# Patient Record
Sex: FEMALE | Race: WHITE | NOT HISPANIC OR LATINO | Employment: OTHER | URBAN - METROPOLITAN AREA
[De-identification: names, ages, dates, MRNs, and addresses within clinical notes are randomized per-mention and may not be internally consistent; named-entity substitution may affect disease eponyms.]

---

## 2017-01-11 ENCOUNTER — OFFICE VISIT (OUTPATIENT)
Dept: INTERNAL MEDICINE | Facility: CLINIC | Age: 72
End: 2017-01-11

## 2017-01-11 VITALS — HEART RATE: 81 BPM | SYSTOLIC BLOOD PRESSURE: 141 MMHG | TEMPERATURE: 97.7 F | DIASTOLIC BLOOD PRESSURE: 90 MMHG

## 2017-01-11 DIAGNOSIS — M62.838 MUSCLE SPASM: ICD-10-CM

## 2017-01-11 DIAGNOSIS — M62.838 MUSCLE SPASM: Primary | ICD-10-CM

## 2017-01-11 LAB
ANION GAP SERPL CALCULATED.3IONS-SCNC: 5 MMOL/L (ref 3–14)
BUN SERPL-MCNC: 24 MG/DL (ref 7–30)
CALCIUM SERPL-MCNC: 8.7 MG/DL (ref 8.5–10.1)
CHLORIDE SERPL-SCNC: 101 MMOL/L (ref 94–109)
CK SERPL-CCNC: 100 U/L (ref 30–225)
CO2 SERPL-SCNC: 32 MMOL/L (ref 20–32)
CREAT SERPL-MCNC: 0.89 MG/DL (ref 0.52–1.04)
GFR SERPL CREATININE-BSD FRML MDRD: 62 ML/MIN/1.7M2
GLUCOSE SERPL-MCNC: 87 MG/DL (ref 70–99)
POTASSIUM SERPL-SCNC: 4.1 MMOL/L (ref 3.4–5.3)
SODIUM SERPL-SCNC: 139 MMOL/L (ref 133–144)

## 2017-01-11 RX ORDER — CODEINE PHOSPHATE AND GUAIFENESIN 10; 100 MG/5ML; MG/5ML
1-2 SOLUTION ORAL EVERY 4 HOURS PRN
Qty: 420 ML | Status: CANCELLED | OUTPATIENT
Start: 2017-01-11

## 2017-01-11 ASSESSMENT — PAIN SCALES - GENERAL: PAINLEVEL: NO PAIN (0)

## 2017-01-11 NOTE — MR AVS SNAPSHOT
After Visit Summary   1/11/2017    Ca Yan    MRN: [id number]           Patient Information     Date Of Birth          1945        Visit Information        Provider Department      1/11/2017 1:25 PM Marlene Briscoe MD [facility]        Today's Diagnoses     Muscle spasm    -  1       Care Instructions    Primary Care Center Medication Refill Request Information:  * Please contact your pharmacy regarding ANY request for medication refills.  ** [facility] Prescription Fax = [phone]  * Please allow 3 business days for routine medication refills.  * Please allow 5 business days for controlled substance medication refills.     Primary Care Center Test Result notification information:  *You will be notified with in 7-10 days of your appointment day regarding the results of your test.  If you are on MyChart you will be notified as soon as the provider has reviewed the results and signed off on them.          Follow-ups after your visit        Additional Services     PHYSICAL THERAPY REFERRAL       *This therapy referral will be filtered to a centralized scheduling office at [facility] and the patient will receive a call to schedule an appointment at a [facility] location most convenient for them. *     [facility] provides Physical Therapy evaluation and treatment and many specialty services across the [facility] system.  If requesting a specialty program, please choose from the list below.    If you have not heard from the scheduling office within 2 business days, please call [phone] for all locations, with the exception of [place], please call [phone].  Treatment: Evaluation & Treatment  Special Instructions/Modalities: aqua therapy  Special Programs: Aquatic Therapy ([facility] locations only)    Please be aware that coverage of these services is subject to the terms and limitations of your health insurance plan.   Call member services at your health plan with any benefit or coverage questions.      **Note to Provider:  If you are referring outside of [place] for the therapy appointment, please list the name of the location in the  special instructions  above, print the referral and give to the patient to schedule the appointment.                  Your next 10 appointments already scheduled     Jan 11, 2017  2:45 PM   LAB with  LAB   [facility] ([facility])    [address] [place] [address]  [address] [facility] [address] [phone]           Patient must bring picture ID.  Patient should be prepared to give a urine specimen  Please do not eat 10-12 hours before your appointment if you are coming in fasting for labs on lipids, cholesterol, or glucose (sugar).  Pregnant women should follow their Care Team instructions. Water with medications is okay. Do not drink coffee or other fluids.   If you have concerns about taking  your medications, please ask at office or if scheduling via [employer], send a message by clicking on Secure Messaging, Message Your Care Team.            Mar 22, 2017 11:40 AM   (Arrive by 11:25 AM)   New Patient Visit with Lynn Peacock MD   Gastroenterology and IBD ([facility])    [address] [place] [address] [phone]              Future tests that were ordered for you today     Open Future Orders        Priority Expected Expires Ordered    Basic metabolic panel Routine  1/11/2018 1/11/2017    CK total Routine  1/11/2018 1/11/2017            Who to contact     Please call your clinic at [phone] to:    Ask questions about your health    Make or cancel appointments    Discuss your medicines    Learn about your test results    Speak to your doctor   If you have compliments or concerns about an experience at your clinic, or if you wish to file a complaint, please contact [facility] Patient Relations at [phone] or email us at [email or web address]         Additional Information About Your Visit        [employer] Information     [employer] is an electronic gateway that provides easy, online access to your medical records. With [employer], you can request a clinic appointment, read your test results, renew a prescription or communicate with your care team.     To sign up for [employer] visit the website at [email or web address]   You will be asked to enter the access code listed below, as well as some personal information. Please follow the directions to create your username and password.     Your access code is: [email or web address]  Expires: 2017 11:57 AM     Your access code will  in 90 days. If you need help or a new code, please contact your [facility] or call [phone] for assistance.        Care EveryWhere ID     This is your Care EveryWhere ID. This could be used by other organizations to access your [facility] medical records  [id number]        Your Vitals Were     Pulse Temperature Breastfeeding?             81 97.7  F (36.5  C) (Oral) No          Blood Pressure from Last 3 Encounters:   17 141/90   16 143/98   10/31/16 136/84    Weight from Last 3 Encounters:   16 89.812 kg (198 lb)   10/31/16 90.13 kg (198 lb 11.2 oz)   10/10/16 90.538 kg (199 lb 9.6 oz)              We Performed the Following     PHYSICAL THERAPY REFERRAL        Primary Care Provider Office Phone # Fax #    Senia Olivas [phone]        PH[facility]IC[facility] [address] [facility] [address]  [facility] [address]        Thank you!     Thank you for choosing [facility]  for your care. Our goal is always to provide you with excellent care. Hearing back from our patients is one way we can continue to improve our services. Please take a few minutes to complete the written survey that you may receive in the mail after your  "visit with us. Thank you!             Your Updated Medication List - Protect others around you: Learn how to safely use, store and throw away your medicines at www.disposemymeds.org.          This list is accurate as of: 1/11/17  2:31 PM.  Always use your most recent med list.                   Brand Name Dispense Instructions for use    acetaminophen 500 MG tablet    TYLENOL     Take 500-1,000 mg by mouth every 6 hours as needed for mild pain       ASPIRIN EC PO      Take 81 mg by mouth       budesonide 0.5 MG/2ML neb solution    PULMICORT     Take 0.5 mg by nebulization daily       cephalexin 250 MG capsule    KEFLEX         gabapentin 100 MG capsule    NEURONTIN    30 capsule    Take 1 capsule (100 mg) by mouth At Bedtime       hydrochlorothiazide 12.5 MG Tabs tablet      Take 12.5 mg by mouth daily.       ibuprofen 200 MG tablet    ADVIL/MOTRIN    90 tablet    Take 3 tablets (600 mg) by mouth 3 times daily (with meals)       losartan 25 MG tablet    COZAAR    30 tablet    Take 1 tablet (25 mg) by mouth daily       omeprazole 20 MG CR capsule    priLOSEC     Take 20 mg by mouth as needed       order for DME     1 Device    Equipment being ordered: Oxygen  Please provide portable oxygen concentrator (pt would like over the shoulder oxygen device) for portability at 2LPM with activity via nasal canula.       polyethylene glycol Packet    MIRALAX    238 g    One 8.3-ounce bottle (238 g).  Refer to \"Getting Ready for a Colonoscopy\" patient handout       QUEtiapine 200 MG tablet    SEROquel     Take 200 mg by mouth At Bedtime.       tiotropium 18 MCG capsule    SPIRIVA     Inhale 18 mcg into the lungs daily       ZOCOR 20 MG tablet   Generic drug:  simvastatin      Take 20 mg by mouth daily         "

## 2017-01-11 NOTE — PATIENT INSTRUCTIONS
Primary Care Center Medication Refill Request Information:  * Please contact your pharmacy regarding ANY request for medication refills.  ** [facility] Prescription Fax = [phone]  * Please allow 3 business days for routine medication refills.  * Please allow 5 business days for controlled substance medication refills.     Primary Care Center Test Result notification information:  *You will be notified with in 7-10 days of your appointment day regarding the results of your test.  If you are on MyChart you will be notified as soon as the provider has reviewed the results and signed off on them.

## 2017-01-11 NOTE — NURSING NOTE
Chief Complaint   Patient presents with     Cough     Patient here for cough and cramps     Rosaura Taveras LPN at 1:34 PM on 1/11/2017.

## 2017-01-11 NOTE — PROGRESS NOTES
"HPI:  This 71-year-old woman comes in today for evaluation of cold symptoms.  The symptoms started last week and by Sunday, 5 days ago, she felt very ill and visited the [facility].  There, they did a chest x-ray which was negative and prescribed doxycycline and Tessalon Perles.  She does not think that the cough medication is helping at all.  She feels slightly better but is wondering if she could get some codeine cough syrup.  She does not have a sore throat, fever, ear pain, nausea, vomiting or diarrhea.      She also complains of cramps in the muscles around her rib cage, under her breasts, and around her abdomen.  This is a longstanding problem that she experiences when she makes a sudden movement; for example, bending over to wash her hair at the sink.  The cramps are debilitating and last several minutes but are very painful.  She would like to know what is causing them and what she can do to make them better.     She describes that overall her body feels \"terrible.\"  She used to feel much better when she was younger and now everything seems to be going wrong.  She has colonic problems which she declined to describe because they are going to be evaluated by a gastroenterologist in a couple of months; however, this is the fifth or sixth specialist she has seen for this problem.  Her mood is somewhat low and she thinks that perhaps she is just getting old.     ROS:  Constitutional: no fevers, night sweats or unintentional weight change   Eyes: no vision change, diplopia or red eyes   Ears, Nose, Mouth, Throat: no tinnitus or hearing change, no oral lesions, throat clear   Cardiovascular: no chest pain, palpitations,orthopnea or PND   Respiratory: no dyspnea, cough, shortness of breath or wheezing   GI: no nausea, vomiting, diarrhea or constipation, no abdominal pain   : no change in urine, no dysuria or hematuria   Musculoskeletal: see HPI      Patient Active Problem List   Diagnosis     Non " morbid obesity due to excess calories     Alcohol abuse     Malignant neoplasm of breast (H)     Chronic kidney disease, stage III (moderate)     Osteoarthritis of knee     Major depressive disorder with single episode     Diarrhea     Diastolic dysfunction     Osteoarthritis of spine     Gastroesophageal reflux disease     Generalized anxiety disorder     Hypertension     Hyperlipidemia     Insomnia     Irritable bowel syndrome     Kyphoscoliosis     Cluster C personality disorder     History of knee surgery     Seborrheic eczema     Anemia     Anxiety state     Asthma     Back pain     Bunion     Chronic obstructive pulmonary disease, unspecified COPD type (H)     Past Medical History   Diagnosis Date     COPD (chronic obstructive pulmonary disease) (H)      Anxiety      Arthritis      Hypertension      Breast cancer (H)      Past Surgical History   Procedure Laterality Date     Back surgery       spinal fusion     Orthopedic surgery       Knee surgery left side     Lumpectomy breast       Social History   Substance Use Topics     Smoking status: Former Smoker     Types: Cigarettes     Quit date: 09/26/1980     Smokeless tobacco: Never Used     Alcohol Use: No      Comment: Sober for 2 years, previous alcoholic     Family History   Problem Relation Age of Onset     Breast Cancer Mother      DIABETES Mother      Alcohol/Drug Father      MENTAL ILLNESS Father      CEREBROVASCULAR DISEASE Maternal Grandmother 67     Allergies   Allergen Reactions     Percocet [Oxycodone-Acetaminophen]      Vomiting     Seasonal Allergies Other (See Comments)     Pt states that she has sneezing and runny nose.      Vicodin [Hydrocodone-Acetaminophen]      Vomiting     Zolpidem Other (See Comments)     Amnestic behavior     Current Outpatient Prescriptions   Medication Sig Dispense Refill     losartan (COZAAR) 25 MG tablet Take 1 tablet (25 mg) by mouth daily 30 tablet 1     cephALEXin (KEFLEX) 250 MG capsule        ibuprofen  "(ADVIL,MOTRIN) 200 MG tablet Take 3 tablets (600 mg) by mouth 3 times daily (with meals) 90 tablet 0     gabapentin (NEURONTIN) 100 MG capsule Take 1 capsule (100 mg) by mouth At Bedtime 30 capsule 0     order for DME Equipment being ordered: Oxygen  Please provide portable oxygen concentrator (pt would like over the shoulder oxygen device) for portability at 2LPM with activity via nasal canula. 1 Device 1     polyethylene glycol (MIRALAX) packet One 8.3-ounce bottle (238 g).  Refer to \"Getting Ready for a Colonoscopy\" patient handout 238 g 0     acetaminophen (TYLENOL) 500 MG tablet Take 500-1,000 mg by mouth every 6 hours as needed for mild pain       ASPIRIN EC PO Take 81 mg by mouth       budesonide (PULMICORT) 0.5 MG/2ML nebulizer solution Take 0.5 mg by nebulization daily       tiotropium (SPIRIVA) 18 MCG inhalation capsule Inhale 18 mcg into the lungs daily       hydrochlorothiazide 12.5 MG TABS Take 12.5 mg by mouth daily.       omeprazole (PRILOSEC) 20 MG capsule Take 20 mg by mouth as needed        quetiapine (SEROQUEL) 200 MG tablet Take 200 mg by mouth At Bedtime.       simvastatin (ZOCOR) 20 MG tablet Take 20 mg by mouth daily        /90 mmHg  Pulse 81  Temp(Src) 97.7  F (36.5  C) (Oral)  Wt   Breastfeeding? No    PHYSICAL EXAM:  Constitutional: no distress, comfortable, pleasant, obese  Eyes: anicteric, normal extra-ocular movements   Ears, Nose and Throat: throat clear, neck supple with full range of motion, no thyromegaly.   Cardiovascular: regular rate and rhythm, normal S1 and S2, no murmurs, rubs or gallops  Respiratory: clear to auscultation, no wheezes or crackles, normal breath sounds   Psychological: fairly appropriate mood but interrupts frequently, somewhat anxious  Lymphatic: no cervical lymphadenopathy    A/P:    1.  Upper respiratory tract infection for approximately 1 week.  Symptoms are improving, but she has a persistent cough.  She does not think that the Tessalon is helping " very much, so we considered Robitussin with codeine, which she was positive about.  She does, however, have an allergy to oxycodone; therefore, we did not complete the prescription.  I reassured her that her symptoms were likely to resolve in less than a week.    2.  Muscle cramping.  This is a longstanding problem of unclear etiology.  I did check a CK since she is on a statin; however, it was normal.  I will inform her of this result and recommended that she embark on an exercise program both for muscle stretching and strengthening and also weight loss.  After some discussion, she elected to pursue aqua therapy and I placed a Physical Therapy order.     Total time spent 25 minutes.  More than 50% of the time spent with Ms. Yan on counseling / coordinating her care.

## 2017-01-19 ENCOUNTER — OFFICE VISIT (OUTPATIENT)
Dept: INTERNAL MEDICINE | Facility: CLINIC | Age: 72
End: 2017-01-19

## 2017-01-19 VITALS
DIASTOLIC BLOOD PRESSURE: 89 MMHG | HEART RATE: 83 BPM | WEIGHT: 196 LBS | BODY MASS INDEX: 34.6 KG/M2 | OXYGEN SATURATION: 94 % | RESPIRATION RATE: 20 BRPM | SYSTOLIC BLOOD PRESSURE: 136 MMHG

## 2017-01-19 DIAGNOSIS — R60.0 BILATERAL LEG EDEMA: ICD-10-CM

## 2017-01-19 DIAGNOSIS — F10.20 ALCOHOLISM (H): ICD-10-CM

## 2017-01-19 DIAGNOSIS — E66.09 OBESITY DUE TO EXCESS CALORIES, UNSPECIFIED OBESITY SEVERITY: ICD-10-CM

## 2017-01-19 DIAGNOSIS — R60.0 BILATERAL LEG EDEMA: Primary | ICD-10-CM

## 2017-01-19 LAB
ALBUMIN SERPL-MCNC: 3.4 G/DL (ref 3.4–5)
ALBUMIN UR-MCNC: 30 MG/DL
ALP SERPL-CCNC: 126 U/L (ref 40–150)
ALT SERPL W P-5'-P-CCNC: 13 U/L (ref 0–50)
AMORPH CRY #/AREA URNS HPF: ABNORMAL /HPF
APPEARANCE UR: ABNORMAL
AST SERPL W P-5'-P-CCNC: 22 U/L (ref 0–45)
BACTERIA #/AREA URNS HPF: ABNORMAL /HPF
BILIRUB DIRECT SERPL-MCNC: 0.1 MG/DL (ref 0–0.2)
BILIRUB SERPL-MCNC: 0.3 MG/DL (ref 0.2–1.3)
BILIRUB UR QL STRIP: NEGATIVE
COLOR UR AUTO: YELLOW
GLUCOSE UR STRIP-MCNC: NEGATIVE MG/DL
HGB UR QL STRIP: NEGATIVE
KETONES UR STRIP-MCNC: 5 MG/DL
LEUKOCYTE ESTERASE UR QL STRIP: ABNORMAL
MUCOUS THREADS #/AREA URNS LPF: PRESENT /LPF
NITRATE UR QL: NEGATIVE
PH UR STRIP: 5 PH (ref 5–7)
PROT SERPL-MCNC: 7.9 G/DL (ref 6.8–8.8)
RBC #/AREA URNS AUTO: 0 /HPF (ref 0–2)
SP GR UR STRIP: 1.03 (ref 1–1.03)
SQUAMOUS #/AREA URNS AUTO: 3 /HPF (ref 0–1)
TSH SERPL DL<=0.005 MIU/L-ACNC: 1.56 MU/L (ref 0.4–4)
URN SPEC COLLECT METH UR: ABNORMAL
UROBILINOGEN UR STRIP-MCNC: 0 MG/DL (ref 0–2)
WBC #/AREA URNS AUTO: 3 /HPF (ref 0–2)

## 2017-01-19 RX ORDER — AMMONIUM LACTATE 12 G/100G
CREAM TOPICAL 2 TIMES DAILY PRN
Qty: 385 G | Refills: 3 | Status: SHIPPED | OUTPATIENT
Start: 2017-01-19 | End: 2019-04-15

## 2017-01-19 ASSESSMENT — PAIN SCALES - GENERAL: PAINLEVEL: NO PAIN (0)

## 2017-01-19 NOTE — PATIENT INSTRUCTIONS
Bong Penaloza,  Nice to meet you today. Your leg swelling is likely due to genetics and being overweight. Losing weight and exercise will help. Things that we will today:  1) Obtain blood work to rule out causes that may be making the leg swelling worse: Check Thyroid (TSH), Check liver (hepatic panel and abdominal ultrasound). Your heart ECHO shows that you have good heart function. Your kidney function is also good.  We will also obtain a Venous Ultrasound Competency test on both legs.   2) Your itchy legs are due to dry skin - you will need to use amlactin and also moisturize your legs and feet right after the shower  3) Nutrition referral: to help you make healthy lifestyle choices to eat.  4) Keep up the good work with finding exercise, whether it's aquatic therapy or other.    Dr. Baez

## 2017-01-19 NOTE — PROGRESS NOTES
"[facility] Office Visit  Date: January 19, 2017  Resident: Casi Baez MD  Attending: Joyce Aguirre MD    Ms. Yan is a 71 year old female with hx of COPD here:     History of Present Illness:  Leg swelling: Since age 30-40's. Wonders if it's hereditary, since her grandma had edema \"so bad that her calves draped over her ankles.\" Not on any CCB's, no recent changes to medications. Creatinine function normal in 1/2017, Albumin last checked in 2014 was 4.1.  Last ECHO with normal EF of 60-65%, on 8/5/16.  In 2014, normal ALT/AST. 1/11/17 Creatinine within normal limits. History of alcoholism, drank since age 13 - 26, then 46 - 68. Drank a half a pint of vodka daily, during those years.  Feels very embarassed and will never wear a dress or any clothes that expose her legs.  Of note, last TSH in 10/2015 was 5.35 (high) with normal T4.  Comes into clinic today, because she noticed that the right outer part of her calf has become more itchy.     No fevers, chills. No chest pain. No sob.  No orthopnea, or PND.     A full 10-pt Review of Systems was performed, verified and is negative except as documented in the HPI.  All health questionnaires were reviewed, verified and relevant information documented above.    Past Medical History:  Past Medical History   Diagnosis Date     COPD (chronic obstructive pulmonary disease) (H)      Anxiety      Arthritis      Hypertension      Breast cancer (H)        Active Meds:  Current Outpatient Prescriptions   Medication     ammonium lactate (LAC-HYDRIN) 12 % cream     losartan (COZAAR) 25 MG tablet     cephALEXin (KEFLEX) 250 MG capsule     ibuprofen (ADVIL,MOTRIN) 200 MG tablet     gabapentin (NEURONTIN) 100 MG capsule     order for DME     polyethylene glycol (MIRALAX) packet     acetaminophen (TYLENOL) 500 MG tablet     ASPIRIN EC PO     budesonide (PULMICORT) 0.5 MG/2ML nebulizer solution     tiotropium (SPIRIVA) 18 MCG inhalation capsule     " hydrochlorothiazide 12.5 MG TABS     omeprazole (PRILOSEC) 20 MG capsule     quetiapine (SEROQUEL) 200 MG tablet     simvastatin (ZOCOR) 20 MG tablet     No current facility-administered medications for this visit.        Allergies:  Reviewed, refer to EMR    Relevant Social History:  Retired , working on computer.     Physical Exam:  Vitals: /89 mmHg  Pulse 83  Resp 20  Wt 88.905 kg (196 lb)  SpO2 94%  Constitutional: Alert, oriented, pleasant, no acute distress, overweight  Head: Normocephalic, atraumatic  Eyes: Extra-ocular movements intact, pupils equally round and reactive bilaterally, no scleral icterus  ENT: Oropharynx clear, moist mucus membranes, good dentition  Neck: Supple, no lymphadenopathy, no thyromegaly, no JVD  Cardiovascular: Regular rate and rhythm, no murmurs, rubs or gallops, peripheral pulses full/symmetric  Respiratory: Good air movement bilaterally, lungs clear, no wheezes/rales/rhonchi  GI: Abdomen soft, bowel sounds present, nondistended, nontender, no organomegaly or masses, no rebound/guarding  Musculoskeletal: Knee replacement scar on right knee, 2+ edema vs. Adipose tissue (non-pitting), normal muscle tone, normal gait  Neurologic: Alert and oriented, cranial nerves 2-12 intact, strength 5/5 throughout, sensation to light touch intact, reflexes 2+ bilaterally  Skin: Right outer calf with faint erythematous excoriation, very dry skin with scale, no warmth / sign of infection, no palpable cords  Psychiatric: normal mentation, affect and mood    Recent Labs:  NA      139   1/11/2017   POTASSIUM      4.1   1/11/2017  CHLORIDE      101   1/11/2017  SYMONE      8.7   1/11/2017  CO2       32   1/11/2017  BUN       24   1/11/2017  CR     0.89   1/11/2017  GLC       87   1/11/2017  AST       24   5/6/2014  ALT       22   5/6/2014  BILICONJ      0.0   2/7/2006   BILITOTAL      0.5   5/6/2014  ALBUMIN      4.1   5/6/2014  PROTTOTAL      8.0   5/6/2014   ALKPHOS      101    5/6/2014    WBC      6.5   4/21/2016  HGB     13.9   4/21/2016  HCT     43.5   4/21/2016  MCV       97   4/21/2016  PLT      259   4/21/2016      Assessment and Plan:    71 year old female with a history of COPD presents    Ca was seen today for edema.    Diagnoses and all orders for this visit:    Bilateral leg swelling:  Chronic for 30-40 years, with grandmother experiencing similar symptoms. High suspicion for genetic component given that bilateral LE swelling feels on exam more consistent with adipose tissue instead of fluid. DDX for contributing causes could be cirrhosis (hx of alcoholism), heart failure (though normal ECHO in 2016), nephrotic syndrome (less likely given normal renal function 1/11/17). Not on any classically causative meds. TSH was high in past, will check now. Lymphedema wraps unlikely to be of much help given that swelling is more related to tissue instead of fluid.  Suspect itchy skin is 2/2 to dry skin from winter weather.   -     LYMPHEDEMA THERAPY REFERRAL  -     Hepatic panel; Future  -     TSH with free T4 reflex; Future  -     US Venous Competency Left; Future  -     US Venous Competency Right; Future  -     NUTRITION REFERRAL  -     US Abdomen Limited; Future  -     ammonium lactate (LAC-HYDRIN) 12 % cream; Apply topically 2 times daily as needed for dry skin  -     Urine analysis     Obesity due to excess calories, unspecified obesity severity: BMI of 31, pt very interested in learning more information about how to lose weight. Will be embarking on aquatic aerobics class.   -     NUTRITION REFERRAL    Alcoholism (H): History of alcoholism, drank since age 13 - 26, then 46 - 68. Drank a half a pint of vodka daily, during those years. Obtaining hepatic panel and US Abdomen to assess for cirrhosis.   -     US Abdomen Limited; Future    Return to clinic: At next available office visit with Dr. Olivas.     Pt was seen and examined with Dr. Baez.  I agree with her documentation as noted  above.    My additional comments: None    Joyce Aguirre MD

## 2017-01-19 NOTE — MR AVS SNAPSHOT
After Visit Summary   1/19/2017    Ca Yan    MRN: [id number]           Patient Information     Date Of Birth          1945        Visit Information        Provider Department      1/19/2017 1:35 PM Casi Baez MD [facility]        Today's Diagnoses     Bilateral leg edema    -  1     Obesity due to excess calories, unspecified obesity severity         Alcoholism (H)           Care Instructions    Bong Penaloza,  Nice to meet you today. Your leg swelling is likely due to genetics and being overweight. Losing weight and exercise will help. Things that we will today:  1) Obtain blood work to rule out causes that may be making the leg swelling worse: Check Thyroid (TSH), Check liver (hepatic panel and abdominal ultrasound). Your heart ECHO shows that you have good heart function. Your kidney function is also good.  We will also obtain a Venous Ultrasound Competency test on both legs.   2) Your itchy legs are due to dry skin - you will need to use amlactin and also moisturize your legs and feet right after the shower  3) Nutrition referral: to help you make healthy lifestyle choices to eat.  4) Keep up the good work with finding exercise, whether it's aquatic therapy or other.    Dr. Baez         Follow-ups after your visit        Additional Services     LYMPHEDEMA THERAPY REFERRAL       This is for a referral to lymphedema PT and/or OT who will evaluate and treat as necessary.            NUTRITION REFERRAL       Your provider has referred you to: [facility]: [facility] (on call location)  - [place] [phone]   [email or web address]    Please be aware that coverage of these services is subject to the terms and limitations of your health insurance plan.  Call member services at your health plan with any benefit or coverage questions.      Please bring the following with you to your appointment:    (1) This referral request  (2)  Any documents given to you regarding this referral  (3) Any specific questions you have about diet and/or food choices                  Your next 10 appointments already scheduled     Jan 19, 2017  3:00 PM   LAB with  LAB   [facility] ([facility])    [address] [place] [id number] [phone]           Patient must bring picture ID.  Patient should be prepared to give a urine specimen  Please do not eat 10-12 hours before your appointment if you are coming in fasting for labs on lipids, cholesterol, or glucose (sugar).  Pregnant women should follow their Care Team instructions. Water with medications is okay. Do not drink coffee or other fluids.   If you have concerns about taking  your medications, please ask at office or if scheduling via [employer], send a message by clicking on Secure Messaging, Message Your Care Team.            Jan 25, 2017  1:45 PM   Lymphedema Evaluation with Daila Galvez, PT   [facility] [place] [facility] ([facility])    [address] [facility]  1st Floor  [address]  [facility] [id number] [phone]            Jan 27, 2017  9:45 AM   US ABDOMEN LIMITED with UCUS2   [facility] US ([facility])    [address] [place] [id number] [phone]           Please bring a list of your medicines (including vitamins, minerals and over-the-counter drugs). Also, tell your doctor about any allergies you may have. Wear comfortable clothes and leave your valuables at home.  Adults: No eating or drinking for 8 hours before the exam. You may take medicine with a small sip of water.  Children: - Children 6+ years: No food or drink for 6 hours before exam. - Children 1-5 years: No food or drink for 4 hours before exam. - Infants, breast-fed: may have breast milk up to 2 hours before exam. - Infants, formula: may have bottle until  4 hours before exam.  Please call the Imaging Department at your exam site with any questions.            Jan 27, 2017 10:30 AM   US LOWER EXTREMITY VENOUS COMPETENCY RIGHT with UCUSV1   [facility]  ([facility])    [address] [place] [id number] [phone]           Please bring a list of your medicines (including vitamins, minerals and over-the-counter drugs). Also, tell your doctor about any allergies you may have. Wear comfortable clothes and leave your valuables at home.  You do not need to do anything special to prepare for your exam.  Please call the Imaging Department at your exam site with any questions.            Jan 27, 2017 11:30 AM   US LOWER EXTREMITY VENOUS COMPETENCY LEFT with UCUSV1   [facility] ([facility])    [address] [place] [id number] [phone]           Please bring a list of your medicines (including vitamins, minerals and over-the-counter drugs). Also, tell your doctor about any allergies you may have. Wear comfortable clothes and leave your valuables at home.  You do not need to do anything special to prepare for your exam.  Please call the Imaging Department at your exam site with any questions.            Feb 21, 2017  1:00 PM   Pool Evaluation with Delmi Mcknezie, PT   [facility] ([facility])    [address] [place] [address]   [phone]            Feb 27, 2017 10:10 AM   (Arrive by 9:55 AM)   Return Visit with Senia Olivas MD   [facility] ([facility])    [address] [place] [address]   [phone]            Mar 22, 2017 11:40 AM   (Arrive by 11:25 AM)   New Patient Visit with Lynn Peacock MD   [facility] ([facility])    [address] [place] [address]  Se  4th Floor  [facility] [id number] [phone]              Future tests that were ordered for you today     Open Future Orders        Priority Expected Expires Ordered    Hepatic panel Routine  2018    TSH with free T4 reflex Routine  2018    US Venous Competency Left Routine  2018    US Venous Competency Right Routine  2018    US Abdomen Limited Routine  2018            Who to contact     Please call your clinic at [phone] to:    Ask questions about your health    Make or cancel appointments    Discuss your medicines    Learn about your test results    Speak to your doctor   If you have compliments or concerns about an experience at your clinic, or if you wish to file a complaint, please contact [facility] Patient Relations at [phone] or email us at [email or web address]         Additional Information About Your Visit        [employer] Information     [employer] is an electronic gateway that provides easy, online access to your medical records. With [employer], you can request a clinic appointment, read your test results, renew a prescription or communicate with your care team.     To sign up for [employer] visit the website at [email or web address]   You will be asked to enter the access code listed below, as well as some personal information. Please follow the directions to create your username and password.     Your access code is: [email or web address]  Expires: 2017 11:57 AM     Your access code will  in 90 days. If you need help or a new code, please contact your [facility] Clinic or call [phone] for assistance.        Care EveryWhere ID     This is your Care EveryWhere ID. This could be used by other organizations to access your [facility] medical records  [id number]        Your Vitals Were     Pulse Respirations Pulse Oximetry             83 20 94%           Blood Pressure from Last 3 Encounters:   01/19/17 136/89   01/11/17 141/90   11/04/16 143/98    Weight from Last 3 Encounters:   01/19/17 88.905 kg (196 lb)   11/04/16 89.812 kg (198 lb)   10/31/16 90.13 kg (198 lb 11.2 oz)              We Performed the Following     *UA reflex to Microscopic     LYMPHEDEMA THERAPY REFERRAL     NUTRITION REFERRAL          Today's Medication Changes          These changes are accurate as of: 1/19/17  2:53 PM.  If you have any questions, ask your nurse or doctor.               Start taking these medicines.        Dose/Directions    ammonium lactate 12 % cream   Commonly known as:  LAC-HYDRIN   Used for:  Bilateral leg edema   Started by:  Casi Baez MD        Apply topically 2 times daily as needed for dry skin   Quantity:  385 g   Refills:  3            Where to get your medicines      These medications were sent to [facility] [address] [facility] [address]  [address] [facility] [address] [address]    Hours:  TRANSPLANT PHONE NUMBER [phone] Phone:  [phone]    - ammonium lactate 12 % cream             Primary Care Provider Office Phone # Fax #    Senia Olivas [phone]        [facility] [address] [place] [address] [facility] [address]  [facility] [address]        Thank you!     Thank you for choosing [facility]  for your care. Our goal is always to provide you with excellent care. Hearing back from our patients is one way we can continue to improve our services. Please take a few minutes to complete the written survey that you may receive in the mail after your visit with us. Thank you!             Your Updated Medication List - Protect others around you: Learn how to safely use, store and throw away your medicines at www.disposemymeds.org.          This list is accurate as of: 1/19/17  2:53 PM.  Always use your most recent med list.                   Brand Name Dispense Instructions for use     "acetaminophen 500 MG tablet    TYLENOL     Take 500-1,000 mg by mouth every 6 hours as needed for mild pain       ammonium lactate 12 % cream    LAC-HYDRIN    385 g    Apply topically 2 times daily as needed for dry skin       ASPIRIN EC PO      Take 81 mg by mouth       budesonide 0.5 MG/2ML neb solution    PULMICORT     Take 0.5 mg by nebulization daily       cephalexin 250 MG capsule    KEFLEX         gabapentin 100 MG capsule    NEURONTIN    30 capsule    Take 1 capsule (100 mg) by mouth At Bedtime       hydrochlorothiazide 12.5 MG Tabs tablet      Take 12.5 mg by mouth daily.       ibuprofen 200 MG tablet    ADVIL/MOTRIN    90 tablet    Take 3 tablets (600 mg) by mouth 3 times daily (with meals)       losartan 25 MG tablet    COZAAR    30 tablet    Take 1 tablet (25 mg) by mouth daily       omeprazole 20 MG CR capsule    priLOSEC     Take 20 mg by mouth as needed       order for DME     1 Device    Equipment being ordered: Oxygen  Please provide portable oxygen concentrator (pt would like over the shoulder oxygen device) for portability at 2LPM with activity via nasal canula.       polyethylene glycol Packet    MIRALAX    238 g    One 8.3-ounce bottle (238 g).  Refer to \"Getting Ready for a Colonoscopy\" patient handout       QUEtiapine 200 MG tablet    SEROquel     Take 200 mg by mouth At Bedtime.       tiotropium 18 MCG capsule    SPIRIVA     Inhale 18 mcg into the lungs daily       ZOCOR 20 MG tablet   Generic drug:  simvastatin      Take 20 mg by mouth daily         "

## 2017-01-19 NOTE — NURSING NOTE
"Chief Complaint   Patient presents with     Edema     Itching , redness   and edema on outer aspect of left leg for a \"couple of weeks\"   Waleska Patel LPN 1:32 PM on 1/19/2017  "

## 2017-01-19 NOTE — Clinical Note
Patient:  Ca Yan  :   1945  MRN:     [id number]        Ms. Ca Yan  [address]   [place] [facility] [address]        2017    Dear Ms. Yan,    Thank you for choosing the [facility] for your healthcare needs.  We appreciate the opportunity to serve you.    The following are your recent test results.     Results for orders placed or performed in visit on 17   UA reflex to Microscopic   Result Value Ref Range    Color Urine Yellow     Appearance Urine Slightly Cloudy     Glucose Urine Negative NEG mg/dL    Bilirubin Urine Negative NEG    Ketones Urine 5 (A) NEG mg/dL    Specific Gravity Urine 1.028 1.003 - 1.035    Blood Urine Negative NEG    pH Urine 5.0 5.0 - 7.0 pH    Protein Albumin Urine 30 (A) NEG mg/dL    Urobilinogen mg/dL 0.0 0.0 - 2.0 mg/dL    Nitrite Urine Negative NEG    Leukocyte Esterase Urine Trace (A) NEG    Source Midstream Urine     RBC Urine 0 0 - 2 /HPF    WBC Urine 3 (H) 0 - 2 /HPF    Bacteria Urine Few (A) NEG /HPF    Squamous Epithelial /HPF Urine 3 (H) 0 - 1 /HPF    Mucous Urine Present (A) NEG /LPF    Amorphous Crystals Few (A) NEG /HPF       Your urine test was negative. Please ask your Primary Care Doctor to obtain a Urine Protein: Urine Creatinine ratio at your next visit, to assess the amount of protein in your urine.       Sincerely,    Casi Baez MD/ky

## 2017-01-25 ENCOUNTER — HOSPITAL ENCOUNTER (OUTPATIENT)
Dept: PHYSICAL THERAPY | Facility: CLINIC | Age: 72
Setting detail: THERAPIES SERIES
End: 2017-01-25
Attending: INTERNAL MEDICINE
Payer: COMMERCIAL

## 2017-01-25 DIAGNOSIS — R60.0 BILATERAL LEG EDEMA: Primary | ICD-10-CM

## 2017-01-25 PROCEDURE — 97161 PT EVAL LOW COMPLEX 20 MIN: CPT | Mod: GP | Performed by: PHYSICAL THERAPIST

## 2017-01-25 PROCEDURE — 97110 THERAPEUTIC EXERCISES: CPT | Mod: GP | Performed by: PHYSICAL THERAPIST

## 2017-01-25 PROCEDURE — 97140 MANUAL THERAPY 1/> REGIONS: CPT | Mod: GP | Performed by: PHYSICAL THERAPIST

## 2017-01-25 PROCEDURE — 40000449 ZZHC STATISTIC PT VISIT, LYMPHEDEMA: Performed by: PHYSICAL THERAPIST

## 2017-01-25 PROCEDURE — 97535 SELF CARE MNGMENT TRAINING: CPT | Mod: GP | Performed by: PHYSICAL THERAPIST

## 2017-01-25 NOTE — PROGRESS NOTES
" 01/25/17 1300   Rehab Discipline   Discipline PT   Type of Visit   Type of visit Initial Edema Evaluation   General Information   Start of care 01/25/17   Referring physician Casi Baez MD    Orders Evaluate and treat as indicated   Order date 01/19/17   Medical diagnosis B LE edema   Onset of illness / date of surgery 01/01/17   Edema onset [birth year]01[birth year]   Affected body parts LLE;RLE   Edema etiology Surgery;TKA;Radiation   Location - Radiation L chest and axilla   Edema etiology comments LE edema began in her 20's and has gotten worse recently   Pertinent history of current problem (PT: include personal factors and/or comorbidities that impact the POC; OT: include additional occupational profile info) obesity, COPD, lives alone,difficult to do IADLs due to chronic LBP and COPD and leg volume (past spinal fusion 1957), anxiety about health problems, no longer has a car, has very limited support, history of alcoholism in early adulthood and 22 out of past 25 years; sober 3 years,    Surgical / medical history reviewed Yes   Edema special tests Ultrasound  (will have US done on Friday)   Prior level of functional mobility able to do own housework   Prior treatment (none)   Community support (no help from friends or relatives)   Patient role / employment history Retired  (worked in office, 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 No   Per patient, fall with injury in past year? No   Is patient a fall risk? No   System Outcome Measures   Outcome Measures Lymphedema   Lymphedema Life Impact Scale (score range 0-72). A higher score indicates greater impairment. 30   Subjective Report   Patient report of symptoms heavy   Precautions   Precautions comments none   Patient / Family Goals   Patient / family goals statement \"I'm here " "because my doctor sent me. I didn't know there was anything I could do about it.\"   Pain   Patient currently in pain Yes   Pain location LBP   Pain rating up to 9/10   Pain description comment soreness   Additional pain locations? Pain location 2   Pain location 2 B legs   Pain rating 2 moderate   Pain comments \"legs feel heavy\"   Vital Signs   Vital signs Weight;Height;BMI   Weight 196   Height 5'4\"   BMI 35   Cognitive Status   Orientation Orientation to person, place and time   Level of consciousness Alert   Follows commands and answers questions 100% of the time   Edema Exam / Assessment   Skin condition Non-pitting;Dryness   Skin condition comments rubbery edema in lower legs , painful with light touch, dry lower legs, cracked heels and very large callouses on heels and 1st MT head   Scar Yes   Location R knee   Mobility fair   Capillary refill Symmetrical   Stemmer sign Negative   Stemmer sign comments B 2nd toe   Ulceration No   Girth Measurements   Girth Measurements Refer to separate girth measurement flowsheet   Volume LE   Right LE (mL) to 70 cm: 51345   Left LE (mL) to 70 cm: 19159   Range of Motion   ROM comments WFL   Strength   Strength comments grossly 4/5 B knees and hip flexion   Palpation   Palpation tender to light touch on legs   Planned Edema Interventions   Planned edema interventions Manual lymph drainage;Gradient compression bandaging;Fit for compression garment;Exercises;Precautions to prevent infection / exacerbation;Education;Manual therapy;ADL training;Skin care / precautions;Scar mobilization;Soft tissue mobilization;Home management program development   Planned edema intervention comments B comrpession bandaging to knees, MLD, garments and home program   Clinical Impression   Criteria for skilled therapeutic intervention met Yes   Therapy diagnosis B LE lymphedema   Influenced by the following impairments / conditions Stage 2;Lipedema   Functional limitations due to impairments / " conditions difficult to get boots, slacks to fit, unable to wear skirts, interferes with leisure activities   Clinical Presentation Stable/Uncomplicated   Clinical Presentation Rationale Pt edema has increased only slightly in last few years.   Clinical Decision Making (Complexity) Low complexity   Treatment frequency 3 times / week   Treatment duration 3 weeks then 2 x month x 2 months   Patient / family and/or staff in agreement with plan of care Yes   Risks and benefits of therapy have been explained Yes   Clinical impression comments Pt presents with long history of lipedema/primary lymphedema and no previous treatment and would benefit from complete CDT to decrease volume and increase her function.   Education Assessment   Preferred learning style Listening;Reading;Demonstration;Pictures / video   Barriers to learning Emotional;Physical   Goals   Edema Eval Goals 1;2;3;4   Goal 1   Goal identifier Home program   Goal description Pt will be independent in home program of skin care and exercises   Target date 05/01/17   Goal 2   Goal identifier compression    Goal description Pt will be independent with use of well fitting compression garments and compression bandaging   Target date 05/16/17   Goal 3   Goal identifier volume   Goal description Pt will have 8% or more reduction in volume of lower legs to decrease risk of infection and make clothers fit more easily.   Target date 06/01/17   Goal 4   Goal identifier LLIS   Goal description Pt will have 4 or more point improvement in LLIS score   Target date 05/17/17   Total Evaluation Time   Total evaluation time 15

## 2017-02-20 ENCOUNTER — OFFICE VISIT (OUTPATIENT)
Dept: INTERNAL MEDICINE | Facility: CLINIC | Age: 72
End: 2017-02-20

## 2017-02-20 VITALS
OXYGEN SATURATION: 91 % | SYSTOLIC BLOOD PRESSURE: 157 MMHG | WEIGHT: 194 LBS | HEART RATE: 81 BPM | TEMPERATURE: 98 F | BODY MASS INDEX: 34.25 KG/M2 | RESPIRATION RATE: 18 BRPM | DIASTOLIC BLOOD PRESSURE: 99 MMHG

## 2017-02-20 DIAGNOSIS — Z00.00 HEALTH CARE MAINTENANCE: ICD-10-CM

## 2017-02-20 DIAGNOSIS — J44.9 CHRONIC OBSTRUCTIVE PULMONARY DISEASE, UNSPECIFIED COPD TYPE (H): Primary | ICD-10-CM

## 2017-02-20 LAB
ALBUMIN SERPL-MCNC: 3.6 G/DL (ref 3.4–5)
ALP SERPL-CCNC: 110 U/L (ref 40–150)
ALT SERPL W P-5'-P-CCNC: 18 U/L (ref 0–50)
ANION GAP SERPL CALCULATED.3IONS-SCNC: 8 MMOL/L (ref 3–14)
AST SERPL W P-5'-P-CCNC: 20 U/L (ref 0–45)
BASOPHILS # BLD AUTO: 0.1 10E9/L (ref 0–0.2)
BASOPHILS NFR BLD AUTO: 1 %
BILIRUB SERPL-MCNC: 0.4 MG/DL (ref 0.2–1.3)
BUN SERPL-MCNC: 22 MG/DL (ref 7–30)
CALCIUM SERPL-MCNC: 8.8 MG/DL (ref 8.5–10.1)
CHLORIDE SERPL-SCNC: 105 MMOL/L (ref 94–109)
CO2 SERPL-SCNC: 28 MMOL/L (ref 20–32)
CREAT SERPL-MCNC: 0.92 MG/DL (ref 0.52–1.04)
DIFFERENTIAL METHOD BLD: NORMAL
EOSINOPHIL # BLD AUTO: 0.1 10E9/L (ref 0–0.7)
EOSINOPHIL NFR BLD AUTO: 2.3 %
ERYTHROCYTE [DISTWIDTH] IN BLOOD BY AUTOMATED COUNT: 14.7 % (ref 10–15)
GFR SERPL CREATININE-BSD FRML MDRD: 60 ML/MIN/1.7M2
GLUCOSE SERPL-MCNC: 90 MG/DL (ref 70–99)
HCT VFR BLD AUTO: 42.4 % (ref 35–47)
HGB BLD-MCNC: 13.4 G/DL (ref 11.7–15.7)
IMM GRANULOCYTES # BLD: 0 10E9/L (ref 0–0.4)
IMM GRANULOCYTES NFR BLD: 0.5 %
LYMPHOCYTES # BLD AUTO: 1.4 10E9/L (ref 0.8–5.3)
LYMPHOCYTES NFR BLD AUTO: 22.3 %
MCH RBC QN AUTO: 30.2 PG (ref 26.5–33)
MCHC RBC AUTO-ENTMCNC: 31.6 G/DL (ref 31.5–36.5)
MCV RBC AUTO: 96 FL (ref 78–100)
MONOCYTES # BLD AUTO: 0.5 10E9/L (ref 0–1.3)
MONOCYTES NFR BLD AUTO: 8.9 %
NEUTROPHILS # BLD AUTO: 3.9 10E9/L (ref 1.6–8.3)
NEUTROPHILS NFR BLD AUTO: 65 %
NRBC # BLD AUTO: 0 10*3/UL
NRBC BLD AUTO-RTO: 0 /100
PLATELET # BLD AUTO: 269 10E9/L (ref 150–450)
POTASSIUM SERPL-SCNC: 4.2 MMOL/L (ref 3.4–5.3)
PROT SERPL-MCNC: 8.4 G/DL (ref 6.8–8.8)
RBC # BLD AUTO: 4.44 10E12/L (ref 3.8–5.2)
SODIUM SERPL-SCNC: 141 MMOL/L (ref 133–144)
TSH SERPL DL<=0.05 MIU/L-ACNC: 1.4 MU/L (ref 0.4–4)
WBC # BLD AUTO: 6.1 10E9/L (ref 4–11)

## 2017-02-20 ASSESSMENT — PAIN SCALES - GENERAL: PAINLEVEL: NO PAIN (0)

## 2017-02-20 NOTE — PATIENT INSTRUCTIONS
[facility] Medication Refill Request Information:  * Please contact your pharmacy regarding ANY request for medication refills.  ** [facility] Prescription Fax = [phone]  * Please allow 3 business days for routine medication refills.  * Please allow 5 business days for controlled substance medication refills.     [facility] Test Result notification information:  *You will be notified with in 7-10 days of your appointment day regarding the results of your test.  If you are on MyChart you will be notified as soon as the provider has reviewed the results and signed off on them.    Primary Care Center Phone Number [phone]    1.  Please call my nurse at  to inform me of the name of the bad smelling pill you take at night to prevent bladder infections.    2.  Use albuterol inhaler 2 puffs 2-4 times daily     3.  Labs today:    Ca was seen today for physical.    Diagnoses and all orders for this visit:    Chronic obstructive pulmonary disease, unspecified COPD type (H)  -     Lipid panel reflex to direct LDL - -(Today); Future  -     Dexa hip/pelvis/spine*; Future  -     Pulmonary Function Test; Future  -     6 minute walk test; Future    Health care maintenance  -     Comprehensive metabolic panel; Future  -     CBC with platelets differential; Future  -     TSH; Future

## 2017-02-20 NOTE — NURSING NOTE
Chief Complaint   Patient presents with     Physical     Patient is here for annual physcial.      Ada Murray LPN at 1:38 PM on 2/20/2017.

## 2017-02-20 NOTE — MR AVS SNAPSHOT
After Visit Summary   2/20/2017    Ca Yan    MRN: [id number]           Patient Information     Date Of Birth          1945        Visit Information        Provider Department      2/20/2017 1:40 PM Senia Olivas MD [facility]        Today's Diagnoses     Chronic obstructive pulmonary disease, unspecified COPD type (H)    -  1    Health care maintenance          Care Instructions    Primary Care Center Medication Refill Request Information:  * Please contact your pharmacy regarding ANY request for medication refills.  ** [facility] Prescription Fax = [phone]  * Please allow 3 business days for routine medication refills.  * Please allow 5 business days for controlled substance medication refills.     Primary Care Center Test Result notification information:  *You will be notified with in 7-10 days of your appointment day regarding the results of your test.  If you are on MyChart you will be notified as soon as the provider has reviewed the results and signed off on them.    Primary Care Center Phone Number [phone]    1.  Please call my nurse at  to inform me of the name of the bad smelling pill you take at night to prevent bladder infections.    2.  Use albuterol inhaler 2 puffs 2-4 times daily     3.  Labs today:    Ca was seen today for physical.    Diagnoses and all orders for this visit:    Chronic obstructive pulmonary disease, unspecified COPD type (H)  -     Lipid panel reflex to direct LDL - -(Today); Future  -     Dexa hip/pelvis/spine*; Future  -     Pulmonary Function Test; Future  -     6 minute walk test; Future    Health care maintenance  -     Comprehensive metabolic panel; Future  -     CBC with platelets differential; Future  -     TSH; Future              Follow-ups after your visit        Follow-up notes from your care team     Return in about 2 months (around 4/20/2017).      Your next 10 appointments already scheduled      Feb 20, 2017  3:15 PM CST   LAB with  LAB    Health Lab ([facility])    [address]  [address] [facility] [address] [phone]           Patient must bring picture ID.  Patient should be prepared to give a urine specimen  Please do not eat 10-12 hours before your appointment if you are coming in fasting for labs on lipids, cholesterol, or glucose (sugar).  Pregnant women should follow their Care Team instructions. Water with medications is okay. Do not drink coffee or other fluids.   If you have concerns about taking  your medications, please ask at office or if scheduling via [employer], send a message by clicking on Secure Messaging, Message Your Care Team.            Feb 21, 2017  1:00 PM CST   Pool Evaluation with Delmi Mckenzie, PT   [facility] ([facility])    [address] [place] [address]   [phone]            Mar 01, 2017 11:00 AM CST   (Arrive by 10:45 AM)   CONSULT with Ginny Britton RD   [facility] Gastroenterology and IBD ([facility])    [address] [place] [address]   [phone]            Mar 09, 2017 12:00 PM CST   (Arrive by 11:45 AM)   New Patient Visit with Luis Felipe Narayanan DPM   [facility] Endocrinology ([facility])    [address] [place] [address]   [phone]            Mar 22, 2017 11:40 AM CDT   (Arrive by 11:25 AM)   New Patient Visit with Lynn Peacock MD   [facility] Gastroenterology and IBD ([facility])    [address] [place] [address]   [phone]            Apr 05, 2017  1:00 PM CDT   FULL PULMONARY FUNCTION with  PFL C   [facility] Pulmonary Function Testing ([facility])    [address] [place] [address]   [phone]            Apr 12, 2017  1:00  PM CDT   DX HIP/PELVIS/SPINE with UCDX1   [facility] Dexa ([facility])    [address]  1st Floor  [facility] [address] [phone]           Please do not take any of the following 48 hours prior to your exam: vitamins, calcium tablets, antacids.            Apr 24, 2017  1:40 PM CDT   (Arrive by 1:25 PM)   Return Visit with Senia Olivas MD   [facility] ([facility])    [address]  4th Floor  [facility] [id number] [phone]              Future tests that were ordered for you today     Open Future Orders        Priority Expected Expires Ordered    CBC with platelets differential Routine 2/20/2017 3/6/2017 2/20/2017    TSH Routine 2/20/2017 2/20/2018 2/20/2017    Comprehensive metabolic panel Routine 2/20/2017 3/6/2017 2/20/2017    Dexa hip/pelvis/spine* Routine  2/20/2018 2/20/2017    Pulmonary Function Test Routine  2/20/2018 2/20/2017    6 minute walk test Routine  2/20/2018 2/20/2017            Who to contact     Please call your clinic at [phone] to:    Ask questions about your health    Make or cancel appointments    Discuss your medicines    Learn about your test results    Speak to your doctor   If you have compliments or concerns about an experience at your clinic, or if you wish to file a complaint, please contact [facility] Patient Relations at [phone] or email us at [email or web address]         Additional Information About Your Visit        [employer] Information     [employer] is an electronic gateway that provides easy, online access to your medical records. With [employer], you can request a clinic appointment, read your test results, renew a prescription or communicate with your care team.     To sign up for [employer] visit the website at [email or web address]   You will be asked to enter the access code listed below, as well as some  personal information. Please follow the directions to create your username and password.     Your access code is: [email or web address]  Expires: 2017  6:31 AM     Your access code will  in 90 days. If you need help or a new code, please contact your [facility] or call [phone] for assistance.        Care EveryWhere ID     This is your Care EveryWhere ID. This could be used by other organizations to access your [facility] medical records  [id number]        Your Vitals Were     Pulse Temperature Respirations Pulse Oximetry Breastfeeding? BMI (Body Mass Index)    81 98  F (36.7  C) (Oral) 18 91% No 34.25 kg/m2       Blood Pressure from Last 3 Encounters:   17 (!) 157/99   17 136/89   17 141/90    Weight from Last 3 Encounters:   17 88 kg (194 lb)   17 88.9 kg (196 lb)   16 89.8 kg (198 lb)                 Today's Medication Changes          These changes are accurate as of: 17  3:03 PM.  If you have any questions, ask your nurse or doctor.               Stop taking these medicines if you haven't already. Please contact your care team if you have questions.     budesonide 0.5 MG/2ML neb solution   Commonly known as:  PULMICORT   Stopped by:  Senia Olivas MD                    Primary Care Provider Office Phone # Fax #    Senia Olivas [phone]        [facility] [address]  [facility] [address]        Thank you!     Thank you for choosing [facility]  for your care. Our goal is always to provide you with excellent care. Hearing back from our patients is one way we can continue to improve our services. Please take a few minutes to complete the written survey that you may receive in the mail after your visit with us. Thank you!             Your Updated Medication List - Protect others around you: Learn how to safely use, store and throw away your medicines at www.disposemymeds.org.  "         This list is accurate as of: 2/20/17  3:03 PM.  Always use your most recent med list.                   Brand Name Dispense Instructions for use    acetaminophen 500 MG tablet    TYLENOL     Take 500-1,000 mg by mouth every 6 hours as needed for mild pain       ammonium lactate 12 % cream    LAC-HYDRIN    385 g    Apply topically 2 times daily as needed for dry skin       ASPIRIN EC PO      Take 81 mg by mouth       cephalexin 250 MG capsule    KEFLEX         gabapentin 100 MG capsule    NEURONTIN    30 capsule    Take 1 capsule (100 mg) by mouth At Bedtime       hydrochlorothiazide 12.5 MG Tabs tablet      Take 12.5 mg by mouth daily.       ibuprofen 200 MG tablet    ADVIL/MOTRIN    90 tablet    Take 3 tablets (600 mg) by mouth 3 times daily (with meals)       losartan 25 MG tablet    COZAAR    30 tablet    Take 1 tablet (25 mg) by mouth daily       omeprazole 20 MG CR capsule    priLOSEC     Take 20 mg by mouth as needed       * order for DME     1 Device    Equipment being ordered: Oxygen  Please provide portable oxygen concentrator (pt would like over the shoulder oxygen device) for portability at 2LPM with activity via nasal canula.       * order for DME     2 each    Equipment being ordered: knee high compression stockings, class 1 or 2, night time velcro compression garments, lymphedema bandaging supplies, darco boots to wear during treatment       polyethylene glycol Packet    MIRALAX    238 g    One 8.3-ounce bottle (238 g).  Refer to \"Getting Ready for a Colonoscopy\" patient handout       QUEtiapine 200 MG tablet    SEROquel     Take 200 mg by mouth At Bedtime.       tiotropium 18 MCG capsule    SPIRIVA     Inhale 18 mcg into the lungs daily       ZOCOR 20 MG tablet   Generic drug:  simvastatin      Take 20 mg by mouth daily       * Notice:  This list has 2 medication(s) that are the same as other medications prescribed for you. Read the directions carefully, and ask your doctor or other care " provider to review them with you.

## 2017-02-20 NOTE — PROGRESS NOTES
Chief complaint: Annual exam  History of present illness:  Patient is a 71-year-old woman with a PMH of COPD, depression, anxiety, and chemical dependency, presents for an annual examination. She has number of concerns today:    1.  HTN---has not taken her pills today.  She tells me that she has not checked her blood pressure at home.  She is currently on hydrochlorothiazide 12.5 mg and losartan 25 mg daily. She is not having side effects  2.  COPD.  Always short of breath.  Going to get a pool evaulation at [facility].  Has doen pulmonary rehab at [facility] and  and did not like it.  She has a pulmonologist, Dr. Omar Patel at [facility] who recommended that she be on home oxygen. I'm unable to access care everywhere right now to verify that. Patient says that she hates oxygen and that the tanks are too heavy and she doesn't want to use it.  3.  Chronic low back pain.  Partly due to scoliosis.  No sciatica.  No weakness numbness or tingling.  4.  IBS with Constipation.  She is scheduled to see GI on March 22.  5.  Anxiety.  We talked about her intense loneliness. There are days where she doesn't talk to anyone else. She feels that this is affecting her mental and physical health.  Lives by hersefl alone in the apartment.  Goes to her day program..abdominal tenderness Mad[facility] Meredith 2-3 days per week.   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  5.  Healthcare maintenance: She had a mammogram in November and she is not had a DEXA since 2014. At that time she tells me that her bone density was normal.    Past Medical History   Diagnosis Date     Anxiety      Arthritis      Breast cancer (H)      COPD (chronic obstructive pulmonary disease) (H)      Hypertension      Past Surgical History   Procedure Laterality Date     Back surgery       spinal fusion     Orthopedic surgery        Knee surgery left side     Lumpectomy breast        ROS: 10 point ROS neg other than the symptoms noted above in the HPI.    Family History   Problem Relation Age of Onset     Breast Cancer Mother      DIABETES Mother      Alcohol/Drug Father      MENTAL ILLNESS Father      CEREBROVASCULAR DISEASE Maternal Grandmother 67   ,  Social History     Social History     Marital status: Single     Spouse name: N/A     Number of children: N/A     Years of education: N/A     Occupational History      Retired     Social History Main Topics     Smoking status: Former Smoker     Types: Cigarettes     Quit date: 9/26/1980     Smokeless tobacco: Never Used     Alcohol use No      Comment: Sober for 2 years, previous alcoholic     Drug use: No     Sexual activity: No     Other Topics Concern     Stress Concern Not Asked     Lives alone     Back Care Not Asked     Hx of scoliosis with spine fusion     Exercise Not Asked     Walks     Social History Narrative     BP (!) 157/99  Pulse 81  Temp 98  F (36.7  C) (Oral)  Resp 18  Wt 88 kg (194 lb)  SpO2 91%  Breastfeeding? No  BMI 34.25 kg/m2  Exam:  Constitutional: Obese, very anxious, friendly and talkative 71-year-old woman  Head: Normocephalic. No masses, lesions, tenderness or abnormalities  Neck: Neck supple. No adenopathy. Thyroid symmetric, normal size,, Carotids without bruits.  ENT: ENT exam normal, no neck nodes or sinus tenderness  Cardiovascular: negative, PMI normal. No lifts, heaves, or thrills. RRR. No murmurs, clicks gallops or rub  Respiratory: Decreased ventilation bilaterally but clear to auscultation percussion and there is no clubbing or cyanosis.  Gastrointestinal: Abdomen soft, non-tender. BS normal. No masses, organomegaly  : Deferred  Back:  Scoliosis  Musculoskeletal: Bilateral lymphedema in both lower extremities.  Skin: no suspicious lesions or rashes  Neurologic: Gait normal. Reflexes normal and symmetric. Sensation grossly WNL.  Psychiatric: mentation  "appears normal and affect anxious  Hematologic/Lymphatic/Immunologic: Normal cervical lymph nodes      ASSESSMENT  1.  COPD. I need to review the care everywhere chart to determine what goals stated she is. She's been offered oxygen, and meds and this is severe. We will obtain PFTs without the 6 minutes test which she says she is already had, to get a better idea. I've encouraged her to take albuterol inhaler 2-4 times a day in addition to her Spiriva inhaler.  We will follow up in 2 months on RTC    2.  Essential hypertension. I cannot tell whether this is controlled that she did not take her medications today. I encouraged her to take those daily and will check electrolytes BUN and creatinine today since she said chronic kidney disease. We'll follow-up on RTC.    3.  Anxiety. I would like to discuss this more closely with her on RTC.      4.  Healthcare maintenance: Due for DEXA scan. Up-to-date on mammography. Up-to-date on immunizations.  On RTC we should get lipids checked.    5.  Medication review: Neither she nor I  She was taking gabapentin for. I don't think she is taking. However she is taking a \"very smelly\" medication at night that she thinks is preventing bladder infections. I asked her to ring that medication with her or call me with the name of it so that I can help her decide whether she needs to continue it.  "

## 2017-02-21 ENCOUNTER — HOSPITAL ENCOUNTER (OUTPATIENT)
Dept: PHYSICAL THERAPY | Facility: CLINIC | Age: 72
Setting detail: THERAPIES SERIES
End: 2017-02-21
Attending: INTERNAL MEDICINE
Payer: COMMERCIAL

## 2017-02-21 PROCEDURE — 97162 PT EVAL MOD COMPLEX 30 MIN: CPT | Mod: GP | Performed by: PHYSICAL THERAPIST

## 2017-02-21 PROCEDURE — 40000185 ZZHC STATISTIC PT OUTPT VISIT: Performed by: PHYSICAL THERAPIST

## 2017-02-21 PROCEDURE — 97110 THERAPEUTIC EXERCISES: CPT | Mod: GP | Performed by: PHYSICAL THERAPIST

## 2017-02-21 PROCEDURE — 97161 PT EVAL LOW COMPLEX 20 MIN: CPT | Mod: GP | Performed by: PHYSICAL THERAPIST

## 2017-02-22 ENCOUNTER — TELEPHONE (OUTPATIENT)
Dept: INTERNAL MEDICINE | Facility: CLINIC | Age: 72
End: 2017-02-22

## 2017-02-23 NOTE — PROGRESS NOTES
02/21/17 1300   Quick Adds   Type of Visit Initial OP PT Evaluation   General Information   Start of Care Date 02/21/17   Referring Physician Marlene Briscoe MD   Orders Evaluate and Treat as Indicated   Order Date 01/11/17   Medical Diagnosis Muscle spasm    Onset of illness/injury or Date of Surgery (10+ years LBP; ms spasms over the past few years)   Precautions/Limitations oxygen therapy device and L/min  (per pt report)   Special Instructions Aquatic Therapy   Surgical/Medical history reviewed Yes   Pertinent history of current problem (include personal factors and/or comorbidities that impact the POC) Pt f/u with internist on 1/11/17 to address cold sxs and referred to aquatic therapy at that time for general conditioning and to address chronic abdominal cramping. Per chart review, during this visit pt c/o cramps in ms around ribs and under breasts and abdomen which has been a longstanding problem. Sxs are intermittent and occur with transitions and prolonged positions ie. when bending over to wash her hair in the sink. Cramping lasts several minutes and very painful. Pt reports she is here today because she wants some relief to her chronic LBP. Per chart, chronic LBP partially due to scoliosis, no numbness or tingling. Pertinent PMH includes: kyphoscoliosis with spinal fusion (1975), OA of knees, OA of spine, hx of L knee surgery (5-6 years ago), back pain, COPD, HTN, non-morbid obesity, alcohol abuse, chronic kidney disease stage III, major depressive disorder, generalized anxiety disorder,  IBS. Pt reports prior rounds of aquatic therapy, difficulty recalling when but thinks about 10 years ago for back pain, she also briefly attended through [facility] and stopped going because therapist tried to get her to do deep water therapy and she was uncomfortable with that. She used to be a gym member at [facility] but canceled membership, unable to do land based exercise because she gets short of breath and does  not prefer [facility] pools, she finds them cold.  Pt evaluated by lymphedema therapist 1/25/17, pt reports today she is not interested in this therapy because she does not want to wrap her legs. She has also received pulmonary rehab with most recent evaluation 7/2016 without sessions following. Had f/u with primary care yesterday for annual physical. Per chart, she has a pulmonologist at [facility] who recommended she be on home oxygen. Unable to verify without access to documentation. Pt says she hates oxygen, the tanks are too heavy and she doesn t want to use it. Feels limited by COPD in participating in community activities. Repeats that overall her body feels  terrible . She is also being evaluated by GI for colonic problems, which will be her 5th or 6th specialist for armando problem.    Prior level of function comment PREVIOUSLY: Reports she did not have back pain for many years after receiving spinal fusion when a teenager. 15 years ago was walking  a lot  outside before dx with COPD 10 years ago. Used to exercise in pool in 2002. CURRENTLY: Reports low back soreness after prolonged standing for the past 10 years with immediate relief in sitting. Needs to frequently sit throughout day to relief LBP. She continues to be (I) standing for cooking, cleaning but has been working with  to receive cleaning services and improved access to community. Her biggest concern is that she is unable to go clothes shopping due to shortness of breath walking community distances and LBP with frequent standing breaks when shopping. Feels like she needs to be pushed by a wheelchair. Denies limitations in her balance, no falls, but uses walker at night to go to bathroom.  Denies pain limiting sleep, sleeps  9-10 hours.   Previous/Current Treatment Physical Therapy   Current Community Support Other/Comments  (, in progress arranging cleaning and outings)   Patient role/Employment history Retired   Living  environment Apartment/condo  (alone)   Home/Community Accessibility Comments no stairs to enter. [facility] for appointments. Does not drive.   Current Assistive Devices Walker  (uses at night for access to bathroom)   Patient/Family Goals Statement strengthening back.    General Information Comments No current access to pool. Somewhat interested in joining as gym member for continued exercise following d/c from therapy, but historically has canceled memberships because she does not like the pool available, finds [facility] pools to be cold.    Fall Risk Screen   Fall screen completed by PT   Per patient - Fall 2 or more times in past year? No   Per patient - Fall with injury in past year? No   Timed Up and Go score (seconds) (below)   Is patient a fall risk? No   Pain   Patient currently in pain No   Pain comments reports intermittent sharp pains in low back and cramps in abdomen. back pain provoked with prolonged standing, 8/10 LBP, aching pain in B LEs. abdominal cramps provoked with forward bending to dry hair daily and twisting movements. sitting immediately relieves pain. Laying painfree.    Vital Signs   Vital Signs Pulse;SpO2  (in seated rest, without supplimental O2)   Pulse 74   SpO2 94 %   Cognitive Status Examination   Orientation orientation to person, place and time   Level of Consciousness alert   Follows Commands and Answers Questions 100% of the time   Personal Safety and Judgment impaired  (does not use supplimnetal O2 as recommended)   Observation   Observation Somewhat anxious and interrupts therapist frequently with repeated questions on various topics around pain, lymphedema, COPD, and pool therapy environment. Arrives to session without supplimental O2   Integumentary   Integumentary Comments B lymphedema B LEs. unable to further assess - pt wearing pants and request's therapist does not expose LEs; allodynia to pants moving against LEs   Posture   Posture Comments kyphoscoliosis, inc lumbar  lordosis   Palpation   Palpation (+) tender to light touch distal LEs   Range of Motion (ROM)   ROM Comment trunk ROM grossly limited in extension, B rotation and SB 2/2 spinal fusion, pt able to demo forwrad trunk bend fingertips past knees without pain   Strength   Strength Comments MMT B LEs limited 2/2 pt (+) TTP, able to demo hip flexion, knee flex/ext and ankle DF WNL against gravity, limited functionally (see 30 sec sit <> stand)   Bed Mobility   Bed Mobility Comments independent   Transfer Skills   Transfer Comments independent wihtout need for UE support   Gait   Gait Comments over short distances indoors without AD, non-antalgicreciprocal gait pattern without instability , inc RR with fatigue over short distances   Balance Special Tests   Balance Special Tests Single leg stance right;Single leg stance left;Sit to stand reps;Timed up and go   Balance Special Tests Timed Up and Go   Seconds 12.02 Seconds   Comments no AD, O2 sat 88% following   Balance Special Tests Single Leg Stance Right,   Right, seconds 5 Seconds   Balance Special Tests Single Leg Stance Left   Left, seconds 5 Seconds   Balance Special Tests Sit to Stand Reps in 30 Seconds   Height 18   Comments no UE assisit, 1st trial: 7 reps, 2nd trial: 9 reps, reports some knee discomfort with relief in sitting, no LBP, inc RR, O2 remains >/ = 92% without supplemental O2   Sensory Examination   Sensory Perception Comments denies numbness/tingling   Muscle Tone   Muscle Tone no deficits were identified   Planned Therapy Interventions   Planned Therapy Interventions Comment aquatic therapy   Clinical Impression   Criteria for Skilled Therapeutic Interventions Met yes, treatment indicated   PT Diagnosis impaired functional mobility   Influenced by the following impairments Reported pain with transitional movements and prolonged standing. Allodynia B LEs, lymphedema distal B LEs.  Limited trunk ROM 2/2 spinal fusion, impaired posture, impaired functional  LE strength per 30 sec sit <> stand, impaired O2 sat following brief walking activity without supplemental O2.   Functional limitations due to impairments Impaired functional standing, walking tolerance with inc LBP and abdominal cramps and shortness of breath 2/2 COPD,  impairing participation in daily activities and self cares.    Clinical Presentation Evolving/Changing   Clinical Presentation Rationale Pt desaturation with activity.   Clinical Decision Making (Complexity) Moderate complexity   Therapy Frequency 2 times/Week   Predicted Duration of Therapy Intervention (days/wks) 4 weeks   Risk & Benefits of therapy have been explained Yes   Patient, Family & other staff in agreement with plan of care Yes   Clinical Impression Comments Unable to provoke sxs of abdominal cramps or LBP throughout assessment repeating movements pt reports provokes her ms cramping and standing assessment without provoking LBP. Notably pt did not wear supplemental oxygen throughout session reporting it has been recommended by her pulmonologist to wear all the time, unable to verify recommendations without access to documentation. Her oxygen declined to 88% following brief walking from [facility], pt demonstrates inc RR without sxs otherwise and needed to be guided through pursed lipped breathing for improved O2 sat. Pt would benefit from aquatic therapy towards improved aerobic conditioning, and reported pain towards improved functional mobility. Evaluating therapist plans for f/u with aquatic therapists in [place] to address recommendation for use of supplemental O2 during sessions with further f/u with pt s pulmonologist prn for safety during exercise. Participation in therapy may be limited by pt expressing hesitation towards wearing oxygen and has been displeased with [facility] pool conditions in the past, as well as poor hx of compliance with therapy including previous pool, pulmonary and lymphedema therapies.     Education Assessment    Barriers to Learning Emotional   GOALS   PT Eval Goals 1;2;3;4;5   Goal 1   Goal Identifier HEP   Goal Description Pt to demonstrate (I) with aquatic HEP for progress towards all goals and continued self-maintenance of chronic condition following d/c from physical therapy.    Target Date 04/10/17   Goal 2   Goal Identifier 30 sec sit <> stand (baseline: 1st trial: 7 reps, 2nd trial: 9 reps, reports some knee discomfort with relief in sitting, no LBP, inc RR, O2 remains >/ = 92% without supplemental O2.)   Goal Description Pt to achieve 13 reps to demonstrate improved functional LE strength and endurance to achieve 50th percentile for age and gender matched norms.   Target Date 04/10/17   Goal 3   Goal Identifier Standing tolerance (baseline: reports intermittent sharp pains in low back provoked with prolonged standing, 8/10, sitting immediately relieves pain, frequently sits throughout day for pain relief.)   Goal Description Pt to report improved standing tolerance with LBP maintained </= 6/10 with fewer seated rests throughout day for pain relief, for progress towards  pt s goal of improved standing tolerance to participate in community access for clothes shopping.   Target Date 04/10/17   Goal 4   Goal Identifier IADLs (baseline: abdominal cramps provoked with forward bending to dry hair daily and twisting movements.)   Goal Description Pt to be able to dry hair without abdominal cramps x 1 week towards improved tolerance of daily activities.   Target Date 04/10/17   Goal 5   Goal Identifier Walking tolerance/community access (baseline: O2 sat decreased to 88% without supplemental oxygen walking short distance indoors)   Goal Description Pt to demonstrate improved walking and activity tolerance without shortness of breath 2/2 O2 desaturation with appropriate use of supplemental oxygen throughout therapy sessions and improved compliance outside of sessions towards improved community access.    Target Date  04/10/17   Total Evaluation Time   Total Evaluation Time (Minutes) 60

## 2017-03-01 ENCOUNTER — OFFICE VISIT (OUTPATIENT)
Dept: GASTROENTEROLOGY | Facility: CLINIC | Age: 72
End: 2017-03-01

## 2017-03-01 DIAGNOSIS — K58.9 IRRITABLE BOWEL SYNDROME: ICD-10-CM

## 2017-03-01 DIAGNOSIS — E66.09 NON MORBID OBESITY DUE TO EXCESS CALORIES: Primary | ICD-10-CM

## 2017-03-01 DIAGNOSIS — N18.30 CHRONIC KIDNEY DISEASE, STAGE III (MODERATE) (H): ICD-10-CM

## 2017-03-01 NOTE — LETTER
"3/1/2017       RE: Ca Yan  [address]   [place] [address]     Dear Colleague,    Thank you for referring your patient, Ca Yan, to the [facility] AND IBD at [facility]. Please see a copy of my visit note below.    CLINICAL NUTRITION SERVICES - ASSESSMENT NOTE    REASON FOR ASSESSMENT  Ca Yan is a 71 year old female seen by the dietitian for Medical Nutrition Therapy related obesity referred by Dr. Baez.   Pt accompanied by self.     NUTRITION HISTORY  Factors affecting nutrition intake include: none at this time.     Ca expresses her desire to lose weight.  She reports that she has gained ~40 lbs over the past 5 years d/t inactivity with her COPD.  She reports that she typically eats 3 meals/day with 1 afternoon snack.   She receives 1 meal and her snack from the her day program. She admits that the meal is less healthy than she would prepare, but the portions are moderate.  She admits that she does not need the snack as she is never hungry for it.   She admits to cooking with a lot of butter.     Diet recall:  B toaster strudel with 2 pc polanco, black coffee  L meat, potato, gravy, vegetables, butter  D lean cuisine or turkey sandwich or cream chipped beef on toast  Snack ice cream  Beverages water, skim milk, <12 oz water/day      ANTHROPOMETRICS  Height: 63\"  Weight: 115 lbs  BMI: 34  Weight Status:  Obesity Grade I BMI 30-34.9  IBW: 115 lbs  % IBW: 168%  Weight History:   Wt Readings from Last 5 Encounters:   02/20/17 88 kg (194 lb)   01/19/17 88.9 kg (196 lb)   11/04/16 89.8 kg (198 lb)   10/31/16 90.1 kg (198 lb 11.2 oz)   10/10/16 90.5 kg (199 lb 9.6 oz)     Dosing Weight: 134 lbs/61kg    LABS  Labs reviewed    MEDICATIONS/VITAMINS/MINERALS  Medications reviewed    ASSESSED NUTRITION NEEDS:  Estimated Energy Needs: 9073-0392 kcals (20-25 Kcal/Kg)  Justification: obese  Estimated Protein Needs: 60 grams protein (1 " "g pro/Kg)  Justification: maintenance  Estimated Fluid Needs: 2000  mL   Justification: maintenance    NUTRITION DIAGNOSIS:  Obesity related to self-monitoring deficit, excessive caloric intake AEB diet recall BMI >35    INTERVENTIONS  Recommendations / Nutrition Prescription  1. 1200kcal/day for desired wt loss  2. 2000mL water/day (non-calorie containing beverages)   Nutrition Intervention:  Discussed dietary and behavioral modifications to promote weight loss (portion control, meal consistency, measuring foods, 9\"portion plate method, fiber sources and protein sources.  Suggested pt aim for 1200kcal/day.  Suggested pt reduce daily caloric intake by 300-500kcal for desired wt loss.   Suggested pt increase fluids to >6-8cups/day as able.      Provided pt with corresponding education materials -  1200kcal meal plan, 100-kcal snack ideas, list of quick meal ideas and resources for healthful living websites.  Pt verbalized understanding of education provided.  Expected diet compliance: good.     Nutrition Goals:  1. Aim for 1200kcal/day  2. Increase water intake to >2000mL/day (6-8 cups)  3. Weight loss towards 160 lbs (0.5-1lb/week desirable)    Monitoring/Follow-up: Provided pt with RD contact information for further questions prn.    MONITORING AND EVALUATION:  Weight trends       Time spent with patient: 60 minutes.  Ginny Britton RD, LD        "

## 2017-03-01 NOTE — PROGRESS NOTES
"CLINICAL NUTRITION SERVICES - ASSESSMENT NOTE    REASON FOR ASSESSMENT  Ca Yan is a 71 year old female seen by the dietitian for Medical Nutrition Therapy related obesity referred by Dr. Baez.   Pt accompanied by self.     NUTRITION HISTORY  Factors affecting nutrition intake include: none at this time.     Ca expresses her desire to lose weight.  She reports that she has gained ~40 lbs over the past 5 years d/t inactivity with her COPD.  She reports that she typically eats 3 meals/day with 1 afternoon snack.   She receives 1 meal and her snack from the her day program. She admits that the meal is less healthy than she would prepare, but the portions are moderate.  She admits that she does not need the snack as she is never hungry for it.   She admits to cooking with a lot of butter.     Diet recall:  B toaster strudel with 2 pc polanco, black coffee  L meat, potato, gravy, vegetables, butter  D lean cuisine or turkey sandwich or cream chipped beef on toast  Snack ice cream  Beverages water, skim milk, <12 oz water/day      ANTHROPOMETRICS  Height: 63\"  Weight: 115 lbs  BMI: 34  Weight Status:  Obesity Grade I BMI 30-34.9  IBW: 115 lbs  % IBW: 168%  Weight History:   Wt Readings from Last 5 Encounters:   02/20/17 88 kg (194 lb)   01/19/17 88.9 kg (196 lb)   11/04/16 89.8 kg (198 lb)   10/31/16 90.1 kg (198 lb 11.2 oz)   10/10/16 90.5 kg (199 lb 9.6 oz)     Dosing Weight: 134 lbs/61kg    LABS  Labs reviewed    MEDICATIONS/VITAMINS/MINERALS  Medications reviewed    ASSESSED NUTRITION NEEDS:  Estimated Energy Needs: 3839-1306 kcals (20-25 Kcal/Kg)  Justification: obese  Estimated Protein Needs: 60 grams protein (1 g pro/Kg)  Justification: maintenance  Estimated Fluid Needs: 2000  mL   Justification: maintenance    NUTRITION DIAGNOSIS:  Obesity related to self-monitoring deficit, excessive caloric intake AEB diet recall BMI >35    INTERVENTIONS  Recommendations / Nutrition Prescription  1. 1200kcal/day for " "desired wt loss  2. 2000mL water/day (non-calorie containing beverages)   Nutrition Intervention:  Discussed dietary and behavioral modifications to promote weight loss (portion control, meal consistency, measuring foods, 9\"portion plate method, fiber sources and protein sources.  Suggested pt aim for 1200kcal/day.  Suggested pt reduce daily caloric intake by 300-500kcal for desired wt loss.   Suggested pt increase fluids to >6-8cups/day as able.      Provided pt with corresponding education materials -  1200kcal meal plan, 100-kcal snack ideas, list of quick meal ideas and resources for healthful living websites.  Pt verbalized understanding of education provided.  Expected diet compliance: good.     Nutrition Goals:  1. Aim for 1200kcal/day  2. Increase water intake to >2000mL/day (6-8 cups)  3. Weight loss towards 160 lbs (0.5-1lb/week desirable)    Monitoring/Follow-up: Provided pt with RD contact information for further questions prn.    MONITORING AND EVALUATION:  Weight trends       Time spent with patient: 60 minutes.  Ginny Britton RD, LD      "

## 2017-03-01 NOTE — MR AVS SNAPSHOT
After Visit Summary   3/1/2017    Ca Yan    MRN: [id number]           Patient Information     Date Of Birth          1945        Visit Information        Provider Department      3/1/2017 11:00 AM Ginny Graves RD [facility]        Today's Diagnoses     Non morbid obesity due to excess calories    -  1    Irritable bowel syndrome        Chronic kidney disease, stage III (moderate)           Follow-ups after your visit        Your next 10 appointments already scheduled     Mar 06, 2017  1:00 PM CST   Pool Treatment with Elena Cedeño PT   [facility] Physical Therapy ([facility])    [address]  [address]  [facility] [address]   [phone]            Mar 09, 2017 12:00 PM CST   (Arrive by 11:45 AM)   New Patient Visit with Luis Felipe Narayanan DPM    [facility] ([facility])    [address] [place] [address]  3rd [facility] [address]   [phone]            Mar 13, 2017  1:00 PM CDT   Pool Treatment with Elena Cedeño PT   [facility] Physical Therapy ([facility])    [address]  [address]  [facility] [address]   [phone]            Mar 15, 2017  1:45 PM CDT   Pool Treatment with Lucy Cherry PT   [facility] Physical Therapy ([facility])    [address]  [address]  [facility] [address]   [phone]            Mar 20, 2017  1:00 PM CDT   Pool Treatment with Elena Cedeño PT   [facility] Therapy ([facility])    [address] [place] [address] [address]  [facility] [address]   [phone]            Mar 22, 2017 11:40 AM CDT   (Arrive by 11:25 AM)   New Patient Visit with MD JC Small [facility] Gastroenterology and IBD ([facility])    [address] [place] [address]  4th [facility] [address]   [phone]            Apr 03, 2017  1:00 PM CDT   Pool Treatment with Elena Cedeño PT    [facility] Physical Therapy ([facility])    [address] [place] [address]  [address]  [place] [address]   [phone]            Apr 05, 2017  1:00 PM CDT   FULL PULMONARY FUNCTION with UC PFL C   [facility] Pulmonary Function Testing ([facility])    [address]  3rd Floor  [facility] [address]   [phone]            Apr 10, 2017  1:00 PM CDT   Pool Treatment with Elena Cedeño PT   [facility] Physical Therapy ([facility])    [address] [place] [address]  [address]  [place] [address]   [phone]            Apr 12, 2017  1:00 PM CDT   DX HIP/PELVIS/SPINE with UCDX1   [facility] ([facility])    [address]  1st [facility] [address]   [phone]           Please do not take any of the following 48 hours prior to your exam: vitamins, calcium tablets, antacids.              Who to contact     Please call your clinic at [phone] to:    Ask questions about your health    Make or cancel appointments    Discuss your medicines    Learn about your test results    Speak to your doctor   If you have compliments or concerns about an experience at your clinic, or if you wish to file a complaint, please contact [facility] Patient Relations at [phone] or email us at [email or web address]         Additional Information About Your Visit        Playnery Information     Playnery is an electronic gateway that provides easy, online access to your medical records. With Playnery, you can request a clinic appointment, read your test results, renew a prescription or communicate with your care team.     To sign up for Playnery visit the website at [email or web address]   You will be asked to enter the access code listed below, as well as some personal information. Please follow the directions to create your username and password.     Your access code is:  [email or web address]  Expires: 2017  6:31 AM     Your access code will  in 90 days. If you need help or a new code, please contact your [facility] or call [phone] for assistance.        Care EveryWhere ID     This is your Care EveryWhere ID. This could be used by other organizations to access your [place] medical records  [id number]         Blood Pressure from Last 3 Encounters:   17 (!) 157/99   17 136/89   17 141/90    Weight from Last 3 Encounters:   17 88 kg (194 lb)   17 88.9 kg (196 lb)   16 89.8 kg (198 lb)              We Performed the Following     MNT INDIVIDUAL INITIAL EA 15 MIN        Primary Care Provider Office Phone # Fax #    Senia Olivas [phone]        PHYSICIANS [address] [facility] [address]  [facility] [address]        Thank you!     Thank you for choosing [facility] GASTROENTEROLOGY AND IBD  for your care. Our goal is always to provide you with excellent care. Hearing back from our patients is one way we can continue to improve our services. Please take a few minutes to complete the written survey that you may receive in the mail after your visit with us. Thank you!             Your Updated Medication List - Protect others around you: Learn how to safely use, store and throw away your medicines at www.disposemymeds.org.          This list is accurate as of: 3/1/17  1:57 PM.  Always use your most recent med list.                   Brand Name Dispense Instructions for use    acetaminophen 500 MG tablet    TYLENOL     Take 500-1,000 mg by mouth every 6 hours as needed for mild pain       ammonium lactate 12 % cream    LAC-HYDRIN    385 g    Apply topically 2 times daily as needed for dry skin       ASPIRIN EC PO      Take 81 mg by mouth       cephalexin 250 MG capsule    KEFLEX         gabapentin 100 MG capsule    NEURONTIN    30 capsule    Take 1 capsule (100 mg) by mouth At Bedtime        "hydrochlorothiazide 12.5 MG Tabs tablet      Take 12.5 mg by mouth daily.       ibuprofen 200 MG tablet    ADVIL/MOTRIN    90 tablet    Take 3 tablets (600 mg) by mouth 3 times daily (with meals)       losartan 25 MG tablet    COZAAR    30 tablet    Take 1 tablet (25 mg) by mouth daily       omeprazole 20 MG CR capsule    priLOSEC     Take 20 mg by mouth as needed       * order for DME     1 Device    Equipment being ordered: Oxygen  Please provide portable oxygen concentrator (pt would like over the shoulder oxygen device) for portability at 2LPM with activity via nasal canula.       * order for DME     2 each    Equipment being ordered: knee high compression stockings, class 1 or 2, night time velcro compression garments, lymphedema bandaging supplies, darco boots to wear during treatment       polyethylene glycol Packet    MIRALAX    238 g    One 8.3-ounce bottle (238 g).  Refer to \"Getting Ready for a Colonoscopy\" patient handout       QUEtiapine 200 MG tablet    SEROquel     Take 200 mg by mouth At Bedtime.       tiotropium 18 MCG capsule    SPIRIVA     Inhale 18 mcg into the lungs daily       ZOCOR 20 MG tablet   Generic drug:  simvastatin      Take 20 mg by mouth daily       * Notice:  This list has 2 medication(s) that are the same as other medications prescribed for you. Read the directions carefully, and ask your doctor or other care provider to review them with you.      "

## 2017-03-02 ENCOUNTER — PRE VISIT (OUTPATIENT)
Dept: GASTROENTEROLOGY | Facility: CLINIC | Age: 72
End: 2017-03-02

## 2017-03-02 NOTE — TELEPHONE ENCOUNTER
1.  Date/reason for appt: 3/22/17 11:40AM Colonic motility disorder/ IBS / per referral / No outside records per Pt  2.  Referring provider:  Dr. Olivas  3.  Call to patient (Yes / No - short description): No, recs are in Epic.   4.  Previous care at / records requested from:  O[facility] notes w/ Dr. Olivas - 10/31/16  Transferred recs from [facility] - 12/03/16  Previous recs w/ [facility] - 1/2012   Colonoscopy - [facility] - Will attempt to fax cover sheet to req. recs

## 2017-03-06 ENCOUNTER — HOSPITAL ENCOUNTER (OUTPATIENT)
Dept: PHYSICAL THERAPY | Facility: CLINIC | Age: 72
Setting detail: THERAPIES SERIES
End: 2017-03-06
Attending: INTERNAL MEDICINE
Payer: COMMERCIAL

## 2017-03-06 ENCOUNTER — TELEPHONE (OUTPATIENT)
Dept: PSYCHOLOGY | Facility: CLINIC | Age: 72
End: 2017-03-06

## 2017-03-06 PROCEDURE — 40000189 ZZH STATISTIC PT POOL VISIT: Performed by: PHYSICAL THERAPIST

## 2017-03-06 PROCEDURE — 97113 AQUATIC THERAPY/EXERCISES: CPT | Mod: GP | Performed by: PHYSICAL THERAPIST

## 2017-03-07 NOTE — TELEPHONE ENCOUNTER
"  Health Psychology                  Clinic    Department of Medicine  India Fernandez, Ph.D., L.P. [phone]                          [facility]  [facility] Harpal Laureano, Ph.D.,  L.P. [phone]                 3rd Floor  [place] Mail [address]   Turner Sierra, Ph.D., JANE.B.P.P., L.P. [phone]     [address] [place] [address] Dalia Huerta, Ph.D., L.P. [phone]            [place] [address]  [place] [address]           Ella Mckenna, Ph.D., L.P. [phone]      Telephone Note      I returned patient's call next business day.  She stated she \"called some ladies and I'm going to go with them.\"    Turner Sierra, Ph.D., A.B.P.P., L.P.  Director, Health Psychology    "

## 2017-03-07 NOTE — TELEPHONE ENCOUNTER
[facility] needs an GREGG, called and LM for pt to return my call regarding GREGG.

## 2017-03-08 NOTE — TELEPHONE ENCOUNTER
Pt LM on my Vm to return her call, called pt but was not able to reach pt left a detailed message for pt to return my call in regards to her GREGG form. - 2nd attempt

## 2017-03-09 ENCOUNTER — OFFICE VISIT (OUTPATIENT)
Dept: ENDOCRINOLOGY | Facility: CLINIC | Age: 72
End: 2017-03-09

## 2017-03-09 DIAGNOSIS — L84 TYLOMA: ICD-10-CM

## 2017-03-09 DIAGNOSIS — B35.3 TINEA PEDIS OF BOTH FEET: ICD-10-CM

## 2017-03-09 DIAGNOSIS — M79.672 PAIN IN BOTH FEET: ICD-10-CM

## 2017-03-09 DIAGNOSIS — B35.1 DERMATOPHYTOSIS OF NAIL: Primary | ICD-10-CM

## 2017-03-09 DIAGNOSIS — L60.2 ONYCHAUXIS: ICD-10-CM

## 2017-03-09 DIAGNOSIS — M79.671 PAIN IN BOTH FEET: ICD-10-CM

## 2017-03-09 RX ORDER — CICLOPIROX OLAMINE 7.7 MG/G
CREAM TOPICAL 2 TIMES DAILY
Qty: 90 G | Refills: 6 | Status: SHIPPED | OUTPATIENT
Start: 2017-03-09 | End: 2019-04-15

## 2017-03-09 NOTE — LETTER
3/9/2017       RE: Ca Yan  [address]   [facility] [address]     Dear Colleague,    Thank you for referring your patient, Ca Yan, to the [facility] ENDOCRINOLOGY at [facility]. Please see a copy of my visit note below.    Past Medical History   Diagnosis Date     Anxiety      Arthritis      Breast cancer (H)      COPD (chronic obstructive pulmonary disease) (H)      6/19/12:  FEV 0.99 l     Hypertension      Patient Active Problem List   Diagnosis     Non morbid obesity due to excess calories     Alcohol abuse     Malignant neoplasm of breast (H)     Chronic kidney disease, stage III (moderate)     Osteoarthritis of knee     Major depressive disorder with single episode     Diarrhea     Diastolic dysfunction     Osteoarthritis of spine     Gastroesophageal reflux disease     Generalized anxiety disorder     Hypertension     Hyperlipidemia     Insomnia     Irritable bowel syndrome     Kyphoscoliosis     Cluster C personality disorder     History of knee surgery     Seborrheic eczema     Anemia     Anxiety state     Asthma     Back pain     Bunion     Chronic obstructive pulmonary disease, unspecified COPD type (H)     Past Surgical History   Procedure Laterality Date     Back surgery       spinal fusion     Orthopedic surgery       Knee surgery left side     Lumpectomy breast       Social History     Social History     Marital status: Single     Spouse name: N/A     Number of children: N/A     Years of education: N/A     Occupational History      Retired     Social History Main Topics     Smoking status: Former Smoker     Types: Cigarettes     Quit date: 9/26/1980     Smokeless tobacco: Never Used     Alcohol use No      Comment: Sober for 2 years, previous alcoholic     Drug use: No     Sexual activity: No     Other Topics Concern     Stress Concern Not Asked     Lives alone     Back Care Not Asked     Hx of scoliosis with spine fusion     Exercise  Not Asked     Walks     Social History Narrative    Only family member is daughter in [place], [place] whom      Family History   Problem Relation Age of Onset     Breast Cancer Mother      DIABETES Mother      Alcohol/Drug Father      MENTAL ILLNESS Father      CEREBROVASCULAR DISEASE Maternal Grandmother 67     Lab Results   Component Value Date    A1C 5.8 04/21/2016      Lab Results   Component Value Date    WBC 6.1 02/20/2017     Lab Results   Component Value Date    RBC 4.44 02/20/2017     Lab Results   Component Value Date    HGB 13.4 02/20/2017     Lab Results   Component Value Date    HCT 42.4 02/20/2017     No components found for: MCT  Lab Results   Component Value Date    MCV 96 02/20/2017     Lab Results   Component Value Date    MCH 30.2 02/20/2017     Lab Results   Component Value Date    MCHC 31.6 02/20/2017     Lab Results   Component Value Date    RDW 14.7 02/20/2017     Lab Results   Component Value Date     02/20/2017     Last Basic Metabolic Panel:  Lab Results   Component Value Date     02/20/2017      Lab Results   Component Value Date    POTASSIUM 4.2 02/20/2017     Lab Results   Component Value Date    CHLORIDE 105 02/20/2017     Lab Results   Component Value Date    SYMONE 8.8 02/20/2017     Lab Results   Component Value Date    CO2 28 02/20/2017     Lab Results   Component Value Date    BUN 22 02/20/2017     Lab Results   Component Value Date    CR 0.92 02/20/2017     Lab Results   Component Value Date    GLC 90 02/20/2017     Lab Results   Component Value Date    AST 20 02/20/2017     Lab Results   Component Value Date    ALT 18 02/20/2017     Lab Results   Component Value Date    BILICONJ 0.0 02/07/2006      Lab Results   Component Value Date    BILITOTAL 0.4 02/20/2017     Lab Results   Component Value Date    ALBUMIN 3.6 02/20/2017     Lab Results   Component Value Date    PROTTOTAL 8.4 02/20/2017      Lab Results   Component Value Date    ALKPHOS 110 02/20/2017     SUBJECTIVE  FINDINGS:  A 71-year-old female presents for her feet are a mess, she relates.  She has got calluses and toenails.  She relates they are dry.  She is using AmLactin, but she has not been using it and so she was seeing a podiatrist.  She last seen him in December but was not happy because she was not using her cream, so she came here.  Relates that they usually trim the calluses down.  She relates she has no neuropathy.  Her toenails hurt with socks on.  She does not wear socks because of that.      OBJECTIVE FINDINGS:  DP and PT are 1/4 bilaterally.  She has decreased hair growth bilaterally.  She has laterally deviated hallux with dorsomedial first MPJ prominence bilaterally.  She has dorsally contracted digits 2-5 bilaterally.  She has dry scaly skin in a moccasin pattern bilaterally.  She has incurvated nails with some thickening, subungual debris and brittleness 1-5 bilaterally.  She has hyperkeratotic tissue buildup plantar medial hallux and first MPJ bilaterally, plantar 2 and 3 MPJs on the left and plantar fifth MPJ bilaterally.  There is no erythema, no drainage, no odor, no calor bilaterally.  She has some peripheral edema bilaterally.  She has pain with palpation of the nails and the calluses bilaterally.        ASSESSMENT AND PLAN:  Tylomas bilaterally.  Onychauxis and onychomycosis bilaterally.  The nails and calluses are causing pain.  She has tinea pedis bilaterally.  Diagnosis and treatment options were discussed with her.  Prescription for Loprox cream given and use discussed with her.  Tylomas 5+ were sharply debrided with a #15 blade and a tissue cutter bilaterally upon consent.  All of the nails were debrided or reduced bilaterally upon consent.  She will return to clinic and see me in 2 months.  Diagnosis and treatment options were discussed with her.     Sincerely,    Luis Felipe Narayanan DPM

## 2017-03-09 NOTE — MR AVS SNAPSHOT
After Visit Summary   3/9/2017    Ca Yan    MRN: [id number]           Patient Information     Date Of Birth          1945        Visit Information        Provider Department      3/9/2017 12:00 PM Luis Felipe Narayanan DPM [facility]         Follow-ups after your visit        Your next 10 appointments already scheduled     Mar 13, 2017  1:00 PM CDT   Pool Treatment with Elena Cedeño, PT   [facility] ([facility])    [address]  [address]  [place] [address]   [phone]            Mar 15, 2017  1:45 PM CDT   Pool Treatment with Lucy Cherry, PT   [facility] ([facility])    [address]  [address]  [place] [address]   [phone]            Mar 20, 2017  1:00 PM CDT   Pool Treatment with Elena Cedeño, PT   [facility] ([facility])    [address]  [address]  [place] [address]   [phone]            Mar 22, 2017 11:40 AM CDT   (Arrive by 11:25 AM)   New Patient Visit with Lynn Peacock MD   [facility] Gastroenterology and IBD ([facility])    [address]  4th Floor  [facility] [address]   [phone]            Apr 03, 2017  1:00 PM CDT   Pool Treatment with Elena Cedeño, PT   [facility] ([facility])    [address]  [address]  [place] [address]   [phone]            Apr 05, 2017  1:00 PM CDT   FULL PULMONARY FUNCTION with UC PFL C   [facility] Pulmonary Function Testing ([facility])    [address]  3rd Floor  [facility] [address]   [phone]            Apr 10, 2017  1:00 PM CDT   Pool Treatment with Elena Cedeño, PT   [facility] ([facility])    [address]  [address]  [place] [address]   [phone]            Apr 12, 2017  1:00 PM CDT   DX HIP/PELVIS/SPINE with UCDX1   [facility]  [facility] ([facility])    [address]  1st Floor  [facility] [address] [phone]           Please do not take any of the following 48 hours prior to your exam: vitamins, calcium tablets, antacids.            2017  1:40 PM CDT   (Arrive by 1:25 PM)   Return Visit with Senia Olivas MD   [facility] ([facility])    [address]  4th Floor  [facility] [address] [phone]            May 11, 2017  1:30 PM CDT   (Arrive by 1:15 PM)   Return Visit with Luis Felipe Narayanan DPM   [facility] Endocrinology ([facility])    [address] [place] [address]  3rd Floor  [facility] [address] [phone]              Who to contact     Please call your clinic at [phone] to:    Ask questions about your health    Make or cancel appointments    Discuss your medicines    Learn about your test results    Speak to your doctor   If you have compliments or concerns about an experience at your clinic, or if you wish to file a complaint, please contact [facility] Phys[facility] Patient Relations at [phone] or email us at [email or web address]         Additional Information About Your Visit        [employer] Grouphart Information     Snapchat is an electronic gateway that provides easy, online access to your medical records. With Snapchat, you can request a clinic appointment, read your test results, renew a prescription or communicate with your care team.     To sign up for PhoneTellt visit the website at [email or web address]   You will be asked to enter the access code listed below, as well as some personal information. Please follow the directions to create your username and password.     Your access code is: [email or web address]  Expires: 2017  6:31 AM     Your access code will  in 90 days. If you need help or a new code, please contact your [facility]  Physicians Clinic or call [phone] for assistance.        Care EveryWhere ID     This is your Care EveryWhere ID. This could be used by other organizations to access your [place] medical records  [id number]         Blood Pressure from Last 3 Encounters:   02/20/17 (!) 157/99   01/19/17 136/89   01/11/17 141/90    Weight from Last 3 Encounters:   02/20/17 88 kg (194 lb)   01/19/17 88.9 kg (196 lb)   11/04/16 89.8 kg (198 lb)              Today, you had the following     No orders found for display       Primary Care Provider Office Phone # Fax #    Senia Olivas [phone]        PHYSICIANS [address] [facility] [address]  [facility] [address]        Thank you!     Thank you for choosing [facility]  for your care. Our goal is always to provide you with excellent care. Hearing back from our patients is one way we can continue to improve our services. Please take a few minutes to complete the written survey that you may receive in the mail after your visit with us. Thank you!             Your Updated Medication List - Protect others around you: Learn how to safely use, store and throw away your medicines at www.disposemymeds.org.          This list is accurate as of: 3/9/17 12:26 PM.  Always use your most recent med list.                   Brand Name Dispense Instructions for use    acetaminophen 500 MG tablet    TYLENOL     Take 500-1,000 mg by mouth every 6 hours as needed for mild pain       ammonium lactate 12 % cream    LAC-HYDRIN    385 g    Apply topically 2 times daily as needed for dry skin       ASPIRIN EC PO      Take 81 mg by mouth       cephalexin 250 MG capsule    KEFLEX         gabapentin 100 MG capsule    NEURONTIN    30 capsule    Take 1 capsule (100 mg) by mouth At Bedtime       hydrochlorothiazide 12.5 MG Tabs tablet      Take 12.5 mg by mouth daily.       ibuprofen 200 MG tablet    ADVIL/MOTRIN    90 tablet    Take 3 tablets (600 mg) by mouth 3 times daily (with  "meals)       losartan 25 MG tablet    COZAAR    30 tablet    Take 1 tablet (25 mg) by mouth daily       omeprazole 20 MG CR capsule    priLOSEC     Take 20 mg by mouth as needed       * order for DME     1 Device    Equipment being ordered: Oxygen  Please provide portable oxygen concentrator (pt would like over the shoulder oxygen device) for portability at 2LPM with activity via nasal canula.       * order for DME     2 each    Equipment being ordered: knee high compression stockings, class 1 or 2, night time velcro compression garments, lymphedema bandaging supplies, darco boots to wear during treatment       polyethylene glycol Packet    MIRALAX    238 g    One 8.3-ounce bottle (238 g).  Refer to \"Getting Ready for a Colonoscopy\" patient handout       QUEtiapine 200 MG tablet    SEROquel     Take 200 mg by mouth At Bedtime.       tiotropium 18 MCG capsule    SPIRIVA     Inhale 18 mcg into the lungs daily       ZOCOR 20 MG tablet   Generic drug:  simvastatin      Take 20 mg by mouth daily       * Notice:  This list has 2 medication(s) that are the same as other medications prescribed for you. Read the directions carefully, and ask your doctor or other care provider to review them with you.      "

## 2017-03-09 NOTE — PROGRESS NOTES
Past Medical History   Diagnosis Date     Anxiety      Arthritis      Breast cancer (H)      COPD (chronic obstructive pulmonary disease) (H)      6/19/12:  FEV 0.99 l     Hypertension      Patient Active Problem List   Diagnosis     Non morbid obesity due to excess calories     Alcohol abuse     Malignant neoplasm of breast (H)     Chronic kidney disease, stage III (moderate)     Osteoarthritis of knee     Major depressive disorder with single episode     Diarrhea     Diastolic dysfunction     Osteoarthritis of spine     Gastroesophageal reflux disease     Generalized anxiety disorder     Hypertension     Hyperlipidemia     Insomnia     Irritable bowel syndrome     Kyphoscoliosis     Cluster C personality disorder     History of knee surgery     Seborrheic eczema     Anemia     Anxiety state     Asthma     Back pain     Bunion     Chronic obstructive pulmonary disease, unspecified COPD type (H)     Past Surgical History   Procedure Laterality Date     Back surgery       spinal fusion     Orthopedic surgery       Knee surgery left side     Lumpectomy breast       Social History     Social History     Marital status: Single     Spouse name: N/A     Number of children: N/A     Years of education: N/A     Occupational History      Retired     Social History Main Topics     Smoking status: Former Smoker     Types: Cigarettes     Quit date: 9/26/1980     Smokeless tobacco: Never Used     Alcohol use No      Comment: Sober for 2 years, previous alcoholic     Drug use: No     Sexual activity: No     Other Topics Concern     Stress Concern Not Asked     Lives alone     Back Care Not Asked     Hx of scoliosis with spine fusion     Exercise Not Asked     Walks     Social History Narrative    Only family member is daughter in [place], [place] whom      Family History   Problem Relation Age of Onset     Breast Cancer Mother      DIABETES Mother      Alcohol/Drug Father      MENTAL ILLNESS Father      CEREBROVASCULAR DISEASE  Maternal Grandmother 67     Lab Results   Component Value Date    A1C 5.8 04/21/2016      Lab Results   Component Value Date    WBC 6.1 02/20/2017     Lab Results   Component Value Date    RBC 4.44 02/20/2017     Lab Results   Component Value Date    HGB 13.4 02/20/2017     Lab Results   Component Value Date    HCT 42.4 02/20/2017     No components found for: MCT  Lab Results   Component Value Date    MCV 96 02/20/2017     Lab Results   Component Value Date    MCH 30.2 02/20/2017     Lab Results   Component Value Date    MCHC 31.6 02/20/2017     Lab Results   Component Value Date    RDW 14.7 02/20/2017     Lab Results   Component Value Date     02/20/2017     Last Basic Metabolic Panel:  Lab Results   Component Value Date     02/20/2017      Lab Results   Component Value Date    POTASSIUM 4.2 02/20/2017     Lab Results   Component Value Date    CHLORIDE 105 02/20/2017     Lab Results   Component Value Date    SYMONE 8.8 02/20/2017     Lab Results   Component Value Date    CO2 28 02/20/2017     Lab Results   Component Value Date    BUN 22 02/20/2017     Lab Results   Component Value Date    CR 0.92 02/20/2017     Lab Results   Component Value Date    GLC 90 02/20/2017     Lab Results   Component Value Date    AST 20 02/20/2017     Lab Results   Component Value Date    ALT 18 02/20/2017     Lab Results   Component Value Date    BILICONJ 0.0 02/07/2006      Lab Results   Component Value Date    BILITOTAL 0.4 02/20/2017     Lab Results   Component Value Date    ALBUMIN 3.6 02/20/2017     Lab Results   Component Value Date    PROTTOTAL 8.4 02/20/2017      Lab Results   Component Value Date    ALKPHOS 110 02/20/2017     SUBJECTIVE FINDINGS:  A 71-year-old female presents for her feet are a mess, she relates.  She has got calluses and toenails.  She relates they are dry.  She is using AmLactin, but she has not been using it and so she was seeing a podiatrist.  She last seen him in December but was not happy  because she was not using her cream, so she came here.  Relates that they usually trim the calluses down.  She relates she has no neuropathy.  Her toenails hurt with socks on.  She does not wear socks because of that.      OBJECTIVE FINDINGS:  DP and PT are 1/4 bilaterally.  She has decreased hair growth bilaterally.  She has laterally deviated hallux with dorsomedial first MPJ prominence bilaterally.  She has dorsally contracted digits 2-5 bilaterally.  She has dry scaly skin in a moccasin pattern bilaterally.  She has incurvated nails with some thickening, subungual debris and brittleness 1-5 bilaterally.  She has hyperkeratotic tissue buildup plantar medial hallux and first MPJ bilaterally, plantar 2 and 3 MPJs on the left and plantar fifth MPJ bilaterally.  There is no erythema, no drainage, no odor, no calor bilaterally.  She has some peripheral edema bilaterally.  She has pain with palpation of the nails and the calluses bilaterally.        ASSESSMENT AND PLAN:  Tylomas bilaterally.  Onychauxis and onychomycosis bilaterally.  The nails and calluses are causing pain.  She has tinea pedis bilaterally.  Diagnosis and treatment options were discussed with her.  Prescription for Loprox cream given and use discussed with her.  Tylomas 5+ were sharply debrided with a #15 blade and a tissue cutter bilaterally upon consent.  All of the nails were debrided or reduced bilaterally upon consent.  She will return to clinic and see me in 2 months.  Diagnosis and treatment options were discussed with her.

## 2017-03-13 ENCOUNTER — HOSPITAL ENCOUNTER (OUTPATIENT)
Dept: PHYSICAL THERAPY | Facility: CLINIC | Age: 72
Setting detail: THERAPIES SERIES
End: 2017-03-13
Attending: INTERNAL MEDICINE
Payer: COMMERCIAL

## 2017-03-13 PROCEDURE — 40000189 ZZH STATISTIC PT POOL VISIT: Performed by: PHYSICAL THERAPIST

## 2017-03-13 PROCEDURE — 97113 AQUATIC THERAPY/EXERCISES: CPT | Mod: GP | Performed by: PHYSICAL THERAPIST

## 2017-03-13 NOTE — TELEPHONE ENCOUNTER
Pt LM on my VM, Called and LM for pt to return my call regarding her outside recs. - 3rd attempt

## 2017-03-13 NOTE — TELEPHONE ENCOUNTER
Pt returned my call in regards to my message I left on her VM. Will call pt to explain to her I need records from [facility] for her appt.

## 2017-03-16 NOTE — TELEPHONE ENCOUNTER
Pt returned my call regarding GREGG form, she will call [facility] to see if they can fax her recs - Mailing out GREGG form to pt per pt request

## 2017-03-21 NOTE — TELEPHONE ENCOUNTER
Received signed GREGG through fax, will fax cover sheet to [facility] to obtain recs

## 2017-03-22 ENCOUNTER — OFFICE VISIT (OUTPATIENT)
Dept: GASTROENTEROLOGY | Facility: CLINIC | Age: 72
End: 2017-03-22

## 2017-03-22 ENCOUNTER — TELEPHONE (OUTPATIENT)
Dept: SURGERY | Facility: CLINIC | Age: 72
End: 2017-03-22

## 2017-03-22 VITALS
BODY MASS INDEX: 34.96 KG/M2 | HEART RATE: 74 BPM | DIASTOLIC BLOOD PRESSURE: 96 MMHG | OXYGEN SATURATION: 92 % | TEMPERATURE: 97.6 F | SYSTOLIC BLOOD PRESSURE: 151 MMHG | WEIGHT: 197.3 LBS | HEIGHT: 63 IN

## 2017-03-22 DIAGNOSIS — K58.9 IRRITABLE BOWEL SYNDROME, UNSPECIFIED TYPE: Primary | ICD-10-CM

## 2017-03-22 DIAGNOSIS — R15.9 FECAL INCONTINENCE: ICD-10-CM

## 2017-03-22 ASSESSMENT — ENCOUNTER SYMPTOMS
TROUBLE SWALLOWING: 0
DYSPNEA ON EXERTION: 1
FLANK PAIN: 0
SPUTUM PRODUCTION: 0
SMELL DISTURBANCE: 0
HOARSE VOICE: 0
HEARTBURN: 0
COUGH: 0
DIARRHEA: 0
SINUS PAIN: 0
SINUS CONGESTION: 0
BLOATING: 0
BLOOD IN STOOL: 0
DYSURIA: 0
WHEEZING: 0
NECK MASS: 0
SNORES LOUDLY: 0
HEMOPTYSIS: 0
HEMATURIA: 0
RESPIRATORY PAIN: 0
DIFFICULTY URINATING: 0
SORE THROAT: 0
ABDOMINAL PAIN: 0
RECTAL BLEEDING: 0
CONSTIPATION: 0
JAUNDICE: 0
COUGH DISTURBING SLEEP: 0
BOWEL INCONTINENCE: 1
SHORTNESS OF BREATH: 1
NAUSEA: 0
TASTE DISTURBANCE: 0
POSTURAL DYSPNEA: 0
RECTAL PAIN: 0

## 2017-03-22 ASSESSMENT — PAIN SCALES - GENERAL: PAINLEVEL: NO PAIN (0)

## 2017-03-22 NOTE — PATIENT INSTRUCTIONS
1. Recommend Citrucel fiber supplement - start 1 capsule daily and increase as tolerated to 4-8 capsules daily.   2. Recommend following up in colorectal surgery clinic to address your questions regarding biofeedback and sacral nerve stimulator  3. Stop drinking coffee in the evening, can work up to stopping coffee after 3 pm. Work on decreasing amount of caffeine ingested overall.   4.  If you have any questions, please don't hesitate to contact our GI RN Clinic Coordinators at [phone] (select option #3 for nurse line).   5. Follow-up in GI clinic as needed.

## 2017-03-22 NOTE — TELEPHONE ENCOUNTER
Records received from [facility].   Included  Office notes: 10/5/11, 1/18/16  Radiology reports: ULT breast on 6/21/02   Xray chest on 9/4/03   Xray left knee on 2/25/04

## 2017-03-22 NOTE — TELEPHONE ENCOUNTER
Called [facility] to see if I can get pt's Colonoscopy report, spoke with GREGG Dept they stated there are no colonoscopy reports.

## 2017-03-22 NOTE — MR AVS SNAPSHOT
After Visit Summary   3/22/2017    Ca Yan    MRN: [id number]           Patient Information     Date Of Birth          1945        Visit Information        Provider Department      3/22/2017 11:40 AM Lynn Peacock MD [facility] Gastroenterology and IBD        Care Instructions    1. Recommend Citrucel fiber supplement - start 1 capsule daily and increase as tolerated to 4-8 capsules daily.   2. Recommend following up in colorectal surgery clinic to address your questions regarding biofeedback and sacral nerve stimulator  3. Stop drinking coffee in the evening, can work up to stopping coffee after 3 pm. Work on decreasing amount of caffeine ingested overall.   4.  If you have any questions, please don't hesitate to contact our GI RN Clinic Coordinators at [phone] (select option #3 for nurse line).   5. Follow-up in GI clinic as needed.              Follow-ups after your visit        Your next 10 appointments already scheduled     Apr 03, 2017  1:00 PM CDT   Pool Treatment with Aimee Ramesh, PT   [facility] Physical Therapy ([facility])    [address] [place] [address] [address]  [facility] [address]   [phone]            Apr 05, 2017  1:00 PM CDT   FULL PULMONARY FUNCTION with UC PFL C   [facility] Pulmonary Function Testing ([facility])    [address] [place] [address]  3rd [facility] [id number] [phone]            Apr 10, 2017  1:00 PM CDT   Pool Treatment with Aimee Ramesh, PT   [facility] ([facility])    [address]  [facility] [address]   [phone]            Apr 10, 2017  2:10 PM CDT   (Arrive by 1:55 PM)   Return Visit with Senia Olivas MD   [facility] Primary Care Clinic ([facility])    [address]  4th Floor  [facility] [id number] [phone]            Apr 12, 2017  1:00 PM CDT   DX HIP/PELVIS/SPINE with UCDX1   [facility] ([facility])    [address]  1st Floor  [facility] [place] [address] [phone]           Please do not take any of the following 48 hours prior to your exam: vitamins, calcium tablets, antacids.            Apr 24, 2017  1:40 PM CDT   (Arrive by 1:25 PM)   Return Visit with Senia Olivas MD   [facility] Primary Care Clinic ([facility])    [address]  4th Floor  [facility] [place] [address] [phone]            May 11, 2017  1:30 PM CDT   (Arrive by 1:15 PM)   Return Visit with Luis Felipe Narayanan DPM   [facility] Endocrinology ([facility])    [address]  3rd Floor  [facility] [place] [address] [phone]              Who to contact     Please call your clinic at [phone] to:    Ask questions about your health    Make or cancel appointments    Discuss your medicines    Learn about your test results    Speak to your doctor   If you have compliments or concerns about an experience at your clinic, or if you wish to file a complaint, please contact [facility] Patient Relations at [phone] or email us at [email or web address]         Additional Information About Your Visit        Care EveryWhere ID     This is your Care EveryWhere ID. This could be used by other organizations to access your [place] medical records  [id number]        Your Vitals Were     Pulse Temperature Height Pulse Oximetry BMI (Body Mass Index)       74 97.6  F (36.4  C) 1.6 m (5' 3\") 92% 34.95 kg/m2        Blood Pressure from Last 3 Encounters:   03/22/17 (!) 151/96   02/20/17 (!) 157/99   01/19/17 136/89    Weight from Last 3 Encounters:   03/22/17 89.5 kg (197 lb 4.8 oz)   02/20/17 88 kg (194 lb)   01/19/17 88.9 kg (196 lb)              Today, you had the following     No orders found for display       Primary Care Provider Office Phone # Fax #    Senia Olivas MD " [phone]        [facility] [address]  [facility] [address]        Thank you!     Thank you for choosing [facility] GASTROENTEROLOGY AND IBD  for your care. Our goal is always to provide you with excellent care. Hearing back from our patients is one way we can continue to improve our services. Please take a few minutes to complete the written survey that you may receive in the mail after your visit with us. Thank you!             Your Updated Medication List - Protect others around you: Learn how to safely use, store and throw away your medicines at www.disposemymeds.org.          This list is accurate as of: 3/22/17 12:54 PM.  Always use your most recent med list.                   Brand Name Dispense Instructions for use    acetaminophen 500 MG tablet    TYLENOL     Take 500-1,000 mg by mouth every 6 hours as needed for mild pain       ammonium lactate 12 % cream    LAC-HYDRIN    385 g    Apply topically 2 times daily as needed for dry skin       ASPIRIN EC PO      Take 81 mg by mouth       cephalexin 250 MG capsule    KEFLEX         ciclopirox 0.77 % cream    LOPROX    90 g    Apply topically 2 times daily To feet and toenails.       gabapentin 100 MG capsule    NEURONTIN    30 capsule    Take 1 capsule (100 mg) by mouth At Bedtime       hydrochlorothiazide 12.5 MG Tabs tablet      Take 12.5 mg by mouth daily.       ibuprofen 200 MG tablet    ADVIL/MOTRIN    90 tablet    Take 3 tablets (600 mg) by mouth 3 times daily (with meals)       losartan 25 MG tablet    COZAAR    30 tablet    Take 1 tablet (25 mg) by mouth daily       omeprazole 20 MG CR capsule    priLOSEC     Take 20 mg by mouth as needed       * order for DME     1 Device    Equipment being ordered: Oxygen  Please provide portable oxygen concentrator (pt would like over the shoulder oxygen device) for portability at 2LPM with activity via nasal canula.       * order for DME     2 each    Equipment being ordered: knee high  "compression stockings, class 1 or 2, night time velcro compression garments, lymphedema bandaging supplies, darco boots to wear during treatment       polyethylene glycol Packet    MIRALAX    238 g    One 8.3-ounce bottle (238 g).  Refer to \"Getting Ready for a Colonoscopy\" patient handout       QUEtiapine 200 MG tablet    SEROquel     Take 200 mg by mouth At Bedtime.       tiotropium 18 MCG capsule    SPIRIVA     Inhale 18 mcg into the lungs daily       ZOCOR 20 MG tablet   Generic drug:  simvastatin      Take 20 mg by mouth daily       * Notice:  This list has 2 medication(s) that are the same as other medications prescribed for you. Read the directions carefully, and ask your doctor or other care provider to review them with you.      "

## 2017-03-22 NOTE — LETTER
"3/22/2017       RE: Ca Yan  [address]   [facility] [address]     Dear Colleague,    Thank you for referring your patient, Ca Yan, to the [facility] GASTROENTEROLOGY AND IBD at [facility]. Please see a copy of my visit note below.    GI CLINIC VISIT    CC/REFERRING MD:  Senia Olivas  REASON FOR CONSULTATION:   Ms. Yan is a 71 year old female who I was asked to see in consultation at the request of Dr. Senia Olivas for   Chief Complaint   Patient presents with     Consult     New Consultaion       ASSESSMENT/PLAN:  (K58.9) Irritable bowel syndrome, unspecified type  (primary encounter diagnosis)  (R15.9) Fecal incontinence  Comment:     Ms. Yan's primary concern is episodes of fecal incontinence which occur during periods of less-formed stool. Though she carries a diagnosis of IBS, this seems to be more of an IBS-D subtype based on history (only one documented episode of constipation in the last year, with no reported prominent constipation history or noted in available documentation). IBS-D episdoes are infrequent (once monthly) and short-lived making daily medication/prophylactic therapy less useful and as needed meds less effective (due to briefness of episode). It seems her only prior \"IBS\" medications were antidiarrheals which were more in use for her fecal incontinence.     Fecal incontinence is Ms. Yan's primary concern. It has been evaluated previously with colorectal surgery with conservative measures planned (though not completed by patient). Biofeedback and sacral nerve stimulation have also been discussed, but no pursued per patient preference. From a GI perspective, we spent some time discussing the role of fiber in stool bulking to permit better \"control\" and use of antidiarrheals to address colon motility. Caffeine's role as a bowel stimulant was reviewed in detail as well.     Ms. Yan was ultimately agreeable to a " "trial of fiber - with past issues tolerating Metamucil leading to only a short-term use, will use Citrucel as outlined below. Ms. Yan was encouraged to follow-up with colorectal surgery clinic to address her questions regarding biofeedback and sacral nerve stimulation.   Plan:   - recommend Citrucel fiber supplement - start 1 capsule daily and increase as tolerated to 4-8 capsules daily.   - recommend following up in colorectal surgery clinic to address your questions   - stop drinking coffee in the evening, can work up to stopping coffee after 3 pm. Work on decreasing amount of caffeine ingested overall.     RTC prn    Thank you for this consultation.  It was a pleasure to participate in the care of this patient; please contact us with any further questions.  A total of 35 minutes was spent with this patient, >50% of which was counseling regarding the above delineated issues.    Lynn Peacock MD   of Medicine  Division of Gastroenterology, Hepatology and Nutrition  [facility]    ---------------------------------------------------------------------------------------------------  HPI:    Ms. Yan is a pleasant 71 year old female with prior breast CA s/p lumpectomy (2012) followed by radiation, COPD, HTN, RENATO, CKD, stage III, and back pain s/p fusion who presents for evaluation of \"IBS.\"  Ms. Yan reports that she thought her visit today was in colorectal surgery clinic; she recalls being referred there for fecal incontinence. She is not quite sure why she is here today in GI clinic. She states she was previously following with GI at [facility].       In regards to her IBS diagnosis, this was apparently made in the 1980s in the [facility] system. Ms. Yan recalls seeing several different providers there but not many details of her GI evaluation. She reports that she experienced intermittent episodes of abdominal cramping, urgency, with passage of one large \"shake\" " "consistency stool. These symptom would resolve after she emptied her bowels, typically lasting a short time (less than an hour). These episodes would occur about once a moth. In between episodes, Ms. Yan typically has one soft, formed stool every morning after breakfast.     These episodes have continued intermittently over the years, but in the last year of so, the episodes having been occurring at night. She reports waking around 2 a.m. with her typical symptoms and passing a \"shake-consistency\" stool and returning to bed after her symptoms are passed. Her evening meal is around 5-6 pm, though Ms. Yan reports she continues to drink coffee throughout the evening.    Most bothersome to Ms. Yan is the associated fecal incontinence that she experiences with her loose stools. She reports having urinary incontinence since age 48 with has not concerned her much. She believes she has seen urologists in the past in the [facility] system. Her fecal incontinence began 7-8 years ago. Ms. Yan has one one vaginal delivery; she does not remember any further details of this delivery.  She states she was told at [facility] that she \"needed an implant\" for her urinary and fecal incontinence but cannot recall why it was not pursued. Review of records indicates she has also followed with our colorectal surgery clinic in the past seeing Dr. Latham, Dr. Lorenzo, and most recently in June 2016, Lilly Ríos CNP. Previous work-up has included an incomplete colonoscopy (due to discomfort) in 2010, a virtual colonoscopy (reportedly normal, results unavailable), and pelvic floor testing in 2010 which demonstrated non-relaxation of her puborectalis and slightly weak resting tone and squeeze. Another colonoscopy was ordered in June 2016, though it appears, Ms. Yan has not completed this.     For management of fecal incontinence she recalls trying tincture of opium and lomotil to slow her bowels; she " "states both were helpful for a period of time but were stopped when her stools become too hard. She doesn't believe she's been on loperamide, though it is mentioned as ineffective for her in other documentation. Ms. Yan reports she stopped all lactose and did use fiber (apparnetly Metamucil powder mixed with water which she stopped as it caused her to feel \"too full\"). She does not believe she tried other forms of fiber or fiber pills. She cannot recall what happened to her stools while on fiber.     At her last colorectal surgery visit in June 2016 the patient declined pursuit of biofeedback. Sacral nerve stimulation was discussed a a potential intervention though Ms. Yan was interested in more conservative measure with stool bulking and/or scheduled enemas to aid in evacuation. It appears, Ms. Yan has not had pursued either of these options. She did not return to colorectal surgery clinic for follow-up as recommended. It seems she reported a period of constipation in Oct 2016 which was treated with Miralax and then an enema through her PCP clinic. This may have been when the GI consultation was placed (though I can find no active referral order). Ms. Yan has no further issues with constipation and she is no longer using Miralax (despite the fact it is on her active med list).  A follow-up appointment on 10/31/16 in primary care clinic revealed resolution of her constipation, but with report of fecal incontinence a referral was placed to colorectal surgery clinic.         ROS:    Remainder of comprehensive ROS is negative.     PROBLEM LIST  Patient Active Problem List    Diagnosis Date Noted     Chronic obstructive pulmonary disease, unspecified COPD type (H) 10/10/2016     Priority: Medium     Non morbid obesity due to excess calories 07/08/2016     Priority: Medium     Diastolic dysfunction 07/08/2016     Priority: Medium     Irritable bowel syndrome 07/08/2016     Priority: Medium     Overview: "   with chronic diarrhea       Kyphoscoliosis 07/08/2016     Priority: Medium     Overview:   Marked kyphoscoliosis of thoracolumbar spine       Diarrhea 05/28/2015     Priority: Medium     Alcohol abuse 05/15/2014     Priority: Medium     Overview:   Problem: Bishop Blayne, psychotherapist, working with pt. Calling to leave message for New to inform her that pt is drinking up to a 1/2 pint a day several times a week. She is not interested in recovery services and will not go to day treatment. She has been given resources. Questions/concerns Bishop can be reached at [phone].        Seborrheic eczema 08/30/2013     Priority: Medium     Chronic kidney disease, stage III (moderate) 08/12/2013     Priority: Medium     Bunion 08/12/2013     Priority: Medium     Overview:   left       Anxiety state 07/12/2013     Priority: Medium     Malignant neoplasm of breast (H) 04/17/2013     Priority: Medium     Overview:   Status post left lumpectomy (2012)  Overview:   Dx 10/25/12, cared for at [facility]       History of knee surgery 12/02/2010     Priority: Medium     Osteoarthritis of knee 08/10/2010     Priority: Medium     Back pain 07/27/2010     Priority: Medium     Overview:   scoliosis surgery as child       Generalized anxiety disorder 06/23/2010     Priority: Medium     Osteoarthritis of spine 01/28/2010     Priority: Medium     Anemia 03/24/2009     Priority: Medium     Hypertension 03/23/2009     Priority: Medium     Overview:   Updated by system to replace inactive record       Insomnia 03/23/2009     Priority: Medium     Cluster C personality disorder 04/10/2007     Priority: Medium     Gastroesophageal reflux disease 09/29/2006     Priority: Medium     Overview:   LW Onset:  [id number]       Major depressive disorder with single episode 03/23/2005     Priority: Medium     Overview:   LW Onset:  [id number]       Hyperlipidemia 03/07/2005     Priority: Medium     Overview:   LW Onset:  26Emz30       Asthma  02/07/2005     Priority: Medium     Overview:   Noncompliant with use of inhalers         PERTINENT PAST MEDICAL HISTORY:  Past Medical History:   Diagnosis Date     Anxiety      Arthritis      Breast cancer (H)      COPD (chronic obstructive pulmonary disease) (H)     6/19/12:  FEV 0.99 l     Hypertension        PREVIOUS SURGERIES:  Past Surgical History:   Procedure Laterality Date     BACK SURGERY      spinal fusion     LUMPECTOMY BREAST       ORTHOPEDIC SURGERY      Knee surgery left side       ALLERGIES:     Allergies   Allergen Reactions     Azithromycin      Other reaction(s): Itching     Codeine Nausea and Vomiting     Hydrocodone      Vomiting     Metronidazole Nausea     Percocet [Oxycodone-Acetaminophen]      Vomiting     Pollen Extract      Other reaction(s): Other, see comments  Pt states that she has sneezing and runny nose.      Seasonal Allergies Other (See Comments)     Pt states that she has sneezing and runny nose.      Vicodin [Hydrocodone-Acetaminophen]      Vomiting     Zolpidem Other (See Comments)     Amnestic behavior     Oxycodone Itching and Rash       PERTINENT MEDICATIONS:  Current Outpatient Prescriptions   Medication     ciclopirox (LOPROX) 0.77 % cream     order for DME     ammonium lactate (LAC-HYDRIN) 12 % cream     losartan (COZAAR) 25 MG tablet     cephALEXin (KEFLEX) 250 MG capsule     ibuprofen (ADVIL,MOTRIN) 200 MG tablet     gabapentin (NEURONTIN) 100 MG capsule     order for DME     polyethylene glycol (MIRALAX) packet     acetaminophen (TYLENOL) 500 MG tablet     ASPIRIN EC PO     tiotropium (SPIRIVA) 18 MCG inhalation capsule     hydrochlorothiazide 12.5 MG TABS     omeprazole (PRILOSEC) 20 MG capsule     quetiapine (SEROQUEL) 200 MG tablet     simvastatin (ZOCOR) 20 MG tablet     No current facility-administered medications for this visit.        SOCIAL HISTORY:  Social History     Social History     Marital status: Single     Spouse name: N/A     Number of children: N/A  "    Years of education: N/A     Occupational History      Retired     Social History Main Topics     Smoking status: Former Smoker     Types: Cigarettes     Quit date: 9/26/1980     Smokeless tobacco: Never Used     Alcohol use No      Comment: Sober for 2 years, previous alcoholic     Drug use: No     Sexual activity: No     Other Topics Concern     Stress Concern Not Asked     Lives alone     Back Care Not Asked     Hx of scoliosis with spine fusion     Exercise Not Asked     Walks     Social History Narrative    Only family member is daughter in [place], [place] whom        FAMILY HISTORY:  Family History   Problem Relation Age of Onset     Breast Cancer Mother      DIABETES Mother      Alcohol/Drug Father      MENTAL ILLNESS Father      CEREBROVASCULAR DISEASE Maternal Grandmother 67       Past/family/social history reviewed and no changes    PHYSICAL EXAMINATION:  Vitals BP (!) 151/96 (BP Location: Right arm, Patient Position: Chair, Cuff Size: Adult Small)  Pulse 74  Temp 97.6  F (36.4  C)  Ht 1.6 m (5' 3\")  Wt 89.5 kg (197 lb 4.8 oz)  SpO2 92%  BMI 34.95 kg/m2   Wt   Wt Readings from Last 2 Encounters:   03/22/17 89.5 kg (197 lb 4.8 oz)   02/20/17 88 kg (194 lb)      Gen: Pt sitting up on exam table in NAD, interactive and cooperative on exam  Eyes: sclerae anicteric, no injection  ENT:  OP clear, MMM  Cardiac: RRR, nl S1, S2  Resp: Clear on anterior exam  GI: Normoactive BS, abd soft, flat, nontender  Skin: Warm, dry, no jaundice, nails appear healthy  Neuro: alert, oriented, answers questions appropriately      PERTINENT STUDIES:    Orders Only on 02/20/2017   Component Date Value Ref Range Status     Sodium 02/20/2017 141  133 - 144 mmol/L Final     Potassium 02/20/2017 4.2  3.4 - 5.3 mmol/L Final     Chloride 02/20/2017 105  94 - 109 mmol/L Final     Carbon Dioxide 02/20/2017 28  20 - 32 mmol/L Final     Anion Gap 02/20/2017 8  3 - 14 mmol/L Final     Glucose 02/20/2017 90  70 - 99 mg/dL Final     Urea " Nitrogen 02/20/2017 22  7 - 30 mg/dL Final     Creatinine 02/20/2017 0.92  0.52 - 1.04 mg/dL Final     GFR Estimate 02/20/2017 60* >60 mL/min/1.7m2 Final     GFR Estimate If Black 02/20/2017 72  >60 mL/min/1.7m2 Final     Calcium 02/20/2017 8.8  8.5 - 10.1 mg/dL Final     Bilirubin Total 02/20/2017 0.4  0.2 - 1.3 mg/dL Final     Albumin 02/20/2017 3.6  3.4 - 5.0 g/dL Final     Protein Total 02/20/2017 8.4  6.8 - 8.8 g/dL Final     Alkaline Phosphatase 02/20/2017 110  40 - 150 U/L Final     ALT 02/20/2017 18  0 - 50 U/L Final     AST 02/20/2017 20  0 - 45 U/L Final     WBC 02/20/2017 6.1  4.0 - 11.0 10e9/L Final     RBC Count 02/20/2017 4.44  3.8 - 5.2 10e12/L Final     Hemoglobin 02/20/2017 13.4  11.7 - 15.7 g/dL Final     Hematocrit 02/20/2017 42.4  35.0 - 47.0 % Final     MCV 02/20/2017 96  78 - 100 fl Final     MCH 02/20/2017 30.2  26.5 - 33.0 pg Final     MCHC 02/20/2017 31.6  31.5 - 36.5 g/dL Final     RDW 02/20/2017 14.7  10.0 - 15.0 % Final     Platelet Count 02/20/2017 269  150 - 450 10e9/L Final     Diff Method 02/20/2017 Automated Method   Final     % Neutrophils 02/20/2017 65.0  % Final     % Lymphocytes 02/20/2017 22.3  % Final     % Monocytes 02/20/2017 8.9  % Final     % Eosinophils 02/20/2017 2.3  % Final     % Basophils 02/20/2017 1.0  % Final     % Immature Granulocytes 02/20/2017 0.5  % Final     Nucleated RBCs 02/20/2017 0  0 /100 Final     Absolute Neutrophil 02/20/2017 3.9  1.6 - 8.3 10e9/L Final     Absolute Lymphocytes 02/20/2017 1.4  0.8 - 5.3 10e9/L Final     Absolute Monocytes 02/20/2017 0.5  0.0 - 1.3 10e9/L Final     Absolute Eosinophils 02/20/2017 0.1  0.0 - 0.7 10e9/L Final     Absolute Basophils 02/20/2017 0.1  0.0 - 0.2 10e9/L Final     Abs Immature Granulocytes 02/20/2017 0.0  0 - 0.4 10e9/L Final     Absolute Nucleated RBC 02/20/2017 0.0   Final     TSH 02/20/2017 1.40  0.40 - 4.00 mU/L Final

## 2017-03-22 NOTE — TELEPHONE ENCOUNTER
[place] [facility]cluded  Office notes:7/8/14(appears to be unrelated), 1/18/16(duplicate), 2/12/16 (appears to be unrelated)  ED notes: 5/6/14 (appears to be unrelated)

## 2017-03-22 NOTE — PROGRESS NOTES
"GI CLINIC VISIT    CC/REFERRING MD:  Senia Olivas  REASON FOR CONSULTATION:   Ms. Yan is a 71 year old female who I was asked to see in consultation at the request of Dr. Senia Olivas for   Chief Complaint   Patient presents with     Consult     New Consultaion       ASSESSMENT/PLAN:  (K58.9) Irritable bowel syndrome, unspecified type  (primary encounter diagnosis)  (R15.9) Fecal incontinence  Comment:     Ms. Yan's primary concern is episodes of fecal incontinence which occur during periods of less-formed stool. Though she carries a diagnosis of IBS, this seems to be more of an IBS-D subtype based on history (only one documented episode of constipation in the last year, with no reported prominent constipation history or noted in available documentation). IBS-D episdoes are infrequent (once monthly) and short-lived making daily medication/prophylactic therapy less useful and as needed meds less effective (due to briefness of episode). It seems her only prior \"IBS\" medications were antidiarrheals which were more in use for her fecal incontinence.     Fecal incontinence is Ms. Yan's primary concern. It has been evaluated previously with colorectal surgery with conservative measures planned (though not completed by patient). Biofeedback and sacral nerve stimulation have also been discussed, but no pursued per patient preference. From a GI perspective, we spent some time discussing the role of fiber in stool bulking to permit better \"control\" and use of antidiarrheals to address colon motility. Caffeine's role as a bowel stimulant was reviewed in detail as well.     Ms. Yan was ultimately agreeable to a trial of fiber - with past issues tolerating Metamucil leading to only a short-term use, will use Citrucel as outlined below. Ms. Yan was encouraged to follow-up with colorectal surgery clinic to address her questions regarding biofeedback and sacral nerve stimulation.   Plan:   - recommend " "Citrucel fiber supplement - start 1 capsule daily and increase as tolerated to 4-8 capsules daily.   - recommend following up in colorectal surgery clinic to address your questions   - stop drinking coffee in the evening, can work up to stopping coffee after 3 pm. Work on decreasing amount of caffeine ingested overall.     RTC prn    Thank you for this consultation.  It was a pleasure to participate in the care of this patient; please contact us with any further questions.  A total of 35 minutes was spent with this patient, >50% of which was counseling regarding the above delineated issues.    Lynn Peacock MD   of Medicine  Division of Gastroenterology, Hepatology and Nutrition  [facility]    ---------------------------------------------------------------------------------------------------  HPI:    Ms. Yan is a pleasant 71 year old female with prior breast CA s/p lumpectomy (2012) followed by radiation, COPD, HTN, RENATO, CKD, stage III, and back pain s/p fusion who presents for evaluation of \"IBS.\"  Ms. Yan reports that she thought her visit today was in colorectal surgery clinic; she recalls being referred there for fecal incontinence. She is not quite sure why she is here today in GI clinic. She states she was previously following with GI at [facility].       In regards to her IBS diagnosis, this was apparently made in the 1980s in the [facility] system. Ms. Yan recalls seeing several different providers there but not many details of her GI evaluation. She reports that she experienced intermittent episodes of abdominal cramping, urgency, with passage of one large \"shake\" consistency stool. These symptom would resolve after she emptied her bowels, typically lasting a short time (less than an hour). These episodes would occur about once a moth. In between episodes, Ms. Yan typically has one soft, formed stool every morning after breakfast.     These episodes " "have continued intermittently over the years, but in the last year of so, the episodes having been occurring at night. She reports waking around 2 a.m. with her typical symptoms and passing a \"shake-consistency\" stool and returning to bed after her symptoms are passed. Her evening meal is around 5-6 pm, though Ms. Yan reports she continues to drink coffee throughout the evening.    Most bothersome to Ms. Yan is the associated fecal incontinence that she experiences with her loose stools. She reports having urinary incontinence since age 48 with has not concerned her much. She believes she has seen urologists in the past in the [facility]. Her fecal incontinence began 7-8 years ago. Ms. Yan has one one vaginal delivery; she does not remember any further details of this delivery.  She states she was told at [facility] that she \"needed an implant\" for her urinary and fecal incontinence but cannot recall why it was not pursued. Review of records indicates she has also followed with our colorectal surgery clinic in the past seeing Dr. Latham, Dr. Lorenzo, and most recently in June 2016, Lilly Ríos CNP. Previous work-up has included an incomplete colonoscopy (due to discomfort) in 2010, a virtual colonoscopy (reportedly normal, results unavailable), and pelvic floor testing in 2010 which demonstrated non-relaxation of her puborectalis and slightly weak resting tone and squeeze. Another colonoscopy was ordered in June 2016, though it appears, Ms. Yan has not completed this.     For management of fecal incontinence she recalls trying tincture of opium and lomotil to slow her bowels; she states both were helpful for a period of time but were stopped when her stools become too hard. She doesn't believe she's been on loperamide, though it is mentioned as ineffective for her in other documentation. Ms. Yan reports she stopped all lactose and did use fiber (apparnetly Metamucil " "powder mixed with water which she stopped as it caused her to feel \"too full\"). She does not believe she tried other forms of fiber or fiber pills. She cannot recall what happened to her stools while on fiber.     At her last colorectal surgery visit in June 2016 the patient declined pursuit of biofeedback. Sacral nerve stimulation was discussed a a potential intervention though Ms. Yan was interested in more conservative measure with stool bulking and/or scheduled enemas to aid in evacuation. It appears, Ms. Yan has not had pursued either of these options. She did not return to colorectal surgery clinic for follow-up as recommended. It seems she reported a period of constipation in Oct 2016 which was treated with Miralax and then an enema through her PCP clinic. This may have been when the GI consultation was placed (though I can find no active referral order). Ms. Yan has no further issues with constipation and she is no longer using Miralax (despite the fact it is on her active med list).  A follow-up appointment on 10/31/16 in primary care clinic revealed resolution of her constipation, but with report of fecal incontinence a referral was placed to colorectal surgery clinic.         ROS:    Remainder of comprehensive ROS is negative.     PROBLEM LIST  Patient Active Problem List    Diagnosis Date Noted     Chronic obstructive pulmonary disease, unspecified COPD type (H) 10/10/2016     Priority: Medium     Non morbid obesity due to excess calories 07/08/2016     Priority: Medium     Diastolic dysfunction 07/08/2016     Priority: Medium     Irritable bowel syndrome 07/08/2016     Priority: Medium     Overview:   with chronic diarrhea       Kyphoscoliosis 07/08/2016     Priority: Medium     Overview:   Marked kyphoscoliosis of thoracolumbar spine       Diarrhea 05/28/2015     Priority: Medium     Alcohol abuse 05/15/2014     Priority: Medium     Overview:   Problem: Bishop Cisneros, psychotherapist, working " with pt. Calling to leave message for New to inform her that pt is drinking up to a 1/2 pint a day several times a week. She is not interested in recovery services and will not go to day treatment. She has been given resources. Questions/concerns Bishop can be reached at [phone].        Seborrheic eczema 08/30/2013     Priority: Medium     Chronic kidney disease, stage III (moderate) 08/12/2013     Priority: Medium     Bunion 08/12/2013     Priority: Medium     Overview:   left       Anxiety state 07/12/2013     Priority: Medium     Malignant neoplasm of breast (H) 04/17/2013     Priority: Medium     Overview:   Status post left lumpectomy (2012)  Overview:   Dx 10/25/12, cared for at [facility]       History of knee surgery 12/02/2010     Priority: Medium     Osteoarthritis of knee 08/10/2010     Priority: Medium     Back pain 07/27/2010     Priority: Medium     Overview:   scoliosis surgery as child       Generalized anxiety disorder 06/23/2010     Priority: Medium     Osteoarthritis of spine 01/28/2010     Priority: Medium     Anemia 03/24/2009     Priority: Medium     Hypertension 03/23/2009     Priority: Medium     Overview:   Updated by system to replace inactive record       Insomnia 03/23/2009     Priority: Medium     Cluster C personality disorder 04/10/2007     Priority: Medium     Gastroesophageal reflux disease 09/29/2006     Priority: Medium     Overview:   LW Onset:  [id number]       Major depressive disorder with single episode 03/23/2005     Priority: Medium     Overview:   LW Onset:  [id number]       Hyperlipidemia 03/07/2005     Priority: Medium     Overview:   LW Onset:  [id number]       Asthma 02/07/2005     Priority: Medium     Overview:   Noncompliant with use of inhalers         PERTINENT PAST MEDICAL HISTORY:  Past Medical History:   Diagnosis Date     Anxiety      Arthritis      Breast cancer (H)      COPD (chronic obstructive pulmonary disease) (H)     6/19/12:  FEV 0.99 l      Hypertension        PREVIOUS SURGERIES:  Past Surgical History:   Procedure Laterality Date     BACK SURGERY      spinal fusion     LUMPECTOMY BREAST       ORTHOPEDIC SURGERY      Knee surgery left side       ALLERGIES:     Allergies   Allergen Reactions     Azithromycin      Other reaction(s): Itching     Codeine Nausea and Vomiting     Hydrocodone      Vomiting     Metronidazole Nausea     Percocet [Oxycodone-Acetaminophen]      Vomiting     Pollen Extract      Other reaction(s): Other, see comments  Pt states that she has sneezing and runny nose.      Seasonal Allergies Other (See Comments)     Pt states that she has sneezing and runny nose.      Vicodin [Hydrocodone-Acetaminophen]      Vomiting     Zolpidem Other (See Comments)     Amnestic behavior     Oxycodone Itching and Rash       PERTINENT MEDICATIONS:  Current Outpatient Prescriptions   Medication     ciclopirox (LOPROX) 0.77 % cream     order for DME     ammonium lactate (LAC-HYDRIN) 12 % cream     losartan (COZAAR) 25 MG tablet     cephALEXin (KEFLEX) 250 MG capsule     ibuprofen (ADVIL,MOTRIN) 200 MG tablet     gabapentin (NEURONTIN) 100 MG capsule     order for DME     polyethylene glycol (MIRALAX) packet     acetaminophen (TYLENOL) 500 MG tablet     ASPIRIN EC PO     tiotropium (SPIRIVA) 18 MCG inhalation capsule     hydrochlorothiazide 12.5 MG TABS     omeprazole (PRILOSEC) 20 MG capsule     quetiapine (SEROQUEL) 200 MG tablet     simvastatin (ZOCOR) 20 MG tablet     No current facility-administered medications for this visit.        SOCIAL HISTORY:  Social History     Social History     Marital status: Single     Spouse name: N/A     Number of children: N/A     Years of education: N/A     Occupational History      Retired     Social History Main Topics     Smoking status: Former Smoker     Types: Cigarettes     Quit date: 9/26/1980     Smokeless tobacco: Never Used     Alcohol use No      Comment: Sober for 2 years, previous alcoholic     Drug use:  "No     Sexual activity: No     Other Topics Concern     Stress Concern Not Asked     Lives alone     Back Care Not Asked     Hx of scoliosis with spine fusion     Exercise Not Asked     Walks     Social History Narrative    Only family member is daughter in [place], [place] whom        FAMILY HISTORY:  Family History   Problem Relation Age of Onset     Breast Cancer Mother      DIABETES Mother      Alcohol/Drug Father      MENTAL ILLNESS Father      CEREBROVASCULAR DISEASE Maternal Grandmother 67       Past/family/social history reviewed and no changes    PHYSICAL EXAMINATION:  Vitals BP (!) 151/96 (BP Location: Right arm, Patient Position: Chair, Cuff Size: Adult Small)  Pulse 74  Temp 97.6  F (36.4  C)  Ht 1.6 m (5' 3\")  Wt 89.5 kg (197 lb 4.8 oz)  SpO2 92%  BMI 34.95 kg/m2   Wt   Wt Readings from Last 2 Encounters:   03/22/17 89.5 kg (197 lb 4.8 oz)   02/20/17 88 kg (194 lb)      Gen: Pt sitting up on exam table in NAD, interactive and cooperative on exam  Eyes: sclerae anicteric, no injection  ENT:  OP clear, MMM  Cardiac: RRR, nl S1, S2  Resp: Clear on anterior exam  GI: Normoactive BS, abd soft, flat, nontender  Skin: Warm, dry, no jaundice, nails appear healthy  Neuro: alert, oriented, answers questions appropriately      PERTINENT STUDIES:    Orders Only on 02/20/2017   Component Date Value Ref Range Status     Sodium 02/20/2017 141  133 - 144 mmol/L Final     Potassium 02/20/2017 4.2  3.4 - 5.3 mmol/L Final     Chloride 02/20/2017 105  94 - 109 mmol/L Final     Carbon Dioxide 02/20/2017 28  20 - 32 mmol/L Final     Anion Gap 02/20/2017 8  3 - 14 mmol/L Final     Glucose 02/20/2017 90  70 - 99 mg/dL Final     Urea Nitrogen 02/20/2017 22  7 - 30 mg/dL Final     Creatinine 02/20/2017 0.92  0.52 - 1.04 mg/dL Final     GFR Estimate 02/20/2017 60* >60 mL/min/1.7m2 Final     GFR Estimate If Black 02/20/2017 72  >60 mL/min/1.7m2 Final     Calcium 02/20/2017 8.8  8.5 - 10.1 mg/dL Final     Bilirubin Total " 02/20/2017 0.4  0.2 - 1.3 mg/dL Final     Albumin 02/20/2017 3.6  3.4 - 5.0 g/dL Final     Protein Total 02/20/2017 8.4  6.8 - 8.8 g/dL Final     Alkaline Phosphatase 02/20/2017 110  40 - 150 U/L Final     ALT 02/20/2017 18  0 - 50 U/L Final     AST 02/20/2017 20  0 - 45 U/L Final     WBC 02/20/2017 6.1  4.0 - 11.0 10e9/L Final     RBC Count 02/20/2017 4.44  3.8 - 5.2 10e12/L Final     Hemoglobin 02/20/2017 13.4  11.7 - 15.7 g/dL Final     Hematocrit 02/20/2017 42.4  35.0 - 47.0 % Final     MCV 02/20/2017 96  78 - 100 fl Final     MCH 02/20/2017 30.2  26.5 - 33.0 pg Final     MCHC 02/20/2017 31.6  31.5 - 36.5 g/dL Final     RDW 02/20/2017 14.7  10.0 - 15.0 % Final     Platelet Count 02/20/2017 269  150 - 450 10e9/L Final     Diff Method 02/20/2017 Automated Method   Final     % Neutrophils 02/20/2017 65.0  % Final     % Lymphocytes 02/20/2017 22.3  % Final     % Monocytes 02/20/2017 8.9  % Final     % Eosinophils 02/20/2017 2.3  % Final     % Basophils 02/20/2017 1.0  % Final     % Immature Granulocytes 02/20/2017 0.5  % Final     Nucleated RBCs 02/20/2017 0  0 /100 Final     Absolute Neutrophil 02/20/2017 3.9  1.6 - 8.3 10e9/L Final     Absolute Lymphocytes 02/20/2017 1.4  0.8 - 5.3 10e9/L Final     Absolute Monocytes 02/20/2017 0.5  0.0 - 1.3 10e9/L Final     Absolute Eosinophils 02/20/2017 0.1  0.0 - 0.7 10e9/L Final     Absolute Basophils 02/20/2017 0.1  0.0 - 0.2 10e9/L Final     Abs Immature Granulocytes 02/20/2017 0.0  0 - 0.4 10e9/L Final     Absolute Nucleated RBC 02/20/2017 0.0   Final     TSH 02/20/2017 1.40  0.40 - 4.00 mU/L Final         Answers for HPI/ROS submitted by the patient on 3/22/2017   General Symptoms: No  Skin Symptoms: No  HENT Symptoms: Yes  EYE SYMPTOMS: Yes  HEART SYMPTOMS: No  LUNG SYMPTOMS: Yes  INTESTINAL SYMPTOMS: Yes  URINARY SYMPTOMS: Yes  GYNECOLOGIC SYMPTOMS: No  BREAST SYMPTOMS: No  SKELETAL SYMPTOMS: No  BLOOD SYMPTOMS: No  NERVOUS SYSTEM SYMPTOMS: No  MENTAL HEALTH SYMPTOMS:  No  Ear pain: No  Ear discharge: No  Hearing loss: No  Tinnitus: No  Nosebleeds: No  Congestion: No  Sinus pain: No  Trouble swallowing: No   Voice hoarseness: No  Mouth sores: Yes  Sore throat: No  Tooth pain: No  Gum tenderness: No  Bleeding gums: No  Change in taste: No  Change in sense of smell: No  Dry mouth: Yes  Hearing aid used: No  Neck lump: No  Cough: No  Sputum or phlegm: No  Coughing up blood: No  Difficulty breating or shortness of breath: Yes  Snoring: No  Wheezing: No  Difficulty breathing on exertion: Yes  Respiratory pain: No  Nighttime Cough: No  Difficulty breathing when lying flat: No  Heart burn or indigestion: No  Nausea: No  Abdominal pain: No  Bloating: No  Constipation: No  Diarrhea: No  Blood in stool: No  Black stools: No  Rectal or Anal pain: No  Fecal incontinence: Yes  Rectal bleeding: No  Yellowing of skin or eyes: No  Vomit with blood: No  Change in stools: No  Trouble holding urine or incontinence: Yes  Pain or burning: No  Trouble starting or stopping: Yes  Increased frequency of urination: No  Blood in urine: No  Decreased frequency of urination: No  Frequent nighttime urination: Yes  Flank pain: No  Difficulty emptying bladder: No

## 2017-03-22 NOTE — TELEPHONE ENCOUNTER
Per task, I called Ca and we discussed setting her up to see a colon and rectal provider. I explained that a GI provider can do certain things while a colon and rectal surgeon can do other things. She was frustrated that she has to come back and wants to know why she is see a different provider. I explained as best as I could why she needed to come in and I believe she understood. I confirmed time, date, location and provider. She asked if she needed to prep or bring anything and I told her that I would find out if she needed to bring anything. I gave her my number in case she has further questions or needs to reschedule.

## 2017-03-22 NOTE — NURSING NOTE
"Chief Complaint   Patient presents with     Consult     New Consultaion       Vitals:    03/22/17 1141   BP: (!) 151/96   BP Location: Right arm   Patient Position: Chair   Cuff Size: Adult Small   Pulse: 74   Temp: 97.6  F (36.4  C)   SpO2: 92%   Weight: 89.5 kg (197 lb 4.8 oz)   Height: 1.6 m (5' 3\")       Body mass index is 34.95 kg/(m^2).    Bela Mack                          "

## 2017-03-30 DIAGNOSIS — R19.7 DIARRHEA, UNSPECIFIED TYPE: Primary | ICD-10-CM

## 2017-04-03 ENCOUNTER — HOSPITAL ENCOUNTER (OUTPATIENT)
Dept: PHYSICAL THERAPY | Facility: CLINIC | Age: 72
Setting detail: THERAPIES SERIES
End: 2017-04-03
Attending: INTERNAL MEDICINE
Payer: COMMERCIAL

## 2017-04-03 PROCEDURE — 97113 AQUATIC THERAPY/EXERCISES: CPT | Mod: GP | Performed by: PHYSICAL THERAPIST

## 2017-04-03 PROCEDURE — 40000189 ZZH STATISTIC PT POOL VISIT: Performed by: PHYSICAL THERAPIST

## 2017-04-03 NOTE — PROGRESS NOTES
AQUATIC PHYSICAL THERAPY EXERCISE LOG (TRUNK)  Date  4/3/17   Warm Up Ambulation (Forward/Side/Back/March/All) FOrward walking as walm up, remaining at 3' deck and completing 8 widths of the pool             Stretching/ROM Chin Tucks     CROM (Flex/Ext/SB/Rot/All)     Shoulder (Shrugs/Rolls)     Scapular (Retraction/Depression)     Upper Trunk (Levator/Scalene/Upper Trapezius)     Pec Stretch (Unilateral/Bilateral)     Posterior Shoulder     Gluteal     Hamstring (On Step/Cuff)     Calf (In Hole/At Wall)     Quad     Hip Flexor (Kneel)     Piriformis (Seated/Stand)     Trunk ROM (Flex/Ext/SB/Rot/All)     BAD RAGAZ              Strengthening Abdominal Sets/ Pelvic Tilt     Shoulder (Flex/Ext, Abd/Add, IR/ER, Circles, Alternation flex/ext for core) Flexion/ext, abduction/adduction and IR/ER with use of small blue dumbells x 20 reps    Horizon (Abd/Add, Diagonals)     Rowing Arms     UE PNF (D1/D2)     Abdominal Sets (Push/Pull, side to side, push down with board) Abdominal setting with push/pull and pushing down on board. Did not complete rotational movements due to history of spinal fusion. Cueing provided for posture, positioning relative to kickboard and technique.    Squat  x 20 reps    Lunge Squat     Hip (Flex/Ext, Abd/Add, single leg bike, circles, figure 8, All) Hip flex/Ext and abduction/adduction x 20 reps, cueing provided for patient to perform hip flexion at faster rate to utilize added resistance from water.     Heel/Toe Raises X 20 reps    Step Ups (Forward, Lateral)     Noodle Push Downs with UE(B UE/1 UE) for core strengthening Noodle push down x 15 reps with LEs using yellow resistance. Trialed resisted knee extension, however, patient having difficulty with positioning and indciating increased discomfort through low back- discontinued.     Noodle Push Downs with LE     Stir the Pot/Punches with Dumbells     Sit to Stand at Bench     Prone Plank on step     Side Plank on step              Aerobic Fast  Walking Fast walking x 4 minutes at 3' deck     Bike/Ski/Anish/All     Aerobic step up  X 5 minutes- patient denying pain with this        Balance Narrow Base of Support     Tandem Stand     Single Leg Stance     Heel/Toe Walking     3 Step and Stop     Braiding                   Cool Down Ambulation     Zarephath Dangle     Whirlpool      NOTE: Cueing for posture and abdominal activation provided throughout.

## 2017-04-05 ENCOUNTER — PRE VISIT (OUTPATIENT)
Dept: SURGERY | Facility: CLINIC | Age: 72
End: 2017-04-05

## 2017-04-05 DIAGNOSIS — J44.9 CHRONIC OBSTRUCTIVE PULMONARY DISEASE, UNSPECIFIED COPD TYPE (H): ICD-10-CM

## 2017-04-05 LAB
ERV-%PRED-PRE: 37 %
ERV-PRE: 0.11 L
ERV-PRED: 0.3 L
EXPTIME-PRE: 7.37 SEC
FEF2575-%PRED-PRE: 46 %
FEF2575-PRE: 0.81 L/SEC
FEF2575-PRED: 1.74 L/SEC
FEFMAX-%PRED-PRE: 58 %
FEFMAX-PRE: 3.12 L/SEC
FEFMAX-PRED: 5.31 L/SEC
FEV1-%PRED-PRE: 47 %
FEV1-PRE: 0.99 L
FEV1FEV6-PRE: 78 %
FEV1FEV6-PRED: 79 %
FEV1FVC-PRE: 78 %
FEV1FVC-PRED: 78 %
FEV1SVC-PRE: 83 %
FEV1SVC-PRED: 71 %
FIFMAX-PRE: 2.14 L/SEC
FVC-%PRED-PRE: 46 %
FVC-PRE: 1.26 L
FVC-PRED: 2.69 L
IC-%PRED-PRE: 40 %
IC-PRE: 1.07 L
IC-PRED: 2.63 L
VC-%PRED-PRE: 40 %
VC-PRE: 1.18 L
VC-PRED: 2.93 L

## 2017-04-05 NOTE — TELEPHONE ENCOUNTER
1.  Date/reason for appt: 4/19/7 10AM Fecal Incontinence   2.  Referring provider: Josiah LOPEZ  3.  Call to patient (Yes / No - short description):  4.  Previous care at / records requested from:  Ov notes - 9/13/2010, 1/6/12, 6/21/16 w/ Noa,  ED notes - 10/5/16 ([facility])   Labs- PFT 6/19/12  Pelvic Floor - 10/09/2010  Virtual colonoscopy ([facility]) 2010-     [facility] PCP (Rachel Villeda) - Attempt to fax cover sheet to req. any missing recs     Pt was seen by GI

## 2017-04-10 NOTE — TELEPHONE ENCOUNTER
Radiology reports received from [facility]. Notified the film room to pull images.   Xray abdomen on 4/1/15, 10/5/16  CT abdomen pelvis on 8/13/16

## 2017-04-11 NOTE — TELEPHONE ENCOUNTER
Records received from [facility].   Included  Office notes: 12/14/13, 4/16/14, 6/30/14, 9/10/15, 9/16/16  Radiology reports: xray thoracic on 1/28/10   Xray lumbar on 1/28/10  Other: labs     Missing ED notes.

## 2017-04-12 NOTE — TELEPHONE ENCOUNTER
Additional records received from [facility].   Included  Office notes:  5/8/09, 6/5/09, 2/3/11, 11/2/11, 8/3/14, 9/30/14, 5/28/15, 7/13/15, 9/10/15(duplicate)  Radiology reports:CT abdomen pelvis on 6/7/13, 8/13/16   Xray abdomen on 2/12/05, 3/7/07, 3/8/07, 1/12/14, 4/1/15   US renal and bladder on 6/5/09   Xray IVP stone technique on 3/7/97

## 2017-04-13 PROBLEM — M85.9 LOW BONE DENSITY: Status: ACTIVE | Noted: 2017-04-13

## 2017-04-18 NOTE — TELEPHONE ENCOUNTER
Called pt to see if she recalls who her previous Gastroenterologist is, pt has no idea who she saw. Called [facility] tp see if they have ED notes, Olive will look into pt's chart for any ED notes (10/5/16 IBS Sx's) and related OV notes over today.

## 2017-04-19 ENCOUNTER — OFFICE VISIT (OUTPATIENT)
Dept: SURGERY | Facility: CLINIC | Age: 72
End: 2017-04-19

## 2017-04-19 VITALS
DIASTOLIC BLOOD PRESSURE: 83 MMHG | WEIGHT: 197.7 LBS | HEART RATE: 83 BPM | BODY MASS INDEX: 35.03 KG/M2 | OXYGEN SATURATION: 91 % | HEIGHT: 63 IN | SYSTOLIC BLOOD PRESSURE: 134 MMHG | TEMPERATURE: 97.4 F

## 2017-04-19 DIAGNOSIS — R15.9 FECAL INCONTINENCE: Primary | ICD-10-CM

## 2017-04-19 ASSESSMENT — PAIN SCALES - GENERAL: PAINLEVEL: NO PAIN (0)

## 2017-04-19 NOTE — LETTER
"2017       RE: Ca Yan  [address]   [facility] [address]     Dear Colleague,    Thank you for referring your patient, Ca Yan, to the [facility] COLON AND RECTAL SURGERY at [facility]. Please see a copy of my visit note below.    Colon and Rectal Surgery Follow-Up Clinic Note    RE: Ca Yan  : 1945  NUPUR: 2017    Ca Yan is a very pleasant 71 year old female here for follow-up of fecal incontinence.    Interval history: Ms. Yan has been seen in the past by Lilly Santa, EVANGELISTA, Dr. Latham and Dr. Lorenzo for fecal incontinence. She reports dealing with fecal incontinence for the past 8 years. She continues to have about one bowel movement a day. Her bowel movements are subjectively very \"messy\" and she has to return to the bathroom several times to clean after bowel movements. She denies loosing a whole stool and denies any incontinence of flatus or nocturnal incontinence. She does have some urinary incontinence. She declines seeing urogynecology as she finds this manageable. She wears a pad and the urinary incontinence is worst with coughing/Valsalva. She denies feeling a prolapse. Her back problems make transfers and taking baths difficult.     She has never had any anorectal procedures. She hd one vaginal birth of a baby of 8 pounds.     Her last colonoscopy was in  but was unable to be completed due to patient discomfort. A virtual colonoscopy was completed at that time with recommended repeat in 5 years.    She underwent pelvic floor testing in  with non relaxing publrectalis on defecography and slightly weaker tone with resting tone 25-31 and squeeze 6-38 mmHg. She declined biofeedback in the past.    PLEASE SEE NOTE BELOW FOR PHYSICAL EXAMINATION, REVIEW OF SYSTEMS, AND OTHER HISTORY.    Assessment/Plan: 71 year old female with ongoing fecal incontinence. We had a long discussion " about pelvic floor dysfunction and fecal incontinence. We spoke about conservative treatment (stool bulkening) versus biofeedback versus sacral nerve stimulator and generally about outcomes associated with these. I think she would be a good candidate for Sacral Nerve Stimulation and she is interested in considering. She also currently wants to get planned cataract surgery in May and June.     We spoke in particular about Sacral Nerve Stimulation and we gave her information about Sacral Nerve Stimulation. She will do a bowel diary prior to any procedure. We provided Sacral Nerve Stimulation information.      She was given our number and she will plan to call in follow-up. She states that she plans to do this after her cataract surgeries.    Advised colonoscopy at this time as well.    Patient's questions were answered to her stated satisfaction and she is in agreement with this plan.    Total time was 25 minutes, over 50% was spent counseling the patient.     Leda Lahtam MD  Colon and Rectal Surgery Attending  Department of Surgery  [facility]    PLEASE SEE NOTE BELOW FOR PHYSICAL EXAMINATION, REVIEW OF SYSTEMS, AND OTHER HISTORY.  --------------------------------------------------------------------------------------------------------------------------------------------------------------    MEDICATIONS:  Current Outpatient Prescriptions   Medication     methylcellulose (CITRUCEL) 500 MG TABS tablet     ciclopirox (LOPROX) 0.77 % cream     order for DME     ammonium lactate (LAC-HYDRIN) 12 % cream     losartan (COZAAR) 25 MG tablet     cephALEXin (KEFLEX) 250 MG capsule     ibuprofen (ADVIL,MOTRIN) 200 MG tablet     gabapentin (NEURONTIN) 100 MG capsule     order for DME     polyethylene glycol (MIRALAX) packet     acetaminophen (TYLENOL) 500 MG tablet     ASPIRIN EC PO     tiotropium (SPIRIVA) 18 MCG inhalation capsule     hydrochlorothiazide 12.5 MG TABS     omeprazole  (PRILOSEC) 20 MG capsule     quetiapine (SEROQUEL) 200 MG tablet     simvastatin (ZOCOR) 20 MG tablet     No current facility-administered medications for this visit.      ALLERGIES:     Allergies   Allergen Reactions     Azithromycin      Other reaction(s): Itching     Codeine Nausea and Vomiting     Hydrocodone      Vomiting     Metronidazole Nausea     Percocet [Oxycodone-Acetaminophen]      Vomiting     Pollen Extract      Other reaction(s): Other, see comments  Pt states that she has sneezing and runny nose.      Seasonal Allergies Other (See Comments)     Pt states that she has sneezing and runny nose.      Vicodin [Hydrocodone-Acetaminophen]      Vomiting     Zolpidem Other (See Comments)     Amnestic behavior     Oxycodone Itching and Rash       PAST MEDICAL HISTORY:  Past Medical History:   Diagnosis Date     Anxiety      Arthritis      Breast cancer (H)      COPD (chronic obstructive pulmonary disease) (H)     6/19/12:  FEV 0.99 l     Hypertension      Low bone density 4/13/2017    DEXA April 12, 2017: T score -2.0. Normal Z score. FRAX risk: major osteoporotic fracture 11.9%, hip fracture 2.6%, therefore not high-risk     PROBLEM LIST:  Patient Active Problem List   Diagnosis     Non morbid obesity due to excess calories     Alcohol abuse     Malignant neoplasm of breast (H)     Chronic kidney disease, stage III (moderate)     Osteoarthritis of knee     Major depressive disorder with single episode     Diarrhea     Diastolic dysfunction     Osteoarthritis of spine     Gastroesophageal reflux disease     Generalized anxiety disorder     Hypertension     Hyperlipidemia     Insomnia     Irritable bowel syndrome     Kyphoscoliosis     Cluster C personality disorder     History of knee surgery     Seborrheic eczema     Anemia     Anxiety state     Asthma     Back pain     Bunion     Chronic obstructive pulmonary disease, unspecified COPD type (H)     Low bone density     PAST SURGICAL HISTORY:  Past Surgical  "History:   Procedure Laterality Date     BACK SURGERY      spinal fusion     LUMPECTOMY BREAST       ORTHOPEDIC SURGERY      Knee surgery left side       FAMILY HISTORY:  Family History   Problem Relation Age of Onset     Breast Cancer Mother      DIABETES Mother      Alcohol/Drug Father      MENTAL ILLNESS Father      CEREBROVASCULAR DISEASE Maternal Grandmother 67       SOCIAL HISTORY:  Social History   Substance Use Topics     Smoking status: Former Smoker     Types: Cigarettes     Quit date: 9/26/1980     Smokeless tobacco: Never Used     Alcohol use No      Comment: Sober for 2 years, previous alcoholic     Marital status: single.    Nursing Notes:   Jaycee Charles CMA  4/19/2017 10:31 AM  Signed  Chief Complaint   Patient presents with     Consult     consult       Vitals:    04/19/17 1027   BP: 134/83   BP Location: Right arm   Patient Position: Chair   Cuff Size: Adult Regular   Pulse: 83   Temp: 97.4  F (36.3  C)   SpO2: 91%   Weight: 197 lb 11.2 oz   Height: 5' 3\"       Body mass index is 35.02 kg/(m^2).      Jaycee Charles MA             Physical Examination:  /83 (BP Location: Right arm, Patient Position: Chair, Cuff Size: Adult Regular)  Pulse 83  Temp 97.4  F (36.3  C)  Ht 5' 3\"  Wt 197 lb 11.2 oz  SpO2 91%  BMI 35.02 kg/m2   General: alert, oriented, in no acute distress, sitting comfortably  HEENT:mucous membranes moist  The remainder of the exam was deferred today per the patient's request. I offered and asked to examine her on the commode and to perform an anorectal examination.      Again, thank you for allowing me to participate in the care of your patient.      Sincerely,    Leda Latham MD      "

## 2017-04-19 NOTE — MR AVS SNAPSHOT
After Visit Summary   4/19/2017    Ca Yan    MRN: [id number]           Patient Information     Date Of Birth          1945        Visit Information        Provider Department      4/19/2017 10:00 AM Leda Latham MD [facility] Colon and Rectal Surgery        Today's Diagnoses     Fecal incontinence    -  1       Follow-ups after your visit        Your next 10 appointments already scheduled     Apr 24, 2017  1:40 PM CDT   (Arrive by 1:25 PM)   Return Visit with Senia Olivas MD   [facility] ([facility] [place])    [address]  [address] [facility] [address]   [phone]            Apr 26, 2017  2:30 PM CDT   Pool Treatment with Lucy Cherry, PT   [facility] ([facility])    [address]  [address]  [facility] [address]   [phone]            May 01, 2017  2:30 PM CDT   Pool Treatment with Aimee Ramesh, PT   [facility] ([facility])    [address]  [address]  [facility] [address]   [phone]            May 03, 2017  2:30 PM CDT   Pool Treatment with Lucy Cherry, PT   [facility] ([facility])    [address]  [address]  [facility] [address]   [phone]            May 08, 2017  1:30 PM CDT   NEW CORNEA with Babar Aguirre MD   [facility] ([facility])    [address]  [facility] [address]   [phone]            May 11, 2017  1:30 PM CDT   (Arrive by 1:15 PM)   Return Visit with Luis Felipe Narayanan DPM   [facility] Endocrinology ([facility] [place])    [address]  [address] [facility] [address]   [phone]            May 12, 2017  1:45 PM CDT   Pool Treatment with Lucy Cherry, PT   [facility] ([facility])    [address]  [address]  [facility] [address]   [phone]  "           May 17, 2017  2:30 PM CDT   Pool Treatment with Lucy Cherry, PT   [facility] [facility] ([facility])    [address] [facility] [address]  [address]  [place] [address]   [phone]            May 19, 2017  2:30 PM CDT   Pool Treatment with Lucy Cherry, PT   [facility] [facility] ([facility])    [address]  [address]  [place] [address]   [phone]            May 24, 2017  2:30 PM CDT   Pool Treatment with Lucy Cherry, PT   [facility] [facility] ([facility])    [address] [facility]  [address]  [place] [address]   [phone]              Who to contact     Please call your clinic at [phone] to:    Ask questions about your health    Make or cancel appointments    Discuss your medicines    Learn about your test results    Speak to your doctor   If you have compliments or concerns about an experience at your clinic, or if you wish to file a complaint, please contact [facility] Patient Relations at [phone] or email us at [email or web address]         Additional Information About Your Visit        Care EveryWhere ID     This is your Care EveryWhere ID. This could be used by other organizations to access your [place] medical records  [id number]        Your Vitals Were     Pulse Temperature Height Pulse Oximetry BMI (Body Mass Index)       83 97.4  F (36.3  C) 5' 3\" 91% 35.02 kg/m2        Blood Pressure from Last 3 Encounters:   04/19/17 134/83   03/22/17 (!) 151/96   02/20/17 (!) 157/99    Weight from Last 3 Encounters:   04/19/17 197 lb 11.2 oz   03/22/17 197 lb 4.8 oz   02/20/17 194 lb              Today, you had the following     No orders found for display       Primary Care Provider Office Phone # Fax #    Senia Olivas [phone]        [facility] [address] [facility] [address]  [facility] [address]        Thank you!     Thank you for choosing [facility] COLON AND " RECTAL SURG[facility]  for your care. Our goal is always to provide you with excellent care. Hearing back from our patients is one way we can continue to improve our services. Please take a few minutes to complete the written survey that you may receive in the mail after your visit with us. Thank you!             Your Updated Medication List - Protect others around you: Learn how to safely use, store and throw away your medicines at www.disposemymeds.org.          This list is accurate as of: 4/19/17 11:59 PM.  Always use your most recent med list.                   Brand Name Dispense Instructions for use    acetaminophen 500 MG tablet    TYLENOL     Take 500-1,000 mg by mouth every 6 hours as needed for mild pain       ammonium lactate 12 % cream    LAC-HYDRIN    385 g    Apply topically 2 times daily as needed for dry skin       ASPIRIN EC PO      Take 81 mg by mouth       cephalexin 250 MG capsule    KEFLEX         ciclopirox 0.77 % cream    LOPROX    90 g    Apply topically 2 times daily To feet and toenails.       gabapentin 100 MG capsule    NEURONTIN    30 capsule    Take 1 capsule (100 mg) by mouth At Bedtime       hydrochlorothiazide 12.5 MG Tabs tablet      Take 12.5 mg by mouth daily.       ibuprofen 200 MG tablet    ADVIL/MOTRIN    90 tablet    Take 3 tablets (600 mg) by mouth 3 times daily (with meals)       losartan 25 MG tablet    COZAAR    30 tablet    Take 1 tablet (25 mg) by mouth daily       methylcellulose 500 MG Tabs tablet    CITRUCEL    30 tablet    Take 1 tablet (500 mg) by mouth daily start 1 capsule daily and increase as tolerated to 4-8 capsules daily.       omeprazole 20 MG CR capsule    priLOSEC     Take 20 mg by mouth as needed       * order for DME     1 Device    Equipment being ordered: Oxygen  Please provide portable oxygen concentrator (pt would like over the shoulder oxygen device) for portability at 2LPM with activity via nasal canula.       * order for DME     2 each    Equipment  "being ordered: knee high compression stockings, class 1 or 2, night time velcro compression garments, lymphedema bandaging supplies, darco boots to wear during treatment       polyethylene glycol Packet    MIRALAX    238 g    One 8.3-ounce bottle (238 g).  Refer to \"Getting Ready for a Colonoscopy\" patient handout       QUEtiapine 200 MG tablet    SEROquel     Take 200 mg by mouth At Bedtime.       tiotropium 18 MCG capsule    SPIRIVA     Inhale 18 mcg into the lungs daily       ZOCOR 20 MG tablet   Generic drug:  simvastatin      Take 20 mg by mouth daily       * Notice:  This list has 2 medication(s) that are the same as other medications prescribed for you. Read the directions carefully, and ask your doctor or other care provider to review them with you.      "

## 2017-04-19 NOTE — NURSING NOTE
"Chief Complaint   Patient presents with     Consult     consult       Vitals:    04/19/17 1027   BP: 134/83   BP Location: Right arm   Patient Position: Chair   Cuff Size: Adult Regular   Pulse: 83   Temp: 97.4  F (36.3  C)   SpO2: 91%   Weight: 197 lb 11.2 oz   Height: 5' 3\"       Body mass index is 35.02 kg/(m^2).      Jaycee Charles MA                          "

## 2017-04-23 PROBLEM — R15.9 FECAL INCONTINENCE: Status: ACTIVE | Noted: 2017-04-23

## 2017-04-23 NOTE — PROGRESS NOTES
"Colon and Rectal Surgery Follow-Up Clinic Note    RE: Ca Yan  : 1945  NUPUR: 2017    Ca Yan is a very pleasant 71 year old female here for follow-up of fecal incontinence.    Interval history: Ms. Yan has been seen in the past by Lilly Santa, EVANGELISTA, Dr. Latham and Dr. Lorenzo for fecal incontinence. She reports dealing with fecal incontinence for the past 8 years. She continues to have about one bowel movement a day. Her bowel movements are subjectively very \"messy\" and she has to return to the bathroom several times to clean after bowel movements. She denies loosing a whole stool and denies any incontinence of flatus or nocturnal incontinence. She does have some urinary incontinence. She declines seeing urogynecology as she finds this manageable. She wears a pad and the urinary incontinence is worst with coughing/Valsalva. She denies feeling a prolapse. Her back problems make transfers and taking baths difficult.     She has never had any anorectal procedures. She hd one vaginal birth of a baby of 8 pounds.     Her last colonoscopy was in  but was unable to be completed due to patient discomfort. A virtual colonoscopy was completed at that time with recommended repeat in 5 years.    She underwent pelvic floor testing in  with non relaxing publrectalis on defecography and slightly weaker tone with resting tone 25-31 and squeeze 6-38 mmHg. She declined biofeedback in the past.    PLEASE SEE NOTE BELOW FOR PHYSICAL EXAMINATION, REVIEW OF SYSTEMS, AND OTHER HISTORY.    Assessment/Plan: 71 year old female with ongoing fecal incontinence. We had a long discussion about pelvic floor dysfunction and fecal incontinence. We spoke about conservative treatment (stool bulkening) versus biofeedback versus sacral nerve stimulator and generally about outcomes associated with these. I think she would be a good candidate for Sacral Nerve Stimulation and she is interested in " considering. She also currently wants to get planned cataract surgery in May and June.     We spoke in particular about Sacral Nerve Stimulation and we gave her information about Sacral Nerve Stimulation. She will do a bowel diary prior to any procedure. We provided Sacral Nerve Stimulation information.      She was given our number and she will plan to call in follow-up. She states that she plans to do this after her cataract surgeries.    Advised colonoscopy at this time as well.    Patient's questions were answered to her stated satisfaction and she is in agreement with this plan.    Total time was 25 minutes, over 50% was spent counseling the patient.     Leda Latham MD  Colon and Rectal Surgery Attending  Department of Surgery  [facility]    PLEASE SEE NOTE BELOW FOR PHYSICAL EXAMINATION, REVIEW OF SYSTEMS, AND OTHER HISTORY.  --------------------------------------------------------------------------------------------------------------------------------------------------------------    MEDICATIONS:  Current Outpatient Prescriptions   Medication     methylcellulose (CITRUCEL) 500 MG TABS tablet     ciclopirox (LOPROX) 0.77 % cream     order for DME     ammonium lactate (LAC-HYDRIN) 12 % cream     losartan (COZAAR) 25 MG tablet     cephALEXin (KEFLEX) 250 MG capsule     ibuprofen (ADVIL,MOTRIN) 200 MG tablet     gabapentin (NEURONTIN) 100 MG capsule     order for DME     polyethylene glycol (MIRALAX) packet     acetaminophen (TYLENOL) 500 MG tablet     ASPIRIN EC PO     tiotropium (SPIRIVA) 18 MCG inhalation capsule     hydrochlorothiazide 12.5 MG TABS     omeprazole (PRILOSEC) 20 MG capsule     quetiapine (SEROQUEL) 200 MG tablet     simvastatin (ZOCOR) 20 MG tablet     No current facility-administered medications for this visit.      ALLERGIES:     Allergies   Allergen Reactions     Azithromycin      Other reaction(s): Itching     Codeine Nausea and Vomiting      Hydrocodone      Vomiting     Metronidazole Nausea     Percocet [Oxycodone-Acetaminophen]      Vomiting     Pollen Extract      Other reaction(s): Other, see comments  Pt states that she has sneezing and runny nose.      Seasonal Allergies Other (See Comments)     Pt states that she has sneezing and runny nose.      Vicodin [Hydrocodone-Acetaminophen]      Vomiting     Zolpidem Other (See Comments)     Amnestic behavior     Oxycodone Itching and Rash       PAST MEDICAL HISTORY:  Past Medical History:   Diagnosis Date     Anxiety      Arthritis      Breast cancer (H)      COPD (chronic obstructive pulmonary disease) (H)     6/19/12:  FEV 0.99 l     Hypertension      Low bone density 4/13/2017    DEXA April 12, 2017: T score -2.0. Normal Z score. FRAX risk: major osteoporotic fracture 11.9%, hip fracture 2.6%, therefore not high-risk     PROBLEM LIST:  Patient Active Problem List   Diagnosis     Non morbid obesity due to excess calories     Alcohol abuse     Malignant neoplasm of breast (H)     Chronic kidney disease, stage III (moderate)     Osteoarthritis of knee     Major depressive disorder with single episode     Diarrhea     Diastolic dysfunction     Osteoarthritis of spine     Gastroesophageal reflux disease     Generalized anxiety disorder     Hypertension     Hyperlipidemia     Insomnia     Irritable bowel syndrome     Kyphoscoliosis     Cluster C personality disorder     History of knee surgery     Seborrheic eczema     Anemia     Anxiety state     Asthma     Back pain     Bunion     Chronic obstructive pulmonary disease, unspecified COPD type (H)     Low bone density     PAST SURGICAL HISTORY:  Past Surgical History:   Procedure Laterality Date     BACK SURGERY      spinal fusion     LUMPECTOMY BREAST       ORTHOPEDIC SURGERY      Knee surgery left side       FAMILY HISTORY:  Family History   Problem Relation Age of Onset     Breast Cancer Mother      DIABETES Mother      Alcohol/Drug Father      MENTAL  "ILLNESS Father      CEREBROVASCULAR DISEASE Maternal Grandmother 67       SOCIAL HISTORY:  Social History   Substance Use Topics     Smoking status: Former Smoker     Types: Cigarettes     Quit date: 9/26/1980     Smokeless tobacco: Never Used     Alcohol use No      Comment: Sober for 2 years, previous alcoholic     Marital status: single.    Nursing Notes:   Jaycee Charles CMA  4/19/2017 10:31 AM  Signed  Chief Complaint   Patient presents with     Consult     consult       Vitals:    04/19/17 1027   BP: 134/83   BP Location: Right arm   Patient Position: Chair   Cuff Size: Adult Regular   Pulse: 83   Temp: 97.4  F (36.3  C)   SpO2: 91%   Weight: 197 lb 11.2 oz   Height: 5' 3\"       Body mass index is 35.02 kg/(m^2).      Jaycee Charles MA             Physical Examination:  /83 (BP Location: Right arm, Patient Position: Chair, Cuff Size: Adult Regular)  Pulse 83  Temp 97.4  F (36.3  C)  Ht 5' 3\"  Wt 197 lb 11.2 oz  SpO2 91%  BMI 35.02 kg/m2   General: alert, oriented, in no acute distress, sitting comfortably  HEENT:mucous membranes moist  The remainder of the exam was deferred today per the patient's request. I offered and asked to examine her on the commode and to perform an anorectal examination.        "

## 2017-04-24 ENCOUNTER — OFFICE VISIT (OUTPATIENT)
Dept: INTERNAL MEDICINE | Facility: CLINIC | Age: 72
End: 2017-04-24

## 2017-04-24 VITALS
DIASTOLIC BLOOD PRESSURE: 97 MMHG | TEMPERATURE: 97.6 F | SYSTOLIC BLOOD PRESSURE: 152 MMHG | HEART RATE: 77 BPM | RESPIRATION RATE: 18 BRPM | BODY MASS INDEX: 34.72 KG/M2 | WEIGHT: 196 LBS

## 2017-04-24 DIAGNOSIS — I10 BENIGN ESSENTIAL HYPERTENSION: Primary | ICD-10-CM

## 2017-04-24 DIAGNOSIS — J44.9 CHRONIC OBSTRUCTIVE PULMONARY DISEASE, UNSPECIFIED COPD TYPE (H): ICD-10-CM

## 2017-04-24 RX ORDER — ADHESIVE BANDAGE 3/4"
BANDAGE TOPICAL
Qty: 1 EACH | Refills: 0 | Status: SHIPPED | OUTPATIENT
Start: 2017-04-24 | End: 2020-04-16

## 2017-04-24 RX ORDER — LOSARTAN POTASSIUM 25 MG/1
25 TABLET ORAL DAILY
Qty: 30 TABLET | Refills: 1 | Status: SHIPPED | OUTPATIENT
Start: 2017-04-24 | End: 2017-07-28

## 2017-04-24 ASSESSMENT — PAIN SCALES - GENERAL: PAINLEVEL: NO PAIN (0)

## 2017-04-24 NOTE — MR AVS SNAPSHOT
After Visit Summary   4/24/2017    Ca Yan    MRN: [id number]           Patient Information     Date Of Birth          1945        Visit Information        Provider Department      4/24/2017 1:40 PM Senia Olivas MD [facility]        Today's Diagnoses     Benign essential hypertension    -  1    Chronic obstructive pulmonary disease, unspecified COPD type (H)          Care Instructions      1.   Please check you BP at home three times daily with a goal of <140/90.  Please let me know your results after two weeks.  You can call me with results.    2. Portable walker with a seat    3.  Bone density:  I would not recommend treatment this year. repeat DEXA in 2 years.      4.  Call me if you get another UTI.        Follow-ups after your visit        Your next 10 appointments already scheduled     Apr 26, 2017  2:30 PM CDT   Pool Treatment with Lucy Cherry, PT   [facility] ([facility])    [address] [place] [address] [address]  [facility] [address]   [phone]            May 01, 2017  2:30 PM CDT   Pool Treatment with Aimee Ramesh, PT   [facility] ([facility])    [address] [place] [address]  [address]  [facility] [address]   [phone]            May 03, 2017  2:30 PM CDT   Pool Treatment with Lucy Cherry, PT   [facility] ([facility])    [address] [place] [address]  [address]  [facility] [address]   [phone]            May 08, 2017  1:30 PM CDT   NEW CORNEA with Babar Aguirre MD   [facility] ([facility])    [address]  [address]  [address] [address]  [facility] [address]   [phone]            May 11, 2017  1:30 PM CDT   (Arrive by 1:15 PM)   Return Visit with Luis Felipe Narayanan DPM   [facility] ([facility])    [address] [facility]  [address] [facility] [id number] [phone]            May 12, 2017  1:45 PM  CDT   Pool Treatment with Lucy Cherry, PT   [facility] ([facility])    [address]  [address]  [place] [address]   [phone]            May 17, 2017  2:30 PM CDT   Pool Treatment with Lucy Cherry, PT   [facility] ([facility])    [address]  [address]  [place] [address]   [phone]            May 19, 2017  2:30 PM CDT   Pool Treatment with Lucy Cherry, PT   [facility] ([facility])    [address]  [address]  [place] [address]   [phone]            May 24, 2017  2:30 PM CDT   Pool Treatment with Lucy Cherry, PT   [facility] ([facility])    [address]  [address]  [place] [address]   [phone]            May 26, 2017  2:30 PM CDT   Pool Treatment with Lucy Hcerry, PT   [facility] ([facility])    [address]  [address]  [place] [address]   [phone]              Who to contact     Please call your clinic at [phone] to:    Ask questions about your health    Make or cancel appointments    Discuss your medicines    Learn about your test results    Speak to your doctor   If you have compliments or concerns about an experience at your clinic, or if you wish to file a complaint, please contact [facility] Patient Relations at [phone] or email us at [email or web address]         Additional Information About Your Visit        Care EveryWhere ID     This is your Care EveryWhere ID. This could be used by other organizations to access your [facility] medical records  [id number]        Your Vitals Were     Pulse Temperature Respirations Breastfeeding? BMI (Body Mass Index)       77 97.6  F (36.4  C) (Oral) 18 No 34.72 kg/m2        Blood Pressure from Last 3 Encounters:   04/24/17 (!) 152/97   04/19/17 134/83   03/22/17 (!) 151/96    Weight from Last 3  Encounters:   04/24/17 88.9 kg (196 lb)   04/19/17 89.7 kg (197 lb 11.2 oz)   03/22/17 89.5 kg (197 lb 4.8 oz)              Today, you had the following     No orders found for display         Today's Medication Changes          These changes are accurate as of: 4/24/17  2:18 PM.  If you have any questions, ask your nurse or doctor.               Start taking these medicines.        Dose/Directions    Blood Pressure Cuff Misc   Used for:  Benign essential hypertension   Started by:  Senia Olivas MD        Imron blood cuff  Please check your blood pressure 2-3 times per week.  Goal is <140/90   Quantity:  1 each   Refills:  0         These medicines have changed or have updated prescriptions.        Dose/Directions    * order for DME   This may have changed:  Another medication with the same name was added. Make sure you understand how and when to take each.   Used for:  Pulmonary hypertension (H)   Changed by:  Jase Allen MD        Equipment being ordered: Oxygen  Please provide portable oxygen concentrator (pt would like over the shoulder oxygen device) for portability at 2LPM with activity via nasal canula.   Quantity:  1 Device   Refills:  1       * order for DME   This may have changed:  Another medication with the same name was added. Make sure you understand how and when to take each.   Used for:  Bilateral leg edema   Changed by:  Dalia Galvez PT        Equipment being ordered: knee high compression stockings, class 1 or 2, night time velcro compression garments, lymphedema bandaging supplies, darco boots to wear during treatment   Quantity:  2 each   Refills:  3       * order for DME   This may have changed:  You were already taking a medication with the same name, and this prescription was added. Make sure you understand how and when to take each.   Used for:  Chronic obstructive pulmonary disease, unspecified COPD type (H)   Changed by:  Senia Olivas MD        Equipment  being ordered: portable walker with seat and compartment under the seat.  Use for going out of the house on errands, where prolonged standing is required.   Quantity:  1 Units   Refills:  0       * Notice:  This list has 3 medication(s) that are the same as other medications prescribed for you. Read the directions carefully, and ask your doctor or other care provider to review them with you.         Where to get your medicines      Some of these will need a paper prescription and others can be bought over the counter.  Ask your nurse if you have questions.     Bring a paper prescription for each of these medications     Blood Pressure Cuff Misc    order for DME                Primary Care Provider Office Phone # Fax #    Senia Olivas [phone]        [facility] [address] [facility] [address]  [facility] [address]        Thank you!     Thank you for choosing [facility]  for your care. Our goal is always to provide you with excellent care. Hearing back from our patients is one way we can continue to improve our services. Please take a few minutes to complete the written survey that you may receive in the mail after your visit with us. Thank you!             Your Updated Medication List - Protect others around you: Learn how to safely use, store and throw away your medicines at www.disposemymeds.org.          This list is accurate as of: 4/24/17  2:18 PM.  Always use your most recent med list.                   Brand Name Dispense Instructions for use    acetaminophen 500 MG tablet    TYLENOL     Take 500-1,000 mg by mouth every 6 hours as needed for mild pain       ammonium lactate 12 % cream    LAC-HYDRIN    385 g    Apply topically 2 times daily as needed for dry skin       ASPIRIN EC PO      Take 81 mg by mouth       Blood Pressure Cuff Misc     1 each    Imron blood cuff  Please check your blood pressure 2-3 times per week.  Goal is <140/90       cephalexin 250 MG capsule     "KEFLEX         ciclopirox 0.77 % cream    LOPROX    90 g    Apply topically 2 times daily To feet and toenails.       gabapentin 100 MG capsule    NEURONTIN    30 capsule    Take 1 capsule (100 mg) by mouth At Bedtime       hydrochlorothiazide 12.5 MG Tabs tablet      Take 12.5 mg by mouth daily.       ibuprofen 200 MG tablet    ADVIL/MOTRIN    90 tablet    Take 3 tablets (600 mg) by mouth 3 times daily (with meals)       losartan 25 MG tablet    COZAAR    30 tablet    Take 1 tablet (25 mg) by mouth daily       methylcellulose 500 MG Tabs tablet    CITRUCEL    30 tablet    Take 1 tablet (500 mg) by mouth daily start 1 capsule daily and increase as tolerated to 4-8 capsules daily.       omeprazole 20 MG CR capsule    priLOSEC     Take 20 mg by mouth as needed       * order for DME     1 Device    Equipment being ordered: Oxygen  Please provide portable oxygen concentrator (pt would like over the shoulder oxygen device) for portability at 2LPM with activity via nasal canula.       * order for DME     2 each    Equipment being ordered: knee high compression stockings, class 1 or 2, night time velcro compression garments, lymphedema bandaging supplies, darco boots to wear during treatment       * order for DME     1 Units    Equipment being ordered: portable walker with seat and compartment under the seat.  Use for going out of the house on errands, where prolonged standing is required.       polyethylene glycol Packet    MIRALAX    238 g    One 8.3-ounce bottle (238 g).  Refer to \"Getting Ready for a Colonoscopy\" patient handout       QUEtiapine 200 MG tablet    SEROquel     Take 200 mg by mouth At Bedtime.       tiotropium 18 MCG capsule    SPIRIVA     Inhale 18 mcg into the lungs daily       ZOCOR 20 MG tablet   Generic drug:  simvastatin      Take 20 mg by mouth daily       * Notice:  This list has 3 medication(s) that are the same as other medications prescribed for you. Read the directions carefully, and ask your " doctor or other care provider to review them with you.

## 2017-04-24 NOTE — PROGRESS NOTES
Chief complaint:  History of present illness:  This 71-year-old woman with a complex past medical history including generalized anxiety disorder, essential hypertension, diastolic dysfunction, major depression, chronic kidney disease stage III, breast cancer, alcohol abuse and cluster C personality disorder.    1.  Essential hypertension.  We reviewed her blood pressures over the last 4 weeks and they tend to be elevated. She is currently on Cozaar 25 mg daily plus hydrochlorothiazide.  She says her blood pressures are often better. We've agreed that she should get a blood pressure cuff and monitor her pressures at home  2.  History of recurrent UTIs.  She has been treated by Dr. Lilly Aguila, a uro-gynecologist at [facility].   Previously she had been on Keflex daily to prevent bladder infections but her doctor recently stopped out with concerns about drug resistance. In its place she started vaginal estrogen twice weekly. The patient describes intensely burning vaginal mucosa and dyspareunia since menopause. She takes a shower every day to clean herself.  Her last urinary tract infection began shortly after stopping the Keflex and she was treated with Cipro for 3 days and recovered. Now she is very worried she is going to get another infection. He spent 10 minutes reviewing the anatomy and potential benefit of estrogen and potential hazard of developing resistant UTI.    She is not currently having dysuria or frequency.    3.  COPD. No exacerbation but she is no longer able to stand for very long or go to the store to buy clothing because of breathing trouble unless she is able to sit.  She asked for a wheelchair but I talked with her about getting a walker. She is able to walk any distance slowly.    4.  Osteopenia. I sent her a letter outlining results and review these. Serax risks is not high. Recommend repeating DEXA scan in 1-2 years.    Past Medical History:   Diagnosis Date     Anxiety       Arthritis      Breast cancer (H)      COPD (chronic obstructive pulmonary disease) (H)     6/19/12:  FEV 0.99 l     Hypertension      Low bone density 4/13/2017    DEXA April 12, 2017: T score -2.0. Normal Z score. FRAX risk: major osteoporotic fracture 11.9%, hip fracture 2.6%, therefore not high-risk      ROS: 10 point ROS neg other than the symptoms noted above in the HPI.    Family History   Problem Relation Age of Onset     Breast Cancer Mother      DIABETES Mother      Alcohol/Drug Father      MENTAL ILLNESS Father      CEREBROVASCULAR DISEASE Maternal Grandmother 67     BP (!) 152/97 (BP Location: Right arm, Patient Position: Chair, Cuff Size: Adult Small)  Pulse 77  Temp 97.6  F (36.4  C) (Oral)  Resp 18  Wt 88.9 kg (196 lb)  Breastfeeding? No  BMI 34.72 kg/m2  No physical exam today    Last Basic Metabolic Panel:  Lab Results   Component Value Date     02/20/2017      Lab Results   Component Value Date    POTASSIUM 4.2 02/20/2017     Lab Results   Component Value Date    CHLORIDE 105 02/20/2017     Lab Results   Component Value Date    SYMONE 8.8 02/20/2017     Lab Results   Component Value Date    CO2 28 02/20/2017     Lab Results   Component Value Date    BUN 22 02/20/2017     Lab Results   Component Value Date    CR 0.92 02/20/2017     Lab Results   Component Value Date    GLC 90 02/20/2017     Color Urine (no units)   Date Value   01/19/2017 Yellow     Appearance Urine (no units)   Date Value   01/19/2017 Slightly Cloudy     Glucose Urine (mg/dL)   Date Value   01/19/2017 Negative     Bilirubin Urine (no units)   Date Value   01/19/2017 Negative     Ketones Urine (mg/dL)   Date Value   01/19/2017 5 (A)     Specific Gravity Urine (no units)   Date Value   01/19/2017 1.028     pH Urine (pH)   Date Value   01/19/2017 5.0     Protein Albumin Urine (mg/dL)   Date Value   01/19/2017 30 (A)     Urobilinogen Urine (EU/dL)   Date Value   02/07/2006 0.2     Nitrite Urine (no units)   Date Value    01/19/2017 Negative     Leukocyte Esterase Urine (no units)   Date Value   01/19/2017 Trace (A)     ASSESSMENT  1.  Essential hypertension with questionable control. Plan to renew Cozaar 25 mg daily and to give patient a blood pressure cuff that she can monitor at home. Our goal is less than 140/90. She will check her blood pressures 2-3 times q. week and contact my nurse with the results in 2 weeks.      2.  COPD with inability to stand for long periods of time without becoming very short of breath. I ordered  a walker with a seat.    3.  Recurrent UTIs. Cemented above. I strongly supported the approach that her uro-gynecologist is taken.  Also recommended Vaseline after showers to protect the vaginal and urethral tissue.    4.  Low bone density. Plan repeat DEXA in 1-2 years. No indication for alendronate. She is currently taking inadequate calcium plus vitamin D diet.    RTC p.r.n.      I spent a total of 25 minutes face-to-face with Ca Yan during today's office visit.  Over 50% of this time was spent counseling the patient and/or coordinating care regarding recurrent UTIs, hypertension.  See note for details.

## 2017-04-24 NOTE — PATIENT INSTRUCTIONS
1.   Please check you BP at home three times daily with a goal of <140/90.  Please let me know your results after two weeks.  You can call me with results.    2. Portable walker with a seat    3.  Bone density:  I would not recommend treatment this year. repeat DEXA in 2 years.      4.  Call me if you get another UTI.

## 2017-04-24 NOTE — NURSING NOTE
Chief Complaint   Patient presents with     Results     Here to follow up on tests done and discuss about getting wheelchair     UTI     Also here for UTI, polyuria and dysuria     Jama De La Vega CMA at 1:50 PM on 4/24/2017

## 2017-04-25 ENCOUNTER — TELEPHONE (OUTPATIENT)
Dept: INTERNAL MEDICINE | Facility: CLINIC | Age: 72
End: 2017-04-25

## 2017-04-25 ENCOUNTER — CARE COORDINATION (OUTPATIENT)
Dept: SURGERY | Facility: CLINIC | Age: 72
End: 2017-04-25

## 2017-04-25 DIAGNOSIS — J44.9 COPD (CHRONIC OBSTRUCTIVE PULMONARY DISEASE) (H): Primary | ICD-10-CM

## 2017-04-25 NOTE — PROGRESS NOTES
Per Dr. NIÑO, pt due for repeat colonoscopy in 2015. Discussed with pt--she thought it was a 10-yr recall, or 2020.  She requests Cologard be done.      Called pt back today (4/27/17), had to lv VM msg.  Let pt know our facility doesn't have Cologard (spoke to our lab--we do FIT) and that she can d/w PCP best way for her to have colon cancer screening.

## 2017-04-25 NOTE — TELEPHONE ENCOUNTER
DME order for Equipment being ordered: portable walker with seat and compartment under the seat.     Use for going out of the house on errands, where prolonged standing is required.  DME order sent.  Viri Webb RN 10:34 AM on 5/4/2017.

## 2017-05-01 ENCOUNTER — TELEPHONE (OUTPATIENT)
Dept: INTERNAL MEDICINE | Facility: CLINIC | Age: 72
End: 2017-05-01

## 2017-05-01 NOTE — TELEPHONE ENCOUNTER
----- Message from Senia Olivas MD sent at 5/1/2017 10:16 AM CDT -----  Regarding: RE: Question   Contact: [phone]  I would not recommend: Cologard, but would recommend FIT testing once yearly instead of colonoscopy. This is based on current recommendations from the [employer] of physicians.  Please let her know. Thank you.    Tanesha  ----- Message -----     From: Viri Webb RN     Sent: 5/1/2017  10:02 AM       To: Senia Olivas MD  Subject: FW: Question                                         ----- Message -----     From: Joshua Wall     Sent: 4/28/2017   9:36 AM       To: Viri Webb RN  Subject: Question                                         Pt states she was recently referred to get a colonoscopy done, but does not want a colonoscopy. She would prefer a colonguard instead.     Would like to speak with her PCP or nurse.     Left message for pt to call back.  Viri Webb RN 4:57 PM on 5/1/2017.    Pt called back and discussed FIT testing. Pt will  at the lab.  Viri Webb RN 10:49 AM on 5/3/2017.

## 2017-05-01 NOTE — TELEPHONE ENCOUNTER
----- Message from Senia Olivas MD sent at 5/1/2017 10:16 AM CDT -----  Regarding: RE: Question   Contact: [phone]  I would not recommend: Cologard, but would recommend FIT testing once yearly instead of colonoscopy. This is based on current recommendations from the [employer] physicians.  Please let her know. Thank you.    Tanesha      Let message for pt to call back.  Viri Webb RN 12:18 PM on 5/1/2017.             Message -----     From: Viri Webb RN     Sent: 5/1/2017  10:02 AM       To: Senia Olivas MD  Subject: FW: Question                                         ----- Message -----     From: Joshua Wall     Sent: 4/28/2017   9:36 AM       To: Viri Webb RN  Subject: Question                                         Pt states she was recently referred to get a colonoscopy done, but does not want a colonoscopy. She would prefer a colonguard instead.     Would like to speak with her PCP or nurse.

## 2017-05-03 NOTE — TELEPHONE ENCOUNTER
----- Message from Senia Olivas MD sent at 5/1/2017 10:16 AM CDT -----  Regarding: RE: Question   Contact: [phone]  I would not recommend: Cologard, but would recommend FIT testing once yearly instead of colonoscopy. This is based on current recommendations from the [employer] physicians.  Please let her know. Thank you.    Tanesha  ----- Message -----     From: Viri Webb RN     Sent: 5/1/2017  10:02 AM       To: Senia Olivas MD  Subject: FW: Question                                         ----- Message -----     From: Joshua Wall     Sent: 4/28/2017   9:36 AM       To: Viri Webb RN  Subject: Question                                         Pt states she was recently referred to get a colonoscopy done, but does not want a colonoscopy. She would prefer a colonguard instead.     Would like to speak with her PCP or nurse.

## 2017-05-09 NOTE — PROGRESS NOTES
"Outpatient Physical Therapy Discharge Note     Patient: Ca Yan  : 1945    Beginning/End Dates of Reporting Period:  17 to 2017    Referring Provider: Marlene Briscoe MD    Therapy Diagnosis: Impaired functional mobility     Client Self Report: Patient present on time. Reproting that her low back pain is \"like normal.\" Denying increased pain following previous session- hopeful to get a good work out today.     Objective Measurements: See goals and comments below        Outcome Measures (most recent score): See goals and comments below      Goals:  Goal Identifier HEP   Goal Description Pt to demonstrate (I) with aquatic HEP for progress towards all goals and continued self-maintenance of chronic condition following d/c from physical therapy.    Target Date 04/10/17   Date Met      Progress: Patient presenting for only 2 treatment sessions. Patient cancelled remaining pool therapy sessions, requesting discharge, and continuation with land based therapy.      Goal Identifier 30 sec sit <> stand (baseline: 1st trial: 7 reps, 2nd trial: 9 reps, reports some knee discomfort with relief in sitting, no LBP, inc RR, O2 remains >/ = 92% without supplemental O2.)   Goal Description Pt to achieve 13 reps to demonstrate improved functional LE strength and endurance to achieve 50th percentile for age and gender matched norms.   Target Date 04/10/17   Date Met      Progress: Unable to assess prior to patient requesting d/c.     Goal Identifier Standing tolerance (baseline: reports intermittent sharp pains in low back provoked with prolonged standing, 8/10, sitting immediately relieves pain, frequently sits throughout day for pain relief.)   Goal Description Pt to report improved standing tolerance with LBP maintained </= 6/10 with fewer seated rests throughout day for pain relief, for progress towards  pt s goal of improved standing tolerance to participate in community access for clothes shopping. "   Target Date 04/10/17   Date Met      Progress: Unable to assess prior to d/c from pool therapy.      Goal Identifier IADLs (baseline: abdominal cramps provoked with forward bending to dry hair daily and twisting movements.)   Goal Description Pt to be able to dry hair without abdominal cramps x 1 week towards improved tolerance of daily activities.   Target Date 04/10/17   Date Met      Progress: Unable to assess prior to D/C from pool therapy.      Goal Identifier Walking tolerance/community access (baseline: O2 sat decreased to 88% without supplemental oxygen walking short distance indoors)   Goal Description Pt to demonstrate improved walking and activity tolerance without shortness of breath 2/2 O2 desaturation with appropriate use of supplemental oxygen throughout therapy sessions and improved compliance outside of sessions towards improved community access.    Target Date 04/10/17   Date Met      Progress: Unable to assess prior to D/C from pool therapy.      Goal Identifier     Goal Description     Target Date     Date Met      Progress:     Goal Identifier     Goal Description     Target Date     Date Met      Progress:     Goal Identifier     Goal Description     Target Date     Date Met      Progress:     Progress Toward Goals: See comments above      Plan:  Other: Patient requesting discharge from pool therapy with continuation of land based therapy.       Discharge:    Reason for Discharge: Patient chooses to discontinue therapy.    Equipment Issued: none    Discharge Plan: Patient to continue with land based therapy

## 2017-05-11 ENCOUNTER — OFFICE VISIT (OUTPATIENT)
Dept: ENDOCRINOLOGY | Facility: CLINIC | Age: 72
End: 2017-05-11

## 2017-05-11 DIAGNOSIS — L84 TYLOMA: ICD-10-CM

## 2017-05-11 DIAGNOSIS — L60.2 ONYCHAUXIS: Primary | ICD-10-CM

## 2017-05-11 DIAGNOSIS — B35.1 DERMATOPHYTOSIS OF NAIL: ICD-10-CM

## 2017-05-11 RX ORDER — UREA 40 %
CREAM (GRAM) TOPICAL DAILY
Qty: 198 G | Refills: 5 | Status: SHIPPED | OUTPATIENT
Start: 2017-05-11 | End: 2019-04-15

## 2017-05-11 NOTE — MR AVS SNAPSHOT
After Visit Summary   5/11/2017    Ca Yan    MRN: [id number]           Patient Information     Date Of Birth          1945        Visit Information        Provider Department      5/11/2017 1:30 PM Luis Felipe Narayanan DPM [facility]        Today's Diagnoses     Onychauxis    -  1    Dermatophytosis of nail        Tyloma           Follow-ups after your visit        Who to contact     Please call your clinic at [phone] to:    Ask questions about your health    Make or cancel appointments    Discuss your medicines    Learn about your test results    Speak to your doctor   If you have compliments or concerns about an experience at your clinic, or if you wish to file a complaint, please contact [facility] Patient Relations at [phone] or email us at [email or web address]         Additional Information About Your Visit        Care EveryWhere ID     This is your Care EveryWhere ID. This could be used by other organizations to access your [facility] medical records  [id number]         Blood Pressure from Last 3 Encounters:   04/24/17 (!) 152/97   04/19/17 134/83   03/22/17 (!) 151/96    Weight from Last 3 Encounters:   04/24/17 88.9 kg (196 lb)   04/19/17 89.7 kg (197 lb 11.2 oz)   03/22/17 89.5 kg (197 lb 4.8 oz)              We Performed the Following     TRIM NONDYSTRPHIC NAIL(S)          Today's Medication Changes          These changes are accurate as of: 5/11/17 11:59 PM.  If you have any questions, ask your nurse or doctor.               Start taking these medicines.        Dose/Directions    Urea 40 % Crea   Used for:  Onychauxis, Dermatophytosis of nail, Tyloma        Externally apply topically daily To feet and toenails.   Quantity:  198 g   Refills:  5            Where to get your medicines      These medications were sent to [facility] [place], [place] - [address] [place] [address] [phone]  [address] [facility]  [address], [facility] [address]    Hours:  TRANSPLANT PHONE NUMBER [phone] Phone:  [phone]     Urea 40 % Crea                Primary Care Provider Office Phone # Fax #    Senia Olivas [phone]       [facility] [address]  [facility] [address]        Thank you!     Thank you for choosing [facility]  for your care. Our goal is always to provide you with excellent care. Hearing back from our patients is one way we can continue to improve our services. Please take a few minutes to complete the written survey that you may receive in the mail after your visit with us. Thank you!             Your Updated Medication List - Protect others around you: Learn how to safely use, store and throw away your medicines at www.disposemymeds.org.          This list is accurate as of: 5/11/17 11:59 PM.  Always use your most recent med list.                   Brand Name Dispense Instructions for use    acetaminophen 500 MG tablet    TYLENOL     Take 500-1,000 mg by mouth every 6 hours as needed for mild pain       ammonium lactate 12 % cream    LAC-HYDRIN    385 g    Apply topically 2 times daily as needed for dry skin       ASPIRIN EC PO      Take 81 mg by mouth       Blood Pressure Cuff Misc     1 each    Imron blood cuff  Please check your blood pressure 2-3 times per week.  Goal is <140/90       cephalexin 250 MG capsule    KEFLEX         ciclopirox 0.77 % cream    LOPROX    90 g    Apply topically 2 times daily To feet and toenails.       gabapentin 100 MG capsule    NEURONTIN    30 capsule    Take 1 capsule (100 mg) by mouth At Bedtime       hydrochlorothiazide 12.5 MG Tabs tablet      Take 12.5 mg by mouth daily.       ibuprofen 200 MG tablet    ADVIL/MOTRIN    90 tablet    Take 3 tablets (600 mg) by mouth 3 times daily (with meals)       losartan 25 MG tablet    COZAAR    30 tablet    Take 1 tablet (25 mg) by mouth daily       methylcellulose 500 MG Tabs tablet     "CITRUCEL    30 tablet    Take 1 tablet (500 mg) by mouth daily start 1 capsule daily and increase as tolerated to 4-8 capsules daily.       omeprazole 20 MG CR capsule    priLOSEC     Take 20 mg by mouth as needed       * order for DME     1 Device    Equipment being ordered: Oxygen  Please provide portable oxygen concentrator (pt would like over the shoulder oxygen device) for portability at 2LPM with activity via nasal canula.       * order for DME     2 each    Equipment being ordered: knee high compression stockings, class 1 or 2, night time velcro compression garments, lymphedema bandaging supplies, darco boots to wear during treatment       * order for DME     1 Units    Equipment being ordered: portable walker with seat and compartment under the seat.  Use for going out of the house on errands, where prolonged standing is required.       * order for DME     1 Device    Equipment being ordered: Wheelchair Sig: Equipment being ordered: portable walker with seat and compartment under the seat.   Use for going out of the house on errands, where prolonged standing is required.       polyethylene glycol Packet    MIRALAX    238 g    One 8.3-ounce bottle (238 g).  Refer to \"Getting Ready for a Colonoscopy\" patient handout       QUEtiapine 200 MG tablet    SEROquel     Take 200 mg by mouth At Bedtime.       tiotropium 18 MCG capsule    SPIRIVA     Inhale 18 mcg into the lungs daily       Urea 40 % Crea     198 g    Externally apply topically daily To feet and toenails.       ZOCOR 20 MG tablet   Generic drug:  simvastatin      Take 20 mg by mouth daily       * Notice:  This list has 4 medication(s) that are the same as other medications prescribed for you. Read the directions carefully, and ask your doctor or other care provider to review them with you.      "

## 2017-05-11 NOTE — LETTER
5/11/2017       RE: Ca Yan  [address]   [facility] [address]     Dear Colleague,    Thank you for referring your patient, Ca Yan, to the [facility] ENDOCRINOLOGY at [facility]. Please see a copy of my visit note below.    Past Medical History:   Diagnosis Date     Anxiety      Arthritis      Breast cancer (H)      COPD (chronic obstructive pulmonary disease) (H)     6/19/12:  FEV 0.99 l     Hypertension      Low bone density 4/13/2017    DEXA April 12, 2017: T score -2.0. Normal Z score. FRAX risk: major osteoporotic fracture 11.9%, hip fracture 2.6%, therefore not high-risk     Patient Active Problem List   Diagnosis     Non morbid obesity due to excess calories     Alcohol abuse     Malignant neoplasm of breast (H)     Chronic kidney disease, stage III (moderate)     Osteoarthritis of knee     Major depressive disorder with single episode     Diarrhea     Diastolic dysfunction     Osteoarthritis of spine     Gastroesophageal reflux disease     Generalized anxiety disorder     Hypertension     Hyperlipidemia     Insomnia     Irritable bowel syndrome     Kyphoscoliosis     Cluster C personality disorder     Seborrheic eczema     Anemia     Anxiety state     Asthma     Back pain     Bunion     Chronic obstructive pulmonary disease, unspecified COPD type (H)     Low bone density     Fecal incontinence     Past Surgical History:   Procedure Laterality Date     BACK SURGERY      spinal fusion     LUMPECTOMY BREAST       ORTHOPEDIC SURGERY      Knee surgery left side     Social History     Social History     Marital status: Single     Spouse name: N/A     Number of children: N/A     Years of education: N/A     Occupational History      Retired     Social History Main Topics     Smoking status: Former Smoker     Types: Cigarettes     Quit date: 9/26/1980     Smokeless tobacco: Never Used     Alcohol use No      Comment: Sober for 2 years, previous  alcoholic     Drug use: No     Sexual activity: No     Other Topics Concern     Stress Concern Not Asked     Lives alone     Back Care Not Asked     Hx of scoliosis with spine fusion     Exercise Not Asked     Walks     Social History Narrative    Only family member is daughter in [place], [place] whom      Family History   Problem Relation Age of Onset     Breast Cancer Mother      DIABETES Mother      Alcohol/Drug Father      MENTAL ILLNESS Father      CEREBROVASCULAR DISEASE Maternal Grandmother 67   SUBJECTIVE FINDINGS:  A 71-year-old female returns to clinic for tylomas and onychomycosis and onychauxis.  She relates her nails are still curving and bothering her.  She relates she used the Loprox and that has not really done any good; it has not gotten rid of anything.        OBJECTIVE FINDINGS:  Vascular status is intact.  She has decreased hair growth bilaterally.  She has some mild peripheral edema bilaterally.  She has incurvated nails, 1-5 bilaterally with thickening, dystrophy, discoloration and brittleness to differing degrees, left hallux nail greater than the rest.  She has hyperkeratotic tissue buildup with cracking, plantar medial hallux and first MPJ bilaterally and plantar heels bilaterally.  There is no erythema, no drainage, no odor, no calor.  There is some pain with debridement of the nails.      ASSESSMENT AND PLAN:  Tylomas bilaterally.  Onychauxis and onychomycosis bilaterally.  Diagnosis and treatment discussed with her.  The nails were all reduced bilaterally upon consent. I attempted to reduce the callus but she related it hurt too much, so she did not want to do that, so we did not.  I am going to discontinue the Loprox and start her on Carmol Lotion and urea cream.  She will return to clinic and see me as needed.         Again, thank you for allowing me to participate in the care of your patient.      Sincerely,    Luis Felipe Narayanan DPM

## 2017-05-11 NOTE — PROGRESS NOTES
Past Medical History:   Diagnosis Date     Anxiety      Arthritis      Breast cancer (H)      COPD (chronic obstructive pulmonary disease) (H)     6/19/12:  FEV 0.99 l     Hypertension      Low bone density 4/13/2017    DEXA April 12, 2017: T score -2.0. Normal Z score. FRAX risk: major osteoporotic fracture 11.9%, hip fracture 2.6%, therefore not high-risk     Patient Active Problem List   Diagnosis     Non morbid obesity due to excess calories     Alcohol abuse     Malignant neoplasm of breast (H)     Chronic kidney disease, stage III (moderate)     Osteoarthritis of knee     Major depressive disorder with single episode     Diarrhea     Diastolic dysfunction     Osteoarthritis of spine     Gastroesophageal reflux disease     Generalized anxiety disorder     Hypertension     Hyperlipidemia     Insomnia     Irritable bowel syndrome     Kyphoscoliosis     Cluster C personality disorder     Seborrheic eczema     Anemia     Anxiety state     Asthma     Back pain     Bunion     Chronic obstructive pulmonary disease, unspecified COPD type (H)     Low bone density     Fecal incontinence     Past Surgical History:   Procedure Laterality Date     BACK SURGERY      spinal fusion     LUMPECTOMY BREAST       ORTHOPEDIC SURGERY      Knee surgery left side     Social History     Social History     Marital status: Single     Spouse name: N/A     Number of children: N/A     Years of education: N/A     Occupational History      Retired     Social History Main Topics     Smoking status: Former Smoker     Types: Cigarettes     Quit date: 9/26/1980     Smokeless tobacco: Never Used     Alcohol use No      Comment: Sober for 2 years, previous alcoholic     Drug use: No     Sexual activity: No     Other Topics Concern     Stress Concern Not Asked     Lives alone     Back Care Not Asked     Hx of scoliosis with spine fusion     Exercise Not Asked     Walks     Social History Narrative    Only family member is daughter in [place], [place]  whom      Family History   Problem Relation Age of Onset     Breast Cancer Mother      DIABETES Mother      Alcohol/Drug Father      MENTAL ILLNESS Father      CEREBROVASCULAR DISEASE Maternal Grandmother 67   SUBJECTIVE FINDINGS:  A 71-year-old female returns to clinic for tylomas and onychomycosis and onychauxis.  She relates her nails are still curving and bothering her.  She relates she used the Loprox and that has not really done any good; it has not gotten rid of anything.        OBJECTIVE FINDINGS:  Vascular status is intact.  She has decreased hair growth bilaterally.  She has some mild peripheral edema bilaterally.  She has incurvated nails, 1-5 bilaterally with thickening, dystrophy, discoloration and brittleness to differing degrees, left hallux nail greater than the rest.  She has hyperkeratotic tissue buildup with cracking, plantar medial hallux and first MPJ bilaterally and plantar heels bilaterally.  There is no erythema, no drainage, no odor, no calor.  There is some pain with debridement of the nails.      ASSESSMENT AND PLAN:  Tylomas bilaterally.  Onychauxis and onychomycosis bilaterally.  Diagnosis and treatment discussed with her.  The nails were all reduced bilaterally upon consent. I attempted to reduce the callus but she related it hurt too much, so she did not want to do that, so we did not.  I am going to discontinue the Loprox and start her on Carmol Lotion and urea cream.  She will return to clinic and see me as needed.

## 2017-06-07 ENCOUNTER — OFFICE VISIT (OUTPATIENT)
Dept: SURGERY | Facility: CLINIC | Age: 72
End: 2017-06-07

## 2017-06-07 VITALS — OXYGEN SATURATION: 98 % | WEIGHT: 197 LBS | HEIGHT: 63 IN | TEMPERATURE: 98.3 F | BODY MASS INDEX: 34.91 KG/M2

## 2017-06-07 DIAGNOSIS — R15.9 FECAL INCONTINENCE: Primary | ICD-10-CM

## 2017-06-07 ASSESSMENT — PAIN SCALES - GENERAL: PAINLEVEL: NO PAIN (0)

## 2017-06-07 NOTE — PROGRESS NOTES
"Colon and Rectal Surgery Follow-Up Clinic Note    RE: Ca Yan  : 1945  NUPUR: 2017    Ca Yan is a very pleasant 71 year old female here for follow-up of fecal incontinence.    Interval history: Ms. Yan has been seen in the past by Lilly Santa, EVANGELISTA, Dr. Latham and Dr. Lorenzo for fecal incontinence. She reports dealing with fecal incontinence for the past 8 years. She continues to have about one bowel movement a day although sometimes this is erratic. Her bowel movements are subjectively very \"messy\" and she has to return to the bathroom several times to clean after bowel movements. She denies loosing a whole stool and denies any incontinence of flatus or nocturnal incontinence. She does have some urinary incontinence. She declines seeing urogynecology as she finds this manageable. She wears a pad and the urinary incontinence is worst with coughing/Valsalva. She denies feeling a prolapse. Her back problems make transfers and taking baths difficult. Most recently, the patient reports having recurrent urinary tract infections.     Past relevant history:  She has never had any anorectal procedures. She hd one vaginal birth of a baby of 8 pounds.     Her last colonoscopy was in  but was unable to be completed due to patient discomfort. A virtual colonoscopy was completed at that time with recommended repeat in 5 years.    She underwent pelvic floor testing in  with non relaxing publrectalis on defecography and slightly weaker tone with resting tone 25-31 and squeeze 6-38 mmHg. She declined biofeedback in the past.    PLEASE SEE NOTE BELOW FOR PHYSICAL EXAMINATION, REVIEW OF SYSTEMS, AND OTHER HISTORY.    Assessment/Plan: 71 year old female with ongoing fecal incontinence. We had a long discussion about pelvic floor dysfunction and fecal incontinence. We spoke about conservative treatment (stool bulkening) versus biofeedback versus sacral nerve stimulator and " generally about outcomes associated with these. I think she would be a good candidate for Sacral Nerve Stimulation and she is interested in considering. She also currently wants to get planned cataract surgery but now states that this is higher priority.     We spoke in particular about Sacral Nerve Stimulation and we gave her information about Sacral Nerve Stimulation. She will do a bowel diary prior to any procedure. We provided Sacral Nerve Stimulation information. Our care coordinator did nursing education    She was given our number and she will plan to call in follow-up. She states that she plans to do this now before her cataract surgeries.    Advised colonoscopy at this time as well which has previously decline and also today.    Patient's questions were answered to her stated satisfaction and she is in agreement with this plan.    Total time was 25 minutes, over 50% was spent counseling the patient.     Leda Latham MD  Colon and Rectal Surgery Attending  Department of Surgery  [facility]    PLEASE SEE NOTE BELOW FOR PHYSICAL EXAMINATION, REVIEW OF SYSTEMS, AND OTHER HISTORY.  --------------------------------------------------------------------------------------------------------------------------------------------------------------    MEDICATIONS:  Current Outpatient Prescriptions   Medication     umeclidinium (INCRUSE ELLIPTA) 62.5 MCG/INH oral inhaler     Urea 40 % CREA     order for DME     [facility]     order for DME     losartan (COZAAR) 25 MG tablet     order for DME     ibuprofen (ADVIL,MOTRIN) 200 MG tablet     order for DME     acetaminophen (TYLENOL) 500 MG tablet     ASPIRIN EC PO     hydrochlorothiazide 12.5 MG TABS     omeprazole (PRILOSEC) 20 MG capsule     quetiapine (SEROQUEL) 200 MG tablet     simvastatin (ZOCOR) 20 MG tablet     ciclopirox (LOPROX) 0.77 % cream     ammonium lactate (LAC-HYDRIN) 12 % cream      No current facility-administered medications for this visit.      ALLERGIES:     Allergies   Allergen Reactions     Azithromycin      Other reaction(s): Itching     Codeine Nausea and Vomiting     Hydrocodone      Vomiting     Metronidazole Nausea     Percocet [Oxycodone-Acetaminophen]      Vomiting     Pollen Extract      Other reaction(s): Other, see comments  Pt states that she has sneezing and runny nose.      Seasonal Allergies Other (See Comments)     Pt states that she has sneezing and runny nose.      Vicodin [Hydrocodone-Acetaminophen]      Vomiting     Zolpidem Other (See Comments)     Amnestic behavior     Oxycodone Itching and Rash       PAST MEDICAL HISTORY:  Past Medical History:   Diagnosis Date     Anxiety      Arthritis      Breast cancer (H)      COPD (chronic obstructive pulmonary disease) (H)     6/19/12:  FEV 0.99 l     Hypertension      Low bone density 4/13/2017    DEXA April 12, 2017: T score -2.0. Normal Z score. FRAX risk: major osteoporotic fracture 11.9%, hip fracture 2.6%, therefore not high-risk     PROBLEM LIST:  Patient Active Problem List   Diagnosis     Non morbid obesity due to excess calories     Alcohol abuse     Malignant neoplasm of breast (H)     Chronic kidney disease, stage III (moderate)     Osteoarthritis of knee     Major depressive disorder with single episode     Diarrhea     Diastolic dysfunction     Osteoarthritis of spine     Gastroesophageal reflux disease     Generalized anxiety disorder     Hypertension     Hyperlipidemia     Insomnia     Irritable bowel syndrome     Kyphoscoliosis     Cluster C personality disorder     Seborrheic eczema     Anemia     Anxiety state     Asthma     Back pain     Bunion     Chronic obstructive pulmonary disease, unspecified COPD type (H)     Low bone density     Fecal incontinence     PAST SURGICAL HISTORY:  Past Surgical History:   Procedure Laterality Date     BACK SURGERY      spinal fusion     LUMPECTOMY BREAST        "ORTHOPEDIC SURGERY      Knee surgery left side       FAMILY HISTORY:  Family History   Problem Relation Age of Onset     Breast Cancer Mother      DIABETES Mother      Alcohol/Drug Father      MENTAL ILLNESS Father      CEREBROVASCULAR DISEASE Maternal Grandmother 67       SOCIAL HISTORY:  Social History   Substance Use Topics     Smoking status: Former Smoker     Types: Cigarettes     Quit date: 9/26/1980     Smokeless tobacco: Never Used     Alcohol use No      Comment: Sober for 2 years, previous alcoholic     Marital status: single.    Nursing Notes:   Jaycee Charles CMA  4/19/2017 10:31 AM  Signed  Chief Complaint   Patient presents with     Consult     consult       Vitals:    04/19/17 1027   BP: 134/83   BP Location: Right arm   Patient Position: Chair   Cuff Size: Adult Regular   Pulse: 83   Temp: 97.4  F (36.3  C)   SpO2: 91%   Weight: 197 lb 11.2 oz   Height: 5' 3\"       Body mass index is 35.02 kg/(m^2).      Jaycee Charles MA             Physical Examination:  Temp 98.3  F (36.8  C) (Oral)  Ht 5' 3\"  Wt 197 lb  SpO2 98%  BMI 34.9 kg/m2   General: alert, oriented, in no acute distress, sitting comfortably  HEENT:mucous membranes moist  The remainder of the exam was deferred today per the patient's request. I offered and asked to examine her on the commode and to perform an anorectal examination.        "

## 2017-06-07 NOTE — LETTER
"2017       RE: Ca Yan  [address]     [facility] [address]     Dear Colleague,    Thank you for referring your patient, Ca Yan, to the [facility]. Please see a copy of my visit note below.    Colon and Rectal Surgery Follow-Up Clinic Note    RE: aC Yan  : 1945  NUPUR: 2017    Ca Yan is a very pleasant 71 year old female here for follow-up of fecal incontinence.    Interval history: Ms. Yan has been seen in the past by Lilly Santa, EVANGELISTA, Dr. Latham and Dr. Lorenzo for fecal incontinence. She reports dealing with fecal incontinence for the past 8 years. She continues to have about one bowel movement a day although sometimes this is erratic. Her bowel movements are subjectively very \"messy\" and she has to return to the bathroom several times to clean after bowel movements. She denies loosing a whole stool and denies any incontinence of flatus or nocturnal incontinence. She does have some urinary incontinence. She declines seeing urogynecology as she finds this manageable. She wears a pad and the urinary incontinence is worst with coughing/Valsalva. She denies feeling a prolapse. Her back problems make transfers and taking baths difficult. Most recently, the patient reports having recurrent urinary tract infections.     Past relevant history:  She has never had any anorectal procedures. She hd one vaginal birth of a baby of 8 pounds.     Her last colonoscopy was in  but was unable to be completed due to patient discomfort. A virtual colonoscopy was completed at that time with recommended repeat in 5 years.  She underwent pelvic floor testing in  with non relaxing publrectalis on defecography and slightly weaker tone with resting tone 25-31 and squeeze 6-38 mmHg. She declined biofeedback in the past.    PLEASE SEE NOTE BELOW FOR PHYSICAL " EXAMINATION, REVIEW OF SYSTEMS, AND OTHER HISTORY.    Assessment/Plan: 71 year old female with ongoing fecal incontinence. We had a long discussion about pelvic floor dysfunction and fecal incontinence. We spoke about conservative treatment (stool bulkening) versus biofeedback versus sacral nerve stimulator and generally about outcomes associated with these. I think she would be a good candidate for Sacral Nerve Stimulation and she is interested in considering. She also currently wants to get planned cataract surgery but now states that this is higher priority.     We spoke in particular about Sacral Nerve Stimulation and we gave her information about Sacral Nerve Stimulation. She will do a bowel diary prior to any procedure. We provided Sacral Nerve Stimulation information. Our care coordinator did nursing education    She was given our number and she will plan to call in follow-up. She states that she plans to do this now before her cataract surgeries.    Advised colonoscopy at this time as well which has previously decline and also today.    Patient's questions were answered to her stated satisfaction and she is in agreement with this plan.    Total time was 25 minutes, over 50% was spent counseling the patient.     Leda Latham MD  Colon and Rectal Surgery Attending  Department of Surgery  [facility]    PLEASE SEE NOTE BELOW FOR PHYSICAL EXAMINATION, REVIEW OF SYSTEMS, AND OTHER HISTORY.  --------------------------------------------------------------------------------------------------------------------------------------------------------------    MEDICATIONS:  Current Outpatient Prescriptions   Medication     umeclidinium (INCRUSE ELLIPTA) 62.5 MCG/INH oral inhaler     Urea 40 % CREA     order for DME     Blood Pressure Monitoring (BLOOD PRESSURE CUFF) MIS     order for DME     losartan (COZAAR) 25 MG tablet     order for DME     ibuprofen (ADVIL,MOTRIN) 200 MG tablet      order for DME     acetaminophen (TYLENOL) 500 MG tablet     ASPIRIN EC PO     hydrochlorothiazide 12.5 MG TABS     omeprazole (PRILOSEC) 20 MG capsule     quetiapine (SEROQUEL) 200 MG tablet     simvastatin (ZOCOR) 20 MG tablet     ciclopirox (LOPROX) 0.77 % cream     ammonium lactate (LAC-HYDRIN) 12 % cream     No current facility-administered medications for this visit.      ALLERGIES:     Allergies   Allergen Reactions     Azithromycin      Other reaction(s): Itching     Codeine Nausea and Vomiting     Hydrocodone      Vomiting     Metronidazole Nausea     Percocet [Oxycodone-Acetaminophen]      Vomiting     Pollen Extract      Other reaction(s): Other, see comments  Pt states that she has sneezing and runny nose.      Seasonal Allergies Other (See Comments)     Pt states that she has sneezing and runny nose.      Vicodin [Hydrocodone-Acetaminophen]      Vomiting     Zolpidem Other (See Comments)     Amnestic behavior     Oxycodone Itching and Rash       PAST MEDICAL HISTORY:  Past Medical History:   Diagnosis Date     Anxiety      Arthritis      Breast cancer (H)      COPD (chronic obstructive pulmonary disease) (H)     6/19/12:  FEV 0.99 l     Hypertension      Low bone density 4/13/2017    DEXA April 12, 2017: T score -2.0. Normal Z score. FRAX risk: major osteoporotic fracture 11.9%, hip fracture 2.6%, therefore not high-risk     PROBLEM LIST:  Patient Active Problem List   Diagnosis     Non morbid obesity due to excess calories     Alcohol abuse     Malignant neoplasm of breast (H)     Chronic kidney disease, stage III (moderate)     Osteoarthritis of knee     Major depressive disorder with single episode     Diarrhea     Diastolic dysfunction     Osteoarthritis of spine     Gastroesophageal reflux disease     Generalized anxiety disorder     Hypertension     Hyperlipidemia     Insomnia     Irritable bowel syndrome     Kyphoscoliosis     Cluster C personality disorder     Seborrheic eczema     Anemia      "Anxiety state     Asthma     Back pain     Bunion     Chronic obstructive pulmonary disease, unspecified COPD type (H)     Low bone density     Fecal incontinence     PAST SURGICAL HISTORY:  Past Surgical History:   Procedure Laterality Date     BACK SURGERY      spinal fusion     LUMPECTOMY BREAST       ORTHOPEDIC SURGERY      Knee surgery left side       FAMILY HISTORY:  Family History   Problem Relation Age of Onset     Breast Cancer Mother      DIABETES Mother      Alcohol/Drug Father      MENTAL ILLNESS Father      CEREBROVASCULAR DISEASE Maternal Grandmother 67       SOCIAL HISTORY:  Social History   Substance Use Topics     Smoking status: Former Smoker     Types: Cigarettes     Quit date: 9/26/1980     Smokeless tobacco: Never Used     Alcohol use No      Comment: Sober for 2 years, previous alcoholic     Marital status: single.    Nursing Notes:   Jaycee Charles CMA  4/19/2017 10:31 AM  Signed  Chief Complaint   Patient presents with     Consult     consult       Vitals:    04/19/17 1027   BP: 134/83   BP Location: Right arm   Patient Position: Chair   Cuff Size: Adult Regular   Pulse: 83   Temp: 97.4  F (36.3  C)   SpO2: 91%   Weight: 197 lb 11.2 oz   Height: 5' 3\"     Body mass index is 35.02 kg/(m^2).    Jaycee Charles MA    Physical Examination:  Temp 98.3  F (36.8  C) (Oral)  Ht 5' 3\"  Wt 197 lb  SpO2 98%  BMI 34.9 kg/m2   General: alert, oriented, in no acute distress, sitting comfortably  HEENT:mucous membranes moist  The remainder of the exam was deferred today per the patient's request. I offered and asked to examine her on the commode and to perform an anorectal examination.    Leda Latham MD      "

## 2017-06-07 NOTE — NURSING NOTE
"No chief complaint on file.      Vitals:    06/07/17 1036   Temp: 98.3  F (36.8  C)   TempSrc: Oral   SpO2: 98%   Weight: 197 lb   Height: 5' 3\"       Body mass index is 34.9 kg/(m^2).    Unable to obtain BP, patient states BP cuff hurts her too much.   Vanessa THORNTON, LPN                          "

## 2017-06-07 NOTE — MR AVS SNAPSHOT
"              After Visit Summary   6/7/2017    Ca Yan    MRN: [id number]           Patient Information     Date Of Birth          1945        Visit Information        Provider Department      6/7/2017 10:15 AM Leda Latham MD  [facility]al [facility]        Today's Diagnoses     Fecal incontinence    -  1       Follow-ups after your visit        Who to contact     Please call your clinic at [phone] to:    Ask questions about your health    Make or cancel appointments    Discuss your medicines    Learn about your test results    Speak to your doctor   If you have compliments or concerns about an experience at your clinic, or if you wish to file a complaint, please contact [facility] Patient Relations at [phone] or email us at [email or web address]         Additional Information About Your Visit        Care EveryWhere ID     This is your Care EveryWhere ID. This could be used by other organizations to access your [place] medical records  [id number]        Your Vitals Were     Temperature Height Pulse Oximetry BMI (Body Mass Index)          98.3  F (36.8  C) (Oral) 5' 3\" 98% 34.9 kg/m2         Blood Pressure from Last 3 Encounters:   04/24/17 (!) 152/97   04/19/17 134/83   03/22/17 (!) 151/96    Weight from Last 3 Encounters:   06/07/17 197 lb   04/24/17 196 lb   04/19/17 197 lb 11.2 oz              Today, you had the following     No orders found for display       Primary Care Provider Office Phone # Fax #    Senia Olivas [phone]       [facility] [address] [place] [id number]        Thank you!     Thank you for choosing  [facility]  for your care. Our goal is always to provide you with excellent care. Hearing back from our patients is one way we can continue to improve our services. Please take a few minutes to complete the written survey that you may " receive in the mail after your visit with us. Thank you!             Your Updated Medication List - Protect others around you: Learn how to safely use, store and throw away your medicines at www.disposemymeds.org.          This list is accurate as of: 6/7/17 11:05 AM.  Always use your most recent med list.                   Brand Name Dispense Instructions for use    acetaminophen 500 MG tablet    TYLENOL     Take 500-1,000 mg by mouth every 6 hours as needed for mild pain       ammonium lactate 12 % cream    LAC-HYDRIN    385 g    Apply topically 2 times daily as needed for dry skin       ASPIRIN EC PO      Take 81 mg by mouth       Blood Pressure Cuff Misc     1 each    Imron blood cuff  Please check your blood pressure 2-3 times per week.  Goal is <140/90       ciclopirox 0.77 % cream    LOPROX    90 g    Apply topically 2 times daily To feet and toenails.       hydrochlorothiazide 12.5 MG Tabs tablet      Take 12.5 mg by mouth daily.       ibuprofen 200 MG tablet    ADVIL/MOTRIN    90 tablet    Take 3 tablets (600 mg) by mouth 3 times daily (with meals)       losartan 25 MG tablet    COZAAR    30 tablet    Take 1 tablet (25 mg) by mouth daily       omeprazole 20 MG CR capsule    priLOSEC     Take 20 mg by mouth as needed       * order for DME     1 Device    Equipment being ordered: Oxygen  Please provide portable oxygen concentrator (pt would like over the shoulder oxygen device) for portability at 2LPM with activity via nasal canula.       * order for DME     2 each    Equipment being ordered: knee high compression stockings, class 1 or 2, night time velcro compression garments, lymphedema bandaging supplies, darco boots to wear during treatment       * order for DME     1 Units    Equipment being ordered: portable walker with seat and compartment under the seat.  Use for going out of the house on errands, where prolonged standing is required.       * order for DME     1 Device    Equipment being ordered:  Wheelchair Sig: Equipment being ordered: portable walker with seat and compartment under the seat.   Use for going out of the house on errands, where prolonged standing is required.       QUEtiapine 200 MG tablet    SEROquel     Take 200 mg by mouth At Bedtime.       umeclidinium 62.5 MCG/INH oral inhaler    INCRUSE ELLIPTA     Inhale 1 puff into the lungs       Urea 40 % Crea     198 g    Externally apply topically daily To feet and toenails.       ZOCOR 20 MG tablet   Generic drug:  simvastatin      Take 20 mg by mouth daily       * Notice:  This list has 4 medication(s) that are the same as other medications prescribed for you. Read the directions carefully, and ask your doctor or other care provider to review them with you.

## 2017-06-08 DIAGNOSIS — R15.9 FECAL INCONTINENCE: Primary | ICD-10-CM

## 2017-06-12 ENCOUNTER — MEDICAL CORRESPONDENCE (OUTPATIENT)
Dept: HEALTH INFORMATION MANAGEMENT | Facility: CLINIC | Age: 72
End: 2017-06-12

## 2017-06-16 ENCOUNTER — TELEPHONE (OUTPATIENT)
Dept: SURGERY | Facility: CLINIC | Age: 72
End: 2017-06-16

## 2017-06-16 NOTE — TELEPHONE ENCOUNTER
I called Ca to discuss her procedures and left a message about them. She had wanted both stages to start later in the day. I found out from Dr. Latham that the first stage needs to be the first case of the day and the second stage is more flexible. I told her that I had not looked into other dates yet because I wanted to find out more about the times. I left my number and asked her to call me back to discuss.

## 2017-06-19 ENCOUNTER — CARE COORDINATION (OUTPATIENT)
Dept: SURGERY | Facility: CLINIC | Age: 72
End: 2017-06-19

## 2017-06-19 NOTE — PROGRESS NOTES
Pt is thinking she may want to delay SNS d/t other health issues & appts this summer.  She will think about the currently-scheduled dates of 7/14/17 and 7/28/17 and let Cony know in the next few days if she wishes to keep or cx dates.

## 2017-06-20 ENCOUNTER — TELEPHONE (OUTPATIENT)
Dept: SURGERY | Facility: CLINIC | Age: 72
End: 2017-06-20

## 2017-06-20 NOTE — TELEPHONE ENCOUNTER
Ca called me yesterday and asked if I am the correct person to reschedule her surgeries. I returned her call today and we discussed new dates for her surgeries. She mentioned that she is having issues with her eyes and needs to take care of it soon. She would prefer August dates for her surgeries, but that is not an option. I offered next available and she took the dates in September. I confirmed new dates with her and asked her if she would like to schedule her pre-op now or if I should call her in August to schedule. She said that would work for her.

## 2017-06-28 ENCOUNTER — MEDICAL CORRESPONDENCE (OUTPATIENT)
Dept: HEALTH INFORMATION MANAGEMENT | Facility: CLINIC | Age: 72
End: 2017-06-28

## 2017-07-28 DIAGNOSIS — I10 BENIGN ESSENTIAL HYPERTENSION: ICD-10-CM

## 2017-07-28 RX ORDER — LOSARTAN POTASSIUM 25 MG/1
25 TABLET ORAL DAILY
Qty: 90 TABLET | Refills: 0 | Status: SHIPPED | OUTPATIENT
Start: 2017-07-28 | End: 2018-09-24

## 2017-07-28 NOTE — TELEPHONE ENCOUNTER
Losartan      Last Written Prescription Date: 4-24-17  Last Fill Quantity: 30, # refills: 1  Last Office Visit : 4-24-17  Next Clinic Visit: none       Potassium   Date Value Ref Range Status   02/20/2017 4.2 3.4 - 5.3 mmol/L Final     Creatinine   Date Value Ref Range Status   02/20/2017 0.92 0.52 - 1.04 mg/dL Final     BP Readings from Last 3 Encounters:   04/24/17 (!) 152/97   04/19/17 134/83   03/22/17 (!) 151/96     Kathleen M Doege RN

## 2017-08-02 ENCOUNTER — TELEPHONE (OUTPATIENT)
Dept: SURGERY | Facility: CLINIC | Age: 72
End: 2017-08-02

## 2017-08-02 NOTE — TELEPHONE ENCOUNTER
I called Ca to discuss scheduling a pre-op. She has multiple questions about the SNS procedure and I offered an appointment before her first stage to come in to talk to Dr. Latham about these concerns. She is not able to come in that day. I let her know that if she wants to see Dr. Latham before the surgery this is the only time. She asked if she could cancel the surgeries and move them out. She asked about recovery time, how the surgery works and what she can expect. I let her know that Ivet Mcghee would have to address these questions. I asked if she would like a call from Ivet and she said yes.

## 2017-08-04 ENCOUNTER — CARE COORDINATION (OUTPATIENT)
Dept: SURGERY | Facility: CLINIC | Age: 72
End: 2017-08-04

## 2017-08-04 NOTE — PROGRESS NOTES
rec'd a msg that pt has questions about upcoming SNS surgery.  Called pt to discuss but had to lv VM msg.  Will await CB.

## 2017-08-11 ENCOUNTER — CARE COORDINATION (OUTPATIENT)
Dept: SURGERY | Facility: CLINIC | Age: 72
End: 2017-08-11

## 2017-08-11 NOTE — PROGRESS NOTES
Pt has questions r/t possible SNS procedure.  She states she is having fecal incontinence and that often times this is very soft to liquid.  States she has a bowel diary completed.  We discussed starting a fiber supplement--she does not wish to take anything in liquid form.  Suggested to pt that she start Citrucel daily, to do so in consistently, and to increase the amt of water she drinks daily because of this.  Asked her to keep a 2-week bowel diary while she is on the Citrucel and bring both in with her when she comes on 9/13.

## 2017-08-22 ENCOUNTER — TELEPHONE (OUTPATIENT)
Dept: INTERNAL MEDICINE | Facility: CLINIC | Age: 72
End: 2017-08-22

## 2017-08-22 NOTE — TELEPHONE ENCOUNTER
----- Message from Joshua Wall sent at 8/22/2017 11:47 AM CDT -----  Regarding: Referral   Contact: [phone]  Pt left a voicemail.    States she needs a physical therapy referral for her back.     Pt seen at [facility] for back pain. Has never been seen for back pain at PCP. Appt set up for 09/11/2017 12:40pm with Dr Olivas.  Viri Webb RN 12:08 PM on 8/22/2017.

## 2017-08-24 ENCOUNTER — OFFICE VISIT (OUTPATIENT)
Dept: INTERNAL MEDICINE | Facility: CLINIC | Age: 72
End: 2017-08-24

## 2017-08-24 ENCOUNTER — TELEPHONE (OUTPATIENT)
Dept: INTERNAL MEDICINE | Facility: CLINIC | Age: 72
End: 2017-08-24

## 2017-08-24 VITALS
WEIGHT: 197.6 LBS | BODY MASS INDEX: 35 KG/M2 | DIASTOLIC BLOOD PRESSURE: 86 MMHG | SYSTOLIC BLOOD PRESSURE: 130 MMHG | HEART RATE: 77 BPM

## 2017-08-24 DIAGNOSIS — R31.9 URINARY TRACT INFECTION WITH HEMATURIA, SITE UNSPECIFIED: Primary | ICD-10-CM

## 2017-08-24 DIAGNOSIS — N39.0 URINARY TRACT INFECTION WITH HEMATURIA, SITE UNSPECIFIED: Primary | ICD-10-CM

## 2017-08-24 LAB
ALBUMIN UR-MCNC: 30 MG/DL
APPEARANCE UR: ABNORMAL
BACTERIA #/AREA URNS HPF: ABNORMAL /HPF
BILIRUB UR QL STRIP: NEGATIVE
COLOR UR AUTO: ABNORMAL
GLUCOSE UR STRIP-MCNC: NEGATIVE MG/DL
HGB UR QL STRIP: NEGATIVE
KETONES UR STRIP-MCNC: NEGATIVE MG/DL
LEUKOCYTE ESTERASE UR QL STRIP: NEGATIVE
MUCOUS THREADS #/AREA URNS LPF: PRESENT /LPF
NITRATE UR QL: POSITIVE
PH UR STRIP: 6 PH (ref 5–7)
RBC #/AREA URNS AUTO: 2 /HPF (ref 0–2)
RENAL EPI CELLS #/AREA URNS HPF: 1 /HPF
SOURCE: ABNORMAL
SP GR UR STRIP: 1.02 (ref 1–1.03)
SQUAMOUS #/AREA URNS AUTO: 25 /HPF (ref 0–1)
UROBILINOGEN UR STRIP-MCNC: 4 MG/DL (ref 0–2)
WBC #/AREA URNS AUTO: 14 /HPF (ref 0–2)

## 2017-08-24 RX ORDER — LATANOPROST 50 UG/ML
1 SOLUTION/ DROPS OPHTHALMIC
Status: ON HOLD | COMMUNITY
Start: 2017-06-23 | End: 2022-12-09

## 2017-08-24 RX ORDER — PHENAZOPYRIDINE HYDROCHLORIDE 100 MG/1
100-200 TABLET, FILM COATED ORAL 3 TIMES DAILY PRN
Qty: 20 TABLET | Refills: 0 | Status: SHIPPED | OUTPATIENT
Start: 2017-08-24 | End: 2018-03-08

## 2017-08-24 RX ORDER — CIPROFLOXACIN 500 MG/1
500 TABLET, FILM COATED ORAL
COMMUNITY
Start: 2017-08-21 | End: 2017-08-28

## 2017-08-24 ASSESSMENT — PAIN SCALES - GENERAL: PAINLEVEL: NO PAIN (0)

## 2017-08-24 NOTE — MR AVS SNAPSHOT
After Visit Summary   8/24/2017    Ca Yan    MRN: [id number]           Patient Information     Date Of Birth          1945        Visit Information        Provider Department      8/24/2017 8:40 AM Eden Morfin MD [facility]        Today's Diagnoses     Urinary tract infection with hematuria, site unspecified    -  1      Care Instructions    Primary Care Center Medication Refill Request Information:  * Please contact your pharmacy regarding ANY request for medication refills.  ** [facility] Prescription Fax = [phone]  * Please allow 3 business days for routine medication refills.  * Please allow 5 business days for controlled substance medication refills.     Primary Care Center Test Result notification information:  *You will be notified with in 7-10 days of your appointment day regarding the results of your test.  If you are on MyChart you will be notified as soon as the provider has reviewed the results and signed off on them.    [facility] [phone]             Follow-ups after your visit        Your next 10 appointments already scheduled     Sep 11, 2017 12:40 PM CDT   (Arrive by 12:25 PM)   Return Visit with Senia Olivas MD   [facility] ([facility] [place])    [address] [place] [address] [phone]            Sep 13, 2017 10:00 AM CDT   (Arrive by 9:45 AM)   Return Visit with Leda Latham MD   [facility] ([facility] [place])    [address] [place] [address] [phone]              Who to contact     Please call your clinic at [phone] to:    Ask questions about your health    Make or cancel appointments    Discuss your medicines    Learn about your test results    Speak to your doctor   If you have compliments or concerns about an experience at your clinic, or if you wish to file a  complaint, please contact [facility] Patient Relations at [phone] or email us at [email or web address]         Additional Information About Your Visit        navigayaharPubMatic Information     MuciMed is an electronic gateway that provides easy, online access to your medical records. With MuciMed, you can request a clinic appointment, read your test results, renew a prescription or communicate with your care team.     To sign up for MuciMed visit the website at [email or web address]   You will be asked to enter the access code listed below, as well as some personal information. Please follow the directions to create your username and password.     Your access code is: [email or web address]  Expires: 2017  6:30 AM     Your access code will  in 90 days. If you need help or a new code, please contact your [facility] or call [phone] for assistance.        Care EveryWhere ID     This is your Care EveryWhere ID. This could be used by other organizations to access your [facility] medical records  [id number]        Your Vitals Were     Pulse BMI (Body Mass Index)                77 35 kg/m2           Blood Pressure from Last 3 Encounters:   17 130/86   17 (!) 152/97   17 134/83    Weight from Last 3 Encounters:   17 89.6 kg (197 lb 9.6 oz)   17 89.4 kg (197 lb)   17 88.9 kg (196 lb)              We Performed the Following     UA with Microscopic reflex to Culture          Today's Medication Changes          These changes are accurate as of: 17  9:14 AM.  If you have any questions, ask your nurse or doctor.               Start taking these medicines.        Dose/Directions    phenazopyridine 100 MG tablet   Commonly known as:  PYRIDIUM   Used for:  Urinary tract infection with hematuria, site unspecified   Started by:  Eden Morfin MD        Dose:  100-200 mg   Take 1-2 tablets (100-200 mg) by mouth 3  times daily as needed for urinary tract discomfort   Quantity:  20 tablet   Refills:  0            Where to get your medicines      These medications were sent to [facility] - [place], [place] - [address]  [address], [facility] [address]    Hours:  TRANSPLANT PHONE NUMBER [phone] Phone:  [phone]     phenazopyridine 100 MG tablet                Primary Care Provider Office Phone # Fax #    Senia Olivas [phone]       [address]  [facility] [place] [address]        Equal Access to Services     MUSHTAQ BROWN : Hadii aad ku hadasho Soomaali, waaxda luqadaha, qaybta kaalmada adeegyada, sushma mahmood hayaan adeloretta wisdom . So [facility] [phone].    ATENCIÓN: Si habla español, tiene a maxwell disposición servicios gratuitos de asistencia lingüística. Llame al [phone].    We comply with applicable federal civil rights laws and [place] laws. We do not discriminate on the basis of race, color, national origin, age, disability sex, sexual orientation or gender identity.            Thank you!     Thank you for choosing [facility]  for your care. Our goal is always to provide you with excellent care. Hearing back from our patients is one way we can continue to improve our services. Please take a few minutes to complete the written survey that you may receive in the mail after your visit with us. Thank you!             Your Updated Medication List - Protect others around you: Learn how to safely use, store and throw away your medicines at www.disposemymeds.org.          This list is accurate as of: 8/24/17  9:14 AM.  Always use your most recent med list.                   Brand Name Dispense Instructions for use Diagnosis    acetaminophen 500 MG tablet    TYLENOL     Take 500-1,000 mg by mouth every 6 hours as needed for mild pain        ammonium lactate 12 % cream    LAC-HYDRIN    385 g    Apply topically 2  times daily as needed for dry skin    Bilateral leg edema       ASPIRIN EC PO      Take 81 mg by mouth        Blood Pressure Cuff Misc     1 each    Imron blood cuff  Please check your blood pressure 2-3 times per week.  Goal is <140/90    Benign essential hypertension       ciclopirox 0.77 % cream    LOPROX    90 g    Apply topically 2 times daily To feet and toenails.    Dermatophytosis of nail, Tinea pedis of both feet       ciprofloxacin 500 MG tablet    CIPRO     Take 500 mg by mouth        hydrochlorothiazide 12.5 MG Tabs tablet      Take 12.5 mg by mouth daily.        ibuprofen 200 MG tablet    ADVIL/MOTRIN    90 tablet    Take 3 tablets (600 mg) by mouth 3 times daily (with meals)    Acute left-sided low back pain without sciatica       latanoprost 0.005 % ophthalmic solution    XALATAN     1 drop        losartan 25 MG tablet    COZAAR    90 tablet    Take 1 tablet (25 mg) by mouth daily    Benign essential hypertension       omeprazole 20 MG CR capsule    priLOSEC     Take 20 mg by mouth as needed        * order for DME     1 Device    Equipment being ordered: Oxygen  Please provide portable oxygen concentrator (pt would like over the shoulder oxygen device) for portability at 2LPM with activity via nasal canula.    Pulmonary hypertension (H)       * order for DME     2 each    Equipment being ordered: knee high compression stockings, class 1 or 2, night time velcro compression garments, lymphedema bandaging supplies, darco boots to wear during treatment    Bilateral leg edema       * order for DME     1 Units    Equipment being ordered: portable walker with seat and compartment under the seat.  Use for going out of the house on errands, where prolonged standing is required.    Chronic obstructive pulmonary disease, unspecified COPD type (H)       * order for DME     1 Device    Equipment being ordered: Wheelchair Sig: Equipment being ordered: portable walker with seat and compartment under the seat.   Use  for going out of the house on errands, where prolonged standing is required.    COPD (chronic obstructive pulmonary disease) (H)       phenazopyridine 100 MG tablet    PYRIDIUM    20 tablet    Take 1-2 tablets (100-200 mg) by mouth 3 times daily as needed for urinary tract discomfort    Urinary tract infection with hematuria, site unspecified       QUEtiapine 200 MG tablet    SEROquel     Take 200 mg by mouth At Bedtime.        umeclidinium 62.5 MCG/INH oral inhaler    INCRUSE ELLIPTA     Inhale 1 puff into the lungs        Urea 40 % Crea     198 g    Externally apply topically daily To feet and toenails.    Onychauxis, Dermatophytosis of nail, Tyloma       ZOCOR 20 MG tablet   Generic drug:  simvastatin      Take 20 mg by mouth daily        * Notice:  This list has 4 medication(s) that are the same as other medications prescribed for you. Read the directions carefully, and ask your doctor or other care provider to review them with you.

## 2017-08-24 NOTE — LETTER
"Patient:  Ca Yan  :   1945  MRN:     [id number]        Ms. Ca Yan  [address]   [place] [facility] [address]        2017    Dear Ms. Yan,    Thank you for choosing the [facility] for your healthcare needs.  We appreciate the opportunity to serve you.    The following are your recent test results.     Your urinalysis interpretation is a bit of a challenge.  You have some findings that might be consistent with a urinary tract infection.  Some of these findings have improved compared to 3 days ago, which suggests that the antibiotic you are taking is working. This make sense because the bacteria that grew in your urine 3 days ago is sensitive to this antibiotic. There is also evidence of \"contamination\" from bacteria that normally reside around the outside of your urethral area.  This may make it look like you have a urinary tract infection even though you don't.  At this time, I recommend that you complete the course of antibiotic you are currently taking.  Use the pyridium to control symptoms of having to urinate frequently.  If you develop fevers, shaking chills, escalating abdominal pain, urinary burning and/or blood in your urine, return to the clinic promptly.  I will let you know if your urine culture indicates a change in antibiotics is warranted.     Results for orders placed or performed in visit on 17   UA with Microscopic reflex to Culture   Result Value Ref Range    Color Urine Nilam     Appearance Urine Slightly Cloudy     Glucose Urine Negative NEG^Negative mg/dL    Bilirubin Urine Negative NEG^Negative    Ketones Urine Negative NEG^Negative mg/dL    Specific Gravity Urine 1.021 1.003 - 1.035    Blood Urine Negative NEG^Negative    pH Urine 6.0 5.0 - 7.0 pH    Protein Albumin Urine 30 (A) NEG^Negative mg/dL    Urobilinogen mg/dL 4.0 (H) 0.0 - 2.0 mg/dL    Nitrite Urine Positive (A) NEG^Negative    Leukocyte " Esterase Urine Negative NEG^Negative    Source Midstream Urine     WBC Urine 14 (H) 0 - 2 /HPF    RBC Urine 2 0 - 2 /HPF    Bacteria Urine Many (A) NEG^Negative /HPF    Squamous Epithelial /HPF Urine 25 (H) 0 - 1 /HPF    Renal Tub Epi 1 (A) NEG^Negative /HPF    Mucous Urine Present (A) NEG^Negative /LPF   Urine Culture Aerobic Bacterial   Result Value Ref Range    Specimen Description Midstream Urine     Special Requests Specimen received in preservative     Culture Micro PENDING      Please contact your provider if you have any questions or concerns.  We look forward to serving your needs in the future.      Sincerely,    Dr. Morfin

## 2017-08-24 NOTE — TELEPHONE ENCOUNTER
"Your urinalysis interpretation is a bit of a challenge.  You have some findings that might be consistent with a urinary tract infection.  Some of these findings have improved compared to 3 days ago, which suggests that the antibiotic you are taking is working. This make sense because the bacteria that grew in your urine 3 days ago is sensitive to this antibiotic. There is also evidence of \"contamination\" from bacteria that normally reside around the outside of your urethral area.  This may make it look like you have a urinary tract infection even though you don't.  At this time, I recommend that you complete the course of antibiotic you are currently taking.  Use the pyridium to control symptoms of having to urinate frequently.  If you develop fevers, shaking chills, escalating abdominal pain, urinary burning and/or blood in your urine, return to the clinic promptly.  I will let you know if your urine culture indicates a change in antibiotics is warranted.     8/24/17 Patient contacted regarding information above. Patient will call if changes in symptoms and wait to hear about the urine culture.  Leticia Barrett RN    "

## 2017-08-24 NOTE — PATIENT INSTRUCTIONS
[facility] Medication Refill Request Information:  * Please contact your pharmacy regarding ANY request for medication refills.  ** [facility] Prescription Fax = [phone]  * Please allow 3 business days for routine medication refills.  * Please allow 5 business days for controlled substance medication refills.     [facility] Test Result notification information:  *You will be notified with in 7-10 days of your appointment day regarding the results of your test.  If you are on MyChart you will be notified as soon as the provider has reviewed the results and signed off on them.    [facility] [phone]

## 2017-08-24 NOTE — NURSING NOTE
Chief Complaint   Patient presents with     UTI     pt was diagnosed with UTI at urgent care on Monday, is on Cipro, still having symptoms     Norma Chery CMA at 8:23 AM on 8/24/2017.

## 2017-08-24 NOTE — PROGRESS NOTES
CC:  UTI, urinary frequency    S:  Here for the above.  Now on 3rd day of ciprofloxacin but urinary frequency persists.  States chronic fecal incontinence, have been associated with frequent UTI's.  Planning implanted stim, but worried about whether or not she should proceed.  We talked about risk/benefits and that ultimately it is up to her.  Offered second opinion, but she declined. Has been on 6 months of antibiotics in the past which helped reduce UTI's, but she reports that the urogynecologist discontinued it and may recommend topical estrogen.  We reviewed her UA's and cultures over the last 6 months.  Also most recent one along with sensitivities.  No fevers, one chill, no abdominal pain. Has chronic back pain, not in flank distribution. No hematuria, no history of renal stones.  No unexplained weight loss or appetite loss.  Transportation to/from clinic and pharmacies a challenge for her.  We discussed options including and I advised that she investigate local pharmacies that have a delivery service.    Patient Active Problem List   Diagnosis     Non morbid obesity due to excess calories     Alcohol abuse     Malignant neoplasm of breast (H)     Chronic kidney disease, stage III (moderate)     Osteoarthritis of knee     Major depressive disorder with single episode     Diarrhea     Diastolic dysfunction     Osteoarthritis of spine     Gastroesophageal reflux disease     Generalized anxiety disorder     Hypertension     Hyperlipidemia     Insomnia     Irritable bowel syndrome     Kyphoscoliosis     Cluster C personality disorder     Seborrheic eczema     Anemia     Anxiety state     Asthma     Back pain     Bunion     Chronic obstructive pulmonary disease, unspecified COPD type (H)     Low bone density     Fecal incontinence     Past Surgical History:   Procedure Laterality Date     BACK SURGERY      spinal fusion     LUMPECTOMY BREAST       ORTHOPEDIC SURGERY      Knee surgery left side     Current  Outpatient Prescriptions   Medication Sig Dispense Refill     ciprofloxacin (CIPRO) 500 MG tablet Take 500 mg by mouth       latanoprost (XALATAN) 0.005 % ophthalmic solution 1 drop       phenazopyridine (PYRIDIUM) 100 MG tablet Take 1-2 tablets (100-200 mg) by mouth 3 times daily as needed for urinary tract discomfort 20 tablet 0     losartan (COZAAR) 25 MG tablet Take 1 tablet (25 mg) by mouth daily 90 tablet 0     umeclidinium (INCRUSE ELLIPTA) 62.5 MCG/INH oral inhaler Inhale 1 puff into the lungs       Urea 40 % CREA Externally apply topically daily To feet and toenails. 198 g 5     order for DME Equipment being ordered: Wheelchair Sig: Equipment being ordered: portable walker with seat and compartment under the seat.     Use for going out of the house on errands, where prolonged standing is required. 1 Device 1     Blood Pressure Monitoring (BLOOD PRESSURE CUFF) MISC Imron blood cuff    Please check your blood pressure 2-3 times per week.  Goal is <140/90 1 each 0     order for DME Equipment being ordered: portable walker with seat and compartment under the seat.    Use for going out of the house on errands, where prolonged standing is required. 1 Units 0     ciclopirox (LOPROX) 0.77 % cream Apply topically 2 times daily To feet and toenails. 90 g 6     order for DME Equipment being ordered: knee high compression stockings, class 1 or 2, night time velcro compression garments, lymphedema bandaging supplies, darco boots to wear during treatment 2 each 3     ammonium lactate (LAC-HYDRIN) 12 % cream Apply topically 2 times daily as needed for dry skin 385 g 3     ibuprofen (ADVIL,MOTRIN) 200 MG tablet Take 3 tablets (600 mg) by mouth 3 times daily (with meals) 90 tablet 0     order for DME Equipment being ordered: Oxygen  Please provide portable oxygen concentrator (pt would like over the shoulder oxygen device) for portability at 2LPM with activity via nasal canula. 1 Device 1     acetaminophen (TYLENOL) 500 MG  tablet Take 500-1,000 mg by mouth every 6 hours as needed for mild pain       ASPIRIN EC PO Take 81 mg by mouth       hydrochlorothiazide 12.5 MG TABS Take 12.5 mg by mouth daily.       omeprazole (PRILOSEC) 20 MG capsule Take 20 mg by mouth as needed        quetiapine (SEROQUEL) 200 MG tablet Take 200 mg by mouth At Bedtime.       simvastatin (ZOCOR) 20 MG tablet Take 20 mg by mouth daily        Allergies   Allergen Reactions     Azithromycin      Other reaction(s): Itching     Codeine Nausea and Vomiting     Hydrocodone      Vomiting     Metronidazole Nausea     Percocet [Oxycodone-Acetaminophen]      Vomiting     Pollen Extract      Other reaction(s): Other, see comments  Pt states that she has sneezing and runny nose.      Seasonal Allergies Other (See Comments)     Pt states that she has sneezing and runny nose.      Vicodin [Hydrocodone-Acetaminophen]      Vomiting     Zolpidem Other (See Comments)     Amnestic behavior     Oxycodone Itching and Rash     /86  Pulse 77  Wt 89.6 kg (197 lb 9.6 oz)  BMI 35 kg/m2  Gen:  Nervous, worried  Lungs:  CTA  C/V:  RRR  No CVA tenderness  Abd:  No tenderness, not distended.  Normal BS's.  No HSM or masses.    Ca was seen today for uti.    Diagnoses and all orders for this visit:    Urinary tract infection with hematuria, site unspecified  -     UA with Micro reflex to Culture; Future  -     phenazopyridine (PYRIDIUM) 100 MG tablet; Take 1-2 tablets (100-200 mg) by mouth 3 times daily as needed for urinary tract discomfort  Plan is to eval UA.  If negative, continue current cares.  If c/w UTI, change antibiotics.  If mainly hematuria, renal US.    F/U with PCP  Eden Morfin M.D.  Internal Medicine  Primary Care Center   pager [phone]

## 2017-08-25 LAB
BACTERIA SPEC CULT: NO GROWTH
Lab: NORMAL
SPECIMEN SOURCE: NORMAL

## 2017-08-31 ENCOUNTER — OFFICE VISIT (OUTPATIENT)
Dept: INTERNAL MEDICINE | Facility: CLINIC | Age: 72
End: 2017-08-31

## 2017-08-31 VITALS — OXYGEN SATURATION: 91 % | DIASTOLIC BLOOD PRESSURE: 89 MMHG | HEART RATE: 86 BPM | SYSTOLIC BLOOD PRESSURE: 142 MMHG

## 2017-08-31 DIAGNOSIS — M54.50 ACUTE LEFT-SIDED LOW BACK PAIN WITHOUT SCIATICA: ICD-10-CM

## 2017-08-31 DIAGNOSIS — Z79.1 NSAID LONG-TERM USE: Primary | ICD-10-CM

## 2017-08-31 RX ORDER — LIDOCAINE 50 MG/G
PATCH TOPICAL
Qty: 30 PATCH | Refills: 0 | Status: SHIPPED | OUTPATIENT
Start: 2017-08-31 | End: 2017-10-18

## 2017-08-31 RX ORDER — NAPROXEN 500 MG/1
500 TABLET ORAL 2 TIMES DAILY WITH MEALS
Qty: 30 TABLET | Refills: 1 | Status: SHIPPED | OUTPATIENT
Start: 2017-08-31 | End: 2020-01-15

## 2017-08-31 RX ORDER — LIDOCAINE 50 MG/G
OINTMENT TOPICAL PRN
Qty: 5 G | Refills: 3 | Status: SHIPPED | OUTPATIENT
Start: 2017-08-31 | End: 2017-09-15

## 2017-08-31 ASSESSMENT — PAIN SCALES - GENERAL: PAINLEVEL: WORST PAIN (10)

## 2017-08-31 NOTE — MR AVS SNAPSHOT
After Visit Summary   8/31/2017    Ca Yan    MRN: [id number]           Patient Information     Date Of Birth          1945        Visit Information        Provider Department      8/31/2017 11:00 AM Pedro Fragoso MD [facility]        Today's Diagnoses     NSAID long-term use    -  1    Acute left-sided low back pain without sciatica          Care Instructions    Primary Care Center Medication Refill Request Information:  * Please contact your pharmacy regarding ANY request for medication refills.  ** [facility] Prescription Fax = [phone]  * Please allow 3 business days for routine medication refills.  * Please allow 5 business days for controlled substance medication refills.     Primary Care Center Test Result notification information:  *You will be notified with in 7-10 days of your appointment day regarding the results of your test.  If you are on MyChart you will be notified as soon as the provider has reviewed the results and signed off on them.    [facility] [phone]             Follow-ups after your visit        Additional Services     PHYSICAL THERAPY REFERRAL       *This therapy referral will be filtered to a centralized scheduling office at [facility] and the patient will receive a call to schedule an appointment at a [facility] location most convenient for them. *     [facility] provides Physical Therapy evaluation and treatment and many specialty services across the [facility] system.  If requesting a specialty program, please choose from the list below.    If you have not heard from the scheduling office within 2 business days, please call [phone] for all locations, with the exception of [place], please call [phone].  Treatment: Evaluation & Treatment  Special Instructions/Modalities: low back pain management      Please be aware that coverage of these services is subject to the terms and  "limitations of your health insurance plan.  Call member services at your health plan with any benefit or coverage questions.      **Note to Provider:  If you are referring outside of [facility] for the therapy appointment, please list the name of the location in the \"special instructions\" above, print the referral and give to the patient to schedule the appointment.                  Follow-up notes from your care team     Return if symptoms worsen or fail to improve.      Your next 10 appointments already scheduled     Sep 11, 2017 12:40 PM CDT   (Arrive by 12:25 PM)   Return Visit with Senia Olivas MD   [facility] ([facility])    [address] [place] [id number] [phone]            Sep 13, 2017 10:00 AM CDT   (Arrive by 9:45 AM)   Return Visit with Leda Latham MD   [facility] ([facility])    [address] [place] [id number] [phone]              Future tests that were ordered for you today     Open Future Orders        Priority Expected Expires Ordered    Basic metabolic panel Routine 8/31/2017 9/14/2017 8/31/2017            Who to contact     Please call your clinic at [phone] to:    Ask questions about your health    Make or cancel appointments    Discuss your medicines    Learn about your test results    Speak to your doctor   If you have compliments or concerns about an experience at your clinic, or if you wish to file a complaint, please contact [facility] Patient Relations at [phone] or email us at [email or web address]         Additional Information About Your Visit        Ask Ziggyhart Information     [employer] is an electronic gateway that provides easy, online access to your medical records. With [employer], you can request a clinic appointment, read your test results, renew a prescription or " communicate with your care team.     To sign up for [facility] visit the website at [email or web address]   You will be asked to enter the access code listed below, as well as some personal information. Please follow the directions to create your username and password.     Your access code is: [email or web address]  Expires: 2017  6:30 AM     Your access code will  in 90 days. If you need help or a new code, please contact your [facility] or call [phone] for assistance.        Care EveryWhere ID     This is your Care EveryWhere ID. This could be used by other organizations to access your [facility] medical records  [id number]        Your Vitals Were     Pulse Pulse Oximetry Breastfeeding?             86 91% No          Blood Pressure from Last 3 Encounters:   17 142/89   17 130/86   17 (!) 152/97    Weight from Last 3 Encounters:   17 89.6 kg (197 lb 9.6 oz)   17 89.4 kg (197 lb)   17 88.9 kg (196 lb)              We Performed the Following     PHYSICAL THERAPY REFERRAL          Today's Medication Changes          These changes are accurate as of: 17 11:59 PM.  If you have any questions, ask your nurse or doctor.               Start taking these medicines.        Dose/Directions    * lidocaine 5 % Patch   Commonly known as:  LIDODERM   Used for:  Acute left-sided low back pain without sciatica   Started by:  Pedro Fragoso MD        Apply up to 3 patches to painful area at once for up to 12 h within a 24 h period.  Remove after 12 hours.   Quantity:  30 patch   Refills:  0       * lidocaine 5 % ointment   Commonly known as:  XYLOCAINE   Used for:  Acute left-sided low back pain without sciatica   Started by:  Pedro Fragoso MD        Apply topically as needed for moderate pain (Please dispense for pain if lidocaine patch unavailable)   Quantity:  5 g   Refills:  3       naproxen 500 MG tablet   Commonly known as:  NAPROSYN   Used for:   Acute left-sided low back pain without sciatica   Started by:  Pedro Fragoso MD        Dose:  500 mg   Take 1 tablet (500 mg) by mouth 2 times daily (with meals)   Quantity:  30 tablet   Refills:  1       tiZANidine 4 MG tablet   Commonly known as:  ZANAFLEX   Used for:  Acute left-sided low back pain without sciatica   Started by:  Pedro Fragoso MD        Dose:  4 mg   Take 1 tablet (4 mg) by mouth 3 times daily   Quantity:  90 tablet   Refills:  0       * Notice:  This list has 2 medication(s) that are the same as other medications prescribed for you. Read the directions carefully, and ask your doctor or other care provider to review them with you.         Where to get your medicines      These medications were sent to [place] [id number] [address] [place] [address] [address] [place], [place] [id number]    Hours:  TRANSPLANT PHONE NUMBER [phone] Phone:  [phone]     lidocaine 5 % ointment    lidocaine 5 % Patch    naproxen 500 MG tablet    tiZANidine 4 MG tablet                Primary Care Provider Office Phone # Fax #    Seniairina Olivas [phone]       [address] [place] [address]        Equal Access to Services     MUSHTAQ BROWN AH: Sal guerrero hadjose fo Soshantellali, waaxda luqadaha, qaybta kaalmada adeegyada, sushma melchor. So [facility] [phone].    ATENCIÓN: Si habla español, tiene a maxwell disposición servicios gratuitos de asistencia lingüística. Lljarrett al [phone].    We comply with applicable federal civil rights laws and [place] laws. We do not discriminate on the basis of race, color, national origin, age, disability sex, sexual orientation or gender identity.            Thank you!     Thank you for choosing [facility]  for your care. Our goal is always to provide you with excellent care. Hearing back from our patients is one way we can continue to improve our services.  Please take a few minutes to complete the written survey that you may receive in the mail after your visit with us. Thank you!             Your Updated Medication List - Protect others around you: Learn how to safely use, store and throw away your medicines at www.disposemymeds.org.          This list is accurate as of: 8/31/17 11:59 PM.  Always use your most recent med list.                   Brand Name Dispense Instructions for use Diagnosis    acetaminophen 500 MG tablet    TYLENOL     Take 500-1,000 mg by mouth every 6 hours as needed for mild pain        ammonium lactate 12 % cream    LAC-HYDRIN    385 g    Apply topically 2 times daily as needed for dry skin    Bilateral leg edema       ASPIRIN EC PO      Take 81 mg by mouth        Blood Pressure Cuff Misc     1 each    Imron blood cuff  Please check your blood pressure 2-3 times per week.  Goal is <140/90    Benign essential hypertension       ciclopirox 0.77 % cream    LOPROX    90 g    Apply topically 2 times daily To feet and toenails.    Dermatophytosis of nail, Tinea pedis of both feet       hydrochlorothiazide 12.5 MG Tabs tablet      Take 12.5 mg by mouth daily.        ibuprofen 200 MG tablet    ADVIL/MOTRIN    90 tablet    Take 3 tablets (600 mg) by mouth 3 times daily (with meals)    Acute left-sided low back pain without sciatica       latanoprost 0.005 % ophthalmic solution    XALATAN     1 drop        * lidocaine 5 % Patch    LIDODERM    30 patch    Apply up to 3 patches to painful area at once for up to 12 h within a 24 h period.  Remove after 12 hours.    Acute left-sided low back pain without sciatica       * lidocaine 5 % ointment    XYLOCAINE    5 g    Apply topically as needed for moderate pain (Please dispense for pain if lidocaine patch unavailable)    Acute left-sided low back pain without sciatica       losartan 25 MG tablet    COZAAR    90 tablet    Take 1 tablet (25 mg) by mouth daily    Benign essential hypertension       naproxen 500  MG tablet    NAPROSYN    30 tablet    Take 1 tablet (500 mg) by mouth 2 times daily (with meals)    Acute left-sided low back pain without sciatica       omeprazole 20 MG CR capsule    priLOSEC     Take 20 mg by mouth as needed        * order for DME     1 Device    Equipment being ordered: Oxygen  Please provide portable oxygen concentrator (pt would like over the shoulder oxygen device) for portability at 2LPM with activity via nasal canula.    Pulmonary hypertension (H)       * order for DME     2 each    Equipment being ordered: knee high compression stockings, class 1 or 2, night time velcro compression garments, lymphedema bandaging supplies, darco boots to wear during treatment    Bilateral leg edema       * order for DME     1 Units    Equipment being ordered: portable walker with seat and compartment under the seat.  Use for going out of the house on errands, where prolonged standing is required.    Chronic obstructive pulmonary disease, unspecified COPD type (H)       * order for DME     1 Device    Equipment being ordered: Wheelchair Sig: Equipment being ordered: portable walker with seat and compartment under the seat.   Use for going out of the house on errands, where prolonged standing is required.    COPD (chronic obstructive pulmonary disease) (H)       phenazopyridine 100 MG tablet    PYRIDIUM    20 tablet    Take 1-2 tablets (100-200 mg) by mouth 3 times daily as needed for urinary tract discomfort    Urinary tract infection with hematuria, site unspecified       QUEtiapine 200 MG tablet    SEROquel     Take 200 mg by mouth At Bedtime.        tiZANidine 4 MG tablet    ZANAFLEX    90 tablet    Take 1 tablet (4 mg) by mouth 3 times daily    Acute left-sided low back pain without sciatica       umeclidinium 62.5 MCG/INH oral inhaler    INCRUSE ELLIPTA     Inhale 1 puff into the lungs        Urea 40 % Crea     198 g    Externally apply topically daily To feet and toenails.    Onychauxis,  Dermatophytosis of nail, Tyloma       ZOCOR 20 MG tablet   Generic drug:  simvastatin      Take 20 mg by mouth daily        * Notice:  This list has 6 medication(s) that are the same as other medications prescribed for you. Read the directions carefully, and ask your doctor or other care provider to review them with you.

## 2017-08-31 NOTE — PROGRESS NOTES
PRIMARY CARE CENTER       SUBJECTIVE:  Ca Yan is a 71 year old female with a PMHx of   Active Ambulatory Problems     Diagnosis Date Noted     Non morbid obesity due to excess calories 07/08/2016     Alcohol abuse 05/15/2014     Malignant neoplasm of breast (H) 04/17/2013     Chronic kidney disease, stage III (moderate) 08/12/2013     Osteoarthritis of knee 08/10/2010     Major depressive disorder with single episode 03/23/2005     Diarrhea 05/28/2015     Diastolic dysfunction 07/08/2016     Osteoarthritis of spine 01/28/2010     Gastroesophageal reflux disease 09/29/2006     Generalized anxiety disorder 06/23/2010     Hypertension 03/23/2009     Hyperlipidemia 03/07/2005     Insomnia 03/23/2009     Irritable bowel syndrome 07/08/2016     Kyphoscoliosis 07/08/2016     Cluster C personality disorder 04/10/2007     Seborrheic eczema 08/30/2013     Anemia 03/24/2009     Anxiety state 07/12/2013     Asthma 02/07/2005     Back pain 07/27/2010     Bunion 08/12/2013     Chronic obstructive pulmonary disease, unspecified COPD type (H) 10/10/2016     Low bone density 04/13/2017     Fecal incontinence 04/23/2017       who comes in for lower back pain. This has been an ongoing issue, particularly in the past month that has been gradually worsening. She states the inciting factor is getting into and out of cars. Pain is localized ni the superior aspect of posterior left hip. Pain is sharp, 10/10, intermittent, occurs with movement.  Denies radiation, burning or pain along his leg, sensory deficit on his leg. Symptoms worsen with movement and improve with rest. Cold packs mildly improve the pain. Has tried NSAIDs intermittently without relief. No other associated symptoms.No trauma or fall.  No red flag symptoms such as fever, chills, night-sweat, weight loss, urinary incontinence or stool incontinence.     ROS:   Constitutional, neuro, ENT, endocrine, pulmonary, cardiac, gastrointestinal,  genitourinary, musculoskeletal, integument and psychiatric systems are negative, except as otherwise noted.      OBJECTIVE:  /89  Pulse 86  SpO2 91%  Breastfeeding? No   Wt Readings from Last 1 Encounters:   08/24/17 89.6 kg (197 lb 9.6 oz)       GENERAL APPEARANCE: healthy, alert and no distress     RESP: lungs clear to auscultation - no rales, rhonchi or wheezes     CV: regular rates and rhythm, normal S1 S2, no S3 or S4 and no murmur, click or rub     ABDOMEN:  soft, nontender, no HSM or masses and bowel sounds normal     MS: tender to palpation upper posterior left hip and along piriformis, limited ROM of the back     SKIN: no suspicious lesions or rashes     NEURO:  Reduce left leg strength due to pain, 5/5 right leg. Intact patellar and ankle reflex bilaterally, intact sensation bilaterally, unable to perform leg raise due to pain on hip    ASSESSMENT/PLAN:    #NSAID long-term use  Patient has taken ibuprofen intermittently for back pain. Last creatinine ~0.9.   -     Basic metabolic panel as pt will receive naproxen for next 2-weeks    #Acute left-sided low back pain without sciatica  -     naproxen (NAPROSYN) 500 MG tablet; Take 1 tablet (500 mg) by mouth 2 times daily (with meals)  -     tiZANidine (ZANAFLEX) 4 MG tablet; Take 1 tablet (4 mg) by mouth 3 times daily  -     lidocaine (LIDODERM) 5 % Patch; Apply up to 3 patches to painful area at once for up to 12 h within a 24 h period.  Remove after 12 hours.  -     PHYSICAL THERAPY REFERRAL  -     lidocaine (XYLOCAINE) 5 % ointment; Apply topically as needed for moderate pain (Please dispense for pain if lidocaine patch unavailable)    Pt should return to clinic for f/u with PCP as scheduled on 09/11/17    Pedro Fragoso MD  Aug 31, 2017    Pt was seen and plan of care discussed with Dr. Sunshine.   Ms Yan was seen and examined with the resident Dr Fragoso,I have reviewed his note and future paln and I agree.

## 2017-08-31 NOTE — PATIENT INSTRUCTIONS
[facility] Medication Refill Request Information:  * Please contact your pharmacy regarding ANY request for medication refills.  ** [facility] Prescription Fax = [phone]  * Please allow 3 business days for routine medication refills.  * Please allow 5 business days for controlled substance medication refills.     [facility] Test Result notification information:  *You will be notified with in 7-10 days of your appointment day regarding the results of your test.  If you are on MyChart you will be notified as soon as the provider has reviewed the results and signed off on them.    [facility] [phone]

## 2017-08-31 NOTE — NURSING NOTE
Chief Complaint   Patient presents with     Back Pain     Patient is here to discuss a back pain.      Ada Murray LPN at 10:38 AM on 8/31/2017.

## 2017-09-07 ENCOUNTER — NURSE TRIAGE (OUTPATIENT)
Dept: NURSING | Facility: CLINIC | Age: 72
End: 2017-09-07

## 2017-09-07 NOTE — TELEPHONE ENCOUNTER
Reason for Disposition    [1] SEVERE back pain (e.g., excruciating, unable to do any normal activities) AND [2] not improved 2 hours after pain medicine    Additional Information    Negative: Passed out (i.e., lost consciousness, collapsed and was not responding)    Negative: Shock suspected (e.g., cold/pale/clammy skin, too weak to stand, low BP, rapid pulse)    Negative: Sounds like a life-threatening emergency to the triager    Negative: Major injury to the back (e.g., MVA, fall > 10 feet or 3 meters, penetrating injury, etc.)    Negative: Followed a tailbone injury    Negative: [1] Pain in the upper back over the ribs (rib cage) AND [2] radiates (travels, goes) into chest    Negative: [1] Pain in the upper back over the ribs (rib cage) AND [2] worsened by coughing (or clearly increases with breathing)    Negative: Back pain during pregnancy    Negative: Pain mainly in flank (i.e., in the side, over the lower ribs or just below the ribs)    Negative: [1] SEVERE back pain (e.g., excruciating) AND [2] sudden onset AND [3] age > 60    Negative: [1] Unable to urinate (or only a few drops) > 4 hours AND     [2] bladder feels very full (e.g., palpable bladder or strong urge to urinate)    Negative: [1] Urinary or bowel incontinence (i.e., loss of bladder or bowel control) AND [2] new onset    Negative: Numbness in groin or rectal area (i.e., loss of sensation)    Negative: [1] SEVERE abdominal pain AND [2] present > 1 hour    Negative: [1] Abdominal pain AND [2] age > 60    Negative: Weakness of a leg or foot (e.g., unable to bear weight, dragging foot)    Negative: Unable to walk    Negative: Patient sounds very sick or weak to the triager    Protocols used: BACK PAIN-ADULT-AH  Pain is down by waist and no fever.    Ca states that she is going to ED.

## 2017-09-08 ENCOUNTER — TELEPHONE (OUTPATIENT)
Dept: INTERNAL MEDICINE | Facility: CLINIC | Age: 72
End: 2017-09-08

## 2017-09-08 NOTE — TELEPHONE ENCOUNTER
----- Message from Valentin Lyn sent at 9/7/2017 12:08 PM CDT -----  Regarding: PT would like a call back regarding back pain  Contact: [phone]  PT is requesting a call back from a nurse about her back pain.  PT does have an appointment scheduled with Dr. Olivas on 9/11/17 but is still in a lot of pain and would like a call back.  PT can be reached at [phone].    Thank you,  Valentin    Call Center    9/8/17 Patient contacted regarding back pain. Patient was seen in the ER yesterday and evaluated. Patient states that the ER was not able to provide any answers and recommended that she follow up with her PCP. Patient is taking Tylenol and Ibuprofen as well as using ice and heat and not getting any relief. She is feeling better than yesterday, but still having the pain. Patient will keep follow up on Monday with .  Leticia Barrett RN

## 2017-09-11 ENCOUNTER — OFFICE VISIT (OUTPATIENT)
Dept: INTERNAL MEDICINE | Facility: CLINIC | Age: 72
End: 2017-09-11

## 2017-09-11 ENCOUNTER — TELEPHONE (OUTPATIENT)
Dept: SURGERY | Facility: CLINIC | Age: 72
End: 2017-09-11

## 2017-09-11 VITALS
OXYGEN SATURATION: 93 % | WEIGHT: 194 LBS | RESPIRATION RATE: 16 BRPM | TEMPERATURE: 98.5 F | HEART RATE: 73 BPM | BODY MASS INDEX: 34.37 KG/M2 | SYSTOLIC BLOOD PRESSURE: 144 MMHG | DIASTOLIC BLOOD PRESSURE: 90 MMHG

## 2017-09-11 DIAGNOSIS — M79.18 LEFT BUTTOCK PAIN: Primary | ICD-10-CM

## 2017-09-11 ASSESSMENT — PAIN SCALES - GENERAL: PAINLEVEL: MODERATE PAIN (5)

## 2017-09-11 NOTE — PROGRESS NOTES
Chief complaint:  Left-sided buttock pain  History of present illness: Olivia is a 71-year-old woman with a past medical history of COPD and breast cancer as well as anxiety who  Presents for a five-week history of left-sided buttock pain that is gotten worse over the last week. A week ago she went to the [facility] emergency department where she was given tizanidine and naproxen, and her greater trochanteric bursitis on the left was treated with a corticosteroid injection. The tizanidine and naproxen did not help but ice applied to the buttock did help. 5 days later she went to the [facility] emergency department were similar conclusions were drawn based on the normal physical examination. Imaging was not done.    Ca has had similar pain a number of years ago and recalls 5 weeks ago that the pain began when she was trying to move into the back seat of a taxi. Particular lateral movements of the thigh cause the pain.  She has no low back pain, numbness or tingling. She does not have weakness of the legs.  At [facility] she was prescribed Flexeril and referred to physical therapy which she will have done in 3 days.    She's had no fevers chills night sweats or weight loss. There was no trauma.    Past Medical History:   Diagnosis Date     Anxiety      Arthritis      Breast cancer (H)      COPD (chronic obstructive pulmonary disease) (H)     6/19/12:  FEV 0.99 l     Hypertension      Low bone density 4/13/2017    DEXA April 12, 2017: T score -2.0. Normal Z score. FRAX risk: major osteoporotic fracture 11.9%, hip fracture 2.6%, therefore not high-risk      ROS: 4 point ROS neg other than the symptoms noted above in the HPI.    Pulse 73  Temp 98.5  F (36.9  C) (Oral)  Resp 16  Wt 88 kg (194 lb)  SpO2 93%  Breastfeeding? No  BMI 34.37 kg/m2  Physical exam  Gen.: Very worried 71-year-old woman  Back:  No paraspinous or spinous tenderness.Iliac crest tenderness. Sacroiliac is not tender. There is reproducible  tenderness in the sacral notch and the muscles just above that. There is mild tenderness on the left greater trochanter.    Hips: Full range of motion and do not induce pain with internal or external rotation    ASSESSMENT  Probable muscular pain in the left buttock, most consistent with piriformis muscle syndrome.    I encouraged her to use ice, use naproxen, and tizanidine, and to attend her physical therapy session on Thursday which will be very helpful.

## 2017-09-11 NOTE — PATIENT INSTRUCTIONS
[facility] Medication Refill Request Information:  * Please contact your pharmacy regarding ANY request for medication refills.  ** [facility] Prescription Fax = [phone]  * Please allow 3 business days for routine medication refills.  * Please allow 5 business days for controlled substance medication refills.     [facility] Test Result notification information:  *You will be notified with in 7-10 days of your appointment day regarding the results of your test.  If you are on MyChart you will be notified as soon as the provider has reviewed the results and signed off on them.    [facility] [phone]

## 2017-09-11 NOTE — MR AVS SNAPSHOT
After Visit Summary   9/11/2017    Ca Yan    MRN: [id number]           Patient Information     Date Of Birth          1945        Visit Information        Provider Department      9/11/2017 12:40 PM Senia Olivas MD [facility]        Care Instructions    Primary Care Center Medication Refill Request Information:  * Please contact your pharmacy regarding ANY request for medication refills.  ** [facility] Prescription Fax = [phone]  * Please allow 3 business days for routine medication refills.  * Please allow 5 business days for controlled substance medication refills.     Primary Care Center Test Result notification information:  *You will be notified with in 7-10 days of your appointment day regarding the results of your test.  If you are on MyChart you will be notified as soon as the provider has reviewed the results and signed off on them.    [facility] [phone]             Follow-ups after your visit        Your next 10 appointments already scheduled     Sep 14, 2017  9:40 AM CDT   (Arrive by 9:25 AM)   TONY Spine with Alfonzo Holly PT   [facility] ([facility])    [address] [place] [id number] [phone]            Oct 25, 2017 10:00 AM CDT   (Arrive by 9:45 AM)   Return Visit with Leda Latham MD   [facility] ([facility])    [address] [address] [phone]              Who to contact     Please call your clinic at [phone] to:    Ask questions about your health    Make or cancel appointments    Discuss your medicines    Learn about your test results    Speak to your doctor   If you have compliments or concerns about an experience at your clinic, or if you wish to file a complaint, please contact [facility] Patient Relations at  [phone] or email us at [email or web address]         Additional Information About Your Visit        [employer] Information     [employer] is an electronic gateway that provides easy, online access to your medical records. With [employer], you can request a clinic appointment, read your test results, renew a prescription or communicate with your care team.     To sign up for [employer] visit the website at [email or web address]   You will be asked to enter the access code listed below, as well as some personal information. Please follow the directions to create your username and password.     Your access code is: [email or web address]  Expires: 2017  6:30 AM     Your access code will  in 90 days. If you need help or a new code, please contact your [facility] or call [phone] for assistance.        Care EveryWhere ID     This is your Care EveryWhere ID. This could be used by other organizations to access your [facility] medical records  [id number]        Your Vitals Were     Pulse Temperature Respirations Pulse Oximetry Breastfeeding? BMI (Body Mass Index)    73 98.5  F (36.9  C) (Oral) 16 93% No 34.37 kg/m2       Blood Pressure from Last 3 Encounters:   17 144/90   17 142/89   17 130/86    Weight from Last 3 Encounters:   17 88 kg (194 lb)   17 89.6 kg (197 lb 9.6 oz)   17 89.4 kg (197 lb)              Today, you had the following     No orders found for display       Primary Care Provider Office Phone # Fax #    Senia Olivas [phone]       [address] [address]        Equal Access to Services     MUSHTAQ BROWN : Sal Sweet, donald cherry, kyle kaalmada sierra, sushma melchor. So [facility] [phone].    ATENCIÓN: Si habla español, tiene a maxwell disposición servicios gratuitos de asistencia lingüística. Lljarrett al [phone].    We comply  with applicable federal civil rights laws and [place] laws. We do not discriminate on the basis of race, color, national origin, age, disability sex, sexual orientation or gender identity.            Thank you!     Thank you for choosing [facility]  for your care. Our goal is always to provide you with excellent care. Hearing back from our patients is one way we can continue to improve our services. Please take a few minutes to complete the written survey that you may receive in the mail after your visit with us. Thank you!             Your Updated Medication List - Protect others around you: Learn how to safely use, store and throw away your medicines at www.disposemymeds.org.          This list is accurate as of: 9/11/17  1:06 PM.  Always use your most recent med list.                   Brand Name Dispense Instructions for use Diagnosis    acetaminophen 500 MG tablet    TYLENOL     Take 500-1,000 mg by mouth every 6 hours as needed for mild pain        ammonium lactate 12 % cream    LAC-HYDRIN    385 g    Apply topically 2 times daily as needed for dry skin    Bilateral leg edema       ASPIRIN EC PO      Take 81 mg by mouth        Blood Pressure Cuff Misc     1 each    Imron blood cuff  Please check your blood pressure 2-3 times per week.  Goal is <140/90    Benign essential hypertension       ciclopirox 0.77 % cream    LOPROX    90 g    Apply topically 2 times daily To feet and toenails.    Dermatophytosis of nail, Tinea pedis of both feet       hydrochlorothiazide 12.5 MG Tabs tablet      Take 12.5 mg by mouth daily.        ibuprofen 200 MG tablet    ADVIL/MOTRIN    90 tablet    Take 3 tablets (600 mg) by mouth 3 times daily (with meals)    Acute left-sided low back pain without sciatica       latanoprost 0.005 % ophthalmic solution    XALATAN     1 drop        * lidocaine 5 % Patch    LIDODERM    30 patch    Apply up to 3 patches to painful area at once for up to 12 h within a 24 h period.   Remove after 12 hours.    Acute left-sided low back pain without sciatica       * lidocaine 5 % ointment    XYLOCAINE    5 g    Apply topically as needed for moderate pain (Please dispense for pain if lidocaine patch unavailable)    Acute left-sided low back pain without sciatica       losartan 25 MG tablet    COZAAR    90 tablet    Take 1 tablet (25 mg) by mouth daily    Benign essential hypertension       naproxen 500 MG tablet    NAPROSYN    30 tablet    Take 1 tablet (500 mg) by mouth 2 times daily (with meals)    Acute left-sided low back pain without sciatica       omeprazole 20 MG CR capsule    priLOSEC     Take 20 mg by mouth as needed        * order for DME     1 Device    Equipment being ordered: Oxygen  Please provide portable oxygen concentrator (pt would like over the shoulder oxygen device) for portability at 2LPM with activity via nasal canula.    Pulmonary hypertension (H)       * order for DME     2 each    Equipment being ordered: knee high compression stockings, class 1 or 2, night time velcro compression garments, lymphedema bandaging supplies, darco boots to wear during treatment    Bilateral leg edema       * order for DME     1 Units    Equipment being ordered: portable walker with seat and compartment under the seat.  Use for going out of the house on errands, where prolonged standing is required.    Chronic obstructive pulmonary disease, unspecified COPD type (H)       * order for DME     1 Device    Equipment being ordered: Wheelchair Sig: Equipment being ordered: portable walker with seat and compartment under the seat.   Use for going out of the house on errands, where prolonged standing is required.    COPD (chronic obstructive pulmonary disease) (H)       phenazopyridine 100 MG tablet    PYRIDIUM    20 tablet    Take 1-2 tablets (100-200 mg) by mouth 3 times daily as needed for urinary tract discomfort    Urinary tract infection with hematuria, site unspecified       QUEtiapine 200 MG  tablet    SEROquel     Take 200 mg by mouth At Bedtime.        tiZANidine 4 MG tablet    ZANAFLEX    90 tablet    Take 1 tablet (4 mg) by mouth 3 times daily    Acute left-sided low back pain without sciatica       umeclidinium 62.5 MCG/INH oral inhaler    INCRUSE ELLIPTA     Inhale 1 puff into the lungs        Urea 40 % Crea     198 g    Externally apply topically daily To feet and toenails.    Onychauxis, Dermatophytosis of nail, Tyloma       ZOCOR 20 MG tablet   Generic drug:  simvastatin      Take 20 mg by mouth daily        * Notice:  This list has 6 medication(s) that are the same as other medications prescribed for you. Read the directions carefully, and ask your doctor or other care provider to review them with you.

## 2017-09-11 NOTE — NURSING NOTE
Chief Complaint   Patient presents with     Hospital F/U     Here for Urgent Care follow up; back pain     Jama De La Vega CMA (AAMA) at 12:56 PM on 9/11/2017

## 2017-09-11 NOTE — TELEPHONE ENCOUNTER
Ca left a message asking me to cancel her appointment on Wednesday. I returned her call and left a message with a new appointment. I asked her to call me to confirm appointment.

## 2017-09-14 ENCOUNTER — THERAPY VISIT (OUTPATIENT)
Dept: PHYSICAL THERAPY | Facility: CLINIC | Age: 72
End: 2017-09-14
Payer: COMMERCIAL

## 2017-09-14 DIAGNOSIS — M54.50 LEFT-SIDED LOW BACK PAIN WITHOUT SCIATICA: Primary | ICD-10-CM

## 2017-09-14 PROCEDURE — 97110 THERAPEUTIC EXERCISES: CPT | Mod: GP | Performed by: PHYSICAL THERAPIST

## 2017-09-14 PROCEDURE — 97161 PT EVAL LOW COMPLEX 20 MIN: CPT | Mod: GP | Performed by: PHYSICAL THERAPIST

## 2017-09-14 NOTE — PROGRESS NOTES
Subjective:    HPI                  Physical Therapy Initial Examination/Evaluation  September 14, 2017    Ca Yan is a 71 year old female referred to physical therapy by Dr. Olivas for treatment of left low back pain with Precautions/Restrictions/MD instructions none  Pt reports 2 year hx of low back pain, left sided.  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  Reports can't twist to get into cab.  Reports pain was good for awhile and then 1 week ago it reoccurred.  Pt has tried ice and this has not helped.  Pt has also tried heat with no success.  NSAIDs did not help.     Subjective:  DOI/onset: 8/31/17 DOS: spinal fusion in 1957  Acute Injury or Gradual Onset?: Acute injury onset  Mechanism of Injury: in and out of car  Related PMH: fusion, emphysema, depression, HBP, chem dep, overweight, CA, OA Previous Treatment: Nothing   Chief Complaint/Functional Limitations:   Left lumbar pain and posterior hip with getting in and out of a cab and see below in therapy evaluation codes   Pain: rest 3 /10, activity 6/10 Location: left lumbar and post hip Frequency: Constant Described as: aching and stabbing Alleviated by: Nothing Progression of Symptoms: Gradually getting better. Time of day when pain is worse: Evening and Activity related  Sleeping: No issues/uninterrupted   Occupation: not working  Job duties: none  Current HEP/exercise regimen: none  Patient's goals are see chief complaints feel good, get groceries without pain, shop for clothes    Other pertinent PMH/Red Flags: osteoarthritis, cancer, chemical dependency, overweight, depression , emphysema, changes in bowel/bladder   Barriers at home/work: Live alone in an apartment with elevator  Pertinent Surgical History: spinal fusion  Medications: None as reported by patient  General health as reported by patient: fair  Return to MD:  Brendon-not at this time      Objective:  Trunk AROM  Ext unable to go past neutral, pain left  lumbar  Flex: 50% and hinging at lower spine-sx relief  Lat flex: right 50%, left 10% and pain  Rotation: left 15% and pain, right 25%   obs: overweight, left lumbar trunk shift, scar fusion from T-L  Sitting: flexed position  System    Physical Exam    General     ROS    Assessment/Plan:      Patient is a 71 year old female with lumbar complaints.    Patient has the following significant findings with corresponding treatment plan.                Diagnosis 1:  Chronic low back pain  Pain -  hot/cold therapy, US, manual therapy, self management, education and home program  Decreased ROM/flexibility - manual therapy and therapeutic exercise  Decreased function - therapeutic activities  Impaired posture - neuro re-education    Therapy Evaluation Codes:   1) History comprised of:   Personal factors that impact the plan of care:      None.    Comorbidity factors that impact the plan of care are:      Chemical Dependency and Emphysema.     Medications impacting care: None.  2) Examination of Body Systems comprised of:   Body structures and functions that impact the plan of care:      Lumbar spine.   Activity limitations that impact the plan of care are:      Bending, Lifting, Stairs, Standing and Walking.  3) Clinical presentation characteristics are:   Stable/Uncomplicated.  4) Decision-Making    Low complexity using standardized patient assessment instrument and/or measureable assessment of functional outcome.  Cumulative Therapy Evaluation is: Low complexity.    Previous and current functional limitations:  (See Goal Flow Sheet for this information)    Short term and Long term goals: (See Goal Flow Sheet for this information)     Communication ability:  Patient appears to be able to clearly communicate and understand verbal and written communication and follow directions correctly.  Treatment Explanation - The following has been discussed with the patient:   RX ordered/plan of care  Anticipated outcomes  Possible risks  and side effects  This patient would benefit from PT intervention to resume normal activities.   Rehab potential is good.    Frequency:  2 X week, once daily  Duration:  for 2 weeks tapering to 1 X a week over 8 weeks  Discharge Plan:  Achieve all LTG.  Independent in home treatment program.  Reach maximal therapeutic benefit.    Please refer to the daily flowsheet for treatment today, total treatment time and time spent performing 1:1 timed codes.

## 2017-09-14 NOTE — MR AVS SNAPSHOT
After Visit Summary   9/14/2017    Ca Yan    MRN: [id number]           Patient Information     Date Of Birth          1945        Visit Information        Provider Department      9/14/2017 9:40 AM Alfonzo Holly PT [facility]        Today's Diagnoses     Left-sided low back pain without sciatica    -  1       Follow-ups after your visit        Your next 10 appointments already scheduled     Sep 28, 2017  1:30 PM CDT   TONY Spine with Alfonzo Holly PT   [facility]    [address]  [address]  [facility] [id number] [phone]            Oct 25, 2017 10:00 AM CDT   (Arrive by 9:45 AM)   Return Visit with Leda Latham MD   [facility] ([facility])    [address] [facility]  4th Floor  [facility] [id number] [phone]              Who to contact     If you have questions or need follow up information about today's clinic visit or your schedule please contact [facility] directly at [phone].  Normal or non-critical lab and imaging results will be communicated to you by [employer], letter or phone within 4 business days after the clinic has received the results. If you do not hear from us within 7 days, please contact the clinic through [employer] or phone. If you have a critical or abnormal lab result, we will notify you by phone as soon as possible.  Submit refill requests through ZEALER or call your pharmacy and they will forward the refill request to us. Please allow 3 business days for your refill to be completed.          Additional Information About Your Visit        MyChart Information     [employer]ER lets you send messages to your doctor, view your test results, renew your prescriptions, schedule appointments and more. To sign up, go to [email or web address] . Click on  "\"Log in\" on the left side of the screen, which will take you to the Welcome page. Then click on \"Sign up Now\" on the right side of the page.     You will be asked to enter the access code listed below, as well as some personal information. Please follow the directions to create your username and password.     Your access code is: [email or web address]  Expires: 2017  6:30 AM     Your access code will  in 90 days. If you need help or a new code, please call your [facility] clinic or [phone].        Care EveryWhere ID     This is your Care EveryWhere ID. This could be used by other organizations to access your [facility] medical records  [id number]         Blood Pressure from Last 3 Encounters:   17 144/90   17 142/89   17 130/86    Weight from Last 3 Encounters:   17 88 kg (194 lb)   17 89.6 kg (197 lb 9.6 oz)   17 89.4 kg (197 lb)              We Performed the Following     TONY Inital Eval Report     PT Eval, Low Complexity (17824)     Therapeutic Exercises        Primary Care Provider Office Phone # Fax #    Senia Olivas [phone]       [address] [address]        Equal Access to Services     MUSHTAQ BROWN : Hadii hollie guerrero hadasho Sosebastian, waaxda luqadaha, qaybta kaalmada sushma esquivel. So [facility] [phone].    ATENCIÓN: Si habla español, tiene a maxwell disposición servicios gratuitos de asistencia lingüística. Elizabeth al [phone].    We comply with applicable federal civil rights laws and [place] laws. We do not discriminate on the basis of race, color, national origin, age, disability sex, sexual orientation or gender identity.            Thank you!     Thank you for choosing [facility] [place]  for your care. Our goal is always to provide you with excellent care. Hearing back from our patients is one way we can continue to improve our services. Please take a " few minutes to complete the written survey that you may receive in the mail after your visit with us. Thank you!             Your Updated Medication List - Protect others around you: Learn how to safely use, store and throw away your medicines at www.disposemymeds.org.          This list is accurate as of: 9/14/17 10:38 AM.  Always use your most recent med list.                   Brand Name Dispense Instructions for use Diagnosis    acetaminophen 500 MG tablet    TYLENOL     Take 500-1,000 mg by mouth every 6 hours as needed for mild pain        ammonium lactate 12 % cream    LAC-HYDRIN    385 g    Apply topically 2 times daily as needed for dry skin    Bilateral leg edema       ASPIRIN EC PO      Take 81 mg by mouth        Blood Pressure Cuff Misc     1 each    Imron blood cuff  Please check your blood pressure 2-3 times per week.  Goal is <140/90    Benign essential hypertension       ciclopirox 0.77 % cream    LOPROX    90 g    Apply topically 2 times daily To feet and toenails.    Dermatophytosis of nail, Tinea pedis of both feet       hydrochlorothiazide 12.5 MG Tabs tablet      Take 12.5 mg by mouth daily.        ibuprofen 200 MG tablet    ADVIL/MOTRIN    90 tablet    Take 3 tablets (600 mg) by mouth 3 times daily (with meals)    Acute left-sided low back pain without sciatica       latanoprost 0.005 % ophthalmic solution    XALATAN     1 drop        * lidocaine 5 % Patch    LIDODERM    30 patch    Apply up to 3 patches to painful area at once for up to 12 h within a 24 h period.  Remove after 12 hours.    Acute left-sided low back pain without sciatica       * lidocaine 5 % ointment    XYLOCAINE    5 g    Apply topically as needed for moderate pain (Please dispense for pain if lidocaine patch unavailable)    Acute left-sided low back pain without sciatica       losartan 25 MG tablet    COZAAR    90 tablet    Take 1 tablet (25 mg) by mouth daily    Benign essential hypertension       naproxen 500 MG tablet     NAPROSYN    30 tablet    Take 1 tablet (500 mg) by mouth 2 times daily (with meals)    Acute left-sided low back pain without sciatica       omeprazole 20 MG CR capsule    priLOSEC     Take 20 mg by mouth as needed        * order for DME     1 Device    Equipment being ordered: Oxygen  Please provide portable oxygen concentrator (pt would like over the shoulder oxygen device) for portability at 2LPM with activity via nasal canula.    Pulmonary hypertension (H)       * order for DME     2 each    Equipment being ordered: knee high compression stockings, class 1 or 2, night time velcro compression garments, lymphedema bandaging supplies, darco boots to wear during treatment    Bilateral leg edema       * order for DME     1 Units    Equipment being ordered: portable walker with seat and compartment under the seat.  Use for going out of the house on errands, where prolonged standing is required.    Chronic obstructive pulmonary disease, unspecified COPD type (H)       * order for DME     1 Device    Equipment being ordered: Wheelchair Sig: Equipment being ordered: portable walker with seat and compartment under the seat.   Use for going out of the house on errands, where prolonged standing is required.    COPD (chronic obstructive pulmonary disease) (H)       phenazopyridine 100 MG tablet    PYRIDIUM    20 tablet    Take 1-2 tablets (100-200 mg) by mouth 3 times daily as needed for urinary tract discomfort    Urinary tract infection with hematuria, site unspecified       QUEtiapine 200 MG tablet    SEROquel     Take 200 mg by mouth At Bedtime.        tiZANidine 4 MG tablet    ZANAFLEX    90 tablet    Take 1 tablet (4 mg) by mouth 3 times daily    Acute left-sided low back pain without sciatica       umeclidinium 62.5 MCG/INH oral inhaler    INCRUSE ELLIPTA     Inhale 1 puff into the lungs        Urea 40 % Crea     198 g    Externally apply topically daily To feet and toenails.    Onychauxis, Dermatophytosis of nail,  Tyloma       ZOCOR 20 MG tablet   Generic drug:  simvastatin      Take 20 mg by mouth daily        * Notice:  This list has 6 medication(s) that are the same as other medications prescribed for you. Read the directions carefully, and ask your doctor or other care provider to review them with you.

## 2017-09-18 DIAGNOSIS — E78.5 HYPERLIPEMIA: Primary | ICD-10-CM

## 2017-09-18 RX ORDER — SIMVASTATIN 20 MG
20 TABLET ORAL DAILY
Qty: 30 TABLET | Refills: 1 | Status: SHIPPED | OUTPATIENT
Start: 2017-09-18 | End: 2018-02-05

## 2017-09-18 RX ORDER — HYDROCHLOROTHIAZIDE 12.5 MG/1
12.5 TABLET ORAL DAILY
Qty: 60 TABLET | Refills: 1 | Status: SHIPPED | OUTPATIENT
Start: 2017-09-18 | End: 2018-07-02

## 2017-09-18 NOTE — TELEPHONE ENCOUNTER
saw Dr. Olivas on 09/11/17 and forgot to ask her to refill her medications.  I asked pt if she called the pharmacy she said Dr. Olivas needs to fill them.  First one is Simvascatin and the second one is Hydrochlorothiacide.  Pt would like a call back at the number listed above in regards to this.   RX sent to pt's pharmacy.  Viri Webb RN 11:36 AM on 9/18/2017.

## 2017-09-19 ENCOUNTER — NURSE TRIAGE (OUTPATIENT)
Dept: NURSING | Facility: CLINIC | Age: 72
End: 2017-09-19

## 2017-09-19 NOTE — TELEPHONE ENCOUNTER
Reason for Disposition    Bleeding or spotting occurs after hysterectomy    Additional Information    Negative: Shock suspected (e.g., cold/pale/clammy skin, too weak to stand, low BP, rapid pulse)    Negative: Difficult to awaken or acting confused  (e.g., disoriented, slurred speech)    Negative: Passed out (i.e., lost consciousness, collapsed and was not responding)    Negative: Sounds like a life-threatening emergency to the triager    Negative: Followed a genital area injury    Negative: Pregnant > 20 weeks  (5 months or more)    Negative: Pregnant < 20 weeks  (less than 5 months)    Negative: Bleeding occurring > 12 months after menopause    Negative: Bleeding from sexual abuse or rape    Negative: [1] Vaginal discharge is main symptom AND [2] small amount of blood    Negative: SEVERE abdominal pain    Negative: SEVERE dizziness (e.g., unable to stand, requires support to walk, feels like passing out now)    Negative: SEVERE vaginal bleeding (i.e., soaking 2 pads or tampons per hour and present 2 or more hours)    Negative: Patient sounds very sick or weak to the triager    Negative: MODERATE vaginal bleeding (i.e., soaking 1 pad or tampon per hour and present > 6 hours)    Negative: [1] Constant abdominal pain AND [2] present > 2 hours    Negative: Pale skin (pallor) of new onset or worsening    Negative: Passed tissue (e.g., gray-white)    Negative: Taking Coumadin (warfarin) or other strong blood thinner, or known bleeding disorder (e.g., thrombocytopenia)    Negative: [1] Skin bruises or nosebleed AND [2] not caused by an injury    Negative: [1] Periods with > 6 soaked pads or tampons per day AND [2] last > 7 days    Negative: [1] Bleeding or spotting after procedure (e.g., biopsy) or pelvic examination (e.g., pap smear) AND [2] lasts > 7 days    Negative: [1] Bleeding or spotting occurs after sex (Exception: first intercourse) AND [2] lasts > 7 days    Negative: Periods with > 6 soaked pads or tampons  "per day    Negative: Periods last > 7 days    Negative: [1] Missed period AND [2] has occurred 2 or more times in the last year    Negative: [1] Menstrual cycle < 21 days OR > 35 days AND [2] occurs more than two cycles (2 months) this past year    Negative: [1] Bleeding or spotting between regular periods AND [2] occurs more than two cycles (2 months) this past year    Negative: [1] Bleeding or spotting between regular periods AND [2] occurs more than two cycles (2 months) AND [3] using birth control medicine (pills, patch, Depo-Provera, Implanon, vaginal ring, Mirena IUD)    Protocols used: VAGINAL BLEEDING - ABNORMAL-ADULT-  Patient states she is having \"vaginal bleeding\" and thinks it cervical cancer.  States only sees blood on toilet tissue after using the bathroom and occasionally a few drops at other times.  Denies any urinary symptoms, pain, fever.  States has an appointment with PCP on 10/4/17, but is so concerned it may be cancer that FNA recommended she call clinic and see if an earlier appointment with PCP or another provider.  Patient will go to  if unable to get earlier appointment.  "

## 2017-10-18 ENCOUNTER — OFFICE VISIT (OUTPATIENT)
Dept: FAMILY MEDICINE | Facility: CLINIC | Age: 72
End: 2017-10-18

## 2017-10-18 VITALS — DIASTOLIC BLOOD PRESSURE: 86 MMHG | HEART RATE: 72 BPM | SYSTOLIC BLOOD PRESSURE: 138 MMHG

## 2017-10-18 DIAGNOSIS — Z91.81 AT RISK FOR FALLING: Primary | ICD-10-CM

## 2017-10-18 DIAGNOSIS — F40.240 CLAUSTROPHOBIA: ICD-10-CM

## 2017-10-18 DIAGNOSIS — M54.50 ACUTE BILATERAL LOW BACK PAIN WITHOUT SCIATICA: ICD-10-CM

## 2017-10-18 RX ORDER — DIAZEPAM 2 MG
2 TABLET ORAL EVERY 6 HOURS PRN
Qty: 1 TABLET | Refills: 0 | Status: SHIPPED | OUTPATIENT
Start: 2017-10-18 | End: 2019-04-03

## 2017-10-18 RX ORDER — NAPROXEN 500 MG/1
500 TABLET ORAL 2 TIMES DAILY PRN
Qty: 60 TABLET | Refills: 1 | Status: SHIPPED | OUTPATIENT
Start: 2017-10-18 | End: 2018-09-24

## 2017-10-18 RX ORDER — TRAMADOL HYDROCHLORIDE 50 MG/1
50 TABLET ORAL EVERY 6 HOURS PRN
Qty: 20 TABLET | Refills: 0 | Status: CANCELLED | OUTPATIENT
Start: 2017-10-18

## 2017-10-18 ASSESSMENT — PAIN SCALES - GENERAL: PAINLEVEL: WORST PAIN (10)

## 2017-10-18 NOTE — MR AVS SNAPSHOT
After Visit Summary   10/18/2017    Ca Yan    MRN: [id number]           Patient Information     Date Of Birth          1945        Visit Information        Provider Department      10/18/2017 12:30 PM Courtney Gongora MD PhD [facility]        Today's Diagnoses     At risk for falling    -  1    Acute bilateral low back pain without sciatica          Care Instructions    Primary Care Center: [phone]     Primary Care Center Medication Refill Request Information:  * Please contact your pharmacy regarding ANY request for medication refills.  ** [facility] Prescription Fax = [phone]  * Please allow 3 business days for routine medication refills.  * Please allow 5 business days for controlled substance medication refills.     Primary Care Center Test Result notification information:  *You will be notified with in 7-10 days of your appointment day regarding the results of your test.  If you are on MyChart you will be notified as soon as the provider has reviewed the results and signed off on them.            Follow-ups after your visit        Additional Services     SPORTS MEDICINE REFERRAL       Your provider has referred you to:  [facility]: Sports Medicine Clinic [facility] [phone]   [email or web address]    Please be aware that coverage of these services is subject to the terms and limitations of your health insurance plan.  Call member services at your health plan with any benefit or coverage questions.      Please bring the following to your appointment:    >>   Any x-rays, CTs or MRIs which have been performed.  Contact the facility where they were done to arrange for  prior to your scheduled appointment.    >>   List of current medications   >>   This referral request   >>   Any documents/labs given to you for this referral                  Your next 10 appointments already scheduled     Oct 20, 2017  1:00 PM CDT    MR LUMBAR SPINE W/O & W CONTRAST with UUMR1   [facility] [place], MRI ([facility] [place])    [address] [facility] [address] [phone]           Take your medicines as usual, unless your doctor tells you not to. Bring a list of your current medicines to your exam (including vitamins, minerals and over-the-counter drugs).  You will be given intravenous contrast for this exam. To prepare:   The day before your exam, drink extra fluids at least six 8-ounce glasses (unless your doctor tells you to restrict your fluids).   Have a blood test (creatinine test) within 30 days of your exam. Go to your clinic or Diagnostic Imaging Department for this test.  The MRI machine uses a strong magnet. Please wear clothes without metal (snaps, zippers). A sweatsuit works well, or we may give you a hospital gown.  Please remove any body piercings and hair extensions before you arrive. You will also remove watches, jewelry, hairpins, wallets, dentures, partial dental plates and hearing aids. You may wear contact lenses, and you may be able to wear your rings. We have a safe place to keep your personal items, but it is safer to leave them at home.   **IMPORTANT** THE INSTRUCTIONS BELOW ARE ONLY FOR THOSE PATIENTS WHO HAVE BEEN TOLD THEY WILL RECEIVE SEDATION OR GENERAL ANESTHESIA DURING THEIR MRI PROCEDURE:  IF YOU WILL RECEIVE SEDATION (take medicine to help you relax during your exam):   You must get the medicine from your doctor before you arrive. Bring the medicine to the exam. Do not take it at home.   Arrive one hour early. Bring someone who can take you home after the test. Your medicine will make you sleepy. After the exam, you may not drive, take a bus or take a taxi by yourself.   No eating 8 hours before your exam. You may have clear liquids up until 4 hours before your exam. (Clear liquids include water, clear tea, black coffee and fruit juice without pulp.)  IF  YOU WILL RECEIVE ANESTHESIA (be asleep for your exam):   Arrive 1 1/2 hours early. Bring someone who can take you home after the test. You may not drive, take a bus or take a taxi by yourself.   No eating 8 hours before your exam. You may have clear liquids up until 4 hours before your exam. (Clear liquids include water, clear tea, black coffee and fruit juice without pulp.)  Please call the Imaging Department at your exam site with any questions.            Oct 23, 2017 11:30 AM CDT   (Arrive by 11:15 AM)   New Patient Visit with Luis Felipe Barnes MD   [facility] Sports Medicine ([facility])    [address]  5th Floor  [facility] [address]   [phone]            Oct 25, 2017 10:00 AM CDT   (Arrive by 9:45 AM)   Return Visit with Leda Latham MD   [facility] Colon and Rectal Surgery ([facility])    [address]  4th Floor  [facility] [address] [phone]            Nov 07, 2017  1:30 PM CST   (Arrive by 1:15 PM)   Return Visit with Senia Olivas MD   [facility] ([facility])    [address] [place] [address]  4th Floor  [facility] [address] [phone]              Future tests that were ordered for you today     Open Future Orders        Priority Expected Expires Ordered    MRI Lumbar spine w/o & w contrast Routine 10/19/2017 10/18/2018 10/18/2017    SPORTS MEDICINE REFERRAL Routine 10/19/2017 12/18/2017 10/18/2017            Who to contact     Please call your clinic at [phone] to:    Ask questions about your health    Make or cancel appointments    Discuss your medicines    Learn about your test results    Speak to your doctor   If you have compliments or concerns about an experience at your clinic, or if you wish to file a complaint, please contact [facility] Patient Relations at [phone] or email us at [email or web address]          Additional Information About Your Visit        Fileblaze Information     Fileblaze is an electronic gateway that provides easy, online access to your medical records. With Fileblaze, you can request a clinic appointment, read your test results, renew a prescription or communicate with your care team.     To sign up for Fileblaze visit the website at [email or web address]   You will be asked to enter the access code listed below, as well as some personal information. Please follow the directions to create your username and password.     Your access code is: [email or web address]  Expires: 2017  6:30 AM     Your access code will  in 90 days. If you need help or a new code, please contact your [facility] or call [phone] for assistance.        Care EveryWhere ID     This is your Care EveryWhere ID. This could be used by other organizations to access your [facility] medical records  [id number]        Your Vitals Were     Pulse Breastfeeding?                72 No           Blood Pressure from Last 3 Encounters:   10/18/17 138/86   17 144/90   17 142/89    Weight from Last 3 Encounters:   17 88 kg (194 lb)   17 89.6 kg (197 lb 9.6 oz)   17 89.4 kg (197 lb)              We Performed the Following     Basic metabolic panel          Today's Medication Changes          These changes are accurate as of: 10/18/17  2:39 PM.  If you have any questions, ask your nurse or doctor.               These medicines have changed or have updated prescriptions.        Dose/Directions    * naproxen 500 MG tablet   Commonly known as:  NAPROSYN   This may have changed:  Another medication with the same name was added. Make sure you understand how and when to take each.   Used for:  Acute left-sided low back pain without sciatica   Changed by:  Pedro Fragoso MD        Dose:  500 mg   Take 1 tablet (500 mg) by mouth 2 times daily (with meals)   Quantity:  30 tablet    Refills:  1       * naproxen 500 MG tablet   Commonly known as:  NAPROSYN   This may have changed:  You were already taking a medication with the same name, and this prescription was added. Make sure you understand how and when to take each.   Used for:  Acute bilateral low back pain without sciatica   Changed by:  Courtney Gongora MD PhD        Dose:  500 mg   Take 1 tablet (500 mg) by mouth 2 times daily as needed for moderate pain   Quantity:  60 tablet   Refills:  1       * Notice:  This list has 2 medication(s) that are the same as other medications prescribed for you. Read the directions carefully, and ask your doctor or other care provider to review them with you.         Where to get your medicines      These medications were sent to [address] [place] [address] [address] [place], [place] [id number]    Hours:  TRANSPLANT PHONE NUMBER [phone] Phone:  [phone]     naproxen 500 MG tablet                Primary Care Provider Office Phone # Fax #    Senia Olivas [phone]       [address] [place] [address]        Equal Access to Services     TESSA BROWN : Hadii hollie guerrero hadasho Soomaali, waaxda luqadaha, qaybta kaalmada adeegyada, sushma wisdom . So [facility] [phone].    ATENCIÓN: Si habla español, tiene a maxwell disposición servicios gratuitos de asistencia lingüística. Llame al [phone].    We comply with applicable federal civil rights laws and [place] laws. We do not discriminate on the basis of race, color, national origin, age, disability, sex, sexual orientation, or gender identity.            Thank you!     Thank you for choosing [facility]  for your care. Our goal is always to provide you with excellent care. Hearing back from our patients is one way we can continue to improve our services. Please take a few minutes to complete the written  survey that you may receive in the mail after your visit with us. Thank you!             Your Updated Medication List - Protect others around you: Learn how to safely use, store and throw away your medicines at www.disposemymeds.org.          This list is accurate as of: 10/18/17  2:39 PM.  Always use your most recent med list.                   Brand Name Dispense Instructions for use Diagnosis    acetaminophen 500 MG tablet    TYLENOL     Take 500-1,000 mg by mouth every 6 hours as needed for mild pain        ammonium lactate 12 % cream    LAC-HYDRIN    385 g    Apply topically 2 times daily as needed for dry skin    Bilateral leg edema       ASPIRIN EC PO      Take 81 mg by mouth        Blood Pressure Cuff Misc     1 each    Imron blood cuff  Please check your blood pressure 2-3 times per week.  Goal is <140/90    Benign essential hypertension       ciclopirox 0.77 % cream    LOPROX    90 g    Apply topically 2 times daily To feet and toenails.    Dermatophytosis of nail, Tinea pedis of both feet       hydrochlorothiazide 12.5 MG Tabs tablet     60 tablet    Take 1 tablet (12.5 mg) by mouth daily    Hyperlipemia       ibuprofen 200 MG tablet    ADVIL/MOTRIN    90 tablet    Take 3 tablets (600 mg) by mouth 3 times daily (with meals)    Acute left-sided low back pain without sciatica       latanoprost 0.005 % ophthalmic solution    XALATAN     1 drop        losartan 25 MG tablet    COZAAR    90 tablet    Take 1 tablet (25 mg) by mouth daily    Benign essential hypertension       * naproxen 500 MG tablet    NAPROSYN    30 tablet    Take 1 tablet (500 mg) by mouth 2 times daily (with meals)    Acute left-sided low back pain without sciatica       * naproxen 500 MG tablet    NAPROSYN    60 tablet    Take 1 tablet (500 mg) by mouth 2 times daily as needed for moderate pain    Acute bilateral low back pain without sciatica       omeprazole 20 MG CR capsule    priLOSEC     Take 20 mg by mouth as needed        * order for  DME     1 Device    Equipment being ordered: Oxygen  Please provide portable oxygen concentrator (pt would like over the shoulder oxygen device) for portability at 2LPM with activity via nasal canula.    Pulmonary hypertension       * order for DME     2 each    Equipment being ordered: knee high compression stockings, class 1 or 2, night time velcro compression garments, lymphedema bandaging supplies, darco boots to wear during treatment    Bilateral leg edema       * order for DME     1 Units    Equipment being ordered: portable walker with seat and compartment under the seat.  Use for going out of the house on errands, where prolonged standing is required.    Chronic obstructive pulmonary disease, unspecified COPD type (H)       * order for DME     1 Device    Equipment being ordered: Wheelchair Sig: Equipment being ordered: portable walker with seat and compartment under the seat.   Use for going out of the house on errands, where prolonged standing is required.    COPD (chronic obstructive pulmonary disease) (H)       phenazopyridine 100 MG tablet    PYRIDIUM    20 tablet    Take 1-2 tablets (100-200 mg) by mouth 3 times daily as needed for urinary tract discomfort    Urinary tract infection with hematuria, site unspecified       QUEtiapine 200 MG tablet    SEROquel     Take 200 mg by mouth At Bedtime.        simvastatin 20 MG tablet    ZOCOR    30 tablet    Take 1 tablet (20 mg) by mouth daily    Hyperlipemia       umeclidinium 62.5 MCG/INH oral inhaler    INCRUSE ELLIPTA     Inhale 1 puff into the lungs        Urea 40 % Crea     198 g    Externally apply topically daily To feet and toenails.    Onychauxis, Dermatophytosis of nail, Tyloma       * Notice:  This list has 6 medication(s) that are the same as other medications prescribed for you. Read the directions carefully, and ask your doctor or other care provider to review them with you.

## 2017-10-18 NOTE — PATIENT INSTRUCTIONS
[facility]: [phone]     [facility] Medication Refill Request Information:  * Please contact your pharmacy regarding ANY request for medication refills.  ** [facility] Prescription Fax = [phone]  * Please allow 3 business days for routine medication refills.  * Please allow 5 business days for controlled substance medication refills.     [facility] Test Result notification information:  *You will be notified with in 7-10 days of your appointment day regarding the results of your test.  If you are on MyChart you will be notified as soon as the provider has reviewed the results and signed off on them.

## 2017-10-18 NOTE — PROGRESS NOTES
[facility]  Established Patient visit    Name: Ca Yan MRN: [id number]     Age: 71 year old           Chief Complaint:    YOB: 1945       CC:back pain         HPI and ROS:   HPI:    71 year old female with a PMH of COPD, GERD, HTN presents today with a one-day history of severe back pain. Patient has a history of scoliosis and had her upper back fused at age 11. She's had chronic back pain on and off for 15-20 years, it's gotten worse over the last 2 months mainly on the left side. However yesterday became  severe at 10 /10. It is across the  low back. It is not into the legs or buttocks. It is so severe she can't even stand up straight. She wondered if it's because her bed is old and her couch is old. She's not had any fall or injury. She didn't lift anything heavy. She has no numbness into the legs. She has no incontinence. She's been using ibuprofen 2 every few hours but doesn't seem to be helping much.      ROS:  A 10-point Review of Systems was performed and is negative other than mentioned in HPI         Past Medical History:     Past Medical History:   Diagnosis Date     Anxiety      Arthritis      Breast cancer (H)      COPD (chronic obstructive pulmonary disease) (H)     6/19/12:  FEV 0.99 l     Hypertension      Low bone density 4/13/2017    DEXA April 12, 2017: T score -2.0. Normal Z score. FRAX risk: major osteoporotic fracture 11.9%, hip fracture 2.6%, therefore not high-risk             Past Surgical History:      Past Surgical History:   Procedure Laterality Date     BACK SURGERY      spinal fusion     LUMPECTOMY BREAST       ORTHOPEDIC SURGERY      Knee surgery left side               Immunizations:     Immunization History   Administered Date(s) Administered     Influenza (H1N1) 01/08/2010     Influenza (High Dose) 3 valent vaccine 10/31/2016     Influenza (IIV3) 11/05/2010, 11/13/2013, 08/25/2014     Pneumococcal (PCV 13) 04/17/2015     Pneumococcal 23  valent 02/06/2009, 03/30/2012     TD (ADULT, 7+) 02/07/2005, 02/07/2005     Tdap (Adacel,Boostrix) 07/09/2011             Allergies:     Allergies   Allergen Reactions     Azithromycin      Other reaction(s): Itching     Codeine Nausea and Vomiting     Hydrocodone      Vomiting     Metronidazole Nausea     Percocet [Oxycodone-Acetaminophen]      Vomiting     Pollen Extract      Other reaction(s): Other, see comments  Pt states that she has sneezing and runny nose.      Seasonal Allergies Other (See Comments)     Pt states that she has sneezing and runny nose.      Vicodin [Hydrocodone-Acetaminophen]      Vomiting     Zolpidem Other (See Comments)     Amnestic behavior     Oxycodone Itching and Rash             Medications:     Current Outpatient Prescriptions on File Prior to Visit:  hydrochlorothiazide 12.5 MG TABS tablet Take 1 tablet (12.5 mg) by mouth daily   simvastatin (ZOCOR) 20 MG tablet Take 1 tablet (20 mg) by mouth daily   naproxen (NAPROSYN) 500 MG tablet Take 1 tablet (500 mg) by mouth 2 times daily (with meals)   tiZANidine (ZANAFLEX) 4 MG tablet Take 1 tablet (4 mg) by mouth 3 times daily   lidocaine (LIDODERM) 5 % Patch Apply up to 3 patches to painful area at once for up to 12 h within a 24 h period.  Remove after 12 hours.   latanoprost (XALATAN) 0.005 % ophthalmic solution 1 drop   phenazopyridine (PYRIDIUM) 100 MG tablet Take 1-2 tablets (100-200 mg) by mouth 3 times daily as needed for urinary tract discomfort   losartan (COZAAR) 25 MG tablet Take 1 tablet (25 mg) by mouth daily   umeclidinium (INCRUSE ELLIPTA) 62.5 MCG/INH oral inhaler Inhale 1 puff into the lungs   Urea 40 % CREA Externally apply topically daily To feet and toenails.   order for DME Equipment being ordered: Wheelchair Sig: Equipment being ordered: portable walker with seat and compartment under the seat. Use for going out of the house on errands, where prolonged standing is required.   Blood Pressure Monitoring (BLOOD PRESSURE  CUFF) MISC Imron blood cuffPlease check your blood pressure 2-3 times per week.  Goal is <140/90   order for DME Equipment being ordered: portable walker with seat and compartment under the seat.Use for going out of the house on errands, where prolonged standing is required.   ciclopirox (LOPROX) 0.77 % cream Apply topically 2 times daily To feet and toenails.   order for DME Equipment being ordered: knee high compression stockings, class 1 or 2, night time velcro compression garments, lymphedema bandaging supplies, darco boots to wear during treatment   ammonium lactate (LAC-HYDRIN) 12 % cream Apply topically 2 times daily as needed for dry skin   ibuprofen (ADVIL,MOTRIN) 200 MG tablet Take 3 tablets (600 mg) by mouth 3 times daily (with meals)   order for DME Equipment being ordered: Oxygen  Please provide portable oxygen concentrator (pt would like over the shoulder oxygen device) for portability at 2LPM with activity via nasal canula.   acetaminophen (TYLENOL) 500 MG tablet Take 500-1,000 mg by mouth every 6 hours as needed for mild pain   ASPIRIN EC PO Take 81 mg by mouth   omeprazole (PRILOSEC) 20 MG capsule Take 20 mg by mouth as needed    quetiapine (SEROQUEL) 200 MG tablet Take 200 mg by mouth At Bedtime.     No current facility-administered medications on file prior to visit.          Exam:   /86  Pulse 72  Breastfeeding? No    General: Elderly female who appears to be in moderate discomfort, can ambulate to the table but has great difficulty sitting up or lying down.  CV: Normal S1,S2 with RRR no murmurs, rubs or gallops  Resp: Lungs CTA bilaterally with no wheezes or crackles  MS: Has mild tenderness to palpation in the low lumbar spine. Cannot straight leg raise either leg.reflexes 1/ 4 in the knees. Motor strength is 4 / 5 in both lower extremities.    Skin: No concerning lesions or rashes        Labs:   MRI pending        Assessment and Plan:     Assessment: An elderly woman with history of  scoliosis and fusion of upper back, and chronic back pain for years who presents with acute  onset of severe back pain yesterday. She is not having any sciatica    Plan  1.Acute low back pain:  She is not experiencing sciatica and has no neurological compromise but is having sudden onset of severe pain which could be related to her scoliosis and spine degenerative changes. We will get an MRI and have her then see sports medicine for consideration of steroid injection. Meantime I gave her Naprosyn 500 b.i.d. for the pain.           RTC: richie Gongora MD, PhD

## 2017-10-18 NOTE — NURSING NOTE
Chief Complaint   Patient presents with     Back Pain     Patient here for back pain x2 months.       Dacia Herndon CMA at 12:51 PM on 10/18/2017.

## 2017-10-23 ENCOUNTER — TELEPHONE (OUTPATIENT)
Dept: INTERNAL MEDICINE | Facility: CLINIC | Age: 72
End: 2017-10-23

## 2017-10-23 NOTE — TELEPHONE ENCOUNTER
----- Message from Valentin Balnick sent at 10/20/2017 10:00 AM CDT -----  Regarding: PT following up on earlier message about her back  Contact: [phone]  PT is following up from a message earlier this morning about pain in her back.  She would like a call back from a nurse and can be reached at [phone].    Left message for pt to call back.  Viri Webb RN 11:37 AM on 10/23/2017.

## 2017-10-23 NOTE — TELEPHONE ENCOUNTER
Pt requesting call back to discuss recent order that was put in for her to get an MRI done. Pt stated she can not do it and wanted to talk to nurse about it. Pt can be reached at [phone]. Thanks.        Left message for pt to call back.  Viri Webb RN 10:44 AM on 10/23/2017.

## 2017-10-24 ENCOUNTER — TELEPHONE (OUTPATIENT)
Dept: INTERNAL MEDICINE | Facility: CLINIC | Age: 72
End: 2017-10-24

## 2017-10-24 NOTE — TELEPHONE ENCOUNTER
"Call to pt, discussed the message from , stated that \"I am not worried about that right now. I have more important thing to worry about, I have cancer and I am trying to take care of that.\"  Asia Steiner RN  -----------------------------      ----- Message from Courtney Gongora MD PhD sent at 10/23/2017  3:04 PM CDT -----  Regarding: FW: Dr. Olivas/Rolan, Request call from Dr. Gongora about MRI  I couldn't reach her - I am OK if she doesn't get the MRI but she should see sports medicine to help with her back pain - they may just get an xray  Courtney   ----- Message -----     From: Asia Steiner RN     Sent: 10/23/2017   2:11 PM       To: Courtney Gongora MD PhD  Subject: FW: Dr. Fernandez, Request call from  #        ----- Message -----     From: Viri Webb RN     Sent: 10/23/2017  10:45 AM       To: Asia Steiner RN  Subject: FW: Dr. Olivas/Rolan, Request call from  #        ----- Message -----     From: Robert Barkley     Sent: 10/20/2017   8:10 AM       To: Asia Steiner RN, Viri Webb RN  Subject: Dr. Fernandez, Request call from Dr. Cowart#    Hi Aubrie,    TGIF!!    Patient called in to let us know she cancelled her MRI for today because she is scared and will no have a ride back. Patient is insist that she speak to Dr. Gongora. Please follow-up.     Kind Regards    Robert     Please DO NOT send this message and/or reply back to sender. Call Center Representatives DO NOT respond to messages.         "

## 2017-10-24 NOTE — TELEPHONE ENCOUNTER
----- Message from Alison Eri sent at 10/20/2017  7:56 AM CDT -----  Regarding: call back/ questions about recent MRI order, pt of Dr. Olivas  Contact: [phone]  Pt requesting call back to discuss recent order that was put in for her to get an MRI done. Pt stated she can not do it and wanted to talk to nurse about it. Pt can be reached at [phone]. Thanks.    Pt states she is NOT going to have an MRI done.  Viri Webb RN 10:09 AM on 10/24/2017.

## 2017-10-26 ENCOUNTER — TELEPHONE (OUTPATIENT)
Dept: SURGERY | Facility: CLINIC | Age: 72
End: 2017-10-26

## 2017-10-26 NOTE — TELEPHONE ENCOUNTER
This RN called patient per request and the patient informed this RN that she needed to cancel her surgery due to other health concerns. This RN told the patient to call the clinic if she would like to reschedule

## 2017-11-07 ENCOUNTER — OFFICE VISIT (OUTPATIENT)
Dept: INTERNAL MEDICINE | Facility: CLINIC | Age: 72
End: 2017-11-07

## 2017-11-07 VITALS
RESPIRATION RATE: 16 BRPM | TEMPERATURE: 97.9 F | HEART RATE: 62 BPM | SYSTOLIC BLOOD PRESSURE: 138 MMHG | DIASTOLIC BLOOD PRESSURE: 88 MMHG

## 2017-11-07 DIAGNOSIS — J30.9 CHRONIC ALLERGIC RHINITIS, UNSPECIFIED SEASONALITY, UNSPECIFIED TRIGGER: Primary | ICD-10-CM

## 2017-11-07 DIAGNOSIS — E66.811 CLASS 1 OBESITY IN ADULT, UNSPECIFIED BMI, UNSPECIFIED OBESITY TYPE, UNSPECIFIED WHETHER SERIOUS COMORBIDITY PRESENT: ICD-10-CM

## 2017-11-07 RX ORDER — FLUTICASONE PROPIONATE 50 MCG
1 SPRAY, SUSPENSION (ML) NASAL DAILY
Qty: 1 BOTTLE | Refills: 1 | Status: SHIPPED | OUTPATIENT
Start: 2017-11-07 | End: 2020-01-15

## 2017-11-07 ASSESSMENT — PAIN SCALES - GENERAL: PAINLEVEL: NO PAIN (0)

## 2017-11-07 NOTE — NURSING NOTE
"Right ear wash per Dr Olivas verbal order. Patient positioned comfortably on exam table, instilled one half water spray into right ear after confirming comfortable water temp for patient, pt requested procedure to stop, \" my ears are too sensitive \" and declined to proceed. Offered to get provider for other recommendations, patient declined, stated will come back if it continues to bother her.   "

## 2017-11-07 NOTE — PATIENT INSTRUCTIONS
[facility]: [phone]     [facility] Medication Refill Request Information:  * Please contact your pharmacy regarding ANY request for medication refills.  ** [facility] Prescription Fax = [phone]  * Please allow 3 business days for routine medication refills.  * Please allow 5 business days for controlled substance medication refills.     [facility] Test Result notification information:  *You will be notified with in 7-10 days of your appointment day regarding the results of your test.  If you are on MyChart you will be notified as soon as the provider has reviewed the results and signed off on them.      1.  Right cerumen impaction  2.  For nasal congestion, try Flonase nasal spray  3.  Referral to weight loss clinic [phone]

## 2017-11-07 NOTE — MR AVS SNAPSHOT
After Visit Summary   11/7/2017    Ca Yan    MRN: [id number]           Patient Information     Date Of Birth          1945        Visit Information        Provider Department      11/7/2017 1:30 PM Senia Olivas MD [facility]        Today's Diagnoses     Chronic allergic rhinitis, unspecified seasonality, unspecified trigger    -  1    Class 1 obesity in adult, unspecified BMI, unspecified obesity type, unspecified whether serious comorbidity present          Care Instructions    [facility]: [phone]     [facility] Medication Refill Request Information:  * Please contact your pharmacy regarding ANY request for medication refills.  ** [facility] Prescription Fax = [phone]  * Please allow 3 business days for routine medication refills.  * Please allow 5 business days for controlled substance medication refills.     [facility] Test Result notification information:  *You will be notified with in 7-10 days of your appointment day regarding the results of your test.  If you are on MyChart you will be notified as soon as the provider has reviewed the results and signed off on them.      1.  Right cerumen impaction  2.  For nasal congestion, try Flonase nasal spray  3.  Referral to weight loss clinic [phone]           Follow-ups after your visit        Additional Services     BARIATRIC ADULT REFERRAL       Your provider has referred you to: [facility]: Medical [facility] [phone]. [email or web address]    Please be aware that coverage of these services is subject to the terms and limitations of your health insurance plan.  Call member services at your health plan with any benefit or coverage questions.      Please bring the following with you to your appointment:      (1) List of current medications   (2) This referral request   (3) Any  documents/labs given to you for this referral                  Your next 10 appointments already scheduled     2017  1:30 PM CST   [facility] with Alfonzo Holly PT   [facility]    [address] [place] [id number] [phone]              Who to contact     Please call your clinic at [phone] to:    Ask questions about your health    Make or cancel appointments    Discuss your medicines    Learn about your test results    Speak to your doctor   If you have compliments or concerns about an experience at your clinic, or if you wish to file a complaint, please contact [facility] Patient Relations at [phone] or email us at [email or web address]         Additional Information About Your Visit        [employer] Information     [employer] is an electronic gateway that provides easy, online access to your medical records. With [employer], you can request a clinic appointment, read your test results, renew a prescription or communicate with your care team.     To sign up for [employer] visit the website at [email or web address]   You will be asked to enter the access code listed below, as well as some personal information. Please follow the directions to create your username and password.     Your access code is: [email or web address]  Expires: 2017  5:30 AM     Your access code will  in 90 days. If you need help or a new code, please contact your [facility] Clinic or call [phone] for assistance.        Care EveryWhere ID     This is your Care EveryWhere ID. This could be used by other organizations to access your [place] medical records  [id number]        Your Vitals Were     Pulse Temperature Respirations             62 97.9  F (36.6  C) 16          Blood Pressure from Last 3 Encounters:   17 138/88   10/18/17 138/86   17 144/90    Weight  from Last 3 Encounters:   09/11/17 88 kg (194 lb)   08/24/17 89.6 kg (197 lb 9.6 oz)   06/07/17 89.4 kg (197 lb)              We Performed the Following     BARIATRIC ADULT REFERRAL          Today's Medication Changes          These changes are accurate as of: 11/7/17  2:18 PM.  If you have any questions, ask your nurse or doctor.               Start taking these medicines.        Dose/Directions    fluticasone 50 MCG/ACT spray   Commonly known as:  FLONASE   Used for:  Chronic allergic rhinitis, unspecified seasonality, unspecified trigger   Started by:  Senia Olivas MD        Dose:  1 spray   Spray 1 spray into both nostrils daily   Quantity:  1 Bottle   Refills:  1            Where to get your medicines      These medications were sent to [address] [place] [address] [address] [place], [place] [id number]    Hours:  TRANSPLANT PHONE NUMBER [phone] Phone:  [phone]     fluticasone 50 MCG/ACT spray                Primary Care Provider Office Phone # Fax #    Senia Olivas [phone]       [address] [facility] [address]  [facility] [address]        Equal Access to Services     MUSHTAQ BROWN AH: Sal guerrero hadasho Soomaali, waaxda luqadaha, qaybta kaalmada adeegyada, sushma melchor. So [facility] [phone].    ATENCIÓN: Si habla español, tiene a maxwell disposición servicios gratuitos de asistencia lingüística. Llame al [phone].    We comply with applicable federal civil rights laws and [place] laws. We do not discriminate on the basis of race, color, national origin, age, disability, sex, sexual orientation, or gender identity.            Thank you!     Thank you for choosing [facility]  for your care. Our goal is always to provide you with excellent care. Hearing back from our patients is one way we can continue to improve our services. Please take a few minutes to complete the  written survey that you may receive in the mail after your visit with us. Thank you!             Your Updated Medication List - Protect others around you: Learn how to safely use, store and throw away your medicines at www.disposemymeds.org.          This list is accurate as of: 11/7/17  2:18 PM.  Always use your most recent med list.                   Brand Name Dispense Instructions for use Diagnosis    acetaminophen 500 MG tablet    TYLENOL     Take 500-1,000 mg by mouth every 6 hours as needed for mild pain        ammonium lactate 12 % cream    LAC-HYDRIN    385 g    Apply topically 2 times daily as needed for dry skin    Bilateral leg edema       ASPIRIN EC PO      Take 81 mg by mouth        Blood Pressure Cuff Misc     1 each    Imron blood cuff  Please check your blood pressure 2-3 times per week.  Goal is <140/90    Benign essential hypertension       ciclopirox 0.77 % cream    LOPROX    90 g    Apply topically 2 times daily To feet and toenails.    Dermatophytosis of nail, Tinea pedis of both feet       diazepam 2 MG tablet    VALIUM    1 tablet    Take 1 tablet (2 mg) by mouth every 6 hours as needed for anxiety or sleep    Claustrophobia       fluticasone 50 MCG/ACT spray    FLONASE    1 Bottle    Spray 1 spray into both nostrils daily    Chronic allergic rhinitis, unspecified seasonality, unspecified trigger       hydrochlorothiazide 12.5 MG Tabs tablet     60 tablet    Take 1 tablet (12.5 mg) by mouth daily    Hyperlipemia       ibuprofen 200 MG tablet    ADVIL/MOTRIN    90 tablet    Take 3 tablets (600 mg) by mouth 3 times daily (with meals)    Acute left-sided low back pain without sciatica       latanoprost 0.005 % ophthalmic solution    XALATAN     1 drop        losartan 25 MG tablet    COZAAR    90 tablet    Take 1 tablet (25 mg) by mouth daily    Benign essential hypertension       * naproxen 500 MG tablet    NAPROSYN    30 tablet    Take 1 tablet (500 mg) by mouth 2 times daily (with meals)     Acute left-sided low back pain without sciatica       * naproxen 500 MG tablet    NAPROSYN    60 tablet    Take 1 tablet (500 mg) by mouth 2 times daily as needed for moderate pain    Acute bilateral low back pain without sciatica       omeprazole 20 MG CR capsule    priLOSEC     Take 20 mg by mouth as needed        * order for DME     1 Device    Equipment being ordered: Oxygen  Please provide portable oxygen concentrator (pt would like over the shoulder oxygen device) for portability at 2LPM with activity via nasal canula.    Pulmonary hypertension       * order for DME     2 each    Equipment being ordered: knee high compression stockings, class 1 or 2, night time velcro compression garments, lymphedema bandaging supplies, darco boots to wear during treatment    Bilateral leg edema       * order for DME     1 Units    Equipment being ordered: portable walker with seat and compartment under the seat.  Use for going out of the house on errands, where prolonged standing is required.    Chronic obstructive pulmonary disease, unspecified COPD type (H)       * order for DME     1 Device    Equipment being ordered: Wheelchair Sig: Equipment being ordered: portable walker with seat and compartment under the seat.   Use for going out of the house on errands, where prolonged standing is required.    COPD (chronic obstructive pulmonary disease) (H)       phenazopyridine 100 MG tablet    PYRIDIUM    20 tablet    Take 1-2 tablets (100-200 mg) by mouth 3 times daily as needed for urinary tract discomfort    Urinary tract infection with hematuria, site unspecified       QUEtiapine 200 MG tablet    SEROquel     Take 200 mg by mouth At Bedtime.        simvastatin 20 MG tablet    ZOCOR    30 tablet    Take 1 tablet (20 mg) by mouth daily    Hyperlipemia       umeclidinium 62.5 MCG/INH oral inhaler    INCRUSE ELLIPTA     Inhale 1 puff into the lungs        Urea 40 % Crea     198 g    Externally apply topically daily To feet and  toenails.    Onychauxis, Dermatophytosis of nail, Tyloma       * Notice:  This list has 6 medication(s) that are the same as other medications prescribed for you. Read the directions carefully, and ask your doctor or other care provider to review them with you.

## 2017-11-07 NOTE — PROGRESS NOTES
Chief complaint:  Follow-up  History of present illness:  Ca is a 71-year-old woman with a past medical history of chronic back pain, COPD, and anxiety who has a number of concerns today:    1.  A couple of weeks ago she saw one of my colleagues for severe back pain. An MRI scan was ordered. The back pain got better the next day. She has known new neurological complaints. She is going to physical therapy for her by Dr. Ponce.    2.  Her ears feel plugged. It's intermittent. She also has a scratchy throat. In addition her sinuses feel congested at times and she wakes in the morning with a very dry mouth. She has clear nasal discharge without sneezing. This been no epistaxis.    3.  She requests a referral to the weight management clinic because she wants to lose 30 pounds.    4.  She's been feeling especially fatigued lately. She's had no fevers, chills, or acute exacerbations of her COPD.   She's had no bleeding vaginally, no melena or hematochezia.    Past Medical History:   Diagnosis Date     Anxiety      Arthritis      Breast cancer (H)      COPD (chronic obstructive pulmonary disease) (H)     6/19/12:  FEV 0.99 l     Hypertension      Low bone density 4/13/2017    DEXA April 12, 2017: T score -2.0. Normal Z score. FRAX risk: major osteoporotic fracture 11.9%, hip fracture 2.6%, therefore not high-risk     Past Surgical History:   Procedure Laterality Date     BACK SURGERY      spinal fusion     LUMPECTOMY BREAST       ORTHOPEDIC SURGERY      Knee surgery left side      ROS: 10 point ROS neg other than the symptoms noted above in the HPI.    Family History   Problem Relation Age of Onset     Breast Cancer Mother      DIABETES Mother      Alcohol/Drug Father      MENTAL ILLNESS Father      CEREBROVASCULAR DISEASE Maternal Grandmother 67     Social History     Social History     Marital status: Single     Spouse name: N/A     Number of children: N/A     Years of education: N/A     Occupational History       Retired     Social History Main Topics     Smoking status: Former Smoker     Types: Cigarettes     Quit date: 9/26/1980     Smokeless tobacco: Never Used     Alcohol use No      Comment: Sober for 2 years, previous alcoholic     Drug use: No     Sexual activity: No     Other Topics Concern     Stress Concern Not Asked     Lives alone     Back Care Not Asked     Hx of scoliosis with spine fusion     Exercise Not Asked     Walks     Social History Narrative    Only family member is daughter in [place], [place] whom      /88  Pulse 62  Temp 97.9  F (36.6  C)  Resp 16  Exam:  Constitutional: Animated, worried appearing 71-year-old woman sitting in a wheelchair.  ENT: Posterior pharynx is hyperemic but not ulcerated or deeply inflamed. There is no cervical lymphadenopathy. The right TM is completely plugged with cerumen. The left TM is normal.  Sinuses are nontender. The nasal mucosa is slightly swollen and erythematous without ulceration.  Cardiovascular: negative, PMI normal. No lifts, heaves, or thrills. RRR. No murmurs, clicks gallops or rub    TSH   Date Value Ref Range Status   02/20/2017 1.40 0.40 - 4.00 mU/L Final   ]  CBC RESULTS:   Recent Labs   Lab Test  02/20/17   1522   WBC  6.1   RBC  4.44   HGB  13.4   HCT  42.4   MCV  96   MCH  30.2   MCHC  31.6   RDW  14.7   PLT  269       ASSESSMENT  1.  Cerumen impaction.  We attempted to clean her right ear but her ears are so sensitive that we could not continue.  2.  Rhinorrhea. Exam is consistent with mild allergic rhinitis or possible vasomotor rhinitis. Plan trial of Flonase.  3. Scratchy throat. I suspect that because  her nose is plugged, she sleeps with an open mouth at night and wakes in the morning with a dry and irritated mouth.  See #2.  4.  Obesity. We'll refer her at her request to the bariatric/weight loss program.    Ca was seen today for plugged ears.    Diagnoses and all orders for this visit:    Chronic allergic rhinitis, unspecified  seasonality, unspecified trigger  -     fluticasone (FLONASE) 50 MCG/ACT spray; Spray 1 spray into both nostrils daily    Class 1 obesity in adult, unspecified BMI, unspecified obesity type, unspecified whether serious comorbidity present  -     BARIATRIC ADULT REFERRAL

## 2017-11-07 NOTE — NURSING NOTE
Chief Complaint   Patient presents with     Plugged Ears     patient states she is here to discuss ears, and throat.       FRANCESCO AVENDANO at 1:29 PM on 11/7/2017.

## 2017-11-15 ENCOUNTER — THERAPY VISIT (OUTPATIENT)
Dept: PHYSICAL THERAPY | Facility: CLINIC | Age: 72
End: 2017-11-15
Payer: COMMERCIAL

## 2017-11-15 DIAGNOSIS — M54.50 LEFT-SIDED LOW BACK PAIN WITHOUT SCIATICA: ICD-10-CM

## 2017-11-15 PROCEDURE — 97112 NEUROMUSCULAR REEDUCATION: CPT | Mod: GP | Performed by: PHYSICAL THERAPIST

## 2017-11-15 PROCEDURE — 97110 THERAPEUTIC EXERCISES: CPT | Mod: GP | Performed by: PHYSICAL THERAPIST

## 2017-11-15 NOTE — MR AVS SNAPSHOT
After Visit Summary   11/15/2017    Ca Yan    MRN: [id number]           Patient Information     Date Of Birth          1945        Visit Information        Provider Department      11/15/2017 10:50 AM Alfonzo Holly PT [facility]        Today's Diagnoses     Left-sided low back pain without sciatica           Follow-ups after your visit        Your next 10 appointments already scheduled     Nov 30, 2017 11:00 AM CST   [facility] Spine with Alfonzo Holly PT   [facility]    [address]  [address]  [facility] [id number] [phone]            Dec 26, 2017 12:00 PM CST   (Arrive by 11:45 AM)   New Patient Visit with Tory Hobbs MD   [facility] ([facility])    [address]  4th Floor  [facility] [id number] [phone]              Who to contact     If you have questions or need follow up information about today's clinic visit or your schedule please contact [facility] directly at [phone].  Normal or non-critical lab and imaging results will be communicated to you by [employer], letter or phone within 4 business days after the clinic has received the results. If you do not hear from us within 7 days, please contact the clinic through [employer] or phone. If you have a critical or abnormal lab result, we will notify you by phone as soon as possible.  Submit refill requests through [facility] or call your pharmacy and they will forward the refill request to us. Please allow 3 business days for your refill to be completed.          Additional Information About Your Visit        [employer] Information     [facility] lets you send messages to your doctor, view your test results, renew your prescriptions, schedule appointments and more. To sign up, go to [email or web address] . Click  "on \"Log in\" on the left side of the screen, which will take you to the Welcome page. Then click on \"Sign up Now\" on the right side of the page.     You will be asked to enter the access code listed below, as well as some personal information. Please follow the directions to create your username and password.     Your access code is: [email or web address]  Expires: 2017  5:30 AM     Your access code will  in 90 days. If you need help or a new code, please call your [facility] clinic or [phone].        Care EveryWhere ID     This is your Care EveryWhere ID. This could be used by other organizations to access your [facility] medical records  [id number]         Blood Pressure from Last 3 Encounters:   17 138/88   10/18/17 138/86   17 144/90    Weight from Last 3 Encounters:   17 88 kg (194 lb)   17 89.6 kg (197 lb 9.6 oz)   17 89.4 kg (197 lb)              We Performed the Following     Neuromuscular Re-Education     Therapeutic Exercises        Primary Care Provider Office Phone # Fax #    Senia Olivas [phone]       [address] [address]        Equal Access to Services     MUSHTAQ BROWN : Hadii hollie bairdo Sosebastian, waaxda luqadaha, qaybta kaalmada adeegyada, sushma wisdom . So [facility] [phone].    ATENCIÓN: Si habla español, tiene a maxwell disposición servicios gratuitos de asistencia lingüística. Llame al [phone].    We comply with applicable federal civil rights laws and [place] laws. We do not discriminate on the basis of race, color, national origin, age, disability, sex, sexual orientation, or gender identity.            Thank you!     Thank you for choosing [facility] [place]  for your care. Our goal is always to provide you with excellent care. Hearing back from our patients is one way we can continue to improve our services. Please take a few minutes to complete the " written survey that you may receive in the mail after your visit with us. Thank you!             Your Updated Medication List - Protect others around you: Learn how to safely use, store and throw away your medicines at www.disposemymeds.org.          This list is accurate as of: 11/15/17 11:41 AM.  Always use your most recent med list.                   Brand Name Dispense Instructions for use Diagnosis    acetaminophen 500 MG tablet    TYLENOL     Take 500-1,000 mg by mouth every 6 hours as needed for mild pain        ammonium lactate 12 % cream    LAC-HYDRIN    385 g    Apply topically 2 times daily as needed for dry skin    Bilateral leg edema       ASPIRIN EC PO      Take 81 mg by mouth        Blood Pressure Cuff Misc     1 each    Imron blood cuff  Please check your blood pressure 2-3 times per week.  Goal is <140/90    Benign essential hypertension       ciclopirox 0.77 % cream    LOPROX    90 g    Apply topically 2 times daily To feet and toenails.    Dermatophytosis of nail, Tinea pedis of both feet       diazepam 2 MG tablet    VALIUM    1 tablet    Take 1 tablet (2 mg) by mouth every 6 hours as needed for anxiety or sleep    Claustrophobia       fluticasone 50 MCG/ACT spray    FLONASE    1 Bottle    Spray 1 spray into both nostrils daily    Chronic allergic rhinitis, unspecified seasonality, unspecified trigger       hydrochlorothiazide 12.5 MG Tabs tablet     60 tablet    Take 1 tablet (12.5 mg) by mouth daily    Hyperlipemia       ibuprofen 200 MG tablet    ADVIL/MOTRIN    90 tablet    Take 3 tablets (600 mg) by mouth 3 times daily (with meals)    Acute left-sided low back pain without sciatica       latanoprost 0.005 % ophthalmic solution    XALATAN     1 drop        losartan 25 MG tablet    COZAAR    90 tablet    Take 1 tablet (25 mg) by mouth daily    Benign essential hypertension       * naproxen 500 MG tablet    NAPROSYN    30 tablet    Take 1 tablet (500 mg) by mouth 2 times daily (with meals)     Acute left-sided low back pain without sciatica       * naproxen 500 MG tablet    NAPROSYN    60 tablet    Take 1 tablet (500 mg) by mouth 2 times daily as needed for moderate pain    Acute bilateral low back pain without sciatica       omeprazole 20 MG CR capsule    priLOSEC     Take 20 mg by mouth as needed        * order for DME     1 Device    Equipment being ordered: Oxygen  Please provide portable oxygen concentrator (pt would like over the shoulder oxygen device) for portability at 2LPM with activity via nasal canula.    Pulmonary hypertension       * order for DME     2 each    Equipment being ordered: knee high compression stockings, class 1 or 2, night time velcro compression garments, lymphedema bandaging supplies, darco boots to wear during treatment    Bilateral leg edema       * order for DME     1 Units    Equipment being ordered: portable walker with seat and compartment under the seat.  Use for going out of the house on errands, where prolonged standing is required.    Chronic obstructive pulmonary disease, unspecified COPD type (H)       * order for DME     1 Device    Equipment being ordered: Wheelchair Sig: Equipment being ordered: portable walker with seat and compartment under the seat.   Use for going out of the house on errands, where prolonged standing is required.    COPD (chronic obstructive pulmonary disease) (H)       phenazopyridine 100 MG tablet    PYRIDIUM    20 tablet    Take 1-2 tablets (100-200 mg) by mouth 3 times daily as needed for urinary tract discomfort    Urinary tract infection with hematuria, site unspecified       QUEtiapine 200 MG tablet    SEROquel     Take 200 mg by mouth At Bedtime.        simvastatin 20 MG tablet    ZOCOR    30 tablet    Take 1 tablet (20 mg) by mouth daily    Hyperlipemia       umeclidinium 62.5 MCG/INH oral inhaler    INCRUSE ELLIPTA     Inhale 1 puff into the lungs        Urea 40 % Crea     198 g    Externally apply topically daily To feet and  toenails.    Onychauxis, Dermatophytosis of nail, Tyloma       * Notice:  This list has 6 medication(s) that are the same as other medications prescribed for you. Read the directions carefully, and ask your doctor or other care provider to review them with you.

## 2017-11-17 NOTE — PROGRESS NOTES
Outpatient Physical Therapy Discharge Note     Patient: Ca Yan  : 1945    Beginning/End Dates of Reporting Period:  17 to 2017    Referring Provider: Casi Baez MD    Therapy Diagnosis: B LE lymphedem, lipidema     Client Self Report:  See initial eval    Objective Measurements:  See initial eval                      Outcome Measures (most recent score):  Lymphedema Life Impact Scale (score range 0-72). A higher score indicates greater impairment.: 30      Goals:  Goal Identifier Home program   Goal Description Pt will be independent in home program of skin care and exercises   Target Date 17   Date Met      Progress:     Goal Identifier compression    Goal Description Pt will be independent with use of well fitting compression garments and compression bandaging   Target Date 17   Date Met      Progress:     Goal Identifier volume   Goal Description Pt will have 8% or more reduction in volume of lower legs to decrease risk of infection and make clothers fit more easily.   Target Date 17   Date Met      Progress:     Goal Identifier LLIS   Goal Description Pt will have 4 or more point improvement in LLIS score   Target Date 17   Date Met      Progress:         Progress Toward Goals:   Progress this reporting period: Goal status unknown as pt did not return for additional visits after eval.         Plan:  Discharge from therapy.    Discharge:    Reason for Discharge: Patient has failed to schedule further appointments.    Equipment Issued: none    Discharge Plan: Patient to continue home program.

## 2017-11-17 NOTE — ADDENDUM NOTE
Encounter addended by: Dalia Galvez, PT on: 11/17/2017 11:21 AM<BR>     Actions taken: Episode resolved, Sign clinical note

## 2017-12-26 ENCOUNTER — OFFICE VISIT (OUTPATIENT)
Dept: ENDOCRINOLOGY | Facility: CLINIC | Age: 72
End: 2017-12-26
Payer: COMMERCIAL

## 2017-12-26 VITALS
TEMPERATURE: 97.7 F | DIASTOLIC BLOOD PRESSURE: 116 MMHG | HEART RATE: 91 BPM | HEIGHT: 64 IN | SYSTOLIC BLOOD PRESSURE: 160 MMHG | WEIGHT: 198 LBS | BODY MASS INDEX: 33.8 KG/M2 | OXYGEN SATURATION: 90 %

## 2017-12-26 ASSESSMENT — ENCOUNTER SYMPTOMS
HEMOPTYSIS: 0
WHEEZING: 1
SHORTNESS OF BREATH: 1
COUGH: 0
COUGH DISTURBING SLEEP: 1
SPUTUM PRODUCTION: 0
POSTURAL DYSPNEA: 0
SNORES LOUDLY: 0
DYSPNEA ON EXERTION: 1

## 2017-12-26 ASSESSMENT — PAIN SCALES - GENERAL: PAINLEVEL: NO PAIN (0)

## 2017-12-26 NOTE — LETTER
"2017       RE: Ca Yan  [address]   [facility] [address]     Dear Colleague,    Thank you for referring your patient, Ca Yan, to the [facility]ICAL [facility]IGHT MANAGEMENT at [facility]. Please see a copy of my visit note below.        New Medical Weight Management Consult    PATIENT:  Ca Yan  MRN:         [id number]  :         1945  NUPUR:         2017    Dear Senia Olivas,    I had the pleasure of seeing your patient, Ca Yan.  Full intake/assessment done to determine barriers to weight loss success and develop a treatment plan.  Ca Yan is a 72 year old female interested in treatment of medical problems associated with weight.  Her weight today is 198 lbs 0 oz, Body mass index is 33.99 kg/(m^2)., and she has the following co-morbidities:     2017   I have the following co-morbidities associated with obesity: High Blood Pressure, High Cholesterol, Lower Extremity Edema, Fatty Liver      COPD      Patient Goals Reviewed With Patient 2017   I am interested in attaining a healthier weight to diminish current health problems related to co-morbid conditions: Yes   I am interested in attaining a healthier weight in order to prevent future health problems: Yes       Referring Provider 2017   Please name the provider who referred you to Medical Weight Management.  If you do not know, please answer: \"I Don't Know\". ldont  know       Wt Readings from Last 4 Encounters:   17 89.8 kg (198 lb)   17 88 kg (194 lb)   17 89.6 kg (197 lb 9.6 oz)   17 89.4 kg (197 lb)       Weight History Reviewed With Patient 2017   How concerned are you about your weight? Very Concerned   Would you describe your weight gain as gradual? Yes   I became overweight: As an Adult   The following factors have contributed to my weight gain:  Eating Wrong Types of Food, Lack of Exercise "   Has anyone in your family had weight loss surgery? No       Diet Recall Reviewed With Patient 12/26/2017   How many glasses of juice do you drink in a typical day? 1   How many of glasses of milk do you drink in a typical day? 2   How many 8oz glasses of sugar containing drinks such as Hussein-Aid/sweet tea do you drink in a day? 1   How many cans/bottles of sugar pop/soda/tea/sports drinks do you drink in a day? 0   How many cans/bottles of diet pop/soda/tea or sports drink do you drink in a day? 0   How often do you have a drink of alcohol? Never       Eating Habits Reviewed With Patient 12/26/2017   Generally, my meals include foods like these: bread, pasta, rice, potatoes, corn, crackers, sweet dessert, pop, or juice. Almost Everyday   Generally, my meals include foods like these: fried meats, brats, burgers, french fries, pizza, cheese, chips, or ice cream. Never   Eat fast food (like weezim.com, ActX, Taco Bell). Never   Eat at a buffet or sit-down restaurant. Never   Eat most of my meals in front of the TV or computer. Almost Everyday   Often skip meals, eat at random times, have no regular eating times. Never   Rarely sit down for a meal but snack or graze throughout.  Never   Eat extra snacks between meals. Never   Eat most of my food at the end of the day. Never   Eat in the middle of the night or wake up at night to eat. A Few TImes a Week   Eat extra snacks to prevent or correct low blood sugar. Never   Eat to prevent acid reflux or stomach pain. Never   Worry about not having enough food to eat. Never   Have you been to the food shelf at least a few times this year? No   I eat when I am depressed, stressed, anxious, or bored. Never   I eat when I am happy or as a reward. Never   I feel hungry all the time even if I just have eaten. Never   Feeling full is important to me. Never   Once I start eating, it is hard to stop. Never   I finish all the food on my plate even if I am already full. Never   I  can't resist eating delicious food or walk past the good food/smell. A Few Times a Week   I eat/snack without noticing that I am eating. Never   I eat when I am preparing the meal. Never   I eat more than usual when I see others eating. Never   I have trouble not eating sweets, ice cream, cookies, or chips if they are around the house. A Few Times a Week   I think about food all day. Never   What foods, if any, do you crave? Cheese   Please list any other foods you crave? muffins ice cream some yummy dinners   I feel out of control when eating. Never   I eat a large amount of food, like a loaf of bread, a box of cookies, a pint/quart of ice cream, all at once. Never   I eat a large amount of food even when I am not hungry. Never   I eat rapidly. Almost Everyday   I eat alone because I feel embarrassed and do not want others to see how much I have eaten. Never   I eat until I am uncomfortably full. Never   I feel bad, disgusted, or guilty after I overeat. Monthly   I make myself vomit what I have eaten or use laxatives to get rid of food. Never       Activity/Exercise History Reviewed With Patient 12/26/2017   How much of a typical 12 hour day do you spend sitting? Most of the Day   How much of a typical 12 hour day do you spend lying down? Most of the Day   How much of a typical day do you spend walking/standing? Half the Day   How many hours (not including work) do you spend on the TV/Video Games/Computer/Tablet/Phone? 6 Hours or More   How many times a week are you active for the purpose of exercise? Never   How many total minutes do you spend doing some activity for the purpose of exercising when you exercise? None   What keeps you from being more active? Pain, Shortness of Breath       ROS    PAST MEDICAL HISTORY:  Past Medical History:   Diagnosis Date     Anxiety      Arthritis      Breast cancer (H)      COPD (chronic obstructive pulmonary disease) (H)     6/19/12:  FEV 0.99 l     Hypertension      Low bone  density 4/13/2017    DEXA April 12, 2017: T score -2.0. Normal Z score. FRAX risk: major osteoporotic fracture 11.9%, hip fracture 2.6%, therefore not high-risk       Work/Social History Reviewed With Patient 12/26/2017   My employment status is: Retired   What is your marital status? Single   Do you have children? Yes   If you have children, are they overweight? No       Mental Health History Reviewed With Patient 12/26/2017   Have you ever been physically or sexually abused? No   How often in the past 2 weeks have you felt little interest or pleasure in doing things? Not at all   Over the past 2 weeks how often have you felt down, depressed, or hopeless? Not at all       Sleep History Reviewed With Patient 12/26/2017   How many hours do you sleep at night? 8   Has anyone seen or heard you stop breathing during your sleep? No   Do you often feel tired, fatigued, or sleepy during the day? No       MEDICATIONS:   Current Outpatient Prescriptions   Medication Sig Dispense Refill     fluticasone (FLONASE) 50 MCG/ACT spray Spray 1 spray into both nostrils daily 1 Bottle 1     naproxen (NAPROSYN) 500 MG tablet Take 1 tablet (500 mg) by mouth 2 times daily as needed for moderate pain 60 tablet 1     diazepam (VALIUM) 2 MG tablet Take 1 tablet (2 mg) by mouth every 6 hours as needed for anxiety or sleep 1 tablet 0     hydrochlorothiazide 12.5 MG TABS tablet Take 1 tablet (12.5 mg) by mouth daily 60 tablet 1     simvastatin (ZOCOR) 20 MG tablet Take 1 tablet (20 mg) by mouth daily 30 tablet 1     naproxen (NAPROSYN) 500 MG tablet Take 1 tablet (500 mg) by mouth 2 times daily (with meals) 30 tablet 1     latanoprost (XALATAN) 0.005 % ophthalmic solution 1 drop       phenazopyridine (PYRIDIUM) 100 MG tablet Take 1-2 tablets (100-200 mg) by mouth 3 times daily as needed for urinary tract discomfort 20 tablet 0     losartan (COZAAR) 25 MG tablet Take 1 tablet (25 mg) by mouth daily 90 tablet 0     umeclidinium (INCRUSE ELLIPTA)  62.5 MCG/INH oral inhaler Inhale 1 puff into the lungs       Urea 40 % CREA Externally apply topically daily To feet and toenails. 198 g 5     order for DME Equipment being ordered: Wheelchair Sig: Equipment being ordered: portable walker with seat and compartment under the seat.     Use for going out of the house on errands, where prolonged standing is required. 1 Device 1     Blood Pressure Monitoring (BLOOD PRESSURE CUFF) MISC Imron blood cuff    Please check your blood pressure 2-3 times per week.  Goal is <140/90 1 each 0     order for DME Equipment being ordered: portable walker with seat and compartment under the seat.    Use for going out of the house on errands, where prolonged standing is required. 1 Units 0     ciclopirox (LOPROX) 0.77 % cream Apply topically 2 times daily To feet and toenails. 90 g 6     order for DME Equipment being ordered: knee high compression stockings, class 1 or 2, night time velcro compression garments, lymphedema bandaging supplies, darco boots to wear during treatment 2 each 3     ammonium lactate (LAC-HYDRIN) 12 % cream Apply topically 2 times daily as needed for dry skin 385 g 3     ibuprofen (ADVIL,MOTRIN) 200 MG tablet Take 3 tablets (600 mg) by mouth 3 times daily (with meals) 90 tablet 0     order for DME Equipment being ordered: Oxygen  Please provide portable oxygen concentrator (pt would like over the shoulder oxygen device) for portability at 2LPM with activity via nasal canula. 1 Device 1     acetaminophen (TYLENOL) 500 MG tablet Take 500-1,000 mg by mouth every 6 hours as needed for mild pain       ASPIRIN EC PO Take 81 mg by mouth       omeprazole (PRILOSEC) 20 MG capsule Take 20 mg by mouth as needed        quetiapine (SEROQUEL) 200 MG tablet Take 200 mg by mouth At Bedtime.         ALLERGIES:   Allergies   Allergen Reactions     Azithromycin      Other reaction(s): Itching     Codeine Nausea and Vomiting     Hydrocodone      Vomiting     Metronidazole Nausea      "Percocet [Oxycodone-Acetaminophen]      Vomiting     Pollen Extract      Other reaction(s): Other, see comments  Pt states that she has sneezing and runny nose.      Seasonal Allergies Other (See Comments)     Pt states that she has sneezing and runny nose.      Vicodin [Hydrocodone-Acetaminophen]      Vomiting     Zolpidem Other (See Comments)     Amnestic behavior     Oxycodone Itching and Rash       PHYSICAL EXAM:  BP (!) 160/116 (BP Location: Left arm, Patient Position: Chair, Cuff Size: Adult Regular)  Pulse 91  Temp 97.7  F (36.5  C)  Ht 1.626 m (5' 4\")  Wt 89.8 kg (198 lb)  SpO2 90%  BMI 33.99 kg/m2   A & O x 3  HEENT: NCAT, mucous membranes moist  Respirations unlabored  Location of obesity: Mixed Obesity    ASSESSMENT:    REE calculated: 1,426    Ca is a patient with mature onset morbid obesity with unknown element of familial/genetic influence and with current health consequences. She does need aggressive weight loss plan due to BMI 33 and significant co-morbid conditions present . Ca Yan eats a high carb diet, eats most meals in front of TV.    Her problem is complicated by strong craving/reward pathways, gender and short stature, impaired metabolic perception and poor lifestyle choices    Her ability to lose weight is impacted by lack of education on nutrition and dietary needs.    PLAN:    Craving/Reward   Ancillary testing:  N/A.  Food Plan:  See below.   Activity Plan:  Exercise after meals.  Supplementary:  N/A.   Medication:  She maybe candidate for naltrexone in future. She was asked to try to stop medication associated with weight gain (Seroquel).    1,000 calorie meal plan to lose 1 lbs weekly without exercise    Please talk to your psychiatrist about Seroquel use for sleep since it can be associated with weight gain. Try sleep hygiene techniques discussed today as well as benadryl 50 mg about 1 hour prior to desired sleep time.    Use meal replacements such as Virgie's meals, " Lean Cuisines, Healthy Choice, Smart Ones, Weight Watchers Meals, and Slim Fast and Glucerna shakes and supplement with fresh fruits and vegetables  Please drink a lot of water daily. Most people typically need about 2 liters of water daily and more if they are exercising, have a large weight, or have a fever or illness. Add Crystal Light for flavoring if desired. But no pop with calories in it.  Please keep a food journal of what you eat, calories in what you eat, and mood and bring the journal with you to your next appointment.  Consider using applications for smart phones such as Spinelab, Polymer Vision, Inquirly, LoseIt, Tap&Track, and RelaxM.  Focus on wet volumetrics, meaning, eat more foods that are high in water and fiber such as fruits and vegetables in order to get that full feeling. These are also good for your overall health as well.  Check out Dr. Ivet Leong from [facility] - she has cookbooks with low calorie volumetric recipes  You can try Let's Dish to help you prepare meals for you and your family. Often times, the caloric and nutrition data and serving sizes are available for this food. This can be a time saving maneuver. The website can give you more information http://www.[employer]/  C[employer] has Let's Dish & fresh low calorie salads  Check out Hello Fresh at https://www.AppRedeem.[employer]/food-boxes/classic-box/  Try Cooking Light recipes for low calorie meal preparation and planning  Other food plan options you can search for on the internet and check out include: Maya LOPEZ, [facility]  Please consider health psychologist to discuss how depression and/or anxiety impact your eating.  Plan to go to the [place] [facility] to get an exercise assessment and recommendation. You can either plan to complete the entire program there or take your new skills to the gym of your choice. If you do not hear from an exercise professional from the program within a week, please call our  clinic nurse at [phone].    RTC:    8 weeks.    TIME: 30 min spent on evaluation, management, counseling, education, & motivational interviewing with greater than 50% of the total time was spent on counseling and coordinating care    Sincerely,    Tory Hobbs MD

## 2017-12-26 NOTE — PATIENT INSTRUCTIONS
1,000 calorie meal plan to lose 1 lbs weekly without exercise    Please talk to your psychiatrist about Seroquel use for sleep since it can be associated with weight gain. Try sleep hygiene techniques discussed today as well as benadryl 50 mg about 1 hour prior to desired sleep time.    Use meal replacements such as Virgie's meals, Lean Cuisines, Healthy Choice, Smart Ones, Weight Watchers Meals, and Slim Fast and Glucerna shakes and supplement with fresh fruits and vegetables  Please drink a lot of water daily. Most people typically need about 2 liters of water daily and more if they are exercising, have a large weight, or have a fever or illness. Add Crystal Light for flavoring if desired. But no pop with calories in it.  Please keep a food journal of what you eat, calories in what you eat, and mood and bring the journal with you to your next appointment.  Consider using applications for smart phones such as ManageIQ, [place] Runway Thief, myLINGO, LoseIt, Tap&Track, and RelaxM.  Focus on wet volumetrics, meaning, eat more foods that are high in water and fiber such as fruits and vegetables in order to get that full feeling. These are also good for your overall health as well.  Check out Dr. Ivet Leong from [facility] - she has cookbooks with low calorie volumetric recipes  You can try Let's Dish to help you prepare meals for you and your family. Often times, the caloric and nutrition data and serving sizes are available for this food. This can be a time saving maneuver. The website can give you more information http://www.Esanex.[employer]/  Coben[employer]'s Delivers has Let's Dish & fresh low calorie salads  Check out Hello Fresh at https://www.[employer]/food-boxes/classic-box/  Try Cooking Light recipes for low calorie meal preparation and planning  Other food plan options you can search for on the internet and check out include: Maya LOPZE, Carlos Bull  Please consider health psychologist to discuss how depression  and/or anxiety impact your eating.  Plan to go to the [place] [facility] to get an exercise assessment and recommendation. You can either plan to complete the entire program there or take your new skills to the gym of your choice. If you do not hear from an exercise professional from the program within a week, please call our clinic nurse at [phone].

## 2017-12-26 NOTE — MR AVS SNAPSHOT
After Visit Summary   12/26/2017    Ca Yan    MRN: [id number]           Patient Information     Date Of Birth          1945        Visit Information        Provider Department      12/26/2017 12:00 PM Tory Hobbs MD  Health Medical Weight Management        Today's Diagnoses     Class 2 obesity due to excess calories with serious comorbidity in adult, unspecified BMI    -  1      Care Instructions    1,000 calorie meal plan to lose 1 lbs weekly without exercise    Please talk to your psychiatrist about Seroquel use for sleep since it can be associated with weight gain. Try sleep hygiene techniques discussed today as well as benadryl 50 mg about 1 hour prior to desired sleep time.    Use meal replacements such as Virgie's meals, Lean Cuisines, Healthy Choice, Smart Ones, Weight Watchers Meals, and Slim Fast and Glucerna shakes and supplement with fresh fruits and vegetables  Please drink a lot of water daily. Most people typically need about 2 liters of water daily and more if they are exercising, have a large weight, or have a fever or illness. Add Crystal Light for flavoring if desired. But no pop with calories in it.  Please keep a food journal of what you eat, calories in what you eat, and mood and bring the journal with you to your next appointment.  Consider using applications for smart phones such as MilkPal, Zase, EmbueRecipes, LoseIt, Tap&Track, and RelaxM.  Focus on wet volumetrics, meaning, eat more foods that are high in water and fiber such as fruits and vegetables in order to get that full feeling. These are also good for your overall health as well.  Check out Dr. Ivet Leong from [facility] - she has cookbooks with low calorie volumetric recipes  You can try Let's Dish to help you prepare meals for you and your family. Often times, the caloric and nutrition data and serving sizes are available for this food. This can be a time saving maneuver. The  website can give you more information http://www.Etable.Solasta/  [employer] has Let's Dish & fresh low calorie salads  Check out Hello Fresh at https://www.Quture.Solasta/food-boxes/classic-box/  Try Cooking Light recipes for low calorie meal preparation and planning  Other food plan options you can search for on the internet and check out include: Maya LOPEZ, [facility]  Please consider health psychologist to discuss how depression and/or anxiety impact your eating.  Plan to go to the [facility] to get an exercise assessment and recommendation. You can either plan to complete the entire program there or take your new skills to the gym of your choice. If you do not hear from an exercise professional from the program within a week, please call our clinic nurse at [phone].              Follow-ups after your visit        Additional Services     Exercise Program at Cardiac/Pulmonary Rehab       Weight Management Exercise Program evaluation and re-evaluations, including six-minute walk test, or modified sub-max stress test.  Includes comprehensive exercise evaluation, instruction, and counseling.    Scheduling Phone #:  [phone]  Fax #: [phone]                  Follow-up notes from your care team     Return in about 2 months (around 2/26/2018).      Your next 10 appointments already scheduled     Jan 16, 2018 10:15 AM CST   (Arrive by 10:00 AM)   MA SCREENING DIGITAL BILATERAL with [id number]   [facility] ([facility])    [address] [place] [id number] [phone]           Do not use any powder, lotion or deodorant under your arms or on your breast. If you do, we will ask you to remove it before your exam.  Wear comfortable, two-piece clothing.  If you have any allergies, tell your care team.  Bring any previous mammograms from other facilities or have them mailed to the breast center. Three-dimensional  "(3D) mammograms are available at [facility] locations in [place], [place], [place], [place], [facility], [place], [place], and [place]. [facility] locations include [facility] in [place]. Benefits of 3D mammograms include: - Improved rate of cancer detection - Decreases your chance of having to go back for more tests, which means fewer: - \"False-positive\" results (This means that there is an abnormal area but it isn't cancer.) - Invasive testing procedures, such as a biopsy or surgery - Can provide clearer images of the breast if you have dense breast tissue. 3D mammography is an optional exam that anyone can have with a 2D mammogram. It doesn't replace or take the place of a 2D mammogram. 2D mammograms remain an effective screening test for all women.  Not all insurance companies cover the cost of a 3D mammogram. Check with your insurance.            Feb 05, 2018  1:10 PM CST   (Arrive by 12:55 PM)   Return Visit with Senia Olivas MD   [facility] ([facility])    [address] [place] [id number] [phone]              Future tests that were ordered for you today     Open Future Orders        Priority Expected Expires Ordered    Exercise Program at Cardiac/Pulmonary Rehab Routine  12/26/2018 12/26/2017            Who to contact     Please call your clinic at [phone] to:    Ask questions about your health    Make or cancel appointments    Discuss your medicines    Learn about your test results    Speak to your doctor   If you have compliments or concerns about an experience at your clinic, or if you wish to file a complaint, please contact [facility] Patient Relations at [phone] or email us at [email or web address]         Additional Information About Your Visit        Biomonitorhart Information     [employer] is an electronic gateway that provides easy, online " "access to your medical records. With Synchro, you can request a clinic appointment, read your test results, renew a prescription or communicate with your care team.     To sign up for Synchro visit the website at [email or web address]   You will be asked to enter the access code listed below, as well as some personal information. Please follow the directions to create your username and password.     Your access code is: [email or web address]  Expires: 3/12/2018  6:30 AM     Your access code will  in 90 days. If you need help or a new code, please contact your [facility] or call [phone] for assistance.        Care EveryWhere ID     This is your Care EveryWhere ID. This could be used by other organizations to access your [place] medical records  [id number]        Your Vitals Were     Pulse Temperature Height Pulse Oximetry BMI (Body Mass Index)       91 97.7  F (36.5  C) 1.626 m (5' 4\") 90% 33.99 kg/m2        Blood Pressure from Last 3 Encounters:   17 (!) 160/116   17 138/88   10/18/17 138/86    Weight from Last 3 Encounters:   17 89.8 kg (198 lb)   17 88 kg (194 lb)   17 89.6 kg (197 lb 9.6 oz)               Primary Care Provider Office Phone # Fax #    Senia STEVE Olivas [phone]       [address] [address]        Equal Access to Services     MUSHTAQ BROWN : Hadvelma bairdo Sosebastian, waaxda luqadaha, qaybta kaalmada sierra, sushma melchor. So [facility] [phone].    ATENCIÓN: Si habla español, tiene a maxwell disposición servicios gratuitos de asistencia lingüística. Llame al [phone].    We comply with applicable federal civil rights laws and [place] laws. We do not discriminate on the basis of race, color, national origin, age, disability, sex, sexual orientation, or gender identity.            Thank you!     Thank you for choosing [facility] MEDICAL WE[facility] MANAG[facility]  " for your care. Our goal is always to provide you with excellent care. Hearing back from our patients is one way we can continue to improve our services. Please take a few minutes to complete the written survey that you may receive in the mail after your visit with us. Thank you!             Your Updated Medication List - Protect others around you: Learn how to safely use, store and throw away your medicines at www.disposemymeds.org.          This list is accurate as of: 12/26/17  2:47 PM.  Always use your most recent med list.                   Brand Name Dispense Instructions for use Diagnosis    acetaminophen 500 MG tablet    TYLENOL     Take 500-1,000 mg by mouth every 6 hours as needed for mild pain        ammonium lactate 12 % cream    LAC-HYDRIN    385 g    Apply topically 2 times daily as needed for dry skin    Bilateral leg edema       ASPIRIN EC PO      Take 81 mg by mouth        Blood Pressure Cuff Misc     1 each    Imron blood cuff  Please check your blood pressure 2-3 times per week.  Goal is <140/90    Benign essential hypertension       ciclopirox 0.77 % cream    LOPROX    90 g    Apply topically 2 times daily To feet and toenails.    Dermatophytosis of nail, Tinea pedis of both feet       diazepam 2 MG tablet    VALIUM    1 tablet    Take 1 tablet (2 mg) by mouth every 6 hours as needed for anxiety or sleep    Claustrophobia       fluticasone 50 MCG/ACT spray    FLONASE    1 Bottle    Spray 1 spray into both nostrils daily    Chronic allergic rhinitis, unspecified seasonality, unspecified trigger       hydrochlorothiazide 12.5 MG Tabs tablet     60 tablet    Take 1 tablet (12.5 mg) by mouth daily    Hyperlipemia       ibuprofen 200 MG tablet    ADVIL/MOTRIN    90 tablet    Take 3 tablets (600 mg) by mouth 3 times daily (with meals)    Acute left-sided low back pain without sciatica       latanoprost 0.005 % ophthalmic solution    XALATAN     1 drop        losartan 25 MG tablet    COZAAR    90 tablet     Take 1 tablet (25 mg) by mouth daily    Benign essential hypertension       * naproxen 500 MG tablet    NAPROSYN    30 tablet    Take 1 tablet (500 mg) by mouth 2 times daily (with meals)    Acute left-sided low back pain without sciatica       * naproxen 500 MG tablet    NAPROSYN    60 tablet    Take 1 tablet (500 mg) by mouth 2 times daily as needed for moderate pain    Acute bilateral low back pain without sciatica       omeprazole 20 MG CR capsule    priLOSEC     Take 20 mg by mouth as needed        * order for DME     1 Device    Equipment being ordered: Oxygen  Please provide portable oxygen concentrator (pt would like over the shoulder oxygen device) for portability at 2LPM with activity via nasal canula.    Pulmonary hypertension       * order for DME     2 each    Equipment being ordered: knee high compression stockings, class 1 or 2, night time velcro compression garments, lymphedema bandaging supplies, darco boots to wear during treatment    Bilateral leg edema       * order for DME     1 Units    Equipment being ordered: portable walker with seat and compartment under the seat.  Use for going out of the house on errands, where prolonged standing is required.    Chronic obstructive pulmonary disease, unspecified COPD type (H)       * order for DME     1 Device    Equipment being ordered: Wheelchair Sig: Equipment being ordered: portable walker with seat and compartment under the seat.   Use for going out of the house on errands, where prolonged standing is required.    COPD (chronic obstructive pulmonary disease) (H)       phenazopyridine 100 MG tablet    PYRIDIUM    20 tablet    Take 1-2 tablets (100-200 mg) by mouth 3 times daily as needed for urinary tract discomfort    Urinary tract infection with hematuria, site unspecified       QUEtiapine 200 MG tablet    SEROquel     Take 200 mg by mouth At Bedtime.        simvastatin 20 MG tablet    ZOCOR    30 tablet    Take 1 tablet (20 mg) by mouth daily     Hyperlipemia       umeclidinium 62.5 MCG/INH oral inhaler    INCRUSE ELLIPTA     Inhale 1 puff into the lungs        Urea 40 % Crea     198 g    Externally apply topically daily To feet and toenails.    Onychauxis, Dermatophytosis of nail, Tyloma       * Notice:  This list has 6 medication(s) that are the same as other medications prescribed for you. Read the directions carefully, and ask your doctor or other care provider to review them with you.

## 2017-12-26 NOTE — PROGRESS NOTES
"    New Medical Weight Management Consult    PATIENT:  Ca Yan  MRN:         [id number]  :         1945  NUPUR:         2017    Dear Senia Olivas,    I had the pleasure of seeing your patient, Ca Yan.  Full intake/assessment done to determine barriers to weight loss success and develop a treatment plan.  Ca Yan is a 72 year old female interested in treatment of medical problems associated with weight.  Her weight today is 198 lbs 0 oz, Body mass index is 33.99 kg/(m^2)., and she has the following co-morbidities:     2017   I have the following co-morbidities associated with obesity: High Blood Pressure, High Cholesterol, Lower Extremity Edema, Fatty Liver      COPD      Patient Goals Reviewed With Patient 2017   I am interested in attaining a healthier weight to diminish current health problems related to co-morbid conditions: Yes   I am interested in attaining a healthier weight in order to prevent future health problems: Yes       Referring Provider 2017   Please name the provider who referred you to Medical Weight Management.  If you do not know, please answer: \"I Don't Know\". ldont  know       Wt Readings from Last 4 Encounters:   17 89.8 kg (198 lb)   17 88 kg (194 lb)   17 89.6 kg (197 lb 9.6 oz)   17 89.4 kg (197 lb)       Weight History Reviewed With Patient 2017   How concerned are you about your weight? Very Concerned   Would you describe your weight gain as gradual? Yes   I became overweight: As an Adult   The following factors have contributed to my weight gain:  Eating Wrong Types of Food, Lack of Exercise   Has anyone in your family had weight loss surgery? No       Diet Recall Reviewed With Patient 2017   How many glasses of juice do you drink in a typical day? 1   How many of glasses of milk do you drink in a typical day? 2   How many 8oz glasses of sugar containing drinks such as Hussein-Aid/sweet " tea do you drink in a day? 1   How many cans/bottles of sugar pop/soda/tea/sports drinks do you drink in a day? 0   How many cans/bottles of diet pop/soda/tea or sports drink do you drink in a day? 0   How often do you have a drink of alcohol? Never       Eating Habits Reviewed With Patient 12/26/2017   Generally, my meals include foods like these: bread, pasta, rice, potatoes, corn, crackers, sweet dessert, pop, or juice. Almost Everyday   Generally, my meals include foods like these: fried meats, brats, burgers, french fries, pizza, cheese, chips, or ice cream. Never   Eat fast food (like [employer], [employer], Taco Bell). Never   Eat at a buffet or sit-down restaurant. Never   Eat most of my meals in front of the TV or computer. Almost Everyday   Often skip meals, eat at random times, have no regular eating times. Never   Rarely sit down for a meal but snack or graze throughout.  Never   Eat extra snacks between meals. Never   Eat most of my food at the end of the day. Never   Eat in the middle of the night or wake up at night to eat. A Few TImes a Week   Eat extra snacks to prevent or correct low blood sugar. Never   Eat to prevent acid reflux or stomach pain. Never   Worry about not having enough food to eat. Never   Have you been to the food shelf at least a few times this year? No   I eat when I am depressed, stressed, anxious, or bored. Never   I eat when I am happy or as a reward. Never   I feel hungry all the time even if I just have eaten. Never   Feeling full is important to me. Never   Once I start eating, it is hard to stop. Never   I finish all the food on my plate even if I am already full. Never   I can't resist eating delicious food or walk past the good food/smell. A Few Times a Week   I eat/snack without noticing that I am eating. Never   I eat when I am preparing the meal. Never   I eat more than usual when I see others eating. Never   I have trouble not eating sweets, ice cream, cookies, or  chips if they are around the house. A Few Times a Week   I think about food all day. Never   What foods, if any, do you crave? Cheese   Please list any other foods you crave? muffins ice cream some yummy dinners   I feel out of control when eating. Never   I eat a large amount of food, like a loaf of bread, a box of cookies, a pint/quart of ice cream, all at once. Never   I eat a large amount of food even when I am not hungry. Never   I eat rapidly. Almost Everyday   I eat alone because I feel embarrassed and do not want others to see how much I have eaten. Never   I eat until I am uncomfortably full. Never   I feel bad, disgusted, or guilty after I overeat. Monthly   I make myself vomit what I have eaten or use laxatives to get rid of food. Never       Activity/Exercise History Reviewed With Patient 12/26/2017   How much of a typical 12 hour day do you spend sitting? Most of the Day   How much of a typical 12 hour day do you spend lying down? Most of the Day   How much of a typical day do you spend walking/standing? Half the Day   How many hours (not including work) do you spend on the TV/Video Games/Computer/Tablet/Phone? 6 Hours or More   How many times a week are you active for the purpose of exercise? Never   How many total minutes do you spend doing some activity for the purpose of exercising when you exercise? None   What keeps you from being more active? Pain, Shortness of Breath       ROS    PAST MEDICAL HISTORY:  Past Medical History:   Diagnosis Date     Anxiety      Arthritis      Breast cancer (H)      COPD (chronic obstructive pulmonary disease) (H)     6/19/12:  FEV 0.99 l     Hypertension      Low bone density 4/13/2017    DEXA April 12, 2017: T score -2.0. Normal Z score. FRAX risk: major osteoporotic fracture 11.9%, hip fracture 2.6%, therefore not high-risk       Work/Social History Reviewed With Patient 12/26/2017   My employment status is: Retired   What is your marital status? Single   Do you  have children? Yes   If you have children, are they overweight? No       Mental Health History Reviewed With Patient 12/26/2017   Have you ever been physically or sexually abused? No   How often in the past 2 weeks have you felt little interest or pleasure in doing things? Not at all   Over the past 2 weeks how often have you felt down, depressed, or hopeless? Not at all       Sleep History Reviewed With Patient 12/26/2017   How many hours do you sleep at night? 8   Has anyone seen or heard you stop breathing during your sleep? No   Do you often feel tired, fatigued, or sleepy during the day? No       MEDICATIONS:   Current Outpatient Prescriptions   Medication Sig Dispense Refill     fluticasone (FLONASE) 50 MCG/ACT spray Spray 1 spray into both nostrils daily 1 Bottle 1     naproxen (NAPROSYN) 500 MG tablet Take 1 tablet (500 mg) by mouth 2 times daily as needed for moderate pain 60 tablet 1     diazepam (VALIUM) 2 MG tablet Take 1 tablet (2 mg) by mouth every 6 hours as needed for anxiety or sleep 1 tablet 0     hydrochlorothiazide 12.5 MG TABS tablet Take 1 tablet (12.5 mg) by mouth daily 60 tablet 1     simvastatin (ZOCOR) 20 MG tablet Take 1 tablet (20 mg) by mouth daily 30 tablet 1     naproxen (NAPROSYN) 500 MG tablet Take 1 tablet (500 mg) by mouth 2 times daily (with meals) 30 tablet 1     latanoprost (XALATAN) 0.005 % ophthalmic solution 1 drop       phenazopyridine (PYRIDIUM) 100 MG tablet Take 1-2 tablets (100-200 mg) by mouth 3 times daily as needed for urinary tract discomfort 20 tablet 0     losartan (COZAAR) 25 MG tablet Take 1 tablet (25 mg) by mouth daily 90 tablet 0     umeclidinium (INCRUSE ELLIPTA) 62.5 MCG/INH oral inhaler Inhale 1 puff into the lungs       Urea 40 % CREA Externally apply topically daily To feet and toenails. 198 g 5     order for DME Equipment being ordered: Wheelchair Sig: Equipment being ordered: portable walker with seat and compartment under the seat.     Use for going  out of the house on errands, where prolonged standing is required. 1 Device 1     Blood Pressure Monitoring (BLOOD PRESSURE CUFF) MISC Imron blood cuff    Please check your blood pressure 2-3 times per week.  Goal is <140/90 1 each 0     order for DME Equipment being ordered: portable walker with seat and compartment under the seat.    Use for going out of the house on errands, where prolonged standing is required. 1 Units 0     ciclopirox (LOPROX) 0.77 % cream Apply topically 2 times daily To feet and toenails. 90 g 6     order for DME Equipment being ordered: knee high compression stockings, class 1 or 2, night time velcro compression garments, lymphedema bandaging supplies, darco boots to wear during treatment 2 each 3     ammonium lactate (LAC-HYDRIN) 12 % cream Apply topically 2 times daily as needed for dry skin 385 g 3     ibuprofen (ADVIL,MOTRIN) 200 MG tablet Take 3 tablets (600 mg) by mouth 3 times daily (with meals) 90 tablet 0     order for DME Equipment being ordered: Oxygen  Please provide portable oxygen concentrator (pt would like over the shoulder oxygen device) for portability at 2LPM with activity via nasal canula. 1 Device 1     acetaminophen (TYLENOL) 500 MG tablet Take 500-1,000 mg by mouth every 6 hours as needed for mild pain       ASPIRIN EC PO Take 81 mg by mouth       omeprazole (PRILOSEC) 20 MG capsule Take 20 mg by mouth as needed        quetiapine (SEROQUEL) 200 MG tablet Take 200 mg by mouth At Bedtime.         ALLERGIES:   Allergies   Allergen Reactions     Azithromycin      Other reaction(s): Itching     Codeine Nausea and Vomiting     Hydrocodone      Vomiting     Metronidazole Nausea     Percocet [Oxycodone-Acetaminophen]      Vomiting     Pollen Extract      Other reaction(s): Other, see comments  Pt states that she has sneezing and runny nose.      Seasonal Allergies Other (See Comments)     Pt states that she has sneezing and runny nose.      Vicodin [Hydrocodone-Acetaminophen]   "    Vomiting     Zolpidem Other (See Comments)     Amnestic behavior     Oxycodone Itching and Rash       PHYSICAL EXAM:  BP (!) 160/116 (BP Location: Left arm, Patient Position: Chair, Cuff Size: Adult Regular)  Pulse 91  Temp 97.7  F (36.5  C)  Ht 1.626 m (5' 4\")  Wt 89.8 kg (198 lb)  SpO2 90%  BMI 33.99 kg/m2   A & O x 3  HEENT: NCAT, mucous membranes moist  Respirations unlabored  Location of obesity: Mixed Obesity    ASSESSMENT:    REE calculated: 1,426    Ca is a patient with mature onset morbid obesity with unknown element of familial/genetic influence and with current health consequences. She does need aggressive weight loss plan due to BMI 33 and significant co-morbid conditions present . Ca Yan eats a high carb diet, eats most meals in front of TV.    Her problem is complicated by strong craving/reward pathways, gender and short stature, impaired metabolic perception and poor lifestyle choices    Her ability to lose weight is impacted by lack of education on nutrition and dietary needs.    PLAN:    Craving/Reward   Ancillary testing:  N/A.  Food Plan:  See below.   Activity Plan:  Exercise after meals.  Supplementary:  N/A.   Medication:  She maybe candidate for naltrexone in future. She was asked to try to stop medication associated with weight gain (Seroquel).    1,000 calorie meal plan to lose 1 lbs weekly without exercise    Please talk to your psychiatrist about Seroquel use for sleep since it can be associated with weight gain. Try sleep hygiene techniques discussed today as well as benadryl 50 mg about 1 hour prior to desired sleep time.    Use meal replacements such as Virgie's meals, Lean Cuisines, Healthy Choice, Smart Ones, Weight Watchers Meals, and Slim Fast and Glucerna shakes and supplement with fresh fruits and vegetables  Please drink a lot of water daily. Most people typically need about 2 liters of water daily and more if they are exercising, have a large weight, or " have a fever or illness. Add Crystal Light for flavoring if desired. But no pop with calories in it.  Please keep a food journal of what you eat, calories in what you eat, and mood and bring the journal with you to your next appointment.  Consider using applications for smart phones such as Lexity, Cleanify, Earn and Play, LoseIt, Tap&Track, and RelaxM.  Focus on wet volumetrics, meaning, eat more foods that are high in water and fiber such as fruits and vegetables in order to get that full feeling. These are also good for your overall health as well.  Check out Dr. Ivet Leong from [facility] - she has cookbooks with low calorie volumetric recipes  You can try Let's Dish to help you prepare meals for you and your family. Often times, the caloric and nutrition data and serving sizes are available for this food. This can be a time saving maneuver. The website can give you more information http://www.SnowShoe Stamp/  [employer] has Let's Dish & fresh low calorie salads  Check out Hello Fresh at https://www.Nitronex/food-boxes/classic-box/  Try Cooking Light recipes for low calorie meal preparation and planning  Other food plan options you can search for on the internet and check out include: Maya LOPEZ, [facility]  Please consider health psychologist to discuss how depression and/or anxiety impact your eating.  Plan to go to the [facility] to get an exercise assessment and recommendation. You can either plan to complete the entire program there or take your new skills to the gym of your choice. If you do not hear from an exercise professional from the program within a week, please call our clinic nurse at [phone].    RTC:    8 weeks.    TIME: 30 min spent on evaluation, management, counseling, education, & motivational interviewing with greater than 50% of the total time was spent on counseling and coordinating care    Sincerely,    Tory Hobbs MD

## 2017-12-29 ENCOUNTER — TRANSFERRED RECORDS (OUTPATIENT)
Dept: HEALTH INFORMATION MANAGEMENT | Facility: CLINIC | Age: 72
End: 2017-12-29

## 2018-01-23 ENCOUNTER — TELEPHONE (OUTPATIENT)
Dept: SURGERY | Facility: CLINIC | Age: 73
End: 2018-01-23

## 2018-01-23 NOTE — TELEPHONE ENCOUNTER
Patient called in to speak with someone in the colon and rectal clinic about the spinal nerve stimulator for fecal incontinence. Patient was informed that she could schedule an appointment with Lilly Santa NP to discuss the SNS. Patient was given the number to schedule and appointment for when she is available

## 2018-01-27 ENCOUNTER — HEALTH MAINTENANCE LETTER (OUTPATIENT)
Age: 73
End: 2018-01-27

## 2018-02-05 ENCOUNTER — OFFICE VISIT (OUTPATIENT)
Dept: INTERNAL MEDICINE | Facility: CLINIC | Age: 73
End: 2018-02-05
Payer: COMMERCIAL

## 2018-02-05 VITALS
DIASTOLIC BLOOD PRESSURE: 92 MMHG | WEIGHT: 192 LBS | SYSTOLIC BLOOD PRESSURE: 164 MMHG | BODY MASS INDEX: 32.96 KG/M2 | OXYGEN SATURATION: 89 % | HEART RATE: 70 BPM

## 2018-02-05 DIAGNOSIS — Z00.00 HEALTH CARE MAINTENANCE: ICD-10-CM

## 2018-02-05 DIAGNOSIS — M54.50 ACUTE BILATERAL LOW BACK PAIN WITHOUT SCIATICA: ICD-10-CM

## 2018-02-05 DIAGNOSIS — J44.9 CHRONIC OBSTRUCTIVE PULMONARY DISEASE, UNSPECIFIED COPD TYPE (H): Primary | ICD-10-CM

## 2018-02-05 DIAGNOSIS — E78.5 HYPERLIPIDEMIA, UNSPECIFIED HYPERLIPIDEMIA TYPE: ICD-10-CM

## 2018-02-05 DIAGNOSIS — I10 BENIGN ESSENTIAL HYPERTENSION: ICD-10-CM

## 2018-02-05 LAB
ANION GAP SERPL CALCULATED.3IONS-SCNC: 4 MMOL/L (ref 3–14)
BUN SERPL-MCNC: 22 MG/DL (ref 7–30)
CALCIUM SERPL-MCNC: 8.7 MG/DL (ref 8.5–10.1)
CHLORIDE SERPL-SCNC: 103 MMOL/L (ref 94–109)
CO2 SERPL-SCNC: 32 MMOL/L (ref 20–32)
CREAT SERPL-MCNC: 0.77 MG/DL (ref 0.52–1.04)
GFR SERPL CREATININE-BSD FRML MDRD: 74 ML/MIN/1.7M2
GLUCOSE SERPL-MCNC: 87 MG/DL (ref 70–99)
POTASSIUM SERPL-SCNC: 3.9 MMOL/L (ref 3.4–5.3)
SODIUM SERPL-SCNC: 139 MMOL/L (ref 133–144)

## 2018-02-05 RX ORDER — LOSARTAN POTASSIUM 50 MG/1
50 TABLET ORAL DAILY
Qty: 90 TABLET | Refills: 3 | Status: SHIPPED | OUTPATIENT
Start: 2018-02-05 | End: 2019-04-03

## 2018-02-05 RX ORDER — SIMVASTATIN 20 MG
20 TABLET ORAL DAILY
Qty: 90 TABLET | Refills: 3 | Status: SHIPPED | OUTPATIENT
Start: 2018-02-05 | End: 2019-04-03

## 2018-02-05 ASSESSMENT — PAIN SCALES - GENERAL: PAINLEVEL: NO PAIN (0)

## 2018-02-05 NOTE — PATIENT INSTRUCTIONS
[facility]: [phone]     [facility] Medication Refill Request Information:  * Please contact your pharmacy regarding ANY request for medication refills.  ** [facility] Prescription Fax = [phone]  * Please allow 3 business days for routine medication refills.  * Please allow 5 business days for controlled substance medication refills.     [facility] Test Result notification information:  *You will be notified with in 7-10 days of your appointment day regarding the results of your test.  If you are on MyChart you will be notified as soon as the provider has reviewed the results and signed off on them.    1.  Increase losartan to 50 mg daily.  (take 2 -25 mg tablets until they are gone, then start the new 50 mg tablets daily)  2.  Continue the HCTZ  3.  Bmp labs today  4.  Talk to your daughter and georges about end of life care.  5. Referral to OT for wheelchair assessment

## 2018-02-05 NOTE — PROGRESS NOTES
"[facility]   Senia Olivas MD  2/5/2018     Chief Complaint:   Patient is here to discuss advanced directive and end-of-life care  Dme (Patient is here to discuss an order for wheelchair. ) and Abdominal Cramping (Pt is here to discuss cramping all over body.      History of Present Illness:   Ca Yan is a 72 year old female with a history of COPD who presents for evaluation of end of life care.    Advanced directive and End Of Life care-  The patient reports that she has no family to take care of her, as her only close relative (daughter) is an alcoholic. She sees her doctor regularly and tells me that her daughter holds full-time job and that they are able to talk.She does have a friend, [place], who lives in the same building and who could potentially honor her wishes as a \"healthcare agent.\" She is concerned about what will happen to her \"stuff.\" She is scared about there not being a plan for what will happen to her belongings or to herself.  We spent 15 minutes reviewing the end-of-life care advance directive form.In most cases, she would like to have full resuscitation unless her probability of surviving with good mentation  Is slow. We discussed the circumstances under which that might occur. I encouraged her to discuss this with her daughter and her friend, [place].    COPD-  Moderately severe to severe COPD based on PFTs from April 20 17th which show an FEV1 of 0.99.  With regards to COPD the patient states that her breathing is \"sometimes good sometimes not\" and that she doesn't exert herself very much. However, she wants to lose weight, but notes that this is very hard to due as she does not exercise. The patient would like a wheelchair because she is not able to walk long enough to do grocery shopping or regular shopping due to her exertional dyspnea. She reports that her mobility is limited due to back pain and COPD.     Myalgias-  The patient describes cramping all over " "her body, including to her back, hands, and feet. She has been feeling this type of pain for years and denies any change. She feels that the pain prevented her from getting blood pressure checked.    Hypertension-  The patient's blood pressure is 164/92 mmHg. She is prescribed HCTZ 12.5 mg daily and Losartan 25 mg daily for blood pressure, but has not taken this \"for a couple of days\" because \" I forgot.\"     Review of Systems:   Pertinent items are noted in HPI.  All other systems are negative.    Active Medications:   Current Outpatient Prescriptions:      fluticasone (FLONASE) 50 MCG/ACT spray, Spray 1 spray into both nostrils daily, Disp: 1 Bottle, Rfl: 1     naproxen (NAPROSYN) 500 MG tablet, Take 1 tablet (500 mg) by mouth 2 times daily as needed for moderate pain, Disp: 60 tablet, Rfl: 1     diazepam (VALIUM) 2 MG tablet, Take 1 tablet (2 mg) by mouth every 6 hours as needed for anxiety or sleep, Disp: 1 tablet, Rfl: 0     hydrochlorothiazide 12.5 MG TABS tablet, Take 1 tablet (12.5 mg) by mouth daily, Disp: 60 tablet, Rfl: 1     simvastatin (ZOCOR) 20 MG tablet, Take 1 tablet (20 mg) by mouth daily, Disp: 30 tablet, Rfl: 1     naproxen (NAPROSYN) 500 MG tablet, Take 1 tablet (500 mg) by mouth 2 times daily (with meals), Disp: 30 tablet, Rfl: 1     latanoprost (XALATAN) 0.005 % ophthalmic solution, 1 drop, Disp: , Rfl:      phenazopyridine (PYRIDIUM) 100 MG tablet, Take 1-2 tablets (100-200 mg) by mouth 3 times daily as needed for urinary tract discomfort, Disp: 20 tablet, Rfl: 0     losartan (COZAAR) 25 MG tablet, Take 1 tablet (25 mg) by mouth daily, Disp: 90 tablet, Rfl: 0     umeclidinium (INCRUSE ELLIPTA) 62.5 MCG/INH oral inhaler, Inhale 1 puff into the lungs, Disp: , Rfl:      Urea 40 % CREA, Externally apply topically daily To feet and toenails., Disp: 198 g, Rfl: 5     order for DME, Equipment being ordered: Wheelchair Sig: Equipment being ordered: portable walker with seat and compartment under the " seat.   Use for going out of the house on errands, where prolonged standing is required., Disp: 1 Device, Rfl: 1     Blood Pressure Monitoring (BLOOD PRESSURE CUFF) [facility]  Please check your blood pressure 2-3 times per week.  Goal is <140/90, Disp: 1 each, Rfl: 0     order for DME, Equipment being ordered: portable walker with seat and compartment under the seat.  Use for going out of the house on errands, where prolonged standing is required., Disp: 1 Units, Rfl: 0     ciclopirox (LOPROX) 0.77 % cream, Apply topically 2 times daily To feet and toenails., Disp: 90 g, Rfl: 6     order for DME, Equipment being ordered: knee high compression stockings, class 1 or 2, night time velcro compression garments, lymphedema bandaging supplies, darco boots to wear during treatment, Disp: 2 each, Rfl: 3     ammonium lactate (LAC-HYDRIN) 12 % cream, Apply topically 2 times daily as needed for dry skin, Disp: 385 g, Rfl: 3     ibuprofen (ADVIL,MOTRIN) 200 MG tablet, Take 3 tablets (600 mg) by mouth 3 times daily (with meals), Disp: 90 tablet, Rfl: 0     order for DME, Equipment being ordered: Oxygen  Please provide portable oxygen concentrator (pt would like over the shoulder oxygen device) for portability at 2LPM with activity via nasal canula., Disp: 1 Device, Rfl: 1     acetaminophen (TYLENOL) 500 MG tablet, Take 500-1,000 mg by mouth every 6 hours as needed for mild pain, Disp: , Rfl:      ASPIRIN EC PO, Take 81 mg by mouth, Disp: , Rfl:      omeprazole (PRILOSEC) 20 MG capsule, Take 20 mg by mouth as needed , Disp: , Rfl:      quetiapine (SEROQUEL) 200 MG tablet, Take 200 mg by mouth At Bedtime., Disp: , Rfl:       Allergies:   Azithromycin; Codeine; Hydrocodone; Metronidazole; Percocet [oxycodone-acetaminophen]; Pollen extract; Seasonal allergies; Vicodin [hydrocodone-acetaminophen]; Zolpidem; and Oxycodone      Past Medical History:  Anxiety   Arthritis   Breast cancer  COPD (chronic obstructive pulmonary  "disease) (6/19/12:  FEV 0.99 l)  Hypertension   Low bone density (4/13/2017)  DEXA April 12, 2017: T score -2.0. Normal Z score. FRAX risk: major osteoporotic fracture 11.9%, hip fracture 2.6%, therefore not high-risk  Non morbid obesity due to excess calories  Alcohol abuse  Malignant neoplasm of breast  Chronic kidney disease, stage III (moderate)  Osteoarthritis of knee  Major depressive disorder with single episode  Diarrhea  Diastolic dysfunction  Osteoarthritis of spine  Gastroesophageal reflux disease  Hyperlipidemia  Insomnia  Irritable bowel syndrome  Kyphoscoliosis  Cluster C personality disorder  Seborrheic eczema  Anemia  Asthma  Back pain  Bunion  Fecal incontinence  Left-sided low back pain without sciatica    Past Surgical History:  Back surgery, spinal fusion  Lumpectomy breast  Orthopedic surgery, knee surgery left side    Family History:   Breast cancer, diabetes: Mother  Alcohol/drug, mental illness: Father  Cerebrovascular disease, age of onset 67: Materal grandmother      Social History:   Former smoker, quit date (9/26/1980)  Sober for 2 years, previous alcoholic     Physical Exam:   Patient Vitals for the past 24 hrs:   BP Pulse SpO2 Weight   02/05/18 1311 (!) 164/92 70 (!) 89 % 87.1 kg (192 lb)      Constitutional:  Very anxious, worried 72-year-old woman    Assessment and Plan:  Advanced Care Directive  As noted above, we reviewed the questions in the directive as well as the roles of her healthcare agent.  Recommended the patient speak to [place] and her daughter about becoming her \"healthcare agents.\" Discussed Advanced Directives with the patient in detail. Today, the patient reports that she knows she wants to be cremated. On RTC in 4 weeks we will discuss further.    Chronic obstructive pulmonary disease, unspecified COPD type (H)  Referred to Occupational Therapy for wheelchair evaluation.    - OCCUPATIONAL THERAPY REFERRAL    Myalgias  The patient will have electrolytes checked to " determine if electrolyte imbalance is the cause of her cramps.       Benign essential hypertension  We were able to take her blood pressure with using meditative deepbreathing exercises to help with the pain. Blood pressure is elevated. Will increase the Losartan to 50 mg daily and continue the HCTZ on her current dose. Reviewed the importance of medication compliance.  - losartan (COZAAR) 50 MG tablet  Dispense: 90 tablet; Refill: 3    Hyperlipidemia, unspecified hyperlipidemia type  The patient was prescribed simvastatin.  - simvastatin (ZOCOR) 20 MG tablet  Dispense: 90 tablet; Refill: 3    Follow-up: Return in about 4 weeks (around 3/5/2018).         Scribe Disclosure:   I, Facundo Wall, am serving as a scribe to document services personally performed by Senia Olivas MD at this visit, based upon the provider's statements to me. All documentation has been reviewed by the aforementioned provider prior to being entered into the official medical record.     Portions of this medical record were completed by a scribe. UPON MY REVIEW AND AUTHENTICATION BY ELECTRONIC SIGNATURE, this confirms (a) I performed the applicable clinical services, and (b) the record is accurate.     Senia Olivas MD    I spent a total of 25 minutes face-to-face with Ca Yan during today's office visit.  Over 50% of this time was spent counseling the patient and/or coordinating care regarding advanced directive and muscle cramps.  See note for details.

## 2018-02-05 NOTE — PROGRESS NOTES
Rooming Note  Health Maintenance   Health Maintenance Due   Topic Date Due     COPD ACTION PLAN Q1 YR  1945     ADVANCE DIRECTIVE PLANNING Q5 YRS  11/29/1998     MAMMO Q1 YR  11/21/2017    Health maintenance items discussed and ordered/forwarded to PCP.  Gave patient a copy of health directive.   Ada uMrray LPN at 1:35 PM on 2/5/2018.

## 2018-02-05 NOTE — MR AVS SNAPSHOT
After Visit Summary   2/5/2018    Ca Yan    MRN: [id number]           Patient Information     Date Of Birth          1945        Visit Information        Provider Department      2/5/2018 1:10 PM Senia Olivas MD [facility]        Today's Diagnoses     Chronic obstructive pulmonary disease, unspecified COPD type (H)    -  1    Health care maintenance        Hyperlipidemia, unspecified hyperlipidemia type        Benign essential hypertension          Care Instructions    Primary Care Center: [phone]     Primary Care Center Medication Refill Request Information:  * Please contact your pharmacy regarding ANY request for medication refills.  ** [facility] Prescription Fax = [phone]  * Please allow 3 business days for routine medication refills.  * Please allow 5 business days for controlled substance medication refills.     Primary Care Center Test Result notification information:  *You will be notified with in 7-10 days of your appointment day regarding the results of your test.  If you are on MyChart you will be notified as soon as the provider has reviewed the results and signed off on them.    1.  Increase losartan to 50 mg daily.  (take 2 -25 mg tablets until they are gone, then start the new 50 mg tablets daily)  2.  Continue the HCTZ  3.  Bmp labs today  4.  Talk to your daughter and georges about end of life care.  5. Referral to OT for wheelchair assessment          Follow-ups after your visit        Additional Services     OCCUPATIONAL THERAPY REFERRAL       *This therapy referral will be filtered to a centralized scheduling office at [facility] and the patient will receive a call to schedule an appointment at a [facility] location most convenient for them. *     [facility] provides Occupational Therapy evaluation and treatment and many specialty services across the [facility] system.  If requesting a specialty  "program, please choose from the list below.    If you have not heard from the scheduling office within 2 business days, please call [phone] for all locations, with the exception of [place], please call [phone].     Treatment: Evaluation & Treatment  Special Instructions/Modalities: requests wheelchair assessment  Special Programs: Seating & Wheeled Mobility ([facility] location only)    Please be aware that coverage of these services is subject to the terms and limitations of your health insurance plan.  Call member services at your health plan with any benefit or coverage questions.      **Note to Provider:  If you are referring outside of [place] for the therapy appointment, please list the name of the location in the \"special instructions\" above, print the referral and give to the patient to schedule the appointment.                  Follow-up notes from your care team     Return in about 4 weeks (around 3/5/2018).      Your next 10 appointments already scheduled     Feb 05, 2018  2:00 PM CST   LAB with [facility] ([facility])    [address] [place] [id number] [phone]           Please do not eat 10-12 hours before your appointment if you are coming in fasting for labs on lipids, cholesterol, or glucose (sugar). This does not apply to pregnant women. Water, hot tea and black coffee (with nothing added) are okay. Do not drink other fluids, diet soda or chew gum.            Feb 14, 2018  1:45 PM CST   RETURN EXTENDED with Virgie Cast MD   [facility] ([facility])    [facility] [address]  [address]rd [address]  [address] [facility] [id number] [phone]            Feb 19, 2018  1:15 PM CST   (Arrive by 1:00 PM)   Return Visit with RUPAL Buchanan [facility] ([facility])    [address] [place] [address]  4th " "Floor  [facility] [id number] [phone]            Feb 21, 2018  1:00 PM CST   (Arrive by 12:45 PM)   MA SCREENING DIGITAL BILATERAL with [email or web address]   [facility] ([facility])    [address]  2nd Floor  [facility] [id number] [phone]           Do not use any powder, lotion or deodorant under your arms or on your breast. If you do, we will ask you to remove it before your exam.  Wear comfortable, two-piece clothing.  If you have any allergies, tell your care team.  Bring any previous mammograms from other facilities or have them mailed to the breast center. Three-dimensional (3D) mammograms are available at [place] locations in [facility], [facility], and [place]. [facility] locations include [place] [facility] in [place]. Benefits of 3D mammograms include: - Improved rate of cancer detection - Decreases your chance of having to go back for more tests, which means fewer: - \"False-positive\" results (This means that there is an abnormal area but it isn't cancer.) - Invasive testing procedures, such as a biopsy or surgery - Can provide clearer images of the breast if you have dense breast tissue. 3D mammography is an optional exam that anyone can have with a 2D mammogram. It doesn't replace or take the place of a 2D mammogram. 2D mammograms remain an effective screening test for all women.  Not all insurance companies cover the cost of a 3D mammogram. Check with your insurance.            Mar 05, 2018  1:10 PM CST   (Arrive by 12:55 PM)   Return Visit with Senia Olivas MD   [facility] ([facility])    [address]  4th Floor  [facility] [id number] [phone]              Future tests that were ordered for you today     Open Future Orders        Priority Expected Expires Ordered    Basic metabolic panel Routine " 2018            Who to contact     Please call your clinic at [phone] to:    Ask questions about your health    Make or cancel appointments    Discuss your medicines    Learn about your test results    Speak to your doctor   If you have compliments or concerns about an experience at your clinic, or if you wish to file a complaint, please contact [facility] Patient Relations at [phone] or email us at [email or web address]         Additional Information About Your Visit        TidyClub Information     T[employer]Club is an electronic gateway that provides easy, online access to your medical records. With T[employer]Club, you can request a clinic appointment, read your test results, renew a prescription or communicate with your care team.     To sign up for [employer] visit the website at [email or web address]   You will be asked to enter the access code listed below, as well as some personal information. Please follow the directions to create your username and password.     Your access code is: [email or web address]  Expires: 3/12/2018  6:30 AM     Your access code will  in 90 days. If you need help or a new code, please contact your [facility] or call [phone] for assistance.        Care EveryWhere ID     This is your Care EveryWhere ID. This could be used by other organizations to access your [place] medical records  [id number]        Your Vitals Were     Pulse Pulse Oximetry Breastfeeding? BMI (Body Mass Index)          70 89% No 32.96 kg/m2         Blood Pressure from Last 3 Encounters:   18 (!) 164/92   17 (!) 160/116   17 138/88    Weight from Last 3 Encounters:   18 87.1 kg (192 lb)   17 89.8 kg (198 lb)   17 88 kg (194 lb)              We Performed the Following     OCCUPATIONAL THERAPY REFERRAL          Today's Medication Changes          These changes are accurate as of 18  1:48  PM.  If you have any questions, ask your nurse or doctor.               These medicines have changed or have updated prescriptions.        Dose/Directions    * losartan 25 MG tablet   Commonly known as:  COZAAR   This may have changed:  Another medication with the same name was added. Make sure you understand how and when to take each.   Used for:  Benign essential hypertension   Changed by:  Senia Olivas MD        Dose:  25 mg   Take 1 tablet (25 mg) by mouth daily   Quantity:  90 tablet   Refills:  0       * losartan 50 MG tablet   Commonly known as:  COZAAR   This may have changed:  You were already taking a medication with the same name, and this prescription was added. Make sure you understand how and when to take each.   Used for:  Benign essential hypertension   Changed by:  Senia Olivas MD        Dose:  50 mg   Take 1 tablet (50 mg) by mouth daily   Quantity:  90 tablet   Refills:  3       * Notice:  This list has 2 medication(s) that are the same as other medications prescribed for you. Read the directions carefully, and ask your doctor or other care provider to review them with you.         Where to get your medicines      These medications were sent to [facility] [address] [place] [address]    Hours:  TRANSPLANT PHONE NUMBER [phone] Phone:  [phone]     losartan 50 MG tablet    simvastatin 20 MG tablet                Primary Care Provider Office Phone # Fax #    Senia Olivas [phone]       [address] [place] [address]        Equal Access to Services     MUSHTAQ BROWN AH: Hadii aad ku hadasho Soomaali, waaxda luqadaha, qaybta kaalmada adeegyada, sushma melchor. So [facility] [phone].    ATENCIÓN: Si habla español, tiene a maxwell disposición servicios gratuitos de asistencia lingüística. Llame al [phone].    We comply with  applicable federal civil rights laws and [place] laws. We do not discriminate on the basis of race, color, national origin, age, disability, sex, sexual orientation, or gender identity.            Thank you!     Thank you for choosing [facility]  for your care. Our goal is always to provide you with excellent care. Hearing back from our patients is one way we can continue to improve our services. Please take a few minutes to complete the written survey that you may receive in the mail after your visit with us. Thank you!             Your Updated Medication List - Protect others around you: Learn how to safely use, store and throw away your medicines at www.disposemymeds.org.          This list is accurate as of 2/5/18  1:48 PM.  Always use your most recent med list.                   Brand Name Dispense Instructions for use Diagnosis    acetaminophen 500 MG tablet    TYLENOL     Take 500-1,000 mg by mouth every 6 hours as needed for mild pain        ammonium lactate 12 % cream    LAC-HYDRIN    385 g    Apply topically 2 times daily as needed for dry skin    Bilateral leg edema       ASPIRIN EC PO      Take 81 mg by mouth        Blood Pressure Cuff Misc     1 each    Imron blood cuff  Please check your blood pressure 2-3 times per week.  Goal is <140/90    Benign essential hypertension       ciclopirox 0.77 % cream    LOPROX    90 g    Apply topically 2 times daily To feet and toenails.    Dermatophytosis of nail, Tinea pedis of both feet       diazepam 2 MG tablet    VALIUM    1 tablet    Take 1 tablet (2 mg) by mouth every 6 hours as needed for anxiety or sleep    Claustrophobia       fluticasone 50 MCG/ACT spray    FLONASE    1 Bottle    Spray 1 spray into both nostrils daily    Chronic allergic rhinitis, unspecified seasonality, unspecified trigger       hydrochlorothiazide 12.5 MG Tabs tablet     60 tablet    Take 1 tablet (12.5 mg) by mouth daily    Hyperlipemia       ibuprofen 200 MG tablet     ADVIL/MOTRIN    90 tablet    Take 3 tablets (600 mg) by mouth 3 times daily (with meals)    Acute left-sided low back pain without sciatica       latanoprost 0.005 % ophthalmic solution    XALATAN     1 drop        * losartan 25 MG tablet    COZAAR    90 tablet    Take 1 tablet (25 mg) by mouth daily    Benign essential hypertension       * losartan 50 MG tablet    COZAAR    90 tablet    Take 1 tablet (50 mg) by mouth daily    Benign essential hypertension       * naproxen 500 MG tablet    NAPROSYN    30 tablet    Take 1 tablet (500 mg) by mouth 2 times daily (with meals)    Acute left-sided low back pain without sciatica       * naproxen 500 MG tablet    NAPROSYN    60 tablet    Take 1 tablet (500 mg) by mouth 2 times daily as needed for moderate pain    Acute bilateral low back pain without sciatica       omeprazole 20 MG CR capsule    priLOSEC     Take 20 mg by mouth as needed        * order for DME     1 Device    Equipment being ordered: Oxygen  Please provide portable oxygen concentrator (pt would like over the shoulder oxygen device) for portability at 2LPM with activity via nasal canula.    Pulmonary hypertension       * order for DME     2 each    Equipment being ordered: knee high compression stockings, class 1 or 2, night time velcro compression garments, lymphedema bandaging supplies, darco boots to wear during treatment    Bilateral leg edema       * order for DME     1 Units    Equipment being ordered: portable walker with seat and compartment under the seat.  Use for going out of the house on errands, where prolonged standing is required.    Chronic obstructive pulmonary disease, unspecified COPD type (H)       * order for DME     1 Device    Equipment being ordered: Wheelchair Sig: Equipment being ordered: portable walker with seat and compartment under the seat.   Use for going out of the house on errands, where prolonged standing is required.    COPD (chronic obstructive pulmonary disease) (H)        phenazopyridine 100 MG tablet    PYRIDIUM    20 tablet    Take 1-2 tablets (100-200 mg) by mouth 3 times daily as needed for urinary tract discomfort    Urinary tract infection with hematuria, site unspecified       QUEtiapine 200 MG tablet    SEROquel     Take 200 mg by mouth At Bedtime.        simvastatin 20 MG tablet    ZOCOR    90 tablet    Take 1 tablet (20 mg) by mouth daily    Hyperlipidemia, unspecified hyperlipidemia type       umeclidinium 62.5 MCG/INH oral inhaler    INCRUSE ELLIPTA     Inhale 1 puff into the lungs        Urea 40 % Crea     198 g    Externally apply topically daily To feet and toenails.    Onychauxis, Dermatophytosis of nail, Tyloma       * Notice:  This list has 8 medication(s) that are the same as other medications prescribed for you. Read the directions carefully, and ask your doctor or other care provider to review them with you.

## 2018-02-05 NOTE — NURSING NOTE
Chief Complaint   Patient presents with     Dme     Patient is here to discuss an order for wheelchair.      Abdominal Cramping     Pt is here to discuss cramping all over body.      Ada Murray LPN at 1:11 PM on 2/5/2018.

## 2018-02-14 ENCOUNTER — OFFICE VISIT (OUTPATIENT)
Dept: OBGYN | Facility: CLINIC | Age: 73
End: 2018-02-14
Attending: OBSTETRICS & GYNECOLOGY
Payer: COMMERCIAL

## 2018-02-14 VITALS
BODY MASS INDEX: 33.36 KG/M2 | HEART RATE: 83 BPM | HEIGHT: 64 IN | DIASTOLIC BLOOD PRESSURE: 83 MMHG | SYSTOLIC BLOOD PRESSURE: 132 MMHG | WEIGHT: 195.4 LBS

## 2018-02-14 DIAGNOSIS — N95.0 POSTMENOPAUSAL BLEEDING: Primary | ICD-10-CM

## 2018-02-14 NOTE — LETTER
2018       RE: Ca Yan  [address]   [place] [address]     Dear Colleague,    Thank you for referring your patient, Ca Yan, to the [facility] at [facility]. Please see a copy of my visit note below.    [facility]    HPI:  Ca Yan is a 72 year old  with history of breast cancer who presents for a second opinion on postmenopausal bleeding. She reports she went through menopause in her early 40s and was on HRT for 17 years for osteoporosis prevention. She had not had any bleeding until 2017, at which time she had four days of bleeding. An ultrasound was obtained 17 and showed endometrium of 7mm. She was unable to tolerate an office endometrial biopsy and so had a hysteroscopy, D&C in the OR on 10/31/17. Biopsy showed fragments of inactive endometrium with no hyperplasia, atypia or malignancy. However, she had a pap obtained 17 which showed atypical glandular cells, favor a neoplastic process, as well as presence of endometrial cells. All of this was done at [facility]. She was recommended to undergo a follow up procedure with repeat D&C and colposcopy, but is wondering if this is really necessary as she has not had any further vaginal bleeding.     Today she also reports bladder and bowel incontinence; she is scheduled to see colorectal surgery next Monday.     Gyn history:  Regular menses throughout adult life  Menopause in early 40s with HRT as above  Distant history of abnormal pap, thinks possibly treated with LEEP    OB history:   x2, one SAB    Past Medical History:   Diagnosis Date     Anxiety      Arthritis      Breast cancer (H)      COPD (chronic obstructive pulmonary disease) (H)     12:  FEV 0.99 l     Hypertension      Low bone density 2017    DEXA 2017: T score -2.0. Normal Z score. FRAX risk: major osteoporotic fracture 11.9%, hip  fracture 2.6%, therefore not high-risk     Past Surgical History:   Procedure Laterality Date     BACK SURGERY      spinal fusion     LUMPECTOMY BREAST       ORTHOPEDIC SURGERY      Knee surgery left side       Current Outpatient Prescriptions:      simvastatin (ZOCOR) 20 MG tablet, Take 1 tablet (20 mg) by mouth daily, Disp: 90 tablet, Rfl: 3     losartan (COZAAR) 50 MG tablet, Take 1 tablet (50 mg) by mouth daily, Disp: 90 tablet, Rfl: 3     fluticasone (FLONASE) 50 MCG/ACT spray, Spray 1 spray into both nostrils daily, Disp: 1 Bottle, Rfl: 1     naproxen (NAPROSYN) 500 MG tablet, Take 1 tablet (500 mg) by mouth 2 times daily as needed for moderate pain, Disp: 60 tablet, Rfl: 1     diazepam (VALIUM) 2 MG tablet, Take 1 tablet (2 mg) by mouth every 6 hours as needed for anxiety or sleep, Disp: 1 tablet, Rfl: 0     hydrochlorothiazide 12.5 MG TABS tablet, Take 1 tablet (12.5 mg) by mouth daily, Disp: 60 tablet, Rfl: 1     naproxen (NAPROSYN) 500 MG tablet, Take 1 tablet (500 mg) by mouth 2 times daily (with meals), Disp: 30 tablet, Rfl: 1     latanoprost (XALATAN) 0.005 % ophthalmic solution, 1 drop, Disp: , Rfl:      phenazopyridine (PYRIDIUM) 100 MG tablet, Take 1-2 tablets (100-200 mg) by mouth 3 times daily as needed for urinary tract discomfort, Disp: 20 tablet, Rfl: 0     losartan (COZAAR) 25 MG tablet, Take 1 tablet (25 mg) by mouth daily, Disp: 90 tablet, Rfl: 0     umeclidinium (INCRUSE ELLIPTA) 62.5 MCG/INH oral inhaler, Inhale 1 puff into the lungs, Disp: , Rfl:      Urea 40 % CREA, Externally apply topically daily To feet and toenails., Disp: 198 g, Rfl: 5     order for DME, Equipment being ordered: Wheelchair Sig: Equipment being ordered: portable walker with seat and compartment under the seat.   Use for going out of the house on errands, where prolonged standing is required., Disp: 1 Device, Rfl: 1     Blood Pressure Monitoring (BLOOD PRESSURE CUFF) MISC, Imron blood cuff  Please check your blood  pressure 2-3 times per week.  Goal is <140/90, Disp: 1 each, Rfl: 0     order for DME, Equipment being ordered: portable walker with seat and compartment under the seat.  Use for going out of the house on errands, where prolonged standing is required., Disp: 1 Units, Rfl: 0     ciclopirox (LOPROX) 0.77 % cream, Apply topically 2 times daily To feet and toenails., Disp: 90 g, Rfl: 6     order for DME, Equipment being ordered: knee high compression stockings, class 1 or 2, night time velcro compression garments, lymphedema bandaging supplies, darco boots to wear during treatment, Disp: 2 each, Rfl: 3     ammonium lactate (LAC-HYDRIN) 12 % cream, Apply topically 2 times daily as needed for dry skin, Disp: 385 g, Rfl: 3     ibuprofen (ADVIL,MOTRIN) 200 MG tablet, Take 3 tablets (600 mg) by mouth 3 times daily (with meals), Disp: 90 tablet, Rfl: 0     order for DME, Equipment being ordered: Oxygen  Please provide portable oxygen concentrator (pt would like over the shoulder oxygen device) for portability at 2LPM with activity via nasal canula., Disp: 1 Device, Rfl: 1     acetaminophen (TYLENOL) 500 MG tablet, Take 500-1,000 mg by mouth every 6 hours as needed for mild pain, Disp: , Rfl:      ASPIRIN EC PO, Take 81 mg by mouth, Disp: , Rfl:      omeprazole (PRILOSEC) 20 MG capsule, Take 20 mg by mouth as needed , Disp: , Rfl:      quetiapine (SEROQUEL) 200 MG tablet, Take 200 mg by mouth At Bedtime., Disp: , Rfl:        Allergies   Allergen Reactions     Azithromycin      Other reaction(s): Itching     Codeine Nausea and Vomiting     Hydrocodone      Vomiting     Metronidazole Nausea     Percocet [Oxycodone-Acetaminophen]      Vomiting     Pollen Extract      Other reaction(s): Other, see comments  Pt states that she has sneezing and runny nose.      Seasonal Allergies Other (See Comments)     Pt states that she has sneezing and runny nose.      Vicodin [Hydrocodone-Acetaminophen]      Vomiting     Zolpidem Other (See  "Comments)     Amnestic behavior     Oxycodone Itching and Rash     ROS: negative except as per HPI    Exam:  Vitals:    02/14/18 1355   BP: 132/83   Pulse: 83   Weight: 88.6 kg (195 lb 6.4 oz)   Height: 1.626 m (5' 4\")     Gen: alert, oriented, NAD  Resp: breathing comfortably on room air  : patient declined    A/P:  72 year old  with postmenopausal bleeding and atypical glandular cells on pap.    - Discussed with patient finding of AGC and rationale for resampling of the endometrium as well as sampling of the cervix/endocervix. Despite previous negative endometrial sampling, my suspicion is highest for an endometrial source of neoplastic cells given her risk factors of age, obesity and HRT.   - Recommended return to OR for hysteroscopy with targeted biopsies, D&C, colposcopy with endocervical sampling. Patient agreeable; surgery request submitted. Will have preop visit with PCP.  - Did not go into detail about urinary incontinence today but will make a plan for follow up after we have the results back from planned procedure.      Again, thank you for allowing me to participate in the care of your patient.      Sincerely,    Virgie Cast MD      "

## 2018-02-14 NOTE — PROGRESS NOTES
[facility]    HPI:  Ca Yan is a 72 year old  with history of breast cancer who presents for a second opinion on postmenopausal bleeding. She reports she went through menopause in her early 40s and was on HRT for 17 years for osteoporosis prevention. She had not had any bleeding until 2017, at which time she had four days of bleeding. An ultrasound was obtained 17 and showed endometrium of 7mm. She was unable to tolerate an office endometrial biopsy and so had a hysteroscopy, D&C in the OR on 10/31/17. Biopsy showed fragments of inactive endometrium with no hyperplasia, atypia or malignancy. However, she had a pap obtained 17 which showed atypical glandular cells, favor a neoplastic process, as well as presence of endometrial cells. All of this was done at [facility]. She was recommended to undergo a follow up procedure with repeat D&C and colposcopy, but is wondering if this is really necessary as she has not had any further vaginal bleeding.     Today she also reports bladder and bowel incontinence; she is scheduled to see colorectal surgery next Monday.     Gyn history:  Regular menses throughout adult life  Menopause in early 40s with HRT as above  Distant history of abnormal pap, thinks possibly treated with LEEP    OB history:   x2, one SAB    Past Medical History:   Diagnosis Date     Anxiety      Arthritis      Breast cancer (H)      COPD (chronic obstructive pulmonary disease) (H)     12:  FEV 0.99 l     Hypertension      Low bone density 2017    DEXA 2017: T score -2.0. Normal Z score. FRAX risk: major osteoporotic fracture 11.9%, hip fracture 2.6%, therefore not high-risk     Past Surgical History:   Procedure Laterality Date     BACK SURGERY      spinal fusion     LUMPECTOMY BREAST       ORTHOPEDIC SURGERY      Knee surgery left side       Current Outpatient Prescriptions:      simvastatin (ZOCOR) 20 MG tablet, Take 1 tablet (20 mg)  by mouth daily, Disp: 90 tablet, Rfl: 3     losartan (COZAAR) 50 MG tablet, Take 1 tablet (50 mg) by mouth daily, Disp: 90 tablet, Rfl: 3     fluticasone (FLONASE) 50 MCG/ACT spray, Spray 1 spray into both nostrils daily, Disp: 1 Bottle, Rfl: 1     naproxen (NAPROSYN) 500 MG tablet, Take 1 tablet (500 mg) by mouth 2 times daily as needed for moderate pain, Disp: 60 tablet, Rfl: 1     diazepam (VALIUM) 2 MG tablet, Take 1 tablet (2 mg) by mouth every 6 hours as needed for anxiety or sleep, Disp: 1 tablet, Rfl: 0     hydrochlorothiazide 12.5 MG TABS tablet, Take 1 tablet (12.5 mg) by mouth daily, Disp: 60 tablet, Rfl: 1     naproxen (NAPROSYN) 500 MG tablet, Take 1 tablet (500 mg) by mouth 2 times daily (with meals), Disp: 30 tablet, Rfl: 1     latanoprost (XALATAN) 0.005 % ophthalmic solution, 1 drop, Disp: , Rfl:      phenazopyridine (PYRIDIUM) 100 MG tablet, Take 1-2 tablets (100-200 mg) by mouth 3 times daily as needed for urinary tract discomfort, Disp: 20 tablet, Rfl: 0     losartan (COZAAR) 25 MG tablet, Take 1 tablet (25 mg) by mouth daily, Disp: 90 tablet, Rfl: 0     umeclidinium (INCRUSE ELLIPTA) 62.5 MCG/INH oral inhaler, Inhale 1 puff into the lungs, Disp: , Rfl:      Urea 40 % CREA, Externally apply topically daily To feet and toenails., Disp: 198 g, Rfl: 5     order for DME, Equipment being ordered: Wheelchair Sig: Equipment being ordered: portable walker with seat and compartment under the seat.   Use for going out of the house on errands, where prolonged standing is required., Disp: 1 Device, Rfl: 1     Blood Pressure Monitoring (BLOOD PRESSURE CUFF) MISC, Imron blood cuff  Please check your blood pressure 2-3 times per week.  Goal is <140/90, Disp: 1 each, Rfl: 0     order for DME, Equipment being ordered: portable walker with seat and compartment under the seat.  Use for going out of the house on errands, where prolonged standing is required., Disp: 1 Units, Rfl: 0     ciclopirox (LOPROX) 0.77 %  "cream, Apply topically 2 times daily To feet and toenails., Disp: 90 g, Rfl: 6     order for DME, Equipment being ordered: knee high compression stockings, class 1 or 2, night time velcro compression garments, lymphedema bandaging supplies, darco boots to wear during treatment, Disp: 2 each, Rfl: 3     ammonium lactate (LAC-HYDRIN) 12 % cream, Apply topically 2 times daily as needed for dry skin, Disp: 385 g, Rfl: 3     ibuprofen (ADVIL,MOTRIN) 200 MG tablet, Take 3 tablets (600 mg) by mouth 3 times daily (with meals), Disp: 90 tablet, Rfl: 0     order for DME, Equipment being ordered: Oxygen  Please provide portable oxygen concentrator (pt would like over the shoulder oxygen device) for portability at 2LPM with activity via nasal canula., Disp: 1 Device, Rfl: 1     acetaminophen (TYLENOL) 500 MG tablet, Take 500-1,000 mg by mouth every 6 hours as needed for mild pain, Disp: , Rfl:      ASPIRIN EC PO, Take 81 mg by mouth, Disp: , Rfl:      omeprazole (PRILOSEC) 20 MG capsule, Take 20 mg by mouth as needed , Disp: , Rfl:      quetiapine (SEROQUEL) 200 MG tablet, Take 200 mg by mouth At Bedtime., Disp: , Rfl:        Allergies   Allergen Reactions     Azithromycin      Other reaction(s): Itching     Codeine Nausea and Vomiting     Hydrocodone      Vomiting     Metronidazole Nausea     Percocet [Oxycodone-Acetaminophen]      Vomiting     Pollen Extract      Other reaction(s): Other, see comments  Pt states that she has sneezing and runny nose.      Seasonal Allergies Other (See Comments)     Pt states that she has sneezing and runny nose.      Vicodin [Hydrocodone-Acetaminophen]      Vomiting     Zolpidem Other (See Comments)     Amnestic behavior     Oxycodone Itching and Rash     ROS: negative except as per HPI    Exam:  Vitals:    02/14/18 1355   BP: 132/83   Pulse: 83   Weight: 88.6 kg (195 lb 6.4 oz)   Height: 1.626 m (5' 4\")     Gen: alert, oriented, NAD  Resp: breathing comfortably on room air  : patient " declined    A/P:  72 year old  with postmenopausal bleeding and atypical glandular cells on pap.    - Discussed with patient finding of AGC and rationale for resampling of the endometrium as well as sampling of the cervix/endocervix. Despite previous negative endometrial sampling, my suspicion is highest for an endometrial source of neoplastic cells given her risk factors of age, obesity and HRT.   - Recommended return to OR for hysteroscopy with targeted biopsies, D&C, colposcopy with endocervical sampling. Patient agreeable; surgery request submitted. Will have preop visit with PCP.  - Did not go into detail about urinary incontinence today but will make a plan for follow up after we have the results back from planned procedure.    Virgie Cast MD

## 2018-02-14 NOTE — MR AVS SNAPSHOT
"              After Visit Summary   2/14/2018    Ca Yan    MRN: [id number]           Patient Information     Date Of Birth          1945        Visit Information        Provider Department      2/14/2018 1:45 PM Virgie Cast MD [facility]        Today's Diagnoses     Postmenopausal bleeding    -  1       Follow-ups after your visit        Your next 10 appointments already scheduled     Feb 19, 2018  1:15 PM CST   (Arrive by 1:00 PM)   Return Visit with RUPAL [facility] ([facility])    [address]  4th Floor  [facility] [id number] [phone]            Feb 21, 2018  1:00 PM CST   (Arrive by 12:45 PM)   MA SCREENING DIGITAL BILATERAL with [id number]   [facility] ([facility])    [address]  [address]nd [facility] [id number] [phone]           Do not use any powder, lotion or deodorant under your arms or on your breast. If you do, we will ask you to remove it before your exam.  Wear comfortable, two-piece clothing.  If you have any allergies, tell your care team.  Bring any previous mammograms from other facilities or have them mailed to the breast center. Three-dimensional (3D) mammograms are available at [place] locations in [facility], [facility], [facility], and [place]. [facility] locations include [place] and Clinic & Surgery Center in [place]. Benefits of 3D mammograms include: - Improved rate of cancer detection - Decreases your chance of having to go back for more tests, which means fewer: - \"False-positive\" results (This means that there is an abnormal area but it isn't cancer.) - Invasive testing procedures, such as a biopsy or surgery - Can provide clearer images of the breast if you have dense breast tissue. 3D mammography is an optional exam that anyone " can have with a 2D mammogram. It doesn't replace or take the place of a 2D mammogram. 2D mammograms remain an effective screening test for all women.  Not all insurance companies cover the cost of a 3D mammogram. Check with your insurance.            Mar 05, 2018  1:10 PM CST   (Arrive by 12:55 PM)   Return Visit with Senia Olivas MD   [facility] ([facility])    [address]  4th Floor  [place] [id number] [phone]            Mar 06, 2018 10:00 AM CST   W/C Seating Eval with Kelly Ferraro OT   [facility] Outpatient ([facility])    [address], [address]  [place] [address] [phone]              Who to contact     Please call your clinic at [phone] to:    Ask questions about your health    Make or cancel appointments    Discuss your medicines    Learn about your test results    Speak to your doctor            Additional Information About Your Visit        [employer] Information     [employer] is an electronic gateway that provides easy, online access to your medical records. With [employer], you can request a clinic appointment, read your test results, renew a prescription or communicate with your care team.     To sign up for [employer] visit the website at [email or web address]   You will be asked to enter the access code listed below, as well as some personal information. Please follow the directions to create your username and password.     Your access code is: [email or web address]  Expires: 3/12/2018  6:30 AM     Your access code will  in 90 days. If you need help or a new code, please contact your [facility] or call [phone] for assistance.        Care EveryWhere ID     This is your Care EveryWhere ID. This could be used by other organizations to access your [facility] medical records  [id number]        Your Vitals  "Were     Pulse Height BMI (Body Mass Index)             83 1.626 m (5' 4\") 33.54 kg/m2          Blood Pressure from Last 3 Encounters:   02/14/18 132/83   02/05/18 (!) 164/92   12/26/17 (!) 160/116    Weight from Last 3 Encounters:   02/14/18 88.6 kg (195 lb 6.4 oz)   02/05/18 87.1 kg (192 lb)   12/26/17 89.8 kg (198 lb)              We Performed the Following     Alysha-Operative Worksheet (Operative Hysteroscopy)        Primary Care Provider Office Phone # Fax #    Senia Olivas [phone]       [address] [address]        Equal Access to Services     MUSHTAQ BROWN : Hadvelma bairdo Sosebastian, waaxda luqadaha, qaybta kaalmada adeegyada, sushma wisdom . So [facility] [phone].    ATENCIÓN: Si habla español, tiene a maxwell disposición servicios gratuitos de asistencia lingüística. Elizabeth al [phone].    We comply with applicable federal civil rights laws and [place] laws. We do not discriminate on the basis of race, color, national origin, age, disability, sex, sexual orientation, or gender identity.            Thank you!     Thank you for choosing [facility]  for your care. Our goal is always to provide you with excellent care. Hearing back from our patients is one way we can continue to improve our services. Please take a few minutes to complete the written survey that you may receive in the mail after your visit with us. Thank you!             Your Updated Medication List - Protect others around you: Learn how to safely use, store and throw away your medicines at www.disposemymeds.org.          This list is accurate as of 2/14/18  4:21 PM.  Always use your most recent med list.                   Brand Name Dispense Instructions for use Diagnosis    acetaminophen 500 MG tablet    TYLENOL     Take 500-1,000 mg by mouth every 6 hours as needed for mild pain        ammonium lactate 12 % cream    LAC-HYDRIN    385 g    Apply " topically 2 times daily as needed for dry skin    Bilateral leg edema       ASPIRIN EC PO      Take 81 mg by mouth        Blood Pressure Cuff Misc     1 each    Imron blood cuff  Please check your blood pressure 2-3 times per week.  Goal is <140/90    Benign essential hypertension       ciclopirox 0.77 % cream    LOPROX    90 g    Apply topically 2 times daily To feet and toenails.    Dermatophytosis of nail, Tinea pedis of both feet       diazepam 2 MG tablet    VALIUM    1 tablet    Take 1 tablet (2 mg) by mouth every 6 hours as needed for anxiety or sleep    Claustrophobia       fluticasone 50 MCG/ACT spray    FLONASE    1 Bottle    Spray 1 spray into both nostrils daily    Chronic allergic rhinitis, unspecified seasonality, unspecified trigger       hydrochlorothiazide 12.5 MG Tabs tablet     60 tablet    Take 1 tablet (12.5 mg) by mouth daily    Hyperlipemia       ibuprofen 200 MG tablet    ADVIL/MOTRIN    90 tablet    Take 3 tablets (600 mg) by mouth 3 times daily (with meals)    Acute left-sided low back pain without sciatica       latanoprost 0.005 % ophthalmic solution    XALATAN     1 drop        * losartan 25 MG tablet    COZAAR    90 tablet    Take 1 tablet (25 mg) by mouth daily    Benign essential hypertension       * losartan 50 MG tablet    COZAAR    90 tablet    Take 1 tablet (50 mg) by mouth daily    Benign essential hypertension       * naproxen 500 MG tablet    NAPROSYN    30 tablet    Take 1 tablet (500 mg) by mouth 2 times daily (with meals)    Acute left-sided low back pain without sciatica       * naproxen 500 MG tablet    NAPROSYN    60 tablet    Take 1 tablet (500 mg) by mouth 2 times daily as needed for moderate pain    Acute bilateral low back pain without sciatica       omeprazole 20 MG CR capsule    priLOSEC     Take 20 mg by mouth as needed        * order for DME     1 Device    Equipment being ordered: Oxygen  Please provide portable oxygen concentrator (pt would like over the  shoulder oxygen device) for portability at 2LPM with activity via nasal canula.    Pulmonary hypertension       * order for DME     2 each    Equipment being ordered: knee high compression stockings, class 1 or 2, night time velcro compression garments, lymphedema bandaging supplies, darco boots to wear during treatment    Bilateral leg edema       * order for DME     1 Units    Equipment being ordered: portable walker with seat and compartment under the seat.  Use for going out of the house on errands, where prolonged standing is required.    Chronic obstructive pulmonary disease, unspecified COPD type (H)       * order for DME     1 Device    Equipment being ordered: Wheelchair Sig: Equipment being ordered: portable walker with seat and compartment under the seat.   Use for going out of the house on errands, where prolonged standing is required.    COPD (chronic obstructive pulmonary disease) (H)       phenazopyridine 100 MG tablet    PYRIDIUM    20 tablet    Take 1-2 tablets (100-200 mg) by mouth 3 times daily as needed for urinary tract discomfort    Urinary tract infection with hematuria, site unspecified       QUEtiapine 200 MG tablet    SEROquel     Take 200 mg by mouth At Bedtime.        simvastatin 20 MG tablet    ZOCOR    90 tablet    Take 1 tablet (20 mg) by mouth daily    Hyperlipidemia, unspecified hyperlipidemia type       umeclidinium 62.5 MCG/INH oral inhaler    INCRUSE ELLIPTA     Inhale 1 puff into the lungs        Urea 40 % Crea     198 g    Externally apply topically daily To feet and toenails.    Onychauxis, Dermatophytosis of nail, Tyloma       * Notice:  This list has 8 medication(s) that are the same as other medications prescribed for you. Read the directions carefully, and ask your doctor or other care provider to review them with you.

## 2018-02-15 ENCOUNTER — TELEPHONE (OUTPATIENT)
Dept: OBGYN | Facility: CLINIC | Age: 73
End: 2018-02-15

## 2018-02-19 ENCOUNTER — OFFICE VISIT (OUTPATIENT)
Dept: SURGERY | Facility: CLINIC | Age: 73
End: 2018-02-19
Payer: COMMERCIAL

## 2018-02-19 VITALS
HEART RATE: 73 BPM | BODY MASS INDEX: 33.29 KG/M2 | SYSTOLIC BLOOD PRESSURE: 156 MMHG | DIASTOLIC BLOOD PRESSURE: 101 MMHG | TEMPERATURE: 98.7 F | WEIGHT: 195 LBS | HEIGHT: 64 IN | OXYGEN SATURATION: 91 %

## 2018-02-19 DIAGNOSIS — R15.9 INCONTINENCE OF FECES, UNSPECIFIED FECAL INCONTINENCE TYPE: Primary | ICD-10-CM

## 2018-02-19 ASSESSMENT — PAIN SCALES - GENERAL: PAINLEVEL: NO PAIN (0)

## 2018-02-19 NOTE — LETTER
"2018      RE: Ca Yan  [address]   [facility] [place] [address]       Colon and Rectal Surgery Follow-Up Clinic Note    RE: Ca Yan  : 1945  NUPUR: 2018    Ca Yan is a very pleasant 71 year old female here for follow-up of fecal incontinence.    Interval history: Ms. Yan has been seen in the past by myself, Dr. Latham and Dr. Lorenzo for fecal incontinence.   She reports dealing with fecal incontinence for the past 8 years. She continues to have about one bowel movement a day although sometimes this is erratic. Her bowel movements are subjectively very \"messy\" and she has to return to the bathroom several times to clean after bowel movements. She denies loosing a whole stool and denies any incontinence of flatus or nocturnal incontinence. She does have some urinary incontinence. She declines seeing urogynecology as she finds this manageable. She wears a pad and the urinary incontinence is worst with coughing/Valsalva. She denies feeling a prolapse. She has tried a fiber supplement in the past but does not like that it makes her feel full and bloated.    Past relevant history:  She has never had any anorectal procedures. She hd one vaginal birth of a baby of 8 pounds.     Her last colonoscopy was in  but was unable to be completed due to patient discomfort. A virtual colonoscopy was completed at that time with recommended repeat in 5 years.    She underwent pelvic floor testing in  with non relaxing publrectalis on defecography and slightly weaker tone with resting tone 25-31 and squeeze 6-38 mmHg. She declined biofeedback in the past.    PLEASE SEE NOTE BELOW FOR PHYSICAL EXAMINATION, REVIEW OF SYSTEMS, AND OTHER HISTORY.    Assessment/Plan: 71 year old female with ongoing fecal incontinence. We had a long discussion about pelvic floor dysfunction and fecal incontinence. We spoke about conservative treatment (stool bulkening) versus biofeedback versus " sacral nerve stimulator and generally about outcomes associated with these. She previously discussed Sacral Nerve Stimulation with Dr. Latham would thought she be a good candidate and she is interested in considering.  I answered her questions regarding the procedure. She has a few upcoming gynecologic surgeries and she would like to take care of those first.  She will do a bowel diary and she will plan to call in follow-up if she decides to schedule SNS. I again advised colonoscopy at this time as well which has previously decline and also today. Recommended she try going back on the fiber supplement for her intermittent constipation and diarrhea and she is agreeable to trying this again.    Patient's questions were answered to her stated satisfaction and she is in agreement with this plan.    Total time was 15 minutes, over 50% was spent counseling the patient.     PLEASE SEE NOTE BELOW FOR PHYSICAL EXAMINATION, REVIEW OF SYSTEMS, AND OTHER HISTORY.  --------------------------------------------------------------------------------------------------------------------------------------------------------------    MEDICATIONS:  Current Outpatient Prescriptions   Medication     simvastatin (ZOCOR) 20 MG tablet     losartan (COZAAR) 50 MG tablet     fluticasone (FLONASE) 50 MCG/ACT spray     naproxen (NAPROSYN) 500 MG tablet     diazepam (VALIUM) 2 MG tablet     hydrochlorothiazide 12.5 MG TABS tablet     naproxen (NAPROSYN) 500 MG tablet     latanoprost (XALATAN) 0.005 % ophthalmic solution     phenazopyridine (PYRIDIUM) 100 MG tablet     losartan (COZAAR) 25 MG tablet     umeclidinium (INCRUSE ELLIPTA) 62.5 MCG/INH oral inhaler     Urea 40 % CREA     order for DME     Blood Pressure Monitoring (BLOOD PRESSURE CUFF[place] [facility]     order for DME     ciclopirox (LOPROX) 0.77 % cream     order for DME     ammonium lactate (LAC-HYDRIN) 12 % cream     ibuprofen (ADVIL,MOTRIN) 200 MG tablet     order for DME     acetaminophen  (TYLENOL) 500 MG tablet     ASPIRIN EC PO     omeprazole (PRILOSEC) 20 MG capsule     quetiapine (SEROQUEL) 200 MG tablet     No current facility-administered medications for this visit.      ALLERGIES:     Allergies   Allergen Reactions     Azithromycin      Other reaction(s): Itching     Codeine Nausea and Vomiting     Hydrocodone      Vomiting     Metronidazole Nausea     Percocet [Oxycodone-Acetaminophen]      Vomiting     Pollen Extract      Other reaction(s): Other, see comments  Pt states that she has sneezing and runny nose.      Seasonal Allergies Other (See Comments)     Pt states that she has sneezing and runny nose.      Vicodin [Hydrocodone-Acetaminophen]      Vomiting     Zolpidem Other (See Comments)     Amnestic behavior     Oxycodone Itching and Rash       PAST MEDICAL HISTORY:  Past Medical History:   Diagnosis Date     Anxiety      Arthritis      Breast cancer (H)      COPD (chronic obstructive pulmonary disease) (H)     6/19/12:  FEV 0.99 l     Hypertension      Low bone density 4/13/2017    DEXA April 12, 2017: T score -2.0. Normal Z score. FRAX risk: major osteoporotic fracture 11.9%, hip fracture 2.6%, therefore not high-risk     PROBLEM LIST:  Patient Active Problem List   Diagnosis     Non morbid obesity due to excess calories     Alcohol abuse     Malignant neoplasm of breast (H)     Chronic kidney disease, stage III (moderate)     Osteoarthritis of knee     Major depressive disorder with single episode     Diarrhea     Diastolic dysfunction     Osteoarthritis of spine     Gastroesophageal reflux disease     Generalized anxiety disorder     Hypertension     Hyperlipidemia     Insomnia     Irritable bowel syndrome     Kyphoscoliosis     Cluster C personality disorder     Seborrheic eczema     Anemia     Anxiety state     Asthma     Back pain     Bunion     Chronic obstructive pulmonary disease, unspecified COPD type (H)     Low bone density     Fecal incontinence     Left-sided low back pain  "without sciatica     PAST SURGICAL HISTORY:  Past Surgical History:   Procedure Laterality Date     BACK SURGERY      spinal fusion     LUMPECTOMY BREAST       ORTHOPEDIC SURGERY      Knee surgery left side       FAMILY HISTORY:  Family History   Problem Relation Age of Onset     Breast Cancer Mother      DIABETES Mother      Alcohol/Drug Father      MENTAL ILLNESS Father      CEREBROVASCULAR DISEASE Maternal Grandmother 67       SOCIAL HISTORY:  Social History   Substance Use Topics     Smoking status: Former Smoker     Types: Cigarettes     Quit date: 9/26/1980     Smokeless tobacco: Never Used     Alcohol use No      Comment: Sober for 2 years, previous alcoholic     Marital status: single.    Nursing Notes:   Jaycee Charles CMA  4/19/2017 10:31 AM  Signed  Chief Complaint   Patient presents with     Consult     consult       Vitals:    04/19/17 1027   BP: 134/83   BP Location: Right arm   Patient Position: Chair   Cuff Size: Adult Regular   Pulse: 83   Temp: 97.4  F (36.3  C)   SpO2: 91%   Weight: 197 lb 11.2 oz   Height: 5' 3\"       Body mass index is 35.02 kg/(m^2).      Jaycee Charles MA             Physical Examination:  BP (!) 156/101  Pulse 73  Temp 98.7  F (37.1  C) (Oral)  Ht 5' 4\"  Wt 195 lb  SpO2 91%  BMI 33.47 kg/m2   General: alert, oriented, in no acute distress, sitting comfortably  HEENT:mucous membranes moist  The remainder of the exam was deferred today    RUPAL Knight, NP-C  Colon and Rectal Surgery  [facility]        "

## 2018-02-19 NOTE — MR AVS SNAPSHOT
"              After Visit Summary   2/19/2018    Ca Yan    MRN: [id number]           Patient Information     Date Of Birth          1945        Visit Information        Provider Department      2/19/2018 1:15 PM Lilly Ríos APRN [facility]        Today's Diagnoses     Incontinence of feces, unspecified fecal incontinence type    -  1       Follow-ups after your visit        Your next 10 appointments already scheduled     Feb 21, 2018  1:00 PM CST   (Arrive by 12:45 PM)   MA SCREENING DIGITAL BILATERAL with [id number]   [facility] ([facility])    [address]  2nd [address]  [place] [address] [phone]           Do not use any powder, lotion or deodorant under your arms or on your breast. If you do, we will ask you to remove it before your exam.  Wear comfortable, two-piece clothing.  If you have any allergies, tell your care team.  Bring any previous mammograms from other facilities or have them mailed to the breast center. Three-dimensional (3D) mammograms are available at [place] locations in [facility], [facility], [facility], and [place]. [facility] locations include [facility] in [place]. Benefits of 3D mammograms include: - Improved rate of cancer detection - Decreases your chance of having to go back for more tests, which means fewer: - \"False-positive\" results (This means that there is an abnormal area but it isn't cancer.) - Invasive testing procedures, such as a biopsy or surgery - Can provide clearer images of the breast if you have dense breast tissue. 3D mammography is an optional exam that anyone can have with a 2D mammogram. It doesn't replace or take the place of a 2D mammogram. 2D mammograms remain an effective screening test for all women.  Not all insurance companies cover the cost of a 3D mammogram. Check " "with your insurance.            Mar 05, 2018  1:10 PM CST   (Arrive by 12:55 PM)   Return Visit with Senia Olivas MD   [facility] ([facility])    [address]  [address] [facility] [id number] [phone]            Mar 06, 2018 10:00 AM CST   W/C Seating Eval with Kelly Ferraro OT   [facility] [place] - Outpatient ([facility])    [address], [address]  [place] [address]   [phone]              Who to contact     Please call your clinic at [phone] to:    Ask questions about your health    Make or cancel appointments    Discuss your medicines    Learn about your test results    Speak to your doctor            Additional Information About Your Visit        My[employer] R&D Information     MePlease is an electronic gateway that provides easy, online access to your medical records. With MePlease, you can request a clinic appointment, read your test results, renew a prescription or communicate with your care team.     To sign up for MePlease visit the website at [email or web address]   You will be asked to enter the access code listed below, as well as some personal information. Please follow the directions to create your username and password.     Your access code is: [email or web address]  Expires: 3/12/2018  6:30 AM     Your access code will  in 90 days. If you need help or a new code, please contact your [facility] or call [phone] for assistance.        Care EveryWhere ID     This is your Care EveryWhere ID. This could be used by other organizations to access your [facility] medical records  [id number]        Your Vitals Were     Pulse Temperature Height Pulse Oximetry BMI (Body Mass Index)       73 98.7  F (37.1  C) (Oral) 5' 4\" 91% 33.47 kg/m2        Blood Pressure from Last 3 Encounters:   18 (!) 156/101   18 132/83 "   02/05/18 (!) 164/92    Weight from Last 3 Encounters:   02/19/18 195 lb   02/14/18 195 lb 6.4 oz   02/05/18 192 lb              Today, you had the following     No orders found for display       Primary Care Provider Office Phone # Fax #    Senia Olivas [phone] [address] [address]        Equal Access to Services     MUSHTAQ BROWN : Hadii aad ku hadasho Soomaali, waaxda luqadaha, qaybta kaalmada adeegyada, waxay idiin hayaan adeeg kharash la'carolina . So [facility] [phone].    ATENCIÓN: Si habla español, tiene a maxwell disposición servicios gratuitos de asistencia lingüística. Llame al [phone].    We comply with applicable federal civil rights laws and [place] laws. We do not discriminate on the basis of race, color, national origin, age, disability, sex, sexual orientation, or gender identity.            Thank you!     Thank you for choosing [facility] COLON [facility] RECTAL [facility]GERY  for your care. Our goal is always to provide you with excellent care. Hearing back from our patients is one way we can continue to improve our services. Please take a few minutes to complete the written survey that you may receive in the mail after your visit with us. Thank you!             Your Updated Medication List - Protect others around you: Learn how to safely use, store and throw away your medicines at www.disposemymeds.org.          This list is accurate as of 2/19/18  4:02 PM.  Always use your most recent med list.                   Brand Name Dispense Instructions for use Diagnosis    acetaminophen 500 MG tablet    TYLENOL     Take 500-1,000 mg by mouth every 6 hours as needed for mild pain        ammonium lactate 12 % cream    LAC-HYDRIN    385 g    Apply topically 2 times daily as needed for dry skin    Bilateral leg edema       ASPIRIN EC PO      Take 81 mg by mouth        Blood Pressure Cuff Misc     1 each    Imron blood cuff  Please check your blood pressure 2-3 times per  week.  Goal is <140/90    Benign essential hypertension       ciclopirox 0.77 % cream    LOPROX    90 g    Apply topically 2 times daily To feet and toenails.    Dermatophytosis of nail, Tinea pedis of both feet       diazepam 2 MG tablet    VALIUM    1 tablet    Take 1 tablet (2 mg) by mouth every 6 hours as needed for anxiety or sleep    Claustrophobia       fluticasone 50 MCG/ACT spray    FLONASE    1 Bottle    Spray 1 spray into both nostrils daily    Chronic allergic rhinitis, unspecified seasonality, unspecified trigger       hydrochlorothiazide 12.5 MG Tabs tablet     60 tablet    Take 1 tablet (12.5 mg) by mouth daily    Hyperlipemia       ibuprofen 200 MG tablet    ADVIL/MOTRIN    90 tablet    Take 3 tablets (600 mg) by mouth 3 times daily (with meals)    Acute left-sided low back pain without sciatica       latanoprost 0.005 % ophthalmic solution    XALATAN     1 drop        * losartan 25 MG tablet    COZAAR    90 tablet    Take 1 tablet (25 mg) by mouth daily    Benign essential hypertension       * losartan 50 MG tablet    COZAAR    90 tablet    Take 1 tablet (50 mg) by mouth daily    Benign essential hypertension       * naproxen 500 MG tablet    NAPROSYN    30 tablet    Take 1 tablet (500 mg) by mouth 2 times daily (with meals)    Acute left-sided low back pain without sciatica       * naproxen 500 MG tablet    NAPROSYN    60 tablet    Take 1 tablet (500 mg) by mouth 2 times daily as needed for moderate pain    Acute bilateral low back pain without sciatica       omeprazole 20 MG CR capsule    priLOSEC     Take 20 mg by mouth as needed        * order for DME     1 Device    Equipment being ordered: Oxygen  Please provide portable oxygen concentrator (pt would like over the shoulder oxygen device) for portability at 2LPM with activity via nasal canula.    Pulmonary hypertension       * order for DME     2 each    Equipment being ordered: knee high compression stockings, class 1 or 2, night time velcro  compression garments, lymphedema bandaging supplies, darco boots to wear during treatment    Bilateral leg edema       * order for DME     1 Units    Equipment being ordered: portable walker with seat and compartment under the seat.  Use for going out of the house on errands, where prolonged standing is required.    Chronic obstructive pulmonary disease, unspecified COPD type (H)       * order for DME     1 Device    Equipment being ordered: Wheelchair Sig: Equipment being ordered: portable walker with seat and compartment under the seat.   Use for going out of the house on errands, where prolonged standing is required.    COPD (chronic obstructive pulmonary disease) (H)       phenazopyridine 100 MG tablet    PYRIDIUM    20 tablet    Take 1-2 tablets (100-200 mg) by mouth 3 times daily as needed for urinary tract discomfort    Urinary tract infection with hematuria, site unspecified       QUEtiapine 200 MG tablet    SEROquel     Take 200 mg by mouth At Bedtime.        simvastatin 20 MG tablet    ZOCOR    90 tablet    Take 1 tablet (20 mg) by mouth daily    Hyperlipidemia, unspecified hyperlipidemia type       umeclidinium 62.5 MCG/INH oral inhaler    INCRUSE ELLIPTA     Inhale 1 puff into the lungs        Urea 40 % Crea     198 g    Externally apply topically daily To feet and toenails.    Onychauxis, Dermatophytosis of nail, Tyloma       * Notice:  This list has 8 medication(s) that are the same as other medications prescribed for you. Read the directions carefully, and ask your doctor or other care provider to review them with you.

## 2018-02-19 NOTE — NURSING NOTE
"Chief Complaint   Patient presents with     Clinic Care Coordination - Follow-up     return       Vitals:    02/19/18 1310   BP: (!) 156/101   Pulse: 73   Temp: 98.7  F (37.1  C)   TempSrc: Oral   SpO2: 91%   Weight: 195 lb   Height: 5' 4\"       Body mass index is 33.47 kg/(m^2).      Hattie GONZALES LPN                          "

## 2018-02-19 NOTE — PROGRESS NOTES
"Colon and Rectal Surgery Follow-Up Clinic Note    RE: Ca Yan  : 1945  NUPUR: 2018    Ca Yan is a very pleasant 71 year old female here for follow-up of fecal incontinence.    Interval history: Ms. Yan has been seen in the past by myself, Dr. Latham and Dr. Lorenzo for fecal incontinence.   She reports dealing with fecal incontinence for the past 8 years. She continues to have about one bowel movement a day although sometimes this is erratic. Her bowel movements are subjectively very \"messy\" and she has to return to the bathroom several times to clean after bowel movements. She denies loosing a whole stool and denies any incontinence of flatus or nocturnal incontinence. She does have some urinary incontinence. She declines seeing urogynecology as she finds this manageable. She wears a pad and the urinary incontinence is worst with coughing/Valsalva. She denies feeling a prolapse. She has tried a fiber supplement in the past but does not like that it makes her feel full and bloated.    Past relevant history:  She has never had any anorectal procedures. She hd one vaginal birth of a baby of 8 pounds.     Her last colonoscopy was in  but was unable to be completed due to patient discomfort. A virtual colonoscopy was completed at that time with recommended repeat in 5 years.    She underwent pelvic floor testing in  with non relaxing publrectalis on defecography and slightly weaker tone with resting tone 25-31 and squeeze 6-38 mmHg. She declined biofeedback in the past.    PLEASE SEE NOTE BELOW FOR PHYSICAL EXAMINATION, REVIEW OF SYSTEMS, AND OTHER HISTORY.    Assessment/Plan: 71 year old female with ongoing fecal incontinence. We had a long discussion about pelvic floor dysfunction and fecal incontinence. We spoke about conservative treatment (stool bulkening) versus biofeedback versus sacral nerve stimulator and generally about outcomes associated with these. She " previously discussed Sacral Nerve Stimulation with Dr. Latham would thought she be a good candidate and she is interested in considering.  I answered her questions regarding the procedure. She has a few upcoming gynecologic surgeries and she would like to take care of those first.  She will do a bowel diary and she will plan to call in follow-up if she decides to schedule SNS. I again advised colonoscopy at this time as well which has previously decline and also today. Recommended she try going back on the fiber supplement for her intermittent constipation and diarrhea and she is agreeable to trying this again.    Patient's questions were answered to her stated satisfaction and she is in agreement with this plan.    Total time was 15 minutes, over 50% was spent counseling the patient.     PLEASE SEE NOTE BELOW FOR PHYSICAL EXAMINATION, REVIEW OF SYSTEMS, AND OTHER HISTORY.  --------------------------------------------------------------------------------------------------------------------------------------------------------------    MEDICATIONS:  Current Outpatient Prescriptions   Medication     simvastatin (ZOCOR) 20 MG tablet     losartan (COZAAR) 50 MG tablet     fluticasone (FLONASE) 50 MCG/ACT spray     naproxen (NAPROSYN) 500 MG tablet     diazepam (VALIUM) 2 MG tablet     hydrochlorothiazide 12.5 MG TABS tablet     naproxen (NAPROSYN) 500 MG tablet     latanoprost (XALATAN) 0.005 % ophthalmic solution     phenazopyridine (PYRIDIUM) 100 MG tablet     losartan (COZAAR) 25 MG tablet     umeclidinium (INCRUSE ELLIPTA) 62.5 MCG/INH oral inhaler     Urea 40 % CREA     order for DME     Blood Pressure Monitoring (BLOOD PRESSURE CUFF) MISC     order for DME     ciclopirox (LOPROX) 0.77 % cream     order for DME     ammonium lactate (LAC-HYDRIN) 12 % cream     ibuprofen (ADVIL,MOTRIN) 200 MG tablet     order for DME     acetaminophen (TYLENOL) 500 MG tablet     ASPIRIN EC PO     omeprazole (PRILOSEC) 20 MG  capsule     quetiapine (SEROQUEL) 200 MG tablet     No current facility-administered medications for this visit.      ALLERGIES:     Allergies   Allergen Reactions     Azithromycin      Other reaction(s): Itching     Codeine Nausea and Vomiting     Hydrocodone      Vomiting     Metronidazole Nausea     Percocet [Oxycodone-Acetaminophen]      Vomiting     Pollen Extract      Other reaction(s): Other, see comments  Pt states that she has sneezing and runny nose.      Seasonal Allergies Other (See Comments)     Pt states that she has sneezing and runny nose.      Vicodin [Hydrocodone-Acetaminophen]      Vomiting     Zolpidem Other (See Comments)     Amnestic behavior     Oxycodone Itching and Rash       PAST MEDICAL HISTORY:  Past Medical History:   Diagnosis Date     Anxiety      Arthritis      Breast cancer (H)      COPD (chronic obstructive pulmonary disease) (H)     6/19/12:  FEV 0.99 l     Hypertension      Low bone density 4/13/2017    DEXA April 12, 2017: T score -2.0. Normal Z score. FRAX risk: major osteoporotic fracture 11.9%, hip fracture 2.6%, therefore not high-risk     PROBLEM LIST:  Patient Active Problem List   Diagnosis     Non morbid obesity due to excess calories     Alcohol abuse     Malignant neoplasm of breast (H)     Chronic kidney disease, stage III (moderate)     Osteoarthritis of knee     Major depressive disorder with single episode     Diarrhea     Diastolic dysfunction     Osteoarthritis of spine     Gastroesophageal reflux disease     Generalized anxiety disorder     Hypertension     Hyperlipidemia     Insomnia     Irritable bowel syndrome     Kyphoscoliosis     Cluster C personality disorder     Seborrheic eczema     Anemia     Anxiety state     Asthma     Back pain     Bunion     Chronic obstructive pulmonary disease, unspecified COPD type (H)     Low bone density     Fecal incontinence     Left-sided low back pain without sciatica     PAST SURGICAL HISTORY:  Past Surgical History:  "  Procedure Laterality Date     BACK SURGERY      spinal fusion     LUMPECTOMY BREAST       ORTHOPEDIC SURGERY      Knee surgery left side       FAMILY HISTORY:  Family History   Problem Relation Age of Onset     Breast Cancer Mother      DIABETES Mother      Alcohol/Drug Father      MENTAL ILLNESS Father      CEREBROVASCULAR DISEASE Maternal Grandmother 67       SOCIAL HISTORY:  Social History   Substance Use Topics     Smoking status: Former Smoker     Types: Cigarettes     Quit date: 9/26/1980     Smokeless tobacco: Never Used     Alcohol use No      Comment: Sober for 2 years, previous alcoholic     Marital status: single.    Nursing Notes:   Jaycee Charles CMA  4/19/2017 10:31 AM  Signed  Chief Complaint   Patient presents with     Consult     consult       Vitals:    04/19/17 1027   BP: 134/83   BP Location: Right arm   Patient Position: Chair   Cuff Size: Adult Regular   Pulse: 83   Temp: 97.4  F (36.3  C)   SpO2: 91%   Weight: 197 lb 11.2 oz   Height: 5' 3\"       Body mass index is 35.02 kg/(m^2).      Jaycee Charles MA             Physical Examination:  BP (!) 156/101  Pulse 73  Temp 98.7  F (37.1  C) (Oral)  Ht 5' 4\"  Wt 195 lb  SpO2 91%  BMI 33.47 kg/m2   General: alert, oriented, in no acute distress, sitting comfortably  HEENT:mucous membranes moist  The remainder of the exam was deferred today    RUAPL Knight, NP-C  Colon and Rectal Surgery  [facility]          "

## 2018-02-21 ENCOUNTER — TELEPHONE (OUTPATIENT)
Dept: SURGERY | Facility: CLINIC | Age: 73
End: 2018-02-21

## 2018-02-21 ENCOUNTER — RADIANT APPOINTMENT (OUTPATIENT)
Dept: MAMMOGRAPHY | Facility: CLINIC | Age: 73
End: 2018-02-21
Payer: COMMERCIAL

## 2018-02-21 DIAGNOSIS — Z12.39 SCREENING BREAST EXAMINATION: ICD-10-CM

## 2018-02-21 NOTE — TELEPHONE ENCOUNTER
Call center contacted clinic that Ca has lost her bowel diary.  Ca had hung up before I was able to speak with her.    I called patient back, got her voicemail.  I left a message.  Keep looking.  Can us a regular notebook to document going forward, or we can mail her another one if she would like?  Requested call back if she would like us to mail another one.

## 2018-02-22 ENCOUNTER — TELEPHONE (OUTPATIENT)
Dept: OBGYN | Facility: CLINIC | Age: 73
End: 2018-02-22

## 2018-02-25 ENCOUNTER — TRANSFERRED RECORDS (OUTPATIENT)
Dept: HEALTH INFORMATION MANAGEMENT | Facility: CLINIC | Age: 73
End: 2018-02-25

## 2018-02-26 ENCOUNTER — TELEPHONE (OUTPATIENT)
Dept: INTERNAL MEDICINE | Facility: CLINIC | Age: 73
End: 2018-02-26

## 2018-02-26 NOTE — TELEPHONE ENCOUNTER
Pt Ph# [phone]     Pt of Dr Olivas     Discharged from [facility] ED 2/25/18 for pneumonia with orders to see PCC within 2 days. Pt needs FU appt and concerned on when she is able to leave the house since she is not feeling well, please call back- Per Pt.     Thank you,   Neeta     Appt on 03/0/2017with Dr Rodríguez at 1:03pm  Viri Webb RN 2:04 PM on 2/26/2018.

## 2018-03-01 ENCOUNTER — TELEPHONE (OUTPATIENT)
Dept: OBGYN | Facility: CLINIC | Age: 73
End: 2018-03-01

## 2018-03-06 ENCOUNTER — TELEPHONE (OUTPATIENT)
Dept: INTERNAL MEDICINE | Facility: CLINIC | Age: 73
End: 2018-03-06

## 2018-03-06 NOTE — TELEPHONE ENCOUNTER
----- Message from Neeta Rice sent at 3/6/2018 12:33 PM CST -----  Regarding: Pneumonia concerns   Contact: [phone]  Pt Ph# [phone]    Pt of Dr Olivas    PT called to get in sooner and unable. Pt said she has pneumonia and wants to speak with a nurse- Per Pt.   Pt refusing to cough. Will only blow her nose.  Advised pt to cough and deep breathe and repeat-pt refusing.  Pt states she is not getting any better. No fever, no SOB. Just feels lousy.  Pt encouraged to keep appt on the 8th of March with Dr Rodriguez.   Suggested UC or ER if pt symptoms change and she fell worse. Pt declined.

## 2018-03-08 ENCOUNTER — OFFICE VISIT (OUTPATIENT)
Dept: INTERNAL MEDICINE | Facility: CLINIC | Age: 73
End: 2018-03-08
Payer: COMMERCIAL

## 2018-03-08 ENCOUNTER — APPOINTMENT (OUTPATIENT)
Dept: LAB | Facility: CLINIC | Age: 73
End: 2018-03-08
Payer: COMMERCIAL

## 2018-03-08 VITALS
OXYGEN SATURATION: 89 % | DIASTOLIC BLOOD PRESSURE: 85 MMHG | TEMPERATURE: 99 F | HEART RATE: 87 BPM | SYSTOLIC BLOOD PRESSURE: 126 MMHG

## 2018-03-08 DIAGNOSIS — K21.9 GASTROESOPHAGEAL REFLUX DISEASE WITHOUT ESOPHAGITIS: ICD-10-CM

## 2018-03-08 DIAGNOSIS — J18.9 UNRESOLVED PNEUMONIA: ICD-10-CM

## 2018-03-08 DIAGNOSIS — K12.0 APHTHOUS ULCER OF MOUTH: ICD-10-CM

## 2018-03-08 DIAGNOSIS — J18.9 PNEUMONIA DUE TO INFECTIOUS ORGANISM, UNSPECIFIED LATERALITY, UNSPECIFIED PART OF LUNG: ICD-10-CM

## 2018-03-08 DIAGNOSIS — J32.9 SINUSITIS, UNSPECIFIED CHRONICITY, UNSPECIFIED LOCATION: Primary | ICD-10-CM

## 2018-03-08 LAB
BASOPHILS # BLD AUTO: 0.1 10E9/L (ref 0–0.2)
BASOPHILS NFR BLD AUTO: 0.9 %
DIFFERENTIAL METHOD BLD: NORMAL
EOSINOPHIL # BLD AUTO: 0.1 10E9/L (ref 0–0.7)
EOSINOPHIL NFR BLD AUTO: 2 %
ERYTHROCYTE [DISTWIDTH] IN BLOOD BY AUTOMATED COUNT: 14.4 % (ref 10–15)
HCT VFR BLD AUTO: 43.4 % (ref 35–47)
HGB BLD-MCNC: 13.7 G/DL (ref 11.7–15.7)
IMM GRANULOCYTES # BLD: 0 10E9/L (ref 0–0.4)
IMM GRANULOCYTES NFR BLD: 0.2 %
LYMPHOCYTES # BLD AUTO: 1.4 10E9/L (ref 0.8–5.3)
LYMPHOCYTES NFR BLD AUTO: 23 %
MCH RBC QN AUTO: 29.6 PG (ref 26.5–33)
MCHC RBC AUTO-ENTMCNC: 31.6 G/DL (ref 31.5–36.5)
MCV RBC AUTO: 94 FL (ref 78–100)
MONOCYTES # BLD AUTO: 0.6 10E9/L (ref 0–1.3)
MONOCYTES NFR BLD AUTO: 10.9 %
NEUTROPHILS # BLD AUTO: 3.7 10E9/L (ref 1.6–8.3)
NEUTROPHILS NFR BLD AUTO: 63 %
NRBC # BLD AUTO: 0 10*3/UL
NRBC BLD AUTO-RTO: 0 /100
PLATELET # BLD AUTO: 279 10E9/L (ref 150–450)
RBC # BLD AUTO: 4.63 10E12/L (ref 3.8–5.2)
WBC # BLD AUTO: 5.9 10E9/L (ref 4–11)

## 2018-03-08 RX ORDER — OMEPRAZOLE 40 MG/1
40 CAPSULE, DELAYED RELEASE ORAL DAILY
Qty: 90 CAPSULE | Refills: 3 | Status: SHIPPED | OUTPATIENT
Start: 2018-03-08 | End: 2019-04-15

## 2018-03-08 RX ORDER — IPRATROPIUM BROMIDE 42 UG/1
2 SPRAY, METERED NASAL 4 TIMES DAILY PRN
Qty: 1 BOX | Refills: 1 | Status: SHIPPED | OUTPATIENT
Start: 2018-03-08 | End: 2019-04-15

## 2018-03-08 ASSESSMENT — PAIN SCALES - GENERAL: PAINLEVEL: NO PAIN (0)

## 2018-03-08 NOTE — NURSING NOTE
Chief Complaint   Patient presents with     Hospital F/U     Patient is here to follow up from the hospital. Records in [facility].      Ada Murray LPN at 12:48 PM on 3/8/2018.

## 2018-03-08 NOTE — PATIENT INSTRUCTIONS
[facility]: [phone]     [facility] Medication Refill Request Information:  * Please contact your pharmacy regarding ANY request for medication refills.  ** [facility] Prescription Fax = [phone]  * Please allow 3 business days for routine medication refills.  * Please allow 5 business days for controlled substance medication refills.     [facility] Test Result notification information:  *You will be notified with in 7-10 days of your appointment day regarding the results of your test.  If you are on MyChart you will be notified as soon as the provider has reviewed the results and signed off on them.

## 2018-03-08 NOTE — PROGRESS NOTES
PRIMARY CARE CENTER       SUBJECTIVE:  Ca Yan is a 72 year old female with a PMHx of COPD who comes in for follow up for PNA.  She was seen in the ED on 2/25 and given a 5 days of levaquin and albuterol nebs.  She takes a neb once daily.  Patient has continued to have subjective fevers (Tmax 99).  She is fatigued and sleeping all day.  Patient is too tired to take a shower.  Worsening SOB.  She has been coughing up thick phlegm that is white in color.  No nausea, vomiting, or diarrhea.  The coughing is worse at night.  She feels a tickle in the back of her throat and has a lot of clear nasal drainage during the day.  Patient feels like it is difficult for her to cough out sputum.  She has difficulty taking full breaths.  Also having recurrent infections.  Patient does not sleep well at night due to coughing.  Feels some postprandial discomfort and bloating.  Patient has noticed that coffee makes these symptoms worse.  No nausea or vomiting.  Denies any melena.  Patient lives alone and is very anxious about being her symptoms.  She started getting painful sores in her mouth since the pna.  This has made it difficult to eat or drink due to pain.      Medications and allergies reviewed by me today.     ROS:   Constitutional, neuro, ENT, endocrine, pulmonary, cardiac, gastrointestinal, genitourinary, musculoskeletal, integument and psychiatric systems are negative, except as otherwise noted.    OBJECTIVE:    /85  Pulse 87  Temp 99  F (37.2  C) (Oral)  SpO2 (!) 89%  Breastfeeding? No   Wt Readings from Last 1 Encounters:   02/19/18 88.5 kg (195 lb)       GENERAL APPEARANCE: anxious woman sitting in wheelchair     EYES: EOMI, PERRL     HENT: cerumen right and aphthous ulcers on b/l sides of tongue and inside bottom lip     RESP: rough breath sounds greater on left but scoliosis limited ability to compare sides accurately     CV: regular rates and rhythm, normal S1 S2, no S3 or S4  and no murmur, click or rub     ABDOMEN:  soft, nontender, no HSM or masses and bowel sounds normal     MS: scoliosis with curvature toward right side     EXT: b/l lymphedema LE     NEURO: Normal strength and tone     PSYCH: anxious, worried and somatic     ASSESSMENT/PLAN:    Ca was seen today for hospital f/u.    Diagnoses and all orders for this visit:    Sinusitis, unspecified chronicity, unspecified location  -     ipratropium (ATROVENT) 0.06 % spray; Spray 2 sprays into both nostrils 4 times daily as needed for rhinitis    Gastroesophageal reflux disease without esophagitis  Night coughing could be a result of GERD.  She does have some postprandial bloating and abdominal discomfort.  She noticed that this is worse when she drinks coffee.  Treating for GERD may help resolve some of her current symptoms.   -     omeprazole (PRILOSEC) 40 MG capsule; Take 1 capsule (40 mg) by mouth daily    Pneumonia due to infectious organism, unspecified laterality, unspecified part of lung  WBC 5.9  -     CBC with platelets and differential    Unresolved pneumonia vs COPD vs bronchitis vs GERD vs asthma vs allergies vs anxiety  Did not get a CXR because no comparison was available.  She does not currently have a leukocytosis (WBC 5.9).  It is likely that her pulmonary symptoms may improve with pulmonary rehab rather than further tx with abx.  Her scoliosis may be playing a role in her   -     PULMONARY REHAB REFERRAL    Aphthous ulcer of mouth  -     magic mouthwash (ENTER INGREDIENTS IN COMMENTS) suspension; Swish and spit 5-10 mLs in mouth every 6 hours as needed     Pt should return to clinic for f/u with me as needed.    Delmi Stone MD  Mar 8, 2018    Pt was seen and plan of care discussed with Dr. Grier.     Pt was seen and examined with Dr. Stone.  I agree with her documentation as noted above.    My additional comments: None    Alan Grier MD

## 2018-03-08 NOTE — MR AVS SNAPSHOT
After Visit Summary   3/8/2018    Ca Yan    MRN: [id number]           Patient Information     Date Of Birth          1945        Visit Information        Provider Department      3/8/2018 1:00 PM Delmi Stone MD [facility]        Today's Diagnoses     Sinusitis, unspecified chronicity, unspecified location    -  1    Gastroesophageal reflux disease without esophagitis        Pneumonia due to infectious organism, unspecified laterality, unspecified part of lung        Unresolved pneumonia        Aphthous ulcer of mouth          Care Instructions    Primary Care Center: [phone]     Primary Care Center Medication Refill Request Information:  * Please contact your pharmacy regarding ANY request for medication refills.  ** [facility] Prescription Fax = [phone]  * Please allow 3 business days for routine medication refills.  * Please allow 5 business days for controlled substance medication refills.     [facility] Test Result notification information:  *You will be notified with in 7-10 days of your appointment day regarding the results of your test.  If you are on MyChart you will be notified as soon as the provider has reviewed the results and signed off on them.            Follow-ups after your visit        Additional Services     PULMONARY REHAB REFERRAL       *This therapy referral will be filtered to a centralized scheduling office at [facility] and the patient will receive a call to schedule an appointment at a [place] location most convenient for them.*     If you have not heard from the scheduling office within 2 business days, please call [phone] for all locations.    Please be aware that coverage of these services is subject to the terms and limitations of your health insurance plan.  Call member services at your health plan with any benefit or coverage questions.      **Note to Provider:  If you are referring outside of  "[place] for the therapy appointment, please list the name of the location in the \"special instructions\" above, print the referral and give to the patient to schedule the appointment.                     Your next 10 appointments already scheduled     Mar 08, 2018  2:15 PM CST   LAB with  LAB    Health Lab ([facility])    [address]  1st Floor  [facility] [id number] [phone]           Please do not eat 10-12 hours before your appointment if you are coming in fasting for labs on lipids, cholesterol, or glucose (sugar). This does not apply to pregnant women. Water, hot tea and black coffee (with nothing added) are okay. Do not drink other fluids, diet soda or chew gum.            Mar 20, 2018 11:00 AM CDT   W/C Keilying Vanita with Kelly Ferraro, OT   [facility], [place], Occupational Therapy - Outpatient ([facility])    [address], [address]  [place] [id number] [phone]              Who to contact     Please call your clinic at [phone] to:    Ask questions about your health    Make or cancel appointments    Discuss your medicines    Learn about your test results    Speak to your doctor            Additional Information About Your Visit        MyChart Information     Comeett is an electronic gateway that provides easy, online access to your medical records. With HOLLR, you can request a clinic appointment, read your test results, renew a prescription or communicate with your care team.     To sign up for Comeett visit the website at [email or web address]   You will be asked to enter the access code listed below, as well as some personal information. Please follow the directions to create your username and password.     Your access code is: [email or web address]  Expires: 3/12/2018  6:30 AM     Your access code will  in 90 days. If you need help or a new code, please contact your [facility] or call [phone] for assistance.        Care EveryWhere ID     This is your Care EveryWhere ID. This could be used by other organizations to access your [place] medical records  [id number]        Your Vitals Were     Pulse Temperature Pulse Oximetry Breastfeeding?          87 99  F (37.2  C) (Oral) 89% No         Blood Pressure from Last 3 Encounters:   03/08/18 126/85   02/19/18 (!) 156/101   02/14/18 132/83    Weight from Last 3 Encounters:   02/19/18 88.5 kg (195 lb)   02/14/18 88.6 kg (195 lb 6.4 oz)   02/05/18 87.1 kg (192 lb)              We Performed the Following     CBC with platelets and differential     PULMONARY REHAB REFERRAL          Today's Medication Changes          These changes are accurate as of 3/8/18  2:12 PM.  If you have any questions, ask your nurse or doctor.               Start taking these medicines.        Dose/Directions    ipratropium 0.06 % spray   Commonly known as:  ATROVENT   Used for:  Sinusitis, unspecified chronicity, unspecified location   Started by:  Delmi Stone MD        Dose:  2 spray   Spray 2 sprays into both nostrils 4 times daily as needed for rhinitis   Quantity:  1 Box   Refills:  1       magic mouthwash suspension   Commonly known as:  ENTER INGREDIENTS IN COMMENTS   Used for:  Aphthous ulcer of mouth   Started by:  Delmi Stone MD        Dose:  5-10 mL   Swish and spit 5-10 mLs in mouth every 6 hours as needed   Quantity:  180 mL   Refills:  1         These medicines have changed or have updated prescriptions.        Dose/Directions    * omeprazole 40 MG capsule   Commonly known as:  priLOSEC   This may have changed:  You were already taking a medication with the same name, and this prescription was added. Make sure you understand how and when to take each.   Used for:  Gastroesophageal reflux disease without esophagitis   Changed by:  Delmi Stone MD        Dose:  40 mg   Take 1 capsule (40 mg) by mouth daily   Quantity:  90  capsule   Refills:  3       * omeprazole 20 MG CR capsule   Commonly known as:  priLOSEC   This may have changed:  Another medication with the same name was added. Make sure you understand how and when to take each.   Changed by:  Delmi Stone MD        Dose:  20 mg   Take 20 mg by mouth as needed   Refills:  0       * Notice:  This list has 2 medication(s) that are the same as other medications prescribed for you. Read the directions carefully, and ask your doctor or other care provider to review them with you.      Stop taking these medicines if you haven't already. Please contact your care team if you have questions.     phenazopyridine 100 MG tablet   Commonly known as:  PYRIDIUM   Stopped by:  Delmi Stone MD                Where to get your medicines      These medications were sent to [address] [place] [address] [address] [place] [id number]    Hours:  TRANSPLANT PHONE NUMBER [phone] Phone:  [phone]     ipratropium 0.06 % spray    omeprazole 40 MG capsule         Some of these will need a paper prescription and others can be bought over the counter.  Ask your nurse if you have questions.     Bring a paper prescription for each of these medications     magic mouthwash suspension                Primary Care Provider Office Phone # Fax #    Senia Olivas [phone]       [address] [facility] [address]  [facility] [address]        Equal Access to Services     [facility]: Hadii hollie guerrero hadjose fo Sosebastian, waaxda luqadaha, qaybta kaalmada adeegyada, sushma wisdom . So [facility] [phone].    ATENCIÓN: Si habla español, tiene a maxwell disposición servicios gratuitos de asistencia lingüística. Llame al [phone].    We comply with applicable federal civil rights laws and [place] laws. We do not discriminate on the basis of race, color, national origin, age, disability, sex, sexual  orientation, or gender identity.            Thank you!     Thank you for choosing [facility]  for your care. Our goal is always to provide you with excellent care. Hearing back from our patients is one way we can continue to improve our services. Please take a few minutes to complete the written survey that you may receive in the mail after your visit with us. Thank you!             Your Updated Medication List - Protect others around you: Learn how to safely use, store and throw away your medicines at www.disposemymeds.org.          This list is accurate as of 3/8/18  2:12 PM.  Always use your most recent med list.                   Brand Name Dispense Instructions for use Diagnosis    acetaminophen 500 MG tablet    TYLENOL     Take 500-1,000 mg by mouth every 6 hours as needed for mild pain        ammonium lactate 12 % cream    LAC-HYDRIN    385 g    Apply topically 2 times daily as needed for dry skin    Bilateral leg edema       ASPIRIN EC PO      Take 81 mg by mouth        Blood Pressure Cuff Misc     1 each    Imron blood cuff  Please check your blood pressure 2-3 times per week.  Goal is <140/90    Benign essential hypertension       ciclopirox 0.77 % cream    LOPROX    90 g    Apply topically 2 times daily To feet and toenails.    Dermatophytosis of nail, Tinea pedis of both feet       diazepam 2 MG tablet    VALIUM    1 tablet    Take 1 tablet (2 mg) by mouth every 6 hours as needed for anxiety or sleep    Claustrophobia       fluticasone 50 MCG/ACT spray    FLONASE    1 Bottle    Spray 1 spray into both nostrils daily    Chronic allergic rhinitis, unspecified seasonality, unspecified trigger       hydrochlorothiazide 12.5 MG Tabs tablet     60 tablet    Take 1 tablet (12.5 mg) by mouth daily    Hyperlipemia       ibuprofen 200 MG tablet    ADVIL/MOTRIN    90 tablet    Take 3 tablets (600 mg) by mouth 3 times daily (with meals)    Acute left-sided low back pain without sciatica        ipratropium 0.06 % spray    ATROVENT    1 Box    Spray 2 sprays into both nostrils 4 times daily as needed for rhinitis    Sinusitis, unspecified chronicity, unspecified location       latanoprost 0.005 % ophthalmic solution    XALATAN     1 drop        * losartan 25 MG tablet    COZAAR    90 tablet    Take 1 tablet (25 mg) by mouth daily    Benign essential hypertension       * losartan 50 MG tablet    COZAAR    90 tablet    Take 1 tablet (50 mg) by mouth daily    Benign essential hypertension       magic mouthwash suspension    ENTER INGREDIENTS IN COMMENTS    180 mL    Swish and spit 5-10 mLs in mouth every 6 hours as needed    Aphthous ulcer of mouth       * naproxen 500 MG tablet    NAPROSYN    30 tablet    Take 1 tablet (500 mg) by mouth 2 times daily (with meals)    Acute left-sided low back pain without sciatica       * naproxen 500 MG tablet    NAPROSYN    60 tablet    Take 1 tablet (500 mg) by mouth 2 times daily as needed for moderate pain    Acute bilateral low back pain without sciatica       * omeprazole 40 MG capsule    priLOSEC    90 capsule    Take 1 capsule (40 mg) by mouth daily    Gastroesophageal reflux disease without esophagitis       * omeprazole 20 MG CR capsule    priLOSEC     Take 20 mg by mouth as needed        * order for DME     1 Device    Equipment being ordered: Oxygen  Please provide portable oxygen concentrator (pt would like over the shoulder oxygen device) for portability at 2LPM with activity via nasal canula.    Pulmonary hypertension       * order for DME     2 each    Equipment being ordered: knee high compression stockings, class 1 or 2, night time velcro compression garments, lymphedema bandaging supplies, darco boots to wear during treatment    Bilateral leg edema       * order for DME     1 Units    Equipment being ordered: portable walker with seat and compartment under the seat.  Use for going out of the house on errands, where prolonged standing is required.    Chronic  obstructive pulmonary disease, unspecified COPD type (H)       * order for DME     1 Device    Equipment being ordered: Wheelchair Sig: Equipment being ordered: portable walker with seat and compartment under the seat.   Use for going out of the house on errands, where prolonged standing is required.    COPD (chronic obstructive pulmonary disease) (H)       QUEtiapine 200 MG tablet    SEROquel     Take 200 mg by mouth At Bedtime.        simvastatin 20 MG tablet    ZOCOR    90 tablet    Take 1 tablet (20 mg) by mouth daily    Hyperlipidemia, unspecified hyperlipidemia type       umeclidinium 62.5 MCG/INH oral inhaler    INCRUSE ELLIPTA     Inhale 1 puff into the lungs        Urea 40 % Crea     198 g    Externally apply topically daily To feet and toenails.    Onychauxis, Dermatophytosis of nail, Tyloma       * Notice:  This list has 10 medication(s) that are the same as other medications prescribed for you. Read the directions carefully, and ask your doctor or other care provider to review them with you.

## 2018-03-12 ENCOUNTER — TELEPHONE (OUTPATIENT)
Dept: INTERNAL MEDICINE | Facility: CLINIC | Age: 73
End: 2018-03-12

## 2018-03-12 NOTE — TELEPHONE ENCOUNTER
Call to pt, stated that she continue to feel poorly, increased fatigue, low energy, SOB, poor appetite, and  Cough. Pt was seen by  on Thursday.  Appt offered for tomorrow ,but declined, requesting to send a message to primary for recommendations.  Please advise.  Asia Steiner RN        ----- Message from Jacquelyn Olvera sent at 3/12/2018  9:26 AM CDT -----  Regarding: Lab results  Contact: [phone]  Patient calling wanting her lab results from Thursday.  Please call her      I call this patient with severe COPD who in late February was diagnosed with probable pneumonia treated with antibiotics for five days. She was seen by  on March 8, where exams unchanged. Refer to ordinary rehab which is not done yet.    Today she is feeling very tired and hungry. She does not have sputum production, fever, or wheezing. She is exhausted however.  Her only pulmonary medications are albuterol and Incruse.      I recommended that she go to pulmonary rehab. That we start along acting bronchodilators with held corticosteroids, and that she returned to see me in clinic within a week. There is no indication for antibiotic.    Senia Olivas MD

## 2018-03-14 ENCOUNTER — TELEPHONE (OUTPATIENT)
Dept: INTERNAL MEDICINE | Facility: CLINIC | Age: 73
End: 2018-03-14

## 2018-03-14 DIAGNOSIS — J44.1 COPD EXACERBATION (H): Primary | ICD-10-CM

## 2018-03-14 NOTE — TELEPHONE ENCOUNTER
----- Message from Jaja Ponce sent at 3/14/2018 10:48 AM CDT -----  Regarding: Pt calling about pneumonia symptoms requesting alternative nurse   Contact: [phone]  Pt calling wanting to know when she is going to start feeling better, Per pt she has had pneumonia for the last 2 1/2 weeks and still feels terrible. Pt is now extremely hungry all the time but per pt still feels lousy. Per pt she wants an answer about when she will feel better. Pt requested to speak to another nurse about these issues.   Pt can be reached at [phone]    Pt called and she states she doesn't have a fever.States she is hungry all the time. No cough. Requesting to f/u with Dr Olivas-Mar 26th at 11:10am. SOB at times will try the Breo inhaler once it come.    Viri Webb RN 2:08 PM on 3/14/2018.

## 2018-03-20 ENCOUNTER — HOSPITAL ENCOUNTER (OUTPATIENT)
Dept: OCCUPATIONAL THERAPY | Facility: CLINIC | Age: 73
Setting detail: THERAPIES SERIES
End: 2018-03-20
Attending: INTERNAL MEDICINE
Payer: COMMERCIAL

## 2018-03-20 PROCEDURE — 97542 WHEELCHAIR MNGMENT TRAINING: CPT | Mod: GO | Performed by: OCCUPATIONAL THERAPIST

## 2018-03-20 PROCEDURE — 40000132 ZZH STATISTIC OT SEATING/WHEELED MOBILITY VISIT: Performed by: OCCUPATIONAL THERAPIST

## 2018-03-20 NOTE — PROGRESS NOTES
03/20/18 1200   Quick Adds   Quick Adds Certification   General Information (PT: include personal factors and/or comorbidities that impact the POC; OT: include additional occupational profile info)   Rehab Discipline OT   Funding Medicare   Service Occupational Therapy;Outpatient;Seating/Wheeled Mobility Evaluation   Start Of Care Date 03/20/18   Referring Physician Senia Olivas   Orders Evaluate And Treat As Indicated;Per Therapist Evaluation   Orders Date 02/05/18   Patient/Caregiver Goals wheelchair to be pushed in at the store   Rehabilitation Technology Supplier [employer] REAVES from [employer]   Current Community Support Family/Friend Caregiver   Medical History   Onset Of Illness/injury Or Date Of Surgery 2/5/18  (order)   Medical Diagnosis COPD, anxiety   Medical History spinal fusion, L knee surgery, alcohol abuse, breast cancer with surger, Personality disorder   Cardio-Respiratory Status O2  (in home only)   Home Accessibility   [facility]   Community ADL   Transportation Transportation Services   Balance   Unsupported Sitting Balance Within Functional Limits   Sitting Balance in Chair Within Functional Limits   Standing Balance Within Functional Limits   Ambulation   Ambulation Ambulatory   Ambulation Assist Independent   Transfers   Transfer Assist Independent   Education Assessment   Barriers to Learning Emotional   Preferred Learning Style Demonstration;Listening;Pictures/Video   Assessment/Plan   Criteria for Skilled Interventions Met Evaluation Only   Treatment Diagnosis weakness limits community mobility   Therapy Frequency once   Planned Therapy Interventions Wheelchair Management/Propulsion Training   Planned Therapy Interventions Comments Educated on payment process for desired equipment.  Patient wanting a light chair to be pushed in at the store.  Medicare will not cover these devices.  Educated on transport w/c's and issued resources via [employer] or [employer] to  obtain a cheap one, 85$.   Risks and benefits of treatment have been explained Yes   Patient/family & other staff in agreement with plan of care Yes   Session Time   Total Treatment Time 20   Total Session Time 20   Certification   Certification date from 03/20/18   Certification date to 03/20/18   Adult OT Eval Goals   OT Eval Goals (Adult) 1   OT Goal 1   Goal Identifier w/c   Goal Description Patient/family demonstrates understanding of equipment to reduce risk to patient/caregiver during ADL   Target Date 03/20/18   Electronically signed by:  Kelly CRABTREE/L, ATP      Occupational Therapist, Assistive   [phone]      fax: [phone]      [email or web address], [address]  [address]  [place]   [address]

## 2018-03-26 ENCOUNTER — OFFICE VISIT (OUTPATIENT)
Dept: INTERNAL MEDICINE | Facility: CLINIC | Age: 73
End: 2018-03-26
Payer: COMMERCIAL

## 2018-03-26 VITALS
DIASTOLIC BLOOD PRESSURE: 97 MMHG | SYSTOLIC BLOOD PRESSURE: 150 MMHG | BODY MASS INDEX: 33.59 KG/M2 | WEIGHT: 195.7 LBS | OXYGEN SATURATION: 91 % | TEMPERATURE: 98.5 F | HEART RATE: 77 BPM

## 2018-03-26 DIAGNOSIS — J44.9 COPD, SEVERE (H): Primary | ICD-10-CM

## 2018-03-26 ASSESSMENT — PAIN SCALES - GENERAL: PAINLEVEL: SEVERE PAIN (6)

## 2018-03-26 NOTE — PROGRESS NOTES
[facility]  Primary Care Center   Senia Olivas MD  03/26/2018      Chief Complaint:   Cough       History of Present Illness:   Ca Yan is a 72 year old female with a history of malignant neoplasm of breast, severe COPD, stage 3 CKD, hypertension, hyperlipidemia, and RENATO, who presents alone for evaluation of cough. The patient recently had pneumonia and was treated. She experienced complications due to her COPD with emphysema since this. The patient was taking Advair for management, however her insurance stopped covering this medication, therefore she was prescribed Breo. The patient has also been prescribed Incruse. It is unclear which of these medications the patient has been taking at home. She mentions that over the last week, her breathing has remained about the same. Overall, the patient's O2 levels have been decreasing. The patient mentions increased wheezing with laying down. She denies chest tightness. The patient does have an upcoming appointment scheduled with her pulmonologist, Dr. Juan Patel, at [facility]. She is interested getting set up with at home oxygen as she cannot walk across a room without getting out of breath.      Of note, the patient does mention occasional upper left abdominal cramping that are exacerbated with wearing of her bra.      Health Care Maintenance/Other:  1. Blood pressure medication- not taken yet this morning   2. Immunizations- currently up to date     Review of Systems:   Pertinent items are noted in HPI, remainder of complete ROS is negative.      Active Medications:      fluticasone-vilanterol (BREO ELLIPTA) 200-25 MCG/INH oral inhaler, Inhale 1 puff into the lungs daily, Disp: 1 Inhaler, Rfl: 3     ipratropium (ATROVENT) 0.06 % spray, Spray 2 sprays into both nostrils 4 times daily as needed for rhinitis, Disp: 1 Box, Rfl: 1     omeprazole (PRILOSEC) 40 MG capsule, Take 1 capsule (40 mg) by mouth daily, Disp: 90 capsule, Rfl: 3     magic mouthwash  (ENTER INGREDIENTS IN COMMENTS) suspension, Swish and spit 5-10 mLs in mouth every 6 hours as needed, Disp: 180 mL, Rfl: 1     simvastatin (ZOCOR) 20 MG tablet, Take 1 tablet (20 mg) by mouth daily, Disp: 90 tablet, Rfl: 3     losartan (COZAAR) 50 MG tablet, Take 1 tablet (50 mg) by mouth daily, Disp: 90 tablet, Rfl: 3     fluticasone (FLONASE) 50 MCG/ACT spray, Spray 1 spray into both nostrils daily, Disp: 1 Bottle, Rfl: 1     naproxen (NAPROSYN) 500 MG tablet, Take 1 tablet (500 mg) by mouth 2 times daily as needed for moderate pain, Disp: 60 tablet, Rfl: 1     diazepam (VALIUM) 2 MG tablet, Take 1 tablet (2 mg) by mouth every 6 hours as needed for anxiety or sleep, Disp: 1 tablet, Rfl: 0     hydrochlorothiazide 12.5 MG TABS tablet, Take 1 tablet (12.5 mg) by mouth daily, Disp: 60 tablet, Rfl: 1     naproxen (NAPROSYN) 500 MG tablet, Take 1 tablet (500 mg) by mouth 2 times daily (with meals), Disp: 30 tablet, Rfl: 1     latanoprost (XALATAN) 0.005 % ophthalmic solution, 1 drop, Disp: , Rfl:      losartan (COZAAR) 25 MG tablet, Take 1 tablet (25 mg) by mouth daily, Disp: 90 tablet, Rfl: 0     umeclidinium (INCRUSE ELLIPTA) 62.5 MCG/INH oral inhaler, Inhale 1 puff into the lungs, Disp: , Rfl:      Urea 40 % CREA, Externally apply topically daily To feet and toenails., Disp: 198 g, Rfl: 5     ciclopirox (LOPROX) 0.77 % cream, Apply topically 2 times daily To feet and toenails., Disp: 90 g, Rfl: 6     ammonium lactate (LAC-HYDRIN) 12 % cream, Apply topically 2 times daily as needed for dry skin, Disp: 385 g, Rfl: 3     ibuprofen (ADVIL,MOTRIN) 200 MG tablet, Take 3 tablets (600 mg) by mouth 3 times daily (with meals), Disp: 90 tablet, Rfl: 0     acetaminophen (TYLENOL) 500 MG tablet, Take 500-1,000 mg by mouth every 6 hours as needed for mild pain, Disp: , Rfl:      ASPIRIN EC PO, Take 81 mg by mouth, Disp: , Rfl:      omeprazole (PRILOSEC) 20 MG capsule, Take 20 mg by mouth as needed , Disp: , Rfl:      quetiapine  (SEROQUEL) 200 MG tablet, Take 200 mg by mouth At Bedtime., Disp: , Rfl:       Allergies:   Azithromycin  Codeine  Hydrocodone  Metronidazole  Percocet [oxycodone-acetaminophen]  Pollen extract  Seasonal allergies  Vicodin [hydrocodone-acetaminophen]  Zolpidem  Oxycodone      Past Medical History:  Anxiety state   Arthritis   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   Low bone density   Non-morbid obesity due to excess calories   Alcohol abuse   Malignant neoplasm of breast    Chronic kidney disease, stage 3 (moderate)   Osteoarthritis of knee   Major depressive disorder (MDD) with single episode   Diarrhea   Diastolic dysfunction   Gastroesophageal reflux disease (GERD)   Generalized anxiety disorder (RENATO)   Hypertension   Hyperlipidemia    Insomnia   Irritable bowel syndrome (IBS)  Kyphoscoliosis   Cluster C personality disorder   Seborrheic eczema   Anemia   Asthma   Bunion   Fecal incontinence   Left-sided low back pain without sciatica     Past Surgical History:  Spinal fusion   Breast lumpectomy   Left knee surgery     Family History:   Breast cancer   Diabetes   Alcohol/drug   Mental illness unspecified   Cerebrovascular disease       Social History:   The patient is single, a former smoker (quit date: 09/1980), and does have a history of alcohol consumption.      Physical Exam:   BP (!) 150/97  Pulse 77  Temp 98.5  F (36.9  C) (Oral)  Wt 88.8 kg (195 lb 11.2 oz)  SpO2 91%  BMI 33.59 kg/m2      Constitutional: Alert, no distress and cooperative  Respiratory: Reduced ventilation.  Hyperresonant lungs.  Trace wheezing. No crackles.   Legs: 1+ bilateral pretibial edema     Assessment and Plan:  1.  COPD with recent pneumonia, questionable medication use   the patient was recently treated for pneumonia.  She states that her breathing is improved.  Her insurance company will no longer pay for Advair so I switched her recently to Brio.  She is really not sure what medication she is taking  however.  She was prescribed Breo and Incruse, however it is unclear what medication she is currently taking at home at this time.  Most recent FEV1 in the chart is 0.92 L.  She has pretibial edema, consistent with mild cor pulmonale.  She would be a candidate to consider for O2 treatment.    The patient is interested in being prescribed at home oxygen as she typically becomes short of breath while walking across a room. She does have an appointment with her pulmonologist, Dr. Juan Patel, in the near future. It would be appropriate to further discuss this intervention at that time as the patient's O2 levels have been chronically low.  I have asked my nurse to give her a call this afternoon to determine which of the 2 inhalers you she is using and needs.  I will then renew the one that she does not have.    2. Routine maintenance  The patient's immunization status is currently up to date.     3.  Essential hypertension.  The patient did NOT take her blood pressure medication this morning.     Follow-up: Patient should follow up to clinic on a p.r.n. basis.          Scribe Disclosure:  I, Eusebia Patiño, am serving as a scribe to document services personally performed by Senia Olivas MD at this visit, based upon the provider's statements to me. All documentation has been reviewed by the aforementioned provider prior to being entered into the official medical record.     Portions of this medical record were completed by a scribe. UPON MY REVIEW AND AUTHENTICATION BY ELECTRONIC SIGNATURE, this confirms (a) I performed the applicable clinical services, and (b) the record is accurate.     I spent a total of 15 minutes face-to-face with Ca Yan during today's office visit.  Over 50% of this time was spent counseling the patient and/or coordinating care regarding severe COPD.  See note for details.    Senia Olivas MD

## 2018-03-26 NOTE — MR AVS SNAPSHOT
After Visit Summary   3/26/2018    Ca Yan    MRN: [id number]           Patient Information     Date Of Birth          1945        Visit Information        Provider Department      3/26/2018 11:10 AM Senia Olivas MD [facility]        Today's Diagnoses     COPD, severe (H)    -  1       Follow-ups after your visit        Your next 10 appointments already scheduled     2018  1:45 PM CDT   Pulmonary Eval with [facility]   [facility], [place], Cardiac Rehabilitation ([facility])    [address] [place] [address] [address]st [address]  [facility] [address] [phone]              Who to contact     Please call your clinic at [phone] to:    Ask questions about your health    Make or cancel appointments    Discuss your medicines    Learn about your test results    Speak to your doctor            Additional Information About Your Visit        MyChart Information     [employer] is an electronic gateway that provides easy, online access to your medical records. With [facility] [employer], you can request a clinic appointment, read your test results, renew a prescription or communicate with your care team.     To sign up for [employer] visit the website at [email or web address]   You will be asked to enter the access code listed below, as well as some personal information. Please follow the directions to create your username and password.     Your access code is: [email or web address]  Expires: 2018  6:30 AM     Your access code will  in 90 days. If you need help or a new code, please contact your [facility] or call [phone] for assistance.        Care EveryWhere ID     This is your Care EveryWhere ID. This could be used by other organizations to access your [place] medical records  [id number]        Your Vitals Were     Pulse Temperature Pulse Oximetry BMI (Body Mass Index)           77 98.5  F (36.9  C) (Oral) 91% 33.59 kg/m2         Blood Pressure from Last 3 Encounters:   03/26/18 (!) 150/97   03/08/18 126/85   02/19/18 (!) 156/101    Weight from Last 3 Encounters:   03/26/18 88.8 kg (195 lb 11.2 oz)   02/19/18 88.5 kg (195 lb)   02/14/18 88.6 kg (195 lb 6.4 oz)              Today, you had the following     No orders found for display       Primary Care Provider Office Phone # Fax #    Senia Olivas [phone] [address] [address]        Equal Access to Services     MUSHTAQ BROWN : Hadvelma bairdo Sosebastian, waaxda luqadaha, qaybta kaalmada adeegyada, sushma melchor. So [facility] [phone].    ATENCIÓN: Si habla español, tiene a maxwell disposición servicios gratuitos de asistencia lingüística. [facility] [phone].    We comply with applicable federal civil rights laws and [place] laws. We do not discriminate on the basis of race, color, national origin, age, disability, sex, sexual orientation, or gender identity.            Thank you!     Thank you for choosing [facility]  for your care. Our goal is always to provide you with excellent care. Hearing back from our patients is one way we can continue to improve our services. Please take a few minutes to complete the written survey that you may receive in the mail after your visit with us. Thank you!             Your Updated Medication List - Protect others around you: Learn how to safely use, store and throw away your medicines at www.disposemymeds.org.          This list is accurate as of 3/26/18  2:08 PM.  Always use your most recent med list.                   Brand Name Dispense Instructions for use Diagnosis    acetaminophen 500 MG tablet    TYLENOL     Take 500-1,000 mg by mouth every 6 hours as needed for mild pain        ammonium lactate 12 % cream    LAC-HYDRIN    385 g    Apply topically 2 times daily as needed for dry skin     Bilateral leg edema       ASPIRIN EC PO      Take 81 mg by mouth        Blood Pressure Cuff Misc     1 each    Imron blood cuff  Please check your blood pressure 2-3 times per week.  Goal is <140/90    Benign essential hypertension       ciclopirox 0.77 % cream    LOPROX    90 g    Apply topically 2 times daily To feet and toenails.    Dermatophytosis of nail, Tinea pedis of both feet       diazepam 2 MG tablet    VALIUM    1 tablet    Take 1 tablet (2 mg) by mouth every 6 hours as needed for anxiety or sleep    Claustrophobia       fluticasone 50 MCG/ACT spray    FLONASE    1 Bottle    Spray 1 spray into both nostrils daily    Chronic allergic rhinitis, unspecified seasonality, unspecified trigger       fluticasone-vilanterol 200-25 MCG/INH oral inhaler    BREO ELLIPTA    1 Inhaler    Inhale 1 puff into the lungs daily    COPD exacerbation (H)       hydrochlorothiazide 12.5 MG Tabs tablet     60 tablet    Take 1 tablet (12.5 mg) by mouth daily    Hyperlipemia       ibuprofen 200 MG tablet    ADVIL/MOTRIN    90 tablet    Take 3 tablets (600 mg) by mouth 3 times daily (with meals)    Acute left-sided low back pain without sciatica       ipratropium 0.06 % spray    ATROVENT    1 Box    Spray 2 sprays into both nostrils 4 times daily as needed for rhinitis    Sinusitis, unspecified chronicity, unspecified location       latanoprost 0.005 % ophthalmic solution    XALATAN     1 drop        * losartan 25 MG tablet    COZAAR    90 tablet    Take 1 tablet (25 mg) by mouth daily    Benign essential hypertension       * losartan 50 MG tablet    COZAAR    90 tablet    Take 1 tablet (50 mg) by mouth daily    Benign essential hypertension       magic mouthwash suspension    ENTER INGREDIENTS IN COMMENTS    180 mL    Swish and spit 5-10 mLs in mouth every 6 hours as needed    Aphthous ulcer of mouth       * naproxen 500 MG tablet    NAPROSYN    30 tablet    Take 1 tablet (500 mg) by mouth 2 times daily (with meals)    Acute  left-sided low back pain without sciatica       * naproxen 500 MG tablet    NAPROSYN    60 tablet    Take 1 tablet (500 mg) by mouth 2 times daily as needed for moderate pain    Acute bilateral low back pain without sciatica       * omeprazole 40 MG capsule    priLOSEC    90 capsule    Take 1 capsule (40 mg) by mouth daily    Gastroesophageal reflux disease without esophagitis       * omeprazole 20 MG CR capsule    priLOSEC     Take 20 mg by mouth as needed        * order for DME     1 Device    Equipment being ordered: Oxygen  Please provide portable oxygen concentrator (pt would like over the shoulder oxygen device) for portability at 2LPM with activity via nasal canula.    Pulmonary hypertension       * order for DME     2 each    Equipment being ordered: knee high compression stockings, class 1 or 2, night time velcro compression garments, lymphedema bandaging supplies, darco boots to wear during treatment    Bilateral leg edema       * order for DME     1 Units    Equipment being ordered: portable walker with seat and compartment under the seat.  Use for going out of the house on errands, where prolonged standing is required.    Chronic obstructive pulmonary disease, unspecified COPD type (H)       * order for DME     1 Device    Equipment being ordered: Wheelchair Sig: Equipment being ordered: portable walker with seat and compartment under the seat.   Use for going out of the house on errands, where prolonged standing is required.    COPD (chronic obstructive pulmonary disease) (H)       QUEtiapine 200 MG tablet    SEROquel     Take 200 mg by mouth At Bedtime.        simvastatin 20 MG tablet    ZOCOR    90 tablet    Take 1 tablet (20 mg) by mouth daily    Hyperlipidemia, unspecified hyperlipidemia type       umeclidinium 62.5 MCG/INH oral inhaler    INCRUSE ELLIPTA     Inhale 1 puff into the lungs        Urea 40 % Crea     198 g    Externally apply topically daily To feet and toenails.    Onychauxis,  Dermatophytosis of nail, Tyloma       * Notice:  This list has 10 medication(s) that are the same as other medications prescribed for you. Read the directions carefully, and ask your doctor or other care provider to review them with you.

## 2018-03-27 ENCOUNTER — TELEPHONE (OUTPATIENT)
Dept: INTERNAL MEDICINE | Facility: CLINIC | Age: 73
End: 2018-03-27

## 2018-03-27 DIAGNOSIS — J44.9 CHRONIC OBSTRUCTIVE PULMONARY DISEASE, UNSPECIFIED COPD TYPE (H): Primary | ICD-10-CM

## 2018-03-27 NOTE — TELEPHONE ENCOUNTER
----- Message from Senia Olivas MD sent at 3/26/2018 12:04 PM CDT -----  Regarding: Which new inhaler that she needed for severe COPD?  Robyn,   This patient has severe COPD.  She cannot keep track of her medications.  I showed her pictures of BREO and INCRUSE inhalers and she is not sure which one she is taking.  She should be taking BOTH.    Would you please call her this afternoon to review those 2 medications with her and let me know which one she does not have.  I will then prescribe it.  Thank you.    Tanesha  --------------------  Message left for pt to call back.  Asia Steiner RN  --------------------  Spoke with pt, Rx sent to pt's pharmacy.  Asia Steiner RN

## 2018-04-06 ENCOUNTER — TRANSFERRED RECORDS (OUTPATIENT)
Dept: HEALTH INFORMATION MANAGEMENT | Facility: CLINIC | Age: 73
End: 2018-04-06

## 2018-04-09 ENCOUNTER — HOSPITAL ENCOUNTER (OUTPATIENT)
Dept: CARDIAC REHAB | Facility: CLINIC | Age: 73
End: 2018-04-09
Attending: INTERNAL MEDICINE
Payer: COMMERCIAL

## 2018-04-09 VITALS — WEIGHT: 195 LBS | BODY MASS INDEX: 33.29 KG/M2 | HEIGHT: 64 IN

## 2018-04-09 PROCEDURE — 40000244 ZZH STATISTIC VISIT PULM REHAB

## 2018-04-09 PROCEDURE — G0238 OTH RESP PROC, INDIV: HCPCS

## 2018-04-09 ASSESSMENT — 6 MINUTE WALK TEST (6MWT)
MALE CALC: 394.41
TOTAL DISTANCE WALKED: 91.44
GENDER SELECTION: FEMALE
FEMALE CALC: 379.39

## 2018-04-09 ASSESSMENT — PULMONARY FUNCTION TESTS
FVC: 1.46
FVC_PERCENT_PREDICTED: 53
FEV1/FVC: 84
FEV1 (%PREDICTED): 58
FEV1: 1.23

## 2018-04-09 ASSESSMENT — ACTIVITIES OF DAILY LIVING (ADL): ADL_LIMITATIONS: DRESSING;STAIR CLIMBING

## 2018-04-09 ASSESSMENT — DUKE ACTIVITY SCORE INDEX (DASI)
DASI METS SCORE: 4.27
VO2_PEAK: 14.95

## 2018-04-09 NOTE — PROGRESS NOTES
04/09/18 1300   Session   Session Initial Evaluation and Exercise Prescription   Certified through this date 05/08/18   Type Initial   General Information   Treatment Diagnosis Chronic Bronchitis   Classification of COPD NA   Hospital Location Not hospitalized   Current Signs and Symptoms None   Outpatient Pulmonary Rehab Start Date 04/09/18   Primary Physician Senia Olivas   Pulmonologist Dr. Patel   Medical History/Co morbidities   Medical History/Co morbidities Anxiety;COPD;Chronic renal disease;Depression;Hypertension;GERD;Insomnia   Sputum   Sputum Production Amount ADEEL   Tobacco History   Tobacco Former smoker   Tobacco Habit Cigarettes   Years Smoked 30   Average Packs Per Day 0.5   Quit Date or Planned Quit Date 01/01/80   Interventions Planned None: Patient is in maintenance   Medications   Long-Acting Beta Agonist Prescribed, taking as prescribed   Short-Acting Beta Agonist Prescribed, not taking as prescribed   Long-Acting Anticholinergic Prescribed, taking as prescribed   Inhaled Corticosteroid Prescribed, taking as prescribed   Oral Corticosteroid Not prescribed   Medications Reconciled By Patient;Medical record   Preventative Vaccinations   Influenza Vaccination Yes   Pneumonia Vaccination Yes   Pain   Patient Currently in Pain Denies   Fall Risk Screen   Fall screen completed by Pulmonary Rehab   Have you fallen 2 or more times in the past year? No   Have you fallen and had an injury in the past year? No   Living and Work Status   Living Arrangements apartment   Support System Live alone   Environmental Factors No concerns   ADL Limitations Dressing;Stair climbing   Initial Duke Activity Status Index (DASI) score. A measure of functional capacity. The goal is to have a pre-program raw score of 9.95 (~4 METs) or above 12.45   Initial DASI VO2 Peak (ml*kg-1*min-1) 14.95   Initial DASI MET Level 4.27   Return to Employment Not employed   Physical Assessments   Incisions Not applicable   Edema Not  "assessed   Left Lung Sounds not assessed   Right Lung Sounds not assessed   Pulmonary Function Test (PFTs)   PFT Results Available   Date Completed 05/27/16   FVC Actual 1.46   % Predicted FVC 53   FEV1 Actual 1.23   % Predicted FEV1 58   FEV1/FEV Ratio 84   Pre/Post Bronchodilator Pre   Airway Obstruction NA   Individualized Treatment Plan   Sessions Scheduled 10   Sessions Attended 1   Type Resistance training;Aerobic exercise;Flexibility training   Oxygen Use   Supplemental Oxygen Needed Yes   Delivery Device Nasal Cannula   Liter Flow at Rest RA   Liter Flow with ADLS RA   Liter Flow During Exercise RA   Liter Flow With Sleep 2   Interventions Recommended Continue to monitor SpO2 at rest and with exercise on current O2 settings;Demonstrate need for oxygen using SpO2 readings   Exercise Prescription   Mode Treadmill;Nustep;Sci-Fit;Ambulation;Weights   Frequency 2 days/week   Duration/Time 15-30 min;Intermittent bouts   THR (85% of age predicted max heart rate)  124.1   Effort Rating (0-10) 4-6/10   Oxygen Titration with Exercise > 88% with exercise   Exercise Assessment   6 Minute Walk Predicted - Gender Selection Female   6 Minute Walk Predicted (Female) 379.39   6 Minute Walk Predicted (Male) 394.41   6 Minute Walk Distance (Initial) 91.44 Meters   Resting HR 77   Exercise    Resting /86   Exercise /90   SpO2 92   Exercise SpO2 89   Current MET level 1.7   Exercise Tolerance poor   Normal Limits Discussed Yes   Current Symptoms at Home Dyspnea   Current Symptoms in Rehab Dyspnea   Limitations Deconditioned   Nutrition Management   Age 72   Height 1.626 m (5' 4.02\")   Weight 88.5 kg (195 lb)   BMI (Calculated) 33.52   Assessment Overweight   Interventions Planned Attend group nutrition class   Psychosocial   Initial Patient Health Questionnaire -9 (PHQ-9) for depression. To notify physician if pre-score >9. 0   Initial COPD Assessment Test (CAT). A quality of life measure. Scores range from " 0-40. Higher scores indicate greater levels of limitations. The goal is to reduce scores pre to post program. 25   Initial Shortness of Breath Questionnaire (SOBQ) score. The goal is to reduce the score pre to post program. 67   Psychosocial Comments Sees a psychiatrist every 6 months   Stages of Change   Aerobic Exercise Contemplation   Physical Activity Contemplation   Recommended diet Precontemplation   Stress Action   Smoking Cessation Maintenance   Oxygen Usage Action   Current Home Exercise   Type of Exercise None   Frequency (days per week) 0   Duration (minutes per session) 0   Recommended Home Exercise Prescription   Type of Exercise Walking;LE Strengthening Exercise   Frequency (Days per week) 1   Duration (minutes per session) 15-30 min;Intermittent   Effort Rating Recommended (0-10) Scale  4-6/10   Learning Assessment   Learner Patient   Primary Language English   Preferred Learning Style Listening;Reading;Demonstration;Pictures/Video   Patient Education/Referrals   Education Recommended Activities of Daily Living;Breathing Techniques;Exercise Principles   Pulmonary Rehab Goals   Pulmonary Rehab Goals 1   Goal 1   Goal Increase endurance to be able to tolerate walking 1 block and increase performance of ADLs (light housework, making the bed, etc).   Target Date 07/09/18   Assessment   Assessment Patient is a pleasant 72 year-old, with chronic bronchitis, and history of depression, anxiety, HTN, COPD, CKD, GERD, and insomnia. Patient has attended rehab in the past but did not complete due to transportation. Pt appears motivated to attend rehab but stated that she is not sure she will have time with her other commitments. Patient stopped after 4 minutes during 6 MWT test, due to being to shortness of breath. Skilled therapy is recommended to monitor cardiopulmonary response to exercise, instruct proper breathing techniques, and assist patient in establishing an exercise routine to achieve goals for rehab  to improve quality of life. The patient was assessed to be stable and appropriate to begin exercise.  The patient's functional capacity and exercise prescription were determined by the completion of the 6 minute walk test. See results above.  The patient was orientated to the program and the equipment. Pulmonary response to exercise was assessed and WNL. No symptoms, complaints or pain were reported.   Goals and objectives were discussed. Good prognosis for reaching goals.   Skilled therapy is necessary to monitor pulmonary response to exercise, provide education, and provide behavior change counseling to achieve patient's goals.     I have reviewed initial evaluation outcomes, and agree with exercise prescription for respiratory therapy for this patient      60 minute total session time, 45 minutes spent 1:1 with patient; assessing performance of ADLs, instructing PLB technique, and discussing strategies to successfully achieve goals for rehab.

## 2018-04-11 DIAGNOSIS — G89.29 CHRONIC BILATERAL LOW BACK PAIN WITH LEFT-SIDED SCIATICA: Primary | ICD-10-CM

## 2018-04-11 DIAGNOSIS — M54.42 CHRONIC BILATERAL LOW BACK PAIN WITH LEFT-SIDED SCIATICA: Primary | ICD-10-CM

## 2018-04-13 ENCOUNTER — TELEPHONE (OUTPATIENT)
Dept: OBGYN | Facility: CLINIC | Age: 73
End: 2018-04-13

## 2018-04-17 ENCOUNTER — TRANSFERRED RECORDS (OUTPATIENT)
Dept: HEALTH INFORMATION MANAGEMENT | Facility: CLINIC | Age: 73
End: 2018-04-17

## 2018-04-25 ENCOUNTER — HOSPITAL ENCOUNTER (OUTPATIENT)
Dept: CARDIAC REHAB | Facility: CLINIC | Age: 73
End: 2018-04-25
Attending: INTERNAL MEDICINE
Payer: COMMERCIAL

## 2018-04-25 VITALS — BODY MASS INDEX: 33.29 KG/M2 | HEIGHT: 64 IN | WEIGHT: 195 LBS

## 2018-04-25 PROCEDURE — G0239 OTH RESP PROC, GROUP: HCPCS

## 2018-04-25 PROCEDURE — 40000244 ZZH STATISTIC VISIT PULM REHAB

## 2018-04-25 ASSESSMENT — PULMONARY FUNCTION TESTS
FVC_PERCENT_PREDICTED: 53
FEV1 (%PREDICTED): 58
FEV1/FVC: 84
FEV1: 1.23
FVC: 1.46

## 2018-04-25 ASSESSMENT — DUKE ACTIVITY SCORE INDEX (DASI)
DASI METS SCORE: 4.27
VO2_PEAK: 14.95

## 2018-04-25 ASSESSMENT — ACTIVITIES OF DAILY LIVING (ADL): ADL_LIMITATIONS: DRESSING;STAIR CLIMBING

## 2018-04-25 ASSESSMENT — 6 MINUTE WALK TEST (6MWT)
TOTAL DISTANCE WALKED: 91.44
GENDER SELECTION: FEMALE
MALE CALC: 394.41
FEMALE CALC: 379.39

## 2018-04-25 NOTE — PROGRESS NOTES
04/25/18 0900   Session   Session 30 Day Individualized Treatment Plan   Certified through this date 05/30/18   Type Reassessment   General Information   Treatment Diagnosis Chronic Bronchitis   Classification of COPD NA   Hospital Location Not hospitalized   Outpatient Pulmonary Rehab Start Date 04/09/18   Primary Physician Senia Olivas   Pulmonconstancegist Dr. Patel   Medical History/Comorbidities   Medical History/Comorbidities Anxiety;COPD;Chronic renal disease;Depression;Hypertension;GERD;Insomnia   Sputum   Sputum Production Amount ADEEL   Tobacco History   Tobacco Former smoker   Tobacco Habit Cigarettes   Years Smoked 30   Average Packs Per Day 0.5   Quit Date or Planned Quit Date 01/01/80   Interventions Planned None: Patient is in maintenance   Medications   Long-Acting Beta Agonist Prescribed, taking as prescribed   Short-Acting Beta Agonist Prescribed, not taking as prescribed   Long-Acting Anticholinergic Prescribed, taking as prescribed   Inhaled Corticosteroid Prescribed, taking as prescribed   Oral Corticosteroid Not prescribed   Medications Reconciled By Patient;Medical record   Preventative Vaccinations   Influenza Vaccination Yes   Pneumonia Vaccination Yes   Pain   Patient Currently in Pain Denies   Fall Risk Screen   Fall screen completed by Pulmonary Rehab   Have you fallen 2 or more times in the past year? No   Have you fallen and had an injury in the past year? No   Living and Work Status   Living Arrangements apartment   Support System Live alone   Environmental Factors No concerns   ADL Limitations Dressing;Stair climbing   Initial Duke Activity Status Index (DASI) score. A measure of functional capacity. The goal is to have a pre-program raw score of 9.95 (~4 METs) or above 12.45   Initial DASI VO2 Peak (ml*kg-1*min-1) 14.95   Initial DASI MET Level 4.27   Return to Employment Not employed   Physical Assessments   Incisions Not applicable   Edema Not assessed   Left Lung Sounds not  "assessed   Right Lung Sounds not assessed   Pulmonary Function Test (PFTs)   PFT Results Available   Date Completed 05/27/16   FVC Actual 1.46   % Predicted FVC 53   FEV1 Actual 1.23   % Predicted FEV1 58   FEV1/FEV Ratio 84   Pre/Post Bronchodilator Pre   Airway Obstruction NA   Individualized Treatment Plan   Sessions Scheduled 10   Sessions Attended 2   Oxygen Use   Supplemental Oxygen Needed Yes   Delivery Device Nasal Cannula   Liter Flow at Rest RA   Liter Flow with ADLS RA   Liter Flow During Exercise RA   Liter Flow With Sleep 2   Interventions Recommended Continue to monitor SpO2 at rest and with exercise on current O2 settings;Demonstrate need for oxygen using SpO2 readings   Exercise Prescription   Mode Treadmill;Nustep;Sci-Fit;Ambulation;Weights   Frequency 2 days/week   Duration/Time 15-30 min;Intermittent bouts   THR (85% of age predicted max heart rate)  124.1   Effort Rating (0-10) 4-6/10   Oxygen Titration with Exercise > 88% with exercise   Exercise Assessment   6 Minute Walk Predicted - Gender Selection Female   6 Minute Walk Predicted (Female) 379.39   6 Minute Walk Predicted (Male) 394.41   6 Minute Walk Distance (Initial) 91.44 Meters   Resting HR 85  (Vitals from 4/25/18)   Exercise HR 94   Resting /68   SpO2 90   Exercise SpO2 92   Current MET level 1.7   Exercise Tolerance poor   Normal Limits Discussed Yes   Current Symptoms at Home Dyspnea   Current Symptoms in Rehab Dyspnea   Limitations Deconditioned   Nutrition Management   Age 72   Height 1.626 m (5' 4.02\")   Weight 88.5 kg (195 lb)   BMI (Calculated) 33.52   Assessment Overweight   Interventions Planned Attend group nutrition class   Psychosocial   Initial Patient Health Questionnaire -9 (PHQ-9) for depression. To notify physician if pre-score >9. 0   Initial COPD Assessment Test (CAT). A quality of life measure. Scores range from 0-40. Higher scores indicate greater levels of limitations. The goal is to reduce scores pre to " post program. 25   Initial Shortness of Breath Questionnaire (SOBQ) score. The goal is to reduce the score pre to post program. 67   Psychosocial Comments Sees a pyschiatrist every 6 months   Stages of Change   Aerobic Exercise Contemplation   Physical Activity Contemplation   Recommended diet Precontemplation   Stress Action   Smoking Cessation Maintenance   Oxygen Usage Action   Current Home Exercise   Type of Exercise None   Frequency (days per week) 0   Duration (minutes per session) 0   Recommended Home Exercise Prescription   Type of Exercise Walking;LE Strengthening Exercise   Frequency (Days per week) 1   Duration (minutes per session) 15-30 min;Intermittent   Effort Rating Recommended (0-10) Scale  4-6/10   Learning Assessment   Learner Patient   Primary Language English   Preferred Learning Style Listening;Reading;Demonstration;Pictures/Video   Patient Education/Referrals   Education Recommended Activities of Daily Living;Breathing Techniques;Exercise Principles   Pulmonary Rehab Goals   Pulmonary Rehab Goals 1   Goal 1   Goal Increase endurance to be able to tolerate walking 1 block and increase performance of ADLs (light housework, making the bed, etc).   Target Date 07/09/18 4/25 Little progress, first exercise session today   Assessment   Assessment Patient is a pleasant 72 year-old, with chronic bronchitis, and history of depression, anxiety, HTN, COPD, CKD, GERD, and insomnia. Patient has attended rehab in the past but did not complete due to transportation. Pt appears motivated to attend rehab but stated that she is not sure she will have time with her other committments. 4/25 Pt has attended 2 rehab sessions. She was able to do intermittent bouts on the Nustep T5 with her arms for 15 min at 10 Hameed. ITP forwarded to medical director for review, only few sessions attended. Skilled therapy is recommended to monitor cardiopulmonary response to exercise, instruct breathing techniques, and assist  patient in establishing an exercise routine to achieve goals for rehab.    I have reviewed and agree with this patient s individual treatment plan and exercise prescription for respiratory therapy.  Please see  individual treatment plan for details of progress and plan.

## 2018-05-14 ENCOUNTER — MEDICAL CORRESPONDENCE (OUTPATIENT)
Dept: HEALTH INFORMATION MANAGEMENT | Facility: CLINIC | Age: 73
End: 2018-05-14

## 2018-05-30 NOTE — PROGRESS NOTES
Pulmonary Rehabilitation/Respiratory Therapy Discharge Summary    Reason for discharge:    Pt multiple no show to appointments.      Progress towards goals:  Goals not met.  Barriers to achieving goals:   limited tolerance for therapy.     Recommendation(s):    Continue home exercise program.

## 2018-05-30 NOTE — ADDENDUM NOTE
Encounter addended by: Vickie Lezama on: 5/30/2018  4:14 PM<BR>     Actions taken: Pend clinical note

## 2018-05-30 NOTE — ADDENDUM NOTE
Encounter addended by: Vickie Lezama on: 5/30/2018  4:25 PM<BR>     Actions taken: Sign clinical note

## 2018-06-08 ENCOUNTER — TRANSFERRED RECORDS (OUTPATIENT)
Dept: HEALTH INFORMATION MANAGEMENT | Facility: CLINIC | Age: 73
End: 2018-06-08

## 2018-06-13 ENCOUNTER — HOSPITAL ENCOUNTER (OUTPATIENT)
Dept: CARDIAC REHAB | Facility: CLINIC | Age: 73
End: 2018-06-13
Attending: INTERNAL MEDICINE
Payer: COMMERCIAL

## 2018-06-13 PROCEDURE — G0238 OTH RESP PROC, INDIV: HCPCS

## 2018-06-13 PROCEDURE — 40000244 ZZH STATISTIC VISIT PULM REHAB

## 2018-06-13 ASSESSMENT — PULMONARY FUNCTION TESTS
FVC: 1.46
FEV1/FVC: 84
FEV1: 1.23
FVC_PERCENT_PREDICTED: 53
FEV1 (%PREDICTED): 58

## 2018-06-13 ASSESSMENT — 6 MINUTE WALK TEST (6MWT)
GENDER SELECTION: FEMALE
MALE CALC: 393.61
TOTAL DISTANCE WALKED: 91.44
FEMALE CALC: 378.35

## 2018-06-13 ASSESSMENT — ACTIVITIES OF DAILY LIVING (ADL): ADL_LIMITATIONS: DRESSING;STAIR CLIMBING

## 2018-06-13 ASSESSMENT — DUKE ACTIVITY SCORE INDEX (DASI)
VO2_PEAK: 14.95
DASI METS SCORE: 4.27

## 2018-06-21 VITALS — HEIGHT: 64 IN | BODY MASS INDEX: 33.46 KG/M2 | WEIGHT: 196 LBS

## 2018-06-21 NOTE — ADDENDUM NOTE
Encounter addended by: Vickie Lezama on: 6/21/2018  9:08 AM<BR>     Actions taken: Sign clinical note, Flowsheet accepted

## 2018-06-21 NOTE — PROGRESS NOTES
06/13/18 1348   Session   Session 60 Day Individualized Treatment Plan   Certified through this date 07/12/18   Type Reassessment   General Information   Treatment Diagnosis Chronic Bronchitis   Classification of COPD NA   Hospital Location Not hospitalized   Current Signs and Symptoms SOB;Fatigue   Outpatient Pulmonary Rehab Start Date 04/09/18   Primary Physician Senia Olivas   Pulmonologist Dr. Patel   Medical History/Co morbidities   Medical History/Co morbidities Anxiety;COPD;Chronic renal disease;Depression;Hypertension;GERD;Insomnia   Sputum   Sputum Production Amount ADEEL   Tobacco History   Tobacco Former smoker   Tobacco Habit Cigarettes   Years Smoked 30   Average Packs Per Day 0.5   Quit Date or Planned Quit Date 01/01/80   Interventions Planned None: Patient is in maintenance   Medications   Long-Acting Beta Agonist Prescribed, taking as prescribed   Short-Acting Beta Agonist Prescribed, not taking as prescribed   Long-Acting Anticholinergic Prescribed, taking as prescribed   Short-Acting Anticholinergic Not prescribed   Inhaled Corticosteroid Prescribed, taking as prescribed   Oral Corticosteroid Not prescribed   Medications Reconciled By Patient;Medical record   Preventative Vaccinations   Influenza Vaccination Yes   Pneumonia Vaccination Yes   Pain   Patient Currently in Pain Denies   Pain Comments chronic knee pain   Fall Risk Screen   Fall screen completed by Pulmonary Rehab   Have you fallen 2 or more times in the past year? No   Have you fallen and had an injury in the past year? No   Is patient a fall risk? No   Living and Work Status   Living Arrangements apartment   Support System Live alone   Environmental Factors No concerns   ADL Limitations Dressing;Stair climbing   Initial Duke Activity Status Index (DASI) score. A measure of functional capacity. The goal is to have a pre-program raw score of 9.95 (~4 METs) or above 12.45   Initial DASI VO2 Peak (ml*kg-1*min-1) 14.95   Initial DASI  "MET Level 4.27   Return to Employment Not employed   Physical Assessments   Incisions Not applicable   Edema Not assessed   Left Lung Sounds not assessed   Right Lung Sounds not assessed   Pulmonary Function Test (PFTs)   PFT Results Available   Date Completed 05/27/16   FVC Actual 1.46   % Predicted FVC 53   FEV1 Actual 1.23   % Predicted FEV1 58   FEV1/FEV Ratio 84   Pre/Post Bronchodilator Pre   Airway Obstruction NA   Individualized Treatment Plan   Sessions Scheduled 13   Sessions Attended 3   Type Aerobic exercise;Resistance training;Flexibility training   Oxygen Use   Supplemental Oxygen Needed Yes   Delivery Device Nasal Cannula   Liter Flow at Rest RA   Liter Flow with ADLS RA   Liter Flow During Exercise RA   Liter Flow With Sleep 2   Interventions Recommended Continue to monitor SpO2 at rest and with exercise on current O2 settings;Demonstrate need for oxygen using SpO2 readings   Exercise Prescription   Mode Treadmill;Nustep;Sci-Fit;Ambulation;Weights   Frequency 2 days/week   Duration/Time 15-30 min;Intermittent bouts   THR (85% of age predicted max heart rate)  124.1   Effort Rating (0-10) 4-6/10   Progression of Exercise Increase by 0.1-0.25 METs per week   Oxygen Titration with Exercise > 88% with exercise   Exercise Assessment   6 Minute Walk Predicted - Gender Selection Female   6 Minute Walk Predicted (Female) 378.35   6 Minute Walk Predicted (Male) 393.61   6 Minute Walk Distance (Initial) 91.44 Meters   Resting HR 76   Exercise HR 96   Resting /56   SpO2 91   Exercise SpO2 91   Current MET level 1.77   Exercise Tolerance fair   Normal Limits Discussed Yes   Current Symptoms at Home Dyspnea;Fatigue   Current Symptoms in Rehab Dyspnea;Fatigue   Limitations Deconditioned   Nutrition Management   Age 72   Height 1.626 m (5' 4.02\")   Weight 88.9 kg (196 lb)   BMI (Calculated) 33.7   Assessment Overweight   Interventions Planned Attend group nutrition class   Psychosocial   Initial Patient " Health Questionnaire -9 (PHQ-9) for depression. To notify physician if pre-score >9. 0   Initial COPD Assessment Test (CAT). A quality of life measure. Scores range from 0-40. Higher scores indicate greater levels of limitations. The goal is to reduce scores pre to post program. 25   Initial Shortness of Breath Questionnaire (SOBQ) score. The goal is to reduce the score pre to post program. 67   Psychosocial Comments Sees a psychiatrist every 6 months   Stages of Change   Aerobic Exercise Contemplation   Physical Activity Contemplation   Recommended diet Precontemplation   Stress Action   Smoking Cessation Maintenance   Oxygen Usage Action   Current Home Exercise   Type of Exercise None   Frequency (days per week) 0   Duration (minutes per session) 0   Recommended Home Exercise Prescription   Type of Exercise Walking;LE Strengthening Exercise   Frequency (Days per week) 1   Duration (minutes per session) 15-30 min;Intermittent   Effort Rating Recommended (0-10) Scale  4-6/10   Learning Assessment   Learner Patient   Primary Language English   Preferred Learning Style Listening;Reading;Demonstration;Pictures/Video   Patient Education/Referrals   Education Recommended Activities of Daily Living;Breathing Techniques;Exercise Principles   Follow-up/On-going Support   Provider follow-up needed on the following No follow-up needed   Pulmonary Rehab Goals   Pulmonary Rehab Goals 1   Goal 1   Goal Increase endurance to be able to tolerate walking 1 block and increase performance of ADLs (housework, making the bed, etc) by participate in aerobic exercise 3 days/week.   Target Date 09/13/18   Progress Towards Goal 6/13 Little progress. Pt tolerated 6 minutes on TM at 1.77 METs and 10 min on Scifit at 2.2 METs.   Assessment   Assessment Patient is a pleasant 72 year-old, with chronic bronchitis, and history of depression, anxiety, HTN, COPD, CKD, GERD, and insomnia. Patient has attended rehab in the past but did not complete  due to transportation. Pt appears motivated to attend rehab but stated that she is not sure she will have time with her other commitments. 4/25 Pt has attended 2 rehab sessions. She was able to do intermittent bouts on the Nustep T5 with her arms for 15 min at 10 Hameed. 6/13 Pt returns to rehab today after being discharged after no show to multiple no call/no show. Patient stated she is motivated this time to attend rehab 2 days/week, but transportation could . Today, discussed strategies to achieve goals for rehab to improve quality of life, instructing PLB technique, and assessing performance of ADLs. Skilled therapy is recommended to monitor cardiopulmonary response to exercise, instruct proper breathing techniques, and assist pt in establishing an exercise routine to achieve goals to improve quality of life.   I have reviewed and agree with this patient s individual treatment plan and exercise prescription for respiratory therapy.  Please see  individual treatment plan for details of progress and plan.

## 2018-07-02 DIAGNOSIS — E78.5 HYPERLIPEMIA: ICD-10-CM

## 2018-07-03 RX ORDER — HYDROCHLOROTHIAZIDE 12.5 MG/1
1 CAPSULE ORAL DAILY
Qty: 90 CAPSULE | Refills: 0 | Status: SHIPPED | OUTPATIENT
Start: 2018-07-03 | End: 2019-03-08

## 2018-07-03 NOTE — TELEPHONE ENCOUNTER
"03/26/18 (!) 150/97   03/08/18 126/85   02/19/18 (!) 156/101     HYDROCHLOROTHIAZIDE 12.5MG CAPS       Last Written Prescription Date:  9/18/2017  Last Fill Quantity: 60,   # refills: 1  Last Office Visit :  3/26/2018  Future Office visit:  none    Routing refill request to provider for review  because:  BP date of last visit 3/26/2018 .  \"3.  Essential hypertension.  The patient did NOT take her blood pressure medication this morning.\"    90 day refill provided per protocol for Diuretics with abnormal BP route as FYI.        "

## 2018-07-10 ENCOUNTER — TELEPHONE (OUTPATIENT)
Dept: INTERNAL MEDICINE | Facility: CLINIC | Age: 73
End: 2018-07-10

## 2018-07-10 DIAGNOSIS — M54.42 CHRONIC BILATERAL LOW BACK PAIN WITH LEFT-SIDED SCIATICA: Primary | ICD-10-CM

## 2018-07-10 DIAGNOSIS — G89.29 CHRONIC BILATERAL LOW BACK PAIN WITH LEFT-SIDED SCIATICA: Primary | ICD-10-CM

## 2018-07-10 NOTE — TELEPHONE ENCOUNTER
Called patient back and she is requesting one on one pool therapy to be done at [facility]  for general health and arthritis.  She states she has had a referral for this in the past but unsure which provider sent her to this therapy.  Will inform RN of this request.  Garth Hilliard LPN at 4:51 PM on 7/10/2018      I ordered PT/pool therapy at the [facility]    Senia Olivas

## 2018-07-10 NOTE — TELEPHONE ENCOUNTER
[facility] Call Center    Phone Message    May a detailed message be left on voicemail: yes    Reason for Call: Other: Per call from PT is requesting an order for Pool Therapy.  Per PT someone from the clinic referred her about a year ago but unsure if it's Dr Olivas or another provider. PT is requesting for a call back     Action Taken: Message routed to:  [facility]

## 2018-07-13 NOTE — TELEPHONE ENCOUNTER
[facility] Call Center    Phone Message    May a detailed message be left on voicemail: yes    Reason for Call: Other: Pt requesting call back to discuss pool therapy order for the [facility].      Action Taken: Message routed to:  [facility] primary care

## 2018-08-07 ENCOUNTER — TELEPHONE (OUTPATIENT)
Dept: CARE COORDINATION | Facility: CLINIC | Age: 73
End: 2018-08-07

## 2018-08-07 NOTE — TELEPHONE ENCOUNTER
Social Work Intervention  [facility]    Data/Intervention:    Patient Name:  Ca Yan  /Age:  1945 (72 year old)    Visit Type: telephone  Referral Source: [facility]   Reason for Referral:  Assistance completing a healthcare directive    Collaborated With:    -Patient    Patient Concerns/Issues:   Patient would like assistance in completing a healthcare directive. Patient is coming to clinic on .    Intervention/Education/Resources Provided:   encouraged Patient to ask to speak to  when she is in clinic for her next appointment.  will be willing and able to assist Patient.    Assessment/Plan:  Patient to ask for  at next visit.    Provided patient/family with contact information and availability.    [facility]resham [facility]  Direct Phone: [phone]  Pager:  [phone]

## 2018-08-27 ENCOUNTER — TRANSFERRED RECORDS (OUTPATIENT)
Dept: HEALTH INFORMATION MANAGEMENT | Facility: CLINIC | Age: 73
End: 2018-08-27

## 2018-09-07 ENCOUNTER — HOSPITAL ENCOUNTER (OUTPATIENT)
Dept: BEHAVIORAL HEALTH | Facility: CLINIC | Age: 73
Discharge: HOME | End: 2018-09-07
Attending: PSYCHIATRY & NEUROLOGY | Admitting: PSYCHIATRY & NEUROLOGY
Payer: COMMERCIAL

## 2018-09-07 ENCOUNTER — BEH TREATMENT PLAN (OUTPATIENT)
Dept: BEHAVIORAL HEALTH | Facility: CLINIC | Age: 73
End: 2018-09-07
Attending: PSYCHIATRY & NEUROLOGY

## 2018-09-07 PROCEDURE — 90791 PSYCH DIAGNOSTIC EVALUATION: CPT | Performed by: PSYCHOLOGIST

## 2018-09-07 ASSESSMENT — PAIN SCALES - GENERAL: PAINLEVEL: WORST PAIN (10)

## 2018-09-07 NOTE — PROGRESS NOTES
"Initial Individual Treatment Plan     Patient: Ca Yan   MRN: [id number]  : 1945  Age: 72 year old  Sex: female    Diagnostic Assessment Date / Date of Initial Individual Treatment Plan: 18      Immediate Health Concerns:  Yes. Identify health concern and plan to address: Pt reports she has COPD and follows with her Pulmonary Doctor through [facility].      Immediate Safety Concerns:  No    Identify the issues to be addressed in treatment:  Symptom Management, Community Resources/Discharge Planning, Develop / Improve Independent Living Skills and Develop Socialization / Interpersonal Relationship Skills    Client Initial Individualized Goals for Treatment:  \"support\"     Initial Treatment suggestions for the client during the time between Diagnostic Assessment and completion of the Individualized Treatment Plan:  Ask for more information, support and/or assistance as needed.  Follow up with providers/community supports as needed:   Report increases or changes in symptoms to staff.  Report any personal safety concerns to staff.   Take medications as prescribed.  Report medication changes and/or side effects to staff.  Attend and participate in groups as scheduled or notify staff if unable to do so.  Report any use of substances to staff as this may impact your symptoms and/or  personal safety.  Notify staff if you have any other issues that need to be addressed. This may include  any current abuse / neglect / exploitation or other vulnerability.  Follow recommendations of your treatment team and discuss concerns if not in  agreement.     Treatment Team Responsible: 55+ Outpatient Program (55+)     Therapeutic Interventions/Treatment Strategies may include:  Support, Redirection, Feedback, Limit/Boundaries, Safety Assessments, Structured Activity, Problem Solving, Clarification, Education, Motivational Enhancement and Relapse Prevention as needed.    Juan Fabian                "

## 2018-09-07 NOTE — PROGRESS NOTES
"Standard Diagnostic Assessment     CLIENT'S NAME: Ca Yan  MRN:   [id number]  :   1945 AGE:72 year old SEX: female  ACCT. NUMBER: [id number]  DATE OF SERVICE: 18 Start Time:  1200 End Time:  1400      Home Phone [phone]   Work Phone Not on file.   Mobile [phone]     Preferred Phone: [phone]  May we leave a program related message? yes    Yes, the patient has been informed that any other mental health professional providing mental health services to me will need access to this Diagnostic Assessment in order to develop a treatment plan and receive payment.     Identifying Information:  Ca Yan is a 72 year old, White, single female. Ca attended the   alone.     Reason for Referral: Ca was referred to 55+ [facility]patient Program (55+) by (pt cannot recall). Ca reports she needs a support group because she does not have friends and feels isolated. Everyone in her family has  and there is no one left except younger people. Pt reports she has felt alone since her sister-in-law  four years ago.     Ca verbalizes the following treatment/discharge goals: \"support\"     Current Stressors/Losses/Disappointments: Pt reports her sister-in-law  four years ago. She reports she was her last family member she was close to. She reports she and her daughter do not get along and do not have contact. She reports her daughter, nephew, and niece are alcoholics and \"they all drink too much.\" Pt reports she is lonely. She reports she had been attending a [facility] program and does not like it since \"everybody has dementia.\" Pt reports she is stressed by not having enough to do.     Per Client, Review of Symptoms:  Mood (Depression/Anxiety/Ayse/Anger): Pt reports she has been feeling depressed. She reports she watches too much tv and drinks too much coffee. She reports she feels anxious when she goes to bed and worries about feeling alone. Pt reports she feels " irritable a lot of the time. Pt reports she yells sometimes. She reports she has COPD and when she gets angry afterward she feels exhausted.   Thoughts: Pt reports no thoughts wishing she were dead and denies S/I. Pt reports she worries about dying and lays in bed thinking about it. Pt reports she ruminates about her sister-in-law's death and the lack of family she has support from.   Concentration/Memory: Pt reports she reads a lot of books. Pt reports her memory is pretty good.   Appetite/Weight: (see also, Physical Health Screening below) Pt reports her appetite is too good. Pt reports she has gained 5# recently.    Sleep: Pt reports she sleeps pretty good.     Motivation/Energy: Pt reports she lacks motivation to do anything. She reports she has to conserve her energy due to having COPD. She reports she gets tired easily.   Behavior: Pt reports she yells sometimes.      Psychosis: No     Trauma: No   Other: Pt reports she has frequent arguments with cab drivers.     Mental Health History:  Ca reports first onset of mental health symptoms Pt reports she first had a depressive episode 20 years ago.   Ca was first diagnosed 20 years ago.   Ca received the following mental health services in the past: counseling, day treatment, inpatient mental health services, MI / CD day treatment and psychiatry.   Psychiatric Hospitalizations: one admission a long time ago. .   Ca denies a history of civil commitment.      Onset/Duration/Pattern of Symptoms noted above:  Since her sister-in-law  four years ago.      Ca reports the following understanding of her diagnosis: Depression and anxiety      Personal Safety:    [place]-Suicide Severity Rating Scale   Suicide Ideation   1.) Have you ever wished you were dead or that you could go to sleep and not wake up?     Lifetime: No Past Month:  No   2.) Have you actually had any thoughts of killing yourself?   Lifetime:  No Past Month:  No   3.) Have you been  thinking about how you might do this?     Lifetime:  No Past Month:  No   4.) Have you had these thoughts and had some intention of acting on them?     Lifetime:  No Past Month:  No   5.) Have you started to work out the details of how to kill yourself?   Lifetime:  No Past Month:  No   6.) Do you intend to carry out this plan?      Lifetime:  No Past Month:  No   Intensity of Ideation   Intensity of ideation (1 being least severe, 5 being most severe):     Lifetime:  NA                                                                                                Past Month:  NA   How often do you have these thoughts? NA    When you have the thoughts how long do they last?  NA   Can you stop thinking about killing yourself or wanting to die if you want to?  NA   Are there things - anyone or anything (i.e. family, Orthodoxy, pain of death) that stopped you from wanting to die or acting on thoughts of suicide?  NA      What sort of reasons did you have for thinking about wanting to die or killing yourself (ie end pain, stop how you were feeling, get attention or reaction, revenge)?  NA   Suicidal Behavior   (Suicide Attempt) - Have you made a suicide attempt?     Lifetime:  No Past Month: No   Have you engaged in self-harm (non-suicidal self-injury)?  No   (Interrupted Attempt) - Has there been a time when you started to do something to end your life but someone or something stopped you before you actually did anything?  No   (Aborted or Self-Interrupted Attempt) - Has there been a time when you started to do something to try to end your life but you stopped yourself before you actually did anything?  No   (Preparatory Acts of Behavior) - Have you taken any steps towards making suicide attempt or preparing to kill yourself (such as collecting pills, getting a gun, giving valuables away or writing a suicide note)? No   Actual Lethality/Medical Damage:   0. No physical damage or very minor physical damage (e.g., surface  scratches).   1. Minor physical damage (e.g., lethargic speech; first-degree burns; mild bleeding; sprains).  2. Moderate physical damage; medical attention needed (e.g., conscious but sleepy, somewhat responsive; second-degree burns; bleeding of major vessel).  3. Moderately severe physical damage; medical hospitalization and likely intensive care required (e.g., comatose with reflexes intact; third-degree burns less than 20% of body; extensive blood loss but can recover; major fractures).  4. Sever physical damage; medical hospitalization with intensive care required (e.g., comatose without reflexes; third-degree burs over 20% of body; extensive blood loss with unstable vital sign; major damage to a vital area).  5. Death    Attempt Date / Enter Code:  /   Attempt Date / Enter Code:  /   Attempt Date / Enter Code:  /        2008  [facility]  Used with permission by Ginny Alvarado, PhD.               Guide to C-SSRS Risk Ratings   NO IDEATION:  with no active thoughts IDEATION: with a wish to die. IDEATION: with active thoughts. Risk Ratings   If Yes No No 0 - Very Low Risk   If NA Yes No 1 - Low Risk   If NA Yes Yes 2 - Low/moderate risk   IDEATION: associated thoughts of methods without intent or plan INTENT: Intent to follow through on suicide PLAN: Plan to follow through on suicide Risk Ratings cont...   If Yes No No 3 - Moderate Risk   If Yes Yes No 4 - High Risk   If Yes Yes Yes 5 - High Risk   The patient's ADDITIONAL RISK FACTORS and lack of PROTECTIVE FACTORS may increase their overall suicide risk ratings.      Additional Risk Factors: NA   Protective Factors:  NA      Risk Status   Risk Rating: -  DA Staff:  LUCYAR to Tx team.    Additional information to support suicide risk rating: There was no additional information to provide at this time.  Please see the above suicide risk rating information.       Additional Safety Questions:    Do you have a gun, weapons or other  "means (including medications) to harm yourself available to you? No   Do you take chances with your safety?   no   Have you ever thought about killing someone else? No   Have you ever heard voices telling you to harm yourself or others? No       Supports:   From whom do you receive support and how often? (family/friends/agency) 's wife Cyndi. Pt reports she is going on a retreat tomorrow with them.      Do your support people want/need education/resources? no        Is there anything in your life (current or history) that is satisfying to you (include leisure interests/hobbies)?   yes reading and play scrabble.       Hope/Belief System:  Do you believe things can get better? \"i hope so\"       Personal Safety Summary:          Ca denies current fears or concerns for personal safety.    Completed safety coping plan? no        Substance Use History:     Substance: Hx of Use/Abuse: Last Use: Pattern of Use:   Alcohol yes 4 years ago 10/4.  Pt report she used to have issues with alcohol for many years.     Pt reports she quit for 20 years until her late 40's and restarted. Pt report she drank 1/2 pint of vodka.    Cannabis no     Street Drugs no     Prescription Drugs no     Other no       Substance Use Disorder Treatment: Ca is currently receiving the following services: CD Treatment at yes, in the past. 4-5 times. .       CAGE-AID:  Have you ever felt you ought to cut down on your drinking or drug use?   Yes in the past.     Have people annoyed you by criticizing your drinking or drug use?   Yes    Have you ever felt bad or guilty about your drinking or drug use?   Yes    Have you ever had a drink or used drugs first thing in the morning to steady your nerves or to get rid of a hangover?  No    Do you feel these issues have been adequately addressed yes    Chemical Dependency Assessment Recommended?  No        Ca has a negative Cage-Aid score.       Legal History:    Ca reports that she has not " "been involved with the legal system.   ________________________________________________________________________    Life Situation (Employment/School/Finances/Basic Needs):  Ca  is currently living alone in an apartment.    The safety/stability of this environment is described as: safe and stable, has been there nine years.     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   Ca reports finances are obtained through Social Security Disability.   Ca does not identify her finances as a current stressor.  Ca denies a history of gambling and denies a history of gambling treatment.     Ca reports her highest level of education is some college 2 years post high school. Ca did not identify any learning problems Pt reports she did not like school.   Ca describes academic performance as: did not do well at all.    Ca describes school social experience as: did not have many friends.      Ca denies concerns regarding her current ability to meet basic needs.     Social/Family History:  Ca  reports she grew up in [place], [place].   Ca was the first born of 2 children.   Ca reports her biological parents are     Ca describes her childhood as : \"Ok, except my dad was an alcoholic, but my mother was real nice, so that helped.\"   Ca describes her current relationships with her family of origin as : pt reports she has no family.      Ca identifies her relationship status as: single.    Ca identifies her sexual orientation as: opposite sex   Ca denies sexual health concerns.     Ca reports having 1 children.     Ca describes the quantity/quality of her social relationships as : pt reports she lacks relationships.         Significant Losses / Trauma / Abuse / Neglect Issues / Developmental Incidents:  Ca denies significant loss/trauma/abuse/neglect issues/developmental incidents     Ca denies personal  " "experience.     Adventism Preference/Spiritual Beliefs/Cultural Considerations:     A. Ethnic Self-Identification:  Ca self-identifies her race/ethnicities as:  and her preferred language to be English.   Ca reports she does not need the assistance of an . Ca  reports she does not need other support or modifications involved in therapy.      B. Do you experience cultural bias (the practice of interpreting judging behavior by standards inherent to one's own culture) by other people as a stressor? If yes, describe how this relates to overall mental health symptoms.  No    C. Are there any cultural influences that may need to be considered for your treatment?  (This includes historical, geographical and familial factors that affect assessment and intervention processes). No, Denies any cultural influences or concerns that need to be considered for treatment    Strengths/Vulnerabilities:   Ca identifies her personal strengths as:  \"i have been able to stand this all these years.\".   Things that may interfere with the clients success in treatment include: few friends and lack of family support.   Other identified areas of vulnerability include: Anxiety with/without panic attacks  Depressive symptoms.     Medical History / Physical Health Screen:     Primary Care Physician: Ca has a [facility] Provider, who is named Senia Olivas.   Last Physical Exam: greater than a year ago and client was encouraged to schedule an exam with PCP.    Mental Health Medication Management Provider / Psychiatrist: Ca has a psychiatrist whose name and location are: [facility], Dr Kurdikar MD.     Last visit: recently        Next visit: no appt, will see in 3-6 months    Current medications including prescription, non-prescription, herbals, dietary aids and vitamins:  Per client report:   Outpatient Prescriptions Marked as Taking for the 9/7/18 encounter (Hospital Encounter) with " Juan Fabian, CHAD   Medication Sig     acetaminophen (TYLENOL) 500 MG tablet Take 500-1,000 mg by mouth every 6 hours as needed for mild pain     ammonium lactate (LAC-HYDRIN) 12 % cream Apply topically 2 times daily as needed for dry skin     ASPIRIN EC PO Take 81 mg by mouth     Blood Pressure Monitoring (BLOOD PRESSURE CUFF) MISC Imron blood cuff    Please check your blood pressure 2-3 times per week.  Goal is <140/90     ciclopirox (LOPROX) 0.77 % cream Apply topically 2 times daily To feet and toenails.     diazepam (VALIUM) 2 MG tablet Take 1 tablet (2 mg) by mouth every 6 hours as needed for anxiety or sleep     fluticasone (FLONASE) 50 MCG/ACT spray Spray 1 spray into both nostrils daily     fluticasone-vilanterol (BREO ELLIPTA) 200-25 MCG/INH oral inhaler Inhale 1 puff into the lungs daily     hydrochlorothiazide (MICROZIDE) 12.5 MG capsule Take 1 capsule (12.5 mg) by mouth daily     ibuprofen (ADVIL,MOTRIN) 200 MG tablet Take 3 tablets (600 mg) by mouth 3 times daily (with meals)     ipratropium (ATROVENT) 0.06 % spray Spray 2 sprays into both nostrils 4 times daily as needed for rhinitis     latanoprost (XALATAN) 0.005 % ophthalmic solution 1 drop     losartan (COZAAR) 25 MG tablet Take 1 tablet (25 mg) by mouth daily     losartan (COZAAR) 50 MG tablet Take 1 tablet (50 mg) by mouth daily     magic mouthwash (ENTER INGREDIENTS IN COMMENTS) suspension Swish and spit 5-10 mLs in mouth every 6 hours as needed     naproxen (NAPROSYN) 500 MG tablet Take 1 tablet (500 mg) by mouth 2 times daily as needed for moderate pain     naproxen (NAPROSYN) 500 MG tablet Take 1 tablet (500 mg) by mouth 2 times daily (with meals)     omeprazole (PRILOSEC) 20 MG capsule Take 20 mg by mouth as needed      omeprazole (PRILOSEC) 40 MG capsule Take 1 capsule (40 mg) by mouth daily     order for DME Equipment being ordered: Wheelchair Sig: Equipment being ordered: portable walker with seat and compartment under  the seat.     Use for going out of the house on errands, where prolonged standing is required.     order for DME Equipment being ordered: portable walker with seat and compartment under the seat.    Use for going out of the house on errands, where prolonged standing is required.     order for DME Equipment being ordered: knee high compression stockings, class 1 or 2, night time velcro compression garments, lymphedema bandaging supplies, darco boots to wear during treatment     order for DME Equipment being ordered: Oxygen  Please provide portable oxygen concentrator (pt would like over the shoulder oxygen device) for portability at 2LPM with activity via nasal canula.     quetiapine (SEROQUEL) 200 MG tablet Take 200 mg by mouth At Bedtime.     simvastatin (ZOCOR) 20 MG tablet Take 1 tablet (20 mg) by mouth daily     umeclidinium (INCRUSE ELLIPTA) 62.5 MCG/INH oral inhaler Inhale 1 puff into the lungs daily     Urea 40 % CREA Externally apply topically daily To feet and toenails.       Ca reports current medications are: Seroquel helps her sleep and she feels lonely. She reports she is depressed.    Ca describes taking her medications as: Independent.  Ca reports taking prescribed medications as prescribed.     Ca provides the following current assessment of pain:  ; Pain Score: Worst Pain (10) (hurts when she stands on her foot severely);  .     Ca provides the following information regarding past significant medical conditions/diagnoses:      Medical:  Past Medical History:   Diagnosis Date     Anxiety      Arthritis      Breast cancer (H)      COPD (chronic obstructive pulmonary disease) (H)     6/19/12:  FEV 0.99 l     Hypertension      Low bone density 4/13/2017    DEXA April 12, 2017: T score -2.0. Normal Z score. FRAX risk: major osteoporotic fracture 11.9%, hip fracture 2.6%, therefore not high-risk       Surgical:  Past Surgical History:   Procedure Laterality Date     BACK SURGERY       "spinal fusion     LUMPECTOMY BREAST       ORTHOPEDIC SURGERY      Knee surgery left side     Allergy:   Ca reports   Allergies   Allergen Reactions     Azithromycin      Other reaction(s): Itching     Codeine Nausea and Vomiting     Hydrocodone      Vomiting     Metronidazole Nausea     Percocet [Oxycodone-Acetaminophen]      Vomiting     Pollen Extract      Other reaction(s): Other, see comments  Pt states that she has sneezing and runny nose.      Seasonal Allergies Other (See Comments)     Pt states that she has sneezing and runny nose.      Vicodin [Hydrocodone-Acetaminophen]      Vomiting     Zolpidem Other (See Comments)     Amnestic behavior     Oxycodone Itching and Rash        Family History of Medical, Mental Health and/or Substance Use problems:  Per client report:   Family History   Problem Relation Age of Onset     Breast Cancer Mother      Diabetes Mother      Alcohol/Drug Father      Mental Illness Father      Cerebrovascular Disease Maternal Grandmother 67       Ca reports the following current medical concerns: COPD worries her. .      General Health:   Have you had any exposure to any communicable disease in the past 2-3 weeks? no     Are you aware of safe sex practices? yes     Is there a possibility of pregnancy?  no       Nutrition:    Are you on a special diet? If yes, please explain:  no   Do you have any concerns regarding your nutritional status? If yes, please explain:  yes \"I eat too much, and sometimes I eat the wrong stuff.\"    Have you had any appetite changes in the last 3 months?  No     Have you had any weight loss or weight gain in the last 3 months?  Yes, how much? Gained 5#     Do you have a history of an eating disorder? no   Do you have a history of being in an eating disorder program? no   NOTE: BMI to be calculated following program admission.    Fall Risk:   Have you had any falls in the past 3 months? no     Do you currently use any assistive devices for mobility?   " no     NOTE: If client reports 3 or more falls in the past 3 months, the client will not be accepted into the program until further assessment is completed by the program nurse. Check if a nurse is available to assess at time of DA.    NOTE: If client reports 2 falls in the past 3 months and/or the client currently uses assistive devices for mobility, the  will send an in-basket to the program nurse to meet with the client within the first week of programming.    Head Injury/Trauma:   Do you have a history of head injury / trauma? no     Do you have any cognitive impairment? no       Per completion of the Medical History / Physical Health Screen, is there a recommendation to see / follow up with a primary care physician/clinic?    No.      Clinical Findings     Mental Status Assessment/Clinical Observation:  Appearance:   awake, alert  Eye Contact:   good  Psychomotor Behavior: Normal    Attitude:   Cooperative    Oriented to:   All    Speech   Rate / Production: Normal    Volume:  Normal   Mood:    Anxious  Depressed     Affect:    Constricted      Thought Content:  Clear    Thought Form:  logical   Insight:    limited    Judgment:     fair  Attention Span/Concentration: fair  Recent and Remote Memory:  fair      Psychiatric Diagnosis:    296.32 (F33.1) Major Depressive Disorder, Recurrent Episode, Moderate _  300.02 (F41.1) Generalized Anxiety Disorder    Provisional Diagnostic Hypothesis (Explain R/O, other Provisional Diagnosis, and why alternative Diagnosis that were considered were ruled out):   None    Medical Concerns that may Impact Treatment:   COPD may effect attendance.     Psychosocial and Contextual Factors (V-Codes):  Lack of support    WHODAS 2.0 SCORE: 28/94.7 %    Client and family participation in assessment:   Ca was alone during this assessment.   This assessment does not include collateral information.      Summary & Recommendations  Provide a brief summary of how diagnostic  "criteria is met (symptoms, duration & functional impairment), cause, prognosis, and likely consequences of symptoms. Include overview of pertinent client strengths, cultural influences, life situations, relationships, health concerns and how diagnosis interacts/impacts with client's life. Recommendations include: client preferences, prioritization of needed mental health, ancillary or other services and any referrals to services required by statute or rule.       Ca was referred to 55+ Outpatient Program (55+) by self .Ca reports the following understanding of her diagnosis: Depression and anxiety Ca reports she needs a support group because she does not have friends and feels isolated. Everyone in her family has  and there is no one left except younger people. Pt reports she has felt alone since her sister-in-law  four years ago.     Pt reports her sister-in-law  four years ago. She reports she was her last family member she was close to. She reports she and her daughter do not get along and do not have contact. She reports her daughter, nephew, and niece are alcoholics and \"they all drink too much.\" Pt reports she is lonely. She reports she had [facility] and does not like it since \"everybody has dementia.\" Pt reports she is stressed by not having enough to do.     Pt reports sx since her sister-in-law  four years ago. Pt reports she has been feeling depressed. She reports she watches too much tv and drinks too much coffee. She reports she feels anxious when she goes to bed and worries about feeling alone. Pt reports she feels irritable a lot of the time. Pt reports she yells sometimes. She reports she has COPD and when she gets angry afterward she feels exhausted. Pt reports no thoughts wishing she were dead and denies S/I. Pt reports she worries about dying and lays in bed thinking about it. Pt reports she ruminates about her sister-in-law's death and the " "lack of family she has support from. Pt reports she reads a lot of books. Pt reports her memory is pretty good. Pt reports her appetite is too good. Pt reports she has gained 5# recently.  Pt reports she sleeps pretty good. Pt reports she lacks motivation to do anything. She reports she has to conserve her energy due to having COPD. She reports she gets tired easily. Pt reports she yells sometimes.     Ca reports first onset of mental health symptoms Pt reports she first had a depressive episode 20 years ago. Ca was first diagnosed 20 years ago. Ca received the following mental health services in the past: counseling, day treatment, inpatient mental health services, MI / CD day treatment and psychiatry. Psychiatric Hospitalizations: one admission a long time ago.     Pt reports a hx of Alcohol Use Disorder, but denies use for the past four years.     Pt reports her strength is that she has been \"able to stand this all these years.\".     Pt reports she has COPD and is followed by her pulmonary MD.     Pt denies cultural issues.     Prognosis is Guarded. Without the recommended intervention, the client is likely to experience the following consequences of their symptoms:Pt will need residential treatment without iop .    Referrals to services required by statute or rule:   Report to child/adult protection services was NA.     Program Recommendation: 55+ Outpatient Program (55+).      Assessment Completed by: Juan Fabian     "

## 2018-09-07 NOTE — PROGRESS NOTES
Acknowledgement of Current Treatment Plan       I have reviewed my treatment plan with my therapist / counselor on 9/7/2018. I agree with the plan as it is written in the electronic health record.    Name Signature   Ca Yan    Name of Therapist / Counselor    Juan Fabian MA [facility]

## 2018-09-17 ENCOUNTER — MEDICAL CORRESPONDENCE (OUTPATIENT)
Dept: HEALTH INFORMATION MANAGEMENT | Facility: CLINIC | Age: 73
End: 2018-09-17

## 2018-09-18 ENCOUNTER — TELEPHONE (OUTPATIENT)
Dept: INTERNAL MEDICINE | Facility: CLINIC | Age: 73
End: 2018-09-18

## 2018-09-18 NOTE — TELEPHONE ENCOUNTER
Contacted [facility] with [facility] [phone] regarding skilled nurse visits needed for plantar fasciitis.  Patient was seen at [facility] ER for this. Will inform RN as this is a new home care order.   Garth Hilliard LPN at 1:13 PM on 9/18/2018    Message left for RN from [facility] with [facility] to call back.  Viri Webb RN 1:21 PM on 9/18/2018.

## 2018-09-18 NOTE — TELEPHONE ENCOUNTER
[facility] Call Center    Phone Message    May a detailed message be left on voicemail: yes    Reason for Call: Order(s): Home Care Orders: Skilled Nursinx a wk for 3 wks - Pain management and med education     Action Taken: Message routed to:  [facility] KALEIGH

## 2018-09-21 ENCOUNTER — MEDICAL CORRESPONDENCE (OUTPATIENT)
Dept: HEALTH INFORMATION MANAGEMENT | Facility: CLINIC | Age: 73
End: 2018-09-21

## 2018-09-21 ENCOUNTER — TELEPHONE (OUTPATIENT)
Dept: BEHAVIORAL HEALTH | Facility: CLINIC | Age: 73
End: 2018-09-21

## 2018-09-24 ENCOUNTER — ALLIED HEALTH/NURSE VISIT (OUTPATIENT)
Dept: CARE COORDINATION | Facility: CLINIC | Age: 73
End: 2018-09-24

## 2018-09-24 ENCOUNTER — OFFICE VISIT (OUTPATIENT)
Dept: INTERNAL MEDICINE | Facility: CLINIC | Age: 73
End: 2018-09-24
Payer: COMMERCIAL

## 2018-09-24 ENCOUNTER — TELEPHONE (OUTPATIENT)
Dept: BEHAVIORAL HEALTH | Facility: CLINIC | Age: 73
End: 2018-09-24

## 2018-09-24 VITALS
SYSTOLIC BLOOD PRESSURE: 153 MMHG | BODY MASS INDEX: 34.26 KG/M2 | RESPIRATION RATE: 16 BRPM | WEIGHT: 199.7 LBS | DIASTOLIC BLOOD PRESSURE: 94 MMHG

## 2018-09-24 DIAGNOSIS — Z01.818 PRE-OP EXAM: Primary | ICD-10-CM

## 2018-09-24 DIAGNOSIS — I10 BENIGN ESSENTIAL HYPERTENSION: ICD-10-CM

## 2018-09-24 DIAGNOSIS — J44.1 COPD EXACERBATION (H): ICD-10-CM

## 2018-09-24 DIAGNOSIS — J44.9 CHRONIC OBSTRUCTIVE PULMONARY DISEASE, UNSPECIFIED COPD TYPE (H): ICD-10-CM

## 2018-09-24 DIAGNOSIS — Z71.9 VISIT FOR COUNSELING: Primary | ICD-10-CM

## 2018-09-24 RX ORDER — AMLODIPINE BESYLATE 2.5 MG/1
2.5 TABLET ORAL DAILY
Qty: 90 TABLET | Refills: 3 | Status: SHIPPED | OUTPATIENT
Start: 2018-09-24 | End: 2020-07-09

## 2018-09-24 RX ORDER — ALBUTEROL SULFATE 90 UG/1
2 AEROSOL, METERED RESPIRATORY (INHALATION) EVERY 6 HOURS PRN
Qty: 3 INHALER | Refills: 1 | Status: SHIPPED | OUTPATIENT
Start: 2018-09-24 | End: 2021-04-07

## 2018-09-24 NOTE — MR AVS SNAPSHOT
"              After Visit Summary   2018    Ca Yan    MRN: [id number]           Patient Information     Date Of Birth          1945        Visit Information        Provider Department      2018 4:47 PM Dalia Gresham LICSW  CASE MANAGEMENT        Today's Diagnoses     Visit for counseling    -  1       Follow-ups after your visit        Who to contact     If you have questions or need follow up information about today's clinic visit or your schedule please contact  CASE MANAGEMENT directly at [phone].  Normal or non-critical lab and imaging results will be communicated to you by [employer], letter or phone within 4 business days after the clinic has received the results. If you do not hear from us within 7 days, please contact the clinic through [employer] or phone. If you have a critical or abnormal lab result, we will notify you by phone as soon as possible.  Submit refill requests through [email or web address] or call your pharmacy and they will forward the refill request to us. Please allow 3 business days for your refill to be completed.          Additional Information About Your Visit        MyChart Information     [email or web address] lets you send messages to your doctor, view your test results, renew your prescriptions, schedule appointments and more. To sign up, go to [email or web address] . Click on \"Log in\" on the left side of the screen, which will take you to the Welcome page. Then click on \"Sign up Now\" on the right side of the page.     You will be asked to enter the access code listed below, as well as some personal information. Please follow the directions to create your username and password.     Your access code is: [email or web address]  Expires: 2018  6:30 AM     Your access code will  in 90 days. If you need help or a new code, please call your [place] clinic or [phone].        Care EveryWhere ID     This is your Care EveryWhere ID. This could be used by other organizations to " access your [facility] medical records  [id number]         Blood Pressure from Last 3 Encounters:   09/24/18 (!) 153/94   03/26/18 (!) 150/97   03/08/18 126/85    Weight from Last 3 Encounters:   09/26/18 89.4 kg (197 lb)   09/24/18 90.6 kg (199 lb 11.2 oz)   06/13/18 88.9 kg (196 lb)              Today, you had the following     No orders found for display         Today's Medication Changes          These changes are accurate as of 9/24/18 11:59 PM.  If you have any questions, ask your nurse or doctor.               Start taking these medicines.        Dose/Directions    albuterol 108 (90 Base) MCG/ACT inhaler   Commonly known as:  PROAIR HFA/PROVENTIL HFA/VENTOLIN HFA   Used for:  COPD exacerbation (H)   Started by:  Senia Olivas MD        Dose:  2 puff   Inhale 2 puffs into the lungs every 6 hours as needed for shortness of breath / dyspnea or wheezing   Quantity:  3 Inhaler   Refills:  1       amLODIPine 2.5 MG tablet   Commonly known as:  NORVASC   Used for:  Benign essential hypertension   Started by:  Senia Olivas MD        Dose:  2.5 mg   Take 1 tablet (2.5 mg) by mouth daily   Quantity:  90 tablet   Refills:  3         These medicines have changed or have updated prescriptions.        Dose/Directions    losartan 50 MG tablet   Commonly known as:  COZAAR   This may have changed:  Another medication with the same name was removed. Continue taking this medication, and follow the directions you see here.   Used for:  Benign essential hypertension   Changed by:  Senia Olivas MD        Dose:  50 mg   Take 1 tablet (50 mg) by mouth daily   Quantity:  90 tablet   Refills:  3       naproxen 500 MG tablet   Commonly known as:  NAPROSYN   This may have changed:  Another medication with the same name was removed. Continue taking this medication, and follow the directions you see here.   Used for:  Acute left-sided low back pain without sciatica   Changed by:  Senia Olivas MD        Dose:   500 mg   Take 1 tablet (500 mg) by mouth 2 times daily (with meals)   Quantity:  30 tablet   Refills:  1            Where to get your medicines      These medications were sent to [facility] - [place], [place] - [address]  [address], [facility] [address]     Phone:  [phone]     albuterol 108 (90 Base) MCG/ACT inhaler    amLODIPine 2.5 MG tablet    fluticasone-vilanterol 200-25 MCG/INH inhaler    umeclidinium 62.5 MCG/INH inhaler                Primary Care Provider Office Phone # Fax #    Senia WILSON [phone]       [address] [address]        Equal Access to Services     MUSHTAQ BROWN : Hadii aad ku hadasho Sosebastian, waaxda luqadaha, qaybta kaalmada adeegyada, sushma wisdom . So [facility] [phone].    ATENCIÓN: Si habla español, tiene a maxwell disposición servicios gratuitos de asistencia lingüística. [facility] [phone].    We comply with applicable federal civil rights laws and [place] laws. We do not discriminate on the basis of race, color, national origin, age, disability, sex, sexual orientation, or gender identity.            Thank you!     Thank you for choosing  CASE [facility]NAGEMENT  for your care. Our goal is always to provide you with excellent care. Hearing back from our patients is one way we can continue to improve our services. Please take a few minutes to complete the written survey that you may receive in the mail after your visit with us. Thank you!             Your Updated Medication List - Protect others around you: Learn how to safely use, store and throw away your medicines at www.disposemymeds.org.          This list is accurate as of 9/24/18 11:59 PM.  Always use your most recent med list.                   Brand Name Dispense Instructions for use Diagnosis    acetaminophen 500 MG tablet    TYLENOL     Take 500-1,000 mg by mouth every 6 hours as needed for mild pain         albuterol 108 (90 Base) MCG/ACT inhaler    PROAIR HFA/PROVENTIL HFA/VENTOLIN HFA    3 Inhaler    Inhale 2 puffs into the lungs every 6 hours as needed for shortness of breath / dyspnea or wheezing    COPD exacerbation (H)       amLODIPine 2.5 MG tablet    NORVASC    90 tablet    Take 1 tablet (2.5 mg) by mouth daily    Benign essential hypertension       ammonium lactate 12 % cream    LAC-HYDRIN    385 g    Apply topically 2 times daily as needed for dry skin    Bilateral leg edema       ASPIRIN EC PO      Take 81 mg by mouth        Blood Pressure Cuff Misc     1 each    Imron blood cuff  Please check your blood pressure 2-3 times per week.  Goal is <140/90    Benign essential hypertension       ciclopirox 0.77 % cream    LOPROX    90 g    Apply topically 2 times daily To feet and toenails.    Dermatophytosis of nail, Tinea pedis of both feet       diazepam 2 MG tablet    VALIUM    1 tablet    Take 1 tablet (2 mg) by mouth every 6 hours as needed for anxiety or sleep    Claustrophobia       fluticasone 50 MCG/ACT spray    FLONASE    1 Bottle    Spray 1 spray into both nostrils daily    Chronic allergic rhinitis, unspecified seasonality, unspecified trigger       fluticasone-vilanterol 200-25 MCG/INH inhaler    BREO ELLIPTA    1 Inhaler    Inhale 1 puff into the lungs daily    COPD exacerbation (H)       hydrochlorothiazide 12.5 MG capsule    MICROZIDE    90 capsule    Take 1 capsule (12.5 mg) by mouth daily    Hyperlipemia       ibuprofen 200 MG tablet    ADVIL/MOTRIN    90 tablet    Take 3 tablets (600 mg) by mouth 3 times daily (with meals)    Acute left-sided low back pain without sciatica       ipratropium 0.06 % spray    ATROVENT    1 Box    Spray 2 sprays into both nostrils 4 times daily as needed for rhinitis    Sinusitis, unspecified chronicity, unspecified location       latanoprost 0.005 % ophthalmic solution    XALATAN     1 drop        losartan 50 MG tablet    COZAAR    90 tablet    Take 1 tablet  (50 mg) by mouth daily    Benign essential hypertension       magic mouthwash suspension    ENTER INGREDIENTS IN COMMENTS    180 mL    Swish and spit 5-10 mLs in mouth every 6 hours as needed    Aphthous ulcer of mouth       naproxen 500 MG tablet    NAPROSYN    30 tablet    Take 1 tablet (500 mg) by mouth 2 times daily (with meals)    Acute left-sided low back pain without sciatica       * omeprazole 40 MG capsule    priLOSEC    90 capsule    Take 1 capsule (40 mg) by mouth daily    Gastroesophageal reflux disease without esophagitis       * omeprazole 20 MG CR capsule    priLOSEC     Take 20 mg by mouth as needed        * order for DME     1 Device    Equipment being ordered: Oxygen  Please provide portable oxygen concentrator (pt would like over the shoulder oxygen device) for portability at 2LPM with activity via nasal canula.    Pulmonary hypertension (H)       * order for DME     2 each    Equipment being ordered: knee high compression stockings, class 1 or 2, night time velcro compression garments, lymphedema bandaging supplies, darco boots to wear during treatment    Bilateral leg edema       * order for DME     1 Units    Equipment being ordered: portable walker with seat and compartment under the seat.  Use for going out of the house on errands, where prolonged standing is required.    Chronic obstructive pulmonary disease, unspecified COPD type (H)       * order for DME     1 Device    Equipment being ordered: Wheelchair Sig: Equipment being ordered: portable walker with seat and compartment under the seat.   Use for going out of the house on errands, where prolonged standing is required.    COPD (chronic obstructive pulmonary disease) (H)       QUEtiapine 200 MG tablet    SEROquel     Take 200 mg by mouth At Bedtime.        simvastatin 20 MG tablet    ZOCOR    90 tablet    Take 1 tablet (20 mg) by mouth daily    Hyperlipidemia, unspecified hyperlipidemia type       umeclidinium 62.5 MCG/INH inhaler     INCRUSE ELLIPTA    7 Inhaler    Inhale 1 puff into the lungs daily    Chronic obstructive pulmonary disease, unspecified COPD type (H)       Urea 40 % Crea     198 g    Externally apply topically daily To feet and toenails.    Onychauxis, Dermatophytosis of nail, Tyloma       * Notice:  This list has 6 medication(s) that are the same as other medications prescribed for you. Read the directions carefully, and ask your doctor or other care provider to review them with you.

## 2018-09-24 NOTE — PROGRESS NOTES
[facility] [place]   Senia Olivas MD  09/24/2018     Chief Complaint:   Preop and discuss advanced healthcare directive    History of Present Illness:   Ca Yan is 72 year old female here at the request of Dr. Martinez for cardiovascular, pulmonary, and perioperative risk assessment prior to surgery.  The intended procedure is a sedated colposcopy with biopsies of the cervix and endometrium.  A copy of this note will be sent to the surgeon.     Reason for surgery: She had post menopausal vaginal bleeding last year. This has not recurred since. She does have a history of DERIK. A colposcopy was attempted but she experienced too much discomfort.    Cardiovascular Risk:  She has no history of coronary artery disease, arrhythmia, or heart failure.  This patient does not have chest pain with ambulation or walking up a flight of stairs.  There is not any chest pain with exercise.  She does have a history of hypertension.  There is not a history of stroke or valvular disease. Blood pressure was elevated in clinic today.    Pulmonary Risk:  In terms of risk factors for pulmonary complications, the patient does have a history of moderately severe COPD. She has not been taking her Breo ellipta and incruse ellipta .  She states that she gets short of breath very easily especially with climbing stairs.  This is not new.  She is followed by pulmonologist, Dr. Omar Patel at [facility].  She is also run out of her albuterol.  She does not smoke cigarettes.  She has a non productive cough.     Perioperative Complications:  The patient does not have a history of bleeding or clotting problems in the past, specifically has no history of DVT, PE, or bleeding disorder.  They are not currently anticoagulated.  She has not had complications from past surgeries.  The patient does not have a family history of any anesthesia or surgical complications. She denies fainting in the last six months. She  required a blood transfusion after her back surgery.     Advanced directive  We went over her healthcare directive and the options on it.  We reviewed and discussed her chief concern which is that certain relatives not have ready to make decisions about her health or finances.    Review of Systems:   Denies nausea and vomiting. She does have difficulty with her bowel movements, she sees colorectal.    Pertinent items are noted in HPI, remainder of complete ROS is negative.      Active Medications:     Current Outpatient Prescriptions:      acetaminophen (TYLENOL) 500 MG tablet, Take 500-1,000 mg by mouth every 6 hours as needed for mild pain, Disp: , Rfl:      albuterol (PROAIR HFA/PROVENTIL HFA/VENTOLIN HFA) 108 (90 Base) MCG/ACT inhaler, Inhale 2 puffs into the lungs every 6 hours as needed for shortness of breath / dyspnea or wheezing, Disp: 3 Inhaler, Rfl: 1     amLODIPine (NORVASC) 2.5 MG tablet, Take 1 tablet (2.5 mg) by mouth daily, Disp: 90 tablet, Rfl: 3     ammonium lactate (LAC-HYDRIN) 12 % cream, Apply topically 2 times daily as needed for dry skin, Disp: 385 g, Rfl: 3     ASPIRIN EC PO, Take 81 mg by mouth, Disp: , Rfl:      Blood Pressure Monitoring (BLOOD PRESSURE CUFF) MISC, [facility] blood cuff  Please check your blood pressure 2-3 times per week.  Goal is <140/90, Disp: 1 each, Rfl: 0     ciclopirox (LOPROX) 0.77 % cream, Apply topically 2 times daily To feet and toenails., Disp: 90 g, Rfl: 6     diazepam (VALIUM) 2 MG tablet, Take 1 tablet (2 mg) by mouth every 6 hours as needed for anxiety or sleep, Disp: 1 tablet, Rfl: 0     fluticasone (FLONASE) 50 MCG/ACT spray, Spray 1 spray into both nostrils daily, Disp: 1 Bottle, Rfl: 1     fluticasone-vilanterol (BREO ELLIPTA) 200-25 MCG/INH inhaler, Inhale 1 puff into the lungs daily, Disp: 1 Inhaler, Rfl: 3     hydrochlorothiazide (MICROZIDE) 12.5 MG capsule, Take 1 capsule (12.5 mg) by mouth daily, Disp: 90 capsule, Rfl: 0     ibuprofen (ADVIL,MOTRIN) 200  MG tablet, Take 3 tablets (600 mg) by mouth 3 times daily (with meals), Disp: 90 tablet, Rfl: 0     ipratropium (ATROVENT) 0.06 % spray, Spray 2 sprays into both nostrils 4 times daily as needed for rhinitis, Disp: 1 Box, Rfl: 1     latanoprost (XALATAN) 0.005 % ophthalmic solution, 1 drop, Disp: , Rfl:      losartan (COZAAR) 50 MG tablet, Take 1 tablet (50 mg) by mouth daily, Disp: 90 tablet, Rfl: 3     magic mouthwash (ENTER INGREDIENTS IN COMMENTS) suspension, Swish and spit 5-10 mLs in mouth every 6 hours as needed, Disp: 180 mL, Rfl: 1     naproxen (NAPROSYN) 500 MG tablet, Take 1 tablet (500 mg) by mouth 2 times daily (with meals), Disp: 30 tablet, Rfl: 1     omeprazole (PRILOSEC) 20 MG capsule, Take 20 mg by mouth as needed , Disp: , Rfl:      omeprazole (PRILOSEC) 40 MG capsule, Take 1 capsule (40 mg) by mouth daily, Disp: 90 capsule, Rfl: 3     order for DME, Equipment being ordered: Wheelchair Sig: Equipment being ordered: portable walker with seat and compartment under the seat.   Use for going out of the house on errands, where prolonged standing is required., Disp: 1 Device, Rfl: 1     order for DME, Equipment being ordered: portable walker with seat and compartment under the seat.  Use for going out of the house on errands, where prolonged standing is required., Disp: 1 Units, Rfl: 0     order for DME, Equipment being ordered: knee high compression stockings, class 1 or 2, night time velcro compression garments, lymphedema bandaging supplies, darco boots to wear during treatment, Disp: 2 each, Rfl: 3     order for DME, Equipment being ordered: Oxygen  Please provide portable oxygen concentrator (pt would like over the shoulder oxygen device) for portability at 2LPM with activity via nasal canula., Disp: 1 Device, Rfl: 1     quetiapine (SEROQUEL) 200 MG tablet, Take 200 mg by mouth At Bedtime., Disp: , Rfl:      simvastatin (ZOCOR) 20 MG tablet, Take 1 tablet (20 mg) by mouth daily, Disp: 90 tablet,  Rfl: 3     umeclidinium (INCRUSE ELLIPTA) 62.5 MCG/INH inhaler, Inhale 1 puff into the lungs daily, Disp: 7 Inhaler, Rfl: 3     Urea 40 % CREA, Externally apply topically daily To feet and toenails., Disp: 198 g, Rfl: 5     [DISCONTINUED] losartan (COZAAR) 25 MG tablet, Take 1 tablet (25 mg) by mouth daily, Disp: 90 tablet, Rfl: 0      Allergies:   Azithromycin; Codeine; Hydrocodone; Metronidazole; Percocet [oxycodone-acetaminophen]; Pollen extract; Seasonal allergies; Vicodin [hydrocodone-acetaminophen]; Zolpidem; and Oxycodone      Past Medical History:  Past Medical History:   Diagnosis Date     Anxiety      Arthritis      Breast cancer (H)      COPD (chronic obstructive pulmonary disease) (H)     6/19/12:  FEV 0.99 l     Hypertension      Low bone density 4/13/2017    DEXA April 12, 2017: T score -2.0. Normal Z score. FRAX risk: major osteoporotic fracture 11.9%, hip fracture 2.6%, therefore not high-risk     Patient Active Problem List   Diagnosis     Non morbid obesity due to excess calories     Alcohol abuse     Malignant neoplasm of breast (H)     Chronic kidney disease, stage III (moderate)     Osteoarthritis of knee     Major depressive disorder with single episode     Diarrhea     Diastolic dysfunction     Osteoarthritis of spine     Gastroesophageal reflux disease     Generalized anxiety disorder     Hypertension     Hyperlipidemia     Insomnia     Irritable bowel syndrome     Kyphoscoliosis     Cluster C personality disorder     Seborrheic eczema     Anemia     Anxiety state     Asthma     Back pain     Bunion     Chronic obstructive pulmonary disease, unspecified COPD type (H)     Low bone density     Fecal incontinence     Left-sided low back pain without sciatica        Past Surgical History:  Past Surgical History:   Procedure Laterality Date     BACK SURGERY      spinal fusion     LUMPECTOMY BREAST       ORTHOPEDIC SURGERY      Knee surgery left side       Family History:   Family History   Problem  Relation Age of Onset     Breast Cancer Mother      Diabetes Mother      Alcohol/Drug Father      Mental Illness Father      Cerebrovascular Disease Maternal Grandmother 67         Social History:   Social History   Substance Use Topics     Smoking status: Former Smoker     Types: Cigarettes     Quit date: 9/26/1980     Smokeless tobacco: Never Used     Alcohol use No      Comment: Sober for 2 years, previous alcoholic        Physical Exam:   BP (!) 153/94 (BP Location: Right arm, Patient Position: Sitting, Cuff Size: Adult Regular)  Resp 16  Wt 90.6 kg (199 lb 11.2 oz)  Breastfeeding? No  BMI 34.26 kg/m2   Constitutional: Healthy, alert, anxious and cooperative  Head: Normocephalic. No masses, lesions, tenderness or abnormalities  Eyes: PERRL, no conjunctival injection  ENT: Bilateral TM normal without fluid or infection, throat normal without erythema or exudate, no cervical lymphadenopathy  Cardiovascular: JVP 6cm. RRR, S1,S2 no clicks, rubs, or gallops. Soft grade 2/6 murmur. No pretibial edema.  No carotid bruits.  Respiratory: Decreased ventilation bilaterally.  Clear to auscultation and percussion bilaterally.   Gastrointestinal: BS NA. Soft, nontender, no hepatosplenomegaly or mass. Liver is 7 centimeters at the midclavicular line.    Extremities: Significant lymphedema on legs bilaterally.  Pretibial area tender to palpation.  Skin: No suspicious lesions or rashes  Psychiatric: Mentation appears normal and affect anxious  Hematologic/Lymphatic/Immunologic: Normal cervical, anterior, posterior, submandibular, submental, and supraclavicular  lymph nodes    Recent Labs:    Last Comprehensive Metabolic Panel:  Sodium   Date Value Ref Range Status   02/05/2018 139 133 - 144 mmol/L Final     Potassium   Date Value Ref Range Status   02/05/2018 3.9 3.4 - 5.3 mmol/L Final     Chloride   Date Value Ref Range Status   02/05/2018 103 94 - 109 mmol/L Final     Carbon Dioxide   Date Value Ref Range Status    02/05/2018 32 20 - 32 mmol/L Final     Anion Gap   Date Value Ref Range Status   02/05/2018 4 3 - 14 mmol/L Final     Glucose   Date Value Ref Range Status   02/05/2018 87 70 - 99 mg/dL Final     Urea Nitrogen   Date Value Ref Range Status   02/05/2018 22 7 - 30 mg/dL Final     Creatinine   Date Value Ref Range Status   02/05/2018 0.77 0.52 - 1.04 mg/dL Final     GFR Estimate   Date Value Ref Range Status   02/05/2018 74 >60 mL/min/1.7m2 Final     Comment:     Non  GFR Calc     Calcium   Date Value Ref Range Status   02/05/2018 8.7 8.5 - 10.1 mg/dL Final         EKG:  EKG was indicated based on risk assessment.  Normal sinus rhythm with a rate of 71 bpm.  Normal OR and QT intervals.  Left axis deviation.  Very irregular baseline, but no overt ST or T-wave changes.  Otherwise normal EKG.    Pregnancy test needed: No     Assessment and Plan:    Chronic obstructive pulmonary disease, unspecified COPD type (H)  She has discontinued medications for COPD because she doesn't believe they are effective. We agreed it would be best if she resumed these before her upcoming surgery regardless, as it will help with her pulmonary risk.   - umeclidinium (INCRUSE ELLIPTA) 62.5 MCG/INH inhaler  Dispense: 7 Inhaler; Refill: 3  - fluticasone-vilanterol (BREO ELLIPTA) 200-25 MCG/INH inhaler  Dispense: 1 Inhaler; Refill: 3  - albuterol (PROAIR HFA/PROVENTIL HFA/VENTOLIN HFA) 108 (90 Base) MCG/ACT inhaler  Dispense: 3 Inhaler; Refill: 1    Pre-op exam  - EKG Performed in Clinic w/ Provider Reading Fee  - Basic metabolic panel  - CBC with platelets differential    Benign essential hypertension  Blood pressure was elevated today at 153/94. Patient agreed to adding amlodipine.  - amLODIPine (NORVASC) 2.5 MG tablet  Dispense: 90 tablet; Refill: 3     1. Pre-operative assessment for colposcopy with biopsies of the cervix and endometrium  The patient is at LOW risk for cardiovascular complications and at MODERATE risk for  pulmonary complications of this LOW risk surgery.    --Approval given to proceed with proposed procedure, without further diagnostic evaluation  --Patient has   Pulmonary Risk  COPD- patient given prescriptions for inhalers to manage COPD prior to surgery.  --Patient is to take all other scheduled medications on the day of surgery including Incruse, Ellipta (I asked her to start taking those again and refilled them), and albuterl MDI.  -If patient is short of breath the day of surgery, would recommend using albuterol nebulizer 1 power preoperative and repeat postoperatively as needed.    The patient has been told to not take any aspirin or NSAIDs for 10 days prior to surgery. The patient has been instructed as to what to do with medications prior to surgery. She has also been instructed to resume her COPD medications prior to surgery and begin amlodipine.    Healthcare directive   As indicated above, we spent about 10 minutes discussing this and I showed her where on the healthcare directive to indicate her wishes.  She would like to talk with the  about this in more detail today we have asked the  to come by.      follow-up: Return in about 3 months (around 12/24/2018).     Scribe Disclosure:   I, Ishmael Hull, am serving as a scribe to document services personally performed by Senia Olivas MD at this visit, based upon the provider's statements to me. All documentation has been reviewed by the aforementioned provider prior to being entered into the official medical record.     Portions of this medical record were completed by a scribe. UPON MY REVIEW AND AUTHENTICATION BY ELECTRONIC SIGNATURE, this confirms (a) I performed the applicable clinical services, and (b) the record is accurate.        Senia Olivas

## 2018-09-24 NOTE — TELEPHONE ENCOUNTER
Left message on pts home phone asking for a return call. Let her know that we will not hold her spot in 55+ if she does not call back today.

## 2018-09-24 NOTE — NURSING NOTE
Chief Complaint   Patient presents with     Pre-Op Exam     pt is here for a pre op exam       Delmi Herrera CMA at 11:21 AM on 9/24/2018

## 2018-09-24 NOTE — PROGRESS NOTES
"Rooming Note      Pre-Operative Information  Surgery: \"Cervical procedure\"  Date of Surgery: 10/9  Surgeon: Dr Michelle Yoder   Fax: [phone]  "

## 2018-09-24 NOTE — MR AVS SNAPSHOT
After Visit Summary   9/24/2018    Ca Yan    MRN: [id number]           Patient Information     Date Of Birth          1945        Visit Information        Provider Department      9/24/2018 11:25 AM Senia Olivas MD [facility]        Today's Diagnoses     Pre-op exam    -  1    COPD exacerbation (H)        Chronic obstructive pulmonary disease, unspecified COPD type (H)        Benign essential hypertension          Care Instructions    Primary Care Center Phone Number [phone]  Primary Care Center Medication Refill Request Information:  * Please contact your pharmacy regarding ANY request for medication refills.  ** [facility] Prescription Fax = [phone]  * Please allow 3 business days for routine medication refills.  * Please allow 5 business days for controlled substance medication refills.     Primary Care Center Test Result notification information:  *You will be notified with in 7-10 days of your appointment day regarding the results of your test.  If you are on MyChart you will be notified as soon as the provider has reviewed the results and signed off on them.      1.  Hold naprosyn and ibuprofen 1 week before surgery  2.  Take all medications including Incruse and Ellipta the day of surgery  3.  The morning of surgery take albuterol inhaler 2 puffs 30 minutes before surgery  4.  Labs and EKG today  5.  For blood pressure, add amlodipine 2.5 mg daily                          Follow-ups after your visit        Follow-up notes from your care team     Return in about 3 months (around 12/24/2018).      Future tests that were ordered for you today     Open Future Orders        Priority Expected Expires Ordered    CBC with platelets differential Routine 9/24/2018 10/8/2018 9/24/2018    Basic metabolic panel Routine 9/24/2018 10/8/2018 9/24/2018            Who to contact     Please call your clinic at [phone] to:    Ask questions about your health    Make  or cancel appointments    Discuss your medicines    Learn about your test results    Speak to your doctor            Additional Information About Your Visit        [employer] Information     Up[employer]ater is an electronic gateway that provides easy, online access to your medical records. With Updater, you can request a clinic appointment, read your test results, renew a prescription or communicate with your care team.     To sign up for Updater visit the website at [email or web address]   You will be asked to enter the access code listed below, as well as some personal information. Please follow the directions to create your username and password.     Your access code is: [email or web address]  Expires: 2018  6:30 AM     Your access code will  in 90 days. If you need help or a new code, please contact your [facility] or call [phone] for assistance.        Care EveryWhere ID     This is your Care EveryWhere ID. This could be used by other organizations to access your [facility] medical records  [id number]        Your Vitals Were     Respirations Breastfeeding? BMI (Body Mass Index)             16 No 34.26 kg/m2          Blood Pressure from Last 3 Encounters:   18 (!) 153/94   18 (!) 150/97   18 126/85    Weight from Last 3 Encounters:   18 90.6 kg (199 lb 11.2 oz)   18 88.9 kg (196 lb)   18 88.5 kg (195 lb)              We Performed the Following     EKG Performed in Clinic w/ Provider Reading Fee          Today's Medication Changes          These changes are accurate as of 18 11:59 PM.  If you have any questions, ask your nurse or doctor.               Start taking these medicines.        Dose/Directions    albuterol 108 (90 Base) MCG/ACT inhaler   Commonly known as:  PROAIR HFA/PROVENTIL HFA/VENTOLIN HFA   Used for:  COPD exacerbation (H)   Started by:  Senia Olivas MD        Dose:  2 puff   Inhale 2 puffs into the lungs every 6  hours as needed for shortness of breath / dyspnea or wheezing   Quantity:  3 Inhaler   Refills:  1       amLODIPine 2.5 MG tablet   Commonly known as:  NORVASC   Used for:  Benign essential hypertension   Started by:  Senia Olivas MD        Dose:  2.5 mg   Take 1 tablet (2.5 mg) by mouth daily   Quantity:  90 tablet   Refills:  3         These medicines have changed or have updated prescriptions.        Dose/Directions    losartan 50 MG tablet   Commonly known as:  COZAAR   This may have changed:  Another medication with the same name was removed. Continue taking this medication, and follow the directions you see here.   Used for:  Benign essential hypertension   Changed by:  Senia Olivas MD        Dose:  50 mg   Take 1 tablet (50 mg) by mouth daily   Quantity:  90 tablet   Refills:  3       naproxen 500 MG tablet   Commonly known as:  NAPROSYN   This may have changed:  Another medication with the same name was removed. Continue taking this medication, and follow the directions you see here.   Used for:  Acute left-sided low back pain without sciatica   Changed by:  Senia Olivas MD        Dose:  500 mg   Take 1 tablet (500 mg) by mouth 2 times daily (with meals)   Quantity:  30 tablet   Refills:  1            Where to get your medicines      [place] [facility] [place] [address], [place] [address]     Phone:  [phone]     albuterol 108 (90 Base) MCG/ACT inhaler    amLODIPine 2.5 MG tablet    fluticasone-vilanterol 200-25 MCG/INH inhaler    umeclidinium 62.5 MCG/INH inhaler                Primary Care Provider Office Phone # Fax #    Senia WILSON [phone]       [address] [address]        Equal Access to Services     MUSHTAQ BROWN AH: Sal Sweet, donald cherry, sushma hare  ah. So [facility] [phone].    ATENCIÓN: Si vianey miranda, tiene a maxwell disposición servicios gratuitos de asistencia lingüística. Elizabeth sherman [phone].    We comply with applicable federal civil rights laws and [place] laws. We do not discriminate on the basis of race, color, national origin, age, disability, sex, sexual orientation, or gender identity.            Thank you!     Thank you for choosing [facility]  for your care. Our goal is always to provide you with excellent care. Hearing back from our patients is one way we can continue to improve our services. Please take a few minutes to complete the written survey that you may receive in the mail after your visit with us. Thank you!             Your Updated Medication List - Protect others around you: Learn how to safely use, store and throw away your medicines at www.disposemymeds.org.          This list is accurate as of 9/24/18 11:59 PM.  Always use your most recent med list.                   Brand Name Dispense Instructions for use Diagnosis    acetaminophen 500 MG tablet    TYLENOL     Take 500-1,000 mg by mouth every 6 hours as needed for mild pain        albuterol 108 (90 Base) MCG/ACT inhaler    PROAIR HFA/PROVENTIL HFA/VENTOLIN HFA    3 Inhaler    Inhale 2 puffs into the lungs every 6 hours as needed for shortness of breath / dyspnea or wheezing    COPD exacerbation (H)       amLODIPine 2.5 MG tablet    NORVASC    90 tablet    Take 1 tablet (2.5 mg) by mouth daily    Benign essential hypertension       ammonium lactate 12 % cream    LAC-HYDRIN    385 g    Apply topically 2 times daily as needed for dry skin    Bilateral leg edema       ASPIRIN EC PO      Take 81 mg by mouth        Blood Pressure Cuff Misc     1 each    Imron blood cuff  Please check your blood pressure 2-3 times per week.  Goal is <140/90    Benign essential hypertension       ciclopirox 0.77 % cream    LOPROX    90 g    Apply topically 2 times daily To feet and  toenails.    Dermatophytosis of nail, Tinea pedis of both feet       diazepam 2 MG tablet    VALIUM    1 tablet    Take 1 tablet (2 mg) by mouth every 6 hours as needed for anxiety or sleep    Claustrophobia       fluticasone 50 MCG/ACT spray    FLONASE    1 Bottle    Spray 1 spray into both nostrils daily    Chronic allergic rhinitis, unspecified seasonality, unspecified trigger       fluticasone-vilanterol 200-25 MCG/INH inhaler    BREO ELLIPTA    1 Inhaler    Inhale 1 puff into the lungs daily    COPD exacerbation (H)       hydrochlorothiazide 12.5 MG capsule    MICROZIDE    90 capsule    Take 1 capsule (12.5 mg) by mouth daily    Hyperlipemia       ibuprofen 200 MG tablet    ADVIL/MOTRIN    90 tablet    Take 3 tablets (600 mg) by mouth 3 times daily (with meals)    Acute left-sided low back pain without sciatica       ipratropium 0.06 % spray    ATROVENT    1 Box    Spray 2 sprays into both nostrils 4 times daily as needed for rhinitis    Sinusitis, unspecified chronicity, unspecified location       latanoprost 0.005 % ophthalmic solution    XALATAN     1 drop        losartan 50 MG tablet    COZAAR    90 tablet    Take 1 tablet (50 mg) by mouth daily    Benign essential hypertension       magic mouthwash suspension    ENTER INGREDIENTS IN COMMENTS    180 mL    Swish and spit 5-10 mLs in mouth every 6 hours as needed    Aphthous ulcer of mouth       naproxen 500 MG tablet    NAPROSYN    30 tablet    Take 1 tablet (500 mg) by mouth 2 times daily (with meals)    Acute left-sided low back pain without sciatica       * omeprazole 40 MG capsule    priLOSEC    90 capsule    Take 1 capsule (40 mg) by mouth daily    Gastroesophageal reflux disease without esophagitis       * omeprazole 20 MG CR capsule    priLOSEC     Take 20 mg by mouth as needed        * order for DME     1 Device    Equipment being ordered: Oxygen  Please provide portable oxygen concentrator (pt would like over the shoulder oxygen device) for  portability at 2LPM with activity via nasal canula.    Pulmonary hypertension       * order for DME     2 each    Equipment being ordered: knee high compression stockings, class 1 or 2, night time velcro compression garments, lymphedema bandaging supplies, darco boots to wear during treatment    Bilateral leg edema       * order for DME     1 Units    Equipment being ordered: portable walker with seat and compartment under the seat.  Use for going out of the house on errands, where prolonged standing is required.    Chronic obstructive pulmonary disease, unspecified COPD type (H)       * order for DME     1 Device    Equipment being ordered: Wheelchair Sig: Equipment being ordered: portable walker with seat and compartment under the seat.   Use for going out of the house on errands, where prolonged standing is required.    COPD (chronic obstructive pulmonary disease) (H)       QUEtiapine 200 MG tablet    SEROquel     Take 200 mg by mouth At Bedtime.        simvastatin 20 MG tablet    ZOCOR    90 tablet    Take 1 tablet (20 mg) by mouth daily    Hyperlipidemia, unspecified hyperlipidemia type       umeclidinium 62.5 MCG/INH inhaler    INCRUSE ELLIPTA    7 Inhaler    Inhale 1 puff into the lungs daily    Chronic obstructive pulmonary disease, unspecified COPD type (H)       Urea 40 % Crea     198 g    Externally apply topically daily To feet and toenails.    Onychauxis, Dermatophytosis of nail, Tyloma       * Notice:  This list has 6 medication(s) that are the same as other medications prescribed for you. Read the directions carefully, and ask your doctor or other care provider to review them with you.

## 2018-09-24 NOTE — LETTER
9/24/2018      RE: Ca Yan  [address]  [facility] [address]       [facility]   Senia Olivas MD  09/24/2018     Chief Complaint:   Preop and discuss advanced healthcare directive    History of Present Illness:   Ca Yan is 72 year old female here at the request of Dr. Martinez for cardiovascular, pulmonary, and perioperative risk assessment prior to surgery.  The intended procedure is a sedated colposcopy with biopsies of the cervix and endometrium.  A copy of this note will be sent to the surgeon.     Reason for surgery: She had post menopausal vaginal bleeding last year. This has not recurred since. She does have a history of DERIK. A colposcopy was attempted but she experienced too much discomfort.    Cardiovascular Risk:  She has no history of coronary artery disease, arrhythmia, or heart failure.  This patient does not have chest pain with ambulation or walking up a flight of stairs.  There is not any chest pain with exercise.  She does have a history of hypertension.  There is not a history of stroke or valvular disease. Blood pressure was elevated in clinic today.    Pulmonary Risk:  In terms of risk factors for pulmonary complications, the patient does have a history of moderately severe COPD. She has not been taking her Breo ellipta and incruse ellipta .  She states that she gets short of breath very easily especially with climbing stairs.  This is not new.  She is followed by pulmonologist, Dr. Omar Patel at [facility].  She is also run out of her albuterol.  She does not smoke cigarettes.  She has a non productive cough.     Perioperative Complications:  The patient does not have a history of bleeding or clotting problems in the past, specifically has no history of DVT, PE, or bleeding disorder.  They are not currently anticoagulated.  She has not had complications from past surgeries.  The patient does not have a family history of any  anesthesia or surgical complications. She denies fainting in the last six months. She required a blood transfusion after her back surgery.     Advanced directive  We went over her healthcare directive and the options on it.  We reviewed and discussed her chief concern which is that certain relatives not have ready to make decisions about her health or finances.    Review of Systems:   Denies nausea and vomiting. She does have difficulty with her bowel movements, she sees colorectal.    Pertinent items are noted in HPI, remainder of complete ROS is negative.      Active Medications:     Current Outpatient Prescriptions:      acetaminophen (TYLENOL) 500 MG tablet, Take 500-1,000 mg by mouth every 6 hours as needed for mild pain, Disp: , Rfl:      albuterol (PROAIR HFA/PROVENTIL HFA/VENTOLIN HFA) 108 (90 Base) MCG/ACT inhaler, Inhale 2 puffs into the lungs every 6 hours as needed for shortness of breath / dyspnea or wheezing, Disp: 3 Inhaler, Rfl: 1     amLODIPine (NORVASC) 2.5 MG tablet, Take 1 tablet (2.5 mg) by mouth daily, Disp: 90 tablet, Rfl: 3     ammonium lactate (LAC-HYDRIN) 12 % cream, Apply topically 2 times daily as needed for dry skin, Disp: 385 g, Rfl: 3     ASPIRIN EC PO, Take 81 mg by mouth, Disp: , Rfl:      Blood Pressure Monitoring (BLOOD PRESSURE CUFF) MISC, [facility]  Please check your blood pressure 2-3 times per week.  Goal is <140/90, Disp: 1 each, Rfl: 0     ciclopirox (LOPROX) 0.77 % cream, Apply topically 2 times daily To feet and toenails., Disp: 90 g, Rfl: 6     diazepam (VALIUM) 2 MG tablet, Take 1 tablet (2 mg) by mouth every 6 hours as needed for anxiety or sleep, Disp: 1 tablet, Rfl: 0     fluticasone (FLONASE) 50 MCG/ACT spray, Spray 1 spray into both nostrils daily, Disp: 1 Bottle, Rfl: 1     fluticasone-vilanterol (BREO ELLIPTA) 200-25 MCG/INH inhaler, Inhale 1 puff into the lungs daily, Disp: 1 Inhaler, Rfl: 3     hydrochlorothiazide (MICROZIDE) 12.5 MG capsule, Take 1  capsule (12.5 mg) by mouth daily, Disp: 90 capsule, Rfl: 0     ibuprofen (ADVIL,MOTRIN) 200 MG tablet, Take 3 tablets (600 mg) by mouth 3 times daily (with meals), Disp: 90 tablet, Rfl: 0     ipratropium (ATROVENT) 0.06 % spray, Spray 2 sprays into both nostrils 4 times daily as needed for rhinitis, Disp: 1 Box, Rfl: 1     latanoprost (XALATAN) 0.005 % ophthalmic solution, 1 drop, Disp: , Rfl:      losartan (COZAAR) 50 MG tablet, Take 1 tablet (50 mg) by mouth daily, Disp: 90 tablet, Rfl: 3     magic mouthwash (ENTER INGREDIENTS IN COMMENTS) suspension, Swish and spit 5-10 mLs in mouth every 6 hours as needed, Disp: 180 mL, Rfl: 1     naproxen (NAPROSYN) 500 MG tablet, Take 1 tablet (500 mg) by mouth 2 times daily (with meals), Disp: 30 tablet, Rfl: 1     omeprazole (PRILOSEC) 20 MG capsule, Take 20 mg by mouth as needed , Disp: , Rfl:      omeprazole (PRILOSEC) 40 MG capsule, Take 1 capsule (40 mg) by mouth daily, Disp: 90 capsule, Rfl: 3     order for DME, Equipment being ordered: Wheelchair Sig: Equipment being ordered: portable walker with seat and compartment under the seat.   Use for going out of the house on errands, where prolonged standing is required., Disp: 1 Device, Rfl: 1     order for DME, Equipment being ordered: portable walker with seat and compartment under the seat.  Use for going out of the house on errands, where prolonged standing is required., Disp: 1 Units, Rfl: 0     order for DME, Equipment being ordered: knee high compression stockings, class 1 or 2, night time velcro compression garments, lymphedema bandaging supplies, darco boots to wear during treatment, Disp: 2 each, Rfl: 3     order for DME, Equipment being ordered: Oxygen  Please provide portable oxygen concentrator (pt would like over the shoulder oxygen device) for portability at 2LPM with activity via nasal canula., Disp: 1 Device, Rfl: 1     quetiapine (SEROQUEL) 200 MG tablet, Take 200 mg by mouth At Bedtime., Disp: , Rfl:       simvastatin (ZOCOR) 20 MG tablet, Take 1 tablet (20 mg) by mouth daily, Disp: 90 tablet, Rfl: 3     umeclidinium (INCRUSE ELLIPTA) 62.5 MCG/INH inhaler, Inhale 1 puff into the lungs daily, Disp: 7 Inhaler, Rfl: 3     Urea 40 % CREA, Externally apply topically daily To feet and toenails., Disp: 198 g, Rfl: 5     [DISCONTINUED] losartan (COZAAR) 25 MG tablet, Take 1 tablet (25 mg) by mouth daily, Disp: 90 tablet, Rfl: 0      Allergies:   Azithromycin; Codeine; Hydrocodone; Metronidazole; Percocet [oxycodone-acetaminophen]; Pollen extract; Seasonal allergies; Vicodin [hydrocodone-acetaminophen]; Zolpidem; and Oxycodone      Past Medical History:  Past Medical History:   Diagnosis Date     Anxiety      Arthritis      Breast cancer (H)      COPD (chronic obstructive pulmonary disease) (H)     6/19/12:  FEV 0.99 l     Hypertension      Low bone density 4/13/2017    DEXA April 12, 2017: T score -2.0. Normal Z score. FRAX risk: major osteoporotic fracture 11.9%, hip fracture 2.6%, therefore not high-risk     Patient Active Problem List   Diagnosis     Non morbid obesity due to excess calories     Alcohol abuse     Malignant neoplasm of breast (H)     Chronic kidney disease, stage III (moderate)     Osteoarthritis of knee     Major depressive disorder with single episode     Diarrhea     Diastolic dysfunction     Osteoarthritis of spine     Gastroesophageal reflux disease     Generalized anxiety disorder     Hypertension     Hyperlipidemia     Insomnia     Irritable bowel syndrome     Kyphoscoliosis     Cluster C personality disorder     Seborrheic eczema     Anemia     Anxiety state     Asthma     Back pain     Bunion     Chronic obstructive pulmonary disease, unspecified COPD type (H)     Low bone density     Fecal incontinence     Left-sided low back pain without sciatica        Past Surgical History:  Past Surgical History:   Procedure Laterality Date     BACK SURGERY      spinal fusion     LUMPECTOMY BREAST        ORTHOPEDIC SURGERY      Knee surgery left side       Family History:   Family History   Problem Relation Age of Onset     Breast Cancer Mother      Diabetes Mother      Alcohol/Drug Father      Mental Illness Father      Cerebrovascular Disease Maternal Grandmother 67         Social History:   Social History   Substance Use Topics     Smoking status: Former Smoker     Types: Cigarettes     Quit date: 9/26/1980     Smokeless tobacco: Never Used     Alcohol use No      Comment: Sober for 2 years, previous alcoholic        Physical Exam:   BP (!) 153/94 (BP Location: Right arm, Patient Position: Sitting, Cuff Size: Adult Regular)  Resp 16  Wt 90.6 kg (199 lb 11.2 oz)  Breastfeeding? No  BMI 34.26 kg/m2   Constitutional: Healthy, alert, anxious and cooperative  Head: Normocephalic. No masses, lesions, tenderness or abnormalities  Eyes: PERRL, no conjunctival injection  ENT: Bilateral TM normal without fluid or infection, throat normal without erythema or exudate, no cervical lymphadenopathy  Cardiovascular: JVP 6cm. RRR, S1,S2 no clicks, rubs, or gallops. Soft grade 2/6 murmur. No pretibial edema.  No carotid bruits.  Respiratory: Decreased ventilation bilaterally.  Clear to auscultation and percussion bilaterally.   Gastrointestinal: BS NA. Soft, nontender, no hepatosplenomegaly or mass. Liver is 7 centimeters at the midclavicular line.    Extremities: Significant lymphedema on legs bilaterally.  Pretibial area tender to palpation.  Skin: No suspicious lesions or rashes  Psychiatric: Mentation appears normal and affect anxious  Hematologic/Lymphatic/Immunologic: Normal cervical, anterior, posterior, submandibular, submental, and supraclavicular  lymph nodes    Recent Labs:    Last Comprehensive Metabolic Panel:  Sodium   Date Value Ref Range Status   02/05/2018 139 133 - 144 mmol/L Final     Potassium   Date Value Ref Range Status   02/05/2018 3.9 3.4 - 5.3 mmol/L Final     Chloride   Date Value Ref Range Status    02/05/2018 103 94 - 109 mmol/L Final     Carbon Dioxide   Date Value Ref Range Status   02/05/2018 32 20 - 32 mmol/L Final     Anion Gap   Date Value Ref Range Status   02/05/2018 4 3 - 14 mmol/L Final     Glucose   Date Value Ref Range Status   02/05/2018 87 70 - 99 mg/dL Final     Urea Nitrogen   Date Value Ref Range Status   02/05/2018 22 7 - 30 mg/dL Final     Creatinine   Date Value Ref Range Status   02/05/2018 0.77 0.52 - 1.04 mg/dL Final     GFR Estimate   Date Value Ref Range Status   02/05/2018 74 >60 mL/min/1.7m2 Final     Comment:     Non  GFR Calc     Calcium   Date Value Ref Range Status   02/05/2018 8.7 8.5 - 10.1 mg/dL Final         EKG:  EKG was indicated based on risk assessment.  Normal sinus rhythm with a rate of 71 bpm.  Normal GA and QT intervals.  Left axis deviation.  Very irregular baseline, but no overt ST or T-wave changes.  Otherwise normal EKG.    Pregnancy test needed: No     Assessment and Plan:    Chronic obstructive pulmonary disease, unspecified COPD type (H)  She has discontinued medications for COPD because she doesn't believe they are effective. We agreed it would be best if she resumed these before her upcoming surgery regardless, as it will help with her pulmonary risk.   - umeclidinium (INCRUSE ELLIPTA) 62.5 MCG/INH inhaler  Dispense: 7 Inhaler; Refill: 3  - fluticasone-vilanterol (BREO ELLIPTA) 200-25 MCG/INH inhaler  Dispense: 1 Inhaler; Refill: 3  - albuterol (PROAIR HFA/PROVENTIL HFA/VENTOLIN HFA) 108 (90 Base) MCG/ACT inhaler  Dispense: 3 Inhaler; Refill: 1    Pre-op exam  - EKG Performed in Clinic w/ Provider Reading Fee  - Basic metabolic panel  - CBC with platelets differential    Benign essential hypertension  Blood pressure was elevated today at 153/94. Patient agreed to adding amlodipine.  - amLODIPine (NORVASC) 2.5 MG tablet  Dispense: 90 tablet; Refill: 3     1. Pre-operative assessment for colposcopy with biopsies of the cervix and  "endometrium  The patient is at LOW risk for cardiovascular complications and at MODERATE risk for pulmonary complications of this LOW risk surgery.    --Approval given to proceed with proposed procedure, without further diagnostic evaluation  --Patient has   Pulmonary Risk  COPD- patient given prescriptions for inhalers to manage COPD prior to surgery.  --Patient is to take all other scheduled medications on the day of surgery including Incruse, Ellipta (I asked her to start taking those again and refilled them), and albuterl MDI.  -If patient is short of breath the day of surgery, would recommend using albuterol nebulizer 1 power preoperative and repeat postoperatively as needed.    The patient has been told to not take any aspirin or NSAIDs for 10 days prior to surgery. The patient has been instructed as to what to do with medications prior to surgery. She has also been instructed to resume her COPD medications prior to surgery and begin amlodipine.    Healthcare directive   As indicated above, we spent about 10 minutes discussing this and I showed her where on the healthcare directive to indicate her wishes.  She would like to talk with the  about this in more detail today we have asked the  to come by.      follow-up: Return in about 3 months (around 12/24/2018).     Scribe Disclosure:   I, Ishmael Hull, am serving as a scribe to document services personally performed by Senia Olivas MD at this visit, based upon the provider's statements to me. All documentation has been reviewed by the aforementioned provider prior to being entered into the official medical record.     Portions of this medical record were completed by a scribe. UPON MY REVIEW AND AUTHENTICATION BY ELECTRONIC SIGNATURE, this confirms (a) I performed the applicable clinical services, and (b) the record is accurate.        Senia Olivas      Rooming Note      Pre-Operative Information  Surgery: \"Cervical " "procedure\"  Date of Surgery: 10/9  Surgeon: Dr Michelle Yoder   Fax: [phone]      Senia Olivas MD    "

## 2018-09-24 NOTE — PATIENT INSTRUCTIONS
Primary Care Center Phone Number [phone]  [facility] Medication Refill Request Information:  * Please contact your pharmacy regarding ANY request for medication refills.  ** [facility] Prescription Fax = [phone]  * Please allow 3 business days for routine medication refills.  * Please allow 5 business days for controlled substance medication refills.     [facility] Test Result notification information:  *You will be notified with in 7-10 days of your appointment day regarding the results of your test.  If you are on MyChart you will be notified as soon as the provider has reviewed the results and signed off on them.      1.  Hold naprosyn and ibuprofen 1 week before surgery  2.  Take all medications including Incruse and Ellipta the day of surgery  3.  The morning of surgery take albuterol inhaler 2 puffs 30 minutes before surgery  4.  Labs and EKG today  5.  For blood pressure, add amlodipine 2.5 mg daily

## 2018-09-26 ENCOUNTER — HOSPITAL ENCOUNTER (OUTPATIENT)
Dept: BEHAVIORAL HEALTH | Facility: CLINIC | Age: 73
End: 2018-09-26
Attending: PSYCHIATRY & NEUROLOGY
Payer: COMMERCIAL

## 2018-09-26 VITALS — BODY MASS INDEX: 33.63 KG/M2 | WEIGHT: 197 LBS | HEIGHT: 64 IN

## 2018-09-26 PROBLEM — F33.9 MAJOR DEPRESSION, RECURRENT (H): Status: ACTIVE | Noted: 2018-09-26

## 2018-09-26 LAB — INTERPRETATION ECG - MUSE: NORMAL

## 2018-09-26 ASSESSMENT — ANXIETY QUESTIONNAIRES
7. FEELING AFRAID AS IF SOMETHING AWFUL MIGHT HAPPEN: SEVERAL DAYS
3. WORRYING TOO MUCH ABOUT DIFFERENT THINGS: SEVERAL DAYS
1. FEELING NERVOUS, ANXIOUS, OR ON EDGE: SEVERAL DAYS
IF YOU CHECKED OFF ANY PROBLEMS ON THIS QUESTIONNAIRE, HOW DIFFICULT HAVE THESE PROBLEMS MADE IT FOR YOU TO DO YOUR WORK, TAKE CARE OF THINGS AT HOME, OR GET ALONG WITH OTHER PEOPLE: SOMEWHAT DIFFICULT
GAD7 TOTAL SCORE: 8
2. NOT BEING ABLE TO STOP OR CONTROL WORRYING: SEVERAL DAYS
5. BEING SO RESTLESS THAT IT IS HARD TO SIT STILL: MORE THAN HALF THE DAYS
6. BECOMING EASILY ANNOYED OR IRRITABLE: SEVERAL DAYS

## 2018-09-26 ASSESSMENT — PATIENT HEALTH QUESTIONNAIRE - PHQ9: 5. POOR APPETITE OR OVEREATING: SEVERAL DAYS

## 2018-09-26 NOTE — PROGRESS NOTES
RN Review of Medical History / Physical Health Screen  Outpatient Behavioral Programs      CLIENT'S NAME: Ca Yan  MRN:   [id number]  :   1945 AGE:72 year old SEX: female    DATE OF DIAGNOSTIC ASSESSMENT: 18  DATE OF ADMISSION: 18   PROGRAM: 55+ Outpatient Program (55+)     Following admission, the RN reviewed the following:    - Medical History / Physical Health Screen completed during the DA noted above.  - Immediate Health Concerns as noted on the Initial Individual Treatment Plan.    Client height and weight recorded by RN in epic: yes    BMI Review:  Was the patient informed of BMI? yes      Findings  No Intervention       RN Recommendations include: Advised to follow up with primary care physician for any recommendations.  General nutrition education will be provided in group setting.    WALE MAN  2018

## 2018-09-27 ASSESSMENT — PATIENT HEALTH QUESTIONNAIRE - PHQ9: SUM OF ALL RESPONSES TO PHQ QUESTIONS 1-9: 1

## 2018-09-27 ASSESSMENT — ANXIETY QUESTIONNAIRES: GAD7 TOTAL SCORE: 8

## 2018-09-27 NOTE — PROGRESS NOTES
"Adult Mental Health Outpatient Group Therapy Progress Note     Client Initial Individualized Goals for Treatment:     See Initial Treatment suggestions for the client during the time between Diagnostic Assessment and completion of the Master Individualized Treatment Plan.    Treatment Goals:     TBD     Area of Treatment Focus:  Symptom Management, Personal Safety, Community Resources/Discharge Planning, Abstinence/Relapse Prevention, Develop / Improve Independent Living Skills and Develop Socialization / Interpersonal Relationship Skills    Therapeutic Interventions/Treatment Strategies:  Support, Redirection, Feedback, Limit/Boundaries, Structured Activity, Problem Solving, Clarification, Education, Motivational Enhancement Therapy and Cognitive Behavioral Therapy    Response to Treatment Strategies:  Accepted Feedback, Gave Feedback, Listened, Attentive, Accepted Support and Alert    Name of Group:  Psychotherapy Group   10:00- 10:50: This was first day for client. She was somewhat irritable and restless but seemed to settle in during morning group. She stated she was very isolated and \" felt buried under ground and invisible\". She reports no supports in her life and no remaining family. She used her time in group to problem solve how she could get to an AA meeting and determined she would use cab to get to 1 meeting near her apartment in [facility] with the hope that she could develop support and perhaps find someone to give her a ride to more meetings. Endorsed anxiety and depression and stated, \" I've been spending too much time reading the obituaries\". Denied active suicidal intent or harm to self.    11:00- 11:50: Discussed values and strengths in this session. Olivia listened today.    Hour 2:00- 2:50PM: Facilitated topic on Boundaries. All received detailed worksheet including examples of both healthy and unhealthy boundaries as well as Tips to Set Boundaries. Ca gave examples of people in her life " that have violated her boundaries but will benefit from redirection to avoid advice giving to others.    Group Participants: 8 of 8    Description and Outcome:  Client demonstrated understanding of session content by listening to others and identifying a goal for herself to address isolation.  Client verbalized understanding of session content by appeared to track conversation, needed slight redirection but very accepting of this.  Client would benefit from additional opportunities to practice and implement content from this session.      Is this a Weekly Review of the Progress on the Treatment Plan?  Yes.      Are Treatment Plan Goals being addressed?  Yes, continue treatment goals      Are Treatment Plan Strategies to Address Goals Effective?  Yes, continue treatment strategies      Are there any current contracts in place?  Yes. safety- denied suicidal ideation.

## 2018-10-02 ENCOUNTER — MEDICAL CORRESPONDENCE (OUTPATIENT)
Dept: HEALTH INFORMATION MANAGEMENT | Facility: CLINIC | Age: 73
End: 2018-10-02

## 2018-10-03 ENCOUNTER — HOSPITAL ENCOUNTER (OUTPATIENT)
Dept: BEHAVIORAL HEALTH | Facility: CLINIC | Age: 73
End: 2018-10-03
Attending: PSYCHIATRY & NEUROLOGY
Payer: COMMERCIAL

## 2018-10-03 PROCEDURE — H2012 BEHAV HLTH DAY TREAT, PER HR: HCPCS

## 2018-10-03 NOTE — PROGRESS NOTES
Adult Mental Health Outpatient Group Therapy Progress Note     Client Initial Individualized Goals for Treatment:     See Initial Treatment suggestions for the client during the time between Diagnostic Assessment and completion of the Master Individualized Treatment Plan.    Treatment Goals:     TBD     Area of Treatment Focus:  Symptom Management, Personal Safety, Community Resources/Discharge Planning, Abstinence/Relapse Prevention, Develop / Improve Independent Living Skills and Develop Socialization / Interpersonal Relationship Skills    Therapeutic Interventions/Treatment Strategies:  Support, Redirection, Feedback, Limit/Boundaries, Structured Activity, Problem Solving, Clarification, Education, Motivational Enhancement Therapy and Cognitive Behavioral Therapy    Response to Treatment Strategies:  Accepted Feedback, Gave Feedback, Listened, Attentive, Accepted Support and Alert    Name of Group:  Psychotherapy Group   10:00- 10:50: Olivia complaining of a lot of pain in her feet and back making it difficult for her to sit. She also reported more difficulty breathing due toCOPD which worsens depression. She sees pulmunary doc tomorrow and will discuss this with this person. She expressed that she does not feel she wants to continue in this program as the day is too long for her. Indeed she left after this first hour and informed w she was going to discharge and focus on attending AA and [place] Day program.   Group Participants: 5/8    Description and Outcome:  Client demonstrated understanding of session content by listening to others and identifying a goal for herself to address isolation.  Client verbalized understanding of session content by appeared to track conversation, needed slight redirection but very accepting of this.  Client would benefit from additional opportunities to practice and implement content from this session.      Is this a Weekly Review of the Progress on the Treatment Plan?  Yes.       Are Treatment Plan Goals being addressed?  Yes, continue treatment goals      Are Treatment Plan Strategies to Address Goals Effective?  Yes, continue treatment strategies      Are there any current contracts in place?  Yes. safety- denied suicidal ideation.

## 2018-10-08 ENCOUNTER — MEDICAL CORRESPONDENCE (OUTPATIENT)
Dept: HEALTH INFORMATION MANAGEMENT | Facility: CLINIC | Age: 73
End: 2018-10-08

## 2018-10-09 ENCOUNTER — MEDICAL CORRESPONDENCE (OUTPATIENT)
Dept: HEALTH INFORMATION MANAGEMENT | Facility: CLINIC | Age: 73
End: 2018-10-09

## 2018-10-09 NOTE — PROGRESS NOTES
Social Work Intervention  [facility]    Data/Intervention:    Patient Name:  Ca Yan  /Age:  1945 (72 year old)    Visit Type: in person  Referral Source: Clinic referral  Reason for Referral:  Health care Directive questions    Collaborated With:    -Patient    Patient Concerns/Issues:    met with Patient who had questions about completing a Health Care Directive. Patient reported that she does not want anyone in her family involved in health care decisions and would like friends to be the agents.     Intervention/Education/Resources Provided:   answered all of Patient's questions about HCD and encouraged Patient to have a conversation with her friends who she wants to appoint as her HC decision makers.    Assessment/Plan:  Patient to work with her support system to have HCD completed. Patient will contact  with any follow up questions.    Provided patient/family with contact information and availability.    JUNE Simon  Outpatient [facility]ty Clinics  Direct Phone: [phone]  Pager:  [phone]

## 2018-10-15 ENCOUNTER — MEDICAL CORRESPONDENCE (OUTPATIENT)
Dept: HEALTH INFORMATION MANAGEMENT | Facility: CLINIC | Age: 73
End: 2018-10-15

## 2018-10-22 ENCOUNTER — TELEPHONE (OUTPATIENT)
Dept: BEHAVIORAL HEALTH | Facility: CLINIC | Age: 73
End: 2018-10-22

## 2018-10-22 NOTE — TELEPHONE ENCOUNTER
Received call from client who has discharged that she is lonely and wanted ideas for weekends. Really stressed [facility] as place she could go and meet people, get involved and become a member. She verbalized agreement and stated she planned to attend this Saturday. W responded to her request for further assistance with resources with an appropriate resource for her that is free and easy for her to get to.

## 2018-11-05 ENCOUNTER — TELEPHONE (OUTPATIENT)
Dept: INTERNAL MEDICINE | Facility: CLINIC | Age: 73
End: 2018-11-05

## 2018-11-05 DIAGNOSIS — G89.29 CHRONIC MIDLINE LOW BACK PAIN WITH LEFT-SIDED SCIATICA: Primary | ICD-10-CM

## 2018-11-05 DIAGNOSIS — M54.42 CHRONIC MIDLINE LOW BACK PAIN WITH LEFT-SIDED SCIATICA: Primary | ICD-10-CM

## 2018-11-05 NOTE — TELEPHONE ENCOUNTER
Health Call Center    Phone Message    May a detailed message be left on voicemail: yes    Reason for Call: Other: Pt requests that Dr Watson call her today or tomorrow before 11am to discuss symptoms and Pt request for PT referral.  Please call prior to 11am.     Action Taken: Patient transferred to: encounter and Message routed to:  [facility] ([facility]): primary

## 2018-11-06 NOTE — TELEPHONE ENCOUNTER
Spoke to patient regarding request for referral for PT eval and treat.  She has had a history of scoliosis surgery as a child and experiencing back pain now.  Will inform provider.  Garth Hilliard LPN at 11:32 AM on 11/6/2018

## 2018-11-08 NOTE — TELEPHONE ENCOUNTER
Contacted patient and advised her that Dr. Olivas has placed a referral for physical therapy.  [facility] physical therapy department will be calling her within a few days to set up an appointment.  Patient would also like to come in to discuss her anxiety.  Appointment made for  December 3 with Dr. Olivas.  Garth Hilliard LPN at 4:46 PM on 11/8/2018

## 2018-12-05 ENCOUNTER — THERAPY VISIT (OUTPATIENT)
Dept: PHYSICAL THERAPY | Facility: CLINIC | Age: 73
End: 2018-12-05
Payer: COMMERCIAL

## 2018-12-05 DIAGNOSIS — G89.29 CHRONIC MIDLINE LOW BACK PAIN WITH LEFT-SIDED SCIATICA: ICD-10-CM

## 2018-12-05 DIAGNOSIS — M54.42 CHRONIC MIDLINE LOW BACK PAIN WITH LEFT-SIDED SCIATICA: ICD-10-CM

## 2018-12-05 PROCEDURE — 97161 PT EVAL LOW COMPLEX 20 MIN: CPT | Mod: GP | Performed by: PHYSICAL THERAPIST

## 2018-12-05 PROCEDURE — 97110 THERAPEUTIC EXERCISES: CPT | Mod: GP | Performed by: PHYSICAL THERAPIST

## 2018-12-05 NOTE — PROGRESS NOTES
Physical Therapy Initial Evaluation  December 5, 2018     MD Instructions/Precautions/Restrictions: PT eval and treat.     Therapist Impression:   Ca Yan presents with findings consistent with mechanical LBP/scoliosis, with related impairments limiting her ability to stand, walk, navigate stairs, squat/bend down. Skilled PT services are necessary in order to reduce impairments and improve independent function.     Subjective:   Date of Onset: 12/5/18 (MD orders, chronic condition)  C/C: Chronic L-sided LBP, history of major fusion due to scoliosis as a child.   Quality of pain is dull and aching. Pains are described as intermittent in nature. Pain is worse: not dependent on time of day. Pain is rated 5/10.   History of symptoms: Pains began gradually as the result of insidious onset. Since onset, symptoms are worsening.  Worsened by: Standing, walking.    Alleviated by: Nothing.    General health as reported by patient: fair  Pertinent medical/surgical history: Refer to health history in EMR. Imaging: none. Current occupational status: Retired. Patient's goals are: decrease pain, stand for longer. Return to MD:  PRN.     Objective:  LUMBAR EXAMINATION    Posture: Prominent R rib hump present during forward flexion consistent with hx of scoliosis.     Active ROM Limitation   Flexion Min, no effect   Extension Mod, incr pain    L R   Rotation     Sidebend Mod/max, no effect Mod/max, pull/no pain   Sideglide       Neurological testing (myotomes, sensation, reflexes, nerve tension) not indicated at this time.    Special Tests:  30sec sit to stand: 10 reps.     Assessment/Plan:    The patient is a 73 year old female with chief complaint of LBP.    The patient has the following significant findings with corresponding treatment plan.  Diagnosis 1:  Mechanical LBP, scoliosis    Pain -  hot/cold therapy, US, electric stimulation, manual therapy, splint/taping/bracing/orthotics, self management, education,  directional preference exercise and home program  Decreased ROM/flexibility - manual therapy, therapeutic exercise and home program  Decreased joint mobility - manual therapy, therapeutic exercise and home program  Decreased strength - therapeutic exercise, therapeutic activities and home program  Impaired balance - neuro re-education, therapeutic activities and home program  Decreased proprioception - neuro re-education and therapeutic activities  Impaired gait - gait training and assistive devices  Impaired muscle performance - neuro re-education and home program  Decreased function - therapeutic activities and home program  Impaired posture - neuro re-education, therapeutic activities and home program    Therapy Evaluation Codes:   1) History comprised of:   Personal factors that impact the plan of care:      Please refer to health history in EMR.    Comorbidity factors that impact the plan of care are:      Please refer to health history in EMR.     Medications impacting care: None.  2) Examination of Body Systems comprised of:   Body structures and functions that impact the plan of care:      Lumbar spine.   Activity limitations that impact the plan of care are:      Bathing, Bending, Cooking, Dressing, Lifting, Squatting/kneeling, Stairs, Standing, Walking, Sleeping and Laying down.   Clinical presentation characteristics are:    Stable/Uncomplicated.  3) Presentation comprised of:   Presentation scored as Low complexity with uncomplicated characteristics..  4) Decision-Making    Low complexity using standardized patient assessment instrument and/or measureable assessment of functional outcome.  Cumulative Therapy Evaluation is: Low complexity.    Previous and current functional limitations:  (See Goal Flow Sheet for this information)    Short term and Long term goals: (See Goal Flow Sheet for this information)     Communication ability:  Patient appears to be able to clearly communicate and understand verbal  and written communication and follow directions correctly.  Treatment Explanation - The following has been discussed with the patient: RX ordered/plan of care, anticipated outcomes, and possible risks and side effects.  This patient would benefit from PT intervention to resume normal activities.   Rehab potential is poor to meet PT goals given PLOF, motivation regarding rehabilitation process, beliefs regarding potential to improve.    Frequency:  2 X a month, once daily  Duration:  for 2 months  Discharge Plan: Achieve all LTGs, be independent in home treatment program, and reach maximal therapeutic benefit.    Please refer to the daily flowsheet for treatment today, total treatment time and time spent performing 1:1 timed codes.

## 2018-12-05 NOTE — MR AVS SNAPSHOT
"              After Visit Summary   12/5/2018    Ca Yan    MRN: [id number]           Patient Information     Date Of Birth          1945        Visit Information        Provider Department      12/5/2018 12:50 PM Alex Atkinson, PT [facility]        Today's Diagnoses     Chronic midline low back pain with left-sided sciatica           Follow-ups after your visit        Your next 10 appointments already scheduled     Jan 07, 2019  1:25 PM CST   (Arrive by 1:10 PM)   Return Visit with Senia WILSON MD   [facility] ([facility])    [address] [place] [id number] [phone]              Who to contact     If you have questions or need follow up information about today's clinic visit or your schedule please contact [facility] directly at [phone].  Normal or non-critical lab and imaging results will be communicated to you by MyChart, letter or phone within 4 business days after the clinic has received the results. If you do not hear from us within 7 days, please contact the clinic through MyChart or phone. If you have a critical or abnormal lab result, we will notify you by phone as soon as possible.  Submit refill requests through [employer] or call your pharmacy and they will forward the refill request to us. Please allow 3 business days for your refill to be completed.          Additional Information About Your Visit        MyChart Information     [employer] lets you send messages to your doctor, view your test results, renew your prescriptions, schedule appointments and more. To sign up, go to [email or web address] . Click on \"Log in\" on the left side of the screen, which will take you to the Welcome page. Then click on \"Sign up Now\" on the right side of the page.     You will be asked to enter the access code listed below, as well as some personal information. Please follow the " directions to create your username and password.     Your access code is: [email or web address]  Expires: 2019  6:30 AM     Your access code will  in 90 days. If you need help or a new code, please call your [facility] clinic or [phone].        Care EveryWhere ID     This is your Care EveryWhere ID. This could be used by other organizations to access your [facility] medical records  [id number]         Blood Pressure from Last 3 Encounters:   18 (!) 153/94   18 (!) 150/97   18 126/85    Weight from Last 3 Encounters:   18 89.4 kg (197 lb)   18 90.6 kg (199 lb 11.2 oz)   18 88.9 kg (196 lb)              We Performed the Following     HC PT EVAL, LOW COMPLEXITY     [facility] INITIAL EVAL REPORT     PHYSICAL THERAPY REFERRAL     THERAPEUTIC EXERCISES        Primary Care Provider Office Phone # Fax #    Senia WILSON [phone]       [address] [address]        Equal Access to Services     [facility]: Hadii aad ku hadasho Soomaali, waaxda luqadaha, qaybta kaalmada adeegyada, sushma wisdom . So [facility] [phone].    ATENCIÓN: Si habla español, tiene a maxwell disposición servicios gratuitos de asistencia lingüística. [facility] [phone].    We comply with applicable federal civil rights laws and [place] laws. We do not discriminate on the basis of race, color, national origin, age, disability, sex, sexual orientation, or gender identity.            Thank you!     Thank you for choosing [facility]  for your care. Our goal is always to provide you with excellent care. Hearing back from our patients is one way we can continue to improve our services. Please take a few minutes to complete the written survey that you may receive in the mail after your visit with us. Thank you!             Your Updated Medication List - Protect others around you: Learn how to safely use, store and throw away your  medicines at www.disposemymeds.org.          This list is accurate as of 12/5/18  1:54 PM.  Always use your most recent med list.                   Brand Name Dispense Instructions for use Diagnosis    acetaminophen 500 MG tablet    TYLENOL     Take 500-1,000 mg by mouth every 6 hours as needed for mild pain        albuterol 108 (90 Base) MCG/ACT inhaler    PROAIR HFA/PROVENTIL HFA/VENTOLIN HFA    3 Inhaler    Inhale 2 puffs into the lungs every 6 hours as needed for shortness of breath / dyspnea or wheezing    COPD exacerbation (H)       amLODIPine 2.5 MG tablet    NORVASC    90 tablet    Take 1 tablet (2.5 mg) by mouth daily    Benign essential hypertension       ammonium lactate 12 % external cream    LAC-HYDRIN    385 g    Apply topically 2 times daily as needed for dry skin    Bilateral leg edema       ASPIRIN EC PO      Take 81 mg by mouth        Blood Pressure Cuff Misc     1 each    Imron blood cuff  Please check your blood pressure 2-3 times per week.  Goal is <140/90    Benign essential hypertension       ciclopirox 0.77 % cream    LOPROX    90 g    Apply topically 2 times daily To feet and toenails.    Dermatophytosis of nail, Tinea pedis of both feet       diazepam 2 MG tablet    VALIUM    1 tablet    Take 1 tablet (2 mg) by mouth every 6 hours as needed for anxiety or sleep    Claustrophobia       fluticasone 50 MCG/ACT nasal spray    FLONASE    1 Bottle    Spray 1 spray into both nostrils daily    Chronic allergic rhinitis, unspecified seasonality, unspecified trigger       fluticasone-vilanterol 200-25 MCG/INH inhaler    BREO ELLIPTA    1 Inhaler    Inhale 1 puff into the lungs daily    COPD exacerbation (H)       hydrochlorothiazide 12.5 MG capsule    MICROZIDE    90 capsule    Take 1 capsule (12.5 mg) by mouth daily    Hyperlipemia       ibuprofen 200 MG tablet    ADVIL/MOTRIN    90 tablet    Take 3 tablets (600 mg) by mouth 3 times daily (with meals)    Acute left-sided low back pain without  sciatica       ipratropium 0.06 % nasal spray    ATROVENT    1 Box    Spray 2 sprays into both nostrils 4 times daily as needed for rhinitis    Sinusitis, unspecified chronicity, unspecified location       latanoprost 0.005 % ophthalmic solution    XALATAN     1 drop        losartan 50 MG tablet    COZAAR    90 tablet    Take 1 tablet (50 mg) by mouth daily    Benign essential hypertension       magic mouthwash suspension    ENTER INGREDIENTS IN COMMENTS    180 mL    Swish and spit 5-10 mLs in mouth every 6 hours as needed    Aphthous ulcer of mouth       naproxen 500 MG tablet    NAPROSYN    30 tablet    Take 1 tablet (500 mg) by mouth 2 times daily (with meals)    Acute left-sided low back pain without sciatica       * omeprazole 40 MG DR capsule    priLOSEC    90 capsule    Take 1 capsule (40 mg) by mouth daily    Gastroesophageal reflux disease without esophagitis       * omeprazole 20 MG DR capsule    priLOSEC     Take 20 mg by mouth as needed        * order for DME     1 Device    Equipment being ordered: Oxygen  Please provide portable oxygen concentrator (pt would like over the shoulder oxygen device) for portability at 2LPM with activity via nasal canula.    Pulmonary hypertension (H)       * order for DME     2 each    Equipment being ordered: knee high compression stockings, class 1 or 2, night time velcro compression garments, lymphedema bandaging supplies, darco boots to wear during treatment    Bilateral leg edema       * order for DME     1 Units    Equipment being ordered: portable walker with seat and compartment under the seat.  Use for going out of the house on errands, where prolonged standing is required.    Chronic obstructive pulmonary disease, unspecified COPD type (H)       * order for DME     1 Device    Equipment being ordered: Wheelchair Sig: Equipment being ordered: portable walker with seat and compartment under the seat.   Use for going out of the house on errands, where prolonged  standing is required.    COPD (chronic obstructive pulmonary disease) (H)       QUEtiapine 200 MG tablet    SEROquel     Take 200 mg by mouth At Bedtime.        simvastatin 20 MG tablet    ZOCOR    90 tablet    Take 1 tablet (20 mg) by mouth daily    Hyperlipidemia, unspecified hyperlipidemia type       umeclidinium 62.5 MCG/INH inhaler    INCRUSE ELLIPTA    7 Inhaler    Inhale 1 puff into the lungs daily    Chronic obstructive pulmonary disease, unspecified COPD type (H)       Urea 40 % Crea     198 g    Externally apply topically daily To feet and toenails.    Onychauxis, Dermatophytosis of nail, Tyloma       * Notice:  This list has 6 medication(s) that are the same as other medications prescribed for you. Read the directions carefully, and ask your doctor or other care provider to review them with you.

## 2018-12-17 ENCOUNTER — TELEPHONE (OUTPATIENT)
Dept: SURGERY | Facility: CLINIC | Age: 73
End: 2018-12-17

## 2018-12-17 NOTE — TELEPHONE ENCOUNTER
Called patient, scheduled an appt with Dr. Latham on 1/23/19 @ 1pm to discuss SNS    Vanessa THORNTON LPN

## 2018-12-17 NOTE — TELEPHONE ENCOUNTER
[facility] Call Center    Phone Message    May a detailed message be left on voicemail: yes    Reason for Call: Other: Patient is calling in requesting a call back regarding a procedure. She does not remember the name of the procedure she stated she discussed it with Lilly at her last office visit in Feb. Please follow up with the patient asap.Thank you.    Action Taken: Message routed to:  [facility] colon and rectal

## 2019-01-07 ENCOUNTER — OFFICE VISIT (OUTPATIENT)
Dept: INTERNAL MEDICINE | Facility: CLINIC | Age: 74
End: 2019-01-07
Payer: COMMERCIAL

## 2019-01-07 VITALS — HEART RATE: 78 BPM | OXYGEN SATURATION: 91 %

## 2019-01-07 DIAGNOSIS — F41.9 ANXIETY: Primary | ICD-10-CM

## 2019-01-07 ASSESSMENT — PAIN SCALES - GENERAL: PAINLEVEL: NO PAIN (0)

## 2019-01-07 NOTE — PROGRESS NOTES
Chief complaint:  HPI: This 73-year-old woman with a past medical history of COPD and severe anxiety who presents for routine follow-up.    Problem #1.  COPD.  She wonders how to dose her Brio and includes.  We reviewed this together today.  Her breathing is generally better than it has been in the past.  She has a morning cough but is not feeling short of breath.    Problem #2: Plantar fasciitis.  She gets episodic heel pain and sometimes has been to the emergency room for this.  She has bought new shoes which are very comfortable and help support her feet.  We reviewed the concept of plantar fasciitis including stretching, icing and heel pads.  The pain is not present today.    Problem #3: Chronic fecal incontinence.  She has seen colorectal surgery in the past and they have recommended the bulk agents which she is used unsuccessfully.  She was last seen in February 2018 when they recommended consideration of sacral nerve stimulation.  She did not go back but now she is thinking about doing it because she is having a great deal of difficulty with defecation and the mass as a result.  There is no rectal bleeding    Problem #4: Anxiety.  She states that before she goes to bed at night she becomes very anxious.  On further questioning this is due to feeling very alone.  She is a last living member of her family.  She lives in the apartment by herself.  The building does not really support cooperative or cooperative activities.  She is not feeling depressed.  She joined a new [employer] and goes to Sunday school but is having some trouble making new friends.  At her old [employer] she liked the assistant 's wife.  We talked about ways to decrease her isolation.  She is able to focus and concentrate.  When she falls asleep she is fine.  Her energy level is not great.    #5: Healthcare maintenance she is up-to-date and had a flu shot this year      Past Medical History:   Diagnosis Date     Anxiety      Arthritis       Breast cancer (H)      COPD (chronic obstructive pulmonary disease) (H)     6/19/12:  FEV 0.99 l     Hypertension      Low back pain with left-sided sciatica      Low bone density 4/13/2017    DEXA April 12, 2017: T score -2.0. Normal Z score. FRAX risk: major osteoporotic fracture 11.9%, hip fracture 2.6%, therefore not high-risk      ROS: 10 point ROS neg other than the symptoms noted above in the HPI.  Past Surgical History:   Procedure Laterality Date     BACK SURGERY      spinal fusion     LUMPECTOMY BREAST       ORTHOPEDIC SURGERY      Knee surgery left side       Pulse 78   SpO2 91%   Refuses vital signs    Exam:  Constitutional: Obese, pleasant, worried appearing woman sitting in a wheelchair  Cardiovascular: negative, PMI normal. No lifts, heaves, or thrills. RRR. Grade 2/6 early systolic murmurs, clicks gallops or rub  Respiratory: Decreased breath sounds bilaterally without wheezes, rhonchi or crackles  Extremities: Lymphedema without pretibial edema bilaterally      Last Comprehensive Metabolic Panel:  Sodium   Date Value Ref Range Status   02/05/2018 139 133 - 144 mmol/L Final     Potassium   Date Value Ref Range Status   02/05/2018 3.9 3.4 - 5.3 mmol/L Final     Chloride   Date Value Ref Range Status   02/05/2018 103 94 - 109 mmol/L Final     Carbon Dioxide   Date Value Ref Range Status   02/05/2018 32 20 - 32 mmol/L Final     Anion Gap   Date Value Ref Range Status   02/05/2018 4 3 - 14 mmol/L Final     Glucose   Date Value Ref Range Status   02/05/2018 87 70 - 99 mg/dL Final     Urea Nitrogen   Date Value Ref Range Status   02/05/2018 22 7 - 30 mg/dL Final     Creatinine   Date Value Ref Range Status   02/05/2018 0.77 0.52 - 1.04 mg/dL Final     GFR Estimate   Date Value Ref Range Status   02/05/2018 74 >60 mL/min/1.7m2 Final     Comment:     Non  GFR Calc     Calcium   Date Value Ref Range Status   02/05/2018 8.7 8.5 - 10.1 mg/dL Final     CBC RESULTS:   Recent Labs   Lab Test  03/08/18  1430   WBC 5.9   RBC 4.63   HGB 13.7   HCT 43.4   MCV 94   MCH 29.6   MCHC 31.6   RDW 14.4        TSH   Date Value Ref Range Status   02/20/2017 1.40 0.40 - 4.00 mU/L Final     ASSESSMENT    Problem #1.  COPD, severe.  FEV1 0.99 L.  On appropriate medications and doing well.  Has had a flu shot this year she is up-to-date on Pneumovax.    Problem #2: Anxiety.  This seems most likely due to isolation and loneliness.  We talked about this a lot.  She has a few ideas about how to meet friends but it is difficult for her.  She does not meet diagnostic criteria for depression at this point.  I recommended a referral to health psychology.  In addition she will consider volunteering at her [employer] and talking with her .    Problem #3: Plantar fasciitis, resolved.  See above    RTC 2 months  We will consider antianxiety medication at that time if not better     I spent a total of 25 minutes face-to-face with Ca Yan during today's office visit.  Over 50% of this time was spent counseling the patient and/or coordinating care regarding multiple medical problems.  See note for details.    Senia Olivas

## 2019-01-07 NOTE — NURSING NOTE
Chief Complaint   Patient presents with     Colitis     Pt has bowel problems     Anxiety     Pt wants to dicuss anxiety     [facility]  1:50 PM 1/7/2019.

## 2019-01-08 ENCOUNTER — TELEPHONE (OUTPATIENT)
Dept: INTERNAL MEDICINE | Facility: CLINIC | Age: 74
End: 2019-01-08

## 2019-01-08 NOTE — TELEPHONE ENCOUNTER
[facility] Call Center    Phone Message    May a detailed message be left on voicemail: yes    Reason for Call: Other: Annual Medical Exam report was sent over but sent back without being filled out. Pt. says the document needs to be filled out. The care facility will resend the document please follow-up.      Action Taken: Message routed to:  [facility]ALE[facility]

## 2019-01-09 ENCOUNTER — TELEPHONE (OUTPATIENT)
Dept: INTERNAL MEDICINE | Facility: CLINIC | Age: 74
End: 2019-01-09

## 2019-01-09 NOTE — TELEPHONE ENCOUNTER
[facility] Call Center    Phone Message    May a detailed message be left on voicemail: yes    Reason for Call: Other: . PLEASE FAX MEDICATION LIST [phone]    Action Taken: Message routed to:  [facility]ALE[facility]

## 2019-01-09 NOTE — TELEPHONE ENCOUNTER
Juan Alberto completed form to [facility].  Faxed to [phone]  1/9/19

## 2019-01-10 ENCOUNTER — TELEPHONE (OUTPATIENT)
Dept: INTERNAL MEDICINE | Facility: CLINIC | Age: 74
End: 2019-01-10

## 2019-01-10 NOTE — TELEPHONE ENCOUNTER
Health Call Center    Phone Message    May a detailed message be left on voicemail: yes    Reason for Call: Other: Pt states Dr. Olivas had recommended seeing Dalia Huerta for counseling, and she had spoken to Dalia, but she could not see pt until March. Pt states she would like to see Dalia but doesn't think she can wait till March. She wanted to know if Brendon had another recommendation. Please advise.    Action Taken: Message routed to:  [facility] PCC

## 2019-01-10 NOTE — TELEPHONE ENCOUNTER
Spoke to patient to relay that if March was too far out that she could check with her insurance or she could call the [facility] number that was given to her by Dalia Huerta and see them and if she would like to then in March, she could see Dalia, but if she would like to stick with where she was currently going, then she would not need to switch. Patient states that she didn't know if March was too long or not, it was only a couple months a way, but she was checking with us if it could wait until March. After discussing with the patient, the patient decided that she would call the [facility]. Advised patient that if she does need the referral sent elsewhere she can contact the clinic again, patient stated verbal understanding. Rosaura Taveras LPN 1/10/2019 9:37 AM

## 2019-01-22 NOTE — PROGRESS NOTES
"Colon and Rectal Surgery Follow-Up Clinic Note    RE: Ca Yan  : 1945  NUPUR: 2019    Ca Yan is a very pleasant 73 year old female here for follow-up of fecal incontinence.    Interval history: Ms. Yan has been seen in the past by Lilly Santa NP and Dr. Hattie Lorenzo for fecal incontinence. She reports dealing with fecal incontinence for the past 8 years, now on a near daily basis. She continues to have about one bowel movement a day although sometimes this is erratic. This tends to occur in AM and is often associated with drinking coffee. Her bowel movements are subjectively very \"messy.\" She reports her pads nearly always have stool on them after having a bowel movement. She denies loosing whole stools. She often doesn't feel or have a sensation that she is having stool leakage. She reports a significant impact on quality of life, including her sex life. She has tried a fiber supplement in the past without improvement. She had discussed SNS at her last visit and hopes to continue this discussion today.    Past relevant history:  She has never had any anorectal procedures. She had one vaginal birth of a baby of 8 pounds.     Her last colonoscopy was in  but was unable to be completed due to patient discomfort. A virtual colonoscopy was completed at that time with recommended repeat in 5 years.    She underwent pelvic floor testing in  with non relaxing publrectalis on defecography and slightly weaker tone with resting tone 25-31 and squeeze 6-38 mmHg. She declined biofeedback in the past.    PLEASE SEE NOTE BELOW FOR PHYSICAL EXAMINATION, REVIEW OF SYSTEMS, AND OTHER HISTORY.    Assessment/Plan: 73 year old female with ongoing fecal incontinence. We had a another discussion about pelvic floor dysfunction and fecal incontinence. We spoke about conservative treatment (stool bulkening) consisting of fiber supplementation, which she reports has failed in past. We " also had an in-depth discussion about sacral nerve stimulator with our SNS nurse specialist. She has previously discussed Sacral Nerve Stimulation with Dr. Latham and is believed to be a good candidate. She expressed a desire to move forward with SNS planning. She will do a bowel diary and she will plan to call for scheduling of trial SNS placement.    -Bowel diary for 2 weeks  -Patient plans to mail in diary  -Will plan to schedule SNS trial in clinic once bowel diary is complete    Patient's questions were answered to her stated satisfaction and she is in agreement with this plan.      Johan Juarez MD PGY1 served as scribe for this visit. I was present for the visit and saw and examined the patient.    Total time was 25 minutes, over 50% was spent counseling the patient.     Leda Latham MD  Colon and Rectal Surgery Staff  [facility]      PLEASE SEE NOTE BELOW FOR PHYSICAL EXAMINATION, REVIEW OF SYSTEMS, AND OTHER HISTORY.  --------------------------------------------------------------------------------------------------------------------------------------------------------------    MEDICATIONS:  Current Outpatient Medications   Medication     acetaminophen (TYLENOL) 500 MG tablet     albuterol (PROAIR HFA/PROVENTIL HFA/VENTOLIN HFA) 108 (90 Base) MCG/ACT inhaler     fluticasone (FLONASE) 50 MCG/ACT spray     fluticasone-vilanterol (BREO ELLIPTA) 200-25 MCG/INH inhaler     hydrochlorothiazide (MICROZIDE) 12.5 MG capsule     ibuprofen (ADVIL,MOTRIN) 200 MG tablet     latanoprost (XALATAN) 0.005 % ophthalmic solution     losartan (COZAAR) 50 MG tablet     omeprazole (PRILOSEC) 20 MG capsule     order for DME     order for DME     order for DME     quetiapine (SEROQUEL) 200 MG tablet     simvastatin (ZOCOR) 20 MG tablet     umeclidinium (INCRUSE ELLIPTA) 62.5 MCG/INH inhaler     amLODIPine (NORVASC) 2.5 MG tablet     ammonium lactate (LAC-HYDRIN) 12 % cream     ASPIRIN  EC PO     Blood Pressure Monitoring (BLOOD PRESSURE CUFF) MISC     ciclopirox (LOPROX) 0.77 % cream     diazepam (VALIUM) 2 MG tablet     ipratropium (ATROVENT) 0.06 % spray     magic mouthwash (ENTER INGREDIENTS IN COMMENTS) suspension     naproxen (NAPROSYN) 500 MG tablet     omeprazole (PRILOSEC) 40 MG capsule     order for DME     Urea 40 % CREA     No current facility-administered medications for this visit.      ALLERGIES:     Allergies   Allergen Reactions     Azithromycin      Other reaction(s): Itching     Codeine Nausea and Vomiting     Hydrocodone      Vomiting     Metronidazole Nausea     Percocet [Oxycodone-Acetaminophen]      Vomiting     Pollen Extract      Other reaction(s): Other, see comments  Pt states that she has sneezing and runny nose.      Seasonal Allergies Other (See Comments)     Pt states that she has sneezing and runny nose.      Vicodin [Hydrocodone-Acetaminophen]      Vomiting     Zolpidem Other (See Comments)     Amnestic behavior     Oxycodone Itching and Rash       PAST MEDICAL HISTORY:  Past Medical History:   Diagnosis Date     Anxiety      Arthritis      Breast cancer (H)      COPD (chronic obstructive pulmonary disease) (H)     6/19/12:  FEV 0.99 l     Hypertension      Low back pain with left-sided sciatica      Low bone density 4/13/2017    DEXA April 12, 2017: T score -2.0. Normal Z score. FRAX risk: major osteoporotic fracture 11.9%, hip fracture 2.6%, therefore not high-risk     Lymphedema, chronic lower extremities      PROBLEM LIST:  Patient Active Problem List   Diagnosis     Non morbid obesity due to excess calories     Alcohol abuse     Malignant neoplasm of breast (H)     Chronic kidney disease, stage III (moderate) (H)     Osteoarthritis of knee     Major depressive disorder with single episode     Diarrhea     Diastolic dysfunction     Osteoarthritis of spine     Gastroesophageal reflux disease     Generalized anxiety disorder     Hypertension     Hyperlipidemia      "Insomnia     Irritable bowel syndrome     Kyphoscoliosis     Cluster C personality disorder (H)     Seborrheic eczema     Anemia     Anxiety state     Asthma     Back pain     Bunion     Chronic obstructive pulmonary disease, unspecified COPD type (H)     Low bone density     Fecal incontinence     Left-sided low back pain without sciatica     Major depression, recurrent (H)     Chronic midline low back pain with left-sided sciatica     PAST SURGICAL HISTORY:  Past Surgical History:   Procedure Laterality Date     BACK SURGERY      spinal fusion     LUMPECTOMY BREAST       ORTHOPEDIC SURGERY      Knee surgery left side       FAMILY HISTORY:  Family History   Problem Relation Age of Onset     Breast Cancer Mother      Diabetes Mother      Alcohol/Drug Father      Mental Illness Father      Cerebrovascular Disease Maternal Grandmother 67       SOCIAL HISTORY:  Social History     Tobacco Use     Smoking status: Former Smoker     Types: Cigarettes     Last attempt to quit: 1980     Years since quittin.3     Smokeless tobacco: Never Used   Substance Use Topics     Alcohol use: No     Alcohol/week: 0.0 oz     Comment: Sober for 2 years, previous alcoholic     Drug use: No     Marital status: single.    ROS  Per HPI, otherwise negative    Physical Examination:  Ht 5' 4\"   SpO2 92%   BMI 33.81 kg/m     General: alert, oriented, in no acute distress, sitting comfortably  HEENT:mucous membranes moist  Pulm:nonlabored breathing on room air  Abd:nondistended  Msk:moving all 4 extremities spontaneously, no BLE edema  Neuro: A+Ox3            "

## 2019-01-23 ENCOUNTER — OFFICE VISIT (OUTPATIENT)
Dept: SURGERY | Facility: CLINIC | Age: 74
End: 2019-01-23
Payer: COMMERCIAL

## 2019-01-23 VITALS — OXYGEN SATURATION: 92 % | BODY MASS INDEX: 33.81 KG/M2 | HEIGHT: 64 IN

## 2019-01-23 DIAGNOSIS — R15.9 INCONTINENCE OF FECES, UNSPECIFIED FECAL INCONTINENCE TYPE: Primary | ICD-10-CM

## 2019-01-23 ASSESSMENT — PAIN SCALES - GENERAL: PAINLEVEL: NO PAIN (0)

## 2019-01-23 NOTE — NURSING NOTE
"Chief Complaint   Patient presents with     Consult     Discuss sacral nerve stimulation and fecal incontinence.       Vitals:    01/23/19 1301   SpO2: 92%   Height: 1.626 m (5' 4\")       Body mass index is 33.81 kg/m .      Baylee Dillard, EMT                      "

## 2019-01-23 NOTE — LETTER
"2019       RE: Ca Yan  [address]  [place] [address]     Dear Colleague,    Thank you for referring your patient, Ca Yan, to the [facility] COLON AND RECTAL SURGERY at [facility]. Please see a copy of my visit note below.    Colon and Rectal Surgery Follow-Up Clinic Note    RE: Ca Yan  : 1945  NUPUR: 2019    Ca Yan is a very pleasant 73 year old female here for follow-up of fecal incontinence.    Interval history: Ms. Yan has been seen in the past by Lilly Santa NP and Dr. Hattie Lorenzo for fecal incontinence. She reports dealing with fecal incontinence for the past 8 years, now on a near daily basis. She continues to have about one bowel movement a day although sometimes this is erratic. This tends to occur in AM and is often associated with drinking coffee. Her bowel movements are subjectively very \"messy.\" She reports her pads nearly always have stool on them after having a bowel movement. She denies loosing whole stools. She often doesn't feel or have a sensation that she is having stool leakage. She reports a significant impact on quality of life, including her sex life. She has tried a fiber supplement in the past without improvement. She had discussed SNS at her last visit and hopes to continue this discussion today.    Past relevant history:  She has never had any anorectal procedures. She had one vaginal birth of a baby of 8 pounds.     Her last colonoscopy was in  but was unable to be completed due to patient discomfort. A virtual colonoscopy was completed at that time with recommended repeat in 5 years.    She underwent pelvic floor testing in  with non relaxing publrectalis on defecography and slightly weaker tone with resting tone 25-31 and squeeze 6-38 mmHg. She declined biofeedback in the past.    PLEASE SEE NOTE BELOW FOR PHYSICAL EXAMINATION, REVIEW OF SYSTEMS, AND " OTHER HISTORY.    Assessment/Plan: 73 year old female with ongoing fecal incontinence. We had a another discussion about pelvic floor dysfunction and fecal incontinence. We spoke about conservative treatment (stool bulkening) consisting of fiber supplementation, which she reports has failed in past. We also had an in-depth discussion about sacral nerve stimulator with our SNS nurse specialist. She has previously discussed Sacral Nerve Stimulation with Dr. Latham and is believed to be a good candidate. She expressed a desire to move forward with SNS planning. She will do a bowel diary and she will plan to call for scheduling of trial SNS placement.    -Bowel diary for 2 weeks  -Patient plans to mail in diary  -Will plan to schedule SNS trial in clinic once bowel diary is complete    Patient's questions were answered to her stated satisfaction and she is in agreement with this plan.      Johan Juarez MD PGY1 served as scribe for this visit. I was present for the visit and saw and examined the patient.    Total time was 25 minutes, over 50% was spent counseling the patient.     Leda Latham MD  Colon and Rectal Surgery Staff  [facility]      PLEASE SEE NOTE BELOW FOR PHYSICAL EXAMINATION, REVIEW OF SYSTEMS, AND OTHER HISTORY.  --------------------------------------------------------------------------------------------------------------------------------------------------------------    MEDICATIONS:  Current Outpatient Medications   Medication     acetaminophen (TYLENOL) 500 MG tablet     albuterol (PROAIR HFA/PROVENTIL HFA/VENTOLIN HFA) 108 (90 Base) MCG/ACT inhaler     fluticasone (FLONASE) 50 MCG/ACT spray     fluticasone-vilanterol (BREO ELLIPTA) 200-25 MCG/INH inhaler     hydrochlorothiazide (MICROZIDE) 12.5 MG capsule     ibuprofen (ADVIL,MOTRIN) 200 MG tablet     latanoprost (XALATAN) 0.005 % ophthalmic solution     losartan (COZAAR) 50 MG tablet     omeprazole  (PRILOSEC) 20 MG capsule     order for DME     order for DME     order for DME     quetiapine (SEROQUEL) 200 MG tablet     simvastatin (ZOCOR) 20 MG tablet     umeclidinium (INCRUSE ELLIPTA) 62.5 MCG/INH inhaler     amLODIPine (NORVASC) 2.5 MG tablet     ammonium lactate (LAC-HYDRIN) 12 % cream     ASPIRIN EC PO     Blood Pressure Monitoring (BLOOD PRESSURE CUFF) MISC     ciclopirox (LOPROX) 0.77 % cream     diazepam (VALIUM) 2 MG tablet     ipratropium (ATROVENT) 0.06 % spray     magic mouthwash (ENTER INGREDIENTS IN COMMENTS) suspension     naproxen (NAPROSYN) 500 MG tablet     omeprazole (PRILOSEC) 40 MG capsule     order for DME     Urea 40 % CREA     No current facility-administered medications for this visit.      ALLERGIES:     Allergies   Allergen Reactions     Azithromycin      Other reaction(s): Itching     Codeine Nausea and Vomiting     Hydrocodone      Vomiting     Metronidazole Nausea     Percocet [Oxycodone-Acetaminophen]      Vomiting     Pollen Extract      Other reaction(s): Other, see comments  Pt states that she has sneezing and runny nose.      Seasonal Allergies Other (See Comments)     Pt states that she has sneezing and runny nose.      Vicodin [Hydrocodone-Acetaminophen]      Vomiting     Zolpidem Other (See Comments)     Amnestic behavior     Oxycodone Itching and Rash       PAST MEDICAL HISTORY:  Past Medical History:   Diagnosis Date     Anxiety      Arthritis      Breast cancer (H)      COPD (chronic obstructive pulmonary disease) (H)     6/19/12:  FEV 0.99 l     Hypertension      Low back pain with left-sided sciatica      Low bone density 4/13/2017    DEXA April 12, 2017: T score -2.0. Normal Z score. FRAX risk: major osteoporotic fracture 11.9%, hip fracture 2.6%, therefore not high-risk     Lymphedema, chronic lower extremities      PROBLEM LIST:  Patient Active Problem List   Diagnosis     Non morbid obesity due to excess calories     Alcohol abuse     Malignant neoplasm of breast  "(H)     Chronic kidney disease, stage III (moderate) (H)     Osteoarthritis of knee     Major depressive disorder with single episode     Diarrhea     Diastolic dysfunction     Osteoarthritis of spine     Gastroesophageal reflux disease     Generalized anxiety disorder     Hypertension     Hyperlipidemia     Insomnia     Irritable bowel syndrome     Kyphoscoliosis     Cluster C personality disorder (H)     Seborrheic eczema     Anemia     Anxiety state     Asthma     Back pain     Bunion     Chronic obstructive pulmonary disease, unspecified COPD type (H)     Low bone density     Fecal incontinence     Left-sided low back pain without sciatica     Major depression, recurrent (H)     Chronic midline low back pain with left-sided sciatica     PAST SURGICAL HISTORY:  Past Surgical History:   Procedure Laterality Date     BACK SURGERY      spinal fusion     LUMPECTOMY BREAST       ORTHOPEDIC SURGERY      Knee surgery left side       FAMILY HISTORY:  Family History   Problem Relation Age of Onset     Breast Cancer Mother      Diabetes Mother      Alcohol/Drug Father      Mental Illness Father      Cerebrovascular Disease Maternal Grandmother 67       SOCIAL HISTORY:  Social History     Tobacco Use     Smoking status: Former Smoker     Types: Cigarettes     Last attempt to quit: 1980     Years since quittin.3     Smokeless tobacco: Never Used   Substance Use Topics     Alcohol use: No     Alcohol/week: 0.0 oz     Comment: Sober for 2 years, previous alcoholic     Drug use: No     Marital status: single.    ROS  Per HPI, otherwise negative    Physical Examination:  Ht 5' 4\"   SpO2 92%   BMI 33.81 kg/m      General: alert, oriented, in no acute distress, sitting comfortably  HEENT:mucous membranes moist  Pulm:nonlabored breathing on room air  Abd:nondistended  Msk:moving all 4 extremities spontaneously, no BLE edema  Neuro: A+Ox3    Again, thank you for allowing me to participate in the care of your patient.  "     Sincerely,    Leda Latham MD

## 2019-02-28 ENCOUNTER — TELEPHONE (OUTPATIENT)
Dept: SURGERY | Facility: CLINIC | Age: 74
End: 2019-02-28

## 2019-02-28 NOTE — TELEPHONE ENCOUNTER
[facility] Call Center    Phone Message    May a detailed message be left on voicemail: yes    Reason for Call: Other: PT:   Per pt wanting a phone call back from Karen HARRIS, pt has questions on her bowel diary.     Action Taken: Message routed to:  [facility]

## 2019-03-01 NOTE — TELEPHONE ENCOUNTER
Patient called back in through the call center. Patient states she has been recording her bowel diary for five months. Patient was advised to mail in a copy of her bowel diary and we will get this uploaded and have our prior authorization team start working with her insurance to see if the SNS surgery would be covered. Patient stated understanding of the address needed to mail these to. Patient is in agreement with this plan of care. Patient's questions and concerns were addressed to her stated satisfaction. Patient will callback directly with further questions or concerns.

## 2019-03-01 NOTE — TELEPHONE ENCOUNTER
Patient was called to assess her symptoms in regard to the below message. The direct phone number was left in a voicemail with a request for the patient to call back at her earliest convenience.     STEVEN Jones Care Coordinator  Colon & Rectal Surgery Clinic  Phone: [phone]

## 2019-03-08 DIAGNOSIS — E78.5 HYPERLIPEMIA: ICD-10-CM

## 2019-03-11 NOTE — TELEPHONE ENCOUNTER
[facility]  Last Written Prescription Date: 7/3/18  Last Fill Quantity: 90  # refills: 0  Last Office Visit : 1/7/19  Future Office visit: no future appt    Routing refill request to nurse for review/approval because:  Drug failed the [facility] or [facility] refill protocol   90 day supply last ordered in July  No recent b/p history: chart note below from 1/7/19 visit  Pulse 78   SpO2 91%   Refuses vital signs    Labs needed    BP Readings from Last 3 Encounters:   09/24/18 (!) 153/94   03/26/18 (!) 150/97   03/08/18 126/85

## 2019-03-12 RX ORDER — HYDROCHLOROTHIAZIDE 12.5 MG/1
CAPSULE ORAL
Qty: 90 CAPSULE | Refills: 0 | Status: SHIPPED | OUTPATIENT
Start: 2019-03-12 | End: 2019-04-03

## 2019-03-13 ENCOUNTER — ANCILLARY PROCEDURE (OUTPATIENT)
Dept: MAMMOGRAPHY | Facility: CLINIC | Age: 74
End: 2019-03-13
Attending: INTERNAL MEDICINE
Payer: COMMERCIAL

## 2019-03-13 DIAGNOSIS — Z12.31 VISIT FOR SCREENING MAMMOGRAM: ICD-10-CM

## 2019-03-28 ENCOUNTER — OFFICE VISIT (OUTPATIENT)
Dept: PSYCHOLOGY | Facility: CLINIC | Age: 74
End: 2019-03-28
Payer: COMMERCIAL

## 2019-03-28 DIAGNOSIS — F41.1 ANXIETY STATE: Primary | ICD-10-CM

## 2019-03-28 DIAGNOSIS — F32.A DEPRESSION, UNSPECIFIED DEPRESSION TYPE: ICD-10-CM

## 2019-03-28 ASSESSMENT — ANXIETY QUESTIONNAIRES
GAD7 TOTAL SCORE: 0
6. BECOMING EASILY ANNOYED OR IRRITABLE: NOT AT ALL
GAD7 TOTAL SCORE: 0
GAD7 TOTAL SCORE: 0
2. NOT BEING ABLE TO STOP OR CONTROL WORRYING: NOT AT ALL
5. BEING SO RESTLESS THAT IT IS HARD TO SIT STILL: NOT AT ALL
4. TROUBLE RELAXING: NOT AT ALL
3. WORRYING TOO MUCH ABOUT DIFFERENT THINGS: NOT AT ALL
7. FEELING AFRAID AS IF SOMETHING AWFUL MIGHT HAPPEN: NOT AT ALL
1. FEELING NERVOUS, ANXIOUS, OR ON EDGE: NOT AT ALL

## 2019-03-28 ASSESSMENT — PATIENT HEALTH QUESTIONNAIRE - PHQ9
SUM OF ALL RESPONSES TO PHQ QUESTIONS 1-9: 1
10. IF YOU CHECKED OFF ANY PROBLEMS, HOW DIFFICULT HAVE THESE PROBLEMS MADE IT FOR YOU TO DO YOUR WORK, TAKE CARE OF THINGS AT HOME, OR GET ALONG WITH OTHER PEOPLE: NOT DIFFICULT AT ALL
SUM OF ALL RESPONSES TO PHQ QUESTIONS 1-9: 1

## 2019-03-28 NOTE — PROGRESS NOTES
"  Health Psychology                  Clinic    Department of Medicine  India Fernandez, PhD, LP [phone]                          [facility]  [facility] Harpal Laureano, PhD, LP [phone]                  3rd Floor  Deb[place] Mail Code 741   Turner Sierra, PhD, ABPP, LP [phone]     [address] [facility],   [address] [facility],  Dalia Huerta,  PhD, LP [phone]            [place] [address] Ella Mckenna, PhD, LP [phone]     Confidential Summary of Standard Psychodiagnostic Evaluation*    Referral Source:  Senia Olivas MD    Reason for Referral:  This evaluation was conducted in order to assess current psychological functioning and provide treatment recommendations.  Reason for referral was anxiety.     Sources of Information:  Information was obtained from a clinical interview with the patient, review of available medical records, and administration of psychological assessments.     History of Presenting Concerns:  Ca Yan is a 73 year old female with multiple medical issues who reported that her presenting issue was anxiety, anger, loneliness, and border.  She reported that these emotions have been experienced intermittently over \"many years\".  She stated that she tries so hard to keep busy during the week, but what she is doing is not enough to feel satisfying.  For example, she attends a day program for older adults at [place] a few days a week, attends Dr. sutton, and is planning to increase time spent exercising at the gym.  However, she feels as if she is not satisfied with social connections or activities.  She also reported that she goes shopping or eats to pass time, and while she is not going into debt due to the shopping, she did report that weight gain has been a problem.  She reported that she has lost several meaningful individuals in her life through death, including her best friend that  in " 2018, her boyfriend that  16 years ago, as well as other family members.  She stated that her anxiety is worse before going to bed and at times it is tough to sleep.  She stated that she also experiences restlessness when at home, worry about being older, worry about being alone, irritability, depressed mood, and fatigue due to her COPD.    Medical History:    Past Medical History:   Diagnosis Date     Anxiety      Arthritis      Breast cancer (H)      COPD (chronic obstructive pulmonary disease) (H)     12:  FEV 0.99 l     Hypertension      Low back pain with left-sided sciatica      Low bone density 2017    DEXA 2017: T score -2.0. Normal Z score. FRAX risk: major osteoporotic fracture 11.9%, hip fracture 2.6%, therefore not high-risk     Lymphedema, chronic lower extremities        Past Surgical History:   Procedure Laterality Date     BACK SURGERY      spinal fusion     LUMPECTOMY BREAST       ORTHOPEDIC SURGERY      Knee surgery left side       Current Outpatient Medications   Medication     acetaminophen (TYLENOL) 500 MG tablet     albuterol (PROAIR HFA/PROVENTIL HFA/VENTOLIN HFA) 108 (90 Base) MCG/ACT inhaler     amLODIPine (NORVASC) 2.5 MG tablet     ammonium lactate (LAC-HYDRIN) 12 % cream     ASPIRIN EC PO     Blood Pressure Monitoring (BLOOD PRESSURE CUFF) MISC     ciclopirox (LOPROX) 0.77 % cream     diazepam (VALIUM) 2 MG tablet     fluticasone (FLONASE) 50 MCG/ACT spray     fluticasone-vilanterol (BREO ELLIPTA) 200-25 MCG/INH inhaler     hydrochlorothiazide (MICROZIDE) 12.5 MG capsule     ibuprofen (ADVIL,MOTRIN) 200 MG tablet     ipratropium (ATROVENT) 0.06 % spray     latanoprost (XALATAN) 0.005 % ophthalmic solution     losartan (COZAAR) 50 MG tablet     magic mouthwash (ENTER INGREDIENTS IN COMMENTS) suspension     naproxen (NAPROSYN) 500 MG tablet     omeprazole (PRILOSEC) 20 MG capsule     omeprazole (PRILOSEC) 40 MG capsule     order for DME     order for DME      "order for DME     order for DME     quetiapine (SEROQUEL) 200 MG tablet     simvastatin (ZOCOR) 20 MG tablet     umeclidinium (INCRUSE ELLIPTA) 62.5 MCG/INH inhaler     Urea 40 % CREA     No current facility-administered medications for this visit.        Psychiatric History:  Previous treatment history includes working with multiple psychologists for one session evaluation (Omar Fabian in 2018 and Harpal Laureano, PhD, LP in Dec 2015) but no follow-up in therapy. Per EMR, Ms. Yan's personal psychiatric history multiple hospitalizations beginning in her early 20's.  She reports trials with many different medications and treatments including ECT.  She reports that her father and most relatives on her father's side have problems with alcohol dependence.  Ms. Yan reports she experienced a severe episode of depression in the  which was treated with Klonopin and antidepressants.  She denied a history of suicidal ideation.  She endorsed some thoughts of suicide after her boyfriend  16 years ago but that she has not experienced any suicidal ideation since that time.    Substance Use History:  Ms. Yan reported that she has a history of an alcohol use disorder.  She stated that she was sober from ages [age], consuming alcohol between ages [age], and sober from age 68 on.  She stated she has participated in multiple inpatient chemical dependency treatment programs as well as Alcoholics Anonymous.  She stated she quit alcohol use approximately 4 years ago and has been sober since then.  She denied current use of tobacco products or recreational drugs.    Social History:  Ms. Yan reported that she grew up in a [place], [place], with her mother, father, and brother.  She reported that her father was  \"mean\" but that she was close with her mother.  She declined to elaborate more fully on her upbringing but this is detailed well in a note from Dr. Laureano in 2015.    Psychological Assessment:  The " patient completed the following battery of assessments during this psychological evaluation: World Health Organization Disability Assessment Schedule 2.0 12-item (WHODAS), Patient Health Questionnaire-9 (PHQ-9), Generalized Anxiety Disorder-7 screener (RENATO-7), and the CAGE Questionnaire Adapted to Include Drugs (CAGE-AID).    The WHODAS measures disability and functional impairment due to health conditions including diseases, illnesses, injuries, mental or emotional problems, and problems with alcohol or drugs. The possible range of scores is 12-60 and higher scores indicate higher levels of disability.   WHODAS 2.0 Total Score 3/28/2019   Total Score 24   Total Score MyChart 24       The PHQ-9 is an instrument for screening, diagnosing, monitoring and measuring the severity of depression. Scores of 5, 10, 15, and 20 represent cutpoints for mild, moderate, moderately severe and severe depression, respectively.   PHQ-9 SCORE 10/10/2016 9/26/2018 3/28/2019   PHQ-9 Total Score MyChart - - 1 (Minimal depression)   PHQ-9 Total Score 2 1 1   PHQ-9 Total Score - - -       The RENATO-7 is an instrument for screening, diagnosing, monitoring and measuring the severity of anxiety. Scores of 5, 10, and 15 represent cutpoints for mild, moderate, and severe anxiety, respectively.  RENATO-7 SCORE 10/10/2016 9/26/2018 3/28/2019   Total Score - - 0 (minimal anxiety)   Total Score 5 8 0   Total Score BEH Adult - - -       The CAGE-AID questionnaire is used to screen for alcohol or drug abuse and dependence in adults. A CAGE-AID score  > 1 is a positive screen, suggesting further discussion is needed to determine if evaluation for alcohol or substance abuse is appropriate. A score > 2 is considered clinically significant, suggesting further evaluation of alcohol or substance-related problems is indicated.  CAGE-AID Total Score 3/28/2019   Total Score 0   Total Score MyChart 0 (A total score of 2 or greater is considered clinically  "significant)       Mental Status Examination:  Appearance/Behavior/Orientation: Patient was on time, appropriately groomed and dressed, and demonstrated good eye contact. Alert and oriented to person, place, time, and situation.  Cooperation/Reliability: Patient was largely cooperative throughout the session but noted there were several topics she would not be interested in speaking at length about such as her early childhood.  Speech/Language: Speech was clear, coherent, and of normal rate, rhythm and volume.   Thought Form: Overall logical and organized.   Thought Content: Appropriate to interview and situation.  Cognition/Memory: Not formally assessed, but no difficulties apparent upon interview.   Attention/Concentration: Good throughout interview.    Fund of knowledge: Consistent with age and level of education.    Abstract reasoning: Not assessed.   Judgment: Intact.    Mood/Affect: Mood was reported to be \"terrible\"; affect ranged from irritable at the beginning of the session to calm towards the end of the session   Insight/Motivation: Fair, ambivalent  Suicide/Assault: Patient denies suicidal or assaultive ideation, plan, or intent.    Impression:  Ca Yan is a 73 year old female endorsing anxiety about her health and aging as well as symptoms of depressed mood.  She also endorses long-standing mental health symptoms as well as documentation in her medical records suggesting long-standing challenges in connecting with others socially as well as history of cluster B personality traits.  Given my specialty in health psychology, she may be best suited to working with me and developing strategies to manage anxiety about health as well as considering a general mental health practitioner to address more long-standing interpersonal dysfunction.    Diagnosis:  Anxiety, unspecified; depression, unspecified    Recommendation/Plan:  Provided recommendation for patient to participate in cognitive behavioral " therapy for anxiety related to health and aging to develop skills to better manage his symptoms.  Cognitive behavioral therapy may also target behavioral activation, activity scheduling, and strategies to manage fatigue associated with COPD.  Provided recommendation for 4-6 session course of treatment.  Given long-standing history of interpersonal difficulties, I encouraged her to consider engaging in psychotherapy with a general mental health provider either individually or with an interpersonal process group.  Provided recommendation for the following 2 groups below.  She reported significant hesitance about wanting to attend given her uncertainty about who the other group members may be and if she would be able to connect to them.    [facility] [address], [address] / [place] [address] / [phone], Interpersonal Therapy Group, Tuesdays 5:30-7  [facility], [address] [place] [address], [phone], Mixed-Gender Process Therapy Group, Thursdays 9:30-11    Dalia Huerta, PhD,   Clinical Health Psychologist    *In accordance with the Rules of the [place] Board of [employer], it is noted that psychological descriptions and scientific procedures underlying psychological evaluations have limitations.  Absolute predictions cannot be made based on information in this report.

## 2019-03-29 ASSESSMENT — ANXIETY QUESTIONNAIRES: GAD7 TOTAL SCORE: 0

## 2019-03-29 ASSESSMENT — PATIENT HEALTH QUESTIONNAIRE - PHQ9: SUM OF ALL RESPONSES TO PHQ QUESTIONS 1-9: 1

## 2019-04-03 ENCOUNTER — APPOINTMENT (OUTPATIENT)
Dept: LAB | Facility: CLINIC | Age: 74
End: 2019-04-03
Payer: COMMERCIAL

## 2019-04-03 ENCOUNTER — OFFICE VISIT (OUTPATIENT)
Dept: FAMILY MEDICINE | Facility: CLINIC | Age: 74
End: 2019-04-03
Payer: COMMERCIAL

## 2019-04-03 ENCOUNTER — TELEPHONE (OUTPATIENT)
Dept: INTERNAL MEDICINE | Facility: CLINIC | Age: 74
End: 2019-04-03

## 2019-04-03 VITALS
HEART RATE: 77 BPM | BODY MASS INDEX: 33.81 KG/M2 | SYSTOLIC BLOOD PRESSURE: 150 MMHG | RESPIRATION RATE: 16 BRPM | OXYGEN SATURATION: 93 % | DIASTOLIC BLOOD PRESSURE: 84 MMHG | HEIGHT: 64 IN

## 2019-04-03 DIAGNOSIS — H65.02 ACUTE SEROUS OTITIS MEDIA OF LEFT EAR, RECURRENCE NOT SPECIFIED: ICD-10-CM

## 2019-04-03 DIAGNOSIS — M17.10 ARTHRITIS OF KNEE: ICD-10-CM

## 2019-04-03 DIAGNOSIS — E78.5 HYPERLIPIDEMIA LDL GOAL <100: ICD-10-CM

## 2019-04-03 DIAGNOSIS — Z79.899 POLYPHARMACY: ICD-10-CM

## 2019-04-03 DIAGNOSIS — E78.5 HYPERLIPIDEMIA, UNSPECIFIED HYPERLIPIDEMIA TYPE: ICD-10-CM

## 2019-04-03 DIAGNOSIS — J44.9 CHRONIC OBSTRUCTIVE PULMONARY DISEASE, UNSPECIFIED COPD TYPE (H): Primary | ICD-10-CM

## 2019-04-03 DIAGNOSIS — I10 BENIGN ESSENTIAL HYPERTENSION: ICD-10-CM

## 2019-04-03 LAB
ANION GAP SERPL CALCULATED.3IONS-SCNC: 3 MMOL/L (ref 3–14)
BUN SERPL-MCNC: 26 MG/DL (ref 7–30)
CALCIUM SERPL-MCNC: 9.7 MG/DL (ref 8.5–10.1)
CHLORIDE SERPL-SCNC: 102 MMOL/L (ref 94–109)
CO2 SERPL-SCNC: 32 MMOL/L (ref 20–32)
CREAT SERPL-MCNC: 0.83 MG/DL (ref 0.52–1.04)
GFR SERPL CREATININE-BSD FRML MDRD: 69 ML/MIN/{1.73_M2}
GLUCOSE SERPL-MCNC: 84 MG/DL (ref 70–99)
POTASSIUM SERPL-SCNC: 4.2 MMOL/L (ref 3.4–5.3)
SODIUM SERPL-SCNC: 137 MMOL/L (ref 133–144)

## 2019-04-03 RX ORDER — LOSARTAN POTASSIUM 50 MG/1
100 TABLET ORAL DAILY
Qty: 90 TABLET | Refills: 3 | Status: SHIPPED | OUTPATIENT
Start: 2019-04-03 | End: 2020-06-02

## 2019-04-03 RX ORDER — HYDROCHLOROTHIAZIDE 12.5 MG/1
1 CAPSULE ORAL DAILY
Qty: 90 CAPSULE | Refills: 3 | Status: SHIPPED | OUTPATIENT
Start: 2019-04-03 | End: 2020-06-02

## 2019-04-03 ASSESSMENT — PAIN SCALES - GENERAL: PAINLEVEL: NO PAIN (0)

## 2019-04-03 NOTE — PATIENT INSTRUCTIONS
[facility] Medication Refill Request Information:  * Please contact your pharmacy regarding ANY request for medication refills.  ** [facility] Prescription Fax = [phone]  * Please allow 3 business days for routine medication refills.  * Please allow 5 business days for controlled substance medication refills.     [facility] Test Result notification information:  *You will be notified with in 7-10 days of your appointment day regarding the results of your test.  If you are on MyChart you will be notified as soon as the provider has reviewed the results and signed off on them.    [facility]: [phone]   Increase cozaar to 100mg/day  Inhalers were refilled  Get your nebulizer fixed  For the ear use the flonase in the nose twice daily for 2-3 wks - if no better you can call and get a referral for ENT  You can make an appointment with our pharmacist Facundo to discuss the inhalers and how to use them or check with your respiratory doctor at [facility]   See me 1 month

## 2019-04-03 NOTE — NURSING NOTE
Chief Complaint   Patient presents with     Pain     Pt complains of cramps all over her body especially in the hands and feet.          Emiliano Salas, EMT on 4/3/2019 at 12:38 PM

## 2019-04-03 NOTE — PROGRESS NOTES
"[facility]   Courtney Gongora MD PhD  04/03/2019      Chief Complaint:   Ear throbbing, cramping of toes and fingers,        History of Present Illness:   Ca Yan is a 73 year old female with a history of scoliosis and osteoarthritis who presents for several problems    Ear pounding  . Ca reports a pounding sensation in her left ear which started yesterday and persists into today. She describes it as feeling \"full,\" but denies drainage or difficulty hearing. She does report intermittent tinnitus, which lasts for a few seconds at a time. She is unable to release the pressure in her ear. She notes that she has experienced this sensation in her ear before, but that it has not persisted for this long before.     Hand cramping  She reports painful cramping in her bilateral knuckles, chest, upper left back, feet, and legs for the past several years. These cramps are intermittent but she notes that her chest cramping can be exacerbated by coughing. She is still able to move her fingers when they cramp, although she allocates her hand and foot pain to her joints at the bases of her fingers and in the tips of her toes. Ca also notes that her upper left-sided back pain is exacerbated by bending forward. She denies joint swelling.    Knee stiffness  She reports knee stiffness when she stands up, which reduces with walking. She takes Acetaminophen to manage her arthritis. She wonders what kinds of exercises may reduce her knee pain. She has had a knee replacement     HTN  She currently takes Cozaar 50 mg daily and Hydrochlorothiazide 12.5 mg daily, and feels that these medications at their current dosages do not sufficiently control her blood pressure. She reports that her blood pressure is always elevated, around 150/80 or 150/90, when she has it checked at medical appointments. She does not check it at any other time. She denies headaches, chest pain, or dizziness associated with her " hypertension.    COPD  She  reports significant confusion about the purposes of many of her medications, and is unsure which she is taking. She would like to talk to Pharmacy to learn how to better manage her medication. Her pulmonary MD is at [facility]. Her nebulizer is broken and so she can't use it.         Review of Systems:   Patient endorses shortness of breath and wheezing attributable to COPD. Remainder of complete ROS is negative other than as mentioned above .     Active Medications:     Current Outpatient Medications:      acetaminophen (TYLENOL) 500 MG tablet, Take 500-1,000 mg by mouth every 6 hours as needed for mild pain, Disp: , Rfl:      albuterol (PROAIR HFA/PROVENTIL HFA/VENTOLIN HFA) 108 (90 Base) MCG/ACT inhaler, Inhale 2 puffs into the lungs every 6 hours as needed for shortness of breath / dyspnea or wheezing, Disp: 3 Inhaler, Rfl: 1     amLODIPine (NORVASC) 2.5 MG tablet, Take 1 tablet (2.5 mg) by mouth daily, Disp: 90 tablet, Rfl: 3     ammonium lactate (LAC-HYDRIN) 12 % cream, Apply topically 2 times daily as needed for dry skin, Disp: 385 g, Rfl: 3     ASPIRIN EC PO, Take 81 mg by mouth, Disp: , Rfl:      Blood Pressure Monitoring (BLOOD PRESSURE CUFF) MISC, [facility] blood cuff  Please check your blood pressure 2-3 times per week.  Goal is <140/90, Disp: 1 each, Rfl: 0     ciclopirox (LOPROX) 0.77 % cream, Apply topically 2 times daily To feet and toenails., Disp: 90 g, Rfl: 6     fluticasone (FLONASE) 50 MCG/ACT spray, Spray 1 spray into both nostrils daily, Disp: 1 Bottle, Rfl: 1     fluticasone-vilanterol (BREO ELLIPTA) 200-25 MCG/INH inhaler, Inhale 1 puff into the lungs daily, Disp: 1 Inhaler, Rfl: 3     hydrochlorothiazide (MICROZIDE) 12.5 MG capsule, TAKE ONE CAPSULE BY MOUTH EVERY DAY, Disp: 90 capsule, Rfl: 0     ibuprofen (ADVIL,MOTRIN) 200 MG tablet, Take 3 tablets (600 mg) by mouth 3 times daily (with meals), Disp: 90 tablet, Rfl: 0     ipratropium (ATROVENT) 0.06 % spray,  Spray 2 sprays into both nostrils 4 times daily as needed for rhinitis, Disp: 1 Box, Rfl: 1     latanoprost (XALATAN) 0.005 % ophthalmic solution, 1 drop, Disp: , Rfl:      losartan (COZAAR) 50 MG tablet, Take 1 tablet (50 mg) by mouth daily, Disp: 90 tablet, Rfl: 3     naproxen (NAPROSYN) 500 MG tablet, Take 1 tablet (500 mg) by mouth 2 times daily (with meals), Disp: 30 tablet, Rfl: 1     omeprazole (PRILOSEC) 20 MG capsule, Take 20 mg by mouth as needed , Disp: , Rfl:      omeprazole (PRILOSEC) 40 MG capsule, Take 1 capsule (40 mg) by mouth daily, Disp: 90 capsule, Rfl: 3     order for DME, Equipment being ordered: Wheelchair Sig: Equipment being ordered: portable walker with seat and compartment under the seat.   Use for going out of the house on errands, where prolonged standing is required., Disp: 1 Device, Rfl: 1     order for DME, Equipment being ordered: portable walker with seat and compartment under the seat.  Use for going out of the house on errands, where prolonged standing is required., Disp: 1 Units, Rfl: 0     order for DME, Equipment being ordered: knee high compression stockings, class 1 or 2, night time velcro compression garments, lymphedema bandaging supplies, darco boots to wear during treatment, Disp: 2 each, Rfl: 3     order for DME, Equipment being ordered: Oxygen  Please provide portable oxygen concentrator (pt would like over the shoulder oxygen device) for portability at 2LPM with activity via nasal canula., Disp: 1 Device, Rfl: 1     quetiapine (SEROQUEL) 200 MG tablet, Take 200 mg by mouth At Bedtime., Disp: , Rfl:      simvastatin (ZOCOR) 20 MG tablet, Take 1 tablet (20 mg) by mouth daily, Disp: 90 tablet, Rfl: 3     umeclidinium (INCRUSE ELLIPTA) 62.5 MCG/INH inhaler, Inhale 1 puff into the lungs daily, Disp: 7 Inhaler, Rfl: 3     Urea 40 % CREA, Externally apply topically daily To feet and toenails., Disp: 198 g, Rfl: 5      Allergies:   Azithromycin; Codeine; Hydrocodone;  "Metronidazole; Percocet [oxycodone-acetaminophen]; Pollen extract; Seasonal allergies; Vicodin [hydrocodone-acetaminophen]; Zolpidem; and Oxycodone      Past Medical History:  Generalized anxiety disorder  Anxiety state  Major depression, recurrent   Cluster C personality disorder  Alcohol abuse  Insomnia   GERD  Chronic obstructive pulmonary disease, unspecified type   Diastolic dysfunction   Hypertension  Hyperlipidemia  Anemia    Breast cancer  Chronic kidney disease, stage 3  Irritable bowel syndrome  Diarrhea   Fecal incontinence   Kyphoscoliosis   Low back pain with left-sided sciatica  Osteoarthritis of knee, spine  Low bone density  Lymphedema, chronic lower extremities  Seborrheic eczema   Bunion      Past Surgical History:  Spinal fusion  Lumpectomy breast  Knee surgery, left    Family History:   Mother: Breast cancer, diabetes  Father: Alcohol/drug abuse, mental illness  Maternal grandmother: Cerebrovascular disease      Social History:   Marital Status: Single  Presents to the clinic alone  Tobacco Use: Former cigarette smoker (quit 9/26/1980; 38.5 years since quitting), never used smokeless tobacco  Alcohol Use: No (\"sober for 2 years, previous alcoholic\")  PCP: Senia Olivas     Physical Exam:   /84 (BP Location: Right arm, Patient Position: Sitting, Cuff Size: Adult Regular)   Pulse 77   Resp 16   Ht 1.626 m (5' 4\")   SpO2 93%   BMI 33.81 kg/m     Gen:  73 year old female quite anxious   HEENT: PERRL fundi  Right ear occluded with  cerumen, and left TM sull light reflex and no TM movement. . De Santiago test is midline. Rinne left ear air is equal to bone, and right ear air is greater than bone.   Neck  Supple.  Thyroid normal No carotid bruits.  No LAD  CVS exam: S1, S2 normal, no murmur, click, rub or gallop, regular rate and rhythm, chest is clear without rales or wheezing, no pedal edema,   Respiratory: Normal - some scattered expiratory wheezes. No rales or rhonchi.   Ext  diminished " pulses. No edema  Musculoskeletal:marked scoliosis with curvature and elevation on right side of posterior thorax  Skin: Well-healed scars on spine  Neuro: Neurological exam reveals normal without focal findings, mental status, speech normal, alert and oriented x iii, NEVILLE, reflexes normal and symmetric.     Assessment and Plan: Elderly 72 yo woman with COPD who comes in because of a throbbing sensation left ear, and joint discomfort.    Chronic obstructive pulmonary disease, unspecified COPD type (H)  She is quite confused on how to use her inhalers - her nebulizer is broken   I provided refills for Ca's inhalers today. And want her to meet with Facundo  - fluticasone-vilanterol (BREO ELLIPTA) 200-25 MCG/INH inhaler  Dispense: 1 Inhaler; Refill: 3  - umeclidinium (INCRUSE ELLIPTA) 62.5 MCG/INH inhaler  Dispense: 7 Inhaler; Refill: 3    Hyperlipemia  No recent lipid test - get when fasting   Continue on her current medication and dosage.     Benign essential hypertension  BP is above target - kidney function OK so will increase Cozaar   Cozaar dosage to 100 mg daily and provided refills for her Cozaar and Hydrochlorothiazide.   - hydrochlorothiazide (MICROZIDE) 12.5 MG capsule  Dispense: 90 capsule; Refill: 3  - losartan (COZAAR) 50 MG tablet  Dispense: 90 tablet; Refill: 3  - Basic metabolic panel    Acute serous otitis media of left ear, recurrence not specified  Ear on left appears to have fluid - serous otitis   will use Flonase for 2-3 weeks to reduce her left ear fluid buildup. She will notify me if her symptoms do not resolve in the next few weeks. Then could have her see ENT     Polypharmacy  She rs confused about what medications she needs to take and when, and would like to talk to pharmacy to clear up her confusion.   - MED THERAPY MANAGE REFERRAL    Arthritis of knee  I recommended exercises that will not exacerbate her knee pain, such as swimming. I recommended PT but she did not want to do this.        Follow-up: 3 months        I spent a total of 45 minutes face-to-face with the patient during today's office visit.  Over 50% of this time was spent counseling the patient and/or coordinating care regarding ear problem, joint pain, HTN, and COPD meds.  See note for details.        Scribe Disclosure:  I, Lissy De Los Santos, am serving as a scribe to document services personally performed by Courtney Gongora MD PhD at this visit, based upon the provider's statements to me. All documentation has been reviewed by the aforementioned provider prior to being entered into the official medical record.    Scribe Preparation Attestation:  I, Lissy De Los Santos, served as a scribe preparing the chart for the clinic encounter through chart review for the provider team.      Portions of this medical record were completed by a scribe. UPON MY REVIEW AND AUTHENTICATION BY ELECTRONIC SIGNATURE, this confirms (a) I performed the applicable clinical services, and (b) the record is accurate.

## 2019-04-05 PROBLEM — G89.29 CHRONIC MIDLINE LOW BACK PAIN WITH LEFT-SIDED SCIATICA: Status: RESOLVED | Noted: 2018-12-05 | Resolved: 2019-04-05

## 2019-04-05 PROBLEM — M54.42 CHRONIC MIDLINE LOW BACK PAIN WITH LEFT-SIDED SCIATICA: Status: RESOLVED | Noted: 2018-12-05 | Resolved: 2019-04-05

## 2019-04-05 NOTE — PROGRESS NOTES
DISCHARGE REPORT    Patient seen one time for evaluation and treat. Please see initial evaluation for discharge information. Episode to be closed at this time and patient formally discharged from therapy.      Alex Atkinson, PT, DPT, OCS

## 2019-04-08 RX ORDER — SIMVASTATIN 20 MG
20 TABLET ORAL DAILY
Qty: 90 TABLET | Refills: 0 | Status: SHIPPED | OUTPATIENT
Start: 2019-04-08 | End: 2019-08-19

## 2019-04-08 NOTE — TELEPHONE ENCOUNTER
simvastatin (ZOCOR) 20 MG   90 DAY RF  NO LDL  Scheduling / CAM has been notified to contact the pt for appointment/ LABS

## 2019-04-12 ENCOUNTER — TELEPHONE (OUTPATIENT)
Dept: PHARMACY | Facility: CLINIC | Age: 74
End: 2019-04-12

## 2019-04-12 NOTE — TELEPHONE ENCOUNTER
Ca calls and LVM asking why she was to meet with me. She has trouble getting around and wonders if she really must come in. I call her back to discuss why we need to meet. She wonders if we can do this over the phone instead.  She is rescheduled for a phone call at her original appt date and time.

## 2019-04-15 ENCOUNTER — ALLIED HEALTH/NURSE VISIT (OUTPATIENT)
Dept: PHARMACY | Facility: CLINIC | Age: 74
End: 2019-04-15
Payer: COMMERCIAL

## 2019-04-15 DIAGNOSIS — E78.5 HYPERLIPIDEMIA, UNSPECIFIED HYPERLIPIDEMIA TYPE: ICD-10-CM

## 2019-04-15 DIAGNOSIS — G47.00 INSOMNIA, UNSPECIFIED TYPE: ICD-10-CM

## 2019-04-15 DIAGNOSIS — Z79.82 LONG TERM (CURRENT) USE OF ASPIRIN: ICD-10-CM

## 2019-04-15 DIAGNOSIS — M17.9 OSTEOARTHRITIS OF KNEE, UNSPECIFIED LATERALITY, UNSPECIFIED OSTEOARTHRITIS TYPE: ICD-10-CM

## 2019-04-15 DIAGNOSIS — I10 BENIGN ESSENTIAL HYPERTENSION: ICD-10-CM

## 2019-04-15 DIAGNOSIS — H40.009 GLAUCOMA SUSPECT, UNSPECIFIED LATERALITY: ICD-10-CM

## 2019-04-15 DIAGNOSIS — R09.82 POST-NASAL DRIP: ICD-10-CM

## 2019-04-15 DIAGNOSIS — J44.9 CHRONIC OBSTRUCTIVE PULMONARY DISEASE, UNSPECIFIED COPD TYPE (H): Primary | ICD-10-CM

## 2019-04-15 DIAGNOSIS — Z79.899 POLYPHARMACY: ICD-10-CM

## 2019-04-15 DIAGNOSIS — R25.2 CRAMP OF LIMB: ICD-10-CM

## 2019-04-15 PROCEDURE — 99605 MTMS BY PHARM NP 15 MIN: CPT | Performed by: PHARMACIST

## 2019-04-15 PROCEDURE — 99607 MTMS BY PHARM ADDL 15 MIN: CPT | Performed by: PHARMACIST

## 2019-04-15 NOTE — PROGRESS NOTES
"SUBJECTIVE/OBJECTIVE:                           Ca Yan is a 73 year old female called for an initial visit for Medication Therapy Management.  She was referred to me from Courtney Gongora.    Chief Complaint: CMR.    Allergies/ADRs: Reviewed in Epic  Tobacco: History of tobacco dependence - quit 1980  Alcohol: not currently using  Caffeine: 4-5 cups/day of coffee  Activity: not right now but is hoping to get into an exercise class  PMH: Reviewed in Epic    Medication Adherence/Access:  no issues reported    COPD: Current medications: Short-Acting Bronchodilator: Albuterol pt reports using 2 times per week (she just takes it because she \"needs it\" and doesn't explain further).  ICS/LABA- Breo 1 puff(s) once daily  LAMA- Incruse Ellipta 1 puff(s) once daily. She reports good adherence and takes her at bedtime, hardly misses doses.      Pt is not experiencing side effects.   Pt reports the following symptoms: none.  Pt does not have an COPD Action Plan on file.   Has spirometry been completed: Doesn't know  PIF was completed today: No    Cramps: She has cramps that occur any of the time of the day, can last up to 5 minutes and are very painful.  She hasn't been prescribed medicine recently to help with this recently. She reports that the cramps started years ago.     Hypertension: Current medications include amlodipine 2.5 mg daily, hydrochlorothiazide 12.5 mg daily and losartan 100 mg daily.  Patient does not self-monitor BP.  We discuss blood pressure goals. Patient reports no current medication side effects.    Last Comprehensive Metabolic Panel:  Sodium   Date Value Ref Range Status   04/03/2019 137 133 - 144 mmol/L Final     Potassium   Date Value Ref Range Status   04/03/2019 4.2 3.4 - 5.3 mmol/L Final     Chloride   Date Value Ref Range Status   04/03/2019 102 94 - 109 mmol/L Final     Carbon Dioxide   Date Value Ref Range Status   04/03/2019 32 20 - 32 mmol/L Final     Anion Gap   Date Value Ref Range " Status   04/03/2019 3 3 - 14 mmol/L Final     Glucose   Date Value Ref Range Status   04/03/2019 84 70 - 99 mg/dL Final     Urea Nitrogen   Date Value Ref Range Status   04/03/2019 26 7 - 30 mg/dL Final     Creatinine   Date Value Ref Range Status   04/03/2019 0.83 0.52 - 1.04 mg/dL Final     GFR Estimate   Date Value Ref Range Status   04/03/2019 69 >60 mL/min/[1.73_m2] Final     Comment:     Non  GFR Calc  Starting 12/18/2018, serum creatinine based estimated GFR (eGFR) will be   calculated using the Chronic Kidney Disease Epidemiology Collaboration   (CKD-EPI) equation.       Calcium   Date Value Ref Range Status   04/03/2019 9.7 8.5 - 10.1 mg/dL Final     CrCl cannot be calculated (Unknown ideal weight.).    BP Readings from Last 3 Encounters:   04/03/19 150/84   09/24/18 (!) 153/94   03/26/18 (!) 150/97     No results found for: MAG    Hyperlipidemia: Current therapy includes simvastatin 20 mg once daily.  Pt reports the following possible side effects: cramping.  The ASCVD Risk score (Sohan OLGA Jr., et al., 2013) failed to calculate for the following reasons:    Cannot find a previous HDL lab    Cannot find a previous total cholesterol lab     No results for input(s): CHOL, HDL, LDL, TRIG, CHOLHDLRATIO in the last 02697 hours.    Insomnia: Current medications include: quetiapine 200 mg at bedtime. Pt reports trouble falling asleep. This works well for sleep and she denies side effects. [facility] is where her psychiatrist practices.      QTc = 439 ms (9/18/18)  Wt Readings from Last 2 Encounters:   09/26/18 197 lb (89.4 kg)   09/24/18 199 lb 11.2 oz (90.6 kg)     General Aches and Pains: Current medications include APAP 2 tablets once and naproxen 500 mg once and ibuprofen as needed, and Ab's as needed, and Bengay as needed. Ab's contains magnesium salicylate.  We discuss concerns using multiple NSAIDs and max doses of APAP. Otherwise, she reports no problems with side effects.     Stroke  Prevention: Current medications include 81 mg daily. No side effects reported.     Post-nasal Drip: She used once spray in each nostril fluticasone 60 mcg/act. She doesn't use it anymore because she didn't feel like it was helpful. She wonders if there is anything else that can be helpful for post-nasal drip.     Glaucoma: Current medications include Latanoprost 0.005% drops.  She reports no concerns.      Today's Vitals: There were no vitals taken for this visit. - telemed       ASSESSMENT:                             Current medications were reviewed today.     Medication Adherence: good, no issues identified    COPD: Stable.    Cramps: Needs improvement. Patient may benefit from a small dose of magnesium to help with sleep and cramps.     Hypertension: Needs Improvement. Patient is not meeting BP goal of < 140/90mmHg. Patient would benefit from using NSAIDs safely and appropriately. She may also benefit from a dose increase in hydrochlorothiazide or amlodipine.     Hyperlipidemia: Unable to assess. Patient would benefit from an updated lipid lab.     Insomnia: Stable. Quetiapine appears safe and effective at this time.     General Aches and Pains: Needs improvement. Patient is encouraged to pick one NSAID and not taking ibuprofen, naproxen and Ab's in the same day to avoid unexpected effects on BP and risk for bleeding events.     Stroke Prevention: Stable. Unclear whether aspirin is helpful in preventing primary CVD events in patients older than 70. Given use of NSAIDs for aches and pains, consideration should be given to use of aspirin.     Post-Nasal Drip: Needs improvement. Increase Fluticasone dose to 2 sprays daily.     Glaucoma: Plan in place with ophthalmology.     PLAN:                            1. Try magnesium 250 mg daily.   2. Reduce use of NSAIDs. Try picking only one (ibuprofen vs. Naproxen vs. Doans) for pain.   3. Increase fluticasone to 2 sprays in each nostril daily.     I spent 45 minutes  with this patient today. All changes were made via collaborative practice agreement with Courtney Gongora. A copy of the visit note was provided to the patient's referring provider.    Will follow up in 2 weeks.    The patient declined a summary of these recommendations as an after visit summary.     Nell Hathaway, Pharm.D., [facility]  Medication Therapy Management Pharmacist  Page/VM:  [phone]

## 2019-04-23 ENCOUNTER — TELEPHONE (OUTPATIENT)
Dept: SURGERY | Facility: CLINIC | Age: 74
End: 2019-04-23

## 2019-04-23 NOTE — TELEPHONE ENCOUNTER
The patient called this writer and is frustrated as she has not heard from the colon and rectal team about her interstim placement. This writer let her know that Carol Ocampo RN, would be in touch with her today. The patient would prefer to be called after 2:30 pm. This writer will send this request to Carol.

## 2019-05-01 ENCOUNTER — ALLIED HEALTH/NURSE VISIT (OUTPATIENT)
Dept: PHARMACY | Facility: CLINIC | Age: 74
End: 2019-05-01
Payer: COMMERCIAL

## 2019-05-01 DIAGNOSIS — R09.82 POST-NASAL DRIP: ICD-10-CM

## 2019-05-01 DIAGNOSIS — R25.2 CRAMP OF LIMB: Primary | ICD-10-CM

## 2019-05-01 PROCEDURE — 99606 MTMS BY PHARM EST 15 MIN: CPT | Performed by: PHARMACIST

## 2019-05-01 RX ORDER — MULTIVITAMIN WITH IRON
1 TABLET ORAL DAILY
COMMUNITY
End: 2020-04-16

## 2019-05-01 NOTE — PROGRESS NOTES
SUBJECTIVE/OBJECTIVE:                Ca Yan is a 73 year old female called for a follow-up visit for Medication Therapy Management.  She was referred to me from Courtney Gongora MD.     Chief Complaint: Follow up from our visit on 4/15/19.      Tobacco: History of tobacco dependence - quit 1980  Alcohol: history of alcohol dependence    Medication Adherence/Access:  no issues reported    Cramps: current medications include magnesium 250 mg daily (newly started). She doesn't think it has been enough time to determine if it is effective.  She denies side effects today.     Post-Nasal Drip: Current medications include fluticasone 50 mcg 2 sprays in each nostril daily (recent dose increase). She says that she still blows her nose a lot but intends to continue using the medication to see if it helps with her symptoms. No side effects are reported.     Today's Vitals: There were no vitals taken for this visit. - telemed       ASSESSMENT:              Current medications were reviewed today as discussed above.      Medication Adherence: good, no issues identified    Cramps: Unimproved. Patient would benefit from further evaluation and has appt scheduled on 5/10.     Post-Nasal Drip: Unimproved. Patient would benefit from consistent use of fluticasone for the next 4 weeks to determine efficacy.     PLAN:                  1. Keep appt in [facility] on 5/10 to discuss cramps and swelling.   2. Continue using fluticasone nasal spray on a regular basis to improve post-nasal drip.     I spent 15 minutes with this patient today. A copy of the visit note was provided to the patient's primary care provider.     Will follow up in 4 weeks.    The patient declined a summary of these recommendations as an after visit summary.    Nell Hathaway, Pharm.D., [facility]  Medication Therapy Management Pharmacist  Page/VM:  [phone]

## 2019-05-10 ENCOUNTER — OFFICE VISIT (OUTPATIENT)
Dept: INTERNAL MEDICINE | Facility: CLINIC | Age: 74
End: 2019-05-10
Payer: COMMERCIAL

## 2019-05-10 ENCOUNTER — ANCILLARY PROCEDURE (OUTPATIENT)
Dept: GENERAL RADIOLOGY | Facility: CLINIC | Age: 74
End: 2019-05-10
Attending: INTERNAL MEDICINE
Payer: COMMERCIAL

## 2019-05-10 VITALS
DIASTOLIC BLOOD PRESSURE: 84 MMHG | OXYGEN SATURATION: 92 % | SYSTOLIC BLOOD PRESSURE: 124 MMHG | BODY MASS INDEX: 34.5 KG/M2 | HEART RATE: 77 BPM | WEIGHT: 201 LBS

## 2019-05-10 DIAGNOSIS — M79.605 PAIN IN BOTH LOWER EXTREMITIES: ICD-10-CM

## 2019-05-10 DIAGNOSIS — M79.604 PAIN IN BOTH LOWER EXTREMITIES: ICD-10-CM

## 2019-05-10 DIAGNOSIS — M79.605 PAIN IN BOTH LOWER EXTREMITIES: Primary | ICD-10-CM

## 2019-05-10 DIAGNOSIS — R63.5 WEIGHT GAIN: ICD-10-CM

## 2019-05-10 DIAGNOSIS — M79.604 PAIN IN BOTH LOWER EXTREMITIES: Primary | ICD-10-CM

## 2019-05-10 LAB
ALBUMIN SERPL-MCNC: 3.6 G/DL (ref 3.4–5)
ALP SERPL-CCNC: 118 U/L (ref 40–150)
ALT SERPL W P-5'-P-CCNC: 14 U/L (ref 0–50)
ANION GAP SERPL CALCULATED.3IONS-SCNC: 4 MMOL/L (ref 3–14)
AST SERPL W P-5'-P-CCNC: 18 U/L (ref 0–45)
BASOPHILS # BLD AUTO: 0.1 10E9/L (ref 0–0.2)
BASOPHILS NFR BLD AUTO: 1 %
BILIRUB SERPL-MCNC: 0.3 MG/DL (ref 0.2–1.3)
BUN SERPL-MCNC: 21 MG/DL (ref 7–30)
CALCIUM SERPL-MCNC: 9.1 MG/DL (ref 8.5–10.1)
CHLORIDE SERPL-SCNC: 101 MMOL/L (ref 94–109)
CO2 SERPL-SCNC: 31 MMOL/L (ref 20–32)
CREAT SERPL-MCNC: 0.8 MG/DL (ref 0.52–1.04)
DIFFERENTIAL METHOD BLD: ABNORMAL
EOSINOPHIL # BLD AUTO: 0.1 10E9/L (ref 0–0.7)
EOSINOPHIL NFR BLD AUTO: 2.4 %
ERYTHROCYTE [DISTWIDTH] IN BLOOD BY AUTOMATED COUNT: 14.3 % (ref 10–15)
GFR SERPL CREATININE-BSD FRML MDRD: 72 ML/MIN/{1.73_M2}
GLUCOSE SERPL-MCNC: 114 MG/DL (ref 70–99)
HCT VFR BLD AUTO: 43.6 % (ref 35–47)
HGB BLD-MCNC: 13.4 G/DL (ref 11.7–15.7)
IMM GRANULOCYTES # BLD: 0 10E9/L (ref 0–0.4)
IMM GRANULOCYTES NFR BLD: 0.2 %
LYMPHOCYTES # BLD AUTO: 1 10E9/L (ref 0.8–5.3)
LYMPHOCYTES NFR BLD AUTO: 16.8 %
MAGNESIUM SERPL-MCNC: 2.5 MG/DL (ref 1.6–2.3)
MCH RBC QN AUTO: 30.7 PG (ref 26.5–33)
MCHC RBC AUTO-ENTMCNC: 30.7 G/DL (ref 31.5–36.5)
MCV RBC AUTO: 100 FL (ref 78–100)
MONOCYTES # BLD AUTO: 0.4 10E9/L (ref 0–1.3)
MONOCYTES NFR BLD AUTO: 7.5 %
NEUTROPHILS # BLD AUTO: 4.1 10E9/L (ref 1.6–8.3)
NEUTROPHILS NFR BLD AUTO: 72.1 %
NRBC # BLD AUTO: 0 10*3/UL
NRBC BLD AUTO-RTO: 0 /100
PLATELET # BLD AUTO: 236 10E9/L (ref 150–450)
POTASSIUM SERPL-SCNC: 3.8 MMOL/L (ref 3.4–5.3)
PROT SERPL-MCNC: 7.8 G/DL (ref 6.8–8.8)
RBC # BLD AUTO: 4.36 10E12/L (ref 3.8–5.2)
SODIUM SERPL-SCNC: 137 MMOL/L (ref 133–144)
TSH SERPL DL<=0.005 MIU/L-ACNC: 3.01 MU/L (ref 0.4–4)
WBC # BLD AUTO: 5.7 10E9/L (ref 4–11)

## 2019-05-10 ASSESSMENT — PAIN SCALES - GENERAL: PAINLEVEL: NO PAIN (0)

## 2019-05-10 NOTE — PROGRESS NOTES
HPI:    Pt. With h/o HTN, respiratory issues (see Dr. Patel's pulmonary note in Care Everywhere 10/14/2018) possibly COPD and restrictive physiology, anxiety, and fecal incontinence. She had h/o breast cancer (last mammogram 3/13/2019).  She comes in specifically for B anterior shin/skin pain. This is worse at night and described as a burning sensation. She states less pain during the day. She states she has had lymphedema for many years (going back to her 20's?). She sates she was possibly seen in Lymphedema clinic and advised to wear compression stocking or leg wraps but this was too painful. She denies any injury or falls. She has not ever tried a neuropathic medication such as gabapentin. She denies any worse HEENT, cardiopulmonary, abdominal, , GYN, systemic complaints. She state denies any chest pain or worse SOB today. She states she takes a cab to get to medical visits. She states possible another family member with lymphedema. She umberto any B LE skin changes.       Past Medical History:   Diagnosis Date     Anxiety      Arthritis      Breast cancer (H)      COPD (chronic obstructive pulmonary disease) (H)     6/19/12:  FEV 0.99 l     Hypertension      Low back pain with left-sided sciatica      Low bone density 4/13/2017    DEXA April 12, 2017: T score -2.0. Normal Z score. FRAX risk: major osteoporotic fracture 11.9%, hip fracture 2.6%, therefore not high-risk     Lymphedema, chronic lower extremities      Past Surgical History:   Procedure Laterality Date     BACK SURGERY      spinal fusion     LUMPECTOMY BREAST       ORTHOPEDIC SURGERY      Knee surgery left side     PE:    Vitals noted, gen alert, cooperative, nad, sitting in wheel chair, Lungs with good air movement, RRR, S2, S2, non acute abdominal exam. She did not let me look at her B LE's to exam because of too much pain to roll up her pants. She had some B upper shin tenderness to palpation anteriorly. She has B LE swelling from what I could  tell with her pants on. No lower leg tenderness near her B ankles. She had minimal B calf tenderness to palpation. No B thigh tenderness to palpation. Abdominal exam (with very light touch) w/o findings.     A/P:    1. She continues to follow in C/R with Dr. Latham 7/2019 for fecal incontinence  2. She has 5/29/2019 follow up MT follow up 5/29/2019   3. She was seen in Psychology with MsNakul Deanna 3/28/2019  4. Leg pain anterior shins worse at night. I recommended B LE U/S today to assess for DVT but she declined. Moreover, I recommended we do a B LE venous competency study but again she declined any ultrasound study because of pain. She did agree to get Labs today and Plain B tib/fib X-rays. Placed order for wt. Loss/management clinic. I also dicussed about abdominal imagining to look for any pelvic obstruction causing B LE swelling. Likewise, we discussed assessing R sided heart/lung issues but she wants to wait on this.       Total time spent 25 minutes.  More than 50% of the time spent with Ms. Yan on counseling / coordinating her care

## 2019-05-10 NOTE — NURSING NOTE
Chief Complaint   Patient presents with     Leg Pain     Pt is here to discuss leg pain at night.      Ada Murray LPN at 9:56 AM on 5/10/2019.

## 2019-05-10 NOTE — PATIENT INSTRUCTIONS
[facility] Medication Refill Request Information:  * Please contact your pharmacy regarding ANY request for medication refills.  ** [facility] Prescription Fax = [phone]  * Please allow 3 business days for routine medication refills.  * Please allow 5 business days for controlled substance medication refills.     [facility] Test Result notification information:  *You will be notified with in 7-10 days of your appointment day regarding the results of your test.  If you are on MyChart you will be notified as soon as the provider has reviewed the results and signed off on them.    [facility]: [phone]

## 2019-05-20 ENCOUNTER — TELEPHONE (OUTPATIENT)
Dept: INTERNAL MEDICINE | Facility: CLINIC | Age: 74
End: 2019-05-20

## 2019-05-20 NOTE — TELEPHONE ENCOUNTER
[facility] Call Center    Phone Message    May a detailed message be left on voicemail: yes    Reason for Call: Other: pt reported that she misplaced her letter regarding the xray for her legs and pt would like a copy of that letter mailed to her residence, please. Thank you.     Action Taken: Message routed to:  [facility] ([facility]): ROBINSON FARRIS

## 2019-05-22 ENCOUNTER — DOCUMENTATION ONLY (OUTPATIENT)
Dept: CARE COORDINATION | Facility: CLINIC | Age: 74
End: 2019-05-22

## 2019-05-23 ENCOUNTER — PATIENT OUTREACH (OUTPATIENT)
Dept: GASTROENTEROLOGY | Facility: CLINIC | Age: 74
End: 2019-05-23

## 2019-05-23 ENCOUNTER — MEDICAL CORRESPONDENCE (OUTPATIENT)
Dept: HEALTH INFORMATION MANAGEMENT | Facility: CLINIC | Age: 74
End: 2019-05-23

## 2019-05-23 ENCOUNTER — TELEPHONE (OUTPATIENT)
Dept: SURGERY | Facility: CLINIC | Age: 74
End: 2019-05-23

## 2019-05-23 NOTE — TELEPHONE ENCOUNTER
Pt called and would like to discuss what to do next, with Dr. Latham.  Pt states she has been calling and has not been able to get a hold of anyone.    Pt also stated she would like to be seen sooner and at an earlier time, like 1pm.  Pt would like to know if she needs to keep the 7/10/19 appt, can she be scheduled at an earlier time?    Pt would prefer a call back from Dr. Latham.  Thank you!

## 2019-05-23 NOTE — PROGRESS NOTES
Patient called and informed that insurance approved her PNE trail.  Patient scheduled for trial on 6/21/2019 at 11 am.

## 2019-05-28 ENCOUNTER — OFFICE VISIT (OUTPATIENT)
Dept: ENDOCRINOLOGY | Facility: CLINIC | Age: 74
End: 2019-05-28
Payer: COMMERCIAL

## 2019-05-28 ENCOUNTER — ALLIED HEALTH/NURSE VISIT (OUTPATIENT)
Dept: SURGERY | Facility: CLINIC | Age: 74
End: 2019-05-28
Payer: COMMERCIAL

## 2019-05-28 VITALS
SYSTOLIC BLOOD PRESSURE: 134 MMHG | WEIGHT: 198.9 LBS | OXYGEN SATURATION: 91 % | HEART RATE: 72 BPM | DIASTOLIC BLOOD PRESSURE: 90 MMHG | HEIGHT: 64 IN | BODY MASS INDEX: 33.96 KG/M2

## 2019-05-28 DIAGNOSIS — E66.09 NON MORBID OBESITY DUE TO EXCESS CALORIES: Primary | ICD-10-CM

## 2019-05-28 RX ORDER — NALTREXONE HYDROCHLORIDE 50 MG/1
TABLET, FILM COATED ORAL
Qty: 60 TABLET | Refills: 2 | Status: SHIPPED | OUTPATIENT
Start: 2019-05-28 | End: 2020-02-13

## 2019-05-28 ASSESSMENT — MIFFLIN-ST. JEOR: SCORE: 1382.2

## 2019-05-28 NOTE — LETTER
"2019       RE: Ca Yan  [address]  [place] [address]     Dear Colleague,    Thank you for referring your patient, Ca Yan, to the [facility] MEDICAL WEIGHT MANAGEMENT at [facility]. Please see a copy of my visit note below.        New Medical Weight Management Consult    PATIENT:  Ca Yan  MRN:         [id number]  :         1945  NUPUR:         2019    Dear Senia Olivas,    I had the pleasure of seeing your patient, Ca Yan.  Full intake/assessment done to determine barriers to weight loss success and develop a treatment plan.  Ca Yan is a 73 year old female interested in treatment of medical problems associated with weight.  Her weight today is 198 lbs 14.4 oz, Body mass index is 34.14 kg/m ., and she has the following co-morbidities: hx of breast cancer, hypertension, hyperlipidemia, COPD, low bone density, CKD stage 3, anxiety, depression       2019   I have the following co-morbidities associated with obesity: High Blood Pressure, High Cholesterol, Lower Extremity Edema, Fatty Liver, Asthma, Weight Bearing Joint Pain       Patient Goals Reviewed With Patient 2019   I am interested in attaining a healthier weight to diminish current health problems related to co-morbid conditions: Yes   I am interested in attaining a healthier weight in order to prevent future health problems: Yes       Referring Provider 2019   Please name the provider who referred you to Medical Weight Management.  If you do not know, please answer: \"I Don't Know\". ntknow       Wt Readings from Last 4 Encounters:   05/10/19 91.2 kg (201 lb)   18 89.4 kg (197 lb)   18 90.6 kg (199 lb 11.2 oz)   18 88.9 kg (196 lb)     She is referred to medical weight management to assist on long term weight loss. She stated that she needs help to lose weight. She tried dieting by herself but it did not work " well. She reports having boredom eating, emotional eating and stress eating. She likes high carb diet particularly ice-cream, cookies, bakery, toast. She does not like cooking.    BF: ice-cream, cookies, Rob, pastry, toast +PB  Lunch: sandwich  Dinner: Spam or TV dinner    Exercise: limited due to COPD  She used to attend pulmonary rehab.    Reports sleep well.  Does not have a lot of family support. Lives alone. Now retired. Plans to find part time job.     She has hx of previous alcohol addiction.    Weight History Reviewed With Patient 5/28/2019   How concerned are you about your weight? -   Would you describe your weight gain as gradual? -   I became overweight: -   The following factors have contributed to my weight gain:  Eating Wrong Types of Food, Lack of Exercise   I have tried the following methods to lose weight: Watching Portions or Calories, Exercise, Weight Watchers   I have the following family history of obesity/being overweight:  I am the only one in my immediate family who is overweight   Has anyone in your family had weight loss surgery? No       Diet Recall Reviewed With Patient 5/28/2019   How many glasses of juice do you drink in a typical day? 1   How many of glasses of milk do you drink in a typical day? 1   How many 8oz glasses of sugar containing drinks such as Hussein-Aid/sweet tea do you drink in a day? 1   How many cans/bottles of sugar pop/soda/tea/sports drinks do you drink in a day? 0   How many cans/bottles of diet pop/soda/tea or sports drink do you drink in a day? 0   How often do you have a drink of alcohol? Never       Eating Habits Reviewed With Patient 5/28/2019   Generally, my meals include foods like these: bread, pasta, rice, potatoes, corn, crackers, sweet dessert, pop, or juice. Almost Everyday   Generally, my meals include foods like these: fried meats, brats, burgers, french fries, pizza, cheese, chips, or ice cream. Never   Eat fast food (like [employer], [employer],  Taco Bell). Never   Eat at a buffet or sit-down restaurant. Never   Eat most of my meals in front of the TV or computer. A Few Times a Week   Often skip meals, eat at random times, have no regular eating times. Never   Rarely sit down for a meal but snack or graze throughout.  Never   Eat extra snacks between meals. Never   Eat most of my food at the end of the day. Never   Eat in the middle of the night or wake up at night to eat. A Few TImes a Week   Eat extra snacks to prevent or correct low blood sugar. Never   Eat to prevent acid reflux or stomach pain. Never   Worry about not having enough food to eat. Never   Have you been to the food shelf at least a few times this year? No   I eat when I am depressed, stressed, anxious, or bored. A Few Times a Week   I eat when I am happy or as a reward. Never   I feel hungry all the time even if I just have eaten. Never   Feeling full is important to me. Never   Once I start eating, it is hard to stop. Never   I finish all the food on my plate even if I am already full. Never   I can't resist eating delicious food or walk past the good food/smell. A Few Times a Week   I eat/snack without noticing that I am eating. Never   I eat when I am preparing the meal. Never   I eat more than usual when I see others eating. Never   I have trouble not eating sweets, ice cream, cookies, or chips if they are around the house. Never   I think about food all day. A Few Times a Week   What foods, if any, do you crave? Sweets/Candy/Chocolate   Please list any other foods you crave? -   I feel out of control when eating. Never   I eat a large amount of food, like a loaf of bread, a box of cookies, a pint/quart of ice cream, all at once. Never   I eat a large amount of food even when I am not hungry. Never   I eat rapidly. Monthly   I eat alone because I feel embarrassed and do not want others to see how much I have eaten. Never   I eat until I am uncomfortably full. Never   I feel bad,  disgusted, or guilty after I overeat. Monthly   I make myself vomit what I have eaten or use laxatives to get rid of food. Never       Activity/Exercise History Reviewed With Patient 5/28/2019   How much of a typical 12 hour day do you spend sitting? Most of the Day   How much of a typical 12 hour day do you spend lying down? Most of the Day   How much of a typical day do you spend walking/standing? Less Than Half the Day   How many hours (not including work) do you spend on the TV/Video Games/Computer/Tablet/Phone? 2-3 Hours   How many times a week are you active for the purpose of exercise? 2-3 Times a Week   How many total minutes do you spend doing some activity for the purpose of exercising when you exercise? -   What keeps you from being more active? Pain, Shortness of Breath, Unsure What To Do       PAST MEDICAL HISTORY:  Past Medical History:   Diagnosis Date     Anxiety      Arthritis      Breast cancer (H)      COPD (chronic obstructive pulmonary disease) (H)     6/19/12:  FEV 0.99 l     Hypertension      Low back pain with left-sided sciatica      Low bone density 4/13/2017    DEXA April 12, 2017: T score -2.0. Normal Z score. FRAX risk: major osteoporotic fracture 11.9%, hip fracture 2.6%, therefore not high-risk     Lymphedema, chronic lower extremities        Work/Social History Reviewed With Patient 5/28/2019   My employment status is: Retired   What is your marital status? Single   If in a relationship, is your significant other overweight? N/A   Do you have children? No   If you have children, are they overweight? N/A       Mental Health History Reviewed With Patient 5/28/2019   Have you ever been physically or sexually abused? No   How often in the past 2 weeks have you felt little interest or pleasure in doing things? For Several Days   Over the past 2 weeks how often have you felt down, depressed, or hopeless? For Several Days       Sleep History Reviewed With Patient 5/28/2019   How many hours  do you sleep at night? 12   Do you think that you snore loudly or has anybody ever heard you snore loudly (louder than talking or so loud it can be heard behind a shut door)? No   Has anyone seen or heard you stop breathing during your sleep? No   Do you often feel tired, fatigued, or sleepy during the day? No       MEDICATIONS:   Current Outpatient Medications   Medication Sig Dispense Refill     acetaminophen (TYLENOL) 500 MG tablet Take 500-1,000 mg by mouth every 6 hours as needed for mild pain       albuterol (PROAIR HFA/PROVENTIL HFA/VENTOLIN HFA) 108 (90 Base) MCG/ACT inhaler Inhale 2 puffs into the lungs every 6 hours as needed for shortness of breath / dyspnea or wheezing 3 Inhaler 1     amLODIPine (NORVASC) 2.5 MG tablet Take 1 tablet (2.5 mg) by mouth daily 90 tablet 3     ASPIRIN EC PO Take 81 mg by mouth       Blood Pressure Monitoring (BLOOD PRESSURE CUFF) MISC Imron blood cuff    Please check your blood pressure 2-3 times per week.  Goal is <140/90 1 each 0     fluticasone (FLONASE) 50 MCG/ACT spray Spray 1 spray into both nostrils daily (Patient taking differently: Spray 2 sprays into both nostrils daily ) 1 Bottle 1     fluticasone-vilanterol (BREO ELLIPTA) 200-25 MCG/INH inhaler Inhale 1 puff into the lungs daily 1 Inhaler 3     hydrochlorothiazide (MICROZIDE) 12.5 MG capsule Take 1 capsule (12.5 mg) by mouth daily 90 capsule 3     ibuprofen (ADVIL,MOTRIN) 200 MG tablet Take 3 tablets (600 mg) by mouth 3 times daily (with meals) 90 tablet 0     latanoprost (XALATAN) 0.005 % ophthalmic solution 1 drop       losartan (COZAAR) 50 MG tablet Take 2 tablets (100 mg) by mouth daily 90 tablet 3     magnesium 250 MG tablet Take 1 tablet by mouth daily       Magnesium Salicylate Tetrahyd (DOANS EXTRA STRENGTH) 580 MG TABS        naproxen (NAPROSYN) 500 MG tablet Take 1 tablet (500 mg) by mouth 2 times daily (with meals) 30 tablet 1     order for DME Equipment being ordered: Wheelchair Sig: Equipment being  "ordered: portable walker with seat and compartment under the seat.     Use for going out of the house on errands, where prolonged standing is required. 1 Device 1     order for DME Equipment being ordered: portable walker with seat and compartment under the seat.    Use for going out of the house on errands, where prolonged standing is required. 1 Units 0     order for DME Equipment being ordered: knee high compression stockings, class 1 or 2, night time velcro compression garments, lymphedema bandaging supplies, darco boots to wear during treatment 2 each 3     order for DME Equipment being ordered: Oxygen  Please provide portable oxygen concentrator (pt would like over the shoulder oxygen device) for portability at 2LPM with activity via nasal canula. 1 Device 1     quetiapine (SEROQUEL) 200 MG tablet Take 200 mg by mouth At Bedtime.       simvastatin (ZOCOR) 20 MG tablet Take 1 tablet (20 mg) by mouth daily * LDL DUE FOR FURTHER REFILLS 90 tablet 0     umeclidinium (INCRUSE ELLIPTA) 62.5 MCG/INH inhaler Inhale 1 puff into the lungs daily 7 Inhaler 3       ALLERGIES:   Allergies   Allergen Reactions     Azithromycin      Other reaction(s): Itching     Codeine Nausea and Vomiting     Hydrocodone      Vomiting     Metronidazole Nausea     Percocet [Oxycodone-Acetaminophen]      Vomiting     Pollen Extract      Other reaction(s): Other, see comments  Pt states that she has sneezing and runny nose.      Seasonal Allergies Other (See Comments)     Pt states that she has sneezing and runny nose.      Vicodin [Hydrocodone-Acetaminophen]      Vomiting     Zolpidem Other (See Comments)     Amnestic behavior     Oxycodone Itching and Rash       PHYSICAL EXAM:  Ht 1.626 m (5' 4\")   Wt 90.2 kg (198 lb 14.4 oz)   SpO2 91%   BMI 34.14 kg/m      A & O x 3  HEENT: NCAT, mucous membranes moist  Respirations unlabored  Location of obesity: Mixed Obesity    ASSESSMENT:  Ca is a patient with mature onset obesity without " significant element of familial/genetic influence and with current health consequences. She does not need aggressive weight loss plan.  Ca Yan eats a high carb diet, eats a high fat diet, uses food as mood management and eats most meals in front of TV.    Her problem is complicated by strong craving/reward pathways and poor lifestyle choices    Her ability to lose weight is impacted by functional impairment and lack of confidence.    PLAN:    Decrease portion sizes  Purge house of food triggers  Dietician visit of education  Calorie/low fat diet  Meal planning  Increase activity by  times per day    Craving/Reward   Ancillary testing:  N/A.  Food Plan:  High protein/low carbohydrate.   Activity Plan:  Exercise after meals.  Supplementary:  N/A.   Medication:  The patient will begin medication in pursuit of improved medical status as influenced by body weight. She will start naltrexone 25 mg and can be up to 50 mg daily. There is a mutual understanding of the goals and risks of this therapy. The patient is in agreement. She is educated on dosage regimen and possible side effects.    RTC:    Follow up with Scarlett LeslieD in 2-3 weeks  Return to see MD in 2 months    TIME: 30 min spent on evaluation, management, counseling, education, & motivational interviewing with greater than 50 % of the total time was spent on counseling and coordinating care    Sincerely,    Sharita Rodriguez MD

## 2019-05-28 NOTE — PATIENT INSTRUCTIONS
Welcome to our weight management program!   We are excited to join you on your weight loss journey    Thank you for allowing us the privilege of caring for you. We hope we provided you with the excellent service you deserve.    Please let us know if there is anything else we can do so that we can be sure you are leaving completely satisfied with your care experience.    You saw Dr. Sharita GONZALES today.    Instructions per today's visit:   Medications started today: Naltrexone  MTM pharmacist referral: 2-3 weeks in clinic      Follow up appointments:  Dietitian today and monthly as needed.  MTM pharmacist in clinic LONG in 2-3 weeks.  Dr. Sheriff in 2 months.     For any questions/concerns contact Margret Maki LPN at [phone] or Erica Merchant RN at [phone]    To schedule appointments with our team, please call [phone]     Please call during clinic hours Monday through Friday 8:00a - 4:00p if you have questions or you can contact us via [employer] at anytime. ?    Lab results will be communicated through My Chart or letter (if My Chart not used). Please call the clinic if you have not received communication after 1 week or if you have any questions.?      Fax: [phone]    Thank you,  Medical Weight Management Team      MEDICATION STARTED AT THIS APPOINTMENT  We are starting Naltrexone. Start with 1/2 tab 1-2 hours prior to the time you have the most trouble with cravings or extra hunger. If you are doing well you may switch to a whole tablet taken at the same time period.     WARNING: This medication blocks the action of opioid type pain medications. If you routinely take any medication like Codeine, Oxycontin,Percocet,Morphine,Dilaudid or Methodone, do not take this until you have talked with weight management staff. If you are planning surgery you should stop Naltrexone 4 days prior to the surgery. If you have an injury that requires pain medication, make sure the health care staff knows you take  "Naltrexone.     Call the nurse at [phone] if you have any questions or concerns. (Do not stop taking it if you don't think it's working. For some people it works without them knowing it.)    Naltrexone is a medication that is used most often to help people who are troubled by dependence on prescription pain killers or alcohol. It has also been found to help with weight loss. Although it's not currently FDA approved for weight loss, it has been used safely for a number of years to help people who are carrying extra weight.     Just how Naltrexone helps with weight loss has not been exactly determined.  It seems to work by quieting down brain signals related to strong food cravings. Many of our patients use the word \"addiction\" to describe their feelings and constant thoughts about food. It makes sense then to treat the feeling of dependence on food, outside of real hunger, with a medication designed to help with other sorts of dependence.     Our patients on Naltrexone find that they:    >feel less interest in food   >think less about food and eating and have more time to think of other things   >find it easier to push the plate away   >have an easier time eating less    For some of our patients, these feelings are very immediate. Other patients, don't feel much of a change but find they've lost weight. Like all weight loss medications, Naltrexone works best when you help it work. This means:  1. Having less tempting high calorie (fattening) food around the house or office. (For people with strong cravings this is very important.)   2. Staying away from situations or people that may trigger your cravings .   3. Eating out only one time or less each week.  4. Eating your meals at a table with the TV or computer off.    Side-effects. Naltrexone is generally well tolerated. The main side-effect we see is  nausea or a woozy feeling. A small number of people feel quite ill. Most people have a mild reaction and some " people have no reaction at all.  The good news is that this feeling does go away.     In order to avoid nausea, please start the medication with half a pill for the first few days. Go on to a full pill if you are feeling well.      If you  are nauseated on 1/2 a pill it is okay to cut back to 1/4 pill ( a very small amount). Take this for a couple of days and work your way back up to a 1/2 pill and then a whole pill. Taking the medication at night or with food  to start also may help prevent the feeling of nausea.         Please refer to the pharmacy insert for more information on side-effects. Since many pharmacists are not familiar with the use of naltrexone in weight loss, calling the nurse at [phone] will get you the most accurate information.  In order to get refills of this or any medication we prescribe you must be seen in the medical weight mgmt clinic every 2-3 months. Please have your pharmacy fax a refill request to [phone].

## 2019-05-28 NOTE — NURSING NOTE
"  Chief Complaint   Patient presents with     Weight Problem     RMWM     Vitals:    05/28/19 1252   BP: 134/90   Pulse: 72   SpO2: 91%   Weight: 90.2 kg (198 lb 14.4 oz)   Height: 1.626 m (5' 4\")     Body mass index is 34.14 kg/m .  Fritz Hayes CMA    "

## 2019-05-28 NOTE — PROGRESS NOTES
"New Weight Management Nutrition Note:    Ca Yan is a 73 year old female presents today for new weight management nutrition consultation. Pt referred by dr. Sharita Mercer (5/28/19).     Admit Height:   Ht Readings from Last 2 Encounters:   05/28/19 1.626 m (5' 4\")   04/03/19 1.626 m (5' 4\")     Admit Weight:   Wt Readings from Last 3 Encounters:   05/28/19 90.2 kg (198 lb 14.4 oz)   05/10/19 91.2 kg (201 lb)   09/26/18 89.4 kg (197 lb)     Admit BMI: 34.14 kg/m2 - calculated based on height and weigh from 5/28/19.     Nutrition history:  Pt states that she does not understand why she has gained weight, she explains that she had been the same weight until her 60's when she reports weight gain. She note that she is not as active because of her COPD.     She reports that she does not like to cook. Pt continued to ask what foods were good or bad, she seems to have an all or nothing complex. Pt keeps healthier options in her home (ie: cottage cheese, eggs or yogurt) but trends to forget about them and chose pastries or sweets instead. Pt explains that sometimes she will get up in the middle of the night and eat; she is aware that she is eating and is unsure why she chooses to eat.     Diet Recall:   B: ice cream, cookie, Rob, pastry or toast with PB  L: [place], burger   D: fried spam or TV dinner (chicken ala andrei)   Snack: Milk and hermelindo crackers    Exercise: None     Vitamin and mineral supplements:   Eating in the middle of night.     See MD note for details.    Nutrition Prescription  High protein/low CHO diet (per MD)    Nutrition Diagnosis  Food and nutrition related knowledge deficit r/t lack of prior exposure to calorie counting and nutrition education aeb pt unable to verbalize understanding of High protein/low CHO diet for weight loss.    Nutrition Intervention  Materials/education provided on High protein/low CHO diet with portion control and healthy food choices (the plate mehtod), " 100 calorie snack choices, sample meal plans and sources of protein (handout given).   -Looked up specific foods that the patient can buy at the grocery store that are healthier options than what she is currently eating.   - Goals were food focused, as patient continues to ask what to buy at the grocery store.     Patient Understanding: Fair   Expected Compliance: Fair   Follow-Up Plans: 1 month      Nutrition Goals  1) Try TV dinner from:   Frozen Meal Replacements  Healthy Choice  Lean Cuisine  Atkins Meals  Smart Ones  2) Try open face sandwiches, (using 1 piece of bread) or for the morning 1/2 bagel or 1 piece of toast and pair it with a protein.   3) Try 100 calorie yogurt: (Yoplait 100 calorie greek yogurt)   4) Eat a protein source at every meal (Rerrer to sources of protein list).   5) Instead of eating during the night: try journaling, try drinking a glass of water, try reading a book/newpaper or watching tv.   6) Keep a food journal, write down everything you eat or drink in a 24 hour period and write sown any additional information (Ie: hunger less, stress, emotions, boredom)       Follow-Up:  1 month    Time spent with patient: 30 minutes.  Nanette Penn, MS, RD, LD

## 2019-05-28 NOTE — PATIENT INSTRUCTIONS
Nutrition Goals  1) Try TV dinner from:   Frozen Meal Replacements  Healthy Choice  Lean Cuisine  Atkins Meals  Smart Ones  2) Try open face sandwiches, (using 1 piece of bread) or for the morning 1/2 bagel or 1 piece of toast and pair it with a protein.   3) Try 100 calorie yogurt: (Yoplait 100 calorie greek yogurt)   4) Eat a protein source at every meal (Rerrer to sources of protein list).   5) Instead of eating during the night: try journaling, try drinking a glass of water, try reading a book/newpaper or watching tv.   6) Keep a food journal, write down everything you eat or drink in a 24 hour period and write sown any additional information (Ie: hunger less, stress, emotions, boredom)     Follow up with RD in one month    Nanette Penn, MS, RD, LD  If you need to schedule or reschedule with a dietitian please call [phone].

## 2019-05-28 NOTE — PROGRESS NOTES
"    New Medical Weight Management Consult    PATIENT:  Ca Yan  MRN:         [id number]  :         1945  NUPUR:         2019    Dear Senia Olivas,    I had the pleasure of seeing your patient, Ca Yan.  Full intake/assessment done to determine barriers to weight loss success and develop a treatment plan.  Ca Yan is a 73 year old female interested in treatment of medical problems associated with weight.  Her weight today is 198 lbs 14.4 oz, Body mass index is 34.14 kg/m ., and she has the following co-morbidities: hx of breast cancer, hypertension, hyperlipidemia, COPD, low bone density, CKD stage 3, anxiety, depression       2019   I have the following co-morbidities associated with obesity: High Blood Pressure, High Cholesterol, Lower Extremity Edema, Fatty Liver, Asthma, Weight Bearing Joint Pain       Patient Goals Reviewed With Patient 2019   I am interested in attaining a healthier weight to diminish current health problems related to co-morbid conditions: Yes   I am interested in attaining a healthier weight in order to prevent future health problems: Yes       Referring Provider 2019   Please name the provider who referred you to Medical Weight Management.  If you do not know, please answer: \"I Don't Know\". ntknow       Wt Readings from Last 4 Encounters:   05/10/19 91.2 kg (201 lb)   18 89.4 kg (197 lb)   18 90.6 kg (199 lb 11.2 oz)   18 88.9 kg (196 lb)     She is referred to medical weight management to assist on long term weight loss. She stated that she needs help to lose weight. She tried dieting by herself but it did not work well. She reports having boredom eating, emotional eating and stress eating. She likes high carb diet particularly ice-cream, cookies, bakery, toast. She does not like cooking.    BF: ice-cream, cookies, Rob, pastry, toast +PB  Lunch: sandwich  Dinner: Spam or TV dinner    Exercise: limited due " to COPD  She used to attend pulmonary rehab.    Reports sleep well.  Does not have a lot of family support. Lives alone. Now retired. Plans to find part time job.     She has hx of previous alcohol addiction.    Weight History Reviewed With Patient 5/28/2019   How concerned are you about your weight? -   Would you describe your weight gain as gradual? -   I became overweight: -   The following factors have contributed to my weight gain:  Eating Wrong Types of Food, Lack of Exercise   I have tried the following methods to lose weight: Watching Portions or Calories, Exercise, Weight Watchers   I have the following family history of obesity/being overweight:  I am the only one in my immediate family who is overweight   Has anyone in your family had weight loss surgery? No       Diet Recall Reviewed With Patient 5/28/2019   How many glasses of juice do you drink in a typical day? 1   How many of glasses of milk do you drink in a typical day? 1   How many 8oz glasses of sugar containing drinks such as Hussein-Aid/sweet tea do you drink in a day? 1   How many cans/bottles of sugar pop/soda/tea/sports drinks do you drink in a day? 0   How many cans/bottles of diet pop/soda/tea or sports drink do you drink in a day? 0   How often do you have a drink of alcohol? Never       Eating Habits Reviewed With Patient 5/28/2019   Generally, my meals include foods like these: bread, pasta, rice, potatoes, corn, crackers, sweet dessert, pop, or juice. Almost Everyday   Generally, my meals include foods like these: fried meats, brats, burgers, french fries, pizza, cheese, chips, or ice cream. Never   Eat fast food (like [employer], [employer], [employer]aco [employer]). Never   Eat at a buffet or sit-down restaurant. Never   Eat most of my meals in front of the TV or computer. A Few Times a Week   Often skip meals, eat at random times, have no regular eating times. Never   Rarely sit down for a meal but snack or graze throughout.  Never   Eat extra  snacks between meals. Never   Eat most of my food at the end of the day. Never   Eat in the middle of the night or wake up at night to eat. A Few TImes a Week   Eat extra snacks to prevent or correct low blood sugar. Never   Eat to prevent acid reflux or stomach pain. Never   Worry about not having enough food to eat. Never   Have you been to the food shelf at least a few times this year? No   I eat when I am depressed, stressed, anxious, or bored. A Few Times a Week   I eat when I am happy or as a reward. Never   I feel hungry all the time even if I just have eaten. Never   Feeling full is important to me. Never   Once I start eating, it is hard to stop. Never   I finish all the food on my plate even if I am already full. Never   I can't resist eating delicious food or walk past the good food/smell. A Few Times a Week   I eat/snack without noticing that I am eating. Never   I eat when I am preparing the meal. Never   I eat more than usual when I see others eating. Never   I have trouble not eating sweets, ice cream, cookies, or chips if they are around the house. Never   I think about food all day. A Few Times a Week   What foods, if any, do you crave? Sweets/Candy/Chocolate   Please list any other foods you crave? -   I feel out of control when eating. Never   I eat a large amount of food, like a loaf of bread, a box of cookies, a pint/quart of ice cream, all at once. Never   I eat a large amount of food even when I am not hungry. Never   I eat rapidly. Monthly   I eat alone because I feel embarrassed and do not want others to see how much I have eaten. Never   I eat until I am uncomfortably full. Never   I feel bad, disgusted, or guilty after I overeat. Monthly   I make myself vomit what I have eaten or use laxatives to get rid of food. Never       Activity/Exercise History Reviewed With Patient 5/28/2019   How much of a typical 12 hour day do you spend sitting? Most of the Day   How much of a typical 12 hour day  do you spend lying down? Most of the Day   How much of a typical day do you spend walking/standing? Less Than Half the Day   How many hours (not including work) do you spend on the TV/Video Games/Computer/Tablet/Phone? 2-3 Hours   How many times a week are you active for the purpose of exercise? 2-3 Times a Week   How many total minutes do you spend doing some activity for the purpose of exercising when you exercise? -   What keeps you from being more active? Pain, Shortness of Breath, Unsure What To Do       PAST MEDICAL HISTORY:  Past Medical History:   Diagnosis Date     Anxiety      Arthritis      Breast cancer (H)      COPD (chronic obstructive pulmonary disease) (H)     6/19/12:  FEV 0.99 l     Hypertension      Low back pain with left-sided sciatica      Low bone density 4/13/2017    DEXA April 12, 2017: T score -2.0. Normal Z score. FRAX risk: major osteoporotic fracture 11.9%, hip fracture 2.6%, therefore not high-risk     Lymphedema, chronic lower extremities        Work/Social History Reviewed With Patient 5/28/2019   My employment status is: Retired   What is your marital status? Single   If in a relationship, is your significant other overweight? N/A   Do you have children? No   If you have children, are they overweight? N/A       Mental Health History Reviewed With Patient 5/28/2019   Have you ever been physically or sexually abused? No   How often in the past 2 weeks have you felt little interest or pleasure in doing things? For Several Days   Over the past 2 weeks how often have you felt down, depressed, or hopeless? For Several Days       Sleep History Reviewed With Patient 5/28/2019   How many hours do you sleep at night? 12   Do you think that you snore loudly or has anybody ever heard you snore loudly (louder than talking or so loud it can be heard behind a shut door)? No   Has anyone seen or heard you stop breathing during your sleep? No   Do you often feel tired, fatigued, or sleepy during the  day? No       MEDICATIONS:   Current Outpatient Medications   Medication Sig Dispense Refill     acetaminophen (TYLENOL) 500 MG tablet Take 500-1,000 mg by mouth every 6 hours as needed for mild pain       albuterol (PROAIR HFA/PROVENTIL HFA/VENTOLIN HFA) 108 (90 Base) MCG/ACT inhaler Inhale 2 puffs into the lungs every 6 hours as needed for shortness of breath / dyspnea or wheezing 3 Inhaler 1     amLODIPine (NORVASC) 2.5 MG tablet Take 1 tablet (2.5 mg) by mouth daily 90 tablet 3     ASPIRIN EC PO Take 81 mg by mouth       Blood Pressure Monitoring (BLOOD PRESSURE CUFF) MISC Imron blood cuff    Please check your blood pressure 2-3 times per week.  Goal is <140/90 1 each 0     fluticasone (FLONASE) 50 MCG/ACT spray Spray 1 spray into both nostrils daily (Patient taking differently: Spray 2 sprays into both nostrils daily ) 1 Bottle 1     fluticasone-vilanterol (BREO ELLIPTA) 200-25 MCG/INH inhaler Inhale 1 puff into the lungs daily 1 Inhaler 3     hydrochlorothiazide (MICROZIDE) 12.5 MG capsule Take 1 capsule (12.5 mg) by mouth daily 90 capsule 3     ibuprofen (ADVIL,MOTRIN) 200 MG tablet Take 3 tablets (600 mg) by mouth 3 times daily (with meals) 90 tablet 0     latanoprost (XALATAN) 0.005 % ophthalmic solution 1 drop       losartan (COZAAR) 50 MG tablet Take 2 tablets (100 mg) by mouth daily 90 tablet 3     magnesium 250 MG tablet Take 1 tablet by mouth daily       Magnesium Salicylate Tetrahyd (DOANS EXTRA STRENGTH) 580 MG TABS        naproxen (NAPROSYN) 500 MG tablet Take 1 tablet (500 mg) by mouth 2 times daily (with meals) 30 tablet 1     order for DME Equipment being ordered: Wheelchair Sig: Equipment being ordered: portable walker with seat and compartment under the seat.     Use for going out of the house on errands, where prolonged standing is required. 1 Device 1     order for DME Equipment being ordered: portable walker with seat and compartment under the seat.    Use for going out of the house on  "errands, where prolonged standing is required. 1 Units 0     order for DME Equipment being ordered: knee high compression stockings, class 1 or 2, night time velcro compression garments, lymphedema bandaging supplies, darco boots to wear during treatment 2 each 3     order for DME Equipment being ordered: Oxygen  Please provide portable oxygen concentrator (pt would like over the shoulder oxygen device) for portability at 2LPM with activity via nasal canula. 1 Device 1     quetiapine (SEROQUEL) 200 MG tablet Take 200 mg by mouth At Bedtime.       simvastatin (ZOCOR) 20 MG tablet Take 1 tablet (20 mg) by mouth daily * LDL DUE FOR FURTHER REFILLS 90 tablet 0     umeclidinium (INCRUSE ELLIPTA) 62.5 MCG/INH inhaler Inhale 1 puff into the lungs daily 7 Inhaler 3       ALLERGIES:   Allergies   Allergen Reactions     Azithromycin      Other reaction(s): Itching     Codeine Nausea and Vomiting     Hydrocodone      Vomiting     Metronidazole Nausea     Percocet [Oxycodone-Acetaminophen]      Vomiting     Pollen Extract      Other reaction(s): Other, see comments  Pt states that she has sneezing and runny nose.      Seasonal Allergies Other (See Comments)     Pt states that she has sneezing and runny nose.      Vicodin [Hydrocodone-Acetaminophen]      Vomiting     Zolpidem Other (See Comments)     Amnestic behavior     Oxycodone Itching and Rash       PHYSICAL EXAM:  Ht 1.626 m (5' 4\")   Wt 90.2 kg (198 lb 14.4 oz)   SpO2 91%   BMI 34.14 kg/m     A & O x 3  HEENT: NCAT, mucous membranes moist  Respirations unlabored  Location of obesity: Mixed Obesity    ASSESSMENT:  Ca is a patient with mature onset obesity without significant element of familial/genetic influence and with current health consequences. She does not need aggressive weight loss plan.  Ca Yan eats a high carb diet, eats a high fat diet, uses food as mood management and eats most meals in front of TV.    Her problem is complicated by strong " craving/reward pathways and poor lifestyle choices    Her ability to lose weight is impacted by functional impairment and lack of confidence.    PLAN:    Decrease portion sizes  Purge house of food triggers  Dietician visit of education  Calorie/low fat diet  Meal planning  Increase activity by  times per day    Craving/Reward   Ancillary testing:  N/A.  Food Plan:  High protein/low carbohydrate.   Activity Plan:  Exercise after meals.  Supplementary:  N/A.   Medication:  The patient will begin medication in pursuit of improved medical status as influenced by body weight. She will start naltrexone 25 mg and can be up to 50 mg daily. There is a mutual understanding of the goals and risks of this therapy. The patient is in agreement. She is educated on dosage regimen and possible side effects.    RTC:    Follow up with Scarlett LeslieD in 2-3 weeks  Return to see MD in 2 months    TIME: 30 min spent on evaluation, management, counseling, education, & motivational interviewing with greater than 50 % of the total time was spent on counseling and coordinating care    Sincerely,    Sharita Rodriguez MD

## 2019-05-29 ENCOUNTER — ALLIED HEALTH/NURSE VISIT (OUTPATIENT)
Dept: PHARMACY | Facility: CLINIC | Age: 74
End: 2019-05-29
Payer: COMMERCIAL

## 2019-05-29 DIAGNOSIS — E66.09 NON MORBID OBESITY DUE TO EXCESS CALORIES: ICD-10-CM

## 2019-05-29 DIAGNOSIS — R25.2 CRAMP OF LIMB: Primary | ICD-10-CM

## 2019-05-29 DIAGNOSIS — R09.82 POST-NASAL DRIP: ICD-10-CM

## 2019-05-29 PROCEDURE — 99606 MTMS BY PHARM EST 15 MIN: CPT | Performed by: PHARMACIST

## 2019-05-29 NOTE — PROGRESS NOTES
SUBJECTIVE/OBJECTIVE:                Ca Yan is a 73 year old female called for a follow-up visit for Medication Therapy Management.  She was referred to me from Courtney Gongora MD.     Chief Complaint: Follow up from our visit on 5/1/19.      Tobacco: History of tobacco dependence - quit 1980  Alcohol: history of alcohol dependence    Medication Adherence/Access:  no issues reported - she does have questions about how to get established with a new doctor since Dr. Olivas is retiring.     Cramps: current medications include magnesium 250 mg daily. She denies side effects today and doesn't think the medication has been helpful for cramps. She has seen weight loss clinic who has recommended a series of lifestyle changes which she wants to pursue at this time.     Post-Nasal Drip: Current medications include fluticasone 50 mcg 2 sprays in each nostril daily. She reports mild improvement in her post-nasal drip.     Today's Vitals: There were no vitals taken for this visit. - telemed       ASSESSMENT:              Current medications were reviewed today as discussed above.      Medication Adherence: good, no issues identified    Cramps: Unimproved. Patient would benefit from following through with weigh loss management recommendations.     Post-Nasal Drip: Somewhat improved. Patient would benefit from continued use of fluticasone nasal spray.     PLAN:                  1. Implement lifestyle changes recommended by weight management.     I spent 15 minutes with this patient today. A copy of the visit note was provided to the patient's primary care provider.     Will follow up as needed per patient.     The patient declined a summary of these recommendations as an after visit summary.    Nell Hathaway, Pharm.D., [facility]  Medication Therapy Management Pharmacist  Page/VM:  [phone]

## 2019-05-30 ENCOUNTER — TELEPHONE (OUTPATIENT)
Dept: ENDOCRINOLOGY | Facility: CLINIC | Age: 74
End: 2019-05-30

## 2019-05-30 NOTE — TELEPHONE ENCOUNTER
MD Sheriff patient:    Reason for call:  Other   Patient called regarding (reason for call): call back  Additional comments: Patient is not feeling well after taking 1/2 of her pill that was prescribed yesterday. She didn't want to be triaged, but would like to speak with the care team about what to do.     Phone number to reach patient:  Home number on file [phone] (home)    Best Time:  Anytime     Can we leave a detailed message on this number?  YES

## 2019-05-30 NOTE — TELEPHONE ENCOUNTER
"Called and spoke with patient in regards to medication side effects. Patient states she took 1/2 tablet of Naltrexone today and she is not feeling well. States she \"cannot describe what she is feeling\". Writer asked is she is having nausea or upset stomach, patient said yes. Advised patient that this is a common side effect from starting the medication and that side effects usually subside after a few days. Patient states she does not want to take this medication. Will inform Dr. Sheriff.   "

## 2019-05-30 NOTE — TELEPHONE ENCOUNTER
After speaking with Dr. Sheriff, offered patient Topiramate ramp to 75mg. Patient declined stating she does not want to take medications.

## 2019-06-04 ENCOUNTER — TELEPHONE (OUTPATIENT)
Dept: ENDOCRINOLOGY | Facility: CLINIC | Age: 74
End: 2019-06-04

## 2019-06-04 NOTE — PROGRESS NOTES
RD was routed message that patient called asking where to find 100 calorie whips yogurt.     RD called patient back and told her where this item was in stock. Encouraged patient to buy any high protein, low sugar yogurt.     Nanette Penn, MS, RD, LD

## 2019-06-04 NOTE — TELEPHONE ENCOUNTER
Reason for call:  Other   Patient called regarding (reason for call): call back  Additional comments: PT WOULD LIKE A CALL BACK, SHE CANNOT FIND WHIPPED 100 CALORIE YOGURT     Phone number to reach patient:  Cell number on file:    Telephone Information:   Mobile [phone]       Best Time:  ANYTIME    Can we leave a detailed message on this number?  YES

## 2019-07-02 ENCOUNTER — TELEPHONE (OUTPATIENT)
Dept: SURGERY | Facility: CLINIC | Age: 74
End: 2019-07-02

## 2019-07-02 NOTE — TELEPHONE ENCOUNTER
[facility] Call Center    Phone Message    May a detailed message be left on voicemail: yes    Reason for Call: Patient called said she received a letter in the mail saying she has an appointment on July 12th. Please call to discuss patient said she is unaware and want information on why and who scheduled this.    Action Taken: Message routed to:  [facility] ([facility]

## 2019-07-03 ENCOUNTER — TRANSFERRED RECORDS (OUTPATIENT)
Dept: HEALTH INFORMATION MANAGEMENT | Facility: CLINIC | Age: 74
End: 2019-07-03

## 2019-07-04 NOTE — TELEPHONE ENCOUNTER
Called patient and discussed dates for her to have the interstim placement.  Patient verbalizes understanding of plan

## 2019-07-23 ENCOUNTER — TELEPHONE (OUTPATIENT)
Dept: INTERNAL MEDICINE | Facility: CLINIC | Age: 74
End: 2019-07-23

## 2019-07-23 NOTE — TELEPHONE ENCOUNTER
[facility] Call Center    Phone Message    May a detailed message be left on voicemail: yes    Reason for Call: Symptoms or Concerns     If patient has red-flag symptoms, warm transfer to triage line    Current symptom or concern: R leg pain and burning sensation - Patient is requesting treatment or referral for possible lymphedema.  Please call to discuss.      Symptoms have been present for:  A couple month(s)    Has patient previously been seen for this? No    Appt on 7/31 at 12:45pm with Jodi Gutierrez.  Viri Webb RN 9:54 AM on 7/23/2019.            Action Taken: Message routed to:  [facility]

## 2019-07-31 ENCOUNTER — OFFICE VISIT (OUTPATIENT)
Dept: INTERNAL MEDICINE | Facility: CLINIC | Age: 74
End: 2019-07-31
Payer: COMMERCIAL

## 2019-07-31 VITALS
OXYGEN SATURATION: 92 % | HEIGHT: 64 IN | HEART RATE: 78 BPM | BODY MASS INDEX: 34.12 KG/M2 | SYSTOLIC BLOOD PRESSURE: 118 MMHG | DIASTOLIC BLOOD PRESSURE: 80 MMHG

## 2019-07-31 DIAGNOSIS — I89.0 LYMPHEDEMA OF BOTH LOWER EXTREMITIES: Primary | ICD-10-CM

## 2019-07-31 ASSESSMENT — PAIN SCALES - GENERAL: PAINLEVEL: NO PAIN (0)

## 2019-07-31 NOTE — PROGRESS NOTES
"[facility]   Jodi PERALTANakul Gutierrez, RUPAL CNP  07/31/2019      Chief Complaint:   Musculoskeletal Problem       History of Present Illness:   Ca Yan is a 73 year old female with a complex medical history who presents for evaluation of musculoskeletal pain.    Leg Pain  Ca's right leg pain mainly occurs at night and is intermittent in frequency, occurring about 1-2 times a month and lasting for a few seconds at a time. It radiates throughout the front of the shin from the ankle to the knee. She describes the pain as burning and stinging. She never notices this pain during the day. No history of injury to her leg. She did have x-rays completed in May for leg pain; she had declined US of the leg at that time. She does not have pain when she walks. Ca has not tried any pain relieving medication as the episodes are so short in duration. She does have pain to palpation, however, patient does have lymphedema. She reports she has had this edema since she was in her 20's and wonders why she has it. She does have compression stockings, however, it is painful to wear. She does not want to use wraps for compression, as she feels she wouldn't have anything to wear and \"my legs already look bad enough, I'm not going to make them look worse with wraps.\"     Review of Systems:   Pertinent items are noted in HPI or as in patient entered ROS below, remainder of complete ROS is negative.     Active Medications:      acetaminophen (TYLENOL) 500 MG tablet, Take 500-1,000 mg by mouth every 6 hours as needed for mild pain, Disp: , Rfl:      albuterol (PROAIR HFA/PROVENTIL HFA/VENTOLIN HFA) 108 (90 Base) MCG/ACT inhaler, Inhale 2 puffs into the lungs every 6 hours as needed for shortness of breath / dyspnea or wheezing, Disp: 3 Inhaler, Rfl: 1     amLODIPine (NORVASC) 2.5 MG tablet, Take 1 tablet (2.5 mg) by mouth daily, Disp: 90 tablet, Rfl: 3     ASPIRIN EC PO, Take 81 mg by mouth, Disp: , Rfl:      " fluticasone (FLONASE) 50 MCG/ACT spray, Spray 1 spray into both nostrils daily (Patient taking differently: Spray 2 sprays into both nostrils daily ), Disp: 1 Bottle, Rfl: 1     fluticasone-vilanterol (BREO ELLIPTA) 200-25 MCG/INH inhaler, Inhale 1 puff into the lungs daily, Disp: 1 Inhaler, Rfl: 3     hydrochlorothiazide (MICROZIDE) 12.5 MG capsule, Take 1 capsule (12.5 mg) by mouth daily, Disp: 90 capsule, Rfl: 3     ibuprofen (ADVIL,MOTRIN) 200 MG tablet, Take 3 tablets (600 mg) by mouth 3 times daily (with meals), Disp: 90 tablet, Rfl: 0     latanoprost (XALATAN) 0.005 % ophthalmic solution, 1 drop, Disp: , Rfl:      losartan (COZAAR) 50 MG tablet, Take 2 tablets (100 mg) by mouth daily, Disp: 90 tablet, Rfl: 3     magnesium 250 MG tablet, Take 1 tablet by mouth daily, Disp: , Rfl:      Magnesium Salicylate Tetrahyd (DOANS EXTRA STRENGTH) 580 MG TABS, , Disp: , Rfl:      naltrexone (DEPADE/REVIA) 50 MG tablet, Take 1/2 tablet.  Time it one to two hours prior to worst cravings.  Then increase to one full tablet as instructed., Disp: 60 tablet, Rfl: 2     naproxen (NAPROSYN) 500 MG tablet, Take 1 tablet (500 mg) by mouth 2 times daily (with meals), Disp: 30 tablet, Rfl: 1     quetiapine (SEROQUEL) 200 MG tablet, Take 200 mg by mouth At Bedtime., Disp: , Rfl:      simvastatin (ZOCOR) 20 MG tablet, Take 1 tablet (20 mg) by mouth daily * LDL DUE FOR FURTHER REFILLS, Disp: 90 tablet, Rfl: 0     umeclidinium (INCRUSE ELLIPTA) 62.5 MCG/INH inhaler, Inhale 1 puff into the lungs daily, Disp: 7 Inhaler, Rfl: 3      Allergies:   Azithromycin; Codeine; Hydrocodone; Metronidazole; Percocet [oxycodone-acetaminophen]; Pollen extract; Seasonal allergies; Vicodin [hydrocodone-acetaminophen]; Zolpidem; and Oxycodone      Past Medical History:  Arthritis  Breast Cancer  COPD  Depressive disorder  Hypertension   Low bone density  Lymphedema, chronic lower extremities  Non morbid obesity due to excess calories  Alcohol  "abuse  Malignant neoplasm of breast  CKD, stage III, moderate  Osteoarthritis of knee, spine  Diarrhea  Diastolic dysfunction  GERD  RENATO  Hyperlipidemia   Insomnia  IBS  Kyphoscoliosis  Cluster C personality disorder  Seborrheic eczema  Bunion  COPD  Fecal incontinence     Past Surgical History:  Back surgery, spinal fusion  Colonoscopy  Lumpectomy breast  Orthopedic surgery, knee, left    Family History:   Cancer- Mother (breast, other)  Diabetes- Mother  Anxiety disorder- Mother  Asthma- Mother  Hyperlipidemia- Mother  Alcohol/drug- Father  Mental Illness- Father  Substance abuse- Father  Obesity- Father  Cerebrovascular disease- Maternal Grandmother       Social History:   The patient was alone  Smoking Status: Former Smoker, 0.50 PPD for 5 years, cigarettes, Start: 5/8/1956 and Quit: 9/26/1980   Smokeless Tobacco: former user, Quit: 5/6/1980   Alcohol Use: not currently, sober for  2 years      Physical Exam:   /80 (BP Location: Right arm, Patient Position: Sitting, Cuff Size: Adult Large)   Pulse 78   Ht 1.626 m (5' 4.02\")   LMP  (LMP Unknown)   SpO2 92%   Breastfeeding? No   BMI 34.12 kg/m     Constitutional: Alert, oriented, pleasant, no acute distress  Head: Normocephalic, atraumatic  Eyes: Extra-ocular movements intact, pupils equally round and reactive bilaterally, no scleral icterus  Cardiovascular: Regular rate and rhythm, no murmurs, rubs or gallops, peripheral pulses full/symmetric  Respiratory: Good air movement bilaterally, lungs clear, no wheezes/rales/rhonchi  Musculoskeletal: 2-3+ edema in BLE, tender to the touch over bilateral anterior tibia, no calf tenderness, normal muscle tone, sitting in wheelchair  Neurologic: Alert and oriented, cranial nerves grossly intact, sensation to light touch intact  Skin: No rashes/lesions  Psychiatric: normal mentation, affect and mood    Assessment and Plan:  Lymphedema of both lower extremities  Suggest Ca try wrapping her legs or wear " compression stockings to reduce her swelling. Suspect that the edema is contributing to her pain. She will try waist high compression stockings. She is convinced that this is nerve pain and we discussed testing to determine if there is a nerve problem; she declines EMG or further work-up at this time. Given that the episodes are infrequent (1-2 times per month) and lasting only a few seconds at a time, would not recommend starting medication at this point, but if worsening or increasing in frequency, or any weakness develops, would recommend further evaluation.  - COMPRESSION STOCKINGS  Dispense: 2 each; Refill: 1  - order for DME  Dispense: 1 each; Refill: 1    Follow-up: Return as needed or if symptoms worsen         Scribe Disclosure:  I, Babar Keith, am serving as a scribe to document services personally performed by RUPAL Mas CNP at this visit, based upon the provider's statements to me. All documentation has been reviewed by the aforementioned provider prior to being entered into the official medical record.     Portions of this medical record were completed by a scribe. UPON MY REVIEW AND AUTHENTICATION BY ELECTRONIC SIGNATURE, this confirms (a) I performed the applicable clinical services, and (b) the record is accurate.     RUPAL Mas CNP

## 2019-07-31 NOTE — PATIENT INSTRUCTIONS
[facility] Medication Refill Request Information:  * Please contact your pharmacy regarding ANY request for medication refills.  ** [facility] Prescription Fax = [phone]  * Please allow 3 business days for routine medication refills.  * Please allow 5 business days for controlled substance medication refills.     [facility] Test Result notification information:  *You will be notified with in 7-10 days of your appointment day regarding the results of your test.  If you are on MyChart you will be notified as soon as the provider has reviewed the results and signed off on them.    [facility]: [phone]

## 2019-07-31 NOTE — NURSING NOTE
Chief Complaint   Patient presents with     Musculoskeletal Problem     internmittent leg pain in right leg- x2 months        Rachel Kate, EMT at 12:29 PM on 7/31/2019.

## 2019-08-19 DIAGNOSIS — E78.5 HYPERLIPIDEMIA, UNSPECIFIED HYPERLIPIDEMIA TYPE: ICD-10-CM

## 2019-08-21 RX ORDER — SIMVASTATIN 20 MG
TABLET ORAL
Qty: 30 TABLET | Refills: 0 | Status: SHIPPED | OUTPATIENT
Start: 2019-08-21 | End: 2019-09-26

## 2019-08-21 NOTE — TELEPHONE ENCOUNTER
simvastatin (ZOCOR) 20 MG tablet      Last Written Prescription Date:  4/8/19  Last Fill Quantity: 90,   # refills: 0  Last Office Visit : 7/31/19  Future Office visit: 9/11/19    30 day pended.     Patient instructed to keep scheduled appointment on 9/11/19.     Routing because:  LDL

## 2019-09-05 ENCOUNTER — TRANSFERRED RECORDS (OUTPATIENT)
Dept: HEALTH INFORMATION MANAGEMENT | Facility: CLINIC | Age: 74
End: 2019-09-05

## 2019-09-11 ENCOUNTER — OFFICE VISIT (OUTPATIENT)
Dept: INTERNAL MEDICINE | Facility: CLINIC | Age: 74
End: 2019-09-11
Payer: COMMERCIAL

## 2019-09-11 VITALS
HEART RATE: 79 BPM | BODY MASS INDEX: 34.14 KG/M2 | RESPIRATION RATE: 26 BRPM | DIASTOLIC BLOOD PRESSURE: 91 MMHG | WEIGHT: 199 LBS | OXYGEN SATURATION: 91 % | SYSTOLIC BLOOD PRESSURE: 151 MMHG | TEMPERATURE: 98.1 F

## 2019-09-11 DIAGNOSIS — R06.02 SOB (SHORTNESS OF BREATH): Primary | ICD-10-CM

## 2019-09-11 RX ORDER — PREDNISONE 20 MG/1
TABLET ORAL
COMMUNITY
End: 2020-01-21

## 2019-09-11 ASSESSMENT — PAIN SCALES - GENERAL: PAINLEVEL: NO PAIN (0)

## 2019-09-11 NOTE — PROGRESS NOTES
"Chief Complaint     Chief Complaint   Patient presents with     Establish Care     establish care for a new PCP     COPD     flare up on COPD follow up.  Patient would like doctors permission to go for Pulmonary Rehab        History of Present Illness   Ca Yan is a 73 year old female presents for follow-up of COPD. States she was seen in the ED 9/6/19 for a COPD exacerbation, and was prescribed steroids and antibiotics. She finished these and feels her breathing is improved today, but notes the prednisone prevented her from sleeping. Today she states she \"doesn't feel good, I can't explain it\" and has felt this way since she was in the ED. Unable to qualify symptoms further. She denies current shortness of breath or chest pain. She does endorse dyspnea on exertion. No fevers, chills. She does use supplemental oxygen at home. She would like to receive a referral for pulmonary rehab.     She does note that she doesn't like where she currently lives, and tends to feel worse when she spends a lot of time at home. Will be going to day program later today and anticipates feeling better when she's there. She feels lonely often, and has a daughter who lives in [place], but doesn't have a good relationship with her and doesn't see her often. Recently got a new psychologist and will be seeing her tomorrow. Sees a psychiatrist through [facility] who she's seen for several years. Currently takes Seroquel primarily as a sleep aid, no other medications for her mood. States she wants to take Klonipin, but her psychiatrist won't prescribe this for her.     She is also concerned that her BP is not well-controlled. She did run out of her amlodipine a few weeks ago. Takes losartan and hydrochlorothiazide daily, no recent missed doses.     Saw Dr. Sheriff in the weight management clinic a few months ago but doesn't recall this appointment.     Has longstanding bilateral lymphedema and denies any recent changes to this or " pain. Doesn't use compression stockings because they are uncomfortable.     Medications and allergies were reviewed by me today.     Social History   History   Smoking Status     Former Smoker     Packs/day: 0.50     Years: 5.00     Types: Cigarettes     Start date: 5/8/1956     Quit date: 9/26/1980   Smokeless Tobacco     Former User     Quit date: 5/6/1980      PHQ-2 ( 1999 [employer]) 10/10/2016 9/26/2014   Q1: Little interest or pleasure in doing things 1 0   Q2: Feeling down, depressed or hopeless 0 1   PHQ-2 Score 1 1     PHQ-9 SCORE 10/10/2016 9/26/2018 3/28/2019   PHQ-9 Total Score MyChart - - 1 (Minimal depression)   PHQ-9 Total Score 2 1 -   Some encounter information is confidential and restricted. Go to Review Flowsheets activity to see all data.       Past Medical History   Patient Active Problem List   Diagnosis     Non morbid obesity due to excess calories     Alcohol abuse     Malignant neoplasm of breast (H)     Chronic kidney disease, stage III (moderate) (H)     Osteoarthritis of knee     Major depressive disorder with single episode     Diarrhea     Diastolic dysfunction     Osteoarthritis of spine     Gastroesophageal reflux disease     Generalized anxiety disorder     Hypertension     Hyperlipidemia     Insomnia     Irritable bowel syndrome     Kyphoscoliosis     Cluster C personality disorder (H)     Seborrheic eczema     Anemia     Anxiety state     Asthma     Back pain     Bunion     Chronic obstructive pulmonary disease, unspecified COPD type (H)     Low bone density     Fecal incontinence     Left-sided low back pain without sciatica     Major depression, recurrent (H)       Review of Systems   5 point ROS completed and negative except noted above, including Gen, CV, Resp, GI, MS    Physical Exam   BP (!) 151/91 (BP Location: Right arm, Patient Position: Sitting, Cuff Size: Adult Small)   Pulse 79   Temp 98.1  F (36.7  C) (Oral)   Resp 26   Wt 90.3 kg (199 lb)   LMP  (LMP Unknown)   SpO2  91%   Breastfeeding? No   BMI 34.14 kg/m    Gen: no distress, appears somewhat uncomfortable   Eyes: anicteric, EOMI  Cardiovascular: regular rate and rhythm, normal S1 and S2, no murmurs appreciated  Respiratory: clear to auscultation, no wheezes or crackles, normal breath sounds   Skin: no concerning lesions, no jaundice   Psychological: appears anxious    Assessment & Plan   There are no diagnoses linked to this encounter.    1. RTHC - States she's had 2-3 colonoscopies in the past, unsure when last one was, and unclear from review of chart. A1c 5.8 and  7/2019--she does take a statin. Creatinine 0.8 on 9/6/2019. Last mammogram 3/2019.   2. COPD - Uses Trelegy and albuterol, supplemental O2. Saw pulmonology in July 2019. Assured patient that cardiopulmonary workup in the ED on 9/6 was thorough, and encouraged keeping her follow-up with [facility] pulmonology on 9/27/19. Will see her back after this.   3. Mental health - Significant MH and substance abuse history, follows with a psychiatrist and establishing care with a new psychologist. Discussed that anxiety can exacerbate her breathing, and in turn breathing issues can worsen anxiety--suspect this plays a significant role for her.   4. HTN - On review of flowsheet data, BP fluctuates significantly. Unclear medication adherence, would benefit from further discussion, possibly simplifying her medication regimen. Will need to discuss at follow-up.     RTC: Return in about 2 weeks (around 9/25/2019).    Patient seen and discussed with Dr. Saunders, supervising physician.   Alison Herrera, MS4      Staff note; I personally reviewed Ms. Yan's past medical history and outside records. I interviewed her today. I conducted a physical examination. I agree with the above assessment and plan.    MD Shannan    Total time spent 25 minutes.  More than 50% of the time spent with Ms. Yan on counseling / coordinating her care

## 2019-09-20 ENCOUNTER — NURSE TRIAGE (OUTPATIENT)
Dept: NURSING | Facility: CLINIC | Age: 74
End: 2019-09-20

## 2019-09-20 NOTE — TELEPHONE ENCOUNTER
"She has 10/11/2019 follow up with leonardo Saunders      [facility]: Nurse Triage Note  SITUATION/BACKGROUND                                                      Ca Yan is a 73 year old female who calls with concern regarding her blood pressure, which today was 150/100.  She does not do home monitor, this was at PT office.   9/11/19 7/31/19 5/28/19 5/10/19 4/3/19   /91 118/80 134/90 124/84 150/84        She takes Losartan and hydrochlorothiazide in  the evening before bed or she forgets to take it.  Amlodipine is on her medication list but she states she does not take this medication.( per 9/11/19 \"She did run out of her amlodipine a few weeks ago.\")     She states she did take hydrochlorothiazide and Losartan  last night.  Recent ED: 9/6/19 for a COPD exacerbation  Recent visit with Dr Saunders 9/11/2019:   HTN - On review of flowsheet data, BP fluctuates significantly. Unclear medication adherence, would benefit from further discussion, possibly simplifying her medication regimen. Will need to discuss at follow-up( 10/11/2019)     She has headache posteriorly which is mild, like a pulled muscle and has had that for months.   She has COPD without increase in SOB, no chest pain or uneven strength, she sounds mildly anxious but no slurred speech or confusion.  She wonders if she should have another BP medication.       Allergies:   Allergies   Allergen Reactions     Azithromycin      Other reaction(s): Itching     Codeine Nausea and Vomiting     Hydrocodone      Vomiting     Metronidazole Nausea     Percocet [Oxycodone-Acetaminophen]      Vomiting     Pollen Extract      Other reaction(s): Other, see comments  Pt states that she has sneezing and runny nose.      Seasonal Allergies Other (See Comments)     Pt states that she has sneezing and runny nose.      Vicodin [Hydrocodone-Acetaminophen]      Vomiting     Zolpidem Other (See Comments)     Amnestic behavior     Oxycodone " Itching and Rash       ASSESSMENT       Reporting /100 at PT office. 9/11/19 here 151/91.No other symptoms.  Confirm medication regimen with patient and physician.( amlodipine or has this been discontinued- patient is not taking but unclear if she ought to be, see 9/11/ note)  She may need sooner follow up.than 10/11/2019  Per Dr Saunders's note her BP was fluctuant and would review further at next appt, no med changes.  If FAST sx, headache, CP, dizziness, weakness, severe headache or blurred vision go to ED        If further questions/concerns or if symptoms do not improve, worsen or new symptoms develop, call your PCP or [phone] to talk with the Resident on call, as soon as possible.    Guideline used: pp   Telephone Triage Protocols for Nurses, Fifth Edition, Elisabeth Jack, STEVEN, RN

## 2019-09-24 ENCOUNTER — TELEPHONE (OUTPATIENT)
Dept: INTERNAL MEDICINE | Facility: CLINIC | Age: 74
End: 2019-09-24

## 2019-09-24 NOTE — TELEPHONE ENCOUNTER
[facility] Call Center    Phone Message    May a detailed message be left on voicemail: yes    Reason for Call: Symptoms or Concerns       Current symptom or concern: Muscle cramping in both legs, calf muscles.      Symptoms have been present for: 2-3 hour(s)    Has patient previously been seen for this? No    By : NA    Date: NA    Are there any new or worsening symptoms? No      Action Taken: Message routed to:  [facility]

## 2019-09-24 NOTE — TELEPHONE ENCOUNTER
"I called this patient and discussed her concerns. It sounds that her muscle cramping started about 5 years ago, but it started out \"small\". Now the patient reports that it has progressively worsened and she is having \"horrible\" muscle cramping all over her body, worse in her legs. I offered her an appointment in the NP clinic, which she was agreeable to. I scheduled the patient for Thursday in the NP clinic due to transportation issues on behalf of the patient. The patient was comfortable with this appointment time, and knew to go to urgent care if she is unable to wait until her appointment time due to discomfort. The patient had no further concerns or questions.   Rachel Kate LPN, EMT at 10:21 AM on 9/24/2019.   "

## 2019-09-25 ENCOUNTER — OFFICE VISIT (OUTPATIENT)
Dept: FAMILY MEDICINE | Facility: CLINIC | Age: 74
End: 2019-09-25
Payer: COMMERCIAL

## 2019-09-25 ENCOUNTER — TELEPHONE (OUTPATIENT)
Dept: FAMILY MEDICINE | Facility: CLINIC | Age: 74
End: 2019-09-25

## 2019-09-25 VITALS — SYSTOLIC BLOOD PRESSURE: 117 MMHG | HEART RATE: 93 BPM | OXYGEN SATURATION: 93 % | DIASTOLIC BLOOD PRESSURE: 77 MMHG

## 2019-09-25 DIAGNOSIS — R25.2 MUSCLE CRAMPING: ICD-10-CM

## 2019-09-25 DIAGNOSIS — M79.604 BILATERAL LEG PAIN: ICD-10-CM

## 2019-09-25 DIAGNOSIS — M79.605 BILATERAL LEG PAIN: ICD-10-CM

## 2019-09-25 DIAGNOSIS — E55.9 VITAMIN D DEFICIENCY: ICD-10-CM

## 2019-09-25 DIAGNOSIS — R25.2 MUSCLE CRAMPING: Primary | ICD-10-CM

## 2019-09-25 LAB
ANION GAP SERPL CALCULATED.3IONS-SCNC: 3 MMOL/L (ref 3–14)
BUN SERPL-MCNC: 21 MG/DL (ref 7–30)
CALCIUM SERPL-MCNC: 9.2 MG/DL (ref 8.5–10.1)
CHLORIDE SERPL-SCNC: 102 MMOL/L (ref 94–109)
CO2 SERPL-SCNC: 32 MMOL/L (ref 20–32)
CREAT SERPL-MCNC: 0.92 MG/DL (ref 0.52–1.04)
CRP SERPL-MCNC: 6.4 MG/L (ref 0–8)
DEPRECATED CALCIDIOL+CALCIFEROL SERPL-MC: 16 UG/L (ref 20–75)
ERYTHROCYTE [SEDIMENTATION RATE] IN BLOOD BY WESTERGREN METHOD: 18 MM/H (ref 0–30)
GFR SERPL CREATININE-BSD FRML MDRD: 61 ML/MIN/{1.73_M2}
GLUCOSE SERPL-MCNC: 102 MG/DL (ref 70–99)
MAGNESIUM SERPL-MCNC: 2.5 MG/DL (ref 1.6–2.3)
POTASSIUM SERPL-SCNC: 4 MMOL/L (ref 3.4–5.3)
SODIUM SERPL-SCNC: 137 MMOL/L (ref 133–144)
TSH SERPL DL<=0.005 MIU/L-ACNC: 2.99 MU/L (ref 0.4–4)

## 2019-09-25 PROCEDURE — 82306 VITAMIN D 25 HYDROXY: CPT | Performed by: NURSE PRACTITIONER

## 2019-09-25 ASSESSMENT — ENCOUNTER SYMPTOMS
CHILLS: 0
MUSCLE CRAMPS: 1
MYALGIAS: 1
FATIGUE: 1
FEVER: 0

## 2019-09-25 ASSESSMENT — PAIN SCALES - GENERAL: PAINLEVEL: NO PAIN (0)

## 2019-09-25 NOTE — TELEPHONE ENCOUNTER
Magnesium remains low. This could be the cause of the cramping. Start taking Magnesium Glycinate 400 mg daily prior to coming back to clinic. Pt will get medication OTC.     Lani Blanc MA 1:55 PM  9/25/2019

## 2019-09-25 NOTE — PROGRESS NOTES
"       HPI       Ca Yan is a 73 year old woman who presents with generalized muscle cramping. She also has bilateral lower leg pain. She has a history of hypertension and this is managed well at this time. She has a history of arthritis. She is a primary patient of Dr. Saunders. She was recently hospitalized for a COPD exacerbation. She feels better in regards to her lungs today. She is not sure what can be causing her cramping, \"its scaring me.\" She has been seen for this in the past and reports that no one knows why or what to do to help her. She also complains of being very lonely. She does not have any family in the area. She also reports that she does not have any friends. She reports that she is sitting at home most of the time and this is not good for her.     Chief Complaint   Patient presents with     Musculoskeletal Problem     Pt is having muscle cramps on whole body.      Pain     Pain on both legs and stiffness.     Problem, Medication and Allergy Lists were reviewed and updated if needed.    Patient is an established patient of this clinic.         Review of Systems:   Review of Systems     Constitutional:  Positive for fatigue. Negative for fever and chills.   Musculoskeletal:  Positive for myalgias and muscle cramps.               Physical Exam:     Vitals:    09/25/19 1037   BP: 117/77   Pulse: 93   SpO2: 93%     There is no height or weight on file to calculate BMI.  Vitals were reviewed and were normal     Physical Exam  Constitutional:       Appearance: Normal appearance.   HENT:      Head: Normocephalic.   Cardiovascular:      Rate and Rhythm: Normal rate and regular rhythm.      Pulses: Normal pulses.      Heart sounds: Normal heart sounds.   Musculoskeletal:        Legs:       Comments: Bilateral lower legs are swollen and tender to touch.    Skin:     General: Skin is warm and dry.   Neurological:      General: No focal deficit present.      Mental Status: She is alert and oriented " to person, place, and time.   Psychiatric:         Mood and Affect: Mood normal.         Behavior: Behavior normal.         Results:       Assessment and Plan     1. Muscle cramping    - Basic metabolic panel; Future  - Magnesium; Future  - Erythrocyte sedimentation rate; Future  - CRP inflammation; Future  - TSH; Future    2. Bilateral leg pain      3. Vitamin D deficiency    - Vitamin D Deficiency; Future      There are no discontinued medications.  First, have labs completed today. Next, drink more water and increase activity. Next, talk with  about activities in the area that may be fun and that you may be able to attend. Finally, please return in 2 weeks for follow up on labs and status.   Options for treatment and follow-up care were reviewed with the patient. Ca Yan  engaged in the decision making process and verbalized understanding of the options discussed and agreed with the final plan.  RUPAL Cardona, CNP

## 2019-09-25 NOTE — PATIENT INSTRUCTIONS

## 2019-09-25 NOTE — NURSING NOTE
73 year old  Chief Complaint   Patient presents with     Musculoskeletal Problem     Pt is having muscle cramps on whole body.      Pain     Pain on both legs and stiffness.       Blood pressure 117/77, pulse 93, SpO2 93 %, not currently breastfeeding. There is no height or weight on file to calculate BMI.  BP completed using cuff size:      Lani Blanc MA  September 25, 2019 10:43 AM

## 2019-09-26 ENCOUNTER — PATIENT OUTREACH (OUTPATIENT)
Dept: CARE COORDINATION | Facility: CLINIC | Age: 74
End: 2019-09-26

## 2019-09-26 DIAGNOSIS — E78.5 HYPERLIPIDEMIA, UNSPECIFIED HYPERLIPIDEMIA TYPE: ICD-10-CM

## 2019-09-26 NOTE — PROGRESS NOTES
Social Work Telephone Message Note  [facility]    Patient Name:  Ca Yan  /Age:  1945 (73 year old)    Referral Source: EMT Ishmael Salas in NP Clinic (Coverage for  Dalia Gresham)   Reason for Referral: Community resources      contacted patient via telephone on 19. Left message on patient's voicemail with  call back info. Will await patient's return phone call and provide assistance at that time.    EPI Gordon, [facility]  Clinical   [facility]   Ph. [phone]  [email or web address]     NO LETTER

## 2019-09-30 RX ORDER — SIMVASTATIN 20 MG
20 TABLET ORAL DAILY
Qty: 90 TABLET | Refills: 0 | Status: SHIPPED | OUTPATIENT
Start: 2019-09-30 | End: 2020-01-27 | Stop reason: ALTCHOICE

## 2019-09-30 NOTE — TELEPHONE ENCOUNTER
Outside labs scanned into EMR  Exam Date Exam Time Accession # Results    7/9/19 11:21 AM     Contains abnormal data Lipid Panel - LDLD If Trig High   Order: [id number]   (suggestion)  Information displayed in this report will not trend or trigger automated decision support.    Ref Range & Units Value   Cholesterol 0 - 199 mg/dL 232High     Triglyceride <=149 mg/dL 66    HDL Cholesterol >=40 mg/dL 78    LDL, Calculated <130 mg/dL 141High     Non HDL Chol, Calculated <=159 mg/dL 154    Cholesterol/HDL Ratio   3.0    Hours Fasting   0    [place] [id number] LABORATORY

## 2019-09-30 NOTE — TELEPHONE ENCOUNTER
Last Clinic Visit: 9/11/2019 [facility]  Outside LDL from 7/9/19 scanned into EMR and  mentioned in Dr. Saunders's 9/11/19 clinic note.

## 2019-10-01 NOTE — PROGRESS NOTES
"Social Work Intervention  [facility]    Data/Intervention:    Patient Name:  Ca Yan  /Age:  1945 (73 year old)    Visit Type: telephone  Referral Source:  Clinic (Coverage for SHARRON Dalia Gresham)   Reason for Referral:  Social isolation     Collaborated With:    - Patient   - Elderly Waiver CM (Tanesha Adan, Ph. [phone])     Patient Concerns/Issues:   Received referral for pt requesting \"social activities\" as she feels lonely/isolated. Called pt to inquire further; introduced self and role of SW, reason for call. Pt stated that she is in search of more social activities, as she has lost her friends and family, doesn't drive, feels alone. Inquired about current activities/support. Pt reported that she attends AA, as well as a [employer] group. She also goes to [place] for adult day services and has an Elderly Waiver. Pt has a therapist but can't get in to see her that often (next appt is not until November, with Ceferino Nicholson). Pt has previously been referred to [facility], but she was not interested in returning here. Pt takes [employer] to get places and stated she's willing to go anywhere in town for more social activities, noted that the weeknds are the hardest. Pt gave  permission to contact her Elderly Waiver  (Tanesha Adan) to discuss further.    After playing phone tag with DMITRY Almendarez, was finally able to discuss pt on 10/3/19. Tanesha confirmed that pt goes to adult day services 3 days a week and in addition to what pt discussed, pt has home making services and her waiver pays for rides to [employer] and [employer]. I had suggested a senior  through [employer] and Tanesha reported that she had already made a referral for this and that it's pending at this time. Discussed ILS/ICLS which pt has brought up in the past, but pt didn't feel she needed assistance with the services an ILS/ICLS worker could do (more paperwork-focused, " help with bills, etc.). Tanesha also reported that pt has had a lot of issues with the homemaker she has and that this is an ongoing source of frustration. Tanesha said she would follow up with pt to discuss services again. Discussed clinic SW role and noted availability to collaborate on barriers to care or other issues related to pt's medical/health needs.     Intervention/Education/Resources Provided:  - Brief needs/resources assessment  - Collaboration with pt's Elderly Waiver CM    Assessment/Plan:  Pt was pleasant and receptive to SW. Per chart review and discussion with CM, pt is well-connected to services and activities at this time and it has been difficult to find additional options at this time. EW CM to continue to main point of contact for pt; clinic SW available to collaborate as needed.     Provided patient/family with contact information and availability.    EPI Gordon, [facility]  Clinical   [facility]   Ph. [phone]  [email or web address]     NO LETTER

## 2019-10-11 ENCOUNTER — OFFICE VISIT (OUTPATIENT)
Dept: INTERNAL MEDICINE | Facility: CLINIC | Age: 74
End: 2019-10-11
Payer: COMMERCIAL

## 2019-10-11 VITALS
OXYGEN SATURATION: 90 % | HEART RATE: 73 BPM | DIASTOLIC BLOOD PRESSURE: 82 MMHG | BODY MASS INDEX: 34.31 KG/M2 | WEIGHT: 200 LBS | SYSTOLIC BLOOD PRESSURE: 118 MMHG

## 2019-10-11 DIAGNOSIS — J44.9 CHRONIC OBSTRUCTIVE PULMONARY DISEASE, UNSPECIFIED COPD TYPE (H): ICD-10-CM

## 2019-10-11 DIAGNOSIS — J44.9 COPD (CHRONIC OBSTRUCTIVE PULMONARY DISEASE) (H): Primary | ICD-10-CM

## 2019-10-11 DIAGNOSIS — R42 DIZZINESS: Primary | ICD-10-CM

## 2019-10-11 DIAGNOSIS — R06.00 DYSPNEA, UNSPECIFIED TYPE: ICD-10-CM

## 2019-10-11 DIAGNOSIS — R42 DIZZINESS: ICD-10-CM

## 2019-10-11 LAB
ALBUMIN SERPL-MCNC: 3.8 G/DL (ref 3.4–5)
ALP SERPL-CCNC: 113 U/L (ref 40–150)
ALT SERPL W P-5'-P-CCNC: 17 U/L (ref 0–50)
ANION GAP SERPL CALCULATED.3IONS-SCNC: 3 MMOL/L (ref 3–14)
AST SERPL W P-5'-P-CCNC: 21 U/L (ref 0–45)
BASOPHILS # BLD AUTO: 0.1 10E9/L (ref 0–0.2)
BASOPHILS NFR BLD AUTO: 1.3 %
BILIRUB SERPL-MCNC: 0.4 MG/DL (ref 0.2–1.3)
BUN SERPL-MCNC: 21 MG/DL (ref 7–30)
CALCIUM SERPL-MCNC: 9.1 MG/DL (ref 8.5–10.1)
CHLORIDE SERPL-SCNC: 101 MMOL/L (ref 94–109)
CO2 SERPL-SCNC: 33 MMOL/L (ref 20–32)
CREAT SERPL-MCNC: 1 MG/DL (ref 0.52–1.04)
DIFFERENTIAL METHOD BLD: ABNORMAL
EOSINOPHIL # BLD AUTO: 0.1 10E9/L (ref 0–0.7)
EOSINOPHIL NFR BLD AUTO: 1.4 %
ERYTHROCYTE [DISTWIDTH] IN BLOOD BY AUTOMATED COUNT: 14.6 % (ref 10–15)
GFR SERPL CREATININE-BSD FRML MDRD: 55 ML/MIN/{1.73_M2}
GLUCOSE SERPL-MCNC: 95 MG/DL (ref 70–99)
HCT VFR BLD AUTO: 45.7 % (ref 35–47)
HGB BLD-MCNC: 14.3 G/DL (ref 11.7–15.7)
IMM GRANULOCYTES # BLD: 0 10E9/L (ref 0–0.4)
IMM GRANULOCYTES NFR BLD: 0.2 %
LYMPHOCYTES # BLD AUTO: 1.5 10E9/L (ref 0.8–5.3)
LYMPHOCYTES NFR BLD AUTO: 23.4 %
MCH RBC QN AUTO: 30.8 PG (ref 26.5–33)
MCHC RBC AUTO-ENTMCNC: 31.3 G/DL (ref 31.5–36.5)
MCV RBC AUTO: 99 FL (ref 78–100)
MONOCYTES # BLD AUTO: 0.5 10E9/L (ref 0–1.3)
MONOCYTES NFR BLD AUTO: 8.6 %
NEUTROPHILS # BLD AUTO: 4.1 10E9/L (ref 1.6–8.3)
NEUTROPHILS NFR BLD AUTO: 65.1 %
NRBC # BLD AUTO: 0 10*3/UL
NRBC BLD AUTO-RTO: 0 /100
PLATELET # BLD AUTO: 230 10E9/L (ref 150–450)
POTASSIUM SERPL-SCNC: 3.9 MMOL/L (ref 3.4–5.3)
PROT SERPL-MCNC: 8.3 G/DL (ref 6.8–8.8)
RBC # BLD AUTO: 4.64 10E12/L (ref 3.8–5.2)
SODIUM SERPL-SCNC: 137 MMOL/L (ref 133–144)
WBC # BLD AUTO: 6.3 10E9/L (ref 4–11)

## 2019-10-11 ASSESSMENT — PAIN SCALES - GENERAL: PAINLEVEL: NO PAIN (0)

## 2019-10-11 NOTE — NURSING NOTE
Chief Complaint   Patient presents with     Recheck Medication     pt here for follow up and looking for a pulmonary doctor       Ousmane Coelho CMA, EMT at 1:28 PM on 10/11/2019.

## 2019-10-11 NOTE — PATIENT INSTRUCTIONS
[facility] [place] Medication Refill Request Information:  * Please contact your pharmacy regarding ANY request for medication refills.  ** [facility] Prescription Fax = [phone]  * Please allow 3 business days for routine medication refills.  * Please allow 5 business days for controlled substance medication refills.     [facility] [place] Test Result notification information:  *You will be notified with in 7-10 days of your appointment day regarding the results of your test.  If you are on MyChart you will be notified as soon as the provider has reviewed the results and signed off on them.    [facility] [place]: [phone]

## 2019-10-11 NOTE — PROGRESS NOTES
HPI:  73 year old female patient here for 1 month follow-up. She reports dizziness with activity. This has been occurring for at least the past few weeks. She usually uses supplemental home oxygen with a concentrator. She recently received portable tanks but has been frustrated with how difficult it has been to learn how to use them. Currently receives oxygen supplies through [employer]. Representatives from the oxygen company have come to her home to assist her with education but have been unsuccessful in helping to set up these tanks. Dizziness is relieved with rest. She denies syncope or chest pain with these episodes. She does not check her blood pressure at home but believes it has been high. Blood pressure checked in clinic today is stable. She is able to walk short distances around her home and denies dizziness when using home oxygen. Uses a pulse oximeter on a regular basis with reported oxygen saturation in the low 90s at rest and mid 80s with activity.     She has bilateral lower extremity lymphedema which is stable. Muscle cramping has improved since taking magnesium supplements.    Patient is taking hydrochlorothiazide and losartan for hypertension. She has not used amlodipine since she last ran out of medication.    Past Medical History:   Diagnosis Date     Anxiety      Arthritis      Breast cancer (H)      COPD (chronic obstructive pulmonary disease) (H)     6/19/12:  FEV 0.99 l     Depressive disorder      Hypertension      Low back pain with left-sided sciatica      Low bone density 4/13/2017    DEXA April 12, 2017: T score -2.0. Normal Z score. FRAX risk: major osteoporotic fracture 11.9%, hip fracture 2.6%, therefore not high-risk     Lymphedema, chronic lower extremities      Past Surgical History:   Procedure Laterality Date     BACK SURGERY      spinal fusion     COLONOSCOPY       LUMPECTOMY BREAST       ORTHOPEDIC SURGERY      Knee surgery left side        Allergies   Allergen Reactions      Azithromycin      Other reaction(s): Itching     Codeine Nausea and Vomiting     Hydrocodone      Vomiting     Metronidazole Nausea     Percocet [Oxycodone-Acetaminophen]      Vomiting     Pollen Extract      Other reaction(s): Other, see comments  Pt states that she has sneezing and runny nose.      Seasonal Allergies Other (See Comments)     Pt states that she has sneezing and runny nose.      Vicodin [Hydrocodone-Acetaminophen]      Vomiting     Zolpidem Other (See Comments)     Amnestic behavior     Oxycodone Itching and Rash     Current Outpatient Medications   Medication     acetaminophen (TYLENOL) 500 MG tablet     albuterol (PROAIR HFA/PROVENTIL HFA/VENTOLIN HFA) 108 (90 Base) MCG/ACT inhaler     amLODIPine (NORVASC) 2.5 MG tablet     ASPIRIN EC PO     COMPRESSION STOCKINGS     fluticasone (FLONASE) 50 MCG/ACT spray     fluticasone-vilanterol (BREO ELLIPTA) 200-25 MCG/INH inhaler     hydrochlorothiazide (MICROZIDE) 12.5 MG capsule     ibuprofen (ADVIL,MOTRIN) 200 MG tablet     latanoprost (XALATAN) 0.005 % ophthalmic solution     losartan (COZAAR) 50 MG tablet     magnesium 250 MG tablet     Magnesium Salicylate Tetrahyd (DOANS EXTRA STRENGTH) 580 MG TABS     naltrexone (DEPADE/REVIA) 50 MG tablet     naproxen (NAPROSYN) 500 MG tablet     order for DME     order for DME     order for DME     predniSONE (DELTASONE) 20 MG tablet     quetiapine (SEROQUEL) 200 MG tablet     simvastatin (ZOCOR) 20 MG tablet     umeclidinium (INCRUSE ELLIPTA) 62.5 MCG/INH inhaler     Blood Pressure Monitoring (BLOOD PRESSURE CUFF) MISC     order for DME     order for DME     No current facility-administered medications for this visit.      Social history:  Lives alone. Attends [employer] program a few days per week.    ROS:  Constitutional: Frustrated with current symptoms and ability to perform activities of daily living.  HEENT: No changes to vision or hearing. No difficulty swallowing.   Respiratory: Dyspnea with exertion.  Wheezing occasionally with lying flat but no orthopnea. No hemoptysis.   Cardiovascular: No chest pain or palpitations. Lymphedema to bilateral lower extremities.  GI: No diarrhea, constipation, gas or bloating. No nausea or vomiting.  : No urinary hesitancy or frequency.   Neuro: Dizziness with activity. No syncope.   Skin: No mole changes, rashes or pruritis.  Psych: Highly stressed with inability to do laundry and perform activities around the house due to current symptoms.      EKG; SR at 69; no acute findings. No change from 9/24/2018    PE:  HEENT: No scleral icterus. Oropharynx pink and moist.  Respiratory: Lungs clear and diminished bilaterally.   Cardiology: Heart rate and rhythm regular, S1 and S2 heard without murmur. Bilateral lower extremity lymphedema. Gait not observed.   GI: Obese abdomen with even contour.  Musculoskeletal: ROM and strength exam limited by wheelchair.     Plan:  1. Dizzines  EKG today shows NSR with rate of 69bpm  ECHO (last performed 8/5/2016)  CBC with diff   CMP    2. COPD  Patient would like to see a new pulmonologist. Referral placed.  Home health care nurse referral for assistance with home oxygen and blood pressure monitoring.    3. Health maintenance  Flu shot today  Patient declines shingles vaccine at this time  Mammogram 3/13/19    Return in 1 month for follow-up.    Staff note; I personally reviewed Ms. Yan's previous medical history. I interviewed her and conducted physical examination. I reviewed her EKG and laboratory tests from today    BOBBY Saunders MD    Total time spent 25 minutes.  More than 50% of the time spent with Ms. Yan on counseling / coordinating her care

## 2019-10-13 LAB — INTERPRETATION ECG - MUSE: NORMAL

## 2019-10-14 NOTE — TELEPHONE ENCOUNTER
RECORDS RECEIVED FROM: Internal    DATE RECEIVED: 12/30/19   NOTES STATUS DETAILS   OFFICE NOTE from referring provider Internal 10/11/19 Dr. Saunders    OFFICE NOTE from other specialist N/A    DISCHARGE SUMMARY from hospital N/A    DISCHARGE REPORT from the ER N/A    MEDICATION LIST Internal    IMAGING  (NEED IMAGES AND REPORTS)     CT SCAN N/A    CHEST XRAY (CXR) Internal 9/5/19 scheduled   10/11/19 order pending    TESTS     PULMONARY FUNCTION TESTING (PFT) In process/care everywhere 12/30/19 scheduled   4/17/18 requested       Action 10/14/19   Action Taken Records request sent out to [facility] for CXR and PFT from the past 2 years        Action 11/8/19   Action Taken Received fax from  saying images are at [facility], request sent to [facility]      Action 11.20.19 sv   Action Taken Received reports, request sent again for images         Action 12.18.19 sv   Action Taken Images received and in PACS

## 2019-10-16 ENCOUNTER — HOSPITAL ENCOUNTER (OUTPATIENT)
Dept: CARDIAC REHAB | Facility: CLINIC | Age: 74
End: 2019-10-16
Attending: INTERNAL MEDICINE
Payer: COMMERCIAL

## 2019-10-16 DIAGNOSIS — R06.02 SOB (SHORTNESS OF BREATH): ICD-10-CM

## 2019-10-16 PROCEDURE — G0237 THERAPEUTIC PROCD STRG ENDUR: HCPCS

## 2019-10-16 PROCEDURE — G0238 OTH RESP PROC, INDIV: HCPCS

## 2019-10-16 PROCEDURE — 40000244 ZZH STATISTIC VISIT PULM REHAB

## 2019-10-23 ENCOUNTER — HOSPITAL ENCOUNTER (OUTPATIENT)
Dept: CARDIAC REHAB | Facility: CLINIC | Age: 74
End: 2019-10-23
Attending: INTERNAL MEDICINE
Payer: COMMERCIAL

## 2019-10-23 PROCEDURE — 40000244 ZZH STATISTIC VISIT PULM REHAB

## 2019-10-23 PROCEDURE — G0238 OTH RESP PROC, INDIV: HCPCS

## 2019-10-30 ENCOUNTER — HOSPITAL ENCOUNTER (OUTPATIENT)
Dept: CARDIAC REHAB | Facility: CLINIC | Age: 74
End: 2019-10-30
Attending: INTERNAL MEDICINE
Payer: COMMERCIAL

## 2019-10-30 PROCEDURE — G0239 OTH RESP PROC, GROUP: HCPCS

## 2019-10-30 PROCEDURE — 40000244 ZZH STATISTIC VISIT PULM REHAB

## 2019-11-06 ENCOUNTER — HOSPITAL ENCOUNTER (OUTPATIENT)
Dept: CARDIAC REHAB | Facility: CLINIC | Age: 74
End: 2019-11-06
Attending: INTERNAL MEDICINE
Payer: COMMERCIAL

## 2019-11-06 PROCEDURE — 40000244 ZZH STATISTIC VISIT PULM REHAB

## 2019-11-06 PROCEDURE — G0239 OTH RESP PROC, GROUP: HCPCS

## 2019-11-11 DIAGNOSIS — R06.02 SHORTNESS OF BREATH: Primary | ICD-10-CM

## 2019-11-13 ENCOUNTER — HOSPITAL ENCOUNTER (OUTPATIENT)
Dept: CARDIAC REHAB | Facility: CLINIC | Age: 74
End: 2019-11-13
Attending: INTERNAL MEDICINE
Payer: COMMERCIAL

## 2019-11-13 PROCEDURE — 40000244 ZZH STATISTIC VISIT PULM REHAB

## 2019-11-13 PROCEDURE — G0239 OTH RESP PROC, GROUP: HCPCS

## 2019-11-15 ENCOUNTER — ANCILLARY PROCEDURE (OUTPATIENT)
Dept: CARDIOLOGY | Facility: CLINIC | Age: 74
End: 2019-11-15
Attending: INTERNAL MEDICINE
Payer: COMMERCIAL

## 2019-11-15 ENCOUNTER — OFFICE VISIT (OUTPATIENT)
Dept: INTERNAL MEDICINE | Facility: CLINIC | Age: 74
End: 2019-11-15
Payer: COMMERCIAL

## 2019-11-15 VITALS
DIASTOLIC BLOOD PRESSURE: 85 MMHG | WEIGHT: 205 LBS | OXYGEN SATURATION: 94 % | SYSTOLIC BLOOD PRESSURE: 125 MMHG | HEART RATE: 82 BPM | BODY MASS INDEX: 35.17 KG/M2

## 2019-11-15 DIAGNOSIS — R06.00 DYSPNEA, UNSPECIFIED TYPE: ICD-10-CM

## 2019-11-15 DIAGNOSIS — R42 DIZZINESS: ICD-10-CM

## 2019-11-15 DIAGNOSIS — R63.5 WEIGHT GAIN: Primary | ICD-10-CM

## 2019-11-15 RX ORDER — CLONAZEPAM 0.5 MG/1
0.5 TABLET ORAL DAILY
COMMUNITY
Start: 2019-11-12 | End: 2021-04-07

## 2019-11-15 NOTE — NURSING NOTE
Chief Complaint   Patient presents with     Results     pt got echo done earlier today     Kimberly Nissen, EMT at 11:56 AM on 11/15/2019

## 2019-11-15 NOTE — PROGRESS NOTES
HPI:  Ca is a 73 year old female who presents today for follow-up for dizziness and to review echocardiogram results. She states that her dizziness has now resolved. ECHO performed today reveals EF of 60-65% with no valve abnormalities or dysfunction present, however, limited information is available due to a technically difficult study. She reports dyspnea on exertion but states this resolves with rest.     She was recently prescribed clonazepam by her psychiatrist but is hesitant to use this. She has a history of substance abuse and is concerned about potential addiction. Her anxiety is worse in the evening when she is home alone. During the day she occupies her time with appointments, her adult day program and shopping which eases her anxiety.     Patient states a home care consult has not been arranged. She has concerns about her need for use of home oxygen. She is scheduled to establish care with Dr. Taylor with pulmonology on 12/30/2019. She currently has a home oxygen tank which she only uses at night, if at all.     She is also concerned about her weight. She would like to increase her physical activity but feels limited by her COPD and a bunion on her left foot. She does not wish to see podiatry regarding her bunion. She is requesting a referral to weight management.     PMH:  Past Medical History:   Diagnosis Date     Anxiety      Arthritis      Breast cancer (H)      COPD (chronic obstructive pulmonary disease) (H)     6/19/12:  FEV 0.99 l     Depressive disorder      Hypertension      Low back pain with left-sided sciatica      Low bone density 4/13/2017    DEXA April 12, 2017: T score -2.0. Normal Z score. FRAX risk: major osteoporotic fracture 11.9%, hip fracture 2.6%, therefore not high-risk     Lymphedema, chronic lower extremities        PSH:  Past Surgical History:   Procedure Laterality Date     BACK SURGERY      spinal fusion     COLONOSCOPY       LUMPECTOMY BREAST       ORTHOPEDIC SURGERY       Knee surgery left side       Medications:  Current Outpatient Medications   Medication     acetaminophen (TYLENOL) 500 MG tablet     albuterol (PROAIR HFA/PROVENTIL HFA/VENTOLIN HFA) 108 (90 Base) MCG/ACT inhaler     amLODIPine (NORVASC) 2.5 MG tablet     ASPIRIN EC PO     COMPRESSION STOCKINGS     fluticasone (FLONASE) 50 MCG/ACT spray     fluticasone-vilanterol (BREO ELLIPTA) 200-25 MCG/INH inhaler     hydrochlorothiazide (MICROZIDE) 12.5 MG capsule     ibuprofen (ADVIL,MOTRIN) 200 MG tablet     latanoprost (XALATAN) 0.005 % ophthalmic solution     losartan (COZAAR) 50 MG tablet     magnesium 250 MG tablet     Magnesium Salicylate Tetrahyd (DOANS EXTRA STRENGTH) 580 MG TABS     naltrexone (DEPADE/REVIA) 50 MG tablet     naproxen (NAPROSYN) 500 MG tablet     order for DME     order for DME     order for DME     predniSONE (DELTASONE) 20 MG tablet     quetiapine (SEROQUEL) 200 MG tablet     simvastatin (ZOCOR) 20 MG tablet     umeclidinium (INCRUSE ELLIPTA) 62.5 MCG/INH inhaler     Blood Pressure Monitoring (BLOOD PRESSURE CUFF) MISC     clonazePAM (KLONOPIN) 0.5 MG tablet     order for DME     order for DME     No current facility-administered medications for this visit.      SH:  Lives independently. Attends an adult day program. Denies alcohol, tobacco or illicit drug use.    ROS:  Constitutional: No weight loss, fatigue, fever or night sweats.   HEENT: No blurred vision, tearing or redness. No hearing loss or tinnitus. No nasal congestion or epistaxis. No neck stiffness of pain.  Cardiac: No chest pain or palpitations.  Respiratory: Positive for dyspnea on exertion.  GI: No nausea, vomiting, diarrhea, constipation, melena, or hematochezia.  Genitourinary: No hesitancy, urgency, dysuria or hematuria.   Musculoskeletal: No joint pain, stiffness or swelling.  Skin/Integumentary: No rashes, dryness, color change or abnormalities of hair or nails  Neurological: No weakness or numbness of extremities, no headache,  dizziness, no loss of consciousness.  Psych: Positive for anxiety.     OBJECTIVE  /85   Pulse 82   Wt 93 kg (205 lb)   LMP  (LMP Unknown)   SpO2 94%   BMI 35.17 kg/m      Physical Exam:  General: Pleasant female in no acute distress. Alert and cooperative with examination.  HEENT: Head normocephalic without tenderness. Conjunctiva pink, sclera non-icteric. PERRLA. EOM intact. Moist oropharynx without lesions or exudate. Uvula and jennifer muidline. Dentition intact.   Respiratory: Symmetric thoracic expansion. Lungs clear to auscultation without crackles, wheezes or rhonchi.    Cardiac: RRR, S1 and S2 auscultated without murmurs, click or rubs. No JVD. Peripheral pulses palpable and equal bilaterally.   GI: Abdomen soft and nontender to palpation.   Musculoskeletal: Full range of motion bilaterally without limitation.   Skin: Warm and dry without rashes or lesions.     ASSESSMENT/PLAN:  1. Dizziness-ECHO completed 11/15/2019. Study limited. EF 60-65%. No valve dysfunction. Diastolic function indeterminate. EKG performed 10/11/19 sinus rhythm with incomplete BBB. Symptoms of dizziness have resolved. Blood pressure stable. Pulmonary consult on 12/30/19.  2. Anxiety-Patient will consider use of PRN clonazepam. She feels that her anxiety is fairly well-controlled. She sees her psychiatrist every 3 months.  3. Weight management-Referral to weight management.    Follow-up in 2 months.    Nancy Huber RN  Nurse Practitioner Student  [facility]    Staff note; I personally reviewed Ms. Yan's past medical records and interviewed her. I examined her. I agree with the above assessment and plan.    BOBBY Saunders MD    Total time spent 25 minutes.  More than 50% of the time spent with Ms. Yan on counseling / coordinating her care

## 2019-11-16 ASSESSMENT — ASTHMA QUESTIONNAIRES: ACT_TOTALSCORE: 19

## 2019-11-18 ENCOUNTER — TELEPHONE (OUTPATIENT)
Dept: INTERNAL MEDICINE | Facility: CLINIC | Age: 74
End: 2019-11-18

## 2019-11-18 NOTE — TELEPHONE ENCOUNTER
[facility] Call Center    Phone Message    May a detailed message be left on voicemail: yes    Reason for Call: Other: Pt requesting call back. Pt stated she was in to see Dr. Saunders on 11/15 but forgot to ask him for Pt orders for her back.      Action Taken: Message routed to:  [facility] lulu

## 2019-11-18 NOTE — TELEPHONE ENCOUNTER
Bend over, back sore, lower back. Scoliosis and spinal fusion when a kid.   No radiation.   Forgot to mention at the apt. Would like it looked at. Pt advised OK to leave a message at this number . Route to MD Esperanza Julien Paramedic on 11/18/2019 at 9:16 AM

## 2019-11-21 NOTE — TELEPHONE ENCOUNTER
Called and advised she could go to the walk in ortho clinic for an evaluation of her back that hurts when she bends over, history of scoliosis and spinal fusion as a child. Pt will go there Esperanza Julien Paramedic on 11/21/2019 at 9:37 AM

## 2019-11-21 NOTE — TELEPHONE ENCOUNTER
[facility] Call Center    Phone Message    May a detailed message be left on voicemail: yes    Reason for Call: Other: Pt calling again requesting to discuss getting PT orders for her back. She is wanting to discuss different locations she can go to once the orders are placed. Please advise.    Action Taken: Message routed to:  [facility] KALEIGH

## 2019-11-23 ENCOUNTER — OFFICE VISIT (OUTPATIENT)
Dept: ORTHOPEDICS | Facility: CLINIC | Age: 74
End: 2019-11-23
Payer: COMMERCIAL

## 2019-11-23 VITALS — BODY MASS INDEX: 33.8 KG/M2 | WEIGHT: 198 LBS | HEIGHT: 64 IN

## 2019-11-23 DIAGNOSIS — M54.50 ACUTE LEFT-SIDED LOW BACK PAIN WITHOUT SCIATICA: Primary | ICD-10-CM

## 2019-11-23 DIAGNOSIS — M41.115 JUVENILE IDIOPATHIC SCOLIOSIS OF THORACOLUMBAR REGION: ICD-10-CM

## 2019-11-23 RX ORDER — MELOXICAM 15 MG/1
15 TABLET ORAL DAILY
Qty: 15 TABLET | Refills: 0 | Status: SHIPPED | OUTPATIENT
Start: 2019-11-23 | End: 2020-01-15

## 2019-11-23 ASSESSMENT — MIFFLIN-ST. JEOR: SCORE: 1378.12

## 2019-11-23 NOTE — LETTER
RE: Ca Yan  [address]  [facility] [address]     Dear Colleague,    Thank you for referring your patient, Ca Yan, to the [facility] SPORTS AND ORTHOPAEDIC WALK IN CLINIC at [facility]. Please see a copy of my visit note below.          SPORTS & ORTHOPEDIC WALK-IN VISIT 11/23/2019    Primary Care Physician: Dr. Saunders    She has a long history of back pain but her pain has been increasing over the last few days. Low back pain only on the left side. Standing for short periods of time increases her pain. Pain feels better when she bends forward. She stated that the pain seems more muscular and much different than her other previous back problems.     Reason for visit:     What part of your body is injured / painful?  left low back    What caused the injury /pain? No inciting event     How long ago did your injury occur or pain begin? problem is longstanding    What are your most bothersome symptoms? Pain    How would you characterize your symptom?  aching    What makes your symptoms better? Other: Bending forward    What makes your symptoms worse? Standing    Have you been previously seen for this problem? No    Medical History:    Any recent changes to your medical history? No    Any new medication prescribed since last visit? No    Have you had surgery on this body part before? Yes spinal fusion in upper back between shoulder blades     Social History:    Occupation: Retired    Handedness: Right    Exercise: Pulmonary rehab    Review of Systems:    Do you have fever, chills, weight loss? No    Do you have any vision problems? No    Do you have any chest pain or edema? No    Do you have any shortness of breath or wheezing?  Yes, COPD    Do you have stomach problems? No    Do you have any numbness or focal weakness? No    Do you have diabetes? No    Do you have problems with bleeding or clotting? No    Do you have an rashes or other skin lesions?  No         CHIEF COMPLAINT:  Pain of the Lower Back    HISTORY OF PRESENT ILLNESS  Ms. Yan is a pleasant 73 year old year old female history of scoliosis and thoracic spinal fusion who presents to clinic today with acute on chronic low back pain.  Ca explains that pain began over the past few days without inciting event or trauma. She has a long history of back pain but her pain has been increasing over the last few days. Low back pain only on the left side. Standing for short periods of time increases her pain. Pain feels better when she bends forward. She stated that the pain seems more muscular and much different than her other previous back problems.        What part of your body is injured / painful?  left low back    What caused the injury /pain? No inciting event     How long ago did your injury occur or pain begin? problem is longstanding    What are your most bothersome symptoms? Pain    How would you characterize your symptom?  aching    What makes your symptoms better? Other: Bending forward    What makes your symptoms worse? Standing    Have you been previously seen for this problem? No    No back pain treatment over the past five years.   Fusion of upper back in adolescence. Unsure of exact procedure around age 11.      Additional history: as documented    MEDICAL HISTORY  Patient Active Problem List   Diagnosis     Non morbid obesity due to excess calories     Alcohol abuse     Malignant neoplasm of breast (H)     Chronic kidney disease, stage III (moderate) (H)     Osteoarthritis of knee     Major depressive disorder with single episode     Diarrhea     Diastolic dysfunction     Osteoarthritis of spine     Gastroesophageal reflux disease     Generalized anxiety disorder     Hypertension     Hyperlipidemia     Insomnia     Irritable bowel syndrome     Kyphoscoliosis     Cluster C personality disorder (H)     Seborrheic eczema     Anemia     Anxiety state     Asthma     Back pain     Bunion      Chronic obstructive pulmonary disease, unspecified COPD type (H)     Low bone density     Fecal incontinence     Left-sided low back pain without sciatica     Major depression, recurrent (H)       Current Outpatient Medications   Medication Sig Dispense Refill     acetaminophen (TYLENOL) 500 MG tablet Take 500-1,000 mg by mouth every 6 hours as needed for mild pain       albuterol (PROAIR HFA/PROVENTIL HFA/VENTOLIN HFA) 108 (90 Base) MCG/ACT inhaler Inhale 2 puffs into the lungs every 6 hours as needed for shortness of breath / dyspnea or wheezing 3 Inhaler 1     amLODIPine (NORVASC) 2.5 MG tablet Take 1 tablet (2.5 mg) by mouth daily 90 tablet 3     ASPIRIN EC PO Take 81 mg by mouth       clonazePAM (KLONOPIN) 0.5 MG tablet Take 0.5 mg by mouth daily       COMPRESSION STOCKINGS 1 each daily 2 each 1     fluticasone (FLONASE) 50 MCG/ACT spray Spray 1 spray into both nostrils daily 1 Bottle 1     fluticasone-vilanterol (BREO ELLIPTA) 200-25 MCG/INH inhaler Inhale 1 puff into the lungs daily 1 Inhaler 3     hydrochlorothiazide (MICROZIDE) 12.5 MG capsule Take 1 capsule (12.5 mg) by mouth daily 90 capsule 3     ibuprofen (ADVIL,MOTRIN) 200 MG tablet Take 3 tablets (600 mg) by mouth 3 times daily (with meals) 90 tablet 0     latanoprost (XALATAN) 0.005 % ophthalmic solution 1 drop       losartan (COZAAR) 50 MG tablet Take 2 tablets (100 mg) by mouth daily 90 tablet 3     magnesium 250 MG tablet Take 1 tablet by mouth daily       Magnesium Salicylate Tetrahyd (DOANS EXTRA STRENGTH) 580 MG TABS        naltrexone (DEPADE/REVIA) 50 MG tablet Take 1/2 tablet.  Time it one to two hours prior to worst cravings.  Then increase to one full tablet as instructed. 60 tablet 2     naproxen (NAPROSYN) 500 MG tablet Take 1 tablet (500 mg) by mouth 2 times daily (with meals) 30 tablet 1     order for DME Equipment being ordered: Waist-high compression stockings (waist-high), appropriately sized, 20-30 mm Hg 1 each 1     order for DME  Equipment being ordered: Wheelchair Sig: Equipment being ordered: portable walker with seat and compartment under the seat.     Use for going out of the house on errands, where prolonged standing is required. 1 Device 1     order for DME Equipment being ordered: Oxygen  Please provide portable oxygen concentrator (pt would like over the shoulder oxygen device) for portability at 2LPM with activity via nasal canula. 1 Device 1     predniSONE (DELTASONE) 20 MG tablet Take 2 tablets daily for 4 days       quetiapine (SEROQUEL) 200 MG tablet Take 200 mg by mouth At Bedtime.       simvastatin (ZOCOR) 20 MG tablet Take 1 tablet (20 mg) by mouth daily 90 tablet 0     umeclidinium (INCRUSE ELLIPTA) 62.5 MCG/INH inhaler Inhale 1 puff into the lungs daily 7 Inhaler 3     Blood Pressure Monitoring (BLOOD PRESSURE CUFF) MISC Imron blood cuff    Please check your blood pressure 2-3 times per week.  Goal is <140/90 (Patient not taking: Reported on 5/28/2019) 1 each 0     order for DME Equipment being ordered: portable walker with seat and compartment under the seat.    Use for going out of the house on errands, where prolonged standing is required. (Patient not taking: Reported on 5/28/2019) 1 Units 0     order for DME Equipment being ordered: knee high compression stockings, class 1 or 2, night time velcro compression garments, lymphedema bandaging supplies, darco boots to wear during treatment (Patient not taking: Reported on 5/28/2019) 2 each 3       Allergies   Allergen Reactions     Azithromycin      Other reaction(s): Itching     Codeine Nausea and Vomiting     Hydrocodone      Vomiting     Metronidazole Nausea     Percocet [Oxycodone-Acetaminophen]      Vomiting     Pollen Extract      Other reaction(s): Other, see comments  Pt states that she has sneezing and runny nose.      Seasonal Allergies Other (See Comments)     Pt states that she has sneezing and runny nose.      Vicodin [Hydrocodone-Acetaminophen]      Vomiting  "    Zolpidem Other (See Comments)     Amnestic behavior     Oxycodone Itching and Rash       Family History   Problem Relation Age of Onset     Breast Cancer Mother      Diabetes Mother      Anxiety Disorder Mother      Asthma Mother      Other Cancer Mother      Hyperlipidemia Mother      Alcohol/Drug Father      Mental Illness Father      Substance Abuse Father      Obesity Father      Cerebrovascular Disease Maternal Grandmother 67     Past Surgical History:   Procedure Laterality Date     BACK SURGERY      spinal fusion     COLONOSCOPY       LUMPECTOMY BREAST       ORTHOPEDIC SURGERY      Knee surgery left side       Additional medical/Social/Surgical histories reviewed in [facility] and updated as appropriate.     REVIEW OF SYSTEMS (11/23/2019)  A 10-point review of systems was obtained and is negative except for as noted in the HPI.      PHYSICAL EXAM  Ht 1.626 m (5' 4\")   Wt 93 kg (205 lb)   LMP  (LMP Unknown)   BMI 35.19 kg/m       General  - normal appearance, in no obvious distress  CV  - normal peripheral perfusion  Pulm  - normal respiratory pattern, non-labored  Musculoskeletal - lumbar spine  - stance: slow to rise and sit  - inspection: normal bone and joint alignment, Marked convex right thoracolumbar scoliosis.  - palpation: bilateral lumbar paravertebral spasm  - ROM: pain with bilateral rotation, flexion past 30 deg, extension  - strength: lower extremities 5/5 in all planes  - special tests:  (-) straight leg raise bilaterally  (-) slump test  Neuro  - patellar and Achilles DTRs 2+ bilaterally, no sensory or motor deficit, grossly normal coordination, normal muscle tone  Skin  - no ecchymosis, erythema, warmth, or induration, no obvious rash  Psych  - interactive, appropriate, normal mood and affect    IMAGING: Deferred by patient today    ASSESSMENT & PLAN  Ms. Yan is a 73 year old year old female with history of scoliosis, thoracic fusion surgery in adolescence who presents to clinic today with " acute low back pain without radiculopathy.    -Meloxicam daily  -Tizanidine at bedtime  -Heat therapy  -PT referral provided today  -Follow up if persisting in 4 weeks; if no improvement imaging (xr minimum).  -Follow up urgently for red flag symptoms discussed    It was a pleasure seeing Ca today.    Js Grace DO, CAQSM  Primary Care Sports Medicine

## 2019-11-23 NOTE — PROGRESS NOTES
SPORTS & ORTHOPEDIC WALK-IN VISIT 11/23/2019    Primary Care Physician: Dr. Saunders    She has a long history of back pain but her pain has been increasing over the last few days. Low back pain only on the left side. Standing for short periods of time increases her pain. Pain feels better when she bends forward. She stated that the pain seems more muscular and much different than her other previous back problems.     Reason for visit:     What part of your body is injured / painful?  left low back    What caused the injury /pain? No inciting event     How long ago did your injury occur or pain begin? problem is longstanding    What are your most bothersome symptoms? Pain    How would you characterize your symptom?  aching    What makes your symptoms better? Other: Bending forward    What makes your symptoms worse? Standing    Have you been previously seen for this problem? No    Medical History:    Any recent changes to your medical history? No    Any new medication prescribed since last visit? No    Have you had surgery on this body part before? Yes spinal fusion in upper back between shoulder blades     Social History:    Occupation: Retired    Handedness: Right    Exercise: Pulmonary rehab    Review of Systems:    Do you have fever, chills, weight loss? No    Do you have any vision problems? No    Do you have any chest pain or edema? No    Do you have any shortness of breath or wheezing?  Yes, COPD    Do you have stomach problems? No    Do you have any numbness or focal weakness? No    Do you have diabetes? No    Do you have problems with bleeding or clotting? No    Do you have an rashes or other skin lesions? No

## 2019-11-23 NOTE — PROGRESS NOTES
CHIEF COMPLAINT:  Pain of the Lower Back       HISTORY OF PRESENT ILLNESS  Ms. Yan is a pleasant 73 year old year old female history of scoliosis and thoracic spinal fusion who presents to clinic today with acute on chronic low back pain.  Ca explains that pain began over the past few days without inciting event or trauma. She has a long history of back pain but her pain has been increasing over the last few days. Low back pain only on the left side. Standing for short periods of time increases her pain. Pain feels better when she bends forward. She stated that the pain seems more muscular and much different than her other previous back problems.        What part of your body is injured / painful?  left low back    What caused the injury /pain? No inciting event     How long ago did your injury occur or pain begin? problem is longstanding    What are your most bothersome symptoms? Pain    How would you characterize your symptom?  aching    What makes your symptoms better? Other: Bending forward    What makes your symptoms worse? Standing    Have you been previously seen for this problem? No    No back pain treatment over the past five years.   Fusion of upper back in adolescence. Unsure of exact procedure around age 11.      Additional history: as documented    MEDICAL HISTORY  Patient Active Problem List   Diagnosis     Non morbid obesity due to excess calories     Alcohol abuse     Malignant neoplasm of breast (H)     Chronic kidney disease, stage III (moderate) (H)     Osteoarthritis of knee     Major depressive disorder with single episode     Diarrhea     Diastolic dysfunction     Osteoarthritis of spine     Gastroesophageal reflux disease     Generalized anxiety disorder     Hypertension     Hyperlipidemia     Insomnia     Irritable bowel syndrome     Kyphoscoliosis     Cluster C personality disorder (H)     Seborrheic eczema     Anemia     Anxiety state     Asthma     Back pain     Bunion     Chronic  obstructive pulmonary disease, unspecified COPD type (H)     Low bone density     Fecal incontinence     Left-sided low back pain without sciatica     Major depression, recurrent (H)       Current Outpatient Medications   Medication Sig Dispense Refill     acetaminophen (TYLENOL) 500 MG tablet Take 500-1,000 mg by mouth every 6 hours as needed for mild pain       albuterol (PROAIR HFA/PROVENTIL HFA/VENTOLIN HFA) 108 (90 Base) MCG/ACT inhaler Inhale 2 puffs into the lungs every 6 hours as needed for shortness of breath / dyspnea or wheezing 3 Inhaler 1     amLODIPine (NORVASC) 2.5 MG tablet Take 1 tablet (2.5 mg) by mouth daily 90 tablet 3     ASPIRIN EC PO Take 81 mg by mouth       clonazePAM (KLONOPIN) 0.5 MG tablet Take 0.5 mg by mouth daily       COMPRESSION STOCKINGS 1 each daily 2 each 1     fluticasone (FLONASE) 50 MCG/ACT spray Spray 1 spray into both nostrils daily 1 Bottle 1     fluticasone-vilanterol (BREO ELLIPTA) 200-25 MCG/INH inhaler Inhale 1 puff into the lungs daily 1 Inhaler 3     hydrochlorothiazide (MICROZIDE) 12.5 MG capsule Take 1 capsule (12.5 mg) by mouth daily 90 capsule 3     ibuprofen (ADVIL,MOTRIN) 200 MG tablet Take 3 tablets (600 mg) by mouth 3 times daily (with meals) 90 tablet 0     latanoprost (XALATAN) 0.005 % ophthalmic solution 1 drop       losartan (COZAAR) 50 MG tablet Take 2 tablets (100 mg) by mouth daily 90 tablet 3     magnesium 250 MG tablet Take 1 tablet by mouth daily       Magnesium Salicylate Tetrahyd (DOANS EXTRA STRENGTH) 580 MG TABS        naltrexone (DEPADE/REVIA) 50 MG tablet Take 1/2 tablet.  Time it one to two hours prior to worst cravings.  Then increase to one full tablet as instructed. 60 tablet 2     naproxen (NAPROSYN) 500 MG tablet Take 1 tablet (500 mg) by mouth 2 times daily (with meals) 30 tablet 1     order for DME Equipment being ordered: Waist-high compression stockings (waist-high), appropriately sized, 20-30 mm Hg 1 each 1     order for DME Equipment  being ordered: Wheelchair Sig: Equipment being ordered: portable walker with seat and compartment under the seat.     Use for going out of the house on errands, where prolonged standing is required. 1 Device 1     order for DME Equipment being ordered: Oxygen  Please provide portable oxygen concentrator (pt would like over the shoulder oxygen device) for portability at 2LPM with activity via nasal canula. 1 Device 1     predniSONE (DELTASONE) 20 MG tablet Take 2 tablets daily for 4 days       quetiapine (SEROQUEL) 200 MG tablet Take 200 mg by mouth At Bedtime.       simvastatin (ZOCOR) 20 MG tablet Take 1 tablet (20 mg) by mouth daily 90 tablet 0     umeclidinium (INCRUSE ELLIPTA) 62.5 MCG/INH inhaler Inhale 1 puff into the lungs daily 7 Inhaler 3     Blood Pressure Monitoring (BLOOD PRESSURE CUFF) MISC Imron blood cuff    Please check your blood pressure 2-3 times per week.  Goal is <140/90 (Patient not taking: Reported on 5/28/2019) 1 each 0     order for DME Equipment being ordered: portable walker with seat and compartment under the seat.    Use for going out of the house on errands, where prolonged standing is required. (Patient not taking: Reported on 5/28/2019) 1 Units 0     order for DME Equipment being ordered: knee high compression stockings, class 1 or 2, night time velcro compression garments, lymphedema bandaging supplies, darco boots to wear during treatment (Patient not taking: Reported on 5/28/2019) 2 each 3       Allergies   Allergen Reactions     Azithromycin      Other reaction(s): Itching     Codeine Nausea and Vomiting     Hydrocodone      Vomiting     Metronidazole Nausea     Percocet [Oxycodone-Acetaminophen]      Vomiting     Pollen Extract      Other reaction(s): Other, see comments  Pt states that she has sneezing and runny nose.      Seasonal Allergies Other (See Comments)     Pt states that she has sneezing and runny nose.      Vicodin [Hydrocodone-Acetaminophen]      Vomiting      "Zolpidem Other (See Comments)     Amnestic behavior     Oxycodone Itching and Rash       Family History   Problem Relation Age of Onset     Breast Cancer Mother      Diabetes Mother      Anxiety Disorder Mother      Asthma Mother      Other Cancer Mother      Hyperlipidemia Mother      Alcohol/Drug Father      Mental Illness Father      Substance Abuse Father      Obesity Father      Cerebrovascular Disease Maternal Grandmother 67     Past Surgical History:   Procedure Laterality Date     BACK SURGERY      spinal fusion     COLONOSCOPY       LUMPECTOMY BREAST       ORTHOPEDIC SURGERY      Knee surgery left side       Additional medical/Social/Surgical histories reviewed in [facility] and updated as appropriate.     REVIEW OF SYSTEMS (11/23/2019)  A 10-point review of systems was obtained and is negative except for as noted in the HPI.      PHYSICAL EXAM  Ht 1.626 m (5' 4\")   Wt 93 kg (205 lb)   LMP  (LMP Unknown)   BMI 35.19 kg/m      General  - normal appearance, in no obvious distress  CV  - normal peripheral perfusion  Pulm  - normal respiratory pattern, non-labored  Musculoskeletal - lumbar spine  - stance: slow to rise and sit  - inspection: normal bone and joint alignment, Marked convex right thoracolumbar scoliosis.  - palpation: bilateral lumbar paravertebral spasm  - ROM: pain with bilateral rotation, flexion past 30 deg, extension  - strength: lower extremities 5/5 in all planes  - special tests:  (-) straight leg raise bilaterally  (-) slump test  Neuro  - patellar and Achilles DTRs 2+ bilaterally, no sensory or motor deficit, grossly normal coordination, normal muscle tone  Skin  - no ecchymosis, erythema, warmth, or induration, no obvious rash  Psych  - interactive, appropriate, normal mood and affect    IMAGING: Deferred by patient today    ASSESSMENT & PLAN  Ms. Yan is a 73 year old year old female with history of scoliosis, thoracic fusion surgery in adolescence who presents to clinic today with " acute low back pain without radiculopathy.    -Meloxicam daily  -Tizanidine at bedtime  -Heat therapy  -PT referral provided today  -Follow up if persisting in 4 weeks; if no improvement imaging (xr minimum).  -Follow up urgently for red flag symptoms discussed    It was a pleasure seeing Ca today.    Js Grace DO, CAQSM  Primary Care Sports Medicine

## 2019-11-23 NOTE — PATIENT INSTRUCTIONS
Low back pain     What is low back pain?    Low back pain is pain and stiffness in the lower back.  It is one of the most common reasons people miss work.      How does it occur?    Low back pain is usually caused when a ligament or muscle holding a vertebra in its proper position is strained.  Vertebrae are bones that make up the spinal column through which the spinal cord passes.  When these muscles or ligaments become weak, the spine loses its ability, resulting in pain.  Because nerves reach all parts of the body from the spinal cord, back problems can lead to pain or weakness in almost any part of the body.      Low back pain can occur if your job involves lifting and carrying heavy objects, or if you spend a lot of time sitting or standing in one position or bending over.  It can be caused by a fall or by unusually strenuous exercise.  It can be brought on by the tension and stress that causes headaches in some people.  It can even be brought on by violent sneezing or coughing.      People who are overweight may have low back pain because of the added stress on their back.      Back pain may occur when the muscles, joints, bones and connective tissues in the back become inflamed as a result of an infection or an immune system problem.  Arthritic disorders as well as some congenital and degenerative conditions may cause back pain.      Back pain accompanied by loss of bladder or bowel control, difficulty moving your legs, or numbness or tingling in your arms or legs may indicate an injury to your spine and nerves, which requires immediate medical treatment.      What are the symptoms?    Symptoms include:      Pain in the back or legs    Stiffness and limited motion    The pain may be continuous or may occur in certain positions.  It may be aggravated by coughing, sneezing, bending, twisting, or straining during a bowel movement. The pain may occur in only one spot or may spread to other areas, most commonly  down the buttocks and into the back of the thigh.     A low back strain typically does not produce pain past the knee into the calf or foot.  Tingling and numbness in the calf or foot may indicate a herniated disk or pinched nerve.      How is it diagnosed?    Your healthcare provider will review your medical history and examine you.  He or she may order X-rays.  In certain situations, a myelogram, CT scan, or MRI may be ordered.    How is it treated?    The early stages of back pain with muscle spasms should be treated with ice packs for 20-30 minutes every 4 to 6 hours for the first 2 to 3 days. You m ay lie on a frozen gel pack, crushed ice, or a bag of frozen peas.    The following are always to treat low back pain:      After the initial injury, applying heat from a heating pad or hot water bottle    Resting in bed on a firm mattress.  Often it helps to lie on your back with your knees raised.  However, isome people prefer to lie on their side with their knees bent.      Taking aspirin, ibuprofen, or other anti-inflammatory medications; muscle relaxants; or other pain medications if recommended by your healthcare provider (adults aged 65 years and older should not take non-steroidal anti-inflammatory medicine for more than 7 days without their healthcare provider's approval).    Having your back massaged by a trained person    Having traction, if recommended by your provider    Wearing a belt or corset to support your back    Talking with a counselor, if your back pain is related to tension caused by emotional problems.    Beginning a program of physical therapy, or exercising on your own.  Begin a regular exercise program to gently stretch and strengthen your muscles as soon as you can.  Your healthcare provider or physical therapist can recommend exercises that will not only help you feel better but will strengthen your muscles and help avoid back trouble later.      When the pain subsides, ask your  healthcare provider about starting an exercise program such as the following:       Exercise moderately every day, using stretching and warm-up exercises suggested by your provider or physical therapist.    Exercise vigorously for about 30 minutes two or three times a week by walking, swimming, using a stationary bicycle, or doing low-impact aerobics.    Participating regularly in an exercise program will not only help your back, it will also help keep you healthier overall.     How long will the effects last?      The effects of back pain last as long as the cause exists or until your body recovers from the strain, usually a day or two but sometimes weeks.     How can I take care of myself?    In addition to the treatment described above, keep in mind these suggestions:      Use an electric heating pad on a low setting (or a hot water bottle wrapped in a towel to avoid burning yourself) for 20 to 30 minutes.  Don't let the heating pad get too hot, and don't fall asleep with it.  You could get a burn.     Try putting an ice pack wrapped in a towel on your back for 20 minutes, one to four times a day.  Set an alarm to avoid frostbite from using the ice pack too long.    Put a pillow under your knees when you are lying down.    Sleep without a pillow under your head.    Lose weight if you are overweight.    Practice good posture.  Stand with your head up, shoulders straight and chest forward, weight balanced evenly on both feet, and pelvis tucked in.     Pain is the best way to  the pace you should set in increasing your activity and exercise.  Minor discomfort, stiffness, soreness, and mild aches need not interfere with activity.  However, limit your activity temporarily if:      your symptoms return    the pain increases when you are more active    the pain increases within 24 hours after a new or higher level of activity    When can I return to my sport or activity?    The goal of rehabilitation is to return  you to your sport or activity as soon as safely possible. If you return too soon you may worsen your injury, which could lead to permanent damage.  Everyone recovers from injury at different rate.  Return to your sport will be determined by how soon your back recovers, not by how many days or weeks it has been since your injury occurred.  In general, the longer you have symptoms before you start treatment, the longer it will take to get better.      It is important that you have fully recovered from your low back pain before you return to your sport or any strenuous activity.  You must be able to have the same range of motion that you had before your injury.  You must be able to run, jump and twist without pain.      What can I do to help prevent low back pain?    You can reduce the strain on your back by doing the following:      Don't push with your arms when you move a heavy object.  Turn around and push backwards so the strain is taken by your legs.     Whenever you sit, sit in a straight-backed chair and hold your spine against the back of the chair.     Bend your knees and hips and keep your back straight when you lift a heavy object.     Avoid lifting heavy objects higher than your waist    Hold packages you carry close to your body, with your arms bent.    Use a footrest for one foot when you stand or sit in one spot for a long time.  This keeps your back straight.    Bend your knees when you bend over.    Sit close to the pedals when you drive and use your seat belt and a hard backrest or pillow.     Lie on your side with your knees bent when you sleep or rest.  It may help to put a pillow between your knees.    Put a pillow under your knees when you sleep on your back.    Raise the foot of the bed 8 inches to discourage sleeping on your stomach unless you have other problems that require that you keep your head elevated.      To rest your back, hold each of these positions for 5 minutes or longer:  Lie on  your back, bend your knees, and put pillows under your knees.    Lie on your back, put a pillow under your neck, bend your knees to a 90-degree angle, and put your lower legs and feet on a chair.    Lie on your back, bend your knees, and bring one knee up to your chest and hold it there.  Repeat with the other knee, then bring both knees to your chest.  When holding your knee to your chest, grab your thigh rather than your lower leg to avoid over flexing your knee.           Exercises that stretch and strengthen the muscles of your abdomen and spine can help prevent back problems. Strong back and abdominal muscles help you keep good posture, with your spine in its correct position.    If your muscles are tight, take a warm shower or bath before doing the exercises. Exercise on a rug or mat. Wear loose clothing. Don t wear shoes. Stop doing any exercise that causes pain until you have talked with your healthcare provider.    Ask your provider or physical therapist to help you develop an exercise program. Ask your provider how many times a week you need to do the exercises. Remember to start slowly.    Excerpts from: The Sports Medicine Patient Advisor 3rd edition. Copyright 2010 [employer].     Low Back Pain Exercises    EXERCISES  These exercises are intended only as suggestions. Be sure to check with your provider before starting the exercises.    Standing hamstring stretch: Put the heel of one leg on a stool about 15 inches high. Keep your leg straight. Lean forward, bending at the hips until you feel a mild stretch in the back of your thigh. Make sure you do not roll your shoulders or bend at the waist when doing this. You want to stretch your leg, not your lower back. Hold the stretch for 15 to 30 seconds. Repeat with each leg 3 times.    Cat and camel: Get down on your hands and knees. Let your stomach sag, allowing your back to curve downward. Hold this position for 5 seconds. Then arch your back and  hold for 5 seconds. Do 2 sets of 15.    Quadruped arm and leg raise: Get down on your hands and knees. Pull in your belly button and tighten your abdominal muscles to stiffen your spine. While keeping your abdominals tight, raise one arm and the opposite leg away from you. Hold this position for 5 seconds. Lower your arm and leg slowly and change sides. Do this 10 times on each side.    Pelvic tilt: Lie on your back with your knees bent and your feet flat on the floor. Pull your belly button in towards your spine and push your lower back into the floor, flattening your back. Hold this position for 15 seconds, then relax. Repeat 5 to 10 times.  Partial curl: Lie on your back with your knees bent and your feet flat on the floor. Draw in your abdomen and tighten your stomach muscles. With your hands stretched out in front of you, curl your upper body forward until your shoulders clear the floor. Hold this position for 3 seconds. Don't hold your breath. It helps to breathe out as you lift your shoulders. Relax back to the floor. Repeat 10 times. Build to 2 sets of 15. To challenge yourself, clasp your hands behind your head and keep your elbows out to your sides.    Gluteal stretch: Lie on your back with both knees bent. Rest your right ankle over the knee of your left leg. Grasp the thigh of the left leg and pull toward your chest. You will feel a stretch along the buttocks and possibly along the outside of your hip. Hold the stretch for 15 to 30 seconds. Then repeat the exercise with your left ankle over your right knee. Do the exercise 3 times with each leg.    Extension exercise  Lie face down on the floor for 5 minutes. If this hurts too much, lie face down with a pillow under your stomach. This should relieve your leg or back pain. When you can lie on your stomach for 5 minutes without a pillow, you can continue with Part B of this exercise.    After lying on your stomach for 5 minutes, prop yourself up on your  elbows for another 5 minutes. If you can do this without having more leg or buttock pain, you can start doing part C of this exercise.    Lie on your stomach with your hands under your shoulders. Then press down on your hands and extend your elbows while keeping your hips flat on the floor. Hold for 1 second and lower yourself to the floor. Do 3 to 5 sets of 10 repetitions. Rest for 1 minute between sets. You should have no pain in your legs when you do this, but it is normal to feel some pain in your lower back.    Do this exercise several times a day.    Side plank: Lie on your side with your legs, hips, and shoulders in a straight line. Prop yourself up onto your forearm with your elbow directly under your shoulder. Lift your hips off the floor and balance on your forearm and the outside of your foot. Try to hold this position for 15 seconds and then slowly lower your hip to the ground. Switch sides and repeat. Work up to holding for 1 minute. This exercise can be made easier by starting with your knees and hips flexed toward your chest.    EXERCISES TO AVOID     It s best to avoid the following exercises because they strain the lower back:    Exercises in which you lie on your back and raise and lower both legs together    Full sit-ups or sit-ups with straight legs    Hip twists    SPORTS AND OTHER ACTIVITIES    In addition to strengthening your back muscles, it s helpful to keep your entire body in shape. Good activities for people with back problems include:      Walking    Bicycling    Swimming    Cross-country skiing    Yoga    Corey Chi    Pilates    Some sports can hurt your back because of rough contact, twisting, sudden impact, or direct stress on your back. Sports that may be dangerous to your back include:      Football    Soccer    Volleyball    Handball    Golf    Weight lifting    Trampoline    Tobogganing    Sledding    Snowmobiling    Snowboarding    Ice hockey            Try lifting both knees to  chest as well. This is a great stretch first thing in the morning, while still in bed.

## 2019-12-13 ENCOUNTER — TELEPHONE (OUTPATIENT)
Dept: INTERNAL MEDICINE | Facility: CLINIC | Age: 74
End: 2019-12-13

## 2019-12-13 NOTE — TELEPHONE ENCOUNTER
[facility] Call Center    Phone Message    May a detailed message be left on voicemail: yes    Reason for Call: Pt called and stated she received a letter from clinic stating that Dr. Saunders has referred her to [facility]? Pt is not aware of why she is being referred to that clinic and it was never discussed with her. She would like to speak with someone regarding this.    Also, Pt was not informed of the change in appointment on 12/18 with Dr. Saunders to Dr. Anderson and was upset about that. She is concerned about her voice and that it is not getting better.     Pt also was not aware of the 1/15 appointment with Dr. Saunders and wants to know why she was not informed of the appointment.     Please call Pt back to discuss these items.     Action Taken: Message routed to:  [facility] ([facility] Clinic

## 2019-12-13 NOTE — TELEPHONE ENCOUNTER
canceled today with Ofstedal due to weather so she wsas put back on the schedule with him.  She said she will   Address with Dr Saunders next week, (was scheduled on wed,) someone canceled it.     Called pt, left a ZEFERINO Julien Paramedic on 12/13/2019 at 3:35 PM

## 2019-12-26 ENCOUNTER — OFFICE VISIT (OUTPATIENT)
Dept: PHARMACY | Facility: CLINIC | Age: 74
End: 2019-12-26
Payer: COMMERCIAL

## 2019-12-26 ENCOUNTER — OFFICE VISIT (OUTPATIENT)
Dept: INTERNAL MEDICINE | Facility: CLINIC | Age: 74
End: 2019-12-26
Payer: COMMERCIAL

## 2019-12-26 VITALS
HEART RATE: 78 BPM | WEIGHT: 197.1 LBS | OXYGEN SATURATION: 100 % | HEIGHT: 64 IN | DIASTOLIC BLOOD PRESSURE: 88 MMHG | SYSTOLIC BLOOD PRESSURE: 130 MMHG | BODY MASS INDEX: 33.65 KG/M2 | TEMPERATURE: 97.9 F | RESPIRATION RATE: 18 BRPM

## 2019-12-26 DIAGNOSIS — J04.0 LARYNGITIS: Primary | ICD-10-CM

## 2019-12-26 DIAGNOSIS — J45.909 UNCOMPLICATED ASTHMA, UNSPECIFIED ASTHMA SEVERITY, UNSPECIFIED WHETHER PERSISTENT: Primary | ICD-10-CM

## 2019-12-26 PROCEDURE — 99207 ZZC NO CHARGE LOS: CPT | Performed by: PHARMACIST

## 2019-12-26 ASSESSMENT — MIFFLIN-ST. JEOR: SCORE: 1369.04

## 2019-12-26 ASSESSMENT — PAIN SCALES - GENERAL: PAINLEVEL: NO PAIN (0)

## 2019-12-26 NOTE — PATIENT INSTRUCTIONS
Your health care Provider has recommended that you receive the new Shingle vaccine called Shingrix to prevent shingles for ages 50 and above. Many private insurance and Medicare Part D plans cover Shingrix. However, not all insurance carriers cover the entire cost of the Shingrix vaccine if the vaccine is administered at your primary care clinic. The clinic cannot determine your insurance benefits.  Please call your insurance carrier prior. The vaccine comes in two doses. Your second dose should be 2-6 months from your first dose.       Prior to receiving the vaccine, we recommend that you call your insurance carrier and ask them the following questions:            1. Is there a cost difference if I receive the vaccine at my doctor's office or a pharmacy?          2. Does my insurance cover the Shingrix Vaccine and administration of the vaccine?          3. What is my co-pay or deductible for the vaccine?        Please call to schedule a Nurse-Visit only at [phone].  Nurse Visit hours are available Monday, Wednesday, and Friday from 9:00 AM-11:00 AM and 1:00 PM-3:00 PM.       [facility] Medication Refill Request Information:  * Please contact your pharmacy regarding ANY request for medication refills.  ** [facility] Prescription Fax = [phone]  * Please allow 3 business days for routine medication refills.  * Please allow 5 business days for controlled substance medication refills.     [facility] Test Result notification information:  *You will be notified with in 7-10 days of your appointment day regarding the results of your test.  If you are on MyChart you will be notified as soon as the provider has reviewed the results and signed off on them.

## 2019-12-26 NOTE — PROGRESS NOTES
Clinical Pharmacy Consult:                                                    Ca Yan is a 74 year old female coming in for a clinical pharmacist consult.  She was referred to me from Joaquín Sunshine.     Reason for Consult: Inhaler teaching    Discussion: Patient is using albuterol HFA inhaler correctly. Technique is reviewed and patient is able to repeat the technique back with reasonable accuracy. She would likely benefit from a spacer. She is to see pulmonology on 12/30 and should review technique again at that visit. She expresses additional confusion regarding her inhalers that warrants an MTM referral after she has seen pulmonology.     Plan:  1. Use albuterol as directed until you see pulmonology.   2. Will see patient for initial MTM on 1/15/20 when she sees Dr. Saunders.    Nell Hathaway, Pharm.D., [facility]  Medication Therapy Management Pharmacist  Page/VM:  [phone]

## 2019-12-26 NOTE — PROGRESS NOTES
"                     PRIMARY CARE CENTER       SUBJECTIVE:  Ca Yan is a 74 year old female with a PMHx of   Past Medical History:   Diagnosis Date     Anxiety      Arthritis      Breast cancer (H)      COPD (chronic obstructive pulmonary disease) (H)     6/19/12:  FEV 0.99 l     Depressive disorder      Hypertension      Low back pain with left-sided sciatica      Low bone density 4/13/2017    DEXA April 12, 2017: T score -2.0. Normal Z score. FRAX risk: major osteoporotic fracture 11.9%, hip fracture 2.6%, therefore not high-risk     Lymphedema, chronic lower extremities     who comes for laryngitis  \"  f several weeks duration throat \"  cratchy\" voice hoarse, no fever , Mucous production ,headache  or nasal congestion. She uses home O 2 and has several inhalers although she admits to inconsistent and is somewhat confused how to uses these. She does note SAM with modest  exertion in her ADL  i.e.less delvin 1 block .  She has superficial muscle pain in her lower torso and upper abd  Then she describes pain in  Her back and leg muscle as well              Medications and allergies reviewed by me  And Pharm d today    ROS:   Constipation    weakness generalized ,dffuse muscle pain, she lives alone in assisted living  Uses [employer] mobility  And does not appear to have many external supports            OBJECTIVE:    /88 (BP Location: Right arm, Patient Position: Chair, Cuff Size: Adult Large)   Pulse 78   Temp 97.9  F (36.6  C) (Oral)   Resp 18   Ht 1.626 m (5' 4\")   Wt 89.4 kg (197 lb 1.6 oz)   LMP  (LMP Unknown)   SpO2 100%   Breastfeeding No   BMI 33.83 kg/m     Wt Readings from Last 1 Encounters:   12/26/19 89.4 kg (197 lb 1.6 oz)     Woman sitting in wheel chair  Anxious and easily distracted when asked specific questions . She is able with modest assistance and some exclamation of pain to walk slowly to exam table and is assisted to sit uppn it.  HEENT  Would allow tongue blade eval due to " discomfort, her throat( as best seen )was dry  But  not injected without edema  , erythema ,or exudate  Neck was supple but she would no allow cervical adenopathy exam  Due to tenderness on touch no icterus   Ear exam incomplete due to her discomfort with touch external canal cerumen was present in her r ear   Lungs  Were resonant and with poor respiratory excursion  Without rales or wheeze   Heart was regular  No gallop  Heart   sounds distant  ABD BS present panis prominent tender to lite touch diffusely    would not permit exam of ext due to pain from her bilat lymphedema  ASSESSMENT/PLAN:  DX   Sore throat    Since she was unclear in her inhaler use or if she was using  Same ; The pharm D came ,demonstrated use io albuterol inhaler  And had her work it several times  Neither of us were able to precisely determine whether she was using  Her other inhalers .We asked her to bring all into up coming pulmonary visit       SHe will  have med review in our clinic (1/15)as well. Her throat was dry and I suspect it's from her O2 us;   But can not exclude reflux given her degree of  Med  uncertainty I did not add anti reflux agent to current meds  And to use saline swish and swallow   And come to pulmonary with meds.  She certainly seems to have a somatic hypersensitivity issue  undetermined type             Pt should return to clinic for f/u with pulmonary 12/30/19and Primary 1/15/20    Joaquín Sunshine MD  Dec 26, 2019

## 2019-12-30 ENCOUNTER — PRE VISIT (OUTPATIENT)
Dept: PULMONOLOGY | Facility: CLINIC | Age: 74
End: 2019-12-30

## 2019-12-30 ENCOUNTER — ANCILLARY PROCEDURE (OUTPATIENT)
Dept: GENERAL RADIOLOGY | Facility: CLINIC | Age: 74
End: 2019-12-30
Payer: COMMERCIAL

## 2019-12-30 ENCOUNTER — OFFICE VISIT (OUTPATIENT)
Dept: PULMONOLOGY | Facility: CLINIC | Age: 74
End: 2019-12-30
Attending: INTERNAL MEDICINE
Payer: COMMERCIAL

## 2019-12-30 VITALS
HEIGHT: 64 IN | WEIGHT: 197.1 LBS | SYSTOLIC BLOOD PRESSURE: 110 MMHG | OXYGEN SATURATION: 90 % | HEART RATE: 82 BPM | BODY MASS INDEX: 33.65 KG/M2 | DIASTOLIC BLOOD PRESSURE: 78 MMHG

## 2019-12-30 DIAGNOSIS — J44.9 COPD (CHRONIC OBSTRUCTIVE PULMONARY DISEASE) (H): ICD-10-CM

## 2019-12-30 DIAGNOSIS — J96.11 CHRONIC HYPOXEMIC RESPIRATORY FAILURE (H): ICD-10-CM

## 2019-12-30 DIAGNOSIS — M41.9 KYPHOSCOLIOSIS: Primary | ICD-10-CM

## 2019-12-30 PROCEDURE — G0463 HOSPITAL OUTPT CLINIC VISIT: HCPCS | Mod: 25,ZF

## 2019-12-30 ASSESSMENT — MIFFLIN-ST. JEOR: SCORE: 1369.04

## 2019-12-30 ASSESSMENT — PAIN SCALES - GENERAL: PAINLEVEL: NO PAIN (0)

## 2019-12-30 NOTE — LETTER
12/30/2019       RE: Ca Yan  [address]  [facility] [address]     Dear Colleague,    Thank you for referring your patient, Ca Yan, to the [facility] at [facility]. Please see a copy of my visit note below.    [facility]  Pulmonary Medicine  Visit Clinic Note  December 30, 2019         ASSESSMENT & PLAN       Shortness of breath: She carries a diagnosis of COPD, but I am not certain if this is accurate at this point.  Her pulmonary function testing is certainly skewed by the fact that she has significant kyphoscoliosis.  She has a extremely minimal smoking history, and has not had good results with inhaler therapy.  This is why I question a COPD diagnosis.  I will get a noncontrast chest CT scan to look for evidence any evidence of airways disease or emphysema which may support COPD as a diagnosis.  If that is normal, I will assume that most of her shortness of breath is primarily related to her thoracic cage defect and deconditioning.    She has been to pulmonary rehab in the past, and I would like her to get back involved with this.  She prefers to go to the [facility]hab, and I have placed a referral for this.    Chronic hypoxemic respiratory failure.  She uses oxygen while she sleeps at night.  We have a 6-minute walk test which shows her oxygen drops to 89% with exertion.  She says as of late, her oxygen has been getting as low as 85% with exertion.  A basic metabolic panel demonstrates an elevated carbon dioxide level which would support hypercapnia.  I suspect these gas exchange abnormalities are related to her hypoventilation from her thoracic cage defect.  Again, if emphysema is noted on a chest CT scan, that parenchymal abnormality could be responsible for the gas exchange abnormalities as well.    I will plan to get an overnight oximetry study as well as a 6-minute walk to  assess her current oxygenation need.  I did discuss the possibility of noninvasive positive pressure ventilation at night to help out with some of this gas exchange and ventilation.  She refuses to wear a mask, and does not want to entertain this idea moving forward.      End-of-life care: I do not think she has a hospice associated diagnosis right now.  She asked me about this.  She does have chronic respiratory failure due to her scoliosis which appears to be somewhat progressive if solely looking at spirometry. This has been a chronic issue, and while it may certainly be life limiting, I do not think she has less than 6 months to live.  She does have a lot of concerns about end-of-life issues, and is extremely anxious about this.  She lacks a stable support group in order to help cope with these concerns.  We discussed this in the clinic.  She did just join a new [employer], and is optimistic that she may need people to talk about these issues moving forward.  She is also very open to meeting with our palliative care doctors to discuss issues of emotional support, how to address end-of-life care issues, and updating her advanced directive.  I have placed a referral for this.    I will call her when I get the chest CT and oxygen results.     Mono Taylor MD          Today's visit note:     Chief Complaint: Ca Yan is a 74 year old year old female who is being seen for No chief complaint on file.      HISTORY OF PRESENT ILLNESS:    This is a 74-year-old female with a history of kyphoscoliosis, COPD, who presents the pulmonary clinic today for evaluation of shortness of breath on exertion.    Today, she is quite frustrated and nervous about her symptoms.  She explains that she is afraid she is going to die any day, and that nothing has been done in the past to help her out with her shortness of breath.  She has been on various inhalers, none of which have helped out.  She develops shortness of breath walking  between rooms.  She will also wake up coughing at night.  She went to the emergency department once this last year with shortness of breath, and was prescribed prednisone.  She said she was having a lot of bad hayfever at that time.  She does have oxygen at home with a stationary concentrator.  She will usually wear oxygen at night while she is sleeping, and sometimes while she is walking around the house.  She thinks she needs a new oxygen tank that she can bring to [employer] with her.  She gets significantly short of breath when she walks from the [facility] to the [facility].  She does measure her oxygen saturation on occasion and sometimes it will be as low as 85%.    She denies any acid reflux symptoms, unless she drinks too much coffee.  Her cough usually will happens when she is in bed.  At that time she will also noticed some wheezing.  Sometimes she wakes up with the cough.    She endorses a lot of anxiety, particularly around end of life issues.  She does not have any family that is alive, and her friend group is very minimal.  She became fairly close with the  of the [employer] she just joined, however he just was diagnosed with leukemia and is no longer there.  She does not feel like she has a close support group, and this amplifies her concerns about end-of-life issues.  She thinks she has filled out an advanced directive, but she is not sure what it says.         Past Medical and Surgical History:     Past Medical History:   Diagnosis Date     Anxiety      Arthritis      Breast cancer (H)      COPD (chronic obstructive pulmonary disease) (H)     6/19/12:  FEV 0.99 l     Depressive disorder      Hypertension      Low back pain with left-sided sciatica      Low bone density 4/13/2017    DEXA April 12, 2017: T score -2.0. Normal Z score. FRAX risk: major osteoporotic fracture 11.9%, hip fracture 2.6%, therefore not high-risk     Lymphedema, chronic lower extremities      Past Surgical History:    Procedure Laterality Date     BACK SURGERY      spinal fusion     COLONOSCOPY       LUMPECTOMY BREAST       ORTHOPEDIC SURGERY      Knee surgery left side           Family History:     Family History   Problem Relation Age of Onset     Breast Cancer Mother      Diabetes Mother      Anxiety Disorder Mother      Asthma Mother      Other Cancer Mother      Hyperlipidemia Mother      Alcohol/Drug Father      Mental Illness Father      Substance Abuse Father      Obesity Father      Cerebrovascular Disease Maternal Grandmother 67              Social History:     Social History     Socioeconomic History     Marital status: Single     Spouse name: Not on file     Number of children: Not on file     Years of education: Not on file     Highest education level: Not on file   Occupational History     Employer: RETIRED   Social Needs     Financial resource strain: Not on file     Food insecurity:     Worry: Not on file     Inability: Not on file     Transportation needs:     Medical: Not on file     Non-medical: Not on file   Tobacco Use     Smoking status: Former Smoker     Packs/day: 0.50     Years: 5.00     Pack years: 2.50     Types: Cigarettes     Start date: 1956     Last attempt to quit: 1980     Years since quittin.2     Smokeless tobacco: Former User     Quit date: 1980   Substance and Sexual Activity     Alcohol use: Not Currently     Alcohol/week: 0.0 standard drinks     Comment: Sober for 2 years, previous alcoholic     Drug use: No     Sexual activity: Not Currently     Partners: Male     Birth control/protection: Abstinence   Lifestyle     Physical activity:     Days per week: Not on file     Minutes per session: Not on file     Stress: Not on file   Relationships     Social connections:     Talks on phone: Not on file     Gets together: Not on file     Attends [employer] service: Not on file     Active member of club or organization: Not on file     Attends meetings of clubs or organizations:  Not on file     Relationship status: Not on file     Intimate partner violence:     Fear of current or ex partner: Not on file     Emotionally abused: Not on file     Physically abused: Not on file     Forced sexual activity: Not on file   Other Topics Concern     Parent/sibling w/ CABG, MI or angioplasty before 65F 55M? Not Asked      Service Not Asked     Blood Transfusions Not Asked     Caffeine Concern Not Asked     Occupational Exposure Not Asked     Hobby Hazards Not Asked     Sleep Concern Not Asked     Stress Concern Not Asked     Comment: Lives alone     Weight Concern Not Asked     Special Diet Not Asked     Back Care Not Asked     Comment: Hx of scoliosis with spine fusion     Exercise Not Asked     Comment: Walks     Bike Helmet Not Asked     Seat Belt Not Asked     Self-Exams Not Asked   Social History Narrative    Only family member is daughter in [place], [place] whom             Medications:     Current Outpatient Medications   Medication     acetaminophen (TYLENOL) 500 MG tablet     albuterol (PROAIR HFA/PROVENTIL HFA/VENTOLIN HFA) 108 (90 Base) MCG/ACT inhaler     amLODIPine (NORVASC) 2.5 MG tablet     ASPIRIN EC PO     Blood Pressure Monitoring (BLOOD PRESSURE CUFF) MISC     clonazePAM (KLONOPIN) 0.5 MG tablet     COMPRESSION STOCKINGS     fluticasone (FLONASE) 50 MCG/ACT spray     fluticasone-vilanterol (BREO ELLIPTA) 200-25 MCG/INH inhaler     hydrochlorothiazide (MICROZIDE) 12.5 MG capsule     ibuprofen (ADVIL,MOTRIN) 200 MG tablet     latanoprost (XALATAN) 0.005 % ophthalmic solution     losartan (COZAAR) 50 MG tablet     magnesium 250 MG tablet     Magnesium Salicylate Tetrahyd (DOANS EXTRA STRENGTH) 580 MG TABS     meloxicam (MOBIC) 15 MG tablet     naltrexone (DEPADE/REVIA) 50 MG tablet     naproxen (NAPROSYN) 500 MG tablet     order for DME     order for DME     order for DME     order for DME     order for DME     predniSONE (DELTASONE) 20 MG tablet     quetiapine (SEROQUEL) 200 MG  "tablet     simvastatin (ZOCOR) 20 MG tablet     tiZANidine (ZANAFLEX) 4 MG tablet     umeclidinium (INCRUSE ELLIPTA) 62.5 MCG/INH inhaler     No current facility-administered medications for this visit.             Review of Systems:       A complete review of systems was otherwise negative except as noted in the HPI.      PHYSICAL EXAM:  /78   Pulse 82   Ht 1.626 m (5' 4\")   Wt 89.4 kg (197 lb 1.6 oz)   LMP  (LMP Unknown)   SpO2 90%   BMI 33.83 kg/m         General: Anxious, pleasant  Eyes: Anicteric  Ears: Hearing grossly normal  Mouth: Oral mucosa is moist, without any lesions. No oropharyngeal exudate.  Neck: supple, no thyromegaly  Lymphatics: No cervical or supraclavicular nodes  Respiratory: Decreased air movement, but clear to auscultation.  No crackles or wheezes.  Cardiac: RRR, normal S1, S2. No murmurs.  Abdomen: Soft, NT/ND   Musculoskeletal: Kyphoscoliosis of her back.  She refused examination of her legs due to painful lymphedema  Skin: No rash on limited exam  Neuro: Normal mentation. Normal speech.  Psych: Anxious            Data:   All laboratory and imaging data reviewed.      PFT:   FEV1/FVC ratio 0.71.  FVC is 1.12 L which is 42% predicted.  FEV1 is 0.8 L which is 39% predicted.      PFT Interpretation:  Possible airflow obstruction.  Low FVC and FEV1 suggestive of restriction.  No lung volumes available to correlate.  Valid Maneuver    CXR:  PA and lateral radiographs of chest. The cardiac silhouette size is  unchanged. Lung volumes are high. Mild flattening of the diaphragms.  Unchanged linear opacity in the right lower lung. Streaky bibasilar  opacities. No pleural effusion or pneumothorax. Visualized portions of  the upper abdomen are unremarkable. Dextroscoliotic curvature of the  thoracic spine.                                                                      IMPRESSION:  1. Changes associated with chronic obstructive pulmonary disease with  no acute air space opacities.  2. " Dextroscoliosis of the thoracic spine.       Recent Results (from the past 168 hour(s))   Pulmonary function test    Collection Time: 12/30/19  9:53 AM   Result Value Ref Range    FVC-Pred 2.66 L    FVC-Pre 1.12 L    FVC-%Pred-Pre 42 %    FEV1-Pre 0.80 L    FEV1-%Pred-Pre 39 %    FEV1FVC-Pred 77 %    FEV1FVC-Pre 71 %    FEFMax-Pred 5.16 L/sec    FEFMax-Pre 2.07 L/sec    FEFMax-%Pred-Pre 40 %    FEF2575-Pred 1.67 L/sec    FEF2575-Pre 0.45 L/sec    BDB9072-%Pred-Pre 26 %    ExpTime-Pre 7.48 sec    FIFMax-Pre 1.41 L/sec    FEV1FEV6-Pred 78 %    FEV1FEV6-Pre 71 %                     Again, thank you for allowing me to participate in the care of your patient.      Sincerely,    Bassam Taylor MD

## 2019-12-30 NOTE — PROGRESS NOTES
[facility]  Pulmonary Medicine  Visit Clinic Note  December 30, 2019         ASSESSMENT & PLAN       Shortness of breath: She carries a diagnosis of COPD, but I am not certain if this is accurate at this point.  Her pulmonary function testing is certainly skewed by the fact that she has significant kyphoscoliosis.  She has a extremely minimal smoking history, and has not had good results with inhaler therapy.  This is why I question a COPD diagnosis.  I will get a noncontrast chest CT scan to look for evidence any evidence of airways disease or emphysema which may support COPD as a diagnosis.  If that is normal, I will assume that most of her shortness of breath is primarily related to her thoracic cage defect and deconditioning.    She has been to pulmonary rehab in the past, and I would like her to get back involved with this.  She prefers to go to the [facility], and I have placed a referral for this.    Chronic hypoxemic respiratory failure.  She uses oxygen while she sleeps at night.  We have a 6-minute walk test which shows her oxygen drops to 89% with exertion.  She says as of late, her oxygen has been getting as low as 85% with exertion.  A basic metabolic panel demonstrates an elevated carbon dioxide level which would support hypercapnia.  I suspect these gas exchange abnormalities are related to her hypoventilation from her thoracic cage defect.  Again, if emphysema is noted on a chest CT scan, that parenchymal abnormality could be responsible for the gas exchange abnormalities as well.    I will plan to get an overnight oximetry study as well as a 6-minute walk to assess her current oxygenation need.  I did discuss the possibility of noninvasive positive pressure ventilation at night to help out with some of this gas exchange and ventilation.  She refuses to wear a mask, and does not want to entertain this idea moving forward.      End-of-life care: I do not think she  has a hospice associated diagnosis right now.  She asked me about this.  She does have chronic respiratory failure due to her scoliosis which appears to be somewhat progressive if solely looking at spirometry. This has been a chronic issue, and while it may certainly be life limiting, I do not think she has less than 6 months to live.  She does have a lot of concerns about end-of-life issues, and is extremely anxious about this.  She lacks a stable support group in order to help cope with these concerns.  We discussed this in the clinic.  She did just join a new [employer], and is optimistic that she may need people to talk about these issues moving forward.  She is also very open to meeting with our palliative care doctors to discuss issues of emotional support, how to address end-of-life care issues, and updating her advanced directive.  I have placed a referral for this.    I will call her when I get the chest CT and oxygen results.     Mono Taylor MD          Today's visit note:     Chief Complaint: Ca Yan is a 74 year old year old female who is being seen for No chief complaint on file.      HISTORY OF PRESENT ILLNESS:    This is a 74-year-old female with a history of kyphoscoliosis, COPD, who presents the pulmonary clinic today for evaluation of shortness of breath on exertion.    Today, she is quite frustrated and nervous about her symptoms.  She explains that she is afraid she is going to die any day, and that nothing has been done in the past to help her out with her shortness of breath.  She has been on various inhalers, none of which have helped out.  She develops shortness of breath walking between rooms.  She will also wake up coughing at night.  She went to the emergency department once this last year with shortness of breath, and was prescribed prednisone.  She said she was having a lot of bad hayfever at that time.  She does have oxygen at home with a stationary concentrator.  She will  usually wear oxygen at night while she is sleeping, and sometimes while she is walking around the house.  She thinks she needs a new oxygen tank that she can bring to [employer] with her.  She gets significantly short of breath when she walks from the [facility] to the [facility].  She does measure her oxygen saturation on occasion and sometimes it will be as low as 85%.    She denies any acid reflux symptoms, unless she drinks too much coffee.  Her cough usually will happens when she is in bed.  At that time she will also noticed some wheezing.  Sometimes she wakes up with the cough.    She endorses a lot of anxiety, particularly around end of life issues.  She does not have any family that is alive, and her friend group is very minimal.  She became fairly close with the  of the [employer] she just joined, however he just was diagnosed with leukemia and is no longer there.  She does not feel like she has a close support group, and this amplifies her concerns about end-of-life issues.  She thinks she has filled out an advanced directive, but she is not sure what it says.         Past Medical and Surgical History:     Past Medical History:   Diagnosis Date     Anxiety      Arthritis      Breast cancer (H)      COPD (chronic obstructive pulmonary disease) (H)     6/19/12:  FEV 0.99 l     Depressive disorder      Hypertension      Low back pain with left-sided sciatica      Low bone density 4/13/2017    DEXA April 12, 2017: T score -2.0. Normal Z score. FRAX risk: major osteoporotic fracture 11.9%, hip fracture 2.6%, therefore not high-risk     Lymphedema, chronic lower extremities      Past Surgical History:   Procedure Laterality Date     BACK SURGERY      spinal fusion     COLONOSCOPY       LUMPECTOMY BREAST       ORTHOPEDIC SURGERY      Knee surgery left side           Family History:     Family History   Problem Relation Age of Onset     Breast Cancer Mother      Diabetes Mother      Anxiety Disorder Mother       Asthma Mother      Other Cancer Mother      Hyperlipidemia Mother      Alcohol/Drug Father      Mental Illness Father      Substance Abuse Father      Obesity Father      Cerebrovascular Disease Maternal Grandmother 67              Social History:     Social History     Socioeconomic History     Marital status: Single     Spouse name: Not on file     Number of children: Not on file     Years of education: Not on file     Highest education level: Not on file   Occupational History     Employer: RETIRED   Social Needs     Financial resource strain: Not on file     Food insecurity:     Worry: Not on file     Inability: Not on file     Transportation needs:     Medical: Not on file     Non-medical: Not on file   Tobacco Use     Smoking status: Former Smoker     Packs/day: 0.50     Years: 5.00     Pack years: 2.50     Types: Cigarettes     Start date: 1956     Last attempt to quit: 1980     Years since quittin.2     Smokeless tobacco: Former User     Quit date: 1980   Substance and Sexual Activity     Alcohol use: Not Currently     Alcohol/week: 0.0 standard drinks     Comment: Sober for 2 years, previous alcoholic     Drug use: No     Sexual activity: Not Currently     Partners: Male     Birth control/protection: Abstinence   Lifestyle     Physical activity:     Days per week: Not on file     Minutes per session: Not on file     Stress: Not on file   Relationships     Social connections:     Talks on phone: Not on file     Gets together: Not on file     Attends [employer] service: Not on file     Active member of club or organization: Not on file     Attends meetings of clubs or organizations: Not on file     Relationship status: Not on file     Intimate partner violence:     Fear of current or ex partner: Not on file     Emotionally abused: Not on file     Physically abused: Not on file     Forced sexual activity: Not on file   Other Topics Concern     Parent/sibling w/ CABG, MI or angioplasty  before 65F 55M? Not Asked      Service Not Asked     Blood Transfusions Not Asked     Caffeine Concern Not Asked     Occupational Exposure Not Asked     Hobby Hazards Not Asked     Sleep Concern Not Asked     Stress Concern Not Asked     Comment: Lives alone     Weight Concern Not Asked     Special Diet Not Asked     Back Care Not Asked     Comment: Hx of scoliosis with spine fusion     Exercise Not Asked     Comment: Walks     Bike Helmet Not Asked     Seat Belt Not Asked     Self-Exams Not Asked   Social History Narrative    Only family member is daughter in [place], [place] whom             Medications:     Current Outpatient Medications   Medication     acetaminophen (TYLENOL) 500 MG tablet     albuterol (PROAIR HFA/PROVENTIL HFA/VENTOLIN HFA) 108 (90 Base) MCG/ACT inhaler     amLODIPine (NORVASC) 2.5 MG tablet     ASPIRIN EC PO     Blood Pressure Monitoring (BLOOD PRESSURE CUFF) MISC     clonazePAM (KLONOPIN) 0.5 MG tablet     COMPRESSION STOCKINGS     fluticasone (FLONASE) 50 MCG/ACT spray     fluticasone-vilanterol (BREO ELLIPTA) 200-25 MCG/INH inhaler     hydrochlorothiazide (MICROZIDE) 12.5 MG capsule     ibuprofen (ADVIL,MOTRIN) 200 MG tablet     latanoprost (XALATAN) 0.005 % ophthalmic solution     losartan (COZAAR) 50 MG tablet     magnesium 250 MG tablet     Magnesium Salicylate Tetrahyd (DOANS EXTRA STRENGTH) 580 MG TABS     meloxicam (MOBIC) 15 MG tablet     naltrexone (DEPADE/REVIA) 50 MG tablet     naproxen (NAPROSYN) 500 MG tablet     order for DME     order for DME     order for DME     order for DME     order for DME     predniSONE (DELTASONE) 20 MG tablet     quetiapine (SEROQUEL) 200 MG tablet     simvastatin (ZOCOR) 20 MG tablet     tiZANidine (ZANAFLEX) 4 MG tablet     umeclidinium (INCRUSE ELLIPTA) 62.5 MCG/INH inhaler     No current facility-administered medications for this visit.             Review of Systems:       A complete review of systems was otherwise negative except as noted  "in the HPI.      PHYSICAL EXAM:  /78   Pulse 82   Ht 1.626 m (5' 4\")   Wt 89.4 kg (197 lb 1.6 oz)   LMP  (LMP Unknown)   SpO2 90%   BMI 33.83 kg/m        General: Anxious, pleasant  Eyes: Anicteric  Ears: Hearing grossly normal  Mouth: Oral mucosa is moist, without any lesions. No oropharyngeal exudate.  Neck: supple, no thyromegaly  Lymphatics: No cervical or supraclavicular nodes  Respiratory: Decreased air movement, but clear to auscultation.  No crackles or wheezes.  Cardiac: RRR, normal S1, S2. No murmurs.  Abdomen: Soft, NT/ND   Musculoskeletal: Kyphoscoliosis of her back.  She refused examination of her legs due to painful lymphedema  Skin: No rash on limited exam  Neuro: Normal mentation. Normal speech.  Psych: Anxious            Data:   All laboratory and imaging data reviewed.      PFT:   FEV1/FVC ratio 0.71.  FVC is 1.12 L which is 42% predicted.  FEV1 is 0.8 L which is 39% predicted.      PFT Interpretation:  Possible airflow obstruction.  Low FVC and FEV1 suggestive of restriction.  No lung volumes available to correlate.  Valid Maneuver    CXR:  PA and lateral radiographs of chest. The cardiac silhouette size is  unchanged. Lung volumes are high. Mild flattening of the diaphragms.  Unchanged linear opacity in the right lower lung. Streaky bibasilar  opacities. No pleural effusion or pneumothorax. Visualized portions of  the upper abdomen are unremarkable. Dextroscoliotic curvature of the  thoracic spine.                                                                      IMPRESSION:  1. Changes associated with chronic obstructive pulmonary disease with  no acute air space opacities.  2. Dextroscoliosis of the thoracic spine.       Recent Results (from the past 168 hour(s))   Pulmonary function test    Collection Time: 12/30/19  9:53 AM   Result Value Ref Range    FVC-Pred 2.66 L    FVC-Pre 1.12 L    FVC-%Pred-Pre 42 %    FEV1-Pre 0.80 L    FEV1-%Pred-Pre 39 %    FEV1FVC-Pred 77 %    " FEV1FVC-Pre 71 %    FEFMax-Pred 5.16 L/sec    FEFMax-Pre 2.07 L/sec    FEFMax-%Pred-Pre 40 %    FEF2575-Pred 1.67 L/sec    FEF2575-Pre 0.45 L/sec    SGZ4140-%Pred-Pre 26 %    ExpTime-Pre 7.48 sec    FIFMax-Pre 1.41 L/sec    FEV1FEV6-Pred 78 %    FEV1FEV6-Pre 71 %

## 2019-12-30 NOTE — NURSING NOTE
Chief Complaint   Patient presents with     New Patient     copd     Vitals were taken and medications were reconciled.     MARTIN Desai

## 2019-12-30 NOTE — Clinical Note
Can you help me out with Ca? She needs:1) Pulm Rehab referral to [facility] (order is in, just needs to be faxed)2) overnight oximetry study on 2 L3) chest CT and 6 minute walk with oxygen titration4) palliative care referral

## 2019-12-31 LAB
EXPTIME-PRE: 7.48 SEC
FEF2575-%PRED-PRE: 26 %
FEF2575-PRE: 0.45 L/SEC
FEF2575-PRED: 1.67 L/SEC
FEFMAX-%PRED-PRE: 40 %
FEFMAX-PRE: 2.07 L/SEC
FEFMAX-PRED: 5.16 L/SEC
FEV1-%PRED-PRE: 39 %
FEV1-PRE: 0.8 L
FEV1FEV6-PRE: 71 %
FEV1FEV6-PRED: 78 %
FEV1FVC-PRE: 71 %
FEV1FVC-PRED: 77 %
FIFMAX-PRE: 1.41 L/SEC
FVC-%PRED-PRE: 42 %
FVC-PRE: 1.12 L
FVC-PRED: 2.66 L

## 2020-01-02 ENCOUNTER — OFFICE VISIT (OUTPATIENT)
Dept: DERMATOLOGY | Facility: CLINIC | Age: 75
End: 2020-01-02
Payer: COMMERCIAL

## 2020-01-02 ENCOUNTER — TELEPHONE (OUTPATIENT)
Dept: PULMONOLOGY | Facility: CLINIC | Age: 75
End: 2020-01-02

## 2020-01-02 DIAGNOSIS — L82.0 INFLAMED SEBORRHEIC KERATOSIS: Primary | ICD-10-CM

## 2020-01-02 DIAGNOSIS — J96.11 CHRONIC HYPOXEMIC RESPIRATORY FAILURE (H): ICD-10-CM

## 2020-01-02 LAB
6 MIN WALK (FT): 315 FT
6 MIN WALK (M): 96 M
FIO2-PRE: 21 %

## 2020-01-02 ASSESSMENT — PAIN SCALES - GENERAL: PAINLEVEL: NO PAIN (0)

## 2020-01-02 NOTE — LETTER
1/2/2020       RE: Ca Yan  [address]  [facility] [address]     Dear Colleague,    Thank you for referring your patient, Ca Yan, to the [facility] DERMATOLOGY at [facility]. Please see a copy of my visit note below.    [facility] Dermatology Note    Dermatology Problem List:  1. Seborrheic Keratosis, inflamed  - s/p cryo 1/2/2020    Encounter Date: Jan 2, 2020    CC:  Chief Complaint   Patient presents with     Derm Problem     Ca is here today for an SK on her left jawline. Pt denies FBSE.        History of Present Illness:  Ms. Ca Yan is a 74 year old female who presents for evaluation of a brown lesion on her left jaw. She is a new patient at our clinic. The lesion has been present for several years, and has been treated with cryotherapy by an outside provider, however, the lesion has grown back. It is bothersome especially when it rubs against her clothes and jacket.     The patient otherwise denies any new or concerning lesions. No bleeding, painful, pruritic, or changing lesions. They report no personal history of skin cancer. There is no family history of skin cancer.  A history of indoor tanning (x1 in her 20s).  No occupational exposure to ultraviolet light or other forms of radiation. Patient is a retired . Health otherwise stable. No other skin concerns.     Past Medical History:   Patient Active Problem List   Diagnosis     Non morbid obesity due to excess calories     Alcohol abuse     Malignant neoplasm of breast (H)     Chronic kidney disease, stage III (moderate) (H)     Osteoarthritis of knee     Major depressive disorder with single episode     Diarrhea     Diastolic dysfunction     Osteoarthritis of spine     Gastroesophageal reflux disease     Generalized anxiety disorder     Hypertension     Hyperlipidemia     Insomnia     Irritable bowel syndrome     Kyphoscoliosis     Cluster C  personality disorder (H)     Seborrheic eczema     Anemia     Anxiety state     Asthma     Back pain     Bunion     Chronic obstructive pulmonary disease, unspecified COPD type (H)     Low bone density     Fecal incontinence     Left-sided low back pain without sciatica     Major depression, recurrent (H)     Past Medical History:   Diagnosis Date     Anxiety      Arthritis      Breast cancer (H)      COPD (chronic obstructive pulmonary disease) (H)     6/19/12:  FEV 0.99 l     Depressive disorder      Hypertension      Low back pain with left-sided sciatica      Low bone density 4/13/2017    DEXA April 12, 2017: T score -2.0. Normal Z score. FRAX risk: major osteoporotic fracture 11.9%, hip fracture 2.6%, therefore not high-risk     Lymphedema, chronic lower extremities      Past Surgical History:   Procedure Laterality Date     BACK SURGERY      spinal fusion     COLONOSCOPY       LUMPECTOMY BREAST       ORTHOPEDIC SURGERY      Knee surgery left side       Social History:  Patient reports that she quit smoking about 39 years ago. Her smoking use included cigarettes. She started smoking about 63 years ago. She has a 2.50 pack-year smoking history. She quit smokeless tobacco use about 39 years ago. She reports previous alcohol use. She reports that she does not use drugs.    Family History:  Family History   Problem Relation Age of Onset     Breast Cancer Mother      Diabetes Mother      Anxiety Disorder Mother      Asthma Mother      Other Cancer Mother      Hyperlipidemia Mother      Alcohol/Drug Father      Mental Illness Father      Substance Abuse Father      Obesity Father      Cerebrovascular Disease Maternal Grandmother 67       Medications:  Current Outpatient Medications   Medication Sig Dispense Refill     acetaminophen (TYLENOL) 500 MG tablet Take 500-1,000 mg by mouth every 6 hours as needed for mild pain       albuterol (PROAIR HFA/PROVENTIL HFA/VENTOLIN HFA) 108 (90 Base) MCG/ACT inhaler Inhale 2  puffs into the lungs every 6 hours as needed for shortness of breath / dyspnea or wheezing 3 Inhaler 1     amLODIPine (NORVASC) 2.5 MG tablet Take 1 tablet (2.5 mg) by mouth daily 90 tablet 3     ASPIRIN EC PO Take 81 mg by mouth       Blood Pressure Monitoring (BLOOD PRESSURE CUFF) MISC Imron blood cuff    Please check your blood pressure 2-3 times per week.  Goal is <140/90 1 each 0     clonazePAM (KLONOPIN) 0.5 MG tablet Take 0.5 mg by mouth daily       COMPRESSION STOCKINGS 1 each daily 2 each 1     fluticasone (FLONASE) 50 MCG/ACT spray Spray 1 spray into both nostrils daily 1 Bottle 1     fluticasone-vilanterol (BREO ELLIPTA) 200-25 MCG/INH inhaler Inhale 1 puff into the lungs daily 1 Inhaler 3     hydrochlorothiazide (MICROZIDE) 12.5 MG capsule Take 1 capsule (12.5 mg) by mouth daily 90 capsule 3     ibuprofen (ADVIL,MOTRIN) 200 MG tablet Take 3 tablets (600 mg) by mouth 3 times daily (with meals) 90 tablet 0     latanoprost (XALATAN) 0.005 % ophthalmic solution 1 drop       losartan (COZAAR) 50 MG tablet Take 2 tablets (100 mg) by mouth daily 90 tablet 3     magnesium 250 MG tablet Take 1 tablet by mouth daily       Magnesium Salicylate Tetrahyd (DOANS EXTRA STRENGTH) 580 MG TABS        meloxicam (MOBIC) 15 MG tablet Take 1 tablet (15 mg) by mouth daily 15 tablet 0     naltrexone (DEPADE/REVIA) 50 MG tablet Take 1/2 tablet.  Time it one to two hours prior to worst cravings.  Then increase to one full tablet as instructed. 60 tablet 2     naproxen (NAPROSYN) 500 MG tablet Take 1 tablet (500 mg) by mouth 2 times daily (with meals) 30 tablet 1     order for DME Equipment being ordered: Waist-high compression stockings (waist-high), appropriately sized, 20-30 mm Hg 1 each 1     order for DME Equipment being ordered: Wheelchair Sig: Equipment being ordered: portable walker with seat and compartment under the seat.     Use for going out of the house on errands, where prolonged standing is required. 1 Device 1      order for DME Equipment being ordered: portable walker with seat and compartment under the seat.    Use for going out of the house on errands, where prolonged standing is required. 1 Units 0     order for DME Equipment being ordered: knee high compression stockings, class 1 or 2, night time velcro compression garments, lymphedema bandaging supplies, darco boots to wear during treatment 2 each 3     order for DME Equipment being ordered: Oxygen  Please provide portable oxygen concentrator (pt would like over the shoulder oxygen device) for portability at 2LPM with activity via nasal canula. 1 Device 1     predniSONE (DELTASONE) 20 MG tablet Take 2 tablets daily for 4 days       quetiapine (SEROQUEL) 200 MG tablet Take 200 mg by mouth At Bedtime.       simvastatin (ZOCOR) 20 MG tablet Take 1 tablet (20 mg) by mouth daily 90 tablet 0     tiZANidine (ZANAFLEX) 4 MG tablet Take 1 tablet (4 mg) by mouth nightly as needed for muscle spasms 10 tablet 0     umeclidinium (INCRUSE ELLIPTA) 62.5 MCG/INH inhaler Inhale 1 puff into the lungs daily 7 Inhaler 3       Allergies   Allergen Reactions     Azithromycin      Other reaction(s): Itching     Codeine Nausea and Vomiting     Hydrocodone      Vomiting     Metronidazole Nausea     Percocet [Oxycodone-Acetaminophen]      Vomiting     Pollen Extract      Other reaction(s): Other, see comments  Pt states that she has sneezing and runny nose.      Seasonal Allergies Other (See Comments)     Pt states that she has sneezing and runny nose.      Vicodin [Hydrocodone-Acetaminophen]      Vomiting     Zolpidem Other (See Comments)     Amnestic behavior     Oxycodone Itching and Rash       Review of Systems:  -Constitutional: Patient is otherwise feeling well, in usual state of health.   -Skin: As above in HPI. No additional skin concerns.    Physical exam:  Vitals: LMP  (LMP Unknown)   GEN: This is a well developed, well-nourished female in no acute distress, in a pleasant mood.    SKIN:  Focused examination of the face was performed.  - Mc Type I  - 1.2 cm x 0.7 cm tan to brown waxy stuck on papule with surrounding erythema on the L jawline.   - No other lesions of concern on areas examined.     Impression/Plan:    1. Seborrheic keratosis, inflamed.   - We discussed the risks, benefits and efficacy of treating the lesion with cryotherapy. The patient agrees to this plan. We advised the patient to follow up in 6 weeks for repeat cryotherapy or biopsy if unresolved.   - Cryotherapy procedure note: After verbal consent and discussion of risks and benefits including but no limited to dyspigmentation/scar, blister, and pain, 1 was(were) treated with 1-2mm freeze border for 3 cycles with liquid nitrogen. Post cryotherapy instructions were provided.    Follow-up in 6 weeks for a follow up, earlier for new or changing lesions.     Staff Involved:  Scribe/Staff    Scribe Disclosure  I, Kiki Desir, am serving as a scribe to document services personally performed by Dr. Armand Rodriges MD, based on data collection and the provider's statements to me.     Provider Disclosure:   The documentation recorded by the scribe accurately reflects the services I personally performed and the decisions made by me.    Armand Rodriges MD    Department of Dermatology  [facility]: Phone: [phone], Fax:[phone]  [facility]: Phone: [phone], Fax: [phone]

## 2020-01-02 NOTE — PROGRESS NOTES
1) Faxed pulmonary rehab order to [facility]  2) Faxed overnight oximetry test to [facility]  3) Scheduled 6 min walk with titration and Chest CT  4) Sent staff message to palliative care group to schedule pt

## 2020-01-02 NOTE — PATIENT INSTRUCTIONS
Cryotherapy    What is it?    Use of a very cold liquid, such as liquid nitrogen, to freeze and destroy abnormal skin cells that need to be removed    What should I expect?    Tenderness and redness    A small blister that might grow and fill with dark purple blood. There may be crusting.    More than one treatment may be needed if the lesions do not go away.    How do I care for the treated area?    Gently wash the area with your hands when bathing.    Use a thin layer of Vaseline to help with healing. You may use a Band-Aid.     The area should heal within 7-10 days and may leave behind a pink or lighter color.     Do not use an antibiotic or Neosporin ointment.     You may take acetaminophen (Tylenol) for pain.     Call your Doctor if you have:    Severe pain    Signs of infection (warmth, redness, cloudy yellow drainage, and or a bad smell)    Questions or concerns    Who should I call with questions?       [facility]: [phone]       [facility]: [phone]       For urgent needs outside of business hours call the [facility] at [phone]        and ask for the dermatology resident on call

## 2020-01-02 NOTE — NURSING NOTE
Chief Complaint   Patient presents with     Derm Problem     Ca is here today for an SK on her left jawline. Pt denies FBSE.        Prema Chavis LPN

## 2020-01-06 ENCOUNTER — TELEPHONE (OUTPATIENT)
Dept: INTERNAL MEDICINE | Facility: CLINIC | Age: 75
End: 2020-01-06

## 2020-01-06 ENCOUNTER — NURSE TRIAGE (OUTPATIENT)
Dept: NURSING | Facility: CLINIC | Age: 75
End: 2020-01-06

## 2020-01-06 NOTE — TELEPHONE ENCOUNTER
Reviewed notes in Care Everywhere 1/4/2020 and 1/6/2020. The actual CXR nor the report was available for review. However, the ED notes suggest a possible infection and therefore she was started on antibiotics.    BOBBY Saundesr

## 2020-01-06 NOTE — TELEPHONE ENCOUNTER
Pt over the age to triage per policy, called left a  for her to call and schedule an apt. Esperanza Julien Paramedic on 1/6/2020 at 9:42 AM

## 2020-01-06 NOTE — TELEPHONE ENCOUNTER
Health Call Center    Phone Message    May a detailed message be left on voicemail: yes    Reason for Call: Symptoms or Concerns     If patient has red-flag symptoms, warm transfer to triage line    Current symptom or concern: URI    Symptoms have been present for:  4 day(s)    Has patient previously been seen for this? Yes    By : Abbot Overton     Date: 01/05/20    Are there any new or worsening symptoms? Yes: Pt was prescribed prednisone, would like to discuss with care team, cannot sleep.      Action Taken: Message routed to:  [facility] KALEIGH

## 2020-01-06 NOTE — TELEPHONE ENCOUNTER
Health Call Center    Phone Message    May a detailed message be left on voicemail: yes    Reason for Call: Other: Pt would like Dr Saunders to look at xrays from [facility] from the weekend. Need to request. Please call pt with any questions.    Action Taken: Message routed to:  [facility]

## 2020-01-06 NOTE — TELEPHONE ENCOUNTER
[facility] xrays. Pt had them this weekend. . She has a little pnuemonia per pt.   She went back to the ER, she is on albuterol and steriods. Then went back to ER and asked for anxiety medications. While talking to her she was not coughing, nor did she sound winded. Esperanza Julien Paramedic on 1/6/2020 at 3:53 PM

## 2020-01-06 NOTE — TELEPHONE ENCOUNTER
Caller states she is having difficulty sleeping and needs to go to ED. Caller states she has been on the prednisone for COPD for one day and it is causing her to have insomnia. Caller states she is also having difficulty breathing and has been using her albuterol nebs every 4 hours. Caller is able to carry out full sentences without any shortness of breath, cough or wheezing noted via phone. Triage guidelines recommend to go to ED, caller refused disposition.    Reason for Disposition    Asthma medicine (nebulizer or inhaler) is needed more frequently than q 4 hours to keep you comfortable    Additional Information    Difficulty breathing    Negative: [1] Breathing stopped AND [2] hasn't returned    Negative: Choking on something    Negative: Severe difficulty breathing (e.g., struggling for each breath, speaks in single words)    Negative: Bluish (or gray) lips or face now    Negative: Difficult to awaken or acting confused (e.g., disoriented, slurred speech)    Negative: Passed out (i.e., lost consciousness, collapsed and was not responding)    Negative: Wheezing started suddenly after medicine, an allergic food or bee sting    Negative: Stridor    Negative: Slow, shallow and weak breathing    Negative: Sounds like a life-threatening emergency to the triager    Negative: Chest pain    [1] Wheezing (high pitched whistling sound) AND [2] previous asthma attacks or use of asthma medicines    Negative: Severe difficulty breathing (e.g., struggling for each breath, speak in single words, pulse > 120)    Negative: Bluish (or gray) lips or face now    Negative: Difficult to awaken or acting confused (e.g., disoriented, slurred speech)    Negative: Passed out (i.e., lost consciousness, collapsed and was not responding)    Negative: Wheezing started suddenly after medicine, an allergic food, or bee sting    Negative: Sounds like a life-threatening emergency to the triager    Negative: [1] SEVERE asthma attack (e.g., very  SOB at rest, speaks in single words, loud wheezes) AND [2] not resolved after 2 nebulizer or inhaler treatments    Negative: Peak flow rate less than 50% of baseline level (RED zone)    Negative: [1] Severe wheezing or coughing AND [2] doesn't have neb or inhaler available    Negative: Chest pain    Negative: [1] Hospitalized before with asthma AND [2] feels the same now    Negative: [1] MODERATE asthma attack (e.g., SOB at rest, speaks in phrases, audible wheezes) AND [2] not resolved after 2 nebulizer or inhaler treatments given 20 minutes apart    Negative: [1] Peak flow rate 50-80% of baseline level (YELLOW zone) AND [2] not resolved after 2 nebulizer or inhaler treatments given 20 minutes apart    Protocols used: ASTHMA ATTACK-A-AH, INSOMNIA-A-AH, BREATHING DIFFICULTY-A-AH

## 2020-01-07 ENCOUNTER — TELEPHONE (OUTPATIENT)
Dept: INTERNAL MEDICINE | Facility: CLINIC | Age: 75
End: 2020-01-07

## 2020-01-07 ENCOUNTER — NURSE TRIAGE (OUTPATIENT)
Dept: NURSING | Facility: CLINIC | Age: 75
End: 2020-01-07

## 2020-01-07 DIAGNOSIS — J44.1 COPD EXACERBATION (H): ICD-10-CM

## 2020-01-07 DIAGNOSIS — J44.9 CHRONIC OBSTRUCTIVE PULMONARY DISEASE, UNSPECIFIED COPD TYPE (H): Primary | ICD-10-CM

## 2020-01-07 DIAGNOSIS — F10.10 ALCOHOL ABUSE: ICD-10-CM

## 2020-01-07 DIAGNOSIS — I51.89 DIASTOLIC DYSFUNCTION: ICD-10-CM

## 2020-01-07 DIAGNOSIS — I10 HYPERTENSION: ICD-10-CM

## 2020-01-07 DIAGNOSIS — K21.9 GASTROESOPHAGEAL REFLUX DISEASE: ICD-10-CM

## 2020-01-07 DIAGNOSIS — R06.02 SOB (SHORTNESS OF BREATH): ICD-10-CM

## 2020-01-07 DIAGNOSIS — F41.9 ANXIETY: ICD-10-CM

## 2020-01-07 NOTE — TELEPHONE ENCOUNTER
Clinic Action Needed: YES  [facility] Triage Call  Presenting Problem:    Patient would like a provider to call back in the morning, states she cannot sleep with the prednisone and needs her PCP to call her back at [phone].     Routed to:    Message sent to PCP Dr. Saunders's team with the [facility] to call pt back   [facility] [employer]

## 2020-01-07 NOTE — TELEPHONE ENCOUNTER
[facility] Call Center    Phone Message    May a detailed message be left on voicemail: yes    Reason for Call: Symptoms or Concerns     If patient has red-flag symptoms, warm transfer to triage line    Current symptom or concern: Pt Is wanting to get a call back, Pt states she has Pneumonia and states she is really sick. Pt states she thinks she should be in the hospital but they don't think she should be.     Symptoms have been present for:  4 day(s)    Has patient previously been seen for this? Yes    CRYSTAL Simmons     Date: 1/7/2020    Are there any new or worsening symptoms? Yes: Really Sick       Action Taken: Message routed to:  [facility] Yassine

## 2020-01-07 NOTE — TELEPHONE ENCOUNTER
[facility] Call Center    Phone Message    May a detailed message be left on voicemail: yes    Reason for Call: Order(s): Home Care Orders: Other: .     Tanesha from [facility] called to inform us that the pt has had multiple ER visits with [facility]. The pt is requesting orders for home care nurse visits due to anxiety and upper respiratory infection. Please call pt to discuss further     Action Taken: Message routed to:  [facility] KALEIGH

## 2020-01-07 NOTE — TELEPHONE ENCOUNTER
Has not taken ativan. It makes her feel like people are in the room with her. She is requesting something else as she cant sleep from the steroid. Esperanza Julien Paramedic on 1/7/2020 at 9:56 AM

## 2020-01-07 NOTE — TELEPHONE ENCOUNTER
Patient would like a provider to call back in the morning, states she cannot sleep with the prednisone and needs her PCP to call her back. Message sent to PCP Dr. Saunders's team with the [facility] to call pt back at [phone].     Reason for Disposition    [1] Caller requests to speak ONLY to PCP AND [2] NON-URGENT question    Protocols used: PCP CALL - NO TRIAGE-A-

## 2020-01-08 NOTE — TELEPHONE ENCOUNTER
Called pt back, got Vm. She has an upcoming apt with MD Esperanza Julien Paramedic on 1/8/2020 at 8:39 AM

## 2020-01-09 NOTE — TELEPHONE ENCOUNTER
Called the patient, left a VM, advised she can make an apt with any provider but also see MD on 15th. Esperanza Julien Paramedic on 1/9/2020 at 11:38 AM

## 2020-01-10 ENCOUNTER — TELEPHONE (OUTPATIENT)
Dept: INTERNAL MEDICINE | Facility: CLINIC | Age: 75
End: 2020-01-10

## 2020-01-10 NOTE — TELEPHONE ENCOUNTER
[facility] Call Center    Phone Message    May a detailed message be left on voicemail: yes    Reason for Call: Per Tanesha, [facility] Care Coordinator, no need to put in home care orders. Pt has been admitted to [facility] and they will do orders. Any questions, feel free to give Tanesha a call back at [phone].     Action Taken: Message routed to:  [facility] Primary Care Clinic

## 2020-01-15 ENCOUNTER — OFFICE VISIT (OUTPATIENT)
Dept: INTERNAL MEDICINE | Facility: CLINIC | Age: 75
End: 2020-01-15
Payer: COMMERCIAL

## 2020-01-15 ENCOUNTER — ANCILLARY PROCEDURE (OUTPATIENT)
Dept: CT IMAGING | Facility: CLINIC | Age: 75
End: 2020-01-15
Payer: COMMERCIAL

## 2020-01-15 ENCOUNTER — OFFICE VISIT (OUTPATIENT)
Dept: PHARMACY | Facility: CLINIC | Age: 75
End: 2020-01-15
Payer: COMMERCIAL

## 2020-01-15 VITALS
TEMPERATURE: 98.3 F | SYSTOLIC BLOOD PRESSURE: 147 MMHG | HEART RATE: 89 BPM | DIASTOLIC BLOOD PRESSURE: 94 MMHG | OXYGEN SATURATION: 90 %

## 2020-01-15 DIAGNOSIS — E78.5 HYPERLIPIDEMIA, UNSPECIFIED HYPERLIPIDEMIA TYPE: ICD-10-CM

## 2020-01-15 DIAGNOSIS — M54.40 LOW BACK PAIN WITH SCIATICA, SCIATICA LATERALITY UNSPECIFIED, UNSPECIFIED BACK PAIN LATERALITY, UNSPECIFIED CHRONICITY: ICD-10-CM

## 2020-01-15 DIAGNOSIS — I10 BENIGN ESSENTIAL HYPERTENSION: ICD-10-CM

## 2020-01-15 DIAGNOSIS — F41.9 ANXIETY: ICD-10-CM

## 2020-01-15 DIAGNOSIS — H40.009 GLAUCOMA SUSPECT, UNSPECIFIED LATERALITY: ICD-10-CM

## 2020-01-15 DIAGNOSIS — J44.9 CHRONIC OBSTRUCTIVE PULMONARY DISEASE, UNSPECIFIED COPD TYPE (H): ICD-10-CM

## 2020-01-15 DIAGNOSIS — J96.11 CHRONIC HYPOXEMIC RESPIRATORY FAILURE (H): ICD-10-CM

## 2020-01-15 DIAGNOSIS — J15.9 COMMUNITY ACQUIRED BACTERIAL PNEUMONIA: Primary | ICD-10-CM

## 2020-01-15 DIAGNOSIS — K59.00 CONSTIPATION, UNSPECIFIED CONSTIPATION TYPE: ICD-10-CM

## 2020-01-15 DIAGNOSIS — R05.9 COUGH: Primary | ICD-10-CM

## 2020-01-15 PROCEDURE — 99607 MTMS BY PHARM ADDL 15 MIN: CPT | Performed by: PHARMACIST

## 2020-01-15 PROCEDURE — 99605 MTMS BY PHARM NP 15 MIN: CPT | Performed by: PHARMACIST

## 2020-01-15 RX ORDER — DOXYCYCLINE 100 MG/1
100 CAPSULE ORAL 2 TIMES DAILY
Qty: 10 CAPSULE | Refills: 0 | Status: SHIPPED | OUTPATIENT
Start: 2020-01-15 | End: 2020-01-21

## 2020-01-15 RX ORDER — DEXTROMETHORPHAN HBR. AND GUAIFENESIN 10; 100 MG/5ML; MG/5ML
5 SOLUTION ORAL 4 TIMES DAILY PRN
Qty: 180 ML | Refills: 0 | Status: SHIPPED | OUTPATIENT
Start: 2020-01-15 | End: 2020-01-24

## 2020-01-15 NOTE — PROGRESS NOTES
"SUBJECTIVE/OBJECTIVE:                Ca Yan is a 74 year old female coming in for a transitions of care visit.  She was discharged from [facility] on 1/12/20 for CAP of right lower lobe, parapneumonic effusion, COPD, and cognitive impairment.      Chief Complaint: Post discharge follow up.  Personal Healthcare Goals: To feel better and figure out her inhalers    Personal Cell: [phone]    Allergies/ADRs: Reviewed in Epic  Tobacco:  reports that she quit smoking about 39 years ago. Her smoking use included cigarettes. She started smoking about 63 years ago. She has a 2.50 pack-year smoking history. She quit smokeless tobacco use about 39 years ago.  Alcohol: Didn't assess  Caffeine: Didn't assess  Activity: ADLs  PMH: Obstructive breathing issues and difficulty sleeping    Medication Adherence/Access:  Per patient, doesn't take inhalers as indicated times per week.     Community-acquired pneumonia: Current medications: Robitussin 100mg/5ml - 10 ml by mouth every 4 hours as needed for cough (she doesn't have any of this, would like some), Prednisone 20 mg - Take 2 tablets daily for today until 1/15/20, benzocaine-menthol 15-3.6 mg lozenge - 1 lozenge every 2 hours as needed. Finished up last dose of Augmentin 875-125 mg and doxycycline 100 mg on day of discharge.   She was started on IV antibiotics for her pneumonia at the hospital. An ultrasound was done to assess whether she had enough pleural fluid for thoracentesis. She did not. She was continued on antibiotics, which were changed to oral at 1 point due to loss of stable IV access. She continued to improve. Respiratory therapy followed. She is on 2 L oxygen at night at home but she had had been using it around the clock. She was stable on 2 L of oxygen here, and did not need anymore at discharge. Still doesn't feel well and feels head congestion.     She has been \"starving\" since taking prednisone - eating everything in sight. Patient asked " for a Robitussin prescription and is receiving another CT to verify status of pneumonia.     Asthma/COPD: Current medications: ICS/LABA- Breo 1 puff once daily, Albuterol sulfate  - Inhale 1-2 puffs every 4 hours as needed, albuterol 2.5 mg/3 mL nublizer solution - Inhale 1 vial every 6 hours as needed for wheezing.   Pt is not experiencing side effects.   Pt reports the following symptoms: recent hospitalization.  Pt does not have an COPD Action Plan on file.   Has spirometry been completed: Doesn't know  COPD diagnosis is not certain. Breathing difficulties may be from kyphoscoliosis.   FEV1/FVC ratio 0.71.  FVC is 1.12 L which is 42% predicted.  FEV1 is 0.8 L which is 39% predicted.   Patient is confused by inhalers and doesn't know which one she is using.  Incruse Ellipta was discontinued in the hospital.     Anxiety:  Current medications include: Quetiapine 25 mg three times daily as needed for anxiety or agitation (not using) and quetiapine 200 mg at bedtime.  Still helps her fall asleep easily.     Constipation:  Current medications include: Senna 8.6 mg once daily as needed.      Hypertension: Current medications include hydrochlorothiazide 12.5 mg once daily at bedtime, losartan 25 mg once daily at bedtime .  Patient reports no current medication side effects.  BP Readings from Last 3 Encounters:   01/15/20 (!) 147/94   12/30/19 110/78   12/26/19 130/88     Hyperlipidemia: Current therapy includes simvastatin 20mg once daily.  Pt reports no significant myalgias or other side effects.      ACC Calculated Current 10 Year ASCVD Risk: 18.2%    Bilateral Glaucoma:  Current medications include: Latanoprost 0.005% eye drops - Place 1 drop into both eyes at bedtime.      Chronic pain:  Current medications include: Acetaminophen 325 mg - Take 2 tablets by mouth every 6 hours as needed. Not taking regularly and gets confused between acetaminophen and ibuprofen. Cyclobenzaprine was discontinued in hospital. She  "is not taking tizanidine.     BP Readings from Last 1 Encounters:   01/15/20 (!) 147/94     Pulse Readings from Last 1 Encounters:   01/15/20 89     Wt Readings from Last 1 Encounters:   12/30/19 197 lb 1.6 oz (89.4 kg)     Ht Readings from Last 1 Encounters:   12/30/19 5' 4\" (1.626 m)     Estimated body mass index is 33.83 kg/m  as calculated from the following:    Height as of 12/30/19: 5' 4\" (1.626 m).    Weight as of 12/30/19: 197 lb 1.6 oz (89.4 kg).    Temp Readings from Last 1 Encounters:   01/15/20 98.3  F (36.8  C) (Oral)       ASSESSMENT:                 Medication Adherence: poor, needs improvement - see below    Community Acquired Pneumonia: Unresolved. Patient would benefit from Robitussin prescription to improve cough symptoms. Patient receiving a repeat CT to confirm resolution pnemonia.      COPD/Asthma: Needs Improvement. Patient would benefit from education on inhalers and reconciliation on which inhaler to use.     Anxiety: Stable. Patient would benefit from continuing Seroquel 200 mg at bedtime and discontinuing the Seroquel 25 mg three times daily as needed due to lack of need.    Constipation: Stable. Patient would benefit from continuing senna once daily as needed.      Hypertension: Needs Improvement. Patient is not meeting BP goal of < 140/90mmHg. This could be due to prednisone use. Continuing to monitor is sensible.     Hyperlipidemia: Stable. Pt is on moderate intensity statin which is indicated based on 2019 ACC/AHA guidelines for lipid management. A current lipid panel would be beneficial since last lipid panel on 7/9/19 showed an elevated LDL.     Bilateral glaucoma: Stable. Patient would benefit from continuing latanoprost drops.      Chronic Pain: Stable. Pain is well controlled and patient has no identified aberrant behaviors.    PLAN:                Post Discharge Medication Reconciliation Status: discharge medications reconciled and changed, per note/orders (see AVS).    1. Sent " prescription for Robitussin cough syrup to improve cough symptoms.  2. Discontinue Seroquel 25 mg three times daily as needed. Continue Seroquel 200 mg at bedtime.  3. Monitor blood pressure at next appointment.  4. Recommending a new lipid panel.     I spent 30 minutes with this patient today. All changes were made via verbal approval with Jefferson Saunders. A copy of the visit note was provided to the patient's primary care provider.    Will follow up in 1 week to identify which inhaler to use and education surrounding its use.    The patient declined a summary of these recommendations as an after visit summary.    Nell Hathaway, Pharm.D., [facility]  Medication Therapy Management Pharmacist  Page/VM:  [phone]

## 2020-01-15 NOTE — NURSING NOTE
Chief Complaint   Patient presents with     [facility] F/U       Jama De La Vega CMA (MA) at 11:49 AM on 1/15/2020

## 2020-01-15 NOTE — PROGRESS NOTES
HPI:    Pt. With h/o anxiety, HTN, increased cholesterol, and breathing issues comes in for follow up today. She was seen by Dr. Taylor, pulmonary 12/30/2019 and he is not entirely sure she has a dx. Of COPD. She is scheduled for a Chest CT scan today 1/15/2020. She was admitted to [facility] 1/6/2020 for possible R sided pneumonia and discharged 1/12/2020. She was treated with antibiotics and prednisone. She states she is better but still not back to her baseline. She states the prednisone has increased her appetite and has trouble sleeping. She has a little nasal drainage. She states she uses O2 at home but not while she is out of the house. She states she is eating and able to do her house work. It was recommended she go to a TCU but she declined. She sates inhalers are not that effective. She took [employer] here today. No additional HEENT, cardiopulmonary, abdominal, , GYN, neurological, systemic, psychiatric complaints.       Past Medical History:   Diagnosis Date     Anxiety      Arthritis      Breast cancer (H)      COPD (chronic obstructive pulmonary disease) (H)     6/19/12:  FEV 0.99 l     Depressive disorder      Hypertension      Low back pain with left-sided sciatica      Low bone density 4/13/2017    DEXA April 12, 2017: T score -2.0. Normal Z score. FRAX risk: major osteoporotic fracture 11.9%, hip fracture 2.6%, therefore not high-risk     Lymphedema, chronic lower extremities      Past Surgical History:   Procedure Laterality Date     BACK SURGERY      spinal fusion     COLONOSCOPY       LUMPECTOMY BREAST       ORTHOPEDIC SURGERY      Knee surgery left side     PE:    Vitals noted, she is not using O2, gen, nad, cooperative, breathing comfortably, HEENT, oropharynx clear no exudate, neck supple nl rom, lungs with fair/good air movement with expiratory wheezing, RRR, S1, S2, soft murmur, abdomen, no acute findings, grossly normal neurological exam.    A/P:    1. Pulmonary, seen by Dr. Taylor  12/30/2019 and she has chest CT scan today. Will review scan if still with air space findings will treat longer with doxycyline. Also discussed another short course of prednisone but she is reluctant to take this  2. She will see [facility] pharmacy today  3. She has 2/13/2020 follow up in Dermatology  4. She has 1/16/2020 palliative medicine appt. Tomorrow 1/16/2020  5. She was seen 12/23/2019  Urology for chronic UTI's and note is present in Care Everywhere  6. She remains on Seroquel for anxiety.   7. HTN; she remains on hydrochlorothiazide and Losartan; electrolytes and renal function checked stable 1/7/2020.   8. H/o breast cancer; mammogram done 3/13/2019    Total time spent 40 minutes.  More than 50% of the time spent with Ms. Yan on counseling / coordinating her care

## 2020-01-16 ENCOUNTER — TELEPHONE (OUTPATIENT)
Dept: INTERNAL MEDICINE | Facility: CLINIC | Age: 75
End: 2020-01-16

## 2020-01-16 NOTE — TELEPHONE ENCOUNTER
Doxycyline, jan 4th. Got RX from [facility].  In hospital. When got out started taking it  It has been almost 4 days  Needs cough syrup sent out to her. cancel the Doxycyline. Will call the pharmacy. Done Esperanza Julien Paramedic on 1/16/2020 at 1:29 PM '

## 2020-01-16 NOTE — TELEPHONE ENCOUNTER
Health Call Center    Phone Message    May a detailed message be left on voicemail: yes    Reason for Call: Medication Question or concern regarding medication   Prescription Clarification  Name of Medication: Mucinex   Prescribing Provider: Dr. Saunders    Pharmacy: [phone]       Pt is wondering if she can take Mucinex with her current medications. Her pneumonia symptoms are worsening. Not able to sleep laying down due to the phlegm. Pt would like a call from a nurse to discuss         Action Taken: Message routed to:  [facility]

## 2020-01-17 NOTE — TELEPHONE ENCOUNTER
[facility] Call Center    Phone Message    May a detailed message be left on voicemail: yes    Reason for Call: Symptoms or Concerns     If patient has red-flag symptoms, warm transfer to triage line    Current symptom or concern: pneumonia not getting better    Symptoms have been present for:  unknown day(s)    Has patient previously been seen for this? Yes    By : Dr. Saunders    Date: 1/15/20    Are there any new or worsening symptoms? Yes: Pt stated she is not getting better, does not know what to do, would like to speak with someone today      Action Taken: Message routed to:  [facility]

## 2020-01-21 ENCOUNTER — OFFICE VISIT (OUTPATIENT)
Dept: INTERNAL MEDICINE | Facility: CLINIC | Age: 75
End: 2020-01-21
Payer: COMMERCIAL

## 2020-01-21 ENCOUNTER — DOCUMENTATION ONLY (OUTPATIENT)
Dept: CARE COORDINATION | Facility: CLINIC | Age: 75
End: 2020-01-21

## 2020-01-21 ENCOUNTER — TELEPHONE (OUTPATIENT)
Dept: INTERNAL MEDICINE | Facility: CLINIC | Age: 75
End: 2020-01-21

## 2020-01-21 VITALS
BODY MASS INDEX: 33.97 KG/M2 | DIASTOLIC BLOOD PRESSURE: 73 MMHG | SYSTOLIC BLOOD PRESSURE: 102 MMHG | HEART RATE: 83 BPM | TEMPERATURE: 97.9 F | OXYGEN SATURATION: 91 % | WEIGHT: 197.9 LBS

## 2020-01-21 DIAGNOSIS — E78.5 HYPERLIPIDEMIA, UNSPECIFIED HYPERLIPIDEMIA TYPE: ICD-10-CM

## 2020-01-21 DIAGNOSIS — M62.81 GENERALIZED MUSCLE WEAKNESS: Primary | ICD-10-CM

## 2020-01-21 DIAGNOSIS — Z13.1 SCREENING FOR DIABETES MELLITUS: ICD-10-CM

## 2020-01-21 DIAGNOSIS — M62.81 GENERALIZED MUSCLE WEAKNESS: ICD-10-CM

## 2020-01-21 LAB
ALBUMIN UR-MCNC: NEGATIVE MG/DL
ANION GAP SERPL CALCULATED.3IONS-SCNC: 1 MMOL/L (ref 3–14)
APPEARANCE UR: ABNORMAL
BILIRUB UR QL STRIP: NEGATIVE
BUN SERPL-MCNC: 26 MG/DL (ref 7–30)
CALCIUM SERPL-MCNC: 9.5 MG/DL (ref 8.5–10.1)
CHLORIDE SERPL-SCNC: 104 MMOL/L (ref 94–109)
CHOLEST SERPL-MCNC: 209 MG/DL
CO2 SERPL-SCNC: 34 MMOL/L (ref 20–32)
COLOR UR AUTO: YELLOW
CREAT SERPL-MCNC: 0.81 MG/DL (ref 0.52–1.04)
ERYTHROCYTE [DISTWIDTH] IN BLOOD BY AUTOMATED COUNT: 14.9 % (ref 10–15)
GFR SERPL CREATININE-BSD FRML MDRD: 71 ML/MIN/{1.73_M2}
GLUCOSE SERPL-MCNC: 81 MG/DL (ref 70–99)
GLUCOSE UR STRIP-MCNC: NEGATIVE MG/DL
HCT VFR BLD AUTO: 43.4 % (ref 35–47)
HDLC SERPL-MCNC: 89 MG/DL
HGB BLD-MCNC: 13.4 G/DL (ref 11.7–15.7)
HGB UR QL STRIP: NEGATIVE
HYALINE CASTS #/AREA URNS LPF: 3 /LPF (ref 0–2)
KETONES UR STRIP-MCNC: NEGATIVE MG/DL
LDLC SERPL CALC-MCNC: 100 MG/DL
LEUKOCYTE ESTERASE UR QL STRIP: NEGATIVE
MCH RBC QN AUTO: 30.1 PG (ref 26.5–33)
MCHC RBC AUTO-ENTMCNC: 30.9 G/DL (ref 31.5–36.5)
MCV RBC AUTO: 98 FL (ref 78–100)
MUCOUS THREADS #/AREA URNS LPF: PRESENT /LPF
NITRATE UR QL: NEGATIVE
NONHDLC SERPL-MCNC: 120 MG/DL
PH UR STRIP: 5 PH (ref 5–7)
PLATELET # BLD AUTO: 244 10E9/L (ref 150–450)
POTASSIUM SERPL-SCNC: 4.1 MMOL/L (ref 3.4–5.3)
RBC # BLD AUTO: 4.45 10E12/L (ref 3.8–5.2)
RBC #/AREA URNS AUTO: 2 /HPF (ref 0–2)
SODIUM SERPL-SCNC: 140 MMOL/L (ref 133–144)
SOURCE: ABNORMAL
SP GR UR STRIP: 1.02 (ref 1–1.03)
SQUAMOUS #/AREA URNS AUTO: 8 /HPF (ref 0–1)
TRIGL SERPL-MCNC: 104 MG/DL
UROBILINOGEN UR STRIP-MCNC: 0 MG/DL (ref 0–2)
WBC # BLD AUTO: 7.3 10E9/L (ref 4–11)
WBC #/AREA URNS AUTO: 2 /HPF (ref 0–5)

## 2020-01-21 ASSESSMENT — PAIN SCALES - GENERAL: PAINLEVEL: NO PAIN (0)

## 2020-01-21 NOTE — PROGRESS NOTES
"[facility]   Trisha Garcia, RUPAL CNP  01/21/2020      Chief Complaint:   Hospital F/U       History of Present Illness:   Ca Yan is a 74 year old female with a history of breast cancer, COPD, and irritable bowl syndrome who presents for evaluation of hospital follow-up. She was admitted to the hospital 1/7 with pneumonia and parapneumonic effusion. She was treated with antibiotics and Prednisone which she has finished. During her hospital stay she underwent pulmonary rehab. At home she is on 2L oxygen overnight.     Today she feels \"generally sick, all over\". She has ongoing fatigue, she wants to sleep all night and stay in bed during the day. Her appetite is good-she is more hungry than usual, and bowel movements are normal. She endorses urinary frequency, going to the bathroom every 15 minutes, which she attributes to high caffeine/fluid intake. She denies lower abdominal discomfort, she has had multiple UTIs and doesn't feel like her current symptoms match them. Her lower extremity edema is unchanged from baseline. She has a productive cough with white sputum. When she lays down she wheezes. She has nasal congestion, she has been blowing her nose for weeks and continues to have thick white nasal discharge. For inhalers she is using Albuterol PRN and Incruse Ellipta once a day along with the remainder of her medications as prescribed. She is on supplemental oxygen at home because it makes her feel much better.    During her hospital stay she was also noted to have cognitive impairment with a MOCA score of 15/30.     Review of Systems:   Pertinent items are noted in HPI, remainder of complete ROS is negative.      Active Medications:     Current Outpatient Medications:      acetaminophen (TYLENOL) 500 MG tablet, Take 500-1,000 mg by mouth every 6 hours as needed for mild pain, Disp: , Rfl:      albuterol (PROAIR HFA/PROVENTIL HFA/VENTOLIN HFA) 108 (90 Base) MCG/ACT inhaler, Inhale " 2 puffs into the lungs every 6 hours as needed for shortness of breath / dyspnea or wheezing, Disp: 3 Inhaler, Rfl: 1     amLODIPine (NORVASC) 2.5 MG tablet, Take 1 tablet (2.5 mg) by mouth daily, Disp: 90 tablet, Rfl: 3     ASPIRIN EC PO, Take 81 mg by mouth, Disp: , Rfl:      Blood Pressure Monitoring (BLOOD PRESSURE CUFF) MISC, Imron blood cuff  Please check your blood pressure 2-3 times per week.  Goal is <140/90, Disp: 1 each, Rfl: 0     COMPRESSION STOCKINGS, 1 each daily, Disp: 2 each, Rfl: 1     dextromethorphan-guaiFENesin (TUSSIN DM)  MG/5ML liquid, Take 5 mLs by mouth 4 times daily as needed (for cough), Disp: 180 mL, Rfl: 0     fluticasone-vilanterol (BREO ELLIPTA) 200-25 MCG/INH inhaler, Inhale 1 puff into the lungs daily, Disp: 1 Inhaler, Rfl: 3     hydrochlorothiazide (MICROZIDE) 12.5 MG capsule, Take 1 capsule (12.5 mg) by mouth daily, Disp: 90 capsule, Rfl: 3     ibuprofen (ADVIL,MOTRIN) 200 MG tablet, Take 3 tablets (600 mg) by mouth 3 times daily (with meals), Disp: 90 tablet, Rfl: 0     latanoprost (XALATAN) 0.005 % ophthalmic solution, 1 drop, Disp: , Rfl:      losartan (COZAAR) 50 MG tablet, Take 2 tablets (100 mg) by mouth daily, Disp: 90 tablet, Rfl: 3     magnesium 250 MG tablet, Take 1 tablet by mouth daily, Disp: , Rfl:      order for DME, Equipment being ordered: Waist-high compression stockings (waist-high), appropriately sized, 20-30 mm Hg, Disp: 1 each, Rfl: 1     order for DME, Equipment being ordered: Wheelchair Sig: Equipment being ordered: portable walker with seat and compartment under the seat.   Use for going out of the house on errands, where prolonged standing is required., Disp: 1 Device, Rfl: 1     order for DME, Equipment being ordered: portable walker with seat and compartment under the seat.  Use for going out of the house on errands, where prolonged standing is required., Disp: 1 Units, Rfl: 0     order for DME, Equipment being ordered: knee high compression  stockings, class 1 or 2, night time velcro compression garments, lymphedema bandaging supplies, darco boots to wear during treatment, Disp: 2 each, Rfl: 3     order for DME, Equipment being ordered: Oxygen  Please provide portable oxygen concentrator (pt would like over the shoulder oxygen device) for portability at 2LPM with activity via nasal canula., Disp: 1 Device, Rfl: 1     quetiapine (SEROQUEL) 200 MG tablet, Take 200 mg by mouth At Bedtime., Disp: , Rfl:      simvastatin (ZOCOR) 20 MG tablet, Take 1 tablet (20 mg) by mouth daily, Disp: 90 tablet, Rfl: 0     umeclidinium (INCRUSE ELLIPTA) 62.5 MCG/INH inhaler, Inhale 1 puff into the lungs daily, Disp: 7 Inhaler, Rfl: 3     clonazePAM (KLONOPIN) 0.5 MG tablet, Take 0.5 mg by mouth daily, Disp: , Rfl:      naltrexone (DEPADE/REVIA) 50 MG tablet, Take 1/2 tablet.  Time it one to two hours prior to worst cravings.  Then increase to one full tablet as instructed. (Patient not taking: Reported on 1/15/2020), Disp: 60 tablet, Rfl: 2      Immunizations:  Immunization History   Administered Date(s) Administered     Influenza (H1N1) 01/08/2010     Influenza (High Dose) 3 valent vaccine 02/26/2016, 10/31/2016, 10/05/2017, 10/04/2018, 10/11/2019     Influenza (IIV3) PF 11/05/2010, 11/13/2013, 08/25/2014     Mantoux Tuberculin Skin Test 04/26/2013, 02/10/2014     Pneumo Conj 13-V (2010&after) 04/17/2015     Pneumococcal 23 valent 02/06/2009, 03/30/2012     TD (ADULT, 7+) 02/07/2005, 02/07/2005     Tdap (Adacel,Boostrix) 07/09/2011      Allergies:   Azithromycin; Codeine; Hydrocodone; Metronidazole; Percocet [oxycodone-acetaminophen]; Pollen extract; Seasonal allergies; Vicodin [hydrocodone-acetaminophen]; Zolpidem; and Oxycodone      Past Medical History:  Anxiety  Arthritis  Breast cancer  COPD  Depression  Hypertension  Low back pain with sciatica  Chronic lymphedema  Low bone density  Hyperlipidemia  Alcohol abuse  Osteoarthritis of knee  Irritable bowl  syndrome  Cluster C personality disorder  Anxiety  Kyphoscoliosis    Past Surgical History:  Spine fusion  Colonoscopy  Breast lumpectomy  Left knee surgery     Family History:   Mother: breast cancer, diabetes mellitus, anxiety, asthma, cancer, hyperlipidemia  Father: alcohol/drug, mental illness  Maternal grandmother: cerebrovascular disease      Social History:   Unmarried   Former 0.5 PPD smoker for 5 years    Physical Exam:   /73 (BP Location: Right arm, Patient Position: Sitting, Cuff Size: Adult Large)   Pulse 83   Temp 97.9  F (36.6  C) (Oral)   Wt 89.8 kg (197 lb 14.4 oz)   LMP  (LMP Unknown)   SpO2 91%   BMI 33.97 kg/m       Constitutional: no distress, comfortable, pleasant   Eyes: anicteric, conjunctiva pink, normal extra-ocular movements   Ears, Nose and Throat: tympanic membranes clear, nose clear and free of lesions, throat clear.   Neck: supple with full range of motion, no thyromegaly.   Cardiovascular: regular rate and rhythm, normal S1 and S2, no murmurs, rubs or gallops, peripheral pulses full and symmetric   Respiratory: clear to auscultation, no wheezes or crackles, normal breath sounds   Gastrointestinal: positive bowel sounds, nontender, no hepatosplenomegaly, no masses   Musculoskeletal: full range of motion, no edema   Skin: no concerning lesions, no jaundice, temp normal   Neurological: cranial nerves intact, normal strength and sensation, reflexes at patella and biceps normal, normal gait, normal speech, no tremor. A and O x 3, good historian.  Psychological: appropriate mood, good eye contact, normal affect   Lymph: no axillary, cervical,  supraclavicular, infraclavicular or inguinal nodes.     Assessment and Plan:    Generalized muscle weakness  Labs ordered to evaluate for UTI or other infection, her ongoing symptoms are likely due to recent illness, hospital stay, and deconditioning. Recommended she rest at home, she deconditioned while in the hospital, advised her to move  around as tolerated to avoid losing more strength. Given her ongoing malaise she can do a trial off statin for a few days to see if this helps.   - UA with Micro reflex to Culture  - CBC with platelets  - Basic metabolic panel    Screening for diabetes mellitus  - Glucose   Results for AMAURI WOOD (MRN [id number]) as of 1/22/2020 15:06   Ref. Range 1/21/2020 09:20 1/21/2020 09:25   Sodium Latest Ref Range: 133 - 144 mmol/L 140    Potassium Latest Ref Range: 3.4 - 5.3 mmol/L 4.1    Chloride Latest Ref Range: 94 - 109 mmol/L 104    Carbon Dioxide Latest Ref Range: 20 - 32 mmol/L 34 (H)    Urea Nitrogen Latest Ref Range: 7 - 30 mg/dL 26    Creatinine Latest Ref Range: 0.52 - 1.04 mg/dL 0.81    GFR Estimate Latest Ref Range: >60 mL/min/1.73_m2 71    GFR Estimate If Black Latest Ref Range: >60 mL/min/1.73_m2 82    Calcium Latest Ref Range: 8.5 - 10.1 mg/dL 9.5    Anion Gap Latest Ref Range: 3 - 14 mmol/L 1 (L)    Cholesterol Latest Ref Range: <200 mg/dL 209 (H)    HDL Cholesterol Latest Ref Range: >49 mg/dL 89    LDL Cholesterol Calculated Latest Ref Range: <100 mg/dL 100 (H)    Non HDL Cholesterol Latest Ref Range: <130 mg/dL 120    Triglycerides Latest Ref Range: <150 mg/dL 104    Glucose Latest Ref Range: 70 - 99 mg/dL 81    WBC Latest Ref Range: 4.0 - 11.0 10e9/L 7.3    Hemoglobin Latest Ref Range: 11.7 - 15.7 g/dL 13.4    Hematocrit Latest Ref Range: 35.0 - 47.0 % 43.4    Platelet Count Latest Ref Range: 150 - 450 10e9/L 244    RBC Count Latest Ref Range: 3.8 - 5.2 10e12/L 4.45    MCV Latest Ref Range: 78 - 100 fl 98    MCH Latest Ref Range: 26.5 - 33.0 pg 30.1    MCHC Latest Ref Range: 31.5 - 36.5 g/dL 30.9 (L)    RDW Latest Ref Range: 10.0 - 15.0 % 14.9    Color Urine Unknown  Yellow   Appearance Urine Unknown  Cloudy   Glucose Urine Latest Ref Range: NEG^Negative mg/dL  Negative   Bilirubin Urine Latest Ref Range: NEG^Negative   Negative   Ketones Urine Latest Ref Range: NEG^Negative mg/dL  Negative    Specific Gravity Urine Latest Ref Range: 1.003 - 1.035   1.019   pH Urine Latest Ref Range: 5.0 - 7.0 pH  5.0   Protein Albumin Urine Latest Ref Range: NEG^Negative mg/dL  Negative   Urobilinogen mg/dL Latest Ref Range: 0.0 - 2.0 mg/dL  0.0   Nitrite Urine Latest Ref Range: NEG^Negative   Negative   Blood Urine Latest Ref Range: NEG^Negative   Negative   Leukocyte Esterase Urine Latest Ref Range: NEG^Negative   Negative   Source Unknown  Midstream Urine   WBC Urine Latest Ref Range: 0 - 5 /HPF  2   RBC Urine Latest Ref Range: 0 - 2 /HPF  2   Squamous Epithelial /HPF Urine Latest Ref Range: 0 - 1 /HPF  8 (H)   Mucous Urine Latest Ref Range: NEG^Negative /LPF  Present (A)   Hyaline Casts Latest Ref Range: 0 - 2 /LPF  3 (H)     She will be phoned with normal results.   She was encouraged to continue to drink fluids and rest and follow-up with Dr. Saunders.     Total time spent 25 minutes.  More than 50% of the time spent with Ms. Yan on counseling / coordinating her care.    Trisah GOLDSMITH CNP      Scribe Disclosure:  I, Ishmael Hull, am serving as a scribe to document services personally performed by RUPAL Black CNP at this visit, based upon the provider's statements to me. All documentation has been reviewed by the aforementioned provider prior to being entered into the official medical record.    Portions of this medical record were completed by a scribe. UPON MY REVIEW AND AUTHENTICATION BY ELECTRONIC SIGNATURE, this confirms (a) I performed the applicable clinical services, and (b) the record is accurate.

## 2020-01-21 NOTE — TELEPHONE ENCOUNTER
Health Call Center    Phone Message    May a detailed message be left on voicemail: yes    Reason for Call: Requesting Results   Name/type of test: Labs  Date of test: 01/21/20   Was test done at a location other than  [facility]?: No    Pt requests call back with results as soon as you have them - she is anxious to discuss them - wants to speak with Trisha Garcia, EVANGELISTA or any nurse at [facility] to discuss Labs - Thanks      Action Taken: Message routed to:  [facility]

## 2020-01-21 NOTE — PATIENT INSTRUCTIONS
[facility] Medication Refill Request Information:  * Please contact your pharmacy regarding ANY request for medication refills.  ** [facility] Prescription Fax = [phone]  * Please allow 3 business days for routine medication refills.  * Please allow 5 business days for controlled substance medication refills.     [facility] Test Result notification information:  *You will be notified with in 7-10 days of your appointment day regarding the results of your test.  If you are on MyChart you will be notified as soon as the provider has reviewed the results and signed off on them.    [facility]: [phone]

## 2020-01-21 NOTE — TELEPHONE ENCOUNTER
Labs are not resulted on by provider. Unable to discuss as there are abnormal results. Rosaura Taveras LPN 1/21/2020 4:04 PM

## 2020-01-21 NOTE — NURSING NOTE
Chief Complaint   Patient presents with     Hospital F/U     pt states she was in the hospital for pneumonia, still doesn't feel good, is very hungry       Norma Chery CMA at 8:19 AM on 1/21/2020.

## 2020-01-22 ENCOUNTER — TELEPHONE (OUTPATIENT)
Dept: PULMONOLOGY | Facility: CLINIC | Age: 75
End: 2020-01-22

## 2020-01-22 ENCOUNTER — TELEPHONE (OUTPATIENT)
Dept: INTERNAL MEDICINE | Facility: CLINIC | Age: 75
End: 2020-01-22

## 2020-01-22 ENCOUNTER — ALLIED HEALTH/NURSE VISIT (OUTPATIENT)
Dept: PHARMACY | Facility: CLINIC | Age: 75
End: 2020-01-22
Payer: COMMERCIAL

## 2020-01-22 DIAGNOSIS — E78.5 HYPERLIPIDEMIA, UNSPECIFIED HYPERLIPIDEMIA TYPE: ICD-10-CM

## 2020-01-22 DIAGNOSIS — R05.9 COUGH: ICD-10-CM

## 2020-01-22 DIAGNOSIS — J44.9 CHRONIC OBSTRUCTIVE PULMONARY DISEASE, UNSPECIFIED COPD TYPE (H): Primary | ICD-10-CM

## 2020-01-22 PROCEDURE — 99607 MTMS BY PHARM ADDL 15 MIN: CPT | Performed by: PHARMACIST

## 2020-01-22 PROCEDURE — 99606 MTMS BY PHARM EST 15 MIN: CPT | Performed by: PHARMACIST

## 2020-01-22 NOTE — TELEPHONE ENCOUNTER
[facility] Call Center    Phone Message    May a detailed message be left on voicemail: yes    Reason for Call: Symptoms or Concerns     Current symptom or concern: bad cough for at least a couple of weeks, pt is still coughing, stomach and diaphragm got so sore from all the coughing, pt wants to know what she can do to relieve the symptoms (really sore muscles)    Symptoms have been present for:  2 week(s)    Has patient previously been seen for this? no    By: Dr. Js Saunders    Date: Last wed, 1/15/20    Are there any new or worsening symptoms? Yes, please call pt. Thank you.      Action Taken: Message routed to:  [facility]

## 2020-01-22 NOTE — TELEPHONE ENCOUNTER
Call center called back line with patient and was relayed to the patient that results were not resulted by the provider yet. Advised that they can take 7-10 days before they are resulted on. Rosaura Taveras LPN 1/22/2020 10:28 AM

## 2020-01-22 NOTE — TELEPHONE ENCOUNTER
Health Call Center    Phone Message    May a detailed message be left on voicemail: yes    Reason for Call: Medication Question or concern regarding medication   Prescription Clarification  Name of Medication: MIGUEL   Prescribing Provider: UNABLE TO VERIFY but says that this rx is for her cough.    Pharmacy: [facility] [place], [place] - [address] [place] [address] [phone]   What on the order needs clarification? Pt is out of rx and would like a refill. Please f/u.     Action Taken: pcc

## 2020-01-22 NOTE — TELEPHONE ENCOUNTER
Called [facility] to ask for an update on the results of the overnight oximetry test for the pt. I spoke with Kelly who stated they tried to reach the pt for 2 weeks and were unsuccessful. Eventually they reached the pt and tried to drop off the overnight oximetry test at her home this morning but the pt stated she was too sick and did not want to do it and didn't know when she would want to do it. Kelly stated the pt told them she would call [facility] when she was ready to do it.

## 2020-01-22 NOTE — TELEPHONE ENCOUNTER
Called the patient she is taking ibuprofen for her stomach muscle aches from coughing. Told her it will take time Esperanza Julien Paramedic on 1/22/2020 at 12:12 PM

## 2020-01-22 NOTE — PROGRESS NOTES
SUBJECTIVE/OBJECTIVE:                Ca Yan is a 74 year old female called for a follow-up visit for Medication Therapy Management.  She was referred to me from Js Saunders.     Chief Complaint: Follow up from [facility] visit on 1/15/20.  We are following up on recent labs and inhalers    Tobacco:  reports that she quit smoking about 39 years ago. Her smoking use included cigarettes. She started smoking about 63 years ago. She has a 2.50 pack-year smoking history. She quit smokeless tobacco use about 39 years ago.  Alcohol: none    Medication Adherence/Access:  Some issues reported with inhalers - discussed below    Asthma/COPD: Current asthma medications: Short-Acting Bronchodilator: Albuterol MDI and Nebs. She doesn't use her albuterol HFA because she doesn't think it works well. She also is still not using her spacer with this device. She also has albuterol nebulizers at home and these work well. We discuss the value of having an albuterol inhaler with her when she is out of the house.   ICS/LABA- Breo 1 puff(s) once daily (does not have)  ICS/LAMA/LABA- Trelegy Ellipta (has but is not using).  Asthma triggers include: upper respiratory infections and but unfortunately she always feels short of breath. Pulmonology has documented that this is likely related to her kyphosis and scoliosis rather than a COPD process.  Pt reports the following symptoms: increased SOB at rest with recent pneumonia.  AAP on file: UNKNOWN  ACT Total Scores 11/15/2019   ACT TOTAL SCORE (Goal Greater than or Equal to 20) 19   In the past 12 months, how many times did you visit the emergency room for your asthma without being admitted to the hospital? 1   In the past 12 months, how many times were you hospitalized overnight because of your asthma? 0     Cough: Current medications include Robitussin at night. She isn't sure if this is helping at this time. Her cough persists.     Hyperlipidemia: Current therapy includes simvastatin  20 mg once daily.  Pt reports no significant myalgias or other side effects. We review her lab results today.   The 10-year ASCVD risk score (Sohan ESPINOZA Jr., et al., 2013) is: 38.4%    Values used to calculate the score:      Age: 74 years      Sex: Female      Is Non- : No      Diabetic: No      Tobacco smoker: No      Systolic Blood Pressure: 170 mmHg      Is BP treated: Yes      HDL Cholesterol: 89 mg/dL      Total Cholesterol: 209 mg/dL   The 10-year ASCVD risk score (Sohan ESPINOZA Jr., et al., 2013) is: 15.9%    Values used to calculate the score:      Age: 74 years      Sex: Female      Is Non- : No      Diabetic: No      Tobacco smoker: No      Systolic Blood Pressure: 102 mmHg      Is BP treated: Yes      HDL Cholesterol: 89 mg/dL      Total Cholesterol: 209 mg/dL    Recent Labs   Lab Test 01/21/20  0920   CHOL 209*   HDL 89   *   TRIG 104     Today's Vitals: LMP  (LMP Unknown)  - telemed       ASSESSMENT:                  Medication Adherence: fair, she would benefit from a new prescription for Breo today to improve adherence to her respiratory therapies    Asthma/COPD: Needs improvement. Patient would benefit from access to her Breo to help reduce inflammation that is contributing to her poor breathing.  Unclear whether this should be continued on a long-term basis based on unclear indication.     Cough: Improving. Continue to use prn therapies to help alleviate symptoms.     Hyperlipidemia: Needs Improvement. Pt is not on high intensity statin which is indicated based on 2019 ACC/AHA guidelines for lipid management.       PLAN:                  1. Breo Ellipta ordered.   2. Stop simvastatin 20 mg daily.  3. Start atorvastatin 40 mg daily.     I spent 20 minutes with this patient today. All changes were made via collaborative practice agreement with Jefferson Saunders. A copy of the visit note was provided to the patient's primary care provider.     Will follow up  in 2 weeks.    The patient declined a summary of these recommendations as an after visit summary.    Vijaya RobbinsD., [facility]  Medication Therapy Management Pharmacist  Page/VM:  [phone]

## 2020-01-22 NOTE — TELEPHONE ENCOUNTER
Spoke to patient to relay that Rx was sent in on 1/15/2020. Patient asked if she was able to get a refill once that was gone. Advised patient that if she still needed cough medication that she would need to request a refill and that she may need to be seen again in clinic. Patient stated that she was too sick and that she can not come in to be seen. Patient said she would call back once she ran out of her cough syrup. Rosaura Taveras LPN 1/22/2020 10:27 AM

## 2020-01-24 ENCOUNTER — OFFICE VISIT (OUTPATIENT)
Dept: FAMILY MEDICINE | Facility: CLINIC | Age: 75
End: 2020-01-24
Payer: COMMERCIAL

## 2020-01-24 VITALS
DIASTOLIC BLOOD PRESSURE: 109 MMHG | TEMPERATURE: 97.9 F | SYSTOLIC BLOOD PRESSURE: 170 MMHG | OXYGEN SATURATION: 91 % | RESPIRATION RATE: 16 BRPM | HEIGHT: 64 IN | WEIGHT: 197 LBS | BODY MASS INDEX: 33.63 KG/M2 | HEART RATE: 80 BPM

## 2020-01-24 DIAGNOSIS — R05.9 COUGH: Primary | ICD-10-CM

## 2020-01-24 DIAGNOSIS — R53.83 OTHER FATIGUE: ICD-10-CM

## 2020-01-24 DIAGNOSIS — J15.9 COMMUNITY ACQUIRED BACTERIAL PNEUMONIA: ICD-10-CM

## 2020-01-24 RX ORDER — DEXTROMETHORPHAN HBR. AND GUAIFENESIN 10; 100 MG/5ML; MG/5ML
5 SOLUTION ORAL 4 TIMES DAILY PRN
Qty: 180 ML | Refills: 0 | Status: SHIPPED | OUTPATIENT
Start: 2020-01-24 | End: 2020-06-03

## 2020-01-24 ASSESSMENT — ANXIETY QUESTIONNAIRES
7. FEELING AFRAID AS IF SOMETHING AWFUL MIGHT HAPPEN: NOT AT ALL
GAD7 TOTAL SCORE: 14
3. WORRYING TOO MUCH ABOUT DIFFERENT THINGS: NEARLY EVERY DAY
GAD7 TOTAL SCORE: 14
6. BECOMING EASILY ANNOYED OR IRRITABLE: SEVERAL DAYS
7. FEELING AFRAID AS IF SOMETHING AWFUL MIGHT HAPPEN: NOT AT ALL
1. FEELING NERVOUS, ANXIOUS, OR ON EDGE: NEARLY EVERY DAY
5. BEING SO RESTLESS THAT IT IS HARD TO SIT STILL: MORE THAN HALF THE DAYS
4. TROUBLE RELAXING: MORE THAN HALF THE DAYS
2. NOT BEING ABLE TO STOP OR CONTROL WORRYING: NEARLY EVERY DAY

## 2020-01-24 ASSESSMENT — ENCOUNTER SYMPTOMS
CHILLS: 0
FATIGUE: 1
COUGH: 1
SHORTNESS OF BREATH: 0
FEVER: 1

## 2020-01-24 ASSESSMENT — MIFFLIN-ST. JEOR: SCORE: 1368.59

## 2020-01-24 ASSESSMENT — PAIN SCALES - GENERAL: PAINLEVEL: NO PAIN (0)

## 2020-01-24 NOTE — PATIENT INSTRUCTIONS

## 2020-01-24 NOTE — NURSING NOTE
Chief Complaint   Patient presents with     URI     Pt complains of a URI.         Emiliano Salas, EMT on 1/24/2020 at 1:01 PM

## 2020-01-24 NOTE — PROGRESS NOTES
"       HPI       Ca Yan is a 74 year old woman who presents for follow up on fatigue after pneumonia. She has been seen once this week and once last week for the same concerns. Ca does have some documented memory loss as well. Today, she asks the same questions a few times. She was admitted to [facility] 1/6/2020 for possible R sided pneumonia and discharged 1/12/2020. She was treated with antibiotics and prednisone. She states she is better but still not back to her baseline. She feels tired all the time and is hungry all the time. She wants to have her cough syrup filled.   Chief Complaint   Patient presents with     URI     Pt complains of a URI.     Problem, Medication and Allergy Lists were reviewed and updated if needed.    Patient is an established patient of this clinic.         Review of Systems:   Review of Systems     Constitutional:  Positive for fever and fatigue. Negative for chills.   Respiratory:   Positive for cough. Negative for shortness of breath.                Physical Exam:     Vitals:    01/24/20 1301   BP: (!) 170/109   BP Location: Right arm   Patient Position: Sitting   Cuff Size: Adult Regular   Pulse: 80   Resp: 16   Temp: 97.9  F (36.6  C)   TempSrc: Oral   SpO2: 91%   Weight: 89.4 kg (197 lb)   Height: 1.626 m (5' 4\")   124/82 left arm.   Body mass index is 33.81 kg/m .  Vitals were reviewed and were normal; BP was better when rechecked.      Physical Exam  Constitutional:       Appearance: Normal appearance.   HENT:      Head: Normocephalic.      Right Ear: Hearing, tympanic membrane, ear canal and external ear normal.      Left Ear: Hearing, tympanic membrane, ear canal and external ear normal.      Nose: Nose normal.      Mouth/Throat:      Lips: Pink.      Mouth: Mucous membranes are moist.      Pharynx: Oropharynx is clear.   Musculoskeletal: Normal range of motion.   Skin:     General: Skin is warm and dry.   Neurological:      General: No focal deficit present.     "  Mental Status: She is alert and oriented to person, place, and time.   Psychiatric:         Mood and Affect: Mood normal.         Behavior: Behavior normal.         Results:     No diagnostics ordered today.     Assessment and Plan     1. Cough    - dextromethorphan-guaiFENesin (TUSSIN DM)  MG/5ML liquid; Take 5 mLs by mouth 4 times daily as needed (for cough)  Dispense: 180 mL; Refill: 0    2. Other fatigue      3. Community acquired bacterial pneumonia    - dextromethorphan-guaiFENesin (TUSSIN DM)  MG/5ML liquid; Take 5 mLs by mouth 4 times daily as needed (for cough)  Dispense: 180 mL; Refill: 0    First, please monitor your symptoms. Please increase your activity as able. Next, please take the cough syrup as needed. Next, please return in one week or as needed for follow up on your symptoms. Options for treatment and follow-up care were reviewed with the patient. Ca Yan  engaged in the decision making process and verbalized understanding of the options discussed and agreed with the final plan.  Margaux Nicholson, APRN, CNP

## 2020-01-25 ASSESSMENT — ANXIETY QUESTIONNAIRES: GAD7 TOTAL SCORE: 14

## 2020-01-27 ENCOUNTER — TELEPHONE (OUTPATIENT)
Dept: INTERNAL MEDICINE | Facility: CLINIC | Age: 75
End: 2020-01-27

## 2020-01-27 DIAGNOSIS — F41.1 GENERALIZED ANXIETY DISORDER: Primary | ICD-10-CM

## 2020-01-27 RX ORDER — ATORVASTATIN CALCIUM 40 MG/1
40 TABLET, FILM COATED ORAL DAILY
Qty: 90 TABLET | Refills: 3 | Status: SHIPPED | OUTPATIENT
Start: 2020-01-27 | End: 2020-08-20

## 2020-01-27 NOTE — TELEPHONE ENCOUNTER
Called pt,. Gave number to call and schedule Esperanza Julien Paramedic on 1/27/2020 at 10:16 AM

## 2020-01-27 NOTE — TELEPHONE ENCOUNTER
[facility] Call Center    Phone Message    May a detailed message be left on voicemail: yes    Reason for Call: Other: Pt calling today requesting that Dr. Saunders give her a referral to Psychiatry.  She stated she has an anxiety disorder that she needs to deal with and would like Dr. Saunders's opinion on who he thinks would be a good fit for her.  She's appreciate a call back about this.      Action Taken: Message routed to:  [facility] [place]

## 2020-01-27 NOTE — TELEPHONE ENCOUNTER
Pend referral to Psych for anxiety route to MD Esperanza Julien Paramedic on 1/27/2020 at 10:09 AM

## 2020-01-31 ENCOUNTER — TELEPHONE (OUTPATIENT)
Dept: INTERNAL MEDICINE | Facility: CLINIC | Age: 75
End: 2020-01-31

## 2020-01-31 ENCOUNTER — TELEPHONE (OUTPATIENT)
Dept: FAMILY MEDICINE | Facility: CLINIC | Age: 75
End: 2020-01-31

## 2020-01-31 NOTE — TELEPHONE ENCOUNTER
Health Call Center    Phone Message    May a detailed message be left on voicemail: yes    Reason for Call: Medication Question or concern regarding medication   Prescription Clarification  Name of Medication: simvastatin (ZOCOR) 20 MG tablet   Prescribing Provider: Dr. Saunders      What on the order needs clarification? Pt called and stated she was told by Trisha Garcia or Margaux Nicholson to stop taking this medication. She wants to know if she can start taking it again or not. Please call pt to discuss. Can leave VM if she is missed. Call both number           Action Taken: Message routed to:  [facility] ([facility] P[facility]

## 2020-01-31 NOTE — TELEPHONE ENCOUNTER
Pt is feeling better. Follow up appointment is not needed. Pt is requesting a pneumonia vaccine, but I explained to pt she has already completed both of her pneumonia vaccines. Pneumonia vaccine is not needed.     MARTIN Mast 9:00 AM  1/31/2020

## 2020-02-03 NOTE — TELEPHONE ENCOUNTER
She is prescribed Simvastatin 40 MG and was to stop the 20 Mg per Facundo Pharmacist. Visit 1/22/20 Esperanza Julien Paramedic on 2/3/2020 at 9:01 AM   Called the patient to explain she is to take 40 mg daily. She understood but forgot she spoke with Facundo, she says she sees so many people she forgets. Esperanza Julien Paramedic on 2/3/2020 at 12:57 PM

## 2020-02-07 ENCOUNTER — OFFICE VISIT (OUTPATIENT)
Dept: ENDOCRINOLOGY | Facility: CLINIC | Age: 75
End: 2020-02-07
Payer: COMMERCIAL

## 2020-02-07 ENCOUNTER — ALLIED HEALTH/NURSE VISIT (OUTPATIENT)
Dept: SURGERY | Facility: CLINIC | Age: 75
End: 2020-02-07
Payer: COMMERCIAL

## 2020-02-07 VITALS
OXYGEN SATURATION: 89 % | WEIGHT: 195.2 LBS | BODY MASS INDEX: 33.32 KG/M2 | HEART RATE: 89 BPM | HEIGHT: 64 IN | DIASTOLIC BLOOD PRESSURE: 74 MMHG | SYSTOLIC BLOOD PRESSURE: 131 MMHG | TEMPERATURE: 97.9 F

## 2020-02-07 DIAGNOSIS — E66.811 OBESITY (BMI 30.0-34.9): Primary | ICD-10-CM

## 2020-02-07 DIAGNOSIS — Z71.3 NUTRITIONAL COUNSELING: Primary | ICD-10-CM

## 2020-02-07 DIAGNOSIS — E66.09 NON MORBID OBESITY DUE TO EXCESS CALORIES: ICD-10-CM

## 2020-02-07 ASSESSMENT — PAIN SCALES - GENERAL: PAINLEVEL: EXTREME PAIN (9)

## 2020-02-07 ASSESSMENT — MIFFLIN-ST. JEOR: SCORE: 1360.42

## 2020-02-07 NOTE — PROGRESS NOTES
"New Weight Management Nutrition Consultation    Ca Yan is a 74 year old female presents today for new weight management nutrition consultation.  Patient referred by Dr. Tony on February 7, 2020.    Patient with Co-morbidities of obesity including:  Type II DM no  Renal Failure yes - CKD III  Sleep apnea no  Hypertension yes   Dyslipidemia no - hyperlipidemia per problem list  Joint pain no  Back pain yes  GERD yes     Anthropometrics:  Estimated body mass index is 33.51 kg/m  as calculated from the following:    Height as of an earlier encounter on 2/7/20: 1.626 m (5' 4\").    Weight as of an earlier encounter on 2/7/20: 88.5 kg (195 lb 3.2 oz).    Medications for Weight Loss:  Naltrexone    NUTRITION HISTORY  See MD note for details.    - She's been 195 lbs for a long time and she says her goal weight is 165 lbs. She is a self referral.   - Not drinking very much water per her report.   - Does not own a microwave but was instructed by Dr. Tony, per pt report, to purchase a microwave so she can make some healthier frozen meals.   - She does not like to cook.  - Does daytime activities with a group that provides lunch some days. She says the portions are small but does not go into detail about size.     Recent food recall:  Breakfast: 2-3 eggs + 1 piece oat nut toast  Lunch: sandwich OR lunch with veg, meat, starch at group  Dinner: stoffers in the oven  Snacks: sometimes piece of cake or pie   Beverages: black coffee, skim milk, water  Alcohol: none  Dining out: none    Physical Activity:  None at this time.    MALNUTRITION  % Intake: No decreased intake noted  % Weight Loss: None noted  Subcutaneous Fat Loss: None observed  Muscle Loss: None observed  Fluid Accumulation/Edema: None noted  Malnutrition Diagnosis: Patient does not meet two of the established criteria necessary for diagnosing malnutrition    Nutrition Prescription  Recommended energy/nutrient modification.    Nutrition Diagnosis  Obesity " "r/t long history of self-monitoring deficit and excessive energy intake aeb BMI >30.    Nutrition Intervention  Materials/education provided on Volumetric eating to help satiety level on fewer calories; portion control and healthy food choices (Plate Method handout), and sources of protein handout provided. Provided her list of frozen meals that are healthier that she could substitute for dinner. Pt was wondering where she could by a microwave and how much one costs so RD looked this information up for her and printed her a page off [employer]'s website per her request. Pt also asked if egg salad was appropriate for lunch. RD helped her look up a healthier recipe with less doe (see below). Also recommended she try using one slice of bread with her sandwiches instead of two slices. Answered patient's questions and provided encouragement and support. Provided patient with list of goals and RD contact information.     Patient Understanding: fair-good  Expected Compliance: fair-good  Follow-Up Plans: use frozen meals in place of stoffers, smaller CHO portions      Nutrition Goals  1) Eat slowly (20-30 minutes per meal), chewing foods well (25 chews per bite/applesauce consistency)  2) 9\" Plate method (1/2 plate non-starchy vegetables/fruit, 1/4 plate lean protein, 1/4 plate whole grain starch - no more than 1/2 cup carb/meal)  3) Get a microwave to use for frozen meals.    Frozen Meal Replacements  Healthy Choice  Lean Cuisine  Atkins Meals  Smart Ones    Healthy Egg Salad:  4 large eggs, 1/4 cup plain Greek yogurt, 1/4 Tablespoon Mayonnaise    Follow-Up:  1 month or PRN    Time spent with patient: 30 minutes.  Antonette Madsen, MS, RD, LD      "
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
## 2020-02-07 NOTE — PATIENT INSTRUCTIONS
"Nutrition Goals  1) Eat slowly (20-30 minutes per meal), chewing foods well (25 chews per bite/applesauce consistency)  2) 9\" Plate method (1/2 plate non-starchy vegetables/fruit, 1/4 plate lean protein, 1/4 plate whole grain starch - no more than 1/2 cup carb/meal)  3) Get a microwave to use for frozen meals.    Frozen Meal Replacements  Healthy Choice  Lean Cuisine  Atkins Meals  Smart Ones    Healthy Egg Salad:  4 large eggs, 1/4 cup plain Greek yogurt, 1/4 Tablespoon Mayonnaise    Follow up with RD in one month    Antonette Madsen MS, RD, LD  If you need to schedule or reschedule with a dietitian please call [phone].      Interested in working with a health ?  Health coaches work with you to improve your overall health and wellbeing.  They look at the whole person, and may involve discussion of different areas of life, including, but not limited to the four pillars of health (sleep, exercise, nutrition, and stress management). Discuss with your care team if you would like to start working a health .     Health Coaching-3 Pack:      $99 for three health coaching visits    Visits may be done in person or via phone    Coaching is a partnership between the  and the client; Coaches do not prescribe or diagnose    Coaching helps inspire the client to reach his/her personal goals   "

## 2020-02-07 NOTE — PATIENT INSTRUCTIONS
"Welcome to our weight management program!   We are excited to join you on your weight loss journey    Thank you for allowing us the privilege of caring for you. We hope we provided you with the excellent service you deserve.   Please let us know if there is anything else we can do for you so that we can be sure you are leaving completely satisfied with your care experience.    To ensure the quality of our services you may be receiving a patient satisfaction survey from an independent patient satisfaction monitoring company.    The greatest compliment you can give is a \"Likely to Recommend\"    You saw Dr. Tony today.    Instructions per today's visit:   Medications started today: considering topiramate--pt to check with her eye doctor and psychiatrist  [facility] pharmacist referral: Lauren Bloch 4-6 wks phone visit  Other referrals ordered today: nutritionist today    Follow up appointments:  Cecily in 4 mo; Lauren Bloch in 4-6 wks      Interested in working with a health ?  Health coaches work with you to improve your overall health and wellbeing.  They look at the whole person, and may involve discussion of different areas of life, including, but not limited to the four pillars of health (sleep, exercise, nutrition, and stress management). Discuss with your care team if you would like to start working a health .  Health Coaching-3 Pack:    $99 for three health coaching visits    Visits may be done in person or via phone    Coaching is a partnership between the  and the client; Coaches do not prescribe or diagnose    Coaching helps inspire the client to reach his/her personal goals     For any questions/concerns contact Margret Maki LPN at [phone]     To schedule appointments with our team, please call [phone]     Please call during clinic hours Monday through Friday 8:00a - 4:00p if you have questions or you can contact us via [email or web address] at anytime. ?    Lab results will be communicated through " My Chart or letter (if My Chart not used). Please call the clinic if you have not received communication after 1 week or if you have any questions.?      Fax: [phone]    Thank you,  Medical Weight Management Team        We are considering starting this medication--also please check with your psychiatrist and your eye doctor-  We are starting topiramate at bedtime or before supper.  Start one tab, 25 mg, for a week. Go up to 50 mg (2 tabs) for the next week. At the third week, take   3 tabs (75 mg).  Stay at 3 tabs until you are seen again. Call the nurse if you have any questions or concerns. (Do not stop taking it if you don't think it's working. For some people it works even though they do not feel much different.)    Topiramate (Topamax) is a medication that is used most often to treat migraine headaches or for seizures. It has also been found to help with weight loss. Although it's not currently FDA approved for weight loss, it has been used safely for a number of years to help people who are carrying extra weight.     Just how topiramate helps with weight loss has not been exactly determined. However it seems to work on areas of the brain to quiet down signals related to eating.      Topiramate may make you:    >feel less interest in eating in between meals   >think less about food and eating   >find it easier to push the plate away   >find giving up pop easier    >have an easier time eating less    For some of our patients, the pills work right away. They feel and think quite differently about food. Other patients don't feel much of a change but find in fact they have lost weight! Like all weight loss medications, topiramate works best when you help it work.  This means:    1) Have less tempting high calorie (fattening) food around the house or office    2) Have lower calorie food (fruits, vegetables,low fat meats and dairy) for snacks    3) Eat out only one time or less each week.   4) Eat your meals at  a table with the TV or computer off.    Side-effects. Topiramate is generally well tolerated. The main side-effects we see are:   Tingling in hands,feet, or face (usually not very troublesome)   Mental confusion and word finding trouble (about 10% of patients have this.)     Feeling sleepy or a bit dopey- this goes away very soon after starting.    One of the dangers of topiramate is the possibility of birth defects--if you get pregnant when you are on it, there is the risk that your baby will be born with a cleft lip or palate.  If you are on topiramate and of child bearing age, you need to be on a reliable form of birth control or refrain from sexual intercourse.     Please refer to the pharmacy insert for more information on side-effects. Since many pharmacists are not familiar with the use of topiramate in weight loss, calling the clinic will get you the most accurate information on the use of this medication for weight loss.     In order to get refills of this or any medication we prescribe you must be seen in the medical weight mgmt clinic every 2-3 months. Please have your pharmacy fax a refill request to [phone].

## 2020-02-07 NOTE — NURSING NOTE
"(   Chief Complaint   Patient presents with     Consult     New weight management.    )    ( Weight: 88.5 kg (195 lb 3.2 oz) )  ( Height: 162.6 cm (5' 4\") )  ( BMI (Calculated): 33.51 )  (   )  ( Cumulative weight loss (lbs): 0 )  (   )  (   )  (   )  (   )    ( BP: 131/74 )  (   )  ( Temp: 97.9  F (36.6  C) )  ( Temp src: Oral )  ( Pulse: 89 )  (   )  ( SpO2: (!) 89 % )    (   Patient Active Problem List   Diagnosis     Non morbid obesity due to excess calories     Alcohol abuse     Malignant neoplasm of breast (H)     Chronic kidney disease, stage III (moderate) (H)     Osteoarthritis of knee     Major depressive disorder with single episode     Diarrhea     Diastolic dysfunction     Osteoarthritis of spine     Gastroesophageal reflux disease     Generalized anxiety disorder     Hypertension     Hyperlipidemia     Insomnia     Irritable bowel syndrome     Kyphoscoliosis     Cluster C personality disorder (H)     Seborrheic eczema     Anemia     Anxiety state     Asthma     Back pain     Bunion     Chronic obstructive pulmonary disease, unspecified COPD type (H)     Low bone density     Fecal incontinence     Left-sided low back pain without sciatica     Major depression, recurrent (H)    )  (   Current Outpatient Medications   Medication Sig Dispense Refill     acetaminophen (TYLENOL) 500 MG tablet Take 500-1,000 mg by mouth every 6 hours as needed for mild pain       albuterol (PROAIR HFA/PROVENTIL HFA/VENTOLIN HFA) 108 (90 Base) MCG/ACT inhaler Inhale 2 puffs into the lungs every 6 hours as needed for shortness of breath / dyspnea or wheezing 3 Inhaler 1     amLODIPine (NORVASC) 2.5 MG tablet Take 1 tablet (2.5 mg) by mouth daily 90 tablet 3     ASPIRIN EC PO Take 81 mg by mouth       atorvastatin (LIPITOR) 40 MG tablet Take 1 tablet (40 mg) by mouth daily 90 tablet 3     Blood Pressure Monitoring (BLOOD PRESSURE CUFF) MISC Imron blood cuff    Please check your blood pressure 2-3 times per week.  Goal is <140/90 " 1 each 0     clonazePAM (KLONOPIN) 0.5 MG tablet Take 0.5 mg by mouth daily       COMPRESSION STOCKINGS 1 each daily 2 each 1     dextromethorphan-guaiFENesin (TUSSIN DM)  MG/5ML liquid Take 5 mLs by mouth 4 times daily as needed (for cough) 180 mL 0     fluticasone-vilanterol (BREO ELLIPTA) 200-25 MCG/INH inhaler Inhale 1 puff into the lungs daily 3 Inhaler 3     hydrochlorothiazide (MICROZIDE) 12.5 MG capsule Take 1 capsule (12.5 mg) by mouth daily 90 capsule 3     ibuprofen (ADVIL,MOTRIN) 200 MG tablet Take 3 tablets (600 mg) by mouth 3 times daily (with meals) 90 tablet 0     latanoprost (XALATAN) 0.005 % ophthalmic solution 1 drop       losartan (COZAAR) 50 MG tablet Take 2 tablets (100 mg) by mouth daily 90 tablet 3     magnesium 250 MG tablet Take 1 tablet by mouth daily       order for DME Equipment being ordered: Waist-high compression stockings (waist-high), appropriately sized, 20-30 mm Hg 1 each 1     order for DME Equipment being ordered: Wheelchair Sig: Equipment being ordered: portable walker with seat and compartment under the seat.     Use for going out of the house on errands, where prolonged standing is required. 1 Device 1     order for DME Equipment being ordered: portable walker with seat and compartment under the seat.    Use for going out of the house on errands, where prolonged standing is required. 1 Units 0     order for DME Equipment being ordered: knee high compression stockings, class 1 or 2, night time velcro compression garments, lymphedema bandaging supplies, darco boots to wear during treatment 2 each 3     order for DME Equipment being ordered: Oxygen  Please provide portable oxygen concentrator (pt would like over the shoulder oxygen device) for portability at 2LPM with activity via nasal canula. 1 Device 1     quetiapine (SEROQUEL) 200 MG tablet Take 200 mg by mouth At Bedtime.       umeclidinium (INCRUSE ELLIPTA) 62.5 MCG/INH inhaler Inhale 1 puff into the lungs daily 7  Inhaler 3     naltrexone (DEPADE/REVIA) 50 MG tablet Take 1/2 tablet.  Time it one to two hours prior to worst cravings.  Then increase to one full tablet as instructed. (Patient not taking: Reported on 1/15/2020) 60 tablet 2    )  ( Diabetes Eval:    )    ( Pain Eval:  Extreme Pain (9) )    ( Wound Eval:       )    (   History   Smoking Status     Former Smoker     Packs/day: 0.50     Years: 5.00     Types: Cigarettes     Start date: 5/8/1956     Quit date: 9/26/1980   Smokeless Tobacco     Former User     Quit date: 5/6/1980    )    ( Signed By:  Mary Kate Coelho EMT; February 7, 2020; 12:05 PM )

## 2020-02-13 ENCOUNTER — ALLIED HEALTH/NURSE VISIT (OUTPATIENT)
Dept: PHARMACY | Facility: CLINIC | Age: 75
End: 2020-02-13
Payer: COMMERCIAL

## 2020-02-13 ENCOUNTER — TELEPHONE (OUTPATIENT)
Dept: ENDOCRINOLOGY | Facility: CLINIC | Age: 75
End: 2020-02-13

## 2020-02-13 DIAGNOSIS — E66.09 NON MORBID OBESITY DUE TO EXCESS CALORIES: ICD-10-CM

## 2020-02-13 DIAGNOSIS — E78.5 HYPERLIPIDEMIA, UNSPECIFIED HYPERLIPIDEMIA TYPE: Primary | ICD-10-CM

## 2020-02-13 PROCEDURE — 99606 MTMS BY PHARM EST 15 MIN: CPT | Performed by: PHARMACIST

## 2020-02-13 NOTE — TELEPHONE ENCOUNTER
"For Dr. Tony or RD?  Pt called and wants to know \"what kind of salad dressings she can use\".  Thanks!    [phone]  **OK to leave detailed VM**  "

## 2020-02-13 NOTE — PROGRESS NOTES
"SUBJECTIVE/OBJECTIVE:                Ca Yan is a 74 year old female called for a follow-up visit for Medication Therapy Management.  She was referred to me from Js Saunders.     Chief Complaint: Follow up from MTM visit on 1/22/20.  We are following up on statin dose increase.     Tobacco:  reports that she quit smoking about 39 years ago. Her smoking use included cigarettes. She started smoking about 63 years ago. She has a 2.50 pack-year smoking history. She quit smokeless tobacco use about 39 years ago.  Alcohol: none    Medication Adherence/Access:  Some issues with changes in statin medication - see below for details    Hyperlipidemia: Current therapy includes atorvastatin 40 mg once daily (recently replaced simvastatin 20 mg daily).  Patient reports that she did not start atorvastatin as directed, she has just been taking simvastatin 20 mg two tablets daily thinking it was the same thing. Pt reports no significant myalgias or other side effects with this dose change.  We discuss the difference between atorvastatin and simvastatin.     The 10-year ASCVD risk score (Arvadaestela ESPINOZA Jr., et al., 2013) is: 24.9%    Values used to calculate the score:      Age: 74 years      Sex: Female      Is Non- : No      Diabetic: No      Tobacco smoker: No      Systolic Blood Pressure: 131 mmHg      Is BP treated: Yes      HDL Cholesterol: 89 mg/dL      Total Cholesterol: 209 mg/dL    Recent Labs   Lab Test 01/21/20  0920   CHOL 209*   HDL 89   *   TRIG 104      Today's Vitals: LMP  (LMP Unknown)  - telemed     Obesity: Current medications include none. Historically, she took naltrexone 50 mg 1/2 tab at night for food cravings. She has recently established care with weight management and we discuss how the [facility] pharmacist in weight management will follow-up with her in 1 month to discuss medication options for her weight loss goals. She is agreeable to the visit but \"I don't know what we'll " "talk about\". She remembers little about how helpful the naltrexone was. In looking back at chart notes, it appears that it caused nausea and upset stomach.     ASSESSMENT:                Medication Adherence: needs improvement - see below    Hyperlipidemia: Needs Improvement. Patient would benefit from switching to atorvastatin as directed. Taking simvastatin 40 mg until she runs out of her current prescription is acceptable.    Obesity: Needs improvement. Patient to follow-up with Lauren Bloch in weight management regarding weight management medications.     PLAN:                  1. Continue simvastatin 20 mg two tablets until gone. Then, start taking atorvastatin 40 mg daily.   2. Follow-up with Lauren Bloch as directed. Appointment scheduled.     I spent 15 minutes with this patient today. All changes were made via collaborative practice agreement with Jefferson Saunders. A copy of the visit note was provided to the patient's primary care provider.     Patient to follow up with Lauren Bloch, MTM pharmacist in Weight Management in 4 weeks. Appointment scheduled.    The patient declined a summary of these recommendations as an after visit summary.    Nell Hathaway, Pharm.D., [facility]  Medication Therapy Management Pharmacist  Page/VM:  [phone]  "

## 2020-02-14 ENCOUNTER — OFFICE VISIT (OUTPATIENT)
Dept: FAMILY MEDICINE | Facility: CLINIC | Age: 75
End: 2020-02-14
Payer: COMMERCIAL

## 2020-02-14 VITALS
SYSTOLIC BLOOD PRESSURE: 169 MMHG | RESPIRATION RATE: 16 BRPM | DIASTOLIC BLOOD PRESSURE: 94 MMHG | HEIGHT: 64 IN | HEART RATE: 75 BPM | WEIGHT: 195 LBS | TEMPERATURE: 98 F | BODY MASS INDEX: 33.29 KG/M2 | OXYGEN SATURATION: 90 %

## 2020-02-14 DIAGNOSIS — Z92.29 HISTORY OF PNEUMOCOCCAL VACCINATION: ICD-10-CM

## 2020-02-14 DIAGNOSIS — R05.9 COUGH: Primary | ICD-10-CM

## 2020-02-14 ASSESSMENT — PAIN SCALES - GENERAL: PAINLEVEL: NO PAIN (0)

## 2020-02-14 ASSESSMENT — MIFFLIN-ST. JEOR: SCORE: 1359.51

## 2020-02-14 NOTE — PATIENT INSTRUCTIONS

## 2020-02-17 NOTE — PROGRESS NOTES
"Palliative Care Outpatient Clinic Consultation Note    Patient: Ca Yan    Chief Complaint:   Ca Yan 74 year old female who is presenting to the palliative medicine clinic today at the request of Dr.Nathaniel Taylor for a palliative care consultation secondary to kyphoscoliosis and chronic hypoxemic respiratory failure. The patient's primary care provider is: Js Saunders     History of Present Illness:  73 yo F with h/o anxiety, HTN, increased cholesterol, and chronic respiratory failure, attributed to COPD but likely also from kyphoscoliosis.     She was admitted to [facility] 1/6/2020 for possible R sided pneumonia and discharged 1/12/2020. She was treated with antibiotics and prednisone. It was recommended she go to a TCU, but she declined.     PCP placed order for home care nursing due to her anxiety and breathing issues.    Also of note, there is documentation in her chart that \"During her hospital stay she was also noted to have cognitive impairment with a MOCA score of 15/30.\"     Today, she reports the following concerns:       Breathing: Has never felt inhalers help her. She will usually wear oxygen at night while she is sleeping, and sometimes while she is walking around the house. She gets significantly short of breath when she walks longer distances.  She Per chart review, does measure her oxygen saturation on occasion and can be as low as 85%. Feels some of her breathing could be anxiety.     Mood: Her primary concerns today are anxiety and social isolation. History of depression in the 90s and off and on anxiety over the years. States she follows with her Psychiatrist Q3 months. Was on Quetiapine 25 mg three times daily as needed for anxiety or agitation (discontinued) and now taking only quetiapine 200 mg at bedtime. Used to take klonopin but doesn't feel it was as helpful for her more recently.     She does not have any family that is alive, and her friend group is very " minimal. She became fairly close with the  of the [employer] she just joined, however he just was diagnosed with leukemia and is no longer there.  She does not feel like she has a close support group, though she is trying to find another [employer]. She does not have a car and has to take a cab or metro mobility places, and with her breathing issues, this makes it hard for her to be spontaneous. Is going to a day program 2-3x weekly but many people there have dementia. She repeatedly asks for assistance with ways to meet people.     Chronic Illness History:   Has carried a diagnosis of COPD for many years, however, she follows with Dr. Taylor in Pulmonology, and he is not entirely sure this is an accurate diagnosis. She did have a Chest CT scan on 1/15/2020, which did not by my read show overt signs of airway disease/emphysema, however, Dr. Taylor has not reviewed yet. It is more likely her significant kyphoscoliosis is the main contributor to her breathing difficulty. She additionally has had a longstanding history of anxiety, and thinking about end of life issues has apparently been a trigger for her, however, she did not express this today.    Patient's Disease Understanding: Based on our conversation, she understands her breathing issues as primarily from her lungs and doesn't think her kyphoscoliosis is contributing.     Coping: Poor coping, very anxious and tends to go down rabbit holes with her train of thought. States it would be helpful for her to learn some better coping techniques.    Social History  Living Situation: lives alone in an independent senior apartment in [facility]  Children: None  Actual/Potential Caregiver(s): None  Support System: Currently very limited. Very few friends.  Occupation: Disabled  Hobbies: Likes to go out to eat and talking to people, also likes Scrabble.  Substance Use/History of misuse: Hx of alcohol misues  Spiritual Background: Judaism Spiritism  Spiritual  Concerns/Needs: Spiritual needs currently not met. She is hoping to connect with the [facility].  Social History     Tobacco Use     Smoking status: Former Smoker     Packs/day: 0.50     Years: 5.00     Pack years: 2.50     Types: Cigarettes     Start date: 1956     Last attempt to quit: 1980     Years since quittin.4     Smokeless tobacco: Former User     Quit date: 1980   Substance Use Topics     Alcohol use: Not Currently     Alcohol/week: 0.0 standard drinks     Comment: Sober for 2 years, previous alcoholic     Drug use: No     Family History- Reviewed in Epic.    Advance Care Planning:  Advance Directive: States she has completed, though not in our system  POLST: Not completed    Allergies   Allergen Reactions     Azithromycin      Other reaction(s): Itching     Codeine Nausea and Vomiting     Hydrocodone      Vomiting     Metronidazole Nausea     Percocet [Oxycodone-Acetaminophen]      Vomiting     Pollen Extract      Other reaction(s): Other, see comments  Pt states that she has sneezing and runny nose.      Seasonal Allergies Other (See Comments)     Pt states that she has sneezing and runny nose.      Vicodin [Hydrocodone-Acetaminophen]      Vomiting     Zolpidem Other (See Comments)     Amnestic behavior     Oxycodone Itching and Rash     Current Outpatient Medications   Medication Sig Dispense Refill     acetaminophen (TYLENOL) 500 MG tablet Take 500-1,000 mg by mouth every 6 hours as needed for mild pain       albuterol (PROAIR HFA/PROVENTIL HFA/VENTOLIN HFA) 108 (90 Base) MCG/ACT inhaler Inhale 2 puffs into the lungs every 6 hours as needed for shortness of breath / dyspnea or wheezing 3 Inhaler 1     amLODIPine (NORVASC) 2.5 MG tablet Take 1 tablet (2.5 mg) by mouth daily 90 tablet 3     ASPIRIN EC PO Take 81 mg by mouth       atorvastatin (LIPITOR) 40 MG tablet Take 1 tablet (40 mg) by mouth daily 90 tablet 3     Blood Pressure Monitoring (BLOOD PRESSURE CUFF) MISC Imron  blood cuff    Please check your blood pressure 2-3 times per week.  Goal is <140/90 1 each 0     clonazePAM (KLONOPIN) 0.5 MG tablet Take 0.5 mg by mouth daily       COMPRESSION STOCKINGS 1 each daily 2 each 1     dextromethorphan-guaiFENesin (TUSSIN DM)  MG/5ML liquid Take 5 mLs by mouth 4 times daily as needed (for cough) 180 mL 0     fluticasone-vilanterol (BREO ELLIPTA) 200-25 MCG/INH inhaler Inhale 1 puff into the lungs daily 3 Inhaler 3     hydrochlorothiazide (MICROZIDE) 12.5 MG capsule Take 1 capsule (12.5 mg) by mouth daily 90 capsule 3     ibuprofen (ADVIL,MOTRIN) 200 MG tablet Take 3 tablets (600 mg) by mouth 3 times daily (with meals) 90 tablet 0     latanoprost (XALATAN) 0.005 % ophthalmic solution 1 drop       losartan (COZAAR) 50 MG tablet Take 2 tablets (100 mg) by mouth daily 90 tablet 3     magnesium 250 MG tablet Take 1 tablet by mouth daily       order for DME Equipment being ordered: Waist-high compression stockings (waist-high), appropriately sized, 20-30 mm Hg 1 each 1     order for DME Equipment being ordered: Wheelchair Sig: Equipment being ordered: portable walker with seat and compartment under the seat.     Use for going out of the house on errands, where prolonged standing is required. 1 Device 1     order for DME Equipment being ordered: portable walker with seat and compartment under the seat.    Use for going out of the house on errands, where prolonged standing is required. 1 Units 0     order for DME Equipment being ordered: knee high compression stockings, class 1 or 2, night time velcro compression garments, lymphedema bandaging supplies, darco boots to wear during treatment 2 each 3     order for DME Equipment being ordered: Oxygen  Please provide portable oxygen concentrator (pt would like over the shoulder oxygen device) for portability at 2LPM with activity via nasal canula. 1 Device 1     quetiapine (SEROQUEL) 200 MG tablet Take 200 mg by mouth At Bedtime.        "sertraline 25 MG PO tablet Take 1 tablet (25 mg) by mouth daily If no side effects and no increase in anxiety in 2 weeks, increase to 2 tabs daily. 60 tablet 1     umeclidinium (INCRUSE ELLIPTA) 62.5 MCG/INH inhaler Inhale 1 puff into the lungs daily 7 Inhaler 3     Past Medical History:   Diagnosis Date     Anxiety      Arthritis      Breast cancer (H)      COPD (chronic obstructive pulmonary disease) (H)     6/19/12:  FEV 0.99 l     Depressive disorder      Hypertension      Low back pain with left-sided sciatica      Low bone density 4/13/2017    DEXA April 12, 2017: T score -2.0. Normal Z score. FRAX risk: major osteoporotic fracture 11.9%, hip fracture 2.6%, therefore not high-risk     Lymphedema, chronic lower extremities      Past Surgical History:   Procedure Laterality Date     BACK SURGERY      spinal fusion     COLONOSCOPY       LUMPECTOMY BREAST       ORTHOPEDIC SURGERY      Knee surgery left side     REVIEW OF SYSTEMS:   ROS: 10 point ROS neg other than the symptoms noted above in the HPI and here:  Palliative Symptom Review (0=no symptom/no concern, 1=mild, 2=moderate, 3=severe):      Pain: 0      Fatigue: 0      Nausea: 0      Constipation: 0      Diarrhea: 0      Depressive Symptoms: 2      Anxiety: 3      Drowsiness: 0      Poor Appetite: 0      Shortness of Breath: 2      Insomnia: 0    Physical Exam:  BP (!) 159/106   Pulse 85   Temp 97.9  F (36.6  C) (Oral)   Ht 1.626 m (5' 4\")   Wt 88.2 kg (194 lb 6.4 oz)   LMP  (LMP Unknown)   SpO2 90%   BMI 33.37 kg/m    General: Sitting in wheelchair, pleasant  HEENT: Sclera anicteric, ears appears normal. MMM.  Neck: supple  Respiratory: Respirations unlabored  Musculoskeletal: Kyphoscoliosis of her back.    Skin: No rash on limited exam  Neuro: Normal mentation. Normal speech.  Psych: Anxious     Data Reviewed:  LABS: 1/21/20- Cr 0.81 (down from 1.08 1/7/20), CO2 34. WBC 7.3, Hgb 13.4, Plts 244.    IMAGING:   Chest CT 01/15/20- IMPRESSION:   1. Small " right pleural effusion.  2. Two solid pulmonary nodules as in left upper lobe. Consider follow-up if patient is stratified into high-risk by Fleischner criteria.  3. Enlargement of the pulmonary artery which can be seen in setting of pulmonary hypertension.  4. Hepatomegaly.  5. Focus of the pancreatic body which may represent serous cystic neoplasm.   Also, Severe thoracic dextroscoliosis.     PFT:   FEV1/FVC ratio 0.71.  FVC is 1.12 L which is 42% predicted.  FEV1 is 0.8 L which is 39% predicted.      Images personally reviewed: Yes    [place]  reviewed and fills consistent with history: Yes    Impressions:  Palliative Performance Score:  50%  Decision Making Capacity:  Seems to have full capacity.    Ca Yan is a 74 year old female with history of h/o anxiety, HTN, increased cholesterol, and chronic respiratory failure, attributed to COPD but likely more from kyphoscoliosis. She was referred to Palliaitve care primarily for guidance on goals of care, updating her HCD, and emotional support. However, today she declined needing help with updating her HCD and was focused on how our team could help with her anxiety and help her meet people.    She is incredibly socially isolated, so we tried to brain storm some ideas for her. This is difficulty due to her limited mobility. It also seemed that although she kept asking for ideas, she had an answer for why most of the ideas wouldn't work. Ultimately, she was open to a trial of starting an serotonin specific reuptake inhibitor as well as a referral to Palliative LICSW for both assistance with coping as well as possibly other resources for social engagement.    Recommendations & Counselin. Start sertraline 25mg in the morning. If she tolerates this after 2 weeks, increase to 50mg daily.  2. Referral to Palliative  .  3. Recommended public library and [employer] groups as other options for places to meet people.    Return in clinic in 4 weeks for a  follow-up.     Patient seen and discussed with Dr. Alison Schuster.    Merlene Juarez, DO  Palliative Medicine Fellow    Attending attestation:   Patient seen and evaluated with Dr. Juarez and I agree with findings/recs in this note.   Experiencing anxiety and dysphoria with significant social isolation.  Will refer to palliative SW for counseling, start sertraline for anxiety/depressed mood.  Also says follows with a psychiatrist q3m, notes not available to us today.  Appears has been on high-dose seroquel for ?anxiety or sleep dating back to at least 2006, has not been tried on SSRI.  If mood stable, this could be a target for weaning/deprescribing.   Thank you for involving us in the patient's care.   Alison Schuster MD / Palliative Medicine / Pager [phone] / After-Hours Answering Service [phone] / Main Palliative Clinic - [facility] [phone] / [facility] Inpatient Team Consult Pager [phone] (answered 8am-430pm M-F)

## 2020-02-19 ENCOUNTER — TELEPHONE (OUTPATIENT)
Dept: SURGERY | Facility: CLINIC | Age: 75
End: 2020-02-19

## 2020-02-19 NOTE — TELEPHONE ENCOUNTER
"Called patient and left detailed message regarding forwarded phone conversation:     Pt called and wants to know \"what kind of salad dressings she can use\".    Recommended pt try using an oil vinagrette-based dressing and avoiding the creamy dressing such as ranch or Italian dressing. Explained that ranch and Italian are higher in fat and calories and the vinagrette dressings are lower in calories.     Provided her RD phone number to call back if she has any questions.     Time spent on phone: 5 minutes  Antonettese Cale MS, RD, LD  "

## 2020-02-20 ENCOUNTER — OFFICE VISIT (OUTPATIENT)
Dept: PALLIATIVE CARE | Facility: CLINIC | Age: 75
End: 2020-02-20
Attending: INTERNAL MEDICINE
Payer: COMMERCIAL

## 2020-02-20 VITALS
BODY MASS INDEX: 33.19 KG/M2 | WEIGHT: 194.4 LBS | OXYGEN SATURATION: 90 % | SYSTOLIC BLOOD PRESSURE: 159 MMHG | HEART RATE: 85 BPM | DIASTOLIC BLOOD PRESSURE: 106 MMHG | TEMPERATURE: 97.9 F | HEIGHT: 64 IN

## 2020-02-20 DIAGNOSIS — R45.89 DEPRESSED MOOD: ICD-10-CM

## 2020-02-20 DIAGNOSIS — J98.4 RESTRICTIVE LUNG DISEASE DUE TO KYPHOSCOLIOSIS: ICD-10-CM

## 2020-02-20 DIAGNOSIS — F41.9 ANXIETY: Primary | ICD-10-CM

## 2020-02-20 DIAGNOSIS — Z60.4 SOCIAL ISOLATION: ICD-10-CM

## 2020-02-20 DIAGNOSIS — M41.9 RESTRICTIVE LUNG DISEASE DUE TO KYPHOSCOLIOSIS: ICD-10-CM

## 2020-02-20 DIAGNOSIS — M41.9 KYPHOSCOLIOSIS: ICD-10-CM

## 2020-02-20 PROCEDURE — 99204 OFFICE O/P NEW MOD 45 MIN: CPT | Mod: GC | Performed by: STUDENT IN AN ORGANIZED HEALTH CARE EDUCATION/TRAINING PROGRAM

## 2020-02-20 PROCEDURE — G0463 HOSPITAL OUTPT CLINIC VISIT: HCPCS | Mod: ZF

## 2020-02-20 RX ORDER — SERTRALINE HYDROCHLORIDE 25 MG/1
25 TABLET, FILM COATED ORAL DAILY
Qty: 60 TABLET | Refills: 1 | Status: SHIPPED | OUTPATIENT
Start: 2020-02-20 | End: 2020-02-20

## 2020-02-20 RX ORDER — SERTRALINE HYDROCHLORIDE 25 MG/1
25 TABLET, FILM COATED ORAL DAILY
Qty: 60 TABLET | Refills: 1 | Status: SHIPPED | OUTPATIENT
Start: 2020-02-20 | End: 2020-06-03

## 2020-02-20 SDOH — SOCIAL STABILITY - SOCIAL INSECURITY: SOCIAL EXCLUSION AND REJECTION: Z60.4

## 2020-02-20 ASSESSMENT — MIFFLIN-ST. JEOR: SCORE: 1356.79

## 2020-02-20 ASSESSMENT — PAIN SCALES - GENERAL: PAINLEVEL: NO PAIN (0)

## 2020-02-20 NOTE — NURSING NOTE
"Oncology Rooming Note    February 20, 2020 12:30 PM   Ca Yan is a 74 year old female who presents for:    Chief Complaint   Patient presents with     New Patient     [facility] NEW; BREAST CANCER     Initial Vitals: BP (!) 159/106   Pulse 85   Temp 97.9  F (36.6  C) (Oral)   Ht 1.626 m (5' 4\")   Wt 88.2 kg (194 lb 6.4 oz)   LMP  (LMP Unknown)   SpO2 90%   BMI 33.37 kg/m   Estimated body mass index is 33.37 kg/m  as calculated from the following:    Height as of this encounter: 1.626 m (5' 4\").    Weight as of this encounter: 88.2 kg (194 lb 6.4 oz). Body surface area is 2 meters squared.  No Pain (0) Comment: Data Unavailable   No LMP recorded (lmp unknown). Patient is postmenopausal.  Allergies reviewed: Yes  Medications reviewed: Yes    Medications: Medication refills not needed today.  Pharmacy name entered into [facility]: [place], [place] - [address] [place] [address] [phone]    Clinical concerns: No new concerns.  Dr. Mack was notified.      Sisi Trujillo, NASH              "

## 2020-02-20 NOTE — LETTER
"2/20/2020       RE: Ca Yan  [address]  [facility] [address]     Dear Colleague,    Thank you for referring your patient, Ca Yan, to the [facility] at [facility]. Please see a copy of my visit note below.    Palliative Care Outpatient Clinic Consultation Note    Patient: Ca Yan    Chief Complaint:   Ca Yan 74 year old female who is presenting to the palliative medicine clinic today at the request of Dr.Nathaniel Taylor for a palliative care consultation secondary to kyphoscoliosis and chronic hypoxemic respiratory failure. The patient's primary care provider is: Js Saunders     History of Present Illness:  73 yo F with h/o anxiety, HTN, increased cholesterol, and chronic respiratory failure, attributed to COPD but likely also from kyphoscoliosis.     [facility] for possible R sided pneumonia and discharged 1/12/2020. She was treated with antibiotics and prednisone. It was recommended she go to a TCU, but she declined.     PCP placed order for home care nursing due to her anxiety and breathing issues.    Also of note, there is documentation in her chart that \"During her hospital stay she was also noted to have cognitive impairment with a MOCA score of 15/30.\"     Today, she reports the following concerns:       Breathing:  Has never felt inhalers help her. She will usually wear oxygen at night while she is sleeping, and sometimes while she is walking around the house. She gets significantly short of breath when she walks longer distances.  She Per chart review, does measure her oxygen saturation on occasion and can be as low as 85%. Feels some of her breathing could be anxiety.     Mood:  Her primary concerns today are anxiety and social isolation. History of depression in the 90s and off and on anxiety over the years. States she follows with her Psychiatrist Q3 months. " Was on Quetiapine 25 mg three times daily as needed for anxiety or agitation (discontinued) and now taking only quetiapine 200 mg at bedtime. Used to take klonopin but doesn't feel it was as helpful for her more recently.     She does not have any family that is alive, and her friend group is very minimal. She became fairly close with the  of the Religion she just joined, however he just was diagnosed with leukemia and is no longer there.  She does not feel like she has a close support group, though she is trying to find another Religion. She does not have a car and has to take a cab or metro mobility places, and with her breathing issues, this makes it hard for her to be spontaneous. Is going to a day program 2-3x weekly but many people there have dementia. She repeatedly asks for assistance with ways to meet people.     Chronic Illness History:   Has carried a diagnosis of COPD for  many years, however, she follows with Dr. Taylor in Pulmonology, and he is not entirely sure this is an accurate diagnosis. She did have a Chest CT scan on 1/15/2020, which did not by my read show overt signs of airway disease/emphysema, however, Dr. Taylor has not reviewed yet. It is more likely her significant kyphoscoliosis is the main contributor to her breathing difficulty. She additionally has had a longstanding history of anxiety, and thinking about end of life issues has apparently been a trigger for her, however, she did not express this today.    Patient's Disease Understanding: Based on our conversation, she understands her breathing issues as primarily from her lungs and doesn't think her kyphoscoliosis is contributing.     Coping: Poor coping, very anxious and tends to go down rabbit holes with her train of thought. States it would be helpful for her to learn some better coping techniques.    Social History  Living Situation: lives alone in an independent senior apartment in [place]  Children:  None  Actual/Potential Caregiver(s): None  Support System: Currently very limited. Very few friends.  Occupation: Disabled  Hobbies: Likes to go out to eat and talking to people, also likes Scrabble.  Substance Use/History of misuse: Hx of alcohol misues  Spiritual Background: [employer]  Spiritual Concerns/Needs: Spiritual needs currently not met. She is hoping to connect with the [facility].  Social History     Tobacco Use     Smoking status: Former Smoker     Packs/day: 0.50     Years: 5.00     Pack years: 2.50     Types: Cigarettes     Start date: 1956     Last attempt to quit: 1980     Years since quittin.4     Smokeless tobacco: Former User     Quit date: 1980   Substance Use Topics     Alcohol use: Not Currently     Alcohol/week: 0.0 standard drinks     Comment: Sober for 2 years, previous alcoholic     Drug use: No     Family History- Reviewed in Epic.    Advance Care Planning:  Advance Directive: States she has completed, though not in our system  POLST: Not completed    Allergies   Allergen Reactions     Azithromycin      Other reaction(s): Itching     Codeine Nausea and Vomiting     Hydrocodone      Vomiting     Metronidazole Nausea     Percocet [Oxycodone-Acetaminophen]      Vomiting     Pollen Extract      Other reaction(s): Other, see comments  Pt states that she has sneezing and runny nose.      Seasonal Allergies Other (See Comments)     Pt states that she has sneezing and runny nose.      Vicodin [Hydrocodone-Acetaminophen]      Vomiting     Zolpidem Other (See Comments)     Amnestic behavior     Oxycodone Itching and Rash     Current Outpatient Medications   Medication Sig Dispense Refill     acetaminophen (TYLENOL) 500 MG tablet Take 500-1,000 mg by mouth every 6 hours as needed for mild pain       albuterol (PROAIR HFA/PROVENTIL HFA/VENTOLIN HFA) 108 (90 Base) MCG/ACT inhaler Inhale 2 puffs into the lungs every 6 hours as needed for shortness of breath /  dyspnea or wheezing 3 Inhaler 1     amLODIPine (NORVASC) 2.5 MG tablet Take 1 tablet (2.5 mg) by mouth daily 90 tablet 3     ASPIRIN EC PO Take 81 mg by mouth       atorvastatin (LIPITOR) 40 MG tablet Take 1 tablet (40 mg) by mouth daily 90 tablet 3     Blood Pressure Monitoring (BLOOD PRESSURE CUFF) MISC Imron blood cuff    Please check your blood pressure 2-3 times per week.  Goal is <140/90 1 each 0     clonazePAM (KLONOPIN) 0.5 MG tablet Take 0.5 mg by mouth daily       COMPRESSION STOCKINGS 1 each daily 2 each 1     dextromethorphan-guaiFENesin (TUSSIN DM)  MG/5ML liquid Take 5 mLs by mouth 4 times daily as needed (for cough) 180 mL 0     fluticasone-vilanterol (BREO ELLIPTA) 200-25 MCG/INH inhaler Inhale 1 puff into the lungs daily 3 Inhaler 3     hydrochlorothiazide (MICROZIDE) 12.5 MG capsule Take 1 capsule (12.5 mg) by mouth daily 90 capsule 3     ibuprofen (ADVIL,MOTRIN) 200 MG tablet Take 3 tablets (600 mg) by mouth 3 times daily (with meals) 90 tablet 0     latanoprost (XALATAN) 0.005 % ophthalmic solution 1 drop       losartan (COZAAR) 50 MG tablet Take 2 tablets (100 mg) by mouth daily 90 tablet 3     magnesium 250 MG tablet Take 1 tablet by mouth daily       order for DME Equipment being ordered: Waist-high compression stockings (waist-high), appropriately sized, 20-30 mm Hg 1 each 1     order for DME Equipment being ordered: Wheelchair Sig: Equipment being ordered: portable walker with seat and compartment under the seat.     Use for going out of the house on errands, where prolonged standing is required. 1 Device 1     order for DME Equipment being ordered: portable walker with seat and compartment under the seat.    Use for going out of the house on errands, where prolonged standing is required. 1 Units 0     order for DME Equipment being ordered: knee high compression stockings, class 1 or 2, night time velcro compression garments, lymphedema bandaging supplies, darco boots to wear during  "treatment 2 each 3     order for DME Equipment being ordered: Oxygen  Please provide portable oxygen concentrator (pt would like over the shoulder oxygen device) for portability at 2LPM with activity via nasal canula. 1 Device 1     quetiapine (SEROQUEL) 200 MG tablet Take 200 mg by mouth At Bedtime.       sertraline 25 MG PO tablet Take 1 tablet (25 mg) by mouth daily If no side effects and no increase in anxiety in 2 weeks, increase to 2 tabs daily. 60 tablet 1     umeclidinium (INCRUSE ELLIPTA) 62.5 MCG/INH inhaler Inhale 1 puff into the lungs daily 7 Inhaler 3     Past Medical History:   Diagnosis Date     Anxiety      Arthritis      Breast cancer (H)      COPD (chronic obstructive pulmonary disease) (H)     6/19/12:  FEV 0.99 l     Depressive disorder      Hypertension      Low back pain with left-sided sciatica      Low bone density 4/13/2017    DEXA April 12, 2017: T score -2.0. Normal Z score. FRAX risk: major osteoporotic fracture 11.9%, hip fracture 2.6%, therefore not high-risk     Lymphedema, chronic lower extremities      Past Surgical History:   Procedure Laterality Date     BACK SURGERY      spinal fusion     COLONOSCOPY       LUMPECTOMY BREAST       ORTHOPEDIC SURGERY      Knee surgery left side     REVIEW OF SYSTEMS:   ROS: 10 point ROS neg other than the symptoms noted above in the HPI and here:  Palliative Symptom Review (0=no symptom/no concern, 1=mild, 2=moderate, 3=severe):      Pain: 0      Fatigue: 0      Nausea: 0      Constipation: 0      Diarrhea: 0      Depressive Symptoms: 2      Anxiety: 3      Drowsiness: 0      Poor Appetite: 0      Shortness of Breath: 2      Insomnia: 0    Physical Exam:  BP (!) 159/106   Pulse 85   Temp 97.9  F (36.6  C) (Oral)   Ht 1.626 m (5' 4\")   Wt 88.2 kg (194 lb 6.4 oz)   LMP  (LMP Unknown)   SpO2 90%   BMI 33.37 kg/m     General: Sitting in wheelchair, pleasant  HEENT: Sclera anicteric, ears appears normal. MMM.  Neck: " supple  Respiratory: Respirations unlabored  Musculoskeletal: Kyphoscoliosis of her back.    Skin: No rash on limited exam  Neuro: Normal mentation. Normal speech.  Psych: Anxious     Data Reviewed:  LABS: 1/21/20- Cr 0.81 (down from 1.08 1/7/20), CO2 34. WBC 7.3, Hgb 13.4, Plts 244.    IMAGING:   Chest CT 01/15/20- IMPRESSION:   1. Small right pleural effusion.  2. Two solid pulmonary nodules as in left upper lobe. Consider follow-up if patient is stratified into high-risk by Fleischner criteria.  3. Enlargement of the pulmonary artery which can be seen in setting of pulmonary hypertension.  4. Hepatomegaly.  5. Focus of the pancreatic body which may represent serous cystic neoplasm.   Also, Severe thoracic dextroscoliosis.     PFT:   FEV1/FVC ratio 0.71.  FVC is 1.12 L which is 42% predicted.  FEV1 is 0.8 L which is 39% predicted.      Images personally reviewed: Yes    MN  reviewed and fills consistent with history: Yes    Impressions:  Palliative Performance Score:  50%  Decision Making Capacity:  Seems to have full capacity.    Ca Yan is a 74 year old female with history of h/o anxiety, HTN, increased cholesterol, and chronic respiratory failure, attributed to COPD but likely more from kyphoscoliosis.  She was referred to Palliaitve care primarily for guidance on goals of care, updating her HCD, and emotional support. However, today she declined needing help with updating her HCD and was focused on how our team could help with her anxiety and help her meet people.    She is incredibly socially isolated, so we tried to brain storm some ideas for her. This is difficulty due to her limited mobility. It also seemed that although she kept asking for ideas, she had an answer for why most of the ideas wouldn't work. Ultimately, she was open to a trial of starting an serotonin specific reuptake inhibitor as well as a referral to Palliative LICSW for both assistance with coping as well as possibly other  resources for social engagement.    Recommendations & Counselin. Start sertraline 25mg in the morning. If she tolerates this after 2 weeks, increase to 50mg daily.  2. Referral to Palliative  .  3. Recommended public library and [employer] groups as other options for places to meet people.    Return in clinic in 4 weeks for a follow-up.     Patient seen and discussed with Dr. Alison Schuster.    Merlene Juarez, DO  Palliative Medicine Fellow    Attending attestation:   Patient seen and evaluated with Dr. Juarez and I agree with findings/recs in this note.   Experiencing anxiety and dysphoria with significant social isolation.  Will refer to palliative SW for counseling, start sertraline for anxiety/depressed mood.  Also says follows with a psychiatrist q3m, notes not available to us today.  Appears has been on high-dose seroquel for ?anxiety or sleep dating back to at least , has not been tried on SSRI.  If mood stable, this could be a target for weaning/deprescribing.   Thank you for involving us in the patient's care.   Alison Schuster MD / Palliative Medicine / Pager [phone] / After-Hours Answering Service [phone] / Main Palliative Clinic - [facility] [phone] / [facility] Inpatient Team Consult Pager [phone] (answered 8am-430pm M-F)

## 2020-02-20 NOTE — PATIENT INSTRUCTIONS
Thank you for coming into the Palliative Care Clinic today.      1. Start taking the sertraline 1 pill in the morning. If you do OK with this after 2 weeks, you can increase to 2 tabs daily.  2. We will put in a referral to our  (counseling background) for you to meet with.  3. Recommend public [employer], [employer] groups as other options for places to meet people.    Return in clinic in 4 weeks for a follow-up.      You can reach the Palliative Care Team during business hours at the following numbers:   -For the [facility], call [phone]     To reach the Palliative Care Provider on-call After-hours or on holidays and weekends, call: [phone].  Please note that we are not able to provide pain medication refills on evenings or weekends.

## 2020-02-20 NOTE — PROGRESS NOTES
"       HPI       Ca Yan is a 74 year old woman who presents with a continuous cough. She does feel that she is improving, however she wants to make sure she is up to date on her pneumonia vaccinations. She presents for evaluation and discussion.   Chief Complaint   Patient presents with     Imm/Inj     Problem, Medication and Allergy Lists were reviewed and updated if needed.    Patient is an established patient of this clinic.         Review of Systems:   Review of Systems     Constitutional:  Positive for fatigue. Negative for fever and chills.   Respiratory:   Positive for cough. Negative for shortness of breath.                Physical Exam:     Vitals:    02/14/20 1035   BP: (!) 169/94   BP Location: Left arm   Patient Position: Sitting   Cuff Size: Adult Regular   Pulse: 75   Resp: 16   Temp: 98  F (36.7  C)   TempSrc: Oral   SpO2: 90%   Weight: 88.5 kg (195 lb)   Height: 1.626 m (5' 4\")     Body mass index is 33.47 kg/m .  Vitals were reviewed and were normal     Physical Exam  Constitutional:       Appearance: Normal appearance.   HENT:      Head: Normocephalic.   Cardiovascular:      Rate and Rhythm: Normal rate and regular rhythm.      Pulses: Normal pulses.      Heart sounds: Normal heart sounds.   Pulmonary:      Effort: Pulmonary effort is normal.      Breath sounds: Normal breath sounds.   Musculoskeletal: Normal range of motion.   Skin:     General: Skin is warm and dry.   Neurological:      General: No focal deficit present.      Mental Status: She is alert and oriented to person, place, and time.   Psychiatric:         Mood and Affect: Mood normal.         Behavior: Behavior normal.         Results:     No diagnostics ordered today.     Assessment and Plan     1. Cough      2. History of pneumococcal vaccination  Ca appears to be improving steadily. She will continue to monitor symptoms and return as needed. Immunization record and guidelines are reviewed in detail with Ca and she " is up to date on all vaccinations. Options for treatment and follow-up care were reviewed with the patient. Ca Yan  engaged in the decision making process and verbalized understanding of the options discussed and agreed with the final plan.  RUPAL Cardona, CNP

## 2020-02-28 ASSESSMENT — ENCOUNTER SYMPTOMS
COUGH: 1
FEVER: 0
CHILLS: 0
FATIGUE: 1
SHORTNESS OF BREATH: 0

## 2020-03-06 ENCOUNTER — TELEPHONE (OUTPATIENT)
Facility: CLINIC | Age: 75
End: 2020-03-06

## 2020-03-06 NOTE — TELEPHONE ENCOUNTER
[facility] Call Center    Phone Message    May a detailed message be left on voicemail: yes     Reason for Call: Other: Pt would like a call back to discuss her health and is she in danger of the corona virus and other health questions     Action Taken: Message routed to:  [facility] Pulm    Travel Screening: Not Applicable

## 2020-03-09 NOTE — TELEPHONE ENCOUNTER
Spoke with patient and explained to limit public contact. Do not touch hands to face when unable to wash hands. Wash hands for 20 seconds with good amount of lather. Discussed viewing CDC web site information.

## 2020-03-12 ENCOUNTER — TELEPHONE (OUTPATIENT)
Dept: PHARMACY | Facility: CLINIC | Age: 75
End: 2020-03-12

## 2020-03-12 NOTE — TELEPHONE ENCOUNTER
"Called patient today, 3/12/2020, at 12:30 pm for telemedicine follow up MTM visit. When trying to call her cell, went straight to voicemail as \"the person you are trying to call is not accepting calls at this time\". Tried then calling home phone and left VM with CB#.     Historically has been seen my Facundo Hathaway, MTM pharmacist but was referred to myself from Dr. Tony to check in from [facility] and progress. Will route to Facundo and Dr. Tony as FYI.     Lauren Bloch, PharmD  Medication Therapy Management Pharmacist   [facility]   Phone: [phone]      "

## 2020-03-19 ENCOUNTER — ALLIED HEALTH/NURSE VISIT (OUTPATIENT)
Dept: PHARMACY | Facility: CLINIC | Age: 75
End: 2020-03-19
Payer: COMMERCIAL

## 2020-03-19 DIAGNOSIS — E66.09 NON MORBID OBESITY DUE TO EXCESS CALORIES: ICD-10-CM

## 2020-03-19 DIAGNOSIS — E78.5 HYPERLIPIDEMIA, UNSPECIFIED HYPERLIPIDEMIA TYPE: Primary | ICD-10-CM

## 2020-03-19 PROCEDURE — 99607 MTMS BY PHARM ADDL 15 MIN: CPT | Performed by: PHARMACIST

## 2020-03-19 PROCEDURE — 99606 MTMS BY PHARM EST 15 MIN: CPT | Performed by: PHARMACIST

## 2020-03-19 NOTE — PROGRESS NOTES
"MTM ENCOUNTER  SUBJECTIVE/OBJECTIVE:                           Ca Yan is a 74 year old female called for a follow-up visit. She was referred to me from Dr. Tony.  Today's visit is a follow-up MTM visit from 2/13/2020. Is followed by Facundo Hathaway, [facility] pharmacist at [facility], last visit was with Facundo.     Chief Complaint: Her questions are:   1) what weight loss medication was Dr. Tony wanted starting  2) Wants to clarify the plan for her cholesterol medication(s)     Allergies/ADRs: Reviewed in Epic  Tobacco:  reports that she quit smoking about 39 years ago. Her smoking use included cigarettes. She started smoking about 63 years ago. She has a 2.50 pack-year smoking history. She quit smokeless tobacco use about 39 years ago.  Alcohol: none  Caffeine: 2-3 cups/day of coffee  Activity: sedentary - reports difficult to be active due to COPD.   PMH: Reviewed in [facility]  Psychiatrist: [facility]    Medication Adherence/Access:   no issues reported. She is interested in PillPack but doesn't want to change medications over from [facility].  Gets medications from [facility] mail order pharmacy and would like one medication sent there.    Hyperlipidemia:   Simvastatin 20 mg tablet: 2 tablets once daily.      Plan from last visit with MTM was to use up the simvastatin she had at home, then to start on atorvastatin 40 mg daily thereafter due to ASCVD risk and lipid results. Patient reports she still has \"quite a bit\" of simvastatin left. She wants to clarify that she should be taking 2 tablets of simvastatin daily and where the rx for atorvastatin is. Clarified that [place]vastatin [place] [facility] [place] 1[place]27/2020.   Recent Labs   Lab Test 01/21/20  0920   CHOL 209*   HDL 89   *   TRIG 104     Obesity:   No medications    Followed by Dr. Truman Tony , last seen 2/7/2020 for [facility].  Last visit with Dr. Tony, patient and provider discussed the potential " "of starting weight loss medication topiramate.  Patient was recommended to follow-up with psychiatry and ophthalmologist.  Patient reports she did follow-up with both doctors and that both signed off on okay to utilize topiramate for weight loss.  However, she is unsure if she would like to add an additional medication to her regimen as she is currently taking \"a lot of medications\" and not wanting to lengthen her medication list.  She wants to know if the topiramate is effective for middle of the night snacking.  Weight History: See Dr. Tony's note for more information.  Diet/Eating Habits: Patient reports during the day she reports that she is not a \"big eater\".  She reports that she eats very small portions throughout the day and does not snack in between.  She does eat breakfast lunch and dinner.  She reports that she does not drink a lot of liquid calories.  She finds her greatest issue is getting up in the middle of the night and snacking, this can occur multiple times in a night.  It is very dependent on what she has in her home as to what she will eat.   Diet Recall:   Breakfast: eggs +/- toast   Lunch: sandwich with no sides otherwise deli meat, vegetables   Dinner: frozen meal option, either single serving size or multiple servings  Snacks: cake, pie, other sweets, etc. - usually at night  Liquid calories: none  Exercise/Activity: Patient reports sedentary, difficult to exercise due to COPD. She would like to go for a walk, even just around her building but she doesn't know if she is allowed to.     Wt Readings from Last 4 Encounters:   02/20/20 194 lb 6.4 oz (88.2 kg)   02/14/20 195 lb (88.5 kg)   02/07/20 195 lb 3.2 oz (88.5 kg)   01/24/20 197 lb (89.4 kg)     Estimated body mass index is 33.37 kg/m  as calculated from the following:    Height as of 2/20/20: 5' 4\" (1.626 m).    Weight as of 2/20/20: 194 lb 6.4 oz (88.2 kg).    Today's Vitals: LMP  (LMP Unknown) Telemedicine visit no " vitals.    ASSESSMENT:                            Medication Adherence: good, no issues identified    Hyperlipidemia: Unimproved.  Patient would benefit from finishing simvastatin medication at home, and following recommendations previously discussed with patient by MTM pharmacist Facundo Hathaway.     Obesity: Needs improvement. Could consider starting low dose topiramate to see if this assists with nighttime snacking/cravings as this would be beneficial with. Due to other medications, would want to watch for potential cognitive impact at current age, so would want to keep at lower dose if prescribing and discussed these risks with patient.     PLAN:                            1. Continue the simvastatin 20 mg tablet: 2 tablet daily for now and when you run out you should start taking the atorvastatin 40 mg tablet: 1 tablet daily   -Will get the atorvastatin sent over to the [facility] or have current pharmacy send out in mail.     2. Can consider starting topiramate - patient to think about and contact in future if wishes to start. Recommend she follow up with Dr. Tony in 1-2 months based on her preference.     I spent 30 minutes with this patient today. A copy of the visit note was provided to the patient's referring provider.    Follow up: Recommended she follow up with Facundo Hathaway in near future to follow up on statin change. Provided phone number for scheduling as she wanted to wait to schedule for now.     The patient declined a summary of these recommendations.     Lauren Bloch, PharmD  Medication Therapy Management Pharmacist   [facility]   Phone: [phone]

## 2020-03-19 NOTE — PROGRESS NOTES
Update 3/19 after visit:     Discussed the below with Facundo Hathaway, MTM pharmacist and clarified with [facility] pharmacy that patient picked up atorvastatin 40 mg daily on 1/31/2020 for 3 months supply. Tried calling patient back to see if she does indeed have this at home and ensure she isn't taking both simvastatin and atorvastatin. No answer. Left VM and CB#.     Lauren Bloch, PharmD  Medication Therapy Management Pharmacist   [facility]   Phone: [phone]

## 2020-04-06 ENCOUNTER — TELEPHONE (OUTPATIENT)
Dept: INTERNAL MEDICINE | Facility: CLINIC | Age: 75
End: 2020-04-06

## 2020-04-06 NOTE — TELEPHONE ENCOUNTER
[facility] Call Center    Phone Message    May a detailed message be left on voicemail: yes     Reason for Call: Symptoms or Concerns     If patient has red-flag symptoms, warm transfer to triage line    Current symptom or concern: Laryngitis    Symptoms have been present for:  2-3 month(s)    Has patient previously been seen for this? Yes    By : Dr. Saunders        Are there any new or worsening symptoms? Yes: patient would like a return call to discuss her laryngitis that she has been experiencing for 2-3 months, please call to discuss thank you.       Action Taken: Message routed to:  [facility]    Travel Screening: Not Applicable                       Pt has had a laryngitis for 3 months. Able to swallow. No drooling. Had pneumonia 2 months and laryngitis. Gets dry mouth every night and drinks lots of water. No pain. Will try salt water gargles and stay away from carbonation. Requesting Dr Dailey input-pt refusing to come to the clinic.

## 2020-04-07 ENCOUNTER — TELEPHONE (OUTPATIENT)
Dept: INTERNAL MEDICINE | Facility: CLINIC | Age: 75
End: 2020-04-07

## 2020-04-07 DIAGNOSIS — Z74.09 MOBILITY IMPAIRED: Primary | ICD-10-CM

## 2020-04-07 NOTE — TELEPHONE ENCOUNTER
[facility] Call Center    Phone Message    May a detailed message be left on voicemail: no     Reason for Call: Order(s): Other:   Reason for requested: 4-wheeled walker  Date needed: Asap  Provider name: Dr. Saunders  Comments: Please fax to [facility] at [phone].       Action Taken: Message routed to:  [facility]    Travel Screening: Not Applicable

## 2020-04-07 NOTE — TELEPHONE ENCOUNTER
Spoke to Tanesha, no visit needed for coverage. OK to send order to [facility].  Rachel Kate EMT at 10:40 AM on 4/7/2020.

## 2020-04-08 NOTE — TELEPHONE ENCOUNTER
She should have a telephone visit with ANYONE    BOBBY Lainez 04/10 at 8:30am for telephone visit.  Viri Webb RN 11:19 AM on 4/8/2020.

## 2020-04-09 NOTE — PROGRESS NOTES
"Telephone visit    Ms. Yan agrees to a telephone visit.     Pt. With h/o anxiety, HTN, increased cholesterol, and breathing issues comes in for follow up today. She was seen by Dr. Taylor, pulmonary 12/30/2019 and he is not entirely sure she has a dx. Of COPD. Chest CT scan done 1/15/2020:    IMPRESSION:   1. Small right pleural effusion.  2. Solid pulmonary nodules as detailed above. Consider follow-up if  patient is stratified into high-risk by Fleischner criteria.  3. Enlargement of the pulmonary artery which can be seen in setting of  pulmonary hypertension.  4. Hepatomegaly      I last saw her in clinic 1/15/2020 and additional information is in that note. She saw Dr. Tony, endocrine for wt. Management 2/7/2020. She states since her \" respiratory\" illness in 1/2020 she has had a hoarse voice. No throat pain, No post nasal drip. She denies any dysphagia. She is eating OK. No F/C/NS. She has not used any PPI recently. She o/w denies additional HEENT, cardiopulmonary, abdominal, , neurological, systemic, psychiatric complaints. Will send in Rx. For PPI to try for 7-10 days; if no improvement she can discontinue. Placed an ENT referral.    BOBBY Saunders MD    Total time today 6 minutes 55 seconds  "

## 2020-04-10 ENCOUNTER — VIRTUAL VISIT (OUTPATIENT)
Dept: INTERNAL MEDICINE | Facility: CLINIC | Age: 75
End: 2020-04-10
Payer: COMMERCIAL

## 2020-04-10 DIAGNOSIS — R49.0 HOARSE VOICE QUALITY: Primary | ICD-10-CM

## 2020-04-10 NOTE — NURSING NOTE
Chief Complaint   Patient presents with     Throat Problem     JAYNE Orlando at 1:31 PM sign on 4/10/2020

## 2020-04-13 ENCOUNTER — MEDICAL CORRESPONDENCE (OUTPATIENT)
Dept: HEALTH INFORMATION MANAGEMENT | Facility: CLINIC | Age: 75
End: 2020-04-13

## 2020-04-14 ENCOUNTER — TELEPHONE (OUTPATIENT)
Dept: INTERNAL MEDICINE | Facility: CLINIC | Age: 75
End: 2020-04-14

## 2020-04-14 ENCOUNTER — TELEPHONE (OUTPATIENT)
Dept: OTOLARYNGOLOGY | Facility: CLINIC | Age: 75
End: 2020-04-14

## 2020-04-14 NOTE — TELEPHONE ENCOUNTER
"Called patient in regards to referral. Unable to reach patient, message stated \"patient is not accepting calls at this time.\"     If patient calls back, OK to schedule as a virtual visit with Dr. Arzate or Dr. Roche in any open NEW slot or schedule 8 weeks out for in person appointment.     If patient would like to do video visit please confirm e-mail and let patient know and clinic staff member will reach out 2 days prior to help set up appointment.   "

## 2020-04-14 NOTE — TELEPHONE ENCOUNTER
[facility] Call Center    Phone Message    May a detailed message be left on voicemail: yes     Reason for Call: Other: please call patient because she has some concerns about her COPD (Chronic obstructive pulmonary disease)  which gives her concerns about Covid-19 she wants a nurse to call asap thank you    Action Taken: Message routed to:  [facility]    Travel Screening: Not Applicable

## 2020-04-15 ENCOUNTER — VIRTUAL VISIT (OUTPATIENT)
Dept: INTERNAL MEDICINE | Facility: CLINIC | Age: 75
End: 2020-04-15
Payer: COMMERCIAL

## 2020-04-15 DIAGNOSIS — F41.1 GENERALIZED ANXIETY DISORDER: Primary | ICD-10-CM

## 2020-04-15 DIAGNOSIS — J44.9 CHRONIC OBSTRUCTIVE PULMONARY DISEASE, UNSPECIFIED COPD TYPE (H): ICD-10-CM

## 2020-04-15 ASSESSMENT — PAIN SCALES - GENERAL: PAINLEVEL: NO PAIN (0)

## 2020-04-15 NOTE — NURSING NOTE
74 year old  Chief Complaint   Patient presents with     COPD     Discuss COPD.        not currently breastfeeding. There is no height or weight on file to calculate BMI.  BP completed using cuff size:      MARTIN Mast  April 15, 2020 10:23 AM

## 2020-04-15 NOTE — TELEPHONE ENCOUNTER
Called Ca and left voice message:  Take everyday preventative care and keep a social distance.  Stay home as much as possible and self quarantine.  Stay 6 feet away from people.  If have nurse attending patient, have nurse wear mask and wash their hands before touching patient.  Patient need to wash hands also and try not to touch her face without washing hands first.    If patient developed any flu like symptoms such as fever, cough, SOB, cold chills, headaches, etc, please contact us at [phone] and they will transfer patient to be triaged by a Nurse.    If patient have other concerns that she wants to discuss with her doctor, please schedule a virtual visit with him at [phone].         Jama De La Vega CMA ([facility]) at 9:54 AM on 4/15/2020 '

## 2020-04-15 NOTE — PATIENT INSTRUCTIONS
"Ca,    I enjoyed visiting with you by phone today.  As we discussed earlier, I recommend the following measures:    I recommend that you limit your exposure to others during this period of \"mitigation\", preferably staying in your apartment as much as possible.  When you leave your primary, I recommend that you wear a facemask, if available.  Given your concern regarding the use of a facemask, I recommend that you lift the mask briefly if you feel excessively hot or breathlessness.  When in public, I recommend you maintain \"social distancing\", staying at least 6 feet away from others.  Be sure to continue careful personal hygiene: avoid touching your face, wash your hands regularly and carefully with soap and water, especially after touching any surface and before and after meals and using the bathroom.  Monitor closely for symptoms and call if you develop fever, loss of taste or smell, cough or chest congestion beyond baseline, or diarrhea.  Immediate evaluation should be arranged in the event that you develop breathing difficulty beyond baseline.    Remember that our best strategy is to limit our risks as we learn more about this virus and develop strategies that will lead to an end to this pandemic.  Thank you for choosing [facility] for your healthcare needs.  Best wishes.    Ruben Lowry  "

## 2020-04-15 NOTE — PROGRESS NOTES
"SUBJECTIVE: Chief complaint: \"I'm anxious\".  This 74-year-old woman with known risk factors for Covid-19 expresses concern regarding her risk and has questions regarding management.  She reports that she feels well, with baseline controlled symptoms that allow limited mobility.  She lives in an apartment complex and enjoys visiting the lobby, where she can select books from a collection.  Due to her limited mobility, in general she does not leave her apartment building.  She reports that she \"would go crazy\", if she never left her apartment.  She reports that she has an abundant supply of food in her apartment; nevertheless, she leaves periodically to spend time in the apartment lobby for a change of pace.  She has experienced no symptoms to suggest infection, but has questions regarding her risks and consequences that she might experience should she contract Covid-19.  She reports that she has a single facemask, which she has used sparingly since \"it feels hot and makes it more difficult to breathe\".    Past Medical History: Reviewed and updated in patient health profile.     Adverse Drug Reactions: Reviewed and updated in patient health profile.     Current Medications: Reviewed and updated in patient health profile.     OBJECTIVE:     Virtual visit by phone; no examination.     ASSESSMENT:    1.  Anxiety.  Currently concerned regarding her risk for Covid-19, and seeking advice regarding measures to reduce risk.  She also posed questions regarding the outcomes of individuals diagnosed with Covid-19 and the expected duration of current activity restrictions.  We reviewed current limited information regarding risk factors, medication, transmission, and management of illness related to Covid-19.  She was advised to follow current social distancing guidelines, which were reviewed.  I encouraged her to limit exposures to others, and we discussed the fact that not all individuals capable of transmitting the virus are " actively symptomatic.  She was advised to use a facemask if available; given her preference not to use the mask due to discomfort, I suggested that she acclimate herself to wearing the mask in her apartment, where the mask whenever leaving her apartment, and lift the mask temporarily only if severe breathlessness is noted.  We reviewed presumed routes of transmission and the importance of maintaining a 6-foot distance when exposed to others in public.  We discussed the concept of mitigation, available and anticipated testing, the uncertainty regarding development of immunity, and the hope for a vaccination in coming months.  We discussed the importance of seeking prompt evaluation in the event of symptoms suggesting Covid-19, including cough, fever, chest congestion, and dyspnea.  She was advised to arrange immediate evaluation in the event of dyspnea more pronounced than baseline.    2.  COPD.  Baseline, controlled symptoms.  We reviewed the importance of pursuing further evaluation in the event of progressive symptoms.    PLAN:  See above.    Call start time 10: 54 AM; call end time 11:33 AM     ~SRT

## 2020-04-15 NOTE — PROGRESS NOTES
"Ca Yan is a 74 year old female who is being evaluated via a billable telephone visit.      The patient has been notified of following:     \"This telephone visit will be conducted via a call between you and your physician/provider. We have found that certain health care needs can be provided without the need for a physical exam.  This service lets us provide the care you need with a short phone conversation.  If a prescription is necessary we can send it directly to your pharmacy.  If lab work is needed we can place an order for that and you can then stop by our lab to have the test done at a later time.    Telephone visits are billed at different rates depending on your insurance coverage. During this emergency period, for some insurers they may be billed the same as an in-person visit.  Please reach out to your insurance provider with any questions.    If during the course of the call the physician/provider feels a telephone visit is not appropriate, you will not be charged for this service.\"    Patient has given verbal consent for Telephone visit?  Yes    How would you like to obtain your AVS? Mail a copy     Please call [phone]    Casi Moya CMA    Additional provider notes:     Prefers to use her home phone - [phone].     Palliative Care Outpatient Clinic Progress Note    Patient Name:  Ca Yan  Primary Provider:  Js Saunders    Chief Complaint/Patient ID:   [id number] F with chronic respiratory failure due to kyphoscoliosis and restrictive lung disease, ?COPD on O2 at night and with exertion  Anxiety  Social isolation    Last Palliative Care Appointment:  2/20/20 w Dr. Juarez, started sertraline 25 mg daily for anxiety, referred to palliative SW for counseling    Interim History:  Ca Yan 74 year old female returns to be seen by palliative care today.  Telephone visit performed due to coronavirus outbreak.      Had appointment scheduled with palliative " social work but was cancelled.  Had visit with PCP Dr. Petty for hoarseness after URI in Jan, no pain.  Recommended trial of PPI, referred to ENT.  Had visit with Dr. Lowry yesterday about coronavirus concerns.     Olivia today reports having the most trouble with physical isolation and being home alone.  At her last appointment we had a extensive discussion about her social isolation, because she does not have any family and few friends.  She is now not been able to go to her day program because it is closed, and even if it were open she would have concerns about her risk of getting ill with her lung disease.  She does not have a computer, and says most of her day is just in her sitting in her apartment watching TV.  She describes worsening anxiety, no panic attacks, she is sleeping well.  Appetite has been good and is continued to going to the grocery store as needed, will go to the library in her building.  Says she is careful about handwashing when she is out of her apartment..  She gets increasingly anxious when she thinks about how long she may need to stay alone.  She says she does not have any friends she has been keeping in touch with her over the phone.    She denies any changes with her blood breathing, and feels like that is doing well.  We had started sertraline at last visit, which she sees she said she took for a few weeks and did not notice any difference so she stopped.  She does not want to be on any medication for anxiety and declines any other pharmacologic treatment today.    Social History:  Pertinent changes to social history/social situation since last visit:   Lives alone in independent senior apartment  Key support resources: Limited social [place], trying to get involved in [employer].  Uses [employer] for transportation.  Goes to day program 2-3x/week.    Hx of alcohol misuse  Advance Directive Status:  States she has completed, though not in our system  Social History     Tobacco Use      Smoking status: Former Smoker     Packs/day: 0.50     Years: 5.00     Pack years: 2.50     Types: Cigarettes     Start date: 1956     Last attempt to quit: 1980     Years since quittin.5     Smokeless tobacco: Former User     Quit date: 1980   Substance Use Topics     Alcohol use: Not Currently     Alcohol/week: 0.0 standard drinks     Comment: Sober for 2 years, previous alcoholic     Drug use: No         Allergies   Allergen Reactions     Azithromycin      Other reaction(s): Itching     Codeine Nausea and Vomiting     Hydrocodone      Vomiting     Metronidazole Nausea     Percocet [Oxycodone-Acetaminophen]      Vomiting     Pollen Extract      Other reaction(s): Other, see comments  Pt states that she has sneezing and runny nose.      Seasonal Allergies Other (See Comments)     Pt states that she has sneezing and runny nose.      Vicodin [Hydrocodone-Acetaminophen]      Vomiting     Zolpidem Other (See Comments)     Amnestic behavior     Oxycodone Itching and Rash     Current Outpatient Medications   Medication Sig Dispense Refill     acetaminophen (TYLENOL) 500 MG tablet Take 500-1,000 mg by mouth every 6 hours as needed for mild pain       albuterol (PROAIR HFA/PROVENTIL HFA/VENTOLIN HFA) 108 (90 Base) MCG/ACT inhaler Inhale 2 puffs into the lungs every 6 hours as needed for shortness of breath / dyspnea or wheezing 3 Inhaler 1     amLODIPine (NORVASC) 2.5 MG tablet Take 1 tablet (2.5 mg) by mouth daily 90 tablet 3     ASPIRIN EC PO Take 81 mg by mouth       atorvastatin (LIPITOR) 40 MG tablet Take 1 tablet (40 mg) by mouth daily 90 tablet 3     Blood Pressure Monitoring (BLOOD PRESSURE CUFF) MISC Imron blood cuff    Please check your blood pressure 2-3 times per week.  Goal is <140/90 1 each 0     clonazePAM (KLONOPIN) 0.5 MG tablet Take 0.5 mg by mouth daily       COMPRESSION STOCKINGS 1 each daily 2 each 1     dextromethorphan-guaiFENesin (TUSSIN DM)  MG/5ML liquid Take 5 mLs by  mouth 4 times daily as needed (for cough) 180 mL 0     fluticasone-vilanterol (BREO ELLIPTA) 200-25 MCG/INH inhaler Inhale 1 puff into the lungs daily 3 Inhaler 3     hydrochlorothiazide (MICROZIDE) 12.5 MG capsule Take 1 capsule (12.5 mg) by mouth daily 90 capsule 3     ibuprofen (ADVIL,MOTRIN) 200 MG tablet Take 3 tablets (600 mg) by mouth 3 times daily (with meals) 90 tablet 0     latanoprost (XALATAN) 0.005 % ophthalmic solution 1 drop       losartan (COZAAR) 50 MG tablet Take 2 tablets (100 mg) by mouth daily 90 tablet 3     magnesium 250 MG tablet Take 1 tablet by mouth daily       order for DME 4 wheeled walker, patient preference 1 Device 0     order for DME Equipment being ordered: Waist-high compression stockings (waist-high), appropriately sized, 20-30 mm Hg 1 each 1     order for DME Equipment being ordered: Wheelchair Sig: Equipment being ordered: portable walker with seat and compartment under the seat.     Use for going out of the house on errands, where prolonged standing is required. 1 Device 1     order for DME Equipment being ordered: portable walker with seat and compartment under the seat.    Use for going out of the house on errands, where prolonged standing is required. 1 Units 0     order for DME Equipment being ordered: knee high compression stockings, class 1 or 2, night time velcro compression garments, lymphedema bandaging supplies, darco boots to wear during treatment 2 each 3     order for DME Equipment being ordered: Oxygen  Please provide portable oxygen concentrator (pt would like over the shoulder oxygen device) for portability at 2LPM with activity via nasal canula. 1 Device 1     quetiapine (SEROQUEL) 200 MG tablet Take 200 mg by mouth At Bedtime.       sertraline 25 MG PO tablet Take 1 tablet (25 mg) by mouth daily If no side effects and no increase in anxiety in 2 weeks, increase to 2 tabs daily. 60 tablet 1     umeclidinium (INCRUSE ELLIPTA) 62.5 MCG/INH inhaler Inhale 1 puff  into the lungs daily 7 Inhaler 3     Past Medical History:   Diagnosis Date     Anxiety      Arthritis      Breast cancer (H)      COPD (chronic obstructive pulmonary disease) (H)     6/19/12:  FEV 0.99 l     Depressive disorder      Hypertension      Low back pain with left-sided sciatica      Low bone density 4/13/2017    DEXA April 12, 2017: T score -2.0. Normal Z score. FRAX risk: major osteoporotic fracture 11.9%, hip fracture 2.6%, therefore not high-risk     Lymphedema, chronic lower extremities      Past Surgical History:   Procedure Laterality Date     BACK SURGERY      spinal fusion     COLONOSCOPY       LUMPECTOMY BREAST       ORTHOPEDIC SURGERY      Knee surgery left side       Review of Systems:   ROS: 10 point ROS neg other than the symptoms noted above in the HPI and pertinents here:  Palliative Symptom Review (0=no symptom/no concern, 1=mild, 2=moderate, 3=severe):     Pain: 0      Fatigue: 0      Nausea: 0      Constipation: 0      Diarrhea: 0      Depressive Symptoms: 2      Anxiety: 3      Drowsiness: 0      Poor Appetite: 0      Shortness of Breath: 1      Insomnia: 0    Unable to perform physical exam because of telephone visit.  She is speaking in full paragraphs, with anxious emotion her voice.    Key Data Reviewed:  LABS:   Last GFR 71  Hb 13.4    IMAGING: No new imaging.     Impression & Recommendations & Counseling:  Ca Yan is a 74 year old female with history of h/o anxiety, HTN, increased cholesterol, and chronic respiratory failure, attributed to COPD but likely more from kyphoscoliosis. She was referred to Palliaitve care primarily for guidance on goals of care, updating her HCD, and emotional support. However, she declined needing help with updating her HCD and was focused on how our team could help with her anxiety and help her meet people.  She is incredibly socially isolated, so we tried to brain storm some ideas for her.  This has been exacerbated by the further physical  isolation recommended for coronavirus.  She alternates about being distressed over having to be alone, and her fear of leaving her apartment and getting sick.  I think she clearly has pre-existing anxiety that is exacerbated by the current situation, I did recommend a trial of an alternate anxiety medicine, but she declines today.  Supportive listening performed.    1.  Is accepting of a palliative social work referral, aware this would likely happen over the phone.  2.  Given the phone number for the 24/7 friendship hotline through the [place] on Aging    Of note for the future, she only uses her HOME phone.     Phone call duration: 22 minutes, 4:26-4:48    Follow-up in 2 months.     Alison Schuster MD  Palliative Medicine  Pager [phone]

## 2020-04-16 ENCOUNTER — VIRTUAL VISIT (OUTPATIENT)
Dept: PALLIATIVE CARE | Facility: CLINIC | Age: 75
End: 2020-04-16
Attending: INTERNAL MEDICINE
Payer: COMMERCIAL

## 2020-04-16 DIAGNOSIS — R45.89 DIFFICULTY COPING: ICD-10-CM

## 2020-04-16 DIAGNOSIS — F43.22 ADJUSTMENT DISORDER WITH ANXIOUS MOOD: Primary | ICD-10-CM

## 2020-04-16 DIAGNOSIS — Z60.4 SOCIAL ISOLATION: ICD-10-CM

## 2020-04-16 PROBLEM — R41.89 COGNITIVE IMPAIRMENT: Status: ACTIVE | Noted: 2020-01-10

## 2020-04-16 PROCEDURE — 99443 ZZC PHYSICIAN TELEPHONE EVALUATION 21-30 MIN: CPT | Performed by: INTERNAL MEDICINE

## 2020-04-16 PROCEDURE — 40000114 ZZH STATISTIC NO CHARGE CLINIC VISIT

## 2020-04-16 SDOH — SOCIAL STABILITY - SOCIAL INSECURITY: SOCIAL EXCLUSION AND REJECTION: Z60.4

## 2020-04-17 ENCOUNTER — TELEPHONE (OUTPATIENT)
Dept: INTERNAL MEDICINE | Facility: CLINIC | Age: 75
End: 2020-04-17

## 2020-04-17 NOTE — TELEPHONE ENCOUNTER
[facility] Call Center    Phone Message    May a detailed message be left on voicemail: yes     Reason for Call: Pt had a telephone visit with Dr. Lowry on 4/15 and stated she has additional questions from that visit (would not give her questions) and would like a call back today if possible.     Action Taken: Primary Care     Travel Screening: Not Applicable

## 2020-04-20 ENCOUNTER — TELEPHONE (OUTPATIENT)
Dept: OTOLARYNGOLOGY | Facility: CLINIC | Age: 75
End: 2020-04-20

## 2020-04-20 NOTE — TELEPHONE ENCOUNTER
[facility] Call Center    Phone Message    May a detailed message be left on voicemail: yes     Reason for Call: Other: DX:Hoarse voice quality and referred by Dr. Js Saunders, per patient.  All records in [facility], per patient.  Please review and follow up with patient.  Thank you!     Action Taken: Message routed to:  [facility] Coord [facility]    Travel Screening: Not Applicable

## 2020-04-20 NOTE — TELEPHONE ENCOUNTER
Contacted Ca and discussed her concerns by phone.  We discussed measures to reduce Covid-19 risk, symptoms for which to seek evaluation, and strategies to lifestyle measures to help manage anxiety in addition to medication currently prescribed.  She expressed satisfaction with plan and agreed to call in the event of problems.

## 2020-04-20 NOTE — TELEPHONE ENCOUNTER
I spoke to the patient over the phone. She was upset that Dr. Lowry told her that she would be able to survive covid19 infection. She feels that this is not factual and she would like a call back from the doctor. She did not wish to speak to me further, but had no other concerns.   Rahcel Kate, EMT at 10:10 AM on 4/20/2020.

## 2020-04-21 NOTE — TELEPHONE ENCOUNTER
"Called patient to schedule referral from Dr. Saunders.    Spoke with patient. She declined a virtual visit w/ MDs, stating \"why would you suggest that?\" and declined to schedule in office visit in July as \"it won't be safe yet.\"     Requested a call back from clinic staff to discuss \"what can be done for me\" at appointment maribell reinoso w/ Dr. Roche.    "

## 2020-04-21 NOTE — TELEPHONE ENCOUNTER
FUTURE VISIT INFORMATION      FUTURE VISIT INFORMATION:    Date: 4/30/2020    Time: 3:20 PM    Location: [facility]   REFERRAL INFORMATION:    Referring provider:  Dr. Js Saunders    Referring providers clinic:  [facility]    Reason for visit/diagnosis  Hoarse Voice     RECORDS REQUESTED FROM:       Clinic name Comments Records Status Imaging Status

## 2020-04-27 ENCOUNTER — TELEPHONE (OUTPATIENT)
Dept: INTERNAL MEDICINE | Facility: CLINIC | Age: 75
End: 2020-04-27

## 2020-04-27 NOTE — TELEPHONE ENCOUNTER
[facility] Call Center    Phone Message    May a detailed message be left on voicemail: yes     Reason for Call: Other: Pt requesting call from nursing staff. Pt states she has COPD, and she cannot wear a face mask as it is difficult for her to breathe. Pt states she is however, worried for her safety and wants to know if she should wear one. Please advise.    Action Taken: Message routed to:  [facility]    Travel Screening: Not Applicable

## 2020-04-27 NOTE — TELEPHONE ENCOUNTER
Called patient:  Patient has to run errands such as grocery shopping.  She cannot breath if wearing a face mask due to her COPD.  She does not have anyone who can run errands for her.  I advise to bring disposable cloth or tissue to have on hand to cover her nose and mouth if people are not staying 6 ft away from her or someone who is coughing near her.  Patient gave verbal understanding.        Jama De La Vega CMA ([facility]) at 11:02 AM on 4/27/2020

## 2020-04-28 ENCOUNTER — TELEPHONE (OUTPATIENT)
Dept: OTOLARYNGOLOGY | Facility: CLINIC | Age: 75
End: 2020-04-28

## 2020-04-28 NOTE — TELEPHONE ENCOUNTER
Spoke with patient about upcoming virtual visit with Dr Roche. Explained further the benefits of an initial exam virtually through the telephone. Also tried to convince patient to switch to a video visit, but patient does not have video capability. Informed patient that clinic will call her 30min prior to her appointment to go over questionnaires in relation to the visit. Patient expressed understanding.    Adali Duran, EMT

## 2020-04-30 ENCOUNTER — PRE VISIT (OUTPATIENT)
Dept: OTOLARYNGOLOGY | Facility: CLINIC | Age: 75
End: 2020-04-30

## 2020-04-30 ENCOUNTER — VIRTUAL VISIT (OUTPATIENT)
Dept: OTOLARYNGOLOGY | Facility: CLINIC | Age: 75
End: 2020-04-30
Attending: INTERNAL MEDICINE
Payer: COMMERCIAL

## 2020-04-30 ENCOUNTER — VIRTUAL VISIT (OUTPATIENT)
Dept: OTOLARYNGOLOGY | Facility: CLINIC | Age: 75
End: 2020-04-30
Payer: COMMERCIAL

## 2020-04-30 VITALS — HEIGHT: 64 IN | WEIGHT: 195 LBS | BODY MASS INDEX: 33.29 KG/M2

## 2020-04-30 DIAGNOSIS — R49.0 DYSPHONIA: Primary | ICD-10-CM

## 2020-04-30 DIAGNOSIS — R09.89 CHRONIC THROAT CLEARING: ICD-10-CM

## 2020-04-30 ASSESSMENT — MIFFLIN-ST. JEOR: SCORE: 1359.51

## 2020-04-30 ASSESSMENT — PAIN SCALES - GENERAL: PAINLEVEL: NO PAIN (0)

## 2020-04-30 NOTE — PROGRESS NOTES
"  Ca Yan is a 74 year old female who is being evaluated via a billable telephone visit.      The patient has been notified of following:      \"This telephone visit will be conducted via a call between you and your physician/provider. We have found that certain health care needs can be provided without the need for a physical exam.  This service lets us provide the care you need with a short phone conversation.  If a prescription is necessary we can send it directly to your pharmacy.  If lab work is needed we can place an order for that and you can then stop by our lab to have the test done at a later time.     Telephone visits are billed at different rates depending on your insurance coverage. During this emergency period, for some insurers they may be billed the same as an in-person visit.  Please reach out to your insurance provider with any questions.     If during the course of the call the physician/provider feels a telephone visit is not appropriate, you will not be charged for this service.\"     Patient has given verbal consent for Telephone visit?  Yes     How would you like to obtain your AVS? Mail a copy    Call initiated at: 3:20pm        [facility]  Evaluation report    Clinician: ROSA MARIA Fabian M.A. (music), CFY-SLP  Seen in conjunction with: Dr. Roche  Referring physician:  Dr. Saunders  Patient: Ca Yan  Date of Visit: 4/30/2020    HISTORY  Chief complaint: Ca Yan is a 74 year old female presenting today for evaluation of voice concerns.    Onset: Suddenly about 3-4 months ago  Inciting incident: Pneumonia in January 2020  Course: Stable  Salient history: She has a history significant for anxiety, depression and COPD. Reports she was hospitalized in January 2020 for pneumonia. She is unable to remember whether the hoarseness began during or after her hospital stay. Although her coughing has improved, the voice has been stable with no improvement. She is quite " bothered by her voice difficulties stating many of her friends want to talk on the phone during the COVID-19 crisis and she is unable to do so. She was referred to our clinic by Dr. Saunders for further evaluation and treatment.     CURRENT SYMPTOMS INCLUDE  VOICE    Poor voice quality    Total loss of voice    Voice rest does not help     Does not like to talk on the phone as much because of voice difficulties     Wakes up with the hoarse voice      THROAT/COUGH/THROAT CLEARING    Dry mouth and throat    Frequent throat clearing    Frequent cough; however, cough is much better   her cough/ throat clearing triggers include:    Worse after heavy voice use    Phlegm    BREATHING    Has COPD and feels short of breath because of this    No additional concerns      Patient denies significant dysphagia and pain.       OTHER PERTINENT HISTORY  Anxiety/depression; managed with medications  COPD  Otherwise unremarkable.  Please also refer to Dr. Roche's dictation.     Past Medical History:   Diagnosis Date     Anxiety      Arthritis      Breast cancer (H)      COPD (chronic obstructive pulmonary disease) (H)     6/19/12:  FEV 0.99 l     Depressive disorder      Hypertension      Low back pain with left-sided sciatica      Low bone density 4/13/2017    DEXA April 12, 2017: T score -2.0. Normal Z score. FRAX risk: major osteoporotic fracture 11.9%, hip fracture 2.6%, therefore not high-risk     Lymphedema, chronic lower extremities      Past Surgical History:   Procedure Laterality Date     BACK SURGERY      spinal fusion     COLONOSCOPY       LUMPECTOMY BREAST       ORTHOPEDIC SURGERY      Knee surgery left side       OBJECTIVE  PATIENT REPORTED MEASURES  Effort to talk: 5 / 10 (0-10 in which 10 represents maximal effort)  Voice quality: 5 / 10 (0-10 in which 10 represents best possible voice)     Patient Supplied Answers To VHI Questionnaire  Voice Handicap Index (VHI-10) 4/30/2020   My voice makes it difficult for people to  "hear me 0   People have difficulty understanding me in a noisy room 0   My voice difficulties restrict my personal and social life.  4   I feel left out of conversations because of my voice 0   My voice problem causes me to lose income 0   I feel as though I have to strain to produce voice 4   The clarity of my voice is unpredictable 4   My voice problem upsets me 4   My voice makes me feel handicapped 4   People ask, \"What's wrong with your voice?\" 0   VHI-10 20       Patient Supplied Answers To CSI Questionnaire  Cough Severity Index (CSI) 4/30/2020   My cough is worse when I lie down 4   My coughing problem causes me to restrict my personal and social life 0   I tend to avoid places because of my cough problem 0   I feel embarrassed because of my coughing problem 3   People ask, ''What's wrong?'' because I cough a lot 0   I run out of air when I cough 4   My coughing problem affects my voice 4   My coughing problem limits my physical activity 4   My coughing problem upsets me 4   People ask me if I am sick because I cough a lot 0   CSI Score 23       PERCEPTUAL EVALUATION (CPT 36770)  POSTURE / TENSION:     Unable to assess due to visit being completed via telephone    BREATHING:   phonation is not coordinated with respiration    VOICE:  Roughness: Moderate Consistent  Breathiness: Minimal  Strain: Moderate to severe Consistent  Intermittent diplophonia   Loudness  Conversational speech:  WNL  Pitch:  Conversational speech:  WNL  Pitch glide: much clearer sound quality is appreciated at higher pitches  Resonance:  Conversational speech:  laryngeal pharyngeal resonance  CAPE-V Overall Severity:  42/100    COUGH/THROAT CLEARING:  Frequent  Dry    LARYNGEAL FUNCTION STUDIES (CPT 77455)  Recommend to be completed when able to come into clinic.    LARYNGEAL EXAMINATION  Recommend to be completed when able to come into clinic.      ASSESSMENT / PLAN  IMPRESSIONS: Ca Yan is presenting today with R49.0 (Dysphonia) " and R68.89 (Chronic Throat Clearing) in the context of following pneumonia in January 2020. Unfortunately, laryngoscopy and laryngeal function studies were unable to be completed due to COVID-19 crisis. Ms. Yan is noted to throat clear frequently throughout the evaluation. Perceptually, Ms. Yan's voice is characterized by roughness, strain and intermittent diplophonia. However, at much higher pitches the voice becomes clearer and no diplophonia is appreciated. She is unable to bring the less dysphonic voice quality to more modal pitches with probes. Because there appears to be a functional component to her symptoms, she would benefit from a course of speech therapy. She will need to undergo laryngoscopy evaluation when it is safe to do so.    STIMULABILITY: results of therapy probes during perceptual and laryngeal evaluation demonstrate limited improvement with use of forward resonant stimuli, coordination of respiration and phonation and use of yawn sigh    RECOMMENDATIONS:   A course of speech therapy is recommended to optimize vocal technique, improve voice quality, promote reduced discomfort, effort and fatigue and help reduce throat clear.  She demonstrates a Guarded prognosis for improvement given adherence to therapeutic recommendations.   Positive indicators: high level of comittment  Negative indicators: Unable to complete laryngoscopy due to COVID-19, limited response to therapy probes  DURATION / FREQUENCY: 2 one-hour sessions with one week in between. Then 3 session with 2-3 weeks in between and 1 monthly follow-up. A total of 7-8 sessions may be necessary.       GOALS:  Patient goal:   To improve and maintain a healthy voice quality  To understand the problem and fix it as much as possible  To have a normal and acceptable voice quality  To reduce throat clearing    Short-term goal(s): Within the first 4 sessions, Ms. Yan:  will demonstrate improved awareness of throat clearing / cough:  acknowledging >75% of all cough events during session time with no clinician support  will be able to demonstrate provided cough suppression and substitution strategies from memory independently with 90% accuracy  will be able to independently list key factors in maintenance of good vocal hygiene with 80% accuracy, and report on their use outside the therapy room.  will utilize silent inhalation with good low-respiratory engagement 75% of the time during therapy tasks with minimal clinician support  will demonstrate semi-occluded vocal tract (SOVT) exercises with at least 80% accuracy with no clinician support  will demonstrate the ability to alternate between target and habitual voice quality given clinician cue 75% of the time during therapy tasks  will initiate Resonant Voice Therapy (RVT)    Long-term goal(s): In 6 months, Ms. Yan will:  Report resolution of symptoms, and use of optimal voice quality and comfort to meet personal, social, and professional needs, 90% of the time during a typical week of vocal activities      This treatment plan was developed with the patient who agreed with the recommendations.      TOTAL SERVICE TIME: 30 minutes  Call Initiated at: 3:20pm  Call Ended at: 3:50pm    CPT Billing Codes  EVALUATION OF VOICE AND RESONANCE (97676)  NO CHARGE FACILITY FEE (47315)        ROSA MARIA Fabian (jenelle), M.A., CFY-SLP  Speech Language Pathology Clinical Fellow  [facility] Trained Vocologist   [facility] Voice [facility]   [phone]  [email or web address]    *This report was created in part through the use of computerized dictation software, and though reviewed following completion, some typographic errors may persist.  If there is confusion regarding any of this notes contents, please contact me for clarification

## 2020-04-30 NOTE — PATIENT INSTRUCTIONS
1.  You were seen in the ENT Clinic today by . If you have any questions or concerns after your appointment, please call [phone]. Press option #1 for scheduling related needs. Press option #3 for Nurse advice.    2.   has recommended  the following:                       Xerostomia (Dry Mouth)  - Try increasing liquids with foods, e.g. sauces and gravies, sips of drinks between mouthfuls of food  - Suck on ice cubes or ice lollies. Try flavoring with citrus fruits or juices  - Suck slices of citrus fruits  - Suck boiled sweets  - Avoid dry foods, i.e. bread and cakes  - Chew sugar-free aimee    3. Start voice therapy    4. Plan is to return to clinic tentatively in 3 months      Annabella Asif LPN  [facility] - Otolaryngology

## 2020-04-30 NOTE — Clinical Note
Hi   Can you mail her the AVS and make her a placeholder appt in 3 months. She also needs voice therapy.   Thank you  Domonique

## 2020-04-30 NOTE — Clinical Note
Thank you for your referral. I was able to talk to her today along with my speech and language therapist. We were able to hear clear voice in the high pitch which makes me not worried about a fungal or bacterial laryngitis. We will start her on some voice therapy - though it is difficult over the phone only. 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 Thank you for your referral again, Domonique

## 2020-04-30 NOTE — PROGRESS NOTES
"Ca Yan is a 74 year old female who is being evaluated via a billable telephone visit.      The patient has been notified of following:     \"This telephone visit will be conducted via a call between you and your physician/provider. We have found that certain health care needs can be provided without the need for a physical exam.  This service lets us provide the care you need with a short phone conversation.  If a prescription is necessary we can send it directly to your pharmacy.  If lab work is needed we can place an order for that and you can then stop by our lab to have the test done at a later time.    Telephone visits are billed at different rates depending on your insurance coverage. During this emergency period, for some insurers they may be billed the same as an in-person visit.  Please reach out to your insurance provider with any questions.    If during the course of the call the physician/provider feels a telephone visit is not appropriate, you will not be charged for this service.\"    Patient has given verbal consent for Telephone visit?  Yes    How would you like to obtain your AVS? Mail a copy    Phone call duration: 20 minutes      [facility]   at the [facility]   Otolaryngology Clinic     Patient: Ca Yan    MRN: [id number]    : 1945    Age/Gender: 74 year old female  Date of Service: 2020  Rendering Provider:   Domonique Roche MD       Referring Provider   PCP: Js Saunders  Referring Physician: Js Saunders MD  [address] [place] [address]    Reason for Consultation   Dysphonia  History     HISTORY OF PRESENT ILLNESS: I was asked to consult on Ca Yan, by Dr Js Saunders for evaluation of dysphonia . Ms. Yan is a 74 year old female who presents to us today with dysphonia for the past 3-4 months. This started around the time of her hospital admission on 20 when she was admitted for pneumonia. She " can't recall the timing - if the hoarseness started during the hospital or after the hospital. She does report that it has been stable since. It does not get better or worse throughout the day. She wakes up with this voice. Her voice today is about as good as it gets. It does not get better than this. It does not get better if she is quiet for a while. She also complains of dry mouth. She has a history of COPD, her breathing has been stable.     PAST MEDICAL HISTORY:   Past Medical History:   Diagnosis Date     Anxiety      Arthritis      Breast cancer (H)      COPD (chronic obstructive pulmonary disease) (H)     6/19/12:  FEV 0.99 l     Depressive disorder      Hypertension      Low back pain with left-sided sciatica      Low bone density 4/13/2017    DEXA April 12, 2017: T score -2.0. Normal Z score. FRAX risk: major osteoporotic fracture 11.9%, hip fracture 2.6%, therefore not high-risk     Lymphedema, chronic lower extremities          PAST SURGICAL HISTORY:   Past Surgical History:   Procedure Laterality Date     BACK SURGERY      spinal fusion     COLONOSCOPY       LUMPECTOMY BREAST       ORTHOPEDIC SURGERY      Knee surgery left side         CURRENT MEDICATIONS:   Current Outpatient Medications:      albuterol (PROAIR HFA/PROVENTIL HFA/VENTOLIN HFA) 108 (90 Base) MCG/ACT inhaler, Inhale 2 puffs into the lungs every 6 hours as needed for shortness of breath / dyspnea or wheezing, Disp: 3 Inhaler, Rfl: 1     amLODIPine (NORVASC) 2.5 MG tablet, Take 1 tablet (2.5 mg) by mouth daily, Disp: 90 tablet, Rfl: 3     ASPIRIN EC PO, Take 81 mg by mouth, Disp: , Rfl:      atorvastatin (LIPITOR) 40 MG tablet, Take 1 tablet (40 mg) by mouth daily, Disp: 90 tablet, Rfl: 3     clonazePAM (KLONOPIN) 0.5 MG tablet, Take 0.5 mg by mouth daily, Disp: , Rfl:      dextromethorphan-guaiFENesin (TUSSIN DM)  MG/5ML liquid, Take 5 mLs by mouth 4 times daily as needed (for cough), Disp: 180 mL, Rfl: 0     hydrochlorothiazide  (MICROZIDE) 12.5 MG capsule, Take 1 capsule (12.5 mg) by mouth daily, Disp: 90 capsule, Rfl: 3     ibuprofen (ADVIL,MOTRIN) 200 MG tablet, Take 3 tablets (600 mg) by mouth 3 times daily (with meals), Disp: 90 tablet, Rfl: 0     latanoprost (XALATAN) 0.005 % ophthalmic solution, 1 drop, Disp: , Rfl:      losartan (COZAAR) 50 MG tablet, Take 2 tablets (100 mg) by mouth daily, Disp: 90 tablet, Rfl: 3     order for DME, Equipment being ordered: Oxygen  Please provide portable oxygen concentrator (pt would like over the shoulder oxygen device) for portability at 2LPM with activity via nasal canula., Disp: 1 Device, Rfl: 1     quetiapine (SEROQUEL) 200 MG tablet, Take 200 mg by mouth At Bedtime., Disp: , Rfl:      fluticasone-vilanterol (BREO ELLIPTA) 200-25 MCG/INH inhaler, Inhale 1 puff into the lungs daily (Patient not taking: Reported on 4/16/2020), Disp: 3 Inhaler, Rfl: 3     sertraline 25 MG PO tablet, Take 1 tablet (25 mg) by mouth daily If no side effects and no increase in anxiety in 2 weeks, increase to 2 tabs daily. (Patient not taking: Reported on 4/16/2020), Disp: 60 tablet, Rfl: 1     umeclidinium (INCRUSE ELLIPTA) 62.5 MCG/INH inhaler, Inhale 1 puff into the lungs daily (Patient not taking: Reported on 4/16/2020), Disp: 7 Inhaler, Rfl: 3      ALLERGIES: Azithromycin; Codeine; Hydrocodone; Metronidazole; Oxycodone; Percocet [oxycodone-acetaminophen]; Pollen extract; Seasonal allergies; Vicodin [hydrocodone-acetaminophen]; and Zolpidem      SOCIAL HISTORY:    Social History     Socioeconomic History     Marital status: Single     Spouse name: Not on file     Number of children: Not on file     Years of education: Not on file     Highest education level: Not on file   Occupational History     Employer: RETIRED   Social Needs     Financial resource strain: Not on file     Food insecurity     Worry: Not on file     Inability: Not on file     Transportation needs     Medical: Not on file     Non-medical: Not on  file   Tobacco Use     Smoking status: Former Smoker     Packs/day: 0.50     Years: 5.00     Pack years: 2.50     Types: Cigarettes     Start date: 1956     Last attempt to quit: 1980     Years since quittin.6     Smokeless tobacco: Former User     Quit date: 1980   Substance and Sexual Activity     Alcohol use: Not Currently     Alcohol/week: 0.0 standard drinks     Comment: Sober for 2 years, previous alcoholic     Drug use: No     Sexual activity: Not Currently     Partners: Male     Birth control/protection: Abstinence   Lifestyle     Physical activity     Days per week: Not on file     Minutes per session: Not on file     Stress: Not on file   Relationships     Social connections     Talks on phone: Not on file     Gets together: Not on file     Attends Bahai service: Not on file     Active member of club or organization: Not on file     Attends meetings of clubs or organizations: Not on file     Relationship status: Not on file     Intimate partner violence     Fear of current or ex partner: Not on file     Emotionally abused: Not on file     Physically abused: Not on file     Forced sexual activity: Not on file   Other Topics Concern     Parent/sibling w/ CABG, MI or angioplasty before 65F 55M? Not Asked      Service Not Asked     Blood Transfusions Not Asked     Caffeine Concern Not Asked     Occupational Exposure Not Asked     Hobby Hazards Not Asked     Sleep Concern Not Asked     Stress Concern Not Asked     Comment: Lives alone     Weight Concern Not Asked     Special Diet Not Asked     Back Care Not Asked     Comment: Hx of scoliosis with spine fusion     Exercise Not Asked     Comment: Walks     Bike Helmet Not Asked     Seat Belt Not Asked     Self-Exams Not Asked   Social History Narrative    Only family member is daughter in [place], [place] whom            FAMILY HISTORY:   Family History   Problem Relation Age of Onset     Breast Cancer Mother      Diabetes Mother       Anxiety Disorder Mother      Asthma Mother      Other Cancer Mother      Hyperlipidemia Mother      Alcohol/Drug Father      Mental Illness Father      Substance Abuse Father      Obesity Father      Cerebrovascular Disease Maternal Grandmother 67     Non-contributory for problems with anesthesia      REVIEW OF SYSTEMS:   The patient was asked a 14 point review of systems regarding constitutional symptoms, eye symptoms, ears, nose, mouth, throat symptoms, cardiovascular symptoms, respiratory symptoms, gastrointestinal symptoms, genitourinary symptoms, musculoskeletal symptoms, integumentary symptoms, neurological symptoms, psychiatric symptoms, endocrine symptoms, hematologic/lymphatic symptoms, and allergic/ immunologic symptoms.   The pertinent factors have been included in the HPI and below.  Patient Supplied Answers to Review of Systems  [facility] ROS 4/30/2020   Psychology Frequently feeling depressed or sad, Frequently feeling anxious   Ears, Nose, Throat Nasal congestion or drainage, Hoarseness   Cardiopulmonary Cough, Breathing problems   Musculoskeletal Sore or stiff joints, Swollen legs/feet   Allergy/Immunology Allergies or hay fever   Hematologic Easy bruising         Physical Examination     BEHAVIORAL & QUALITATIVE EVALUATION OF VOICE AND RESONENCE   No stridor  Comments: MPT: 10s  Vocal Quality: strained and rough with diplophonia and low and normal pitch, normal quality at high pitch    Pitch Range:  Normal   Phrase Length:  Normal  Vocal Loudness: Normal  Dysarthria: No    Review of Relevant Clinical Data     Notes: Js Saunders 1/21/20; Trisha Bowen 1/21/20; Hayden Rodriguez 1/7/20    Radiology: CT chest 1/15/20  1. Small right pleural effusion.  2. Solid pulmonary nodules as detailed above. Consider follow-up if  patient is stratified into high-risk by Fleischner criteria.  3. Enlargement of the pulmonary artery which can be seen in setting of  pulmonary hypertension.  4.  "Hepatomegaly.    Pathology: none    Procedures:6 min walk test 1/2/20  The six-minute walk distance is reduced.   There is significant desaturation, without hypoxemia, during the six-minute walk done on room air.     Labs:  Lab Results   Component Value Date    TSH 2.99 09/25/2019     Lab Results   Component Value Date     01/21/2020    CO2 34 (H) 01/21/2020    BUN 26 01/21/2020     Lab Results   Component Value Date    WBC 7.3 01/21/2020    HGB 13.4 01/21/2020    HCT 43.4 01/21/2020    MCV 98 01/21/2020     01/21/2020     No results found for: PT, PTT, INR  No results found for: LONNY  No components found for: RHEUMATOIDFACTOR,  RF  Lab Results   Component Value Date    CRP 6.4 09/25/2019     No components found for: CKTOT, URICACID  No components found for: C3, C4, DSDNAAB, NDNAABIFA  No results found for: MPOAB    Patient reported Quality of Life (QOL) Measures     Patient Supplied Answers To VHI Questionnaire  Voice Handicap Index (VHI-10) 4/30/2020   My voice makes it difficult for people to hear me 0   People have difficulty understanding me in a noisy room 0   My voice difficulties restrict my personal and social life.  4   I feel left out of conversations because of my voice 0   My voice problem causes me to lose income 0   I feel as though I have to strain to produce voice 4   The clarity of my voice is unpredictable 4   My voice problem upsets me 4   My voice makes me feel handicapped 4   People ask, \"What's wrong with your voice?\" 0   VHI-10 20       Patient Supplied Answers To CSI Questionnaire  Cough Severity Index (CSI) 4/30/2020   My cough is worse when I lie down 4   My coughing problem causes me to restrict my personal and social life 0   I tend to avoid places because of my cough problem 0   I feel embarrassed because of my coughing problem 3   People ask, ''What's wrong?'' because I cough a lot 0   I run out of air when I cough 4   My coughing problem affects my voice 4   My coughing " problem limits my physical activity 4   My coughing problem upsets me 4   People ask me if I am sick because I cough a lot 0   CSI Score 23       Impression & Plan     IMPRESSION: Ms. Yan is a 74 year old female who is being seen for the followin. Dysphonia - for the past 3 months, stable all day and progression, with improvement at high pitch. Normal voice at high pitch rules out an infectious etiology like fungal laryngitis. Differential includes some type of paresis (though no breathiness or trouble with food) or vocal fold polyp given coughing. This is on top of presbylarynx and dehyration.  - will start voice therapy  - will discuss with therapist - if minimal improvement will consider in office exam  - difficult to provide biofeedback given that she does not have video capability  - will need to mail instructions for dry mouth    RETURN VISIT: follow up 3 months, or earlier as needed.     Thank you for the kind referral and for allowing me to share in the care of Ms. Yan. If you have any questions, please do not hesitate to contact me.        Domonique Roche MD    of Otolaryngology - Head and Neck Surgery   Voice, Airway, and Swallowing Disorders   [facility] at the [facility]    Clinics & Surgery Center  [address] [place] [address]  Phone: [phone]  Fax: [phone]    [place] [address]  Phone: [phone]  Fax: [phone]

## 2020-05-01 ENCOUNTER — TELEPHONE (OUTPATIENT)
Dept: INTERNAL MEDICINE | Facility: CLINIC | Age: 75
End: 2020-05-01

## 2020-05-01 NOTE — TELEPHONE ENCOUNTER
"[facility] Call Center    Phone Message    May a detailed message be left on voicemail: yes     Reason for Call: Other:     Pt is calling again asking for a call back ASAP.  She is worried that she will not be able to leave her apartment if she does not wear a mask.       Action Taken: Message routed to:  [facility] ([facility]): [facility]    Travel Screening: Not Applicable     Called pt and she states she cannot breathe for a period of time with her mask on due to her COPD. Informed pt to make a list of thing she needs to do within her apartment complex and set a time to get things done. Pt states they do not care about her \"problem\" and that she is the only one with this \"problem\".  Encouraged pt to wear the mask outside her home to protect her from others.Clinic numbers given for questions or concerns.  Viri Webb RN 10:44 AM on 5/1/2020.                                                                       "

## 2020-05-01 NOTE — TELEPHONE ENCOUNTER
[facility] Call Center    Phone Message    May a detailed message be left on voicemail: yes     Reason for Call: Other: Call Back: Patient is calling with concerns about wearing a mask. Patient states her apartment building is requiring everyone to wear masks. Patient is concerned because she has COPD and cannot breath when she wears a mask. Patient states she would like a call back with advice on what she should do? Please advise.    Action Taken: Message routed to:  [facility] ([facility]): [facility]    Travel Screening: Not Applicable

## 2020-05-05 ENCOUNTER — TELEPHONE (OUTPATIENT)
Dept: OTOLARYNGOLOGY | Facility: CLINIC | Age: 75
End: 2020-05-05

## 2020-05-05 NOTE — TELEPHONE ENCOUNTER
Spoke with patient about questions on AVS. Patient was wondering about the suggestions on the AVS stating to use 'ice lollies' and 'boiled candies.' Writer informed patient that those were basically ice cubes and hard candies to help with the dry mouth she gets after waking up and to help with her hoarseness. Patient states that she has tried this in the past, but will continue to try doing this. Patient had no further questions.    Adali Duran, EMT

## 2020-05-05 NOTE — TELEPHONE ENCOUNTER
[facility] Call Center    Phone Message    May a detailed message be left on voicemail: yes     Reason for Call: Other: Pt would like a call back to discuss summary that was sent over, please reach out to pt to discuss     Action Taken: Message routed to:  [facility] ([facility]): ENT    Travel Screening: Not Applicable

## 2020-05-07 ENCOUNTER — TELEPHONE (OUTPATIENT)
Dept: INTERNAL MEDICINE | Facility: CLINIC | Age: 75
End: 2020-05-07

## 2020-05-07 NOTE — TELEPHONE ENCOUNTER
See pharmacist visit on 01/22/20.  Patient was stopped taking simvastatin and was to start taking atorvastatin.    Atorvastatin medication was sent to [place], [place] - [address] [place] [address] [phone]       Jama De La Vega CMA (AAMA) at 10:01 AM on 5/7/2020   [facility]

## 2020-05-07 NOTE — TELEPHONE ENCOUNTER
Pt states she is supposed to be on Simvastatin, NOT Atorvastatin    Thank you!  Brandy Chand Lucy  [facility]

## 2020-05-07 NOTE — LETTER
[facility]  [address]  [address]  [place] [address]  [phone]          May 11, 2020    RE:  Ca Yan                                                                                                                                                       [address]   [place] [address]            To whom it may concern:    aC Yan is under my professional care. Ca states she is unable to wear a mask due to her medical diagnosis of Chronic Obstructive Lung Disease.        Sincerely,      Js Saunders MD

## 2020-05-07 NOTE — TELEPHONE ENCOUNTER
[facility] Call Center    Phone Message    May a detailed message be left on voicemail: yes     Reason for Call: Other: .  pt has some issues breathing making wearing a mask almost impossible - pt is requesting a call back to discuss potential solutions - people in her office are upset she will not wear a mask, but she cant breath well with one on.    Action Taken: Message routed to:  [facility] ([facility]    Travel Screening: Not Applicable     Pt states she cannot wear a mask of any kind. Made suggestions and dismissed all of them. Pt adamant about being unable to breathe with a mask on. Wants Dr Saunders to advise.

## 2020-05-08 ENCOUNTER — VIRTUAL VISIT (OUTPATIENT)
Dept: OTOLARYNGOLOGY | Facility: CLINIC | Age: 75
End: 2020-05-08
Payer: COMMERCIAL

## 2020-05-08 DIAGNOSIS — R09.89 CHRONIC THROAT CLEARING: ICD-10-CM

## 2020-05-08 DIAGNOSIS — R49.0 DYSPHONIA: Primary | ICD-10-CM

## 2020-05-08 NOTE — TELEPHONE ENCOUNTER
The pt is asking Dr Saunders himself to call her to discuss the mask. Thanks.    Called pt and she is requesting a note from Dr Saunders to state that she is unable to  her mail and do other activities in the lobby of her apartment/office building with a mask on. She is unable to breathe with a mask on.  Viri Webb RN 1:32 PM on 5/8/2020.

## 2020-05-11 NOTE — TELEPHONE ENCOUNTER
The pt called to clarify the letter needs to be addressed to Karissa Esteban, ph# [phone]. If you need to speak with the pt, please call [phone]. Thanks.

## 2020-05-11 NOTE — PROGRESS NOTES
"Ca Yan is a 74 year old female who is being evaluated via a billable telephone visit.      The patient has been notified of following:     \"This telephone visit will be conducted via a call between you and your physician/provider. We have found that certain health care needs can be provided without the need for a physical exam.  This service lets us provide the care you need with a short phone conversation.  If a prescription is necessary we can send it directly to your pharmacy.  If lab work is needed we can place an order for that and you can then stop by our lab to have the test done at a later time.    Telephone visits are billed at different rates depending on your insurance coverage. During this emergency period, for some insurers they may be billed the same as an in-person visit.  Please reach out to your insurance provider with any questions.    If during the course of the call the physician/provider feels a telephone visit is not appropriate, you will not be charged for this service.\"    Patient has given verbal consent for Telephone visit?  Yes    What phone number would you like to be contacted at? [phone]    How would you like to obtain your AVS? Mail a copy    Phone call duration: 60 minutes    .[facility] VOICE [facility]  Inderjit Shelton Jr., M.D., F.A.C.S.  Luann Arzate M.D., M.P.H.  Domonique Roche M.D.  Mirta Blake, Ph.D., CCC/SLP  Ya Arzate M.M. (voice), M.A., CCC/SLP  Wiley Snow M.M. (voice), M.A., [facility]/SLP    [facility] VOICE [facility]  VOICE/SPEECH/BREATHING THERAPY PROGRESS REPORT    Patient: Ca Yan  Date of Service: 5/8/2020    Date of Last Service: 4/30/20  Session number: 1  Referring physician: Dr. Roche  Initial evaluation: 4/30/20    I had the pleasure of seeing Ms. Yan today, for speech therapy to address a diagnosis of:  R49.0 (Dysphonia)   R68.89 (Chronic Throat Clearing)    PROGRESS SINCE LAST SESSION  Ms. Yan was had a telephone evaluation on 4/30.20 by " Dr. Roche and my associate Nargis Valerio.  At that time, it was determined that she would benefit from a course of speech therapy to address the above diagnosis.  Since then, she states she has not had any change in her symptoms, which is expected, as no therapeutic suggestions were made at the time.    Ms. Yan also states that:    She would like to know why her voice is so bad, and what can be done about it  o She has no understanding of what happened to make her voice so bad     Ms. Yan presents today with the following:  Voice quality:    Marked roughness, diplophonia, pitch and register breaks, and obvious register imbalance    Obvious strain and lack of airflow    Pitch often sounds higher than it is because of upper register production; there are brief bursts of phonation at around A3, but they sound much higher, and lack any periodicity or regularity associated with balanced lower register phonation    Moderately higher pitches, at around E4 or F4, are clear and WNL    Laughter is clear and WNL    Very short bursts of normal voice quality; she is unaware of these      THERAPEUTIC ACTIVITIES  Today Ms. Yan participated in the following therapeutic activities:    Asked many questions about the nature of her symptoms, and I answered all of these thoroughly.  o She demonstrated limited understanding of my explanations, and did not seem to comprehend the analogies; we were limited by lack of visual signal in this telephone visit    Engaged in a variety of therapy probes to try to find an exercise that would help her find a more balanced muscle adjustment in phonation  o Some success in producing as easier airstream on an unvoiced hiss, though poor awareness of abdominal relaxation  o Semi-occluded vocal tract exercises were not successful; she had obviously too much tension on lip trills, tongue trills, or lip puffs, and does not have a straw to try straw phonation  o Tried gentle glottic coup exercises to  promote better thryoarytenoid contraction; these had obvious high effort and high glottic resistance, with very strain, rough phonation at onset  o Tried easy-onset, yawn-sigh phonation, but didn't seem to have a grasp of a more relaxed flowing airstream, and I could not demonstrate adequately    Practiced techniques for yawn-sigh pitch glides, descending from upper register productions  o This was her best technique for producting a more balanced register production with improved quality  o There were no pitch or register breaks, or overt roughness, as the upper register productions  o She started at about Eb4, which sounded higher due to the register adjustment  o Was able to produce a D4 in a mixed register adjustment; this was clear and WNL in quality but she had no awareness of it.  o Was able to hear the improved quality at the higher pitches, but had no awareness of the difference in technique, or what she did to improve the qualty    I provided instruction for practicing these pitch glides, in order to reduce overall laryngeal tension and promote better airflow, until we can have another session  o She asked to have the exercises sent to her in the mail.    IMPRESSIONS/GOALS/PLAN  Ms. Yan had a difficult first session of speech therapy today, to address the following:  R49.0 (Dysphonia)   R68.89 (Chronic Throat Clearing)   We were able to find a few therapeutic activities to start the process of learning new muscular behaviors.    Speech therapy for her is medically necessary to allow  her to meet personal and professional demands and fully engage in activities of daily living.     She will work on her exercises on a daily basis, and work on incorporating the techniques into her daily activities.    Goals for this practice period:     practice all exercises according to instructions    Plan: I will see Ms. Yan in 6 days to work on education, modification, and carryover of therapeutic activities to more  complex activities.    TOTAL SERVICE TIME: 60 minutes  TREATMENT (21769)  NO CHARGE FACILITY FEE (94412)    Mirta Blake, Ph.D., [facility]-SLP  Speech-Language Pathologist  Director, [facility]  [phone]

## 2020-05-11 NOTE — TELEPHONE ENCOUNTER
Pt calling and she now does not want the letter sent to pt manager.  Viri Webb RN 1:16 PM on 5/11/2020.          Letter written and mailed to Karissa Esteban at [address] [phone]  Viri Webb RN 12:40 PM on 5/11/2020.       Health Call Center    Phone Message    May a detailed message be left on voicemail: yes     Reason for Call: Other: Per call from PT is requesting to speak with Viri re: letter. Please reach to the PT.      Action Taken: Message routed to:  [facility] Primary Care    Travel Screening: Not Applicable

## 2020-05-11 NOTE — PATIENT INSTRUCTIONS
"  After Visit Summary    Patient: Ca Yan  Date of Visit: 5/8/2020      Voice Exercises:    Start with just breathing:   o Say \"SShhhhhhhh\" and hold it for about 5 seconds:  o Feel the easy flow of air, and the relaxation in your neck and jaw  o Let your belly muscles relax as you take you next breath; make sure your chest doesn't lift up as you inhale; your abdominal area should feel like it inflates    o Once you're relaxed enough, you can start exercisng your voice       Sigh-glide  o Start by inhaling as if you were starting a yawn  o Start with a comfortably high pitch (with your clear voice) and glide on, saying \"shahhh\" or \"whoooo\"  o Let the voice get softer and easier as the pitch gets lower; if it gets gravelly, stop the voice but keep the air going for a second  o Take a \"belly-breath\" and do it again    Do this 10 times: each repetition will take less than 5 seconds, so it won't take much more than a minute to do all 10 repetitions      Do a set of 10 repetitions 10 times throughout the day  o It won't take more than 15-20 minutes out of your entire day, but you will need to spend time practicing in order to get your voice back      I'll talk to you on Thursday at 8:30          "

## 2020-05-11 NOTE — TELEPHONE ENCOUNTER
[facility] Call Center    Phone Message    May a detailed message be left on voicemail: yes     Reason for Call: Other: Pt requesting to speak with STEVEN Meredith regarding her letter request.     Action Taken: Message routed to:  [facility] P[facility]    Travel Screening: Not Applicable

## 2020-05-14 ENCOUNTER — VIRTUAL VISIT (OUTPATIENT)
Dept: OTOLARYNGOLOGY | Facility: CLINIC | Age: 75
End: 2020-05-14
Payer: COMMERCIAL

## 2020-05-14 DIAGNOSIS — R09.89 CHRONIC THROAT CLEARING: ICD-10-CM

## 2020-05-14 DIAGNOSIS — R49.0 DYSPHONIA: Primary | ICD-10-CM

## 2020-05-14 NOTE — PROGRESS NOTES
"Ca Yan is a 74 year old female who is being evaluated via a billable telephone visit.      The patient has been notified of following:     \"This telephone visit will be conducted via a call between you and your physician/provider. We have found that certain health care needs can be provided without the need for a physical exam.  This service lets us provide the care you need with a short phone conversation.  If a prescription is necessary we can send it directly to your pharmacy.  If lab work is needed we can place an order for that and you can then stop by our lab to have the test done at a later time.    Telephone visits are billed at different rates depending on your insurance coverage. During this emergency period, for some insurers they may be billed the same as an in-person visit.  Please reach out to your insurance provider with any questions.    If during the course of the call the physician/provider feels a telephone visit is not appropriate, you will not be charged for this service.\"    Patient has given verbal consent for Telephone visit?  Yes    What phone number would you like to be contacted at? [phone]    How would you like to obtain your AVS? Mail a copy    Phone call duration: 40 minutes    [facility] VOICE [facility]  Inderjit Shelton Jr., M.D., F.A.C.S.  Luann Arzate M.D., M.P.H.  Domonique Roche M.D.  Mirta Blake, Ph.D., CCC/SLP  Ya Arzate M.M. (voice), M.A., CCC/SLP  Wiley Snow M.M. (voice), M.A., [facility]/SLP    [facility] VOICE [facility]  VOICE/SPEECH/BREATHING THERAPY PROGRESS REPORT    Patient: Ca Yan  Date of Service: 5/14/2020    Date of Last Service: 5/8/20  Session number: 2  Referring physician: Dr. Roche  Initial evaluation: 4/30/20    I had the pleasure of seeing Ms. Yan today, for speech therapy to address a diagnosis of:  R49.0 (Dysphonia)   R68.89 (Chronic Throat Clearing)    PROGRESS SINCE LAST SESSION  At the last session, Ms. Yan worked on therapeutic " "activities to address the above diagnosis.    Regarding practice, Ms. Yan reports the following:     She tried to practice, but found 10 repetitions too tiring    She stopped practicing after a couple days because she had too much tension and fatigue    She did not know if she was doing the exercises correctly    Ms. Yan also states that:    Her voice is not better    She has \"so much anxiety\" about the pandemic that it fatigues her  o She has a counseling session this afternoon, but she doesn't know what a counselor can do to help her  o She wants to know when the pandemic will be over  o When queried, she states she still thinks she should continue trying to work on her voice, despite the fact that it is very difficult for her to practice, to understand the instructions, or whether or not she is doing the exercises correctly     Ms. Yan presents today with the following:  Voice quality:    Continued roughness, diplophonia, and strain    Very frequent pitch and register breaks    Obvious effort    Pitch is very hard to discern    More bursts of normal quality, though they are still very transient, and she is unaware of them    THERAPEUTIC ACTIVITIES  Today Ms. Yan participated in the following therapeutic activities:    Demonstrated previous exercises.  o Demonstrated lack of awareness of technique, but somewhat improved quality in the glides  - She is ble to descend to A3 with a weak lower register adjustment; this is slightly lower than last week, and an improvement in register balance and quality  - She has to start at around E4 in order for the glide to be useful or with acceptable technique  o instruction provided for increased level of complexity/difficulty  - Able to find a more normal muscular respiratory/phonatory balance in a simple humming task  - Aware of the improved quality, but not aware of what she is doing differently, or to understand my explanations  - Worked on more complex " "descending glides, with the neutral syllables \"my-oh-my\"    Had to stop quickly because of fatigue with the exercises    I provided an after visit summary to help facilitate practice.    IMPRESSIONS/GOALS/PLAN  Ms. Yan has made some minimal progress, and had a more productive session of speech therapy today, to address the following:  R49.0 (Dysphonia)   R68.89 (Chronic Throat Clearing)   Speech therapy for her is medically necessary to allow  her to meet personal and professional demands and fully engage in activities of daily living.     She will continue to work on her exercises on a daily basis, and work on incorporating the techniques into her daily activities.    Goals for this practice period:     practice all exercises according to instructions    Plan: I will see Ms. Yan in three weeks to work on education, modification, and carryover of therapeutic activities to more complex activities.    TOTAL SERVICE TIME: 40 minutes  TREATMENT (55954)  NO CHARGE FACILITY FEE (91977)    Mirta Blake, Ph.D., [facility]-SLP  Speech-Language Pathologist  Director, [facility]  [phone]      "

## 2020-05-17 NOTE — PATIENT INSTRUCTIONS
"  After Visit Summary    Patient: Ca Yan  Date of Visit: 5/14/2020      Voice Exercises:    \"Es-xg-oc-oh-my\"   o Start at a medium high pitch and then glide down  o Take an easy breath first, and let the air flow out smoothly and easily; keep the air going past the end of the sound  o Don't be afraid of going to the lower pitches, but don't go to the very bottom pitch  o If the sound gets gravelly, stop!      Practice the exercises 5 repetitions at a time, many many times per day  o Let your muscle learn new behaviors          "

## 2020-05-19 ENCOUNTER — TELEPHONE (OUTPATIENT)
Dept: OTOLARYNGOLOGY | Facility: CLINIC | Age: 75
End: 2020-05-19

## 2020-05-19 NOTE — TELEPHONE ENCOUNTER
Called patient to schedule additional TELEPHONE RETURN voice therapy sessions with Dr. Blake. Provided ENT call center number for patient to call back and schedule.    If pt would like to complete return video visit via [employer] please confirm pt e-mail. Please send pt [employer] tip sheet via [employer]. Pt is welcome to do telephone appt if unable to facilitate a video appt.       If pt would prefer to wait to be seen in clinic please reschedule appts 8+ weeks out.

## 2020-05-22 ENCOUNTER — TELEPHONE (OUTPATIENT)
Dept: INTERNAL MEDICINE | Facility: CLINIC | Age: 75
End: 2020-05-22

## 2020-05-22 ENCOUNTER — VIRTUAL VISIT (OUTPATIENT)
Dept: OTOLARYNGOLOGY | Facility: CLINIC | Age: 75
End: 2020-05-22
Payer: COMMERCIAL

## 2020-05-22 DIAGNOSIS — R49.0 DYSPHONIA: Primary | ICD-10-CM

## 2020-05-22 DIAGNOSIS — R09.89 CHRONIC THROAT CLEARING: ICD-10-CM

## 2020-05-22 NOTE — TELEPHONE ENCOUNTER
Don't know.    Looks like it was ordered by Dr. Schuster, Palliative care    She had a virtual visit with Dr. Schuster  4/16/2020.     Is the pt. Even taking the Sertraline?    [facility] Call Center    Phone Message    May a detailed message be left on voicemail: yes     Reason for Call: Medication Question or concern regarding medication   Prescription Clarification  Name of Medication: sertraline 25 MG PO tablet   Prescribing Provider: Dr. Alison Schuster,  Oncology Palliative    Pharmacy: n/a   What on the order needs clarification? Pt reporting that she received this medication, she does not know why, she did not recognize the provider who ordered the script. Writer shared with pt that is was Dr. Alison Schuster. Pt still does not know why this script was given to her and pt wants her doctor, Dr. Saunders/Care team to tell her why and if she should be using this script.          Action Taken: Message routed to:  [facility]  Primary    Travel Screening: Not Applicable

## 2020-05-22 NOTE — PROGRESS NOTES
"Ca Yan is a 74 year old female who is being evaluated via a billable telephone visit.      The patient has been notified of following:     \"This telephone visit will be conducted via a call between you and your physician/provider. We have found that certain health care needs can be provided without the need for a physical exam.  This service lets us provide the care you need with a short phone conversation.  If a prescription is necessary we can send it directly to your pharmacy.  If lab work is needed we can place an order for that and you can then stop by our lab to have the test done at a later time.    Telephone visits are billed at different rates depending on your insurance coverage. During this emergency period, for some insurers they may be billed the same as an in-person visit.  Please reach out to your insurance provider with any questions.    If during the course of the call the physician/provider feels a telephone visit is not appropriate, you will not be charged for this service.\"    Patient has given verbal consent for Telephone visit?  Yes    What phone number would you like to be contacted at? [phone]    How would you like to obtain your AVS? Mail a copy    Phone call duration: 50 minutes    [facility] VOICE [facility]  Inderjit Shelton Jr., M.D., F.A.C.S.  Luann Arzate M.D., M.P.H.  Domonique Roche M.D.  Mirta Blake, Ph.D., CCC/SLP  Ya Arzate M.M. (voice), M.A., CCC/SLP  Wiley Snow M.M. (voice), M.A., [facility]/SLP    [facility] VOICE [facility]  VOICE/SPEECH/BREATHING THERAPY PROGRESS REPORT    Patient: Ca Yan  Date of Service: 5/22/2020    Date of Last Service: 5/14/20  Session number: 3  Referring physician: Dr. Roche  Initial evaluation: 4/30/20    I had the pleasure of seeing Ms. Yan today, for speech therapy to address a diagnosis of:  R49.0 (Dysphonia)   R68.89 (Chronic Throat Clearing)    PROGRESS SINCE LAST SESSION  At the last session, Ms. Yan worked on therapeutic " activities to address the above diagnosis.    Regarding practice, Ms. Yan reports the following:     Irregular practice; she got the mailed instructions and tried to remember what we had done    Ms. Yan also states that:    She thinks her voice is better overall, although she doesn't understand why    Ms. Yan presents today with the following:  Voice quality:    Still significant strain, roughness, muscle tension    Very frequent pitch, register, and phonation breaks    Very poor sense of pitch; obvious poor register adjustment    THERAPEUTIC ACTIVITIES  Today Ms. Yan participated in the following therapeutic activities:    Demonstrated previous exercises.  o demonstrated improved technique  o appropriate redirection provided    Scarbro exercises to promote improved airflow during phonation  o Yawn-sigh concept continued to be helpful  o Able to progress to a simple phrase with aspirate onsets.    Scarbro exercises for improved glottic closure to promote thyroarytenoid contraction  o able to produce acceptable glottic coup with about 50% success; seemed to have a perfunctory awareness of whether she was producing a sharp coup  o Also used voiced bilabial plosives for a more gentle glottic closure  o Able to find a lower pitch with almost normal lower register adjustment on these exercises, about 50% successful with about 40% awareness  o learned techniques to reduce glottal tyson and improve breath flow    Scarbro exercises to experience a more forward sensation during phonation.  o speech material with nasal continuants was facilitating  o Started with high pitch and glided down  o able to recognize improvement in quality most of the time, and was able to self-correct a few times  o Did not complain of fatigue with any of these exercises    Scarbro concepts of an optimal regimen for practice.  o she should use an interval schedule of practice, with brief periods of practice frequently throughout each  day  o I provided a very specific regimen to be sent    I provided an after visit summary to help facilitate practice.    IMPRESSIONS/GOALS/PLAN  Ms. Yan had a productive session of speech therapy today, to address the following:  R49.0 (Dysphonia)   R68.89 (Chronic Throat Clearing)   Speech therapy for her is medically necessary to allow  her to meet personal and professional demands and fully engage in activities of daily living.     She will continue to work on her exercises on a daily basis, and work on incorporating the techniques into her daily activities.    Goals for this practice period:     practice all exercises according to instructions    incorporate techniques into daily vocal activities    maintain vigilance for vocal technique    Plan: I will see Ms. Yan in two weeks to work on education, modification, and carryover of therapeutic activities to more complex activities.    TOTAL SERVICE TIME: 50 minutes  TREATMENT (87429)  NO CHARGE FACILITY FEE (88233)    Mirta Blake, Ph.D., [facility]-SLP  Speech-Language Pathologist  Director, [facility]  [phone]

## 2020-05-22 NOTE — PATIENT INSTRUCTIONS
"  After Visit Summary    Patient: Ca Yan  Date of Visit: 5/22/2020      Ca's Voice Exercises:    High-pitch, relaxation exercises:   o Remember to start with a yawn, and then sigh out the phrase with flowing air  o \"my - oh -my -oh - my\"  (start high, glide down gently)  o \"hi there, how are you\"  (start high, glide down gently)   These exercises will help you learn to relax your throat muscles and have a better balance of muscle use.  Stay relaxed with flowing air.  Do 5-10 repetitions of each one, every 15-30 minutes throughout the day.         Low-pitch strengthening exercises:  o Start with a little yawn, and then gently hold your breath back:  o \"bye-bye\" (let the pitch be a little lower)  o \"uh-oh\"  (let the pitch be a little lower)   These exercises will help you get more strength back in your low pitch muscle, but it's important that you don't use too much effort doing them.  Let it be gentle.  Do 3-5 repetitions of each one, every 15-30 minutes throughout the day.   It's a good idea to alternate these exercises with the relaxation exercises above, so you don't get too fatigued.  If the sound gets worse, rest for 15 seconds, have a sip of water, and try again.      Also remember to try to use the \"yawn-sigh\" technique EVERY time you say something.  That's how your muscles will learn the new technique.    I have you on my wait list, so hopefully you'll get a call, and will be able to have an appointment before June 19.    Good luck - happy practicing!    Dr. Blake          "

## 2020-05-22 NOTE — LETTER
"5/22/2020       RE: Ca Yan  [address]  [facility] [address]     Dear Colleague,    Thank you for referring your patient, Ca Yan, to the [facility] at [facility]. Please see a copy of my visit note below.    Ca Yan is a 74 year old female who is being evaluated via a billable telephone visit.      The patient has been notified of following:     \"This telephone visit will be conducted via a call between you and your physician/provider. We have found that certain health care needs can be provided without the need for a physical exam.  This service lets us provide the care you need with a short phone conversation.  If a prescription is necessary we can send it directly to your pharmacy.  If lab work is needed we can place an order for that and you can then stop by our lab to have the test done at a later time.    Telephone visits are billed at different rates depending on your insurance coverage. During this emergency period, for some insurers they may be billed the same as an in-person visit.  Please reach out to your insurance provider with any questions.    If during the course of the call the physician/provider feels a telephone visit is not appropriate, you will not be charged for this service.\"    Patient has given verbal consent for Telephone visit?  Yes    What phone number would you like to be contacted at? [phone]    How would you like to obtain your AVS? Mail a copy    Phone call duration: 50 minutes    [facility]  Inderjit Shelton Jr., M.D., F.A.C.S.  Luann Arzate M.D., M.P.H.  Domonique Roche M.D.  Mirta Blake, Ph.D., CCC/SLP  Ya Arzate M.M. (voice), M.A., CCC/SLP  Wiley nSow M.M. (voice), M.A., CCC/SLP    [facility]  VOICE/SPEECH/BREATHING THERAPY PROGRESS REPORT    Patient: Ca Yan  Date of Service: 5/22/2020    Date of Last Service: 5/14/20  Session number: 3  Referring " physician: Dr. Roche  Initial evaluation: 4/30/20    I had the pleasure of seeing Ms. Yan today, for speech therapy to address a diagnosis of:  R49.0 (Dysphonia)   R68.89 (Chronic Throat Clearing)    PROGRESS SINCE LAST SESSION  At the last session, Ms. Yan worked on therapeutic activities to address the above diagnosis.    Regarding practice, Ms. Yan reports the following:     Irregular practice; she got the mailed instructions and tried to remember what we had done    Ms. Yan also states that:    She thinks her voice is better overall, although she doesn't understand why    Ms. Yan presents today with the following:  Voice quality:    Still significant strain, roughness, muscle tension    Very frequent pitch, register, and phonation breaks    Very poor sense of pitch; obvious poor register adjustment    THERAPEUTIC ACTIVITIES  Today Ms. Yan participated in the following therapeutic activities:    Demonstrated previous exercises.  o demonstrated improved technique  o appropriate redirection provided    Devens exercises to promote improved airflow during phonation  o Yawn-sigh concept continued to be helpful  o Able to progress to a simple phrase with aspirate onsets.    Devens exercises for improved glottic closure to promote thyroarytenoid contraction  o able to produce acceptable glottic coup with about 50% success; seemed to have a perfunctory awareness of whether she was producing a sharp coup  o Also used voiced bilabial plosives for a more gentle glottic closure  o Able to find a lower pitch with almost normal lower register adjustment on these exercises, about 50% successful with about 40% awareness  o learned techniques to reduce glottal tyson and improve breath flow    Devens exercises to experience a more forward sensation during phonation.  o speech material with nasal continuants was facilitating  o Started with high pitch and glided down  o able to recognize improvement in quality  most of the time, and was able to self-correct a few times  o Did not complain of fatigue with any of these exercises    Booth concepts of an optimal regimen for practice.  o she should use an interval schedule of practice, with brief periods of practice frequently throughout each day  o I provided a very specific regimen to be sent    I provided an after visit summary to help facilitate practice.    IMPRESSIONS/GOALS/PLAN  Ms. Yan had a productive session of speech therapy today, to address the following:  R49.0 (Dysphonia)   R68.89 (Chronic Throat Clearing)   Speech therapy for her is medically necessary to allow  her to meet personal and professional demands and fully engage in activities of daily living.     She will continue to work on her exercises on a daily basis, and work on incorporating the techniques into her daily activities.    Goals for this practice period:     practice all exercises according to instructions    incorporate techniques into daily vocal activities    maintain vigilance for vocal technique    Plan: I will see Ms. Yan in two weeks to work on education, modification, and carryover of therapeutic activities to more complex activities.    TOTAL SERVICE TIME: 50 minutes  TREATMENT (62206)  NO CHARGE FACILITY FEE (36927)    Mirta Blake, Ph.D., [facility]-SLP  Speech-Language Pathologist  Director, [facility]  [phone]

## 2020-05-26 NOTE — TELEPHONE ENCOUNTER
Patient declined wanting to speak with PCP about this medication. She would like to try taking it for a few weeks and see if she feels better. She does note that anxiety is a problem that she has. She will update us on how she is doing in a couple of weeks. I again offered for her to discuss this with Chip, she declined. No other questions or concerns.   Rachel Kate, EMT at 9:48 AM on 5/26/2020.

## 2020-05-29 ENCOUNTER — TELEPHONE (OUTPATIENT)
Dept: INTERNAL MEDICINE | Facility: CLINIC | Age: 75
End: 2020-05-29

## 2020-05-29 ENCOUNTER — TELEPHONE (OUTPATIENT)
Dept: FAMILY MEDICINE | Facility: CLINIC | Age: 75
End: 2020-05-29

## 2020-05-29 DIAGNOSIS — I10 BENIGN ESSENTIAL HYPERTENSION: ICD-10-CM

## 2020-05-29 NOTE — TELEPHONE ENCOUNTER
[facility] Call Center    Phone Message    May a detailed message be left on voicemail: yes     Reason for Call: Other: pt is requesting to speak to  only about a mask, she didnt provide any more information, please call pt thanks     Action Taken: Message routed to:  [facility] PCC    Travel Screening: Not Applicable

## 2020-06-02 RX ORDER — HYDROCHLOROTHIAZIDE 12.5 MG/1
CAPSULE ORAL
Qty: 90 CAPSULE | Refills: 0 | Status: SHIPPED | OUTPATIENT
Start: 2020-06-02 | End: 2020-06-03

## 2020-06-02 RX ORDER — LOSARTAN POTASSIUM 50 MG/1
TABLET ORAL
Qty: 180 TABLET | Refills: 0 | Status: SHIPPED | OUTPATIENT
Start: 2020-06-02 | End: 2020-08-20

## 2020-06-02 NOTE — TELEPHONE ENCOUNTER
HYDROCHLOROTHIAZIDE 12.5MG CAPS   Last Written Prescription Date:  4/3/2019  Last Fill Quantity: 90,   # refills: 3     LOSARTAN POTASSIUM 50MG TABS Take 2 tablets (100 mg) by mouth daily   Last Written Prescription Date:  4/3/2019  Last Fill Quantity: 90,   # refills: 3     Last Office Visit : 4/15/20  Future Office visit:  6/3/20    Routing refill request to provider for review/approval because:  Blood pressure out of range. New appt Dr Sahu( req female provider) on 6/3/20    02/20/20 (!) 159/106   02/14/20 (!) 169/94   02/07/20 131/74     90 day refill sent with PCP follow up for increased BP /message sent per protocol

## 2020-06-03 ENCOUNTER — VIRTUAL VISIT (OUTPATIENT)
Dept: FAMILY MEDICINE | Facility: CLINIC | Age: 75
End: 2020-06-03
Payer: COMMERCIAL

## 2020-06-03 DIAGNOSIS — F41.1 GENERALIZED ANXIETY DISORDER: ICD-10-CM

## 2020-06-03 DIAGNOSIS — J44.9 CHRONIC OBSTRUCTIVE PULMONARY DISEASE, UNSPECIFIED COPD TYPE (H): ICD-10-CM

## 2020-06-03 DIAGNOSIS — F41.1 ANXIETY STATE: ICD-10-CM

## 2020-06-03 DIAGNOSIS — I10 BENIGN ESSENTIAL HYPERTENSION: ICD-10-CM

## 2020-06-03 DIAGNOSIS — Z76.89 ENCOUNTER TO ESTABLISH CARE: Primary | ICD-10-CM

## 2020-06-03 DIAGNOSIS — F32.1 CURRENT MODERATE EPISODE OF MAJOR DEPRESSIVE DISORDER WITHOUT PRIOR EPISODE (H): ICD-10-CM

## 2020-06-03 DIAGNOSIS — I51.89 DIASTOLIC DYSFUNCTION: ICD-10-CM

## 2020-06-03 RX ORDER — HYDROCHLOROTHIAZIDE 12.5 MG/1
CAPSULE ORAL
Qty: 90 CAPSULE | Refills: 3 | Status: SHIPPED | OUTPATIENT
Start: 2020-06-03 | End: 2020-08-20

## 2020-06-03 ASSESSMENT — PATIENT HEALTH QUESTIONNAIRE - PHQ9
5. POOR APPETITE OR OVEREATING: NEARLY EVERY DAY
SUM OF ALL RESPONSES TO PHQ QUESTIONS 1-9: 6

## 2020-06-03 ASSESSMENT — ANXIETY QUESTIONNAIRES
5. BEING SO RESTLESS THAT IT IS HARD TO SIT STILL: SEVERAL DAYS
2. NOT BEING ABLE TO STOP OR CONTROL WORRYING: NEARLY EVERY DAY
1. FEELING NERVOUS, ANXIOUS, OR ON EDGE: NEARLY EVERY DAY
7. FEELING AFRAID AS IF SOMETHING AWFUL MIGHT HAPPEN: NEARLY EVERY DAY
GAD7 TOTAL SCORE: 17
6. BECOMING EASILY ANNOYED OR IRRITABLE: SEVERAL DAYS
3. WORRYING TOO MUCH ABOUT DIFFERENT THINGS: NEARLY EVERY DAY
IF YOU CHECKED OFF ANY PROBLEMS ON THIS QUESTIONNAIRE, HOW DIFFICULT HAVE THESE PROBLEMS MADE IT FOR YOU TO DO YOUR WORK, TAKE CARE OF THINGS AT HOME, OR GET ALONG WITH OTHER PEOPLE: VERY DIFFICULT

## 2020-06-03 NOTE — Clinical Note
Please assist with pulmonary referral  Follow-up with me in one month  Best wishes,  Favian Sahu MD

## 2020-06-03 NOTE — PROGRESS NOTES
"Ca Yan is a 74 year old female who is being evaluated via a billable telephone visit.      The patient has been notified of following:     \"This telephone visit will be conducted via a call between you and your physician/provider. We have found that certain health care needs can be provided without the need for a physical exam.  This service lets us provide the care you need with a short phone conversation.  If a prescription is necessary we can send it directly to your pharmacy.  If lab work is needed we can place an order for that and you can then stop by our lab to have the test done at a later time.    Telephone visits are billed at different rates depending on your insurance coverage. During this emergency period, for some insurers they may be billed the same as an in-person visit.  Please reach out to your insurance provider with any questions.    If during the course of the call the physician/provider feels a telephone visit is not appropriate, you will not be charged for this service.\"    Patient has given verbal consent for Telephone visit?  Yes    What phone number would you like to be contacted at? [phone]    How would you like to obtain your AVS? Mail a copy    Subjective     Ca Yan is a 74 year old female COPD, hypertension, osteoarthritis scoliosis, pulmonary hypertension, HX breast cancer who presents via phone visit today to re-establish primary care for the following health issues:    HPI   Ca Yan a 74-year-old female who would like to reestablish primary care requested a female provider.  She indicates she has COPD, recently establish care with a pulmonologist.  She indicates her breathing is quite impaired related to severe scoliosis with a restrictive component as well as COPD although she indicates she quit smoking in 1980 but sustained secondhand smoke by both parents smoking when she was a child.  She indicates she uses 3 L of oxygen when needed has a Brio " "Ellipta and Incruse inhaler has not required use of albuterol rescue inhaler very frequently.  Her current concern is with pandemic recommendations to wear a mask she has trouble wearing a mask because she feels she is not able to breathe with the mask in place, feels claustrophobic and is wondering what she should do in this situation as she has been maintaining in her home but would like to go into the lobby at times of her building or out in public and is worried about not being able to wear a mask.  I discussed it is important to wear a mask for her protection as well as the protection of others and will look into if there are masks that may be less \"claustrophobic\" she may be able to tolerate such as N 95.  She indicates she has hoarseness after pneumonia in January.  She is following with a voice specialist.    She has significant mood disorder is on Seroquel 200 mg has a therapist through [facility] and a psychiatrist through [facility] which I learned at the end of the appointment.  She had a recent visit with Dr. Schuster in palliative care who started her on sertraline 25 mg daily but she feels this dose is too small and is wondering if I would increase the dose to 50 mg dose to assist with her anxiety related to recent protests and pandemic concerns.  I encouraged her to share her doses with her psychiatrist.    She has a history of hypertension/ pulmonary hypertension and is tolerating losartan 50 mg tablets taking 2 at one time and hydrochlorothiazide 12.5 mg daily.    She has hyperlipidemia and thinks that she is on simvastatin although the medical record indicates atorvastatin 40 mg therefore I need to clarify with her pharmacy current medication. I confirmed with her pharmacy Atorvastatin 40 mg was mailed to her on May 7.  History of breast cancer treated through [facility] @ 2014        Patient Active Problem List   Diagnosis     Non morbid obesity due to excess calories     Alcohol " abuse     Malignant neoplasm of breast (H)     Chronic kidney disease, stage III (moderate) (H)     Osteoarthritis of knee     Major depressive disorder with single episode     Diarrhea     Diastolic dysfunction     Osteoarthritis of spine     Gastroesophageal reflux disease     Generalized anxiety disorder     Hypertension     Hyperlipidemia     Insomnia     Irritable bowel syndrome     Kyphoscoliosis     Cluster C personality disorder (H)     Seborrheic eczema     Anemia     Anxiety state     Asthma     Back pain     Bunion     Chronic obstructive pulmonary disease, unspecified COPD type (H)     Low bone density     Fecal incontinence     Left-sided low back pain without sciatica     Major depression, recurrent (H)     Cognitive impairment     Past Surgical History:   Procedure Laterality Date     BACK SURGERY      spinal fusion     COLONOSCOPY       LUMPECTOMY BREAST       ORTHOPEDIC SURGERY      Knee surgery left side       Social History     Tobacco Use     Smoking status: Former Smoker     Packs/day: 0.50     Years: 5.00     Pack years: 2.50     Types: Cigarettes     Start date: 1956     Last attempt to quit: 1980     Years since quittin.7     Smokeless tobacco: Former User     Quit date: 1980   Substance Use Topics     Alcohol use: Not Currently     Alcohol/week: 0.0 standard drinks     Comment: Sober for 2 years, previous alcoholic     Family History   Problem Relation Age of Onset     Breast Cancer Mother      Diabetes Mother      Anxiety Disorder Mother      Asthma Mother      Other Cancer Mother      Hyperlipidemia Mother      Alcohol/Drug Father      Mental Illness Father      Substance Abuse Father      Obesity Father      Cerebrovascular Disease Maternal Grandmother 67         Current Outpatient Medications   Medication Sig Dispense Refill     ASPIRIN EC PO Take 81 mg by mouth       atorvastatin (LIPITOR) 40 MG tablet Take 1 tablet (40 mg) by mouth daily 90 tablet 3      fluticasone-vilanterol (BREO ELLIPTA) 200-25 MCG/INH inhaler Inhale 1 puff into the lungs daily 3 Inhaler 3     hydrochlorothiazide (MICROZIDE) 12.5 MG capsule TAKE ONE CAPSULE (12.5MG) BY MOUTH ONCE DAILY 90 capsule 3     ibuprofen (ADVIL,MOTRIN) 200 MG tablet Take 3 tablets (600 mg) by mouth 3 times daily (with meals) 90 tablet 0     latanoprost (XALATAN) 0.005 % ophthalmic solution 1 drop       losartan (COZAAR) 50 MG tablet TAKE TWO TABLETS (100MG) BY MOUTH ONCE DAILY 180 tablet 0     order for DME Equipment being ordered: Oxygen  Please provide portable oxygen concentrator (pt would like over the shoulder oxygen device) for portability at 2LPM with activity via nasal canula. 1 Device 1     quetiapine (SEROQUEL) 200 MG tablet Take 200 mg by mouth At Bedtime.       sertraline (ZOLOFT) 50 MG tablet Take 1 tablet (50 mg) by mouth daily 90 tablet 1     umeclidinium (INCRUSE ELLIPTA) 62.5 MCG/INH inhaler Inhale 1 puff into the lungs daily 7 Inhaler 3     albuterol (PROAIR HFA/PROVENTIL HFA/VENTOLIN HFA) 108 (90 Base) MCG/ACT inhaler Inhale 2 puffs into the lungs every 6 hours as needed for shortness of breath / dyspnea or wheezing (Patient not taking: Reported on 6/3/2020) 3 Inhaler 1     amLODIPine (NORVASC) 2.5 MG tablet Take 1 tablet (2.5 mg) by mouth daily (Patient not taking: Reported on 6/3/2020) 90 tablet 3     clonazePAM (KLONOPIN) 0.5 MG tablet Take 0.5 mg by mouth daily       Allergies   Allergen Reactions     Azithromycin Itching     Codeine Nausea and Vomiting     Hydrocodone Nausea and Vomiting     Metronidazole Nausea     Oxycodone Itching and Rash     Percocet [Oxycodone-Acetaminophen] Nausea and Vomiting     Pollen Extract      sneezing and runny nose.      Seasonal Allergies      sneezing and runny nose.      Vicodin [Hydrocodone-Acetaminophen] Nausea and Vomiting     Zolpidem      Amnestic behavior     Recent Labs   Lab Test 01/21/20  0920 10/11/19  1520 09/25/19  1138 05/10/19  1057  02/20/17  1522   04/21/16  1443   A1C  --   --   --   --   --   --   --  5.8   *  --   --   --   --   --   --   --    HDL 89  --   --   --   --   --   --   --    TRIG 104  --   --   --   --   --   --   --    ALT  --  17  --  14  --  18   < >  --    CR 0.81 1.00 0.92 0.80   < > 0.92   < >  --    GFRESTIMATED 71 55* 61 72   < > 60*   < >  --    GFRESTBLACK 82 64 70 83   < > 72   < >  --    POTASSIUM 4.1 3.9 4.0 3.8   < > 4.2   < >  --    TSH  --   --  2.99 3.01  --  1.40   < >  --     < > = values in this interval not displayed.      BP Readings from Last 3 Encounters:   02/20/20 (!) 159/106   02/14/20 (!) 169/94   02/07/20 131/74    Wt Readings from Last 3 Encounters:   04/30/20 88.5 kg (195 lb)   02/20/20 88.2 kg (194 lb 6.4 oz)   02/14/20 88.5 kg (195 lb)                    Reviewed and updated as needed this visit by Provider         Review of Systems   Anxiety, chronic breathing concerns related to COPD, claustrophobia, Anxiety about wearing a mask and not able to breathe.        Objective   Reported vitals:  LMP  (LMP Unknown)    alert and interactive, vocal hoarseness chronic after pneumonia in January  PSYCH: Alert and oriented times 3; coherent speech but hoarseness,  able to articulate logical thoughts but concern for wearing a mask she was  Open to consideration of possible alternative mask such as an N95 if able to obtain for her  RESP: No cough, no audible wheezing, able to talk in full sentences  Remainder of exam unable to be completed due to telephone visits    Labs reviewed in Epic      PHQ 9/26/2018 3/28/2019 6/3/2020   PHQ-9 Total Score 1 1 6   Q9: Thoughts of better off dead/self-harm past 2 weeks Not at all Not at all Not at all     RENATO-7 SCORE 3/28/2019 1/24/2020 6/3/2020   Total Score 0 (minimal anxiety) 14 (moderate anxiety) -   Total Score 0 14 17   Total Score BEH Adult - - -     Reviewed today  EXAMINATION: CT CHEST W/O CONTRAST, 1/15/2020 1:30 PM     TECHNIQUE:  Helical CT images from the thoracic  inlet through the lung  bases were obtained without IV contrast.      COMPARISON: Radiograph 12/30/2019, 8/13/2016     HISTORY: Shortness of breath; Chronic hypoxemic respiratory failure  (H)     FINDINGS:  Chest: Thyroid gland appears unremarkable. Esophagus appears  unremarkable. Tracheobronchial tree appears patent.  No suspicious  lung nodules.      Lung nodule(s) described on series 4:  -Solid 3 mm pulmonary nodule of the left upper lobe (Image: 147)  -Solid 4 mm pulmonary nodule in the left upper lobe (Image: 164)     The pleura appears unremarkable. Small right pleural effusion. No  pneumothorax. Enlargement of the pulmonary artery measuring 4.0 cm.  Heart size is within normal limits. Mild atherosclerotic ossifications  of the coronary arteries. No significant pericardial effusion.   Visualized thoracic aorta and main pulmonary artery diameters appear  within normal limits. There are no visualized pathologically enlarged  mediastinal, hilar or axillary lymph nodes.     Abdomen: Examination of the upper abdomen is limited.   Hypoattenuating focus of the pancreas measuring 7 mm. Original  preliminary report had suggested a lesion in the pancreas. Images are  available from outside CT showed a very similar appearance on outside  CT dated 8/13/2016. Appears to be fat extending up into the pancreas  rather than a mass.     Bones: No suspicious osseous lesion. Severe thoracic dextroscoliosis.         Soft Tissues: No suspicious mass.                                                                      IMPRESSION:   1. Small right pleural effusion.  2. Solid pulmonary nodules as detailed above. Consider follow-up if  patient is stratified into high-risk by Fleischner criteria.  3. Enlargement of the pulmonary artery which can be seen in setting of  pulmonary hypertension.  4. Hepatomegaly.     I have personally reviewed the examination and initial interpretation  and I agree with the findings.     EAGLE CASTRO,  MD    Assessment/Plan: Ca Yan is a 74 year old female COPD, hypertension, osteoarthritis scoliosis, pulmonary hypertension who presents via phone visit today to re-establish primary care       1. Chronic obstructive pulmonary disease, unspecified COPD type (H)  I encouraged her to wear a mask for her personal protection. I noted a letter has been written 5/11/2020 that could be mailed to her in order for her to carry with her for medical reason if she did not have a mask. She does not have my chart.I will ask staff mail to her. I will research if there are any particular masks for people with COPD that may be more comfortable but it is in her best interest to protect herself and others by wearing a mask during pandemic concerns. I encouraged a follow-up appointment with pulmonary.  I reviewed recent CT and PFTS  - PULMONARY MEDICINE REFERRAL; Future    2. Current moderate episode of major depressive disorder without prior episode (H)  She has increased anxiety. I sent RX for increased dose of Sertraline 50 mg. I encouraged her to keep appointment with her psychiatrist ( Through [facility]) and psychologist through [facility]. FOllow-up with me in 1 month may be virtual  - sertraline (ZOLOFT) 50 MG tablet; Take 1 tablet (50 mg) by mouth daily  Dispense: 90 tablet; Refill: 1    3. Anxiety state  See #2  - sertraline (ZOLOFT) 50 MG tablet; Take 1 tablet (50 mg) by mouth daily  Dispense: 90 tablet; Refill: 1    4. Benign essential hypertension  Refills provided under my name  - hydrochlorothiazide (MICROZIDE) 12.5 MG capsule; TAKE ONE CAPSULE (12.5MG) BY MOUTH ONCE DAILY  Dispense: 90 capsule; Refill: 3    5. Diastolic dysfunction  I reviewed recent ECHO    Patient instructions:  Please make a follow-up appointment with me in one month.   We adjusted your dose of Sertraline today to 50 mg daily to assist with anxiety. Please share this information with your Psychiatrist.  Sandra make an appointment with  pulmonary provider  Please try to wear a mask in public for your protection and others. If you are not able to breath due to your medical condition you may take off your mask but use good handwashing after removal of your mask.  I will set up a prescription for you to try to obtain an N 95 mask to determine if this works better for you. Please contact my staff if you need assistance in locating N 95 masks.  Best wishes,  Favian Sahu MD    Phone call duration: 10:22 AM-10:56 AM   34 minutes    Favian Sahu MD  June 8, 2020Addendum  Kiki Eddy, Favian Mena MD               Hi Dr. Sahu,   Sent FYI, I called patient and let her know the letter written by Dr. Saunders on 5/11/20 would be mailed to her home address.   Also, I called around the various DME companies, but N95 masks are not available.  [facility] does have these for online ordering, and I let patient know.  She appreciated the letter, but was not sure if she would get the masks.     Thanks,   Kiki

## 2020-06-03 NOTE — Clinical Note
Please print DME and send to home health supply of her choice. I called her pharmacy and she is to be on Atorvastin  40 mg once daily simvastatin was stopped in January by Dr GREENE.There is a letter in her chart 5/11 mail to her she could carry with her.

## 2020-06-03 NOTE — NURSING NOTE
Chief Complaint   Patient presents with     Establish Care     Pt would like to establish care       JAYNE Orlando at 9:09 AM sign on 6/3/2020

## 2020-06-03 NOTE — PATIENT INSTRUCTIONS
Patient Education     Chronic Lung Disease: Controlling Stress    Stress and anxiety can make breathing harder. When it s hard to breathe, it s natural to get anxious and start to panic. This makes you even more short of breath. This sequence is known as the dyspnea cycle, and it s common among people with chronic lung disease. Talk with your healthcare provider about how you are feeling. It's important for him or her to understand what is going on and how it is affecting your life. Breathing training and coping strategies can help you manage stress and anxiety.  Understanding the cycle  When you re short of breath, your breathing muscles get tense. It s hard to take a deep breath. You may worry that you re not getting enough air. Then you start breathing faster and become more short of breath. You may even start to panic, which makes symptoms seem worse. Often, people with chronic lung disease try to prevent this cycle. They limit their activity, stay at home, and don't do anything that could cause shortness of breath. You don t have to live this way.   Ways to relax  When you find yourself getting stressed or anxious, make an effort to relax. Doing so will help break the dyspnea cycle. Sit in a quiet, comfortable place. Do pursed-lip and diaphragmatic breathing. You may also find the following helpful:    Certain activities can help you relax. These can include reading a good book, listening to music or relaxation tapes, practicing yoga or bridget chi, meditating, and praying. Find activities that work for you.    Try visualization. Picture yourself in a peaceful place, such as the beach. Feel the warm sand. Hear the waves. Smell the ocean. Doing this may help you feel more relaxed.    Your healthcare provider may advise using a bronchodilator along with these or other relaxation methods.     To prevent shortness of breath from limiting your life:    Right now. Learn to stop an attack with pursed-lip breathing,  diaphragmatic breathing, and relaxation methods. If you don t know how to do these, ask your healthcare provider.    In day-to-day life. Learn to maximize your energy and to breathe during activity, so you can do more.    Over time. Start exercising, so your body can start to handle more activity.  Date Last Reviewed: 11/1/2018 2000-2019 [employer]. [address] [place] [address], [place] [address]. All rights reserved. This information is not intended as a substitute for professional medical care. Always follow your healthcare professional's instructions.    Please make a follow-up appointment with me in one month.   We adjusted your dose of Sertraline today to 50 mg daily to assist with anxiety. Please share this information with your Psychiatrist.  Pleas make an appointment with pulmonary provider  Please try to wear a mask in public for your protection and others. If you are not able to breath due to your medical condition you may take off your mask but use good handwashing after removal of your mask.  I will set up a prescription for you to try to obtain an N 95 mask to determine if this works better for you. Please contact my staff if you need assistance in locating N 95 masks.  Best wishes,  Favian Sahu MD

## 2020-06-04 ENCOUNTER — TELEPHONE (OUTPATIENT)
Dept: FAMILY MEDICINE | Facility: CLINIC | Age: 75
End: 2020-06-04

## 2020-06-04 DIAGNOSIS — J44.9 CHRONIC OBSTRUCTIVE PULMONARY DISEASE, UNSPECIFIED COPD TYPE (H): ICD-10-CM

## 2020-06-04 ASSESSMENT — ANXIETY QUESTIONNAIRES: GAD7 TOTAL SCORE: 17

## 2020-06-08 NOTE — TELEPHONE ENCOUNTER
Last Clinic Visit 6/3/2020  [facility]  Overdue test; ACT, - FYI to clinic [facility]  NCV 7-9-20  RF 90 day

## 2020-06-15 ENCOUNTER — NURSE TRIAGE (OUTPATIENT)
Dept: FAMILY MEDICINE | Facility: CLINIC | Age: 75
End: 2020-06-15

## 2020-06-15 NOTE — TELEPHONE ENCOUNTER
[facility] Call Center    Phone Message    May a detailed message be left on voicemail: yes     Reason for Call: Symptoms or Concerns---2ND REQUEST TODAY    If patient has red-flag symptoms, warm transfer to triage line    Current symptom or concern: Diarrhea after she eats    Symptoms have been present for:  3 day(s), started on Saturday 6/13/20, did not happen on Sunday 6/14/20, happened again today    Has patient previously been seen for this? Not sure    By : Dr. Sahu    Date: n/a    Are there any new or worsening symptoms? Not sure      Action Taken: Message routed to:  [facility]    Travel Screening: Not Applicable

## 2020-06-15 NOTE — TELEPHONE ENCOUNTER
[facility] Call Center    Phone Message    May a detailed message be left on voicemail: yes     Reason for Call: Symptoms or Concerns     If patient has red-flag symptoms, warm transfer to triage line    Current symptom or concern: Diarrhea    Symptoms have been present for:  1 day(s)    Has patient previously been seen for this? No    By : n/a    Date: n/a    Are there any new or worsening symptoms? No      Action Taken: Message routed to:  [facility]    Travel Screening: Not Applicable

## 2020-06-16 NOTE — TELEPHONE ENCOUNTER
Additional Information    [1] MILD diarrhea (e.g., 1-3 or more stools than normal in past 24 hours) without known cause AND [2] present >  7 days    Protocols used: DIARRHEA-A-AH      Patient was reached by phone, and reported she had one episode of diarrhea on Sat, but it has since improved.  No nausea or vomiting, and she has been able to have small meals and is taking PO fluids OK.  No fever or abdominal pain, no diarrhea episodes today so far.  She will call clinic back if she develops this again, thinks at this point it was a one time event.    Kiki Eddy RN on 6/16/2020 at 8:35 AM

## 2020-06-19 ENCOUNTER — VIRTUAL VISIT (OUTPATIENT)
Dept: OTOLARYNGOLOGY | Facility: CLINIC | Age: 75
End: 2020-06-19
Payer: COMMERCIAL

## 2020-06-19 DIAGNOSIS — R49.0 DYSPHONIA: Primary | ICD-10-CM

## 2020-06-19 NOTE — LETTER
6/19/2020       RE: Ca Yan  [address]  [facility] [address]     Dear Colleague,    Thank you for referring your patient, Ca Yan, to the [facility]. Please see a copy of my visit note below.    I tired calling Ms. Yan several times, but was unable to get a reply.  I believe she may have declined one of the phone calls.  No visit today.    Again, thank you for allowing me to participate in the care of your patient.      Sincerely,    Mirta Blake, SLP

## 2020-06-22 NOTE — PROGRESS NOTES
"  Ca Yan is a 74 year old female who is being evaluated via a billable telephone visit.       The patient has been notified of following:      \"This telephone visit will be conducted via a call between you and your physician/provider. We have found that certain health care needs can be provided without the need for a physical exam.  This service lets us provide the care you need with a short phone conversation.  If a prescription is necessary we can send it directly to your pharmacy.  If lab work is needed we can place an order for that and you can then stop by our lab to have the test done at a later time.     Telephone visits are billed at different rates depending on your insurance coverage. During this emergency period, for some insurers they may be billed the same as an in-person visit.  Please reach out to your insurance provider with any questions.     If during the course of the call the physician/provider feels a telephone visit is not appropriate, you will not be charged for this service.\"     Patient has given verbal consent for Telephone visit?  Yes     What phone number would you like to be contacted at? [phone]      How would you like to obtain your AVS? Mail a copy      I have reviewed and updated the patient's allergies and medication list.     Concerns: No concerns  Refills: No refills needed.       Patient did not have any vitals to report. Patient reported no pain.      Meli Santiago CMA       Additional provider notes:     Prefers to use her home phone - [phone].       Palliative Care Outpatient Clinic Progress Note    Patient Name:  Ca Yan  Primary Provider:  Js Saunders    Chief Complaint/Patient ID:   73yo F with chronic respiratory failure due to kyphoscoliosis and restrictive lung disease, ?COPD on O2 at night and with exertion  Anxiety  Social isolation    Last Palliative Care Appointment:  4/16/20 w/ Dr. Schuster. Had stopped sertraline for anxiety as " "she didn't feel it worked. Declined further medication for anxiety. Again referred to palliative SW for counseling.    Interim History:  Ca Yan 74 year old female returns to be seen by palliative care today.  Telephone visit performed due to coronavirus outbreak.      Had appointment scheduled with palliative social work but was cancelled and has not been able to reschedule. Has been seeing Speech therapy through ENT for dysphonia and chronic throat clearing-seems to be seeing some improvement.    Olivia today reports having the most trouble with physical isolation and being home alone. At previous Palliative appointments, we have had extensive discussions about her social isolation, because she does not have any family and few friends. Due to COVID related closures, she still has not been able to go to her day program because she is unable to wear a mask due to her claustrophobia and lung disease. She does not have a computer, and says most of her day is just in her sitting in her apartment watching TV. She did get a smart phone recently but has now been struggling with using too much data. She is bored with not much to do and is not interested in anything other than reading. She describes worsening anxiety related to \"when will this Covid end?\". No panic attacks, she is sleeping well.  Appetite has been good, continues to going to the grocery store as needed, will go to the library in her building. Continues with careful handwashing when she is out of her apartment. She says she has a couple ladies in her building as well as one friend Cyndi who she is able to keep up with by phone or in person sometimes.    About 3 weeks ago, she restarted Zoloft at 50mg dose, unsure if there has been much change. She also started talking to a therapist on the phone weekly within the past month. Feels this will be a good and helpful relationship for her.    She denies any changes with her breathing, and feels like that " is doing well.       Social History:  Pertinent changes to social history/social situation since last visit:   Lives alone in independent senior apartment  Key support resources: Limited social [place], trying to get involved in [employer].  Uses [employer] for transportation.  Hx of alcohol misuse  Advance Directive Status:  States she has completed, though not in our system  Social History     Tobacco Use     Smoking status: Former Smoker     Packs/day: 0.50     Years: 5.00     Pack years: 2.50     Types: Cigarettes     Start date: 1956     Last attempt to quit: 1980     Years since quittin.7     Smokeless tobacco: Former User     Quit date: 1980   Substance Use Topics     Alcohol use: Not Currently     Alcohol/week: 0.0 standard drinks     Comment: Sober for 2 years, previous alcoholic     Drug use: No       Allergies   Allergen Reactions     Azithromycin Itching     Codeine Nausea and Vomiting     Hydrocodone Nausea and Vomiting     Metronidazole Nausea     Oxycodone Itching and Rash     Percocet [Oxycodone-Acetaminophen] Nausea and Vomiting     Pollen Extract      sneezing and runny nose.      Seasonal Allergies      sneezing and runny nose.      Vicodin [Hydrocodone-Acetaminophen] Nausea and Vomiting     Zolpidem      Amnestic behavior     Current Outpatient Medications   Medication Sig Dispense Refill     albuterol (PROAIR HFA/PROVENTIL HFA/VENTOLIN HFA) 108 (90 Base) MCG/ACT inhaler Inhale 2 puffs into the lungs every 6 hours as needed for shortness of breath / dyspnea or wheezing (Patient not taking: Reported on 6/3/2020) 3 Inhaler 1     amLODIPine (NORVASC) 2.5 MG tablet Take 1 tablet (2.5 mg) by mouth daily (Patient not taking: Reported on 6/3/2020) 90 tablet 3     ASPIRIN EC PO Take 81 mg by mouth       atorvastatin (LIPITOR) 40 MG tablet Take 1 tablet (40 mg) by mouth daily 90 tablet 3     clonazePAM (KLONOPIN) 0.5 MG tablet Take 0.5 mg by mouth daily       fluticasone-vilanterol (BREO  ELLIPTA) 200-25 MCG/INH inhaler Inhale 1 puff into the lungs daily 3 Inhaler 3     hydrochlorothiazide (MICROZIDE) 12.5 MG capsule TAKE ONE CAPSULE (12.5MG) BY MOUTH ONCE DAILY 90 capsule 3     ibuprofen (ADVIL,MOTRIN) 200 MG tablet Take 3 tablets (600 mg) by mouth 3 times daily (with meals) 90 tablet 0     latanoprost (XALATAN) 0.005 % ophthalmic solution 1 drop       losartan (COZAAR) 50 MG tablet TAKE TWO TABLETS (100MG) BY MOUTH ONCE DAILY 180 tablet 0     order for DME Equipment being ordered: N95 mask indication COPD difficulty breathing with standard mask 10 each 1     order for DME Equipment being ordered: Oxygen  Please provide portable oxygen concentrator (pt would like over the shoulder oxygen device) for portability at 2LPM with activity via nasal canula. 1 Device 1     quetiapine (SEROQUEL) 200 MG tablet Take 200 mg by mouth At Bedtime.       sertraline (ZOLOFT) 50 MG tablet Take 1 tablet (50 mg) by mouth daily 90 tablet 1     umeclidinium (INCRUSE ELLIPTA) 62.5 MCG/INH inhaler Inhale 1 puff into the lungs daily 7 each 0     Past Medical History:   Diagnosis Date     Anxiety      Arthritis      Breast cancer (H)      COPD (chronic obstructive pulmonary disease) (H)     6/19/12:  FEV 0.99 l     Depressive disorder      Hypertension      Low back pain with left-sided sciatica      Low bone density 4/13/2017    DEXA April 12, 2017: T score -2.0. Normal Z score. FRAX risk: major osteoporotic fracture 11.9%, hip fracture 2.6%, therefore not high-risk     Lymphedema, chronic lower extremities      Past Surgical History:   Procedure Laterality Date     BACK SURGERY      spinal fusion     COLONOSCOPY       LUMPECTOMY BREAST       ORTHOPEDIC SURGERY      Knee surgery left side     Unable to perform physical exam because of telephone visit.  She is speaking in full paragraphs, with anxious emotion her voice.    Key Data Reviewed:  LABS:   Last GFR 71  Hb 13.4    IMAGING: No new imaging.     Impression &  Recommendations & Counseling:  Ca Yan is a 74 year old female with history of h/o anxiety, HTN, increased cholesterol, and chronic respiratory failure, attributed to COPD but likely more from kyphoscoliosis. She was referred to Palliaitve care primarily for guidance on goals of care, updating her HCD, and emotional support. However, she declined needing help with updating her HCD and was focused on how our team could help with her anxiety and help her meet people. She is incredibly socially isolated, so we tried to brain storm some ideas for her. This has been exacerbated by the further physical isolation recommended for coronavirus. She alternates about being distressed over having to be alone, and her fear of leaving her apartment and getting sick.     She clearly has pre-existing anxiety that is exacerbated by the current situation. Discussed it is good she restarted the Zoloft and recommended that she increase her dose. She was initially hesitant but agreed it will be a good idea.  Supportive listening performed.    1.  Increased Zoloft to 75mg daily. New Rx sent for 50mg tablets- she will take 1 and a half tabs daily. Will ask Palliative RN to check on her in 2 weeks to see how she is doing with the adjustment in dose.  2. Encouraged to continue work with SLP.  3. Doing well with her current therapist. No need to connect with Palliative SW at this time.    Of note for the future, she only uses her HOME phone.     Phone call duration: 35 minutes    Follow-up in 1 month.     Patient discussed with Dr. Elliot Knox.    Merlene Juarez,   Palliative Medicine Fellow      Attending Note:  Patient hung up and wouldn't participate in visit any longer so I did not directly staff this visit; d/w me.     Thank you for involving us in the patient's care.   Elliot Knox MD / Palliative Medicine / Mobile [phone] - texts, without PHI, preferred / My clinic is in the [facility]: [phone]  (scheduling); [phone] (triage). [facility] Inpatient Team Consult Pager [phone] (answered 8am-430pm M-F) - prefer text pages via [employer] / Palliative After-Hours Answering Service [phone].

## 2020-06-24 ENCOUNTER — TELEPHONE (OUTPATIENT)
Dept: FAMILY MEDICINE | Facility: CLINIC | Age: 75
End: 2020-06-24

## 2020-06-24 NOTE — LETTER
Ca Yan  [address]   [place] [address]  : 1945  MRN:  [id number]      2020        To whom it may concern:    Ca Yan is under my professional care. Ca states she is unable to wear a mask due to her medical diagnosis of Chronic Obstructive Lung Disease.        Favian Sahu MD

## 2020-06-24 NOTE — TELEPHONE ENCOUNTER
[facility] Call Center    Phone Message    May a detailed message be left on voicemail: yes     Reason for Call: Other: Call Back: Patient states she has concerns about wearing a mask. Patient states she has lung diease and can't wear one, but everywhere she goes it is required. Can patient not wear a mask? Are there other options? Please advise.     Action Taken: Message routed to:  [facility]    Travel Screening: Not Applicable

## 2020-06-25 ENCOUNTER — VIRTUAL VISIT (OUTPATIENT)
Dept: PALLIATIVE CARE | Facility: CLINIC | Age: 75
End: 2020-06-25
Attending: INTERNAL MEDICINE
Payer: COMMERCIAL

## 2020-06-25 DIAGNOSIS — M41.9 RESTRICTIVE LUNG DISEASE DUE TO KYPHOSCOLIOSIS: ICD-10-CM

## 2020-06-25 DIAGNOSIS — F43.22 ADJUSTMENT DISORDER WITH ANXIOUS MOOD: Primary | ICD-10-CM

## 2020-06-25 DIAGNOSIS — R45.89 DIFFICULTY COPING: ICD-10-CM

## 2020-06-25 DIAGNOSIS — Z60.4 SOCIAL ISOLATION: ICD-10-CM

## 2020-06-25 DIAGNOSIS — J98.4 RESTRICTIVE LUNG DISEASE DUE TO KYPHOSCOLIOSIS: ICD-10-CM

## 2020-06-25 PROCEDURE — 40001009 ZZH VIDEO/TELEPHONE VISIT; NO CHARGE

## 2020-06-25 SDOH — SOCIAL STABILITY - SOCIAL INSECURITY: SOCIAL EXCLUSION AND REJECTION: Z60.4

## 2020-06-25 NOTE — PATIENT INSTRUCTIONS
Thank you for coming into the Palliative Care Clinic today.      1. We discussed increasing your Zoloft to 75mg daily today. A new prescription was sent to your pharmacy for 50mg tablets, and you will take 1 and a half tabs daily. Will ask our Palliative RN to check on you in 2 weeks by phone to see how you are doing with the adjustment in dose.  2. Until you get the new prescription, recommend you take 3 of your 25mg tabs daily.  3. Continue talking to your therapist on the phone weekly. I'm very glad you were able to connect with someone in this capacity.    Return in clinic in 4-6 weeks as needed for a follow-up.      You can reach the Palliative Care Team during business hours at the following numbers:   -For the [facility], call [phone]  -Direct line for Palliative Care nurse managerLeslie: [phone].     To reach the Palliative Care Provider on-call After-hours or on holidays and weekends, call: [phone]. Please note that we are not able to provide pain medication refills on evenings or weekends.

## 2020-06-25 NOTE — LETTER
6/25/2020       RE: Ca Yan  [address]  [place] [address]     Dear Colleague,    Thank you for referring your patient, Ca Yan, to the [facility] at [facility]. Please see a copy of my visit note below.      Ca Yan is a 74 year old female who is being evaluated via a billable telephone visit.           I have reviewed and updated the patient's allergies and medication list.     Concerns: No concerns  Refills: No refills needed.       Patient did not have any vitals to report. Patient reported no pain.      Meli Santiago CMA       Additional provider notes:     Prefers to use her home phone - [phone].       Palliative Care Outpatient Clinic Progress Note    Patient Name:  Ca Yan  Primary Provider:  Js Saunders    Chief Complaint/Patient ID:   73yo F with chronic respiratory failure due to kyphoscoliosis and restrictive lung disease, ?COPD on O2 at night and with exertion  Anxiety  Social isolation    Last Palliative Care Appointment:  4/16/20 w/ Dr. Schuster. Had stopped sertraline for anxiety as she didn't feel it worked. Declined further medication for anxiety. Again referred to palliative SW for counseling.    Interim History:  Ca Yan 74 year old female returns to be seen by palliative care today.  Telephone visit performed due to coronavirus outbreak.      Had appointment scheduled with palliative social work but was cancelled and has not been able to reschedule. Has been seeing Speech therapy through ENT for dysphonia and chronic throat clearing-seems to be seeing some improvement.    Olivia today reports having the most trouble with physical isolation and being home alone. At previous Palliative appointments, we have had extensive discussions about her social isolation, because she does not have any family and few friends. Due to COVID related closures, she still has not been  "able to go to her day program because she is unable to wear a mask due to her claustrophobia and lung disease. She does not have a computer, and says most of her day is just in her sitting in her apartment watching TV. She did get a smart phone recently but has now been struggling with using too much data. She is bored with not much to do and is not interested in anything other than reading. She describes worsening anxiety related to \"when will this Covid end?\". No panic attacks, she is sleeping well.  Appetite has been good, continues to going to the grocery store as needed, will go to the library in her building. Continues with careful handwashing when she is out of her apartment. She says she has a couple ladies in her building as well as one friend Cyndi who she is able to keep up with by phone or in person sometimes.    About 3 weeks ago, she restarted Zoloft at 50mg dose, unsure if there has been much change. She also started talking to a therapist on the phone weekly within the past month. Feels this will be a good and helpful relationship for her.    She denies any changes with her breathing, and feels like that is doing well.       Social History:  Pertinent changes to social history/social situation since last visit:   Lives alone in independent senior apartment  Key support resources: Limited social [place], trying to get involved in Christianity.  Uses [employer] for transportation.  Hx of alcohol misuse  Advance Directive Status:  States she has completed, though not in our system  Social History     Tobacco Use     Smoking status: Former Smoker     Packs/day: 0.50     Years: 5.00     Pack years: 2.50     Types: Cigarettes     Start date: 1956     Last attempt to quit: 1980     Years since quittin.7     Smokeless tobacco: Former User     Quit date: 1980   Substance Use Topics     Alcohol use: Not Currently     Alcohol/week: 0.0 standard drinks     Comment: Sober for 2 years, previous " alcoholic     Drug use: No       Allergies   Allergen Reactions     Azithromycin Itching     Codeine Nausea and Vomiting     Hydrocodone Nausea and Vomiting     Metronidazole Nausea     Oxycodone Itching and Rash     Percocet [Oxycodone-Acetaminophen] Nausea and Vomiting     Pollen Extract      sneezing and runny nose.      Seasonal Allergies      sneezing and runny nose.      Vicodin [Hydrocodone-Acetaminophen] Nausea and Vomiting     Zolpidem      Amnestic behavior     Current Outpatient Medications   Medication Sig Dispense Refill     albuterol (PROAIR HFA/PROVENTIL HFA/VENTOLIN HFA) 108 (90 Base) MCG/ACT inhaler Inhale 2 puffs into the lungs every 6 hours as needed for shortness of breath / dyspnea or wheezing (Patient not taking: Reported on 6/3/2020) 3 Inhaler 1     amLODIPine (NORVASC) 2.5 MG tablet Take 1 tablet (2.5 mg) by mouth daily (Patient not taking: Reported on 6/3/2020) 90 tablet 3     ASPIRIN EC PO Take 81 mg by mouth       atorvastatin (LIPITOR) 40 MG tablet Take 1 tablet (40 mg) by mouth daily 90 tablet 3     clonazePAM (KLONOPIN) 0.5 MG tablet Take 0.5 mg by mouth daily       fluticasone-vilanterol (BREO ELLIPTA) 200-25 MCG/INH inhaler Inhale 1 puff into the lungs daily 3 Inhaler 3     hydrochlorothiazide (MICROZIDE) 12.5 MG capsule TAKE ONE CAPSULE (12.5MG) BY MOUTH ONCE DAILY 90 capsule 3     ibuprofen (ADVIL,MOTRIN) 200 MG tablet Take 3 tablets (600 mg) by mouth 3 times daily (with meals) 90 tablet 0     latanoprost (XALATAN) 0.005 % ophthalmic solution 1 drop       losartan (COZAAR) 50 MG tablet TAKE TWO TABLETS (100MG) BY MOUTH ONCE DAILY 180 tablet 0     order for DME Equipment being ordered: N95 mask indication COPD difficulty breathing with standard mask 10 each 1     order for DME Equipment being ordered: Oxygen  Please provide portable oxygen concentrator (pt would like over the shoulder oxygen device) for portability at 2LPM with activity via nasal canula. 1 Device 1     quetiapine  (SEROQUEL) 200 MG tablet Take 200 mg by mouth At Bedtime.       sertraline (ZOLOFT) 50 MG tablet Take 1 tablet (50 mg) by mouth daily 90 tablet 1     umeclidinium (INCRUSE ELLIPTA) 62.5 MCG/INH inhaler Inhale 1 puff into the lungs daily 7 each 0     Past Medical History:   Diagnosis Date     Anxiety      Arthritis      Breast cancer (H)      COPD (chronic obstructive pulmonary disease) (H)     6/19/12:  FEV 0.99 l     Depressive disorder      Hypertension      Low back pain with left-sided sciatica      Low bone density 4/13/2017    DEXA April 12, 2017: T score -2.0. Normal Z score. FRAX risk: major osteoporotic fracture 11.9%, hip fracture 2.6%, therefore not high-risk     Lymphedema, chronic lower extremities      Past Surgical History:   Procedure Laterality Date     BACK SURGERY      spinal fusion     COLONOSCOPY       LUMPECTOMY BREAST       ORTHOPEDIC SURGERY      Knee surgery left side     Unable to perform physical exam because of telephone visit.  She is speaking in full paragraphs, with anxious emotion her voice.    Key Data Reviewed:  LABS:   Last GFR 71  Hb 13.4    IMAGING: No new imaging.     Impression & Recommendations & Counseling:  Ca Yan is a 74 year old female with history of h/o anxiety, HTN, increased cholesterol, and chronic respiratory failure, attributed to COPD but likely more from kyphoscoliosis. She was referred to Palliaitve care primarily for guidance on goals of care, updating her HCD, and emotional support. However, she declined needing help with updating her HCD and was focused on how our team could help with her anxiety and help her meet people. She is incredibly socially isolated, so we tried to brain storm some ideas for her. This has been exacerbated by the further physical isolation recommended for coronavirus. She alternates about being distressed over having to be alone, and her fear of leaving her apartment and getting sick.     She clearly has pre-existing anxiety  that is exacerbated by the current situation. Discussed it is good she restarted the Zoloft and recommended that she increase her dose. She was initially hesitant but agreed it will be a good idea.  Supportive listening performed.    1.  Increased Zoloft to 75mg daily. New Rx sent for 50mg tablets- she will take 1 and a half tabs daily. Will ask Palliative RN to check on her in 2 weeks to see how she is doing with the adjustment in dose.  2. Encouraged to continue work with SLP.  3. Doing well with her current therapist. No need to connect with Palliative SW at this time.    Of note for the future, she only uses her HOME phone.     Phone call duration: 35 minutes    Follow-up in 1 month.     Patient discussed with Dr. Elliot Knox.    Merlene Juarez DO  Palliative Medicine Fellow      Attending Note:  Patient hung up and wouldn't participate in visit any longer so I did not directly staff this visit; d/w me.     Thank you for involving us in the patient's care.     Elliot Knox MD / Palliative Medicine / Mobile [phone] - texts, without PHI, preferred / My clinic is in the [facility]: [phone] (scheduling); [phone] (triage). [facility] Inpatient Team Consult Pager [phone] (answered 8am-430pm M-F) - prefer text pages via myairmail / Palliative After-Hours Answering Service [phone].                       Ca Yan is a 74 year old female who is being evaluated via a billable telephone visit.      I have reviewed and updated the patient's allergies and medication list.    Concerns: No concerns  Refills: No refills needed.      Patient did not have any vitals to report. Patient reported no pain.     Meli Santiago CMA      Phone call duration: 25 minutes      .      Again, thank you for allowing me to participate in the care of your patient.      Sincerely,    Merlene Juarez DO

## 2020-06-25 NOTE — PROGRESS NOTES
"Ca Yan is a 74 year old female who is being evaluated via a billable telephone visit.      The patient has been notified of following:     \"This telephone visit will be conducted via a call between you and your physician/provider. We have found that certain health care needs can be provided without the need for a physical exam.  This service lets us provide the care you need with a short phone conversation.  If a prescription is necessary we can send it directly to your pharmacy.  If lab work is needed we can place an order for that and you can then stop by our lab to have the test done at a later time.    Telephone visits are billed at different rates depending on your insurance coverage. During this emergency period, for some insurers they may be billed the same as an in-person visit.  Please reach out to your insurance provider with any questions.    If during the course of the call the physician/provider feels a telephone visit is not appropriate, you will not be charged for this service.\"    Patient has given verbal consent for Telephone visit?  Yes    What phone number would you like to be contacted at? [phone]     How would you like to obtain your AVS? Mail a copy     I have reviewed and updated the patient's allergies and medication list.    Concerns: No concerns  Refills: No refills needed.      Patient did not have any vitals to report. Patient reported no pain.     Meli Santiago CMA      Phone call duration: 25 minutes      .  "

## 2020-06-25 NOTE — TELEPHONE ENCOUNTER
She was provided with a note previously from Dr Deb brambila.  I have written the letter as well, please send.  Best wishes,  Favian Sahu MD

## 2020-07-01 ENCOUNTER — ANCILLARY PROCEDURE (OUTPATIENT)
Dept: MAMMOGRAPHY | Facility: CLINIC | Age: 75
End: 2020-07-01
Attending: FAMILY MEDICINE
Payer: COMMERCIAL

## 2020-07-01 DIAGNOSIS — Z12.31 VISIT FOR SCREENING MAMMOGRAM: ICD-10-CM

## 2020-07-02 NOTE — PROGRESS NOTES
I tired calling Ms. Yan several times, but was unable to get a reply.  I believe she may have declined one of the phone calls.  No visit today.

## 2020-07-06 ENCOUNTER — VIRTUAL VISIT (OUTPATIENT)
Dept: PULMONOLOGY | Facility: CLINIC | Age: 75
End: 2020-07-06
Payer: COMMERCIAL

## 2020-07-06 DIAGNOSIS — J96.11 CHRONIC HYPOXEMIC RESPIRATORY FAILURE (H): Primary | ICD-10-CM

## 2020-07-06 DIAGNOSIS — M41.9 KYPHOSCOLIOSIS: ICD-10-CM

## 2020-07-06 NOTE — LETTER
7/6/2020         RE: Ca Yan  [address]  [place] [address]        Dear Colleague,    Thank you for referring your patient, Ca Yan, to the [facility]. Please see a copy of my visit note below.    Ca Yan is a 74 year old female who is being evaluated via a billable telephone visit.            Chief complaint: Shortness of breath  History of present illness: This is a 74-year-old female with history of kyphoscoliosis who is presenting to the pulmonary clinic as a telephone visit follow-up on her shortness of breath.    She still has significant shortness of breath, and this is especially with exertion.  She has a difficult time wearing a mask in public because of this.  She rarely leaves the house, and does not get any exercise.  She does remember when she was in pulmonary rehab in the past and found it quite helpful, but she has been able to get any sort of exercise routine in.  This is partly because her apartment for the most part is close down, but also the fact that she is getting a lot of body pains with any sort of work.    She has been diagnosed with laryngitis and is being seen in the vocal clinic to try to correct this.  She was admitted to the hospital in January for pneumonia.  She has seen palliative care recently, and was started on sertraline.    She does not find significant benefit from use of inhalers.    Imaging: I have reviewed her chest CT scan images and agree with the radiologist interpretation below:  Chest: Thyroid gland appears unremarkable. Esophagus appears  unremarkable. Tracheobronchial tree appears patent.  No suspicious  lung nodules.      Lung nodule(s) described on series 4:  -Solid 3 mm pulmonary nodule of the left upper lobe (Image: 147)  -Solid 4 mm pulmonary nodule in the left upper lobe (Image: 164)     The pleura appears unremarkable. Small right pleural effusion. No  pneumothorax. Enlargement of  the pulmonary artery measuring 4.0 cm.  Heart size is within normal limits. Mild atherosclerotic ossifications  of the coronary arteries. No significant pericardial effusion.   Visualized thoracic aorta and main pulmonary artery diameters appear  within normal limits. There are no visualized pathologically enlarged  mediastinal, hilar or axillary lymph nodes.     Abdomen: Examination of the upper abdomen is limited.   Hypoattenuating focus of the pancreas measuring 7 mm. Original  preliminary report had suggested a lesion in the pancreas. Images are  available from outside CT showed a very similar appearance on outside  CT dated 8/13/2016. Appears to be fat extending up into the pancreas  rather than a mass.     Bones: No suspicious osseous lesion. Severe thoracic dextroscoliosis.         Soft Tissues: No suspicious mass.                                                                      IMPRESSION:   1. Small right pleural effusion.  2. Solid pulmonary nodules as detailed above. Consider follow-up if  patient is stratified into high-risk by Fleischner criteria.  3. Enlargement of the pulmonary artery which can be seen in setting of  pulmonary hypertension.  4. Hepatomegaly.    Assessment and plan:  Shortness of breath  Kyphosis  Deconditioning  Right middle lobe atelectasis    I think most of her symptoms are related to her spinal curvature and deconditioning.  I am not convinced that she has COPD.  She does not have airflow obstruction on her pulmonary function testing, and there is no significant bronchitis or emphysema on her CT scan.  She does have right middle lobe atelectasis which is been present since 2013 and I think is also related to her spinal curvature. This is not fixable.     We talked for a while about the reason for her shortness of breath.  I tried to encourage her to get as much physical exercise as she can in order to help out with the symptom of dyspnea.  If pain is limiting, then she needs  to discuss this with her primary care physician or potentially get a referral to the pain clinic.  I do not believe there are any inhalers that will help out with her shortness of breath.  Pulmonary rehab would be beneficial, and I encouraged her to participate once it does open up again.         Phone call duration: 19 minutes    Bassam Taylor MD

## 2020-07-06 NOTE — PROGRESS NOTES
"Ca Yan is a 74 year old female who is being evaluated via a billable telephone visit.      The patient has been notified of following:     \"This telephone visit will be conducted via a call between you and your physician/provider. We have found that certain health care needs can be provided without the need for a physical exam.  This service lets us provide the care you need with a short phone conversation.  If a prescription is necessary we can send it directly to your pharmacy.  If lab work is needed we can place an order for that and you can then stop by our lab to have the test done at a later time.    Telephone visits are billed at different rates depending on your insurance coverage. During this emergency period, for some insurers they may be billed the same as an in-person visit.  Please reach out to your insurance provider with any questions.    If during the course of the call the physician/provider feels a telephone visit is not appropriate, you will not be charged for this service.\"    Patient has given verbal consent for Telephone visit?  Yes    What phone number would you like to be contacted at? [phone]    How would you like to obtain your AVS? Mail a copy    Chief complaint: Shortness of breath  History of present illness: This is a 74-year-old female with history of kyphoscoliosis who is presenting to the pulmonary clinic as a telephone visit follow-up on her shortness of breath.    She still has significant shortness of breath, and this is especially with exertion.  She has a difficult time wearing a mask in public because of this.  She rarely leaves the house, and does not get any exercise.  She does remember when she was in pulmonary rehab in the past and found it quite helpful, but she has been able to get any sort of exercise routine in.  This is partly because her apartment for the most part is close down, but also the fact that she is getting a lot of body pains with any sort " of work.    She has been diagnosed with laryngitis and is being seen in the vocal clinic to try to correct this.  She was admitted to the hospital in January for pneumonia.  She has seen palliative care recently, and was started on sertraline.    She does not find significant benefit from use of inhalers.    Imaging: I have reviewed her chest CT scan images and agree with the radiologist interpretation below:  Chest: Thyroid gland appears unremarkable. Esophagus appears  unremarkable. Tracheobronchial tree appears patent.  No suspicious  lung nodules.      Lung nodule(s) described on series 4:  -Solid 3 mm pulmonary nodule of the left upper lobe (Image: 147)  -Solid 4 mm pulmonary nodule in the left upper lobe (Image: 164)     The pleura appears unremarkable. Small right pleural effusion. No  pneumothorax. Enlargement of the pulmonary artery measuring 4.0 cm.  Heart size is within normal limits. Mild atherosclerotic ossifications  of the coronary arteries. No significant pericardial effusion.   Visualized thoracic aorta and main pulmonary artery diameters appear  within normal limits. There are no visualized pathologically enlarged  mediastinal, hilar or axillary lymph nodes.     Abdomen: Examination of the upper abdomen is limited.   Hypoattenuating focus of the pancreas measuring 7 mm. Original  preliminary report had suggested a lesion in the pancreas. Images are  available from outside CT showed a very similar appearance on outside  CT dated 8/13/2016. Appears to be fat extending up into the pancreas  rather than a mass.     Bones: No suspicious osseous lesion. Severe thoracic dextroscoliosis.         Soft Tissues: No suspicious mass.                                                                      IMPRESSION:   1. Small right pleural effusion.  2. Solid pulmonary nodules as detailed above. Consider follow-up if  patient is stratified into high-risk by Fleischner criteria.  3. Enlargement of the pulmonary  artery which can be seen in setting of  pulmonary hypertension.  4. Hepatomegaly.    Assessment and plan:  Shortness of breath  Kyphosis  Deconditioning  Right middle lobe atelectasis    I think most of her symptoms are related to her spinal curvature and deconditioning.  I am not convinced that she has COPD.  She does not have airflow obstruction on her pulmonary function testing, and there is no significant bronchitis or emphysema on her CT scan.  She does have right middle lobe atelectasis which is been present since 2013 and I think is also related to her spinal curvature. This is not fixable.     We talked for a while about the reason for her shortness of breath.  I tried to encourage her to get as much physical exercise as she can in order to help out with the symptom of dyspnea.  If pain is limiting, then she needs to discuss this with her primary care physician or potentially get a referral to the pain clinic.  I do not believe there are any inhalers that will help out with her shortness of breath.  Pulmonary rehab would be beneficial, and I encouraged her to participate once it does open up again.         Phone call duration: 19 minutes    Bassam Taylor MD

## 2020-07-07 ENCOUNTER — TELEPHONE (OUTPATIENT)
Dept: FAMILY MEDICINE | Facility: CLINIC | Age: 75
End: 2020-07-07

## 2020-07-07 NOTE — TELEPHONE ENCOUNTER
Voice message was left with [employer] care coordinator, asking her to call RN back, to relay if a DME order is needed, or if fax will have form for Dr. Sahu to sign regarding air conditioner for patient.    Kiki Eddy RN on 7/7/2020 at 12:14 PM

## 2020-07-07 NOTE — TELEPHONE ENCOUNTER
[facility] Call Center    Phone Message    May a detailed message be left on voicemail: yes     Reason for Call: Order(s): Other:   Reason for requested: pt is requesting an air conditioner - we should be receiving a fax soon for this.  Date needed: asap  Provider name:       Action Taken: Message routed to:  [facility]    Travel Screening: Not Applicable

## 2020-07-07 NOTE — TELEPHONE ENCOUNTER
Received form and will have on site staff put in Dr. Sahu's in box.    Jacquelyn Olvera    Primary Care

## 2020-07-07 NOTE — TELEPHONE ENCOUNTER
Tanesha called back, and let RN know the form needs to be signed by Dr. Sahu and then faxed back to Tanesha.  She will take care of ordering air conditioner from a supplier.    Kiki Eddy RN on 7/7/2020 at 12:21 PM

## 2020-07-09 ENCOUNTER — VIRTUAL VISIT (OUTPATIENT)
Dept: FAMILY MEDICINE | Facility: CLINIC | Age: 75
End: 2020-07-09
Payer: COMMERCIAL

## 2020-07-09 DIAGNOSIS — I10 ESSENTIAL HYPERTENSION: ICD-10-CM

## 2020-07-09 DIAGNOSIS — F43.22 ADJUSTMENT DISORDER WITH ANXIOUS MOOD: ICD-10-CM

## 2020-07-09 DIAGNOSIS — J44.9 CHRONIC OBSTRUCTIVE PULMONARY DISEASE, UNSPECIFIED COPD TYPE (H): Primary | ICD-10-CM

## 2020-07-09 DIAGNOSIS — I51.89 DIASTOLIC DYSFUNCTION: ICD-10-CM

## 2020-07-09 NOTE — PROGRESS NOTES
"Ca Yan is a 74 year old female who is being evaluated via a billable telephone visit.      The patient has been notified of following:     \"This telephone visit will be conducted via a call between you and your physician/provider. We have found that certain health care needs can be provided without the need for a physical exam.  This service lets us provide the care you need with a short phone conversation.  If a prescription is necessary we can send it directly to your pharmacy.  If lab work is needed we can place an order for that and you can then stop by our lab to have the test done at a later time.    If during the course of the call the physician/provider feels a telephone visit is not appropriate, you will not be charged for this service.\"     Ca Yan has been contacted via telephone for   Chief Complaint   Patient presents with     Abdominal Cramping     Pt is following up on abdominal cramping.    She does not have abdominal cramping.  She is interested in a new pulmonary MD for COPD. She saw a pulmonary MD at [facility] in the past, would like to learn more about her COPD requests pulmonary at [facility]. She is considering pulmonary rehab.   She still has dysphonia related to pneumonia she had in January. She has no acute infection symptoms currently.  Today I received a request for air conditioner for her. I agree this is important for her with advanced COPD HX and diastolic dysfunction.  She was to see Dr Cornell Saleh but will reschedule.  She has hypertension has not had a recent BP reading, last was high 159/106. She is taking Cozaar 50 mg 2 tabs daily for 100 mg dose and hydrochlorothiazide 12.5 mg. She is not taking amlodipine. She agreed to an appointment for BP check in person.      Patient Active Problem List   Diagnosis     Non morbid obesity due to excess calories     Alcohol abuse     Malignant neoplasm of breast (H)     Chronic kidney disease, stage III (moderate) (H)     " Osteoarthritis of knee     Major depressive disorder with single episode     Diarrhea     Diastolic dysfunction     Osteoarthritis of spine     Gastroesophageal reflux disease     Generalized anxiety disorder     Hypertension     Hyperlipidemia     Insomnia     Irritable bowel syndrome     Kyphoscoliosis     Cluster C personality disorder (H)     Seborrheic eczema     Anemia     Anxiety state     Asthma     Back pain     Bunion     Low bone density     Fecal incontinence     Left-sided low back pain without sciatica     Major depression, recurrent (H)     Cognitive impairment        Current Outpatient Medications:      ASPIRIN EC PO, Take 81 mg by mouth, Disp: , Rfl:      atorvastatin (LIPITOR) 40 MG tablet, Take 1 tablet (40 mg) by mouth daily, Disp: 90 tablet, Rfl: 3     clonazePAM (KLONOPIN) 0.5 MG tablet, Take 0.5 mg by mouth daily, Disp: , Rfl:      fluticasone-vilanterol (BREO ELLIPTA) 200-25 MCG/INH inhaler, Inhale 1 puff into the lungs daily, Disp: 3 Inhaler, Rfl: 3     hydrochlorothiazide (MICROZIDE) 12.5 MG capsule, TAKE ONE CAPSULE (12.5MG) BY MOUTH ONCE DAILY, Disp: 90 capsule, Rfl: 3     ibuprofen (ADVIL,MOTRIN) 200 MG tablet, Take 3 tablets (600 mg) by mouth 3 times daily (with meals), Disp: 90 tablet, Rfl: 0     latanoprost (XALATAN) 0.005 % ophthalmic solution, 1 drop, Disp: , Rfl:      losartan (COZAAR) 50 MG tablet, TAKE TWO TABLETS (100MG) BY MOUTH ONCE DAILY, Disp: 180 tablet, Rfl: 0     order for DME, Equipment being ordered: N95 mask indication COPD difficulty breathing with standard mask, Disp: 10 each, Rfl: 1     order for DME, Equipment being ordered: Oxygen  Please provide portable oxygen concentrator (pt would like over the shoulder oxygen device) for portability at 2LPM with activity via nasal canula., Disp: 1 Device, Rfl: 1     quetiapine (SEROQUEL) 200 MG tablet, Take 200 mg by mouth At Bedtime., Disp: , Rfl:      sertraline (ZOLOFT) 50 MG tablet, Take 1.5 tablets (75 mg) by mouth  daily, Disp: 45 tablet, Rfl: 3     umeclidinium (INCRUSE ELLIPTA) 62.5 MCG/INH inhaler, Inhale 1 puff into the lungs daily, Disp: 7 each, Rfl: 0     albuterol (PROAIR HFA/PROVENTIL HFA/VENTOLIN HFA) 108 (90 Base) MCG/ACT inhaler, Inhale 2 puffs into the lungs every 6 hours as needed for shortness of breath / dyspnea or wheezing (Patient not taking: Reported on 6/3/2020), Disp: 3 Inhaler, Rfl: 1     amLODIPine (NORVASC) 2.5 MG tablet, Take 1 tablet (2.5 mg) by mouth daily (Patient not taking: Reported on 7/9/2020), Disp: 90 tablet, Rfl: 3   Social History     Social History Narrative    Only family member is daughter in [place], [place] whom         I have reviewed and updated the patient's Past Medical History, Social History,  and Medication List.    ALLERGIES  Azithromycin; Codeine; Hydrocodone; Metronidazole; Oxycodone; Percocet [oxycodone-acetaminophen]; Pollen extract; Seasonal allergies; Vicodin [hydrocodone-acetaminophen]; and Zolpidem   ROS: 5 point ROS neg other than the symptoms noted above in the HPI.     Telephone: Mild dysphonia, no dyspnea noted speaking in full sentences  Mood slightly tangential.     Assessment/Plan:  1. Chronic obstructive pulmonary disease, unspecified COPD type (H)   She requests pulmonary evaluation, would benefit from COPD action plan consider pulmonary rehab.  I provided a signed form for her to have an air conditioner through [employer] insurance   - PULMONARY MEDICINE REFERRAL; Future    2. Essential hypertension  Requires in office visit for assessment.     3. Diastolic dysfunction  Per medical problem list  4. Depression : she received  Sertraline 50 mg 1.5 tabs from palliative care provider on 6/25. She is wondering about a change tablet to avoid cutting. She has been taking 2 sertraline 50 mg tabs for 100 mg dose daily. She will contact her provider  Phone call start 10:30 am  Phone call end 10:42 AM  Phone call duration: 12 minutes  All questions were addressed and voiced  understanding and agreement with the above.   Favian Sahu MD

## 2020-07-09 NOTE — TELEPHONE ENCOUNTER
Planned to call patient today to check in regarding sertraline increase - prior to call noticed refill request.     Attempted to call patient to check in on increased sertraline, but unable to reach her. Did leave  for call back. Also advised in  that I had received her refill request and would be sending it along to an MD.     This script was last refilled on 6/25/2020 for a 1 month supply with 3 refills, therefore refill request denied.     Will wait for patient to reach out with sertraline dose increase update.

## 2020-07-09 NOTE — NURSING NOTE
Chief Complaint   Patient presents with     Abdominal Cramping     Pt is following up on abdominal cramping.      .  Video Visit Technology for this patient: No video technology available to patient, please call patient over the phone     Ada Murray LPN at 9:47 AM on 7/9/2020.

## 2020-07-09 NOTE — Clinical Note
Please assist with clinic visit with me lab prior in one month and pulmonary referral  Best wishes,  Favian Sahu MD

## 2020-07-10 ENCOUNTER — TELEPHONE (OUTPATIENT)
Dept: FAMILY MEDICINE | Facility: CLINIC | Age: 75
End: 2020-07-10

## 2020-07-10 ENCOUNTER — TELEPHONE (OUTPATIENT)
Dept: OTOLARYNGOLOGY | Facility: CLINIC | Age: 75
End: 2020-07-10

## 2020-07-10 NOTE — TELEPHONE ENCOUNTER
LVM letting pt know appt on 8/7 has been rescheduled to 8/6 as provider will not be in clinic on 8/7. Left my direct line for questions/confirmation.

## 2020-07-23 ENCOUNTER — VIRTUAL VISIT (OUTPATIENT)
Dept: INTERNAL MEDICINE | Facility: CLINIC | Age: 75
End: 2020-07-23
Payer: COMMERCIAL

## 2020-07-23 DIAGNOSIS — K58.2 IRRITABLE BOWEL SYNDROME WITH BOTH CONSTIPATION AND DIARRHEA: Primary | ICD-10-CM

## 2020-07-23 DIAGNOSIS — R10.2 SUPRAPUBIC ABDOMINAL PAIN: ICD-10-CM

## 2020-07-23 DIAGNOSIS — R19.7 DIARRHEA OF PRESUMED INFECTIOUS ORIGIN: ICD-10-CM

## 2020-07-23 DIAGNOSIS — Z12.11 SPECIAL SCREENING FOR MALIGNANT NEOPLASMS, COLON: ICD-10-CM

## 2020-07-23 NOTE — PROGRESS NOTES
"This patient is being evaluated via a billable telephone visit; THIS VISIT WAS INITIATED BY THE PT, as AN ALTERNATIVE TO IN PERSON VISIT .       The patient has has been notified of following:      \"This billable telephone visit will be conducted via a call between you and your physician/provider. We have found that certain health care needs can be provided without the need for a physical exam.  This service lets us provide the care you need with a short phone conversation.  If a prescription is necessary we can send it directly to your pharmacy.  If lab work is needed we can place an order for that and you can then stop by our lab to have the test done at a later time. We can also place orders for a limited number of imaging tests if they are deemed urgently necessary.     If during the course of the call the physician/provider feels a telephone visit is not appropriate, you will not be charged for this service.\"     Due to efforts to reduce the spread of COVID-19 in the clinic, state, nation, virtual visits are encouraged currently. Patient understands that diagnose and advice is limited by the inability to exam him/her/them face-to-face.    Person(s) spoken to: patient                                     1st call         2nd call(after staffing)  Time call initiated:  09:56 AM ---  10:41 AM  Time call ended:    10:15 AM  --- 10:49  Total length of call:27 mins    Staffed with: Dr. Gongora       [facility]         HPI:       Ca Yan is a 74 year old female who presents for the following  Patient presents with: Abdominal Pain (Pt is having abdominal pain and diarrrheafor the past couple weeks.Pt states it comes and goes. )      HTN, HLD ,mood disorder, Kyphosis related chronic hypoxic respiratory failure on home O2    PT started to have suprapubic abdominal pain which can last 2 days, last episode was on Tuesday 7/22. Pain is cramping in nature, not radiating associated with 2-3 days of " "constipation and then patient will have loose stools/watery diarrhea and reported that her pain will resolve after having BM  Her symptoms do not wake her up during the night. No N/V, no melena or hematochezia   She was diagnosed with IBS in the past and her current sxs do not feel anything different than her IBS. She takes Keflexfor UTI prophylaxis because of ? Bowel incontinence for about a year now.     No fever, chills or night sweats. No recent travel or camping. No dysuria. Urinating more than usual because of her \"age\" for the last year. No new back pain. No hematuria or melena or hematochezia, no weight loss.    She had a \"virtual colonoscopy\" 10 years ago, but could not find any records for that on patient's chart.            Review of Systems:     ROS  I have personally reviewed and updated the complete ROS on the day of the visit.           Physical Exam:   LMP  (LMP Unknown)   There is no height or weight on file to calculate BMI.  Vitals were reviewed       GENERAL APPEARANCE: alert and no distress     ABDOMEN: suprapubic tenderness, no back pain per patient.    Assessment and Plan     Ca was seen today for abdominal pain.    Diagnoses and all orders for this visit:    Irritable bowel syndrome with both constipation and diarrhea  Suprapubic abdominal pain   Diarrhea of presumed infectious origin  Patient signs and symptoms are most likely consistent with IBS picture. She has a hx of recurrent UTI 2/2 fecal incontinence and has been on prophylaxis keflex 250 mg daily. Concerns about C.diff or Recurrent UTI 2/2 resistant bacterial infection. Will get the following tests and defer for treating her diarrhea/constipation for now before establishing a diagnosis.          -     CBC with platelets  -     UA with Microscopic reflex to Culture  -     Clostridium difficile Toxin B PCR; Future  -     Enteric Bacteria and Virus Panel by [facility] Stool; Future  -     UA with Microscopic reflex to Culture-     CBC with " platelets    Special screening for malignant neoplasms, colon  Patient was very hesitant to get a colonoscopy done. She had a painful experience 10 years ago and had to discontinue the procedure. She has no weight loss. Encouraged her to get a colonoscopy and explained that GI would do there best to make it painless/ more comfortable procedure She asked about FIT tests, but informed her that she would have to get it annually and it is a less sensitive test than the colonoscopy.   -     GASTROENTEROLOGY ADULT REF PROCEDURE ONLY; Future      Options for treatment and follow-up care were reviewed with the patient. Ca Yan engaged in the decision making process and verbalized understanding of the options discussed and agreed with the final plan.    Jack Ernandez MD  Internal Medicine PGY-2  [phone]  Jul 23, 2020    Pt was seen and plan of care discussed with Dr. Gongora   Teaching Physician Note:  I, Dr. Gongora , was immediately accessible to the resident, and agree with the residents's findings and plan of care, as documented in the resident's note.     Courtney Gongora MD, PhD  [facility]  Primary Care Center   pager [phone]

## 2020-07-23 NOTE — NURSING NOTE
Chief Complaint   Patient presents with     Abdominal Pain     Pt is having abdominal pain and diarrrheafor the past couple weeks.Pt states it comes and goes.      .Video Visit Technology for this patient: No video technology available to patient, please call patient over the phone     Ada Murray LPN at 8:29 AM on 7/23/2020.

## 2020-07-24 ENCOUNTER — TELEPHONE (OUTPATIENT)
Dept: INTERNAL MEDICINE | Facility: CLINIC | Age: 75
End: 2020-07-24

## 2020-07-29 ENCOUNTER — NURSE TRIAGE (OUTPATIENT)
Dept: NURSING | Facility: CLINIC | Age: 75
End: 2020-07-29

## 2020-07-29 NOTE — TELEPHONE ENCOUNTER
74 year old female calls today c/o abdominal cramping and soft stools/diarrhea.  She states she has been experiencing diarrhea and abdominal cramping off and on for the last month.  Today she has had to have a bowel movement x 3.  2 of them were very soft and almost black.  1 was very loose.   She develops cramping before she has to have a bowel movement.  She had recently spoke with  Dr. Ernandez about her symptoms. Dr. Ernandez ordered labs and a colonoscopy.  She has not completed either.  Suggested she come in for labs as soon as she can.   She states she will come in tomorrow      Will forward to primary care to follow up on labs results

## 2020-07-29 NOTE — TELEPHONE ENCOUNTER
Noted. Ordering provider of labs will be in clinic tomorrow and will be able to follow up on labs. Rosaura Taveras LPN 7/29/2020 3:42 PM

## 2020-07-30 DIAGNOSIS — R19.7 DIARRHEA OF PRESUMED INFECTIOUS ORIGIN: ICD-10-CM

## 2020-07-30 DIAGNOSIS — R10.2 SUPRAPUBIC ABDOMINAL PAIN: ICD-10-CM

## 2020-07-30 DIAGNOSIS — K58.2 IRRITABLE BOWEL SYNDROME WITH BOTH CONSTIPATION AND DIARRHEA: ICD-10-CM

## 2020-07-30 LAB
ERYTHROCYTE [DISTWIDTH] IN BLOOD BY AUTOMATED COUNT: 15.2 % (ref 10–15)
HCT VFR BLD AUTO: 43.3 % (ref 35–47)
HGB BLD-MCNC: 13.4 G/DL (ref 11.7–15.7)
MCH RBC QN AUTO: 30.4 PG (ref 26.5–33)
MCHC RBC AUTO-ENTMCNC: 30.9 G/DL (ref 31.5–36.5)
MCV RBC AUTO: 98 FL (ref 78–100)
PLATELET # BLD AUTO: 209 10E9/L (ref 150–450)
RBC # BLD AUTO: 4.41 10E12/L (ref 3.8–5.2)
WBC # BLD AUTO: 4.9 10E9/L (ref 4–11)

## 2020-07-31 ENCOUNTER — TELEPHONE (OUTPATIENT)
Dept: INTERNAL MEDICINE | Facility: CLINIC | Age: 75
End: 2020-07-31

## 2020-07-31 ENCOUNTER — NURSE TRIAGE (OUTPATIENT)
Dept: NURSING | Facility: CLINIC | Age: 75
End: 2020-07-31

## 2020-07-31 ENCOUNTER — HOSPITAL ENCOUNTER (OUTPATIENT)
Dept: CARDIAC REHAB | Facility: CLINIC | Age: 75
End: 2020-07-31
Payer: COMMERCIAL

## 2020-07-31 PROCEDURE — G0238 OTH RESP PROC, INDIV: HCPCS

## 2020-07-31 PROCEDURE — 40000244 ZZH STATISTIC VISIT PULM REHAB

## 2020-07-31 NOTE — TELEPHONE ENCOUNTER
Caller called regarding laboratory orders from yesterday.  Called to clarify how to collect the samples.  Cheyenne Capellan, RN    Additional Information    Negative: [1] Caller is not with the adult (patient) AND [2] reporting urgent symptoms    Negative: Lab result questions    Negative: Medication questions    Negative: Caller can't be reached by phone    Negative: Caller has already spoken to PCP or another triager    Negative: RN needs further essential information from caller in order to complete triage    Negative: Requesting regular office appointment    Negative: [1] Caller requesting NON-URGENT health information AND [2] PCP's office is the best resource    [1] Follow-up call to recent contact AND [2] information only call, no triage required     Laboratory orders    Negative: [1] Caller is not with the adult (patient) AND [2] probable NON-URGENT symptoms    Negative: Question about upcoming scheduled test, no triage required and triager able to answer question    Negative: General information question, no triage required and triager able to answer question    Negative: Health Information question, no triage required and triager able to answer question    Protocols used: INFORMATION ONLY CALL-A-

## 2020-07-31 NOTE — TELEPHONE ENCOUNTER
[facility] Call Center    Phone Message    May a detailed message be left on voicemail: yes     Reason for Call: Other: The pt has questions about the AVS she got from the visit 7.23.20. Ans she needs instructions on how to use the stool-urine sample tests she took home. Please call to discuss. Thanks.     Action Taken: Message routed to:  [facility] ([facility] med    Travel Screening: Not Applicable

## 2020-08-04 ENCOUNTER — HOSPITAL ENCOUNTER (OUTPATIENT)
Dept: CARDIAC REHAB | Facility: CLINIC | Age: 75
End: 2020-08-04
Payer: COMMERCIAL

## 2020-08-04 PROCEDURE — G0238 OTH RESP PROC, INDIV: HCPCS

## 2020-08-04 PROCEDURE — 40000244 ZZH STATISTIC VISIT PULM REHAB

## 2020-08-06 ENCOUNTER — OFFICE VISIT (OUTPATIENT)
Dept: OTOLARYNGOLOGY | Facility: CLINIC | Age: 75
End: 2020-08-06
Payer: COMMERCIAL

## 2020-08-06 ENCOUNTER — VIRTUAL VISIT (OUTPATIENT)
Dept: OTOLARYNGOLOGY | Facility: CLINIC | Age: 75
End: 2020-08-06
Payer: COMMERCIAL

## 2020-08-06 ENCOUNTER — VIRTUAL VISIT (OUTPATIENT)
Dept: INTERNAL MEDICINE | Facility: CLINIC | Age: 75
End: 2020-08-06
Payer: COMMERCIAL

## 2020-08-06 ENCOUNTER — TELEPHONE (OUTPATIENT)
Dept: INTERNAL MEDICINE | Facility: CLINIC | Age: 75
End: 2020-08-06

## 2020-08-06 VITALS
WEIGHT: 188 LBS | BODY MASS INDEX: 32.1 KG/M2 | TEMPERATURE: 98.2 F | OXYGEN SATURATION: 92 % | HEIGHT: 64 IN | HEART RATE: 76 BPM

## 2020-08-06 DIAGNOSIS — J38.01 VOCAL FOLD PARALYSIS, UNILATERAL: ICD-10-CM

## 2020-08-06 DIAGNOSIS — R13.14 PHARYNGOESOPHAGEAL DYSPHAGIA: ICD-10-CM

## 2020-08-06 DIAGNOSIS — J38.01 VOCAL FOLD PARESIS, UNILATERAL: ICD-10-CM

## 2020-08-06 DIAGNOSIS — R49.0 DYSPHONIA: Primary | ICD-10-CM

## 2020-08-06 DIAGNOSIS — R19.7 DIARRHEA, UNSPECIFIED TYPE: Primary | ICD-10-CM

## 2020-08-06 ASSESSMENT — MIFFLIN-ST. JEOR: SCORE: 1327.76

## 2020-08-06 ASSESSMENT — PAIN SCALES - GENERAL: PAINLEVEL: NO PAIN (0)

## 2020-08-06 NOTE — PROGRESS NOTES
Virtual audio visit    CC:  Stomach concerns    PCP:  Dr. Sahu    HPI:  74 year old female  Hx Hypertension, Hyperlipidemia, mood disorder, kyphosis related chronic hypoxic respiratory failure on home oxygen, irritable bowel syndrome.    Had virtual visit with Dr. Ernandez on 7/23/20.  C/O abdominal pain, diarrhea, intermittent x past few weeks. Suprapubid, cramping, non radiating, associated with constipation followed by loose, watery stools.  Pain subsides after BM.  No fevers, chills, sweats, appetite loss, weight loss, nausea, vomiting, BRBPR, melena.  On cephalexin for UTI prophylaxis. No sick contacts or travels.  ?last colonoscopy.    Had labs ordered 7/23/20, and was encouraged to schedule a colonoscopy (patient declined).  Referral placed.    Labs have not been done yet except for CBC (results acceptable)    Patient wants explanation regarding why tests were ordered.  Wants to know what her diagnosis is.  Stomachache has resolved. Worried about weight loss.  Has appointment with Dr. Sahu on 8/20/20.  I encouraged her to complete her tests and keep her f/u appointment. She reports that she is confused about all of this.  I tried to re-cap'd the plan:  Virtual visit with Dr. Ernandez, tests were ordered, complete the tests, review test results with Dr. Sahu.  It sounds like she has picked up her stool collection materials from the lab already.  CBC done, but UA not done. Hesitant to schedule colonoscopy.  Difficulty coordinating transportation to/from lab. She may need an appointment at the lab.  I will have our staff give her a call to help with coordinating care. I reassured her that we will continue to address her concerns.    Patient Active Problem List   Diagnosis     Non morbid obesity due to excess calories     Alcohol abuse     Malignant neoplasm of breast (H)     Chronic kidney disease, stage III (moderate) (H)     Osteoarthritis of knee     Major depressive disorder with single episode      Diarrhea     Diastolic dysfunction     Osteoarthritis of spine     Gastroesophageal reflux disease     Generalized anxiety disorder     Hypertension     Hyperlipidemia     Insomnia     Irritable bowel syndrome     Kyphoscoliosis     Cluster C personality disorder (H)     Seborrheic eczema     Anemia     Anxiety state     Asthma     Back pain     Bunion     Low bone density     Fecal incontinence     Left-sided low back pain without sciatica     Major depression, recurrent (H)     Cognitive impairment     Current Outpatient Medications   Medication Sig Dispense Refill     ASPIRIN EC PO Take 81 mg by mouth       atorvastatin (LIPITOR) 40 MG tablet Take 1 tablet (40 mg) by mouth daily 90 tablet 3     clonazePAM (KLONOPIN) 0.5 MG tablet Take 0.5 mg by mouth daily       fluticasone-vilanterol (BREO ELLIPTA) 200-25 MCG/INH inhaler Inhale 1 puff into the lungs daily 3 Inhaler 3     hydrochlorothiazide (MICROZIDE) 12.5 MG capsule TAKE ONE CAPSULE (12.5MG) BY MOUTH ONCE DAILY 90 capsule 3     ibuprofen (ADVIL,MOTRIN) 200 MG tablet Take 3 tablets (600 mg) by mouth 3 times daily (with meals) 90 tablet 0     latanoprost (XALATAN) 0.005 % ophthalmic solution 1 drop       losartan (COZAAR) 50 MG tablet TAKE TWO TABLETS (100MG) BY MOUTH ONCE DAILY 180 tablet 0     order for DME Equipment being ordered: N95 mask indication COPD difficulty breathing with standard mask 10 each 1     order for DME Equipment being ordered: Oxygen  Please provide portable oxygen concentrator (pt would like over the shoulder oxygen device) for portability at 2LPM with activity via nasal canula. 1 Device 1     quetiapine (SEROQUEL) 200 MG tablet Take 200 mg by mouth At Bedtime.       sertraline (ZOLOFT) 50 MG tablet Take 1.5 tablets (75 mg) by mouth daily 45 tablet 3     umeclidinium (INCRUSE ELLIPTA) 62.5 MCG/INH inhaler Inhale 1 puff into the lungs daily 7 each 0     albuterol (PROAIR HFA/PROVENTIL HFA/VENTOLIN HFA) 108 (90 Base) MCG/ACT inhaler Inhale  "2 puffs into the lungs every 6 hours as needed for shortness of breath / dyspnea or wheezing (Patient not taking: Reported on 6/3/2020) 3 Inhaler 1     Allergies   Allergen Reactions     Azithromycin Itching     Codeine Nausea and Vomiting     Hydrocodone Nausea and Vomiting     Metronidazole Nausea     Oxycodone Itching and Rash     Percocet [Oxycodone-Acetaminophen] Nausea and Vomiting     Pollen Extract      sneezing and runny nose.      Seasonal Allergies      sneezing and runny nose.      Vicodin [Hydrocodone-Acetaminophen] Nausea and Vomiting     Zolpidem      Amnestic behavior     BP Readings from Last 6 Encounters:   02/20/20 (!) 159/106   02/14/20 (!) 169/94   02/07/20 131/74   01/24/20 (!) 170/109   01/21/20 102/73   01/15/20 (!) 147/94     Wt Readings from Last 5 Encounters:   04/30/20 88.5 kg (195 lb)   02/20/20 88.2 kg (194 lb 6.4 oz)   02/14/20 88.5 kg (195 lb)   02/07/20 88.5 kg (195 lb 3.2 oz)   01/24/20 89.4 kg (197 lb)     Gen: ?cognitive impairment--I had to re iterate her evaluation and recommendations thus far several times.  She sounds anxious.    Component      Latest Ref Rng & Units 7/30/2020   WBC      4.0 - 11.0 10e9/L 4.9   RBC Count      3.8 - 5.2 10e12/L 4.41   Hemoglobin      11.7 - 15.7 g/dL 13.4   Hematocrit      35.0 - 47.0 % 43.3   MCV      78 - 100 fl 98   MCH      26.5 - 33.0 pg 30.4   MCHC      31.5 - 36.5 g/dL 30.9 (L)   RDW      10.0 - 15.0 % 15.2 (H)   Platelet Count      150 - 450 10e9/L 209     Ca was seen today for abdominal pain.    Diagnoses and all orders for this visit:    Diarrhea, unspecified type      See HPI for details of discussion today.    This patient is being evaluated via a billable telephone visit; THIS VISIT WAS INITIATED BY THE PT, as AN ALTERNATIVE TO IN PERSON VISIT .       The patient has has been notified of following:      \"This billable telephone visit will be conducted via a call between you and your physician/provider. We have found that " "certain health care needs can be provided without the need for a physical exam.  This service lets us provide the care you need with a short phone conversation.  If a prescription is necessary we can send it directly to your pharmacy.  If lab work is needed we can place an order for that and you can then stop by our lab to have the test done at a later time. We can also place orders for a limited number of imaging tests if they are deemed urgently necessary.     If during the course of the call the physician/provider feels a telephone visit is not appropriate, you will not be charged for this service.\"     Due to efforts to reduce the spread of COVID-19 in the clinic, state, nation, telephone visits are encouraged currently. Patient understands that diagnose and advice is limited by the inability to exam him/her/them face-to-face.    Patient has given verbal consent for the virtual audio visit? Yes  Did patient initiate this virtual visit? Yes    Person spoken to: patient    This was a synchronous virtual visit  Time call initiated:  11:02 a.m.  Time call ended:  11:15 a.m.  Total length of call: 13 min    Eden Morfin M.D.  Internal Medicine  Primary Care Center   pager [phone]      "

## 2020-08-06 NOTE — Clinical Note
Thank you for your referral. She has vocal fold paralysis and a lot of pooling of saliva in her pharynx. I am checking a CT and a swallow xray and then we will talk about options for her voice.  Domonique

## 2020-08-06 NOTE — LETTER
"8/6/2020       RE: Ca Yan  [address]  [facility] [address]     Dear Colleague,    Thank you for referring your patient, Ca Yan, to the [facility] VOICE at [facility]. Please see a copy of my visit note below.      Ca Yan is a 74 year old female who is being cared for via a billable virtual visit.      The Ca Yan has been notified and verbally consented to the following:     \"This billable virtual visit will be conducted between you and your provider.\"     \"Patient has opted to conduct today's billable virtual visit vs an in-person appointment, and is not able to attend due to possible exposure to COVID-19\"    If during the course of the call the provider feels the appointment is not appropriate, you will not be charged for this service.\"     Provider has received verbal consent for billable virtual visit from the patient? Yes    Preferred method for receiving information: email/ my chart    Call initiated at: 3:57 PM  Platform used to conduct today's virtual appointment: AM Well video  Location of provider: Residence  Location of patient: [facility]  Inderjit Shelton Jr., M.D., F.A.C.S.  Luann Arzate M.D., M.P.H.  Domonique Roche M.D.  Mirta Blake, Ph.D., CCC/SLP  Ya Arzate M.M. (voice), M.JANE., CCC/SLP  Wiley Snow M.M. (voice), M.A., CCC/SLP       Evaluation report    Clinician: Ya Arzate M.M. (voice), M.A., CCC/SLP  Evaluated in conjunction with: Dr. Roche  Referring physician:  Self  Patient: Ca Yan  Date of Visit: 8/6/2020    HISTORY  Chief complaint: Ca Yan is a 74 year old presenting today for evaluation of voice and thorat concerns.      CURRENT SYMPTOMS INCLUDE  VOICE    \"scratchy\" voice    She has been working on the exercises provided by Dr. Blake.      Last therapy appointment was on 5/22/20.    Patient denies significant dyspnea, dysphagia, cough and pain.     OTHER " "PERTINENT HISTORY    Complex medical history: please also refer to Dr. Arzate's dictation.     Past Medical History:   Diagnosis Date     Anxiety      Arthritis      Breast cancer (H)      COPD (chronic obstructive pulmonary disease) (H)     6/19/12:  FEV 0.99 l     Depressive disorder      Hypertension      Low back pain with left-sided sciatica      Low bone density 4/13/2017    DEXA April 12, 2017: T score -2.0. Normal Z score. FRAX risk: major osteoporotic fracture 11.9%, hip fracture 2.6%, therefore not high-risk     Lymphedema, chronic lower extremities      Past Surgical History:   Procedure Laterality Date     BACK SURGERY      spinal fusion     COLONOSCOPY       LUMPECTOMY BREAST       ORTHOPEDIC SURGERY      Knee surgery left side       OBJECTIVE  PATIENT REPORTED MEASURES  Patient Supplied Answers To VHI Questionnaire  Voice Handicap Index (VHI-10) 4/30/2020   My voice makes it difficult for people to hear me 0   People have difficulty understanding me in a noisy room 0   My voice difficulties restrict my personal and social life.  4   I feel left out of conversations because of my voice 0   My voice problem causes me to lose income 0   I feel as though I have to strain to produce voice 4   The clarity of my voice is unpredictable 4   My voice problem upsets me 4   My voice makes me feel handicapped 4   People ask, \"What's wrong with your voice?\" 0   VHI-10 20       Patient Supplied Answers To CSI Questionnaire  Cough Severity Index (CSI) 4/30/2020   My cough is worse when I lie down 4   My coughing problem causes me to restrict my personal and social life 0   I tend to avoid places because of my cough problem 0   I feel embarrassed because of my coughing problem 3   People ask, ''What's wrong?'' because I cough a lot 0   I run out of air when I cough 4   My coughing problem affects my voice 4   My coughing problem limits my physical activity 4   My coughing problem upsets me 4   People ask me if I am " sick because I cough a lot 0   CSI Score 23       Patient Supplied Answers To EAT Questionnaire  No flowsheet data found.      PERCEPTUAL EVALUATION (CPT 19079)  POSTURE / TENSION:     upper body    neck and shoulders    BREATHING:     appears within normal limits and adequate     clavicular elevation on inspiration    LARYNGEAL PALPATION:     firm musculature    tenderness of the thyrohyoid area    VOICE:    Roughness: Moderate to severe    Breathiness: Moderate to severe    Strain: Moderate to severe    Loudness    Conversational speech:  Mild to moderately reduced    Projected speech:  Moderately reduced    Pitch:    Conversational speech:  WNL    Pitch glide: neurologically normal    Resonance:    Conversational speech:  laryngeal pharyngeal resonance    Singing vs. Speech:  n/a    CAPE-V Overall Severity:  35/100    COUGH/THROAT CLEARING:    Not observed    THERAPY PROBES: Improvement was elicited with use of forward resonant stimuli, coordination of respiration and phonation and use of glottic coup to promote vocal fold closure    LARYNGEAL FUNCTION STUDIES (CPT 73751)  Recommend to be completed when able to come into clinic. (covid)    LARYNGEAL EXAMINATION  Recommend to be completed when able to come into clinic.      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   Remarkable findings and recommendations of the evaluation included:    ADDITIONAL RECOMMENDATIONS:     A course of speech therapy is recommended to optimize vocal technique.    She demonstrates a Good prognosis for improvement given adherence to therapeutic recommendations.     Positive indicators: diagnosis is known to respond to treatment high level of comittment    Negative indicators: none    DURATION / FREQUENCY: 3 biweekly one-hour sessions.  A total of 6-8 sessions may be  necessary.    GOALS:  Patient goal:   1. To understand the problem and fix it as much as possible    Short-term goal(s): Within the first 4 sessions, Ms. Yan:  1. will demonstrate semi-occluded vocal tract (SOVT) exercises with at least 80% accuracy with no clinician support  2. will initiate Resonant Voice Therapy (RVT)    Long-term goal(s): In 6 months, Ms. Yan will:  1. Report resolution of dysphonia, and use of optimal voice quality to meet personal, social, and professional needs, 90% of the time during a typical week of vocal activities    This treatment plan was developed with the patient who agreed with the recommendations.    TOTAL SERVICE TIME:   Call Initiated at: 3:57 PM  Call Ended at: 5p           CPT Billing Codes:   EVALUATION OF VOICE AND RESONANCE (41576)  NO CHARGE FACILITY FEE (40893)      Ya Arzate M.M. (voice) MSOHAN, CCC/SLP  Speech-Language Pathologist  [facility] Trained Vocologist  [facility]  [phone]  [email or web address]  Prounouns: she/her      *this report was created in part through the use of computerized dictation software, and though reviewed following completion, some typographic errors may persist.  If there is confusion regarding any of this notes contents, please contact me for clarification      Again, thank you for allowing me to participate in the care of your patient.      Sincerely,    Ya Arzate, SLP

## 2020-08-06 NOTE — LETTER
2020       RE: Ca Yan  [address]  [facility] [address]     Dear Colleague,    Thank you for referring your patient, Ca Yan, to the [facility] EAR NOSE AND THROAT at [facility]. Please see a copy of my visit note below.        [facility]   at the [facility]   Otolaryngology Clinic     Patient: Ca Yan    MRN: [id number]    : 1945    Age/Gender: 74 year old female  Date of Service: 2020  Rendering Provider:   Domonique Roche MD     Chief Complaint   Dysphonia  Interval History   HISTORY OF PRESENT ILLNESS: Ms. Yan is a 74 year old female is being followed for dysphonia. She was initially seen on 20. Please refer to this note for full history.   Of note she has had dysphonia since around the time of her hospital admission on 20 when she was admitted for pneumonia. She has started voice therapy with Mirta Blake, Ph.D., CCC/SLP    Today, she presents for follow up. She reports she continues to have dysphonia. It has not improved. She has been following the exercises for therapy. Denies coughing and choking with food but she does report some trouble with food at times. Denies recent PNA.     Dysphonia: Patient reports dysphonia. This is stable      Dysphagia: Patient reports dysphagia. This is stable     Dyspnea: Patient reports dyspnea. This is stable       PAST MEDICAL HISTORY:   Past Medical History:   Diagnosis Date     Anxiety      Arthritis      Breast cancer (H)      COPD (chronic obstructive pulmonary disease) (H)     12:  FEV 0.99 l     Depressive disorder      Hypertension      Low back pain with left-sided sciatica      Low bone density 2017    DEXA 2017: T score -2.0. Normal Z score. FRAX risk: major osteoporotic fracture 11.9%, hip fracture 2.6%, therefore not high-risk     Lymphedema, chronic lower extremities        PAST SURGICAL HISTORY:   Past Surgical  History:   Procedure Laterality Date     BACK SURGERY      spinal fusion     COLONOSCOPY       LUMPECTOMY BREAST       ORTHOPEDIC SURGERY      Knee surgery left side       CURRENT MEDICATIONS:   Current Outpatient Medications:      ASPIRIN EC PO, Take 81 mg by mouth, Disp: , Rfl:      atorvastatin (LIPITOR) 40 MG tablet, Take 1 tablet (40 mg) by mouth daily, Disp: 90 tablet, Rfl: 3     clonazePAM (KLONOPIN) 0.5 MG tablet, Take 0.5 mg by mouth daily, Disp: , Rfl:      fluticasone-vilanterol (BREO ELLIPTA) 200-25 MCG/INH inhaler, Inhale 1 puff into the lungs daily, Disp: 3 Inhaler, Rfl: 3     hydrochlorothiazide (MICROZIDE) 12.5 MG capsule, TAKE ONE CAPSULE (12.5MG) BY MOUTH ONCE DAILY, Disp: 90 capsule, Rfl: 3     ibuprofen (ADVIL,MOTRIN) 200 MG tablet, Take 3 tablets (600 mg) by mouth 3 times daily (with meals), Disp: 90 tablet, Rfl: 0     latanoprost (XALATAN) 0.005 % ophthalmic solution, 1 drop, Disp: , Rfl:      losartan (COZAAR) 50 MG tablet, TAKE TWO TABLETS (100MG) BY MOUTH ONCE DAILY, Disp: 180 tablet, Rfl: 0     order for DME, Equipment being ordered: N95 mask indication COPD difficulty breathing with standard mask, Disp: 10 each, Rfl: 1     order for DME, Equipment being ordered: Oxygen  Please provide portable oxygen concentrator (pt would like over the shoulder oxygen device) for portability at 2LPM with activity via nasal canula., Disp: 1 Device, Rfl: 1     quetiapine (SEROQUEL) 200 MG tablet, Take 200 mg by mouth At Bedtime., Disp: , Rfl:      sertraline (ZOLOFT) 50 MG tablet, Take 1.5 tablets (75 mg) by mouth daily, Disp: 45 tablet, Rfl: 3     umeclidinium (INCRUSE ELLIPTA) 62.5 MCG/INH inhaler, Inhale 1 puff into the lungs daily, Disp: 7 each, Rfl: 0     albuterol (PROAIR HFA/PROVENTIL HFA/VENTOLIN HFA) 108 (90 Base) MCG/ACT inhaler, Inhale 2 puffs into the lungs every 6 hours as needed for shortness of breath / dyspnea or wheezing (Patient not taking: Reported on 6/3/2020), Disp: 3 Inhaler, Rfl:  1    ALLERGIES: Azithromycin; Codeine; Hydrocodone; Metronidazole; Oxycodone; Percocet [oxycodone-acetaminophen]; Pollen extract; Seasonal allergies; Vicodin [hydrocodone-acetaminophen]; and Zolpidem    SOCIAL HISTORY:    Social History     Socioeconomic History     Marital status: Single     Spouse name: Not on file     Number of children: Not on file     Years of education: Not on file     Highest education level: Not on file   Occupational History     Employer: RETIRED   Social Needs     Financial resource strain: Not on file     Food insecurity     Worry: Not on file     Inability: Not on file     Transportation needs     Medical: Not on file     Non-medical: Not on file   Tobacco Use     Smoking status: Former Smoker     Packs/day: 0.50     Years: 5.00     Pack years: 2.50     Types: Cigarettes     Start date: 1956     Last attempt to quit: 1980     Years since quittin.8     Smokeless tobacco: Former User     Quit date: 1980   Substance and Sexual Activity     Alcohol use: Not Currently     Alcohol/week: 0.0 standard drinks     Comment: Sober for 2 years, previous alcoholic     Drug use: No     Sexual activity: Not Currently     Partners: Male     Birth control/protection: Abstinence   Lifestyle     Physical activity     Days per week: Not on file     Minutes per session: Not on file     Stress: Not on file   Relationships     Social connections     Talks on phone: Not on file     Gets together: Not on file     Attends Christian service: Not on file     Active member of club or organization: Not on file     Attends meetings of clubs or organizations: Not on file     Relationship status: Not on file     Intimate partner violence     Fear of current or ex partner: Not on file     Emotionally abused: Not on file     Physically abused: Not on file     Forced sexual activity: Not on file   Other Topics Concern     Parent/sibling w/ CABG, MI or angioplasty before 65F 55M? Not Asked       Service Not Asked     Blood Transfusions Not Asked     Caffeine Concern Not Asked     Occupational Exposure Not Asked     Hobby Hazards Not Asked     Sleep Concern Not Asked     Stress Concern Not Asked     Comment: Lives alone     Weight Concern Not Asked     Special Diet Not Asked     Back Care Not Asked     Comment: Hx of scoliosis with spine fusion     Exercise Not Asked     Comment: Walks     Bike Helmet Not Asked     Seat Belt Not Asked     Self-Exams Not Asked   Social History Narrative    Only family member is daughter in [place], [place] whom          FAMILY HISTORY:   Family History   Problem Relation Age of Onset     Breast Cancer Mother      Diabetes Mother      Anxiety Disorder Mother      Asthma Mother      Other Cancer Mother      Hyperlipidemia Mother      Alcohol/Drug Father      Mental Illness Father      Substance Abuse Father      Obesity Father      Cerebrovascular Disease Maternal Grandmother 67      Non-contributory for problems with anesthesia    REVIEW OF SYSTEMS:   The patient was asked a 14 point review of systems regarding constitutional symptoms, eye symptoms, ears, nose, mouth, throat symptoms, cardiovascular symptoms, respiratory symptoms, gastrointestinal symptoms, genitourinary symptoms, musculoskeletal symptoms, integumentary symptoms, neurological symptoms, psychiatric symptoms, endocrine symptoms, hematologic/lymphatic symptoms, and allergic/ immunologic symptoms.   The pertinent factors have been included in the HPI and below.  Patient Supplied Answers to Review of Systems  [facility] ROS 4/30/2020   Psychology Frequently feeling depressed or sad, Frequently feeling anxious   Ears, Nose, Throat Nasal congestion or drainage, Hoarseness   Cardiopulmonary Cough, Breathing problems   Musculoskeletal Sore or stiff joints, Swollen legs/feet   Allergy/Immunology Allergies or hay fever   Hematologic Easy bruising       Physical Examination   The patient underwent a physical examination as  described below. The pertinent positive and negative findings are summarized after the description of the examination.  Constitutional: The patient's developmental and nutritional status was assessed. The patient's voice quality was assessed.  Head and Face: The head and face were inspected for deformities. The sinuses were palpated. The salivary glands were palpated. Facial muscle strength was assessed bilaterally.  Eyes: Extraocular movements and primary gaze alignment were assessed.  Ears, Nose, Mouth and Throat: The ears and nose were examined for deformities. The nasal septum, mucosa, and turbinates were inspected by anterior rhinoscopy. The lips, teeth, and gums were examined for abnormalities. The oral mucosa, tongue, palate, tonsils, lateral and posterior pharynx were inspected for the presence of asymmetry or mucosal lesions.    Neck: The tracheal position was noted, and the neck mass palpated to determine if there were any asymmetries, abnormal neck masses, thyromegally, or thyroid nodules.  Respiratory: The nature of the breathing and chest expansion/symmetry was observed.  Cardiovascular: The patient was examined to determine the presence of any edema or jugular venous distension.  Abdomen: The contour of the abdomen was noted.  Lymphatic: The patient was examined for infraclavicular lymphadenopathy.  Musculoskeletal: The patient was inspected for the presence of skeletal deformities.  Extremities: The extremities were examined for any clubbing or cyanosis.  Skin: The skin was examined for inflammatory or neoplastic conditions.  Neurologic: The patient's orientation, mood, and affect were noted. The cranial nerve  functions were examined.  Other pertinent positive and negative findings on physical examination:   OC/OP: no lesions, uvula midline, soft palate elevates symmetrically, FOM/BOT soft  Neck: no lesions, no TH tenderness to palpation  All other physical examination findings were within normal  limits and noncontributory.    Procedures   Video Laryngoscopy with Stroboscopy (CPT 14165) and Behavioral & Qualitative Evaluation of Voice and Resonence     Preoperative Diagnosis:  Dysphonia and throat symptoms  Postoperative Diagnosis: Dysphonia and throat symptoms  Indication:  Patient has new or persistent dysphonia and throat symptoms.  This requires evaluation by stroboscopy to fully delineate the laryngeal functioning; especially mucosal wave assessment, ultrasharp visualization of lesions on the vocal folds, and overall functioning of the larynx.  Details of Procedure: A 70 degree rigid telescopic laryngoscope or a distal chip flexible scope was used. The lighting was obtained via a light cable connected to a stroboscopic unit. The telescope was inserted either transorally or transnasally until the vocal folds could be visualized. The patient was instructed to sustain the vowel  ee  at a comfortable pitch and loudness as the voice was monitored by a microphone connected to a fundamental frequency tracker. This circuit tracked vocal periodicity, allowing the light to flash in synchrony with the glottal cycles. A setting on the stroboscope was set to change the phase of light strobing with relation to the vocal fundamental frequency, producing an image of 1 to 2 glottal cycles every second. The video images were recorded for analysis. Use of the variable speed, slow and stop scan allowed careful study of pertinent segments of laryngeal function to increase accuracy of clinical assessments of function and dysfunction.  In particular, features of glottal closure, mucosal wave on the vocal fold cover and laryngeal symmetry were analyzed. Lastly, the patient was asked to phonate speech samples and auditory/perceptual evaluation of voice and resonance were performed.  The vocal quality was carefully evaluated for hoarseness, breathiness, loudness, phrase length and intelligibility to determine the source of  dysphonia.    Findings:   A. BEHAVIORAL & QUALITATIVE EVALUATION OF VOICE AND RESONENCE   Comments: F0 200- MPT: 10s  Vocal Quality:high pitch, strain       Pitch Range:  Moderately reduced   Phrase Length:  Mildly reduced  Vocal Loudness: Normal  Dysarthria: No    B. LARYNGOVIDEOSTROBOSCOPY   Anatomic/Physiological Deviations:  Left vocal fold paralysis, pooling of secretions   Mucosal wave: Right:  unable to visualize restriction     Left: unable to visualize restriction  Bilateral Vocal Fold Vibration: unable to visualize  Vocal Process: Right: No restriction    Left:  Marked restriction  Vocal Fold closure: Glottal gap    Complication(s): None  Disposition: Patient tolerated the procedure very poorly              Review of Relevant Clinical Data   Notes: SANDRA Blake 6/19/20    Labs:  Lab Results   Component Value Date    TSH 2.99 09/25/2019     Lab Results   Component Value Date     01/21/2020    CO2 34 (H) 01/21/2020    BUN 26 01/21/2020     Lab Results   Component Value Date    WBC 4.9 07/30/2020    HGB 13.4 07/30/2020    HCT 43.3 07/30/2020    MCV 98 07/30/2020     07/30/2020     No results found for: PT, PTT, INR  No results found for: LONNY  No components found for: RHEUMATOIDFACTOR,  RF  Lab Results   Component Value Date    CRP 6.4 09/25/2019     No components found for: CKTOT, URICACID  No components found for: C3, C4, DSDNAAB, NDNAABIFA  No results found for: MPOAB    Patient reported Quality of Life (QOL) Measures   Patient Supplied Answers To VHI Questionnaire  Voice Handicap Index (VHI-10) 4/30/2020   My voice makes it difficult for people to hear me 0   People have difficulty understanding me in a noisy room 0   My voice difficulties restrict my personal and social life.  4   I feel left out of conversations because of my voice 0   My voice problem causes me to lose income 0   I feel as though I have to strain to produce voice 4   The clarity of my voice is unpredictable 4   My voice  "problem upsets me 4   My voice makes me feel handicapped 4   People ask, \"What's wrong with your voice?\" 0   VHI-10 20         Patient Supplied Answers To EAT Questionnaire  No flowsheet data found.      Patient Supplied Answers To CSI Questionnaire  Cough Severity Index (CSI) 2020   My cough is worse when I lie down 4   My coughing problem causes me to restrict my personal and social life 0   I tend to avoid places because of my cough problem 0   I feel embarrassed because of my coughing problem 3   People ask, ''What's wrong?'' because I cough a lot 0   I run out of air when I cough 4   My coughing problem affects my voice 4   My coughing problem limits my physical activity 4   My coughing problem upsets me 4   People ask me if I am sick because I cough a lot 0   CSI Score 23         Patient Supplied Answers to Dyspnea Index Questionnaire:  No flowsheet data found.    Impression & Plan     IMPRESSION: Ms. Yan is a 74 year old female who is being seen for the followin. Dysphonia   - since 2020 in the context of hospital admission for PNA  - started voice therapy without improvement with Mirta Blake, Ph.D., CCC/SLP  - scope today very difficult to be tolerated by the patient but does show left vocal fold paralysis and no other lesions  Plan  - obtain CT neck with contrast to rule RLN lesion  - will discuss options after CT neck but given lack of tolerance to exam will need general anesthesia    2. Dysphagia  - denies coughing and choking with food  - did have PNA in 2020  - has severe pooling of saliva on scope exam and left vocal fold paralysis  Plan  - Xray Video Swallow Exam with esophagram    RETURN VISIT: telemedicine evaluation  with voice SLP in after testing, or earlier as needed.     Domonique Roche MD    Laryngology    [facility]  Department of  Otolaryngology - Head and Neck Surgery    Clinics & Surgery Center  [address] [place] [address], " [place] [address]  Appointment line: [phone]  Fax: [phone]  [address] [address]  Appointment line: [phone]  Fax: [phone]      Again, thank you for allowing me to participate in the care of your patient.      Sincerely,    Domonique Roche MD

## 2020-08-06 NOTE — Clinical Note
Please contact patient and help schedule a lab appointment for next week. ?may need help coordinating transportation too?  Anxious. ?Memory loss too.  Dr. HUFF

## 2020-08-06 NOTE — NURSING NOTE
Chief Complaint   Patient presents with     Abdominal Pain     Pt is following up on abdominal pain.        Video Visit Technology for this patient: No video technology available to patient, please call patient over the phone     Ada Murray LPN at 9:11 AM on 8/6/2020.

## 2020-08-06 NOTE — NURSING NOTE
"Chief Complaint   Patient presents with     RECHECK     3 month follow up after speech therapy     Pulse 76, temperature 98.2  F (36.8  C), temperature source Temporal, height 1.626 m (5' 4\"), weight 85.3 kg (188 lb), SpO2 92 %, not currently breastfeeding.    Adali Duran, EMT  "

## 2020-08-06 NOTE — PROGRESS NOTES
"  Ca Yan is a 74 year old female who is being cared for via a billable virtual visit.      The Ca Yan has been notified and verbally consented to the following:     \"This billable virtual visit will be conducted between you and your provider.\"     \"Patient has opted to conduct today's billable virtual visit vs an in-person appointment, and is not able to attend due to possible exposure to COVID-19\"    If during the course of the call the provider feels the appointment is not appropriate, you will not be charged for this service.\"     Provider has received verbal consent for billable virtual visit from the patient? Yes    Preferred method for receiving information: email/ my chart    Call initiated at: 3:57 PM  Platform used to conduct today's virtual appointment: AM Well video  Location of provider: Residence  Location of patient: [facility]  Inderjit Shelton Jr., M.D., F.A.C.S.  Luann Arzate M.D., M.P.H.  Domonique Roche M.D.  Mirta Blake, Ph.D., CCC/SLP  Ya Arzate M.M. (voice), M.JANE., CCC/SLP  Wiley Snow M.M. (voice), M.A., CCC/SLP       Evaluation report    Clinician: Ya Arzate M.M. (voice), M.JANE., CCC/SLP  Evaluated in conjunction with: Dr. Roche  Referring physician:  Self  Patient: Ca Yan  Date of Visit: 8/6/2020    HISTORY  Chief complaint: Ca Yan is a 74 year old presenting today for evaluation of voice and thorat concerns.      CURRENT SYMPTOMS INCLUDE  VOICE    \"scratchy\" voice    She has been working on the exercises provided by Dr. Blake.      Last therapy appointment was on 5/22/20.    Patient denies significant dyspnea, dysphagia, cough and pain.     OTHER PERTINENT HISTORY    Complex medical history: please also refer to Dr. Arzate's dictation.     Past Medical History:   Diagnosis Date     Anxiety      Arthritis      Breast cancer (H)      COPD (chronic obstructive pulmonary disease) (H)     6/19/12:  FEV 0.99 l     Depressive " "disorder      Hypertension      Low back pain with left-sided sciatica      Low bone density 4/13/2017    DEXA April 12, 2017: T score -2.0. Normal Z score. FRAX risk: major osteoporotic fracture 11.9%, hip fracture 2.6%, therefore not high-risk     Lymphedema, chronic lower extremities      Past Surgical History:   Procedure Laterality Date     BACK SURGERY      spinal fusion     COLONOSCOPY       LUMPECTOMY BREAST       ORTHOPEDIC SURGERY      Knee surgery left side       OBJECTIVE  PATIENT REPORTED MEASURES  Patient Supplied Answers To VHI Questionnaire  Voice Handicap Index (VHI-10) 4/30/2020   My voice makes it difficult for people to hear me 0   People have difficulty understanding me in a noisy room 0   My voice difficulties restrict my personal and social life.  4   I feel left out of conversations because of my voice 0   My voice problem causes me to lose income 0   I feel as though I have to strain to produce voice 4   The clarity of my voice is unpredictable 4   My voice problem upsets me 4   My voice makes me feel handicapped 4   People ask, \"What's wrong with your voice?\" 0   VHI-10 20       Patient Supplied Answers To CSI Questionnaire  Cough Severity Index (CSI) 4/30/2020   My cough is worse when I lie down 4   My coughing problem causes me to restrict my personal and social life 0   I tend to avoid places because of my cough problem 0   I feel embarrassed because of my coughing problem 3   People ask, ''What's wrong?'' because I cough a lot 0   I run out of air when I cough 4   My coughing problem affects my voice 4   My coughing problem limits my physical activity 4   My coughing problem upsets me 4   People ask me if I am sick because I cough a lot 0   CSI Score 23       Patient Supplied Answers To EAT Questionnaire  No flowsheet data found.      PERCEPTUAL EVALUATION (CPT 52362)  POSTURE / TENSION:     upper body    neck and shoulders    BREATHING:     appears within normal limits and adequate "     clavicular elevation on inspiration    LARYNGEAL PALPATION:     firm musculature    tenderness of the thyrohyoid area    VOICE:    Roughness: Moderate to severe    Breathiness: Moderate to severe    Strain: Moderate to severe    Loudness    Conversational speech:  Mild to moderately reduced    Projected speech:  Moderately reduced    Pitch:    Conversational speech:  WNL    Pitch glide: neurologically normal    Resonance:    Conversational speech:  laryngeal pharyngeal resonance    Singing vs. Speech:  n/a    CAPE-V Overall Severity:  35/100    COUGH/THROAT CLEARING:    Not observed    THERAPY PROBES: Improvement was elicited with use of forward resonant stimuli, coordination of respiration and phonation and use of glottic coup to promote vocal fold closure    LARYNGEAL FUNCTION STUDIES (CPT 65055)  Recommend to be completed when able to come into clinic. (covid)    LARYNGEAL EXAMINATION  Recommend to be completed when able to come into clinic.      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   Remarkable findings and recommendations of the evaluation included:    ADDITIONAL RECOMMENDATIONS:     A course of speech therapy is recommended to optimize vocal technique.    She demonstrates a Good prognosis for improvement given adherence to therapeutic recommendations.     Positive indicators: diagnosis is known to respond to treatment high level of comittment    Negative indicators: none    DURATION / FREQUENCY: 3 biweekly one-hour sessions.  A total of 6-8 sessions may be necessary.    GOALS:  Patient goal:   1. To understand the problem and fix it as much as possible    Short-term goal(s): Within the first 4 sessions, Ms. Yan:  1. will demonstrate semi-occluded vocal tract (SOVT) exercises with at least 80% accuracy with no clinician support  2. will  initiate Resonant Voice Therapy (RVT)    Long-term goal(s): In 6 months, Ms. Yan will:  1. Report resolution of dysphonia, and use of optimal voice quality to meet personal, social, and professional needs, 90% of the time during a typical week of vocal activities    This treatment plan was developed with the patient who agreed with the recommendations.    TOTAL SERVICE TIME:   Call Initiated at: 3:57 PM  Call Ended at: 5p           CPT Billing Codes:   EVALUATION OF VOICE AND RESONANCE (50957)  NO CHARGE FACILITY FEE (71093)      Ya Arzate M.M. (voice), M.A., CCC/SLP  Speech-Language Pathologist  [facility] Trained Vocologist  [facility]  [phone]  [email or web address]  Prounouns: she/her      *this report was created in part through the use of computerized dictation software, and though reviewed following completion, some typographic errors may persist.  If there is confusion regarding any of this notes contents, please contact me for clarification

## 2020-08-06 NOTE — PROGRESS NOTES
[facility]   at the [facility]   Otolaryngology Clinic     Patient: Ca Yan    MRN: [id number]    : 1945    Age/Gender: 74 year old female  Date of Service: 2020  Rendering Provider:   Domonique Roche MD     Chief Complaint   Dysphonia  Interval History   HISTORY OF PRESENT ILLNESS: Ms. Yan is a 74 year old female is being followed for dysphonia. She was initially seen on 20. Please refer to this note for full history.   Of note she has had dysphonia since around the time of her hospital admission on 20 when she was admitted for pneumonia. She has started voice therapy with Mirta Blake, Ph.D., CCC/SLP    Today, she presents for follow up. She reports she continues to have dysphonia. It has not improved. She has been following the exercises for therapy. Denies coughing and choking with food but she does report some trouble with food at times. Denies recent PNA.     Dysphonia: Patient reports dysphonia. This is stable      Dysphagia: Patient reports dysphagia. This is stable     Dyspnea: Patient reports dyspnea. This is stable       PAST MEDICAL HISTORY:   Past Medical History:   Diagnosis Date     Anxiety      Arthritis      Breast cancer (H)      COPD (chronic obstructive pulmonary disease) (H)     12:  FEV 0.99 l     Depressive disorder      Hypertension      Low back pain with left-sided sciatica      Low bone density 2017    DEXA 2017: T score -2.0. Normal Z score. FRAX risk: major osteoporotic fracture 11.9%, hip fracture 2.6%, therefore not high-risk     Lymphedema, chronic lower extremities        PAST SURGICAL HISTORY:   Past Surgical History:   Procedure Laterality Date     BACK SURGERY      spinal fusion     COLONOSCOPY       LUMPECTOMY BREAST       ORTHOPEDIC SURGERY      Knee surgery left side       CURRENT MEDICATIONS:   Current Outpatient Medications:      ASPIRIN EC PO, Take 81 mg by mouth, Disp: , Rfl:       atorvastatin (LIPITOR) 40 MG tablet, Take 1 tablet (40 mg) by mouth daily, Disp: 90 tablet, Rfl: 3     clonazePAM (KLONOPIN) 0.5 MG tablet, Take 0.5 mg by mouth daily, Disp: , Rfl:      fluticasone-vilanterol (BREO ELLIPTA) 200-25 MCG/INH inhaler, Inhale 1 puff into the lungs daily, Disp: 3 Inhaler, Rfl: 3     hydrochlorothiazide (MICROZIDE) 12.5 MG capsule, TAKE ONE CAPSULE (12.5MG) BY MOUTH ONCE DAILY, Disp: 90 capsule, Rfl: 3     ibuprofen (ADVIL,MOTRIN) 200 MG tablet, Take 3 tablets (600 mg) by mouth 3 times daily (with meals), Disp: 90 tablet, Rfl: 0     latanoprost (XALATAN) 0.005 % ophthalmic solution, 1 drop, Disp: , Rfl:      losartan (COZAAR) 50 MG tablet, TAKE TWO TABLETS (100MG) BY MOUTH ONCE DAILY, Disp: 180 tablet, Rfl: 0     order for DME, Equipment being ordered: N95 mask indication COPD difficulty breathing with standard mask, Disp: 10 each, Rfl: 1     order for DME, Equipment being ordered: Oxygen  Please provide portable oxygen concentrator (pt would like over the shoulder oxygen device) for portability at 2LPM with activity via nasal canula., Disp: 1 Device, Rfl: 1     quetiapine (SEROQUEL) 200 MG tablet, Take 200 mg by mouth At Bedtime., Disp: , Rfl:      sertraline (ZOLOFT) 50 MG tablet, Take 1.5 tablets (75 mg) by mouth daily, Disp: 45 tablet, Rfl: 3     umeclidinium (INCRUSE ELLIPTA) 62.5 MCG/INH inhaler, Inhale 1 puff into the lungs daily, Disp: 7 each, Rfl: 0     albuterol (PROAIR HFA/PROVENTIL HFA/VENTOLIN HFA) 108 (90 Base) MCG/ACT inhaler, Inhale 2 puffs into the lungs every 6 hours as needed for shortness of breath / dyspnea or wheezing (Patient not taking: Reported on 6/3/2020), Disp: 3 Inhaler, Rfl: 1    ALLERGIES: Azithromycin; Codeine; Hydrocodone; Metronidazole; Oxycodone; Percocet [oxycodone-acetaminophen]; Pollen extract; Seasonal allergies; Vicodin [hydrocodone-acetaminophen]; and Zolpidem    SOCIAL HISTORY:    Social History     Socioeconomic History     Marital status: Single      Spouse name: Not on file     Number of children: Not on file     Years of education: Not on file     Highest education level: Not on file   Occupational History     Employer: RETIRED   Social Needs     Financial resource strain: Not on file     Food insecurity     Worry: Not on file     Inability: Not on file     Transportation needs     Medical: Not on file     Non-medical: Not on file   Tobacco Use     Smoking status: Former Smoker     Packs/day: 0.50     Years: 5.00     Pack years: 2.50     Types: Cigarettes     Start date: 1956     Last attempt to quit: 1980     Years since quittin.8     Smokeless tobacco: Former User     Quit date: 1980   Substance and Sexual Activity     Alcohol use: Not Currently     Alcohol/week: 0.0 standard drinks     Comment: Sober for 2 years, previous alcoholic     Drug use: No     Sexual activity: Not Currently     Partners: Male     Birth control/protection: Abstinence   Lifestyle     Physical activity     Days per week: Not on file     Minutes per session: Not on file     Stress: Not on file   Relationships     Social connections     Talks on phone: Not on file     Gets together: Not on file     Attends Worship service: Not on file     Active member of club or organization: Not on file     Attends meetings of clubs or organizations: Not on file     Relationship status: Not on file     Intimate partner violence     Fear of current or ex partner: Not on file     Emotionally abused: Not on file     Physically abused: Not on file     Forced sexual activity: Not on file   Other Topics Concern     Parent/sibling w/ CABG, MI or angioplasty before 65F 55M? Not Asked      Service Not Asked     Blood Transfusions Not Asked     Caffeine Concern Not Asked     Occupational Exposure Not Asked     Hobby Hazards Not Asked     Sleep Concern Not Asked     Stress Concern Not Asked     Comment: Lives alone     Weight Concern Not Asked     Special Diet Not Asked     Back  Care Not Asked     Comment: Hx of scoliosis with spine fusion     Exercise Not Asked     Comment: Walks     Bike Helmet Not Asked     Seat Belt Not Asked     Self-Exams Not Asked   Social History Narrative    Only family member is daughter in [place], [place] whom          FAMILY HISTORY:   Family History   Problem Relation Age of Onset     Breast Cancer Mother      Diabetes Mother      Anxiety Disorder Mother      Asthma Mother      Other Cancer Mother      Hyperlipidemia Mother      Alcohol/Drug Father      Mental Illness Father      Substance Abuse Father      Obesity Father      Cerebrovascular Disease Maternal Grandmother 67      Non-contributory for problems with anesthesia    REVIEW OF SYSTEMS:   The patient was asked a 14 point review of systems regarding constitutional symptoms, eye symptoms, ears, nose, mouth, throat symptoms, cardiovascular symptoms, respiratory symptoms, gastrointestinal symptoms, genitourinary symptoms, musculoskeletal symptoms, integumentary symptoms, neurological symptoms, psychiatric symptoms, endocrine symptoms, hematologic/lymphatic symptoms, and allergic/ immunologic symptoms.   The pertinent factors have been included in the HPI and below.  Patient Supplied Answers to Review of Systems   ENT ROS 4/30/2020   Psychology Frequently feeling depressed or sad, Frequently feeling anxious   Ears, Nose, Throat Nasal congestion or drainage, Hoarseness   Cardiopulmonary Cough, Breathing problems   Musculoskeletal Sore or stiff joints, Swollen legs/feet   Allergy/Immunology Allergies or hay fever   Hematologic Easy bruising       Physical Examination   The patient underwent a physical examination as described below. The pertinent positive and negative findings are summarized after the description of the examination.  Constitutional: The patient's developmental and nutritional status was assessed. The patient's voice quality was assessed.  Head and Face: The head and face were inspected for  deformities. The sinuses were palpated. The salivary glands were palpated. Facial muscle strength was assessed bilaterally.  Eyes: Extraocular movements and primary gaze alignment were assessed.  Ears, Nose, Mouth and Throat: The ears and nose were examined for deformities. The nasal septum, mucosa, and turbinates were inspected by anterior rhinoscopy. The lips, teeth, and gums were examined for abnormalities. The oral mucosa, tongue, palate, tonsils, lateral and posterior pharynx were inspected for the presence of asymmetry or mucosal lesions.    Neck: The tracheal position was noted, and the neck mass palpated to determine if there were any asymmetries, abnormal neck masses, thyromegally, or thyroid nodules.  Respiratory: The nature of the breathing and chest expansion/symmetry was observed.  Cardiovascular: The patient was examined to determine the presence of any edema or jugular venous distension.  Abdomen: The contour of the abdomen was noted.  Lymphatic: The patient was examined for infraclavicular lymphadenopathy.  Musculoskeletal: The patient was inspected for the presence of skeletal deformities.  Extremities: The extremities were examined for any clubbing or cyanosis.  Skin: The skin was examined for inflammatory or neoplastic conditions.  Neurologic: The patient's orientation, mood, and affect were noted. The cranial nerve  functions were examined.  Other pertinent positive and negative findings on physical examination:   OC/OP: no lesions, uvula midline, soft palate elevates symmetrically, FOM/BOT soft  Neck: no lesions, no TH tenderness to palpation  All other physical examination findings were within normal limits and noncontributory.    Procedures   Video Laryngoscopy with Stroboscopy (CPT 86643) and Behavioral & Qualitative Evaluation of Voice and Resonence     Preoperative Diagnosis:  Dysphonia and throat symptoms  Postoperative Diagnosis: Dysphonia and throat symptoms  Indication:  Patient has new  or persistent dysphonia and throat symptoms.  This requires evaluation by stroboscopy to fully delineate the laryngeal functioning; especially mucosal wave assessment, ultrasharp visualization of lesions on the vocal folds, and overall functioning of the larynx.  Details of Procedure: A 70 degree rigid telescopic laryngoscope or a distal chip flexible scope was used. The lighting was obtained via a light cable connected to a stroboscopic unit. The telescope was inserted either transorally or transnasally until the vocal folds could be visualized. The patient was instructed to sustain the vowel  ee  at a comfortable pitch and loudness as the voice was monitored by a microphone connected to a fundamental frequency tracker. This circuit tracked vocal periodicity, allowing the light to flash in synchrony with the glottal cycles. A setting on the stroboscope was set to change the phase of light strobing with relation to the vocal fundamental frequency, producing an image of 1 to 2 glottal cycles every second. The video images were recorded for analysis. Use of the variable speed, slow and stop scan allowed careful study of pertinent segments of laryngeal function to increase accuracy of clinical assessments of function and dysfunction.  In particular, features of glottal closure, mucosal wave on the vocal fold cover and laryngeal symmetry were analyzed. Lastly, the patient was asked to phonate speech samples and auditory/perceptual evaluation of voice and resonance were performed.  The vocal quality was carefully evaluated for hoarseness, breathiness, loudness, phrase length and intelligibility to determine the source of dysphonia.    Findings:   A. BEHAVIORAL & QUALITATIVE EVALUATION OF VOICE AND RESONENCE   Comments: F0 200- MPT: 10s  Vocal Quality:high pitch, strain       Pitch Range:  Moderately reduced   Phrase Length:  Mildly reduced  Vocal Loudness: Normal  Dysarthria: No    B. LARYNGOVIDEOSTROBOSCOPY  "  Anatomic/Physiological Deviations:  Left vocal fold paralysis, pooling of secretions   Mucosal wave: Right:  unable to visualize restriction     Left: unable to visualize restriction  Bilateral Vocal Fold Vibration: unable to visualize  Vocal Process: Right: No restriction    Left:  Marked restriction  Vocal Fold closure: Glottal gap    Complication(s): None  Disposition: Patient tolerated the procedure very poorly              Review of Relevant Clinical Data   Notes: SANDRA Blake 6/19/20    Labs:  Lab Results   Component Value Date    TSH 2.99 09/25/2019     Lab Results   Component Value Date     01/21/2020    CO2 34 (H) 01/21/2020    BUN 26 01/21/2020     Lab Results   Component Value Date    WBC 4.9 07/30/2020    HGB 13.4 07/30/2020    HCT 43.3 07/30/2020    MCV 98 07/30/2020     07/30/2020     No results found for: PT, PTT, INR  No results found for: LONNY  No components found for: RHEUMATOIDFACTOR,  RF  Lab Results   Component Value Date    CRP 6.4 09/25/2019     No components found for: CKTOT, URICACID  No components found for: C3, C4, DSDNAAB, NDNAABIFA  No results found for: MPOAB    Patient reported Quality of Life (QOL) Measures   Patient Supplied Answers To VHI Questionnaire  Voice Handicap Index (VHI-10) 4/30/2020   My voice makes it difficult for people to hear me 0   People have difficulty understanding me in a noisy room 0   My voice difficulties restrict my personal and social life.  4   I feel left out of conversations because of my voice 0   My voice problem causes me to lose income 0   I feel as though I have to strain to produce voice 4   The clarity of my voice is unpredictable 4   My voice problem upsets me 4   My voice makes me feel handicapped 4   People ask, \"What's wrong with your voice?\" 0   VHI-10 20         Patient Supplied Answers To EAT Questionnaire  No flowsheet data found.      Patient Supplied Answers To CSI Questionnaire  Cough Severity Index (CSI) 4/30/2020   My cough " is worse when I lie down 4   My coughing problem causes me to restrict my personal and social life 0   I tend to avoid places because of my cough problem 0   I feel embarrassed because of my coughing problem 3   People ask, ''What's wrong?'' because I cough a lot 0   I run out of air when I cough 4   My coughing problem affects my voice 4   My coughing problem limits my physical activity 4   My coughing problem upsets me 4   People ask me if I am sick because I cough a lot 0   CSI Score 23         Patient Supplied Answers to Dyspnea Index Questionnaire:  No flowsheet data found.    Impression & Plan     IMPRESSION: Ms. Yan is a 74 year old female who is being seen for the followin. Dysphonia   - since 2020 in the context of hospital admission for PNA  - started voice therapy without improvement with Mirta Blake, Ph.D., CCC/SLP  - scope today very difficult to be tolerated by the patient but does show left vocal fold paralysis and no other lesions  Plan  - obtain CT neck with contrast to rule RLN lesion  - will discuss options after CT neck but given lack of tolerance to exam will need general anesthesia    2. Dysphagia  - denies coughing and choking with food  - did have PNA in 2020  - has severe pooling of saliva on scope exam and left vocal fold paralysis  Plan  - Xray Video Swallow Exam with esophagram    RETURN VISIT: telemedicine evaluation  with voice SLP in after testing, or earlier as needed.     Domonique Roche MD    Laryngology    [facility]  Department of  Otolaryngology - Head and Neck Surgery    [facility]  [address] [place] [id number]  Appointment line: [phone]  Fax: [phone]  [address] [address]  Appointment line: [phone]  Fax: [phone]

## 2020-08-06 NOTE — Clinical Note
Question  When the  doesn't schedule tests - do they let you know to schedule?  For this patient I would like  1. CT neck with contrast   2. Xray Video Swallow Exam and esophagram    Please schedule same day if possible    And she can't say no she doesn't want them. If she does let me know  Domonique

## 2020-08-10 ENCOUNTER — VIRTUAL VISIT (OUTPATIENT)
Dept: OTOLARYNGOLOGY | Facility: CLINIC | Age: 75
End: 2020-08-10
Payer: COMMERCIAL

## 2020-08-10 DIAGNOSIS — J38.01 VOCAL FOLD PARALYSIS, UNILATERAL: ICD-10-CM

## 2020-08-10 DIAGNOSIS — R49.0 DYSPHONIA: Primary | ICD-10-CM

## 2020-08-10 NOTE — LETTER
"8/10/2020       RE: Ca Yan  [address]  [facility] [address]     Dear Colleague,    Thank you for referring your patient, Ca Yan, to the [facility] at [facility]. Please see a copy of my visit note below.    Ca Yan is a 74 year old female who is being evaluated via a billable telephone visit.        Phone call duration: 44 minutes    [facility]  Inderjit Shelton Jr., M.D., F.A.C.S.  Luann Arzate M.D., M.P.H.  Domonique Roche M.D.  Mirta Blake, Ph.D., CCC/SLP  Ya Arzate M.M. (voice), M.A., CCC/SLP  Wiley Snow M.M. (voice), M.JANE., CCC/SLP    [facility]  VOICE/SPEECH/BREATHING THERAPY PROGRESS REPORT    Patient: Ca Yan  Date of Service: 8/10/2020    Date of Last Service: 8/6/20; seen in clinic with Dr. Roche and my associate Ya Arzate  Lat seen fro therapy with me on 6/19/20  Session number: 5  Referring physician: Dr. Roceh  Initial evaluation: 5/8/20    I had the pleasure of seeing Ms. 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 Yan worked on therapeutic activities to address the above diagnosis.    Regarding practice, Ms. Yan reports the following:     She did do the exercises for a while, working on talking at a higher pitch    Talking higher helps reduce the \"scratchiness\"    She stopped doing the exercises a few weeks ago, and didn't get worse    Ms. Yan also states that:    She thinks her voice has gotten a little better in the past two months; she is not sure if this has anything to do with the exercises    She saw Dr. Roche a few days ago; she had a laryngeal examination that she found extremely uncomfortable    Laryngeal exam showed left vocal fold paralysis as well as significant pooling of secretions in the pyriform sinuses and " valleculae    Dr. Roche recommended continued therapy, but also suggested that there may be procedures that could help her voice; however, because there is no clear etiology for the vocal fold paralysis, Dr. Roche recommended imaging; also, she recommended a swallow study to assess swallow function in the presence of some swallowing problems and marked thickened pooled secretions throughout the laryngeal area    She will undergo the CT scan and video swallow before any other consideration    She has undergone pulmonary work-up and was found to have kyphosis, which may be affecting her breathing and overall comfort     Ms. Yan presents today with the following:  Voice quality:    Markedly rough and strained; highly inconsistent     Obvious effort and pushing; the voice is better when she relaxes for a syllable or two    Pitch is barely discernable, but there is a pitch at D4, as well as a subharmonic, possibly at B3    She can demonstrate a strained, tight phonation at A4    A reflexive laugh at around B3 is clear; it sounds much less strained and effortful    THERAPEUTIC ACTIVITIES  Today Ms. Yan participated in the following therapeutic activities:    Demonstrated previous exercises.  o Demonstrated poor awareness of technique other than trying to talk at a higher pitch  o Gentle reflexive phonation had better quality than any volitional attempts; she was unaware of this  o Redirection was considered unwarranted    [place] concepts of her disorder and potential interventions  o She had many questions, and I needed to give very careful explanations, to help her make decisions based on minimal understanding of her vocal mechanism  o I did not recommend any particular treatment, but did suggest that a vocal fold augmentation may be more useful than voice therapy at this time, but that therapy will be useful after any procedure she undergoes  o I reiterated the need to base any decision about her treatment on the  imaging, the swallow study, and Dr. Roche's recommendations based on these evaluations; continued explanation will be important     IMPRESSIONS/GOALS/PLAN  Ms. 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     She will continue to work on her exercises on a daily basis, and work on incorporating the techniques into her daily activities.    Goals for this practice period:     Continue to incorporate techniques into daily vocal activities    Plan: I will see Ms. Yan in two months, after her continued evaluations, to determine her potential for benefit from functional therapy and medical intervention..    TOTAL SERVICE TIME: 44 minutes  TREATMENT (00835)  NO CHARGE FACILITY FEE (23163)    Mirta Blake, Ph.D., [facility]-SLP  Speech-Language Pathologist  Director, [facility]  [phone]

## 2020-08-10 NOTE — PROGRESS NOTES
"Ca Yan is a 74 year old female who is being evaluated via a billable telephone visit.      The patient has been notified of following:     \"This telephone visit will be conducted via a call between you and your physician/provider. We have found that certain health care needs can be provided without the need for a physical exam.  This service lets us provide the care you need with a short phone conversation.  If a prescription is necessary we can send it directly to your pharmacy.  If lab work is needed we can place an order for that and you can then stop by our lab to have the test done at a later time.    Telephone visits are billed at different rates depending on your insurance coverage. During this emergency period, for some insurers they may be billed the same as an in-person visit.  Please reach out to your insurance provider with any questions.    If during the course of the call the physician/provider feels a telephone visit is not appropriate, you will not be charged for this service.\"    Patient has given verbal consent for Telephone visit?  Yes    What phone number would you like to be contacted at? [phone]    How would you like to obtain your AVS? Mail a copy    Phone call duration: 44 minutes    [facility] VOICE [facility]  Inderjit Shelton Jr., M.D., F.A.C.S.  Luann Arzate M.D., M.P.H.  Domonique Roche M.D.  Mirta Blake, Ph.D., CCC/SLP  Ya Arzate M.M. (voice), M.A., CCC/SLP  Wiley Snow M.M. (voice), M.A., CCC/SLP    [facility] VOICE [facility]  VOICE/SPEECH/BREATHING THERAPY PROGRESS REPORT    Patient: Ca Yan  Date of Service: 8/10/2020    Date of Last Service: 8/6/20; seen in clinic with Dr. Roche and my associate Ya Arzate  Lat seen fro therapy with me on 6/19/20  Session number: 5  Referring physician: Dr. Roche  Initial evaluation: 5/8/20    I had the pleasure of seeing Ms. Yan today, for speech therapy to address a diagnosis of:  R49.0 (Dysphonia)   J38.01 (Unilateral " "Vocal Fold Paralysis)       PROGRESS SINCE LAST SESSION  At the last session, Ms. Yan worked on therapeutic activities to address the above diagnosis.    Regarding practice, Ms. Yan reports the following:     She did do the exercises for a while, working on talking at a higher pitch    Talking higher helps reduce the \"scratchiness\"    She stopped doing the exercises a few weeks ago, and didn't get worse    Ms. Yan also states that:    She thinks her voice has gotten a little better in the past two months; she is not sure if this has anything to do with the exercises    She saw Dr. Roche a few days ago; she had a laryngeal examination that she found extremely uncomfortable    Laryngeal exam showed left vocal fold paralysis as well as significant pooling of secretions in the pyriform sinuses and valleculae    Dr. Roche recommended continued therapy, but also suggested that there may be procedures that could help her voice; however, because there is no clear etiology for the vocal fold paralysis, Dr. Roche recommended imaging; also, she recommended a swallow study to assess swallow function in the presence of some swallowing problems and marked thickened pooled secretions throughout the laryngeal area    She will undergo the CT scan and video swallow before any other consideration    She has undergone pulmonary work-up and was found to have kyphosis, which may be affecting her breathing and overall comfort     Ms. Yan presents today with the following:  Voice quality:    Markedly rough and strained; highly inconsistent     Obvious effort and pushing; the voice is better when she relaxes for a syllable or two    Pitch is barely discernable, but there is a pitch at D4, as well as a subharmonic, possibly at B3    She can demonstrate a strained, tight phonation at A4    A reflexive laugh at around B3 is clear; it sounds much less strained and effortful    THERAPEUTIC ACTIVITIES  Today Ms. Yan participated in " the following therapeutic activities:    Demonstrated previous exercises.  o Demonstrated poor awareness of technique other than trying to talk at a higher pitch  o Gentle reflexive phonation had better quality than any volitional attempts; she was unaware of this  o Redirection was considered unwarranted    [place] concepts of her disorder and potential interventions  o She had many questions, and I needed to give very careful explanations, to help her make decisions based on minimal understanding of her vocal mechanism  o I did not recommend any particular treatment, but did suggest that a vocal fold augmentation may be more useful than voice therapy at this time, but that therapy will be useful after any procedure she undergoes  o I reiterated the need to base any decision about her treatment on the imaging, the swallow study, and Dr. Roche's recommendations based on these evaluations; continued explanation will be important     IMPRESSIONS/GOALS/PLAN  Ms. 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     She will continue to work on her exercises on a daily basis, and work on incorporating the techniques into her daily activities.    Goals for this practice period:     Continue to incorporate techniques into daily vocal activities    Plan: I will see Ms. Yan in two months, after her continued evaluations, to determine her potential for benefit from functional therapy and medical intervention..    TOTAL SERVICE TIME: 44 minutes  TREATMENT (54700)  NO CHARGE FACILITY FEE (59628)    Mirta Blake, Ph.D., [facility]-SLP  Speech-Language Pathologist  Director, [facility]  [phone]

## 2020-08-11 ENCOUNTER — HOSPITAL ENCOUNTER (OUTPATIENT)
Dept: CARDIAC REHAB | Facility: CLINIC | Age: 75
End: 2020-08-11
Payer: COMMERCIAL

## 2020-08-11 PROCEDURE — G0239 OTH RESP PROC, GROUP: HCPCS

## 2020-08-11 PROCEDURE — 40000244 ZZH STATISTIC VISIT PULM REHAB

## 2020-08-12 DIAGNOSIS — R19.7 DIARRHEA OF PRESUMED INFECTIOUS ORIGIN: ICD-10-CM

## 2020-08-12 DIAGNOSIS — R10.2 SUPRAPUBIC PAIN: ICD-10-CM

## 2020-08-12 LAB
ALBUMIN UR-MCNC: NEGATIVE MG/DL
APPEARANCE UR: ABNORMAL
BACTERIA #/AREA URNS HPF: ABNORMAL /HPF
BILIRUB UR QL STRIP: NEGATIVE
C COLI+JEJUNI+LARI FUSA STL QL NAA+PROBE: NOT DETECTED
CAOX CRY #/AREA URNS HPF: ABNORMAL /HPF
COLOR UR AUTO: YELLOW
EC STX1 GENE STL QL NAA+PROBE: NOT DETECTED
EC STX2 GENE STL QL NAA+PROBE: NOT DETECTED
ENTERIC PATHOGEN COMMENT: NORMAL
GLUCOSE UR STRIP-MCNC: NEGATIVE MG/DL
HGB UR QL STRIP: NEGATIVE
HYALINE CASTS #/AREA URNS LPF: 4 /LPF (ref 0–2)
KETONES UR STRIP-MCNC: NEGATIVE MG/DL
LEUKOCYTE ESTERASE UR QL STRIP: ABNORMAL
MUCOUS THREADS #/AREA URNS LPF: PRESENT /LPF
NITRATE UR QL: NEGATIVE
NOROV GI+II ORF1-ORF2 JNC STL QL NAA+PR: NOT DETECTED
PH UR STRIP: 5 PH (ref 5–7)
RBC #/AREA URNS AUTO: 9 /HPF (ref 0–2)
RVA NSP5 STL QL NAA+PROBE: NOT DETECTED
SALMONELLA SP RPOD STL QL NAA+PROBE: NOT DETECTED
SHIGELLA SP+EIEC IPAH STL QL NAA+PROBE: NOT DETECTED
SOURCE: ABNORMAL
SP GR UR STRIP: 1.02 (ref 1–1.03)
SQUAMOUS #/AREA URNS AUTO: 24 /HPF (ref 0–1)
UROBILINOGEN UR STRIP-MCNC: 0 MG/DL (ref 0–2)
V CHOL+PARA RFBL+TRKH+TNAA STL QL NAA+PR: NOT DETECTED
WBC #/AREA URNS AUTO: 6 /HPF (ref 0–5)
Y ENTERO RECN STL QL NAA+PROBE: NOT DETECTED

## 2020-08-12 PROCEDURE — 87506 IADNA-DNA/RNA PROBE TQ 6-11: CPT | Performed by: STUDENT IN AN ORGANIZED HEALTH CARE EDUCATION/TRAINING PROGRAM

## 2020-08-20 ENCOUNTER — OFFICE VISIT (OUTPATIENT)
Dept: FAMILY MEDICINE | Facility: CLINIC | Age: 75
End: 2020-08-20
Payer: COMMERCIAL

## 2020-08-20 VITALS
WEIGHT: 188.5 LBS | BODY MASS INDEX: 32.36 KG/M2 | SYSTOLIC BLOOD PRESSURE: 157 MMHG | OXYGEN SATURATION: 91 % | HEART RATE: 69 BPM | TEMPERATURE: 98 F | DIASTOLIC BLOOD PRESSURE: 90 MMHG

## 2020-08-20 DIAGNOSIS — E55.9 VITAMIN D DEFICIENCY: ICD-10-CM

## 2020-08-20 DIAGNOSIS — F51.02 ADJUSTMENT INSOMNIA: ICD-10-CM

## 2020-08-20 DIAGNOSIS — F41.1 ANXIETY STATE: ICD-10-CM

## 2020-08-20 DIAGNOSIS — R63.4 WEIGHT LOSS: Primary | ICD-10-CM

## 2020-08-20 DIAGNOSIS — R30.0 DYSURIA: ICD-10-CM

## 2020-08-20 DIAGNOSIS — F60.89 CLUSTER C PERSONALITY DISORDER (H): ICD-10-CM

## 2020-08-20 DIAGNOSIS — I10 ESSENTIAL HYPERTENSION: ICD-10-CM

## 2020-08-20 DIAGNOSIS — R63.4 WEIGHT LOSS: ICD-10-CM

## 2020-08-20 DIAGNOSIS — R19.5 LOOSE STOOLS: ICD-10-CM

## 2020-08-20 DIAGNOSIS — K58.0 IRRITABLE BOWEL SYNDROME WITH DIARRHEA: ICD-10-CM

## 2020-08-20 DIAGNOSIS — E78.5 HYPERLIPIDEMIA, UNSPECIFIED HYPERLIPIDEMIA TYPE: ICD-10-CM

## 2020-08-20 LAB
ALBUMIN SERPL-MCNC: 3.6 G/DL (ref 3.4–5)
ALBUMIN UR-MCNC: NEGATIVE MG/DL
ALP SERPL-CCNC: 121 U/L (ref 40–150)
ALT SERPL W P-5'-P-CCNC: 15 U/L (ref 0–50)
ANION GAP SERPL CALCULATED.3IONS-SCNC: 3 MMOL/L (ref 3–14)
APPEARANCE UR: ABNORMAL
AST SERPL W P-5'-P-CCNC: 17 U/L (ref 0–45)
BACTERIA #/AREA URNS HPF: ABNORMAL /HPF
BASOPHILS # BLD AUTO: 0.1 10E9/L (ref 0–0.2)
BASOPHILS NFR BLD AUTO: 0.9 %
BILIRUB SERPL-MCNC: 0.5 MG/DL (ref 0.2–1.3)
BILIRUB UR QL STRIP: NEGATIVE
BUN SERPL-MCNC: 18 MG/DL (ref 7–30)
CALCIUM SERPL-MCNC: 8.9 MG/DL (ref 8.5–10.1)
CHLORIDE SERPL-SCNC: 103 MMOL/L (ref 94–109)
CO2 SERPL-SCNC: 32 MMOL/L (ref 20–32)
COLOR UR AUTO: YELLOW
CREAT SERPL-MCNC: 0.81 MG/DL (ref 0.52–1.04)
DIFFERENTIAL METHOD BLD: ABNORMAL
EOSINOPHIL # BLD AUTO: 0.1 10E9/L (ref 0–0.7)
EOSINOPHIL NFR BLD AUTO: 2.6 %
ERYTHROCYTE [DISTWIDTH] IN BLOOD BY AUTOMATED COUNT: 15.5 % (ref 10–15)
GFR SERPL CREATININE-BSD FRML MDRD: 70 ML/MIN/{1.73_M2}
GLUCOSE SERPL-MCNC: 88 MG/DL (ref 70–99)
GLUCOSE UR STRIP-MCNC: NEGATIVE MG/DL
HCT VFR BLD AUTO: 45.1 % (ref 35–47)
HGB BLD-MCNC: 14.3 G/DL (ref 11.7–15.7)
HGB UR QL STRIP: NEGATIVE
IMM GRANULOCYTES # BLD: 0 10E9/L (ref 0–0.4)
IMM GRANULOCYTES NFR BLD: 0.2 %
KETONES UR STRIP-MCNC: NEGATIVE MG/DL
LEUKOCYTE ESTERASE UR QL STRIP: NEGATIVE
LYMPHOCYTES # BLD AUTO: 1.3 10E9/L (ref 0.8–5.3)
LYMPHOCYTES NFR BLD AUTO: 24.1 %
MCH RBC QN AUTO: 31.2 PG (ref 26.5–33)
MCHC RBC AUTO-ENTMCNC: 31.7 G/DL (ref 31.5–36.5)
MCV RBC AUTO: 98 FL (ref 78–100)
MONOCYTES # BLD AUTO: 0.5 10E9/L (ref 0–1.3)
MONOCYTES NFR BLD AUTO: 9 %
MUCOUS THREADS #/AREA URNS LPF: PRESENT /LPF
NEUTROPHILS # BLD AUTO: 3.4 10E9/L (ref 1.6–8.3)
NEUTROPHILS NFR BLD AUTO: 63.2 %
NITRATE UR QL: NEGATIVE
NRBC # BLD AUTO: 0 10*3/UL
NRBC BLD AUTO-RTO: 0 /100
PH UR STRIP: 5 PH (ref 5–7)
PLATELET # BLD AUTO: 217 10E9/L (ref 150–450)
POTASSIUM SERPL-SCNC: 3.9 MMOL/L (ref 3.4–5.3)
PROT SERPL-MCNC: 8.2 G/DL (ref 6.8–8.8)
PTH-INTACT SERPL-MCNC: 103 PG/ML (ref 18–80)
RBC # BLD AUTO: 4.59 10E12/L (ref 3.8–5.2)
RBC #/AREA URNS AUTO: 6 /HPF (ref 0–2)
SODIUM SERPL-SCNC: 138 MMOL/L (ref 133–144)
SOURCE: ABNORMAL
SP GR UR STRIP: 1.02 (ref 1–1.03)
SQUAMOUS #/AREA URNS AUTO: 18 /HPF (ref 0–1)
TSH SERPL DL<=0.005 MIU/L-ACNC: 2.39 MU/L (ref 0.4–4)
UROBILINOGEN UR STRIP-MCNC: 0 MG/DL (ref 0–2)
WBC # BLD AUTO: 5.4 10E9/L (ref 4–11)
WBC #/AREA URNS AUTO: 4 /HPF (ref 0–5)

## 2020-08-20 PROCEDURE — 87086 URINE CULTURE/COLONY COUNT: CPT | Performed by: FAMILY MEDICINE

## 2020-08-20 PROCEDURE — 82306 VITAMIN D 25 HYDROXY: CPT | Performed by: FAMILY MEDICINE

## 2020-08-20 PROCEDURE — 83970 ASSAY OF PARATHORMONE: CPT | Performed by: FAMILY MEDICINE

## 2020-08-20 RX ORDER — ATORVASTATIN CALCIUM 40 MG/1
40 TABLET, FILM COATED ORAL DAILY
Qty: 90 TABLET | Refills: 3 | Status: SHIPPED | OUTPATIENT
Start: 2020-08-20 | End: 2021-05-06

## 2020-08-20 RX ORDER — LOSARTAN POTASSIUM AND HYDROCHLOROTHIAZIDE 25; 100 MG/1; MG/1
1 TABLET ORAL DAILY
Qty: 90 TABLET | Refills: 3 | Status: SHIPPED | OUTPATIENT
Start: 2020-08-20 | End: 2021-05-06

## 2020-08-20 SDOH — SOCIAL STABILITY: SOCIAL NETWORK: IN A TYPICAL WEEK, HOW MANY TIMES DO YOU TALK ON THE PHONE WITH FAMILY, FRIENDS, OR NEIGHBORS?: ONCE A WEEK

## 2020-08-20 SDOH — SOCIAL STABILITY: SOCIAL INSECURITY: WITHIN THE LAST YEAR, HAVE YOU BEEN HUMILIATED OR EMOTIONALLY ABUSED IN OTHER WAYS BY YOUR PARTNER OR EX-PARTNER?: NO

## 2020-08-20 SDOH — SOCIAL STABILITY: SOCIAL NETWORK: HOW OFTEN DO YOU ATTEND CHURCH OR RELIGIOUS SERVICES?: MORE THAN 4 TIMES PER YEAR

## 2020-08-20 SDOH — SOCIAL STABILITY: SOCIAL INSECURITY: WITHIN THE LAST YEAR, HAVE YOU BEEN AFRAID OF YOUR PARTNER OR EX-PARTNER?: NO

## 2020-08-20 SDOH — SOCIAL STABILITY: SOCIAL NETWORK
DO YOU BELONG TO ANY CLUBS OR ORGANIZATIONS SUCH AS CHURCH GROUPS UNIONS, FRATERNAL OR ATHLETIC GROUPS, OR SCHOOL GROUPS?: YES

## 2020-08-20 SDOH — SOCIAL STABILITY: SOCIAL NETWORK: ARE YOU MARRIED, WIDOWED, DIVORCED, SEPARATED, NEVER MARRIED, OR LIVING WITH A PARTNER?: NEVER MARRIED

## 2020-08-20 SDOH — SOCIAL STABILITY: SOCIAL NETWORK: HOW OFTEN DO YOU ATTENT MEETINGS OF THE CLUB OR ORGANIZATION YOU BELONG TO?: MORE THAN 4 TIMES PER YEAR

## 2020-08-20 SDOH — SOCIAL STABILITY: SOCIAL NETWORK: HOW OFTEN DO YOU GET TOGETHER WITH FRIENDS OR RELATIVES?: NEVER

## 2020-08-20 SDOH — SOCIAL STABILITY: SOCIAL INSECURITY
WITHIN THE LAST YEAR, HAVE TO BEEN RAPED OR FORCED TO HAVE ANY KIND OF SEXUAL ACTIVITY BY YOUR PARTNER OR EX-PARTNER?: NO

## 2020-08-20 SDOH — SOCIAL STABILITY: SOCIAL INSECURITY
WITHIN THE LAST YEAR, HAVE YOU BEEN KICKED, HIT, SLAPPED, OR OTHERWISE PHYSICALLY HURT BY YOUR PARTNER OR EX-PARTNER?: NO

## 2020-08-20 NOTE — PROGRESS NOTES
" Ca Yan is here after establishing primary care with me to meet me in person for the first time and for follow up of chronic health conditions. Concerns are as follows:  No current dysuria she is on chronic Keflex 250 mg daily. She has a stool problem where some of the stool goes into the vaginal area causing occassionally UTI but the low dose Keflex seems to assist. She is considering a stimulator working with Dr Cornell Guo.   She developed stomach cramps and irritable bowel. Now her bowel feels better. She was worried about colon cancer. She had stool studies for infection but not for colon cancer screening and would like to check as she has lost 10 pounds of weight.   She has a therapist she talks to once per week on the phone. She has trouble with anxiety when going to bed uses music. She feels very lonely.She feels she has enough resources  She has too many medications and wondering if we could simplify. She would like pill dorothy but her pharmacy does not offer his service. She wants to continue with her current pharmacy  She prepares her own meals \" I don't know how to cook\" doesn't like to cut up vegetables mostly frozen dinners. She declines home meal service and feels she likes to grocery shop.   She is trying to go to pulmonary rehab.  She has dysphonia related to vocal cord.   She is no longer using alcohol form many years but misses going to  for support.  She has a severe spinal scoliosis and kyphosis most likely as the cause for her breathing concerns.  Chronic leg lymphedema does not like her legs touched    Patient Active Problem List   Diagnosis     Non morbid obesity due to excess calories     Alcohol abuse     Malignant neoplasm of breast (H)     Chronic kidney disease, stage III (moderate) (H)     Osteoarthritis of knee     Major depressive disorder with single episode     Diarrhea     Diastolic dysfunction     Osteoarthritis of spine     Gastroesophageal reflux disease     " Generalized anxiety disorder     Hypertension     Hyperlipidemia     Insomnia     Irritable bowel syndrome     Kyphoscoliosis     Cluster C personality disorder (H)     Seborrheic eczema     Anemia     Anxiety state     Asthma     Back pain     Bunion     Low bone density     Fecal incontinence     Left-sided low back pain without sciatica     Major depression, recurrent (H)     Cognitive impairment       Past Medical History:   Diagnosis Date     Anxiety      Arthritis      Breast cancer (H)      COPD (chronic obstructive pulmonary disease) (H)     6/19/12:  FEV 0.99 l     Depressive disorder      Hypertension      Low back pain with left-sided sciatica      Low bone density 4/13/2017    DEXA April 12, 2017: T score -2.0. Normal Z score. FRAX risk: major osteoporotic fracture 11.9%, hip fracture 2.6%, therefore not high-risk     Lymphedema, chronic lower extremities        Past Surgical History:   Procedure Laterality Date     BACK SURGERY      spinal fusion     COLONOSCOPY       LUMPECTOMY BREAST       ORTHOPEDIC SURGERY      Knee surgery left side       Current Outpatient Medications   Medication Sig Dispense Refill     albuterol (PROAIR HFA/PROVENTIL HFA/VENTOLIN HFA) 108 (90 Base) MCG/ACT inhaler Inhale 2 puffs into the lungs every 6 hours as needed for shortness of breath / dyspnea or wheezing 3 Inhaler 1     ASPIRIN EC PO Take 81 mg by mouth       atorvastatin (LIPITOR) 40 MG tablet Take 1 tablet (40 mg) by mouth daily 90 tablet 3     cephALEXin (KEFLEX) 250 MG capsule Take 250 mg by mouth       clonazePAM (KLONOPIN) 0.5 MG tablet Take 0.5 mg by mouth daily       fluticasone-vilanterol (BREO ELLIPTA) 200-25 MCG/INH inhaler Inhale 1 puff into the lungs daily 3 Inhaler 3     hydrochlorothiazide (MICROZIDE) 12.5 MG capsule TAKE ONE CAPSULE (12.5MG) BY MOUTH ONCE DAILY 90 capsule 3     ibuprofen (ADVIL,MOTRIN) 200 MG tablet Take 3 tablets (600 mg) by mouth 3 times daily (with meals) 90 tablet 0     latanoprost  (XALATAN) 0.005 % ophthalmic solution 1 drop       losartan (COZAAR) 50 MG tablet TAKE TWO TABLETS (100MG) BY MOUTH ONCE DAILY 180 tablet 0     order for DME Equipment being ordered: N95 mask indication COPD difficulty breathing with standard mask 10 each 1     order for DME Equipment being ordered: Oxygen  Please provide portable oxygen concentrator (pt would like over the shoulder oxygen device) for portability at 2LPM with activity via nasal canula. 1 Device 1     quetiapine (SEROQUEL) 200 MG tablet Take 200 mg by mouth At Bedtime.       sertraline (ZOLOFT) 50 MG tablet Take 1.5 tablets (75 mg) by mouth daily 45 tablet 3     umeclidinium (INCRUSE ELLIPTA) 62.5 MCG/INH inhaler Inhale 1 puff into the lungs daily 7 each 0       Immunization History   Administered Date(s) Administered     Influenza (H1N1) 01/08/2010     Influenza (High Dose) 3 valent vaccine 02/26/2016, 10/31/2016, 10/05/2017, 10/04/2018, 10/11/2019     Influenza (IIV3) PF 11/05/2010, 11/13/2013, 08/25/2014     Mantoux Tuberculin Skin Test 04/26/2013, 02/10/2014     Pneumo Conj 13-V (2010&after) 04/17/2015     Pneumococcal 23 valent 02/06/2009, 03/30/2012     TD (ADULT, 7+) 02/07/2005, 02/07/2005     Tdap (Adacel,Boostrix) 07/09/2011       Allergies   Allergen Reactions     Azithromycin Itching     Codeine Nausea and Vomiting     Hydrocodone Nausea and Vomiting     Metronidazole Nausea     Oxycodone Itching and Rash     Percocet [Oxycodone-Acetaminophen] Nausea and Vomiting     Pollen Extract      sneezing and runny nose.      Seasonal Allergies      sneezing and runny nose.      Vicodin [Hydrocodone-Acetaminophen] Nausea and Vomiting     Zolpidem      Amnestic behavior       Social History     Socioeconomic History     Marital status: Single     Spouse name: Not on file     Number of children: Not on file     Years of education: Not on file     Highest education level: Not on file   Occupational History     Employer: RETIRED   Social Needs      Financial resource strain: Not on file     Food insecurity     Worry: Not on file     Inability: Not on file     Transportation needs     Medical: Not on file     Non-medical: Not on file   Tobacco Use     Smoking status: Former Smoker     Packs/day: 0.50     Years: 5.00     Pack years: 2.50     Types: Cigarettes     Start date: 1956     Last attempt to quit: 1980     Years since quittin.9     Smokeless tobacco: Former User     Quit date: 1980   Substance and Sexual Activity     Alcohol use: Not Currently     Alcohol/week: 0.0 standard drinks     Comment: Sober for 2 years, previous alcoholic     Drug use: No     Sexual activity: Not Currently     Partners: Male     Birth control/protection: Abstinence   Lifestyle     Physical activity     Days per week: Not on file     Minutes per session: Not on file     Stress: Not on file   Relationships     Social connections     Talks on phone: Once a week     Gets together: Never     Attends Alevism service: More than 4 times per year     Active member of club or organization: Yes     Attends meetings of clubs or organizations: More than 4 times per year     Relationship status: Never      Intimate partner violence     Fear of current or ex partner: No     Emotionally abused: No     Physically abused: No     Forced sexual activity: No   Other Topics Concern     Parent/sibling w/ CABG, MI or angioplasty before 65F 55M? Not Asked      Service Not Asked     Blood Transfusions Not Asked     Caffeine Concern Not Asked     Occupational Exposure Not Asked     Hobby Hazards Not Asked     Sleep Concern Not Asked     Stress Concern Not Asked     Comment: Lives alone     Weight Concern Not Asked     Special Diet Not Asked     Back Care Not Asked     Comment: Hx of scoliosis with spine fusion     Exercise Not Asked     Comment: Walks     Bike Helmet Not Asked     Seat Belt Not Asked     Self-Exams Not Asked   Social History Narrative    Ca Yan  never   Lives in apartment  is  family member is daughter Rin in [place], [place]  Previous  meetings    She does not have a health care agent    Family History   Problem Relation Age of Onset     Breast Cancer Mother      Diabetes Mother      Anxiety Disorder Mother      Asthma Mother      Other Cancer Mother      Hyperlipidemia Mother      Alcohol/Drug Father      Mental Illness Father      Substance Abuse Father      Obesity Father      Cerebrovascular Disease Maternal Grandmother 67       Remaining 10 point ROS of systems including Constitutional, Eyes, Respiratory, Cardiovascular, Gastroenterology, Genitourinary, Integumentary, Musculoskeletal, Neurological, Psychiatric were all negative except for pertinent positives noted in HPI and ROS reviewed above.  BP (!) 157/90 (BP Location: Right arm, Patient Position: Sitting, Cuff Size: Adult Regular)   Pulse 69   Temp 98  F (36.7  C) (Oral)   Wt 85.5 kg (188 lb 8 oz)   LMP  (LMP Unknown)   SpO2 91%   BMI 32.36 kg/m    Constitutional: Oriented to person, place, and time. Vital signs are noted.  Appears well-nourished. Non-toxic appearance.  No distress. She was cooperative and interactive today and appreciative of the visit. She has vocal dysphonia. She was able to wear a mask the entire visit without distress. White hair  HENT: TM normal. External canal normal.  Head: Normocephalic and atraumatic.   Mouth/Throat: Deferred wearing a mask  Eyes: Conjunctivae and EOM are normal. Pupils are equal, round, and reactive to light. No scleral icterus. Glasses  Neck: Normal range of motion. Neck supple. No JVD present. No tracheal deviation present. No thyromegaly present.   Cardiovascular: Regular rhythm, normal heart sounds and intact distal pulses. No murmur present. Exam reveals no gallop and no friction rub.   Pulmonary/Chest: Effort normal and breath sounds normal. No respiratory distress.   Abdominal: Obese Soft. Bowel sounds are normal. No distension and  no mass. No tenderness. Bladder non distended.  Musculoskeletal:Significant kyphoscoliosis, bilateral chronic lymphedema  Lymphadenopathy:   No cervical adenopathy.   Neurological: Alert and oriented to person, place, and time. General deconditioning, sitting in wheelchair but moves all extremities   Skin: Skin is warm and dry, mild pallor. No rash noted. No erythema.   Psychiatric: Fixed ideas, pleasant, cooperative, though coherent but expresses anxiety related to weight loss and depression related to isolation. She feels she has adequate resources.  PHQ 9/26/2018 3/28/2019 6/3/2020   PHQ-9 Total Score 1 1 6   Q9: Thoughts of better off dead/self-harm past 2 weeks Not at all Not at all Not at all     RENATO-7 SCORE 3/28/2019 1/24/2020 6/3/2020   Total Score 0 (minimal anxiety) 14 (moderate anxiety) -   Total Score 0 14 17   Total Score BEH Adult - - -     Ca was seen today for breathing problem and results.  She has significant Kyphoscoliosis as a cause  Of her breathing and encouraged pulmonary rehab.  Her blood pressure today was slightly high therefore I recommended discontinue current losartan 50 mg was taking 2 tabs, discontinue hydrochlorothiazide 12.5 mg and increased dose to combined Hyzaar 100-25 mg one tab daily. We also talked about lowering sodium in hr diet, increase fresh fruits and veggies.    Diagnoses and all orders for this visit:    Weight loss  -     Comprehensive metabolic panel; Future  -     CBC with platelets differential; Future  -     TSH with free T4 reflex; Future    Dysuria  -     UA with Micro reflex to Culture; Future  -     Urine Culture; Future  -     Urine Culture  -     Cancel: UA with Micro reflex to Culture  -     UA with Microscopic reflex to Culture    Loose stools  -     Fecal cancer screen FIT; Future    Vitamin D deficiency  -     Vitamin D Deficiency; Future    Anxiety state  -     Parathyroid Hormone Intact; Future    Cluster C personality disorder (H)  Adjustment  insomnia  Seroquel, meditation, music  Irritable bowel syndrome with diarrhea  Check FIT test  Essential hypertension  -     losartan-hydrochlorothiazide (HYZAAR) 100-25 MG tablet; Take 1 tablet by mouth daily  Stop Losartan and HCTZ  Hyperlipidemia, unspecified hyperlipidemia type  -     atorvastatin (LIPITOR) 40 MG tablet; Take 1 tablet (40 mg) by mouth daily    1 month follow-up in clinic with MD for BP check.  All questions were addressed and voiced understanding and agreement with the above.    Favian Sahu MD

## 2020-08-20 NOTE — NURSING NOTE
Chief Complaint   Patient presents with     Breathing Problem     pt states she gets breathless doing things     Results     pt would like to discuss stool tests results       Norma Chery CMA at 10:33 AM on 8/20/2020.

## 2020-08-20 NOTE — PATIENT INSTRUCTIONS
[facility] Medication Refill Request Information:  * Please contact your pharmacy regarding ANY request for medication refills.  ** [facility] Prescription Fax = [phone]  * Please allow 3 business days for routine medication refills.  * Please allow 5 business days for controlled substance medication refills.     [facility] Test Result notification information:  *You will be notified with in 7-10 days of your appointment day regarding the results of your test.  If you are on MyChart you will be notified as soon as the provider has reviewed the results and signed off on them.    [facility]: [phone]   Your health care Provider has recommended that you receive the new Shingle vaccine called Shingrix to prevent shingles for ages 50 and above. Many private insurance and Medicare Part D plans cover Shingrix. However, not all insurance carriers cover the entire cost of the Shingrix vaccine if the vaccine is administered at your primary care clinic. The clinic cannot determine your insurance benefits.  Please call your insurance carrier prior. The vaccine comes in two doses. Your second dose should be 2-6 months from your first dose.       Prior to receiving the vaccine, we recommend that you call your insurance carrier and ask them the following questions:            1. Is there a cost difference if I receive the vaccine at my doctor's office or a pharmacy?          2. Does my insurance cover the Shingrix Vaccine and administration of the vaccine?          3. What is my co-pay or deductible for the vaccine?        Please call to schedule a Nurse-Visit only at [phone].  Nurse Visit hours are available Monday, Wednesday, and Friday from 9:00 AM-11:00 AM and 1:00 PM-3:00 PM.     I recommended discontinue current losartan 50 mg was taking 2 tabs, discontinue hydrochlorothiazide 12.5 mg and increased dose to combined tablet Hyzaar 100-25 mg one tab daily which will simplify your  medications.    Patient Education     Eating Heart-Healthy Foods  Eating has a big impact on your heart health. In fact, eating healthier can improve several of your heart risks at once. For instance, it helps you manage weight, cholesterol, and blood pressure. Here are ideas to help you make heart-healthy changes without giving up all the foods and flavors you love.  Getting started    Talk with your healthcare provider about eating plans, such as the DASH or Mediterranean diet. You may also be referred to a dietitian.    Change a few things at a time. Give yourself time to get used to a few eating changes before adding more.    Work to create a tasty, healthy eating plan that you can stick to for the rest of your life.    Goals for healthy eating  Below are some tips to improve your eating habits:    Limit saturated fats and trans fats. Saturated fats raise your levels of cholesterol, so keep these fats to a minimum. They are found in foods such as fatty meats, whole milk, cheese, and palm and coconut oils. Avoid trans fats because they lower good cholesterol as well as raise bad cholesterol. Trans fats are most often found in processed foods.    Reduce sodium (salt) intake. Eating too much salt may increase your blood pressure. Limit your sodium intake to 2,300 milligrams (mg) per day (the amount in 1 teaspoon of salt), or less if your healthcare provider recommends it. Dining out less often and eating fewer processed foods are two great ways to decrease the amount of salt you consume.    Managing calories. A calorie is a unit of energy. Your body burns calories for fuel, but if you eat more calories than your body burns, the extras are stored as fat. Your healthcare provider can help you create a diet plan to manage your calories. This will likely include eating healthier foods as well as exercising regularly. To help you track your progress, keep a diary to record what you eat and how often you  exercise.  Choose the right foods  Aim to make these foods staples of your diet. If you have diabetes, you may have different recommendations than what is listed here:    Fruits and vegetables provide plenty of nutrients without a lot of calories. At meals, fill half your plate with these foods. Split the other half of your plate between whole grains and lean protein.    Whole grains are high in fiber and rich in vitamins and nutrients. Good choices include whole-wheat bread, pasta, and brown rice.    Lean proteins give you nutrition with less fat. Good choices include fish, skinless chicken, and beans.    Low-fat or nonfat dairy provides nutrients without a lot of fat. Try low-fat or nonfat milk, cheese, or yogurt.    Healthy fats can be good for you in small amounts. These are unsaturated fats, such as olive oil, nuts, and fish. Try to have at least 2 servings per week of fatty fish, such as salmon, sardines, mackerel, rainbow trout, and albacore tuna. These contain omega-3 fatty acids, which are good for your heart. Flaxseed is another source of a heart-healthy fat.  More on heart-healthy eating  Read food labels  Healthy eating starts at the grocery store. Be sure to pay attention to food labels on packaged foods. Look for products that are high in fiber and protein, and low in saturated fat, cholesterol, and sodium. Avoid products that contain trans fat. And pay close attention to serving size. For instance, if you plan to eat two servings, double all the numbers on the label.  Prepare food right  A key part of healthy cooking is cutting down on added fat and salt. Look on the internet for lower-fat, lower-sodium recipes. Also, try these tips:    Remove fat from meat and skin from poultry before cooking.    Skim fat from the surface of soups and sauces.    Broil, boil, bake, steam, grill, and microwave food without added fats.    Choose ingredients that spice up your food without adding calories, fat, or  sodium. Try these items: horseradish, hot sauce, lemon, mustard, nonfat salad dressings, and vinegar. For salt-free herbs and spices, try basil, cilantro, cinnamon, pepper, and rosemary.  Date Last Reviewed: 10/1/2017    4562-4319 [employer]. [address] [place] [address]. All rights reserved. This information is not intended as a substitute for professional medical care. Always follow your healthcare professional's instructions.           Patient Education     Diet and Lifestyle Tips for Irritable Bowel Syndrome (IBS)    Your healthcare professional may suggest some lifestyle changes to help control your IBS. Changing your diet and managing stress are two of the most important. Follow your healthcare provider s instructions and try some of the suggestions below.  Change your diet  Your diet may be an important cause of IBS symptoms. You may want to try the following:    Pay attention to what foods bother you, and avoid them. For example, dairy products are hard for some people to digest.    Drink 6 to 8 glasses of water a day.    Avoid caffeine and tobacco. These are muscle stimulants and can affect the working of your digestive tract.    Avoid alcohol, which can irritate your digestive tract and make your symptoms worse.    Eat more fiber if constipation is a problem. Fiber makes the stool softer and easier to pass through the colon.  Reduce stress  If stress or anxiety makes your IBS symptoms worse, learning how to manage stress may help you feel better. Try these tips:    Identify the causes of stress in your life.    Learn new ways to cope with them.    Regular exercise is a great way to relieve stress. It can also help ease constipation.  Date Last Reviewed: 7/1/2016 2000-2019 [employer]. [address] [place] [address]. All rights reserved. This information is not intended as a substitute for professional medical care. Always follow your healthcare  professional's instructions.

## 2020-08-20 NOTE — LETTER
Patient:  Ca Yan  :   1945  MRN:     [id number]        Ms. Ca Yan  [address]   [facility] [address]        2020    Dear Ms. Yan,    Thank you for choosing the [facility] for your healthcare needs.  We appreciate the opportunity to serve you.    The following are your recent test results.     Dear Ca Yan    No current urinary tract infection.   Best wishes,   Favian Sahu MD     Resulted Orders   Urine Culture   Result Value Ref Range    Specimen Description Unspecified Urine     Special Requests Specimen received in preservative     Culture Micro       10,000 to 50,000 colonies/mL  mixed urogenital zeina  Susceptibility testing not routinely done     UA with Microscopic reflex to Culture   Result Value Ref Range    Color Urine Yellow     Appearance Urine Slightly Cloudy     Glucose Urine Negative NEG^Negative mg/dL    Bilirubin Urine Negative NEG^Negative    Ketones Urine Negative NEG^Negative mg/dL    Specific Gravity Urine 1.018 1.003 - 1.035    Blood Urine Negative NEG^Negative    pH Urine 5.0 5.0 - 7.0 pH    Protein Albumin Urine Negative NEG^Negative mg/dL    Urobilinogen mg/dL 0.0 0.0 - 2.0 mg/dL    Nitrite Urine Negative NEG^Negative    Leukocyte Esterase Urine Negative NEG^Negative    Source Unspecified Urine     WBC Urine 4 0 - 5 /HPF    RBC Urine 6 (H) 0 - 2 /HPF    Bacteria Urine Few (A) NEG^Negative /HPF    Squamous Epithelial /HPF Urine 18 (H) 0 - 1 /HPF    Mucous Urine Present (A) NEG^Negative /LPF         Please contact your provider if you have any questions or concerns.  We look forward to serving your needs in the future.      Sincerely,        [facility] Staff

## 2020-08-21 LAB
BACTERIA SPEC CULT: NORMAL
DEPRECATED CALCIDIOL+CALCIFEROL SERPL-MC: 22 UG/L (ref 20–75)
Lab: NORMAL
SPECIMEN SOURCE: NORMAL

## 2020-08-25 ENCOUNTER — TELEPHONE (OUTPATIENT)
Dept: FAMILY MEDICINE | Facility: CLINIC | Age: 75
End: 2020-08-25

## 2020-08-25 NOTE — TELEPHONE ENCOUNTER
[facility] Call Center    Phone Message    May a detailed message be left on voicemail: yes     Reason for Call: Symptoms or Concerns     If patient has red-flag symptoms, warm transfer to triage line    Current symptom or concern: exhaustion after sitting in dental chair for 3 hours    Symptoms have been present for:  1 years(s)    Has patient previously been seen for this? No    By : n/a    Date: n/a    Are there any new or worsening symptoms? Yes: now that she is older its harder to stay in a chair for 3 hours.      Action Taken: Message routed to:  [facility]    Travel Screening: Not Applicable

## 2020-08-26 ENCOUNTER — TELEPHONE (OUTPATIENT)
Dept: OTOLARYNGOLOGY | Facility: CLINIC | Age: 75
End: 2020-08-26

## 2020-08-26 NOTE — TELEPHONE ENCOUNTER
Spoke to patient to follow up on missed appointmenst for CT scan and video swallow/esophogram. Pt stated that someone from radiology had called her today and would be calling her back tomorrow to reschedule. Reminded pt that provider thinks it is very important she has these done to evaluate her safety. Pt then immediately got concerned and asked if provider was looking for cancer. Advised pt that this writer did not imply that, but we want to make sure everything is ok in her throat and there is nothing abnormal.pt was ok with this response and said she would be rescheduling appts.

## 2020-08-26 NOTE — TELEPHONE ENCOUNTER
I called and left VM for her to clarify her concerns, she can also schedule Dr. Sahu's next available virtual appointment to discuss further. I left number for scheduling.   Rachel Kate, EMT at 12:59 PM on 8/26/2020.

## 2020-08-27 ENCOUNTER — HOSPITAL ENCOUNTER (OUTPATIENT)
Dept: CARDIAC REHAB | Facility: CLINIC | Age: 75
End: 2020-08-27
Payer: COMMERCIAL

## 2020-08-27 PROCEDURE — G0239 OTH RESP PROC, GROUP: HCPCS

## 2020-08-27 PROCEDURE — 40000244 ZZH STATISTIC VISIT PULM REHAB

## 2020-09-02 ENCOUNTER — HOSPITAL ENCOUNTER (OUTPATIENT)
Dept: CARDIAC REHAB | Facility: CLINIC | Age: 75
End: 2020-09-02
Payer: COMMERCIAL

## 2020-09-02 PROCEDURE — 40000244 ZZH STATISTIC VISIT PULM REHAB

## 2020-09-02 PROCEDURE — G0239 OTH RESP PROC, GROUP: HCPCS

## 2020-09-03 ENCOUNTER — VIRTUAL VISIT (OUTPATIENT)
Dept: PULMONOLOGY | Facility: CLINIC | Age: 75
End: 2020-09-03
Payer: COMMERCIAL

## 2020-09-03 ENCOUNTER — VIRTUAL VISIT (OUTPATIENT)
Dept: FAMILY MEDICINE | Facility: CLINIC | Age: 75
End: 2020-09-03
Payer: COMMERCIAL

## 2020-09-03 DIAGNOSIS — K58.9 IRRITABLE BOWEL SYNDROME, UNSPECIFIED TYPE: ICD-10-CM

## 2020-09-03 DIAGNOSIS — R53.82 CHRONIC FATIGUE: ICD-10-CM

## 2020-09-03 DIAGNOSIS — M40.209 KYPHOSIS, UNSPECIFIED KYPHOSIS TYPE, UNSPECIFIED SPINAL REGION: Primary | ICD-10-CM

## 2020-09-03 DIAGNOSIS — R06.02 SHORTNESS OF BREATH: ICD-10-CM

## 2020-09-03 DIAGNOSIS — F41.9 ANXIETY AND DEPRESSION: ICD-10-CM

## 2020-09-03 DIAGNOSIS — E21.3 PARATHYROID HORMONE EXCESS (H): Primary | ICD-10-CM

## 2020-09-03 DIAGNOSIS — M41.9 KYPHOSCOLIOSIS: ICD-10-CM

## 2020-09-03 DIAGNOSIS — F32.A ANXIETY AND DEPRESSION: ICD-10-CM

## 2020-09-03 RX ORDER — VITAMIN B COMPLEX
1 TABLET ORAL DAILY
Qty: 100 TABLET | Refills: 3 | Status: SHIPPED | OUTPATIENT
Start: 2020-09-03 | End: 2021-04-07

## 2020-09-03 ASSESSMENT — PAIN SCALES - GENERAL: PAINLEVEL: NO PAIN (0)

## 2020-09-03 NOTE — NURSING NOTE
Chief Complaint   Patient presents with     Fatigue     pt would like to discuss worsening fatigue, blowing nose a lot       Ousmane Coelho CMA, EMT at 10:18 AM on 9/3/2020.

## 2020-09-03 NOTE — LETTER
"    9/3/2020         RE: Ca Yan  [address]  [place] [address]        Dear Colleague,    Thank you for referring your patient, Ca Yan, to the [facility]. Please see a copy of my visit note below.    Ca Yan is a 74 year old female who is being evaluated via a billable telephone visit.      The patient has been notified of following:     \"This telephone visit will be conducted via a call between you and your physician/provider. We have found that certain health care needs can be provided without the need for a physical exam.  This service lets us provide the care you need with a short phone conversation.  If a prescription is necessary we can send it directly to your pharmacy.  If lab work is needed we can place an order for that and you can then stop by our lab to have the test done at a later time.    Telephone visits are billed at different rates depending on your insurance coverage. During this emergency period, for some insurers they may be billed the same as an in-person visit.  Please reach out to your insurance provider with any questions.    If during the course of the call the physician/provider feels a telephone visit is not appropriate, you will not be charged for this service.\"    Patient has given verbal consent for Telephone visit?  Yes    What phone number would you like to be contacted at? [phone]    How would you like to obtain your AVS? Mail a copy    [facility]  Pulmonary Medicine  Visit Clinic Note  September 3, 2020         ASSESSMENT & PLAN       Shortness of breath  Kyphosis  Deconditioning  Right middle lobe atelectasis    We spoke again today about the importance of pulmonary rehab for her.  The way look at her lungs is that a lot of her pulmonary related symptoms are actually related to her musculoskeletal system.  She has bad kyphosis which is led to restrictive lung disease and actually " "likely chronic atelectasis of the right middle lobe.  These are both unfixable issues, and so the priority needs to be focused on keeping her body as conditioned as possible.  This would be the point for pulmonary rehab.  She has started attending this, but she was planning on quitting and just doing physical therapy.  I encouraged her to continue doing pulmonary rehab for its benefits of decreased shortness of breath.  She will continue this for total of 36 sessions, and I will see her at the end of her course of pulmonary rehab.  She has not been using any of her inhalers, and I think that that is just fine.  She can use albuterol as needed but all the other inhalers are unnecessary for her. She is getting follow up with ENT regarding a paralyzed vocal cord.  This may also be contributing to some of her dyspnea.      Mono Taylor MD          Today's visit note:     Chief Complaint: Ca Yan is a 74 year old year old female who is being seen for Follow Up (f/u telephone visit)      HISTORY OF PRESENT ILLNESS:    This is a 74-year-old female with a history of kyphoscoliosis, who presents the pulmonary clinic today for evaluation of shortness of breath on exertion.    This visit was a telephone visit.    She has started pulmonary rehab, and has participated in about 4-5 sessions.  She does feel exhausted at the end of it, but does think that she has been able to participate more and more in the pulmonary rehab program.  She actually says that some the stuff might be a little too easy for her.  She also notes that her shortness of breath gets worse when she has \"emotional\" episodes.  When she gets very stressed out, she will become short of breath and this exacerbates her anxiety which then exacerbates her shortness of breath.  The sensation of shortness of breath will go away once her nerves are calmed.    She has not required any antibiotics or steroids recently for lung disease.         Past Medical and " Surgical History:     Past Medical History:   Diagnosis Date     Anxiety      Arthritis      Breast cancer (H)      COPD (chronic obstructive pulmonary disease) (H)     12:  FEV 0.99 l     Depressive disorder      Hypertension      Low back pain with left-sided sciatica      Low bone density 2017    DEXA 2017: T score -2.0. Normal Z score. FRAX risk: major osteoporotic fracture 11.9%, hip fracture 2.6%, therefore not high-risk     Lymphedema, chronic lower extremities      Past Surgical History:   Procedure Laterality Date     BACK SURGERY      spinal fusion     COLONOSCOPY       LUMPECTOMY BREAST       ORTHOPEDIC SURGERY      Knee surgery left side           Family History:     Family History   Problem Relation Age of Onset     Breast Cancer Mother      Diabetes Mother      Anxiety Disorder Mother      Asthma Mother      Other Cancer Mother      Hyperlipidemia Mother      Alcohol/Drug Father      Mental Illness Father      Substance Abuse Father      Obesity Father      Cerebrovascular Disease Maternal Grandmother 67              Social History:     Social History     Socioeconomic History     Marital status: Single     Spouse name: Not on file     Number of children: Not on file     Years of education: Not on file     Highest education level: Not on file   Occupational History     Employer: RETIRED   Social Needs     Financial resource strain: Not on file     Food insecurity     Worry: Not on file     Inability: Not on file     Transportation needs     Medical: Not on file     Non-medical: Not on file   Tobacco Use     Smoking status: Former Smoker     Packs/day: 0.50     Years: 5.00     Pack years: 2.50     Types: Cigarettes     Start date: 1956     Last attempt to quit: 1980     Years since quittin.9     Smokeless tobacco: Former User     Quit date: 1980   Substance and Sexual Activity     Alcohol use: Not Currently     Alcohol/week: 0.0 standard drinks     Comment: Sober  for 2 years, previous alcoholic     Drug use: No     Sexual activity: Not Currently     Partners: Male     Birth control/protection: Abstinence   Lifestyle     Physical activity     Days per week: Not on file     Minutes per session: Not on file     Stress: Not on file   Relationships     Social connections     Talks on phone: Once a week     Gets together: Never     Attends Oriental orthodox service: More than 4 times per year     Active member of club or organization: Yes     Attends meetings of clubs or organizations: More than 4 times per year     Relationship status: Never      Intimate partner violence     Fear of current or ex partner: No     Emotionally abused: No     Physically abused: No     Forced sexual activity: No   Other Topics Concern     Parent/sibling w/ CABG, MI or angioplasty before 65F 55M? Not Asked      Service Not Asked     Blood Transfusions Not Asked     Caffeine Concern Not Asked     Occupational Exposure Not Asked     Hobby Hazards Not Asked     Sleep Concern Not Asked     Stress Concern Not Asked     Comment: Lives alone     Weight Concern Not Asked     Special Diet Not Asked     Back Care Not Asked     Comment: Hx of scoliosis with spine fusion     Exercise Not Asked     Comment: Walks     Bike Helmet Not Asked     Seat Belt Not Asked     Self-Exams Not Asked   Social History Narrative    Ca Yan never   Lives in apartment  is  family member is daughter Rin in [place], [place]  Previous AA meetings            Medications:     Current Outpatient Medications   Medication     albuterol (PROAIR HFA/PROVENTIL HFA/VENTOLIN HFA) 108 (90 Base) MCG/ACT inhaler     atorvastatin (LIPITOR) 40 MG tablet     cephALEXin (KEFLEX) 250 MG capsule     clonazePAM (KLONOPIN) 0.5 MG tablet     fluticasone-vilanterol (BREO ELLIPTA) 200-25 MCG/INH inhaler     ibuprofen (ADVIL,MOTRIN) 200 MG tablet     latanoprost (XALATAN) 0.005 % ophthalmic solution     losartan-hydrochlorothiazide (HYZAAR)  100-25 MG tablet     quetiapine (SEROQUEL) 200 MG tablet     sertraline (ZOLOFT) 50 MG tablet     umeclidinium (INCRUSE ELLIPTA) 62.5 MCG/INH inhaler     Vitamin D3 (CHOLECALCIFEROL) 25 mcg (1000 units) tablet     No current facility-administered medications for this visit.             Review of Systems:       A complete review of systems was otherwise negative except as noted in the HPI.      PHYSICAL EXAM:  LMP  (LMP Unknown)       No physical exam was conducted as this was a telephone visit           Data:   All laboratory and imaging data reviewed.      PFT:   FEV1/FVC ratio 0.71.  FVC is 1.12 L which is 42% predicted.  FEV1 is 0.8 L which is 39% predicted.      PFT Interpretation:  Possible airflow obstruction.  Low FVC and FEV1 suggestive of restriction.  No lung volumes available to correlate.  Valid Maneuver    Chest CT scan:  Chest: Thyroid gland appears unremarkable. Esophagus appears  unremarkable. Tracheobronchial tree appears patent.  No suspicious  lung nodules.      Lung nodule(s) described on series 4:  -Solid 3 mm pulmonary nodule of the left upper lobe (Image: 147)  -Solid 4 mm pulmonary nodule in the left upper lobe (Image: 164)     The pleura appears unremarkable. Small right pleural effusion. No  pneumothorax. Enlargement of the pulmonary artery measuring 4.0 cm.  Heart size is within normal limits. Mild atherosclerotic ossifications  of the coronary arteries. No significant pericardial effusion.   Visualized thoracic aorta and main pulmonary artery diameters appear  within normal limits. There are no visualized pathologically enlarged  mediastinal, hilar or axillary lymph nodes.     Abdomen: Examination of the upper abdomen is limited.   Hypoattenuating focus of the pancreas measuring 7 mm. Original  preliminary report had suggested a lesion in the pancreas. Images are  available from outside CT showed a very similar appearance on outside  CT dated 8/13/2016. Appears to be fat extending up into  the pancreas  rather than a mass.     Bones: No suspicious osseous lesion. Severe thoracic dextroscoliosis.         Soft Tissues: No suspicious mass.                                                                      IMPRESSION:   1. Small right pleural effusion.  2. Solid pulmonary nodules as detailed above. Consider follow-up if  patient is stratified into high-risk by Fleischner criteria.  3. Enlargement of the pulmonary artery which can be seen in setting of  pulmonary hypertension.  4. Hepatomegaly.      Phone call duration: 13 minutes    Bassam Taylor MD

## 2020-09-03 NOTE — PROGRESS NOTES
ENT referral faxed to Dr. Domonique Roche at [phone].      Jama De La Vega CMA ([facility]) at 4:37 PM on 9/3/2020

## 2020-09-03 NOTE — PATIENT INSTRUCTIONS
Patient Education     Diagnosing a Parathyroid Problem  Your healthcare provider thinks you may have a parathyroid problem. He or she will ask you about your medical history and give you a physical exam. You may need to have blood tests. You may also need to have other tests, such as an imaging scan. Based on the results, your healthcare provider will work with you on a treatment plan.  Medical history  Your healthcare provider will ask about your medical history. Tell him or her about:    Symptoms you have, even if they seem minor    Health conditions you have, or had in the past    What foods you eat    The medicines,vitamins, and herbal supplements you use. This includes both prescription and over-the-counter medications.    Any allergies to medications, foods, or other substances    Any family history of thyroid disease, cancer, heart attacks, strokes, diabetes, osteoporosis, kidney disease, or other serious chronic illnesses  Physical exam  During a physical exam, your healthcare provider will feel your neck. He or she may also check other parts of your body to look for other causes of your symptoms.                                                                           Blood and urine tests  Certain tests are done to diagnose primary hyperparathyroidism and to rule out other causes of problems. The risk of related health problems, such as kidney and bone disease, is also evaluated. Tests may include:    Blood tests. Samples of blood are drawn from a vein. These are checked for high levels of calcium and parathyroid hormone (PTH). The levels of vitamin D, magnesium, alkaline phosphatase, and phosphorus may also be checked. Your kidney function may also be checked with these blood tests.    Urine tests. Samples of urine are taken over a 24-hour period. These are checked for high levels of calcium and signs of kidney problems. Urine testing could find a type of high calcium which runs in families which would  not need surgery.  Imaging tests  Imaging tests can help show if parathyroid glands are enlarged, and can check your bones for signs of calcium problems. You may have one of the below:    Bone density study. Scans of the hip, lower back, or forearm are taken. This test measures the amount of calcium in the bones to check bone health.    Sestamibi scan. This is used to show enlarged parathyroid glands. The test can take up to 3-4 hours. During the test, a safe radioactive fluid is injected into the veins. This fluid helps make enlarged parathyroid glands show up clearly when a special camera is used.    Ultrasound. This test can also be used to show enlarged parathyroid glands. During this quick test, harmless sound waves are used to form pictures of the parathyroid glands. The pictures are then viewed on a computer screen.    Computed tomography (CT) scan. This test uses a series of many X-rays and a computer to form detailed images.    Magnetic resonance imaging (MRI). This testuses magnets and radio waves to form images. This test is used less often, but they can also be used to show enlarged parathyroid glands.  Date Last Reviewed: 11/1/2016 2000-2019 [employer]. [address] [place] [address]. All rights reserved. This information is not intended as a substitute for professional medical care. Always follow your healthcare professional's instructions.           Patient Education     Understanding Primary Hyperparathyroidism    The parathyroid glands are 4 tiny glands in the neck. They make parathyroid hormone (PTH). PTH controls the amount of calcium and phosphorus in your blood. Primary hyperparathyroidism is when there is too much PTH in your blood. It occurs when one or more of the glands are too active.  The job of PTH is to tell the body how to control calcium. Too much PTH means the body increases the amount of calcium in the blood. This leads to a problem called hypercalcemia.  This is when the amount of calcium in the blood is too high. Hypercalcemia can cause serious health problems.  Causes  Hyperparathyroidism can occur when a parathyroid gland becomes enlarged. It can also occur as a complication of another health conditions, such as kidney failure or rickets. In these conditions, calcium is usually not high. This is called secondary hyperparathyroidism.  Who s at risk  The risk factors for this condition include:    Being a woman (it s less common in men)    Being older (it s more likely to occur with age)    Having parents or siblings with the condition or other endocrine tumors    Having certain kidney problems    Taking certain medicines    Having had radiation treatment in the head or neck  Symptoms  Symptoms of the condition can include:    Muscle weakness    Depression    Tiredness    Confusion and memory loss    Poor memory    Nausea and vomiting    Pain in the stomach area (abdomen)    Hard stools (constipation)    Stomach ulcers    Need to urinate often    Kidney stones    Joint or bone pain    Bone disease (osteopenia or osteoporosis), an increase in bone fractures    High blood pressure    Increased thirst  Treatment  If primary hyperparathyroidism is not treated, it can get worse over time. Treatments include:    Surgery. This may be done to remove any enlarged parathyroid glands. This lets the amount of calcium in the blood go back to normal. You may need to take vitamin D and calcium supplements before and after the surgery. This will reduce the risk of low calcium after the surgery.    Medicine. This lowers the amount of parathyroid hormone made by the overactive glands.   You and your healthcare provider can discuss your treatment options. Make sure to ask any questions you have before you sign the informed consent form for the surgical procedure.  Date Last Reviewed: 11/1/2016 2000-2019 [employer]. [address] [place] [address], [place] [address]. All  rights reserved. This information is not intended as a substitute for professional medical care. Always follow your healthcare professional's instructions.

## 2020-09-03 NOTE — PROGRESS NOTES
"Ca Yan is a 74 year old female who is being evaluated via a billable telephone visit.      The patient has been notified of following:     \"This telephone visit will be conducted via a call between you and your physician/provider. We have found that certain health care needs can be provided without the need for a physical exam.  This service lets us provide the care you need with a short phone conversation.  If a prescription is necessary we can send it directly to your pharmacy.  If lab work is needed we can place an order for that and you can then stop by our lab to have the test done at a later time.    If during the course of the call the physician/provider feels a telephone visit is not appropriate, you will not be charged for this service.\"     Ca Yan has been contacted via telephone for   Chief Complaint   Patient presents with     Fatigue     pt would like to discuss worsening fatigue, blowing nose a lot   She indicates she has had significant fatigue worsened by anxiety and mental distress sometimes requiring her to go to bed to rest.  She also has been bothered by abdominal symptoms which she terms irritable bowel syndrome.  She has yet to complete the fecal occult blood test or FIT testing that I sent home with her on August 20.  I reviewed the acute illness stool testing was normal in August showing no signs of infectious cause for her abdominal discomfort.  She is wondering if she should be scheduled for a colonoscopy however I encouraged her to send in the FIT testing first.  I also am reviewing her lab tests which show her parathyroid hormone level to be elevated at 108 and vitamin D level to be in the low normal range, creatinine is in normal range.  I discussed sometimes parathyroid thyroid abnormalities may contribute to bone symptoms, abdominal symptoms and mental health symptoms.  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  She indicates she is scheduled for several diagnostic tests in September related to her vocal dysphonia.  I will contact her ENT provider Dr Domonique Roche to determine if she will assess parathyroid abnormality   for further evaluation of elevated parathyroid hormone level which may be contributing to her symptoms of fatigue and other constellation of symptoms.  Patient Active Problem List   Diagnosis     Non morbid obesity due to excess calories     Alcohol abuse     Malignant neoplasm of breast (H)     Chronic kidney disease, stage III (moderate) (H)     Osteoarthritis of knee     Major depressive disorder with single episode     Diarrhea     Diastolic dysfunction     Osteoarthritis of spine     Gastroesophageal reflux disease     Generalized anxiety disorder     Hypertension     Hyperlipidemia     Insomnia     Irritable bowel syndrome     Kyphoscoliosis     Cluster C personality disorder (H)     Seborrheic eczema     Anemia     Anxiety state     Asthma     Back pain     Bunion     Low bone density     Fecal incontinence     Left-sided low back pain without sciatica     Major depression, recurrent (H)     Cognitive impairment        Current Outpatient Medications:      albuterol (PROAIR HFA/PROVENTIL HFA/VENTOLIN HFA) 108 (90 Base) MCG/ACT inhaler, Inhale 2 puffs into the lungs every 6 hours as needed for shortness of breath / dyspnea or wheezing, Disp: 3 Inhaler, Rfl: 1     atorvastatin (LIPITOR) 40 MG tablet, Take 1 tablet (40 mg) by mouth daily, Disp: 90 tablet, Rfl: 3     cephALEXin (KEFLEX) 250 MG capsule, Take 250 mg by mouth, Disp: , Rfl:      clonazePAM (KLONOPIN) 0.5 MG tablet, Take 0.5 mg by mouth daily, Disp: , Rfl:      fluticasone-vilanterol (BREO ELLIPTA) 200-25 MCG/INH inhaler, Inhale 1 puff into the lungs daily, Disp: 3 Inhaler, Rfl: 3     ibuprofen (ADVIL,MOTRIN) 200 MG tablet, Take 3 tablets (600 mg) by mouth 3 times daily (with meals), Disp: 90 tablet, Rfl:  0     latanoprost (XALATAN) 0.005 % ophthalmic solution, 1 drop, Disp: , Rfl:      losartan-hydrochlorothiazide (HYZAAR) 100-25 MG tablet, Take 1 tablet by mouth daily, Disp: 90 tablet, Rfl: 3     quetiapine (SEROQUEL) 200 MG tablet, Take 200 mg by mouth At Bedtime., Disp: , Rfl:      sertraline (ZOLOFT) 50 MG tablet, Take 1.5 tablets (75 mg) by mouth daily, Disp: 45 tablet, Rfl: 3     umeclidinium (INCRUSE ELLIPTA) 62.5 MCG/INH inhaler, Inhale 1 puff into the lungs daily, Disp: 7 each, Rfl: 0   Social History     Social History Narrative    Ca Wood never   Lives in apartment  is  family member is daughter Rin in [place], [place]  Previous AA meetings        I have reviewed and updated the patient's Past Medical History, Social History, Family History and Medication List.    ALLERGIES  Azithromycin; Codeine; Hydrocodone; Metronidazole; Oxycodone; Percocet [oxycodone-acetaminophen]; Pollen extract; Seasonal allergies; Vicodin [hydrocodone-acetaminophen]; and Zolpidem  Physical exam  Telephone visit  General interactive but anxious related to her multiple health conditions significant vocal dysphonia noted no current cough or febrile illness  Pulmonary speaking in full sentences no signs of dyspnea but vocal dysphonia  Psychiatric expresses anxiety related to her breathing which is impaired related to significant scoliosis which she has had since age 11.  Multiple somatic concerns.  PHQ 9/26/2018 3/28/2019 6/3/2020   PHQ-9 Total Score 1 1 6   Q9: Thoughts of better off dead/self-harm past 2 weeks Not at all Not at all Not at all     RENATO-7 SCORE 3/28/2019 1/24/2020 6/3/2020   Total Score 0 (minimal anxiety) 14 (moderate anxiety) -   Total Score 0 14 17   Total Score BEH Adult - - -       Additional provider notes: Test results reviewed  Results for CA WOOD (MRN [id number]) as of 9/3/2020 12:46   Ref. Range 8/20/2020 12:02   Sodium Latest Ref Range: 133 - 144 mmol/L 138   Potassium Latest Ref  Range: 3.4 - 5.3 mmol/L 3.9   Chloride Latest Ref Range: 94 - 109 mmol/L 103   Carbon Dioxide Latest Ref Range: 20 - 32 mmol/L 32   Urea Nitrogen Latest Ref Range: 7 - 30 mg/dL 18   Creatinine Latest Ref Range: 0.52 - 1.04 mg/dL 0.81   GFR Estimate Latest Ref Range: >60 mL/min/1.73_m2 70   GFR Estimate If Black Latest Ref Range: >60 mL/min/1.73_m2 81   Calcium Latest Ref Range: 8.5 - 10.1 mg/dL 8.9   Anion Gap Latest Ref Range: 3 - 14 mmol/L 3   Albumin Latest Ref Range: 3.4 - 5.0 g/dL 3.6   Protein Total Latest Ref Range: 6.8 - 8.8 g/dL 8.2   Bilirubin Total Latest Ref Range: 0.2 - 1.3 mg/dL 0.5   Alkaline Phosphatase Latest Ref Range: 40 - 150 U/L 121   ALT Latest Ref Range: 0 - 50 U/L 15   AST Latest Ref Range: 0 - 45 U/L 17   TSH Latest Ref Range: 0.40 - 4.00 mU/L 2.39   Vitamin D Deficiency screening Latest Ref Range: 20 - 75 ug/L 22   Glucose Latest Ref Range: 70 - 99 mg/dL 88   WBC Latest Ref Range: 4.0 - 11.0 10e9/L 5.4   Hemoglobin Latest Ref Range: 11.7 - 15.7 g/dL 14.3   Hematocrit Latest Ref Range: 35.0 - 47.0 % 45.1   Platelet Count Latest Ref Range: 150 - 450 10e9/L 217   RBC Count Latest Ref Range: 3.8 - 5.2 10e12/L 4.59   MCV Latest Ref Range: 78 - 100 fl 98   MCH Latest Ref Range: 26.5 - 33.0 pg 31.2   MCHC Latest Ref Range: 31.5 - 36.5 g/dL 31.7   RDW Latest Ref Range: 10.0 - 15.0 % 15.5 (H)   Diff Method Unknown Automated Method   % Neutrophils Latest Units: % 63.2   % Lymphocytes Latest Units: % 24.1   % Monocytes Latest Units: % 9.0   % Eosinophils Latest Units: % 2.6   % Basophils Latest Units: % 0.9   % Immature Granulocytes Latest Units: % 0.2   Nucleated RBCs Latest Ref Range: 0 /100 0   Absolute Neutrophil Latest Ref Range: 1.6 - 8.3 10e9/L 3.4   Absolute Lymphocytes Latest Ref Range: 0.8 - 5.3 10e9/L 1.3   Absolute Monocytes Latest Ref Range: 0.0 - 1.3 10e9/L 0.5   Absolute Eosinophils Latest Ref Range: 0.0 - 0.7 10e9/L 0.1   Absolute Basophils Latest Ref Range: 0.0 - 0.2 10e9/L 0.1   Abs  Immature Granulocytes Latest Ref Range: 0 - 0.4 10e9/L 0.0   Absolute Nucleated RBC Unknown 0.0   Results for AMAURI WOOD (MRN [id number]) as of 9/3/2020 12:46   Ref. Range 8/20/2020 12:02   Parathyroid Hormone Intact Latest Ref Range: 18 - 80 pg/mL 103 (H)     Assessment/Plan:      She is a 74-year-old female with history of vocal dysphonia following with ENT, depression and anxiety irritable bowel syndrome who presents today for telephone visit for follow-up of test results which showed elevated parathyroid hormone level and low normal vitamin D level otherwise normal.  Her symptoms may be related to an abnormal parathyroid condition which may be contributing to multiple symptoms including fatigue, anxiety, bone abnormalities, GI disturbance therefore further evaluation is required related to her elevated parathyroid hormone.  I will start low-dose vitamin D 1000 international units daily.  I will send a note to her ENT provider to determine if they will follow-up with the parathyroid elevated level. Unable to route to Dr Roche  1. Parathyroid hormone excess (H)  See avobe  - Vitamin D3 (CHOLECALCIFEROL) 25 mcg (1000 units) tablet; Take 1 tablet (25 mcg) by mouth daily  Dispense: 100 tablet; Refill: 3  Kiki Eddy, STEVEN Sahu, Favian SIMONS MD               Hi Dr. Sahu,   I called ENT scheduling, and staff relayed that Dr. Domonique Roche is seeing patients for parathyroid concerns.  Staff could not explain why this provider could not be located in system, but Dr. Roche does have a schedule.  Staff also let me know the referral will need to be faxed to [phone], attn: [facility], so schedulers can contact patient for an appt. with Dr. Roche.  I can let in-clinic staff know about the fax needing sent, will wait to hear back from you after referral is signed.     Thanks,   Kiki        2. Chronic fatigue  See above    3. Irritable bowel syndrome, unspecified type  Complete FIT testing further plan after  results available  4. Kyphoscoliosis  See above    5. Anxiety and depression      Phone call start 11:00  Phone call end 11:22  Phone call duration: 22 minutes  All questions were addressed and voiced understanding and agreement with the above.   Favian Sahu MD

## 2020-09-03 NOTE — Clinical Note
Hello,  I am not able to find Dr Domonique Roche her ENT provider in the system. Could you call ENT to determine if Dr Roche assesses parathyroid concerns or do I need to refer patient to Dr Mary Kate Luong.  Best wishes,  Favian Sahu MD

## 2020-09-03 NOTE — PROGRESS NOTES
"Ca Yan is a 74 year old female who is being evaluated via a billable telephone visit.      The patient has been notified of following:     \"This telephone visit will be conducted via a call between you and your physician/provider. We have found that certain health care needs can be provided without the need for a physical exam.  This service lets us provide the care you need with a short phone conversation.  If a prescription is necessary we can send it directly to your pharmacy.  If lab work is needed we can place an order for that and you can then stop by our lab to have the test done at a later time.    Telephone visits are billed at different rates depending on your insurance coverage. During this emergency period, for some insurers they may be billed the same as an in-person visit.  Please reach out to your insurance provider with any questions.    If during the course of the call the physician/provider feels a telephone visit is not appropriate, you will not be charged for this service.\"    Patient has given verbal consent for Telephone visit?  Yes    What phone number would you like to be contacted at? [phone]    How would you like to obtain your AVS? Mail a copy    [facility]  Pulmonary Medicine  Visit Clinic Note  September 3, 2020         ASSESSMENT & PLAN       Shortness of breath  Kyphosis  Deconditioning  Right middle lobe atelectasis    We spoke again today about the importance of pulmonary rehab for her.  The way look at her lungs is that a lot of her pulmonary related symptoms are actually related to her musculoskeletal system.  She has bad kyphosis which is led to restrictive lung disease and actually likely chronic atelectasis of the right middle lobe.  These are both unfixable issues, and so the priority needs to be focused on keeping her body as conditioned as possible.  This would be the point for pulmonary rehab.  She has started attending this, but she was " "planning on quitting and just doing physical therapy.  I encouraged her to continue doing pulmonary rehab for its benefits of decreased shortness of breath.  She will continue this for total of 36 sessions, and I will see her at the end of her course of pulmonary rehab.  She has not been using any of her inhalers, and I think that that is just fine.  She can use albuterol as needed but all the other inhalers are unnecessary for her. She is getting follow up with ENT regarding a paralyzed vocal cord.  This may also be contributing to some of her dyspnea.      Mono Taylor MD          Today's visit note:     Chief Complaint: Ca Yan is a 74 year old year old female who is being seen for Follow Up (f/u telephone visit)      HISTORY OF PRESENT ILLNESS:    This is a 74-year-old female with a history of kyphoscoliosis, who presents the pulmonary clinic today for evaluation of shortness of breath on exertion.    This visit was a telephone visit.    She has started pulmonary rehab, and has participated in about 4-5 sessions.  She does feel exhausted at the end of it, but does think that she has been able to participate more and more in the pulmonary rehab program.  She actually says that some the stuff might be a little too easy for her.  She also notes that her shortness of breath gets worse when she has \"emotional\" episodes.  When she gets very stressed out, she will become short of breath and this exacerbates her anxiety which then exacerbates her shortness of breath.  The sensation of shortness of breath will go away once her nerves are calmed.    She has not required any antibiotics or steroids recently for lung disease.         Past Medical and Surgical History:     Past Medical History:   Diagnosis Date     Anxiety      Arthritis      Breast cancer (H)      COPD (chronic obstructive pulmonary disease) (H)     6/19/12:  FEV 0.99 l     Depressive disorder      Hypertension      Low back pain with left-sided " sciatica      Low bone density 2017    DEXA 2017: T score -2.0. Normal Z score. FRAX risk: major osteoporotic fracture 11.9%, hip fracture 2.6%, therefore not high-risk     Lymphedema, chronic lower extremities      Past Surgical History:   Procedure Laterality Date     BACK SURGERY      spinal fusion     COLONOSCOPY       LUMPECTOMY BREAST       ORTHOPEDIC SURGERY      Knee surgery left side           Family History:     Family History   Problem Relation Age of Onset     Breast Cancer Mother      Diabetes Mother      Anxiety Disorder Mother      Asthma Mother      Other Cancer Mother      Hyperlipidemia Mother      Alcohol/Drug Father      Mental Illness Father      Substance Abuse Father      Obesity Father      Cerebrovascular Disease Maternal Grandmother 67              Social History:     Social History     Socioeconomic History     Marital status: Single     Spouse name: Not on file     Number of children: Not on file     Years of education: Not on file     Highest education level: Not on file   Occupational History     Employer: RETIRED   Social Needs     Financial resource strain: Not on file     Food insecurity     Worry: Not on file     Inability: Not on file     Transportation needs     Medical: Not on file     Non-medical: Not on file   Tobacco Use     Smoking status: Former Smoker     Packs/day: 0.50     Years: 5.00     Pack years: 2.50     Types: Cigarettes     Start date: 1956     Last attempt to quit: 1980     Years since quittin.9     Smokeless tobacco: Former User     Quit date: 1980   Substance and Sexual Activity     Alcohol use: Not Currently     Alcohol/week: 0.0 standard drinks     Comment: Sober for 2 years, previous alcoholic     Drug use: No     Sexual activity: Not Currently     Partners: Male     Birth control/protection: Abstinence   Lifestyle     Physical activity     Days per week: Not on file     Minutes per session: Not on file     Stress: Not on file    Relationships     Social connections     Talks on phone: Once a week     Gets together: Never     Attends Yazdanism service: More than 4 times per year     Active member of club or organization: Yes     Attends meetings of clubs or organizations: More than 4 times per year     Relationship status: Never      Intimate partner violence     Fear of current or ex partner: No     Emotionally abused: No     Physically abused: No     Forced sexual activity: No   Other Topics Concern     Parent/sibling w/ CABG, MI or angioplasty before 65F 55M? Not Asked      Service Not Asked     Blood Transfusions Not Asked     Caffeine Concern Not Asked     Occupational Exposure Not Asked     Hobby Hazards Not Asked     Sleep Concern Not Asked     Stress Concern Not Asked     Comment: Lives alone     Weight Concern Not Asked     Special Diet Not Asked     Back Care Not Asked     Comment: Hx of scoliosis with spine fusion     Exercise Not Asked     Comment: Walks     Bike Helmet Not Asked     Seat Belt Not Asked     Self-Exams Not Asked   Social History Narrative    Ca Yan never   Lives in apartment  is  family member is daughter Rin in [place], [place]  Previous AA meetings            Medications:     Current Outpatient Medications   Medication     albuterol (PROAIR HFA/PROVENTIL HFA/VENTOLIN HFA) 108 (90 Base) MCG/ACT inhaler     atorvastatin (LIPITOR) 40 MG tablet     cephALEXin (KEFLEX) 250 MG capsule     clonazePAM (KLONOPIN) 0.5 MG tablet     fluticasone-vilanterol (BREO ELLIPTA) 200-25 MCG/INH inhaler     ibuprofen (ADVIL,MOTRIN) 200 MG tablet     latanoprost (XALATAN) 0.005 % ophthalmic solution     losartan-hydrochlorothiazide (HYZAAR) 100-25 MG tablet     quetiapine (SEROQUEL) 200 MG tablet     sertraline (ZOLOFT) 50 MG tablet     umeclidinium (INCRUSE ELLIPTA) 62.5 MCG/INH inhaler     Vitamin D3 (CHOLECALCIFEROL) 25 mcg (1000 units) tablet     No current facility-administered medications for this  visit.             Review of Systems:       A complete review of systems was otherwise negative except as noted in the HPI.      PHYSICAL EXAM:  LMP  (LMP Unknown)       No physical exam was conducted as this was a telephone visit           Data:   All laboratory and imaging data reviewed.      PFT:   FEV1/FVC ratio 0.71.  FVC is 1.12 L which is 42% predicted.  FEV1 is 0.8 L which is 39% predicted.      PFT Interpretation:  Possible airflow obstruction.  Low FVC and FEV1 suggestive of restriction.  No lung volumes available to correlate.  Valid Maneuver    Chest CT scan:  Chest: Thyroid gland appears unremarkable. Esophagus appears  unremarkable. Tracheobronchial tree appears patent.  No suspicious  lung nodules.      Lung nodule(s) described on series 4:  -Solid 3 mm pulmonary nodule of the left upper lobe (Image: 147)  -Solid 4 mm pulmonary nodule in the left upper lobe (Image: 164)     The pleura appears unremarkable. Small right pleural effusion. No  pneumothorax. Enlargement of the pulmonary artery measuring 4.0 cm.  Heart size is within normal limits. Mild atherosclerotic ossifications  of the coronary arteries. No significant pericardial effusion.   Visualized thoracic aorta and main pulmonary artery diameters appear  within normal limits. There are no visualized pathologically enlarged  mediastinal, hilar or axillary lymph nodes.     Abdomen: Examination of the upper abdomen is limited.   Hypoattenuating focus of the pancreas measuring 7 mm. Original  preliminary report had suggested a lesion in the pancreas. Images are  available from outside CT showed a very similar appearance on outside  CT dated 8/13/2016. Appears to be fat extending up into the pancreas  rather than a mass.     Bones: No suspicious osseous lesion. Severe thoracic dextroscoliosis.         Soft Tissues: No suspicious mass.                                                                      IMPRESSION:   1. Small right pleural  effusion.  2. Solid pulmonary nodules as detailed above. Consider follow-up if  patient is stratified into high-risk by Fleischner criteria.  3. Enlargement of the pulmonary artery which can be seen in setting of  pulmonary hypertension.  4. Hepatomegaly.      Phone call duration: 13 minutes    Bassam Taylor MD

## 2020-09-08 ENCOUNTER — TELEPHONE (OUTPATIENT)
Dept: FAMILY MEDICINE | Facility: CLINIC | Age: 75
End: 2020-09-08

## 2020-09-08 NOTE — TELEPHONE ENCOUNTER
Sent referral for ENT tp [phone]  Mailed AVS to patient  Norma Chery CMA at 8:02 AM on 9/8/2020.

## 2020-09-11 ENCOUNTER — TELEPHONE (OUTPATIENT)
Dept: PULMONOLOGY | Facility: CLINIC | Age: 75
End: 2020-09-11

## 2020-09-11 NOTE — TELEPHONE ENCOUNTER
[facility] Call Center    Phone Message    May a detailed message be left on voicemail: yes     Reason for Call: Other: Pt would like a call back to discuss an update on her lungs     Action Taken: Message routed to:  [facility] Pulm    Travel Screening: Not Applicable

## 2020-09-11 NOTE — TELEPHONE ENCOUNTER
Left message with patient to return call to clinic to discuss update with her lungs. Left clinic number.

## 2020-09-15 ENCOUNTER — TELEPHONE (OUTPATIENT)
Dept: FAMILY MEDICINE | Facility: CLINIC | Age: 75
End: 2020-09-15

## 2020-09-15 NOTE — TELEPHONE ENCOUNTER
[facility] Call Center    Phone Message    May a detailed message be left on voicemail: yes     Reason for Call: Other: Per Patient is wanting to get a call back in regards to a hospital visit. Patient states went to [facility] and  had a chest x-ray done and was told has an enlarged heart. Patient is wanting to know what that means. please advise.      Action Taken: Message routed to:  [facility] pcc    Travel Screening: Not Applicable

## 2020-09-16 NOTE — TELEPHONE ENCOUNTER
I called and left VM letting the patient know that she is in need of hospital follow up. She should call us back to schedule next avail telephone or video appointment with Dr. Sahu to discuss her recent hospital visit and medical concerns.   Rachel Kate, EMT at 11:36 AM on 9/16/2020.

## 2020-09-17 ENCOUNTER — TELEPHONE (OUTPATIENT)
Dept: PULMONOLOGY | Facility: CLINIC | Age: 75
End: 2020-09-17

## 2020-09-17 ENCOUNTER — TELEPHONE (OUTPATIENT)
Dept: FAMILY MEDICINE | Facility: CLINIC | Age: 75
End: 2020-09-17

## 2020-09-17 NOTE — TELEPHONE ENCOUNTER
----- Message from Sisi Bradley RN sent at 9/17/2020 10:34 AM CDT -----  Regarding: returning your call  Pt left message stating she was returning your call and would like to speak with you.   [phone]

## 2020-09-17 NOTE — TELEPHONE ENCOUNTER
[facility] Call Center    Phone Message    May a detailed message be left on voicemail: yes     Reason for Call: Symptoms or Concerns     If patient has red-flag symptoms, warm transfer to triage line    Current symptom or concern: UTI    Symptoms have been present for:  1 week(s)    Has patient previously been seen for this? No      Are there any new or worsening symptoms? Yes: Patient calling stating she was in the ER due to a bladder infection and was given medication, patient states everytime she tries to do anything she gets real hot and is requesting a return call to discuss thank you.       Action Taken: Message routed to:  [facility]    Travel Screening: Not Applicable

## 2020-09-18 NOTE — TELEPHONE ENCOUNTER
Patient was reached by phone, and reported that she has had chills and then episodes of feeling very hot, coming and going.  She relayed that her med was changed to Cefpodoxone Proxetil from Macrobid.  Both meds were reviewed on Micromedex, but no side effects were noted with regard to hot flashes.  RN let patient know that she should finish all of Rx and also drink plenty of water, and that symptoms should improve after the weekend.  Also that chills and hot episodes could be related to the body trying to fight off infection.  Patient will call clinic back if symptoms do not improve.    Kiki Eddy RN on 9/18/2020 at 1:33 PM

## 2020-09-21 ENCOUNTER — THERAPY VISIT (OUTPATIENT)
Dept: PHYSICAL THERAPY | Facility: CLINIC | Age: 75
End: 2020-09-21
Payer: COMMERCIAL

## 2020-09-21 DIAGNOSIS — M41.9 KYPHOSCOLIOSIS: Primary | ICD-10-CM

## 2020-09-21 DIAGNOSIS — M54.6 LEFT-SIDED THORACIC BACK PAIN, UNSPECIFIED CHRONICITY: ICD-10-CM

## 2020-09-21 PROBLEM — M54.50 LEFT-SIDED LOW BACK PAIN WITHOUT SCIATICA: Status: RESOLVED | Noted: 2017-09-14 | Resolved: 2020-09-21

## 2020-09-21 PROCEDURE — 97110 THERAPEUTIC EXERCISES: CPT | Mod: GP | Performed by: PHYSICAL THERAPIST

## 2020-09-21 PROCEDURE — 97161 PT EVAL LOW COMPLEX 20 MIN: CPT | Mod: GP | Performed by: PHYSICAL THERAPIST

## 2020-09-21 NOTE — PROGRESS NOTES
[facility] Initial Evaluation  Subjective:  The history is provided by the patient. No  was used.   Therapist Generated HPI Evaluation  Problem details: Saw MD for back pain on 7/9/2020.  Has difficulty breathing and MD feels this is partly a result of kyphoscoliosis.  Intermittently uses oxygen. Was told recently that she has an enlarged heart as well.  Has vocal cord dysfunction as well.  Does not exercise due to difficulty breathing.  Currently taking medication for a bladder infection. Pain is on the left side.  Worse with bending forward to wash hair over the sink.. Lives by herself.   Had surgery for scoliosis when she was 11.  Not as active now due to COVID.  Used to swim and walk.         Type of problem:  Lumbar.    This is a chronic condition.  Condition occurred with:  Bending.    Patient reports pain:  Lumbar spine left.    Pain radiates to:  No radiation.     Associated symptoms:  Loss of motion/stiffness. Symptoms are exacerbated by bending  and relieved by rest.                              Objective:  Standing Alignment:        Lumbar deviations alignment: scoliosis stabilized with rods.            Gait:      Deviations:  Hip:  Trendelenberg L and Trendelenberg RGeneral Deviations:  Jenny decr    Flexibility/Screens:     Upper Extremity:    Decreased left upper extremity flexibility at:  Pectoralis Major and Latissimus    Decreased right upper extremity flexibility present at:  Pectoralis Major and Latissimus  Lower Extremity:  Decreased left lower extremity flexibility:Hip Flexors and Quadriceps    Decreased right lower extremity flexibility:  Hip Flexors and Quadriceps  Spine:  Decreased left spine flexibility:  Quadratus Lumborum               Lumbar/SI Evaluation  ROM:    AROM Lumbar:   Flexion:        Minimal movement, from hips only  Ext:                    Difficulty extending to neutral   Side Bend:        Left:     Right:   Rotation:           Left:      Right:   Side Glide:        Left:     Right:           Lumbar Myotomes:  not assessed            Lumbar DTR's:  not assessed      Cord Signs:  not assessed    Lumbar Dermtomes:  normal                Neural Tension/Mobility:  Lumbar:  Not assessed        Lumbar Palpation:    Tenderness present at Left:    Quadratus Lumborum and Erector Spinae    Functional Tests:  Core strength and proprioception lumbar: unable to get out of chair with use of her hands.                                                         General     ROS    Assessment/Plan:    Patient is a 74 year old female with lumbar complaints.    Patient has the following significant findings with corresponding treatment plan.                Diagnosis 1:  Back pain  Pain -  self management and education  Decreased ROM/flexibility - therapeutic exercise and home program  Decreased strength - therapeutic exercise, therapeutic activities and home program  Impaired gait - home program  Decreased function - therapeutic activities and home program  Impaired posture - neuro re-education and home program    Therapy Evaluation Codes:   1) History comprised of:   Personal factors that impact the plan of care:      None and Time since onset of symptoms.    Comorbidity factors that impact the plan of care are:      Heart problems and breathing problems.     Medications impacting care: None.  2) Examination of Body Systems comprised of:   Body structures and functions that impact the plan of care:      Lumbar spine.   Activity limitations that impact the plan of care are:      Bathing, Bending, Lifting, Standing, Walking and Laying down.  3) Clinical presentation characteristics are:   Evolving/Changing.  4) Decision-Making    Moderate complexity using standardized patient assessment instrument and/or measureable assessment of functional outcome.  Cumulative Therapy Evaluation is: Moderate complexity.    Previous and current functional limitations:  (See Goal Flow  Sheet for this information)    Short term and Long term goals: (See Goal Flow Sheet for this information)     Communication ability:  Patient appears to be able to clearly communicate and understand verbal and written communication and follow directions correctly.  Treatment Explanation - The following has been discussed with the patient:   RX ordered/plan of care  Anticipated outcomes  Possible risks and side effects  This patient would benefit from PT intervention to resume normal activities.   Rehab potential is good.    Frequency:  One visit  Duration:  One visit  Discharge Plan:  Independent in home treatment program.  Reach maximal therapeutic benefit.    Please refer to the daily flowsheet for treatment today, total treatment time and time spent performing 1:1 timed codes.

## 2020-09-24 ENCOUNTER — VIRTUAL VISIT (OUTPATIENT)
Dept: FAMILY MEDICINE | Facility: CLINIC | Age: 75
End: 2020-09-24
Payer: COMMERCIAL

## 2020-09-24 DIAGNOSIS — I51.7 CARDIOMEGALY: ICD-10-CM

## 2020-09-24 DIAGNOSIS — N39.0 RECURRENT UTI: Primary | ICD-10-CM

## 2020-09-24 RX ORDER — CEFPODOXIME PROXETIL 200 MG/1
200 TABLET, FILM COATED ORAL 2 TIMES DAILY
COMMUNITY
Start: 2020-09-17 | End: 2021-04-07

## 2020-09-24 NOTE — NURSING NOTE
Chief Complaint   Patient presents with     Hospital F/U     Pt is following up from the ED     UTI     Pt would like to discuss UTI/bladder infection problem.      Video Visit Technology for this patient: No video technology available to patient, please call patient over the phone.    Ada Murray LPN at 9:43 AM on 9/24/2020.

## 2020-09-24 NOTE — PROGRESS NOTES
"Ca Yan is a 74 year old female who is being evaluated via a billable telephone visit.      The patient has been notified of following:     \"This telephone visit will be conducted via a call between you and your physician/provider. We have found that certain health care needs can be provided without the need for a physical exam.  This service lets us provide the care you need with a short phone conversation.  If a prescription is necessary we can send it directly to your pharmacy.  If lab work is needed we can place an order for that and you can then stop by our lab to have the test done at a later time.    Telephone visits are billed at different rates depending on your insurance coverage. During this emergency period, for some insurers they may be billed the same as an in-person visit.  Please reach out to your insurance provider with any questions.    If during the course of the call the physician/provider feels a telephone visit is not appropriate, you will not be charged for this service.\"    Patient has given verbal consent for Telephone visit?  Yes    What phone number would you like to be contacted at? [phone] (home)     How would you like to obtain your AVS? Mail a copy    Subjective     Ca Yan is a 74 year old female who presents via phone visit today for the following health issues:    BRUNO  Olivia presents today via virtual telephone visit because of recent need to go to urgent care and then was sent to emergency room for urinary tract infection.  She was started on Cefpodoxime 200 mg twice daily after presenting with chills and fatigue but did not really notice urinary symptoms.  She maintains on low-dose Keflex 250 mg for prophylaxis and has a visit scheduled with urology outside of [facility] tomorrow for next steps related to her chronic urinary tract infections.  She does have a history of urinary incontinence and fecal incontinence wears a pad and is contemplating stimulator " for assistance of her urinary and fecal malfunction.    Of note is during the evaluation chest x-ray was performed and showed enlargement of her heart therefore she is concerned about the possibility of cardiomegaly.  Last echo was done 1 year ago and showed a normal ejection fraction of 60% and she has not had any cardiac symptoms.  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  She denies dyspnea or chest pain but did not feel very well due to the urinary tract infection.  She is feeling better on the antibiotics.  She is very anxious about what medication she should be taking for chronic urinary tract prophylaxis and will discuss this with her urologist.  Patient Active Problem List   Diagnosis     Non morbid obesity due to excess calories     Alcohol abuse     Malignant neoplasm of breast (H)     Chronic kidney disease, stage III (moderate) (H)     Osteoarthritis of knee     Major depressive disorder with single episode     Diarrhea     Diastolic dysfunction     Osteoarthritis of spine     Gastroesophageal reflux disease     Generalized anxiety disorder     Hypertension     Hyperlipidemia     Insomnia     Irritable bowel syndrome     Kyphoscoliosis     Cluster C personality disorder (H)     Seborrheic eczema     Anemia     Anxiety state     Left-sided thoracic back pain     Bunion     Low bone density     Fecal incontinence     Major depression, recurrent (H)     Cognitive impairment     Parathyroid hormone excess (H)     Chronic fatigue     Past Medical History:   Diagnosis Date     Anxiety      Arthritis      Breast cancer (H)      COPD (chronic obstructive pulmonary disease) (H)     6/19/12:  FEV 0.99 l     Depressive disorder      Hypertension      Low back pain with left-sided sciatica      Low bone density 4/13/2017    DEXA April 12, 2017: T score -2.0. Normal Z score. FRAX risk: major osteoporotic fracture 11.9%, hip fracture 2.6%, therefore not  high-risk     Lymphedema, chronic lower extremities      Past Surgical History:   Procedure Laterality Date     BACK SURGERY      spinal fusion     COLONOSCOPY       LUMPECTOMY BREAST       ORTHOPEDIC SURGERY      Knee surgery left side     Current Outpatient Medications   Medication Sig Dispense Refill     albuterol (PROAIR HFA/PROVENTIL HFA/VENTOLIN HFA) 108 (90 Base) MCG/ACT inhaler Inhale 2 puffs into the lungs every 6 hours as needed for shortness of breath / dyspnea or wheezing 3 Inhaler 1     atorvastatin (LIPITOR) 40 MG tablet Take 1 tablet (40 mg) by mouth daily 90 tablet 3     cefpodoxime (VANTIN) 200 MG tablet Take 200 mg by mouth 2 times daily       cephALEXin (KEFLEX) 250 MG capsule Take 250 mg by mouth       clonazePAM (KLONOPIN) 0.5 MG tablet Take 0.5 mg by mouth daily       fluticasone-vilanterol (BREO ELLIPTA) 200-25 MCG/INH inhaler Inhale 1 puff into the lungs daily 3 Inhaler 3     ibuprofen (ADVIL,MOTRIN) 200 MG tablet Take 3 tablets (600 mg) by mouth 3 times daily (with meals) 90 tablet 0     latanoprost (XALATAN) 0.005 % ophthalmic solution 1 drop       losartan-hydrochlorothiazide (HYZAAR) 100-25 MG tablet Take 1 tablet by mouth daily 90 tablet 3     sertraline (ZOLOFT) 50 MG tablet Take 1.5 tablets (75 mg) by mouth daily 45 tablet 3     umeclidinium (INCRUSE ELLIPTA) 62.5 MCG/INH inhaler Inhale 1 puff into the lungs daily 7 each 0     Vitamin D3 (CHOLECALCIFEROL) 25 mcg (1000 units) tablet Take 1 tablet (25 mcg) by mouth daily 100 tablet 3     quetiapine (SEROQUEL) 200 MG tablet Take 200 mg by mouth At Bedtime.       Review of Systems   Constitutional, HEENT, cardiovascular, pulmonary, gi and gu systems are negative, except as otherwise noted.       Objective          Vitals:  No vitals were obtained today due to virtual visit.    alert and mild distress, worried about UTI and cardiomegally  PSYCH: Alert and oriented times 3; coherent speech chronic vocal hoarsness  rate and volume, able to  articulate logical thoughts, able   to abstract reason, no tangential thoughts, no hallucinations   or delusions  Her affect is pleasant, anxious  RESP: No cough, no audible wheezing, able to talk in full sentences  Remainder of exam unable to be completed due to telephone visits    ER visit reviewed          Assessment & Plan   Olivia is a 74-year-old female who has a history of recent urinary tract infection requiring emergency room visit.  She is currently on Cefpodoxime 200 mg twice daily and has a follow-up appointment with urologist tomorrow to further discuss urinary tract infection prevention.  She does have chronic fecal incontinence and has been working with colorectal and urology on a plan.  Noted at the emergency room chest x-ray showed cardiomegaly however there is no signs of pulmonary edema and she does not have symptoms related to cardiomegaly therefore I recommended an echocardiogram to further evaluate to ensure stability.  She will keep a follow-up with me in October to review test results already scheduled    Ca was seen today for hospital f/u and uti.    Diagnoses and all orders for this visit:    Recurrent UTI  To be discussed with urology  Cardiomegaly  -     Echocardiogram Complete; Future               Already scheduled in October  Favian Sahu MD  [facility]    Phone call duration: 10:37-10:57 AM  20 minutes

## 2020-10-05 ENCOUNTER — OFFICE VISIT (OUTPATIENT)
Dept: FAMILY MEDICINE | Facility: CLINIC | Age: 75
End: 2020-10-05
Payer: COMMERCIAL

## 2020-10-05 VITALS
DIASTOLIC BLOOD PRESSURE: 71 MMHG | WEIGHT: 189.2 LBS | BODY MASS INDEX: 32.48 KG/M2 | SYSTOLIC BLOOD PRESSURE: 130 MMHG | TEMPERATURE: 97.9 F | OXYGEN SATURATION: 91 % | HEART RATE: 74 BPM

## 2020-10-05 DIAGNOSIS — R49.0 DYSPHONIA: ICD-10-CM

## 2020-10-05 DIAGNOSIS — N39.0 RECURRENT UTI: ICD-10-CM

## 2020-10-05 DIAGNOSIS — Z23 ENCOUNTER FOR IMMUNIZATION: ICD-10-CM

## 2020-10-05 DIAGNOSIS — I10 ESSENTIAL HYPERTENSION: Primary | ICD-10-CM

## 2020-10-05 PROCEDURE — 90662 IIV NO PRSV INCREASED AG IM: CPT | Performed by: FAMILY MEDICINE

## 2020-10-05 PROCEDURE — 99214 OFFICE O/P EST MOD 30 MIN: CPT | Mod: 25 | Performed by: FAMILY MEDICINE

## 2020-10-05 PROCEDURE — G0008 ADMIN INFLUENZA VIRUS VAC: HCPCS | Performed by: FAMILY MEDICINE

## 2020-10-05 RX ORDER — HYDROCHLOROTHIAZIDE 12.5 MG/1
CAPSULE ORAL
COMMUNITY
Start: 2020-06-02 | End: 2021-04-07

## 2020-10-05 ASSESSMENT — PAIN SCALES - GENERAL: PAINLEVEL: NO PAIN (0)

## 2020-10-05 NOTE — PATIENT INSTRUCTIONS
[facility] Medication Refill Request Information:  * Please contact your pharmacy regarding ANY request for medication refills.  ** [facility] Prescription Fax = [phone]  * Please allow 3 business days for routine medication refills.  * Please allow 5 business days for controlled substance medication refills.     [facility] Test Result notification information:  *You will be notified with in 7-10 days of your appointment day regarding the results of your test.  If you are on MyChart you will be notified as soon as the provider has reviewed the results and signed off on them.    Primary Care Center: [phone]     [place] [phone] (3rd [facility] [address]

## 2020-10-05 NOTE — NURSING NOTE
Patient received Flu high dose vaccine. Vaccine was given under the supervision of Dr. Sahu. See immunization list for administration details. Pt was advised to remain in [facility] lobby for 15 minutes in case of an averse reaction. Given by Mi Malik CMA, EMT 2:15 PM 10/5/2020

## 2020-10-05 NOTE — NURSING NOTE
Chief Complaint   Patient presents with     Heart Problem     pt here to discuss enlarged heart     Hypertension     pt would like to discuss blood pressure       Norma Chery CMA at 10:46 AM on 10/5/2020.

## 2020-10-05 NOTE — PROGRESS NOTES
Ca Yan is here after establishing primary care for follow up of chronic health conditions. Concerns are as follows:    Urinary tract infection recurrent  She is following with the urologist recently had urinary tract infection symptoms have resolved.  No current dysuria she is on chronic Keflex 250 mg daily. She has a stool problem where some of the stool goes into the vaginal area causing occassionally UTI but the low dose Keflex seems to assist. She is considering a stimulator working with Dr Cornell Guo.  She is here today to check her blood pressure which is in goal range.  She is here today to receive influenza vaccine.   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    She has a severe spinal scoliosis and kyphosis most likely as the cause for her breathing concerns.  Chronic leg lymphedema does not like her legs touched    Patient Active Problem List   Diagnosis     Non morbid obesity due to excess calories     Alcohol abuse     Malignant neoplasm of breast (H)     Chronic kidney disease, stage III (moderate)     Osteoarthritis of knee     Major depressive disorder with single episode     Diarrhea     Diastolic dysfunction     Osteoarthritis of spine     Gastroesophageal reflux disease     Generalized anxiety disorder     Hypertension     Hyperlipidemia     Insomnia     Irritable bowel syndrome     Kyphoscoliosis     Cluster C personality disorder (H)     Seborrheic eczema     Anemia     Anxiety state     Left-sided thoracic back pain     Bunion     Low bone density     Fecal incontinence     Major depression, recurrent (H)     Cognitive impairment     Parathyroid hormone excess (H)     Chronic fatigue       Past Medical History:   Diagnosis Date     Anxiety      Arthritis      Breast cancer (H)      COPD (chronic obstructive pulmonary disease) (H)     6/19/12:  FEV 0.99 l     Depressive disorder      Hypertension       Low back pain with left-sided sciatica      Low bone density 4/13/2017    DEXA April 12, 2017: T score -2.0. Normal Z score. FRAX risk: major osteoporotic fracture 11.9%, hip fracture 2.6%, therefore not high-risk     Lymphedema, chronic lower extremities        Past Surgical History:   Procedure Laterality Date     BACK SURGERY      spinal fusion     COLONOSCOPY       LUMPECTOMY BREAST       ORTHOPEDIC SURGERY      Knee surgery left side       Current Outpatient Medications   Medication Sig Dispense Refill     albuterol (PROAIR HFA/PROVENTIL HFA/VENTOLIN HFA) 108 (90 Base) MCG/ACT inhaler Inhale 2 puffs into the lungs every 6 hours as needed for shortness of breath / dyspnea or wheezing 3 Inhaler 1     atorvastatin (LIPITOR) 40 MG tablet Take 1 tablet (40 mg) by mouth daily 90 tablet 3     cefpodoxime (VANTIN) 200 MG tablet Take 200 mg by mouth 2 times daily       cephALEXin (KEFLEX) 250 MG capsule Take 250 mg by mouth       clonazePAM (KLONOPIN) 0.5 MG tablet Take 0.5 mg by mouth daily       fluticasone-vilanterol (BREO ELLIPTA) 200-25 MCG/INH inhaler Inhale 1 puff into the lungs daily 3 Inhaler 3     hydrochlorothiazide (MICROZIDE) 12.5 MG capsule        ibuprofen (ADVIL,MOTRIN) 200 MG tablet Take 3 tablets (600 mg) by mouth 3 times daily (with meals) 90 tablet 0     latanoprost (XALATAN) 0.005 % ophthalmic solution 1 drop       losartan-hydrochlorothiazide (HYZAAR) 100-25 MG tablet Take 1 tablet by mouth daily 90 tablet 3     quetiapine (SEROQUEL) 200 MG tablet Take 200 mg by mouth At Bedtime.       sertraline (ZOLOFT) 50 MG tablet Take 1.5 tablets (75 mg) by mouth daily 45 tablet 3     umeclidinium (INCRUSE ELLIPTA) 62.5 MCG/INH inhaler Inhale 1 puff into the lungs daily 7 each 0     Vitamin D3 (CHOLECALCIFEROL) 25 mcg (1000 units) tablet Take 1 tablet (25 mcg) by mouth daily 100 tablet 3       Immunization History   Administered Date(s) Administered     Influenza (H1N1) 01/08/2010     Influenza (High Dose) 3  valent vaccine 2016, 10/31/2016, 10/05/2017, 10/04/2018, 10/11/2019     Influenza (IIV3) PF 2010, 2013, 2014     Influenza, Quad, High Dose, Pf, 65yr + 10/05/2020     Mantoux Tuberculin Skin Test 2013, 02/10/2014     Pneumo Conj 13-V (2010&after) 2015     Pneumococcal 23 valent 2009, 2012     TD (ADULT, 7+) 2005, 2005     Tdap (Adacel,Boostrix) 2011       Allergies   Allergen Reactions     Azithromycin Itching     Codeine Nausea and Vomiting     Hydrocodone Nausea and Vomiting     Metronidazole Nausea     Oxycodone Itching and Rash     Percocet [Oxycodone-Acetaminophen] Nausea and Vomiting     Pollen Extract      sneezing and runny nose.      Seasonal Allergies      sneezing and runny nose.      Vicodin [Hydrocodone-Acetaminophen] Nausea and Vomiting     Zolpidem      Amnestic behavior       Social History     Socioeconomic History     Marital status: Single     Spouse name: Not on file     Number of children: Not on file     Years of education: Not on file     Highest education level: Not on file   Occupational History     Employer: RETIRED   Social Needs     Financial resource strain: Not on file     Food insecurity     Worry: Not on file     Inability: Not on file     Transportation needs     Medical: Not on file     Non-medical: Not on file   Tobacco Use     Smoking status: Former Smoker     Packs/day: 0.50     Years: 5.00     Pack years: 2.50     Types: Cigarettes     Start date: 1956     Quit date: 1980     Years since quittin.0     Smokeless tobacco: Former User     Quit date: 1980   Substance and Sexual Activity     Alcohol use: Not Currently     Alcohol/week: 0.0 standard drinks     Comment: Sober for 2 years, previous alcoholic     Drug use: No     Sexual activity: Not Currently     Partners: Male     Birth control/protection: Abstinence   Lifestyle     Physical activity     Days per week: Not on file     Minutes per  session: Not on file     Stress: Not on file   Relationships     Social connections     Talks on phone: Once a week     Gets together: Never     Attends Congregation service: More than 4 times per year     Active member of club or organization: Yes     Attends meetings of clubs or organizations: More than 4 times per year     Relationship status: Never      Intimate partner violence     Fear of current or ex partner: No     Emotionally abused: No     Physically abused: No     Forced sexual activity: No   Other Topics Concern     Parent/sibling w/ CABG, MI or angioplasty before 65F 55M? Not Asked      Service Not Asked     Blood Transfusions Not Asked     Caffeine Concern Not Asked     Occupational Exposure Not Asked     Hobby Hazards Not Asked     Sleep Concern Not Asked     Stress Concern Not Asked     Comment: Lives alone     Weight Concern Not Asked     Special Diet Not Asked     Back Care Not Asked     Comment: Hx of scoliosis with spine fusion     Exercise Not Asked     Comment: Walks     Bike Helmet Not Asked     Seat Belt Not Asked     Self-Exams Not Asked   Social History Narrative    Ca Yan never   Lives in apartment  is  family member is daughter Rin in [place], [place]  Previous AA meetings    She does not have a health care agent    Family History   Problem Relation Age of Onset     Breast Cancer Mother      Diabetes Mother      Anxiety Disorder Mother      Asthma Mother      Other Cancer Mother      Hyperlipidemia Mother      Alcohol/Drug Father      Mental Illness Father      Substance Abuse Father      Obesity Father      Cerebrovascular Disease Maternal Grandmother 67     ROS noted in HPI and ROS negative for infection  /71 (BP Location: Right arm, Patient Position: Sitting, Cuff Size: Adult Large)   Pulse 74   Temp 97.9  F (36.6  C) (Oral)   Wt 85.8 kg (189 lb 3.2 oz)   LMP  (LMP Unknown)   SpO2 91%   BMI 32.48 kg/m    Constitutional: Oriented to person, place,  and time. Vital signs are noted.  Appears well-nourished. Non-toxic appearance.  No distress. She was cooperative and interactive today and appreciative of the visit. She has vocal dysphonia. She was able to wear a mask the entire visit without distress. White hair  HENT: TM normal. External canal normal.  Head: Normocephalic and atraumatic.   Mouth/Throat: Deferred wearing a mask  Eyes: Conjunctivae and EOM are normal. Pupils are equal, round, and reactive to light. No scleral icterus. Glasses  Neck: Normal range of motion. Neck supple. No JVD present. No tracheal deviation present. No thyromegaly present.   Cardiovascular: Regular rhythm, normal heart sounds and intact distal pulses. No murmur present. Exam reveals no gallop and no friction rub.   Pulmonary/Chest: Effort normal and breath sounds normal. No respiratory distress.   Abdominal: Obese Soft. Bowel sounds are normal. No distension and no mass. No tenderness. Bladder non distended.  Musculoskeletal:Significant kyphoscoliosis, bilateral chronic lymphedema  Lymphadenopathy:   No cervical adenopathy.   Neurological: Alert and oriented to person, place, and time. General deconditioning, sitting in wheelchair but moves all extremities   Skin: Skin is warm and dry, mild pallor. No rash noted. No erythema.   Psychiatric: Fixed ideas, pleasant, cooperative, thought coherent but expresses anxiety related urinary infection      Ca was seen today to check blood pressure.  Hyzaar 100-25 mg one tab daily. We also talked about lowering sodium in hr diet, increase fresh fruits and veggies.    Diagnoses and all orders for this visit:    Essential hypertension  Improved on Hyzaar  Dysphonia  Follows with ENT  Encounter for immunization  -     FLU VACCINE HIGH DOSE PRESERVATIVE FREE, AGE =>65 YR    Recurrent UTI  On preventive Keflex, Urology coordinating with colon rectal    6 month follow-up earlier if concerns  All questions were addressed and voiced understanding  and agreement with the above.    Favian Sahu MD

## 2020-10-08 ENCOUNTER — ANCILLARY PROCEDURE (OUTPATIENT)
Dept: GENERAL RADIOLOGY | Facility: CLINIC | Age: 75
End: 2020-10-08
Attending: OTOLARYNGOLOGY
Payer: COMMERCIAL

## 2020-10-08 ENCOUNTER — OFFICE VISIT (OUTPATIENT)
Dept: INTERNAL MEDICINE | Facility: CLINIC | Age: 75
End: 2020-10-08
Payer: COMMERCIAL

## 2020-10-08 ENCOUNTER — THERAPY VISIT (OUTPATIENT)
Dept: SPEECH THERAPY | Facility: CLINIC | Age: 75
End: 2020-10-08
Payer: COMMERCIAL

## 2020-10-08 ENCOUNTER — ANCILLARY PROCEDURE (OUTPATIENT)
Dept: CT IMAGING | Facility: CLINIC | Age: 75
End: 2020-10-08
Attending: OTOLARYNGOLOGY
Payer: COMMERCIAL

## 2020-10-08 VITALS
OXYGEN SATURATION: 97 % | WEIGHT: 189 LBS | DIASTOLIC BLOOD PRESSURE: 87 MMHG | HEART RATE: 71 BPM | BODY MASS INDEX: 32.44 KG/M2 | SYSTOLIC BLOOD PRESSURE: 146 MMHG

## 2020-10-08 DIAGNOSIS — R13.14 PHARYNGOESOPHAGEAL DYSPHAGIA: ICD-10-CM

## 2020-10-08 DIAGNOSIS — R13.14 PHARYNGOESOPHAGEAL DYSPHAGIA: Primary | ICD-10-CM

## 2020-10-08 DIAGNOSIS — R21 RASH AND NONSPECIFIC SKIN ERUPTION: Primary | ICD-10-CM

## 2020-10-08 DIAGNOSIS — J38.01 VOCAL FOLD PARALYSIS, UNILATERAL: ICD-10-CM

## 2020-10-08 LAB
CREAT BLD-MCNC: 0.9 MG/DL (ref 0.52–1.04)
GFR SERPL CREATININE-BSD FRML MDRD: 61 ML/MIN/{1.73_M2}

## 2020-10-08 PROCEDURE — 92611 MOTION FLUOROSCOPY/SWALLOW: CPT | Mod: GN | Performed by: SPEECH-LANGUAGE PATHOLOGIST

## 2020-10-08 PROCEDURE — 82565 ASSAY OF CREATININE: CPT | Performed by: PATHOLOGY

## 2020-10-08 PROCEDURE — 74230 X-RAY XM SWLNG FUNCJ C+: CPT | Mod: GC | Performed by: RADIOLOGY

## 2020-10-08 PROCEDURE — 36415 COLL VENOUS BLD VENIPUNCTURE: CPT | Performed by: RADIOLOGY

## 2020-10-08 PROCEDURE — 99214 OFFICE O/P EST MOD 30 MIN: CPT | Mod: GC | Performed by: STUDENT IN AN ORGANIZED HEALTH CARE EDUCATION/TRAINING PROGRAM

## 2020-10-08 PROCEDURE — 70491 CT SOFT TISSUE NECK W/DYE: CPT | Performed by: RADIOLOGY

## 2020-10-08 PROCEDURE — 92610 EVALUATE SWALLOWING FUNCTION: CPT | Mod: GN | Performed by: SPEECH-LANGUAGE PATHOLOGIST

## 2020-10-08 PROCEDURE — 99207 XR ESOPHAGRAM: CPT | Performed by: RADIOLOGY

## 2020-10-08 RX ORDER — BARIUM SULFATE 400 MG/ML
SUSPENSION ORAL ONCE
Status: COMPLETED | OUTPATIENT
Start: 2020-10-08 | End: 2020-10-08

## 2020-10-08 RX ORDER — BARIUM SULFATE 400 MG/ML
SUSPENSION ORAL ONCE
Status: DISCONTINUED | OUTPATIENT
Start: 2020-10-08 | End: 2020-10-08

## 2020-10-08 RX ORDER — IOPAMIDOL 755 MG/ML
100 INJECTION, SOLUTION INTRAVASCULAR ONCE
Status: COMPLETED | OUTPATIENT
Start: 2020-10-08 | End: 2020-10-08

## 2020-10-08 RX ADMIN — BARIUM SULFATE: 400 SUSPENSION ORAL at 11:25

## 2020-10-08 RX ADMIN — IOPAMIDOL 100 ML: 755 INJECTION, SOLUTION INTRAVASCULAR at 10:43

## 2020-10-08 NOTE — NURSING NOTE
Chief Complaint   Patient presents with     Recheck Medication     left arm is red and swollen. itchy     Kimberly Nissen, EMT at 1:40 PM on 10/8/2020

## 2020-10-08 NOTE — DISCHARGE INSTRUCTIONS
Videofluoroscopic Swallow Study Results    Problem Identified: Trace aspiration noted on large consecutive sips of thin liquid    Diet Recommended: Regular solids and thin liquids    Swallowing Suggestions:  Sit upright at 90 degrees  Eat slowly  Chew carefully  Do not talk while chewing or swallowing    Follow up: Will review imaging with Dr. Roche next week. We will reach out if there are further recommendations.

## 2020-10-08 NOTE — PROGRESS NOTES
CC: Rash     A&P: Yuriy is a 74 Y with a PMH of HTN, HLD, CKDIII, recent UTI, COPD who presents with a new left arm well demarcated reticular rased rash accompanied by itching.     Well demarcated raised rash  Patient's rash is unlikely to be cellulitis. However she is already on cephalexin 250 that would manege it. Since her rash is subsiding from initial presentation and she is currently a-febrile, I told her to send us a message if it gets worse. If it does we would start her on Clindamycin.     Eliu Edwards MD  Internal Medicine Resident (PGY1)  [id number]    This patient was discussed and seen with Dr. Davison    I was present during the visit and the patient was seen and examined by me in conjunction with the resident.  I discussed the pertinent history, exam, results and plan with the resident and agree with the documentation above with the following exceptions: none.    Marge Davison MD        HPI: Yuriy is a 74 Y with a PMH of HTN, HLD, CKDIII, recent UTI, COPD who presents with a new left arm well demarcated reticular rased rash accompanied by itching.     Rash was red, hot and tender upon initial presentation. She complaied of one day of fever. Patient admits to an improvement of her rash in terms of redness, size and ichiness. Today she is afebrile. Denies cuts, scrapes, trauma.  Not warm to touch. Slightly tender. Appears light red in and lacy in colour with no fluctuation or drainage on exam. Patient is currently on cephalexin 250 per urology for her UTI.    Patient expressed a concern for cellulitis. We spoke about how the rash was unlikely to be cellulitis however if it was she was already on an antibiotic that would manege it. I told her to send us a message if it gets worse. If it does we would start her on  Clindamycin.         ROS: Review Of Systems  Skin: negative  Eyes: negative  Ears/Nose/Throat: negative  Respiratory: No shortness of breath, dyspnea on exertion, cough, or  hemoptysis  Cardiovascular: negative  Gastrointestinal: negative   Genitourinary: negative  Musculoskeletal: negative  Neurologic: negative  Psychiatric: negative  Hematologic/Lymphatic/Immunologic: negative  Endocrine: negative      Past Medical History:   Diagnosis Date     Anxiety      Arthritis      Breast cancer (H)      COPD (chronic obstructive pulmonary disease) (H)     12:  FEV 0.99 l     Depressive disorder      Hypertension      Low back pain with left-sided sciatica      Low bone density 2017    DEXA 2017: T score -2.0. Normal Z score. FRAX risk: major osteoporotic fracture 11.9%, hip fracture 2.6%, therefore not high-risk     Lymphedema, chronic lower extremities      Past Surgical History:   Procedure Laterality Date     BACK SURGERY      spinal fusion     COLONOSCOPY       LUMPECTOMY BREAST       ORTHOPEDIC SURGERY      Knee surgery left side     Family History   Problem Relation Age of Onset     Breast Cancer Mother      Diabetes Mother      Anxiety Disorder Mother      Asthma Mother      Other Cancer Mother      Hyperlipidemia Mother      Alcohol/Drug Father      Mental Illness Father      Substance Abuse Father      Obesity Father      Cerebrovascular Disease Maternal Grandmother 67     Social History     Tobacco Use     Smoking status: Former Smoker     Packs/day: 0.50     Years: 5.00     Pack years: 2.50     Types: Cigarettes     Start date: 1956     Quit date: 1980     Years since quittin.0     Smokeless tobacco: Former User     Quit date: 1980   Substance Use Topics     Alcohol use: Not Currently     Alcohol/week: 0.0 standard drinks     Comment: Sober for 2 years, previous alcoholic     Drug use: No     Current Outpatient Medications   Medication Sig Dispense Refill     albuterol (PROAIR HFA/PROVENTIL HFA/VENTOLIN HFA) 108 (90 Base) MCG/ACT inhaler Inhale 2 puffs into the lungs every 6 hours as needed for shortness of breath / dyspnea or wheezing 3 Inhaler  1     atorvastatin (LIPITOR) 40 MG tablet Take 1 tablet (40 mg) by mouth daily 90 tablet 3     cefpodoxime (VANTIN) 200 MG tablet Take 200 mg by mouth 2 times daily       cephALEXin (KEFLEX) 250 MG capsule Take 250 mg by mouth       clonazePAM (KLONOPIN) 0.5 MG tablet Take 0.5 mg by mouth daily       fluticasone-vilanterol (BREO ELLIPTA) 200-25 MCG/INH inhaler Inhale 1 puff into the lungs daily 3 Inhaler 3     hydrochlorothiazide (MICROZIDE) 12.5 MG capsule        ibuprofen (ADVIL,MOTRIN) 200 MG tablet Take 3 tablets (600 mg) by mouth 3 times daily (with meals) 90 tablet 0     latanoprost (XALATAN) 0.005 % ophthalmic solution 1 drop       losartan-hydrochlorothiazide (HYZAAR) 100-25 MG tablet Take 1 tablet by mouth daily 90 tablet 3     quetiapine (SEROQUEL) 200 MG tablet Take 200 mg by mouth At Bedtime.       sertraline (ZOLOFT) 50 MG tablet Take 1.5 tablets (75 mg) by mouth daily 45 tablet 3     umeclidinium (INCRUSE ELLIPTA) 62.5 MCG/INH inhaler Inhale 1 puff into the lungs daily 7 each 0     Vitamin D3 (CHOLECALCIFEROL) 25 mcg (1000 units) tablet Take 1 tablet (25 mcg) by mouth daily 100 tablet 3        Allergies   Allergen Reactions     Azithromycin Itching     Codeine Nausea and Vomiting     Hydrocodone Nausea and Vomiting     Metronidazole Nausea     Oxycodone Itching and Rash     Percocet [Oxycodone-Acetaminophen] Nausea and Vomiting     Pollen Extract      sneezing and runny nose.      Seasonal Allergies      sneezing and runny nose.      Vicodin [Hydrocodone-Acetaminophen] Nausea and Vomiting     Zolpidem      Amnestic behavior         BP (!) 146/87   Pulse 71   Wt 85.7 kg (189 lb)   LMP  (LMP Unknown)   SpO2 97%   BMI 32.44 kg/m    Physical Examination:  General: NAD  Lungs:  Clear to auscultation throughout, no wheezes, rhonchi or rales. Normal respiratory effort and no intercostal retractions.  C/V:  Regular rate and rhythm, no murmurs, rubs or gallops.  No JVD, no carotid bruits. Radial and DP  pulses 2+, equal and regular.  Skin:  No cuts, scrapes or trauma, No fluctuation or drainage on exam.  Appears light red, lacy and slightly raised.   Extremities:  No peripheral edema, no digital cyanosis

## 2020-10-12 ENCOUNTER — TELEPHONE (OUTPATIENT)
Dept: FAMILY MEDICINE | Facility: CLINIC | Age: 75
End: 2020-10-12

## 2020-10-12 NOTE — TELEPHONE ENCOUNTER
Patient was reached by phone, and RN reviewed symptoms of feeling hot, but no fever or other symptoms.  Patient was advised to stay hydrated and have temp checked periodically, and to call clinic back if she develops a fever or other symptoms.    Kiki Eddy RN on 10/12/2020 at 3:50 PM

## 2020-10-12 NOTE — PROGRESS NOTES
Speech-Language Pathology Department   EVALUATION  [facility]      10/08/20 1100   General Information   Type Of Visit Initial   Start Of Care Date 10/08/20   Referring Physician Dr. Domonique Roche   Orders Evaluate And Treat   Orders Comment Video Swallow Study with esophagram   Medical Diagnosis Pharyngoesophgeal dysphagia, vocal cord paralysis   Precautions/limitations Oxygen Therapy Device And L/min  (2L/min)   Hearing WFL   Pertinent History of Current Problem/OT: Additional Occupational Profile Info Ca Yan is a 74-year-old woman who presents for swallow study due to dysphonia which has not improved from initial onset. She denies any difficulty swallowing however formal evaluation is indicated as part of her workup. She does not modify her diet at all.    Respiratory Status Room air   Prior Level Of Function Swallowing   Prior Level Of Function Comment Regular solids and thin liquids   Patient Role/employment History Retired   General Observations Pt cooperative throughout evaluation.    Clinical Swallow Evaluation   Oral Musculature generally intact   Dentition present and adequate   Mucosal Quality good   Mandibular Strength and Mobility intact   Oral Labial Strength and Mobility WFL   Lingual Strength and Mobility WFL   Velar Elevation intact   Buccal Strength and Mobility intact   Laryngeal Function Cough;Throat clear;Swallow;Voicing initiated  (Hoarse breathy voice with minimal cough/throat clear strengt)   Oral Musculature Comments Significant laryngeal deficits due to vocal cord paralysis   Additional evaluation(s) completed today Yes   Rationale for completing additional evaluation Video Swallow study to formally assess pharyngeal phase of the swallow.    VFSS Eval: Radiology   Radiologist Resident, Dr. Edmond Polo   Views Taken left lateral;A/P   Physical Location of Procedure [facility]   VFSS Eval: Thin Liquid Texture Trial   Mode of  Presentation, Thin Liquid cup;self-fed   Order of Presentation Series 1, 2, 7, 9 (A/P)   Preparatory Phase WFL   Oral Phase, Thin Liquid WFL   Pharyngeal Phase, Thin Liquid other (see comments)  (trace residue noted throughout pharynx)   Rosenbek's Penetration Aspiration Scale: Thin Liquid Trial Results 7 - contrast passes glottis, visible subglottic residue remains despite patient's response (aspiration)   Diagnostic Statement Pt demonstrates deep penetration and trace aspiration into the airway on large consecutive sips of thin liquid which is cleared with cued cough. She demonstrates no aspiration or penetration on single smaller sips.    VFSS Eval: Nectar Thick Liquid Texture Trial   Mode of Presentation, Nectar cup;self-fed   Order of Presentation Series 3   Preparatory Phase WFL   Oral Phase, Nectar WFL   Pharyngeal Phase, [place] WFL   Rosenbek's Penetration Aspiration Scale: Nectar-Thick Liquid Trial Results 1 - no aspiration, contrast does not enter airway   Diagnostic Statement No aspiration/penetration noted on nectar thick liquid trials. Pt demonstrates trace residue in the valleculae and pyriform sinus which looks more like a fine coating.    VFSS Eval: Puree Solid Texture Trial   Mode of Presentation, Puree self-fed;spoon   Order of Presentation Series 4, 8 (A/P)    Preparatory Phase WFL   Oral Phase, Puree WFL   Pharyngeal Phase, Puree WFL   Rosenbek's Penetration Aspiration Scale: Puree Food Trial Results 1 - no aspiration, contrast does not enter airway   Diagnostic Statement No aspiration/penetration noted on puree consistency trials.    VFSS Eval: Solid Food Texture Trial   Mode of Presentation, Solid self-fed   Order of Presentation Series 5 (cookie with barium pudding), 6 (barium tablet with water)   Preparatory Phase WFL   Oral Phase, Solid WFL   Pharyngeal Phase, Solid WFL   Rosenbek's Penetration Aspiration Scale: Solid Food Trial Results 1 - no aspiration, contrast does not enter airway    Diagnostic Statement No aspiration/penetration noted on solid consistency trials. Barium tablet passed through oral and pharyngeal phases without difficulty.    Esophageal Phase of Swallow   Esophageal comments Esophagram completed by Radiology resident. Please refer to radiologist for further details on esophgeal function.    Swallow Eval: Clinical Impressions   Skilled Criteria for Therapy Intervention Skilled criteria met.  Treatment indicated.   Dysphagia Outcome Severity Scale (RACHEL) Level 6 - RACHEL   Treatment Diagnosis Minimal pharyngeal dysphagia   Diet texture recommendations Regular diet;Thin liquids   Recommended Feeding/Eating Techniques alternate between small bites and sips of food/liquid;maintain upright posture during/after eating for 30 mins;small sips/bites   Risks and Benefits of Treatment have been explained. Yes   Patient, family and/or staff in agreement with Plan of Care Yes   Clinical Impression Comments Ca Yan demonstrates minimal pharyngeal dysphagia as characterized by trace aspiration below the vocal cords on large consecutive sips of thin liquid which was cleared with cued cough. No aspiration noted on small individual sips of thin liquid. Trace residue noted throughout pharynx however this is very minor and does not result in any further aspiration/penetration. Pt not sensitive to residual or aspiration although both are very minimal. No aspiration noted during esophagram on larger consecutive sips of thicker consistency barium contrast. Recommend she continue with regular consistency solids and thin liquids. Encouraged thorough mastication and slow rate while eating/drinking. Pt will sit upright during all po intake. She will follow up with Dr. Roche for further evaluation and treatment. Reviewed images from video swallow study including training on anatomy and physiology of the swallow. Pt verbalized understanding of information presented.    Total Session Time   SLP Eval:  oral/pharyngeal swallow function, clinical minutes (97348) 10   SLP Eval: VideoFluoroscopic Swallow function Minutes (98799) 20   Total Evaluation Time 30       Thank you for the referral of Ca Yan. If you have any questions about this report, please contact me using the information below.      Ksenia Gilbert MS, CCC-SLP  Speech-Language Pathology  [facility]  Department of Otolaryngology/D&T - 4th floor  Pager: [phone]  Phone: [phone]  Email: [email or web address]

## 2020-10-12 NOTE — TELEPHONE ENCOUNTER
[facility] Call Center    Phone Message    May a detailed message be left on voicemail: yes     Reason for Call: Patient not feeling well - been really hot the past two days - but no fever. No other symptoms. Pt requesting a call back from a nurse to discuss.     Action Taken: Message routed to:  [facility] ([facility]    Travel Screening: Not Applicable

## 2020-10-13 ENCOUNTER — DOCUMENTATION ONLY (OUTPATIENT)
Dept: OTOLARYNGOLOGY | Facility: CLINIC | Age: 75
End: 2020-10-13

## 2020-10-13 NOTE — PROGRESS NOTES
Video Esophagram Review Rounds  Imaging Review of MBSS conducted with attending physician Domonique Roche and reviewed/discussed with SLP Ksenia Gilbert, Christelle Dale, Eusebia Deal and/or Nargis Valerio    Relevant Background:    Esophagram 10/8/20 - 1. Esophageal dysmotility without spontaneous or elicited  gastroesophageal reflux. Remainder of esophagram was normal. Of note,  the patient was intolerant of the GI/Ivrk position.    MBSS Date: 10/8/20    Findings:  Pharyngeal Weakness:Yes, very mild  Epiglottic dysfunction: No  Penetration: Yes only scant with serial sips  Aspiration: No  UES dysfunction: No  Details: safe swallow       Recommendations:  Diet:current  Consider GI for esophageal findings

## 2020-10-14 ENCOUNTER — TELEPHONE (OUTPATIENT)
Dept: OTOLARYNGOLOGY | Facility: CLINIC | Age: 75
End: 2020-10-14

## 2020-10-14 ENCOUNTER — DOCUMENTATION ONLY (OUTPATIENT)
Dept: OTOLARYNGOLOGY | Facility: CLINIC | Age: 75
End: 2020-10-14

## 2020-10-14 NOTE — PROGRESS NOTES
Called radiology to review      CT neck 10/8/20 - no lesions along left RLN - does have tortuous thoracic aorta due to scoliosis which is causing compression of esophagus - could cause compression of the RLN as well            Then reviewed CT chest from 1/15/20 which does show some dilation of the pyriform which can point to a left vocal fold paralysis      \    This explains her inability to lay flat during the esophagram - will question re: esophageal symptoms    Also will question timeline for voice change given this is structural problem it's likely not accounting for the sudden voice change

## 2020-10-14 NOTE — TELEPHONE ENCOUNTER
-A call was placed and a voicemail message was left on her home phone line.  The following information was reported:    -CT Neck, which was performed on 10/8/20 came back normal.    -It looks like a note was sent to [place] to get this patient added to Dr. Roche's schedule for Friday (for a virtual visit).    -A reminder note will be sent to [place] to reach out to the patient tomorrow.    Nancy Hancock RN  10/14/2020 6:30 PM

## 2020-10-16 ENCOUNTER — VIRTUAL VISIT (OUTPATIENT)
Dept: OTOLARYNGOLOGY | Facility: CLINIC | Age: 75
End: 2020-10-16
Payer: COMMERCIAL

## 2020-10-16 ENCOUNTER — TELEPHONE (OUTPATIENT)
Dept: PULMONOLOGY | Facility: CLINIC | Age: 75
End: 2020-10-16

## 2020-10-16 VITALS — WEIGHT: 188 LBS | HEIGHT: 64 IN | BODY MASS INDEX: 32.1 KG/M2

## 2020-10-16 DIAGNOSIS — R49.0 DYSPHONIA: Primary | ICD-10-CM

## 2020-10-16 PROCEDURE — 99214 OFFICE O/P EST MOD 30 MIN: CPT | Mod: TEL | Performed by: OTOLARYNGOLOGY

## 2020-10-16 ASSESSMENT — MIFFLIN-ST. JEOR: SCORE: 1327.76

## 2020-10-16 ASSESSMENT — PAIN SCALES - GENERAL: PAINLEVEL: NO PAIN (0)

## 2020-10-16 NOTE — NURSING NOTE
"Chief Complaint   Patient presents with     RECHECK     Follow up       Height 1.626 m (5' 4\"), weight 85.3 kg (188 lb), not currently breastfeeding.    Adali Duran, EMT  "

## 2020-10-16 NOTE — PROGRESS NOTES
"Ca Yan is a 74 year old female who is being evaluated via a billable telephone visit.      The patient has been notified of following:     \"This telephone visit will be conducted via a call between you and your physician/provider. We have found that certain health care needs can be provided without the need for a physical exam.  This service lets us provide the care you need with a short phone conversation.  If a prescription is necessary we can send it directly to your pharmacy.  If lab work is needed we can place an order for that and you can then stop by our lab to have the test done at a later time.    Telephone visits are billed at different rates depending on your insurance coverage. During this emergency period, for some insurers they may be billed the same as an in-person visit.  Please reach out to your insurance provider with any questions.    If during the course of the call the physician/provider feels a telephone visit is not appropriate, you will not be charged for this service.\"    Patient has given verbal consent for Telephone visit?  Yes    What phone number would you like to be contacted at? [phone]    How would you like to obtain your AVS? Mail a copy    Phone call duration: 23 minutes              [facility]   at the [facility]   Otolaryngology Clinic     Patient: Ca Yan    MRN: [id number]    : 1945    Age/Gender: 74 year old female  Date of Service: 10/16/2020  Rendering Provider:   Domonique Roche MD     Chief Complaint   Dysphonia  Interval History   HISTORY OF PRESENT ILLNESS: Ms. Yan is a 74 year old female is being followed for dysphonia. She was initially seen on 20. Please refer to this note for full history.   Of note she has had dysphonia since around the time of her hospital admission on 20 when she was admitted for pneumonia. She has started voice therapy with Mirta Blake, Ph.D., CCC/SLP.    Today, she presents " for follow up . she reports     - the voice is the same  - she can't talk very well  - when she gets upset she hardly talks at all  - she went to the doctor today - she didn't want to keep her mask   - not applying anymore strategies from voice therapy  - the voice therapy didn't help too much  - no pain with talking    - had scoliosis   -she doesn't have trouble swallowing  - but if she lays too flat she has to have a few pillows  - if she lays too flat she has a hard time breathing  - has to sleep on the right only    - she lives alone and has a lot of anxiety    Dysphonia: Patient reports dysphonia. This is stable      Dysphagia: Patient denies dysphagia. This is stable     Dyspnea: Patient reports dyspnea. This is stable     PAST MEDICAL HISTORY:   Past Medical History:   Diagnosis Date     Anxiety      Arthritis      Breast cancer (H)      COPD (chronic obstructive pulmonary disease) (H)     6/19/12:  FEV 0.99 l     Depressive disorder      Hypertension      Low back pain with left-sided sciatica      Low bone density 4/13/2017    DEXA April 12, 2017: T score -2.0. Normal Z score. FRAX risk: major osteoporotic fracture 11.9%, hip fracture 2.6%, therefore not high-risk     Lymphedema, chronic lower extremities        PAST SURGICAL HISTORY:   Past Surgical History:   Procedure Laterality Date     BACK SURGERY      spinal fusion     COLONOSCOPY       LUMPECTOMY BREAST       ORTHOPEDIC SURGERY      Knee surgery left side       CURRENT MEDICATIONS:   Current Outpatient Medications:      albuterol (PROAIR HFA/PROVENTIL HFA/VENTOLIN HFA) 108 (90 Base) MCG/ACT inhaler, Inhale 2 puffs into the lungs every 6 hours as needed for shortness of breath / dyspnea or wheezing, Disp: 3 Inhaler, Rfl: 1     atorvastatin (LIPITOR) 40 MG tablet, Take 1 tablet (40 mg) by mouth daily, Disp: 90 tablet, Rfl: 3     cefpodoxime (VANTIN) 200 MG tablet, Take 200 mg by mouth 2 times daily, Disp: , Rfl:      cephALEXin (KEFLEX) 250 MG  capsule, Take 250 mg by mouth, Disp: , Rfl:      clonazePAM (KLONOPIN) 0.5 MG tablet, Take 0.5 mg by mouth daily, Disp: , Rfl:      fluticasone-vilanterol (BREO ELLIPTA) 200-25 MCG/INH inhaler, Inhale 1 puff into the lungs daily, Disp: 3 Inhaler, Rfl: 3     hydrochlorothiazide (MICROZIDE) 12.5 MG capsule, , Disp: , Rfl:      ibuprofen (ADVIL,MOTRIN) 200 MG tablet, Take 3 tablets (600 mg) by mouth 3 times daily (with meals), Disp: 90 tablet, Rfl: 0     latanoprost (XALATAN) 0.005 % ophthalmic solution, 1 drop, Disp: , Rfl:      losartan-hydrochlorothiazide (HYZAAR) 100-25 MG tablet, Take 1 tablet by mouth daily, Disp: 90 tablet, Rfl: 3     quetiapine (SEROQUEL) 200 MG tablet, Take 200 mg by mouth At Bedtime., Disp: , Rfl:      sertraline (ZOLOFT) 50 MG tablet, Take 1.5 tablets (75 mg) by mouth daily, Disp: 45 tablet, Rfl: 3     umeclidinium (INCRUSE ELLIPTA) 62.5 MCG/INH inhaler, Inhale 1 puff into the lungs daily, Disp: 7 each, Rfl: 0     Vitamin D3 (CHOLECALCIFEROL) 25 mcg (1000 units) tablet, Take 1 tablet (25 mcg) by mouth daily, Disp: 100 tablet, Rfl: 3    ALLERGIES: Azithromycin, Codeine, Hydrocodone, Metronidazole, Oxycodone, Percocet [oxycodone-acetaminophen], Pollen extract, Seasonal allergies, Vicodin [hydrocodone-acetaminophen], and Zolpidem    SOCIAL HISTORY:    Social History     Socioeconomic History     Marital status: Single     Spouse name: Not on file     Number of children: Not on file     Years of education: Not on file     Highest education level: Not on file   Occupational History     Employer: RETIRED   Social Needs     Financial resource strain: Not on file     Food insecurity     Worry: Not on file     Inability: Not on file     Transportation needs     Medical: Not on file     Non-medical: Not on file   Tobacco Use     Smoking status: Former Smoker     Packs/day: 0.50     Years: 5.00     Pack years: 2.50     Types: Cigarettes     Start date: 5/8/1956     Quit date: 9/26/1980     Years since  quittin.0     Smokeless tobacco: Former User     Quit date: 1980   Substance and Sexual Activity     Alcohol use: Not Currently     Alcohol/week: 0.0 standard drinks     Comment: Sober for 2 years, previous alcoholic     Drug use: No     Sexual activity: Not Currently     Partners: Male     Birth control/protection: Abstinence   Lifestyle     Physical activity     Days per week: Not on file     Minutes per session: Not on file     Stress: Not on file   Relationships     Social connections     Talks on phone: Once a week     Gets together: Never     Attends Muslim service: More than 4 times per year     Active member of club or organization: Yes     Attends meetings of clubs or organizations: More than 4 times per year     Relationship status: Never      Intimate partner violence     Fear of current or ex partner: No     Emotionally abused: No     Physically abused: No     Forced sexual activity: No   Other Topics Concern     Parent/sibling w/ CABG, MI or angioplasty before 65F 55M? Not Asked      Service Not Asked     Blood Transfusions Not Asked     Caffeine Concern Not Asked     Occupational Exposure Not Asked     Hobby Hazards Not Asked     Sleep Concern Not Asked     Stress Concern Not Asked     Comment: Lives alone     Weight Concern Not Asked     Special Diet Not Asked     Back Care Not Asked     Comment: Hx of scoliosis with spine fusion     Exercise Not Asked     Comment: Walks     Bike Helmet Not Asked     Seat Belt Not Asked     Self-Exams Not Asked   Social History Narrative    Ca Yan never   Lives in apartment  is  family member is daughter Rin in [place], [place]  Previous AA meetings         FAMILY HISTORY:   Family History   Problem Relation Age of Onset     Breast Cancer Mother      Diabetes Mother      Anxiety Disorder Mother      Asthma Mother      Other Cancer Mother      Hyperlipidemia Mother      Alcohol/Drug Father      Mental Illness Father      Substance  Abuse Father      Obesity Father      Cerebrovascular Disease Maternal Grandmother 67      Non-contributory for problems with anesthesia    REVIEW OF SYSTEMS:   The patient was asked a 14 point review of systems regarding constitutional symptoms, eye symptoms, ears, nose, mouth, throat symptoms, cardiovascular symptoms, respiratory symptoms, gastrointestinal symptoms, genitourinary symptoms, musculoskeletal symptoms, integumentary symptoms, neurological symptoms, psychiatric symptoms, endocrine symptoms, hematologic/lymphatic symptoms, and allergic/ immunologic symptoms.   The pertinent factors have been included in the HPI and below.  Patient Supplied Answers to Review of Systems  [facility] ROS 4/30/2020   Psychology Frequently feeling depressed or sad, Frequently feeling anxious   Ears, Nose, Throat Nasal congestion or drainage, Hoarseness   Cardiopulmonary Cough, Breathing problems   Musculoskeletal Sore or stiff joints, Swollen legs/feet   Allergy/Immunology Allergies or hay fever   Hematologic Easy bruising       Physical Examination   The patient underwent a physical examination as described below. The pertinent positive and negative findings are summarized after the description of the examination.  Constitutional: The patient's developmental and nutritional status was assessed. The patient's voice quality was assessed.  Head and Face: The head and face were inspected for deformities. Facial muscle strength was assessed bilaterally.  Eyes: Extraocular movements and primary gaze alignment were assessed.  Ears, Nose, Mouth and Throat: The ears and nose were examined for deformities.The lips were examined for abnormalities.   Neck: The neck was visualized for abnormal neck masses  Respiratory: The nature of the breathing was observed.  Extremities: The hand extremities were examined for any clubbing or cyanosis.  Skin: The skin was examined for inflammatory or neoplastic conditions.  Neurologic: The patient's  orientation, mood, and affect were noted. The cranial nerve  functions were examined.  Other pertinent positive and negative findings on physical examination:   Breathing comfortably on room air, no stridor with deep inspiration    All other physical examination findings were within normal limits and noncontributory  BEHAVIORAL & QUALITATIVE EVALUATION OF VOICE AND RESONENCE   Comments: MPT: 5sec Vocal Quality: breathy    Pitch Range:  Moderately reduced   Phrase Length:  Mildly reduced  Vocal Loudness: Mildly reduced  Dysarthria: No    Review of Relevant Clinical Data   Notes:     Dr Bassam Taylor 9/3/20    CT neck 10/8/20 - no lesions along left RLN - does have tortuous thoracic aorta due to scoliosis which is causing compression of esophagus - could cause compression of the RLN as well               Then reviewed CT chest from 1/15/20 which does show some dilation of the pyriform which can point to a left vocal fold paralysis       Radiology:  Esophagram 10/8/20 - 1. Esophageal dysmotility without spontaneous or elicited  gastroesophageal reflux. Remainder of esophagram was normal. Of note,  the patient was intolerant of the GI/Virk position.     MBSS Date: 10/8/20     Findings:  Pharyngeal Weakness:Yes, very mild  Epiglottic dysfunction: No  Penetration: Yes only scant with serial sips  Aspiration: No  UES dysfunction: No  Details: safe swallow         Recommendations:  Diet:current  Consider GI for esophageal findings      Labs:  Lab Results   Component Value Date    TSH 2.39 08/20/2020     Lab Results   Component Value Date     08/20/2020    CO2 32 08/20/2020    BUN 18 08/20/2020    CREAT 0.9 10/08/2020     Lab Results   Component Value Date    WBC 5.4 08/20/2020    HGB 14.3 08/20/2020    HCT 45.1 08/20/2020    MCV 98 08/20/2020     08/20/2020     No results found for: PT, PTT, INR  No results found for: LONNY  No components found for: RHEUMATOIDFACTOR,  RF  Lab Results   Component Value Date     "CRP 6.4 2019     No components found for: CKTOT, URICACID  No components found for: C3, C4, DSDNAAB, NDNAABIFA  No results found for: MPOAB    Patient reported Quality of Life (QOL) Measures   Patient Supplied Answers To VHI Questionnaire  Voice Handicap Index (VHI-10) 2020   My voice makes it difficult for people to hear me 0   People have difficulty understanding me in a noisy room 0   My voice difficulties restrict my personal and social life.  4   I feel left out of conversations because of my voice 0   My voice problem causes me to lose income 0   I feel as though I have to strain to produce voice 4   The clarity of my voice is unpredictable 4   My voice problem upsets me 4   My voice makes me feel handicapped 4   People ask, \"What's wrong with your voice?\" 0   VHI-10 20         Patient Supplied Answers To EAT Questionnaire  No flowsheet data found.      Patient Supplied Answers To CSI Questionnaire  Cough Severity Index (CSI) 2020   My cough is worse when I lie down 4   My coughing problem causes me to restrict my personal and social life 0   I tend to avoid places because of my cough problem 0   I feel embarrassed because of my coughing problem 3   People ask, ''What's wrong?'' because I cough a lot 0   I run out of air when I cough 4   My coughing problem affects my voice 4   My coughing problem limits my physical activity 4   My coughing problem upsets me 4   People ask me if I am sick because I cough a lot 0   CSI Score 23         Patient Supplied Answers to Dyspnea Index Questionnaire:  No flowsheet data found.     Impression & Plan     IMPRESSION: Ms. Yan is a 74 year old female who is being seen for the followin. 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 and " no other lesions  - CT neck 10/8/20 - no lesions along left RLN - does have tortuous thoracic aorta due to scoliosis which is causing compression of esophagus - could cause compression of the RLN as well    - CT chest from 1/15/20 which does show some dilation of the pyriform which can point to a left vocal fold paralysis  - though unlikely etiology given sudden onset of symptoms in January  - discussed that sine she had so much trouble tolerating the exam in the office she is not a candidate for a vocal fold awake injection  - options are MDL with voice gel in OR or waiting till 1 year for recovery   - given she has sensation of inability to talk or breath when she gets upset 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 Dysphagia  - denies coughing and choking with food  - did have PNA in 1/2020  - has severe pooling of saliva on scope exam and left vocal fold paralysis  - esophagram - 10/8/20  Esophageal dysmotility without spontaneous or elicited gastroesophageal reflux. Remainder of esophagram was normal.   - Xray Video Swallow Exam 10/8/20 - mild pharyngeal weakness, safe swallow  - CT neck 10/8/20 - no lesions along left RLN - does have tortuous thoracic aorta due to scoliosis which is causing compression of esophagus - could cause compression of the RLN as well    - denies swallow is bothering   Plan  - observation    3. Dyspnea  - SOB when laying down - it improves if she can stay there for a few minutes  - she is working with pulm - Dr Bassam Taylor 9/3/20  - could be that when she lays down she has reflux which causes laryngospasm  Plan  - start breathing therapy  - if no improvement consider GI referral     RETURN VISIT: office evaluation with voice SLP in 3 month(s), or earlier as needed.       Domonique Roche MD    Laryngology    [facility]  Department of  Otolaryngology - Head and Neck Surgery  [facility]  [address] [place] [id number]  Appointment line: [phone]  Fax: [phone]

## 2020-10-16 NOTE — LETTER
"10/16/2020       RE: Ca Yan  [address]  [facility] [address]     Dear Colleague,    Thank you for referring your patient, Ca Yan, to the [facility] [place] [facility]. Please see a copy of my visit note below.    Ca Yan is a 74 year old female who is being evaluated via a billable telephone visit.      The patient has been notified of following:     \"This telephone visit will be conducted via a call between you and your physician/provider. We have found that certain health care needs can be provided without the need for a physical exam.  This service lets us provide the care you need with a short phone conversation.  If a prescription is necessary we can send it directly to your pharmacy.  If lab work is needed we can place an order for that and you can then stop by our lab to have the test done at a later time.    Telephone visits are billed at different rates depending on your insurance coverage. During this emergency period, for some insurers they may be billed the same as an in-person visit.  Please reach out to your insurance provider with any questions.    If during the course of the call the physician/provider feels a telephone visit is not appropriate, you will not be charged for this service.\"    Patient has given verbal consent for Telephone visit?  Yes    What phone number would you like to be contacted at? [phone]    How would you like to obtain your AVS? Mail a copy    Phone call duration: 23 minutes              [facility]   at the [facility]   Otolaryngology Clinic     Patient: Ca Yan    MRN: [id number]    : 1945    Age/Gender: 74 year old female  Date of Service: 10/16/2020  Rendering Provider:   Domonique Roche MD     Chief Complaint   Dysphonia  Interval History   HISTORY OF PRESENT ILLNESS: Ms. Yan is a 74 year old female is being " followed for dysphonia. She was initially seen on 4/30/20. Please refer to this note for full history.   Of note she has had dysphonia since around the time of her hospital admission on 1/7/20 when she was admitted for pneumonia. She has started voice therapy with Mirta Blake, Ph.D., CCC/SLP.    Today, she presents for follow up . she reports     - the voice is the same  - she can't talk very well  - when she gets upset she hardly talks at all  - she went to the doctor today - she didn't want to keep her mask   - not applying anymore strategies from voice therapy  - the voice therapy didn't help too much  - no pain with talking    - had scoliosis   -she doesn't have trouble swallowing  - but if she lays too flat she has to have a few pillows  - if she lays too flat she has a hard time breathing  - has to sleep on the right only    - she lives alone and has a lot of anxiety    Dysphonia: Patient reports dysphonia. This is stable      Dysphagia: Patient denies dysphagia. This is stable     Dyspnea: Patient reports dyspnea. This is stable     PAST MEDICAL HISTORY:   Past Medical History:   Diagnosis Date     Anxiety      Arthritis      Breast cancer (H)      COPD (chronic obstructive pulmonary disease) (H)     6/19/12:  FEV 0.99 l     Depressive disorder      Hypertension      Low back pain with left-sided sciatica      Low bone density 4/13/2017    DEXA April 12, 2017: T score -2.0. Normal Z score. FRAX risk: major osteoporotic fracture 11.9%, hip fracture 2.6%, therefore not high-risk     Lymphedema, chronic lower extremities        PAST SURGICAL HISTORY:   Past Surgical History:   Procedure Laterality Date     BACK SURGERY      spinal fusion     COLONOSCOPY       LUMPECTOMY BREAST       ORTHOPEDIC SURGERY      Knee surgery left side       CURRENT MEDICATIONS:   Current Outpatient Medications:      albuterol (PROAIR HFA/PROVENTIL HFA/VENTOLIN HFA) 108 (90 Base) MCG/ACT inhaler, Inhale 2 puffs into the lungs  every 6 hours as needed for shortness of breath / dyspnea or wheezing, Disp: 3 Inhaler, Rfl: 1     atorvastatin (LIPITOR) 40 MG tablet, Take 1 tablet (40 mg) by mouth daily, Disp: 90 tablet, Rfl: 3     cefpodoxime (VANTIN) 200 MG tablet, Take 200 mg by mouth 2 times daily, Disp: , Rfl:      cephALEXin (KEFLEX) 250 MG capsule, Take 250 mg by mouth, Disp: , Rfl:      clonazePAM (KLONOPIN) 0.5 MG tablet, Take 0.5 mg by mouth daily, Disp: , Rfl:      fluticasone-vilanterol (BREO ELLIPTA) 200-25 MCG/INH inhaler, Inhale 1 puff into the lungs daily, Disp: 3 Inhaler, Rfl: 3     hydrochlorothiazide (MICROZIDE) 12.5 MG capsule, , Disp: , Rfl:      ibuprofen (ADVIL,MOTRIN) 200 MG tablet, Take 3 tablets (600 mg) by mouth 3 times daily (with meals), Disp: 90 tablet, Rfl: 0     latanoprost (XALATAN) 0.005 % ophthalmic solution, 1 drop, Disp: , Rfl:      losartan-hydrochlorothiazide (HYZAAR) 100-25 MG tablet, Take 1 tablet by mouth daily, Disp: 90 tablet, Rfl: 3     quetiapine (SEROQUEL) 200 MG tablet, Take 200 mg by mouth At Bedtime., Disp: , Rfl:      sertraline (ZOLOFT) 50 MG tablet, Take 1.5 tablets (75 mg) by mouth daily, Disp: 45 tablet, Rfl: 3     umeclidinium (INCRUSE ELLIPTA) 62.5 MCG/INH inhaler, Inhale 1 puff into the lungs daily, Disp: 7 each, Rfl: 0     Vitamin D3 (CHOLECALCIFEROL) 25 mcg (1000 units) tablet, Take 1 tablet (25 mcg) by mouth daily, Disp: 100 tablet, Rfl: 3    ALLERGIES: Azithromycin, Codeine, Hydrocodone, Metronidazole, Oxycodone, Percocet [oxycodone-acetaminophen], Pollen extract, Seasonal allergies, Vicodin [hydrocodone-acetaminophen], and Zolpidem    SOCIAL HISTORY:    Social History     Socioeconomic History     Marital status: Single     Spouse name: Not on file     Number of children: Not on file     Years of education: Not on file     Highest education level: Not on file   Occupational History     Employer: RETIRED   Social Needs     Financial resource strain: Not on file     Food insecurity      Worry: Not on file     Inability: Not on file     Transportation needs     Medical: Not on file     Non-medical: Not on file   Tobacco Use     Smoking status: Former Smoker     Packs/day: 0.50     Years: 5.00     Pack years: 2.50     Types: Cigarettes     Start date: 1956     Quit date: 1980     Years since quittin.0     Smokeless tobacco: Former User     Quit date: 1980   Substance and Sexual Activity     Alcohol use: Not Currently     Alcohol/week: 0.0 standard drinks     Comment: Sober for 2 years, previous alcoholic     Drug use: No     Sexual activity: Not Currently     Partners: Male     Birth control/protection: Abstinence   Lifestyle     Physical activity     Days per week: Not on file     Minutes per session: Not on file     Stress: Not on file   Relationships     Social connections     Talks on phone: Once a week     Gets together: Never     Attends Anabaptist service: More than 4 times per year     Active member of club or organization: Yes     Attends meetings of clubs or organizations: More than 4 times per year     Relationship status: Never      Intimate partner violence     Fear of current or ex partner: No     Emotionally abused: No     Physically abused: No     Forced sexual activity: No   Other Topics Concern     Parent/sibling w/ CABG, MI or angioplasty before 65F 55M? Not Asked      Service Not Asked     Blood Transfusions Not Asked     Caffeine Concern Not Asked     Occupational Exposure Not Asked     Hobby Hazards Not Asked     Sleep Concern Not Asked     Stress Concern Not Asked     Comment: Lives alone     Weight Concern Not Asked     Special Diet Not Asked     Back Care Not Asked     Comment: Hx of scoliosis with spine fusion     Exercise Not Asked     Comment: Walks     Bike Helmet Not Asked     Seat Belt Not Asked     Self-Exams Not Asked   Social History Narrative    Ca Yuriy never   Lives in apartment  is  family member is daughter Rin in  [place]  Previous AA meetings         FAMILY HISTORY:   Family History   Problem Relation Age of Onset     Breast Cancer Mother      Diabetes Mother      Anxiety Disorder Mother      Asthma Mother      Other Cancer Mother      Hyperlipidemia Mother      Alcohol/Drug Father      Mental Illness Father      Substance Abuse Father      Obesity Father      Cerebrovascular Disease Maternal Grandmother 67      Non-contributory for problems with anesthesia    REVIEW OF SYSTEMS:   The patient was asked a 14 point review of systems regarding constitutional symptoms, eye symptoms, ears, nose, mouth, throat symptoms, cardiovascular symptoms, respiratory symptoms, gastrointestinal symptoms, genitourinary symptoms, musculoskeletal symptoms, integumentary symptoms, neurological symptoms, psychiatric symptoms, endocrine symptoms, hematologic/lymphatic symptoms, and allergic/ immunologic symptoms.   The pertinent factors have been included in the HPI and below.  Patient Supplied Answers to Review of Systems  [facility] ENT ROS 4/30/2020   Psychology Frequently feeling depressed or sad, Frequently feeling anxious   Ears, Nose, Throat Nasal congestion or drainage, Hoarseness   Cardiopulmonary Cough, Breathing problems   Musculoskeletal Sore or stiff joints, Swollen legs/feet   Allergy/Immunology Allergies or hay fever   Hematologic Easy bruising       Physical Examination   The patient underwent a physical examination as described below. The pertinent positive and negative findings are summarized after the description of the examination.  Constitutional: The patient's developmental and nutritional status was assessed. The patient's voice quality was assessed.  Head and Face: The head and face were inspected for deformities. Facial muscle strength was assessed bilaterally.  Eyes: Extraocular movements and primary gaze alignment were assessed.  Ears, Nose, Mouth and Throat: The ears and nose were examined for deformities.The lips were  examined for abnormalities.   Neck: The neck was visualized for abnormal neck masses  Respiratory: The nature of the breathing was observed.  Extremities: The hand extremities were examined for any clubbing or cyanosis.  Skin: The skin was examined for inflammatory or neoplastic conditions.  Neurologic: The patient's orientation, mood, and affect were noted. The cranial nerve  functions were examined.  Other pertinent positive and negative findings on physical examination:   Breathing comfortably on room air, no stridor with deep inspiration    All other physical examination findings were within normal limits and noncontributory  BEHAVIORAL & QUALITATIVE EVALUATION OF VOICE AND RESONENCE   Comments: MPT: 5sec Vocal Quality: breathy    Pitch Range:  Moderately reduced   Phrase Length:  Mildly reduced  Vocal Loudness: Mildly reduced  Dysarthria: No    Review of Relevant Clinical Data   Notes:     Dr Bassam Taylor 9/3/20    CT neck 10/8/20 - no lesions along left RLN - does have tortuous thoracic aorta due to scoliosis which is causing compression of esophagus - could cause compression of the RLN as well               Then reviewed CT chest from 1/15/20 which does show some dilation of the pyriform which can point to a left vocal fold paralysis       Radiology:  Esophagram 10/8/20 - 1. Esophageal dysmotility without spontaneous or elicited  gastroesophageal reflux. Remainder of esophagram was normal. Of note,  the patient was intolerant of the GI/Virk position.     MBSS Date: 10/8/20     Findings:  Pharyngeal Weakness:Yes, very mild  Epiglottic dysfunction: No  Penetration: Yes only scant with serial sips  Aspiration: No  UES dysfunction: No  Details: safe swallow         Recommendations:  Diet:current  Consider GI for esophageal findings      Labs:  Lab Results   Component Value Date    TSH 2.39 08/20/2020     Lab Results   Component Value Date     08/20/2020    CO2 32 08/20/2020    BUN 18 08/20/2020     "CREAT 0.9 10/08/2020     Lab Results   Component Value Date    WBC 5.4 08/20/2020    HGB 14.3 08/20/2020    HCT 45.1 08/20/2020    MCV 98 08/20/2020     08/20/2020     No results found for: PT, PTT, INR  No results found for: LONNY  No components found for: RHEUMATOIDFACTOR,  RF  Lab Results   Component Value Date    CRP 6.4 09/25/2019     No components found for: CKTOT, URICACID  No components found for: C3, C4, DSDNAAB, NDNAABIFA  No results found for: MPOAB    Patient reported Quality of Life (QOL) Measures   Patient Supplied Answers To VHI Questionnaire  Voice Handicap Index (VHI-10) 4/30/2020   My voice makes it difficult for people to hear me 0   People have difficulty understanding me in a noisy room 0   My voice difficulties restrict my personal and social life.  4   I feel left out of conversations because of my voice 0   My voice problem causes me to lose income 0   I feel as though I have to strain to produce voice 4   The clarity of my voice is unpredictable 4   My voice problem upsets me 4   My voice makes me feel handicapped 4   People ask, \"What's wrong with your voice?\" 0   VHI-10 20         Patient Supplied Answers To EAT Questionnaire  No flowsheet data found.      Patient Supplied Answers To CSI Questionnaire  Cough Severity Index (CSI) 4/30/2020   My cough is worse when I lie down 4   My coughing problem causes me to restrict my personal and social life 0   I tend to avoid places because of my cough problem 0   I feel embarrassed because of my coughing problem 3   People ask, ''What's wrong?'' because I cough a lot 0   I run out of air when I cough 4   My coughing problem affects my voice 4   My coughing problem limits my physical activity 4   My coughing problem upsets me 4   People ask me if I am sick because I cough a lot 0   CSI Score 23         Patient Supplied Answers to Dyspnea Index Questionnaire:  No flowsheet data found.     Impression & Plan     IMPRESSION: Ms. Yan is a 74 year " old female who is being seen for the followin. 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 and no other lesions  - CT neck 10/8/20 - no lesions along left RLN - does have tortuous thoracic aorta due to scoliosis which is causing compression of esophagus - could cause compression of the RLN as well    - CT chest from 1/15/20 which does show some dilation of the pyriform which can point to a left vocal fold paralysis  - though unlikely etiology given sudden onset of symptoms in January  - discussed that sine she had so much trouble tolerating the exam in the office she is not a candidate for a vocal fold awake injection  - options are MDL with voice gel in OR or waiting till 1 year for recovery   - given she has sensation of inability to talk or breath when she gets upset 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 Dysphagia  - denies coughing and choking with food  - did have PNA in 2020  - has severe pooling of saliva on scope exam and left vocal fold paralysis  - esophagram - 10/8/20  Esophageal dysmotility without spontaneous or elicited gastroesophageal reflux. Remainder of esophagram was normal.   - Xray Video Swallow Exam 10/8/20 - mild pharyngeal weakness, safe swallow  - CT neck 10/8/20 - no lesions along left RLN - does have tortuous thoracic aorta due to scoliosis which is causing compression of esophagus - could cause compression of the RLN as well    - denies swallow is bothering   Plan  - observation    3. Dyspnea  - SOB when laying down - it improves if she can stay there for a few minutes  - she is working with pulm - Dr Bassam Taylor  9/3/20  - could be that when she lays down she has reflux which causes laryngospasm  Plan  - start breathing therapy  - if no improvement consider GI referral     RETURN VISIT: office evaluation with voice SLP in 3 month(s), or earlier as needed.       Domonique Roche MD    Laryngology    [facility]  Department of  Otolaryngology - Head and Neck Surgery  [facility]  [address] [place], [place] [address]  Appointment line: [phone]  Fax: [phone]

## 2020-10-16 NOTE — TELEPHONE ENCOUNTER
[facility] Call Center    Phone Message    May a detailed message be left on voicemail: yes     Reason for Call: Other: Pt requesting a call back to discuss her oxygen needs when out of her home and going to doctors appts. Thank you     Action Taken: Message routed to:  [facility] ([facility]): Pulm    Travel Screening: Not Applicable

## 2020-10-19 ENCOUNTER — TELEPHONE (OUTPATIENT)
Dept: PULMONOLOGY | Facility: CLINIC | Age: 75
End: 2020-10-19

## 2020-10-19 DIAGNOSIS — R06.00 DYSPNEA: ICD-10-CM

## 2020-10-19 DIAGNOSIS — J98.4 RESTRICTIVE LUNG DISEASE: Primary | ICD-10-CM

## 2020-10-19 DIAGNOSIS — J98.11 ATELECTASIS: ICD-10-CM

## 2020-10-19 NOTE — TELEPHONE ENCOUNTER
"Contacted by pt to review need for oxygen.  Pt reports currently using 3L/min \"once in a while\".  Has a home concentrator from [facility] that she's had for approximately 8 years.  She is currently paying out of pocket for this.  She does not have portability and is requesting a new order, stating she needs portable device to allow her to leave the house for doctors appointments etc.  She is requesting a change of DME provider as she is unhappy with [facility].  Message to MD to determine whether 6MWT appropriate.   "

## 2020-10-19 NOTE — TELEPHONE ENCOUNTER
Reviewed with Dr. Taylor who recommends 6MWT with titration and overnight oximetry on current setting or RA if she is not using overnight. Left message for pt to return call to clinic to confirm whether using at night.

## 2020-10-20 ENCOUNTER — DOCUMENTATION ONLY (OUTPATIENT)
Dept: OTHER | Facility: CLINIC | Age: 75
End: 2020-10-20

## 2020-10-20 NOTE — TELEPHONE ENCOUNTER
"Reviewed Dr. Taylor's recommendation for overnight oximetry with pt.  She states she \"feels fine\" at night and not currently using nocturnal oxygen. States her issue is leaving her home and taking a cab to appointments. She agrees to overnight oximetry, 6MWT and face to face.   "

## 2020-10-20 NOTE — PROGRESS NOTES
Attempted call back at for nurse Brandon at Dr. Wei office regarding home o2.  Number listed was invalid and was unable to reach RN to answer questions regarding home oxygen.     Please call us back at  to answer questions regarding home oxygen.     Thank You  [facility]

## 2020-10-20 NOTE — TELEPHONE ENCOUNTER
Pt is scheduled for 6MW on 11/13 and for a telephone visit with Dr. Taylor on 11/16. Will call [facility] in early November to schedule for for an overnight oximetry test prior to appt with Dr. Taylor.

## 2020-10-27 ENCOUNTER — TELEPHONE (OUTPATIENT)
Dept: FAMILY MEDICINE | Facility: CLINIC | Age: 75
End: 2020-10-27

## 2020-10-28 ENCOUNTER — HOSPITAL ENCOUNTER (OUTPATIENT)
Dept: CARDIAC REHAB | Facility: CLINIC | Age: 75
End: 2020-10-28
Payer: COMMERCIAL

## 2020-10-28 PROCEDURE — G0238 OTH RESP PROC, INDIV: HCPCS

## 2020-10-28 PROCEDURE — 999N000193 HC STATISTIC VISIT PULM REHAB

## 2020-10-28 NOTE — TELEPHONE ENCOUNTER
Annual Medical Exam and office notes were faxed today, 10/28/2020 to [phone] and a copy scanned into chart.    Jacquelyn Olvera on 10/28/2020 at 3:54 PM

## 2020-10-28 NOTE — TELEPHONE ENCOUNTER
October 27, 2020   I completed Annual medical exam report for [facility].  Please print my note from 10/10/20 to send with form.  Copy form to scan in chart and send to [phone].  Best wishes,  Favian Sahu MD

## 2020-11-02 ENCOUNTER — HOSPITAL ENCOUNTER (OUTPATIENT)
Dept: CARDIAC REHAB | Facility: CLINIC | Age: 75
End: 2020-11-02
Payer: COMMERCIAL

## 2020-11-02 ENCOUNTER — TELEPHONE (OUTPATIENT)
Dept: FAMILY MEDICINE | Facility: CLINIC | Age: 75
End: 2020-11-02

## 2020-11-02 PROCEDURE — 999N000193 HC STATISTIC VISIT PULM REHAB

## 2020-11-02 PROCEDURE — G0239 OTH RESP PROC, GROUP: HCPCS

## 2020-11-02 NOTE — TELEPHONE ENCOUNTER
[facility] Call Center    Phone Message    May a detailed message be left on voicemail: yes     Reason for Call: Other: Patient calling requesting a return call to discuss a health care directive. Please call to discuss thank you.      Action Taken: Message routed to:  [facility] pcc    Travel Screening: Not Applicable

## 2020-11-03 NOTE — TELEPHONE ENCOUNTER
Called patient and left VM for pt to call back:  If patient would like to discuss health care directive with Dr. Sahu, please schedule a virtual appointment.  If need the form for health care directive, let us know and we can mail out the form to home address listed on here.      Jama De La Vega CMA ([facility]) at 12:51 PM on 11/3/2020

## 2020-11-04 ENCOUNTER — HOSPITAL ENCOUNTER (OUTPATIENT)
Dept: CARDIAC REHAB | Facility: CLINIC | Age: 75
End: 2020-11-04
Payer: COMMERCIAL

## 2020-11-04 PROCEDURE — 999N000193 HC STATISTIC VISIT PULM REHAB

## 2020-11-04 PROCEDURE — G0239 OTH RESP PROC, GROUP: HCPCS

## 2020-11-05 NOTE — TELEPHONE ENCOUNTER
Per Patient is calling back wanting to discuss advance directive/ healthcare directive. Patient states needing it mailed to Patient to be able to go through it with Dr. Sahu. Patient states can only do a telephone visit or come into the office once has the advanced directive/ health care directive. Please advise.

## 2020-11-09 ENCOUNTER — HOSPITAL ENCOUNTER (OUTPATIENT)
Dept: CARDIAC REHAB | Facility: CLINIC | Age: 75
End: 2020-11-09
Payer: COMMERCIAL

## 2020-11-09 PROCEDURE — 999N000193 HC STATISTIC VISIT PULM REHAB

## 2020-11-09 PROCEDURE — G0239 OTH RESP PROC, GROUP: HCPCS

## 2020-11-09 NOTE — TELEPHONE ENCOUNTER
I called and left VM. Sending out advance directive. Instructions are on the form, she should look it over before scheduling an appointment.   Rachel Kate, EMT at 3:58 PM on 11/9/2020.

## 2020-11-11 NOTE — TELEPHONE ENCOUNTER
Spoke with Kelly at [facility] and they will call pt to set up STACY next week. Called pt and let her know that [facility] will be reaching out to her to set up the STACY and reminded her of the 6MW on Friday. Pt confirmed understanding.

## 2020-11-13 DIAGNOSIS — J98.4 RESTRICTIVE LUNG DISEASE: ICD-10-CM

## 2020-11-13 DIAGNOSIS — R06.00 DYSPNEA: ICD-10-CM

## 2020-11-13 DIAGNOSIS — J98.11 ATELECTASIS: ICD-10-CM

## 2020-11-13 LAB
6 MIN WALK (FT): 410 FT
6 MIN WALK (M): 125 M

## 2020-11-16 ENCOUNTER — HOSPITAL ENCOUNTER (OUTPATIENT)
Dept: CARDIAC REHAB | Facility: CLINIC | Age: 75
End: 2020-11-16
Payer: COMMERCIAL

## 2020-11-16 LAB — FIO2-PRE: 28 %

## 2020-11-16 PROCEDURE — 999N000193 HC STATISTIC VISIT PULM REHAB

## 2020-11-16 PROCEDURE — G0239 OTH RESP PROC, GROUP: HCPCS

## 2020-11-17 ENCOUNTER — TELEPHONE (OUTPATIENT)
Dept: FAMILY MEDICINE | Facility: CLINIC | Age: 75
End: 2020-11-17

## 2020-11-17 NOTE — TELEPHONE ENCOUNTER
Interesting as pulmonary Dr Taylor ordered pulmonary rehab and normally pulmonary will follow through with oxygen orders.Have you documented O2 during the pulmonary rehab as that will be required oxygen orders? I will ask my nurse Kiki Eddy to reach out to pulmonary to start the portable oxygen process.  Best wishes,  Favian Sahu MD

## 2020-11-17 NOTE — TELEPHONE ENCOUNTER
----- Message from Vickie Lezama sent at 11/17/2020  9:11 AM CST -----  I have, but they suggested to go to Primary provider. Is there another option of someone to talk to?     Thanks,    Vickie Lezama   ----- Message -----  From: Favian Sahu MD  Sent: 11/16/2020   5:31 PM CST  To: Vickie Lezama    The request should be sent to her pulmonary provider.  Best wishes,  Favian Sahu MD     ----- Message -----  From: Vickie Lezama  Sent: 11/16/2020   2:56 PM CST  To: Favian Sahu MD    Hi Dr. Sahu,    I just wanted to let you know that Ca mentioned not having portable Oxygen. Patient has been requiring 3 LPM with exercise will in Pulmonary Rehab. If you have any questions or need anything please call.    Thanks,    Vickie Lezama   Cardiac and Pulmonary Rehab  [facility]  [phone]

## 2020-11-17 NOTE — TELEPHONE ENCOUNTER
Luis   Thank you for notifying me pulmonary will coordinate Oxygen orders. Patient will be rescheduled with pulmonary to complete orders,  Best wishes,  Favian Sahu MD

## 2020-11-18 ENCOUNTER — HOSPITAL ENCOUNTER (OUTPATIENT)
Dept: CARDIAC REHAB | Facility: CLINIC | Age: 75
End: 2020-11-18
Payer: COMMERCIAL

## 2020-11-18 PROCEDURE — 999N000193 HC STATISTIC VISIT PULM REHAB

## 2020-11-18 PROCEDURE — G0239 OTH RESP PROC, GROUP: HCPCS

## 2020-11-19 ENCOUNTER — TRANSFERRED RECORDS (OUTPATIENT)
Dept: HEALTH INFORMATION MANAGEMENT | Facility: CLINIC | Age: 75
End: 2020-11-19

## 2020-11-23 ENCOUNTER — HOSPITAL ENCOUNTER (OUTPATIENT)
Dept: CARDIAC REHAB | Facility: CLINIC | Age: 75
End: 2020-11-23
Payer: COMMERCIAL

## 2020-11-23 ENCOUNTER — VIRTUAL VISIT (OUTPATIENT)
Dept: PULMONOLOGY | Facility: CLINIC | Age: 75
End: 2020-11-23
Payer: COMMERCIAL

## 2020-11-23 DIAGNOSIS — J96.11 CHRONIC HYPOXEMIC RESPIRATORY FAILURE (H): Primary | ICD-10-CM

## 2020-11-23 DIAGNOSIS — M41.9 KYPHOSCOLIOSIS: ICD-10-CM

## 2020-11-23 PROCEDURE — 999N000193 HC STATISTIC VISIT PULM REHAB

## 2020-11-23 PROCEDURE — G0239 OTH RESP PROC, GROUP: HCPCS

## 2020-11-23 PROCEDURE — 99214 OFFICE O/P EST MOD 30 MIN: CPT | Mod: 95

## 2020-11-23 NOTE — TELEPHONE ENCOUNTER
Spoke with Kelly at [facility] and she said they should be downloading results today or tomorrow and will fax results over.

## 2020-11-23 NOTE — LETTER
"    11/23/2020         RE: Ca Yan  [address]  [facility] [address]        Dear Colleague,    Thank you for referring your patient, Ca Yan, to the [facility] [place]. Please see a copy of my visit note below.    Ca Yan is a 74 year old female who is being evaluated via a billable telephone visit.      The patient has been notified of following:     \"This telephone visit will be conducted via a call between you and your physician/provider. We have found that certain health care needs can be provided without the need for a physical exam.  This service lets us provide the care you need with a short phone conversation.  If a prescription is necessary we can send it directly to your pharmacy.  If lab work is needed we can place an order for that and you can then stop by our lab to have the test done at a later time.    Telephone visits are billed at different rates depending on your insurance coverage. During this emergency period, for some insurers they may be billed the same as an in-person visit.  Please reach out to your insurance provider with any questions.    If during the course of the call the physician/provider feels a telephone visit is not appropriate, you will not be charged for this service.\"    Patient has given verbal consent for Telephone visit?  Yes    What phone number would you like to be contacted at? [phone]    How would you like to obtain your AVS? Mail a copy    Phone call duration: 18 minutes    Bassam Taylor MD        [facility]  Pulmonary Medicine  Visit Clinic Note  November 23, 2020         ASSESSMENT & PLAN       Shortness of breath  Kyphosis  Deconditioning  Right middle lobe atelectasis  Chronic Hypoxemic Respiratory Failure    Fortunately, she has started participating in pulmonary rehab.  I again emphasized that this is going to be the most important " intervention that can be done to help out with her shortness of breath.  I do not suspect that oxygen is going to help her symptoms of shortness of breath much.  However, she does desaturate with movement and in order to exercise longer I think oxygen at home would be appropriate as well.  I will prescribed 2 L of oxygen with exertion.  We are still waiting the overnight oximetry test results to see if she needs oxygen with sleep, but I suspect she will.    Her symptoms of shortness of breath are all related to kyphosis of the spine, which is causing right middle lobe atelectasis and hypoventilation.  This is not fixable, and so we need to work on her physical conditioning as best as possible to improve her symptoms moving forward      Mono Taylor MD     I certify that this patient, Ca Yan has been under my care (or a nurse practitioner or physican's assistant working with me). This is the face-to-face encounter for oxygen medical necessity.      Ca Yan is now in a chronic stable state and continues to require supplemental oxygen. Patient has continued oxygen desaturation due to Chronic Respiratory Failure with Hypoxia J93.11.    Alternative treatment(s) tried or considered and deemed clinically infective for treatment of Chronic Respiratory Failure with Hypoxia J93.11 include nebulizers, inhalers and pulmonary rehab.  If portability is ordered, is the patient mobile within the home? yes    **Patients who qualify for home O2 coverage under the CMS guidelines require ABG tests or O2 sat readings obtained closest to, but no earlier than 2 days prior to the discharge, as evidence of the need for home oxygen therapy. Testing must be performed while patient is in the chronic stable state. See notes for O2 sats.**         Today's visit note:     Chief Complaint: Ca Yan is a 74 year old year old female who is being seen for Follow Up (Restrictive lung disease, Atelectasis,  Dyspnea)      HISTORY OF PRESENT ILLNESS:    This is a 74-year-old female with a history of kyphoscoliosis, who presents the pulmonary clinic today for evaluation of hypoxemia    This visit was a telephone visit.    She recently started pulmonary rehab and has had 3 sessions of it.  She notes that she has been doing some small weight lifting as well as walking around.  She is not sure if it is been helpful or not for her shortness of breath.  We have also had her tested for the need for oxygen.  She did complete a 6-minute walk which demonstrated hypoxemia with walking.  I am still awaiting the results of an overnight oximetry test.  She states that she is using oxygen when she is at pulmonary rehab.         Past Medical and Surgical History:     Past Medical History:   Diagnosis Date     Anxiety      Arthritis      Breast cancer (H)      COPD (chronic obstructive pulmonary disease) (H)     6/19/12:  FEV 0.99 l     Depressive disorder      Hypertension      Low back pain with left-sided sciatica      Low bone density 4/13/2017    DEXA April 12, 2017: T score -2.0. Normal Z score. FRAX risk: major osteoporotic fracture 11.9%, hip fracture 2.6%, therefore not high-risk     Lymphedema, chronic lower extremities      Past Surgical History:   Procedure Laterality Date     BACK SURGERY      spinal fusion     COLONOSCOPY       LUMPECTOMY BREAST       ORTHOPEDIC SURGERY      Knee surgery left side           Family History:     Family History   Problem Relation Age of Onset     Breast Cancer Mother      Diabetes Mother      Anxiety Disorder Mother      Asthma Mother      Other Cancer Mother      Hyperlipidemia Mother      Alcohol/Drug Father      Mental Illness Father      Substance Abuse Father      Obesity Father      Cerebrovascular Disease Maternal Grandmother 67              Social History:     Social History     Socioeconomic History     Marital status: Single     Spouse name: Not on file     Number of children: Not on  file     Years of education: Not on file     Highest education level: Not on file   Occupational History     Employer: RETIRED   Social Needs     Financial resource strain: Not on file     Food insecurity     Worry: Not on file     Inability: Not on file     Transportation needs     Medical: Not on file     Non-medical: Not on file   Tobacco Use     Smoking status: Former Smoker     Packs/day: 0.50     Years: 5.00     Pack years: 2.50     Types: Cigarettes     Start date: 1956     Quit date: 1980     Years since quittin.1     Smokeless tobacco: Former User     Quit date: 1980   Substance and Sexual Activity     Alcohol use: Not Currently     Alcohol/week: 0.0 standard drinks     Comment: Sober for 2 years, previous alcoholic     Drug use: No     Sexual activity: Not Currently     Partners: Male     Birth control/protection: Abstinence   Lifestyle     Physical activity     Days per week: Not on file     Minutes per session: Not on file     Stress: Not on file   Relationships     Social connections     Talks on phone: Once a week     Gets together: Never     Attends [employer] service: More than 4 times per year     Active member of club or organization: Yes     Attends meetings of clubs or organizations: More than 4 times per year     Relationship status: Never      Intimate partner violence     Fear of current or ex partner: No     Emotionally abused: No     Physically abused: No     Forced sexual activity: No   Other Topics Concern     Parent/sibling w/ CABG, MI or angioplasty before 65F 55M? Not Asked      Service Not Asked     Blood Transfusions Not Asked     Caffeine Concern Not Asked     Occupational Exposure Not Asked     Hobby Hazards Not Asked     Sleep Concern Not Asked     Stress Concern Not Asked     Comment: Lives alone     Weight Concern Not Asked     Special Diet Not Asked     Back Care Not Asked     Comment: Hx of scoliosis with spine fusion     Exercise Not Asked      Comment: Walks     Bike Helmet Not Asked     Seat Belt Not Asked     Self-Exams Not Asked   Social History Narrative    Ca Yan never   Lives in apartment  is  family member is daughter Rin in [place], [place]  Previous AA meetings            Medications:     Current Outpatient Medications   Medication     albuterol (PROAIR HFA/PROVENTIL HFA/VENTOLIN HFA) 108 (90 Base) MCG/ACT inhaler     atorvastatin (LIPITOR) 40 MG tablet     cefpodoxime (VANTIN) 200 MG tablet     cephALEXin (KEFLEX) 250 MG capsule     clonazePAM (KLONOPIN) 0.5 MG tablet     fluticasone-vilanterol (BREO ELLIPTA) 200-25 MCG/INH inhaler     hydrochlorothiazide (MICROZIDE) 12.5 MG capsule     ibuprofen (ADVIL,MOTRIN) 200 MG tablet     latanoprost (XALATAN) 0.005 % ophthalmic solution     losartan-hydrochlorothiazide (HYZAAR) 100-25 MG tablet     quetiapine (SEROQUEL) 200 MG tablet     sertraline (ZOLOFT) 50 MG tablet     umeclidinium (INCRUSE ELLIPTA) 62.5 MCG/INH inhaler     Vitamin D3 (CHOLECALCIFEROL) 25 mcg (1000 units) tablet     No current facility-administered medications for this visit.             Review of Systems:       A complete review of systems was otherwise negative except as noted in the HPI.      PHYSICAL EXAM:  LMP  (LMP Unknown)       No physical exam was conducted as this was a telephone visit           Data:   All laboratory and imaging data reviewed.      6-minute walk test was performed November 13, 2020.  Her walk distance was 125 m.  Her room air saturation at rest is 91%.  With walking she desaturated down to 85%.  2 L of oxygen was administered.  With the use of 2 L her oxygen saturation decreased to 91%    PFT:   FEV1/FVC ratio 0.71.  FVC is 1.12 L which is 42% predicted.  FEV1 is 0.8 L which is 39% predicted.      PFT Interpretation:  Possible airflow obstruction.  Low FVC and FEV1 suggestive of restriction.  No lung volumes available to correlate.  Valid Maneuver    Chest CT scan:  Chest: Thyroid gland  appears unremarkable. Esophagus appears  unremarkable. Tracheobronchial tree appears patent.  No suspicious  lung nodules.      Lung nodule(s) described on series 4:  -Solid 3 mm pulmonary nodule of the left upper lobe (Image: 147)  -Solid 4 mm pulmonary nodule in the left upper lobe (Image: 164)     The pleura appears unremarkable. Small right pleural effusion. No  pneumothorax. Enlargement of the pulmonary artery measuring 4.0 cm.  Heart size is within normal limits. Mild atherosclerotic ossifications  of the coronary arteries. No significant pericardial effusion.   Visualized thoracic aorta and main pulmonary artery diameters appear  within normal limits. There are no visualized pathologically enlarged  mediastinal, hilar or axillary lymph nodes.     Abdomen: Examination of the upper abdomen is limited.   Hypoattenuating focus of the pancreas measuring 7 mm. Original  preliminary report had suggested a lesion in the pancreas. Images are  available from outside CT showed a very similar appearance on outside  CT dated 8/13/2016. Appears to be fat extending up into the pancreas  rather than a mass.     Bones: No suspicious osseous lesion. Severe thoracic dextroscoliosis.         Soft Tissues: No suspicious mass.                                                                      IMPRESSION:   1. Small right pleural effusion.  2. Solid pulmonary nodules as detailed above. Consider follow-up if  patient is stratified into high-risk by Fleischner criteria.  3. Enlargement of the pulmonary artery which can be seen in setting of  pulmonary hypertension.  4. Hepatomegaly.                      Again, thank you for allowing me to participate in the care of your patient.        Sincerely,        Bassam Taylor MD

## 2020-11-23 NOTE — PROGRESS NOTES
"Ca Yan is a 74 year old female who is being evaluated via a billable telephone visit.      The patient has been notified of following:     \"This telephone visit will be conducted via a call between you and your physician/provider. We have found that certain health care needs can be provided without the need for a physical exam.  This service lets us provide the care you need with a short phone conversation.  If a prescription is necessary we can send it directly to your pharmacy.  If lab work is needed we can place an order for that and you can then stop by our lab to have the test done at a later time.    Telephone visits are billed at different rates depending on your insurance coverage. During this emergency period, for some insurers they may be billed the same as an in-person visit.  Please reach out to your insurance provider with any questions.    If during the course of the call the physician/provider feels a telephone visit is not appropriate, you will not be charged for this service.\"    Patient has given verbal consent for Telephone visit?  Yes    What phone number would you like to be contacted at? [phone]    How would you like to obtain your AVS? Mail a copy    Phone call duration: 18 minutes    Bassam Taylor MD        [facility]  Pulmonary Medicine  Visit Clinic Note  November 23, 2020         ASSESSMENT & PLAN       Shortness of breath  Kyphosis  Deconditioning  Right middle lobe atelectasis  Chronic Hypoxemic Respiratory Failure    Fortunately, she has started participating in pulmonary rehab.  I again emphasized that this is going to be the most important intervention that can be done to help out with her shortness of breath.  I do not suspect that oxygen is going to help her symptoms of shortness of breath much.  However, she does desaturate with movement and in order to exercise longer I think oxygen at home would be appropriate as well.  I will " prescribed 2 L of oxygen with exertion.  We are still waiting the overnight oximetry test results to see if she needs oxygen with sleep, but I suspect she will.    Her symptoms of shortness of breath are all related to kyphosis of the spine, which is causing right middle lobe atelectasis and hypoventilation.  This is not fixable, and so we need to work on her physical conditioning as best as possible to improve her symptoms moving forward      Mono Taylor MD     I certify that this patient, Ca Yan has been under my care (or a nurse practitioner or physican's assistant working with me). This is the face-to-face encounter for oxygen medical necessity.      Ca Yan is now in a chronic stable state and continues to require supplemental oxygen. Patient has continued oxygen desaturation due to Chronic Respiratory Failure with Hypoxia J93.11.    Alternative treatment(s) tried or considered and deemed clinically infective for treatment of Chronic Respiratory Failure with Hypoxia J93.11 include nebulizers, inhalers and pulmonary rehab.  If portability is ordered, is the patient mobile within the home? yes    **Patients who qualify for home O2 coverage under the CMS guidelines require ABG tests or O2 sat readings obtained closest to, but no earlier than 2 days prior to the discharge, as evidence of the need for home oxygen therapy. Testing must be performed while patient is in the chronic stable state. See notes for O2 sats.**      Addendum:  Overnight oximetry results reviewed.  Her O2 saturation was less than 88% for 8 hours and 39 minutes.  She should use 2 L nasal cannula while sleeping.   Mono Taylor MD       Today's visit note:     Chief Complaint: Ca Yan is a 74 year old year old female who is being seen for Follow Up (Restrictive lung disease, Atelectasis, Dyspnea)      HISTORY OF PRESENT ILLNESS:    This is a 74-year-old female with a history of kyphoscoliosis, who presents the  pulmonary clinic today for evaluation of hypoxemia    This visit was a telephone visit.    She recently started pulmonary rehab and has had 3 sessions of it.  She notes that she has been doing some small weight lifting as well as walking around.  She is not sure if it is been helpful or not for her shortness of breath.  We have also had her tested for the need for oxygen.  She did complete a 6-minute walk which demonstrated hypoxemia with walking.  I am still awaiting the results of an overnight oximetry test.  She states that she is using oxygen when she is at pulmonary rehab.         Past Medical and Surgical History:     Past Medical History:   Diagnosis Date     Anxiety      Arthritis      Breast cancer (H)      COPD (chronic obstructive pulmonary disease) (H)     6/19/12:  FEV 0.99 l     Depressive disorder      Hypertension      Low back pain with left-sided sciatica      Low bone density 4/13/2017    DEXA April 12, 2017: T score -2.0. Normal Z score. FRAX risk: major osteoporotic fracture 11.9%, hip fracture 2.6%, therefore not high-risk     Lymphedema, chronic lower extremities      Past Surgical History:   Procedure Laterality Date     BACK SURGERY      spinal fusion     COLONOSCOPY       LUMPECTOMY BREAST       ORTHOPEDIC SURGERY      Knee surgery left side           Family History:     Family History   Problem Relation Age of Onset     Breast Cancer Mother      Diabetes Mother      Anxiety Disorder Mother      Asthma Mother      Other Cancer Mother      Hyperlipidemia Mother      Alcohol/Drug Father      Mental Illness Father      Substance Abuse Father      Obesity Father      Cerebrovascular Disease Maternal Grandmother 67              Social History:     Social History     Socioeconomic History     Marital status: Single     Spouse name: Not on file     Number of children: Not on file     Years of education: Not on file     Highest education level: Not on file   Occupational History     Employer:  RETIRED   Social Needs     Financial resource strain: Not on file     Food insecurity     Worry: Not on file     Inability: Not on file     Transportation needs     Medical: Not on file     Non-medical: Not on file   Tobacco Use     Smoking status: Former Smoker     Packs/day: 0.50     Years: 5.00     Pack years: 2.50     Types: Cigarettes     Start date: 1956     Quit date: 1980     Years since quittin.1     Smokeless tobacco: Former User     Quit date: 1980   Substance and Sexual Activity     Alcohol use: Not Currently     Alcohol/week: 0.0 standard drinks     Comment: Sober for 2 years, previous alcoholic     Drug use: No     Sexual activity: Not Currently     Partners: Male     Birth control/protection: Abstinence   Lifestyle     Physical activity     Days per week: Not on file     Minutes per session: Not on file     Stress: Not on file   Relationships     Social connections     Talks on phone: Once a week     Gets together: Never     Attends Christian service: More than 4 times per year     Active member of club or organization: Yes     Attends meetings of clubs or organizations: More than 4 times per year     Relationship status: Never      Intimate partner violence     Fear of current or ex partner: No     Emotionally abused: No     Physically abused: No     Forced sexual activity: No   Other Topics Concern     Parent/sibling w/ CABG, MI or angioplasty before 65F 55M? Not Asked      Service Not Asked     Blood Transfusions Not Asked     Caffeine Concern Not Asked     Occupational Exposure Not Asked     Hobby Hazards Not Asked     Sleep Concern Not Asked     Stress Concern Not Asked     Comment: Lives alone     Weight Concern Not Asked     Special Diet Not Asked     Back Care Not Asked     Comment: Hx of scoliosis with spine fusion     Exercise Not Asked     Comment: Walks     Bike Helmet Not Asked     Seat Belt Not Asked     Self-Exams Not Asked   Social History Narrative     Ca Yan never   Lives in apartment  is  family member is daughter Rin in [place], [place]  Previous AA meetings            Medications:     Current Outpatient Medications   Medication     albuterol (PROAIR HFA/PROVENTIL HFA/VENTOLIN HFA) 108 (90 Base) MCG/ACT inhaler     atorvastatin (LIPITOR) 40 MG tablet     cefpodoxime (VANTIN) 200 MG tablet     cephALEXin (KEFLEX) 250 MG capsule     clonazePAM (KLONOPIN) 0.5 MG tablet     fluticasone-vilanterol (BREO ELLIPTA) 200-25 MCG/INH inhaler     hydrochlorothiazide (MICROZIDE) 12.5 MG capsule     ibuprofen (ADVIL,MOTRIN) 200 MG tablet     latanoprost (XALATAN) 0.005 % ophthalmic solution     losartan-hydrochlorothiazide (HYZAAR) 100-25 MG tablet     quetiapine (SEROQUEL) 200 MG tablet     sertraline (ZOLOFT) 50 MG tablet     umeclidinium (INCRUSE ELLIPTA) 62.5 MCG/INH inhaler     Vitamin D3 (CHOLECALCIFEROL) 25 mcg (1000 units) tablet     No current facility-administered medications for this visit.             Review of Systems:       A complete review of systems was otherwise negative except as noted in the HPI.      PHYSICAL EXAM:  LMP  (LMP Unknown)       No physical exam was conducted as this was a telephone visit           Data:   All laboratory and imaging data reviewed.      6-minute walk test was performed November 13, 2020.  Her walk distance was 125 m.  Her room air saturation at rest is 91%.  With walking she desaturated down to 85%.  2 L of oxygen was administered.  With the use of 2 L her oxygen saturation decreased to 91%    PFT:   FEV1/FVC ratio 0.71.  FVC is 1.12 L which is 42% predicted.  FEV1 is 0.8 L which is 39% predicted.      PFT Interpretation:  Possible airflow obstruction.  Low FVC and FEV1 suggestive of restriction.  No lung volumes available to correlate.  Valid Maneuver    Chest CT scan:  Chest: Thyroid gland appears unremarkable. Esophagus appears  unremarkable. Tracheobronchial tree appears patent.  No suspicious  lung nodules.       Lung nodule(s) described on series 4:  -Solid 3 mm pulmonary nodule of the left upper lobe (Image: 147)  -Solid 4 mm pulmonary nodule in the left upper lobe (Image: 164)     The pleura appears unremarkable. Small right pleural effusion. No  pneumothorax. Enlargement of the pulmonary artery measuring 4.0 cm.  Heart size is within normal limits. Mild atherosclerotic ossifications  of the coronary arteries. No significant pericardial effusion.   Visualized thoracic aorta and main pulmonary artery diameters appear  within normal limits. There are no visualized pathologically enlarged  mediastinal, hilar or axillary lymph nodes.     Abdomen: Examination of the upper abdomen is limited.   Hypoattenuating focus of the pancreas measuring 7 mm. Original  preliminary report had suggested a lesion in the pancreas. Images are  available from outside CT showed a very similar appearance on outside  CT dated 8/13/2016. Appears to be fat extending up into the pancreas  rather than a mass.     Bones: No suspicious osseous lesion. Severe thoracic dextroscoliosis.         Soft Tissues: No suspicious mass.                                                                      IMPRESSION:   1. Small right pleural effusion.  2. Solid pulmonary nodules as detailed above. Consider follow-up if  patient is stratified into high-risk by Fleischner criteria.  3. Enlargement of the pulmonary artery which can be seen in setting of  pulmonary hypertension.  4. Hepatomegaly.

## 2020-11-24 ENCOUNTER — TELEPHONE (OUTPATIENT)
Dept: PULMONOLOGY | Facility: CLINIC | Age: 75
End: 2020-11-24

## 2020-11-24 ENCOUNTER — DOCUMENTATION ONLY (OUTPATIENT)
Dept: OTHER | Facility: CLINIC | Age: 75
End: 2020-11-24

## 2020-11-24 PROBLEM — J96.11 CHRONIC HYPOXEMIC RESPIRATORY FAILURE (H): Status: ACTIVE | Noted: 2020-11-24

## 2020-11-24 NOTE — TELEPHONE ENCOUNTER
Contacted  [facility] to let them know that Dr. Taylor has placed orders for nocturnal and exertional oxygen.  All testing in chart.

## 2020-11-24 NOTE — PROGRESS NOTES
All qualifying documentation obtained for home oxygen. Connected with patient and would like to use [facility] to service her for those needs. Pt. Will be getting set up 11/27/2020 per her request. Attempted to call [facility] Pulm clinic to confirm documentation received. Unable to connect with care team. For further questions/concerns please call  .

## 2020-11-24 NOTE — TELEPHONE ENCOUNTER
Received STACY results from [facility]. Emailed results to Dr. Taylor and scanned into pts chart.

## 2020-11-25 ENCOUNTER — HOSPITAL ENCOUNTER (OUTPATIENT)
Dept: CARDIAC REHAB | Facility: CLINIC | Age: 75
End: 2020-11-25
Payer: COMMERCIAL

## 2020-11-25 PROCEDURE — G0239 OTH RESP PROC, GROUP: HCPCS

## 2020-11-25 PROCEDURE — 999N000193 HC STATISTIC VISIT PULM REHAB

## 2020-11-30 ENCOUNTER — DOCUMENTATION ONLY (OUTPATIENT)
Dept: OTHER | Facility: CLINIC | Age: 75
End: 2020-11-30

## 2020-11-30 ENCOUNTER — HOSPITAL ENCOUNTER (OUTPATIENT)
Dept: CARDIAC REHAB | Facility: CLINIC | Age: 75
End: 2020-11-30
Payer: COMMERCIAL

## 2020-11-30 PROCEDURE — G0239 OTH RESP PROC, GROUP: HCPCS

## 2020-11-30 PROCEDURE — 999N000193 HC STATISTIC VISIT PULM REHAB

## 2020-11-30 NOTE — PROGRESS NOTES
Attempted to set up patient with home oxygen 11/27/2020 with [facility]. Patient refused the style of equipment we had to offer (refused both portable oxygen concentrator and tank system). We did not set patient up on home oxygen equipment per patient request. Please call [facility] with any questions or concerns at [phone] .

## 2020-12-01 ENCOUNTER — TELEPHONE (OUTPATIENT)
Dept: PULMONOLOGY | Facility: CLINIC | Age: 75
End: 2020-12-01

## 2020-12-01 NOTE — TELEPHONE ENCOUNTER
Spoke with [facility] they are not taking on transfers. Pt would like to stop using [place] as they can not supply her with what she is looking for. RN called and spoke with Bharat Cedeño at [facility] and he requested that we send all her information over and will review to see if they can take her on as a transfer. Herminia from our clinic faxed everything that was requested to [facility], ATTUMA Nicholson. RN then called patient and updated her with plan. Pt is in agreement will wait to hear back from [facility]. If [facility] can not take her on, pt states she will call [facility]. RN urged pt not to make any calls until we hear back from [facility]. Pt is in agreement. Advised it will take several days to review. Discussed with patient that she will have to see what her insurance will cover and we can not make any promises on what type of equipment is supplied. No further questions at this time.   Sisi Bradley RN

## 2020-12-01 NOTE — TELEPHONE ENCOUNTER
[facility] Call Center    Phone Message    May a detailed message be left on voicemail: yes     Reason for Call: Other: Pt following up on conversation from this morning with Sisi. She is wondering if she has heard anything back yet and would like another call back, thank you    Action Taken: Message routed to:  [facility] Pulm    Travel Screening: Not Applicable

## 2020-12-01 NOTE — TELEPHONE ENCOUNTER
Called and spoke with patient. She currently has [place] for oxygen supplier and would like to switch to [place]. Pt states [facility] wont take her on. RN offered to call and find out what is going on. RN will let pt know after speaking with [facility].   Sisi Bradley RN BSN

## 2020-12-01 NOTE — TELEPHONE ENCOUNTER
[facility] Call Center    Phone Message    May a detailed message be left on voicemail: yes     Reason for Call: Other: Ca calling to get a recommendation from Dr. Taylor for an Oxygen Supply Company.  She is looking for a good company wthat also has portability tanks.  Please call her back with any recommendations.      Action Taken: Message routed to:  [facility]    Travel Screening: Not Applicable

## 2020-12-07 NOTE — TELEPHONE ENCOUNTER
Received message from Teofilo at [facility] requesting CMN that was faxed over be filled out and signed by Dr. Taylor. Completed CMN and obtained signature from Dr. Taylor and fax to [facility] at [phone].

## 2020-12-09 ENCOUNTER — TELEPHONE (OUTPATIENT)
Dept: SURGERY | Facility: CLINIC | Age: 75
End: 2020-12-09

## 2020-12-09 NOTE — TELEPHONE ENCOUNTER
"Patient called, I relayed the following message to Dr. Latham and her clinical nurse STEVEN Gómez:    \"Hi Dr. Latham, Dalia,    Received call from patient. Patient [facility], but wants to discuss it with M again 1st before proceeding. She says she discussed it with you a long time ago. She also discussed it with [facility] and she said that there was some paperwork she completed with them which cannot be located by them?    Anyway, do you want to set patient up for an in-person or virtual visit to discuss this?    Please advise.    Thank-you!  Lani\"  "

## 2020-12-11 NOTE — TELEPHONE ENCOUNTER
[facility] Call Center    Phone Message    May a detailed message be left on voicemail: no     Reason for Call: Other: Pt called [facility] today to check status of oxygen supply orders. Pt said they still need paperwork but didn't know what paperwork. Documented CMN sent on 12/7/20. Not sure what other paperwork [facility] needs but Pt has not yet received her oxygen. Pt says she needs this done ASAP/TODAY or else she will be going to the ER to get oxygen. Pt requests urgent call back to discuss.     Action Taken: Message routed to:  [facility]    Travel Screening: Not Applicable

## 2020-12-11 NOTE — TELEPHONE ENCOUNTER
Spoke with [facility] who stated they were having trouble with their printer when I faxed the CMN on 12/7 and they did not receive it. Refaxed CMN to [phone]. They said they will reach out to pt to get oxygen set up. I called pt and left message explaining what the issue was and provided my number to call back if she has any questions.

## 2020-12-16 ENCOUNTER — TELEPHONE (OUTPATIENT)
Dept: FAMILY MEDICINE | Facility: CLINIC | Age: 75
End: 2020-12-16

## 2020-12-16 ENCOUNTER — HOSPITAL ENCOUNTER (OUTPATIENT)
Dept: CARDIAC REHAB | Facility: CLINIC | Age: 75
End: 2020-12-16
Payer: COMMERCIAL

## 2020-12-16 DIAGNOSIS — N18.30 CHRONIC KIDNEY DISEASE, STAGE III (MODERATE) (H): ICD-10-CM

## 2020-12-16 DIAGNOSIS — J96.11 CHRONIC HYPOXEMIC RESPIRATORY FAILURE (H): Primary | ICD-10-CM

## 2020-12-16 DIAGNOSIS — I10 BENIGN ESSENTIAL HYPERTENSION: ICD-10-CM

## 2020-12-16 PROCEDURE — G0239 OTH RESP PROC, GROUP: HCPCS

## 2020-12-16 PROCEDURE — 999N000193 HC STATISTIC VISIT PULM REHAB

## 2020-12-16 NOTE — TELEPHONE ENCOUNTER
[facility] Call Center    Phone Message    May a detailed message be left on voicemail: yes     Reason for Call: Other: Call Back: Patient would like a call back from Dr. Sahu or the Nurse. Patient states she has a serious question about her health. She wants to know what it means when someone wants to send you to palliative care? Please call patient back to advise.    Action Taken: Message routed to:  [facility] PCC    Travel Screening: Not Applicable

## 2020-12-17 NOTE — TELEPHONE ENCOUNTER
Patient was reached by phone, and RN relayed what palliative care is:    Palliative care is:    Special care for people with a serious illness.    Care focused on relief of difficult symptoms, such as pain and stress.    Care that deals with the whole person. It may benefit your physical, emotional and spiritual health.    Care that helps you think about and understand your choices for health care.    Care that helps improve the quality of life for patients and their loved ones.    Patient last had a palliative care virtual visit on 6/25/20.  Palliative care was called, and RN asked staff to call patient back to schedule a follow up visit, as she needs help with portable O2, as tanks are too heavy for her to manage.    Kiki Eddy RN on 12/17/2020 at 2:05 PM

## 2020-12-18 DIAGNOSIS — F43.22 ADJUSTMENT DISORDER WITH ANXIOUS MOOD: ICD-10-CM

## 2020-12-18 NOTE — TELEPHONE ENCOUNTER
"Received VM from patient asking for a call back.  Message just stated, \"I was told to call you\"    Left Message on patients phone today.    Also received  Staff message from scheduling team stating the PCP office called about patient needed assistance with her oxygen tanks.   However, patient has pulmonary team that handles her oxygen needs.    Per chart review, it appears patient may be under stress as she has called numerous providers these last few days.    Received refill request for Sertraline and will forward to provider to review.    Last seen in palliative care 6/2020 and no follow up scheduled.  We will work to reach the patient and schedule follow up  "

## 2020-12-21 ENCOUNTER — TELEPHONE (OUTPATIENT)
Dept: PULMONOLOGY | Facility: CLINIC | Age: 75
End: 2020-12-21

## 2020-12-21 ENCOUNTER — TELEPHONE (OUTPATIENT)
Dept: SURGERY | Facility: CLINIC | Age: 75
End: 2020-12-21

## 2020-12-21 ENCOUNTER — VIRTUAL VISIT (OUTPATIENT)
Dept: OTOLARYNGOLOGY | Facility: CLINIC | Age: 75
End: 2020-12-21
Payer: COMMERCIAL

## 2020-12-21 DIAGNOSIS — J96.11 CHRONIC HYPOXEMIC RESPIRATORY FAILURE (H): Primary | ICD-10-CM

## 2020-12-21 DIAGNOSIS — J38.01 VOCAL FOLD PARALYSIS, UNILATERAL: ICD-10-CM

## 2020-12-21 DIAGNOSIS — R49.0 DYSPHONIA: Primary | ICD-10-CM

## 2020-12-21 PROCEDURE — 92507 TX SP LANG VOICE COMM INDIV: CPT | Mod: GN | Performed by: SPEECH-LANGUAGE PATHOLOGIST

## 2020-12-21 PROCEDURE — 99207 PR NO CHARGE LOS: CPT | Performed by: SPEECH-LANGUAGE PATHOLOGIST

## 2020-12-21 RX ORDER — HYDROCHLOROTHIAZIDE 12.5 MG/1
CAPSULE ORAL
OUTPATIENT
Start: 2020-12-21

## 2020-12-21 RX ORDER — LOSARTAN POTASSIUM 50 MG/1
TABLET ORAL
Qty: 180 TABLET | Refills: 0 | OUTPATIENT
Start: 2020-12-21

## 2020-12-21 NOTE — LETTER
"12/21/2020       RE: Ca Yan  [address]  [place] [address]     Dear Colleague,    Thank you for referring your patient, Ca Yan, to the [facility]. Please see a copy of my visit note below.    Ca Yan is a 75 year old female who is being evaluated via a billable telephone visit.      The patient has been notified of following:     \"This telephone visit will be conducted via a call between you and your physician/provider. We have found that certain health care needs can be provided without the need for a physical exam.  This service lets us provide the care you need with a short phone conversation.  If a prescription is necessary we can send it directly to your pharmacy.  If lab work is needed we can place an order for that and you can then stop by our lab to have the test done at a later time.    Telephone visits are billed at different rates depending on your insurance coverage. During this emergency period, for some insurers they may be billed the same as an in-person visit.  Please reach out to your insurance provider with any questions.    If during the course of the call the physician/provider feels a telephone visit is not appropriate, you will not be charged for this service.\"    Patient has given verbal consent for Telephone visit?  Yes    What phone number would you like to be contacted at? [phone]    How would you like to obtain your AVS? Mail a copy    Phone call duration: 58 minutes    .[facility]  Inderjit Shelton Jr., M.D., F.A.C.S.  Luann Arzate M.D., M.P.H.  Domonique Roche M.D.  Mirta Blake, Ph.D., CCC/SLP  JC Castillo.JC. (voice), M.A., CCC/SLP  Wiley Snow M.M. (voice), M.A., CCC/SLP    [facility]  VOICE/SPEECH/BREATHING THERAPY PROGRESS REPORT    Patient: Ca Yan  Date of Service: 12/21/2020    Date of Last Service: 10/16/20, in " "a virtual visit with Dr. Roche  Referring physician: Dr. Roche  Initial evaluation: 4/30/20    I had the pleasure of seeing Ms. 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 Yan worked on therapeutic activities to address the above diagnosis.  She was seen for follow-up by Dr. Roche on 10/16.  Dr. Roche explained possible vocal fold augmentation procedures, including the pros and cons of each.  One concern is with the possibility of a sensation of narrowed airway resulting in paradoxical vocal fold motion.  Dr. Roche referred Ms. Yan for speech therapy to learn breathing techniques before she can consider any augmentation procedure.      Regarding practice, Ms. Yan reports the following:     She tried voice exercises for a while, but they weren't helpful, so she stopped    Ms. Yan also states that:    She is struggling a great deal with her voice, but has many questions about the potential for any medical intervention to improve her voice    She often cannot be heard, especially in phone conversations    She cannot breathe well with the mask; she goes to pulmonary rehab, but that is \"just oxygen\" and she is not learning any techniques for improved breathing function     Ms. Yan presents today with the following:  Voice quality:    Markedly breathy, with some very light phonation that indicates vocal fold vibration with weak but periodic glottic closure    Any increase in volume results in marked roughness and diplophonia    Pitch is quite high, usually around D4 to E4, and sounds even higher due to the breathiness    Some inspiratory stridor during fast conversation; inhalations are quiet at rest    Obvious respiratory phonatory incoordination, as well as phonatory effort    THERAPEUTIC ACTIVITIES  Today Ms. Yan participated in the following therapeutic " activities:    Asked many questions about the nature of her symptoms, and I answered all of these thoroughly.  o I reviewed the note from Dr. Roche, and explained many of the concepts; she demonstrated poor understanding of what she had discussed with Dr. Roche, and what her options are  o I provided more explanation of the options for vocal fold augmentation  - Her questions indicated that she did not have good understanding of the options, and also she has a great deal of anxiety regarding the medical interventions  - She tended to be quite tangential, which made answering her questions more difficult as I was not always able to provide a thorough answer before she had a question about a different topic  - I did try to explain the reason why an awake procedure is not indicated for her, because she has such discomfort with the endoscopy procedure  - Also, any procedure could possibly result in a compensa ory response of paradoxical vocal fold motion, and I reiterated how important it is for her to learn techniques for breathing.    I provided instruction for rescue breathing techniques to use if she has any difficulty inhaling; this was challenging over the phone  o She cannot do straw inhalation because she doesn't have a straw; it is not clear if she had the right sensation with rounded-lip inhalation  o She understands the need to practice these techniques like a fire drill.    IMPRESSIONS/GOALS/PLAN  Ms. 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 This will remain challenging as the phone visits do not allow for visual aids to improve her understanding of her medical options or therapy techniques.  Still, she is quite willing to pratice the techniques, and check  in again in two weeks to see how she is learning to use these.    Goals for this practice period:     practice breathing exercises according to instructions    Plan: I will visit with Ms. Yan by phone in two weeks to work on using the breathing techniques to arrest any episode of dyspnea.    TOTAL SERVICE TIME: 58 minutes  TREATMENT (88860)  NO CHARGE FACILITY FEE    Mirta Blake, Ph.D., [facility]-SLP  Speech-Language Pathologist  Director, [facility]  [phone]

## 2020-12-21 NOTE — TELEPHONE ENCOUNTER
"Contacted by Palliative care to call patient regarding her oxygen.  She is requesting something lighter to carry around.  Left message for pt to return my call to discuss. Per [facility] pt currently has concentrator and small tanks for portability.  [facility] confirmed that they will review her insurance for POC on receipt of order.  The order must state \"Portable oxygen concentrator with conserving device\".  "

## 2020-12-21 NOTE — TELEPHONE ENCOUNTER
hydrochlorothiazide (MICROZIDE) 12.5 MG capsule  Last Written Prescription Date:  9/18/17-7/2/18  Last Fill Quantity: 60,   # refills: 1  Last Office Visit : 10/8/20  Future Office visit:  4/5/21    Routing refill request to provider for review/approval because:  Medication is reported/historical    Losartan Potassium (COZAAR PO)      Last Written Prescription Date:  n/a  Last Fill Quantity: n/a,   # refills: n/a    Routing refill request to provider for review/approval because:  Medication is reported/historical

## 2020-12-21 NOTE — TELEPHONE ENCOUNTER
[facility] Call Center    Phone Message    May a detailed message be left on voicemail: yes     Reason for Call: Other:  has question about Interstim Procedure     Action Taken: Message routed to:  [facility] Colon and Rec    Travel Screening: Not Applicable

## 2020-12-22 ENCOUNTER — TELEPHONE (OUTPATIENT)
Dept: FAMILY MEDICINE | Facility: CLINIC | Age: 75
End: 2020-12-22

## 2020-12-22 NOTE — TELEPHONE ENCOUNTER
Pt requesting refills of Losartan 50mg caps and hydrochlorthiazide 12.5mg caps    Pt last got from Dr Saunders

## 2020-12-22 NOTE — TELEPHONE ENCOUNTER
" Centralized Medication Refill Team note:   [facility]ia [facility]: Pt requesting refills of Losartan 50mg caps and hydrochlorthiazide 12.5mg caps  Last refilled as below by Dr Sahu( listed as PCP):   losartan-hydrochlorothiazide (HYZAAR) 100-25 MG tablet 90 tablet 3 8/20/2020  --   Sig - Route: Take 1 tablet by mouth daily - Oral   Sent to pharmacy as: Losartan Potassium-HCTZ 100-25 MG Oral Tablet (HYZAAR)       8/20/20 note excerpted.\"Her blood pressure today was slightly high therefore I recommended discontinue current losartan 50 mg was taking 2 tabs, discontinue hydrochlorothiazide 12.5 mg and increased dose to combined Hyzaar 100-25 mg one tab daily\"        "

## 2020-12-22 NOTE — TELEPHONE ENCOUNTER
Spoke with Ania at [facility] and confirmed they received POC order. They will contact pt to get it set up.

## 2020-12-23 DIAGNOSIS — I10 BENIGN ESSENTIAL HYPERTENSION: ICD-10-CM

## 2020-12-24 NOTE — TELEPHONE ENCOUNTER
[facility] Call Center    Phone Message    May a detailed message be left on voicemail: yes     Reason for Call: Other: Pt would like to discuss HCTZ. Pt states that she thinks that the medication was refused and she wants to know why she has not ok'd this medication. Please call to discuss further.      Action Taken: Message routed to:  [facility] PCC    Travel Screening: Not Applicable

## 2020-12-24 NOTE — TELEPHONE ENCOUNTER
Losartan      NOT ON MEDICATION LIST    HCTZ      HISTORICAL ONLY ON MEDICATION LIST    Last Office Visit : 10-5-2020  Future Office visit:  4-5-2021    Routing refill request to provider for review/approval because:  NOT ON MEDICATION LIST AND HISTORICAL ONLY    Kathleen M Doege RN

## 2020-12-24 NOTE — TELEPHONE ENCOUNTER
Med was changed to Hyzaar in August - pt was called and states she has not been taking this. States she knows nothing about this. She has been taking losartan and hydrochlorothiazide instead. A Different encounter request refills of hydrochlorothiazide and losartan sent to RN/provider will route this as well - pt will need a call about what medication to be taking and correct medication ordered and incorrect discontinued from medication list (all including historical entries).      Kathleen M Doege RN

## 2020-12-28 ENCOUNTER — VIRTUAL VISIT (OUTPATIENT)
Dept: OTOLARYNGOLOGY | Facility: CLINIC | Age: 75
End: 2020-12-28
Payer: COMMERCIAL

## 2020-12-28 DIAGNOSIS — R49.0 DYSPHONIA: Primary | ICD-10-CM

## 2020-12-28 DIAGNOSIS — J38.01 VOCAL FOLD PARALYSIS, UNILATERAL: ICD-10-CM

## 2020-12-28 PROCEDURE — 92507 TX SP LANG VOICE COMM INDIV: CPT | Mod: GN | Performed by: SPEECH-LANGUAGE PATHOLOGIST

## 2020-12-28 PROCEDURE — 99207 PR NO CHARGE LOS: CPT | Performed by: SPEECH-LANGUAGE PATHOLOGIST

## 2020-12-28 NOTE — LETTER
"12/28/2020       RE: Ca Yan  [address]  [place] [address]     Dear Colleague,    Thank you for referring your patient, Ca Yan, to the [facility] [place] [facility]. Please see a copy of my visit note below.    Ca Yan is a 75 year old female who is being evaluated via a billable telephone visit.      The patient has been notified of following:     \"This telephone visit will be conducted via a call between you and your physician/provider. We have found that certain health care needs can be provided without the need for a physical exam.  This service lets us provide the care you need with a short phone conversation.  If a prescription is necessary we can send it directly to your pharmacy.  If lab work is needed we can place an order for that and you can then stop by our lab to have the test done at a later time.    Telephone visits are billed at different rates depending on your insurance coverage. During this emergency period, for some insurers they may be billed the same as an in-person visit.  Please reach out to your insurance provider with any questions.    If during the course of the call the physician/provider feels a telephone visit is not appropriate, you will not be charged for this service.\"    Patient has given verbal consent for Telephone visit?  Yes    What phone number would you like to be contacted at? [phone]    How would you like to obtain your AVS? Mail a copy    Phone call duration: 43 minutes      [facility]  Inderjit Shelton Jr., M.D., F.A.C.S.  Luann Arzate M.D., M.P.H.  Domonique Roche M.D.  Mirta Blake, Ph.D., CCC/SLP  Ya Arzate M.M. (voice), M.A., CCC/SLP  Wiley Snow M.M. (voice), M.A., CCC/SLP    [facility]  VOICE/SPEECH/BREATHING THERAPY PROGRESS REPORT    Patient: Ca Yan  Date of Service: 12/28/2020    Date of Last Service: " "12/21/20  Referring physician: Dr. Roche  Initial evaluation: 4/30/20    I had the pleasure of seeing Ms. 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 Yan worked on therapeutic activities to address the above diagnosis.    Regarding practice, Ms. Yan reports the following:     Practice of the breathing exercises several times per day  o The rounded lip configuration feels like air goes in easily    It does not appear that she has a good understanding of what she is trying to do with these exercises    Ms. Yan also states that:    She still has many questions about her condition and the potential medical interventions  o She forgot what we talked about last week    She has trouble swallowing \"once in a while;\" can be solids or liquids; occurs once or twice a day   o Chokes/coughs on liquids; solids get stuck (depends on what she's eating)  - She had problems with coconut macaroons recently     Ms. Yan presents today with the following:  Voice quality:    Markedly breathy, with roughness and diplophonia    Very short breath groups, with obvious effort coordinating her breathing with phonation    Pitch is high, at around D4    THERAPEUTIC ACTIVITIES  Today Ms. Yan participated in the following therapeutic activities:    Asked many questions about the nature of her symptoms, and I answered all of these thoroughly.  o She had forgotten much of what we talked about     I provided instruction for strategies to improve her swallowing   o She learned about the chin tuck technique, but it is unclear if she actually learned to do this in the telephone visit  o I stressed the importance of chewing and swallowing more carefully and deliberately .    [place] exercises for rescue breathing to arrest an episode of paradoxical vocal fold motion  o Worked on inhalation strategies of " inhaling through rounded lips, the nose, and short rapid sniffs  o Also worked on an exercise to blow out rapidly, as if blowing out candles in rapid succession.  o She continued to state that the exercises felt fine, but could not describe any differentiation between the exercises and her typical breathing  o I reiterated the need to practice like a fire-drill, in anticipation of any breathing problems after a vocal fold augmentation procedure    In response to her questions, I provided explanations of the surgical options for vocal fold augmentation  o I relayed information from Dr. Roche's most recent note, to try to help her understand  o She continues to demonstrate lack of understanding, and is tangential in her questions  o I stressed that we will be able to schedule a therapy session right after any procedure to ensure she is breathing well enough, and also that if she undergoes a surgical procedure she could approach Dr. Roche about staying overnight in the hospital, as she has no one who can care for her after  o I reiterated that these are topics she should discuss with , and I will bring them up to Dr. Roche as well.    I provided an after visit summary, mailed to her at home, to help facilitate practice.    IMPRESSIONS/GOALS/PLAN  Ms. 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     She will continue to work on her exercises on a daily basis, and work on incorporating the techniques into her daily activities.    Goals for this practice period:     practice all exercises according to instructions    Plan: I will see Ms. Yan as Dr. Roche deems it warranted, to work on continuing education, modification, and carryover of therapeutic activities to more  complex activities.  Ms. Yan will see Dr. Roche on 1/19/21, at which time they will discuss options for vocal fold augmentation.  I will relay our discussions to Dr. Roche, that Ms. Yan should be scheduled for speech therapy very shortly after any procedure, and also that if there is an OR procedure, that Dr. Roche consider keeping Ms. Yan in the hospital overnight, as she has no one to care for her, and she has considerable anxiety about any procedure.         TOTAL SERVICE TIME: 43 minutes  TREATMENT (44834)  NO CHARGE FACILITY FEE    Mirta Blake, Ph.D., [facility]-SLP  Speech-Language Pathologist  Director, [facility]  [phone]

## 2020-12-28 NOTE — PATIENT INSTRUCTIONS
After Visit Summary    Patient: Ca Yan  Date of Visit: 12/28/2020      Breathing:    Practice these exercises at least four times a day; do four of each kind of inhalation:   o Do all these inhalations with your hand on your belly, so you can feel your belly expand as you inhale.  - Inhale = Inflate; Exhale = Deflate  o Make sure your inhalation is silent; you may hear some noise in your mouth, but nothing in your throat!  o Inhale through rounded lips, as if inhaling through a large straw  o Inhale through your nose, as if you were smelling roses  o Blow out many times, rapidly, as if you were blowing out a candle very fast, 8  times in a row  - Notice that the air comes in by itself after every time you blow out  o Now, try doing it wrong: take a deep breath, and make a gasping noise while you inhale (it will feel terrible).  Then do one of the good, silent inhalations, and feel how much better that is!  - Enjoy the good feeling!  You know you can do this after your surgery, if you have any feelings of difficulty breathing.  Your airway will be plenty big if you breathe the right way!    Swallow:    For liquids:  o Take the sip of a liquid, hold it in your mouth while you tip your chin down slightly, then swallow deliberately  o The important thing with liquids is to prepare for the swallow and do it deliberately    For solids:  o Take small bites and chew thoroughly; pay attention as you swallow, so you coordinate your muscle activity  o If you have food that has little bits that can get stuck in your throat (like coconut, dry cracker crumbs, or nuts/popcorn), make sure you follow every solid swallow with a liquid swallow

## 2020-12-28 NOTE — PROGRESS NOTES
"Ca Yan is a 75 year old female who is being evaluated via a billable telephone visit.      The patient has been notified of following:     \"This telephone visit will be conducted via a call between you and your physician/provider. We have found that certain health care needs can be provided without the need for a physical exam.  This service lets us provide the care you need with a short phone conversation.  If a prescription is necessary we can send it directly to your pharmacy.  If lab work is needed we can place an order for that and you can then stop by our lab to have the test done at a later time.    Telephone visits are billed at different rates depending on your insurance coverage. During this emergency period, for some insurers they may be billed the same as an in-person visit.  Please reach out to your insurance provider with any questions.    If during the course of the call the physician/provider feels a telephone visit is not appropriate, you will not be charged for this service.\"    Patient has given verbal consent for Telephone visit?  Yes    What phone number would you like to be contacted at? [phone]    How would you like to obtain your AVS? Mail a copy    Phone call duration: 43 minutes      [facility] VOICE [facility]  Inderjit Shelton Jr., M.D., F.A.C.S.  Luann Arzate M.D., M.P.H.  Domonique Roche M.D.  Mirta Blake, Ph.D., CCC/SLP  Ya Arzate M.M. (voice), M.A., CCC/SLP  Wiley Snow M.M. (voice), M.A., [facility]/SLP    [facility] VOICE [facility]  VOICE/SPEECH/BREATHING THERAPY PROGRESS REPORT    Patient: Ca Yan  Date of Service: 12/28/2020    Date of Last Service: 12/21/20  Referring physician: Dr. Roche  Initial evaluation: 4/30/20    I had the pleasure of seeing Ms. 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 Yan worked on therapeutic " "activities to address the above diagnosis.    Regarding practice, Ms. Yan reports the following:     Practice of the breathing exercises several times per day  o The rounded lip configuration feels like air goes in easily    It does not appear that she has a good understanding of what she is trying to do with these exercises    Ms. Yan also states that:    She still has many questions about her condition and the potential medical interventions  o She forgot what we talked about last week    She has trouble swallowing \"once in a while;\" can be solids or liquids; occurs once or twice a day   o Chokes/coughs on liquids; solids get stuck (depends on what she's eating)  - She had problems with coconut macaroons recently     Ms. Yan presents today with the following:  Voice quality:    Markedly breathy, with roughness and diplophonia    Very short breath groups, with obvious effort coordinating her breathing with phonation    Pitch is high, at around D4    THERAPEUTIC ACTIVITIES  Today Ms. Yan participated in the following therapeutic activities:    Asked many questions about the nature of her symptoms, and I answered all of these thoroughly.  o She had forgotten much of what we talked about     I provided instruction for strategies to improve her swallowing   o She learned about the chin tuck technique, but it is unclear if she actually learned to do this in the telephone visit  o I stressed the importance of chewing and swallowing more carefully and deliberately .    [place] exercises for rescue breathing to arrest an episode of paradoxical vocal fold motion  o Worked on inhalation strategies of inhaling through rounded lips, the nose, and short rapid sniffs  o Also worked on an exercise to blow out rapidly, as if blowing out candles in rapid succession.  o She continued to state that the exercises felt fine, but could not describe any differentiation between the exercises and her typical breathing  o I " reiterated the need to practice like a fire-drill, in anticipation of any breathing problems after a vocal fold augmentation procedure    In response to her questions, I provided explanations of the surgical options for vocal fold augmentation  o I relayed information from Dr. Roche's most recent note, to try to help her understand  o She continues to demonstrate lack of understanding, and is tangential in her questions  o I stressed that we will be able to schedule a therapy session right after any procedure to ensure she is breathing well enough, and also that if she undergoes a surgical procedure she could approach Dr. Roche about staying overnight in the hospital, as she has no one who can care for her after  o I reiterated that these are topics she should discuss with , and I will bring them up to Dr. Roche as well.    I provided an after visit summary, mailed to her at home, to help facilitate practice.    IMPRESSIONS/GOALS/PLAN  Ms. 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     She will continue to work on her exercises on a daily basis, and work on incorporating the techniques into her daily activities.    Goals for this practice period:     practice all exercises according to instructions    Plan: I will see Ms. Yan as Dr. Roche deems it warranted, to work on continuing education, modification, and carryover of therapeutic activities to more complex activities.  Ms. Yan will see Dr. Roche on 1/19/21, at which time they will discuss options for vocal fold augmentation.  I will relay our discussions to Dr. Roche, that Ms. Yan should be scheduled for speech therapy very shortly after any procedure, and also that if there is an OR procedure, that   [facility] consider keeping Ms. Yan in the hospital overnight, as she has no one to care for her, and she has considerable anxiety about any procedure.         TOTAL SERVICE TIME: 43 minutes  TREATMENT ([id number])  NO CHARGE FACILITY FEE    Mirta Blake, Ph.D., [facility]-SLP  Speech-Language Pathologist  Director, [facility]  [phone]

## 2020-12-29 RX ORDER — HYDROCHLOROTHIAZIDE 12.5 MG/1
CAPSULE ORAL
OUTPATIENT
Start: 2020-12-29

## 2020-12-29 RX ORDER — LOSARTAN POTASSIUM 50 MG/1
TABLET ORAL
Qty: 180 TABLET | Refills: 0 | OUTPATIENT
Start: 2020-12-29

## 2020-12-30 ENCOUNTER — HOSPITAL ENCOUNTER (OUTPATIENT)
Dept: CARDIAC REHAB | Facility: CLINIC | Age: 75
End: 2020-12-30
Payer: COMMERCIAL

## 2020-12-30 PROCEDURE — 999N000193 HC STATISTIC VISIT PULM REHAB

## 2020-12-30 PROCEDURE — G0239 OTH RESP PROC, GROUP: HCPCS

## 2020-12-30 NOTE — TELEPHONE ENCOUNTER
Patient was reached by phone, and RN reviewed that Dr. Sahu changed B/P med to losartan-hydrochlorothiazide (HYZAAR) 100-25 MG tablet and that is what she should be taking daily.  Patient relayed that she already knew this, and has been taking this daily.    Kiki Eddy RN on 12/30/2020 at 11:08 AM

## 2021-01-07 ENCOUNTER — TELEPHONE (OUTPATIENT)
Dept: PULMONOLOGY | Facility: CLINIC | Age: 76
End: 2021-01-07

## 2021-01-07 NOTE — TELEPHONE ENCOUNTER
Patient call returning Brandon's call. Explained that she had faxed rehab orders to [facility].

## 2021-01-11 ENCOUNTER — TELEPHONE (OUTPATIENT)
Dept: FAMILY MEDICINE | Facility: CLINIC | Age: 76
End: 2021-01-11

## 2021-01-11 NOTE — TELEPHONE ENCOUNTER
[facility] Call Center    Phone Message    May a detailed message be left on voicemail: yes     Reason for Call: Other: The patient would like a call from the care team to get information about when and where can she get the Covid 19 Vaccine? Please review and follow up with the patient asap      Action Taken: Message routed to:  [facility] pcc    Travel Screening: Not Applicable

## 2021-01-12 NOTE — TELEPHONE ENCOUNTER
I called and left .   No estimate for vaccine arrival at this time.   She should follow news and [place] dept of health for updates on arrival.  No sign up list at this time  Rachel Kate, EMT at 11:33 AM on 1/12/2021.

## 2021-01-19 ENCOUNTER — VIRTUAL VISIT (OUTPATIENT)
Dept: OTOLARYNGOLOGY | Facility: CLINIC | Age: 76
End: 2021-01-19
Payer: COMMERCIAL

## 2021-01-19 ENCOUNTER — TELEPHONE (OUTPATIENT)
Dept: OTOLARYNGOLOGY | Facility: CLINIC | Age: 76
End: 2021-01-19

## 2021-01-19 VITALS — BODY MASS INDEX: 32.27 KG/M2 | WEIGHT: 189 LBS | HEIGHT: 64 IN

## 2021-01-19 DIAGNOSIS — R49.0 DYSPHONIA: Primary | ICD-10-CM

## 2021-01-19 PROCEDURE — 99442 PR PHYSICIAN TELEPHONE EVALUATION 11-20 MIN: CPT | Performed by: OTOLARYNGOLOGY

## 2021-01-19 ASSESSMENT — PAIN SCALES - GENERAL: PAINLEVEL: NO PAIN (0)

## 2021-01-19 ASSESSMENT — MIFFLIN-ST. JEOR: SCORE: 1327.3

## 2021-01-19 NOTE — TELEPHONE ENCOUNTER
[facility] Call Center    Phone Message    May a detailed message be left on voicemail: yes     Reason for Call: Other: Pt calling she forgot to ask If it was safe to get the Covid Vaccine  ,she said she can leave a detailed message on her VM, Please call Pt back to discuss  Thank you,    Action Taken: Message routed to:  [facility] ENT    Travel Screening: Not Applicable                                                                       Physical Therapy Initial Evaluation      ASSESSMENT:   Patient seen on 3N nursing unit.  Patient presents below baseline  which was modified independent with mobility. For safe return to prior living situation the patient needs to be at a supervision  level for mobility.      The patient now presents with impairments in activity tolerance, balance, cognitive changes, coordination, safety awareness and strength which impact safe and effective performance in bed mobility, transfers, ambulation and stair negotiation.  Patient is currently functioning at minimal assist  for mobility.     Further skilled physical therapy services are reasonable and necessary to address the above impairments and performance deficits            DIAGNOSIS & PAST MEDICAL HISTORY:   Diagnosis:  Acute UTI [N39.0]    Past Medical History:   Diagnosis Date   • Depressive disorder    • Diabetes mellitus (CMS/HCC)    • Diastolic heart failure (CMS/HCC)    • Essential (primary) hypertension    • GERD (gastroesophageal reflux disease)    • Hyperlipidemia    • Hypothyroid    • Hypothyroidism      Past Surgical History:   Procedure Laterality Date   • Anesth, section     • Cataract extraction w/  intraocular lens implant     • Cosm lcsc blepharoplasty upper w/excess skin wt down lid     • Foot surgery     • Joint replacement Left     elbow   • Spine surgery          HOSPITAL COURSE:   There are no active hospital problems to display for this patient.         PRIOR LIVING SITUATION & PRIOR LEVEL OF FUNCTION:   Prior Living Situation:  Prior Living Situation  Information Provided By:: Patient  Type of Home: House  Home Layout: One level  # Steps to Enter: 1  # Steps in the Home: 0  Lives With: Daughter  Receives Help From: Family  Additional Comments: daughter is patient's full time caregiver    Prior Communication and Cognition       Prior Level of Function:  Prior Function  Bed Mobility: Modified Independent  Transfers: Modified  Independent  Ambulation in the Home: Modified  Independent  Steps into the Home: Minimal Assist (Min)  History of Falls in past year: Yes  Number of falls in past year: multiple     BASIC LINES & SAFETY MEASURES:             PRECAUTIONS:   Precautions  Other Precautions: High Fall Risk (02/16/20 1500)      PAIN:   At Rest: 0/10  With Activity: 2/10     SUBJECTIVE:   Subjective: Patient agreeable to PT. Reports no pain at rest.  (02/16/20 1500)      Orientation Level: Oriented X4 (02/16/20 1500)        COMMUNICATION & COGNITION:   Overall Cognitive Status: Within Functional Limits  Arousal/Alertness: Appropriate responses to stimuli  Orientation Level: Oriented X4  Following Commands: Follows all commands and directions without difficulty  Safety Judgement: Decreased awareness of need for assistance  Awareness of Deficits: Fully aware of deficits  Problem Solving: Assistance required to identify errors made  Cognition Comments: trouble finding occassional word      RANGE OF MOTION & STRENGTH:   Range of Motion:  Upper Extremities: not tested - see OT note  Lower Extremities: within functional limits      Strength:  Upper Extremities: not tested - see OT note  Lower Extremities: B hip flexion 4-/5, knee and ankle grossly4/5      OBJECTIVE:   Balance:  Balance  Sitting - Static: Supervision (Supv) (02/16/20 1500)  Sitting - Dynamic: Supervision (Supv) (02/16/20 1500)  Standing - Static: Minimal Assist (Min) (02/16/20 1500)  Standing - Dynamic (eyes open): Minimal Assist (Min) (02/16/20 1500)  Balance Comments #1: at RW (02/16/20 1500)    Bed Mobility:    Bed Mobility  Supine to Sit: Supervision (Supv) (02/16/20 1500)  Bed Mobility Comments: in recliner at end of session (02/16/20 1500)     Transfers:    Transfers  Sit to Stand: Minimal Assist (Min) (02/16/20 1500)  Stand to Sit: Minimal Assist (Min) (02/16/20 1500)  Assistive Device/: 2-wheeled walker;1 Person;Gait Belt (02/16/20 1500)  Transfer Comments  1: dizzy when first standing up (02/16/20 1500)      Gait:    Gait  Gait Assistance: Minimal Assist (Min) (02/16/20 1500)  Assistive Device/: 2-wheeled walker;1 Person;Gait Belt (02/16/20 1500)  Ambulation Distance (Feet): 120 Feet (02/16/20 1500)  Gait Comments 1: close recliner follow, very unsteady on first 2 attempts, improves on third attempt (02/16/20 1500)       Stairs:    Stairs Mobility  Stair Management Assistance: Not attempted due to safety concerns (02/16/20 1500)          EXERCISES:   Repetitions:  5  Set: 1  Static Standing:  hip flexion      AM-PAC 6 CLICKS BASIC MOBILITY:   AM-PAC 6 Clicks Basic Mobility  Help to turn from back to side (bed flat, no rails): A little (02/16/20 1500)  Help to go from supine to sit (bed flat, no rails): A little (02/16/20 1500)  Help to move to/from bed to chair: A little (02/16/20 1500)  Help to stand up/sit down from chair using UEs: A little (02/16/20 1500)  Help to walk in hospital room: A little (02/16/20 1500)  Help to climb 3-5 steps with rail: A lot (02/16/20 1500)  Basic Mobility Raw Score: 17 (02/16/20 1500)  Basic Mobility Converted Score: 39.67 (02/16/20 1500)     GOALS:   Short Term Goals to Be Reviewed On: 02/28/20 (02/16/20 1500)  Short Term Goals = Discharge Goals: Yes (02/16/20 1500)  Goal Agreement: Patient agrees with goals and treatment plan (02/16/20 1500)  Bed Mobility Short Term Goal: mod I (02/16/20 1500)  Transfer Short Term Goal: mod I at RW (02/16/20 1500)  Ambulation Short Term Goal: 150' with RW and mod I (02/16/20 1500)  Stairs Short Term Goal: 1 step with RW and SBA (02/16/20 1500)     EQUIPMENT FOR DISCHARGE:         PLAN AND RECOMMENDATIONS:   Patient requires 24 hour nonskilled assistance to perform mobility and/or ADLs safely;Patient needs nondaily skilled therapy after discharge (02/16/20 1500)    This may be adjusted as the patient's condition and medical status change throughout their hospital stay.  This decision is  also based on other factors such as living situation, home environment, caregiver assist and MD recommendation.       Plan:   Treatment/Interventions: Strengthening;Functional transfer training;Bed mobility;Gait training;Compensatory technique education;Stairs retraining;Safety Education (02/16/20 1500)       PT Frequency: 6 days/week (02/16/20 1500)     Treatment Plan for Next Session: progress safety awareness                END OF SESSION:   Patient location: Chair  Bed/Chair Alarm On: Yes  Hand Off To: CT

## 2021-01-19 NOTE — PROGRESS NOTES
Ca is a 75 year old who is being evaluated via a billable telephone visit.      What phone number would you like to be contacted at? [phone]  How would you like to obtain your AVS? Mail a copy    Phone call duration: 17 minutes        [facility]   at the [facility]   Otolaryngology Clinic     Patient: Ca Yan    MRN: [id number]    : 1945    Age/Gender: 75 year old female  Date of Service: 2021  Rendering Provider:   Domonique Roche MD     Chief Complaint   Dysphonia  Interval History   HISTORY OF PRESENT ILLNESS: Ms. Yan is a 74 year old female is being followed for dysphonia. She was initially seen on 20. Please refer to this note for full history.   Of note she has had dysphonia since around the time of her hospital admission on 20 when she was admitted for pneumonia. She has started voice therapy with Mirta Blake, Ph.D., CCC/SLP.    Today, she presents for follow up. She reports   - she is on speaker  - does not use a headset   - she's been ok  - except she can't talk very good   - if she has to leave a message it doesn't work - the phone wont  her voice  - besides that things have been going ok  - it's not getting better - the voice  - she thinks it would be ok - she can't go to a store if it is real noisy  - she'd rather have a regular voice  - it's hard to communicate  - she's talking on the land line  - if she drinks something too fast she starts to cough    Dysphonia: Patient reports dysphonia. This is stable      Dysphagia: Patient reports dysphagia. This is stable     Dyspnea: Patient reports dyspnea. This is stable     Throat clearing/Chronic cough: Patient reports throat clearing/chronic cough. This is stable        PAST MEDICAL HISTORY:   Past Medical History:   Diagnosis Date     Anxiety      Arthritis      Breast cancer (H)      COPD (chronic obstructive pulmonary disease) (H)     12:  FEV 0.99 l     Depressive  disorder      Hypertension      Low back pain with left-sided sciatica      Low bone density 4/13/2017    DEXA April 12, 2017: T score -2.0. Normal Z score. FRAX risk: major osteoporotic fracture 11.9%, hip fracture 2.6%, therefore not high-risk     Lymphedema, chronic lower extremities        PAST SURGICAL HISTORY:   Past Surgical History:   Procedure Laterality Date     BACK SURGERY      spinal fusion     COLONOSCOPY       LUMPECTOMY BREAST       ORTHOPEDIC SURGERY      Knee surgery left side       CURRENT MEDICATIONS:   Current Outpatient Medications:      albuterol (PROAIR HFA/PROVENTIL HFA/VENTOLIN HFA) 108 (90 Base) MCG/ACT inhaler, Inhale 2 puffs into the lungs every 6 hours as needed for shortness of breath / dyspnea or wheezing, Disp: 3 Inhaler, Rfl: 1     atorvastatin (LIPITOR) 40 MG tablet, Take 1 tablet (40 mg) by mouth daily, Disp: 90 tablet, Rfl: 3     cefpodoxime (VANTIN) 200 MG tablet, Take 200 mg by mouth 2 times daily, Disp: , Rfl:      cephALEXin (KEFLEX) 250 MG capsule, Take 250 mg by mouth, Disp: , Rfl:      clonazePAM (KLONOPIN) 0.5 MG tablet, Take 0.5 mg by mouth daily, Disp: , Rfl:      fluticasone-vilanterol (BREO ELLIPTA) 200-25 MCG/INH inhaler, Inhale 1 puff into the lungs daily, Disp: 3 Inhaler, Rfl: 3     hydrochlorothiazide (MICROZIDE) 12.5 MG capsule, , Disp: , Rfl:      ibuprofen (ADVIL,MOTRIN) 200 MG tablet, Take 3 tablets (600 mg) by mouth 3 times daily (with meals), Disp: 90 tablet, Rfl: 0     latanoprost (XALATAN) 0.005 % ophthalmic solution, 1 drop, Disp: , Rfl:      losartan-hydrochlorothiazide (HYZAAR) 100-25 MG tablet, Take 1 tablet by mouth daily, Disp: 90 tablet, Rfl: 3     quetiapine (SEROQUEL) 200 MG tablet, Take 200 mg by mouth At Bedtime., Disp: , Rfl:      sertraline (ZOLOFT) 50 MG tablet, Take 1.5 tablets (75 mg) by mouth daily, Disp: 45 tablet, Rfl: 3     umeclidinium (INCRUSE ELLIPTA) 62.5 MCG/INH inhaler, Inhale 1 puff into the lungs daily, Disp: 7 each, Rfl: 0      Vitamin D3 (CHOLECALCIFEROL) 25 mcg (1000 units) tablet, Take 1 tablet (25 mcg) by mouth daily, Disp: 100 tablet, Rfl: 3    ALLERGIES: Azithromycin, Codeine, Hydrocodone, Metronidazole, Oxycodone, Percocet [oxycodone-acetaminophen], Pollen extract, Seasonal allergies, Vicodin [hydrocodone-acetaminophen], and Zolpidem    SOCIAL HISTORY:    Social History     Socioeconomic History     Marital status: Single     Spouse name: Not on file     Number of children: Not on file     Years of education: Not on file     Highest education level: Not on file   Occupational History     Employer: RETIRED   Social Needs     Financial resource strain: Not on file     Food insecurity     Worry: Not on file     Inability: Not on file     Transportation needs     Medical: Not on file     Non-medical: Not on file   Tobacco Use     Smoking status: Former Smoker     Packs/day: 0.50     Years: 5.00     Pack years: 2.50     Types: Cigarettes     Start date: 1956     Quit date: 1980     Years since quittin.3     Smokeless tobacco: Former User     Quit date: 1980   Substance and Sexual Activity     Alcohol use: Not Currently     Alcohol/week: 0.0 standard drinks     Comment: Sober for 2 years, previous alcoholic     Drug use: No     Sexual activity: Not Currently     Partners: Male     Birth control/protection: Abstinence   Lifestyle     Physical activity     Days per week: Not on file     Minutes per session: Not on file     Stress: Not on file   Relationships     Social connections     Talks on phone: Once a week     Gets together: Never     Attends Buddhist service: More than 4 times per year     Active member of club or organization: Yes     Attends meetings of clubs or organizations: More than 4 times per year     Relationship status: Never      Intimate partner violence     Fear of current or ex partner: No     Emotionally abused: No     Physically abused: No     Forced sexual activity: No   Other Topics Concern      Parent/sibling w/ CABG, MI or angioplasty before 65F 55M? Not Asked      Service Not Asked     Blood Transfusions Not Asked     Caffeine Concern Not Asked     Occupational Exposure Not Asked     Hobby Hazards Not Asked     Sleep Concern Not Asked     Stress Concern Not Asked     Comment: Lives alone     Weight Concern Not Asked     Special Diet Not Asked     Back Care Not Asked     Comment: Hx of scoliosis with spine fusion     Exercise Not Asked     Comment: Walks     Bike Helmet Not Asked     Seat Belt Not Asked     Self-Exams Not Asked   Social History Narrative    Ca Yan never   Lives in apartment  is  family member is daughter Rin in [place], [place]  Previous AA meetings         FAMILY HISTORY:   Family History   Problem Relation Age of Onset     Breast Cancer Mother      Diabetes Mother      Anxiety Disorder Mother      Asthma Mother      Other Cancer Mother      Hyperlipidemia Mother      Alcohol/Drug Father      Mental Illness Father      Substance Abuse Father      Obesity Father      Cerebrovascular Disease Maternal Grandmother 67      Non-contributory for problems with anesthesia    REVIEW OF SYSTEMS:   The patient was asked a 14 point review of systems regarding constitutional symptoms, eye symptoms, ears, nose, mouth, throat symptoms, cardiovascular symptoms, respiratory symptoms, gastrointestinal symptoms, genitourinary symptoms, musculoskeletal symptoms, integumentary symptoms, neurological symptoms, psychiatric symptoms, endocrine symptoms, hematologic/lymphatic symptoms, and allergic/ immunologic symptoms.   The pertinent factors have been included in the HPI and below.  Patient Supplied Answers to Review of Systems  [facility] ROS 4/30/2020   Psychology Frequently feeling depressed or sad, Frequently feeling anxious   Ears, Nose, Throat Nasal congestion or drainage, Hoarseness   Cardiopulmonary Cough, Breathing problems   Musculoskeletal Sore or stiff joints, Swollen legs/feet  "  Allergy/Immunology Allergies or hay fever   Hematologic Easy bruising       Physical Examination   The patient underwent a physical examination as described below. The pertinent positive and negative findings are summarized after the description of the examination.  Neurologic: The patient's orientation, mood, and affect were noted.   Other pertinent positive and negative findings on physical examination:   Breathing comfortably on room air, no stridor with deep inspiration  Breathy vocal quality    All other physical examination findings were within normal limits and noncontributory     Review of Relevant Clinical Data   Notes: SANDRA Blake 12/28/20; Manuel Taylor 11/23/20    Labs:  Lab Results   Component Value Date    TSH 2.39 08/20/2020     Lab Results   Component Value Date     08/20/2020    CO2 32 08/20/2020    BUN 18 08/20/2020    CREAT 0.9 10/08/2020     Lab Results   Component Value Date    WBC 5.4 08/20/2020    HGB 14.3 08/20/2020    HCT 45.1 08/20/2020    MCV 98 08/20/2020     08/20/2020     No results found for: PT, PTT, INR  No results found for: LONNY  No components found for: RHEUMATOIDFACTOR,  RF  Lab Results   Component Value Date    CRP 6.4 09/25/2019     No components found for: CKTOT, URICACID  No components found for: C3, C4, DSDNAAB, NDNAABIFA  No results found for: MPOAB    Patient reported Quality of Life (QOL) Measures   Patient Supplied Answers To VHI Questionnaire  Voice Handicap Index (VHI-10) 4/30/2020   My voice makes it difficult for people to hear me 0   People have difficulty understanding me in a noisy room 0   My voice difficulties restrict my personal and social life.  4   I feel left out of conversations because of my voice 0   My voice problem causes me to lose income 0   I feel as though I have to strain to produce voice 4   The clarity of my voice is unpredictable 4   My voice problem upsets me 4   My voice makes me feel handicapped 4   People ask, \"What's wrong " "with your voice?\" 0   VHI-10 20         Patient Supplied Answers To EAT Questionnaire  No flowsheet data found.      Patient Supplied Answers To CSI Questionnaire  Cough Severity Index (CSI) 2020   My cough is worse when I lie down 4   My coughing problem causes me to restrict my personal and social life 0   I tend to avoid places because of my cough problem 0   I feel embarrassed because of my coughing problem 3   People ask, ''What's wrong?'' because I cough a lot 0   I run out of air when I cough 4   My coughing problem affects my voice 4   My coughing problem limits my physical activity 4   My coughing problem upsets me 4   People ask me if I am sick because I cough a lot 0   CSI Score 23         Patient Supplied Answers to Dyspnea Index Questionnaire:  No flowsheet data found.     Impression & Plan     IMPRESSION: Ms. Yan is a 75 year old female who is being seen for the followin. 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 and no other lesions  - CT neck 10/8/20 - no lesions along left RLN - does have tortuous thoracic aorta due to scoliosis which is causing compression of esophagus - could cause compression of the RLN as well    - CT chest from 1/15/20 which does show some dilation of the pyriform which can point to a left vocal fold paralysis  - though unlikely etiology given sudden onset of symptoms in January  - discussed that sine she had so much trouble tolerating the exam in the office she is not a candidate for a vocal fold awake injection  - she is now 1 year out from voice changes with persistent breathy dysphonia and therefore persistent VF paralysis   - discussed options of IL under general anesthesia or implant under MAC  - she state she is too scared of her " "breathing to go with either procedure  - she states her issue with the voice is that she cant leave a message because \"the machine says it cant hear me'  - and she can't talk when she is in a store if she were to go to a store   Plan  - consider handset/amplifier         2. Dysphagia  - denies coughing and choking with food  - did have PNA in 1/2020  - has severe pooling of saliva on scope exam and left vocal fold paralysis  - esophagram - 10/8/20 - dysmotility  - Xray Video Swallow Exam 10/8/20 - mild pharyngeal weakness, safe swallow  - CT neck 10/8/20 - no lesions along left RLN - does have tortuous thoracic aorta due to scoliosis which is causing compression of esophagus - could cause compression of the RLN as well    - reports swallowing is stable compared to October  Plan  - observation       3. Dyspnea  - SOB when laying down - it improves if she can stay there for a few minutes  - she is working with pulm - Dr Bassam Taylor 9/3/20  - pulm david again on 11/23/20 - shortness of breath are all related to kyphosis of the spine, which is causing right middle lobe atelectasis and hypoventilation- started pulm rehab  - could also be that when she lays down she has reflux which causes laryngospasm  Plan  - start breathing therapy  - if no improvement consider GI referral        RETURN VISIT: as needed    Domonique Roche MD    Laryngology    [facility]  Department of  Otolaryngology - Head and Neck Surgery    [facility]  [address] [place] [id number]  Appointment line: [phone]  Fax: [phone]    "

## 2021-01-19 NOTE — LETTER
2021       RE: Ca Yan  [address]  [facility] [address]     Dear Colleague,    Thank you for referring your patient, Ca Yan, to the [facility] [place] at [facility]. Please see a copy of my visit note below.    Ca is a 75 year old who is being evaluated via a billable telephone visit.      What phone number would you like to be contacted at? [phone]  How would you like to obtain your AVS? Mail a copy    Phone call duration: 17 minutes        [facility]   at the [facility]   Otolaryngology Clinic     Patient: Ca Yan    MRN: [id number]    : 1945    Age/Gender: 75 year old female  Date of Service: 2021  Rendering Provider:   Domonique Roche MD     Chief Complaint   Dysphonia  Interval History   HISTORY OF PRESENT ILLNESS: Ms. Yan is a 74 year old female is being followed for dysphonia. She was initially seen on 20. Please refer to this note for full history.   Of note she has had dysphonia since around the time of her hospital admission on 20 when she was admitted for pneumonia. She has started voice therapy with Mirta Blake, Ph.D., CCC/SLP.    Today, she presents for follow up. She reports   - she is on speaker  - does not use a headset   - she's been ok  - except she can't talk very good   - if she has to leave a message it doesn't work - the phone wont  her voice  - besides that things have been going ok  - it's not getting better - the voice  - she thinks it would be ok - she can't go to a store if it is real noisy  - she'd rather have a regular voice  - it's hard to communicate  - she's talking on the land line  - if she drinks something too fast she starts to cough    Dysphonia: Patient reports dysphonia. This is stable      Dysphagia: Patient reports dysphagia. This is stable     Dyspnea: Patient reports dyspnea. This is  stable     Throat clearing/Chronic cough: Patient reports throat clearing/chronic cough. This is stable        PAST MEDICAL HISTORY:   Past Medical History:   Diagnosis Date     Anxiety      Arthritis      Breast cancer (H)      COPD (chronic obstructive pulmonary disease) (H)     6/19/12:  FEV 0.99 l     Depressive disorder      Hypertension      Low back pain with left-sided sciatica      Low bone density 4/13/2017    DEXA April 12, 2017: T score -2.0. Normal Z score. FRAX risk: major osteoporotic fracture 11.9%, hip fracture 2.6%, therefore not high-risk     Lymphedema, chronic lower extremities        PAST SURGICAL HISTORY:   Past Surgical History:   Procedure Laterality Date     BACK SURGERY      spinal fusion     COLONOSCOPY       LUMPECTOMY BREAST       ORTHOPEDIC SURGERY      Knee surgery left side       CURRENT MEDICATIONS:   Current Outpatient Medications:      albuterol (PROAIR HFA/PROVENTIL HFA/VENTOLIN HFA) 108 (90 Base) MCG/ACT inhaler, Inhale 2 puffs into the lungs every 6 hours as needed for shortness of breath / dyspnea or wheezing, Disp: 3 Inhaler, Rfl: 1     atorvastatin (LIPITOR) 40 MG tablet, Take 1 tablet (40 mg) by mouth daily, Disp: 90 tablet, Rfl: 3     cefpodoxime (VANTIN) 200 MG tablet, Take 200 mg by mouth 2 times daily, Disp: , Rfl:      cephALEXin (KEFLEX) 250 MG capsule, Take 250 mg by mouth, Disp: , Rfl:      clonazePAM (KLONOPIN) 0.5 MG tablet, Take 0.5 mg by mouth daily, Disp: , Rfl:      fluticasone-vilanterol (BREO ELLIPTA) 200-25 MCG/INH inhaler, Inhale 1 puff into the lungs daily, Disp: 3 Inhaler, Rfl: 3     hydrochlorothiazide (MICROZIDE) 12.5 MG capsule, , Disp: , Rfl:      ibuprofen (ADVIL,MOTRIN) 200 MG tablet, Take 3 tablets (600 mg) by mouth 3 times daily (with meals), Disp: 90 tablet, Rfl: 0     latanoprost (XALATAN) 0.005 % ophthalmic solution, 1 drop, Disp: , Rfl:      losartan-hydrochlorothiazide (HYZAAR) 100-25 MG tablet, Take 1 tablet by mouth daily, Disp: 90 tablet,  Rfl: 3     quetiapine (SEROQUEL) 200 MG tablet, Take 200 mg by mouth At Bedtime., Disp: , Rfl:      sertraline (ZOLOFT) 50 MG tablet, Take 1.5 tablets (75 mg) by mouth daily, Disp: 45 tablet, Rfl: 3     umeclidinium (INCRUSE ELLIPTA) 62.5 MCG/INH inhaler, Inhale 1 puff into the lungs daily, Disp: 7 each, Rfl: 0     Vitamin D3 (CHOLECALCIFEROL) 25 mcg (1000 units) tablet, Take 1 tablet (25 mcg) by mouth daily, Disp: 100 tablet, Rfl: 3    ALLERGIES: Azithromycin, Codeine, Hydrocodone, Metronidazole, Oxycodone, Percocet [oxycodone-acetaminophen], Pollen extract, Seasonal allergies, Vicodin [hydrocodone-acetaminophen], and Zolpidem    SOCIAL HISTORY:    Social History     Socioeconomic History     Marital status: Single     Spouse name: Not on file     Number of children: Not on file     Years of education: Not on file     Highest education level: Not on file   Occupational History     Employer: RETIRED   Social Needs     Financial resource strain: Not on file     Food insecurity     Worry: Not on file     Inability: Not on file     Transportation needs     Medical: Not on file     Non-medical: Not on file   Tobacco Use     Smoking status: Former Smoker     Packs/day: 0.50     Years: 5.00     Pack years: 2.50     Types: Cigarettes     Start date: 1956     Quit date: 1980     Years since quittin.3     Smokeless tobacco: Former User     Quit date: 1980   Substance and Sexual Activity     Alcohol use: Not Currently     Alcohol/week: 0.0 standard drinks     Comment: Sober for 2 years, previous alcoholic     Drug use: No     Sexual activity: Not Currently     Partners: Male     Birth control/protection: Abstinence   Lifestyle     Physical activity     Days per week: Not on file     Minutes per session: Not on file     Stress: Not on file   Relationships     Social connections     Talks on phone: Once a week     Gets together: Never     Attends Gnosticism service: More than 4 times per year     Active member  of club or organization: Yes     Attends meetings of clubs or organizations: More than 4 times per year     Relationship status: Never      Intimate partner violence     Fear of current or ex partner: No     Emotionally abused: No     Physically abused: No     Forced sexual activity: No   Other Topics Concern     Parent/sibling w/ CABG, MI or angioplasty before 65F 55M? Not Asked      Service Not Asked     Blood Transfusions Not Asked     Caffeine Concern Not Asked     Occupational Exposure Not Asked     Hobby Hazards Not Asked     Sleep Concern Not Asked     Stress Concern Not Asked     Comment: Lives alone     Weight Concern Not Asked     Special Diet Not Asked     Back Care Not Asked     Comment: Hx of scoliosis with spine fusion     Exercise Not Asked     Comment: Walks     Bike Helmet Not Asked     Seat Belt Not Asked     Self-Exams Not Asked   Social History Narrative    Ca Yan never   Lives in apartment  is  family member is daughter Rin in [place], [place]  Previous AA meetings         FAMILY HISTORY:   Family History   Problem Relation Age of Onset     Breast Cancer Mother      Diabetes Mother      Anxiety Disorder Mother      Asthma Mother      Other Cancer Mother      Hyperlipidemia Mother      Alcohol/Drug Father      Mental Illness Father      Substance Abuse Father      Obesity Father      Cerebrovascular Disease Maternal Grandmother 67      Non-contributory for problems with anesthesia    REVIEW OF SYSTEMS:   The patient was asked a 14 point review of systems regarding constitutional symptoms, eye symptoms, ears, nose, mouth, throat symptoms, cardiovascular symptoms, respiratory symptoms, gastrointestinal symptoms, genitourinary symptoms, musculoskeletal symptoms, integumentary symptoms, neurological symptoms, psychiatric symptoms, endocrine symptoms, hematologic/lymphatic symptoms, and allergic/ immunologic symptoms.   The pertinent factors have been included in the HPI and  below.  Patient Supplied Answers to Review of Systems  [facility] E[facility] ROS 4/30/2020   Psychology Frequently feeling depressed or sad, Frequently feeling anxious   Ears, Nose, Throat Nasal congestion or drainage, Hoarseness   Cardiopulmonary Cough, Breathing problems   Musculoskeletal Sore or stiff joints, Swollen legs/feet   Allergy/Immunology Allergies or hay fever   Hematologic Easy bruising       Physical Examination   The patient underwent a physical examination as described below. The pertinent positive and negative findings are summarized after the description of the examination.  Neurologic: The patient's orientation, mood, and affect were noted.   Other pertinent positive and negative findings on physical examination:   Breathing comfortably on room air, no stridor with deep inspiration  Breathy vocal quality    All other physical examination findings were within normal limits and noncontributory     Review of Relevant Clinical Data   Notes: SANDRA Blake 12/28/20; Manuel Taylor 11/23/20    Labs:  Lab Results   Component Value Date    TSH 2.39 08/20/2020     Lab Results   Component Value Date     08/20/2020    CO2 32 08/20/2020    BUN 18 08/20/2020    CREAT 0.9 10/08/2020     Lab Results   Component Value Date    WBC 5.4 08/20/2020    HGB 14.3 08/20/2020    HCT 45.1 08/20/2020    MCV 98 08/20/2020     08/20/2020     No results found for: PT, PTT, INR  No results found for: LONNY  No components found for: RHEUMATOIDFACTOR,  RF  Lab Results   Component Value Date    CRP 6.4 09/25/2019     No components found for: CKTOT, URICACID  No components found for: C3, C4, DSDNAAB, NDNAABIFA  No results found for: MPOAB    Patient reported Quality of Life (QOL) Measures   Patient Supplied Answers To VHI Questionnaire  Voice Handicap Index (VHI-10) 4/30/2020   My voice makes it difficult for people to hear me 0   People have difficulty understanding me in a noisy room 0   My voice difficulties restrict my personal and  "social life.  4   I feel left out of conversations because of my voice 0   My voice problem causes me to lose income 0   I feel as though I have to strain to produce voice 4   The clarity of my voice is unpredictable 4   My voice problem upsets me 4   My voice makes me feel handicapped 4   People ask, \"What's wrong with your voice?\" 0   VHI-10 20         Patient Supplied Answers To EAT Questionnaire  No flowsheet data found.      Patient Supplied Answers To CSI Questionnaire  Cough Severity Index (CSI) 2020   My cough is worse when I lie down 4   My coughing problem causes me to restrict my personal and social life 0   I tend to avoid places because of my cough problem 0   I feel embarrassed because of my coughing problem 3   People ask, ''What's wrong?'' because I cough a lot 0   I run out of air when I cough 4   My coughing problem affects my voice 4   My coughing problem limits my physical activity 4   My coughing problem upsets me 4   People ask me if I am sick because I cough a lot 0   CSI Score 23         Patient Supplied Answers to Dyspnea Index Questionnaire:  No flowsheet data found.     Impression & Plan     IMPRESSION: Ms. Yan is a 75 year old female who is being seen for the followin. 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 and no other lesions  - CT neck 10/8/20 - no lesions along left RLN - does have tortuous thoracic aorta due to scoliosis which is causing compression of esophagus - could cause compression of the RLN as well    - CT chest from 1/15/20 which does show some dilation of the pyriform which can point to a left vocal fold paralysis  - though unlikely etiology given sudden onset of symptoms in January  - discussed that sine she had so much trouble " "tolerating the exam in the office she is not a candidate for a vocal fold awake injection  - she is now 1 year out from voice changes with persistent breathy dysphonia and therefore persistent VF paralysis   - discussed options of IL under general anesthesia or implant under MAC  - she state she is too scared of her breathing to go with either procedure  - she states her issue with the voice is that she cant leave a message because \"the machine says it cant hear me'  - and she can't talk when she is in a store if she were to go to a store   Plan  - consider handset/amplifier         2. Dysphagia  - denies coughing and choking with food  - did have PNA in 1/2020  - has severe pooling of saliva on scope exam and left vocal fold paralysis  - esophagram - 10/8/20 - dysmotility  - Xray Video Swallow Exam 10/8/20 - mild pharyngeal weakness, safe swallow  - CT neck 10/8/20 - no lesions along left RLN - does have tortuous thoracic aorta due to scoliosis which is causing compression of esophagus - could cause compression of the RLN as well    - reports swallowing is stable compared to October  Plan  - observation       3. Dyspnea  - SOB when laying down - it improves if she can stay there for a few minutes  - she is working with pulm - Dr Bassam Taylor 9/3/20  - pulm david again on 11/23/20 - shortness of breath are all related to kyphosis of the spine, which is causing right middle lobe atelectasis and hypoventilation- started pulm rehab  - could also be that when she lays down she has reflux which causes laryngospasm  Plan  - start breathing therapy  - if no improvement consider GI referral        RETURN VISIT: as needed    Domonique Roche MD    Laryngology    [facility]  Department of  Otolaryngology - Head and Neck Surgery    [facility]  [address] [place] [id number]  Appointment line: [phone]  Fax: [phone]        Again, thank you for allowing me to " participate in the care of your patient.      Sincerely,    Domonique Roche MD

## 2021-01-21 ENCOUNTER — TELEPHONE (OUTPATIENT)
Dept: OTOLARYNGOLOGY | Facility: CLINIC | Age: 76
End: 2021-01-21

## 2021-01-21 NOTE — TELEPHONE ENCOUNTER
Discussed therapy appointment with patient. Went over the goals of therapy. Discussed the date and times and confirmed with patient that these will work. In person, the SLP will work on breathing therapy and discuss voice amplifier use. No further questions at this time.    Patricia Sanchez RN on 1/21/2021 at 9:08 AM

## 2021-01-25 ENCOUNTER — TELEPHONE (OUTPATIENT)
Dept: PULMONOLOGY | Facility: CLINIC | Age: 76
End: 2021-01-25

## 2021-01-25 ENCOUNTER — VIRTUAL VISIT (OUTPATIENT)
Dept: PALLIATIVE CARE | Facility: CLINIC | Age: 76
End: 2021-01-25
Attending: INTERNAL MEDICINE
Payer: COMMERCIAL

## 2021-01-25 DIAGNOSIS — J98.4 RESTRICTIVE LUNG DISEASE DUE TO KYPHOSCOLIOSIS: ICD-10-CM

## 2021-01-25 DIAGNOSIS — Z71.85 VACCINE COUNSELING: ICD-10-CM

## 2021-01-25 DIAGNOSIS — F43.22 ADJUSTMENT DISORDER WITH ANXIOUS MOOD: Primary | ICD-10-CM

## 2021-01-25 DIAGNOSIS — M41.9 RESTRICTIVE LUNG DISEASE DUE TO KYPHOSCOLIOSIS: ICD-10-CM

## 2021-01-25 PROCEDURE — 999N001193 HC VIDEO/TELEPHONE VISIT; NO CHARGE

## 2021-01-25 PROCEDURE — 99214 OFFICE O/P EST MOD 30 MIN: CPT | Mod: TEL | Performed by: INTERNAL MEDICINE

## 2021-01-25 NOTE — TELEPHONE ENCOUNTER
[facility] Call Center    Phone Message    May a detailed message be left on voicemail: yes     Reason for Call: Other: Pt would like a call back to discuss the covid vaccine, Was offered the link but stated she does not have a computer and would like her Dr to talk to her     Action Taken: Message routed to:  [facility] ([facility]): Pulm    Travel Screening: Not Applicable

## 2021-01-25 NOTE — TELEPHONE ENCOUNTER
Spoke to pt and gave a msg that Dr. Roche said it is ok to get it. She should talk to her PCP about it.    Jaci Young LPN

## 2021-01-25 NOTE — PROGRESS NOTES
"Ca is a 75 year old who is being evaluated via a billable telephone visit.      What phone number would you like to be contacted at? [phone]  How would you like to obtain your AVS? Mail a copy     Vitals - Patient Reported  Weight (Patient Reported): 87.1 kg (192 lb)  Height (Patient Reported): 162.6 cm (5' 4\")  BMI (Based on Pt Reported Ht/Wt): 32.96  Pain Score: Moderate Pain (5)  Pain Loc: Other - see comment(LEGS)    Luann Mobley CMA January 25, 2021  10:42 AM     Phone call duration: 41 minutes    Prefers to use her home phone - [phone].     Palliative Care Outpatient Clinic Progress Note    Patient Name:  Ca Yan  Primary Provider:  Js Saunders    Chief Complaint/Patient ID:   75yo F with chronic respiratory failure due to kyphoscoliosis and restrictive lung disease, ?COPD on O2 at night and with exertion  Anxiety  Social isolation    Last Palliative Care Appointment:  6/25/20 w Dr. Juarez - had increased zoloft to 75 mg daily and visits with therapist    Interim History:  Ca Yan 75 year old female returns to be seen by palliative care today.  Telephone visit performed due to coronavirus outbreak.      Met with SLP last week to work on breathing therapy.  Has been following with ENT for 1 year of acute onset dysphonia and dysphagia after pneumonia- scope showed L vocal fold paralysis, CT necks showed tortuous thoracic aorta due to scoliosis which could be compression esophagus and R laryngeal nerve.  Recommended voice amplifier. Completed pulmonary rehab, still reports shortness of breath and is planned for speech therapy for breathing exercises.      She reports she still feels anxious, and lately her anxiety is absent to the left coronavirus vaccine and is worried about her breathing with the vaccine.  She overall feels she is coping with the isolation better, and cannot point to any specific strategy but feels like she is \"just gotten used to it\".  Her " friend Cyndi is visiting sometimes.  She has been reticent to have anyone in her apartment.  She is signed up to get the vaccine  in her building.    She says her appetite has been good.  She has not had any falls.  She is noticing shortness of breath when getting dressed, getting a shower, feels how short of breath she is varies with how fast she is trying to move.  Her back will feet feel fatigued at the end of the day.    Social History:  Pertinent changes to social history/social situation since last visit:   Lives alone in independent senior apartment  Key support resources: Uses [employer] mobility for transportation.  Hx of alcohol misuse  Advance Directive Status:  States she has completed, though not in our system.  Today again discussed - says her friend Cyndi is closest to her and they have been visiting and touch of the phone during the pandemic.  She would consider talking to her about being a healthcare agent.  Social History     Tobacco Use     Smoking status: Former Smoker     Packs/day: 0.50     Years: 5.00     Pack years: 2.50     Types: Cigarettes     Start date: 1956     Quit date: 1980     Years since quittin.3     Smokeless tobacco: Former User     Quit date: 1980   Substance Use Topics     Alcohol use: Not Currently     Alcohol/week: 0.0 standard drinks     Comment: Sober for 2 years, previous alcoholic     Drug use: No       Allergies   Allergen Reactions     Azithromycin Itching     Codeine Nausea and Vomiting     Hydrocodone Nausea and Vomiting     Metronidazole Nausea     Oxycodone Itching and Rash     Percocet [Oxycodone-Acetaminophen] Nausea and Vomiting     Pollen Extract      sneezing and runny nose.      Seasonal Allergies      sneezing and runny nose.      Vicodin [Hydrocodone-Acetaminophen] Nausea and Vomiting     Zolpidem      Amnestic behavior     Current Outpatient Medications   Medication Sig Dispense Refill     albuterol (PROAIR HFA/PROVENTIL HFA/VENTOLIN HFA)  108 (90 Base) MCG/ACT inhaler Inhale 2 puffs into the lungs every 6 hours as needed for shortness of breath / dyspnea or wheezing 3 Inhaler 1     atorvastatin (LIPITOR) 40 MG tablet Take 1 tablet (40 mg) by mouth daily 90 tablet 3     cefpodoxime (VANTIN) 200 MG tablet Take 200 mg by mouth 2 times daily       cephALEXin (KEFLEX) 250 MG capsule Take 250 mg by mouth       clonazePAM (KLONOPIN) 0.5 MG tablet Take 0.5 mg by mouth daily       fluticasone-vilanterol (BREO ELLIPTA) 200-25 MCG/INH inhaler Inhale 1 puff into the lungs daily 3 Inhaler 3     hydrochlorothiazide (MICROZIDE) 12.5 MG capsule        ibuprofen (ADVIL,MOTRIN) 200 MG tablet Take 3 tablets (600 mg) by mouth 3 times daily (with meals) 90 tablet 0     latanoprost (XALATAN) 0.005 % ophthalmic solution 1 drop       losartan-hydrochlorothiazide (HYZAAR) 100-25 MG tablet Take 1 tablet by mouth daily 90 tablet 3     quetiapine (SEROQUEL) 200 MG tablet Take 200 mg by mouth At Bedtime.       sertraline (ZOLOFT) 50 MG tablet Take 1.5 tablets (75 mg) by mouth daily 45 tablet 3     umeclidinium (INCRUSE ELLIPTA) 62.5 MCG/INH inhaler Inhale 1 puff into the lungs daily 7 each 0     Vitamin D3 (CHOLECALCIFEROL) 25 mcg (1000 units) tablet Take 1 tablet (25 mcg) by mouth daily 100 tablet 3     Unable to perform physical exam because of telephone visit.  She is speaking in full paragraphs, with anxious emotion her voice, though less so than previous.    Key Data Reviewed:  LABS:   Last GFR 71  Hb 13.4    IMAGING: No new imaging.     Impression & Recommendations & Counseling:  Ca Yan is a 75 year old female with history of h/o anxiety, HTN, increased cholesterol, and chronic respiratory failure, attributed to COPD but likely more from kyphoscoliosis and restriction. She was referred to Palliaitve care primarily for guidance on goals of care, updating her HCD, and emotional support. However, she declined needing help with updating her HCD, and has been focused  on her anxiety, which was exacerbated by the further physical isolation recommended for coronavirus. She overall sounds less anxious and has been taking the sertraline, but her therapist retired and she has not found a new one yet.      Anxiety -continue sertraline 75 mg daily.  Previously meeting with a therapist from [facility], encouraged her to reach back out to them about establishing with a new therapist.  Orladno may take up trial of a couple to find when she feels comfortable with.    Dyspnea -certainly worsened by anxiety, but also present with minimal exertion.  No improvement with pulmonary rehab  -Recommended following with speech to see if it improvement with breathing exercises  -Introduced the idea of opioids like morphine at low doses for dyspnea.  Would prefer to maximize nonpharmacologic therapy first, but if no improvement, would consider low-dose morphine in the future.   Of note for the future, she only uses her HOME phone.     Advanced Care Planning - discussed the role of a health care agent, considering if she would trust Cyndi for this and talking about it with Cyndi.      Encouraged her to take the coronavirus vaccine, answered her questions to the best of my knowledge.     Phone call duration:  41 minutes  Follow-up in 3 months    Alison Schuster MD  Palliative Medicine  Pager [phone]

## 2021-01-25 NOTE — LETTER
"1/25/2021       RE: Ca Yan  [address]  [facility] [address]     Dear Colleague,    Thank you for referring your patient, Ca Yan, to the [facility] at [facility]. Please see a copy of my visit note below.    Ca is a 75 year old who is being evaluated via a billable telephone visit.      What phone number would you like to be contacted at? [phone]  How would you like to obtain your AVS? Mail a copy     Vitals - Patient Reported  Weight (Patient Reported): 87.1 kg (192 lb)  Height (Patient Reported): 162.6 cm (5' 4\")  BMI (Based on Pt Reported Ht/Wt): 32.96  Pain Score: Moderate Pain (5)  Pain Loc: Other - see comment(LEGS)    Luann Mobley CMA January 25, 2021  10:42 AM     Phone call duration: 41 minutes    Prefers to use her home phone - [phone].     Palliative Care Outpatient Clinic Progress Note    Patient Name:  Ca Yan  Primary Provider:  Js Saunders    Chief Complaint/Patient ID:   75yo F with chronic respiratory failure due to kyphoscoliosis and restrictive lung disease, ?COPD on O2 at night and with exertion  Anxiety  Social isolation    Last Palliative Care Appointment:  6/25/20 w Dr. Juarez - had increased zoloft to 75 mg daily and visits with therapist    Interim History:  Ca Yan 75 year old female returns to be seen by palliative care today.  Telephone visit performed due to coronavirus outbreak.      Met with SLP last week to work on breathing therapy.  Has been following with ENT for 1 year of acute onset dysphonia and dysphagia after pneumonia- scope showed L vocal fold paralysis, CT necks showed tortuous thoracic aorta due to scoliosis which could be compression esophagus and R laryngeal nerve.  Recommended voice amplifier. Completed pulmonary rehab, still reports shortness of breath and is planned for speech therapy for breathing exercises.      She " "reports she still feels anxious, and lately her anxiety is absent to the left coronavirus vaccine and is worried about her breathing with the vaccine.  She overall feels she is coping with the isolation better, and cannot point to any specific strategy but feels like she is \"just gotten used to it\".  Her friend Cyndi is visiting sometimes.  She has been reticent to have anyone in her apartment.  She is signed up to get the vaccine  in her building.    She says her appetite has been good.  She has not had any falls.  She is noticing shortness of breath when getting dressed, getting a shower, feels how short of breath she is varies with how fast she is trying to move.  Her back will feet feel fatigued at the end of the day.    Social History:  Pertinent changes to social history/social situation since last visit:   Lives alone in independent senior apartment  Key support resources: Uses [employer] for transportation.  Hx of alcohol misuse  Advance Directive Status:  States she has completed, though not in our system.  Today again discussed - says her friend Cyndi is closest to her and they have been visiting and touch of the phone during the pandemic.  She would consider talking to her about being a healthcare agent.  Social History     Tobacco Use     Smoking status: Former Smoker     Packs/day: 0.50     Years: 5.00     Pack years: 2.50     Types: Cigarettes     Start date: 1956     Quit date: 1980     Years since quittin.3     Smokeless tobacco: Former User     Quit date: 1980   Substance Use Topics     Alcohol use: Not Currently     Alcohol/week: 0.0 standard drinks     Comment: Sober for 2 years, previous alcoholic     Drug use: No       Allergies   Allergen Reactions     Azithromycin Itching     Codeine Nausea and Vomiting     Hydrocodone Nausea and Vomiting     Metronidazole Nausea     Oxycodone Itching and Rash     Percocet [Oxycodone-Acetaminophen] Nausea and Vomiting     Pollen Extract "      sneezing and runny nose.      Seasonal Allergies      sneezing and runny nose.      Vicodin [Hydrocodone-Acetaminophen] Nausea and Vomiting     Zolpidem      Amnestic behavior     Current Outpatient Medications   Medication Sig Dispense Refill     albuterol (PROAIR HFA/PROVENTIL HFA/VENTOLIN HFA) 108 (90 Base) MCG/ACT inhaler Inhale 2 puffs into the lungs every 6 hours as needed for shortness of breath / dyspnea or wheezing 3 Inhaler 1     atorvastatin (LIPITOR) 40 MG tablet Take 1 tablet (40 mg) by mouth daily 90 tablet 3     cefpodoxime (VANTIN) 200 MG tablet Take 200 mg by mouth 2 times daily       cephALEXin (KEFLEX) 250 MG capsule Take 250 mg by mouth       clonazePAM (KLONOPIN) 0.5 MG tablet Take 0.5 mg by mouth daily       fluticasone-vilanterol (BREO ELLIPTA) 200-25 MCG/INH inhaler Inhale 1 puff into the lungs daily 3 Inhaler 3     hydrochlorothiazide (MICROZIDE) 12.5 MG capsule        ibuprofen (ADVIL,MOTRIN) 200 MG tablet Take 3 tablets (600 mg) by mouth 3 times daily (with meals) 90 tablet 0     latanoprost (XALATAN) 0.005 % ophthalmic solution 1 drop       losartan-hydrochlorothiazide (HYZAAR) 100-25 MG tablet Take 1 tablet by mouth daily 90 tablet 3     quetiapine (SEROQUEL) 200 MG tablet Take 200 mg by mouth At Bedtime.       sertraline (ZOLOFT) 50 MG tablet Take 1.5 tablets (75 mg) by mouth daily 45 tablet 3     umeclidinium (INCRUSE ELLIPTA) 62.5 MCG/INH inhaler Inhale 1 puff into the lungs daily 7 each 0     Vitamin D3 (CHOLECALCIFEROL) 25 mcg (1000 units) tablet Take 1 tablet (25 mcg) by mouth daily 100 tablet 3     Unable to perform physical exam because of telephone visit.  She is speaking in full paragraphs, with anxious emotion her voice, though less so than previous.    Key Data Reviewed:  LABS:   Last GFR 71  Hb 13.4    IMAGING: No new imaging.     Impression & Recommendations & Counseling:  Ca Yan is a 75 year old female with history of h/o anxiety, HTN, increased  cholesterol, and chronic respiratory failure, attributed to COPD but likely more from kyphoscoliosis and restriction. She was referred to Palliaitve care primarily for guidance on goals of care, updating her HCD, and emotional support. However, she declined needing help with updating her HCD, and has been focused on her anxiety, which was exacerbated by the further physical isolation recommended for coronavirus. She overall sounds less anxious and has been taking the sertraline, but her therapist retired and she has not found a new one yet.      Anxiety -continue sertraline 75 mg daily.  Previously meeting with a therapist from [facility], encouraged her to reach back out to them about establishing with a new therapist.  [place] may take up trial of a couple to find when she feels comfortable with.    Dyspnea -certainly worsened by anxiety, but also present with minimal exertion.  No improvement with pulmonary rehab  -Recommended following with speech to see if it improvement with breathing exercises  -Introduced the idea of opioids like morphine at low doses for dyspnea.  Would prefer to maximize nonpharmacologic therapy first, but if no improvement, would consider low-dose morphine in the future.   Of note for the future, she only uses her HOME phone.     Advanced Care Planning - discussed the role of a health care agent, considering if she would trust Cyndi for this and talking about it with Cyndi.      Encouraged her to take the coronavirus vaccine, answered her questions to the best of my knowledge.     Phone call duration:  41 minutes  Follow-up in 3 months    Alison Schuster MD  Palliative Medicine  Pager [phone]

## 2021-01-27 ENCOUNTER — PATIENT OUTREACH (OUTPATIENT)
Dept: PALLIATIVE CARE | Facility: CLINIC | Age: 76
End: 2021-01-27

## 2021-01-27 NOTE — PROGRESS NOTES
Received VM from patient - she stated she wanted to discuss the vaccine. Attempted to call her back, but unable to reach her. Left VM.

## 2021-01-29 ENCOUNTER — HOSPITAL ENCOUNTER (OUTPATIENT)
Dept: CARDIAC REHAB | Facility: CLINIC | Age: 76
End: 2021-01-29
Payer: COMMERCIAL

## 2021-01-29 PROCEDURE — G0238 OTH RESP PROC, INDIV: HCPCS

## 2021-01-29 PROCEDURE — 999N000193 HC STATISTIC VISIT PULM REHAB

## 2021-02-01 ENCOUNTER — TELEPHONE (OUTPATIENT)
Dept: PULMONOLOGY | Facility: CLINIC | Age: 76
End: 2021-02-01

## 2021-02-01 NOTE — TELEPHONE ENCOUNTER
[facility] Call Center    Phone Message    May a detailed message be left on voicemail: no     Reason for Call: Other: Patient requested Oxygen Order be sent to [facility]  Telephone# [phone], please follow up with patient to advise order has been processed      Action Taken: Message routed to:  [facility] pulmonology    Travel Screening: Not Applicable

## 2021-02-01 NOTE — TELEPHONE ENCOUNTER
Pt calls because she would like to switch oxygen supplier from [facility] to  [facility].  She states she doesn't like [facility] and unless she can switch states she will not be using her oxygen.  She tells me that she cannot remember how to switch concentrator on and off but declined my offer to have [facility] sent someone out to assist.  Advised that I will call [facility] to see if pt can switch provider. Reviewed with TREV Mendoza who advised that they are not taking on transfers at this time.     Left message for Olivia to let her know that [facility] are unable to take her on at this time and to let me know if she would like me to contact [facility] to arrange patient education.

## 2021-02-01 NOTE — TELEPHONE ENCOUNTER
Pt has [facility]'s number and will call them to schedule another pt education session. I have advised her to ask for written instructions that she can refer to after they have left.

## 2021-02-03 ENCOUNTER — OFFICE VISIT (OUTPATIENT)
Dept: OTOLARYNGOLOGY | Facility: CLINIC | Age: 76
End: 2021-02-03
Payer: COMMERCIAL

## 2021-02-03 DIAGNOSIS — J38.01 VOCAL FOLD PARALYSIS, UNILATERAL: ICD-10-CM

## 2021-02-03 DIAGNOSIS — R49.0 DYSPHONIA: Primary | ICD-10-CM

## 2021-02-03 PROCEDURE — 99207 PR NO CHARGE LOS: CPT | Performed by: SPEECH-LANGUAGE PATHOLOGIST

## 2021-02-03 PROCEDURE — 92507 TX SP LANG VOICE COMM INDIV: CPT | Mod: GN | Performed by: SPEECH-LANGUAGE PATHOLOGIST

## 2021-02-03 NOTE — LETTER
"2/3/2021       RE: Ca Yan  [address]  [facility] [address]     Dear Colleague,    Thank you for referring your patient, Ca Yan, to the [facility]ICE [facility] [place] at [facility]. Please see a copy of my visit note below.    [facility]  THERAPY NOTE (CPT 74482)    Patient: Ca Yan  Date of Service: 2/3/2021  Referring physician: Dr. Roche  Impressions from most recent evaluation by Dr. Roche on 1/19/21:  \"1. Dysphonia   - since January 2020 in the context of hospital admission for PNA  - started voice therapy without improvement with Mirta Blake, Ph.D., CCC/SLP  - scope on 8/6 very difficult to be tolerated by the patient but does show left vocal fold paralysis and no other lesions  - CT neck 10/8/20 - no lesions along left RLN - does have tortuous thoracic aorta due to scoliosis which is causing compression of esophagus - could cause compression of the RLN as well    - CT chest from 1/15/20 which does show some dilation of the pyriform which can point to a left vocal fold paralysis  - though unlikely etiology given sudden onset of symptoms in January  - discussed that sine she had so much trouble tolerating the exam in the office she is not a candidate for a vocal fold awake injection  - she is now 1 year out from voice changes with persistent breathy dysphonia and therefore persistent VF paralysis   - discussed options of IL under general anesthesia or implant under MAC  - she state she is too scared of her breathing to go with either procedure  - she states her issue with the voice is that she cant leave a message because \"the machine says it cant hear me'  - and she can't talk when she is in a store if she were to go to a store \"    SUBJECTIVE:  Since the patient's last session, they report the following:     Overall symptoms are about the same    Reduced projection is the biggest issue    Also doesn't feel she " September 14, 2020      JACKIE Mares  [address]  [facility] [address]           [facility]  [address] [place] [address]  Phone: [phone]  Fax: [phone]          Patient: Ruth Ann Dee   MR Number: [id number]   YOB: 1972   Date of Visit: 9/14/2020       Dear Gary Dupont:    Thank you for referring Ruth Ann Dee to me for evaluation. Attached you will find relevant portions of my assessment and plan of care.    If you have questions, please do not hesitate to call me. I look forward to following Ruth Ann Dee along with you.    Sincerely,    Juliette L. Sandifer Kum-Nji, MD    Enclosure  CC:  No Recipients    If you would like to receive this communication electronically, please contact [email or web address] or [phone] to request more information on [employer] Link access.    For providers and/or their staff who would like to refer a patient to [facility], please contact us through our one-stop-shop provider referral line, [facility], at [phone].    If you feel you have received this communication in error or would no longer like to receive these types of communications, please e-mail [email or web address]          can be heard with automated answering services which she finds very frustrating    OBJECTIVE:  PATIENT REPORTED MEASURES:  Patient Supplied Answers To SLP QOL Questionnaire  No flowsheet data found.  Patient Specific Goal Metrics:  No flowsheet data found.    THERAPEUTIC ACTIVITIES    Counseling and Education:    Although it has been addressed on multiple occasions patient still has many questions about the origin of her vocal fold paralysis.  She had many questions about this as well as possible procedures to help.  These were answered to the best of my ability    Techniques to address limited projection    Use of personal amplification    Education regarding personal amplification devices    Patient states that she is unable to order things online, and has no one who helps her with that sort of issue    She is concerned about cost, and we discussed seeking insurance approval for the purchase    Limitations of personal amplification discussed as well    Ultimately we decided that we will trial an amplifier if possible at her next session so that she can make a decision with more input    Various facilitating techniques were explored    Modest improvement was noted with elevated pitch    No significant benefit from glottic coup or head turn    Patient achieves best voice quality when not attempting to achieve greater intensity but using more moderated respiratory drive    We discussed that should amplification be pursued it does not negate the need for optimized voicing      I provided handouts of today's therapeutic activities to facilitate practice.    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 Ca had many questions during today's session and was frequently perseverative on the causes of her condition any  "possible intervention to \"fix\" the problem.  Education was provided to the best of my ability.  The potential use of personal amplification is likely the most easily attainable solution given the patient's apprehension surrounding procedural interventions.  It was emphasized that amplification amplifies all sound not only the good sound, and that she will still sound breathy and strained with amplification, but it will be helpful for increasing loudness potentially.  Given the patient's limited access to ways of gaining amplification and concern over financial cost we discussed the value of trialing a amplifier within the next session if 1 can be procured.  She agreed with this plan    PLAN: I will see Ca in approximately 2 weeks, at which point we will test out the function of amplification for the patient's voice concerns.   For practice goals see AVS.       TOTAL SERVICE TIME: 50 minutes  TREATMENT (75379)  NO CHARGE FACILITY FEE (13548)    Nikita Snow M.M., M.A., CCC-SLP  Speech-Language Pathologist  Certificate of Vocology  [phone]    *this report was created in part through the use of computerized dictation software, and though reviewed following completion, some typographic errors may persist.  If there is confusion regarding any of this notes contents, please contact me for clarification.*        Again, thank you for allowing me to participate in the care of your patient.      Sincerely,    Wiley Snow, SLP      "

## 2021-02-03 NOTE — PROGRESS NOTES
"[facility] VOICE CLINIC  THERAPY NOTE (CPT 92195)    Patient: Ca Yan  Date of Service: 2/3/2021  Referring physician: Dr. Roche  Impressions from most recent evaluation by Dr. Roche on 1/19/21:  \"1. Dysphonia   - since January 2020 in the context of hospital admission for PNA  - started voice therapy without improvement with Mirta Blake, Ph.D., CCC/SLP  - scope on 8/6 very difficult to be tolerated by the patient but does show left vocal fold paralysis and no other lesions  - CT neck 10/8/20 - no lesions along left RLN - does have tortuous thoracic aorta due to scoliosis which is causing compression of esophagus - could cause compression of the RLN as well    - CT chest from 1/15/20 which does show some dilation of the pyriform which can point to a left vocal fold paralysis  - though unlikely etiology given sudden onset of symptoms in January  - discussed that sine she had so much trouble tolerating the exam in the office she is not a candidate for a vocal fold awake injection  - she is now 1 year out from voice changes with persistent breathy dysphonia and therefore persistent VF paralysis   - discussed options of IL under general anesthesia or implant under MAC  - she state she is too scared of her breathing to go with either procedure  - she states her issue with the voice is that she cant leave a message because \"the machine says it cant hear me'  - and she can't talk when she is in a store if she were to go to a store \"    SUBJECTIVE:  Since the patient's last session, they report the following:     Overall symptoms are about the same    Reduced projection is the biggest issue    Also doesn't feel she can be heard with automated answering services which she finds very frustrating    OBJECTIVE:  PATIENT REPORTED MEASURES:  Patient Supplied Answers To SLP QOL Questionnaire  No flowsheet data found.  Patient Specific Goal Metrics:  No flowsheet data found.    THERAPEUTIC ACTIVITIES    Counseling and " "Education:    Although it has been addressed on multiple occasions patient still has many questions about the origin of her vocal fold paralysis.  She had many questions about this as well as possible procedures to help.  These were answered to the best of my ability    Techniques to address limited projection    Use of personal amplification    Education regarding personal amplification devices    Patient states that she is unable to order things online, and has no one who helps her with that sort of issue    She is concerned about cost, and we discussed seeking insurance approval for the purchase    Limitations of personal amplification discussed as well    Ultimately we decided that we will trial an amplifier if possible at her next session so that she can make a decision with more input    Various facilitating techniques were explored    Modest improvement was noted with elevated pitch    No significant benefit from glottic coup or head turn    Patient achieves best voice quality when not attempting to achieve greater intensity but using more moderated respiratory drive    We discussed that should amplification be pursued it does not negate the need for optimized voicing      I provided handouts of today's therapeutic activities to facilitate practice.    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 Ca had many questions during today's session and was frequently perseverative on the causes of her condition any possible intervention to \"fix\" the problem.  Education was provided to the best of my ability.  The potential use of personal amplification is likely the most easily attainable solution given the patient's apprehension surrounding procedural interventions.  It was emphasized that amplification " amplifies all sound not only the good sound, and that she will still sound breathy and strained with amplification, but it will be helpful for increasing loudness potentially.  Given the patient's limited access to ways of gaining amplification and concern over financial cost we discussed the value of trialing a amplifier within the next session if 1 can be procured.  She agreed with this plan    PLAN: I will see Ca in approximately 2 weeks, at which point we will test out the function of amplification for the patient's voice concerns.   For practice goals see AVS.       TOTAL SERVICE TIME: 50 minutes  TREATMENT (80702)  NO CHARGE FACILITY FEE (32209)    Nikita Snow M.M., M.A., CCC-SLP  Speech-Language Pathologist  Certificate of Vocology  [phone]    *this report was created in part through the use of computerized dictation software, and though reviewed following completion, some typographic errors may persist.  If there is confusion regarding any of this notes contents, please contact me for clarification.*

## 2021-02-04 ENCOUNTER — TELEPHONE (OUTPATIENT)
Dept: PULMONOLOGY | Facility: CLINIC | Age: 76
End: 2021-02-04

## 2021-02-04 NOTE — TELEPHONE ENCOUNTER
Ca raul has requested writer call [facility] and have them provide assistance to her as she has trouble using the portable tanks they have provided. Writer called [facility] and they have confirmed they will contact her to assist.  Per [facility] Red, their RT is aware pt is requesting assistance and he will be calling her today. Pt updated.

## 2021-02-05 ENCOUNTER — PATIENT OUTREACH (OUTPATIENT)
Dept: PALLIATIVE CARE | Facility: CLINIC | Age: 76
End: 2021-02-05

## 2021-02-05 NOTE — PROGRESS NOTES
Received note from  that patient reported some pain in her legs at night when  was setting up follow up apt.     Called her to follow up - she is having leg pain, only when she gets into bed. She thinks it is fairly new, no new swelling or redness. This this started a few weeks ago maybe.     Tells me she is going to urgent care about this tomorrow. Suggested she could try a dose of tylenol before bed to see if that helps - she tells me that she tried 500mg once and it didn't help. She prefers to see urgent care. Will call us if she changes her mind and wants recommendations.

## 2021-02-12 ENCOUNTER — TELEPHONE (OUTPATIENT)
Dept: PULMONOLOGY | Facility: CLINIC | Age: 76
End: 2021-02-12

## 2021-02-12 NOTE — TELEPHONE ENCOUNTER
Pt called frustrated as she had not heard back from [facility]. RN called and spoke with Teofilo who advised they would reach out to patient again and have RT go there to review oxygen set up. RN then called pt back and advised that [facility] would be calling and to please speak with them. Pt verbalized understanding.   Sisi Bradley RN BSN

## 2021-02-16 NOTE — PROGRESS NOTES
"[facility] VOICE CLINIC  THERAPY NOTE (CPT [id number])    Patient: Ca Yan  Date of Service: 2/17/2021  Referring physician: Dr. Roche  Impressions from most recent evaluation by Dr. Roche on 1/19/21:  \"1. Dysphonia   - since January 2020 in the context of hospital admission for PNA  - started voice therapy without improvement with Mirta Blake, Ph.D., CCC/SLP  - scope on 8/6 very difficult to be tolerated by the patient but does show left vocal fold paralysis and no other lesions  - CT neck 10/8/20 - no lesions along left RLN - does have tortuous thoracic aorta due to scoliosis which is causing compression of esophagus - could cause compression of the RLN as well    - CT chest from 1/15/20 which does show some dilation of the pyriform which can point to a left vocal fold paralysis  - though unlikely etiology given sudden onset of symptoms in January  - discussed that sine she had so much trouble tolerating the exam in the office she is not a candidate for a vocal fold awake injection  - she is now 1 year out from voice changes with persistent breathy dysphonia and therefore persistent VF paralysis   - discussed options of IL under general anesthesia or implant under MAC  - she state she is too scared of her breathing to go with either procedure  - she states her issue with the voice is that she cant leave a message because \"the machine says it cant hear me'  - and she can't talk when she is in a store if she were to go to a store \"    SUBJECTIVE:  Since the patient's last session, they report the following:     Overall symptoms are about the same    OBJECTIVE:    THERAPEUTIC ACTIVITIES    Counseling and Education:    Patient was again perseverative regarding the potential causes of her paralysis and whether there was \"anything that could be done for it\"    Again various treatment options were discussed, along with their pros and cons.  Ultimately although I am well versed in the nature of interventions for " "unilateral vocal fold paralysis I feel that the patient would be better served in conversation with Dr. Roche as she will then be able to hear more concretely about options relative to her unique situation.    Over the course of these conversations we discussed that the primary limiting factor seems to be the patient's anxiety and uncertainty about the procedure itself.  One avenue for addressing this involves education for which again she was encouraged to speak with Dr. Roche regarding the procedures themselves, but the other avenue for improvement involves helping with her underlying anxiety and stress surrounding recovery from a procedure.    As a primary fear involves living alone with no family nearby, which makes her nervous postoperatively we discussed checking with her friends to see if anyone would be willing to stay with her to ease her mind for several days after her procedure.  She stated that she will inquire about this    Additionally she was encouraged to speak with her counselor about what she can do to manage her stress and anxiety surrounding the procedures, and she stated that she would do so    Patient was trained in the use of a personal amplifier    She stated that she did not like the quality, and that it was \"louder in a bad way\"    Education regarding the nature of amplifiers which amplify all sound not only the desired qualities    Use of the amplifier was modeled and patient did note that it was easier to hear the clinician with the amplifier and whispered phonation versus without the amplifier    Despite this she stated that she is not interested in pursuing this as an option    Previous therapeutic exercises were discussed, and she was encouraged to return to practice of forward resonant sounds at elevated pitch range as she had learned with Dr. Blake.    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 Ca has experienced no substantive improvement in her voice quality since her last session.  She again had many questions about the nature of the paralysis and possible interventions for improvement.  Given her prior anxiety surrounding surgical intervention the use of amplification was explored, but patient felt that this was not viable as the quality was still too degraded.  From an outside standpoint she is able to increase her loudness by using the amplifier, though the degree of breathiness does mask the signal moderately in noise.  Prior therapeutic exercises were able to demonstrate decrease in breathiness with elevated pitch and forward sounds, though patient was again dismissive of possible gains stating that it requires significant effort to maintain.  Overall I feel that the only form of intervention which is likely to offer gains which feel substantive to the patient is likely to be a procedural intervention, and in order to access that management of her stress and anxiety surrounding the possible surgery would need to be better managed.  She was encouraged to talk with her counselor about this, and I offered to be in communication with that individual should they desire to help clarify the nature of the situation.    PLAN: I will currently planning to see Olivia in approximately 2 weeks, though the timing may shift based on conversations with the rest of her care team.      TOTAL SERVICE TIME: 45 minutes  TREATMENT (45403)  NO CHARGE FACILITY FEE (48852)    Nikita Snow M.M., M.A., CCC-SLP  Speech-Language Pathologist  Certificate of Vocology  [phone]    *this report was created in part through the use of computerized dictation software, and though reviewed following completion, some typographic errors may persist.  If there is confusion regarding any of this notes  contents, please contact me for clarification.*

## 2021-02-17 ENCOUNTER — OFFICE VISIT (OUTPATIENT)
Dept: OTOLARYNGOLOGY | Facility: CLINIC | Age: 76
End: 2021-02-17
Payer: COMMERCIAL

## 2021-02-17 DIAGNOSIS — R49.0 DYSPHONIA: Primary | ICD-10-CM

## 2021-02-17 DIAGNOSIS — J38.01 VOCAL FOLD PARALYSIS, UNILATERAL: ICD-10-CM

## 2021-02-17 PROCEDURE — 92507 TX SP LANG VOICE COMM INDIV: CPT | Mod: GN | Performed by: SPEECH-LANGUAGE PATHOLOGIST

## 2021-02-17 PROCEDURE — 99207 PR NO CHARGE LOS: CPT | Performed by: SPEECH-LANGUAGE PATHOLOGIST

## 2021-02-17 NOTE — LETTER
"2/17/2021       RE: Ca Yan  [address]  [facility] [facility] [address]       [facility] VOICE CLINIC  THERAPY NOTE (CPT [id number])    Patient: Ca Yan  Date of Service: 2/17/2021  Referring physician: Dr. Roche  Impressions from most recent evaluation by Dr. Roche on 1/19/21:  \"1. Dysphonia   - since January 2020 in the context of hospital admission for PNA  - started voice therapy without improvement with Mirta Blake, Ph.D., CCC/SLP  - scope on 8/6 very difficult to be tolerated by the patient but does show left vocal fold paralysis and no other lesions  - CT neck 10/8/20 - no lesions along left RLN - does have tortuous thoracic aorta due to scoliosis which is causing compression of esophagus - could cause compression of the RLN as well    - CT chest from 1/15/20 which does show some dilation of the pyriform which can point to a left vocal fold paralysis  - though unlikely etiology given sudden onset of symptoms in January  - discussed that sine she had so much trouble tolerating the exam in the office she is not a candidate for a vocal fold awake injection  - she is now 1 year out from voice changes with persistent breathy dysphonia and therefore persistent VF paralysis   - discussed options of IL under general anesthesia or implant under MAC  - she state she is too scared of her breathing to go with either procedure  - she states her issue with the voice is that she cant leave a message because \"the machine says it cant hear me'  - and she can't talk when she is in a store if she were to go to a store \"    SUBJECTIVE:  Since the patient's last session, they report the following:     Overall symptoms are about the same    OBJECTIVE:    THERAPEUTIC ACTIVITIES    Counseling and Education:    Patient was again perseverative regarding the potential causes of her paralysis and whether there was \"anything that could be done for it\"    Again various treatment options were discussed, along with their pros " "and cons.  Ultimately although I am well versed in the nature of interventions for unilateral vocal fold paralysis I feel that the patient would be better served in conversation with Dr. Roche as she will then be able to hear more concretely about options relative to her unique situation.    Over the course of these conversations we discussed that the primary limiting factor seems to be the patient's anxiety and uncertainty about the procedure itself.  One avenue for addressing this involves education for which again she was encouraged to speak with Dr. Roche regarding the procedures themselves, but the other avenue for improvement involves helping with her underlying anxiety and stress surrounding recovery from a procedure.    As a primary fear involves living alone with no family nearby, which makes her nervous postoperatively we discussed checking with her friends to see if anyone would be willing to stay with her to ease her mind for several days after her procedure.  She stated that she will inquire about this    Additionally she was encouraged to speak with her counselor about what she can do to manage her stress and anxiety surrounding the procedures, and she stated that she would do so    Patient was trained in the use of a personal amplifier    She stated that she did not like the quality, and that it was \"louder in a bad way\"    Education regarding the nature of amplifiers which amplify all sound not only the desired qualities    Use of the amplifier was modeled and patient did note that it was easier to hear the clinician with the amplifier and whispered phonation versus without the amplifier    Despite this she stated that she is not interested in pursuing this as an option    Previous therapeutic exercises were discussed, and she was encouraged to return to practice of forward resonant sounds at elevated pitch range as she had learned with Dr. Blake.    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 Ca has experienced no substantive improvement in her voice quality since her last session.  She again had many questions about the nature of the paralysis and possible interventions for improvement.  Given her prior anxiety surrounding surgical intervention the use of amplification was explored, but patient felt that this was not viable as the quality was still too degraded.  From an outside standpoint she is able to increase her loudness by using the amplifier, though the degree of breathiness does mask the signal moderately in noise.  Prior therapeutic exercises were able to demonstrate decrease in breathiness with elevated pitch and forward sounds, though patient was again dismissive of possible gains stating that it requires significant effort to maintain.  Overall I feel that the only form of intervention which is likely to offer gains which feel substantive to the patient is likely to be a procedural intervention, and in order to access that management of her stress and anxiety surrounding the possible surgery would need to be better managed.  She was encouraged to talk with her counselor about this, and I offered to be in communication with that individual should they desire to help clarify the nature of the situation.    PLAN: I will currently planning to see Olivia in approximately 2 weeks, though the timing may shift based on conversations with the rest of her care team.      TOTAL SERVICE TIME: 45 minutes  TREATMENT (43347)  NO CHARGE FACILITY FEE (27089)    Nikita Snow M.M., M.A., CCC-SLP  Speech-Language Pathologist  Certificate of Vocology  [phone]    *this report was created in part through the use of computerized dictation software, and though reviewed following completion, some  typographic errors may persist.  If there is confusion regarding any of this notes contents, please contact me for clarification.*        Wiley Snow, SLP

## 2021-02-17 NOTE — LETTER
"2/17/2021       RE: Ca Yan  [address]  [facility] [facility] [address]     Dear Colleague,    Thank you for referring your patient, Ca Yan, to the [facility]ICE [facility]IN[facility] [place] [facility]. Please see a copy of my visit note below.    [facility]  THERAPY NOTE (CPT [id number])    Patient: Ca Yan  Date of Service: 2/17/2021  Referring physician: Dr. Roche  Impressions from most recent evaluation by Dr. Roche on 1/19/21:  \"1. Dysphonia   - since January 2020 in the context of hospital admission for PNA  - started voice therapy without improvement with Mirta Blake, Ph.D., CCC/SLP  - scope on 8/6 very difficult to be tolerated by the patient but does show left vocal fold paralysis and no other lesions  - CT neck 10/8/20 - no lesions along left RLN - does have tortuous thoracic aorta due to scoliosis which is causing compression of esophagus - could cause compression of the RLN as well    - CT chest from 1/15/20 which does show some dilation of the pyriform which can point to a left vocal fold paralysis  - though unlikely etiology given sudden onset of symptoms in January  - discussed that sine she had so much trouble tolerating the exam in the office she is not a candidate for a vocal fold awake injection  - she is now 1 year out from voice changes with persistent breathy dysphonia and therefore persistent VF paralysis   - discussed options of IL under general anesthesia or implant under MAC  - she state she is too scared of her breathing to go with either procedure  - she states her issue with the voice is that she cant leave a message because \"the machine says it cant hear me'  - and she can't talk when she is in a store if she were to go to a store \"    SUBJECTIVE:  Since the patient's last session, they report the following:     Overall symptoms are about the same    OBJECTIVE:    THERAPEUTIC ACTIVITIES    Counseling " "and Education:    Patient was again perseverative regarding the potential causes of her paralysis and whether there was \"anything that could be done for it\"    Again various treatment options were discussed, along with their pros and cons.  Ultimately although I am well versed in the nature of interventions for unilateral vocal fold paralysis I feel that the patient would be better served in conversation with Dr. Roche as she will then be able to hear more concretely about options relative to her unique situation.    Over the course of these conversations we discussed that the primary limiting factor seems to be the patient's anxiety and uncertainty about the procedure itself.  One avenue for addressing this involves education for which again she was encouraged to speak with Dr. Roche regarding the procedures themselves, but the other avenue for improvement involves helping with her underlying anxiety and stress surrounding recovery from a procedure.    As a primary fear involves living alone with no family nearby, which makes her nervous postoperatively we discussed checking with her friends to see if anyone would be willing to stay with her to ease her mind for several days after her procedure.  She stated that she will inquire about this    Additionally she was encouraged to speak with her counselor about what she can do to manage her stress and anxiety surrounding the procedures, and she stated that she would do so    Patient was trained in the use of a personal amplifier    She stated that she did not like the quality, and that it was \"louder in a bad way\"    Education regarding the nature of amplifiers which amplify all sound not only the desired qualities    Use of the amplifier was modeled and patient did note that it was easier to hear the clinician with the amplifier and whispered phonation versus without the amplifier    Despite this she stated that she is not interested in pursuing this as an " option    Previous therapeutic exercises were discussed, and she was encouraged to return to practice of forward resonant sounds at elevated pitch range as she had learned with Dr. Blake.    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 Ca has experienced no substantive improvement in her voice quality since her last session.  She again had many questions about the nature of the paralysis and possible interventions for improvement.  Given her prior anxiety surrounding surgical intervention the use of amplification was explored, but patient felt that this was not viable as the quality was still too degraded.  From an outside standpoint she is able to increase her loudness by using the amplifier, though the degree of breathiness does mask the signal moderately in noise.  Prior therapeutic exercises were able to demonstrate decrease in breathiness with elevated pitch and forward sounds, though patient was again dismissive of possible gains stating that it requires significant effort to maintain.  Overall I feel that the only form of intervention which is likely to offer gains which feel substantive to the patient is likely to be a procedural intervention, and in order to access that management of her stress and anxiety surrounding the possible surgery would need to be better managed.  She was encouraged to talk with her counselor about this, and I offered to be in communication with that individual should they desire to help clarify the nature of the situation.    PLAN: I will currently planning to see Olivia in approximately 2 weeks, though the timing may shift based on conversations with the rest of her care team.      TOTAL SERVICE TIME: 45 minutes  TREATMENT (60942)  NO CHARGE FACILITY FEE (37819)    Nikita  BENJI Snow, M.A., CCC-SLP  Speech-Language Pathologist  Certificate of Vocology  [phone]    *this report was created in part through the use of computerized dictation software, and though reviewed following completion, some typographic errors may persist.  If there is confusion regarding any of this notes contents, please contact me for clarification.*        Again, thank you for allowing me to participate in the care of your patient.      Sincerely,    Wiley Snow, SLP

## 2021-02-24 ENCOUNTER — TELEPHONE (OUTPATIENT)
Dept: OTOLARYNGOLOGY | Facility: CLINIC | Age: 76
End: 2021-02-24

## 2021-02-24 NOTE — TELEPHONE ENCOUNTER
Spoke to patient and confirmed that we would like her to have someone join her for the appointment on 3/25. Discussed potential procedure. Denies further questions at this time.    Patricia Sanchez RN on 2/24/2021 at 11:00 AM

## 2021-03-04 ENCOUNTER — PATIENT OUTREACH (OUTPATIENT)
Dept: CARE COORDINATION | Facility: CLINIC | Age: 76
End: 2021-03-04

## 2021-03-04 NOTE — PROGRESS NOTES
"Social Work Intervention  [facility]    Data/Intervention:    Patient Name:  Ca Yan  /Age:  1945 (75 year old)    Visit Type: telephone  Referral Source: Palliative Care  Reason for Referral:  Patient had questions about healthcare directive    Collaborated With:    -Patient    Patient Concerns/Issues:    called and spoke with Patient on the phone today. Patient reported she has blank forms for HCD and would like help completing the forms.     Intervention/Education/Resources Provided:   offered to help Patient complete the form over the telephone and answer all of Patient's questions. Patient reported her HCD is \"complicated\" and will take some time. Patient did not have time today to complete the form.     Assessment/Plan:   provided Patient with direct phone number and availability. Patient will contact  to complete HCD when Patient has availability to complete the document.  will remain available.    JUNE Simon  Outpatient Specialty Clinics  Direct Phone: [phone]      "

## 2021-03-10 ENCOUNTER — TELEPHONE (OUTPATIENT)
Facility: CLINIC | Age: 76
End: 2021-03-10

## 2021-03-10 NOTE — TELEPHONE ENCOUNTER
Contacted Ca to follow up on message below. Ca states that despite [facility] to demonstrate how to use her portable tanks, the system is too complicated and she cannot use what they have provided.  She declined my offer to get [facility] out again to for more teaching. Pt would like to switch provider to [facility], however, they are unable to take her as a new client at this time. Writer placed call to [employer] to see if they would be able to provide her with a POC for her exertional needs, they will return call to let me know if this would now be possible.

## 2021-03-10 NOTE — TELEPHONE ENCOUNTER
[facility] returned call.  Pt was titrated for use of pulsed dose POC last January.  Per RT who completed testing, pt did not qualify for POC because her sats dropped while using a pulsed dose system. Reviewed with Ca, she verbalized understanding of problem triggering a pulsed dose system and explanation of why she needs a continuous oxygen supply.  I offered again to have [facility] perform more teaching.  She declined offer, she will call back if she changes her mind.

## 2021-03-10 NOTE — TELEPHONE ENCOUNTER
[facility] Call Center    Phone Message    May a detailed message be left on voicemail: no     Reason for Call: Other: Pt, Ca calling she is having issues/ Problem with Oxygen, she is not able to get this on her own.  Please call: [phone]     Action Taken: Message routed to:  [facility] ([facility] Pulmonology    Travel Screening: Not Applicable

## 2021-03-11 NOTE — TELEPHONE ENCOUNTER
Pt called in stating she would like to speak with Brandon. Please call her back when able. She would like to discuss her oxygen situation asap

## 2021-03-12 NOTE — TELEPHONE ENCOUNTER
Patient would like to discuss switching to a different oxygen supply company. Would like a call back from [facility]. Patient will be leaving home at 1PM, would like a call in the morning.

## 2021-03-16 NOTE — TELEPHONE ENCOUNTER
Called patient and discussed problem with switching oxygen concentrator on and off. RN reviewed past notes and reiterated to patient that her desired home oxygen company will not be taking transfers at this time. RN called [facility] to discuss patient problems and [facility] will reach out to patient to attempt to teach patient how to use equipment.

## 2021-03-18 ENCOUNTER — TELEPHONE (OUTPATIENT)
Dept: FAMILY MEDICINE | Facility: CLINIC | Age: 76
End: 2021-03-18

## 2021-03-24 NOTE — PROGRESS NOTES
[facility]   at the [facility]   Otolaryngology Clinic     Patient: Ca Yan    MRN: [id number]    : 1945    Age/Gender: 75 year old female  Date of Service: 3/25/2021  Rendering Provider:   Domonique Roche MD     Chief Complaint   Dysphonia  Interval History   HISTORY OF PRESENT ILLNESS:Ms. Yan is a 74 year old female is being followed for dysphonia. She was initially seen on 20. Please refer to this note for full history.   Of note she has had dysphonia since around the time of her hospital admission on 20 when she was admitted for pneumonia. She has started voice therapy with Mirta Blake, Ph.D., CCC/SLP.      Today, she presents for follow up with her friend. she reports:  - she reports has voice has been bad  - she can't leave messages  - she has a lot of mucus     Dysphonia: Patient reports dysphonia. This is stable      Dysphagia: Patient reports dysphagia. This is stable     Dyspnea: Patient reports dyspnea. This is stable     Throat clearing/Chronic cough: Patient reports throat clearing/chronic cough. This is stable     LPRD/GERD: Patient reports LPRD/GERD symptoms. This is stable     PAST MEDICAL HISTORY:   Past Medical History:   Diagnosis Date     Anxiety      Arthritis      Breast cancer (H)      COPD (chronic obstructive pulmonary disease) (H)     12:  FEV 0.99 l     Depressive disorder      Hypertension      Low back pain with left-sided sciatica      Low bone density 2017    DEXA 2017: T score -2.0. Normal Z score. FRAX risk: major osteoporotic fracture 11.9%, hip fracture 2.6%, therefore not high-risk     Lymphedema, chronic lower extremities        PAST SURGICAL HISTORY:   Past Surgical History:   Procedure Laterality Date     BACK SURGERY      spinal fusion     COLONOSCOPY       LUMPECTOMY BREAST       ORTHOPEDIC SURGERY      Knee surgery left side       CURRENT MEDICATIONS:   Current Outpatient Medications:       albuterol (PROAIR HFA/PROVENTIL HFA/VENTOLIN HFA) 108 (90 Base) MCG/ACT inhaler, Inhale 2 puffs into the lungs every 6 hours as needed for shortness of breath / dyspnea or wheezing (Patient not taking: Reported on 1/25/2021), Disp: 3 Inhaler, Rfl: 1     atorvastatin (LIPITOR) 40 MG tablet, Take 1 tablet (40 mg) by mouth daily, Disp: 90 tablet, Rfl: 3     cefpodoxime (VANTIN) 200 MG tablet, Take 200 mg by mouth 2 times daily, Disp: , Rfl:      cephALEXin (KEFLEX) 250 MG capsule, Take 250 mg by mouth, Disp: , Rfl:      clonazePAM (KLONOPIN) 0.5 MG tablet, Take 0.5 mg by mouth daily, Disp: , Rfl:      fluticasone-vilanterol (BREO ELLIPTA) 200-25 MCG/INH inhaler, Inhale 1 puff into the lungs daily, Disp: 3 Inhaler, Rfl: 3     hydrochlorothiazide (MICROZIDE) 12.5 MG capsule, , Disp: , Rfl:      ibuprofen (ADVIL,MOTRIN) 200 MG tablet, Take 3 tablets (600 mg) by mouth 3 times daily (with meals), Disp: 90 tablet, Rfl: 0     latanoprost (XALATAN) 0.005 % ophthalmic solution, 1 drop, Disp: , Rfl:      losartan-hydrochlorothiazide (HYZAAR) 100-25 MG tablet, Take 1 tablet by mouth daily, Disp: 90 tablet, Rfl: 3     quetiapine (SEROQUEL) 200 MG tablet, Take 200 mg by mouth At Bedtime., Disp: , Rfl:      sertraline (ZOLOFT) 50 MG tablet, Take 1.5 tablets (75 mg) by mouth daily, Disp: 45 tablet, Rfl: 3     umeclidinium (INCRUSE ELLIPTA) 62.5 MCG/INH inhaler, Inhale 1 puff into the lungs daily, Disp: 7 each, Rfl: 0     Vitamin D3 (CHOLECALCIFEROL) 25 mcg (1000 units) tablet, Take 1 tablet (25 mcg) by mouth daily, Disp: 100 tablet, Rfl: 3    ALLERGIES: Azithromycin, Codeine, Hydrocodone, Metronidazole, Oxycodone, Percocet [oxycodone-acetaminophen], Pollen extract, Seasonal allergies, Vicodin [hydrocodone-acetaminophen], and Zolpidem    SOCIAL HISTORY:    Social History     Socioeconomic History     Marital status: Single     Spouse name: Not on file     Number of children: Not on file     Years of education: Not on file     Highest  education level: Not on file   Occupational History     Employer: RETIRED   Social Needs     Financial resource strain: Not on file     Food insecurity     Worry: Not on file     Inability: Not on file     Transportation needs     Medical: Not on file     Non-medical: Not on file   Tobacco Use     Smoking status: Former Smoker     Packs/day: 0.50     Years: 5.00     Pack years: 2.50     Types: Cigarettes     Start date: 1956     Quit date: 1980     Years since quittin.5     Smokeless tobacco: Former User     Quit date: 1980   Substance and Sexual Activity     Alcohol use: Not Currently     Alcohol/week: 0.0 standard drinks     Comment: Sober for 2 years, previous alcoholic     Drug use: No     Sexual activity: Not Currently     Partners: Male     Birth control/protection: Abstinence   Lifestyle     Physical activity     Days per week: Not on file     Minutes per session: Not on file     Stress: Not on file   Relationships     Social connections     Talks on phone: Once a week     Gets together: Never     Attends Shinto service: More than 4 times per year     Active member of club or organization: Yes     Attends meetings of clubs or organizations: More than 4 times per year     Relationship status: Never      Intimate partner violence     Fear of current or ex partner: No     Emotionally abused: No     Physically abused: No     Forced sexual activity: No   Other Topics Concern     Parent/sibling w/ CABG, MI or angioplasty before 65F 55M? Not Asked      Service Not Asked     Blood Transfusions Not Asked     Caffeine Concern Not Asked     Occupational Exposure Not Asked     Hobby Hazards Not Asked     Sleep Concern Not Asked     Stress Concern Not Asked     Comment: Lives alone     Weight Concern Not Asked     Special Diet Not Asked     Back Care Not Asked     Comment: Hx of scoliosis with spine fusion     Exercise Not Asked     Comment: Walks     Bike Helmet Not Asked     Seat Belt  Not Asked     Self-Exams Not Asked   Social History Narrative    Ca Yan never   Lives in apartment  is  family member is daughter Rin in [place], [place]  Previous AA meetings         FAMILY HISTORY:   Family History   Problem Relation Age of Onset     Breast Cancer Mother      Diabetes Mother      Anxiety Disorder Mother      Asthma Mother      Other Cancer Mother      Hyperlipidemia Mother      Alcohol/Drug Father      Mental Illness Father      Substance Abuse Father      Obesity Father      Cerebrovascular Disease Maternal Grandmother 67      Non-contributory for problems with anesthesia    REVIEW OF SYSTEMS:   The patient was asked a 14 point review of systems regarding constitutional symptoms, eye symptoms, ears, nose, mouth, throat symptoms, cardiovascular symptoms, respiratory symptoms, gastrointestinal symptoms, genitourinary symptoms, musculoskeletal symptoms, integumentary symptoms, neurological symptoms, psychiatric symptoms, endocrine symptoms, hematologic/lymphatic symptoms, and allergic/ immunologic symptoms.   The pertinent factors have been included in the HPI and below.  Patient Supplied Answers to Review of Systems  [facility] ROS 4/30/2020   Psychology Frequently feeling depressed or sad, Frequently feeling anxious   Ears, Nose, Throat Nasal congestion or drainage, Hoarseness   Cardiopulmonary Cough, Breathing problems   Musculoskeletal Sore or stiff joints, Swollen legs/feet   Allergy/Immunology Allergies or hay fever   Hematologic Easy bruising       Physical Examination   The patient underwent a physical examination as described below. The pertinent positive and negative findings are summarized after the description of the examination.  Constitutional: The patient's developmental and nutritional status was assessed. The patient's voice quality was assessed.  Head and Face: The head and face were inspected for deformities. The sinuses were palpated. The salivary glands were palpated.  Facial muscle strength was assessed bilaterally.  Eyes: Extraocular movements and primary gaze alignment were assessed.  Ears, Nose, Mouth and Throat: The ears and nose were examined for deformities. The nasal septum, mucosa, and turbinates were inspected by anterior rhinoscopy. The lips, teeth, and gums were examined for abnormalities. The oral mucosa, tongue, palate, tonsils, lateral and posterior pharynx were inspected for the presence of asymmetry or mucosal lesions.    Neck: The tracheal position was noted, and the neck mass palpated to determine if there were any asymmetries, abnormal neck masses, thyromegally, or thyroid nodules.  Respiratory: The nature of the breathing and chest expansion/symmetry was observed.  Cardiovascular: The patient was examined to determine the presence of any edema or jugular venous distension.  Abdomen: The contour of the abdomen was noted.  Lymphatic: The patient was examined for infraclavicular lymphadenopathy.  Musculoskeletal: The patient was inspected for the presence of skeletal deformities.  Extremities: The extremities were examined for any clubbing or cyanosis.  Skin: The skin was examined for inflammatory or neoplastic conditions.  Neurologic: The patient's orientation, mood, and affect were noted. The cranial nerve  functions were examined.  Other pertinent positive and negative findings on physical examination:   OC/OP: no lesions, uvula midline, soft palate elevates symmetrically, prior tonsillectomy  Neck: no lesions, no TH tenderness to palpation    All other physical examination findings were within normal limits and noncontributory.    Procedures   Flexible laryngoscopy (CPT 45369)      Pre-procedure diagnosis: dysphonia  Post-procedure diagnosis: same as above  Indication for procedure: Ms. Yan is a 75 year old female with see above  Procedure(s): Fiberoptic Laryngoscopy    Details of Procedure: After informed consent was obtained, the patient was seated in  the examination chair.  The areas of the nasopharynx as well as the hypopharynx were anesthetized with topical 4% lidocaine with 0.25% phenylephrine atomizer.  Examination of the base of tongue was performed first.  Attention was directed to any evidence of masses in the area or evidence of leukoplakia or candidal infection.  Attention was directed to the epiglottis where its size and position was determined and its movement on phonation of the vowel  e .  The piriform sinuses were then inspected for any mass lesions or pooling of secretions.  Attention was then directed to the larynx. The vocal folds were inspected for infection or any areas of leukoplakia, for masses, polypoid degeneration, or hemorrhage.  Having done this, the arytenoids and vocal processes were inspected for erythema or evidence of granuloma formation.  The posterior commissure was then inspected for evidence of inflammatory changes in the mucosa and heaping up of mucosal tissue. The patient was then instructed to say the vowel  e .  Adduction of vocal folds to the midline was observed for any evidence of paresis or paralysis of the larynx or asymmetry in rotation of the larynx to the left or right. The patient was asked to breathe and the degree of abduction was noted bilaterally.  Subglottic view of the larynx was obtained for any additional mass lesions or mucosal changes.  Finally the post cricoid was examined for evidence of pooling of secretions, as well as the pharyngeal wall mucosa.   Anesthesia type: 0.25% phenylephrine    Findings:  Anatomic/physiological deviations: Left vocal fold paralysis, pooling of secretions    Right vocal process: No restriction of mobility   Left vocal process: Marked restriction of mobility  Glottal gap: Glottal gap  Supraglottic structures: Normal  Hypopharynx: Normal     Estimated Blood Loss: minimal  Complications: None  Disposition: Patient tolerated the procedure well            Review of Relevant  "Clinical Data      Labs:  Lab Results   Component Value Date    TSH 2.39 08/20/2020     Lab Results   Component Value Date     08/20/2020    CO2 32 08/20/2020    BUN 18 08/20/2020    CREAT 0.9 10/08/2020     Lab Results   Component Value Date    WBC 5.4 08/20/2020    HGB 14.3 08/20/2020    HCT 45.1 08/20/2020    MCV 98 08/20/2020     08/20/2020     No results found for: PT, PTT, INR  No results found for: LONNY  No components found for: RHEUMATOIDFACTOR,  RF  Lab Results   Component Value Date    CRP 6.4 09/25/2019     No components found for: CKTOT, URICACID  No components found for: C3, C4, DSDNAAB, NDNAABIFA  No results found for: MPOAB    Patient reported Quality of Life (QOL) Measures   Patient Supplied Answers To VHI Questionnaire  Voice Handicap Index (VHI-10) 4/30/2020   My voice makes it difficult for people to hear me 0   People have difficulty understanding me in a noisy room 0   My voice difficulties restrict my personal and social life.  4   I feel left out of conversations because of my voice 0   My voice problem causes me to lose income 0   I feel as though I have to strain to produce voice 4   The clarity of my voice is unpredictable 4   My voice problem upsets me 4   My voice makes me feel handicapped 4   People ask, \"What's wrong with your voice?\" 0   VHI-10 20         Patient Supplied Answers To EAT Questionnaire  No flowsheet data found.      Patient Supplied Answers To CSI Questionnaire  Cough Severity Index (CSI) 4/30/2020   My cough is worse when I lie down 4   My coughing problem causes me to restrict my personal and social life 0   I tend to avoid places because of my cough problem 0   I feel embarrassed because of my coughing problem 3   People ask, ''What's wrong?'' because I cough a lot 0   I run out of air when I cough 4   My coughing problem affects my voice 4   My coughing problem limits my physical activity 4   My coughing problem upsets me 4   People ask me if I am sick " "because I cough a lot 0   CSI Score 23         Patient Supplied Answers to Dyspnea Index Questionnaire:  No flowsheet data found.    Impression & Plan     IMPRESSION: Ms. Yan is a 75 year old female who is being seen for the followin. 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 and no other lesions  - CT neck 10/8/20 - no lesions along left RLN - does have tortuous thoracic aorta due to scoliosis which is causing compression of esophagus - could cause compression of the RLN as well    - CT chest from 1/15/20 which does show some dilation of the pyriform which can point to a left vocal fold paralysis  - though unlikely etiology given sudden onset of symptoms in January  - discussed that since she had so much trouble tolerating the exam in the office she is not a candidate for a vocal fold awake injection  - she is now 1 year out from voice changes with persistent breathy dysphonia and therefore persistent VF paralysis   - discussed options of IL under general anesthesia or implant under MAC  - she state she is too scared of her breathing to go with either procedure  - she states her issue with the voice is that she cant leave a message because \"the machine says it cant hear me'  - and she can't talk when she is in a store if she were to go to a store   - she tried an amplifier but it did not work  - scope today shows 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  - discussed options again temporary vs permanent (CAHA, fat and implant) - risks and benefits of them  Plan  - will call Monday to confirm what she wants to proceed with - she is thinking the temporary injection laryngoplasty first followed by implant after           2. Dysphagia  - denies coughing and choking with food  - did " have PNA in 1/2020  - has severe pooling of saliva on scope exam and left vocal fold paralysis  - esophagram - 10/8/20 - dysmotility  - Xray Video Swallow Exam 10/8/20 - mild pharyngeal weakness, safe swallow  - CT neck 10/8/20 - no lesions along left RLN - does have tortuous thoracic aorta due to scoliosis which is causing compression of esophagus - could cause compression of the RLN as well    - reports swallowing is stable compared to October  Plan  - observation        3. Dyspnea  - SOB when laying down - it improves if she can stay there for a few minutes  - she is working with pulm - Dr Bassam Taylor 9/3/20  - pulm eval again on 11/23/20 - shortness of breath are all related to kyphosis of the spine, which is causing right middle lobe atelectasis and hypoventilation- started pulm rehab  - could also be that when she lays down she has reflux which causes laryngospasm  - symptoms have been stable  Plan  - start breathing therapy  - if no improvement consider GI referral        RETURN VISIT: after surgery    Domonique Roche MD    Laryngology    [facility]  Department of  Otolaryngology - Head and Neck Surgery  Clinics & Surgery Center  [address] [place] [address]  Appointment line: [phone]  Fax: [phone]  [email or web address]

## 2021-03-25 ENCOUNTER — TELEPHONE (OUTPATIENT)
Dept: PULMONOLOGY | Facility: CLINIC | Age: 76
End: 2021-03-25

## 2021-03-25 ENCOUNTER — VIRTUAL VISIT (OUTPATIENT)
Dept: OTOLARYNGOLOGY | Facility: CLINIC | Age: 76
End: 2021-03-25
Payer: COMMERCIAL

## 2021-03-25 ENCOUNTER — OFFICE VISIT (OUTPATIENT)
Dept: OTOLARYNGOLOGY | Facility: CLINIC | Age: 76
End: 2021-03-25
Payer: COMMERCIAL

## 2021-03-25 VITALS — BODY MASS INDEX: 32.44 KG/M2 | HEIGHT: 64 IN

## 2021-03-25 DIAGNOSIS — J38.01 VOCAL FOLD PARALYSIS, UNILATERAL: ICD-10-CM

## 2021-03-25 DIAGNOSIS — R49.0 DYSPHONIA: Primary | ICD-10-CM

## 2021-03-25 PROCEDURE — 99214 OFFICE O/P EST MOD 30 MIN: CPT | Mod: 25 | Performed by: OTOLARYNGOLOGY

## 2021-03-25 PROCEDURE — 92507 TX SP LANG VOICE COMM INDIV: CPT | Mod: GN | Performed by: SPEECH-LANGUAGE PATHOLOGIST

## 2021-03-25 PROCEDURE — 31575 DIAGNOSTIC LARYNGOSCOPY: CPT | Performed by: OTOLARYNGOLOGY

## 2021-03-25 PROCEDURE — 99207 PR NO CHARGE LOS: CPT | Performed by: SPEECH-LANGUAGE PATHOLOGIST

## 2021-03-25 NOTE — PROGRESS NOTES
Ca Yan is a 75 year old female who is being evaluated via a billable video visit.      aC has been notified and verbally consented to the following:     This video visit will be conducted between you and your provider.    Patient has opted to conduct today's video visit vs an in-person appointment.     Video visits are billed at different rates depending on your insurance coverage. Please reach out to your insurance provider with any questions.     If during the course of the call the provider feels the appointment is not appropriate, you will not be charged for this service.  Provider has received verbal consent for billable virtual visit from the patient? Yes  Will anyone else be joining your video visit? This is a joint visit with Dr. Roche, who is seeing the patient in the clinic; she is also joined by a friend    Call initiated at: 8:03 AM   Type of Visit Platform Used: Grid20/20 Video  Location of provider: Home  Location of patient: [facility]  Inderjit Shelton Jr., M.D., F.A.C.S.  Luann Arzate M.D., M.P.H.  Domonique Roche M.D.  Mirta Blake, Ph.D., CCC-SLP  Ya Arzate M.M. (voice), M.A., CCC-SLP  Wiley Snow MCHAPARRO. (voice), M.A., CCC-SLP  GRABIEL Marsh (voice), M.S., CCC-SLP    [facility]  VOICE/SPEECH/BREATHING THERAPY  CLINICAL FOLLOW-UP AND LARYNGEAL EXAMINATION REPORT    Patient: Ca Yan  Date of Service: 3/25/2021    HISTORY  PATIENT INFORMATION  Ca Yan was seen for follow-up in conjunction with a visit to Dr. Roche today.  Please also refer to Dr. Roche's dictation.  She was last seen on 2/17/21 by my associate, Wiley Snow.      PROGRESS SINCE LAST VISIT  Ms. Yan reports:    Voice quality is mostly always the same  o Use of a higher pitch can help  o The amplifier was not helpful; it just amplified the whisper  o She can't be heard on the phone    She is ready to discuss options for augmentation procedures for improved  glottic closure      OBJECTIVE FINDINGS  VOICE AND SPEECH EVALUATION  Ms. Yan presents today with a voice quality that is characterized by     Markedly breathy with considerable turbulence noise    High pitch; often about F#4    Very frequent diplophonia    Very short phrases with obvious high effort       LARYNGEAL EXAMINATION    Endoscopic laryngeal examination was performed by:  Dr. Roche  I provided the protocol of instructions for the patient.  Type of exam:   Flexible endoscopy with chip-tip technology and stroboscopy    This exam shows:    Left vocal fold is immobile in the paramedian position, and is mildly bowed    Right vocal fold comes to midline, but glottic gap is considerable    There is obvious effort and considerable anterior to posterior constriction of the supraglottic mass during all attempts at phonation    Various attempts at different pitches resulted in only slightly better voice quality; improvement in glottic closure was often felt discern due to the anterior to posterior constriction    Strobosocpy was not warranted today    Ms. Yan tolerated the exam well      Dr. Roche and I reviewed this laryngeal exam with Ms. Yan today, and I provided pertinent explanations:    She used to be a candidate for some kind of vocal fold augmentation procedure, and Dr. Roche gave her a thorough description of the various options    With considerable discussion, Ms. Yan stated that she is most likely to want to undergo ProLaryn injection under general anesthesia; she understands that this is temporary but wants to try alternate augmentation before opting for a permanent implant    We agreed that presurgical breathing training will be important      THERAPEUTIC ACTIVITIES  Today Ms. Yan participated in the following therapeutic activities:    In response to her questions, I provided explanations of the potential for breathing problems.  o I explained that Dr. Roche would never consider an  augmentation procedure if she thought it would actually reduce the airway and and her breathing; I cited another patient I am treating who cannot have an augmentation because we feel it may be restrict her airway too much  o I explained the breathing exercises, especially the rescue breathing technique of inhaling through a straw  o I explained how she will undergo breathing treatment with Mr. Snow prior to her procedure, and will practice the exercises like a fire drill both before and after the procedure  - She demonstrated good understanding of this, and also her friend understood how the breathing exercises will help her feel more confident about undergoing the procedure  o I also explained the necessity for voice therapy after the procedure, so that she can optimize her voice  - I demonstrated how she has been pushing and straining her voice, with excessive effort, for the last year, and how those habits will not fade after the procedure unless some specific training  - Voice therapy after the procedure will also be a good investment in voice techniques that will optimize a possible eventual placement of the implant  o All these explanations were help her and an understanding what she will learn from Mr. 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      Based on today s laryngeal examination, it appears that:    The left vocal fold is still paralyzed, resulting in a considerable glottic gap that accounts for Ms. Patel's dysphonia    After much discussion, she has decided to undergo a temporary collagen injection under general anesthesia    She will have a session of speech therapy with Mr. Snow prior to this, in order to learn breathing techniques that she can use, in case she becomes anxious and has difficulty breathing after the surgery     I have provided a great deal  of explanation about these exercises, but also about the limited possibility for actual airway compromise    Ongoing therapy is warranted in order to optimize the voice after the surgery, and also returned vocal production technique to something more normal, in anticipation of placement of a permanent implant at some point      TREATMENT PLAN    Ms. Yan will see the midline for presurgical breathing training on April 7, prior to her ProLaryn  injection.      TOTAL SERVICE TIME: 32 minutes  TREATMENT (83906)  NO CHARGE FACILITY FEE       Mirta Blake, Ph.D., [facility]-SLP  Speech-Language Pathologist  Director, [facility]  [phone]

## 2021-03-25 NOTE — LETTER
3/25/2021       RE: Ca Yan  [address]  [facility] [address]     Dear Colleague,    Thank you for referring your patient, Ca Yan, to the [facility]. Please see a copy of my visit note below.    Ca Yan is a 75 year old female who is being evaluated via a billable video visit.      Ca has been notified and verbally consented to the following:     This video visit will be conducted between you and your provider.    Patient has opted to conduct today's video visit vs an in-person appointment.     Video visits are billed at different rates depending on your insurance coverage. Please reach out to your insurance provider with any questions.     If during the course of the call the provider feels the appointment is not appropriate, you will not be charged for this service.  Provider has received verbal consent for billable virtual visit from the patient? Yes  Will anyone else be joining your video visit? This is a joint visit with Dr. Roche, who is seeing the patient in the clinic; she is also joined by a friend    Call initiated at: 8:03 AM   Type of Visit Platform Used: [employer]  Location of provider: Home  Location of patient: [facility]  Inderjit Shelton Jr., M.D., F.A.C.S.  Luann Arzate M.D., M.P.H.  Domonique Roche M.D.  Mirta Blake, Ph.D., CCC-SLP  Ya Arzate M.M. (voice), M.A., CCC-SLP  Wiley Snow M.M. (voice), M.A., CCC-SLP  GRABIEL Marhs (voice), M.S., CCC-SLP    [facility]  VOICE/SPEECH/BREATHING THERAPY  CLINICAL FOLLOW-UP AND LARYNGEAL EXAMINATION REPORT    Patient: Ca Yan  Date of Service: 3/25/2021    HISTORY  PATIENT INFORMATION  Ca Yan was seen for follow-up in conjunction with a visit to Dr. Roche today.  Please also refer to Dr. Roche's dictation.  She was last seen on 2/17/21 by my associate,  Wiley Snow.      PROGRESS SINCE LAST VISIT  Ms. Yan reports:    Voice quality is mostly always the same  o Use of a higher pitch can help  o The amplifier was not helpful; it just amplified the whisper  o She can't be heard on the phone    She is ready to discuss options for augmentation procedures for improved glottic closure      OBJECTIVE FINDINGS  VOICE AND SPEECH EVALUATION  Ms. Yan presents today with a voice quality that is characterized by     Markedly breathy with considerable turbulence noise    High pitch; often about F#4    Very frequent diplophonia    Very short phrases with obvious high effort       LARYNGEAL EXAMINATION    Endoscopic laryngeal examination was performed by:  Dr. Roche  I provided the protocol of instructions for the patient.  Type of exam:   Flexible endoscopy with chip-tip technology and stroboscopy    This exam shows:    Left vocal fold is immobile in the paramedian position, and is mildly bowed    Right vocal fold comes to midline, but glottic gap is considerable    There is obvious effort and considerable anterior to posterior constriction of the supraglottic mass during all attempts at phonation    Various attempts at different pitches resulted in only slightly better voice quality; improvement in glottic closure was often felt discern due to the anterior to posterior constriction    Strobosocpy was not warranted today    Ms. Yan tolerated the exam well      Dr. Roche and I reviewed this laryngeal exam with Ms. Yan today, and I provided pertinent explanations:    She used to be a candidate for some kind of vocal fold augmentation procedure, and Dr. Roche gave her a thorough description of the various options    With considerable discussion, Ms. Yan stated that she is most likely to want to undergo ProLaryn injection under general anesthesia; she understands that this is temporary but wants to try alternate augmentation before opting for a permanent implant    We  agreed that presurgical breathing training will be important      THERAPEUTIC ACTIVITIES  Today Ms. Yan participated in the following therapeutic activities:    In response to her questions, I provided explanations of the potential for breathing problems.  o I explained that Dr. Roche would never consider an augmentation procedure if she thought it would actually reduce the airway and and her breathing; I cited another patient I am treating who cannot have an augmentation because we feel it may be restrict her airway too much  o I explained the breathing exercises, especially the rescue breathing technique of inhaling through a straw  o I explained how she will undergo breathing treatment with Mr. Snow prior to her procedure, and will practice the exercises like a fire drill both before and after the procedure  - She demonstrated good understanding of this, and also her friend understood how the breathing exercises will help her feel more confident about undergoing the procedure  o I also explained the necessity for voice therapy after the procedure, so that she can optimize her voice  - I demonstrated how she has been pushing and straining her voice, with excessive effort, for the last year, and how those habits will not fade after the procedure unless some specific training  - Voice therapy after the procedure will also be a good investment in voice techniques that will optimize a possible eventual placement of the implant  o All these explanations were help her and an understanding what she will learn from Mr. 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      Based on today s laryngeal examination, it appears that:    The left vocal fold is still paralyzed, resulting in a considerable glottic gap that accounts for Ms. Patel's dysphonia    After much discussion, she has decided  to undergo a temporary collagen injection under general anesthesia    She will have a session of speech therapy with Mr. Snow prior to this, in order to learn breathing techniques that she can use, in case she becomes anxious and has difficulty breathing after the surgery     I have provided a great deal of explanation about these exercises, but also about the limited possibility for actual airway compromise    Ongoing therapy is warranted in order to optimize the voice after the surgery, and also returned vocal production technique to something more normal, in anticipation of placement of a permanent implant at some point      TREATMENT PLAN    Ms. Yan will see the [facility] for presurgical breathing training on April 7, prior to her ProLaryn  injection.      TOTAL SERVICE TIME: 32 minutes  TREATMENT (82545)  NO CHARGE FACILITY FEE       Mirta Blake, Ph.D., [facility]-SLP  Speech-Language Pathologist  Director, [facility]  [phone]

## 2021-03-25 NOTE — NURSING NOTE
"Chief Complaint   Patient presents with     RECHECK     Follow up, discuss injection       Height 1.626 m (5' 4\"), not currently breastfeeding.    Adali Duran, EMT  "

## 2021-03-25 NOTE — TELEPHONE ENCOUNTER
Magno, RT at [facility] called to let me know he'd offered to go out to Ca's home last week to provide O2 training, but that Ca had declined.  He will mail written instructions to her home as she has requested.

## 2021-03-25 NOTE — PATIENT INSTRUCTIONS
1.  You were seen in the ENT Clinic today by Dr. Roche. The following has been recommended:   - Temporary voice gel injection   - Consider implant afterwards    2.  Plan is to return to clinic post-operatively.    If you have any questions or concerns after your appointment, please call the clinic.   - Clinic phone: [phone]. Press option #1 for scheduling related needs. Press option #3 for Nurse advice.  - Direct phone: [phone]      Patricia Sanchez RN, BSN  [phone]  [facility]  Department of Otolaryngology  [facility]  [email or web address]

## 2021-03-25 NOTE — LETTER
3/25/2021       RE: Ca Yan  [address]  [facility] [place] [address]     Dear Colleague,    Thank you for referring your patient, Ca Yan, to the [facility] AND [facility]INIC [place] at [facility]. Please see a copy of my visit note below.        [facility]   at the [facility]   Otolaryngology Clinic     Patient: Ca Yan    MRN: [id number]    : 1945    Age/Gender: 75 year old female  Date of Service: 3/25/2021  Rendering Provider:   Domonique Roche MD     Chief Complaint   Dysphonia  Interval History   HISTORY OF PRESENT ILLNESS:Ms. Yan is a 74 year old female is being followed for dysphonia. She was initially seen on 20. Please refer to this note for full history.   Of note she has had dysphonia since around the time of her hospital admission on 20 when she was admitted for pneumonia. She has started voice therapy with Mirta Blake, Ph.D., CCC/SLP.      Today, she presents for follow up with her friend. she reports:  - she reports has voice has been bad  - she can't leave messages  - she has a lot of mucus     Dysphonia: Patient reports dysphonia. This is stable      Dysphagia: Patient reports dysphagia. This is stable     Dyspnea: Patient reports dyspnea. This is stable     Throat clearing/Chronic cough: Patient reports throat clearing/chronic cough. This is stable     LPRD/GERD: Patient reports LPRD/GERD symptoms. This is stable     PAST MEDICAL HISTORY:   Past Medical History:   Diagnosis Date     Anxiety      Arthritis      Breast cancer (H)      COPD (chronic obstructive pulmonary disease) (H)     12:  FEV 0.99 l     Depressive disorder      Hypertension      Low back pain with left-sided sciatica      Low bone density 2017    DEXA 2017: T score -2.0. Normal Z score. FRAX risk: major osteoporotic fracture 11.9%, hip fracture 2.6%, therefore not  high-risk     Lymphedema, chronic lower extremities        PAST SURGICAL HISTORY:   Past Surgical History:   Procedure Laterality Date     BACK SURGERY      spinal fusion     COLONOSCOPY       LUMPECTOMY BREAST       ORTHOPEDIC SURGERY      Knee surgery left side       CURRENT MEDICATIONS:   Current Outpatient Medications:      albuterol (PROAIR HFA/PROVENTIL HFA/VENTOLIN HFA) 108 (90 Base) MCG/ACT inhaler, Inhale 2 puffs into the lungs every 6 hours as needed for shortness of breath / dyspnea or wheezing (Patient not taking: Reported on 1/25/2021), Disp: 3 Inhaler, Rfl: 1     atorvastatin (LIPITOR) 40 MG tablet, Take 1 tablet (40 mg) by mouth daily, Disp: 90 tablet, Rfl: 3     cefpodoxime (VANTIN) 200 MG tablet, Take 200 mg by mouth 2 times daily, Disp: , Rfl:      cephALEXin (KEFLEX) 250 MG capsule, Take 250 mg by mouth, Disp: , Rfl:      clonazePAM (KLONOPIN) 0.5 MG tablet, Take 0.5 mg by mouth daily, Disp: , Rfl:      fluticasone-vilanterol (BREO ELLIPTA) 200-25 MCG/INH inhaler, Inhale 1 puff into the lungs daily, Disp: 3 Inhaler, Rfl: 3     hydrochlorothiazide (MICROZIDE) 12.5 MG capsule, , Disp: , Rfl:      ibuprofen (ADVIL,MOTRIN) 200 MG tablet, Take 3 tablets (600 mg) by mouth 3 times daily (with meals), Disp: 90 tablet, Rfl: 0     latanoprost (XALATAN) 0.005 % ophthalmic solution, 1 drop, Disp: , Rfl:      losartan-hydrochlorothiazide (HYZAAR) 100-25 MG tablet, Take 1 tablet by mouth daily, Disp: 90 tablet, Rfl: 3     quetiapine (SEROQUEL) 200 MG tablet, Take 200 mg by mouth At Bedtime., Disp: , Rfl:      sertraline (ZOLOFT) 50 MG tablet, Take 1.5 tablets (75 mg) by mouth daily, Disp: 45 tablet, Rfl: 3     umeclidinium (INCRUSE ELLIPTA) 62.5 MCG/INH inhaler, Inhale 1 puff into the lungs daily, Disp: 7 each, Rfl: 0     Vitamin D3 (CHOLECALCIFEROL) 25 mcg (1000 units) tablet, Take 1 tablet (25 mcg) by mouth daily, Disp: 100 tablet, Rfl: 3    ALLERGIES: Azithromycin, Codeine, Hydrocodone, Metronidazole,  Oxycodone, Percocet [oxycodone-acetaminophen], Pollen extract, Seasonal allergies, Vicodin [hydrocodone-acetaminophen], and Zolpidem    SOCIAL HISTORY:    Social History     Socioeconomic History     Marital status: Single     Spouse name: Not on file     Number of children: Not on file     Years of education: Not on file     Highest education level: Not on file   Occupational History     Employer: RETIRED   Social Needs     Financial resource strain: Not on file     Food insecurity     Worry: Not on file     Inability: Not on file     Transportation needs     Medical: Not on file     Non-medical: Not on file   Tobacco Use     Smoking status: Former Smoker     Packs/day: 0.50     Years: 5.00     Pack years: 2.50     Types: Cigarettes     Start date: 1956     Quit date: 1980     Years since quittin.5     Smokeless tobacco: Former User     Quit date: 1980   Substance and Sexual Activity     Alcohol use: Not Currently     Alcohol/week: 0.0 standard drinks     Comment: Sober for 2 years, previous alcoholic     Drug use: No     Sexual activity: Not Currently     Partners: Male     Birth control/protection: Abstinence   Lifestyle     Physical activity     Days per week: Not on file     Minutes per session: Not on file     Stress: Not on file   Relationships     Social connections     Talks on phone: Once a week     Gets together: Never     Attends Judaism service: More than 4 times per year     Active member of club or organization: Yes     Attends meetings of clubs or organizations: More than 4 times per year     Relationship status: Never      Intimate partner violence     Fear of current or ex partner: No     Emotionally abused: No     Physically abused: No     Forced sexual activity: No   Other Topics Concern     Parent/sibling w/ CABG, MI or angioplasty before 65F 55M? Not Asked      Service Not Asked     Blood Transfusions Not Asked     Caffeine Concern Not Asked     Occupational  Exposure Not Asked     Hobby Hazards Not Asked     Sleep Concern Not Asked     Stress Concern Not Asked     Comment: Lives alone     Weight Concern Not Asked     Special Diet Not Asked     Back Care Not Asked     Comment: Hx of scoliosis with spine fusion     Exercise Not Asked     Comment: Walks     Bike Helmet Not Asked     Seat Belt Not Asked     Self-Exams Not Asked   Social History Narrative    Ca Yan never   Lives in apartment  is  family member is daughter Rin in [place], [place]  Previous AA meetings         FAMILY HISTORY:   Family History   Problem Relation Age of Onset     Breast Cancer Mother      Diabetes Mother      Anxiety Disorder Mother      Asthma Mother      Other Cancer Mother      Hyperlipidemia Mother      Alcohol/Drug Father      Mental Illness Father      Substance Abuse Father      Obesity Father      Cerebrovascular Disease Maternal Grandmother 67      Non-contributory for problems with anesthesia    REVIEW OF SYSTEMS:   The patient was asked a 14 point review of systems regarding constitutional symptoms, eye symptoms, ears, nose, mouth, throat symptoms, cardiovascular symptoms, respiratory symptoms, gastrointestinal symptoms, genitourinary symptoms, musculoskeletal symptoms, integumentary symptoms, neurological symptoms, psychiatric symptoms, endocrine symptoms, hematologic/lymphatic symptoms, and allergic/ immunologic symptoms.   The pertinent factors have been included in the HPI and below.  Patient Supplied Answers to Review of Systems  [facility] ROS 4/30/2020   Psychology Frequently feeling depressed or sad, Frequently feeling anxious   Ears, Nose, Throat Nasal congestion or drainage, Hoarseness   Cardiopulmonary Cough, Breathing problems   Musculoskeletal Sore or stiff joints, Swollen legs/feet   Allergy/Immunology Allergies or hay fever   Hematologic Easy bruising       Physical Examination   The patient underwent a physical examination as described below. The pertinent  positive and negative findings are summarized after the description of the examination.  Constitutional: The patient's developmental and nutritional status was assessed. The patient's voice quality was assessed.  Head and Face: The head and face were inspected for deformities. The sinuses were palpated. The salivary glands were palpated. Facial muscle strength was assessed bilaterally.  Eyes: Extraocular movements and primary gaze alignment were assessed.  Ears, Nose, Mouth and Throat: The ears and nose were examined for deformities. The nasal septum, mucosa, and turbinates were inspected by anterior rhinoscopy. The lips, teeth, and gums were examined for abnormalities. The oral mucosa, tongue, palate, tonsils, lateral and posterior pharynx were inspected for the presence of asymmetry or mucosal lesions.    Neck: The tracheal position was noted, and the neck mass palpated to determine if there were any asymmetries, abnormal neck masses, thyromegally, or thyroid nodules.  Respiratory: The nature of the breathing and chest expansion/symmetry was observed.  Cardiovascular: The patient was examined to determine the presence of any edema or jugular venous distension.  Abdomen: The contour of the abdomen was noted.  Lymphatic: The patient was examined for infraclavicular lymphadenopathy.  Musculoskeletal: The patient was inspected for the presence of skeletal deformities.  Extremities: The extremities were examined for any clubbing or cyanosis.  Skin: The skin was examined for inflammatory or neoplastic conditions.  Neurologic: The patient's orientation, mood, and affect were noted. The cranial nerve  functions were examined.  Other pertinent positive and negative findings on physical examination:   OC/OP: no lesions, uvula midline, soft palate elevates symmetrically, prior tonsillectomy  Neck: no lesions, no TH tenderness to palpation    All other physical examination findings were within normal limits and  noncontributory.    Procedures   Flexible laryngoscopy (CPT 79296)      Pre-procedure diagnosis: dysphonia  Post-procedure diagnosis: same as above  Indication for procedure: Ms. Yan is a 75 year old female with see above  Procedure(s): Fiberoptic Laryngoscopy    Details of Procedure: After informed consent was obtained, the patient was seated in the examination chair.  The areas of the nasopharynx as well as the hypopharynx were anesthetized with topical 4% lidocaine with 0.25% phenylephrine atomizer.  Examination of the base of tongue was performed first.  Attention was directed to any evidence of masses in the area or evidence of leukoplakia or candidal infection.  Attention was directed to the epiglottis where its size and position was determined and its movement on phonation of the vowel  e .  The piriform sinuses were then inspected for any mass lesions or pooling of secretions.  Attention was then directed to the larynx. The vocal folds were inspected for infection or any areas of leukoplakia, for masses, polypoid degeneration, or hemorrhage.  Having done this, the arytenoids and vocal processes were inspected for erythema or evidence of granuloma formation.  The posterior commissure was then inspected for evidence of inflammatory changes in the mucosa and heaping up of mucosal tissue. The patient was then instructed to say the vowel  e .  Adduction of vocal folds to the midline was observed for any evidence of paresis or paralysis of the larynx or asymmetry in rotation of the larynx to the left or right. The patient was asked to breathe and the degree of abduction was noted bilaterally.  Subglottic view of the larynx was obtained for any additional mass lesions or mucosal changes.  Finally the post cricoid was examined for evidence of pooling of secretions, as well as the pharyngeal wall mucosa.   Anesthesia type: 0.25% phenylephrine    Findings:  Anatomic/physiological deviations: Left vocal fold  "paralysis, pooling of secretions    Right vocal process: No restriction of mobility   Left vocal process: Marked restriction of mobility  Glottal gap: Glottal gap  Supraglottic structures: Normal  Hypopharynx: Normal     Estimated Blood Loss: minimal  Complications: None  Disposition: Patient tolerated the procedure well            Review of Relevant Clinical Data      Labs:  Lab Results   Component Value Date    TSH 2.39 08/20/2020     Lab Results   Component Value Date     08/20/2020    CO2 32 08/20/2020    BUN 18 08/20/2020    CREAT 0.9 10/08/2020     Lab Results   Component Value Date    WBC 5.4 08/20/2020    HGB 14.3 08/20/2020    HCT 45.1 08/20/2020    MCV 98 08/20/2020     08/20/2020     No results found for: PT, PTT, INR  No results found for: LONNY  No components found for: RHEUMATOIDFACTOR,  RF  Lab Results   Component Value Date    CRP 6.4 09/25/2019     No components found for: CKTOT, URICACID  No components found for: C3, C4, DSDNAAB, NDNAABIFA  No results found for: MPOAB    Patient reported Quality of Life (QOL) Measures   Patient Supplied Answers To VHI Questionnaire  Voice Handicap Index (VHI-10) 4/30/2020   My voice makes it difficult for people to hear me 0   People have difficulty understanding me in a noisy room 0   My voice difficulties restrict my personal and social life.  4   I feel left out of conversations because of my voice 0   My voice problem causes me to lose income 0   I feel as though I have to strain to produce voice 4   The clarity of my voice is unpredictable 4   My voice problem upsets me 4   My voice makes me feel handicapped 4   People ask, \"What's wrong with your voice?\" 0   VHI-10 20         Patient Supplied Answers To EAT Questionnaire  No flowsheet data found.      Patient Supplied Answers To CSI Questionnaire  Cough Severity Index (CSI) 4/30/2020   My cough is worse when I lie down 4   My coughing problem causes me to restrict my personal and social life 0   I " "tend to avoid places because of my cough problem 0   I feel embarrassed because of my coughing problem 3   People ask, ''What's wrong?'' because I cough a lot 0   I run out of air when I cough 4   My coughing problem affects my voice 4   My coughing problem limits my physical activity 4   My coughing problem upsets me 4   People ask me if I am sick because I cough a lot 0   CSI Score 23         Patient Supplied Answers to Dyspnea Index Questionnaire:  No flowsheet data found.    Impression & Plan     IMPRESSION: Ms. Yan is a 75 year old female who is being seen for the followin. 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 and no other lesions  - CT neck 10/8/20 - no lesions along left RLN - does have tortuous thoracic aorta due to scoliosis which is causing compression of esophagus - could cause compression of the RLN as well    - CT chest from 1/15/20 which does show some dilation of the pyriform which can point to a left vocal fold paralysis  - though unlikely etiology given sudden onset of symptoms in January  - discussed that since she had so much trouble tolerating the exam in the office she is not a candidate for a vocal fold awake injection  - she is now 1 year out from voice changes with persistent breathy dysphonia and therefore persistent VF paralysis   - discussed options of IL under general anesthesia or implant under MAC  - she state she is too scared of her breathing to go with either procedure  - she states her issue with the voice is that she cant leave a message because \"the machine says it cant hear me'  - and she can't talk when she is in a store if she were to go to a store   - she tried an amplifier but it did not work  - scope today shows persistent vocal fold " paralysis with glottal gap  - discussed options again temporary vs permanent (CAHA, fat and implant) - risks and benefits of them  Plan  - will call Monday to confirm what she wants to proceed with - she is thinking the temporary injection laryngoplasty first followed by implant after           2. Dysphagia  - denies coughing and choking with food  - did have PNA in 1/2020  - has severe pooling of saliva on scope exam and left vocal fold paralysis  - esophagram - 10/8/20 - dysmotility  - Xray Video Swallow Exam 10/8/20 - mild pharyngeal weakness, safe swallow  - CT neck 10/8/20 - no lesions along left RLN - does have tortuous thoracic aorta due to scoliosis which is causing compression of esophagus - could cause compression of the RLN as well    - reports swallowing is stable compared to October  Plan  - observation        3. Dyspnea  - SOB when laying down - it improves if she can stay there for a few minutes  - she is working with pulm - Dr Bassam Taylor 9/3/20  - pulm eval again on 11/23/20 - shortness of breath are all related to kyphosis of the spine, which is causing right middle lobe atelectasis and hypoventilation- started pulm rehab  - could also be that when she lays down she has reflux which causes laryngospasm  - symptoms have been stable  Plan  - start breathing therapy  - if no improvement consider GI referral        RETURN VISIT: after surgery    Domonique Roche MD    Laryngology    [facility]  Department of  Otolaryngology - Head and Neck Surgery  [facility]  [address] [place] [id number]  Appointment line: [phone]  Fax: [phone]  [email or web address]

## 2021-03-25 NOTE — TELEPHONE ENCOUNTER
[facility] Call Center    Phone Message    May a detailed message be left on voicemail: no     Reason for Call: Other: Ca would like to speak with a member of the care team regarding confusion with the oxygen tank. She is requesting assistance in finding someone who can explain how to use it. Please reach out to Ca at [phone].     Action Taken: Message routed to:  [facility] ([facility]): Pulmonology    Travel Screening: Not Applicable

## 2021-03-25 NOTE — TELEPHONE ENCOUNTER
Ca tells me she has had [employer] out to her home several times to provide instruction of how to use her home oxygen equipment.  She claim that she is still unable to figure it out.  She tells me she has written down the instructions for herself but is unable to follow them.  I offered to have Hoal[employer] go out again, she adamantly declined. She would like me to contact [employer] and as them to provide her with written instruction. Left message for Pipo RT to return call to discuss Ca's issues and how to help her.

## 2021-03-29 ENCOUNTER — PATIENT OUTREACH (OUTPATIENT)
Dept: OTOLARYNGOLOGY | Facility: CLINIC | Age: 76
End: 2021-03-29

## 2021-03-29 NOTE — PROGRESS NOTES
Left vm for patient to discuss vocal cord injection options that were offered at her Thursday appointment. Encouraged patient to call back to discuss. Direct line given.

## 2021-03-31 ENCOUNTER — HOSPITAL ENCOUNTER (OUTPATIENT)
Facility: AMBULATORY SURGERY CENTER | Age: 76
End: 2021-03-31
Attending: OTOLARYNGOLOGY
Payer: COMMERCIAL

## 2021-03-31 ENCOUNTER — PREP FOR PROCEDURE (OUTPATIENT)
Dept: OTOLARYNGOLOGY | Facility: CLINIC | Age: 76
End: 2021-03-31

## 2021-03-31 ENCOUNTER — TELEPHONE (OUTPATIENT)
Dept: OTOLARYNGOLOGY | Facility: CLINIC | Age: 76
End: 2021-03-31

## 2021-03-31 DIAGNOSIS — J38.3 GLOTTIC INSUFFICIENCY: ICD-10-CM

## 2021-03-31 DIAGNOSIS — J38.3 GLOTTIC INSUFFICIENCY: Primary | ICD-10-CM

## 2021-03-31 RX ORDER — CEFAZOLIN SODIUM 2 G/50ML
2 SOLUTION INTRAVENOUS SEE ADMIN INSTRUCTIONS
Status: CANCELLED | OUTPATIENT
Start: 2021-03-31

## 2021-03-31 RX ORDER — CEFAZOLIN SODIUM 2 G/50ML
2 SOLUTION INTRAVENOUS
Status: CANCELLED | OUTPATIENT
Start: 2021-03-31

## 2021-03-31 NOTE — TELEPHONE ENCOUNTER
Called patient back regarding scheduling surgery with Dr. Roche. Offered patient the option of ~ approximate arrival time of 11:30 a.m. on 4/30/2021. Patient was agreeable to this.   She verbalized the following and understood  -Pre-op within 30 days of surgery. She will arrange this with her PCP.   -Covid-19 test within 4 days of surgery   (scheduled this for patient and provided date and time)  - She needs a  for surgery and someone to stay with her after surgery due to general anesthesia.   -She will receive a phone call 2-3 days prior with exact arrival time and instructions  -She will receive information via mail    No further questions or concerns at this time.  Provided patient with direct phone at her request.      Becca Patel on 3/31/2021 at 3:26 PM

## 2021-03-31 NOTE — TELEPHONE ENCOUNTER
Called patient regarding scheduling surgery with Dr. Roche. Offered patient two available dates. Pt would like 4/23. When discussing arrival time and instructions. Pt states that there is no way that she can come that early as her friend would need to bring her. Pt states because she needs to have someone bring her she needs to find a date that can be later in the day. Writer expressed to patient that we will try to locate a day with a later arrival time. Writer will attempt to call her back yet today.      Becca Patel on 3/31/2021 at 2:58 PM

## 2021-04-01 ENCOUNTER — TELEPHONE (OUTPATIENT)
Dept: OTOLARYNGOLOGY | Facility: CLINIC | Age: 76
End: 2021-04-01

## 2021-04-01 NOTE — TELEPHONE ENCOUNTER
LVM letting pt know that the care team thought it best to move her pre-op therapy with Wiley from 4/7 to 4/21, so that it is a bit closer to her procedure date. Appt has been moved.    Ask her to give me a call back if that wasn't going to work for her.

## 2021-04-01 NOTE — PROGRESS NOTES
Patient returned call. Discussed vocal fold procedure. Confirmed with patient she would like to proceed with temporary vocal fold injection under general anesthesia, followed by an implant. Provided surgery teaching. Patient aware of pre op needed and covid test needed and time frames. Denies further questions at this time.     Patricia Sanchez RN on 4/1/2021 at 9:47 AM

## 2021-04-07 ENCOUNTER — VIRTUAL VISIT (OUTPATIENT)
Dept: FAMILY MEDICINE | Facility: CLINIC | Age: 76
End: 2021-04-07
Payer: COMMERCIAL

## 2021-04-07 ENCOUNTER — PATIENT OUTREACH (OUTPATIENT)
Dept: OTOLARYNGOLOGY | Facility: CLINIC | Age: 76
End: 2021-04-07

## 2021-04-07 DIAGNOSIS — Z85.3 PERSONAL HISTORY OF MALIGNANT NEOPLASM OF BREAST: ICD-10-CM

## 2021-04-07 DIAGNOSIS — M41.9 KYPHOSCOLIOSIS: ICD-10-CM

## 2021-04-07 DIAGNOSIS — Z71.89 ADVANCED DIRECTIVES, COUNSELING/DISCUSSION: Primary | ICD-10-CM

## 2021-04-07 DIAGNOSIS — E78.5 HYPERLIPIDEMIA LDL GOAL <100: ICD-10-CM

## 2021-04-07 DIAGNOSIS — R49.0 DYSPHONIA: ICD-10-CM

## 2021-04-07 DIAGNOSIS — Z11.59 ENCOUNTER FOR HEPATITIS C SCREENING TEST FOR LOW RISK PATIENT: ICD-10-CM

## 2021-04-07 DIAGNOSIS — I10 BENIGN ESSENTIAL HYPERTENSION: ICD-10-CM

## 2021-04-07 DIAGNOSIS — F32.A ANXIETY AND DEPRESSION: ICD-10-CM

## 2021-04-07 DIAGNOSIS — F41.9 ANXIETY AND DEPRESSION: ICD-10-CM

## 2021-04-07 DIAGNOSIS — E21.3 PARATHYROID HORMONE EXCESS (H): ICD-10-CM

## 2021-04-07 PROBLEM — J96.11 CHRONIC HYPOXEMIC RESPIRATORY FAILURE (H): Status: RESOLVED | Noted: 2020-11-24 | Resolved: 2021-04-07

## 2021-04-07 PROBLEM — F33.9 MAJOR DEPRESSION, RECURRENT (H): Status: RESOLVED | Noted: 2018-09-26 | Resolved: 2021-04-07

## 2021-04-07 PROCEDURE — 99443 PR PHYSICIAN TELEPHONE EVALUATION 21-30 MIN: CPT | Performed by: FAMILY MEDICINE

## 2021-04-07 RX ORDER — VITAMIN B COMPLEX
1 TABLET ORAL DAILY
Qty: 100 TABLET | Refills: 3 | Status: ON HOLD | OUTPATIENT
Start: 2021-04-07 | End: 2022-12-09

## 2021-04-07 ASSESSMENT — ANXIETY QUESTIONNAIRES
7. FEELING AFRAID AS IF SOMETHING AWFUL MIGHT HAPPEN: MORE THAN HALF THE DAYS
5. BEING SO RESTLESS THAT IT IS HARD TO SIT STILL: NOT AT ALL
6. BECOMING EASILY ANNOYED OR IRRITABLE: NOT AT ALL
2. NOT BEING ABLE TO STOP OR CONTROL WORRYING: NOT AT ALL
1. FEELING NERVOUS, ANXIOUS, OR ON EDGE: SEVERAL DAYS
GAD7 TOTAL SCORE: 3
3. WORRYING TOO MUCH ABOUT DIFFERENT THINGS: NOT AT ALL

## 2021-04-07 ASSESSMENT — PATIENT HEALTH QUESTIONNAIRE - PHQ9
5. POOR APPETITE OR OVEREATING: NOT AT ALL
SUM OF ALL RESPONSES TO PHQ QUESTIONS 1-9: 0

## 2021-04-07 ASSESSMENT — PAIN SCALES - GENERAL: PAINLEVEL: NO PAIN (0)

## 2021-04-07 NOTE — Clinical Note
Please mail advanced directive, needs assistance with honoring choices referral for advanced directive. Prior to surgery end of April  Follow-up in person in October.  Please assist with coordination of cares.  Best wishes,  Favian Sahu MD

## 2021-04-07 NOTE — NURSING NOTE
Chief Complaint   Patient presents with     Recheck Medication     Follow up.     Lani Blanc, MARTIN 9:54 AM  4/7/2021

## 2021-04-07 NOTE — PROGRESS NOTES
"Went over surgery teaching with patient and discussed procedure in detail. Discussed what to expect post-operatively. Patient expressed anxiety regarding the procedure. Writer acknowledged patient feelings and talked through this. Discussed SLP appointment with Wiley on 4/21. Discussed that this appointment will help with breathing techniques. Voice use after surgery and swallowing after surgery as well. Encouraged patient to have friend, Cyndi, come to this appointment since she will be staying with her after the surgery.     Patient nervous about anxiety following the surgery. Patient states, \"what if I'm anxious after surgery and feel like I can't breathe. How will I know if it is anxiety or swelling in my throat?\"    Discussed that learning breathing techniques will help with this. Patient stated she felt more comfortable after talking today. Patient will call back with any further questions or concerns.    Patricia Sanchez RN on 4/7/2021 at 9:29 AM    "

## 2021-04-07 NOTE — PROGRESS NOTES
Ca is a 75 year old who is being evaluated via a billable telephone visit.      What phone number would you like to be contacted at? [phone]  How would you like to obtain your AVS? Mail a copy    Assessment & Plan   Olivia is a 75-year-old with history of anxiety depression, significant kyphoscoliosis causing impairment of respiratory function, benign essential hypertension, past history of breast cancer, vocal cord paralysis with dysphonia who presents today for telephone visit, primary care follow-up.  She has several questions today indicates she has a preoperative appointment prior to surgical intervention for her vocal cord paralysis scheduled for April 30.  She is feeling anxious related to the procedure but understands that she needs to proceed would like to speak with me regarding a few concerns. We reviewed need for echocardiogram previously scheduled and she will keep appointment.    She indicates she is off pulmonary inhalers as her pulmonary provider feels her lung symptoms are related to her Kyphoscoliosis. Medication list was reviewed and updated to remove inhalers   She will keep appointment for pre-operative assessment.    Advanced directives, counseling/discussion  Today I reviewed need for advanced directive however she does not have one completed although had a paper copy at home but called to see if someone would help her fill it out and requires a referral for honoring choices.  She does not have family members and feels her friend Cyndi would not want to be on her advanced directive as her healthcare agent.     - HONORING CHOICES REFERRAL    Benign essential hypertension  Controlled on Hyzaar 100-25 mg once daily due for labs will order to be completed with pre-operative assessment  - Comprehensive metabolic panel    Anxiety and depression  Slightly increased anxiety related to surgical procedure but  Has ways to help herself relax. currently taking sertraline 75 mg daily  - TSH with  free T4 reflex    Dysphonia  Has vocal cord paralysis, reviewed recent laryngoscopy and planned surgical procedure    Kyphoscoliosis  Contributing to pulmonary symptoms of dyspnea    Encounter for hepatitis C screening test for low risk patient  As we are ordering labs she agrees to screening reviewed Hep C  - HCV Screen with Reflex    Hyperlipidemia LDL goal <100  Due for fasting lipids  - Lipid panel reflex to direct LDL Fasting    Parathyroid hormone excess (H)  She ran out of Vit d needs refill  - Vitamin D3 (CHOLECALCIFEROL) 25 mcg (1000 units) tablet  Dispense: 100 tablet; Refill: 3    Personal history of malignant neoplasm of breast  Remote HX               Return in about 6 months (around 10/11/2021).    Favian Sahu MD  [facility] [place]    Magalys Penaloza is a 75 year old who presents for the following health issues     HPI   Olivia is a 75-year-old with history of anxiety depression, significant kyphoscoliosis causing impairment of respiratory function, benign essential hypertension, past history of breast cancer, vocal cord paralysis with dysphonia who presents today for telephone visit, primary care follow-up.  She has several questions today indicates she has a preoperative appointment prior to surgical intervention for her vocal cord paralysis scheduled for April 30.  She is feeling anxious related to the procedure but understands that she needs to proceed would like to speak with me regarding a few concerns.  She would like to recheck why we are checking an echocardiogram which I ordered September 2020 after a visit noted she had not had an echocardiogram for approximately 1 year and was having continued dyspnea.  Her dyspnea is most likely related to her significant kyphoscoliosis interfering with lung function.  I discussed with upcoming surgical procedure if probably would be a good idea to have an echocardiogram again prior to surgical procedure and she  agreed to continue with the planned echocardiogram.  She notes she feels lonely and has increased anxiety when going to bed will place soft music and then her symptoms improve.  Today I reviewed need for advanced directive however she does not have one completed although had a paper copy at home but called to see if someone would help her fill it out and requires a referral for honoring choices.  She does not have family members and feels her friend Cyndi would not want to be on her advanced directive as her healthcare agent.  She indicates due to pandemic she has gained weight.  She has been vaccinated for COVID-19 tolerated well.  She indicates she recently was seen by urology who refilled her low-dose cephalexin 250 mg tablet she takes prophylactically for urinary tract infection prevention considering stool incontinence.  Blood pressure well controlled on Hyzaar once daily  Patient Active Problem List   Diagnosis     Non morbid obesity due to excess calories     Alcohol abuse     Malignant neoplasm of breast (H)     Osteoarthritis of knee     Diarrhea     Diastolic dysfunction     Osteoarthritis of spine     Gastroesophageal reflux disease     Generalized anxiety disorder     Hypertension     Hyperlipidemia     Insomnia     Irritable bowel syndrome     Kyphoscoliosis     Cluster C personality disorder (H)     Seborrheic eczema     Anemia     Anxiety state     Bunion     Low bone density     Fecal incontinence     Cognitive impairment     Parathyroid hormone excess (H)     Chronic fatigue     Glottic insufficiency     Past Medical History:   Diagnosis Date     Anxiety      Arthritis      Breast cancer (H)      COPD (chronic obstructive pulmonary disease) (H)     6/19/12:  FEV 0.99 l     Depressive disorder      Hypertension      Low back pain with left-sided sciatica      Low bone density 4/13/2017    DEXA April 12, 2017: T score -2.0. Normal Z score. FRAX risk: major osteoporotic fracture 11.9%, hip fracture  2.6%, therefore not high-risk     Lymphedema, chronic lower extremities      Past Surgical History:   Procedure Laterality Date     BACK SURGERY      spinal fusion     COLONOSCOPY       LUMPECTOMY BREAST       ORTHOPEDIC SURGERY      Knee surgery left side     Current Outpatient Medications   Medication Sig Dispense Refill     atorvastatin (LIPITOR) 40 MG tablet Take 1 tablet (40 mg) by mouth daily 90 tablet 3     cephALEXin (KEFLEX) 250 MG capsule Take 250 mg by mouth       ibuprofen (ADVIL,MOTRIN) 200 MG tablet Take 3 tablets (600 mg) by mouth 3 times daily (with meals) 90 tablet 0     latanoprost (XALATAN) 0.005 % ophthalmic solution 1 drop       losartan-hydrochlorothiazide (HYZAAR) 100-25 MG tablet Take 1 tablet by mouth daily 90 tablet 3     quetiapine (SEROQUEL) 200 MG tablet Take 200 mg by mouth At Bedtime.       sertraline (ZOLOFT) 50 MG tablet Take 1.5 tablets (75 mg) by mouth daily 45 tablet 3     Vitamin D3 (CHOLECALCIFEROL) 25 mcg (1000 units) tablet Take 1 tablet (25 mcg) by mouth daily 100 tablet 3     albuterol (PROAIR HFA/PROVENTIL HFA/VENTOLIN HFA) 108 (90 Base) MCG/ACT inhaler Inhale 2 puffs into the lungs every 6 hours as needed for shortness of breath / dyspnea or wheezing (Patient not taking: Reported on 1/25/2021) 3 Inhaler 1          Immunization History   Administered Date(s) Administered     COVID-19,PF,Moderna 01/28/2021, 02/25/2021     Influenza (H1N1) 01/08/2010     Influenza (High Dose) 3 valent vaccine 02/26/2016, 10/31/2016, 10/05/2017, 10/04/2018, 10/11/2019     Influenza (IIV3) PF 11/05/2010, 11/13/2013, 08/25/2014     Influenza, Quad, High Dose, Pf, 65yr + 10/05/2020     Mantoux Tuberculin Skin Test 04/26/2013, 02/10/2014     Pneumo Conj 13-V (2010&after) 04/17/2015     Pneumococcal 23 valent 02/06/2009, 03/30/2012     TD (ADULT, 7+) 02/07/2005, 02/07/2005     Tdap (Adacel,Boostrix) 07/09/2011     Review of Systems         Objective    Vitals - Patient Reported  Pain Score: No Pain  (0)      Vitals: thinks her weight is up to 195 should be 170.  No vitals were obtained today due to virtual visit.    Physical Exam   Healthy,interactive, no distress and vocal dysphonia  PSYCH: Alert and oriented times 3; coherent speech but dysphonia, able to articulate logical thoughts, able   to abstract reason, no tangential thoughts, no hallucinations or delusions  Her affect is pleasant and  Slightly anxious  RESP: No cough, no audible wheezing, able to talk in full sentences  Remainder of exam unable to be completed due to telephone visits                Phone call duration: 10:07 AM- 10:35 AM 28 minutes

## 2021-04-07 NOTE — PATIENT INSTRUCTIONS
Primary Care Center Phone Number [phone]  Primary Care Center Medication Refill Request Information:  * Please contact your pharmacy regarding ANY request for medication refills.  ** [facility] Prescription Fax = [phone]  * Please allow 3 business days for routine medication refills.  * Please allow 5 business days for controlled substance medication refills.     Primary Care Center Test Result notification information:  *You will be notified with in 7-10 days of your appointment day regarding the results of your test.  If you are on MyChart you will be notified as soon as the provider has reviewed the results and signed off on them.

## 2021-04-08 ASSESSMENT — ANXIETY QUESTIONNAIRES: GAD7 TOTAL SCORE: 3

## 2021-04-12 ENCOUNTER — ANCILLARY PROCEDURE (OUTPATIENT)
Dept: CARDIOLOGY | Facility: CLINIC | Age: 76
End: 2021-04-12
Attending: FAMILY MEDICINE
Payer: COMMERCIAL

## 2021-04-12 DIAGNOSIS — I51.7 CARDIOMEGALY: ICD-10-CM

## 2021-04-12 PROCEDURE — 93306 TTE W/DOPPLER COMPLETE: CPT | Performed by: INTERNAL MEDICINE

## 2021-04-13 ENCOUNTER — TELEPHONE (OUTPATIENT)
Dept: OTOLARYNGOLOGY | Facility: CLINIC | Age: 76
End: 2021-04-13

## 2021-04-13 NOTE — TELEPHONE ENCOUNTER
Received call from patient to discuss upcoming procedure. Patient expresses anxiousness regarding the procedure and what to expect. Went through procedure in detail and what to expect post-operatively.     Patricia Sanchez RN on 4/13/2021 at 9:53 AM

## 2021-04-14 NOTE — TELEPHONE ENCOUNTER
Patient called with questions regarding the surgery. Stated anxiousness regarding procedure. Patient asked for more information regarding the pre-surgery showers. Additional teaching provided.

## 2021-04-15 ENCOUNTER — TELEPHONE (OUTPATIENT)
Facility: CLINIC | Age: 76
End: 2021-04-15

## 2021-04-15 ENCOUNTER — OFFICE VISIT (OUTPATIENT)
Dept: INTERNAL MEDICINE | Facility: CLINIC | Age: 76
End: 2021-04-15
Payer: COMMERCIAL

## 2021-04-15 VITALS
BODY MASS INDEX: 32.96 KG/M2 | OXYGEN SATURATION: 95 % | WEIGHT: 192 LBS | HEART RATE: 74 BPM | DIASTOLIC BLOOD PRESSURE: 89 MMHG | SYSTOLIC BLOOD PRESSURE: 143 MMHG

## 2021-04-15 DIAGNOSIS — R06.02 SOB (SHORTNESS OF BREATH): Primary | ICD-10-CM

## 2021-04-15 DIAGNOSIS — R00.2 PALPITATIONS: ICD-10-CM

## 2021-04-15 DIAGNOSIS — Z01.818 PRE-OPERATIVE EXAMINATION: ICD-10-CM

## 2021-04-15 PROCEDURE — 99214 OFFICE O/P EST MOD 30 MIN: CPT | Mod: 25 | Performed by: STUDENT IN AN ORGANIZED HEALTH CARE EDUCATION/TRAINING PROGRAM

## 2021-04-15 PROCEDURE — 93005 ELECTROCARDIOGRAM TRACING: CPT | Performed by: STUDENT IN AN ORGANIZED HEALTH CARE EDUCATION/TRAINING PROGRAM

## 2021-04-15 NOTE — TELEPHONE ENCOUNTER
Ca called stating she'd spoken to her neighbor and she'd like to pursue obtaining a POC from [email or web address].  She wondering whether [employer] would have a maching tht would be suitable for her needs, per [facility] she failed pulsed dose testing and needs a machine that will deliver continuous oxygen.  Advised that she would need to talk to [employer] to find out if they are able to provide a POC that delivers 2L continuous.  If they can, they/she should contact us for an order. Ca agrees with plan and has no further questions at this time.

## 2021-04-15 NOTE — PROGRESS NOTES
"Ca is a 75 year old who is being evaluated via a billable telephone visit.      What phone number would you like to be contacted at? [phone]  How would you like to obtain your AVS? Mail a copy     I have reviewed and updated the patient's allergies and medication list.    Concerns: none  Refills: none     Vitals - Patient Reported  Weight (Patient Reported): 88 kg (194 lb)  Height (Patient Reported): 162.6 cm (5' 4\")  BMI (Based on Pt Reported Ht/Wt): 33.3  Pain Score: No Pain (0)    Lissy Walls CMA      Prefers to use her home phone - [phone].     Palliative Care Outpatient Clinic Progress Note    Patient Name:  Ca Yan  Primary Provider:  Js Saunders    Chief Complaint/Patient ID:   [id number] F with chronic respiratory failure due to kyphoscoliosis and restrictive lung disease, ?COPD on O2 at night and with exertion  Anxiety  Social isolation    Last Palliative Care Appointment:  1/25/21 - continued on sertraline 75 mg daily, encouraged to reach out for new therapist, discussed considering who could be her health care agent, extensive discussion about her worries about the coronavirus vaccine    Interim History:  Ca Yan 75 year old female returns to be seen by palliative care today.  Telephone visit performed due to coronavirus outbreak.      Continues to see Dr. Roche in ENT/Voice clinic for dysphonia with persistent vocal fold paralysis - had discussed temporary injection laryngoplasty or implant - scheduled for 4/30.     Breathing continues to be a problem, says is worst when she gets anxious.  Feeling anxious every day, says she is really suffering from this. Worst before bed. The only thing that has seemed to help is playing soft music.  Not interested in seeing a therapist.  Says she is taking 100 mg sertraline prescribed by the psychiatrist she saw prior to coronavirus.  Wants something else to help.  Doing breathing exercises from [facility], but felt overwhelmed at how " "complicated the instructions were, that it was a lot to remember. Feels her anxiety has been exacerbated by the recent death of a friend last week, one last year, and living alone.  Has looked into assisted living but \"they take all your money\".     Social History:  Pertinent changes to social history/social situation since last visit:   Lives alone in independent senior apartment  Key support resources: Uses [employer] for transportation.  Hx of alcohol misuse  Advance Directive Status:  States she has completed, though not in our system.  Today again discussed - says her friend Cyndi is closest to her and they have been visiting and touch of the phone during the pandemic.  She would consider talking to her about being a healthcare agent.  Social History     Tobacco Use     Smoking status: Former Smoker     Packs/day: 0.50     Years: 5.00     Pack years: 2.50     Types: Cigarettes     Start date: 1956     Quit date: 1980     Years since quittin.5     Smokeless tobacco: Former User     Quit date: 1980   Substance Use Topics     Alcohol use: Not Currently     Alcohol/week: 0.0 standard drinks     Comment: Sober for 2 years, previous alcoholic     Drug use: No       Allergies   Allergen Reactions     Azithromycin Itching     Codeine Nausea and Vomiting     Hydrocodone Nausea and Vomiting     Metronidazole Nausea     Oxycodone Itching and Rash     Percocet [Oxycodone-Acetaminophen] Nausea and Vomiting     Pollen Extract      sneezing and runny nose.      Seasonal Allergies      sneezing and runny nose.      Vicodin [Hydrocodone-Acetaminophen] Nausea and Vomiting     Zolpidem      Amnestic behavior     Current Outpatient Medications   Medication Sig Dispense Refill     atorvastatin (LIPITOR) 40 MG tablet Take 1 tablet (40 mg) by mouth daily 90 tablet 3     cephALEXin (KEFLEX) 250 MG capsule Take 250 mg by mouth       ibuprofen (ADVIL,MOTRIN) 200 MG tablet Take 3 tablets (600 mg) by mouth 3 times " daily (with meals) 90 tablet 0     latanoprost (XALATAN) 0.005 % ophthalmic solution 1 drop       losartan-hydrochlorothiazide (HYZAAR) 100-25 MG tablet Take 1 tablet by mouth daily 90 tablet 3     quetiapine (SEROQUEL) 200 MG tablet Take 200 mg by mouth At Bedtime.       sertraline (ZOLOFT) 50 MG tablet Take 1.5 tablets (75 mg) by mouth daily 45 tablet 3     Vitamin D3 (CHOLECALCIFEROL) 25 mcg (1000 units) tablet Take 1 tablet (25 mcg) by mouth daily 100 tablet 3     Unable to perform physical exam because of telephone visit.  She is speaking in full paragraphs, with anxious emotion her voice.    Key Data Reviewed:  LABS:   Last GFR 60, HCO3 32  Hb 13.3  TSH 2.04    TTE 4/12/21  Normal LV EF, indeterminate diastolic function. Global RV function mildly reduced.     IMAGING: No new imaging.     Impression & Recommendations & Counseling:  Ca Yan is a 75 year old female with history of h/o anxiety, HTN, increased cholesterol, and chronic respiratory failure, attributed to COPD but likely more from kyphoscoliosis and restriction. She was referred to Palliative care primarily for guidance on goals of care, updating her HCD, and emotional support. However, she declined needing help with updating her HCD, and has been focused on her anxiety, which was exacerbated by the further physical isolation recommended for coronavirus.  Had initial response to sertraline, feels is less so.      Anxiety - uncontrolled.  Discussed options including uptitration of sertraline, adding Buspar, +/- establishing with a new therapist.  She is not interested in therapy, does not feel it's been helpful.  Says she's tried Buspar and didn't like it.  Is on high-dose seroquel at night. Asks about Klonopin, but I wouldn't recommend this for chronic anxiety and with her COPD.   - Is agreeable to uptitration of sertraline - says she is taking 100 mg tablets (by our records was taking 75 mg?).  Will prescribe 150 mg daily.  Told her if she  is in fact taking 50 mg tablets, then only take the 100 mg, not the extra 50  - Will continue with breathing exercises.     Advanced Care Planning - discussed the role of a health care agent, considering if she would trust Cyndi for this and talking about it with Cyndi.  Does not want to do until after her procedure.  PCP has made honoring choices referral also.     Discussed follow-up - she doesn't feel these visits have been helpful, and at this point if she is having refractory anxiety I'd recommend she see psychiatry or psychotherapy.  She'd prefer to follow-up with her PCP.  Discussed we could follow-up on an as-needed basis if her PCP felt there were new concerns.     Phone call duration:  27 minutes    Alison Schuster MD  Palliative Medicine  Pager [phone]

## 2021-04-15 NOTE — TELEPHONE ENCOUNTER
[facility] Call Center    Phone Message    May a detailed message be left on voicemail: yes     Reason for Call: Other: Pt called in stating she would like to speak with a nurse about getting an [employer] O2 concentrator. Please review and call her back to discuss. Pt will not be home after 1 PM today, so if it is past that time please wait until 4/16/21 to call.     Action Taken: Message routed to:  [facility] ([facility] Pulm    Travel Screening: Not Applicable

## 2021-04-15 NOTE — LETTER
4/15/2021       RE: Ca Yan  [address]  [facility] [id number]     Dear Colleague,    Thank you for referring your patient, Ca Yan, to the [facility] INTERNAL MEDICINE [place] at [facility]. Please see a copy of my visit note below.    CC:Pre-operative evaluation      HPI: Patient with a h/o chronic hypoxemic respiratory failure on home O2, Right middle lobe atelectasis, and dysphonia scheduled for a scheduled for a microlaryngoscopy with injection laryngoplasty on 04/30/31. She reports no chest pain, she endorses a chronic cough, productive of whitish sputum, no hemoptysis, associated nasal discharge. Cough is unchanged.    She reports dyspnea on exertion, dyspnea is chronic and unchanged. She notes bilateral LE edema, edema is chronic and unchanged. She has no intermittent claudication. She endorses episodes of lightheadedness when standing up; symptoms occur at night. Of note, she takes her antihypertensive medications at night.     She reports anxiety about upcoming procedure. She is on Ibuprofen, she reports taking this occasionally. Discussed holding ibuprofen and avoiding any other NSAIDs 4-5 days prior to day of surgery d/t bleeding risk.    ROS: ROS negative unless noted here or in HPI.      Past Medical History:   Diagnosis Date     Anxiety      Arthritis      Breast cancer (H)      COPD (chronic obstructive pulmonary disease) (H)     6/19/12:  FEV 0.99 l     Depressive disorder      Hypertension      Low back pain with left-sided sciatica      Low bone density 4/13/2017    DEXA April 12, 2017: T score -2.0. Normal Z score. FRAX risk: major osteoporotic fracture 11.9%, hip fracture 2.6%, therefore not high-risk     Lymphedema, chronic lower extremities      Past Surgical History:   Procedure Laterality Date     BACK SURGERY      spinal fusion     COLONOSCOPY       LUMPECTOMY BREAST       ORTHOPEDIC SURGERY       Knee surgery left side     Family History   Problem Relation Age of Onset     Breast Cancer Mother      Diabetes Mother      Anxiety Disorder Mother      Asthma Mother      Other Cancer Mother      Hyperlipidemia Mother      Alcohol/Drug Father      Mental Illness Father      Substance Abuse Father      Obesity Father      Cerebrovascular Disease Maternal Grandmother 67     Social History     Tobacco Use     Smoking status: Former Smoker     Packs/day: 0.50     Years: 5.00     Pack years: 2.50     Types: Cigarettes     Start date: 1956     Quit date: 1980     Years since quittin.5     Smokeless tobacco: Former User     Quit date: 1980   Substance Use Topics     Alcohol use: Not Currently     Alcohol/week: 0.0 standard drinks     Comment: Sober for 2 years, previous alcoholic     Drug use: No     Current Outpatient Medications   Medication Sig Dispense Refill     atorvastatin (LIPITOR) 40 MG tablet Take 1 tablet (40 mg) by mouth daily 90 tablet 3     cephALEXin (KEFLEX) 250 MG capsule Take 250 mg by mouth       latanoprost (XALATAN) 0.005 % ophthalmic solution 1 drop       losartan-hydrochlorothiazide (HYZAAR) 100-25 MG tablet Take 1 tablet by mouth daily 90 tablet 3     quetiapine (SEROQUEL) 200 MG tablet Take 200 mg by mouth At Bedtime.       sertraline (ZOLOFT) 50 MG tablet Take 1.5 tablets (75 mg) by mouth daily 45 tablet 3     Vitamin D3 (CHOLECALCIFEROL) 25 mcg (1000 units) tablet Take 1 tablet (25 mcg) by mouth daily 100 tablet 3     ibuprofen (ADVIL,MOTRIN) 200 MG tablet Take 3 tablets (600 mg) by mouth 3 times daily (with meals) (Patient not taking: Reported on 4/15/2021) 90 tablet 0        Allergies   Allergen Reactions     Azithromycin Itching     Codeine Nausea and Vomiting     Hydrocodone Nausea and Vomiting     Metronidazole Nausea     Oxycodone Itching and Rash     Percocet [Oxycodone-Acetaminophen] Nausea and Vomiting     Pollen Extract      sneezing and runny nose.      Seasonal  Allergies      sneezing and runny nose.      Vicodin [Hydrocodone-Acetaminophen] Nausea and Vomiting     Zolpidem      Amnestic behavior         BP (!) 143/89   Pulse 74   Wt 87.1 kg (192 lb)   LMP  (LMP Unknown)   SpO2 95%   BMI 32.96 kg/m    Physical Examination:    General:  Conversant, no acute distress, appears stated age  Head: atraumatic  Eyes:  Pupils 2-3 mm, sclera white, EOM's full, conjunctiva moist, no periorbital swelling    Neck:  Trachea midline, Full AROM, supple, thyroid smooth, symmetric, not enlarged, no nodules  Lungs:  Clear to auscultation throughout, no wheezes, rhonchi or rales. Normal respiratory effort and no intercostal retractions.  C/V:  Regular rate and rhythm, no murmurs, rubs or gallops.  No JVD, no carotid bruits. Radial pulses 2+, equal and regular.  Neuro: Alert and oriented, face symmetric. Able to get on/off exam table without assistance.  No gross CN deficit   M/S:   No joint deformities noted.  No joint swelling.  Skin:   Normal temperature., turgor and texture. No rashes, suspicious lesions, or jaundice on exposed skin surfaces.   Extremities:  Bilateral LE edema, no digital cyanosis  Psych:  Alert and oriented. Appropriate affect.  Not psychomotor slowed.  No signs of anxiety or agitation.      A&P:  Patient was seen for pre-operative evaluation. She is scheduled for a microlaryngoscopy with injection laryngoplasty on 04/30/31 with Dr. Roche. She reports  a chronic cough, productive of whitish sputum, no hemoptysis-- symptoms are unchanged from baseline. She has a h/o chronic hypoxemic respiratory failure and is on 2L of oxygen at home; O2 needs have remained stable. She endorses dyspnea on exertion, chronic bilateral LE edema, and occasional palpitations. She reports episodes of lightheadedness at home. She has no h/o CHF, last EF >70% (04/15/2021).  She reports being able to achieve <4 METs of activity.    According to the RCRI, she has 0 cardiac risk factors. This  stratifies the patient to Class I, which carries a 0.4%  risk of major CV complications (95% CI 0.05-1.5%). In this case the RCRI potentially underestimates the patient's true cardiac risk given the h/o diastolic dysfunction, chronic hypoxemic respiratory failure, and possible pulmonary hypertension.       #. Pre-Op evaluation  -- Chest xray (ordered)  -- CBC (ordered)  -- CMP (ordered prior to today's visit)  -- 12 lead EKG, done in clinic showed NSR  -- Hold Ibuprofen 4-5 days before surgery (04/25/21), discussed with patient  -- continue other medications as prescribed    Patient seen and discussed with Dr. Bharat Barfield.      Jules Patel MD  Internal Medicine Resident PGY 1  Pager: [phone]  Pt was seen and examined with Dr. Jones.  I agree with his documentation as noted above.    My additional comments: None    Bharat Barfield MD          Again, thank you for allowing me to participate in the care of your patient.      Sincerely,    Jules Patel MD

## 2021-04-15 NOTE — PROGRESS NOTES
CC:Pre-operative evaluation      HPI: Patient with a h/o chronic hypoxemic respiratory failure on home O2, Right middle lobe atelectasis, and dysphonia scheduled for a scheduled for a microlaryngoscopy with injection laryngoplasty on 04/30/31. She reports no chest pain, she endorses a chronic cough, productive of whitish sputum, no hemoptysis, associated nasal discharge. Cough is unchanged.    She reports dyspnea on exertion, dyspnea is chronic and unchanged. She notes bilateral LE edema, edema is chronic and unchanged. She has no intermittent claudication. She endorses episodes of lightheadedness when standing up; symptoms occur at night. Of note, she takes her antihypertensive medications at night.     She reports anxiety about upcoming procedure. She is on Ibuprofen, she reports taking this occasionally. Discussed holding ibuprofen and avoiding any other NSAIDs 4-5 days prior to day of surgery d/t bleeding risk.    ROS: ROS negative unless noted here or in HPI.      Past Medical History:   Diagnosis Date     Anxiety      Arthritis      Breast cancer (H)      COPD (chronic obstructive pulmonary disease) (H)     6/19/12:  FEV 0.99 l     Depressive disorder      Hypertension      Low back pain with left-sided sciatica      Low bone density 4/13/2017    DEXA April 12, 2017: T score -2.0. Normal Z score. FRAX risk: major osteoporotic fracture 11.9%, hip fracture 2.6%, therefore not high-risk     Lymphedema, chronic lower extremities      Past Surgical History:   Procedure Laterality Date     BACK SURGERY      spinal fusion     COLONOSCOPY       LUMPECTOMY BREAST       ORTHOPEDIC SURGERY      Knee surgery left side     Family History   Problem Relation Age of Onset     Breast Cancer Mother      Diabetes Mother      Anxiety Disorder Mother      Asthma Mother      Other Cancer Mother      Hyperlipidemia Mother      Alcohol/Drug Father      Mental Illness Father      Substance Abuse Father      Obesity Father       Cerebrovascular Disease Maternal Grandmother 67     Social History     Tobacco Use     Smoking status: Former Smoker     Packs/day: 0.50     Years: 5.00     Pack years: 2.50     Types: Cigarettes     Start date: 1956     Quit date: 1980     Years since quittin.5     Smokeless tobacco: Former User     Quit date: 1980   Substance Use Topics     Alcohol use: Not Currently     Alcohol/week: 0.0 standard drinks     Comment: Sober for 2 years, previous alcoholic     Drug use: No     Current Outpatient Medications   Medication Sig Dispense Refill     atorvastatin (LIPITOR) 40 MG tablet Take 1 tablet (40 mg) by mouth daily 90 tablet 3     cephALEXin (KEFLEX) 250 MG capsule Take 250 mg by mouth       latanoprost (XALATAN) 0.005 % ophthalmic solution 1 drop       losartan-hydrochlorothiazide (HYZAAR) 100-25 MG tablet Take 1 tablet by mouth daily 90 tablet 3     quetiapine (SEROQUEL) 200 MG tablet Take 200 mg by mouth At Bedtime.       sertraline (ZOLOFT) 50 MG tablet Take 1.5 tablets (75 mg) by mouth daily 45 tablet 3     Vitamin D3 (CHOLECALCIFEROL) 25 mcg (1000 units) tablet Take 1 tablet (25 mcg) by mouth daily 100 tablet 3     ibuprofen (ADVIL,MOTRIN) 200 MG tablet Take 3 tablets (600 mg) by mouth 3 times daily (with meals) (Patient not taking: Reported on 4/15/2021) 90 tablet 0        Allergies   Allergen Reactions     Azithromycin Itching     Codeine Nausea and Vomiting     Hydrocodone Nausea and Vomiting     Metronidazole Nausea     Oxycodone Itching and Rash     Percocet [Oxycodone-Acetaminophen] Nausea and Vomiting     Pollen Extract      sneezing and runny nose.      Seasonal Allergies      sneezing and runny nose.      Vicodin [Hydrocodone-Acetaminophen] Nausea and Vomiting     Zolpidem      Amnestic behavior         BP (!) 143/89   Pulse 74   Wt 87.1 kg (192 lb)   LMP  (LMP Unknown)   SpO2 95%   BMI 32.96 kg/m    Physical Examination:    General:  Conversant, no acute distress, appears  stated age  Head: atraumatic  Eyes:  Pupils 2-3 mm, sclera white, EOM's full, conjunctiva moist, no periorbital swelling    Neck:  Trachea midline, Full AROM, supple, thyroid smooth, symmetric, not enlarged, no nodules  Lungs:  Clear to auscultation throughout, no wheezes, rhonchi or rales. Normal respiratory effort and no intercostal retractions.  C/V:  Regular rate and rhythm, no murmurs, rubs or gallops.  No JVD, no carotid bruits. Radial pulses 2+, equal and regular.  Neuro: Alert and oriented, face symmetric. Able to get on/off exam table without assistance.  No gross CN deficit   M/S:   No joint deformities noted.  No joint swelling.  Skin:   Normal temperature., turgor and texture. No rashes, suspicious lesions, or jaundice on exposed skin surfaces.   Extremities:  Bilateral LE edema, no digital cyanosis  Psych:  Alert and oriented. Appropriate affect.  Not psychomotor slowed.  No signs of anxiety or agitation.      A&P:  Patient was seen for pre-operative evaluation. She is scheduled for a microlaryngoscopy with injection laryngoplasty on 04/30/31 with Dr. Roche. She reports  a chronic cough, productive of whitish sputum, no hemoptysis-- symptoms are unchanged from baseline. She has a h/o chronic hypoxemic respiratory failure and is on 2L of oxygen at home; O2 needs have remained stable. She endorses dyspnea on exertion, chronic bilateral LE edema, and occasional palpitations. She reports episodes of lightheadedness at home. She has no h/o CHF, last EF >70% (04/15/2021).  She reports being able to achieve <4 METs of activity.    According to the RCRI, she has 0 cardiac risk factors. This stratifies the patient to Class I, which carries a 0.4%  risk of major CV complications (95% CI 0.05-1.5%). In this case the RCRI potentially underestimates the patient's true cardiac risk given the h/o diastolic dysfunction, chronic hypoxemic respiratory failure, and possible pulmonary hypertension.       #. Pre-Op  evaluation  -- Chest xray (ordered)  -- CBC (ordered)  -- CMP (ordered prior to today's visit)  -- 12 lead EKG, done in clinic showed NSR  -- Hold Ibuprofen 4-5 days before surgery (04/25/21), discussed with patient  -- continue other medications as prescribed    Patient seen and discussed with Dr. Bharat Barfield.      Jules Patel MD  Internal Medicine Resident PGY 1  Pager: [phone]  Pt was seen and examined with Dr. Jones.  I agree with his documentation as noted above.    My additional comments: None    Bharat Barfield MD

## 2021-04-15 NOTE — NURSING NOTE
Chief Complaint   Patient presents with     Pre-Op Exam     DOS: 4/30/21 Microlaryngoscopy with injection laryngoplasty with Dr. Gray Kimberly Nissen, EMT at 1:58 PM on 4/15/2021

## 2021-04-15 NOTE — LETTER
4/15/2021      RE: Ca Yan  [address]  [facility] [address]       CC:Pre-operative evaluation      HPI: Patient with a h/o chronic hypoxemic respiratory failure on home O2, Right middle lobe atelectasis, and dysphonia scheduled for a scheduled for a microlaryngoscopy with injection laryngoplasty on 04/30/31. She reports no chest pain, she endorses a chronic cough, productive of whitish sputum, no hemoptysis, associated nasal discharge. Cough is unchanged.    She reports dyspnea on exertion, dyspnea is chronic and unchanged. She notes bilateral LE edema, edema is chronic and unchanged. She has no intermittent claudication. She endorses episodes of lightheadedness when standing up; symptoms occur at night. Of note, she takes her antihypertensive medications at night.     She reports anxiety about upcoming procedure. She is on Ibuprofen, she reports taking this occasionally. Discussed holding ibuprofen and avoiding any other NSAIDs 4-5 days prior to day of surgery d/t bleeding risk.    ROS: ROS negative unless noted here or in HPI.      Past Medical History:   Diagnosis Date     Anxiety      Arthritis      Breast cancer (H)      COPD (chronic obstructive pulmonary disease) (H)     6/19/12:  FEV 0.99 l     Depressive disorder      Hypertension      Low back pain with left-sided sciatica      Low bone density 4/13/2017    DEXA April 12, 2017: T score -2.0. Normal Z score. FRAX risk: major osteoporotic fracture 11.9%, hip fracture 2.6%, therefore not high-risk     Lymphedema, chronic lower extremities      Past Surgical History:   Procedure Laterality Date     BACK SURGERY      spinal fusion     COLONOSCOPY       LUMPECTOMY BREAST       ORTHOPEDIC SURGERY      Knee surgery left side     Family History   Problem Relation Age of Onset     Breast Cancer Mother      Diabetes Mother      Anxiety Disorder Mother      Asthma Mother      Other Cancer Mother      Hyperlipidemia Mother      Alcohol/Drug Father       Mental Illness Father      Substance Abuse Father      Obesity Father      Cerebrovascular Disease Maternal Grandmother 67     Social History     Tobacco Use     Smoking status: Former Smoker     Packs/day: 0.50     Years: 5.00     Pack years: 2.50     Types: Cigarettes     Start date: 1956     Quit date: 1980     Years since quittin.5     Smokeless tobacco: Former User     Quit date: 1980   Substance Use Topics     Alcohol use: Not Currently     Alcohol/week: 0.0 standard drinks     Comment: Sober for 2 years, previous alcoholic     Drug use: No     Current Outpatient Medications   Medication Sig Dispense Refill     atorvastatin (LIPITOR) 40 MG tablet Take 1 tablet (40 mg) by mouth daily 90 tablet 3     cephALEXin (KEFLEX) 250 MG capsule Take 250 mg by mouth       latanoprost (XALATAN) 0.005 % ophthalmic solution 1 drop       losartan-hydrochlorothiazide (HYZAAR) 100-25 MG tablet Take 1 tablet by mouth daily 90 tablet 3     quetiapine (SEROQUEL) 200 MG tablet Take 200 mg by mouth At Bedtime.       sertraline (ZOLOFT) 50 MG tablet Take 1.5 tablets (75 mg) by mouth daily 45 tablet 3     Vitamin D3 (CHOLECALCIFEROL) 25 mcg (1000 units) tablet Take 1 tablet (25 mcg) by mouth daily 100 tablet 3     ibuprofen (ADVIL,MOTRIN) 200 MG tablet Take 3 tablets (600 mg) by mouth 3 times daily (with meals) (Patient not taking: Reported on 4/15/2021) 90 tablet 0        Allergies   Allergen Reactions     Azithromycin Itching     Codeine Nausea and Vomiting     Hydrocodone Nausea and Vomiting     Metronidazole Nausea     Oxycodone Itching and Rash     Percocet [Oxycodone-Acetaminophen] Nausea and Vomiting     Pollen Extract      sneezing and runny nose.      Seasonal Allergies      sneezing and runny nose.      Vicodin [Hydrocodone-Acetaminophen] Nausea and Vomiting     Zolpidem      Amnestic behavior         BP (!) 143/89   Pulse 74   Wt 87.1 kg (192 lb)   LMP  (LMP Unknown)   SpO2 95%   BMI 32.96 kg/m     Physical Examination:    General:  Conversant, no acute distress, appears stated age  Head: atraumatic  Eyes:  Pupils 2-3 mm, sclera white, EOM's full, conjunctiva moist, no periorbital swelling    Neck:  Trachea midline, Full AROM, supple, thyroid smooth, symmetric, not enlarged, no nodules  Lungs:  Clear to auscultation throughout, no wheezes, rhonchi or rales. Normal respiratory effort and no intercostal retractions.  C/V:  Regular rate and rhythm, no murmurs, rubs or gallops.  No JVD, no carotid bruits. Radial pulses 2+, equal and regular.  Neuro: Alert and oriented, face symmetric. Able to get on/off exam table without assistance.  No gross CN deficit   M/S:   No joint deformities noted.  No joint swelling.  Skin:   Normal temperature., turgor and texture. No rashes, suspicious lesions, or jaundice on exposed skin surfaces.   Extremities:  Bilateral LE edema, no digital cyanosis  Psych:  Alert and oriented. Appropriate affect.  Not psychomotor slowed.  No signs of anxiety or agitation.      A&P:  Patient was seen for pre-operative evaluation. She is scheduled for a microlaryngoscopy with injection laryngoplasty on 04/30/31 with Dr. Roche. She reports  a chronic cough, productive of whitish sputum, no hemoptysis-- symptoms are unchanged from baseline. She has a h/o chronic hypoxemic respiratory failure and is on 2L of oxygen at home; O2 needs have remained stable. She endorses dyspnea on exertion, chronic bilateral LE edema, and occasional palpitations. She reports episodes of lightheadedness at home. She has no h/o CHF, last EF >70% (04/15/2021).  She reports being able to achieve <4 METs of activity.    According to the RCRI, she has 0 cardiac risk factors. This stratifies the patient to Class I, which carries a 0.4%  risk of major CV complications (95% CI 0.05-1.5%). In this case the RCRI potentially underestimates the patient's true cardiac risk given the h/o diastolic dysfunction, chronic hypoxemic  respiratory failure, and possible pulmonary hypertension.       #. Pre-Op evaluation  -- Chest xray (ordered)  -- CBC (ordered)  -- CMP (ordered prior to today's visit)  -- 12 lead EKG, done in clinic showed NSR  -- Hold Ibuprofen 4-5 days before surgery (04/25/21), discussed with patient  -- continue other medications as prescribed    Patient seen and discussed with Dr. Bharat Barfield.      uJles Patel MD  Internal Medicine Resident PGY 1  Pager: [phone]  Pt was seen and examined with Dr. Jones.  I agree with his documentation as noted above.    My additional comments: None    MD Jules Rose MD

## 2021-04-15 NOTE — LETTER
4/15/2021      RE: Ca Yan  [address]  [facility] [address]       CC:Pre-operative evaluation      HPI: Patient with a h/o chronic hypoxemic respiratory failure on home O2, Right middle lobe atelectasis, and dysphonia scheduled for a scheduled for a microlaryngoscopy with injection laryngoplasty on 04/30/31. She reports no chest pain, she endorses a chronic cough, productive of whitish sputum, no hemoptysis, associated nasal discharge. Cough is unchanged.    She reports dyspnea on exertion, dyspnea is chronic and unchanged. She notes bilateral LE edema, edema is chronic and unchanged. She has no intermittent claudication. She endorses episodes of lightheadedness when standing up; symptoms occur at night. Of note, she takes her antihypertensive medications at night.     She reports anxiety about upcoming procedure. She is on Ibuprofen, she reports taking this occasionally. Discussed holding ibuprofen and avoiding any other NSAIDs 4-5 days prior to day of surgery d/t bleeding risk.    ROS: ROS negative unless noted here or in HPI.      Past Medical History:   Diagnosis Date     Anxiety      Arthritis      Breast cancer (H)      COPD (chronic obstructive pulmonary disease) (H)     6/19/12:  FEV 0.99 l     Depressive disorder      Hypertension      Low back pain with left-sided sciatica      Low bone density 4/13/2017    DEXA April 12, 2017: T score -2.0. Normal Z score. FRAX risk: major osteoporotic fracture 11.9%, hip fracture 2.6%, therefore not high-risk     Lymphedema, chronic lower extremities      Past Surgical History:   Procedure Laterality Date     BACK SURGERY      spinal fusion     COLONOSCOPY       LUMPECTOMY BREAST       ORTHOPEDIC SURGERY      Knee surgery left side     Family History   Problem Relation Age of Onset     Breast Cancer Mother      Diabetes Mother      Anxiety Disorder Mother      Asthma Mother      Other Cancer Mother      Hyperlipidemia Mother      Alcohol/Drug Father       Mental Illness Father      Substance Abuse Father      Obesity Father      Cerebrovascular Disease Maternal Grandmother 67     Social History     Tobacco Use     Smoking status: Former Smoker     Packs/day: 0.50     Years: 5.00     Pack years: 2.50     Types: Cigarettes     Start date: 1956     Quit date: 1980     Years since quittin.5     Smokeless tobacco: Former User     Quit date: 1980   Substance Use Topics     Alcohol use: Not Currently     Alcohol/week: 0.0 standard drinks     Comment: Sober for 2 years, previous alcoholic     Drug use: No     Current Outpatient Medications   Medication Sig Dispense Refill     atorvastatin (LIPITOR) 40 MG tablet Take 1 tablet (40 mg) by mouth daily 90 tablet 3     cephALEXin (KEFLEX) 250 MG capsule Take 250 mg by mouth       latanoprost (XALATAN) 0.005 % ophthalmic solution 1 drop       losartan-hydrochlorothiazide (HYZAAR) 100-25 MG tablet Take 1 tablet by mouth daily 90 tablet 3     quetiapine (SEROQUEL) 200 MG tablet Take 200 mg by mouth At Bedtime.       sertraline (ZOLOFT) 50 MG tablet Take 1.5 tablets (75 mg) by mouth daily 45 tablet 3     Vitamin D3 (CHOLECALCIFEROL) 25 mcg (1000 units) tablet Take 1 tablet (25 mcg) by mouth daily 100 tablet 3     ibuprofen (ADVIL,MOTRIN) 200 MG tablet Take 3 tablets (600 mg) by mouth 3 times daily (with meals) (Patient not taking: Reported on 4/15/2021) 90 tablet 0        Allergies   Allergen Reactions     Azithromycin Itching     Codeine Nausea and Vomiting     Hydrocodone Nausea and Vomiting     Metronidazole Nausea     Oxycodone Itching and Rash     Percocet [Oxycodone-Acetaminophen] Nausea and Vomiting     Pollen Extract      sneezing and runny nose.      Seasonal Allergies      sneezing and runny nose.      Vicodin [Hydrocodone-Acetaminophen] Nausea and Vomiting     Zolpidem      Amnestic behavior         BP (!) 143/89   Pulse 74   Wt 87.1 kg (192 lb)   LMP  (LMP Unknown)   SpO2 95%   BMI 32.96 kg/m     Physical Examination:    General:  Conversant, no acute distress, appears stated age  Head: atraumatic  Eyes:  Pupils 2-3 mm, sclera white, EOM's full, conjunctiva moist, no periorbital swelling    Neck:  Trachea midline, Full AROM, supple, thyroid smooth, symmetric, not enlarged, no nodules  Lungs:  Clear to auscultation throughout, no wheezes, rhonchi or rales. Normal respiratory effort and no intercostal retractions.  C/V:  Regular rate and rhythm, no murmurs, rubs or gallops.  No JVD, no carotid bruits. Radial pulses 2+, equal and regular.  Neuro: Alert and oriented, face symmetric. Able to get on/off exam table without assistance.  No gross CN deficit   M/S:   No joint deformities noted.  No joint swelling.  Skin:   Normal temperature., turgor and texture. No rashes, suspicious lesions, or jaundice on exposed skin surfaces.   Extremities:  Bilateral LE edema, no digital cyanosis  Psych:  Alert and oriented. Appropriate affect.  Not psychomotor slowed.  No signs of anxiety or agitation.      A&P:  Patient was seen for pre-operative evaluation. She is scheduled for a microlaryngoscopy with injection laryngoplasty on 04/30/31 with Dr. Roche. She reports  a chronic cough, productive of whitish sputum, no hemoptysis-- symptoms are unchanged from baseline. She has a h/o chronic hypoxemic respiratory failure and is on 2L of oxygen at home; O2 needs have remained stable. She endorses dyspnea on exertion, chronic bilateral LE edema, and occasional palpitations. She reports episodes of lightheadedness at home. She has no h/o CHF, last EF >70% (04/15/2021).  She reports being able to achieve <4 METs of activity.    According to the RCRI, she has 0 cardiac risk factors. This stratifies the patient to Class I, which carries a 0.4%  risk of major CV complications (95% CI 0.05-1.5%). In this case the RCRI potentially underestimates the patient's true cardiac risk given the h/o diastolic dysfunction, chronic hypoxemic  respiratory failure, and possible pulmonary hypertension.       #. Pre-Op evaluation  -- Chest xray (ordered)  -- CBC (ordered)  -- CMP (ordered prior to today's visit)  -- 12 lead EKG, done in clinic showed NSR  -- Hold Ibuprofen 4-5 days before surgery (04/25/21), discussed with patient  -- continue other medications as prescribed    Patient seen and discussed with Dr. Bharat Barfield.      Jules Patel MD  Internal Medicine Resident PGY 1  Pager: [phone]  Pt was seen and examined with Dr. Jones.  I agree with his documentation as noted above.    My additional comments: None    MD Jules Rose MD

## 2021-04-15 NOTE — PATIENT INSTRUCTIONS
Stop ibuprofen 4-5 days before surgery. Do not take ibuprofen, naproxen,, aspirin, or any NSAIDS from 04/25/21.     Pre-OP laboratory tests are ordered

## 2021-04-16 LAB — INTERPRETATION ECG - MUSE: NORMAL

## 2021-04-16 NOTE — TELEPHONE ENCOUNTER
Spoke with patient and she wants to speak with Brandon HARRIS. She will call back Monday. Gave her [facility] fax number.

## 2021-04-16 NOTE — TELEPHONE ENCOUNTER
[facility] Call Center    Phone Message    May a detailed message be left on voicemail: no     Reason for Call: Other: Ca Ball calling about Order for Inogen Machine Fax to: [facility]: [phone] .  [facility]'s # is: [phone]     Action Taken: Message routed to:  [facility] ([facility]): Pulmonary    Travel Screening: Not Applicable

## 2021-04-19 DIAGNOSIS — Z11.59 ENCOUNTER FOR SCREENING FOR OTHER VIRAL DISEASES: ICD-10-CM

## 2021-04-19 NOTE — TELEPHONE ENCOUNTER
Reviewed with [facility] who state they only provide Inogen pulsed dose POC. The do not provide Inogen continuous dose POC because they have issues with battery life.  Ca has tried/failed pulsed dose testing per [facility].  Reviewed with Ca that [facility] are unable to provide her with an Inogen POC.

## 2021-04-20 NOTE — PROGRESS NOTES
"[facility]  THERAPY NOTE (CPT 52276)    Patient: Ca Yan  Date of Service: 4/21/2021  Referring physician: Dr. Roche  Impressions from most recent evaluation by Dr. Roche on 1/19/21:  \"1. Dysphonia   - since January 2020 in the context of hospital admission for PNA  - started voice therapy without improvement with Mirta Blake, Ph.D., CCC/SLP  - scope on 8/6 very difficult to be tolerated by the patient but does show left vocal fold paralysis and no other lesions  - CT neck 10/8/20 - no lesions along left RLN - does have tortuous thoracic aorta due to scoliosis which is causing compression of esophagus - could cause compression of the RLN as well    - CT chest from 1/15/20 which does show some dilation of the pyriform which can point to a left vocal fold paralysis  - though unlikely etiology given sudden onset of symptoms in January  - discussed that sine she had so much trouble tolerating the exam in the office she is not a candidate for a vocal fold awake injection  - she is now 1 year out from voice changes with persistent breathy dysphonia and therefore persistent VF paralysis   - discussed options of IL under general anesthesia or implant under MAC  - she state she is too scared of her breathing to go with either procedure  - she states her issue with the voice is that she cant leave a message because \"the machine says it cant hear me'  - and she can't talk when she is in a store if she were to go to a store \"    SUBJECTIVE:  Since the patient's last session, they report the following:     Overall symptoms are about the same    OBJECTIVE:    THERAPEUTIC ACTIVITIES    Counseling and Education:    Asked many questions about the nature of their symptoms, and I answered all of these thoroughly.    Instructed concepts and techniques for optimal vocal hygiene including:    Systemic hydration, including strategies for increasing daily water intake    Topical hydration - Gargling, saline nasal " irrigation, humidification, steam, guaifenesin    Awareness and reduction of phonotraumatic behaviors    Avoiding cough and throat clearing    Exercises to promote optimal respiratory mechanics    Practiced in a forward leaning seated posture to facilitate awareness of low respiratory engagement    Patient experienced greatest accuracy reclining/relaxing in her chair    With clinician support, patient was able to demonstrate improved abdominal relaxation and engagement on inhalation    Rescue breathing strategies using oral configurations to promote improved vocal fold abduction were instructed    Patient reported nasal inhalation with semioccluded exhalation was most beneficial    Straw inhalation was also utilized and outwardly best accuracy was achieved with this modality    Patient was able to demonstrate use of these techniques in combination with low respiratory engagement with good accuracy and minimal clinician support    A plan for when and how to implement these strategies was developed, and the patient was encouraged to practice the techniques independent of distress two times daily to habituate their use.    Semi-Occluded Vocal Tract (SOVT) exercises instructed to reduce laryngeal tension, promote vocal fold pliability, and coordinate respiration and phonation    Straw phonation was found to be most facilitating     Sustained phonation, and voice vs. voiceless productions used to promote easy voicing and raise awareness of laryngeal tension    Ascending and descending glides utilized to promote vocal fold pliability    Instructed to use these exercises as a warm-up / cooldown, and to re-calibrate the voice throughout the day.    Good accuracy with moderate clinician support      A regimen for home practice was instructed.    I provided handouts of today's therapeutic activities to facilitate practice, and the patient's friend Cyndi Hanks was present to offer additional  support.    ASSESSMENT/PLAN  PROGRESS TOWARD LONG TERM GOALS:   Adequate progress; too early for objective measures    IMPRESSIONS: Dysphonia (R 49.0) in the context of a unilateral vocal fold paralysis (J 38.01). Ca seemed more calm during today's session, perhaps due to the support of her friend who has accompanied her.  She had questions about the upcoming procedure and these were answered to the best of my ability.  Most importantly the relative safety of the procedure, and that breathing issues are quite uncommon following such interventions.  Despite this the goal was to feel more comfortable and confident in her ability to manage breathing issues on the rare chance that they occur.  She was able to demonstrate good use of rescue breathing strategies and low respiratory engagement, as well as semi-occluded vocal tract exercises to rebalance effort and technique following the procedure.    PLAN: I will see Ca in ~2 weeks for a telephone appointment, at which point we will gauge improvement following her procedure with DR. Roche.   For practice goals see AVS.       TOTAL SERVICE TIME: 60 minutes  TREATMENT (04201)  NO CHARGE FACILITY FEE (55419)    Nikita Snow M.M., M.A., CCC-SLP  Speech-Language Pathologist  Certificate of Vocology  [phone]    *this report was created in part through the use of computerized dictation software, and though reviewed following completion, some typographic errors may persist.  If there is confusion regarding any of this notes contents, please contact me for clarification.*

## 2021-04-21 ENCOUNTER — ANCILLARY PROCEDURE (OUTPATIENT)
Dept: GENERAL RADIOLOGY | Facility: CLINIC | Age: 76
End: 2021-04-21
Attending: INTERNAL MEDICINE
Payer: COMMERCIAL

## 2021-04-21 ENCOUNTER — OFFICE VISIT (OUTPATIENT)
Dept: OTOLARYNGOLOGY | Facility: CLINIC | Age: 76
End: 2021-04-21
Payer: COMMERCIAL

## 2021-04-21 DIAGNOSIS — J38.01 VOCAL FOLD PARALYSIS, UNILATERAL: ICD-10-CM

## 2021-04-21 DIAGNOSIS — R06.02 SOB (SHORTNESS OF BREATH): ICD-10-CM

## 2021-04-21 DIAGNOSIS — R49.0 DYSPHONIA: Primary | ICD-10-CM

## 2021-04-21 DIAGNOSIS — Z01.818 PRE-OPERATIVE EXAMINATION: ICD-10-CM

## 2021-04-21 PROCEDURE — 99207 PR NO CHARGE LOS: CPT | Performed by: SPEECH-LANGUAGE PATHOLOGIST

## 2021-04-21 PROCEDURE — 71046 X-RAY EXAM CHEST 2 VIEWS: CPT | Performed by: RADIOLOGY

## 2021-04-21 PROCEDURE — 92507 TX SP LANG VOICE COMM INDIV: CPT | Mod: GN | Performed by: SPEECH-LANGUAGE PATHOLOGIST

## 2021-04-21 NOTE — LETTER
"4/21/2021       RE: Ca Yan  [address]  [facility] [address]     Dear Colleague,    Thank you for referring your patient, Ca Yan, to the [facility] VOICE [facility] [place] at [facility]. Please see a copy of my visit note below.    [facility]  THERAPY NOTE (CPT 58858)    Patient: Ca Yan  Date of Service: 4/21/2021  Referring physician: Dr. Roche  Impressions from most recent evaluation by Dr. Roche on 1/19/21:  \"1. Dysphonia   - since January 2020 in the context of hospital admission for PNA  - started voice therapy without improvement with Mirta Blake, Ph.D., CCC/SLP  - scope on 8/6 very difficult to be tolerated by the patient but does show left vocal fold paralysis and no other lesions  - CT neck 10/8/20 - no lesions along left RLN - does have tortuous thoracic aorta due to scoliosis which is causing compression of esophagus - could cause compression of the RLN as well    - CT chest from 1/15/20 which does show some dilation of the pyriform which can point to a left vocal fold paralysis  - though unlikely etiology given sudden onset of symptoms in January  - discussed that sine she had so much trouble tolerating the exam in the office she is not a candidate for a vocal fold awake injection  - she is now 1 year out from voice changes with persistent breathy dysphonia and therefore persistent VF paralysis   - discussed options of IL under general anesthesia or implant under MAC  - she state she is too scared of her breathing to go with either procedure  - she states her issue with the voice is that she cant leave a message because \"the machine says it cant hear me'  - and she can't talk when she is in a store if she were to go to a store \"    SUBJECTIVE:  Since the patient's last session, they report the following:     Overall symptoms are about the same    OBJECTIVE:    THERAPEUTIC ACTIVITIES    Counseling " and Education:    Asked many questions about the nature of their symptoms, and I answered all of these thoroughly.    Instructed concepts and techniques for optimal vocal hygiene including:    Systemic hydration, including strategies for increasing daily water intake    Topical hydration - Gargling, saline nasal irrigation, humidification, steam, guaifenesin    Awareness and reduction of phonotraumatic behaviors    Avoiding cough and throat clearing    Exercises to promote optimal respiratory mechanics    Practiced in a forward leaning seated posture to facilitate awareness of low respiratory engagement    Patient experienced greatest accuracy reclining/relaxing in her chair    With clinician support, patient was able to demonstrate improved abdominal relaxation and engagement on inhalation    Rescue breathing strategies using oral configurations to promote improved vocal fold abduction were instructed    Patient reported nasal inhalation with semioccluded exhalation was most beneficial    Straw inhalation was also utilized and outwardly best accuracy was achieved with this modality    Patient was able to demonstrate use of these techniques in combination with low respiratory engagement with good accuracy and minimal clinician support    A plan for when and how to implement these strategies was developed, and the patient was encouraged to practice the techniques independent of distress two times daily to habituate their use.    Semi-Occluded Vocal Tract (SOVT) exercises instructed to reduce laryngeal tension, promote vocal fold pliability, and coordinate respiration and phonation    Straw phonation was found to be most facilitating     Sustained phonation, and voice vs. voiceless productions used to promote easy voicing and raise awareness of laryngeal tension    Ascending and descending glides utilized to promote vocal fold pliability    Instructed to use these exercises as a warm-up / cooldown, and to re-calibrate  the voice throughout the day.    Good accuracy with moderate clinician support      A regimen for home practice was instructed.    I provided handouts of today's therapeutic activities to facilitate practice, and the patient's friend Cyndi Hanks was present to offer additional support.    ASSESSMENT/PLAN  PROGRESS TOWARD LONG TERM GOALS:   Adequate progress; too early for objective measures    IMPRESSIONS: Dysphonia (R 49.0) in the context of a unilateral vocal fold paralysis (J 38.01). Ca seemed more calm during today's session, perhaps due to the support of her friend who has accompanied her.  She had questions about the upcoming procedure and these were answered to the best of my ability.  Most importantly the relative safety of the procedure, and that breathing issues are quite uncommon following such interventions.  Despite this the goal was to feel more comfortable and confident in her ability to manage breathing issues on the rare chance that they occur.  She was able to demonstrate good use of rescue breathing strategies and low respiratory engagement, as well as semi-occluded vocal tract exercises to rebalance effort and technique following the procedure.    PLAN: I will see Ca in ~2 weeks for a telephone appointment, at which point we will gauge improvement following her procedure with DR. Roche.   For practice goals see AVS.       TOTAL SERVICE TIME: 60 minutes  TREATMENT (12914)  NO CHARGE FACILITY FEE (29972)    Nikita Snow M.M., M.A., CCC-SLP  Speech-Language Pathologist  Certificate of Vocology  [phone]    *this report was created in part through the use of computerized dictation software, and though reviewed following completion, some typographic errors may persist.  If there is confusion regarding any of this notes contents, please contact me for clarification.*        Again, thank you for allowing me to participate in the care of your patient.      Sincerely,    Wiley nSow,  SLP

## 2021-04-21 NOTE — PATIENT INSTRUCTIONS
Tips for after the procedure.    Drink plenty of hydrating fluids (water or other noncaffeinated and non-alcoholic beverages).  If you feel irritated, try breathing some steam, or using a salt water gargle (see the recipe below). If  you find you want to cough a lot, try using a hard candy / sugar free gum / or a non-mentol cough drop like Ludens.    Recipe: For nasal irrigation and gargle:    6-8 oz glass     warm water   ? distilled  ? feel for right temperature (not too hot or cold)  ? warm enough to dissolve the salt      tsp. kosher salt, or sea salt   ? Additives in table salt can cause irritation/ discomfort.      tsp. baking soda  (can alternate this recipe with one saline packet)  Gargle:    Using Saline Rinse or plain water  ? morning and night   ? Tilt your head side to side  ? Wiggle your tongue around  ? Help the water get to all the nooks and crannies    I also want you to do a simple voice exercise using a straw and water. See the cup and bubble exercise sheet.    EXERCISES!  [email or web address]  Rescue breathing patterns ** can also use a  shhhh  exhalation if it s easier    Pursed lip or Straw inhalation with  blowing out a candle  exhalation    Sniff inhalation with  blowing out a candle  exhalation    Sniff x2 with  blowing out a candle  exhalation  IF you start to feel any difficulty with your breathing use one of the rescue breathing strategies from above in the positions and low breathing described below:     Find a relaxed position, it can be leaning back in your chair or even laying down in bed    You can also tart off in a forward leaning position while seated with your elbows on your knees    Focus on feeling the expansion into your low belly and rib cage  o You can rest your hand on your belly to feel this  o Each time breathe out to nearly empty and back in to comfortably (but not overly) full  o Recognize that as you switch from breathing out to breathing in  you will feel a sense of  release  or recoil in your rib cage and belly. That helps you know you breathed out enough!  When I should practice    Practice the routine twice a day until your surgery so you feel confident    Remember that I don t think this is going to be a big issue, but I still want you to feel as relaxed and confident as you can be      Cup and Bubble Exercises      WHY:  Though these exercises seems (and feels) silly they are helpful for a number of reasons.  First, they make you use your air generously and consistently, helping you to coordinate your breath and your voice.  Second, they lengthen and narrow the vocal tract with the straw.  This narrowing (or semi-occlusion in scientific terms) creates back pressure in your throat which has been shown to help the vocal folds vibrate more easily and reduce how hard some of the other muscles are squeezing.    To practice breathing: Start off in a forward leaning position with your elbows on your knees.  Feel the expansion into your belly and ribcage as you inhale and gentle contraction in the same area as you exhale.  Let yourself sit back up and maintain that pattern.    HOW:    Straw Phonation - make sound through the straw (like it s a kazoo).  Make sure you are using your air freely.  You can hold your hand in front of the straw to test, and you should be able to feel the air moving.    Straw with Water - Fill the cup (or bottle) about 2 inches full of water. Blow bubbles through the straw while keeping your voice on. (kind of like making an  ooooo  sound through straw).Keep the water bubbling the whole time. That means your air is moving consistently.      Or you can use a tongue or lip bubble!    Feel how open and relaxed your throat feels when you practice these sounds. If the bubbling or air stops or it gets tight, don t sweat it.  Just breath, relax, and start again.  Across all the exercises make sure you are getting a nice low breath and  feeling the steady inward motion of low abdominal muscles when making sound.    Practice 5 times a day for no more than 3 minutes, and whenever you feel fatigued.          Aren t sure if you are doing it right? Ask yourself these three questions:  1. Does it feel easy?  2. Is the airflow consistent?  3. Do you feel that forward buzz?      What sounds to make:    Start off with just bubbles to give yourself a point of comparison for how little you need to work in your throat    Single pitches - use any comfortable pitch and sustain the note for as long as it feels free and easy, and your lips or tongue are bubbling.        Sighs - glide from high to low like you are sitting down in a comfortable chair after a long day of work.  The goal on this one is the low pitch, so don t worry about starting too high. This is about the low note        [place] - Start on a medium pitch and glide up just a bit and back down   The goal on this one is the high pitch. This is about the high note

## 2021-04-22 DIAGNOSIS — Z01.818 PRE-OPERATIVE EXAMINATION: ICD-10-CM

## 2021-04-22 DIAGNOSIS — I10 BENIGN ESSENTIAL HYPERTENSION: ICD-10-CM

## 2021-04-22 DIAGNOSIS — E78.5 HYPERLIPIDEMIA LDL GOAL <100: ICD-10-CM

## 2021-04-22 DIAGNOSIS — R06.02 SOB (SHORTNESS OF BREATH): ICD-10-CM

## 2021-04-22 DIAGNOSIS — F41.9 ANXIETY AND DEPRESSION: ICD-10-CM

## 2021-04-22 DIAGNOSIS — F32.A ANXIETY AND DEPRESSION: ICD-10-CM

## 2021-04-22 DIAGNOSIS — Z11.59 ENCOUNTER FOR HEPATITIS C SCREENING TEST FOR LOW RISK PATIENT: ICD-10-CM

## 2021-04-22 LAB
ALBUMIN SERPL-MCNC: 3.6 G/DL (ref 3.4–5)
ALP SERPL-CCNC: 104 U/L (ref 40–150)
ALT SERPL W P-5'-P-CCNC: 14 U/L (ref 0–50)
ANION GAP SERPL CALCULATED.3IONS-SCNC: 2 MMOL/L (ref 3–14)
AST SERPL W P-5'-P-CCNC: 17 U/L (ref 0–45)
BILIRUB SERPL-MCNC: 0.5 MG/DL (ref 0.2–1.3)
BUN SERPL-MCNC: 26 MG/DL (ref 7–30)
CALCIUM SERPL-MCNC: 8.9 MG/DL (ref 8.5–10.1)
CHLORIDE SERPL-SCNC: 103 MMOL/L (ref 94–109)
CHOLEST SERPL-MCNC: 289 MG/DL
CO2 SERPL-SCNC: 32 MMOL/L (ref 20–32)
CREAT SERPL-MCNC: 0.92 MG/DL (ref 0.52–1.04)
ERYTHROCYTE [DISTWIDTH] IN BLOOD BY AUTOMATED COUNT: 14.9 % (ref 10–15)
GFR SERPL CREATININE-BSD FRML MDRD: 60 ML/MIN/{1.73_M2}
GLUCOSE SERPL-MCNC: 86 MG/DL (ref 70–99)
HCT VFR BLD AUTO: 42.1 % (ref 35–47)
HCV AB SERPL QL IA: NONREACTIVE
HDLC SERPL-MCNC: 90 MG/DL
HGB BLD-MCNC: 13.3 G/DL (ref 11.7–15.7)
LDLC SERPL CALC-MCNC: 182 MG/DL
MCH RBC QN AUTO: 31.3 PG (ref 26.5–33)
MCHC RBC AUTO-ENTMCNC: 31.6 G/DL (ref 31.5–36.5)
MCV RBC AUTO: 99 FL (ref 78–100)
NONHDLC SERPL-MCNC: 200 MG/DL
PLATELET # BLD AUTO: 223 10E9/L (ref 150–450)
POTASSIUM SERPL-SCNC: 4.1 MMOL/L (ref 3.4–5.3)
PROT SERPL-MCNC: 7.8 G/DL (ref 6.8–8.8)
RBC # BLD AUTO: 4.25 10E12/L (ref 3.8–5.2)
SODIUM SERPL-SCNC: 138 MMOL/L (ref 133–144)
TRIGL SERPL-MCNC: 87 MG/DL
TSH SERPL DL<=0.005 MIU/L-ACNC: 2.04 MU/L (ref 0.4–4)
WBC # BLD AUTO: 5.6 10E9/L (ref 4–11)

## 2021-04-22 PROCEDURE — 84443 ASSAY THYROID STIM HORMONE: CPT | Performed by: PATHOLOGY

## 2021-04-22 PROCEDURE — 86803 HEPATITIS C AB TEST: CPT | Performed by: PATHOLOGY

## 2021-04-22 PROCEDURE — 85027 COMPLETE CBC AUTOMATED: CPT | Performed by: PATHOLOGY

## 2021-04-22 PROCEDURE — 80061 LIPID PANEL: CPT | Performed by: PATHOLOGY

## 2021-04-22 PROCEDURE — 80053 COMPREHEN METABOLIC PANEL: CPT | Performed by: PATHOLOGY

## 2021-04-22 PROCEDURE — 36415 COLL VENOUS BLD VENIPUNCTURE: CPT | Performed by: PATHOLOGY

## 2021-04-23 ENCOUNTER — TELEPHONE (OUTPATIENT)
Dept: PULMONOLOGY | Facility: CLINIC | Age: 76
End: 2021-04-23

## 2021-04-23 NOTE — TELEPHONE ENCOUNTER
Patient calling to ask why she can't use an Inogen POC. RN spoke with someone at [facility] [place] and they state she did not pass the titration test. RN spoke with the patient and explained how the pulsed dose machines work and why it is not appropriate for her. Patient verbalized understanding and all questions were answered.

## 2021-04-25 ENCOUNTER — NURSE TRIAGE (OUTPATIENT)
Dept: NURSING | Facility: CLINIC | Age: 76
End: 2021-04-25

## 2021-04-25 NOTE — TELEPHONE ENCOUNTER
Patient calling with difficulty breathing. Reports she had called when she was trying to figure out her portable oxygen machine and began to get worked up over not getting the oxygen to come out. Reports she had anxiety, getting hot and sweaty but upon returning to her apartment and attaching to her home oxygen feeling better. Reports fixing the portable oxygen machine and is breathing normally. Reports regular oxygen use and current breathing is not any more labored than normal. Denies breathing that stopped, losing consciousness or confusion. Reviewed symptoms that would escalate patient to needing to be seen in the urgent care or ER. Patient reports nothing is needed at this time. Patient to follow up in clinic routinely.     Arlene Valenzuela RN 04/25/21 11:49 AM   [facility] Triage Nurse Advisor    Reason for Disposition    [1] MILD longstanding difficulty breathing AND [2]  SAME as normal    Additional Information    Negative: [1] Breathing stopped AND [2] hasn't returned    Negative: Choking on something    Negative: Severe difficulty breathing (e.g., struggling for each breath, speaks in single words)    Negative: Bluish (or gray) lips or face now    Negative: Difficult to awaken or acting confused (e.g., disoriented, slurred speech)    Negative: Passed out (i.e., lost consciousness, collapsed and was not responding)    Negative: Wheezing started suddenly after medicine, an allergic food or bee sting    Negative: Stridor    Negative: Slow, shallow and weak breathing    Negative: Sounds like a life-threatening emergency to the triager    Negative: Chest pain    Negative: [1] Wheezing (high pitched whistling sound) AND [2] previous asthma attacks or use of asthma medicines    Negative: [1] Difficulty breathing AND [2] only present when coughing    Negative: [1] Difficulty breathing AND [2] only from stuffy or runny nose    Negative: [1] MODERATE difficulty breathing (e.g., speaks in phrases, SOB even at rest, pulse  "100-120) AND [2] NEW-onset or WORSE than normal    Negative: Wheezing can be heard across the room    Negative: Drooling or spitting out saliva (because can't swallow)    Negative: History of prior \"blood clot\" in leg or lungs (i.e., deep vein thrombosis, pulmonary embolism)    Negative: History of inherited increased risk of blood clots (e.g., Factor 5 Leiden, Anti-thrombin 3, Protein C or Protein S deficiency, Prothrombin mutation)    Negative: Recent illness requiring prolonged bedrest (i.e., immobilization)    Negative: Hip or leg fracture in past 2 months (e.g., had cast on leg or ankle)    Negative: Major surgery in the past month    Negative: Recent long-distance travel with prolonged time in car, bus, plane, or train (i.e., within past 2 weeks; 6 or  more hours duration)    Negative: Extra heart beats OR irregular heart beating   (i.e., \"palpitations\")    Negative: Fever > 103 F (39.4 C)    Negative: [1] Fever > 101 F (38.3 C) AND [2] age > 60    Negative: [1] Fever > 100.0 F (37.8 C) AND [2] bedridden (e.g., nursing home patient, CVA, chronic illness, recovering from surgery)    Negative: [1] Fever > 100.0 F (37.8 C) AND [2] diabetes mellitus or weak immune system (e.g., HIV positive, cancer chemo, splenectomy, organ transplant, chronic steroids)    Negative: [1] Periods where breathing stops and then resumes normally AND [2] bedridden (e.g., nursing home patient, CVA)    Negative: Pregnant or postpartum (< 1 month since delivery)    Negative: Patient sounds very sick or weak to the triager    Negative: [1] MILD difficulty breathing (e.g., minimal/no SOB at rest, SOB with walking, pulse <100) AND [2] NEW-onset or WORSE than normal    Negative: [1] Longstanding difficulty breathing (e.g., CHF, COPD, emphysema) AND [2] WORSE than normal    Negative: [1] Longstanding difficulty breathing AND [2] not responding to usual therapy    Negative: [1] Continuous (nonstop) coughing AND [2] keeps from working or " sleeping    Negative: [1] MODERATE longstanding difficulty breathing (e.g., speaks in phrases, SOB even at rest, pulse 100-120) AND [2] SAME as normal    Protocols used: BREATHING DIFFICULTY-A-AH    COVID 19 Nurse Triage Plan/Patient Instructions    Please be aware that novel coronavirus (COVID-19) may be circulating in the community. If you develop symptoms such as fever, cough, or SOB or if you have concerns about the presence of another infection including coronavirus (COVID-19), please contact your health care provider or visit [email or web address].     Disposition/Instructions    In-Person Visit with provider recommended. Reference Visit Selection Guide.    Thank you for taking steps to prevent the spread of this virus.  o Limit your contact with others.  o Wear a simple mask to cover your cough.  o Wash your hands well and often.    Resources    [facility]: About COVID-19: [email or web address]    CDC: What to Do If You're Sick: www.cdc.gov/coronavirus/2019-ncov/about/steps-when-sick.html    CDC: Ending Home Isolation: www.cdc.gov/coronavirus/2019-ncov/hcp/disposition-in-home-patients.html     CDC: Caring for Someone: www.cdc.gov/coronavirus/2019-ncov/if-you-are-sick/care-for-someone.html     [facility]: Interim Guidance for Hospital Discharge to Home: www.health.[facility].[place]/diseases/coronavirus/hcp/hospdischarge.pdf    [facility] clinical trials (COVID-19 research studies): [email or web address]-clinical-trials     Below are the COVID-19 hotlines at the [place] Department of Health ([facility]). Interpreters are available.   o For health questions: Call 293-926-7345 or 1-952.389.3452 (7 a.m. to 7 p.m.)  o For questions about schools and childcare: Call 238-770-6483 or 1-974.218.1335 (7 a.m. to 7 p.m.)

## 2021-04-26 ENCOUNTER — VIRTUAL VISIT (OUTPATIENT)
Dept: PALLIATIVE CARE | Facility: CLINIC | Age: 76
End: 2021-04-26
Attending: INTERNAL MEDICINE
Payer: COMMERCIAL

## 2021-04-26 DIAGNOSIS — F43.22 ADJUSTMENT DISORDER WITH ANXIOUS MOOD: ICD-10-CM

## 2021-04-26 PROCEDURE — 99214 OFFICE O/P EST MOD 30 MIN: CPT | Mod: TEL | Performed by: INTERNAL MEDICINE

## 2021-04-26 PROCEDURE — 999N001193 HC VIDEO/TELEPHONE VISIT; NO CHARGE

## 2021-04-26 RX ORDER — SERTRALINE HYDROCHLORIDE 100 MG/1
150 TABLET, FILM COATED ORAL DAILY
Qty: 45 TABLET | Refills: 3 | Status: SHIPPED | OUTPATIENT
Start: 2021-04-26 | End: 2021-11-08

## 2021-04-26 NOTE — PATIENT INSTRUCTIONS
Thank you for seeing the Palliative Care Clinic today.      1. Will increase the sertraline to 150 mg daily.     NOTE:  Only go to 150 if you are taking the 100 mg tablets now.  From our records, you have 50 mg tablets and were taking 1.5 (75 mg).  When you check your bottle, if this is the case, only increase to 100 mg.  Would not increase by >50 mg at a time.     Return to clinic on an as-needed basis for a follow-up.      You can reach the Palliative Care Team during business hours at the following numbers:   -For the [facility], call [phone]  -To reach the palliative RN for questions or refills, call [phone]       To reach the Palliative Care Provider on-call After-hours or on holidays and weekends, call: [phone].  Please note that we are not able to provide pain medication refills on evenings or weekends.

## 2021-04-26 NOTE — LETTER
"4/26/2021       RE: Ca Yan  [address]  [place] [address]     Dear Colleague,    [facility] you [facility] your [facility] Yan[facility]. Please see a copy of my visit note below.    Ca is a 75 year old who is being evaluated via a billable telephone visit.      What phone number would you like to be contacted at? [phone]  How would you like to obtain your AVS? Mail a copy     I have reviewed and updated the patient's allergies and medication list.    Concerns: none  Refills: none     Vitals - Patient Reported  Weight (Patient Reported): 88 kg (194 lb)  Height (Patient Reported): 162.6 cm (5' 4\")  BMI (Based on Pt Reported Ht/Wt): 33.3  Pain Score: No Pain (0)    Lissy Walls CMA      Prefers to use her home phone - [phone].     Palliative Care Outpatient Clinic Progress Note    Patient Name:  Ca Yan  Primary Provider:  Js Saunders    Chief Complaint/Patient ID:   [id number] F with chronic respiratory failure due to kyphoscoliosis and restrictive lung disease, ?COPD on O2 at night and with exertion  Anxiety  Social isolation    Last Palliative Care Appointment:  1/25/21 - continued on sertraline 75 mg daily, encouraged to reach out for new therapist, discussed considering who could be her health care agent, extensive discussion about her worries about the coronavirus vaccine    Interim History:  Ca Yan 75 year old female returns to be seen by palliative care today.  Telephone visit performed due to coronavirus outbreak.      Continues to see Dr. Roche in ENT/Voice clinic for dysphonia with persistent vocal fold paralysis - had discussed temporary injection laryngoplasty or implant - scheduled for 4/30.     Breathing continues to be a problem, says is worst when she gets anxious.  Feeling anxious every day, says she is really suffering from this. " "Worst before bed. The only thing that has seemed to help is playing soft music.  Not interested in seeing a therapist.  Says she is taking 100 mg sertraline prescribed by the psychiatrist she saw prior to coronavirus.  Wants something else to help.  Doing breathing exercises from [facility], but felt overwhelmed at how complicated the instructions were, that it was a lot to remember. Feels her anxiety has been exacerbated by the recent death of a friend last week, one last year, and living alone.  Has looked into assisted living but \"they take all your money\".     Social History:  Pertinent changes to social history/social situation since last visit:   Lives alone in independent senior apartment  Key support resources: Uses [employer] for transportation.  Hx of alcohol misuse  Advance Directive Status:  States she has completed, though not in our system.  Today again discussed - says her friend Cyndi is closest to her and they have been visiting and touch of the phone during the pandemic.  She would consider talking to her about being a healthcare agent.  Social History     Tobacco Use     Smoking status: Former Smoker     Packs/day: 0.50     Years: 5.00     Pack years: 2.50     Types: Cigarettes     Start date: 1956     Quit date: 1980     Years since quittin.5     Smokeless tobacco: Former User     Quit date: 1980   Substance Use Topics     Alcohol use: Not Currently     Alcohol/week: 0.0 standard drinks     Comment: Sober for 2 years, previous alcoholic     Drug use: No       Allergies   Allergen Reactions     Azithromycin Itching     Codeine Nausea and Vomiting     Hydrocodone Nausea and Vomiting     Metronidazole Nausea     Oxycodone Itching and Rash     Percocet [Oxycodone-Acetaminophen] Nausea and Vomiting     Pollen Extract      sneezing and runny nose.      Seasonal Allergies      sneezing and runny nose.      Vicodin [Hydrocodone-Acetaminophen] Nausea and Vomiting     Zolpidem      Amnestic " behavior     Current Outpatient Medications   Medication Sig Dispense Refill     atorvastatin (LIPITOR) 40 MG tablet Take 1 tablet (40 mg) by mouth daily 90 tablet 3     cephALEXin (KEFLEX) 250 MG capsule Take 250 mg by mouth       ibuprofen (ADVIL,MOTRIN) 200 MG tablet Take 3 tablets (600 mg) by mouth 3 times daily (with meals) 90 tablet 0     latanoprost (XALATAN) 0.005 % ophthalmic solution 1 drop       losartan-hydrochlorothiazide (HYZAAR) 100-25 MG tablet Take 1 tablet by mouth daily 90 tablet 3     quetiapine (SEROQUEL) 200 MG tablet Take 200 mg by mouth At Bedtime.       sertraline (ZOLOFT) 50 MG tablet Take 1.5 tablets (75 mg) by mouth daily 45 tablet 3     Vitamin D3 (CHOLECALCIFEROL) 25 mcg (1000 units) tablet Take 1 tablet (25 mcg) by mouth daily 100 tablet 3     Unable to perform physical exam because of telephone visit.  She is speaking in full paragraphs, with anxious emotion her voice.    Key Data Reviewed:  LABS:   Last GFR 60, HCO3 32  Hb 13.3  TSH 2.04    TTE 4/12/21  Normal LV EF, indeterminate diastolic function. Global RV function mildly reduced.     IMAGING: No new imaging.     Impression & Recommendations & Counseling:  Ca Yan is a 75 year old female with history of h/o anxiety, HTN, increased cholesterol, and chronic respiratory failure, attributed to COPD but likely more from kyphoscoliosis and restriction. She was referred to Palliative care primarily for guidance on goals of care, updating her HCD, and emotional support. However, she declined needing help with updating her HCD, and has been focused on her anxiety, which was exacerbated by the further physical isolation recommended for coronavirus.  Had initial response to sertraline, feels is less so.      Anxiety - uncontrolled.  Discussed options including uptitration of sertraline, adding Buspar, +/- establishing with a new therapist.  She is not interested in therapy, does not feel it's been helpful.  Says she's tried Buspar  and didn't like it.  Is on high-dose seroquel at night. Asks about Klonopin, but I wouldn't recommend this for chronic anxiety and with her COPD.   - Is agreeable to uptitration of sertraline - says she is taking 100 mg tablets (by our records was taking 75 mg?).  Will prescribe 150 mg daily.  Told her if she is in fact taking 50 mg tablets, then only take the 100 mg, not the extra 50  - Will continue with breathing exercises.     Advanced Care Planning - discussed the role of a health care agent, considering if she would trust Cyndi for this and talking about it with Cyndi.  Does not want to do until after her procedure.  PCP has made honoring choices referral also.     Discussed follow-up - she doesn't feel these visits have been helpful, and at this point if she is having refractory anxiety I'd recommend she see psychiatry or psychotherapy.  She'd prefer to follow-up with her PCP.  Discussed we could follow-up on an as-needed basis if her PCP felt there were new concerns.     Phone call duration:  27 minutes    Alison Schuster MD  Palliative Medicine  Pager [phone]

## 2021-04-27 DIAGNOSIS — Z11.59 ENCOUNTER FOR SCREENING FOR OTHER VIRAL DISEASES: ICD-10-CM

## 2021-04-27 LAB
LABORATORY COMMENT REPORT: NORMAL
SARS-COV-2 RNA RESP QL NAA+PROBE: NEGATIVE
SARS-COV-2 RNA RESP QL NAA+PROBE: NORMAL
SPECIMEN SOURCE: NORMAL
SPECIMEN SOURCE: NORMAL

## 2021-04-27 PROCEDURE — U0003 INFECTIOUS AGENT DETECTION BY NUCLEIC ACID (DNA OR RNA); SEVERE ACUTE RESPIRATORY SYNDROME CORONAVIRUS 2 (SARS-COV-2) (CORONAVIRUS DISEASE [COVID-19]), AMPLIFIED PROBE TECHNIQUE, MAKING USE OF HIGH THROUGHPUT TECHNOLOGIES AS DESCRIBED BY CMS-2020-01-R: HCPCS | Performed by: PATHOLOGY

## 2021-04-27 PROCEDURE — U0005 INFEC AGEN DETEC AMPLI PROBE: HCPCS | Performed by: PATHOLOGY

## 2021-04-28 ENCOUNTER — TELEPHONE (OUTPATIENT)
Dept: OTOLARYNGOLOGY | Facility: CLINIC | Age: 76
End: 2021-04-28

## 2021-04-28 ENCOUNTER — TELEPHONE (OUTPATIENT)
Dept: FAMILY MEDICINE | Facility: CLINIC | Age: 76
End: 2021-04-28

## 2021-04-28 NOTE — TELEPHONE ENCOUNTER
[facility]    Phone Message    May a detailed message be left on voicemail: yes     Reason for Call: Requesting Results   Name/type of test: Echo  Date of test: 04/12/2021  Was test done at a location other than [facility] (Please fill in the location if not [facility])?: No      Action Taken: Message routed to:  [facility] ([facility]): PCC    Travel Screening: Not Applicable

## 2021-04-28 NOTE — TELEPHONE ENCOUNTER
Patient called with procedure questions. Discussed what to expect post operatively in detail. Patient specifically asked about swallowing restrictions and sore throat. No further questions at this time.    Patricia Sanchez RN on 4/28/2021 at 4:56 PM

## 2021-04-29 ENCOUNTER — ANESTHESIA EVENT (OUTPATIENT)
Dept: SURGERY | Facility: CLINIC | Age: 76
End: 2021-04-29
Payer: COMMERCIAL

## 2021-04-29 ENCOUNTER — TELEPHONE (OUTPATIENT)
Dept: OTOLARYNGOLOGY | Facility: CLINIC | Age: 76
End: 2021-04-29

## 2021-04-29 RX ORDER — NALOXONE HYDROCHLORIDE 0.4 MG/ML
0.4 INJECTION, SOLUTION INTRAMUSCULAR; INTRAVENOUS; SUBCUTANEOUS
Status: CANCELLED | OUTPATIENT
Start: 2021-04-29 | End: 2021-04-30

## 2021-04-29 RX ORDER — MEPERIDINE HYDROCHLORIDE 25 MG/ML
12.5 INJECTION INTRAMUSCULAR; INTRAVENOUS; SUBCUTANEOUS
Status: CANCELLED | OUTPATIENT
Start: 2021-04-29

## 2021-04-29 RX ORDER — ACETAMINOPHEN 325 MG/1
975 TABLET ORAL ONCE
Status: CANCELLED | OUTPATIENT
Start: 2021-04-29 | End: 2021-04-29

## 2021-04-29 RX ORDER — ONDANSETRON 4 MG/1
4 TABLET, ORALLY DISINTEGRATING ORAL EVERY 30 MIN PRN
Status: CANCELLED | OUTPATIENT
Start: 2021-04-29

## 2021-04-29 RX ORDER — FENTANYL CITRATE 50 UG/ML
25-50 INJECTION, SOLUTION INTRAMUSCULAR; INTRAVENOUS
Status: CANCELLED | OUTPATIENT
Start: 2021-04-29

## 2021-04-29 RX ORDER — GABAPENTIN 100 MG/1
100 CAPSULE ORAL ONCE
Status: CANCELLED | OUTPATIENT
Start: 2021-04-29 | End: 2021-04-29

## 2021-04-29 RX ORDER — ONDANSETRON 2 MG/ML
4 INJECTION INTRAMUSCULAR; INTRAVENOUS EVERY 30 MIN PRN
Status: CANCELLED | OUTPATIENT
Start: 2021-04-29

## 2021-04-29 RX ORDER — NALOXONE HYDROCHLORIDE 0.4 MG/ML
0.2 INJECTION, SOLUTION INTRAMUSCULAR; INTRAVENOUS; SUBCUTANEOUS
Status: CANCELLED | OUTPATIENT
Start: 2021-04-29 | End: 2021-04-30

## 2021-04-29 RX ORDER — SODIUM CHLORIDE, SODIUM LACTATE, POTASSIUM CHLORIDE, CALCIUM CHLORIDE 600; 310; 30; 20 MG/100ML; MG/100ML; MG/100ML; MG/100ML
INJECTION, SOLUTION INTRAVENOUS CONTINUOUS
Status: CANCELLED | OUTPATIENT
Start: 2021-04-29

## 2021-04-29 NOTE — TELEPHONE ENCOUNTER
[facility]    Phone Message    May a detailed message be left on voicemail: yes     Reason for Call: Other:    Pt has a procedure tomorrow and was given a special soap that they want her to use when showering tonight and tomorrow morning. Pt is concerned because the soap says it might cause difficulty breathing and wheezing. Pt is already on oxygen.       Please follow-up with pt ASAP to advise. Please leave direct line if unable to reach pt so pt can call back.     Action Taken: Other:  ent    Travel Screening: Not Applicable

## 2021-04-29 NOTE — TELEPHONE ENCOUNTER
"Returned patient's call regarding concerns. Patient is concerned that the chlorhexidine soap may cause her to have difficulty breathing. On the label it states, \"Allergy alert: may cause severe allergic reactions. May include: -Difficulty breathing or wheezing,..\" and so on. Patient concerned about this item. Patient states, \"I already have trouble breathing, how do I know I won't have an allergic reaction\". Discussed that these types of reactions are rare, and that based on her current allergy list, there isn't an indication that she would have a severe reaction. Patient expressed anxiousness and desire to not use this soap.    Gave patient an alternative soap option. Patient was relieved by this. Discussed the procedure at length. Discussed arrival time, NPO instructions, and approximate end time of procedure. Discussed what to expect post-operatively. Patient denies any further questions at this time.    Patricia Sanchez RN on 4/29/2021 at 9:34 AM    "

## 2021-04-29 NOTE — TELEPHONE ENCOUNTER
"Called patient.  Gave provider test result interpretation. Patient question if test result is a concern, especially \"Trace aortic insufficiency is present.\"  This shouldn't be a concern but can double check with Dr. Sahu.  Patient decline to check with provider since provider interpretation is no concern findings and good result.     Patient is feeling fine besides her usual SOB. Instruct patient to call us if she has concerns or questions.  Patient gave verbal understanding.    Jama De La Vega CMA ([facility]) at 11:01 AM on 4/29/2021     "

## 2021-04-30 ENCOUNTER — HOSPITAL ENCOUNTER (OUTPATIENT)
Facility: CLINIC | Age: 76
Discharge: HOME | End: 2021-04-30
Attending: OTOLARYNGOLOGY | Admitting: OTOLARYNGOLOGY
Payer: COMMERCIAL

## 2021-04-30 ENCOUNTER — ANESTHESIA (OUTPATIENT)
Dept: SURGERY | Facility: CLINIC | Age: 76
End: 2021-04-30
Payer: COMMERCIAL

## 2021-04-30 VITALS
RESPIRATION RATE: 16 BRPM | OXYGEN SATURATION: 85 % | WEIGHT: 193.56 LBS | DIASTOLIC BLOOD PRESSURE: 97 MMHG | HEART RATE: 77 BPM | BODY MASS INDEX: 33.05 KG/M2 | SYSTOLIC BLOOD PRESSURE: 151 MMHG | HEIGHT: 64 IN | TEMPERATURE: 97.8 F

## 2021-04-30 LAB — GLUCOSE BLDC GLUCOMTR-MCNC: 92 MG/DL (ref 70–99)

## 2021-04-30 PROCEDURE — 82962 GLUCOSE BLOOD TEST: CPT

## 2021-04-30 PROCEDURE — 370N000017 HC ANESTHESIA TECHNICAL FEE, PER MIN: Performed by: OTOLARYNGOLOGY

## 2021-04-30 PROCEDURE — 272N000002 HC OR SUPPLY OTHER OPNP: Performed by: OTOLARYNGOLOGY

## 2021-04-30 PROCEDURE — 710N000012 HC RECOVERY PHASE 2, PER MINUTE: Performed by: OTOLARYNGOLOGY

## 2021-04-30 PROCEDURE — 272N000001 HC OR GENERAL SUPPLY STERILE: Performed by: OTOLARYNGOLOGY

## 2021-04-30 PROCEDURE — 360N000076 HC SURGERY LEVEL 3, PER MIN: Performed by: OTOLARYNGOLOGY

## 2021-04-30 PROCEDURE — 999N000141 HC STATISTIC PRE-PROCEDURE NURSING ASSESSMENT: Performed by: OTOLARYNGOLOGY

## 2021-04-30 PROCEDURE — 250N000013 HC RX MED GY IP 250 OP 250 PS 637: Performed by: ANESTHESIOLOGY

## 2021-04-30 PROCEDURE — 258N000003 HC RX IP 258 OP 636: Performed by: ANESTHESIOLOGY

## 2021-04-30 PROCEDURE — 250N000009 HC RX 250: Performed by: OTOLARYNGOLOGY

## 2021-04-30 RX ORDER — FENTANYL CITRATE 50 UG/ML
25-50 INJECTION, SOLUTION INTRAMUSCULAR; INTRAVENOUS
Status: DISCONTINUED | OUTPATIENT
Start: 2021-04-30 | End: 2021-04-30 | Stop reason: HOSPADM

## 2021-04-30 RX ORDER — SODIUM CHLORIDE, SODIUM LACTATE, POTASSIUM CHLORIDE, CALCIUM CHLORIDE 600; 310; 30; 20 MG/100ML; MG/100ML; MG/100ML; MG/100ML
INJECTION, SOLUTION INTRAVENOUS CONTINUOUS
Status: DISCONTINUED | OUTPATIENT
Start: 2021-04-30 | End: 2021-04-30

## 2021-04-30 RX ORDER — NALOXONE HYDROCHLORIDE 0.4 MG/ML
0.2 INJECTION, SOLUTION INTRAMUSCULAR; INTRAVENOUS; SUBCUTANEOUS
Status: DISCONTINUED | OUTPATIENT
Start: 2021-04-30 | End: 2021-04-30 | Stop reason: HOSPADM

## 2021-04-30 RX ORDER — SODIUM CHLORIDE, SODIUM LACTATE, POTASSIUM CHLORIDE, CALCIUM CHLORIDE 600; 310; 30; 20 MG/100ML; MG/100ML; MG/100ML; MG/100ML
INJECTION, SOLUTION INTRAVENOUS CONTINUOUS
Status: DISCONTINUED | OUTPATIENT
Start: 2021-04-30 | End: 2021-04-30 | Stop reason: HOSPADM

## 2021-04-30 RX ORDER — GABAPENTIN 100 MG/1
100 CAPSULE ORAL ONCE
Status: COMPLETED | OUTPATIENT
Start: 2021-04-30 | End: 2021-04-30

## 2021-04-30 RX ORDER — NALOXONE HYDROCHLORIDE 0.4 MG/ML
0.4 INJECTION, SOLUTION INTRAMUSCULAR; INTRAVENOUS; SUBCUTANEOUS
Status: DISCONTINUED | OUTPATIENT
Start: 2021-04-30 | End: 2021-04-30 | Stop reason: HOSPADM

## 2021-04-30 RX ORDER — ONDANSETRON 4 MG/1
4 TABLET, ORALLY DISINTEGRATING ORAL EVERY 30 MIN PRN
Status: DISCONTINUED | OUTPATIENT
Start: 2021-04-30 | End: 2021-04-30 | Stop reason: HOSPADM

## 2021-04-30 RX ORDER — MEPERIDINE HYDROCHLORIDE 25 MG/ML
12.5 INJECTION INTRAMUSCULAR; INTRAVENOUS; SUBCUTANEOUS
Status: DISCONTINUED | OUTPATIENT
Start: 2021-04-30 | End: 2021-04-30 | Stop reason: HOSPADM

## 2021-04-30 RX ORDER — OXYMETAZOLINE HYDROCHLORIDE 0.05 G/100ML
SPRAY NASAL PRN
Status: DISCONTINUED | OUTPATIENT
Start: 2021-04-30 | End: 2021-04-30 | Stop reason: HOSPADM

## 2021-04-30 RX ORDER — ACETAMINOPHEN 325 MG/1
975 TABLET ORAL ONCE
Status: COMPLETED | OUTPATIENT
Start: 2021-04-30 | End: 2021-04-30

## 2021-04-30 RX ORDER — CEFAZOLIN SODIUM 2 G/100ML
2 INJECTION, SOLUTION INTRAVENOUS SEE ADMIN INSTRUCTIONS
Status: DISCONTINUED | OUTPATIENT
Start: 2021-04-30 | End: 2021-04-30 | Stop reason: HOSPADM

## 2021-04-30 RX ORDER — LIDOCAINE 40 MG/G
CREAM TOPICAL
Status: DISCONTINUED | OUTPATIENT
Start: 2021-04-30 | End: 2021-04-30 | Stop reason: HOSPADM

## 2021-04-30 RX ORDER — ONDANSETRON 2 MG/ML
4 INJECTION INTRAMUSCULAR; INTRAVENOUS EVERY 30 MIN PRN
Status: DISCONTINUED | OUTPATIENT
Start: 2021-04-30 | End: 2021-04-30 | Stop reason: HOSPADM

## 2021-04-30 RX ORDER — CEFAZOLIN SODIUM 2 G/100ML
2 INJECTION, SOLUTION INTRAVENOUS
Status: DISCONTINUED | OUTPATIENT
Start: 2021-04-30 | End: 2021-04-30 | Stop reason: HOSPADM

## 2021-04-30 RX ADMIN — Medication 2 MCG: at 14:16

## 2021-04-30 RX ADMIN — ACETAMINOPHEN 975 MG: 325 TABLET, FILM COATED ORAL at 12:18

## 2021-04-30 RX ADMIN — GABAPENTIN 100 MG: 100 CAPSULE ORAL at 12:20

## 2021-04-30 RX ADMIN — Medication 4 MCG: at 14:14

## 2021-04-30 RX ADMIN — SODIUM CHLORIDE, POTASSIUM CHLORIDE, SODIUM LACTATE AND CALCIUM CHLORIDE: 600; 310; 30; 20 INJECTION, SOLUTION INTRAVENOUS at 14:00

## 2021-04-30 RX ADMIN — Medication 6 MCG: at 14:22

## 2021-04-30 ASSESSMENT — MIFFLIN-ST. JEOR: SCORE: 1348

## 2021-04-30 ASSESSMENT — COPD QUESTIONNAIRES: COPD: 1

## 2021-04-30 NOTE — ANESTHESIA PREPROCEDURE EVALUATION
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 Normal Z score. FRAX risk: major osteoporotic fracture 11.9%, hip fracture 2.6%, therefore not high-risk     Lymphedema, chronic lower extremities       Past Surgical History:   Procedure Laterality Date     BACK SURGERY      spinal fusion     COLONOSCOPY       LUMPECTOMY BREAST       ORTHOPEDIC SURGERY      Knee surgery left side      Allergies   Allergen Reactions     Azithromycin Itching     Codeine Nausea and Vomiting     Hydrocodone Nausea and Vomiting     Metronidazole Nausea     Oxycodone Itching and Rash     Percocet [Oxycodone-Acetaminophen] Nausea and Vomiting     Pollen Extract      sneezing and runny nose.      Seasonal Allergies      sneezing and runny nose.      Vicodin [Hydrocodone-Acetaminophen] Nausea and Vomiting     Zolpidem      Amnestic behavior      Social History     Tobacco Use     Smoking status: Former Smoker     Packs/day: 0.50     Years: 5.00     Pack years: 2.50     Types: Cigarettes     Start date: 1956     Quit date: 1980     Years since quittin.6     Smokeless tobacco: Former User     Quit date: 1980   Substance Use Topics     Alcohol use: Not Currently     Alcohol/week: 0.0 standard drinks     Comment: Sober for 2 years, previous alcoholic      Wt Readings from Last 1 Encounters:   21 87.8 kg (193 lb 9 oz)        Anesthesia Evaluation            ROS/MED HX  ENT/Pulmonary:     (+) COPD, O2 dependent, during  Both,     Neurologic:       Cardiovascular:     (+) hypertension-----SAM.     METS/Exercise Tolerance: 1 - Eating, dressing    Hematologic:       Musculoskeletal:       GI/Hepatic:     (+) GERD,     Renal/Genitourinary:       Endo:       Psychiatric/Substance Use:     (+) psychiatric history anxiety     Infectious Disease:       Malignancy:       Other:            Physical Exam    Airway         TM distance: > 3 FB   Neck ROM: full   Mouth opening: > 3 cm    Respiratory Devices and Support         Dental           Cardiovascular   cardiovascular exam normal          Pulmonary           (+) decreased breath sounds           OUTSIDE LABS:  CBC:   Lab Results   Component Value Date    WBC 5.6 04/22/2021    WBC 5.4 08/20/2020    HGB 13.3 04/22/2021    HGB 14.3 08/20/2020    HCT 42.1 04/22/2021    HCT 45.1 08/20/2020     04/22/2021     08/20/2020     BMP:   Lab Results   Component Value Date     04/22/2021     08/20/2020    POTASSIUM 4.1 04/22/2021    POTASSIUM 3.9 08/20/2020    CHLORIDE 103 04/22/2021    CHLORIDE 103 08/20/2020    CO2 32 04/22/2021    CO2 32 08/20/2020    BUN 26 04/22/2021    BUN 18 08/20/2020    CR 0.92 04/22/2021    CR 0.81 08/20/2020    GLC 86 04/22/2021    GLC 88 08/20/2020     COAGS: No results found for: PTT, INR, FIBR  POC:   Lab Results   Component Value Date    BGM 92 04/30/2021    HCG Negative 02/07/2006     HEPATIC:   Lab Results   Component Value Date    ALBUMIN 3.6 04/22/2021    PROTTOTAL 7.8 04/22/2021    ALT 14 04/22/2021    AST 17 04/22/2021    GGT 20 02/08/2006    ALKPHOS 104 04/22/2021    BILITOTAL 0.5 04/22/2021     OTHER:   Lab Results   Component Value Date    A1C 5.8 04/21/2016    SYMONE 8.9 04/22/2021    MAG 2.5 (H) 09/25/2019    LIPASE 126 02/07/2006    TSH 2.04 04/22/2021    T4 0.77 10/05/2015    CRP 6.4 09/25/2019    SED 18 09/25/2019       Anesthesia Plan    ASA Status:  3      Anesthesia Type: General.     - Airway: ETT   Induction: Intravenous.    Maintenance: Inhalation.        Consents    Anesthesia Plan(s) and associated risks, benefits, and realistic alternatives discussed. Questions answered and patient/representative(s) expressed understanding.     - Discussed with:  Patient      - Extended Intubation/Ventilatory Support Discussed: Yes.         Postoperative Care       PONV prophylaxis: Ondansetron (or other 5HT-3)     Comments:    Pt on home O2, did not bring. When dc'd from O2 to walk to bathroom, returned with Spo2 80s. Likely unsafe for transport home without O2. D/w surgeon. Admit to obs vs cancel procedure.     Pt's friend will go to her home to get the O2 for transport home postop. If this is unable to be completed, ENT will need to admit pt overnight. Surgeon aware.            Brenna Núñez MD

## 2021-04-30 NOTE — DISCHARGE INSTRUCTIONS
[facility] [place]  Same-Day Surgery   Adult Discharge Orders & Instructions     For 24 hours after surgery    1. Get plenty of rest.  A responsible adult must stay with you for at least 24 hours after you leave the hospital.   2. Do not drive or use heavy equipment.  If you have weakness or tingling, don't drive or use heavy equipment until this feeling goes away.  3. Do not drink alcohol.  4. Avoid strenuous or risky activities.  Ask for help when climbing stairs.   5. You may feel lightheaded.  IF so, sit for a few minutes before standing.  Have someone help you get up.   6. If you have nausea (feel sick to your stomach): Drink only clear liquids such as apple juice, ginger ale, broth or 7-Up.  Rest may also help.  Be sure to drink enough fluids.  Move to a regular diet as you feel able.  7. You may have a slight fever. Call the doctor if your fever is over 100 F (37.7 C) (taken under the tongue) or lasts longer than 24 hours.  8. You may have a dry mouth, a sore throat, muscle aches or trouble sleeping.  These should go away after 24 hours.  9. Do not make important or legal decisions.     To contact a doctor, call ________________________________________ or:        [phone] and ask for the resident on call for   ______________________________________________ (answered 24 hours a day)      Emergency Department:    [facility]: [phone]       (TTY for hearing impaired: [phone])    [facility]: [phone]       (TTY for hearing impaired: [phone])

## 2021-04-30 NOTE — OR NURSING
Patient went to bathroom and upon return patient was very short of breath. Lungs clear but O 2 sats 85 om room air. O2 sats on 2 liters 95 to 97.  Patient very anxious.

## 2021-04-30 NOTE — OP NOTE
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 Discussed that she is at risk of requiring more support after the surgery with oxygenation and hospital admission. She stated she does not want to proceed if that were the case. Discussed doing an awake procedure instead can be attempted. She agreed with this. The risks, benefits and alternatives of the surgery were discussed. The patient wishes to proceed with surgery and has signed an informed consent.     FINDINGS:   Attempted via transcricothyroid approach however due to continuous swallowing the positioning of the needle could not be confirmed and the procedure was stopped  Anatomic/physiological deviations: left vocal fold paralysis   Right vocal process: No restriction of mobility   Left vocal process: Marked restriction of mobility  Glottal gap: Glottal gap  Supraglottic structures: Normal  Hypopharynx: Normal      DESCRIPTION OF PROCEDURE:   Then, the patient was seated upright. The areas of the nasopharynx as well as the hypopharynx were anesthetized with topical 4% lidocaine with 0.25% phenylephrine atomizer. The scope was then passed in the right or left nasal cavity, depending upon which side had the greater opening, and passed in through middle or the inferior meatus. Upon reaching the nasopharynx, the patient was instructed  to breathe through the nasal cavity and the scope was passed inferiorly into the hypopharynx until the epiglottis was visualized. The scope was then passed beyond the epiglottis and both the brightness and focus were readjusted for maximum resolution. The vocal cords were visualized and the larynx was then carefully identified. An assessment of glottic closure was made. The chin of the patient was moved up and down to identify external landmarks including the cricoid, cricothyroid membrane, superior notch of the thyroid cartilage, and the hyoid bone. These landmarks were palpated and, based upon this, a decision was made as to the position of the vocal cords in relationship to external landmarks. A 27-gauge needle was then secured to the augmentation syringe and air was removed from the needle. The needle was passed transcartilagenous or transmembranous into the vocal fold. The needle tips were then observed on the video monitor, care being taken not to perforate the membranous vocal cord. Attempted via transcricothyroid approach however due to continuous swallowing the positioning of the needle could not be confirmed and the procedure was stopped and the scope was withdrawn atraumatically.       COMPLICATIONS:   None.  .   ESTIMATED BLOOD LOSS:   Less than 10cc    DISPOSITION:   PACU.    SPECIMENS:  * No specimens in log *       PHOTODOCUMENTATION:

## 2021-04-30 NOTE — ANESTHESIA CARE TRANSFER NOTE
Patient: Ca Yan    Procedure(s):  FLEXIBLE LARYNGOSCOPY    Diagnosis: Glottic insufficiency [J38.3]  Diagnosis Additional Information: No value filed.    Anesthesia Type:   General     Note:    Oropharynx: oropharynx clear of all foreign objects and spontaneously breathing  Level of Consciousness: awake  Oxygen Supplementation: nasal cannula  Level of Supplemental Oxygen (L/min / FiO2): 2  Independent Airway: airway patency satisfactory and stable  Dentition: dentition unchanged  Vital Signs Stable: post-procedure vital signs reviewed and stable  Report to RN Given: handoff report given  Patient transferred to: Phase II    Handoff Report: Identifed the Patient, Identified the Reponsible Provider, Reviewed the pertinent medical history, Discussed the surgical course, Reviewed Intra-OP anesthesia mangement and issues during anesthesia, Set expectations for post-procedure period and Allowed opportunity for questions and acknowledgement of understanding      Vitals: (Last set prior to Anesthesia Care Transfer)  CRNA VITALS  4/30/2021 1405 - 4/30/2021 1443      4/30/2021             Resp Rate (observed):         Electronically Signed By: RUPAL Villanueva CRNA  April 30, 2021  2:43 PM

## 2021-04-30 NOTE — BRIEF OP NOTE
[facility]    Brief Operative Note    Pre-operative diagnosis: Glottic insufficiency [J38.3]  Post-operative diagnosis Same as pre-operative diagnosis    Procedure: Procedure(s):  FLEXIBLE LARYNGOSCOPY  Surgeon: Surgeon(s) and Role:     * Domonique Roche MD - Primary  Anesthesia: General   Estimated blood loss: None  Drains: None  Specimens: * No specimens in log *  Findings:   None.  Complications: None.  Implants:   Implant Name Type Inv. Item Serial No.  Lot No. LRB No. Used Action   IMP VOCAL CORD PROLARYN GEL INJ 1ML [id number] Other IMP VOCAL CORD PROLARYN GEL INJ 1ML [id number] NA [employer] [id number] N/A 1 Implanted   IMP VOCAL CORD PROLARYN GEL INJ 1ML [id number] Other IMP VOCAL CORD PROLARYN GEL INJ 1ML [id number] NA [employer] [id number] N/A 1 Implanted

## 2021-05-03 ENCOUNTER — TELEPHONE (OUTPATIENT)
Dept: OTOLARYNGOLOGY | Facility: CLINIC | Age: 76
End: 2021-05-03

## 2021-05-03 NOTE — TELEPHONE ENCOUNTER
"Spoke to patient to discuss questions. Patient stated, \"my voice isn't any better and my breathing isn't better either\". Patient stated she does not remember what happened on Friday and that she didn't understand why it hurt so bad.    Discussed that the reason patient's voice is not better is because they were unable to do the injection due to patient's discomfort and swallowing. Explained that the pain was likely due to discomfort from the scope, as the patient does not tolerate the scope well.    Per Dr. Javier operative note on 4/30/21, the patient decided to attempt an awake procedure due to anesthesia risks and needing to stay the night in the hospital, oxygen needs. Discussed the below notes with patient.    \"...however with minimal activity patient's O2 sat decreased to mid 80s. Discussed that she is at risk of requiring more support after the surgery with oxygenation and hospital admission. She stated she does not want to proceed if that were the case. Discussed doing an awake procedure instead can be attempted. She agreed with this.\"    \"The needle was passed transcartilagenous or transmembranous into the vocal fold. The needle tips were then observed on the video monitor, care being taken not to perforate the membranous vocal cord. Attempted via transcricothyroid approach however due to continuous swallowing the positioning of the needle could not be confirmed and the procedure was stopped and the scope was withdrawn atraumatically.\"    When discussing the admission, the patient stated, \"well that would be fine\". Writer discussed in detail what happened and that there may have been miscommunication. The patient stated that she is \"fine with staying the night, but am I going to die?\" Patient expressed anxiousness regarding going under anesthesia and worried that because of her oxygen that she wasn't going to make it. Discussed patient's concerns. Also discussed that if the anxiousness is too much, then may " need to consider not doing an injection at all.    Encouraged patient to practice breathing techniques and voice exercises. Helped patient set up a virtual visit with Dr. Roche in order to discuss her concerns and the plan going forward.    Patient denies any further questions at this time. Spent a total of 28 minutes discussing the patient's concerns.    Patricia Sanchez RN on 5/3/2021 at 2:24 PM

## 2021-05-03 NOTE — ANESTHESIA POSTPROCEDURE EVALUATION
Patient: Ca Yan    Procedure(s):  FLEXIBLE LARYNGOSCOPY    Diagnosis:Glottic insufficiency [J38.3]  Diagnosis Additional Information: No value filed.    Anesthesia Type:  General    Note:  Disposition: Outpatient   Postop Pain Control: Uneventful            Sign Out: Well controlled pain   PONV: No   Neuro/Psych: Uneventful            Sign Out: Acceptable/Baseline neuro status   Airway/Respiratory: Uneventful            Sign Out: O2 supplementation; Acceptable/Baseline resp. status               Oxygen: Nasal Cannula   CV/Hemodynamics: Uneventful   Other NRE:    DID A NON-ROUTINE EVENT OCCUR? No           Last vitals:  Vitals:    04/30/21 1105 04/30/21 1200 04/30/21 1445   BP: (!) 151/97     Pulse: 77     Resp: 16     Temp: 36.6  C (97.8  F)  (P) 36.9  C (98.5  F)   SpO2: 97% (!) 85%        Last vitals prior to Anesthesia Care Transfer:  CRNA VITALS  4/30/2021 1405 - 4/30/2021 1505      4/30/2021             Resp Rate (observed):  (!) 1          Electronically Signed By: Brenna Núñez MD  May 3, 2021  4:09 PM

## 2021-05-03 NOTE — TELEPHONE ENCOUNTER
[facility] Call Center    Phone Message    May a detailed message be left on voicemail: yes     Reason for Call: Other: Pt of Dr. Roche's calling in stating she had a procedure done on last Friday and is confused on the after care instructions.      Pt stated she needs someone on the care team to call her and go over what she is supposed to be doing at home for her after care    Action Taken: Message routed to:  [facility] Ent    Travel Screening: Not Applicable

## 2021-05-04 ENCOUNTER — TELEPHONE (OUTPATIENT)
Dept: OTOLARYNGOLOGY | Facility: CLINIC | Age: 76
End: 2021-05-04

## 2021-05-04 NOTE — TELEPHONE ENCOUNTER
"Patient called to discuss Friday's procedure and plan going forward. Patient stated,   -\"I am disappointed with how things happened on Friday\"  -\"I feel bad that I have to keep asking my friend for rides\"  -\"If we do this again, am I going to need to get another pre-op and covid test? I can't be running around all of the time\"  -\"Concerned about needing oxygen when going to appointments\"    Discussed patients concerns. Writer provided empathy towards patients situation. Discussed Friday's procedure and what happened. Discussed that if it is decided to proceed with another procedure, she would need the H&P to be within 30 days, and a covid test within 4 days. Suggested that patient discuss with friend if she is willing to continue to be a part of her care and help with rides. Discussed that if friend is unable to give rides, that we can discuss other options for transportation- such as, [employer] for rides.    Discussed that virtual appointment with Dr. Roche is important to discuss expectations and plan. Requested that patient have friend, tanika Hanna for this appointment. Patient confirmed understanding. Denies further questions at this time. Spent a total of 8 minutes on this phone call.    Patricia Sanchez RN on 5/4/2021 at 12:31 PM    "

## 2021-05-06 ENCOUNTER — VIRTUAL VISIT (OUTPATIENT)
Dept: FAMILY MEDICINE | Facility: CLINIC | Age: 76
End: 2021-05-06
Payer: COMMERCIAL

## 2021-05-06 DIAGNOSIS — I10 ESSENTIAL HYPERTENSION: ICD-10-CM

## 2021-05-06 DIAGNOSIS — F41.1 GENERALIZED ANXIETY DISORDER: ICD-10-CM

## 2021-05-06 DIAGNOSIS — E78.5 HYPERLIPIDEMIA, UNSPECIFIED HYPERLIPIDEMIA TYPE: ICD-10-CM

## 2021-05-06 DIAGNOSIS — J38.3 GLOTTIC INSUFFICIENCY: Primary | ICD-10-CM

## 2021-05-06 DIAGNOSIS — R49.0 DYSPHONIA: ICD-10-CM

## 2021-05-06 DIAGNOSIS — F60.89 CLUSTER C PERSONALITY DISORDER (H): ICD-10-CM

## 2021-05-06 PROCEDURE — 99443 PR PHYSICIAN TELEPHONE EVALUATION 21-30 MIN: CPT | Performed by: FAMILY MEDICINE

## 2021-05-06 RX ORDER — ATORVASTATIN CALCIUM 40 MG/1
40 TABLET, FILM COATED ORAL DAILY
Qty: 90 TABLET | Refills: 3 | Status: SHIPPED | OUTPATIENT
Start: 2021-05-06 | End: 2022-04-13

## 2021-05-06 RX ORDER — LOSARTAN POTASSIUM AND HYDROCHLOROTHIAZIDE 25; 100 MG/1; MG/1
1 TABLET ORAL DAILY
Qty: 90 TABLET | Refills: 3 | Status: SHIPPED | OUTPATIENT
Start: 2021-05-06 | End: 2022-04-22

## 2021-05-06 NOTE — Clinical Note
Please schedule a follow-up in person visit  around 6/16 for fasting cholesterol and blood pressure recheck in one one month  Best wishes,  Favian Sahu MD

## 2021-05-06 NOTE — NURSING NOTE
Chief Complaint   Patient presents with     Recheck Medication     Kimberly Nissen, EMT at 9:22 AM on 5/6/2021

## 2021-05-06 NOTE — PATIENT INSTRUCTIONS
[phone]   Please contact your pharmacy to enroll in automatic refills to be mailed to you as you were not receiving the atorvastatin 40 mg for your cholesterol. Please take this medication daily once you receive it.  Please continue taking the Losartan potassium hydrochlorothiazide 100-25 mg combined medication for your Blood pressure  Please schedule a follow-up in person visit for fasting cholesterol and blood pressure recheck in one one month  Favian Menon MD

## 2021-05-10 ENCOUNTER — PATIENT OUTREACH (OUTPATIENT)
Dept: SURGERY | Facility: CLINIC | Age: 76
End: 2021-05-10

## 2021-05-10 ENCOUNTER — ALLIED HEALTH/NURSE VISIT (OUTPATIENT)
Dept: PULMONOLOGY | Facility: CLINIC | Age: 76
End: 2021-05-10
Payer: COMMERCIAL

## 2021-05-10 DIAGNOSIS — R06.02 SHORTNESS OF BREATH: Primary | ICD-10-CM

## 2021-05-10 NOTE — NURSING NOTE
Provided patient education/teaching for changing regulator on new oxygen tanks. Patient was able to demonstrate proper technique at end of teaching.  Written instruction given to patient.

## 2021-05-10 NOTE — PROGRESS NOTES
Called patient in response to her Call Center message.    C/o stool - formed, increasing to diarrhea.  Has had issues with fecal incontinence in the past but denies this now.    Not using fiber supplements.  Denied; fever/chills, nausea, abdominal pain, cramping or visible blood in stools.    Denied changes in appetite.    Encouraged patient to initiate Metamucil; increase oral fluid intake; scheduled for video visit with Dr. Latham on Wednesday, May 12th.

## 2021-05-12 ENCOUNTER — TELEPHONE (OUTPATIENT)
Dept: PULMONOLOGY | Facility: CLINIC | Age: 76
End: 2021-05-12

## 2021-05-12 NOTE — TELEPHONE ENCOUNTER
Followed up with Ca, she's calling to find out whether we have a determination from [employer] regarding possibility of getting her a POC. As I have previously told her, they are working on authorization and will call as soon as a determination is made.  She continues to be limited with regard to activities outside her home as the portable tanks [employer] have provided are too heavy for her. Pt declined offer of follow up with Dr. Taylor.  Writer contacted [employer] to request they provide pt with smaller tanks, advised by their customer service that do have tank that are smaller than the one she brought in to clinic, they will reach out to her to discuss and arrange a delivery.

## 2021-05-12 NOTE — TELEPHONE ENCOUNTER
[facility] Call Center    Phone Message    May a detailed message be left on voicemail: yes     Reason for Call: Other: Pt calling hoping to speak with a nurse regarding recent breathing problems that she has been having. Please call back to discuss.     Action Taken: Message routed to:  [facility] pulmonary    Travel Screening: Not Applicable

## 2021-05-17 ENCOUNTER — TELEPHONE (OUTPATIENT)
Facility: CLINIC | Age: 76
End: 2021-05-17

## 2021-05-17 NOTE — TELEPHONE ENCOUNTER
[facility] Call Center    Phone Message    May a detailed message be left on voicemail: yes     Reason for Call: Other: Quinn, Ca calling for [facility] nurse to call back.  Okay to Leave a message.     Action Taken: Message routed to:  [facility] Pulmonary    Travel Screening: Not Applicable                                                                    ,

## 2021-05-17 NOTE — TELEPHONE ENCOUNTER
Pt wondering whether [facility] are able to provider her with a POC.  I explained that they are working through their process and that we will contact her as soon as we hear from them.  Pt has declined to use the small tanks that writer arranged through [facility] [place].

## 2021-05-18 ENCOUNTER — VIRTUAL VISIT (OUTPATIENT)
Dept: OTOLARYNGOLOGY | Facility: CLINIC | Age: 76
End: 2021-05-18
Payer: COMMERCIAL

## 2021-05-18 VITALS — BODY MASS INDEX: 32.95 KG/M2 | HEIGHT: 64 IN | WEIGHT: 193 LBS

## 2021-05-18 DIAGNOSIS — J38.01 VOCAL FOLD PARALYSIS, UNILATERAL: Primary | ICD-10-CM

## 2021-05-18 PROCEDURE — 99441 PR PHYSICIAN TELEPHONE EVALUATION 5-10 MIN: CPT | Performed by: OTOLARYNGOLOGY

## 2021-05-18 ASSESSMENT — MIFFLIN-ST. JEOR: SCORE: 1345.44

## 2021-05-18 ASSESSMENT — PAIN SCALES - GENERAL: PAINLEVEL: NO PAIN (0)

## 2021-05-18 NOTE — PROGRESS NOTES
8Ca is a 75 year old who is being evaluated via a billable telephone visit.      What phone number would you like to be contacted at? [phone]  How would you like to obtain your AVS? Mail a copy  Phone call duration: 10 minutes        [facility]   at the [facility]   Otolaryngology Clinic     Patient: Ca Yan    MRN: [id number]    : 1945    Age/Gender: 75 year old female  Date of Service: 2021  Rendering Provider:   Domonique Roche MD     Chief Complaint   Dysphonia  S/p flexible laryngoscopy with attempted injection 21  Interval History   HISTORY OF PRESENT ILLNESS:    Ms. Yan is a 74 year old female is being followed for dysphonia. She was initially seen on 20. Please refer to this note for full history.   Of note she has had dysphonia since around the time of her hospital admission on 20 when she was admitted for pneumonia. She has started voice therapy with Mirat Blake, Ph.D., CCC/SLP.    Today, she presents for follow up with her friend Cyndi. They report   - she is scared to die  - she is afraid of procedures  - the last procedure was very painful  - she does not think she can sit and not swallow for a few minutes  PAST MEDICAL HISTORY:   Past Medical History:   Diagnosis Date     Anxiety      Arthritis      Breast cancer (H)      COPD (chronic obstructive pulmonary disease) (H)     12:  FEV 0.99 l     Depressive disorder      Hypertension      Low back pain with left-sided sciatica      Low bone density 2017    DEXA 2017: T score -2.0. Normal Z score. FRAX risk: major osteoporotic fracture 11.9%, hip fracture 2.6%, therefore not high-risk     Lymphedema, chronic lower extremities        PAST SURGICAL HISTORY:   Past Surgical History:   Procedure Laterality Date     BACK SURGERY      spinal fusion     COLONOSCOPY       LARYNGOSCOPY WITH MICROSCOPE N/A 2021    Procedure: FLEXIBLE LARYNGOSCOPY;  Surgeon: Melchor  MD Domonique;  Location: [facility] OR     LUMPECTOMY BREAST       ORTHOPEDIC SURGERY      Knee surgery left side       CURRENT MEDICATIONS:   Current Outpatient Medications:      atorvastatin (LIPITOR) 40 MG tablet, Take 1 tablet (40 mg) by mouth daily, Disp: 90 tablet, Rfl: 3     cephALEXin (KEFLEX) 250 MG capsule, Take 250 mg by mouth, Disp: , Rfl:      latanoprost (XALATAN) 0.005 % ophthalmic solution, 1 drop, Disp: , Rfl:      losartan-hydrochlorothiazide (HYZAAR) 100-25 MG tablet, Take 1 tablet by mouth daily, Disp: 90 tablet, Rfl: 3     quetiapine (SEROQUEL) 200 MG tablet, Take 200 mg by mouth At Bedtime., Disp: , Rfl:      sertraline (ZOLOFT) 100 MG tablet, Take 1.5 tablets (150 mg) by mouth daily, Disp: 45 tablet, Rfl: 3     Vitamin D3 (CHOLECALCIFEROL) 25 mcg (1000 units) tablet, Take 1 tablet (25 mcg) by mouth daily, Disp: 100 tablet, Rfl: 3    ALLERGIES: Azithromycin, Codeine, Hydrocodone, Metronidazole, Oxycodone, Percocet [oxycodone-acetaminophen], Pollen extract, Seasonal allergies, Vicodin [hydrocodone-acetaminophen], and Zolpidem    SOCIAL HISTORY:    Social History     Socioeconomic History     Marital status: Single     Spouse name: Not on file     Number of children: Not on file     Years of education: Not on file     Highest education level: Not on file   Occupational History     Employer: RETIRED   Social Needs     Financial resource strain: Not on file     Food insecurity     Worry: Not on file     Inability: Not on file     Transportation needs     Medical: Not on file     Non-medical: Not on file   Tobacco Use     Smoking status: Former Smoker     Packs/day: 0.50     Years: 5.00     Pack years: 2.50     Types: Cigarettes     Start date: 1956     Quit date: 1980     Years since quittin.6     Smokeless tobacco: Former User     Quit date: 1980   Substance and Sexual Activity     Alcohol use: Not Currently     Alcohol/week: 0.0 standard drinks     Comment: Sober for 2 years, previous  alcoholic     Drug use: No     Sexual activity: Not Currently     Partners: Male     Birth control/protection: Abstinence   Lifestyle     Physical activity     Days per week: Not on file     Minutes per session: Not on file     Stress: Not on file   Relationships     Social connections     Talks on phone: Once a week     Gets together: Never     Attends Buddhism service: More than 4 times per year     Active member of club or organization: Yes     Attends meetings of clubs or organizations: More than 4 times per year     Relationship status: Never      Intimate partner violence     Fear of current or ex partner: No     Emotionally abused: No     Physically abused: No     Forced sexual activity: No   Other Topics Concern     Parent/sibling w/ CABG, MI or angioplasty before 65F 55M? Not Asked      Service Not Asked     Blood Transfusions Not Asked     Caffeine Concern Not Asked     Occupational Exposure Not Asked     Hobby Hazards Not Asked     Sleep Concern Not Asked     Stress Concern Not Asked     Comment: Lives alone     Weight Concern Not Asked     Special Diet Not Asked     Back Care Not Asked     Comment: Hx of scoliosis with spine fusion     Exercise Not Asked     Comment: Walks     Bike Helmet Not Asked     Seat Belt Not Asked     Self-Exams Not Asked   Social History Narrative    Ca Yan never   Lives in apartment  is  family member is daughter Rin in [place], [place]  Previous AA meetings         FAMILY HISTORY:   Family History   Problem Relation Age of Onset     Breast Cancer Mother      Diabetes Mother      Anxiety Disorder Mother      Asthma Mother      Other Cancer Mother      Hyperlipidemia Mother      Alcohol/Drug Father      Mental Illness Father      Substance Abuse Father      Obesity Father      Cerebrovascular Disease Maternal Grandmother 67      Non-contributory for problems with anesthesia    REVIEW OF SYSTEMS:   The patient was asked a 14 point review of systems  regarding constitutional symptoms, eye symptoms, ears, nose, mouth, throat symptoms, cardiovascular symptoms, respiratory symptoms, gastrointestinal symptoms, genitourinary symptoms, musculoskeletal symptoms, integumentary symptoms, neurological symptoms, psychiatric symptoms, endocrine symptoms, hematologic/lymphatic symptoms, and allergic/ immunologic symptoms.   The pertinent factors have been included in the HPI and below.  Patient Supplied Answers to Review of Systems  [facility] E[facility] ROS 4/30/2020   Psychology Frequently feeling depressed or sad, Frequently feeling anxious   Ears, Nose, Throat Nasal congestion or drainage, Hoarseness   Cardiopulmonary Cough, Breathing problems   Musculoskeletal Sore or stiff joints, Swollen legs/feet   Allergy/Immunology Allergies or hay fever   Hematologic Easy bruising       Physical Examination   The patient underwent a physical examination as described below. The pertinent positive and negative findings are summarized after the description of the examination.  Neurologic: The patient's orientation, mood, and affect were noted.   Other pertinent positive and negative findings on physical examination:   Breathing comfortably on room air, no stridor with deep inspiration  no throat clearing throughout the visit     All other physical examination findings were within normal limits and noncontributory     Review of Relevant Clinical Data     Labs:  Lab Results   Component Value Date    TSH 2.04 04/22/2021     Lab Results   Component Value Date     04/22/2021    CO2 32 04/22/2021    BUN 26 04/22/2021    CREAT 0.9 10/08/2020     Lab Results   Component Value Date    WBC 5.6 04/22/2021    HGB 13.3 04/22/2021    HCT 42.1 04/22/2021    MCV 99 04/22/2021     04/22/2021     No results found for: PT, PTT, INR  No results found for: LONNY  No components found for: RHEUMATOIDFACTOR,  RF  Lab Results   Component Value Date    CRP 6.4 09/25/2019     No components found for: CKTOT,  "URICACID  No components found for: C3, C4, DSDNAAB, NDNAABIFA  No results found for: MPOAB    Patient reported Quality of Life (QOL) Measures   Patient Supplied Answers To VHI Questionnaire  Voice Handicap Index (VHI-10) 2020   My voice makes it difficult for people to hear me 0   People have difficulty understanding me in a noisy room 0   My voice difficulties restrict my personal and social life.  4   I feel left out of conversations because of my voice 0   My voice problem causes me to lose income 0   I feel as though I have to strain to produce voice 4   The clarity of my voice is unpredictable 4   My voice problem upsets me 4   My voice makes me feel handicapped 4   People ask, \"What's wrong with your voice?\" 0   VHI-10 20         Patient Supplied Answers To EAT Questionnaire  No flowsheet data found.      Patient Supplied Answers To CSI Questionnaire  Cough Severity Index (CSI) 2020   My cough is worse when I lie down 4   My coughing problem causes me to restrict my personal and social life 0   I tend to avoid places because of my cough problem 0   I feel embarrassed because of my coughing problem 3   People ask, ''What's wrong?'' because I cough a lot 0   I run out of air when I cough 4   My coughing problem affects my voice 4   My coughing problem limits my physical activity 4   My coughing problem upsets me 4   People ask me if I am sick because I cough a lot 0   CSI Score 23         Patient Supplied Answers to Dyspnea Index Questionnaire:  No flowsheet data found.     Impression & Plan     IMPRESSION: Ms. Yan is a 75 year old female who is being seen for the followin. 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 and no other lesions  - CT neck 10/8/20 - no lesions " "along left RLN - does have tortuous thoracic aorta due to scoliosis which is causing compression of esophagus - could cause compression of the RLN as well    - CT chest from 1/15/20 which does show some dilation of the pyriform which can point to a left vocal fold paralysis  - though unlikely etiology given sudden onset of symptoms in January  - discussed that since she had so much trouble tolerating the exam in the office she is not a candidate for a vocal fold awake injection  - she is now 1 year out from voice changes with persistent breathy dysphonia and therefore persistent VF paralysis   - discussed options of IL under general anesthesia or implant under MAC  - she state she is too scared of her breathing to go with either procedure  - she states her issue with the voice is that she cant leave a message because \"the machine says it cant hear me'  - and she can't talk when she is in a store if she were to go to a store   - she tried an amplifier but it did not work  - scope on 3/25 shows 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  - discussed options again temporary vs permanent (CAHA, fat and implant) - risks and benefits of them  - S/p flexible laryngoscopy with attempted injection 4/30/21  - does not want general anesthesia  - discussed MAC with thyroplasty - not interested given anesthesia component  - discussed repeat awake injection with anxiolytic - states she cannot sit still without swallowing for this   - decided to not proceed with any further intervention anymore  - given her dependence on oxygen she is at risk for anesthesia and is not interested in taking any risk for this and she cannot tolerate awake procedures   Plan  - observation            RETURN VISIT: as needed      oDmonique Roche MD    Laryngology    [facility]  Department of  Otolaryngology - Head and Neck Surgery    Cl[facility]  [address] [place] [id number]  Appointment " line: [phone]  Fax: [phone]  [email or web address]

## 2021-05-18 NOTE — LETTER
2021       RE: Ca Yan  [address]  [facility] [address]     Dear Colleague,    Thank you for referring your patient, Ca Yan, to the [facility] [place] at [facility]. Please see a copy of my visit note below.    Lyndsay is a 75 year old who is being evaluated via a billable telephone visit.      What phone number would you like to be contacted at? [phone]  How would you like to obtain your AVS? Mail a copy  Phone call duration: 10 minutes        [facility]   at the [facility]   Otolaryngology Clinic     Patient: Ca Yan    MRN: [id number]    : 1945    Age/Gender: 75 year old female  Date of Service: 2021  Rendering Provider:   Domonique Roche MD     Chief Complaint   Dysphonia  S/p flexible laryngoscopy with attempted injection 21  Interval History   HISTORY OF PRESENT ILLNESS:    Ms. Yan is a 74 year old female is being followed for dysphonia. She was initially seen on 20. Please refer to this note for full history.   Of note she has had dysphonia since around the time of her hospital admission on 20 when she was admitted for pneumonia. She has started voice therapy with Mirta Blake, Ph.D., CCC/SLP.    Today, she presents for follow up with her friend Cyndi. They report   - she is scared to die  - she is afraid of procedures  - the last procedure was very painful  - she does not think she can sit and not swallow for a few minutes  PAST MEDICAL HISTORY:   Past Medical History:   Diagnosis Date     Anxiety      Arthritis      Breast cancer (H)      COPD (chronic obstructive pulmonary disease) (H)     12:  FEV 0.99 l     Depressive disorder      Hypertension      Low back pain with left-sided sciatica      Low bone density 2017    DEXA 2017: T score -2.0. Normal Z score. FRAX risk: major osteoporotic fracture  11.9%, hip fracture 2.6%, therefore not high-risk     Lymphedema, chronic lower extremities        PAST SURGICAL HISTORY:   Past Surgical History:   Procedure Laterality Date     BACK SURGERY      spinal fusion     COLONOSCOPY       LARYNGOSCOPY WITH MICROSCOPE N/A 4/30/2021    Procedure: FLEXIBLE LARYNGOSCOPY;  Surgeon: Domonique Roche MD;  Location: [facility] OR     LUMPECTOMY BREAST       ORTHOPEDIC SURGERY      Knee surgery left side       CURRENT MEDICATIONS:   Current Outpatient Medications:      atorvastatin (LIPITOR) 40 MG tablet, Take 1 tablet (40 mg) by mouth daily, Disp: 90 tablet, Rfl: 3     cephALEXin (KEFLEX) 250 MG capsule, Take 250 mg by mouth, Disp: , Rfl:      latanoprost (XALATAN) 0.005 % ophthalmic solution, 1 drop, Disp: , Rfl:      losartan-hydrochlorothiazide (HYZAAR) 100-25 MG tablet, Take 1 tablet by mouth daily, Disp: 90 tablet, Rfl: 3     quetiapine (SEROQUEL) 200 MG tablet, Take 200 mg by mouth At Bedtime., Disp: , Rfl:      sertraline (ZOLOFT) 100 MG tablet, Take 1.5 tablets (150 mg) by mouth daily, Disp: 45 tablet, Rfl: 3     Vitamin D3 (CHOLECALCIFEROL) 25 mcg (1000 units) tablet, Take 1 tablet (25 mcg) by mouth daily, Disp: 100 tablet, Rfl: 3    ALLERGIES: Azithromycin, Codeine, Hydrocodone, Metronidazole, Oxycodone, Percocet [oxycodone-acetaminophen], Pollen extract, Seasonal allergies, Vicodin [hydrocodone-acetaminophen], and Zolpidem    SOCIAL HISTORY:    Social History     Socioeconomic History     Marital status: Single     Spouse name: Not on file     Number of children: Not on file     Years of education: Not on file     Highest education level: Not on file   Occupational History     Employer: RETIRED   Social Needs     Financial resource strain: Not on file     Food insecurity     Worry: Not on file     Inability: Not on file     Transportation needs     Medical: Not on file     Non-medical: Not on file   Tobacco Use     Smoking status: Former Smoker     Packs/day: 0.50     Years: 5.00      Pack years: 2.50     Types: Cigarettes     Start date: 1956     Quit date: 1980     Years since quittin.6     Smokeless tobacco: Former User     Quit date: 1980   Substance and Sexual Activity     Alcohol use: Not Currently     Alcohol/week: 0.0 standard drinks     Comment: Sober for 2 years, previous alcoholic     Drug use: No     Sexual activity: Not Currently     Partners: Male     Birth control/protection: Abstinence   Lifestyle     Physical activity     Days per week: Not on file     Minutes per session: Not on file     Stress: Not on file   Relationships     Social connections     Talks on phone: Once a week     Gets together: Never     Attends Mandaeism service: More than 4 times per year     Active member of club or organization: Yes     Attends meetings of clubs or organizations: More than 4 times per year     Relationship status: Never      Intimate partner violence     Fear of current or ex partner: No     Emotionally abused: No     Physically abused: No     Forced sexual activity: No   Other Topics Concern     Parent/sibling w/ CABG, MI or angioplasty before 65F 55M? Not Asked      Service Not Asked     Blood Transfusions Not Asked     Caffeine Concern Not Asked     Occupational Exposure Not Asked     Hobby Hazards Not Asked     Sleep Concern Not Asked     Stress Concern Not Asked     Comment: Lives alone     Weight Concern Not Asked     Special Diet Not Asked     Back Care Not Asked     Comment: Hx of scoliosis with spine fusion     Exercise Not Asked     Comment: Walks     Bike Helmet Not Asked     Seat Belt Not Asked     Self-Exams Not Asked   Social History Narrative    Ca Yuriy never   Lives in apartment  is  family member is daughter Rin in [place], [place]  Previous AA meetings         FAMILY HISTORY:   Family History   Problem Relation Age of Onset     Breast Cancer Mother      Diabetes Mother      Anxiety Disorder Mother      Asthma Mother       Other Cancer Mother      Hyperlipidemia Mother      Alcohol/Drug Father      Mental Illness Father      Substance Abuse Father      Obesity Father      Cerebrovascular Disease Maternal Grandmother 67      Non-contributory for problems with anesthesia    REVIEW OF SYSTEMS:   The patient was asked a 14 point review of systems regarding constitutional symptoms, eye symptoms, ears, nose, mouth, throat symptoms, cardiovascular symptoms, respiratory symptoms, gastrointestinal symptoms, genitourinary symptoms, musculoskeletal symptoms, integumentary symptoms, neurological symptoms, psychiatric symptoms, endocrine symptoms, hematologic/lymphatic symptoms, and allergic/ immunologic symptoms.   The pertinent factors have been included in the HPI and below.  Patient Supplied Answers to Review of Systems  [facility] ENT ROS 4/30/2020   Psychology Frequently feeling depressed or sad, Frequently feeling anxious   Ears, Nose, Throat Nasal congestion or drainage, Hoarseness   Cardiopulmonary Cough, Breathing problems   Musculoskeletal Sore or stiff joints, Swollen legs/feet   Allergy/Immunology Allergies or hay fever   Hematologic Easy bruising       Physical Examination   The patient underwent a physical examination as described below. The pertinent positive and negative findings are summarized after the description of the examination.  Neurologic: The patient's orientation, mood, and affect were noted.   Other pertinent positive and negative findings on physical examination:   Breathing comfortably on room air, no stridor with deep inspiration  no throat clearing throughout the visit     All other physical examination findings were within normal limits and noncontributory     Review of Relevant Clinical Data     Labs:  Lab Results   Component Value Date    TSH 2.04 04/22/2021     Lab Results   Component Value Date     04/22/2021    CO2 32 04/22/2021    BUN 26 04/22/2021    CREAT 0.9 10/08/2020     Lab Results   Component Value Date  "   WBC 5.6 04/22/2021    HGB 13.3 04/22/2021    HCT 42.1 04/22/2021    MCV 99 04/22/2021     04/22/2021     No results found for: PT, PTT, INR  No results found for: LONNY  No components found for: RHEUMATOIDFACTOR,  RF  Lab Results   Component Value Date    CRP 6.4 09/25/2019     No components found for: CKTOT, URICACID  No components found for: C3, C4, DSDNAAB, NDNAABIFA  No results found for: MPOAB    Patient reported Quality of Life (QOL) Measures   Patient Supplied Answers To VHI Questionnaire  Voice Handicap Index (VHI-10) 4/30/2020   My voice makes it difficult for people to hear me 0   People have difficulty understanding me in a noisy room 0   My voice difficulties restrict my personal and social life.  4   I feel left out of conversations because of my voice 0   My voice problem causes me to lose income 0   I feel as though I have to strain to produce voice 4   The clarity of my voice is unpredictable 4   My voice problem upsets me 4   My voice makes me feel handicapped 4   People ask, \"What's wrong with your voice?\" 0   VHI-10 20         Patient Supplied Answers To EAT Questionnaire  No flowsheet data found.      Patient Supplied Answers To CSI Questionnaire  Cough Severity Index (CSI) 4/30/2020   My cough is worse when I lie down 4   My coughing problem causes me to restrict my personal and social life 0   I tend to avoid places because of my cough problem 0   I feel embarrassed because of my coughing problem 3   People ask, ''What's wrong?'' because I cough a lot 0   I run out of air when I cough 4   My coughing problem affects my voice 4   My coughing problem limits my physical activity 4   My coughing problem upsets me 4   People ask me if I am sick because I cough a lot 0   CSI Score 23         Patient Supplied Answers to Dyspnea Index Questionnaire:  No flowsheet data found.     Impression & Plan     IMPRESSION: Ms. Yan is a 75 year old female who is being seen for the " "followin. 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 and no other lesions  - CT neck 10/8/20 - no lesions along left RLN - does have tortuous thoracic aorta due to scoliosis which is causing compression of esophagus - could cause compression of the RLN as well    - CT chest from 1/15/20 which does show some dilation of the pyriform which can point to a left vocal fold paralysis  - though unlikely etiology given sudden onset of symptoms in January  - discussed that since she had so much trouble tolerating the exam in the office she is not a candidate for a vocal fold awake injection  - she is now 1 year out from voice changes with persistent breathy dysphonia and therefore persistent VF paralysis   - discussed options of IL under general anesthesia or implant under MAC  - she state she is too scared of her breathing to go with either procedure  - she states her issue with the voice is that she cant leave a message because \"the machine says it cant hear me'  - and she can't talk when she is in a store if she were to go to a store   - she tried an amplifier but it did not work  - scope on 3/25 shows 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  - discussed options again temporary vs permanent (CAHA, fat and implant) - risks and benefits of them  - S/p flexible laryngoscopy with attempted injection 21  - does not want general anesthesia  - discussed MAC with thyroplasty - not interested given anesthesia component  - discussed repeat awake injection with anxiolytic - states she cannot sit still without swallowing for this   - decided to not proceed with any further intervention anymore  - given her dependence on oxygen she is at risk for anesthesia and is not interested in taking any " risk for this and she cannot tolerate awake procedures   Plan  - observation            RETURN VISIT: as needed      Domonique Roche MD    Laryngology    [facility]  Department of  Otolaryngology - Head and Neck Surgery    [facility] [place] [address]  Appointment line: [phone]  Fax: [phone]  [email or web address]        Again, thank you for allowing me to participate in the care of your patient.      Sincerely,    Domonique Roche MD

## 2021-05-19 ENCOUNTER — TELEPHONE (OUTPATIENT)
Dept: FAMILY MEDICINE | Facility: CLINIC | Age: 76
End: 2021-05-19

## 2021-05-19 NOTE — PATIENT INSTRUCTIONS
1.  You were seen in the ENT Clinic today by . If you have any questions or concerns after your appointment, please call [phone]. Press option #1 for scheduling related needs. Press option #3 for Nurse advice.    2.   Plan is to return to clinic as needed or sooner if new of worsening symptoms develop.      Annabella Asif LPN  [phone]  [facility] - Otolaryngology

## 2021-05-19 NOTE — TELEPHONE ENCOUNTER
[facility] Call Center    Phone Message    May a detailed message be left on voicemail: yes     Reason for Call: Other: The patient wanted to speak with the care team about procedure sedation for patient with breathing issues, the patient was inquiring about this because she plans to have procedures in the future but the surgeons and not moving forward with the surgery because they can sedate the patient with her current breathing issues. Please follow up with any other resolution to this type of issues thank you.     Action Taken: Message routed to:  [facility] ([facility]    Travel Screening: Not Applicable

## 2021-05-20 NOTE — TELEPHONE ENCOUNTER
"Patient called back to discuss concerns. Patient states she is \"worried about if she will ever have to go under sedation that she is afraid she will die\"    Discussed with patient that at this time, Dr. Roche does not recommend an additional procedure, and patient agrees.    Patient continues to state, \"what if something happens to me where I need it and I might die\". Discussed that these are fears that are not currently realistic. Discussed that if the patient ever needs a procedure in the future, her and the surgeon will discuss the risks and benefits at that time and will make an informed decision.    Asked patient see's a therapist for her anxiety. Patient states, \"I have one, but haven't done therapy in a while\".    Encouraged patient to reach out to her therapist to get an appointment to work through some of these fears. Patient states she thinks that is a good idea. She will call her therapist, Francisco, and get an appointment scheduled.    Patient is to follow up as needed with further questions and concerns regarding her ENT care.    Patricia Sanchez RN on 5/20/2021 at 3:51 PM    "

## 2021-05-20 NOTE — TELEPHONE ENCOUNTER
Left vm for patient regarding message sent to ENT. Informed patient that per last visit with Dr. Roche we are not pursuing another procedure. Patient is to follow up as needed. If patient would like to discuss further, provided direct call number as well as clinic phone number.    Patricia Sanchez RN on 5/20/2021 at 2:05 PM

## 2021-05-24 ENCOUNTER — TELEPHONE (OUTPATIENT)
Dept: PULMONOLOGY | Facility: CLINIC | Age: 76
End: 2021-05-24

## 2021-05-24 NOTE — TELEPHONE ENCOUNTER
[facility] Call Center    Phone Message    May a detailed message be left on voicemail: yes     Reason for Call: Other: Pt calling hoping to speak with Brandon regarding getting her set up with oxygen from [facility]. She reports that [facility] had called her and told her that she was on some kind of waiting list, and she wondered if Brandon knew anything about that. Please call back to discuss.     Action Taken: Message routed to:  [facility] pulmonary    Travel Screening: Not Applicable

## 2021-05-24 NOTE — TELEPHONE ENCOUNTER
Reassured Ca that [facility] are working on her request for POC, she will be contacted with update in near future.

## 2021-06-01 ENCOUNTER — TELEPHONE (OUTPATIENT)
Dept: PULMONOLOGY | Facility: CLINIC | Age: 76
End: 2021-06-01

## 2021-06-01 NOTE — TELEPHONE ENCOUNTER
[facility] Call Center    Phone Message    May a detailed message be left on voicemail: yes     Reason for Call: Other: Pt would like a call back to see what the status was on her oxygen tank as she stated the RN was looking into a new oxygen place and she has not heard back yet     Action Taken: Message routed to:  [facility] pulm    Travel Screening: Not Applicable

## 2021-06-01 NOTE — TELEPHONE ENCOUNTER
Spoke with  with [facility], and he states the machine for the patient is on backorder. She is on the list to receive one just as soon as they have one for her. Representative stated he will reach out to the patient and speak with her about it.

## 2021-06-02 ENCOUNTER — TELEPHONE (OUTPATIENT)
Dept: PULMONOLOGY | Facility: CLINIC | Age: 76
End: 2021-06-02

## 2021-06-02 NOTE — TELEPHONE ENCOUNTER
Spoke with pt about rescheduling 7/5 appt with Dr. Taylor as he is no longer available that day. Pt was agreeable to moving telephone visit to 7/15 and details confirmed with pt.

## 2021-06-08 ENCOUNTER — TELEPHONE (OUTPATIENT)
Dept: PULMONOLOGY | Facility: CLINIC | Age: 76
End: 2021-06-08

## 2021-06-08 DIAGNOSIS — J96.11 CHRONIC RESPIRATORY FAILURE WITH HYPOXIA (H): Primary | ICD-10-CM

## 2021-06-08 NOTE — PROGRESS NOTES
"Colon and Rectal Surgery Telephone Note         RE: Ca Yan  : 1945  NUPUR: 2021    Ca Yan is a 75 year old female who is being evaluated via a billable telephone visit.      The patient has been notified of following:     \"This telephone visit will be conducted via a call between you and your physician/provider. We have found that certain health care needs can be provided without the need for a physical exam.  This service lets us provide the care you need with a short phone conversation.  If a prescription is necessary we can send it directly to your pharmacy.  If lab work is needed we can place an order for that and you can then stop by our lab to have the test done at a later time.    If during the course of the call the physician/provider feels a telephone visit is not appropriate, you will not be charged for this service.\"     Patient has given verbal consent for telephone visit? Yes    Phone call duration: 15 minutes    Ca Yan is a very pleasant 75 year old female here for follow-up on diarrhea and fecal incontinence.      HISTORY OF PRESENT ILNESS: Ms. Yan has been seen in the past by Lilly Santa, EVANGELISTA and Dr. Hattie Lorenzo for fecal incontinence. She reports dealing with fecal incontinence for the past 10 years. I saw her in 2019 for fecal incontinence and we discussed SNS at that time with plan for a two week bowel diary and scheduling SNS.      She has never had any anorectal procedures. She had one vaginal birth of a baby of 8 pounds.      Her last colonoscopy was in  but was unable to be completed due to patient discomfort. A virtual colonoscopy was completed at that time with recommended repeat in 5 years.     She underwent pelvic floor testing in  with non relaxing publrectalis on defecography and slightly weaker tone with resting tone 25-31 and squeeze 6-38 mmHg. She declined biofeedback in the past.    Interim history: She has " significant issues still with difficulty with evacuation and difficulty cleaning. She does not rely any true full fecal incontinence but smearing instead. She states that she is taking fiber and that was recently started. She did complete a bowel diary and she does appear to have some smearing but not true fecal incontinence.She does have urgency and does not appear to have a bowel movement every day.     Assessment/Plan:   We spoke at length about options at this point.  I encouraged her to continue to use fiber.  If her symptoms worsen she may be a candidate for Sacral Nerve Stimulation but at this point, likely not.      15 minutes spent on the date of the encounter doing chart review, history and exam, documentation and further activities as noted above.     Medical history:  Past Medical History:   Diagnosis Date     Anxiety      Arthritis      Breast cancer (H)      COPD (chronic obstructive pulmonary disease) (H)     6/19/12:  FEV 0.99 l     Depressive disorder      Hypertension      Low back pain with left-sided sciatica      Low bone density 4/13/2017    DEXA April 12, 2017: T score -2.0. Normal Z score. 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 Normal Z score.  FRAX risk: major osteoporotic fracture 11.9%, hip fracture 2.6%, therefore not high-risk       Non morbid obesity due to excess calories 07/08/2016     Priority: Medium     Diastolic dysfunction 07/08/2016     Priority: Medium     Irritable bowel syndrome 07/08/2016     Priority: Medium     Overview:   with chronic diarrhea       Kyphoscoliosis 07/08/2016     Priority: Medium     Overview:   Marked kyphoscoliosis of thoracolumbar spine       Diarrhea 05/28/2015     Priority: Medium     Alcohol abuse 05/15/2014     Priority: Medium     Overview:   Problem: Bishop Cisneros, psychotherapist, working with pt. Calling to leave message for New to inform her that pt is drinking up to a 1/2 pint a day several times a week. She is not interested in recovery services and will not go to day treatment. She has been given resources. Questions/concerns Bishop can be reached at [phone].        Seborrheic eczema 08/30/2013     Priority: Medium     Bunion 08/12/2013     Priority: Medium     Overview:   left       Anxiety state 07/12/2013     Priority: Medium     Malignant neoplasm of breast (H) 04/17/2013     Priority: Medium     Overview:   Status post left lumpectomy (2012)  Overview:   Dx 10/25/12, cared for at [facility]       Osteoarthritis of knee 08/10/2010     Priority: Medium     Generalized anxiety disorder 06/23/2010     Priority: Medium     Osteoarthritis of spine 01/28/2010     Priority: Medium     Anemia 03/24/2009     Priority: Medium     Hypertension 03/23/2009     Priority: Medium     Overview:   Updated by system to replace inactive record       Insomnia 03/23/2009     Priority: Medium     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     Priority: Medium     Gastroesophageal  reflux disease 2006     Priority: Medium     Overview:   LW Onset:  [id number]       Hyperlipidemia 2005     Priority: Medium     Overview:   LW Onset:           Medications:  Current Outpatient Medications   Medication Sig Dispense Refill     atorvastatin (LIPITOR) 40 MG tablet Take 1 tablet (40 mg) by mouth daily 90 tablet 3     cephALEXin (KEFLEX) 250 MG capsule Take 250 mg by mouth       latanoprost (XALATAN) 0.005 % ophthalmic solution 1 drop       losartan-hydrochlorothiazide (HYZAAR) 100-25 MG tablet Take 1 tablet by mouth daily 90 tablet 3     quetiapine (SEROQUEL) 200 MG tablet Take 200 mg by mouth At Bedtime.       sertraline (ZOLOFT) 100 MG tablet Take 1.5 tablets (150 mg) by mouth daily 45 tablet 3     Vitamin D3 (CHOLECALCIFEROL) 25 mcg (1000 units) tablet Take 1 tablet (25 mcg) by mouth daily 100 tablet 3       Allergies:  Allergies   Allergen Reactions     Azithromycin Itching     Codeine Nausea and Vomiting     Hydrocodone Nausea and Vomiting     Metronidazole Nausea     Oxycodone Itching and Rash     Percocet [Oxycodone-Acetaminophen] Nausea and Vomiting     Pollen Extract      sneezing and runny nose.      Seasonal Allergies      sneezing and runny nose.      Vicodin [Hydrocodone-Acetaminophen] Nausea and Vomiting     Zolpidem      Amnestic behavior       Family history:  Family History   Problem Relation Age of Onset     Breast Cancer Mother      Diabetes Mother      Anxiety Disorder Mother      Asthma Mother      Other Cancer Mother      Hyperlipidemia Mother      Alcohol/Drug Father      Mental Illness Father      Substance Abuse Father      Obesity Father      Cerebrovascular Disease Maternal Grandmother 67       Social history:  Social History     Tobacco Use     Smoking status: Former Smoker     Packs/day: 0.50     Years: 5.00     Pack years: 2.50     Types: Cigarettes     Start date: 1956     Quit date: 1980     Years since quittin.7     Smokeless tobacco:  Former User     Quit date: 5/6/1980   Substance Use Topics     Alcohol use: Not Currently     Alcohol/week: 0.0 standard drinks     Comment: Sober for 2 years, previous alcoholic    Marital status: single.    Nursing Notes:   Daina Avila CMA  6/9/2021  4:55 PM  Signed  Chief Complaint   Patient presents with     Follow Up     Follow up for diarrhea       There were no vitals filed for this visit.    There is no height or weight on file to calculate BMI.          NASH Shin MD  Colon and Rectal Surgery Staff  [facility]    This note was created using speech recognition software and may contain unintended word substitutions.

## 2021-06-09 ENCOUNTER — VIRTUAL VISIT (OUTPATIENT)
Dept: SURGERY | Facility: CLINIC | Age: 76
End: 2021-06-09
Payer: COMMERCIAL

## 2021-06-09 DIAGNOSIS — R15.9 INCONTINENCE OF FECES, UNSPECIFIED FECAL INCONTINENCE TYPE: Primary | ICD-10-CM

## 2021-06-09 PROCEDURE — 99213 OFFICE O/P EST LOW 20 MIN: CPT | Mod: TEL | Performed by: COLON & RECTAL SURGERY

## 2021-06-09 ASSESSMENT — PAIN SCALES - GENERAL: PAINLEVEL: NO PAIN (0)

## 2021-06-09 NOTE — NURSING NOTE
Chief Complaint   Patient presents with     Follow Up     Follow up for diarrhea       There were no vitals filed for this visit.    There is no height or weight on file to calculate BMI.          Daina Moore, CMA

## 2021-06-09 NOTE — LETTER
"2021       RE: Ca Yan  [address]  [place] [facility] [place] [facility] [address]     Dear Colleague,    Thank you for referring your patient, Ca Yan, to the [facility] COL[facility] SURG[facility]IN[facility] [place] [facility]. Please see a copy of my visit note below.    Colon and Rectal Surgery Telephone Note         RE: Ca Yan  : 1945  NUPUR: 2021    Ca Yan is a 75 year old female who is being evaluated via a billable telephone visit.      The patient has been notified of following:     \"This telephone visit will be conducted via a call between you and your physician/provider. We have found that certain health care needs can be provided without the need for a physical exam.  This service lets us provide the care you need with a short phone conversation.  If a prescription is necessary we can send it directly to your pharmacy.  If lab work is needed we can place an order for that and you can then stop by our lab to have the test done at a later time.    If during the course of the call the physician/provider feels a telephone visit is not appropriate, you will not be charged for this service.\"     Patient has given verbal consent for telephone visit? Yes    Phone call duration: 15 minutes    Ca Yan is a very pleasant 75 year old female here for follow-up on diarrhea and fecal incontinence.      HISTORY OF PRESENT ILNESS: Ms. Yan has been seen in the past by Lilly Santa NP and Dr. Hattie Lorenzo for fecal incontinence. She reports dealing with fecal incontinence for the past 10 years. I saw her in 2019 for fecal incontinence and we discussed SNS at that time with plan for a two week bowel diary and scheduling SNS.      She has never had any anorectal procedures. She had one vaginal birth of a baby of 8 pounds.      Her last colonoscopy was in  but was unable to be completed due to " patient discomfort. A virtual colonoscopy was completed at that time with recommended repeat in 5 years.     She underwent pelvic floor testing in 2010 with non relaxing publrectalis on defecography and slightly weaker tone with resting tone 25-31 and squeeze 6-38 mmHg. She declined biofeedback in the past.    Interim history: She has significant issues still with difficulty with evacuation and difficulty cleaning. She does not rely any true full fecal incontinence but smearing instead. She states that she is taking fiber and that was recently started. She did complete a bowel diary and she does appear to have some smearing but not true fecal incontinence.She does have urgency and does not appear to have a bowel movement every day.     Assessment/Plan:   We spoke at length about options at this point.  I encouraged her to continue to use fiber.  If her symptoms worsen she may be a candidate for Sacral Nerve Stimulation but at this point, likely not.      15 minutes spent on the date of the encounter doing chart review, history and exam, documentation and further activities as noted above.     Medical history:  Past Medical History:   Diagnosis Date     Anxiety      Arthritis      Breast cancer (H)      COPD (chronic obstructive pulmonary disease) (H)     6/19/12:  FEV 0.99 l     Depressive disorder      Hypertension      Low back pain with left-sided sciatica      Low bone density 4/13/2017    DEXA April 12, 2017: T score -2.0. Normal Z score. 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 Normal Z score.  FRAX risk: major osteoporotic fracture 11.9%, hip fracture 2.6%, therefore not high-risk       Non morbid obesity due to excess calories 07/08/2016     Priority: Medium     Diastolic dysfunction 07/08/2016     Priority: Medium     Irritable bowel syndrome 07/08/2016     Priority: Medium     Overview:   with chronic diarrhea       Kyphoscoliosis 07/08/2016     Priority: Medium     Overview:   Marked kyphoscoliosis of thoracolumbar spine       Diarrhea 05/28/2015     Priority: Medium     Alcohol abuse 05/15/2014     Priority: Medium     Overview:   Problem: Bishop Cisneros, psychotherapist, working with pt. Calling to leave message for Rhysink to inform her that pt is drinking up to a 1/2 pint a day several times a week. She is not interested in recovery services and will not go to day treatment. She has been given resources. Questions/concerns Bishop can be reached at [phone].        Seborrheic eczema 08/30/2013     Priority: Medium     Bunion 08/12/2013     Priority: Medium     Overview:   left       Anxiety state 07/12/2013     Priority: Medium     Malignant neoplasm of breast (H) 04/17/2013     Priority: Medium     Overview:   Status post left lumpectomy (2012)  Overview:   Dx 10/25/12, cared for at [facility]       Osteoarthritis of knee 08/10/2010     Priority: Medium     Generalized anxiety disorder 06/23/2010     Priority: Medium     Osteoarthritis of  spine 01/28/2010     Priority: Medium     Anemia 03/24/2009     Priority: Medium     Hypertension 03/23/2009     Priority: Medium     Overview:   Updated by system to replace inactive record       Insomnia 03/23/2009     Priority: Medium     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     Priority: Medium     Gastroesophageal reflux disease 09/29/2006     Priority: Medium     Overview:   LW Onset:  [id number]       Hyperlipidemia 03/07/2005     Priority: Medium     Overview:   LW Onset:  07Mar05         Medications:  Current Outpatient Medications   Medication Sig Dispense Refill     atorvastatin (LIPITOR) 40 MG tablet Take 1 tablet (40 mg) by mouth daily 90 tablet 3     cephALEXin (KEFLEX) 250 MG capsule Take 250 mg by mouth       latanoprost (XALATAN) 0.005 % ophthalmic solution 1 drop       losartan-hydrochlorothiazide (HYZAAR) 100-25 MG tablet Take 1 tablet by mouth daily 90 tablet 3     quetiapine (SEROQUEL) 200 MG tablet Take 200 mg by mouth At Bedtime.       sertraline (ZOLOFT) 100 MG tablet Take 1.5 tablets (150 mg) by mouth daily 45 tablet 3     Vitamin D3 (CHOLECALCIFEROL) 25 mcg (1000 units) tablet Take 1 tablet (25 mcg) by mouth daily 100 tablet 3       Allergies:  Allergies   Allergen Reactions     Azithromycin Itching     Codeine Nausea and Vomiting     Hydrocodone Nausea and Vomiting     Metronidazole Nausea     Oxycodone Itching and Rash     Percocet [Oxycodone-Acetaminophen] Nausea and Vomiting     Pollen Extract      sneezing and runny nose.      Seasonal Allergies      sneezing and runny nose.      Vicodin [Hydrocodone-Acetaminophen] Nausea and Vomiting     Zolpidem      Amnestic behavior       Family history:  Family History   Problem Relation Age of Onset     Breast Cancer Mother      Diabetes Mother      Anxiety Disorder Mother      Asthma Mother      Other Cancer Mother      Hyperlipidemia Mother      Alcohol/Drug Father      Mental Illness Father      Substance Abuse Father      Obesity Father       Cerebrovascular Disease Maternal Grandmother 67       Social history:  Social History     Tobacco Use     Smoking status: Former Smoker     Packs/day: 0.50     Years: 5.00     Pack years: 2.50     Types: Cigarettes     Start date: 1956     Quit date: 1980     Years since quittin.7     Smokeless tobacco: Former User     Quit date: 1980   Substance Use Topics     Alcohol use: Not Currently     Alcohol/week: 0.0 standard drinks     Comment: Sober for 2 years, previous alcoholic    Marital status: single.    Nursing Notes:   Daina Avila CMA  2021  4:55 PM  Signed  Chief Complaint   Patient presents with     Follow Up     Follow up for diarrhea       There were no vitals filed for this visit.    There is no height or weight on file to calculate BMI.          NASH Shin MD  Colon and Rectal Surgery Staff  [facility]    This note was created using speech recognition software and may contain unintended word substitutions.        Again, thank you for allowing me to participate in the care of your patient.      Sincerely,    Leda Latham MD

## 2021-06-21 ENCOUNTER — TELEPHONE (OUTPATIENT)
Dept: FAMILY MEDICINE | Facility: CLINIC | Age: 76
End: 2021-06-21

## 2021-06-23 ENCOUNTER — OFFICE VISIT (OUTPATIENT)
Dept: FAMILY MEDICINE | Facility: CLINIC | Age: 76
End: 2021-06-23
Payer: COMMERCIAL

## 2021-06-23 VITALS
WEIGHT: 192 LBS | HEART RATE: 82 BPM | SYSTOLIC BLOOD PRESSURE: 137 MMHG | OXYGEN SATURATION: 93 % | BODY MASS INDEX: 32.96 KG/M2 | DIASTOLIC BLOOD PRESSURE: 87 MMHG

## 2021-06-23 DIAGNOSIS — M17.10 ARTHRITIS OF KNEE: ICD-10-CM

## 2021-06-23 DIAGNOSIS — N64.89 ASYMMETRICAL BREASTS: ICD-10-CM

## 2021-06-23 DIAGNOSIS — Z12.31 VISIT FOR SCREENING MAMMOGRAM: ICD-10-CM

## 2021-06-23 DIAGNOSIS — E78.5 HYPERLIPIDEMIA LDL GOAL <100: ICD-10-CM

## 2021-06-23 DIAGNOSIS — E78.5 HYPERLIPIDEMIA, UNSPECIFIED HYPERLIPIDEMIA TYPE: ICD-10-CM

## 2021-06-23 DIAGNOSIS — I10 ESSENTIAL HYPERTENSION: Primary | ICD-10-CM

## 2021-06-23 DIAGNOSIS — M41.9 KYPHOSCOLIOSIS: ICD-10-CM

## 2021-06-23 DIAGNOSIS — J96.11 CHRONIC HYPOXEMIC RESPIRATORY FAILURE (H): ICD-10-CM

## 2021-06-23 DIAGNOSIS — M54.50 CHRONIC BILATERAL LOW BACK PAIN WITHOUT SCIATICA: ICD-10-CM

## 2021-06-23 DIAGNOSIS — J31.0 CHRONIC RHINITIS: ICD-10-CM

## 2021-06-23 DIAGNOSIS — R49.0 DYSPHONIA: ICD-10-CM

## 2021-06-23 DIAGNOSIS — G89.29 CHRONIC BILATERAL LOW BACK PAIN WITHOUT SCIATICA: ICD-10-CM

## 2021-06-23 LAB
CHOLEST SERPL-MCNC: 194 MG/DL
HDLC SERPL-MCNC: 90 MG/DL
LDLC SERPL CALC-MCNC: 87 MG/DL
NONHDLC SERPL-MCNC: 103 MG/DL
TRIGL SERPL-MCNC: 82 MG/DL

## 2021-06-23 PROCEDURE — 80061 LIPID PANEL: CPT | Performed by: PATHOLOGY

## 2021-06-23 PROCEDURE — 99215 OFFICE O/P EST HI 40 MIN: CPT | Performed by: FAMILY MEDICINE

## 2021-06-23 PROCEDURE — 36415 COLL VENOUS BLD VENIPUNCTURE: CPT | Performed by: PATHOLOGY

## 2021-06-23 RX ORDER — AZELASTINE 1 MG/ML
1 SPRAY, METERED NASAL 2 TIMES DAILY
Qty: 30 ML | Refills: 3 | Status: SHIPPED | OUTPATIENT
Start: 2021-06-23 | End: 2021-07-15

## 2021-06-23 RX ORDER — MIRTAZAPINE 7.5 MG/1
TABLET, FILM COATED ORAL
COMMUNITY
Start: 2021-06-10 | End: 2022-11-17

## 2021-06-23 ASSESSMENT — PAIN SCALES - GENERAL: PAINLEVEL: NO PAIN (0)

## 2021-06-23 NOTE — PROGRESS NOTES
Assessment & Plan   Olivia is a 75-year-old with history of anxiety depression, significant kyphoscoliosis causing impairment of respiratory function, benign essential hypertension, past history of breast cancer, vocal cord paralysis with dysphonia who presents today for primary care follow-up of her blood pressure and lipids.        Essential hypertension  Blood pressure under excellent control on Hyzaar continue once daily.    Dysphonia secondary to vocal cord paralysis  Vocal therapy, observation    Chronic hypoxemic respiratory failure (H)Kyphoscoliosis Arthritis of knee  Significant kyphoscoliosis contributing to dyspnea.  I also reviewed her echocardiogram in detail consistent with her musculoskeletal limitations but no significant valvular abnormalities at this time reassurance provided.  I encouraged her to consider pool therapy once available to assist with conditioning and to work on gentle range of motion exercises with light weights she has 2 pound weights at home we reviewed upper body strengthening exercises and lower extremity strengthening exercises today.   She would benefit from a recliner to assist with sitting and ability to lift her legs to assist with chronic edema  - PHYSICAL THERAPY REFERRAL  - MISCELLANEOUS DME SUPPLY OR ACCESSORY, NOT OTHERWISE SPECIFIED  - Miscellaneous Order for DME - ONLY FOR DME      Chronic bilateral low back pain without sciatica   She would benefit from a recliner to assist with sitting and ability to lift her legs to assist with chronic edema and back pain to vary her position  - MISCELLANEOUS DME SUPPLY OR ACCESSORY, NOT OTHERWISE SPECIFIED  - Miscellaneous Order for DME - ONLY FOR DME  Hyperlipidemia LDL goal <100  Lipids under excellent control on Atorvastatin 40 mg continue as she is tolerating well.    Chronic rhinitis  Chronic rhinitis will try astelin nasal spary  - azelastine (ASTELIN) 0.1 % nasal spray  Dispense: 30 mL; Refill: 3    Visit for screening  "mammogram,Asymmetrical breasts  Due for mammogram  - Mammogram, screening w michael (3D)    See avs discussed Shingrix vaccine.  45 minutes spent on the date of the encounter doing chart review, history, exam, diagnostics review, documentation, counseling and coordination of cares as noted.    Follow-up in 6 months earlier if concerns.           BMI:   Estimated body mass index is 32.96 kg/m  as calculated from the following:    Height as of 5/18/21: 1.626 m (5' 4\").    Weight as of this encounter: 87.1 kg (192 lb).   Weight management plan: Discussed healthy diet and exercise guidelines        Return in about 4 months (around 11/1/2021) for follow-up, BP Recheck.    Favian Sahu MD  [facility] [place]    Magalys Penaloza is a 75 year old who presents for the following health issues     HPI   Ca is a 75-year-old with history of anxiety depression, significant kyphoscoliosis causing impairment of respiratory function, benign essential hypertension, past history of breast cancer, vocal cord paralysis with dysphonia who presents today for primary care follow-up of her blood pressure and lipids.  Hypertension  We have had several virtual visits I wanted her to come in for blood pressure check to confirm her blood pressure is within goal range currently on Hyzaar once daily tolerating very well.  Dysphonia  She follows with ENT and pulmonary she has left vocal cord paralysis she had an attempt at injection April 30 and has indicated she does not want general anesthesia due to her significant kyphoscoliosis and impact on her breathing.  Dyspnea  She has significant dyspnea with exertion related to significant kyphoscoliosis and restrictive breathing related to her narrow rib cage.  We also did an echocardiogram in April I reviewed the results in detail with her indicate no significant structural valve abnormalities ejection fraction of 70% with no signs of left ventricular " hypertrophy findings consistent with her restrictive musculoskeletal concerns.  She indicates she has difficulty with much physical activity in particular because she has been very inactive during the pandemic.  I reviewed she should try to work on physical activity in her home such as upper body strengthening lower extremity strengthening and when able consider pool therapy to assist with conditioning which will help her overall shortness of breath.  Lipids  We recently started atorvastatin, she is tolerating without any side effects.  She had lipids performed this morning but chemistries required send out therefore I indicated I would call her with the results.  Osteoarthritis  She has osteoarthritis of her knees which also limit her physical activity.  I encouraged strengthening as noted above.  Rhinorrhea  She has chronic rhinorrhea during the day this seems to be better at night.  She denies other symptoms.  She thinks that interferes with her dysphonia is wondering if there is anything to assist with rhinorrhea.  Healthcare maintenance  She is due for mammogram.  I reviewed Shingrix vaccine get through local pharmacy.          Labs reviewed in EPIC  BP Readings from Last 3 Encounters:   06/23/21 137/87   04/30/21 (!) 151/97   04/15/21 (!) 143/89    Wt Readings from Last 3 Encounters:   06/23/21 87.1 kg (192 lb)   05/18/21 87.5 kg (193 lb)   05/12/21 87.5 kg (193 lb)                  Patient Active Problem List   Diagnosis     Non morbid obesity due to excess calories     Alcohol abuse     Malignant neoplasm of breast (H)     Arthritis of knee     Diarrhea     Diastolic dysfunction     Osteoarthritis of spine     Gastroesophageal reflux disease     Generalized anxiety disorder     Hypertension     Hyperlipidemia     Insomnia     Irritable bowel syndrome     Kyphoscoliosis     Cluster C personality disorder (H)     Seborrheic eczema     Anemia     Anxiety state     Bunion     Low bone density     Fecal  incontinence     Chronic bilateral low back pain without sciatica     Cognitive impairment     Parathyroid hormone excess (H)     Chronic fatigue     Glottic insufficiency     Dysphonia     Past Surgical History:   Procedure Laterality Date     BACK SURGERY      spinal fusion     COLONOSCOPY       LARYNGOSCOPY WITH MICROSCOPE N/A 2021    Procedure: FLEXIBLE LARYNGOSCOPY;  Surgeon: Domonique Roche MD;  Location: [facility] OR     LUMPECTOMY BREAST       ORTHOPEDIC SURGERY      Knee surgery left side       Social History     Tobacco Use     Smoking status: Former Smoker     Packs/day: 0.50     Years: 5.00     Pack years: 2.50     Types: Cigarettes     Start date: 1956     Quit date: 1980     Years since quittin.7     Smokeless tobacco: Former User     Quit date: 1980   Substance Use Topics     Alcohol use: Not Currently     Alcohol/week: 0.0 standard drinks     Comment: Sober for 2 years, previous alcoholic     Family History   Problem Relation Age of Onset     Breast Cancer Mother      Diabetes Mother      Anxiety Disorder Mother      Asthma Mother      Other Cancer Mother      Hyperlipidemia Mother      Alcohol/Drug Father      Mental Illness Father      Substance Abuse Father      Obesity Father      Cerebrovascular Disease Maternal Grandmother 67         Current Outpatient Medications   Medication Sig Dispense Refill     atorvastatin (LIPITOR) 40 MG tablet Take 1 tablet (40 mg) by mouth daily 90 tablet 3     azelastine (ASTELIN) 0.1 % nasal spray Spray 1 spray into both nostrils 2 times daily 30 mL 3     cephALEXin (KEFLEX) 250 MG capsule Take 250 mg by mouth       latanoprost (XALATAN) 0.005 % ophthalmic solution 1 drop       losartan-hydrochlorothiazide (HYZAAR) 100-25 MG tablet Take 1 tablet by mouth daily 90 tablet 3     mirtazapine (REMERON) 7.5 MG tablet        quetiapine (SEROQUEL) 200 MG tablet Take 200 mg by mouth At Bedtime.       sertraline (ZOLOFT) 100 MG tablet Take 1.5 tablets (150 mg)  by mouth daily 45 tablet 3     Vitamin D3 (CHOLECALCIFEROL) 25 mcg (1000 units) tablet Take 1 tablet (25 mcg) by mouth daily 100 tablet 3     Allergies   Allergen Reactions     Azithromycin Itching     Codeine Nausea and Vomiting     Hydrocodone Nausea and Vomiting     Metronidazole Nausea     Oxycodone Itching and Rash     Percocet [Oxycodone-Acetaminophen] Nausea and Vomiting     Pollen Extract      sneezing and runny nose.      Seasonal Allergies      sneezing and runny nose.      Vicodin [Hydrocodone-Acetaminophen] Nausea and Vomiting     Zolpidem      Amnestic behavior     Recent Labs   Lab Test 06/23/21  0940 04/22/21  1145 10/08/20  1035 08/20/20  1202 01/21/20  0920 10/11/19  1520 04/21/16  1443 04/21/16  1443   A1C  --   --   --   --   --   --   --  5.8   LDL 87 182*  --   --  100*  --   --   --    HDL 90 90  --   --  89  --   --   --    TRIG 82 87  --   --  104  --   --   --    ALT  --  14  --  15  --  17   < >  --    CR  --  0.92  --  0.81 0.81 1.00   < >  --    GFRESTIMATED  --  60* 61 70 71 55*   < >  --    GFRESTBLACK  --  70 74 81 82 64   < >  --    POTASSIUM  --  4.1  --  3.9 4.1 3.9   < >  --    TSH  --  2.04  --  2.39  --   --    < >  --     < > = values in this interval not displayed.      Review of Systems   Constitutional, HEENT, cardiovascular, pulmonary, gi and gu systems are negative, except as otherwise noted.      Objective    /87 (BP Location: Right arm, Patient Position: Sitting, Cuff Size: Adult Regular)   Pulse 82   Wt 87.1 kg (192 lb)   LMP  (LMP Unknown)   SpO2 93%   BMI 32.96 kg/m    Body mass index is 32.96 kg/m .  Physical Exam     Constitutional: Oriented to person, place, and time. Vital signs are noted.  Appears well-nourished. Non-toxic appearance.  No distress. She was cooperative and interactive today and appreciative of the visit but indicates she needed to get to her ride. She has vocal dysphonia. She was able to wear a mask the entire visit without distress.  White hair  HENT: exam deferred mask  Head: Normocephalic and atraumatic.   Mouth/Throat: Deferred wearing a mask  Eyes: Conjunctivae and EOM are normal. Pupils are equal, round, and reactive to light. No scleral icterus. Glasses  Neck: Normal range of motion. Neck supple. No JVD present. No tracheal deviation present. No thyromegaly present.   Cardiovascular: Regular rhythm increases slightly with inspiration, normal heart sounds. Trace  murmur present in pulmonic region.   Pulmonary/Chest: Effort normal and breath sounds normal. No respiratory distress but indicates she feels short of breath related to her Scoliosis   Abdominal: Obese deferred exam sitting in chair  Musculoskeletal:Significant kyphoscoliosis, bilateral chronic lymphedema. Bilateral osteoarthritis of knees   Neurological: Alert and oriented to person, place, and time. General deconditioning, sitting in wheelchair but moves all extremities  Chronic dysphonia  Skin: Skin is warm and dry, mild pallor. No rash noted. No erythema.   Psychiatric: Fixed ideas, pleasant, cooperative, thought coherent but expresses worry about her breathing related to musculoskeletal limitations scoliosis impacting lung volumes.      I reviewed ECHO today with her.  [id number]  [id number]  [id number]  [id number]^JOAO^PHUONG T     [facility]  Diagnostic and Treamtent-3rd Floor  [address] [place] [address]     Name: AMAURI WOOD  MRN: [id number]  : 1945  Study Date: 2021 02:15 PM  Age: 75 yrs  Gender: Female  Patient Location: [facility]  Reason For Study: Cardiomegaly  Ordering Physician: JOAO SHAIKH  Referring Physician: JOAO SHAIKH  Performed By: Willa Maciel RDCS     BSA: 1.9 m2  Height: 64 in  Weight: 189 lb  BP: 146/87 mmHg  ______________________________________________________________________________  Procedure  Echocardiogram with two-dimensional, color and spectral Doppler  performed.  Technically difficult study.Extremely poor acoustic windows.  ______________________________________________________________________________  Interpretation Summary  Technically difficult study.Extremely poor acoustic windows.  Global and regional left ventricular function is hyperkinetic with an EF >70%.  Global right ventricular function is normal.  Right ventricular systolic pressure is 52mmHg above the right atrial pressure.  The inferior vena cava is normal.  No pericardial effusion is present.  ______________________________________________________________________________  Left Ventricle  Global and regional left ventricular function is hyperkinetic with an EF >70%.  Left ventricular wall thickness is normal. Left ventricular size is normal.  Left ventricular diastolic function is indeterminate. No regional wall motion  abnormalities are seen.     Right Ventricle  Global right ventricular function is normal. Global right ventricular function  is mildly reduced.     Atria  The atria cannot be assessed.     Mitral Valve  The valve leaflets are not well visualized. On Doppler interrogation, there is  no significant stenosis or regurgitation.     Aortic Valve  The valve leaflets are not well visualized. Trace aortic insufficiency is  present.     Tricuspid Valve  The valve leaflets are not well visualized. Trace to mild tricuspid  insufficiency is present. Right ventricular systolic pressure is 52mmHg above  the right atrial pressure.     Pulmonic Valve  The valve leaflets are not well visualized. On Doppler interrogation, there is  no significant stenosis or regurgitation.     Vessels  The pulmonary artery and bifurcation cannot be assessed. The inferior vena  cava is normal. Ascending aorta 3.7 cm.     Pericardium  No pericardial effusion is present.     ______________________________________________________________________________  MMode/2D Measurements & Calculations  IVSd: 0.77 cm  LVIDd: 3.8  cm  LVIDs: 1.9 cm  LVPWd: 0.95 cm  FS: 50.4 %     LV mass(C)d: 94.5 grams  LV mass(C)dI: 49.5 grams/m2  Ao root diam: 3.5 cm  asc Aorta Diam: 3.7 cm  LVOT diam: 1.8 cm  LVOT area: 2.6 cm2  RWT: 0.50  TAPSE: 2.1 cm     Time Measurements  Aortic HR: 68.0 BPM     Doppler Measurements & Calculations  MV E max ck: 64.0 cm/sec  MV A max ck: 81.0 cm/sec  MV E/A: 0.79  MV dec time: 0.21 sec  Ao V2 max: 181.0 cm/sec  Ao max P.0 mmHg  BETTY(V,D): 1.7 cm2  LV V1 max P.6 mmHg  LV V1 max: 118.4 cm/sec  LV V1 VTI: 26.2 cm  CO(LVOT): 4.6 l/min  CI(LVOT): 2.4 l/min/m2  SV(LVOT): 68.4 ml  SI(LVOT): 35.8 ml/m2     PA V2 max: 151.3 cm/sec  PA max P.2 mmHg  TR max ck: 358.5 cm/sec  TR max P.5 mmHg  AV Ck Ratio (DI): 0.65  E/E' av.3  Lateral E/e': 6.8  Medial E/e': 7.9     ______________________________________________________________________________  Report approved by: Shubham Hoyos 2021 04:03 PM     I reviewed the above with her in detail.  Favian Sahu MD

## 2021-06-23 NOTE — NURSING NOTE
Chief Complaint   Patient presents with     Recheck Medication     pt here to follow up on bp and labs       Ousmane Coelho CMA, EMT at 9:53 AM on 6/23/2021.

## 2021-06-23 NOTE — PATIENT INSTRUCTIONS
To schedule a MAMMOGRAM, please call Radiology scheduling at: [phone]      Your health care Provider has recommended that you receive the new Shingle vaccine called Shingrix to prevent shingles for ages 50 and above. Many private insurance and Medicare Part D plans cover Shingrix. However, not all insurance carriers cover the entire cost of the Shingrix vaccine if the vaccine is administered at your primary care clinic. The clinic cannot determine your insurance benefits.  Please call your insurance carrier prior. The vaccine comes in two doses. Your second dose should be 2-6 months from your first dose.       Prior to receiving the vaccine, we recommend that you call your insurance carrier and ask them the following questions:            1. Is there a cost difference if I receive the vaccine at my doctor's office or a pharmacy?          2. Does my insurance cover the Shingrix Vaccine and administration of the vaccine?          3. What is my co-pay or deductible for the vaccine?        Please call to schedule a Nurse-Visit only at [phone].  Nurse Visit hours are available Monday, Wednesday, and Friday from 9:00 AM-11:00 AM and 1:00 PM-3:00 PM.

## 2021-06-30 ENCOUNTER — MEDICAL CORRESPONDENCE (OUTPATIENT)
Dept: HEALTH INFORMATION MANAGEMENT | Facility: CLINIC | Age: 76
End: 2021-06-30

## 2021-07-14 ENCOUNTER — TELEPHONE (OUTPATIENT)
Dept: FAMILY MEDICINE | Facility: CLINIC | Age: 76
End: 2021-07-14

## 2021-07-14 NOTE — TELEPHONE ENCOUNTER
[facility] Call Center    Phone Message    May a detailed message be left on voicemail: yes     Reason for Call: Other: Pt calling to say that this medication azelastine (ASTELIN) 0.1 % nasal spray is not working for her. She would like something else however not another nasal spray. Please call to discuss further. Pt would like to talk to Dr Sahu about this issue.      Action Taken: Message routed to:  [facility] ([facility]    Travel Screening: Not Applicable

## 2021-07-14 NOTE — TELEPHONE ENCOUNTER
Called patient. Scheduled a telephone visit tomorrow at 9:30 am.      Jama De La Vega CMA ([facility]) at 3:51 PM on 7/14/2021

## 2021-07-15 ENCOUNTER — VIRTUAL VISIT (OUTPATIENT)
Dept: FAMILY MEDICINE | Facility: CLINIC | Age: 76
End: 2021-07-15
Payer: COMMERCIAL

## 2021-07-15 ENCOUNTER — VIRTUAL VISIT (OUTPATIENT)
Dept: PULMONOLOGY | Facility: CLINIC | Age: 76
End: 2021-07-15
Payer: COMMERCIAL

## 2021-07-15 DIAGNOSIS — J96.11 CHRONIC HYPOXEMIC RESPIRATORY FAILURE (H): Primary | ICD-10-CM

## 2021-07-15 DIAGNOSIS — R49.0 DYSPHONIA: ICD-10-CM

## 2021-07-15 DIAGNOSIS — M41.9 KYPHOSCOLIOSIS: ICD-10-CM

## 2021-07-15 DIAGNOSIS — J34.89 RHINORRHEA: Primary | ICD-10-CM

## 2021-07-15 DIAGNOSIS — J98.4 RESTRICTIVE LUNG DISEASE: ICD-10-CM

## 2021-07-15 DIAGNOSIS — J98.11 ATELECTASIS: ICD-10-CM

## 2021-07-15 PROCEDURE — 99442 PR PHYSICIAN TELEPHONE EVALUATION 11-20 MIN: CPT | Performed by: FAMILY MEDICINE

## 2021-07-15 PROCEDURE — 99442 PR PHYSICIAN TELEPHONE EVALUATION 11-20 MIN: CPT

## 2021-07-15 RX ORDER — IPRATROPIUM BROMIDE 21 UG/1
SPRAY, METERED NASAL
Qty: 30 ML | Refills: 1 | Status: SHIPPED | OUTPATIENT
Start: 2021-07-15 | End: 2022-11-17

## 2021-07-15 NOTE — PROGRESS NOTES
Ca is a 75 year old who is being evaluated via a billable telephone visit.      What phone number would you like to be contacted at? [phone]  How would you like to obtain your AVS? Mail a copy  Assessment & Plan   Olivia is a 75-year-old with history of anxiety depression, significant kyphoscoliosis causing impairment of respiratory function, benign essential hypertension, past history of breast cancer, vocal cord paralysis with dysphonia who presents today for telephone visit for follow-up of clear rhinorrhea Azelastine has not been helpful.       Rhinorrhea  Discontinue Azelastine trial Atrovent. If this is not helpful follow-up with ENT  - ipratropium (ATROVENT) 0.03 % nasal spray  Dispense: 30 mL; Refill: 1    Dysphonia  Follows with ENT                 Follow-up with me in October as previously  planned    Favian Sahu MD  [facility]   Ca is a 75 year old who presents for the following health issues     HPI   Olivia is a 75-year-old with history of anxiety depression, significant kyphoscoliosis causing impairment of respiratory function, benign essential hypertension, past history of breast cancer, vocal cord paralysis with dysphonia who presents today for telephone visit for follow-up of clear rhinorrhea Azelastine has not been helpful.         Rhinorrhea  She has chronic clear rhinorrhea during the day this seems to be better at night when she lies down. She has dry throat at night. Chronic dysphonia from vocal cord abnormality following with ENT non surgical recommendations at this time  She denies other symptoms.  She thinks that interferes with her dysphonia is wondering if there is anything to assist with rhinorrhea.            Labs reviewed in EPIC  BP Readings from Last 3 Encounters:   06/23/21 137/87   04/30/21 (!) 151/97   04/15/21 (!) 143/89    Wt Readings from Last 3 Encounters:   06/23/21 87.1 kg (192 lb)   05/18/21 87.5 kg  (193 lb)   21 87.5 kg (193 lb)                  Patient Active Problem List   Diagnosis     Non morbid obesity due to excess calories     Alcohol abuse     Malignant neoplasm of breast (H)     Arthritis of knee     Diarrhea     Diastolic dysfunction     Osteoarthritis of spine     Gastroesophageal reflux disease     Generalized anxiety disorder     Hypertension     Hyperlipidemia     Insomnia     Irritable bowel syndrome     Kyphoscoliosis     Cluster C personality disorder (H)     Seborrheic eczema     Anemia     Anxiety state     Bunion     Low bone density     Fecal incontinence     Chronic bilateral low back pain without sciatica     Cognitive impairment     Parathyroid hormone excess (H)     Chronic fatigue     Glottic insufficiency     Dysphonia     Past Surgical History:   Procedure Laterality Date     BACK SURGERY      spinal fusion     COLONOSCOPY       LARYNGOSCOPY WITH MICROSCOPE N/A 2021    Procedure: FLEXIBLE LARYNGOSCOPY;  Surgeon: Domonique Roche MD;  Location: [facility] OR     LUMPECTOMY BREAST       ORTHOPEDIC SURGERY      Knee surgery left side       Social History     Tobacco Use     Smoking status: Former Smoker     Packs/day: 0.50     Years: 5.00     Pack years: 2.50     Types: Cigarettes     Start date: 1956     Quit date: 1980     Years since quittin.8     Smokeless tobacco: Former User     Quit date: 1980   Substance Use Topics     Alcohol use: Not Currently     Alcohol/week: 0.0 standard drinks     Comment: Sober for 2 years, previous alcoholic     Family History   Problem Relation Age of Onset     Breast Cancer Mother      Diabetes Mother      Anxiety Disorder Mother      Asthma Mother      Other Cancer Mother      Hyperlipidemia Mother      Alcohol/Drug Father      Mental Illness Father      Substance Abuse Father      Obesity Father      Cerebrovascular Disease Maternal Grandmother 67         Current Outpatient Medications   Medication Sig Dispense Refill      atorvastatin (LIPITOR) 40 MG tablet Take 1 tablet (40 mg) by mouth daily 90 tablet 3     azelastine (ASTELIN) 0.1 % nasal spray Spray 1 spray into both nostrils 2 times daily 30 mL 3     cephALEXin (KEFLEX) 250 MG capsule Take 250 mg by mouth       latanoprost (XALATAN) 0.005 % ophthalmic solution 1 drop       losartan-hydrochlorothiazide (HYZAAR) 100-25 MG tablet Take 1 tablet by mouth daily 90 tablet 3     mirtazapine (REMERON) 7.5 MG tablet        quetiapine (SEROQUEL) 200 MG tablet Take 200 mg by mouth At Bedtime.       sertraline (ZOLOFT) 100 MG tablet Take 1.5 tablets (150 mg) by mouth daily 45 tablet 3     Vitamin D3 (CHOLECALCIFEROL) 25 mcg (1000 units) tablet Take 1 tablet (25 mcg) by mouth daily 100 tablet 3     Allergies   Allergen Reactions     Azithromycin Itching     Codeine Nausea and Vomiting     Hydrocodone Nausea and Vomiting     Metronidazole Nausea     Oxycodone Itching and Rash     Percocet [Oxycodone-Acetaminophen] Nausea and Vomiting     Pollen Extract      sneezing and runny nose.      Seasonal Allergies      sneezing and runny nose.      Vicodin [Hydrocodone-Acetaminophen] Nausea and Vomiting     Zolpidem      Amnestic behavior     Recent Labs   Lab Test 06/23/21  0940 04/22/21  1145 10/08/20  1035 08/20/20  1202 01/21/20  0920 10/11/19  1520 11/04/16  1350 04/21/16  1443   A1C  --   --   --   --   --   --   --  5.8   LDL 87 182*  --   --  100*  --   --   --    HDL 90 90  --   --  89  --   --   --    TRIG 82 87  --   --  104  --   --   --    ALT  --  14  --  15  --  17   < >  --    CR  --  0.92  --  0.81 0.81 1.00  --   --    GFRESTIMATED  --  60* 61 70 71 55*  --   --    GFRESTBLACK  --  70 74 81 82 64  --   --    POTASSIUM  --  4.1  --  3.9 4.1 3.9  --   --    TSH  --  2.04  --  2.39  --   --    < >  --     < > = values in this interval not displayed.      Review of Systems         Objective           Vitals:  No vitals were obtained today due to virtual visit.    Physical Exam     Alert,  interactive,  chronic dysphonia  PSYCH: Alert and oriented times 3; coherent speech, dysphonia, able to articulate logical thoughts, able to abstract reason, no tangential thoughts, no hallucinations   or delusions  Her affect is detailed, asking clarifying questions  RESP: No cough, no audible wheezing, able to talk in full sentences, chronic dysphonia  Remainder of exam unable to be completed due to telephone visits                  Phone call duration: 9:29 AM- 9:43 AM  18 minutes

## 2021-07-15 NOTE — PROGRESS NOTES
Ca is a 75 year old who is being evaluated via a billable telephone visit.      What phone number would you like to be contacted at? [phone]  How would you like to obtain your AVS? Mail a copy  Phone call duration: 20 minutes        [facility]  Pulmonary Medicine  Visit Clinic Note  July 15, 2021         ASSESSMENT & PLAN       Shortness of breath  Kyphosis  Deconditioning  Right middle lobe atelectasis  Chronic Hypoxemic Respiratory Failure    I have the best thing to treat her shortness of breath and deconditioning related to her thoracic cage deformity is to try to get as much exercise as she can noting her limitations. Her primary care physician has recommended pool therapy, and I think that that is worthwhile to try. I do think that she should also reenroll in pulmonary rehab. I placed that referral for pulmonary rehab. She should continue to use oxygen to keep her oxygen saturations greater than 88%. I will follow up with her in November with a repeat 6-minute walk test to assess her degree of hypoxemia.    Mono Taylor MD            Today's visit note:     Chief Complaint: Ca Yan is a 75 year old year old female who is being seen for shortness of breath    HISTORY OF PRESENT ILLNESS:    Since her last visit, she has been seen several times in the otolaryngology clinic for her dysphonia. There were plans for an injection of her vocal cords, but she could not tolerate the procedure with minimal sedation. She decided that the risks of general anesthesia were not worth it to her at that time, and so declined any further procedures. She is still a bit upset that she has her hoarse voice.    She does think that her shortness of breath is gotten worse as well. She is not getting regular exercise. Her primary care physician has suggested pool therapy, which she is interested in but has not started yet. She did enjoy her time in pulmonary rehab as well. She denies any cough,  wheeze or chest tightness. Shortness of breath is her main issue. She is checking her oxygen saturation throughout the day, and does note that she has oxygen saturations in the low 80s sometimes if she is exerting herself.         Past Medical and Surgical History:     Past Medical History:   Diagnosis Date     Anxiety      Arthritis      Breast cancer (H)      COPD (chronic obstructive pulmonary disease) (H)     12:  FEV 0.99 l     Depressive disorder      Hypertension      Low back pain with left-sided sciatica      Low bone density 2017    DEXA 2017: T score -2.0. Normal Z score. FRAX risk: major osteoporotic fracture 11.9%, hip fracture 2.6%, therefore not high-risk     Lymphedema, chronic lower extremities      Past Surgical History:   Procedure Laterality Date     BACK SURGERY      spinal fusion     COLONOSCOPY       LARYNGOSCOPY WITH MICROSCOPE N/A 2021    Procedure: FLEXIBLE LARYNGOSCOPY;  Surgeon: Domonique Roche MD;  Location: [facility] OR     LUMPECTOMY BREAST       ORTHOPEDIC SURGERY      Knee surgery left side           Family History:     Family History   Problem Relation Age of Onset     Breast Cancer Mother      Diabetes Mother      Anxiety Disorder Mother      Asthma Mother      Other Cancer Mother      Hyperlipidemia Mother      Alcohol/Drug Father      Mental Illness Father      Substance Abuse Father      Obesity Father      Cerebrovascular Disease Maternal Grandmother 67              Social History:     Social History     Socioeconomic History     Marital status: Single     Spouse name: Not on file     Number of children: Not on file     Years of education: Not on file     Highest education level: Not on file   Occupational History     Employer: RETIRED   Tobacco Use     Smoking status: Former Smoker     Packs/day: 0.50     Years: 5.00     Pack years: 2.50     Types: Cigarettes     Start date: 1956     Quit date: 1980     Years since quittin.8     Smokeless tobacco:  Former User     Quit date: 5/6/1980   Substance and Sexual Activity     Alcohol use: Not Currently     Alcohol/week: 0.0 standard drinks     Comment: Sober for 2 years, previous alcoholic     Drug use: No     Sexual activity: Not Currently     Partners: Male     Birth control/protection: Abstinence   Other Topics Concern     Parent/sibling w/ CABG, MI or angioplasty before 65F 55M? Not Asked      Service Not Asked     Blood Transfusions Not Asked     Caffeine Concern Not Asked     Occupational Exposure Not Asked     Hobby Hazards Not Asked     Sleep Concern Not Asked     Stress Concern Not Asked     Comment: Lives alone     Weight Concern Not Asked     Special Diet Not Asked     Back Care Not Asked     Comment: Hx of scoliosis with spine fusion     Exercise Not Asked     Comment: Walks     Bike Helmet Not Asked     Seat Belt Not Asked     Self-Exams Not Asked   Social History Narrative    Ca Yan never   Lives in apartment  is  family member is daughter Rin in [place], [place]  Previous AA meetings     Social Determinants of Health     Financial Resource Strain:      Difficulty of Paying Living Expenses:    Food Insecurity:      Worried About Running Out of Food in the Last Year:      Ran Out of Food in the Last Year:    Transportation Needs:      Lack of Transportation (Medical):      Lack of Transportation (Non-Medical):    Physical Activity:      Days of Exercise per Week:      Minutes of Exercise per Session:    Stress:      Feeling of Stress :    Social Connections: Moderately Isolated     Frequency of Communication with Friends and Family: Once a week     Frequency of Social Gatherings with Friends and Family: Never     Attends [employer] Services: More than 4 times per year     Active Member of Clubs or Organizations: Yes     Attends Club or Organization Meetings: More than 4 times per year     Marital Status: Never    Intimate Partner Violence: Not At Risk     Fear of Current or  Ex-Partner: No     Emotionally Abused: No     Physically Abused: No     Sexually Abused: No            Medications:     Current Outpatient Medications   Medication     atorvastatin (LIPITOR) 40 MG tablet     cephALEXin (KEFLEX) 250 MG capsule     ipratropium (ATROVENT) 0.03 % nasal spray     latanoprost (XALATAN) 0.005 % ophthalmic solution     losartan-hydrochlorothiazide (HYZAAR) 100-25 MG tablet     mirtazapine (REMERON) 7.5 MG tablet     quetiapine (SEROQUEL) 200 MG tablet     sertraline (ZOLOFT) 100 MG tablet     Vitamin D3 (CHOLECALCIFEROL) 25 mcg (1000 units) tablet     No current facility-administered medications for this visit.            Review of Systems:       A complete review of systems was otherwise negative except as noted in the HPI.      PHYSICAL EXAM:  LMP  (LMP Unknown)       No physical exam was conducted as this was a telephone visit           Data:   All laboratory and imaging data reviewed.      6-minute walk test was performed November 13, 2020.  Her walk distance was 125 m.  Her room air saturation at rest is 91%.  With walking she desaturated down to 85%.  2 L of oxygen was administered.  With the use of 2 L her oxygen saturation decreased to 91%    PFT:   FEV1/FVC ratio 0.71.  FVC is 1.12 L which is 42% predicted.  FEV1 is 0.8 L which is 39% predicted.      PFT Interpretation:  Possible airflow obstruction.  Low FVC and FEV1 suggestive of restriction.  No lung volumes available to correlate.  Valid Maneuver    Chest CT scan:  Chest: Thyroid gland appears unremarkable. Esophagus appears  unremarkable. Tracheobronchial tree appears patent.  No suspicious  lung nodules.      Lung nodule(s) described on series 4:  -Solid 3 mm pulmonary nodule of the left upper lobe (Image: 147)  -Solid 4 mm pulmonary nodule in the left upper lobe (Image: 164)     The pleura appears unremarkable. Small right pleural effusion. No  pneumothorax. Enlargement of the pulmonary artery measuring 4.0 cm.  Heart size  is within normal limits. Mild atherosclerotic ossifications  of the coronary arteries. No significant pericardial effusion.   Visualized thoracic aorta and main pulmonary artery diameters appear  within normal limits. There are no visualized pathologically enlarged  mediastinal, hilar or axillary lymph nodes.     Abdomen: Examination of the upper abdomen is limited.   Hypoattenuating focus of the pancreas measuring 7 mm. Original  preliminary report had suggested a lesion in the pancreas. Images are  available from outside CT showed a very similar appearance on outside  CT dated 8/13/2016. Appears to be fat extending up into the pancreas  rather than a mass.     Bones: No suspicious osseous lesion. Severe thoracic dextroscoliosis.         Soft Tissues: No suspicious mass.                                                                      IMPRESSION:   1. Small right pleural effusion.  2. Solid pulmonary nodules as detailed above. Consider follow-up if  patient is stratified into high-risk by Fleischner criteria.  3. Enlargement of the pulmonary artery which can be seen in setting of  pulmonary hypertension.  4. Hepatomegaly.

## 2021-07-15 NOTE — LETTER
7/15/2021     RE: Ca Yan  [address]  [facility] [address]    Dear Colleague,    Thank you for referring your patient, Ca Yan, to the [facility] [place]. Please see a copy of my visit note below.    Ca is a 75 year old who is being evaluated via a billable telephone visit.      What phone number would you like to be contacted at? [phone]  How would you like to obtain your AVS? Mail a copy  Phone call duration: 20 minutes        [facility]  Pulmonary Medicine  Visit Clinic Note  July 15, 2021         ASSESSMENT & PLAN       Shortness of breath  Kyphosis  Deconditioning  Right middle lobe atelectasis  Chronic Hypoxemic Respiratory Failure    I have the best thing to treat her shortness of breath and deconditioning related to her thoracic cage deformity is to try to get as much exercise as she can noting her limitations. Her primary care physician has recommended pool therapy, and I think that that is worthwhile to try. I do think that she should also reenroll in pulmonary rehab. I placed that referral for pulmonary rehab. She should continue to use oxygen to keep her oxygen saturations greater than 88%. I will follow up with her in November with a repeat 6-minute walk test to assess her degree of hypoxemia.    Mono Taylor MD            Today's visit note:     Chief Complaint: Ca Yan is a 75 year old year old female who is being seen for shortness of breath    HISTORY OF PRESENT ILLNESS:    Since her last visit, she has been seen several times in the otolaryngology clinic for her dysphonia. There were plans for an injection of her vocal cords, but she could not tolerate the procedure with minimal sedation. She decided that the risks of general anesthesia were not worth it to her at that time, and so declined any further procedures. She is still a bit upset that she has her hoarse  voice.    She does think that her shortness of breath is gotten worse as well. She is not getting regular exercise. Her primary care physician has suggested pool therapy, which she is interested in but has not started yet. She did enjoy her time in pulmonary rehab as well. She denies any cough, wheeze or chest tightness. Shortness of breath is her main issue. She is checking her oxygen saturation throughout the day, and does note that she has oxygen saturations in the low 80s sometimes if she is exerting herself.         Past Medical and Surgical History:     Past Medical History:   Diagnosis Date     Anxiety      Arthritis      Breast cancer (H)      COPD (chronic obstructive pulmonary disease) (H)     6/19/12:  FEV 0.99 l     Depressive disorder      Hypertension      Low back pain with left-sided sciatica      Low bone density 4/13/2017    DEXA April 12, 2017: T score -2.0. Normal Z score. FRAX risk: major osteoporotic fracture 11.9%, hip fracture 2.6%, therefore not high-risk     Lymphedema, chronic lower extremities      Past Surgical History:   Procedure Laterality Date     BACK SURGERY      spinal fusion     COLONOSCOPY       LARYNGOSCOPY WITH MICROSCOPE N/A 4/30/2021    Procedure: FLEXIBLE LARYNGOSCOPY;  Surgeon: Domonique Roche MD;  Location: [facility] OR     LUMPECTOMY BREAST       ORTHOPEDIC SURGERY      Knee surgery left side           Family History:     Family History   Problem Relation Age of Onset     Breast Cancer Mother      Diabetes Mother      Anxiety Disorder Mother      Asthma Mother      Other Cancer Mother      Hyperlipidemia Mother      Alcohol/Drug Father      Mental Illness Father      Substance Abuse Father      Obesity Father      Cerebrovascular Disease Maternal Grandmother 67            Social History:     Social History     Socioeconomic History     Marital status: Single     Spouse name: Not on file     Number of children: Not on file     Years of education: Not on file     Highest education  level: Not on file   Occupational History     Employer: RETIRED   Tobacco Use     Smoking status: Former Smoker     Packs/day: 0.50     Years: 5.00     Pack years: 2.50     Types: Cigarettes     Start date: 1956     Quit date: 1980     Years since quittin.8     Smokeless tobacco: Former User     Quit date: 1980   Substance and Sexual Activity     Alcohol use: Not Currently     Alcohol/week: 0.0 standard drinks     Comment: Sober for 2 years, previous alcoholic     Drug use: No     Sexual activity: Not Currently     Partners: Male     Birth control/protection: Abstinence   Other Topics Concern     Parent/sibling w/ CABG, MI or angioplasty before 65F 55M? Not Asked      Service Not Asked     Blood Transfusions Not Asked     Caffeine Concern Not Asked     Occupational Exposure Not Asked     Hobby Hazards Not Asked     Sleep Concern Not Asked     Stress Concern Not Asked     Comment: Lives alone     Weight Concern Not Asked     Special Diet Not Asked     Back Care Not Asked     Comment: Hx of scoliosis with spine fusion     Exercise Not Asked     Comment: Walks     Bike Helmet Not Asked     Seat Belt Not Asked     Self-Exams Not Asked   Social History Narrative    Ca Yan never   Lives in apartment  is  family member is daughter Rin in [place], [place]  Previous AA meetings     Social Determinants of Health     Financial Resource Strain:      Difficulty of Paying Living Expenses:    Food Insecurity:      Worried About Running Out of Food in the Last Year:      Ran Out of Food in the Last Year:    Transportation Needs:      Lack of Transportation (Medical):      Lack of Transportation (Non-Medical):    Physical Activity:      Days of Exercise per Week:      Minutes of Exercise per Session:    Stress:      Feeling of Stress :    Social Connections: Moderately Isolated     Frequency of Communication with Friends and Family: Once a week     Frequency of Social Gatherings with Friends and  Family: Never     Attends [employer] Services: More than 4 times per year     Active Member of Clubs or Organizations: Yes     Attends Club or Organization Meetings: More than 4 times per year     Marital Status: Never    Intimate Partner Violence: Not At Risk     Fear of Current or Ex-Partner: No     Emotionally Abused: No     Physically Abused: No     Sexually Abused: No            Medications:     Current Outpatient Medications   Medication     atorvastatin (LIPITOR) 40 MG tablet     cephALEXin (KEFLEX) 250 MG capsule     ipratropium (ATROVENT) 0.03 % nasal spray     latanoprost (XALATAN) 0.005 % ophthalmic solution     losartan-hydrochlorothiazide (HYZAAR) 100-25 MG tablet     mirtazapine (REMERON) 7.5 MG tablet     quetiapine (SEROQUEL) 200 MG tablet     sertraline (ZOLOFT) 100 MG tablet     Vitamin D3 (CHOLECALCIFEROL) 25 mcg (1000 units) tablet     No current facility-administered medications for this visit.            Review of Systems:       A complete review of systems was otherwise negative except as noted in the HPI.      PHYSICAL EXAM:  LMP  (LMP Unknown)       No physical exam was conducted as this was a telephone visit         Data:   All laboratory and imaging data reviewed.      6-minute walk test was performed November 13, 2020.  Her walk distance was 125 m.  Her room air saturation at rest is 91%.  With walking she desaturated down to 85%.  2 L of oxygen was administered.  With the use of 2 L her oxygen saturation decreased to 91%    PFT:   FEV1/FVC ratio 0.71.  FVC is 1.12 L which is 42% predicted.  FEV1 is 0.8 L which is 39% predicted.      PFT Interpretation:  Possible airflow obstruction.  Low FVC and FEV1 suggestive of restriction.  No lung volumes available to correlate.  Valid Maneuver    Chest CT scan:  Chest: Thyroid gland appears unremarkable. Esophagus appears  unremarkable. Tracheobronchial tree appears patent.  No suspicious  lung nodules.      Lung nodule(s) described on series  4:  -Solid 3 mm pulmonary nodule of the left upper lobe (Image: 147)  -Solid 4 mm pulmonary nodule in the left upper lobe (Image: 164)     The pleura appears unremarkable. Small right pleural effusion. No  pneumothorax. Enlargement of the pulmonary artery measuring 4.0 cm.  Heart size is within normal limits. Mild atherosclerotic ossifications  of the coronary arteries. No significant pericardial effusion.   Visualized thoracic aorta and main pulmonary artery diameters appear  within normal limits. There are no visualized pathologically enlarged  mediastinal, hilar or axillary lymph nodes.     Abdomen: Examination of the upper abdomen is limited.   Hypoattenuating focus of the pancreas measuring 7 mm. Original  preliminary report had suggested a lesion in the pancreas. Images are  available from outside CT showed a very similar appearance on outside  CT dated 8/13/2016. Appears to be fat extending up into the pancreas  rather than a mass.     Bones: No suspicious osseous lesion. Severe thoracic dextroscoliosis.       Soft Tissues: No suspicious mass.                                                                    IMPRESSION:   1. Small right pleural effusion.  2. Solid pulmonary nodules as detailed above. Consider follow-up if  patient is stratified into high-risk by Fleischner criteria.  3. Enlargement of the pulmonary artery which can be seen in setting of  pulmonary hypertension.  4. Hepatomegaly.    Again, thank you for allowing me to participate in the care of your patient.      Sincerely,    Bassam Taylor MD

## 2021-07-15 NOTE — PATIENT INSTRUCTIONS
[facility] Medication Refill Request Information:  * Please contact your pharmacy regarding ANY request for medication refills.  ** [facility] Prescription Fax = [phone]  * Please allow 3 business days for routine medication refills.  * Please allow 5 business days for controlled substance medication refills.     [facility] Test Result notification information:  *You will be notified with in 7-10 days of your appointment day regarding the results of your test.  If you are on MyChart you will be notified as soon as the provider has reviewed the results and signed off on them.    [facility]: [phone]   Follow-up with me in October as previously  Planned  Best wishes,  Favian Sahu MD

## 2021-07-20 ENCOUNTER — ANCILLARY PROCEDURE (OUTPATIENT)
Dept: MAMMOGRAPHY | Facility: CLINIC | Age: 76
End: 2021-07-20
Attending: FAMILY MEDICINE
Payer: COMMERCIAL

## 2021-07-20 DIAGNOSIS — Z12.31 VISIT FOR SCREENING MAMMOGRAM: ICD-10-CM

## 2021-07-20 DIAGNOSIS — N64.89 ASYMMETRICAL BREASTS: ICD-10-CM

## 2021-07-20 PROCEDURE — 77063 BREAST TOMOSYNTHESIS BI: CPT | Mod: GC

## 2021-07-20 PROCEDURE — 77067 SCR MAMMO BI INCL CAD: CPT | Mod: GC

## 2021-07-20 NOTE — LETTER
July 23, 2021      Ca Yan  [address]   [facility] [address]        Dear ,    We are writing to inform you of your test results.    Dear Ca Yan   Congratulations. The result of your recent mammogram was normal.   Best wishes,   Favian Sahu MD            Resulted Orders   Mammogram, screening w michael (3D)    Narrative    BILATERAL FULL FIELD DIGITAL SCREENING MAMMOGRAM WITH TOMOSYNTHESIS    Performed on: 7/20/21    Compared to: 07/01/2020 [facility] Screening Digital Bilateral, 03/13/2019 [facility]   Screening Digital Bilateral, and 02/21/2018 [facility] Screening Digital Bilateral    Findings: The breasts are heterogeneously dense, which may obscure small   masses. This study was evaluated with the assistance of Computer-Aided   Detection.  Breast Tomosynthesis was used in interpretation.  There are   breast conservation changes in the left breast.  There is no radiographic evidence of malignancy.     IMPRESSION: ACR BI-RADS Category 2: Benign    RECOMMENDED FOLLOW-UP: Annual routine screening mammogram    The results and recommendations of this examination will be communicated   to the patient.  Based on the patient history questionnaire completed prior to the  mammogram, the NCI risk calculator and the NCCN indications for genetic   screening,  the patient may be at an increased risk for breast cancer and/or  have an indication for genetic screening.  She has been sent a letter  informing her of this and a phone number to schedule an appointment in  the [facility] if she so desires.         If you have any questions or concerns, please call the clinic at the number listed above.       Sincerely,      Favian Sahu MD

## 2021-07-21 ENCOUNTER — TELEPHONE (OUTPATIENT)
Dept: FAMILY MEDICINE | Facility: CLINIC | Age: 76
End: 2021-07-21

## 2021-07-21 NOTE — TELEPHONE ENCOUNTER
[facility] Call Center    Phone Message    May a detailed message be left on voicemail: yes     Reason for Call: Other: Pt requesting call back to discuss getting a script to treat a rash under her breast and in her arm pits. Pt stated she thinks it is from the heat     Action Taken: Message routed to:  [facility] p[facility]

## 2021-07-22 ENCOUNTER — VIRTUAL VISIT (OUTPATIENT)
Dept: FAMILY MEDICINE | Facility: CLINIC | Age: 76
End: 2021-07-22
Payer: COMMERCIAL

## 2021-07-22 DIAGNOSIS — B35.4 TINEA CORPORIS: Primary | ICD-10-CM

## 2021-07-22 PROCEDURE — 99441 PR PHYSICIAN TELEPHONE EVALUATION 5-10 MIN: CPT | Performed by: FAMILY MEDICINE

## 2021-07-22 RX ORDER — CLOTRIMAZOLE 1 %
CREAM (GRAM) TOPICAL 2 TIMES DAILY PRN
Qty: 60 G | Refills: 1 | Status: ON HOLD | OUTPATIENT
Start: 2021-07-22 | End: 2022-12-09

## 2021-07-22 NOTE — NURSING NOTE
Chief Complaint   Patient presents with     Derm Problem     pt here to discuss rash under arms and breasts for a few months, itchy       Ousmane Coelho CMA, EMT at 7:46 AM on 7/22/2021.

## 2021-07-22 NOTE — RESULT ENCOUNTER NOTE
Dear Ca Yan   Congratulations. The result of your recent mammogram was normal.  Best wishes,  Favian Sahu MD

## 2021-07-22 NOTE — PATIENT INSTRUCTIONS
Patient Education     Fungal Skin Infection (Tinea)  A fungal infection occurs when too much fungus grows on or in the body. Fungus normally lives on the skin in small amounts and does not cause harm. But when too much grows on the skin, it causes an infection. This is also known as tinea. Fungal skin infections are common and not usually serious.   The infection often starts as a small red area the size of a pea. The skin may turn dry and flaky. The area may itch. As the fungus grows, it spreads out in a red [place]. Because of how it looks, fungal skin infection is often called ringworm, but it is not caused by a worm. Fungal skin infections can occur on many parts of the body. They can grow on the head, chest, arms, buttocks or legs. On the feet, fungal infection is known as  athlete s foot.  It causes itchy, sometimes painful sores between the toes and the bottom or sides of the feet. In the groin, the rash is called  jock itch.    People with weak immune systems can get a fungal infection more easily. This includes people with diabetes or HIV, or who are being treated for cancer. In these cases, the fungal infection can spread and cause severe illness. Fungal infections are also more common in people who are overweight.   In most cases, treatment is done with antifungal cream or ointment. If the infection is on your scalp, you will need to take oral medicine. To confirm the diagnosis of a fungal infection, the healthcare provider may take a small scraping of the skin to be tested in a lab.   Common fungal infections are treated with creams on the skin or oral medicine.  Home care  Follow all instructions when using antifungal cream or ointment on your skin.   General care:    If you were prescribed an oral medicine, read the patient information. Talk with your healthcare provider about the risks and side effects.    Let your skin dry completely after bathing. Carefully dry your feet and between your  toes.    Dress in loose cotton clothing.    Don t scratch the affected area. This can delay healing and may spread the infection. It can also cause a bacterial infection.    Keep your skin clean, but don t wash the skin too much. This can irritate your skin.    Keep in mind that it may take a week before the fungus starts to go away. It can take 2 to 4 weeks to fully clear. To prevent it from coming back, use the medicine until the rash is all gone.  Follow-up care  Follow up with your healthcare provider if the rash does not get better after 10 days of treatment. Also follow up if the rash spreads to other parts of your body.   When to seek medical advice  Call your healthcare provider right away if any of these occur:    Fever of 100.4 F (38 C) or higher, or as directed by your healthcare provider    Redness or swelling that gets worse    Pain that gets worse    Foul-smelling fluid leaking from the skin  Layne last reviewed this educational content on 8/1/2019 2000-2021 [employer]. All rights reserved. This information is not intended as a substitute for professional medical care. Always follow your healthcare professional's instructions.

## 2021-07-22 NOTE — PROGRESS NOTES
Ca is a 75 year old who is being evaluated via a billable telephone visit.      What phone number would you like to be contacted at? [phone]  How would you like to obtain your AVS? Mail a copy  Assessment & Plan   Olivia Yan presents today via telephone visit for concerns for intertriginous pruritic rash most likely fungal.  Tinea corporis    I discussed discontinue use of steroid cream and sent to her pharmacy Lotrimin topical cream use twice daily until rash resolved.  She should make a follow-up appointment if the rash does not resolve information provided in her after visit summary.    - clotrimazole (LOTRIMIN) 1 % external cream  Dispense: 60 g; Refill: 1                   No follow-ups on file.    Favian Sahu MD  [facility]   Ca is a 75 year old who presents for the following health issues     HPI   Ca Yan 75-year-old who presents today for concern of rash in intertriginous areas under bilateral axilla, breast, groin region which is pruritic and has worsened in warm weather.  She has tried to apply over-the-counter steroid cream which seems to make it worse.  She indicates it was noted that time of her mammogram recommended to contact her doctor.  She does not recall having this in the past and has no other systemic symptoms that are concerning at this time.          BP Readings from Last 3 Encounters:   06/23/21 137/87   04/30/21 (!) 151/97   04/15/21 (!) 143/89    Wt Readings from Last 3 Encounters:   06/23/21 87.1 kg (192 lb)   05/18/21 87.5 kg (193 lb)   05/12/21 87.5 kg (193 lb)                  Patient Active Problem List   Diagnosis     Non morbid obesity due to excess calories     Alcohol abuse     Malignant neoplasm of breast (H)     Arthritis of knee     Diarrhea     Diastolic dysfunction     Osteoarthritis of spine     Gastroesophageal reflux disease     Generalized anxiety disorder     Hypertension      Hyperlipidemia     Insomnia     Irritable bowel syndrome     Kyphoscoliosis     Cluster C personality disorder (H)     Seborrheic eczema     Anemia     Anxiety state     Bunion     Low bone density     Fecal incontinence     Chronic bilateral low back pain without sciatica     Cognitive impairment     Parathyroid hormone excess (H)     Chronic fatigue     Glottic insufficiency     Dysphonia     Past Surgical History:   Procedure Laterality Date     BACK SURGERY      spinal fusion     COLONOSCOPY       LARYNGOSCOPY WITH MICROSCOPE N/A 2021    Procedure: FLEXIBLE LARYNGOSCOPY;  Surgeon: Domonique Roche MD;  Location: [facility] OR     LUMPECTOMY BREAST       ORTHOPEDIC SURGERY      Knee surgery left side       Social History     Tobacco Use     Smoking status: Former Smoker     Packs/day: 0.50     Years: 5.00     Pack years: 2.50     Types: Cigarettes     Start date: 1956     Quit date: 1980     Years since quittin.8     Smokeless tobacco: Former User     Quit date: 1980   Substance Use Topics     Alcohol use: Not Currently     Alcohol/week: 0.0 standard drinks     Comment: Sober for 2 years, previous alcoholic     Family History   Problem Relation Age of Onset     Breast Cancer Mother      Diabetes Mother      Anxiety Disorder Mother      Asthma Mother      Other Cancer Mother      Hyperlipidemia Mother      Alcohol/Drug Father      Mental Illness Father      Substance Abuse Father      Obesity Father      Cerebrovascular Disease Maternal Grandmother 67         Current Outpatient Medications   Medication Sig Dispense Refill     atorvastatin (LIPITOR) 40 MG tablet Take 1 tablet (40 mg) by mouth daily 90 tablet 3     cephALEXin (KEFLEX) 250 MG capsule Take 250 mg by mouth       ipratropium (ATROVENT) 0.03 % nasal spray 2 sprays each nostril twice daily for clear rhinorrhea 30 mL 1     latanoprost (XALATAN) 0.005 % ophthalmic solution 1 drop       losartan-hydrochlorothiazide (HYZAAR) 100-25 MG tablet Take  1 tablet by mouth daily 90 tablet 3     mirtazapine (REMERON) 7.5 MG tablet        quetiapine (SEROQUEL) 200 MG tablet Take 200 mg by mouth At Bedtime.       sertraline (ZOLOFT) 100 MG tablet Take 1.5 tablets (150 mg) by mouth daily 45 tablet 3     Vitamin D3 (CHOLECALCIFEROL) 25 mcg (1000 units) tablet Take 1 tablet (25 mcg) by mouth daily 100 tablet 3     Allergies   Allergen Reactions     Azithromycin Itching     Codeine Nausea and Vomiting     Hydrocodone Nausea and Vomiting     Metronidazole Nausea     Oxycodone Itching and Rash     Percocet [Oxycodone-Acetaminophen] Nausea and Vomiting     Pollen Extract      sneezing and runny nose.      Seasonal Allergies      sneezing and runny nose.      Vicodin [Hydrocodone-Acetaminophen] Nausea and Vomiting     Zolpidem      Amnestic behavior     Review of Systems   No fevers or other associated SX        Objective           Vitals:  No vitals were obtained today due to virtual visit.    Physical Exam   healthy, alert and no distress  PSYCH: Alert and oriented times 3; coherent speech but stable dysphonia    able to articulate logical thoughts, able   to abstract reason, no tangential thoughts, no hallucinations   or delusions  Her affect is normal and pleasant  RESP: No cough, no audible wheezing, able to talk in full sentences  Remainder of exam unable to be completed due to telephone visits                Phone call duration: 7:58-8:03   5 minutes

## 2021-07-27 ENCOUNTER — TELEPHONE (OUTPATIENT)
Dept: PALLIATIVE MEDICINE | Facility: CLINIC | Age: 76
End: 2021-07-27

## 2021-07-27 NOTE — TELEPHONE ENCOUNTER
S: Pt is calling for Palliative care team assistance with getting an anti-anxiety medication and assistance with Home Care tasks.    B: Pt sounds short of breath, speaking only about two words per breath. Pt states this is normal for her. On 2-3 L at home. Pt having difficulty doing her wash and would like some assistance getting help for that. Pt is also very hoarse which she reports has continued after she had a virus in her vocal cords. Pt was supposed to have surgery on her vocal cords but could not undergo anesthesia due to her breathing problems.     A: Spoke with Care Team and message will be routed to Jane and Sujatha for follow up.  Will send message to  regarding assistance with chores at home.     R: Message routed to Dr. Schuster and Sujatha.   IB message sent to .

## 2021-07-28 NOTE — TELEPHONE ENCOUNTER
Patient calls today,  She seems to not know who to call for what.  She is having troubles breathing, with chronic lung issues.  She has some anxiety around this, however does not really remember having a palliative care appointment in the past.  However then she recalls having phone calls .  I reviewed with her the role of palliative care.    She would like to have an appointment.  I do see she has had only telephone visits. In 2021, but did have a face to face in 2020.    Will schedule a return telephone with Fellow on 8/5/2021

## 2021-07-30 ENCOUNTER — TELEPHONE (OUTPATIENT)
Dept: GENERAL RADIOLOGY | Facility: CLINIC | Age: 76
End: 2021-07-30

## 2021-07-30 ENCOUNTER — PATIENT OUTREACH (OUTPATIENT)
Dept: CARE COORDINATION | Facility: CLINIC | Age: 76
End: 2021-07-30

## 2021-07-30 NOTE — PROGRESS NOTES
Social Work Intervention  [facility]    Data/Intervention:    Patient Name:  Ca Yan  /Age:  1945 (75 year old)    Visit Type: telephone  [facility]  Reason for Referral: Help in Home    Collaborated With:    -Patient    Psychosocial Information/Concerns:  Ca is a 75 year old patient with a history of Malignant neoplasm of breast. SW was asked to follow up with patient as they indicated needing assistance with household chores, such as washing clothes.    Intervention/Education/Resources Provided:  SW called patient, introduced self and explained the reason for calling. SW offered private pay home care agencies and per patient's request SW mailed them out to patient. SW provided patient with contact information for any additional questions or concerns.    Assessment/Plan:  SW will remain available as needed.  Provided patient/family with contact information and availability.    EPI Power,LGSW  Hematology/Oncology Social Worker  Phone:[phone] Pager: [phone]

## 2021-07-30 NOTE — TELEPHONE ENCOUNTER
Received call from patient regarding high risk screening mammogram result letter. Discussed option to speak with genetic counseling or high risk cancer management clinic. Pt declines at this time but will ask her PCP at her next appointment if she feels it is necessary.

## 2021-08-09 ENCOUNTER — TELEPHONE (OUTPATIENT)
Dept: FAMILY MEDICINE | Facility: CLINIC | Age: 76
End: 2021-08-09

## 2021-08-09 NOTE — TELEPHONE ENCOUNTER
[facility] Call Center    Phone Message    May a detailed message be left on voicemail: yes     Reason for Call: Other: Patient called to speak to Dr. Sahu about getting a home care nurse. Please follow up with patient to advise. Thank you!      Action Taken: Message routed to:  [facility] ([facility] pcc    Travel Screening: Not Applicable

## 2021-08-10 NOTE — TELEPHONE ENCOUNTER
Patient would need a face to face visit and be homebound for insurance to cover. Do you want her to come in person?   Rachel Kate, EMT at 10:38 AM on 8/10/2021.   [facility]   [facility]   [phone]      August 10, 2021  She is not homebound at this time as far as my understanding of her current health.  She requires appointment to discuss reason for request.  Favian Sahu MD

## 2021-08-11 ENCOUNTER — TELEPHONE (OUTPATIENT)
Dept: NURSING | Facility: CLINIC | Age: 76
End: 2021-08-11

## 2021-08-11 NOTE — TELEPHONE ENCOUNTER
".  [facility]: Nurse Triage Note  SITUATION/BACKGROUND                                                      Ca Yan is a 75 year old female who calls with c/o SOB after going downstairs to try to do laundry.   On supplemental oxygen 2-3 Liters, nasal canula (NC) all the time.  Patient states that she takes the elevator down to 2nd floor and can not take her O2 tank - too heavy. She has no portable tank due to not lasting more than 45 minutes...   During conversation patient took oxygen saturation.   Currently, her saturation is  93% on the  2 or 3 Liter NC ;Kyphoscoliosis  (not able to bend to confirm setting).  No SOB at this time.   Per patient, she has a list of provider to assist with laundry from [facility]. However, the company's contacted \"do not want to do laundry\".   Per telephone encounter 8/9/21 patient would need a face to face visit and be homebound for insurance to cover home care nurse/ services.   This nurse recommended scheduling with . Patient verbalized an understanding and agreed with plan... appointment on 8/18/21.   Home care instructions given per breathing problems.   Advised patient not to attempt to do laundry without supplemental oxygen due to pulmonary history...     Routed to Pulmonary as High Priority to review and follow up call to patient at  [phone] for further assistance with portable O2 / sooner appointment.         RECOMMENDATION/PLAN                                                      RECOMMENDED DISPOSITION: home care instructions given per breathing problem protocol. Seek ED care with chest pain,blue lips or tongue, pale or gray face; clammy skin; feeling of suffocation; frothy pink or copious white sputum; inability to speak; drooling, unable to swallow saliva.    Will comply with recommendation: Yes    If further questions/concerns or if symptoms do not improve, worsen or new symptoms develop, call your PCP or [phone] to talk with " the Resident on call, as soon as possible.    Guideline used: breathing problems  Telephone Triage Protocols for Nurses, Fifth Edition, Elisabeth Herrera RN, BSN

## 2021-08-12 ENCOUNTER — PATIENT OUTREACH (OUTPATIENT)
Dept: PALLIATIVE CARE | Facility: CLINIC | Age: 76
End: 2021-08-12

## 2021-08-12 NOTE — PROGRESS NOTES
Received VM from patient asking for call back - she states she wants to talk to someone who knows about her chronic problem.     Attempted to call her back but was unable to reach her. Did leave VM asking for call back when she is able. Unclear if she needs to schedule an apt with palliative care or what she is looking for. Will wait until call back.

## 2021-08-12 NOTE — TELEPHONE ENCOUNTER
"Contacted Ca as requested.  She tells me she's very afraid and she wants to know if she's going die.  She states she has no one to talk to and that she doesn't like being alone with her thoughts. Writer suggested pt be seen in ED, she declined. She tells me she has an appointment with her PCP, Dr. Sahu on 8/18, I have suggested she calls the office and ask for a sooner appointment. I have also provided the number to schedule for Mental Health Referral placed by Dr. Saunders last year.  She tells me she was not aware of this referral and that she will call for appointment.  States she does does not feel unsafe at this time, just very worried. Writer noticed that pt cancelled appt with Palliative care on 8/5, I offered to help her reschedule, but she declined stating \"they offered me morphine for my breathing issues, I'm an alcoholic and abuse pills, that would ruin my sobriety\", \"palliative care is for people with chronic disease who are going to die\". She vehemently decline my attempts to discuss role of palliative care.  States she will wait for her appointment with Dr. Sahu.  "

## 2021-08-12 NOTE — TELEPHONE ENCOUNTER
Contacted pt to follow up on the message below.  Ca states she was trying to do laundry yesterday (lower floor) and became very sweaty and felt very SOB.  She is having difficulty with household chores, she has an appointment with Dr. Sahu on 8/18 to discuss referral for home help, in the meantime a friend is going to help her.  Ca states she has called several agencies, non are able to help with laundry.  With regard to her oxygen, she is frustrated that her portable tanks only last 45 mins, this limits the time she can spend outside of her home. She declines larger tanks at this time d/t their weight.  She has enrolled in Pulmonary Rehab and has her first session on 8/19, she is also scheduled for swim therapy and will be starting this on 9/12. I discussed with her that both of these therapies will be very helpful.  She confirms breathing is at baseline today. She understand to call for new or worsening symptoms.

## 2021-08-12 NOTE — TELEPHONE ENCOUNTER
Pt is calling, she stated her health is deteriorating and would like to speak to Brandon again, please call ada thank you

## 2021-08-13 ENCOUNTER — TELEPHONE (OUTPATIENT)
Dept: PULMONOLOGY | Facility: CLINIC | Age: 76
End: 2021-08-13

## 2021-08-13 NOTE — TELEPHONE ENCOUNTER
Called home phone, no answer. Per provider note patient will need a visit to discuss this.   Rachel Kate, EMT at 3:39 PM on 8/13/2021.  [facility]  [facility]  [place]  [phone]

## 2021-08-13 NOTE — TELEPHONE ENCOUNTER
Patient was reached by phone, and RN relayed Dr. Sahu's message and reviewed next appt time with PCP on 8/18/21 time and date.    Kiki Eddy RN on 8/13/2021 at 1:54 PM

## 2021-08-13 NOTE — TELEPHONE ENCOUNTER
"I called Ms Yan. She has experienced a lot of shortness of breath and anxiety.  She is concerned because she has got the message from her health teams that \"there is nothing more to do.\"     I reminded her that we are fully supportive of her participation in pulmonary rehab as well as pool therapy as best as she can participate.  I think that regular exercise is the best treatment for her dyspnea.  I let her know that takes a lot of effort to make this better, but that if she keeps at it, she should notice improvements.      Mono Taylor MD  "

## 2021-08-13 NOTE — TELEPHONE ENCOUNTER
August 13, 2021  Luis Swanson,  Her health has been stable. Please call her to  reassure her.I will see her as planned on Wednesday.  Favian Sahu MD

## 2021-08-18 ENCOUNTER — OFFICE VISIT (OUTPATIENT)
Dept: FAMILY MEDICINE | Facility: CLINIC | Age: 76
End: 2021-08-18
Payer: COMMERCIAL

## 2021-08-18 VITALS
WEIGHT: 195 LBS | SYSTOLIC BLOOD PRESSURE: 121 MMHG | HEART RATE: 75 BPM | BODY MASS INDEX: 33.47 KG/M2 | DIASTOLIC BLOOD PRESSURE: 82 MMHG | OXYGEN SATURATION: 90 %

## 2021-08-18 DIAGNOSIS — M41.9 KYPHOSCOLIOSIS: ICD-10-CM

## 2021-08-18 DIAGNOSIS — Z23 ENCOUNTER FOR IMMUNIZATION: ICD-10-CM

## 2021-08-18 DIAGNOSIS — R15.9 INCONTINENCE OF FECES, UNSPECIFIED FECAL INCONTINENCE TYPE: ICD-10-CM

## 2021-08-18 DIAGNOSIS — F41.1 GENERALIZED ANXIETY DISORDER: ICD-10-CM

## 2021-08-18 DIAGNOSIS — M47.895 OTHER OSTEOARTHRITIS OF SPINE, THORACOLUMBAR REGION: ICD-10-CM

## 2021-08-18 DIAGNOSIS — R49.0 DYSPHONIA: ICD-10-CM

## 2021-08-18 DIAGNOSIS — R53.82 CHRONIC FATIGUE: ICD-10-CM

## 2021-08-18 DIAGNOSIS — I10 ESSENTIAL HYPERTENSION: ICD-10-CM

## 2021-08-18 DIAGNOSIS — J96.11 CHRONIC HYPOXEMIC RESPIRATORY FAILURE (H): Primary | ICD-10-CM

## 2021-08-18 DIAGNOSIS — M85.9 LOW BONE DENSITY: ICD-10-CM

## 2021-08-18 DIAGNOSIS — R41.89 COGNITIVE IMPAIRMENT: ICD-10-CM

## 2021-08-18 DIAGNOSIS — M17.10 ARTHRITIS OF KNEE: ICD-10-CM

## 2021-08-18 DIAGNOSIS — J38.3 GLOTTIC INSUFFICIENCY: ICD-10-CM

## 2021-08-18 DIAGNOSIS — M40.00 KYPHOSIS (ACQUIRED) (POSTURAL): ICD-10-CM

## 2021-08-18 PROCEDURE — 90471 IMMUNIZATION ADMIN: CPT | Performed by: FAMILY MEDICINE

## 2021-08-18 PROCEDURE — 99214 OFFICE O/P EST MOD 30 MIN: CPT | Mod: 25 | Performed by: FAMILY MEDICINE

## 2021-08-18 PROCEDURE — 90715 TDAP VACCINE 7 YRS/> IM: CPT | Performed by: FAMILY MEDICINE

## 2021-08-18 ASSESSMENT — PAIN SCALES - GENERAL: PAINLEVEL: NO PAIN (0)

## 2021-08-18 NOTE — PROGRESS NOTES
Assessment & Plan     Ca is a 75-year-old with history of anxiety depression, significant kyphoscoliosis causing impairment of respiratory function, benign essential hypertension, past history of breast cancer, vocal cord paralysis with dysphonia who presents today for primary care follow-up of her blood pressure, lipids, dysphonia and significant anxiety related to her breathing restriction from Kyphoscoliosis. Ca Yan 75  is interested in assistance with cleaning/ houskeeping her small apartment. She feels anxious related to her breathing, she has a very hard time with groceries, meal prep requires assistance with personal cares, housekeeping. She received assistance with increased  Funds through [employer] and also requires assistance with several activities of daily living requireing home care assessment  See orders. She requires Home cares     Documentation of Face to Face and Certification for Home Health Services      I certify that patient, Ca Yan is under my care and that I, or a Nurse Practitioner or Physician's Assistant working with me, had a face-to-face encounter that meets the physician face-to-face encounter requirements with this patient on: 8/18/2021.      This encounter with the patient was in whole, or in part, for the following medical condition, which is the primary reason for Home Health Care: Requires RN assessment, fall risk OT/PT assess for ADLS, assistive devices. Needs HHA/ PCA assistance with housekeeping and meal services, Assess for lifeline      I certify that, based on my findings, the following services are medically necessary Home Health Services: Nursing, Occupational Therapy, Physical Therapy and Social Work      My clinical findings support the need for the above services because: Nurse is needed: For complex cares medication adherence assessment because the patient needs instruction and cannot perform care on their own due to: anxiety, respiratory  malfunction due to kyphosis restricting lung function, arthritis has difficulty with ADLS To assess adherence after changes in medications or other medical regimen., To provide assessment and oversight required in the home to assure adherence to the medical plan due to:anxiety related to dysphonia and breathing. and To provide caregiver training to assist with reassurance regarding breathing. Occupational Therapy Services are needed to assess and treat cognitive ability and address ADL safety due to impairment in breathing due to scoliosis restriction lung function, Physical Therapy Services are needed to assess and treat the following functional impairments:need for assistive devices  Such as shower chair lift chair due to Restriction in breathing scoliosis  and asthririts. and Speech Therapy Services are needed to assess and treat impairments in language and/or swallow functions due to dysphonia.      Further, I certify that my clinical findings support that this patient is homebound (i.e. absences from home require considerable and taxing effort and are for medical reasons or [employer] services or infrequently or of short duration when for other reasons) because: Leaving home is medically contraindicated for the following reason(s): Dyspnea on exertion that makes it so they cannot leave their home for needed services without clinical deterioration. and Other physician ordered restriction: She has anxiety, HX of addiction not current, and Mental health symptoms including: Mental health condition is exacerbated by exposure to crowds, unfamiliar environment or new stressful situations..      Based on the above findings, I certify that this patient is confined to the home and needs intermittent skilled nursing care, physical therapy and/or speech therapy.  The patient is under my care, and I have initiated the establishment of the plan of care.  This patient will be followed by a physician who will periodically  review the plan of care.      Physician/Provider to provide follow up care: Favian Sahu      Responsible PECOS certified Physician at time of discharge: Favian Sahu MD      Please be aware that coverage of these services is subject to the terms and limitations of your health insurance plan.  Call member services at your health plan with any benefit or coverage questions.   [employer] will call you to coordinate your care as prescribed by the provider.  If you don t hear from a representative within 2 business days, please call the number listed above.           Favian Sahu MD   [address]   [place] [address]   Phone: [phone]   Fax: [phone]    Diagnoses: Chronic hypoxemic respiratory failure (H)   Dysphonia   Generalized anxiety disorder   Kyphosis (acquired) (postural)   Arthritis of knee             Chronic hypoxemic respiratory failure (H) Generalized anxiety disorder  She would benefit from psychotherapy, reassurance and non pharmacologic therapy to reduce anxiety, Social service to assist with if she qualifies for CADI waiver increased  Services.  - [facility]  - HOME CARE NURSING REFERRAL  - Social Work Referral ([facility] ONLY)    Kyphosis (acquired) (postural)Kyphoscoliosis  Breathing impairment would improve with physical strengthening  - [facility]  - HOME CARE NURSING REFERRAL  - Social Work Referral ([facility] ONLY)  DME order was placed for hospital bed, this will be faxed to [facility]  DME (Durable Medical Equipment) Orders and Documentation  Orders Placed This Encounter   Procedures     Hospital Bed Order for DME - ONLY FOR DME      The patient was assessed and it was determined the patient is in need of the following listed DME Supplies/Equipment. Please complete supporting documentation below to demonstrate medical necessity.      Hospital Bed/Accessories Documentation  Hospital bed is  "required for body positioning, to allow for safe transfers to wheelchair and standing and frequent changes in body position, not feasible in an ordinary bed     NOTE: Patient must have a \"Yes\" in one of the four following questions to qualify for a hospital bed.    1. Does the patient require positioning of the body in ways not feasible with an ordinary bed due to a medical condition that is expected to last at least 1 month?Yes severe Kyphoscoliosis     2. Does the patient require, for the alleviation of pain, positioning of the body in ways not feasible with an ordinary bed? Yes (Please explain): Yes severe Kyphoscoliosis  respiratory restriction requires ability to adjust bed  3. Does the patient require the head of the bed to be elevated more than 30 degrees most of the time due to congestive heart failure, chronic pulmonary disease, or aspiration? Yes severe Kyphoscoliosis  respiratory restriction requires ability to adjust bed    4. Does the patient require traction that can only be attached to a hospital bed? Yes see below    Additional Criteria:    Does the patient require frequent changes in body position and/or have an immediate need for change in body position? Yes - Patient qualifies for Semi Electric Bed     Trapeze Criteria:    1. Does patient need this device to sit up because of a respiratory condition, for change in body position for other medical reasons, or to get in or out of bed? Yes (Please explain): Yes severe Kyphoscoliosis  respiratory restriction requires ability to adjust bed,  require trapeze to allow ease of positioning      October 13, 2021 addend for hospital bed DME.Favian Sahu MD        Dysphonia Glottic insufficiency  Stable, follows with ENT  - [facility]  - HOME CARE NURSING REFERRAL  - Social Work Referral ([facility] ONLY)  Arthritis of knee  Chronic fatigue  Essential hypertension  Other osteoarthritis of spine, thoracolumbar region  Low bone " "density  Incontinence of feces, unspecified fecal incontinence type  Cognitive impairment      Encounter for immunization  Provided today  - TDAP VACCINE (Adacel, Boostrix)  [[id number]]  36 minutes spent on the date of the encounter doing chart review, history, exam, diagnostics review, documentation, counseling and coordination of cares as noted.  Social service was not available today will have her follow-up via phone.    September 1, 2021Kiki Eddy RN Santilli, Favian SIMONS MD  Hi Dr. Sahu,   I looked today to find patient's home care nurse to update the snapshot, and then reviewed the home care order you placed at the last office visit on 8/18/21.  I realized that one had also been ordered as \"future\".  I re-did the order today by copying and pasting your previous one exactly, and it needs a co-sign in your inbox.  I was able to update the [employer] Care Coordinator name and number in the snapshot.  I will check again after home care starts services, and update the home care nurse then.     Thanks,   Kiki       Return in about 8 weeks (around 10/11/2021).    Favian Sahu MD  [facility] [place]    Magalys Penaloza is a 75 year old who presents for the following health issues     HPI   Ca is a 75-year-old with history of anxiety depression, significant kyphoscoliosis causing impairment of respiratory function, benign essential hypertension, past history of breast cancer, vocal cord paralysis with dysphonia who presents today for primary care follow-up of her blood pressure, lipids, dysphonia and significant anxiety related to her breathing restriction from Kyphoscoliosis. Ca Yan 75  is interested in assistance with cleaning/ houskeeping her small apartment. She feels anxious related to her breathing, she has a very hard time with groceries, meal prep requires assistance with personal cares, housekeeping. She received assistance with increased  Funds through " [employer] and also requires assistance with several activities of daily living requireing home care assessment  See orders.  She has a walker in her home and portable oxygen today she does not have her oxygen with her.   She has a care coordinator Cathie Adan through [employer].  She utilizes [employer] mobility. She feels therapist would be helpful with her significant anxiety and worry she has a psychiatrist.  Hypertension  We have had several virtual visits I wanted her to come in for blood pressure check to confirm her blood pressure is within goal range currently on Hyzaar once daily tolerating very well.  Dysphonia  She follows with ENT and pulmonary she has left vocal cord paralysis she had an attempt at injection April 30 and has indicated she does not want general anesthesia due to her significant kyphoscoliosis and impact on her breathing.  Dyspnea  She has significant dyspnea with exertion related to significant kyphoscoliosis and restrictive breathing related to her narrow rib cage.  We also did an echocardiogram in April I reviewed the results in detail with her indicate no significant structural valve abnormalities ejection fraction of 70% with no signs of left ventricular hypertrophy findings consistent with her restrictive musculoskeletal concerns.  She indicates she has difficulty with much physical activity in particular because she has been very inactive during the pandemic.  I reviewed she should try to work on physical activity in her home such as upper body strengthening lower extremity strengthening and when able  pool therapy/ pulmonary rehad to assist with conditioning which will help her overall shortness of breath.  Lipids  Atorvastatin, she is tolerating without any side effects.  .  Osteoarthritis  She has osteoarthritis of her knees which also limit her physical activity.  I encouraged strengthening as noted above.  Rhinorrhea  She has chronic rhinorrhea during the day this seems to be better at  night.  She denies other symptoms.  She thinks that interferes with her dysphonia is wondering if there is anything to assist with rhinorrhea.  Healthcare maintenance  I reviewed Shingrix vaccine get through local pharmacy.                Labs reviewed in EPIC  BP Readings from Last 3 Encounters:   08/18/21 121/82   06/23/21 137/87   04/30/21 (!) 151/97    Wt Readings from Last 3 Encounters:   08/18/21 88.5 kg (195 lb)   06/23/21 87.1 kg (192 lb)   05/18/21 87.5 kg (193 lb)               Immunization History   Administered Date(s) Administered     COVID-19,PF,Moderna 01/28/2021, 02/25/2021     Influenza (H1N1) 01/08/2010     Influenza (High Dose) 3 valent vaccine 02/26/2016, 10/31/2016, 10/05/2017, 10/04/2018, 10/11/2019     Influenza (IIV3) PF 11/05/2010, 11/13/2013, 08/25/2014     Influenza, Quad, High Dose, Pf, 65yr + 10/05/2020     Mantoux Tuberculin Skin Test 04/26/2013, 02/10/2014     Pneumo Conj 13-V (2010&after) 04/17/2015     Pneumococcal 23 valent 02/06/2009, 03/30/2012     TD (ADULT, 7+) 02/07/2005, 02/07/2005     Tdap (Adacel,Boostrix) 07/09/2011, 08/18/2021        Patient Active Problem List   Diagnosis     Non morbid obesity due to excess calories     Alcohol abuse     Malignant neoplasm of breast (H)     Arthritis of knee     Diarrhea     Diastolic dysfunction     Osteoarthritis of spine     Gastroesophageal reflux disease     Generalized anxiety disorder     Hypertension     Hyperlipidemia     Insomnia     Irritable bowel syndrome     Kyphoscoliosis     Cluster C personality disorder (H)     Seborrheic eczema     Anemia     Anxiety state     Bunion     Low bone density     Fecal incontinence     Chronic bilateral low back pain without sciatica     Cognitive impairment     Parathyroid hormone excess (H)     Chronic fatigue     Glottic insufficiency     Dysphonia     Past Surgical History:   Procedure Laterality Date     BACK SURGERY      spinal fusion     COLONOSCOPY       LARYNGOSCOPY WITH MICROSCOPE  N/A 2021    Procedure: FLEXIBLE LARYNGOSCOPY;  Surgeon: Domonique Roche MD;  Location: [facility] OR     LUMPECTOMY BREAST       ORTHOPEDIC SURGERY      Knee surgery left side       Social History     Tobacco Use     Smoking status: Former Smoker     Packs/day: 0.50     Years: 5.00     Pack years: 2.50     Types: Cigarettes     Start date: 1956     Quit date: 1980     Years since quittin.9     Smokeless tobacco: Former User     Quit date: 1980   Substance Use Topics     Alcohol use: Not Currently     Alcohol/week: 0.0 standard drinks     Comment: Sober for 2 years, previous alcoholic     Family History   Problem Relation Age of Onset     Breast Cancer Mother      Diabetes Mother      Anxiety Disorder Mother      Asthma Mother      Other Cancer Mother      Hyperlipidemia Mother      Alcohol/Drug Father      Mental Illness Father      Substance Abuse Father      Obesity Father      Cerebrovascular Disease Maternal Grandmother 67         Current Outpatient Medications   Medication Sig Dispense Refill     atorvastatin (LIPITOR) 40 MG tablet Take 1 tablet (40 mg) by mouth daily 90 tablet 3     cephALEXin (KEFLEX) 250 MG capsule Take 250 mg by mouth       clotrimazole (LOTRIMIN) 1 % external cream Apply topically 2 times daily as needed (under arms breast groin rash until rash resolves) 60 g 1     ipratropium (ATROVENT) 0.03 % nasal spray 2 sprays each nostril twice daily for clear rhinorrhea 30 mL 1     latanoprost (XALATAN) 0.005 % ophthalmic solution 1 drop       losartan-hydrochlorothiazide (HYZAAR) 100-25 MG tablet Take 1 tablet by mouth daily 90 tablet 3     mirtazapine (REMERON) 7.5 MG tablet        quetiapine (SEROQUEL) 200 MG tablet Take 200 mg by mouth At Bedtime.       sertraline (ZOLOFT) 100 MG tablet Take 1.5 tablets (150 mg) by mouth daily 45 tablet 3     Vitamin D3 (CHOLECALCIFEROL) 25 mcg (1000 units) tablet Take 1 tablet (25 mcg) by mouth daily 100 tablet 3     Allergies   Allergen Reactions      Azithromycin Itching     Codeine Nausea and Vomiting     Hydrocodone Nausea and Vomiting     Metronidazole Nausea     Oxycodone Itching and Rash     Percocet [Oxycodone-Acetaminophen] Nausea and Vomiting     Pollen Extract      sneezing and runny nose.      Seasonal Allergies      sneezing and runny nose.      Vicodin [Hydrocodone-Acetaminophen] Nausea and Vomiting     Zolpidem      Amnestic behavior     Recent Labs   Lab Test 06/23/21  0940 04/22/21  1145 10/08/20  1035 08/20/20  1202 01/21/20  0920 10/11/19  1520 11/04/16  1350 04/21/16  1443   A1C  --   --   --   --   --   --   --  5.8   LDL 87 182*  --   --  100*  --   --   --    HDL 90 90  --   --  89  --   --   --    TRIG 82 87  --   --  104  --   --   --    ALT  --  14  --  15  --  17   < >  --    CR  --  0.92  --  0.81 0.81 1.00  --   --    GFRESTIMATED  --  60* 61 70 71 55*  --   --    GFRESTBLACK  --  70 74 81 82 64  --   --    POTASSIUM  --  4.1  --  3.9 4.1 3.9  --   --    TSH  --  2.04  --  2.39  --   --    < >  --     < > = values in this interval not displayed.      Review of Systems         Objective    /82 (BP Location: Left arm, Patient Position: Sitting, Cuff Size: Adult Regular)   Pulse 75   Wt 88.5 kg (195 lb)   LMP  (LMP Unknown)   SpO2 90%   BMI 33.47 kg/m    Body mass index is 33.47 kg/m .  Physical Exam   Constitutional: Oriented to person, place, and time. Vital signs are noted.  Appears well-nourished. Non-toxic appearance.  No distress. She was cooperative and interactive today and appreciative of the visit but indicates her friend disappeared after dropping her off, she is worried about her friend. She has vocal dysphonia. She was able to wear a mask the entire visit without distress. White hair, no oxygen today   HENT: exam deferred mask  Head: Normocephalic and atraumatic.   Mouth/Throat: Deferred wearing a mask  Eyes: Conjunctivae and EOM are normal. Pupils are equal, round, and reactive to light. No scleral icterus.  Glasses  Neck: Normal range of motion. Neck supple. No JVD present. No tracheal deviation present. No thyromegaly present.   Cardiovascular: Regular rhythm increases slightly with inspiration, normal heart sounds. Trace  murmur present in pulmonic region.   Pulmonary/Chest: Effort normal and breath sounds normal. No respiratory distress but indicates she feels short of breath related to her Scoliosis   Abdominal: Obese deferred exam sitting in chair  Musculoskeletal:Significant kyphoscoliosis, bilateral chronic lymphedema. Bilateral osteoarthritis of knees   Neurological: Alert and oriented to person, place, and time. General deconditioning, sitting in wheelchair but moves all extremities  Chronic dysphonia  Skin: Skin is warm and dry, mild pallor. No rash noted. No erythema.   Psychiatric: Fixed ideas, pleasant, cooperative, thought coherent but expresses worry about her breathing related to musculoskeletal limitations scoliosis impacting lung volumes.  PHQ-2 Score:     PHQ-2 ( 1999 Pfizer) 7/15/2021 4/7/2021   Q1: Little interest or pleasure in doing things 0 0   Q2: Feeling down, depressed or hopeless 0 0   PHQ-2 Score 0 0     PHQ 3/28/2019 6/3/2020 4/7/2021   PHQ-9 Total Score 1 6 0   Q9: Thoughts of better off dead/self-harm past 2 weeks Not at all Not at all Not at all     RENATO-7 SCORE 1/24/2020 6/3/2020 4/7/2021   Total Score 14 (moderate anxiety) - -   Total Score 14 17 3   Total Score BEH Adult - - -

## 2021-08-18 NOTE — Clinical Note
Please assist with home care orders. Please update her chart with her care coordinator, nurse when available.  Social service to assist with if she qualifies for community resources, independent living worker, etc  Best wishes,  Favian Sahu MD

## 2021-08-18 NOTE — NURSING NOTE
Chief Complaint   Patient presents with     [facility]     pt here to discuss homecare, needs help with housekeeping     Respiratory Problems     pt here to discuss breathing       Ousmane Coelho CMA, EMT at 9:30 AM on 8/18/2021.

## 2021-08-25 ENCOUNTER — TELEPHONE (OUTPATIENT)
Dept: FAMILY MEDICINE | Facility: CLINIC | Age: 76
End: 2021-08-25

## 2021-08-25 ENCOUNTER — PATIENT OUTREACH (OUTPATIENT)
Dept: CARE COORDINATION | Facility: CLINIC | Age: 76
End: 2021-08-25

## 2021-08-25 NOTE — TELEPHONE ENCOUNTER
Detailed information about insomnia and sleep hygiene were mailed to patient's home address.    Kiki Eddy RN on 8/25/2021 at 1:38 PM

## 2021-08-25 NOTE — TELEPHONE ENCOUNTER
[facility] Call Center    Phone Message    May a detailed message be left on voicemail: yes     Reason for Call: Symptoms or Concerns     If patient has red-flag symptoms, warm transfer to triage line    Current symptom or concern: hard time sleeping    Symptoms have been present for:  3 day(s)        Are there any new or worsening symptoms? Yes: requesting call back to discuss sleeping issues, wondering what she can do      Action Taken: Message routed to:  [facility] ([facility] lulu

## 2021-08-25 NOTE — PROGRESS NOTES
Social Work Intervention  [facility]    Data/Intervention:    Patient Name:  Ca Yan  /Age:  1945 (75 year old)    Visit Type: telephone  Referral Source: Dr. Sahu, [facility]  Reason for Referral:  May need increased services at home    Collaborated With:    -Patient  -[employer]  (and Elderly Waiver CM) Cathie Adan, [phone]    Psychosocial Information/Concerns:   called Patient and introduced self and role.  explained that Dr. Sahu placed a referral for increased help at home. Patient reported she doesn't need any help. Through further clarification, Patient indicated she only needs help with housekeeping. Patient could not remember if she was on a waiver program or if she has a . Later Patient reported she has a worker through [facility] and provided name and contact information and gave  verbal permission to contact [facility].    Intervention/Education/Resources Provided:   spoke with Cathie Adan, Renny and Elderly Waiver . She indicated that Patient has the following services through the Elderly Waiver:    - 6 hours/week of homemaking services, but Patient has historically not gotten along with her homemaker providers and discontinues services.   -Adult  at [facility]  -Transportation to Adult     Additionally, Cathie Adan has offered Patient the following, but Patient has declined:  -Meals on Wheels  -lift chair    Cathie reported that she has had a difficult time finding in-home providers for Patient due to Patient not getting along with people. Currently, there is a shortage of homemaker and PCA providers.     Assessment/Plan:  Cathie will follow up with Patient to offer a one-time deep cleaning of her apartment in addition to checking on what homemaking services Patient is actually receiving.  will remain available as needed.    Additionally,   [facility] placed Home Care orders for RN, PT, and OT assessment. Per Cathie Adan, Patient only really wants someone to help her with medications.    Provided patient/family with contact information and availability.    Dalia Gresham [facility]  Clinical , Outpatient Specialty Clinics  Direct Phone: [phone]

## 2021-08-27 ENCOUNTER — TELEPHONE (OUTPATIENT)
Dept: FAMILY MEDICINE | Facility: CLINIC | Age: 76
End: 2021-08-27

## 2021-08-27 NOTE — TELEPHONE ENCOUNTER
[facility] Call Center    Phone Message    May a detailed message be left on voicemail: yes     Reason for Call: Symptoms or Concerns     If patient has red-flag symptoms, warm transfer to triage line    Current symptom or concern: over heated, sweaty, clothes get damp, chills, Body Tempeture changes      Symptoms have been present for:  2 day(s)    Has patient previously been seen for this? No        Are there any new or worsening symptoms? Yes: Happened yesterday and then again today clothes were actually damp even in the air conditioned store.      Action Taken: Message routed to:  [facility] PCC    Travel Screening: Not Applicable

## 2021-08-30 NOTE — TELEPHONE ENCOUNTER
Patient was reached by phone, and RN reviewed her symptoms.  She has not had a fever, and takes a shower after she has an episode of excessive sweating and feeling overheated.  She is feeling better today and has not had any recent episodes over the weekend.  RN offered a virtual appt with Dr. Sahu for this coming Thurs 9/2 for advice for excessive sweating, but patient declined.  She will discuss with PCP at next in person appt on 10/11/21.    Kiki Eddy RN on 8/30/2021 at 4:07 PM  '

## 2021-09-01 ENCOUNTER — TELEPHONE (OUTPATIENT)
Dept: FAMILY MEDICINE | Facility: CLINIC | Age: 76
End: 2021-09-01

## 2021-09-01 ENCOUNTER — MEDICAL CORRESPONDENCE (OUTPATIENT)
Dept: HEALTH INFORMATION MANAGEMENT | Facility: CLINIC | Age: 76
End: 2021-09-01

## 2021-09-01 DIAGNOSIS — M40.00 KYPHOSIS (ACQUIRED) (POSTURAL): ICD-10-CM

## 2021-09-01 DIAGNOSIS — J96.11 CHRONIC HYPOXEMIC RESPIRATORY FAILURE (H): Primary | ICD-10-CM

## 2021-09-01 DIAGNOSIS — M17.10 ARTHRITIS OF KNEE: ICD-10-CM

## 2021-09-01 DIAGNOSIS — F41.1 GENERALIZED ANXIETY DISORDER: ICD-10-CM

## 2021-09-01 NOTE — TELEPHONE ENCOUNTER
[facility]    Phone Message    May a detailed message be left on voicemail: no     Reason for Call: Other: Paulette from [facility] called and stated they can not accept the patient as they do not have mental health services.      Action Taken: Message routed to:  [facility]    Travel Screening: Not Applicable

## 2021-09-01 NOTE — TELEPHONE ENCOUNTER
[facility] Call [facility]    Phone Message    May a detailed message be left on voicemail: yes     Reason for Call: Other: patient calling requesting a return call from Dr. Sahu to discuss her sleep. Please call thank you.      Action Taken: Message routed to:  [facility]    Travel Screening: Not Applicable

## 2021-09-03 NOTE — TELEPHONE ENCOUNTER
"RN called [facility], and intake staff relayed that due to diagnosis of Generalized Anxiety Disorder on referral, that they can't take patient on since they don't have staff trained for behavioral health concerns.  RN also left a voice message for Cathie Adan, [facility] Care Coordinator to call RN back at direct number to ask for help with finding a home care agency that can provide services.   Note from  recently  \" called Patient and introduced self and role.  explained that Dr. Sahu placed a referral for increased help at home. Patient reported she doesn't need any help. Through further clarification, Patient indicated she only needs help with housekeeping.\"  Note indicates patient may not be agreeable to having home care services, but RN will await call back from Cathie to discuss what may be possible.    Kiki Eddy RN on 9/3/2021 at 10:41 AM        "

## 2021-09-03 NOTE — TELEPHONE ENCOUNTER
RN reached patient by phone, and helped her make a telephone appt with Dr. Sahu for advice for insomnia.  Patient reported she had a better night's sleep last night, but that was after taking Seroquel.  She still wants to have an appt for any other advice from PCP, since patient has had many sleepless nights recently and Seroquel doesn't always help.  Telephone appt was made for 9/9/21.    Kiki Eddy RN on 9/3/2021 at 10:51 AM

## 2021-09-07 NOTE — TELEPHONE ENCOUNTER
Cathie with [facility] called RN back.  She relayed that homemaking services will be starting for patient soon.  Cathie also gave the name of [facility] as a possible agency that could provide services.  RN faxed home care order along with demographics and insurance to [facility].    Kiki Eddy RN on 9/7/2021 at 9:57 AM

## 2021-09-08 ENCOUNTER — TELEPHONE (OUTPATIENT)
Dept: FAMILY MEDICINE | Facility: CLINIC | Age: 76
End: 2021-09-08

## 2021-09-08 NOTE — TELEPHONE ENCOUNTER
[facility] Call [facility]    Phone Message    May a detailed message be left on voicemail: yes     Reason for Call:  [facility] calling about clarificiation on the order- Pg 4 of the fax. Skilled nursing, PT and home health aide and pg 5 has similar orders with MSW, and OT and no HHA. Conflicting orders in the same fax.[facility] will need a face sheet on the pt as well faxed over to her [phone].   Earliest to get out to the patient for start of care will be early next week. She will orders clarified and the face sheet before she schedules it.  Action Taken: Message routed to:  [facility]    Travel Screening: Not Applicable

## 2021-09-09 NOTE — TELEPHONE ENCOUNTER
[facility] Call Center    Phone Message    May a detailed message be left on voicemail: yes     Reason for Call: Other: [facility] is calling because they need specific diagnosis with the progress notes supporting skill need of the diagnosis, they cannot do med management per Medicare requirements because medication management is no longer considered skilled. They can only do it short term about 4-5 weeks please fax that information to [phone] or call to address any questions or concern's thank you.      Action Taken: Message routed to:  [facility]    Travel Screening: Not Applicable

## 2021-09-10 ENCOUNTER — VIRTUAL VISIT (OUTPATIENT)
Dept: BEHAVIORAL HEALTH | Facility: CLINIC | Age: 76
End: 2021-09-10
Attending: FAMILY MEDICINE
Payer: COMMERCIAL

## 2021-09-10 DIAGNOSIS — F41.1 GENERALIZED ANXIETY DISORDER: ICD-10-CM

## 2021-09-10 PROCEDURE — 90834 PSYTX W PT 45 MINUTES: CPT | Mod: TEL | Performed by: PSYCHOLOGIST

## 2021-09-10 ASSESSMENT — COLUMBIA-SUICIDE SEVERITY RATING SCALE - C-SSRS
TOTAL  NUMBER OF INTERRUPTED ATTEMPTS PAST 3 MONTHS: NO
4. HAVE YOU HAD THESE THOUGHTS AND HAD SOME INTENTION OF ACTING ON THEM?: NO
3. HAVE YOU BEEN THINKING ABOUT HOW YOU MIGHT KILL YOURSELF?: NO
1. IN THE PAST MONTH, HAVE YOU WISHED YOU WERE DEAD OR WISHED YOU COULD GO TO SLEEP AND NOT WAKE UP?: NO
6. HAVE YOU EVER DONE ANYTHING, STARTED TO DO ANYTHING, OR PREPARED TO DO ANYTHING TO END YOUR LIFE?: NO
ATTEMPT LIFETIME: NO
ATTEMPT PAST THREE MONTHS: NO
TOTAL  NUMBER OF ABORTED OR SELF INTERRUPTED ATTEMPTS PAST 3 MONTHS: NO
5. HAVE YOU STARTED TO WORK OUT OR WORKED OUT THE DETAILS OF HOW TO KILL YOURSELF? DO YOU INTEND TO CARRY OUT THIS PLAN?: NO
1. IN THE PAST MONTH, HAVE YOU WISHED YOU WERE DEAD OR WISHED YOU COULD GO TO SLEEP AND NOT WAKE UP?: YES
2. HAVE YOU ACTUALLY HAD ANY THOUGHTS OF KILLING YOURSELF?: NO
2. HAVE YOU ACTUALLY HAD ANY THOUGHTS OF KILLING YOURSELF LIFETIME?: NO
4. HAVE YOU HAD THESE THOUGHTS AND HAD SOME INTENTION OF ACTING ON THEM?: NO
5. HAVE YOU STARTED TO WORK OUT OR WORKED OUT THE DETAILS OF HOW TO KILL YOURSELF? DO YOU INTEND TO CARRY OUT THIS PLAN?: NO
6. HAVE YOU EVER DONE ANYTHING, STARTED TO DO ANYTHING, OR PREPARED TO DO ANYTHING TO END YOUR LIFE?: NO
TOTAL  NUMBER OF ABORTED OR SELF INTERRUPTED ATTEMPTS PAST LIFETIME: NO
TOTAL  NUMBER OF INTERRUPTED ATTEMPTS LIFETIME: NO
REASONS FOR IDEATION LIFETIME: DOES NOT APPLY

## 2021-09-10 NOTE — LETTER
"9/10/2021       RE: Ca Yan  [address]  [place] [address]     Dear Colleague,    Thank you for referring your patient, Ca Yan, to the [facility] at [facility]. Please see a copy of my visit note below.    [facility]: Integrated Behavioral Health  September 10, 2021      Behavioral Health Clinician Progress Note    Patient Name: Ca Yan           Service Type: Phone Visit      Service Location:  Phone Visit      Session Start Time: 11:32  Session End Time: 12:20      Session Length: 38 - 52      Attendees: Patient    Visit Activities (Refresh list every visit): NEW, [facility] Only and Phone Encounter    Ca Yan is a 75 year old female who is being evaluated via a telephone visit.      The patient has been notified of the following:     \"We have found that certain health care needs can be provided without the need for a face to face visit.  This service lets us provide the care you need with a short phone conversation.      I will have full access to your [facility] medical record during this entire phone call.   I will be taking notes for your medical record.     Since this is like an office visit, we will bill your insurance company for this service.  Please check with your medical insurance if this type of telephone visit/virtual care is covered.  You may be responsible for the cost of this service if insurance coverage is denied.      There are potential benefits and risks of telephone visits (e.g. limits to patient confidentiality) that differ from in-person visits.?  Confidentiality still applies for telephone services, and nobody will record the visit.  It is important to be in a quiet, private space that is free of distractions (including cell phone or other devices) during the visit.??     If during the course of the " "call I believe a telephone visit is not appropriate, you will not be charged for this service\"    Consent has been obtained for this service by care team member: yes.        Diagnostic Assessment Date: IP  Treatment Plan Review Date: IP  See Flowsheets for today's PHQ-9 and RENATO-7 results  Previous PHQ-9:   PHQ-9 SCORE 3/28/2019 6/3/2020 4/7/2021   PHQ-9 Total Score MyChart 1 (Minimal depression) - -   PHQ-9 Total Score - 6 0   Some encounter information is confidential and restricted. Go to Review Flowsheets activity to see all data.     Previous RENATO-7:   RENATO-7 SCORE 1/24/2020 6/3/2020 4/7/2021   Total Score 14 (moderate anxiety) - -   Total Score 14 17 3   Some encounter information is confidential and restricted. Go to Review Flowsheets activity to see all data.       DATA  Extended Session (60+ minutes): No  Interactive Complexity: No  Crisis: No    Treatment Objective(s) Addressed in This Session:  Target Behavior(s): disease management/lifestyle changes      Depressed Mood: Increase interest, engagement, and pleasure in doing things  Decrease frequency and intensity of feeling down, depressed, hopeless  Improve quantity and quality of night time sleep / decrease daytime naps  Feel less tired and more energy during the day   Improve diet, appetite, mindful eating, and / or meal planning    Current Stressors / Issues:  [facility] met with Ca over the phone today for an initial behavioral health appointment. Our session consisted of reviewing Ca's presenting concerns and options for behavioral health services. Confidentiality, documentation, and billing matters were also discussed.     Ca presented today with concerns about feeling more down/depressed lately due to the pandemic. She reported that due to the length of the pandemic, it has been difficult for her to find enough to do to keep busy. She states that she is quick to snack/eat when bored and is experiencing greater troubles with sleep at the moment. " Anxiety about SOB difficulties were also expressed by Ca today.     With depression, [facility] validated her feelings toward the pandemic and we talked about behavioral things she could do to better cope, such as building more structure into her day. We agreed this would be a beneficial topic to talk about further in counseling. Discussed a few behavioral health options and reviewed the benefits of [facility] services to address her concerns on a short-term therapy basis.     With sleep, Ca shared she is going to bed earlier than usual out of not having enough to do. She reported going to bed around 5:30 or 6:00 pm and will lay in bed until 9:00 pm, which is around when she falls asleep. This writer stressed the importance of good sleep hygiene and how going to bed this early can result in sleep difficulties long term. We discussed ways to keep busier/more active during the day and not going to bed until she feels tired/ready to sleep at 9:00. We reviewed additional sleep hygiene tips and she was sent a handout on strategies in her AVS today.     Ca stated certain tasks are difficult at home due to her health. We talked about a Behavioral Health Home referral for added support and management of chronic health difficulties she experiences. Ca also appears to lack meaningful social support and is quite isolated. We spoke some about how she is currently estranged from her daughter, how this is difficult for her. Discussed the benefits of social work for this regard, for help receiving additional support at home.     Ca denied safety concerns today. See risk information below.       Progress on Treatment Objective(s) / Homework:  New Objective established this session - PREPARATION (Decided to change - considering how); Intervened by negotiating a change plan and determining options / strategies for behavior change, identifying triggers, exploring social supports, and working towards setting a date to begin  behavior change    Motivational Interviewing    MI Intervention: Expressed Empathy/Understanding, Supported Autonomy, Collaboration, Evocation, Permission to raise concern or advise, Open-ended questions, Reflections: simple and complex, Change talk (evoked) and Reframe     Change Talk Expressed by the Patient: Reasons to change    Provider Response to Change Talk: E - Evoked more info from patient about behavior change, A - Affirmed patient's thoughts, decisions, or attempts at behavior change, R - Reflected patient's change talk and S - Summarized patient's change talk statements    Also provided psychoeducation about behavioral health condition, symptoms, and treatment options    Care Plan review completed: Yes    Medication Review:  No changes to current psychiatric medication(s)     Current Outpatient Medications   Medication     atorvastatin (LIPITOR) 40 MG tablet     cephALEXin (KEFLEX) 250 MG capsule     clotrimazole (LOTRIMIN) 1 % external cream     ipratropium (ATROVENT) 0.03 % nasal spray     latanoprost (XALATAN) 0.005 % ophthalmic solution     losartan-hydrochlorothiazide (HYZAAR) 100-25 MG tablet     mirtazapine (REMERON) 7.5 MG tablet     quetiapine (SEROQUEL) 200 MG tablet     sertraline (ZOLOFT) 100 MG tablet     Vitamin D3 (CHOLECALCIFEROL) 25 mcg (1000 units) tablet     No current facility-administered medications for this visit.       Medication Compliance:  Yes    Changes in Health Issues:   None reported    Chemical Use Review:   Substance Use: Chemical use reviewed, no active concerns identified      Tobacco Use: No current tobacco use.       CAGE-AID Total Score 3/28/2019   Total Score MyChart 0 (A total score of 2 or greater is considered clinically significant)   Some encounter information is confidential and restricted. Go to Review Flowsheets activity to see all data.       CAGE-AID score  > 1 is a positive screen, suggesting further discussion is needed to determine if evaluation for  alcohol or substance abuse is appropriate.  A score > 2 is considered clinically significant, suggesting further evaluation of alcohol or substance-related problems is indicated.      Social History     Tobacco Use     Smoking status: Former Smoker     Packs/day: 0.50     Years: 5.00     Pack years: 2.50     Types: Cigarettes     Start date: 1956     Quit date: 1980     Years since quittin.0     Smokeless tobacco: Former User     Quit date: 1980   Substance Use Topics     Alcohol use: Not Currently     Alcohol/week: 0.0 standard drinks     Comment: Sober for 2 years, previous alcoholic     Drug use: No         Assessment: Current Emotional / Mental Status (status of significant symptoms):  Risk status (Self / Other harm or suicidal ideation)  Patient has had a history of suicidal ideation: passive thoughts only and denies a history of suicide attempts, self-injurious behavior, homicidal ideation, homicidal behavior and and other safety concerns     Patient denies current fears or concerns for personal safety.  Patient denies current or recent suicidal ideation or behaviors.  Patient denies current or recent homicidal ideation or behaviors.  Patient denies current or recent self injurious behavior or ideation.  Patient denies other safety concerns.     A safety and risk management plan has not been developed at this time, however patient was encouraged to call [place] [phone] should there be a change in any of these risk factors.     [place] Suicide Severity Rating Scale (Lifetime/Recent)  [place] Suicide Severity Rating (Lifetime/Recent) 9/10/2021   1. Wish to be Dead (Lifetime) Yes   1. Wish to be Dead (Recent) No   2. Non-Specific Active Suicidal Thoughts (Lifetime) No   2. Non-Specific Active Suicidal Thoughts (Recent) No   3. Active Suicidal Ideation with any Methods (Not Plan) Without Intent to Act (Lifetime) No   3. Active Suicidal Ideation with any Methods (Not Plan) Without Intent to  Act (Recent) No   4. Active Suicidal Ideation with Some Intent to Act, Without Specific Plan (Lifetime) No   4. Active Suicidal Ideation with Some Intent to Act, Without Specific Plan (Recent) No   5. Active Suicidal Ideation with Specific Plan and Intent (Lifetime) No   5. Active Suicidal Ideation with Specific Plan and Intent (Recent) No   Most Severe Ideation Rating (Lifetime) 1   Frequency (Lifetime) 1   Duration (Lifetime) 1   Controllability (Lifetime) 1   Protective Factors  (Lifetime) 1   Reasons for Ideation (Lifetime) 0   Most Severe Ideation Rating (Past Month) NA   Frequency (Past Month) NA   Duration (Past Month) NA   Controllability (Past Month) NA   Protective Factors (Past Month) NA   Reasons for Ideation (Past Month) NA   Actual Attempt (Lifetime) No   Actual Attempt (Past 3 Months) No   Has subject engaged in non-suicidal self-injurious behavior? (Lifetime) No   Has subject engaged in non-suicidal self-injurious behavior? (Past 3 Months) No   Interrupted Attempts (Lifetime) No   Interrupted Attempts (Past 3 Months) No   Aborted or Self-Interrupted Attempt (Lifetime) No   Aborted or Self-Interrupted Attempt (Past 3 Months) No   Preparatory Acts or Behavior (Lifetime) No   Preparatory Acts or Behavior (Past 3 Months) No   Most Recent Attempt Actual Lethality Code NA   Most Lethal Attempt Actual Lethality Code NA   Initial/First Attempt Actual Lethality Code NA     [place] Suicide Severity Rating Scale (Short Version)  [place] Suicide Severity Rating (Short Version) 4/30/2021   Over the past 2 weeks have you felt down, depressed, or hopeless? no   Over the past 2 weeks have you had thoughts of killing yourself? no   Have you ever attempted to kill yourself? no       Appearance:   Unable to assess   Eye Contact:   Unable to assess   Psychomotor Behavior: Unable to assess   Attitude:   Cooperative   Orientation:   All  Speech   Rate / Production: Normal    Volume:  Normal    Mood:    Normal  Affect:    Appropriate   Thought Content:  Clear   Thought Form:  Coherent  Logical   Insight:    Good     Diagnoses:  1. Generalized anxiety disorder    per hx and records    Collateral Reports Completed:  Routed note to Care Team Member(s)    Plan: (Homework, other):  Patient was given information about behavioral services and encouraged to schedule a follow up appointment with the clinic [facility] in 2 weeks.  She was also given information about mental health symptoms and treatment options  and sleep Hygeine recommendations, including a handout with tips and strategies.  CD Recommendations: No indications of CD issues.    Karlos Sung Psy.D,    Behavioral Health Clinician   [facility] [place]           Again, thank you for allowing me to participate in the care of your patient.      Sincerely,    Karlos Sung

## 2021-09-10 NOTE — TELEPHONE ENCOUNTER
Fax was sent to [facility] @ [phone], attn: [facility], with demographics, office visit note from 8/18/21, and home care order clarified to include HHA.    Kiki Eddy RN on 9/10/2021 at 3:03 PM

## 2021-09-10 NOTE — PROGRESS NOTES
"[facility]: Integrated Behavioral Health  September 10, 2021      Behavioral Health Clinician Progress Note    Patient Name: Ca Yan           Service Type: Phone Visit      Service Location:  Phone Visit      Session Start Time: 11:32  Session End Time: 12:20      Session Length: 38 - 52      Attendees: Patient    Visit Activities (Refresh list every visit): NEW, [facility] Only and Phone Encounter    Ca Yan is a 75 year old female who is being evaluated via a telephone visit.      The patient has been notified of the following:     \"We have found that certain health care needs can be provided without the need for a face to face visit.  This service lets us provide the care you need with a short phone conversation.      I will have full access to your [facility] medical record during this entire phone call.   I will be taking notes for your medical record.     Since this is like an office visit, we will bill your insurance company for this service.  Please check with your medical insurance if this type of telephone visit/virtual care is covered.  You may be responsible for the cost of this service if insurance coverage is denied.      There are potential benefits and risks of telephone visits (e.g. limits to patient confidentiality) that differ from in-person visits.?  Confidentiality still applies for telephone services, and nobody will record the visit.  It is important to be in a quiet, private space that is free of distractions (including cell phone or other devices) during the visit.??     If during the course of the call I believe a telephone visit is not appropriate, you will not be charged for this service\"    Consent has been obtained for this service by care team member: yes.        Diagnostic Assessment Date: IP  Treatment Plan Review Date: IP  See Flowsheets for today's PHQ-9 and RENATO-7 results  Previous PHQ-9:   PHQ-9 SCORE 3/28/2019 6/3/2020 " 4/7/2021   PHQ-9 Total Score [place] 1 (Minimal depression) - -   PHQ-9 Total Score - 6 0   Some encounter information is confidential and restricted. Go to Review Flowsheets activity to see all data.     Previous RENATO-7:   RENATO-7 SCORE 1/24/2020 6/3/2020 4/7/2021   Total Score 14 (moderate anxiety) - -   Total Score 14 17 3   Some encounter information is confidential and restricted. Go to Review Flowsheets activity to see all data.       DATA  Extended Session (60+ minutes): No  Interactive Complexity: No  Crisis: No    Treatment Objective(s) Addressed in This Session:  Target Behavior(s): disease management/lifestyle changes      Depressed Mood: Increase interest, engagement, and pleasure in doing things  Decrease frequency and intensity of feeling down, depressed, hopeless  Improve quantity and quality of night time sleep / decrease daytime naps  Feel less tired and more energy during the day   Improve diet, appetite, mindful eating, and / or meal planning    Current Stressors / Issues:  [facility] met with Ca over the phone today for an initial behavioral health appointment. Our session consisted of reviewing Ca's presenting concerns and options for behavioral health services. Confidentiality, documentation, and billing matters were also discussed.     Ca presented today with concerns about feeling more down/depressed lately due to the pandemic. She reported that due to the length of the pandemic, it has been difficult for her to find enough to do to keep busy. She states that she is quick to snack/eat when bored and is experiencing greater troubles with sleep at the moment. Anxiety about SOB difficulties were also expressed by Ca today.     With depression, [facility] validated her feelings toward the pandemic and we talked about behavioral things she could do to better cope, such as building more structure into her day. We agreed this would be a beneficial topic to talk about further in counseling. Discussed  a few behavioral health options and reviewed the benefits of [facility] services to address her concerns on a short-term therapy basis.     With sleep, Ca shared she is going to bed earlier than usual out of not having enough to do. She reported going to bed around 5:30 or 6:00 pm and will lay in bed until 9:00 pm, which is around when she falls asleep. This writer stressed the importance of good sleep hygiene and how going to bed this early can result in sleep difficulties long term. We discussed ways to keep busier/more active during the day and not going to bed until she feels tired/ready to sleep at 9:00. We reviewed additional sleep hygiene tips and she was sent a handout on strategies in her AVS today.     Ca stated certain tasks are difficult at home due to her health. We talked about a Behavioral Health Home referral for added support and management of chronic health difficulties she experiences. Ca also appears to lack meaningful social support and is quite isolated. We spoke some about how she is currently estranged from her daughter, how this is difficult for her. Discussed the benefits of social work for this regard, for help receiving additional support at home.     Ca denied safety concerns today. See risk information below.       Progress on Treatment Objective(s) / Homework:  New Objective established this session - PREPARATION (Decided to change - considering how); Intervened by negotiating a change plan and determining options / strategies for behavior change, identifying triggers, exploring social supports, and working towards setting a date to begin behavior change    Motivational Interviewing    MI Intervention: Expressed Empathy/Understanding, Supported Autonomy, Collaboration, Evocation, Permission to raise concern or advise, Open-ended questions, Reflections: simple and complex, Change talk (evoked) and Reframe     Change Talk Expressed by the Patient: Reasons to change    Provider  Response to Change Talk: E - Evoked more info from patient about behavior change, A - Affirmed patient's thoughts, decisions, or attempts at behavior change, R - Reflected patient's change talk and S - Summarized patient's change talk statements    Also provided psychoeducation about behavioral health condition, symptoms, and treatment options    Care Plan review completed: Yes    Medication Review:  No changes to current psychiatric medication(s)     Current Outpatient Medications   Medication     atorvastatin (LIPITOR) 40 MG tablet     cephALEXin (KEFLEX) 250 MG capsule     clotrimazole (LOTRIMIN) 1 % external cream     ipratropium (ATROVENT) 0.03 % nasal spray     latanoprost (XALATAN) 0.005 % ophthalmic solution     losartan-hydrochlorothiazide (HYZAAR) 100-25 MG tablet     mirtazapine (REMERON) 7.5 MG tablet     quetiapine (SEROQUEL) 200 MG tablet     sertraline (ZOLOFT) 100 MG tablet     Vitamin D3 (CHOLECALCIFEROL) 25 mcg (1000 units) tablet     No current facility-administered medications for this visit.       Medication Compliance:  Yes    Changes in Health Issues:   None reported    Chemical Use Review:   Substance Use: Chemical use reviewed, no active concerns identified      Tobacco Use: No current tobacco use.       CAGE-AID Total Score 3/28/2019   Total Score MyChart 0 (A total score of 2 or greater is considered clinically significant)   Some encounter information is confidential and restricted. Go to Review Flowsheets activity to see all data.       CAGE-AID score  > 1 is a positive screen, suggesting further discussion is needed to determine if evaluation for alcohol or substance abuse is appropriate.  A score > 2 is considered clinically significant, suggesting further evaluation of alcohol or substance-related problems is indicated.      Social History     Tobacco Use     Smoking status: Former Smoker     Packs/day: 0.50     Years: 5.00     Pack years: 2.50     Types: Cigarettes     Start date:  1956     Quit date: 1980     Years since quittin.0     Smokeless tobacco: Former User     Quit date: 1980   Substance Use Topics     Alcohol use: Not Currently     Alcohol/week: 0.0 standard drinks     Comment: Sober for 2 years, previous alcoholic     Drug use: No         Assessment: Current Emotional / Mental Status (status of significant symptoms):  Risk status (Self / Other harm or suicidal ideation)  Patient has had a history of suicidal ideation: passive thoughts only and denies a history of suicide attempts, self-injurious behavior, homicidal ideation, homicidal behavior and and other safety concerns     Patient denies current fears or concerns for personal safety.  Patient denies current or recent suicidal ideation or behaviors.  Patient denies current or recent homicidal ideation or behaviors.  Patient denies current or recent self injurious behavior or ideation.  Patient denies other safety concerns.     A safety and risk management plan has not been developed at this time, however patient was encouraged to call [place] [phone] should there be a change in any of these risk factors.     [place] Suicide Severity Rating Scale (Lifetime/Recent)  [place] Suicide Severity Rating (Lifetime/Recent) 9/10/2021   1. Wish to be Dead (Lifetime) Yes   1. Wish to be Dead (Recent) No   2. Non-Specific Active Suicidal Thoughts (Lifetime) No   2. Non-Specific Active Suicidal Thoughts (Recent) No   3. Active Suicidal Ideation with any Methods (Not Plan) Without Intent to Act (Lifetime) No   3. Active Suicidal Ideation with any Methods (Not Plan) Without Intent to Act (Recent) No   4. Active Suicidal Ideation with Some Intent to Act, Without Specific Plan (Lifetime) No   4. Active Suicidal Ideation with Some Intent to Act, Without Specific Plan (Recent) No   5. Active Suicidal Ideation with Specific Plan and Intent (Lifetime) No   5. Active Suicidal Ideation with Specific Plan and Intent (Recent) No    Most Severe Ideation Rating (Lifetime) 1   Frequency (Lifetime) 1   Duration (Lifetime) 1   Controllability (Lifetime) 1   Protective Factors  (Lifetime) 1   Reasons for Ideation (Lifetime) 0   Most Severe Ideation Rating (Past Month) NA   Frequency (Past Month) NA   Duration (Past Month) NA   Controllability (Past Month) NA   Protective Factors (Past Month) NA   Reasons for Ideation (Past Month) NA   Actual Attempt (Lifetime) No   Actual Attempt (Past 3 Months) No   Has subject engaged in non-suicidal self-injurious behavior? (Lifetime) No   Has subject engaged in non-suicidal self-injurious behavior? (Past 3 Months) No   Interrupted Attempts (Lifetime) No   Interrupted Attempts (Past 3 Months) No   Aborted or Self-Interrupted Attempt (Lifetime) No   Aborted or Self-Interrupted Attempt (Past 3 Months) No   Preparatory Acts or Behavior (Lifetime) No   Preparatory Acts or Behavior (Past 3 Months) No   Most Recent Attempt Actual Lethality Code NA   Most Lethal Attempt Actual Lethality Code NA   Initial/First Attempt Actual Lethality Code NA     [place] Suicide Severity Rating Scale (Short Version)  [place] Suicide Severity Rating (Short Version) 4/30/2021   Over the past 2 weeks have you felt down, depressed, or hopeless? no   Over the past 2 weeks have you had thoughts of killing yourself? no   Have you ever attempted to kill yourself? no       Appearance:   Unable to assess   Eye Contact:   Unable to assess   Psychomotor Behavior: Unable to assess   Attitude:   Cooperative   Orientation:   All  Speech   Rate / Production: Normal    Volume:  Normal   Mood:    Normal  Affect:    Appropriate   Thought Content:  Clear   Thought Form:  Coherent  Logical   Insight:    Good     Diagnoses:  1. Generalized anxiety disorder    per hx and records    Collateral Reports Completed:  Routed note to Care Team Member(s)    Plan: (Homework, other):  Patient was given information about behavioral services and encouraged to  schedule a follow up appointment with the clinic [facility] in 2 weeks.  She was also given information about mental health symptoms and treatment options  and sleep Hygeine recommendations, including a handout with tips and strategies.  CD Recommendations: No indications of CD issues.    Karlos Sung Psy.D, LP   Behavioral Health Clinician   [facility]

## 2021-09-10 NOTE — LETTER
"9/10/2021      RE: Ca Yan  [address]  [facility] [address]       [facility]: Integrated Behavioral Health  September 10, 2021      Behavioral Health Clinician Progress Note    Patient Name: Ca Yan           Service Type: Phone Visit      Service Location:  Phone Visit      Session Start Time: 11:32  Session End Time: 12:20      Session Length: 38 - 52      Attendees: Patient    Visit Activities (Refresh list every visit): NEW, [facility] [place] Only and Phone Encounter    Ca Yan is a 75 year old female who is being evaluated via a telephone visit.      The patient has been notified of the following:     \"We have found that certain health care needs can be provided without the need for a face to face visit.  This service lets us provide the care you need with a short phone conversation.      I will have full access to your [place] medical record during this entire phone call.   I will be taking notes for your medical record.     Since this is like an office visit, we will bill your insurance company for this service.  Please check with your medical insurance if this type of telephone visit/virtual care is covered.  You may be responsible for the cost of this service if insurance coverage is denied.      There are potential benefits and risks of telephone visits (e.g. limits to patient confidentiality) that differ from in-person visits.?  Confidentiality still applies for telephone services, and nobody will record the visit.  It is important to be in a quiet, private space that is free of distractions (including cell phone or other devices) during the visit.??     If during the course of the call I believe a telephone visit is not appropriate, you will not be charged for this service\"    Consent has been obtained for this service by care team member: yes.        Diagnostic Assessment Date: IP  Treatment Plan Review Date: IP  See Flowsheets " for today's PHQ-9 and RENATO-7 results  Previous PHQ-9:   PHQ-9 SCORE 3/28/2019 6/3/2020 4/7/2021   PHQ-9 Total Score MyChart 1 (Minimal depression) - -   PHQ-9 Total Score - 6 0   Some encounter information is confidential and restricted. Go to Review Flowsheets activity to see all data.     Previous RENATO-7:   RENATO-7 SCORE 1/24/2020 6/3/2020 4/7/2021   Total Score 14 (moderate anxiety) - -   Total Score 14 17 3   Some encounter information is confidential and restricted. Go to Review Flowsheets activity to see all data.       DATA  Extended Session (60+ minutes): No  Interactive Complexity: No  Crisis: No    Treatment Objective(s) Addressed in This Session:  Target Behavior(s): disease management/lifestyle changes      Depressed Mood: Increase interest, engagement, and pleasure in doing things  Decrease frequency and intensity of feeling down, depressed, hopeless  Improve quantity and quality of night time sleep / decrease daytime naps  Feel less tired and more energy during the day   Improve diet, appetite, mindful eating, and / or meal planning    Current Stressors / Issues:  [facility] met with Ca over the phone today for an initial behavioral health appointment. Our session consisted of reviewing Ca's presenting concerns and options for behavioral health services. Confidentiality, documentation, and billing matters were also discussed.     Ca presented today with concerns about feeling more down/depressed lately due to the pandemic. She reported that due to the length of the pandemic, it has been difficult for her to find enough to do to keep busy. She states that she is quick to snack/eat when bored and is experiencing greater troubles with sleep at the moment. Anxiety about SOB difficulties were also expressed by Ca today.     With depression, [facility] validated her feelings toward the pandemic and we talked about behavioral things she could do to better cope, such as building more structure into her day. We  agreed this would be a beneficial topic to talk about further in counseling. Discussed a few behavioral health options and reviewed the benefits of [facility] services to address her concerns on a short-term therapy basis.     With sleep, Ca shared she is going to bed earlier than usual out of not having enough to do. She reported going to bed around 5:30 or 6:00 pm and will lay in bed until 9:00 pm, which is around when she falls asleep. This writer stressed the importance of good sleep hygiene and how going to bed this early can result in sleep difficulties long term. We discussed ways to keep busier/more active during the day and not going to bed until she feels tired/ready to sleep at 9:00. We reviewed additional sleep hygiene tips and she was sent a handout on strategies in her AVS today.     Ca stated certain tasks are difficult at home due to her health. We talked about a Behavioral Health Home referral for added support and management of chronic health difficulties she experiences. Ca also appears to lack meaningful social support and is quite isolated. We spoke some about how she is currently estranged from her daughter, how this is difficult for her. Discussed the benefits of social work for this regard, for help receiving additional support at home.     Ca denied safety concerns today. See risk information below.       Progress on Treatment Objective(s) / Homework:  New Objective established this session - PREPARATION (Decided to change - considering how); Intervened by negotiating a change plan and determining options / strategies for behavior change, identifying triggers, exploring social supports, and working towards setting a date to begin behavior change    Motivational Interviewing    MI Intervention: Expressed Empathy/Understanding, Supported Autonomy, Collaboration, Evocation, Permission to raise concern or advise, Open-ended questions, Reflections: simple and complex, Change talk  (evoked) and Reframe     Change Talk Expressed by the Patient: Reasons to change    Provider Response to Change Talk: E - Evoked more info from patient about behavior change, A - Affirmed patient's thoughts, decisions, or attempts at behavior change, R - Reflected patient's change talk and S - Summarized patient's change talk statements    Also provided psychoeducation about behavioral health condition, symptoms, and treatment options    Care Plan review completed: Yes    Medication Review:  No changes to current psychiatric medication(s)     Current Outpatient Medications   Medication     atorvastatin (LIPITOR) 40 MG tablet     cephALEXin (KEFLEX) 250 MG capsule     clotrimazole (LOTRIMIN) 1 % external cream     ipratropium (ATROVENT) 0.03 % nasal spray     latanoprost (XALATAN) 0.005 % ophthalmic solution     losartan-hydrochlorothiazide (HYZAAR) 100-25 MG tablet     mirtazapine (REMERON) 7.5 MG tablet     quetiapine (SEROQUEL) 200 MG tablet     sertraline (ZOLOFT) 100 MG tablet     Vitamin D3 (CHOLECALCIFEROL) 25 mcg (1000 units) tablet     No current facility-administered medications for this visit.       Medication Compliance:  Yes    Changes in Health Issues:   None reported    Chemical Use Review:   Substance Use: Chemical use reviewed, no active concerns identified      Tobacco Use: No current tobacco use.       CAGE-AID Total Score 3/28/2019   Total Score MyChart 0 (A total score of 2 or greater is considered clinically significant)   Some encounter information is confidential and restricted. Go to Review Flowsheets activity to see all data.       CAGE-AID score  > 1 is a positive screen, suggesting further discussion is needed to determine if evaluation for alcohol or substance abuse is appropriate.  A score > 2 is considered clinically significant, suggesting further evaluation of alcohol or substance-related problems is indicated.      Social History     Tobacco Use     Smoking status: Former Smoker      Packs/day: 0.50     Years: 5.00     Pack years: 2.50     Types: Cigarettes     Start date: 1956     Quit date: 1980     Years since quittin.0     Smokeless tobacco: Former User     Quit date: 1980   Substance Use Topics     Alcohol use: Not Currently     Alcohol/week: 0.0 standard drinks     Comment: Sober for 2 years, previous alcoholic     Drug use: No         Assessment: Current Emotional / Mental Status (status of significant symptoms):  Risk status (Self / Other harm or suicidal ideation)  Patient has had a history of suicidal ideation: passive thoughts only and denies a history of suicide attempts, self-injurious behavior, homicidal ideation, homicidal behavior and and other safety concerns     Patient denies current fears or concerns for personal safety.  Patient denies current or recent suicidal ideation or behaviors.  Patient denies current or recent homicidal ideation or behaviors.  Patient denies current or recent self injurious behavior or ideation.  Patient denies other safety concerns.     A safety and risk management plan has not been developed at this time, however patient was encouraged to call [place] [id number] should there be a change in any of these risk factors.     [place] Suicide Severity Rating Scale (Lifetime/Recent)  [place] Suicide Severity Rating (Lifetime/Recent) 9/10/2021   1. Wish to be Dead (Lifetime) Yes   1. Wish to be Dead (Recent) No   2. Non-Specific Active Suicidal Thoughts (Lifetime) No   2. Non-Specific Active Suicidal Thoughts (Recent) No   3. Active Suicidal Ideation with any Methods (Not Plan) Without Intent to Act (Lifetime) No   3. Active Suicidal Ideation with any Methods (Not Plan) Without Intent to Act (Recent) No   4. Active Suicidal Ideation with Some Intent to Act, Without Specific Plan (Lifetime) No   4. Active Suicidal Ideation with Some Intent to Act, Without Specific Plan (Recent) No   5. Active Suicidal Ideation with Specific Plan and  Intent (Lifetime) No   5. Active Suicidal Ideation with Specific Plan and Intent (Recent) No   Most Severe Ideation Rating (Lifetime) 1   Frequency (Lifetime) 1   Duration (Lifetime) 1   Controllability (Lifetime) 1   Protective Factors  (Lifetime) 1   Reasons for Ideation (Lifetime) 0   Most Severe Ideation Rating (Past Month) NA   Frequency (Past Month) NA   Duration (Past Month) NA   Controllability (Past Month) NA   Protective Factors (Past Month) NA   Reasons for Ideation (Past Month) NA   Actual Attempt (Lifetime) No   Actual Attempt (Past 3 Months) No   Has subject engaged in non-suicidal self-injurious behavior? (Lifetime) No   Has subject engaged in non-suicidal self-injurious behavior? (Past 3 Months) No   Interrupted Attempts (Lifetime) No   Interrupted Attempts (Past 3 Months) No   Aborted or Self-Interrupted Attempt (Lifetime) No   Aborted or Self-Interrupted Attempt (Past 3 Months) No   Preparatory Acts or Behavior (Lifetime) No   Preparatory Acts or Behavior (Past 3 Months) No   Most Recent Attempt Actual Lethality Code NA   Most Lethal Attempt Actual Lethality Code NA   Initial/First Attempt Actual Lethality Code NA     [employer] Suicide Severity Rating Scale (Short Version)  [employer] Suicide Severity Rating (Short Version) 4/30/2021   Over the past 2 weeks have you felt down, depressed, or hopeless? no   Over the past 2 weeks have you had thoughts of killing yourself? no   Have you ever attempted to kill yourself? no       Appearance:   Unable to assess   Eye Contact:   Unable to assess   Psychomotor Behavior: Unable to assess   Attitude:   Cooperative   Orientation:   All  Speech   Rate / Production: Normal    Volume:  Normal   Mood:    Normal  Affect:    Appropriate   Thought Content:  Clear   Thought Form:  Coherent  Logical   Insight:    Good     Diagnoses:  1. Generalized anxiety disorder    per hx and records    Collateral Reports Completed:  Routed note to Care Team Member(s)    Plan:  (Homework, other):  Patient was given information about behavioral services and encouraged to schedule a follow up appointment with the clinic [facility] in 2 weeks.  She was also given information about mental health symptoms and treatment options  and sleep Hygeine recommendations, including a handout with tips and strategies.  CD Recommendations: No indications of CD issues.    Karlos Sung Psy.D, LP   Behavioral Health Clinician   -[facility]           Karlos Sung

## 2021-09-13 NOTE — TELEPHONE ENCOUNTER
Eden from [facility] called to cancel the order that was sent to them, she stated they do not do unskilled homecare, she recommended the clinic send the orders to [facility] since they do both

## 2021-09-15 ENCOUNTER — TELEPHONE (OUTPATIENT)
Dept: FAMILY MEDICINE | Facility: CLINIC | Age: 76
End: 2021-09-15

## 2021-09-15 NOTE — TELEPHONE ENCOUNTER
I completed form for [facility] services see scanned.form.  [phone] Fax [phone]    Favian Sahu MD

## 2021-09-15 NOTE — TELEPHONE ENCOUNTER
[facility] Call Center    Phone Message    May a detailed message be left on voicemail: yes     Reason for Call: Other: Pt of Dr. Sahu's calling saying she has some questions on her after visit summary she'd like to talk about with the care team.      Pt is requesting a call back today      Action Taken: Message routed to:  [facility] ([facility]    Travel Screening: Not Applicable

## 2021-09-15 NOTE — CONFIDENTIAL NOTE
Form faxed to [phone] and scanned into patient's chart.    Jacquelyn Olvera on 9/15/2021 at 8:33 AM

## 2021-09-17 NOTE — TELEPHONE ENCOUNTER
I called patient. She has several questions about her problem list and what each item means which I am unable to answer.   She would like to speak with Dr. Sahu about this.   Rachel Kate, EMT at 10:29 AM on 9/17/2021.  [facility]  [facility]  [place]  [phone]

## 2021-09-17 NOTE — TELEPHONE ENCOUNTER
September 17, 2021  Please ask Ca Valerie Yuriy to schedule a telephone visit to review questions. Visit will not be available until 10/7 ( first virtual) as provider out of office. Also she could cross out/ add, indicate if there are changes she feels need to occur and we will review at her visit. She should provided the document prior to her next visit for discussion.  Much of the chart list was created prior to me being primary care therefore review/ update is a great idea.  Best wishes,  Favian Sahu MD

## 2021-09-17 NOTE — TELEPHONE ENCOUNTER
Faxed visit 9/1/21, along with demographics and insurance info to [employer] @ Fax [phone].     Rachel Kate, EMT at 4:08 PM on 9/17/2021.  [facility]  [facility]  [place]  [phone]

## 2021-09-21 ENCOUNTER — TRANSFERRED RECORDS (OUTPATIENT)
Dept: HEALTH INFORMATION MANAGEMENT | Facility: CLINIC | Age: 76
End: 2021-09-21

## 2021-09-23 NOTE — TELEPHONE ENCOUNTER
I called and left message with note from provider below. Please help her schedule when she calls back.   Rachel Kate, EMT at 1:40 PM on 9/23/2021.  [facility]  [facility]  [place]  [phone]

## 2021-09-24 NOTE — PATIENT INSTRUCTIONS
"Patient Education     Tips for Sleep Hygiene  \"Sleep hygiene\" means having good sleep habits.Follow these tips to sleep better at night:     Get on a schedule. Go to bed and get up at about the same time every day.    Listen to your body. Only try to sleep when you actually feel tired or sleepy.    Be patient. If you haven't been able to get to sleep after about 30 minutes or more, get up and do something calming or boring until you feel sleepy. Then return to bed and try again.    Don't have caffeine (coffee, tea, cola drinks, chocolate and some medicines), alcohol or nicotine (cigarettes). These can make it harder for you to fall asleep and stay asleep.    Use your bed for sleeping only. That means no TV, computer or homework in bed, especially during the evening and before bedtime.    Don't nap during the day. If you must nap, make sure it is for less than 20 minutes.    Create sleep rituals that remind your body it is time to sleep. Examples include breathing exercises, stretching or reading a book.    Avoid all electronic media (smart phone, computer, tablet) within 2 hours of bed time. The \"blue light\" in these devices activates the part of the brain that keeps you awake.    Dim the lights at night.    Get early morning sources of light (walk in the sunshine) to help set sleep patterns at night.    Try a bath or shower before bed. Having a warm bath 1 to 2 hours before bedtime can help you feel sleepy. Hot baths can make you alert, so be mindful of the temperature.    Don't watch the clock. Checking the clock during the night can wake you up. It can also lead to negative thoughts such as, \"I will never fall asleep,\" which can increase anxiety and sleeplessness.    Use a sleep diary. Track your sleep schedule to know your sleep patterns and to see where you can improve.    Get regular exercise every day. Try not to do heavy exercise in the 4 hours before bedtime.    Eat a healthy, balanced diet.    Try eating a " light, healthy snack before bed, but avoid eating a heavy meal.    Create the right sleeping area. A cool, dark, quiet room is best. If needed, try earplugs, fans and blackout curtains.    Keep your daytime routine the same even if you have a bad night sleep. Avoiding activities the next day can make it harder to sleep.  For informational purposes only. Not to replace the advice of your health care provider.   Copyright   2013 [facility]. All rights reserved. [employer] [id number] - 01/16.

## 2021-09-29 ENCOUNTER — VIRTUAL VISIT (OUTPATIENT)
Dept: BEHAVIORAL HEALTH | Facility: CLINIC | Age: 76
End: 2021-09-29
Payer: COMMERCIAL

## 2021-09-29 ENCOUNTER — TELEPHONE (OUTPATIENT)
Dept: PULMONOLOGY | Facility: CLINIC | Age: 76
End: 2021-09-29

## 2021-09-29 ENCOUNTER — TELEPHONE (OUTPATIENT)
Dept: FAMILY MEDICINE | Facility: CLINIC | Age: 76
End: 2021-09-29

## 2021-09-29 DIAGNOSIS — K21.9 GASTROESOPHAGEAL REFLUX DISEASE WITHOUT ESOPHAGITIS: ICD-10-CM

## 2021-09-29 DIAGNOSIS — F41.1 GENERALIZED ANXIETY DISORDER: Primary | ICD-10-CM

## 2021-09-29 DIAGNOSIS — J98.4 RESTRICTIVE LUNG DISEASE: Primary | ICD-10-CM

## 2021-09-29 DIAGNOSIS — M17.10 ARTHRITIS OF KNEE: ICD-10-CM

## 2021-09-29 DIAGNOSIS — G89.29 CHRONIC BILATERAL LOW BACK PAIN WITHOUT SCIATICA: ICD-10-CM

## 2021-09-29 DIAGNOSIS — M41.9 KYPHOSCOLIOSIS: ICD-10-CM

## 2021-09-29 DIAGNOSIS — M54.50 CHRONIC BILATERAL LOW BACK PAIN WITHOUT SCIATICA: ICD-10-CM

## 2021-09-29 DIAGNOSIS — M47.895 OTHER OSTEOARTHRITIS OF SPINE, THORACOLUMBAR REGION: ICD-10-CM

## 2021-09-29 PROCEDURE — 90832 PSYTX W PT 30 MINUTES: CPT | Mod: TEL | Performed by: PSYCHOLOGIST

## 2021-09-29 NOTE — PROGRESS NOTES
"[facility]: Integrated Behavioral Health  September 29, 2021      Behavioral Health Clinician Progress Note    Patient Name: Ca Yan           Service Type: Phone Visit      Service Location:  Phone Visit      Session Start Time: 2:30 Session End Time: 2:56      Session Length: 16 - 37      Attendees: Patient    Visit Activities (Refresh list every visit): [facility] Only and Phone Encounter    Ca Yan is a 75 year old female who is being evaluated via a telephone visit.      The patient has been notified of the following:     \"We have found that certain health care needs can be provided without the need for a face to face visit.  This service lets us provide the care you need with a short phone conversation.      I will have full access to your [facility] medical record during this entire phone call.   I will be taking notes for your medical record.     Since this is like an office visit, we will bill your insurance company for this service.  Please check with your medical insurance if this type of telephone visit/virtual care is covered.  You may be responsible for the cost of this service if insurance coverage is denied.      There are potential benefits and risks of telephone visits (e.g. limits to patient confidentiality) that differ from in-person visits.?  Confidentiality still applies for telephone services, and nobody will record the visit.  It is important to be in a quiet, private space that is free of distractions (including cell phone or other devices) during the visit.??     If during the course of the call I believe a telephone visit is not appropriate, you will not be charged for this service\"    Consent has been obtained for this service by care team member: yes.      Diagnostic Assessment Date: IP  Treatment Plan Review Date: IP  See Flowsheets for today's PHQ-9 and RENATO-7 results  Previous PHQ-9:   PHQ-9 SCORE 3/28/2019 6/3/2020 4/7/2021 " "  PHQ-9 Total Score MyChart 1 (Minimal depression) - -   PHQ-9 Total Score - 6 0   Some encounter information is confidential and restricted. Go to Review Flowsheets activity to see all data.     Previous RENATO-7:   RENATO-7 SCORE 1/24/2020 6/3/2020 4/7/2021   Total Score 14 (moderate anxiety) - -   Total Score 14 17 3   Some encounter information is confidential and restricted. Go to Review Flowsheets activity to see all data.       DATA  Extended Session (60+ minutes): No  Interactive Complexity: No  Crisis: No    Treatment Objective(s) Addressed in This Session:  Target Behavior(s): disease management/lifestyle changes      Anxiety: will experience a reduction in anxiety, will develop more effective coping skills to manage anxiety symptoms, will develop healthy cognitive patterns and beliefs and will increase ability to function adaptively    Current Stressors / Issues:  [facility] met with Ca over the phone today to continue supporting her treatment of mood difficulties. This appointment was rescheduled from earlier today due [facility] responsibilities that required us to postpone our session today.     We continued to discuss her case - what she is dealing with emotionally and would like help addressing. Ca presented today with concern about her SOB issues and feeling anxious when she leaves home. She inquired about ways she could better manage her anxious thoughts and anxiety feelings when she experiences labored breathing. She discussed how this occurs most in public spaces and we discussed a few instances of this for background. [facility] recommended Ca make use of calming self-statements to help her manage her anxious distress. For instance, we talked about using statements such as \"this will pass\" or \"I'll get through this, this passes soon.\"     Subjectively, Ca sounded dissatisfied with the coping skills we discussed today. When asked about this, she indicated that she does not believe therapy will be helpful " for her right now. [facility] offered to help connect her with another provider, noting that fit can be important to have with a counselor, however she declined this offer. She was willing to take this provider's contact information for scheduling a a follow-up as needed.       Progress on Treatment Objective(s) / Homework:  No improvement - PRECONTEMPLATION (Not seeing need for change); Intervened by educating the patient about the effects of current behavior on health.  Evoked information about reasons to continue behavior, express concern / recommendations, and explored any change talk    Motivational Interviewing    MI Intervention: Expressed Empathy/Understanding, Supported Autonomy, Collaboration, Evocation, Permission to raise concern or advise, Open-ended questions, Reflections: simple and complex, Change talk (evoked) and Reframe     Change Talk Expressed by the Patient: Reasons to change    Provider Response to Change Talk: E - Evoked more info from patient about behavior change, A - Affirmed patient's thoughts, decisions, or attempts at behavior change, R - Reflected patient's change talk and S - Summarized patient's change talk statements    Also provided psychoeducation about behavioral health condition, symptoms, and treatment options    Presented Ca CBT strategies today to help with anxious thoughts    Care Plan review completed: No    Medication Review:  No changes to current psychiatric medication(s)     Current Outpatient Medications   Medication     atorvastatin (LIPITOR) 40 MG tablet     cephALEXin (KEFLEX) 250 MG capsule     clotrimazole (LOTRIMIN) 1 % external cream     ipratropium (ATROVENT) 0.03 % nasal spray     latanoprost (XALATAN) 0.005 % ophthalmic solution     losartan-hydrochlorothiazide (HYZAAR) 100-25 MG tablet     mirtazapine (REMERON) 7.5 MG tablet     quetiapine (SEROQUEL) 200 MG tablet     sertraline (ZOLOFT) 100 MG tablet     Vitamin D3 (CHOLECALCIFEROL) 25 mcg (1000 units) tablet      No current facility-administered medications for this visit.       Medication Compliance:  Yes    Changes in Health Issues:   None reported    Chemical Use Review:   Substance Use: Chemical use reviewed, no active concerns identified      Tobacco Use: No current tobacco use.       CAGE-AID Total Score 3/28/2019   Total Score MyChart 0 (A total score of 2 or greater is considered clinically significant)   Some encounter information is confidential and restricted. Go to Review Flowsheets activity to see all data.       CAGE-AID score  > 1 is a positive screen, suggesting further discussion is needed to determine if evaluation for alcohol or substance abuse is appropriate.  A score > 2 is considered clinically significant, suggesting further evaluation of alcohol or substance-related problems is indicated.      Social History     Tobacco Use     Smoking status: Former Smoker     Packs/day: 0.50     Years: 5.00     Pack years: 2.50     Types: Cigarettes     Start date: 1956     Quit date: 1980     Years since quittin.0     Smokeless tobacco: Former User     Quit date: 1980   Substance Use Topics     Alcohol use: Not Currently     Alcohol/week: 0.0 standard drinks     Comment: Sober for 2 years, previous alcoholic     Drug use: No         Assessment: Current Emotional / Mental Status (status of significant symptoms):  Risk status (Self / Other harm or suicidal ideation)  Patient has had a history of suicidal ideation: passive thoughts only and denies a history of suicide attempts, self-injurious behavior, homicidal ideation, homicidal behavior and and other safety concerns     Patient denies current fears or concerns for personal safety.  Patient denies current or recent suicidal ideation or behaviors.  Patient denies current or recent homicidal ideation or behaviors.  Patient denies current or recent self injurious behavior or ideation.  Patient denies other safety concerns.     A safety and risk  management plan has not been developed at this time, however patient was encouraged to call [place] [phone] should there be a change in any of these risk factors.     [place] Suicide Severity Rating Scale (Lifetime/Recent)  [place] Suicide Severity Rating (Lifetime/Recent) 9/10/2021   1. Wish to be Dead (Lifetime) Yes   1. Wish to be Dead (Recent) No   2. Non-Specific Active Suicidal Thoughts (Lifetime) No   2. Non-Specific Active Suicidal Thoughts (Recent) No   3. Active Suicidal Ideation with any Methods (Not Plan) Without Intent to Act (Lifetime) No   3. Active Suicidal Ideation with any Methods (Not Plan) Without Intent to Act (Recent) No   4. Active Suicidal Ideation with Some Intent to Act, Without Specific Plan (Lifetime) No   4. Active Suicidal Ideation with Some Intent to Act, Without Specific Plan (Recent) No   5. Active Suicidal Ideation with Specific Plan and Intent (Lifetime) No   5. Active Suicidal Ideation with Specific Plan and Intent (Recent) No   Most Severe Ideation Rating (Lifetime) 1   Frequency (Lifetime) 1   Duration (Lifetime) 1   Controllability (Lifetime) 1   Protective Factors  (Lifetime) 1   Reasons for Ideation (Lifetime) 0   Most Severe Ideation Rating (Past Month) NA   Frequency (Past Month) NA   Duration (Past Month) NA   Controllability (Past Month) NA   Protective Factors (Past Month) NA   Reasons for Ideation (Past Month) NA   Actual Attempt (Lifetime) No   Actual Attempt (Past 3 Months) No   Has subject engaged in non-suicidal self-injurious behavior? (Lifetime) No   Has subject engaged in non-suicidal self-injurious behavior? (Past 3 Months) No   Interrupted Attempts (Lifetime) No   Interrupted Attempts (Past 3 Months) No   Aborted or Self-Interrupted Attempt (Lifetime) No   Aborted or Self-Interrupted Attempt (Past 3 Months) No   Preparatory Acts or Behavior (Lifetime) No   Preparatory Acts or Behavior (Past 3 Months) No   Most Recent Attempt Actual Lethality Code NA    Most Lethal Attempt Actual Lethality Code NA   Initial/First Attempt Actual Lethality Code NA     [place] Suicide Severity Rating Scale (Short Version)  [place] Suicide Severity Rating (Short Version) 4/30/2021   Over the past 2 weeks have you felt down, depressed, or hopeless? no   Over the past 2 weeks have you had thoughts of killing yourself? no   Have you ever attempted to kill yourself? no       Appearance:   Unable to assess   Eye Contact:   Unable to assess   Psychomotor Behavior: Unable to assess   Attitude:   Cooperative   Orientation:   All  Speech   Rate / Production: Normal    Volume:  Normal   Mood:    Normal  Affect:    Appropriate   Thought Content:  Clear   Thought Form:  Coherent  Logical   Insight:    Good     Diagnoses:  1. Generalized anxiety disorder    per hx and records    Collateral Reports Completed:  Routed note to Care Team Member(s)    Plan: (Homework, other):  Patient was given information about behavioral services and encouraged to schedule a follow up appointment with the clinic [facility] as needed. She declined a follow-up visit and offer to connect with another provider.  She was also given information about mental health symptoms and treatment options  and Cognitive Behavioral Therapy skills to practice when experiencing anxiety.  CD Recommendations: No indications of CD issues.    Karlos Sung Psy.D, LP   Behavioral Health Clinician   [facility]

## 2021-09-29 NOTE — TELEPHONE ENCOUNTER
DME order was placed for hospital bed, this will be faxed to [facility] after approval from Dr. Sahu.  RN called [facility], and staff will fax a form to [facility] that is needs to be signed by Dr. Sahu.  Also Office visit notes from 8/18/21 need to be updated to add need for hospital bed with diagnosis codes.    Kiki Eddy RN on 9/29/2021 at 12:19 PM

## 2021-09-29 NOTE — TELEPHONE ENCOUNTER
[facility] Call Center    Phone Message    May a detailed message be left on voicemail: yes     Reason for Call: Other: Pt of Dr. Sahu's calling in stating her back feels tired and hurts and wants to know if she can take Ibuprofen for relief?      Please call pt back to discuss    Action Taken: Message routed to:  [facility]    Travel Screening: Not Applicable

## 2021-09-29 NOTE — TELEPHONE ENCOUNTER
Luis Swanson,  Please clarify patient request, she requires an adjustable bed. She has significant kyphoscolioses.       Diagnoses and all orders for this visit:    Restrictive lung disease  Related to   Kyphoscoliosis    Arthritis of knee    Other osteoarthritis of spine, thoracolumbar region    Chronic bilateral low back pain without sciatica    Gastroesophageal reflux disease without esophagitis    She is working with PT who may offer some suggestions. Need dme order and supplier.  Thank you for your assistance with coordination of cares, set up DME RX.  Favian Sahu MD

## 2021-09-29 NOTE — LETTER
"9/29/2021       RE: Ca Yan  [address]  [facility] [address]     Dear Colleague,    Thank you for referring your patient, Ca Yan, to the [facility] MENTAL HEALTH & ADDICTION SERVICES at [facility]. Please see a copy of my visit note below.    [facility]: Integrated Behavioral Health  September 29, 2021      Behavioral Health Clinician Progress Note    Patient Name: Ca Yan           Service Type: Phone Visit      Service Location:  Phone Visit      Session Start Time: 2:30 Session End Time: 2:56      Session Length: 16 - 37      Attendees: Patient    Visit Activities (Refresh list every visit): [facility] Only and Phone Encounter    Ca Yan is a 75 year old female who is being evaluated via a telephone visit.      The patient has been notified of the following:     \"We have found that certain health care needs can be provided without the need for a face to face visit.  This service lets us provide the care you need with a short phone conversation.      I will have full access to your [place] medical record during this entire phone call.   I will be taking notes for your medical record.     Since this is like an office visit, we will bill your insurance company for this service.  Please check with your medical insurance if this type of telephone visit/virtual care is covered.  You may be responsible for the cost of this service if insurance coverage is denied.      There are potential benefits and risks of telephone visits (e.g. limits to patient confidentiality) that differ from in-person visits.?  Confidentiality still applies for telephone services, and nobody will record the visit.  It is important to be in a quiet, private space that is free of distractions (including cell phone or other devices) during the visit.??     If during the course of the call I " "believe a telephone visit is not appropriate, you will not be charged for this service\"    Consent has been obtained for this service by care team member: yes.      Diagnostic Assessment Date: IP  Treatment Plan Review Date: IP  See Flowsheets for today's PHQ-9 and RENATO-7 results  Previous PHQ-9:   PHQ-9 SCORE 3/28/2019 6/3/2020 4/7/2021   PHQ-9 Total Score MyChart 1 (Minimal depression) - -   PHQ-9 Total Score - 6 0   Some encounter information is confidential and restricted. Go to Review Flowsheets activity to see all data.     Previous RENATO-7:   RENATO-7 SCORE 1/24/2020 6/3/2020 4/7/2021   Total Score 14 (moderate anxiety) - -   Total Score 14 17 3   Some encounter information is confidential and restricted. Go to Review Flowsheets activity to see all data.       DATA  Extended Session (60+ minutes): No  Interactive Complexity: No  Crisis: No    Treatment Objective(s) Addressed in This Session:  Target Behavior(s): disease management/lifestyle changes      Anxiety: will experience a reduction in anxiety, will develop more effective coping skills to manage anxiety symptoms, will develop healthy cognitive patterns and beliefs and will increase ability to function adaptively    Current Stressors / Issues:  [facility] met with Ca over the phone today to continue supporting her treatment of mood difficulties. This appointment was rescheduled from earlier today due [facility] responsibilities that required us to postpone our session today.     We continued to discuss her case - what she is dealing with emotionally and would like help addressing. Ca presented today with concern about her SOB issues and feeling anxious when she leaves home. She inquired about ways she could better manage her anxious thoughts and anxiety feelings when she experiences labored breathing. She discussed how this occurs most in public spaces and we discussed a few instances of this for background. [facility] recommended Ca make use of calming " "self-statements to help her manage her anxious distress. For instance, we talked about using statements such as \"this will pass\" or \"I'll get through this, this passes soon.\"     Subjectively, Ca sounded dissatisfied with the coping skills we discussed today. When asked about this, she indicated that she does not believe therapy will be helpful for her right now. [facility] offered to help connect her with another provider, noting that fit can be important to have with a counselor, however she declined this offer. She was willing to take this provider's contact information for scheduling a a follow-up as needed.       Progress on Treatment Objective(s) / Homework:  No improvement - PRECONTEMPLATION (Not seeing need for change); Intervened by educating the patient about the effects of current behavior on health.  Evoked information about reasons to continue behavior, express concern / recommendations, and explored any change talk    Motivational Interviewing    MI Intervention: Expressed Empathy/Understanding, Supported Autonomy, Collaboration, Evocation, Permission to raise concern or advise, Open-ended questions, Reflections: simple and complex, Change talk (evoked) and Reframe     Change Talk Expressed by the Patient: Reasons to change    Provider Response to Change Talk: E - Evoked more info from patient about behavior change, A - Affirmed patient's thoughts, decisions, or attempts at behavior change, R - Reflected patient's change talk and S - Summarized patient's change talk statements    Also provided psychoeducation about behavioral health condition, symptoms, and treatment options    Presented Ca CBT strategies today to help with anxious thoughts    Care Plan review completed: No    Medication Review:  No changes to current psychiatric medication(s)     Current Outpatient Medications   Medication     atorvastatin (LIPITOR) 40 MG tablet     cephALEXin (KEFLEX) 250 MG capsule     clotrimazole (LOTRIMIN) 1 % " external cream     ipratropium (ATROVENT) 0.03 % nasal spray     latanoprost (XALATAN) 0.005 % ophthalmic solution     losartan-hydrochlorothiazide (HYZAAR) 100-25 MG tablet     mirtazapine (REMERON) 7.5 MG tablet     quetiapine (SEROQUEL) 200 MG tablet     sertraline (ZOLOFT) 100 MG tablet     Vitamin D3 (CHOLECALCIFEROL) 25 mcg (1000 units) tablet     No current facility-administered medications for this visit.       Medication Compliance:  Yes    Changes in Health Issues:   None reported    Chemical Use Review:   Substance Use: Chemical use reviewed, no active concerns identified      Tobacco Use: No current tobacco use.       CAGE-AID Total Score 3/28/2019   Total Score MyChart 0 (A total score of 2 or greater is considered clinically significant)   Some encounter information is confidential and restricted. Go to Review Flowsheets activity to see all data.       CAGE-AID score  > 1 is a positive screen, suggesting further discussion is needed to determine if evaluation for alcohol or substance abuse is appropriate.  A score > 2 is considered clinically significant, suggesting further evaluation of alcohol or substance-related problems is indicated.      Social History     Tobacco Use     Smoking status: Former Smoker     Packs/day: 0.50     Years: 5.00     Pack years: 2.50     Types: Cigarettes     Start date: 1956     Quit date: 1980     Years since quittin.0     Smokeless tobacco: Former User     Quit date: 1980   Substance Use Topics     Alcohol use: Not Currently     Alcohol/week: 0.0 standard drinks     Comment: Sober for 2 years, previous alcoholic     Drug use: No         Assessment: Current Emotional / Mental Status (status of significant symptoms):  Risk status (Self / Other harm or suicidal ideation)  Patient has had a history of suicidal ideation: passive thoughts only and denies a history of suicide attempts, self-injurious behavior, homicidal ideation, homicidal behavior and and  other safety concerns     Patient denies current fears or concerns for personal safety.  Patient denies current or recent suicidal ideation or behaviors.  Patient denies current or recent homicidal ideation or behaviors.  Patient denies current or recent self injurious behavior or ideation.  Patient denies other safety concerns.     A safety and risk management plan has not been developed at this time, however patient was encouraged to call [place] [phone] should there be a change in any of these risk factors.     [place] Suicide Severity Rating Scale (Lifetime/Recent)  [place] Suicide Severity Rating (Lifetime/Recent) 9/10/2021   1. Wish to be Dead (Lifetime) Yes   1. Wish to be Dead (Recent) No   2. Non-Specific Active Suicidal Thoughts (Lifetime) No   2. Non-Specific Active Suicidal Thoughts (Recent) No   3. Active Suicidal Ideation with any Methods (Not Plan) Without Intent to Act (Lifetime) No   3. Active Suicidal Ideation with any Methods (Not Plan) Without Intent to Act (Recent) No   4. Active Suicidal Ideation with Some Intent to Act, Without Specific Plan (Lifetime) No   4. Active Suicidal Ideation with Some Intent to Act, Without Specific Plan (Recent) No   5. Active Suicidal Ideation with Specific Plan and Intent (Lifetime) No   5. Active Suicidal Ideation with Specific Plan and Intent (Recent) No   Most Severe Ideation Rating (Lifetime) 1   Frequency (Lifetime) 1   Duration (Lifetime) 1   Controllability (Lifetime) 1   Protective Factors  (Lifetime) 1   Reasons for Ideation (Lifetime) 0   Most Severe Ideation Rating (Past Month) NA   Frequency (Past Month) NA   Duration (Past Month) NA   Controllability (Past Month) NA   Protective Factors (Past Month) NA   Reasons for Ideation (Past Month) NA   Actual Attempt (Lifetime) No   Actual Attempt (Past 3 Months) No   Has subject engaged in non-suicidal self-injurious behavior? (Lifetime) No   Has subject engaged in non-suicidal self-injurious behavior?  (Past 3 Months) No   Interrupted Attempts (Lifetime) No   Interrupted Attempts (Past 3 Months) No   Aborted or Self-Interrupted Attempt (Lifetime) No   Aborted or Self-Interrupted Attempt (Past 3 Months) No   Preparatory Acts or Behavior (Lifetime) No   Preparatory Acts or Behavior (Past 3 Months) No   Most Recent Attempt Actual Lethality Code NA   Most Lethal Attempt Actual Lethality Code NA   Initial/First Attempt Actual Lethality Code NA     [place] Suicide Severity Rating Scale (Short Version)  [place] Suicide Severity Rating (Short Version) 4/30/2021   Over the past 2 weeks have you felt down, depressed, or hopeless? no   Over the past 2 weeks have you had thoughts of killing yourself? no   Have you ever attempted to kill yourself? no       Appearance:   Unable to assess   Eye Contact:   Unable to assess   Psychomotor Behavior: Unable to assess   Attitude:   Cooperative   Orientation:   All  Speech   Rate / Production: Normal    Volume:  Normal   Mood:    Normal  Affect:    Appropriate   Thought Content:  Clear   Thought Form:  Coherent  Logical   Insight:    Good     Diagnoses:  1. Generalized anxiety disorder    per hx and records    Collateral Reports Completed:  Routed note to Care Team Member(s)    Plan: (Homework, other):  Patient was given information about behavioral services and encouraged to schedule a follow up appointment with the clinic [facility] as needed. She declined a follow-up visit and offer to connect with another provider.  She was also given information about mental health symptoms and treatment options  and Cognitive Behavioral Therapy skills to practice when experiencing anxiety.  CD Recommendations: No indications of CD issues.    Karlos Sung Psy.D, LP   Behavioral Health Clinician   [facility]         Again, thank you for allowing me to participate in the care of your patient.      Sincerely,    Karlos Sung

## 2021-09-29 NOTE — TELEPHONE ENCOUNTER
[facility]    Phone Message    May a detailed message be left on voicemail: yes     Reason for Call: Other: Pt would like a call back to discuss getting a new bed and what type to get for her back     Action Taken: Message routed to:  [facility] Pulm    Travel Screening: Not Applicable

## 2021-10-01 ENCOUNTER — TELEPHONE (OUTPATIENT)
Dept: NURSING | Facility: CLINIC | Age: 76
End: 2021-10-01

## 2021-10-01 NOTE — TELEPHONE ENCOUNTER
Patient was called and RN relayed Dr. Sahu's message.  RN reviewed that patient has an in person appt to follow up with PCP on 10/11/21.    Kiki Eddy RN on 10/1/2021 at 12:04 PM

## 2021-10-01 NOTE — TELEPHONE ENCOUNTER
October 1, 2021  She should take tylenol for back pain not ibuprofen as she has HX of GERD, Hypertension and last GFR 60. Please schedule a follow-up if pain persists. Could use non pharmacological cares for mild back pain.   Favian Sahu MD

## 2021-10-01 NOTE — TELEPHONE ENCOUNTER
.  [facility]: Nurse Triage Note  SITUATION/BACKGROUND                                                      Ca Yan is a 75 year old female who calls with concern of increasing back pain and breathing problems. History of scoliosis.   Patient is on supplemental oxygen all the time at 3 liters/ NC. During call oxygen saturation 91% on 3L/ NC.   Intermittent back pain. None at this time.   Patient voices concern of being SOB on exertion and back pain. . Went out yesterday to grocery store with friend and begin having back pain....   Home care instructions given per breathing problems/ back pain.   Routed to Primary Care as High Priority to follow up call to patient at [phone] for further assistance and update on hospital bed.       RECOMMENDATION/PLAN                                                      RECOMMENDED DISPOSITION: home care instructions per breathing problems/ back pain. Seek ED care with chest pain, blue lips or tongue, pale or gray face; clammy skin; feeling of suffocation; frothy pink or copious white sputum; decreased LOC; inability to speak; inability to swallow saliva.   Will comply with recommendation: Yes    If further questions/concerns or if symptoms do not improve, worsen or new symptoms develop, call your PCP or [phone] to talk with the Resident on call, as soon as possible.    Guideline used: breathing problem/ back pain   Telephone Triage Protocols for Nurses, Fifth Edition, Elisabeth Herrera, RN, RN

## 2021-10-05 ENCOUNTER — TELEPHONE (OUTPATIENT)
Dept: PULMONOLOGY | Facility: CLINIC | Age: 76
End: 2021-10-05

## 2021-10-05 ENCOUNTER — TELEPHONE (OUTPATIENT)
Facility: CLINIC | Age: 76
End: 2021-10-05

## 2021-10-05 NOTE — TELEPHONE ENCOUNTER
Spoke with pt regarding scheduling a follow up appt with [facility] prior after receiving communication from STEVEN Taylor. Appt scheduled with Dr. Taylor for next available, which was Dec. Details confirmed with pt.

## 2021-10-05 NOTE — TELEPHONE ENCOUNTER
[facility]    Phone Message    May a detailed message be left on voicemail: yes     Reason for Call: Other: Ca calling to discuss her oxygen needs. Please call back. Thank you!     Action Taken: Message routed to:  [facility] pulm    Travel Screening: Not Applicable

## 2021-10-05 NOTE — TELEPHONE ENCOUNTER
Pt states she's tried several different sized oxygen tanks - the smallest/lightest and most manageable for her do not last long enough, the larger size are too heavy and cumbersome for her to manage.  She has spoken to her DME provider, [employer], she tells me they have referred her back to us.  She voiced concern about her breathing, she would like to speak to Dr. Taylor about her prognosis.  Review of Dr Taylor's note on 7/15 indicates she should follow up in Nov with [facility] at that time.  I have advised Ca that we can review options for portable oxygen after [facility], currently she is using 3L/min continuous.  I will send message to schedulers to assist with follow up appointment.

## 2021-10-08 NOTE — TELEPHONE ENCOUNTER
[facility]    Phone Message    May a detailed message be left on voicemail: yes     Reason for Call: Order(s): Other:   Reason for requested: Oxygen orders. Ca is requesting tall tanks, due to the smaller tanks only lasting her an hour and a half. Needs them sent to [employer]. Patient is requesting a call from a different nurse than last time.   Date needed: 10/08/21  Provider name: Dr. Taylor      Action Taken: Message routed to:  [facility] Pulmonary    Travel Screening: Not Applicable

## 2021-10-08 NOTE — TELEPHONE ENCOUNTER
Spoke with [facility] (Magno) and there are no orders needed for tall tanks. Talked to patient and stated that there are not orders needed. Patient needs to pick a tank size and stick with it as she keeps telling [facility] that she needs small tanks and the the next week she wants tall tanks. Explained that to patient.

## 2021-10-14 ENCOUNTER — HOSPITAL ENCOUNTER (OUTPATIENT)
Dept: CARDIAC REHAB | Facility: CLINIC | Age: 76
End: 2021-10-14
Payer: COMMERCIAL

## 2021-10-14 PROCEDURE — G0238 OTH RESP PROC, INDIV: HCPCS

## 2021-10-18 ENCOUNTER — TELEPHONE (OUTPATIENT)
Dept: PULMONOLOGY | Facility: CLINIC | Age: 76
End: 2021-10-18

## 2021-10-18 NOTE — TELEPHONE ENCOUNTER
Spoke with pt about rescheduling her appt and [facility] with Dr. Taylor on 12/27 to 12/14. Pt states she is unable to that day as she has pulmonary rehab. Next opening offered on 1/24 and appt details confirmed with pt who requested hard copy of appt be mailed to her home; mailing address verified.

## 2021-10-20 NOTE — TELEPHONE ENCOUNTER
RN called [facility], and staff reviewed order for hospital bed.  [facility] staff relayed that all forms have been received, including office visit notes and forms needed, and they are processing the request.    Kiki Eddy RN on 10/20/2021 at 10:04 AM

## 2021-10-28 DIAGNOSIS — F43.22 ADJUSTMENT DISORDER WITH ANXIOUS MOOD: ICD-10-CM

## 2021-10-29 RX ORDER — SERTRALINE HYDROCHLORIDE 100 MG/1
150 TABLET, FILM COATED ORAL DAILY
Qty: 45 TABLET | Refills: 3 | OUTPATIENT
Start: 2021-10-29

## 2021-11-02 ENCOUNTER — HOSPITAL ENCOUNTER (OUTPATIENT)
Dept: CARDIAC REHAB | Facility: CLINIC | Age: 76
End: 2021-11-02
Payer: COMMERCIAL

## 2021-11-02 PROCEDURE — G0238 OTH RESP PROC, INDIV: HCPCS

## 2021-11-04 ENCOUNTER — TELEPHONE (OUTPATIENT)
Dept: FAMILY MEDICINE | Facility: CLINIC | Age: 76
End: 2021-11-04
Payer: COMMERCIAL

## 2021-11-04 ENCOUNTER — TELEPHONE (OUTPATIENT)
Dept: PULMONOLOGY | Facility: CLINIC | Age: 76
End: 2021-11-04

## 2021-11-04 DIAGNOSIS — F43.22 ADJUSTMENT DISORDER WITH ANXIOUS MOOD: ICD-10-CM

## 2021-11-04 NOTE — TELEPHONE ENCOUNTER
[facility] Call Center    Phone Message    May a detailed message be left on voicemail: yes     Reason for Call: Other: Jefferson calling from [facility] stating that Ca should maybe not be living alone. She is unable to change her regulator on her oxygen tank and has memory issues on how to operate the equipment. He wanted to let Dr. Taylor know and mentioned maybe getting  involved. Thank you!     Action Taken: Message routed to:  [facility] Pulm    Travel Screening: Not Applicable

## 2021-11-04 NOTE — TELEPHONE ENCOUNTER
[facility] Call Center    Phone Message    May a detailed message be left on voicemail: yes     Reason for Call: Symptoms or Concerns     If patient has red-flag symptoms, warm transfer to triage line    Current symptom or concern: Patient stated she is very hot and unable to cool off. She forgot to advise the nurse of this today.      Has patient previously been seen for this? No      Are there any new or worsening symptoms? No      Action Taken: Message routed to:  [facility]    Travel Screening: Not Applicable

## 2021-11-05 ENCOUNTER — TELEPHONE (OUTPATIENT)
Dept: FAMILY MEDICINE | Facility: CLINIC | Age: 76
End: 2021-11-05
Payer: COMMERCIAL

## 2021-11-05 NOTE — TELEPHONE ENCOUNTER
Left message with patient that she will be receiving a call from Hattie, the  here, to help with issues she is having. [employer] messaging that they continue to assist patient but she can not remember any instructions for oxygen use.

## 2021-11-05 NOTE — TELEPHONE ENCOUNTER
Patient calling because she is having trouble with the oxygen company she is currently using. Patient is requesting a referral to a new oxygen company. Please call to discuss. Thank you.

## 2021-11-08 ENCOUNTER — NURSE TRIAGE (OUTPATIENT)
Dept: NURSING | Facility: CLINIC | Age: 76
End: 2021-11-08
Payer: COMMERCIAL

## 2021-11-08 RX ORDER — SERTRALINE HYDROCHLORIDE 100 MG/1
150 TABLET, FILM COATED ORAL DAILY
Qty: 135 TABLET | Refills: 1 | Status: SHIPPED | OUTPATIENT
Start: 2021-11-08 | End: 2022-04-13

## 2021-11-08 NOTE — TELEPHONE ENCOUNTER
RN left a voice mail message to let patient know that her message was received, and that she can follow up with Dr. Sahu about this on 11/10/21.    Kiki Eddy RN on 11/8/2021 at 12:06 PM

## 2021-11-08 NOTE — TELEPHONE ENCOUNTER
Back pain and seeing ortho on 11/22.  Asking what to expect at ortho visit.  Can do xrays, mri, ct, injections.  Ortho is specialist of the bones.    Luann Arteaga RN  [facility] Nurse Advisor

## 2021-11-08 NOTE — TELEPHONE ENCOUNTER
[facility]    Phone Message    May a detailed message be left on voicemail: yes     Reason for Call: Other: Patient is calling for Kiki about her oxygen. Patient stated her oxygen is being taken away. Please call back and discuss.     Action Taken: Message routed to:  [facility] P[facility]    Travel Screening: Not Applicable

## 2021-11-09 NOTE — TELEPHONE ENCOUNTER
Pt states oxygen company are mad at her for not catching on to instructions for changing the oxygen regulator on her oxygen tanks and she is expecting them to take her oxygen from her.  I advised her it is unlikely that they will do this.  I reminded her that when she I showed her how to do this in clinic, I also provided written instructions.  She recalls this but doesn't know where they are.  Writer will send a new set of written instructions via mail.  I have also provided the name of web site to view instructional video, pt will need to have friend/family member provide instructions for viewing on her smart phone. Suggested she have RT review instruction during pulm rehab session.

## 2021-11-10 ENCOUNTER — OFFICE VISIT (OUTPATIENT)
Dept: FAMILY MEDICINE | Facility: CLINIC | Age: 76
End: 2021-11-10
Payer: COMMERCIAL

## 2021-11-10 VITALS
HEART RATE: 90 BPM | TEMPERATURE: 97.6 F | BODY MASS INDEX: 33.29 KG/M2 | HEIGHT: 64 IN | OXYGEN SATURATION: 92 % | WEIGHT: 195 LBS

## 2021-11-10 DIAGNOSIS — M41.9 KYPHOSCOLIOSIS: ICD-10-CM

## 2021-11-10 DIAGNOSIS — J38.00 VOCAL CORD PARALYSIS: ICD-10-CM

## 2021-11-10 DIAGNOSIS — R49.0 DYSPHONIA: ICD-10-CM

## 2021-11-10 DIAGNOSIS — L85.3 DRY SKIN: ICD-10-CM

## 2021-11-10 DIAGNOSIS — J38.3 GLOTTIC INSUFFICIENCY: ICD-10-CM

## 2021-11-10 DIAGNOSIS — L60.3 NAIL DYSTROPHY: ICD-10-CM

## 2021-11-10 DIAGNOSIS — Z23 ENCOUNTER FOR IMMUNIZATION: ICD-10-CM

## 2021-11-10 DIAGNOSIS — M40.00 KYPHOSIS (ACQUIRED) (POSTURAL): ICD-10-CM

## 2021-11-10 DIAGNOSIS — F41.1 GENERALIZED ANXIETY DISORDER: Primary | ICD-10-CM

## 2021-11-10 DIAGNOSIS — L84 CALLUS OF FOOT: ICD-10-CM

## 2021-11-10 PROCEDURE — 99215 OFFICE O/P EST HI 40 MIN: CPT | Performed by: FAMILY MEDICINE

## 2021-11-10 ASSESSMENT — ANXIETY QUESTIONNAIRES
7. FEELING AFRAID AS IF SOMETHING AWFUL MIGHT HAPPEN: MORE THAN HALF THE DAYS
6. BECOMING EASILY ANNOYED OR IRRITABLE: MORE THAN HALF THE DAYS
1. FEELING NERVOUS, ANXIOUS, OR ON EDGE: MORE THAN HALF THE DAYS
IF YOU CHECKED OFF ANY PROBLEMS ON THIS QUESTIONNAIRE, HOW DIFFICULT HAVE THESE PROBLEMS MADE IT FOR YOU TO DO YOUR WORK, TAKE CARE OF THINGS AT HOME, OR GET ALONG WITH OTHER PEOPLE: VERY DIFFICULT
GAD7 TOTAL SCORE: 10
3. WORRYING TOO MUCH ABOUT DIFFERENT THINGS: MORE THAN HALF THE DAYS
2. NOT BEING ABLE TO STOP OR CONTROL WORRYING: NOT AT ALL
5. BEING SO RESTLESS THAT IT IS HARD TO SIT STILL: NOT AT ALL

## 2021-11-10 ASSESSMENT — PATIENT HEALTH QUESTIONNAIRE - PHQ9: 5. POOR APPETITE OR OVEREATING: MORE THAN HALF THE DAYS

## 2021-11-10 ASSESSMENT — PAIN SCALES - GENERAL: PAINLEVEL: SEVERE PAIN (7)

## 2021-11-10 ASSESSMENT — MIFFLIN-ST. JEOR: SCORE: 1354.51

## 2021-11-10 NOTE — Clinical Note
Please document moderna vaccine if provided.  Unable to close encounter until documented.  Best wishes,  Favian Sahu MD

## 2021-11-10 NOTE — NURSING NOTE
Chief Complaint   Patient presents with     Pain     Patient comes in to discuss pain all over her body.         Emiliano Salas MA on 11/10/2021 at 10:49 AM

## 2021-11-10 NOTE — PROGRESS NOTES
Assessment & Plan     Ca is a 75-year-old with history of anxiety /depression, significant kyphoscoliosis causing impairment of respiratory function, benign essential hypertension, past history of breast cancer, vocal cord paralysis with dysphonia who presents today for primary care follow-up of her   dysphonia and significant anxiety related to her breathing restriction from Kyphoscoliosis. She also has foot concerns.  Generalized anxiety disorder  Sertraline 150 mg daily, Remeron 7.5 mg daily  Glottic insufficiency  Dysphonia  Vocal cord paralysis  Reviewed options. She declined ENT follow-up ( unable to tolerate procedures)but agreed to speech, vocal therapy  - Speech Therapy Referral    Kyphosis (acquired) (postural)  Kyphoscoliosis  Contributing to respiratory restriction plan per pulmonary    Encounter for immunization  Ordered,   - COVID-19,PF,MODERNA (18+ Yrs PRIMARY SERIES .5mL)    Callus of foot  Nail dystrophy  Requires moisturizer and assistance with callous, nail trimming  - Orthopedic  Referral    Dry skin  Apply liberal moisturizer  - Emollient (CERAVE) CREA  Dispense: 453 g; Refill: 3  - Orthopedic  Referral     I sent the following message to pulmonary and Hattie Alexander, social service to assist with PCA.  I saw her today. She agrees to a PCA to assist with some personal cares. She would appreciate Hattie Alexander to call her back at some point. She declined home care nurse but may be needed to assess for PCA hours brief oxygen review.  She does not have dementia, She has anxiety but with kindness slow explanations does well. She has some fixed ideas but is willing to work with my recommendations. She is not really sure oxygen is required but agrees with Dr Taylor's recommendations.  Best wishes,  Favian Sahu MD             60 minutes spent on the date of the encounter doing chart review, history, exam, diagnostics review, documentation, counseling and coordination of cares  "as noted.    Return in about 3 months (around 2/3/2022). virtual    Favian Sahu MD  [facility] [place]       Magalys Penaloza is a 75 year old who presents for the following health issues     HPI   Ca is a 75-year-old with history of anxiety /depression, significant kyphoscoliosis causing impairment of respiratory function, benign essential hypertension, past history of breast cancer, vocal cord paralysis with dysphonia who presents today for primary care follow-up of her   dysphonia and significant anxiety related to her breathing restriction from Kyphoscoliosis. She also has foot concerns. She indicates she has trouble with her throat, often is sneezing and rhinorrhea all day long but at night it is better She is frustrated by dysphonia secondary to vocal cord paralysis.  Oxygen company has been concerned about her ability to use Oxygen.  She feels anxious when they are there.  She is uncertain if Oxygen is helpful,  doesn't really assist her. Sometimes uses when she goes out but is cumbersome and frustrating.   She has occasional spasmodic Cough with spasm in abdomen at times.  We discussed concerns raised by the Oxygen company. She independently  started to me, \"Why would they feel I have dementia and shouldn't live on my own?\"  We reviewed concerns raised by understanding use of oxygen as need to understand for safety.  She feels if there a was a fire she would need extra assistance getting out of the building.She does not smoke.Indicates there is No smoking in the building. She currently has assistance with homemaking,[employer] for homemaking once per month.  She plays scrabble to keep her mind busy, has friends, and goes to grocery store and occasional clothing shopping.  Needs assistance with foot care as she has difficulty reaching her feet due to kyphosis.  She has a care coordinator through her insurance [employer] [employer] and had recent contactfrom Hattie " Winkels social service would benefit from PCA services / home care to assist with grooming nail care.  Immunizations: Due for Moderna booster will provide today. She will have influenza vaccine through her building.          Labs reviewed in EPIC  BP Readings from Last 3 Encounters:   08/18/21 121/82   06/23/21 137/87   04/30/21 (!) 151/97    Wt Readings from Last 3 Encounters:   11/10/21 88.5 kg (195 lb)   08/18/21 88.5 kg (195 lb)   06/23/21 87.1 kg (192 lb)            Immunization History   Administered Date(s) Administered     COVID-19,PF,Moderna 01/28/2021, 02/25/2021, 11/10/2021     Influenza (H1N1) 01/08/2010     Influenza (High Dose) 3 valent vaccine 02/26/2016, 10/31/2016, 10/05/2017, 10/04/2018, 10/11/2019     Influenza (IIV3) PF 11/05/2010, 11/13/2013, 08/25/2014     Influenza, Quad, High Dose, Pf, 65yr+ (Fluzone HD) 10/05/2020     Mantoux Tuberculin Skin Test 04/26/2013, 02/10/2014     Pneumo Conj 13-V (2010&after) 04/17/2015     Pneumococcal 23 valent 02/06/2009, 03/30/2012     TD (ADULT, 7+) 02/07/2005, 02/07/2005     Tdap (Adacel,Boostrix) 07/09/2011, 08/18/2021           Patient Active Problem List   Diagnosis     Non morbid obesity due to excess calories     Alcohol abuse     Malignant neoplasm of breast (H)     Arthritis of knee     Diarrhea     Diastolic dysfunction     Osteoarthritis of spine     Gastroesophageal reflux disease     Generalized anxiety disorder     Hypertension     Hyperlipidemia     Insomnia     Irritable bowel syndrome     Kyphoscoliosis     Cluster C personality disorder (H)     Seborrheic eczema     Anemia     Anxiety state     Bunion     Low bone density     Fecal incontinence     Chronic bilateral low back pain without sciatica     Cognitive impairment     Parathyroid hormone excess (H)     Chronic fatigue     Vocal cord paralysis     Dysphonia     Callus of foot     Nail dystrophy     Past Surgical History:   Procedure Laterality Date     BACK SURGERY      spinal fusion      COLONOSCOPY       LARYNGOSCOPY WITH MICROSCOPE N/A 2021    Procedure: FLEXIBLE LARYNGOSCOPY;  Surgeon: Domonique Roche MD;  Location: [facility] OR     LUMPECTOMY BREAST       ORTHOPEDIC SURGERY      Knee surgery left side       Social History     Tobacco Use     Smoking status: Former Smoker     Packs/day: 0.50     Years: 5.00     Pack years: 2.50     Types: Cigarettes     Start date: 1956     Quit date: 1980     Years since quittin.1     Smokeless tobacco: Former User     Quit date: 1980   Substance Use Topics     Alcohol use: Not Currently     Alcohol/week: 0.0 standard drinks     Comment: Sober for 2 years, previous alcoholic     Family History   Problem Relation Age of Onset     Breast Cancer Mother      Diabetes Mother      Anxiety Disorder Mother      Asthma Mother      Other Cancer Mother      Hyperlipidemia Mother      Alcohol/Drug Father      Mental Illness Father      Substance Abuse Father      Obesity Father      Cerebrovascular Disease Maternal Grandmother 67         Current Outpatient Medications   Medication Sig Dispense Refill     atorvastatin (LIPITOR) 40 MG tablet Take 1 tablet (40 mg) by mouth daily 90 tablet 3     cephALEXin (KEFLEX) 250 MG capsule Take 250 mg by mouth       clotrimazole (LOTRIMIN) 1 % external cream Apply topically 2 times daily as needed (under arms breast groin rash until rash resolves) 60 g 1     Emollient (CERAVE) CREA Please apply twice daily to dry skin of body and feet 453 g 3     ipratropium (ATROVENT) 0.03 % nasal spray 2 sprays each nostril twice daily for clear rhinorrhea 30 mL 1     latanoprost (XALATAN) 0.005 % ophthalmic solution 1 drop       losartan-hydrochlorothiazide (HYZAAR) 100-25 MG tablet Take 1 tablet by mouth daily 90 tablet 3     mirtazapine (REMERON) 7.5 MG tablet        quetiapine (SEROQUEL) 200 MG tablet Take 200 mg by mouth At Bedtime.       sertraline (ZOLOFT) 100 MG tablet Take 1.5 tablets (150 mg) by mouth daily 135 tablet 1      "Vitamin D3 (CHOLECALCIFEROL) 25 mcg (1000 units) tablet Take 1 tablet (25 mcg) by mouth daily 100 tablet 3     Allergies   Allergen Reactions     Azithromycin Itching     Codeine Nausea and Vomiting     Hydrocodone Nausea and Vomiting     Metronidazole Nausea     Oxycodone Itching and Rash     Percocet [Oxycodone-Acetaminophen] Nausea and Vomiting     Pollen Extract      sneezing and runny nose.      Seasonal Allergies      sneezing and runny nose.      Vicodin [Hydrocodone-Acetaminophen] Nausea and Vomiting     Zolpidem      Amnestic behavior     Recent Labs   Lab Test 06/23/21  0940 04/22/21  1145 10/08/20  1035 08/20/20  1202 01/21/20  0920 10/11/19  1520 11/04/16  1350 04/21/16  1443   A1C  --   --   --   --   --   --   --  5.8   LDL 87 182*  --   --  100*  --   --   --    HDL 90 90  --   --  89  --   --   --    TRIG 82 87  --   --  104  --   --   --    ALT  --  14  --  15  --  17   < >  --    CR  --  0.92  --  0.81 0.81 1.00   < >  --    GFRESTIMATED  --  60* 61 70 71 55*   < >  --    GFRESTBLACK  --  70 74 81 82 64   < >  --    POTASSIUM  --  4.1  --  3.9 4.1 3.9   < >  --    TSH  --  2.04  --  2.39  --   --    < >  --     < > = values in this interval not displayed.      Review of Systems   Problem list, PMH, Surgical HX, SH, allergies, medications,immunizations reviewed and updated in Epic.           Objective    Pulse 90   Temp 97.6  F (36.4  C) (Oral)   Ht 1.626 m (5' 4\")   Wt 88.5 kg (195 lb)   LMP  (LMP Unknown)   SpO2 92%   BMI 33.47 kg/m   She declined BP check per staff.  Body mass index is 33.47 kg/m .  Physical Exam   Constitutional: Oriented to person, place, and time. Vital signs are noted.  Appears well-nourished. Non-toxic appearance.  No distress. She was cooperative and interactive today and appreciative of the visit.  She has vocal dysphonia. She declined to wear mask the entire visit,  No distress. White hair, no oxygen today   HENT:Oral exam no thrush TM normal  Head: Normocephalic " and atraumatic.   Eyes: Conjunctivae and EOM are normal. Pupils are equal, round, and reactive to light. No scleral icterus. Glasses  Neck:  Kyphosis, Deformity  Neck trachea deviation  Cardiovascular: Regular rhythm increases slightly with inspiration, normal heart sounds. Trace  murmur present in pulmonic region.   Pulmonary/Chest: Effort normal and breath sounds normal. No respiratory distress but indicates she feels short of breath related to her Scoliosis   Abdominal: Obese deferred exam sitting in chair  Musculoskeletal:Significant kyphoscoliosis, bilateral chronic lymphedema. Bilateral osteoarthritis of knees   Neurological: Alert and oriented to person, place, and time. General deconditioning, sitting in wheelchair but moves all extremities  Chronic dysphonia  Skin: Dry skin  Foot exam large callous near reat toes, evidence of previous healed ulcer under her right 5th metatarsal with callous formation, no open areas.. Skin is warm and dry, mild pallor. No rash noted. No erythema.   Psychiatric: Fixed ideas, pleasant, cooperative, thought coherent but expresses worry about her breathing related to musculoskeletal limitations scoliosis impacting lung volumes.       PHQ 6/3/2020 4/7/2021 11/10/2021   PHQ-9 Total Score 6 0 0   Q9: Thoughts of better off dead/self-harm past 2 weeks Not at all Not at all Not at all     RENATO-7 SCORE 6/3/2020 4/7/2021 11/10/2021   Total Score - - -   Total Score 17 3 10   Total Score BEH Adult - - -     Able to complete the above independently   Favian Sahu MD

## 2021-11-11 ENCOUNTER — PATIENT OUTREACH (OUTPATIENT)
Dept: CARE COORDINATION | Facility: CLINIC | Age: 76
End: 2021-11-11
Payer: COMMERCIAL

## 2021-11-11 ASSESSMENT — PATIENT HEALTH QUESTIONNAIRE - PHQ9: SUM OF ALL RESPONSES TO PHQ QUESTIONS 1-9: 0

## 2021-11-11 ASSESSMENT — ANXIETY QUESTIONNAIRES: GAD7 TOTAL SCORE: 10

## 2021-11-11 NOTE — PROGRESS NOTES
Social Work Intervention  [facility]    Data/Intervention:    Patient Name:  Ca Yan  /Age:  1945 (75 year old)    Visit Type: telephone  Referral Source: Jj Dennison Rn pulm  Reason for Referral:  Unable to manage her home O2    Collaborated With:    -Jj Dennison RN, Brandon Olvera RN  -Dr Sahu  -Cathie Adan, Pt's Waiver  [phone]  -Ca    Psychosocial Information/Concerns:  Nurses and [facility] have had multiple calls from the Pt and completed multiple interventions in trying to teach Ca how to replace an empty O2 tank. She was able to demonstrate in clinic how to do it but is not successful when she is at home.   Ca lives alone in an apt. She is on the Elderly waiver program and Cathie Adan is her  until next month when [facility] care coord takes over. She currently has only homemaking services. She didn't qualify for PCA during last assessment because she was independent in her ADLs. She has not wanted other services. Cathie reports that she has been hard on some of the caregivers she has had, accusing them of things that they report they didn't do (like stealing her magazines) so they don't stay with her very long. She has indicated to Cathie that she doesn't like to be seen with her O2 on so she doesn't use it when she leaves her apt. She doesn't have involved family and has some friends in her bldg.    Intervention/Education/Resources Provided:  Reviewed her concerns. Inquired about whether a video on her phone would help her with the O2 change and she indicated that it would. She has been given pictures of how to do it. She states she's not savvy on her cell phone but she could state that she knows how to access the Internet and google. She asked me to mail her the address of the video.  Discussed that she will have a change of  next month. She wasn't aware and that a new PCA assessment will be done at that time. She  would like help with bathing. Toenail care needs to be done by podiatry as a PCA cannot do that. She has been referred to podiatry but Dr Sahu.   She indicated to Dr Sahu that she doesn't want a home care nurse but pt reports to me that is not what she conveyed.     Assessment/Plan:  Pt with anxiety impacting her ability to change her O2 tanks. Brandon Olvera RN in pul is exploring other options for O2 at home that may be easier for the Pt.   P case mgmt starting next Month when a new PCA assessment can be completed for PCA.  Remain available.     Provided patient/family with contact information and availability.    EPI [place], [facility]    [facility] [place]  [phone]

## 2021-11-12 DIAGNOSIS — M54.50 ACUTE LEFT-SIDED LOW BACK PAIN WITHOUT SCIATICA: Primary | ICD-10-CM

## 2021-11-12 NOTE — TELEPHONE ENCOUNTER
DIAGNOSIS: back muscle pain / no images  ( month of Pain )  self / [facility]   APPOINTMENT DATE: 11.22.21   NOTES STATUS DETAILS   OFFICE NOTE from referring provider Internal 9.21.21 Dr Favian Sahu  6.23.21   OFFICE NOTE from other specialist N/A    DISCHARGE SUMMARY from hospital N/A    DISCHARGE REPORT from the ER N/A    OPERATIVE REPORT N/A    EMG report N/A    MEDICATION LIST Internal    MRI N/A    DEXA (osteoporosis/bone health) Internal    CT SCAN N/A    XRAYS (IMAGES & REPORTS) N/A

## 2021-11-15 ENCOUNTER — TELEPHONE (OUTPATIENT)
Dept: PULMONOLOGY | Facility: CLINIC | Age: 76
End: 2021-11-15
Payer: COMMERCIAL

## 2021-11-15 NOTE — TELEPHONE ENCOUNTER
"Ca called back, she tells me that her oxygen tanks sound like they are delivering oxygen in \"spurts\" and not continuously. I have advised her to contact [facility] and have someone come out to check them.  I discussed the possibility of trying to switch to a POC, however, Ca states she does not want a pulsed dose system, this would not work for her.  She agrees to contact [facility].   "

## 2021-11-15 NOTE — TELEPHONE ENCOUNTER
[employer] confirm that pt has continuous flow tanks and that she does not have any kind of conserving device.  Left message for pt to call back to discuss.

## 2021-11-15 NOTE — TELEPHONE ENCOUNTER
[facility] Call Center    Phone Message    May a detailed message be left on voicemail: yes     Reason for Call: Other: Ca calling requesting a oxygen tank that has continous flow. Right now when she walks, she states she gets quick spurts and that it's not enough. Please call. Thank you!     Action Taken: Message routed to:  [facility] Pulm    Travel Screening: Not Applicable

## 2021-11-17 ENCOUNTER — TELEPHONE (OUTPATIENT)
Dept: PULMONOLOGY | Facility: CLINIC | Age: 76
End: 2021-11-17
Payer: COMMERCIAL

## 2021-11-17 NOTE — TELEPHONE ENCOUNTER
[facility] Call Center    Phone Message    May a detailed message be left on voicemail: no     Reason for Call: Other: Ca called to speak with Brandon Olvera, RN, she stated she called [facility] yesterday 11/16/2021 and as of today at 3pm she still has heard nothing back from them. Please reach out to Ca at [phone].     Action Taken: Message routed to:  [facility] Pulmonology    Travel Screening: Not Applicable

## 2021-11-17 NOTE — TELEPHONE ENCOUNTER
Ca doesn't think her oxgen tank is  working correctly, states she only hears oxygen released when she breathes in. She doesn't think it is delivering oxygen on a continuous basis.  She called [facility] yesterday and hasn't heard back, she would like writer to call on her behalf.  Writer contacted [facility], they confirm receipt of pt's request for assistance yesterday, they will place another work order and attempt to expedite call to pt to discuss issue. Writer updated Ca with this information.

## 2021-11-22 ENCOUNTER — PRE VISIT (OUTPATIENT)
Dept: ORTHOPEDICS | Facility: CLINIC | Age: 76
End: 2021-11-22
Payer: COMMERCIAL

## 2021-12-02 ENCOUNTER — ANCILLARY PROCEDURE (OUTPATIENT)
Dept: GENERAL RADIOLOGY | Facility: CLINIC | Age: 76
End: 2021-12-02
Attending: FAMILY MEDICINE
Payer: COMMERCIAL

## 2021-12-02 ENCOUNTER — OFFICE VISIT (OUTPATIENT)
Dept: ORTHOPEDICS | Facility: CLINIC | Age: 76
End: 2021-12-02
Payer: COMMERCIAL

## 2021-12-02 VITALS — WEIGHT: 195 LBS | BODY MASS INDEX: 33.29 KG/M2 | HEIGHT: 64 IN

## 2021-12-02 DIAGNOSIS — M54.9 BACK PAIN: ICD-10-CM

## 2021-12-02 DIAGNOSIS — M54.50 CHRONIC LEFT-SIDED LOW BACK PAIN WITHOUT SCIATICA: Primary | ICD-10-CM

## 2021-12-02 DIAGNOSIS — M41.25 OTHER IDIOPATHIC SCOLIOSIS, THORACOLUMBAR REGION: ICD-10-CM

## 2021-12-02 DIAGNOSIS — R06.02 SHORTNESS OF BREATH: ICD-10-CM

## 2021-12-02 DIAGNOSIS — G89.29 CHRONIC LEFT-SIDED LOW BACK PAIN WITHOUT SCIATICA: Primary | ICD-10-CM

## 2021-12-02 PROCEDURE — 99204 OFFICE O/P NEW MOD 45 MIN: CPT | Performed by: FAMILY MEDICINE

## 2021-12-02 PROCEDURE — 72100 X-RAY EXAM L-S SPINE 2/3 VWS: CPT | Performed by: RADIOLOGY

## 2021-12-02 PROCEDURE — 72070 X-RAY EXAM THORAC SPINE 2VWS: CPT | Performed by: STUDENT IN AN ORGANIZED HEALTH CARE EDUCATION/TRAINING PROGRAM

## 2021-12-02 ASSESSMENT — MIFFLIN-ST. JEOR: SCORE: 1349.51

## 2021-12-02 NOTE — LETTER
12/2/2021      RE: Ca Yan  [address]  [facility] [place] [facility] [address]         [facility]  SPORTS & ORTHOPEDIC CLINIC VISIT     Dec 2, 2021        ASSESSMENT & PLAN    76-year-old with chronic hypoxic respiratory failure as well as thoracic scoliosis and chronic low and mid back pain. Here today with concern that her scoliosis may be contributing to her respiratory status. Ultimately I discussed with the patient that this is something that should be discussed with spine specialist. Additionally we will need subsequent images. Therefore we will order complete spine x-ray today and assist her with scheduling with one of our spine surgeons for further discussion.      Ildefonso Xavier MD  [facility] SPORTS MEDICINE CLINIC [place]    -----  Chief Complaint   Patient presents with     Consult For     back        SUBJECTIVE  Ca Yan is a/an 76 year old female who is seen as a self referral for evaluation of  Low back pain. Has a chronic history of scoliosis and back pain however her primary reason for presentation today is that she is having increasing shortness of breath and was recommended to be evaluated for scoliosis as a possible cause for her worsening dyspnea.    The patient is seen by themselves.  The patient is Right handed    Onset: Many years(s) ago. No injury, but scoliosis and hx of back surgery. Hx of thoracis spine surgery in 1957 for her scoliosis   Location of Pain: diffuse upper and lower back pain   Worsened by: fatigued  Better with: rest  Treatments tried: Hx of thoracis spine surgery, she does remember seeing a pain clinic many years ago  Associated symptoms: no distal numbness or tingling; denies swelling or warmth    Orthopedic/Surgical history: YES - Date: 1957 by Dr Adame  Social History/Occupation: Retired      REVIEW OF SYSTEMS:    Do you have fever, chills, weight loss? No    Do you have any vision problems? No    Do you have any chest pain  "or edema? No    Do you have any shortness of breath or wheezing?  Yes, due to back    Do you have stomach problems? No    Do you have any numbness or focal weakness? No    Do you have diabetes? No    Do you have problems with bleeding or clotting? No    Do you have an rashes or other skin lesions? No    OBJECTIVE:  Ht 1.626 m (5' 4\")   Wt 88.5 kg (195 lb)   LMP  (LMP Unknown)   BMI 33.47 kg/m       General: Alert, pleasant, no distress  No further exam this visit.    RADIOLOGY:    Two view x-rays of the lumbar spine performed today. Per radiology report:  IMPRESSION: Degenerative changes of lumbar spine especially right  lower facet joints.    Two view x-rays of thoracic spine performed today. Per radiology report:  Impression:  1.  No acute osseous abnormality.  2.  Moderate convex right curvature of the thoracic spine with apex at  T8. Approximately 8 mm the right lateral listhesis of L1 on L2,  progressed from 2019          Ildefonso Xavier MD    "

## 2021-12-02 NOTE — PROGRESS NOTES
[facility]ICS AND SURGERY CENTER  SPORTS & ORTHOPEDIC CLINIC VISIT     Dec 2, 2021        ASSESSMENT & PLAN    76-year-old with chronic hypoxic respiratory failure as well as thoracic scoliosis and chronic low and mid back pain. Here today with concern that her scoliosis may be contributing to her respiratory status. Ultimately I discussed with the patient that this is something that should be discussed with spine specialist. Additionally we will need subsequent images. Therefore we will order complete spine x-ray today and assist her with scheduling with one of our spine surgeons for further discussion.      Ildefonso Xavier MD  [facility] SPORTS MEDICINE CLINIC [place]    -----  Chief Complaint   Patient presents with     Consult For     back        SUBJECTIVE  Ca Yan is a/an 76 year old female who is seen as a self referral for evaluation of  Low back pain. Has a chronic history of scoliosis and back pain however her primary reason for presentation today is that she is having increasing shortness of breath and was recommended to be evaluated for scoliosis as a possible cause for her worsening dyspnea.    The patient is seen by themselves.  The patient is Right handed    Onset: Many years(s) ago. No injury, but scoliosis and hx of back surgery. Hx of thoracis spine surgery in 1957 for her scoliosis   Location of Pain: diffuse upper and lower back pain   Worsened by: fatigued  Better with: rest  Treatments tried: Hx of thoracis spine surgery, she does remember seeing a pain clinic many years ago  Associated symptoms: no distal numbness or tingling; denies swelling or warmth    Orthopedic/Surgical history: YES - Date: 1957 by Dr Adame  Social History/Occupation: Retired      REVIEW OF SYSTEMS:    Do you have fever, chills, weight loss? No    Do you have any vision problems? No    Do you have any chest pain or edema? No    Do you have any shortness of breath or wheezing?  Yes, due to back    Do  "you have stomach problems? No    Do you have any numbness or focal weakness? No    Do you have diabetes? No    Do you have problems with bleeding or clotting? No    Do you have an rashes or other skin lesions? No    OBJECTIVE:  Ht 1.626 m (5' 4\")   Wt 88.5 kg (195 lb)   LMP  (LMP Unknown)   BMI 33.47 kg/m       General: Alert, pleasant, no distress  No further exam this visit.    RADIOLOGY:    Two view x-rays of the lumbar spine performed today. Per radiology report:  IMPRESSION: Degenerative changes of lumbar spine especially right  lower facet joints.    Two view x-rays of thoracic spine performed today. Per radiology report:  Impression:  1.  No acute osseous abnormality.  2.  Moderate convex right curvature of the thoracic spine with apex at  T8. Approximately 8 mm the right lateral listhesis of L1 on L2,  progressed from 2019      "

## 2021-12-06 DIAGNOSIS — M41.9 SCOLIOSIS: Primary | ICD-10-CM

## 2021-12-14 ENCOUNTER — TELEPHONE (OUTPATIENT)
Dept: PULMONOLOGY | Facility: CLINIC | Age: 76
End: 2021-12-14
Payer: COMMERCIAL

## 2021-12-14 ENCOUNTER — TELEPHONE (OUTPATIENT)
Facility: CLINIC | Age: 76
End: 2021-12-14
Payer: COMMERCIAL

## 2021-12-14 NOTE — TELEPHONE ENCOUNTER
Advised pt to follow written instructions provided.  She has an android 'phone and is not sure how to access you tube videos. She will have a friend review with her. Encouraged her to reach out to [facility] for assistance.

## 2021-12-14 NOTE — TELEPHONE ENCOUNTER
[facility] Call Center    Phone Message    May a detailed message be left on voicemail: yes     Reason for Call: Other: Ca calling stating that she received a letter from Dr. Henry nurse with a youtube video to watch. She cannot read the link. Please call and discuss. Thank you!     Action Taken: Message routed to:  [facility] Pulm    Travel Screening: Not Applicable

## 2021-12-14 NOTE — TELEPHONE ENCOUNTER
Ca called clinic to say that she'd received written instruction for changing the regulator on her oxygen tanks, however, she's unable to move the t-bar, she thinks it's stuck.  Writer suggested she call [facility], to see if they can send someone out to her home to assist. Pt agrees with plan, no further questions.

## 2021-12-14 NOTE — TELEPHONE ENCOUNTER
[facility] Call Center    Phone Message    May a detailed message be left on voicemail: yes     Reason for Call: Other: Pt has questions on her oxygen regulator. Please call her back. Thank-you     Action Taken: Message routed to:  [facility] pulm    Travel Screening: Not Applicable

## 2021-12-21 ENCOUNTER — DOCUMENTATION ONLY (OUTPATIENT)
Dept: FAMILY MEDICINE | Facility: CLINIC | Age: 76
End: 2021-12-21
Payer: COMMERCIAL

## 2021-12-28 NOTE — PROGRESS NOTES
Telephone call to the client's home for annual health risk assessment and PCA assessment.  An  was not needed.    Current situation/living environment  Ca is a 76 year old female who is new to [facility] as of 12/1/21. She had a previous CM named Bianca, from [facility], as Ca had her primary care within the [facility] system, and most recently has established at the [facility]. Her specialty care is also at the [facility]. Today, we visited by phone to complete her annual health risk assessment, PCA assessment and review services that have been in place. Ca has a few friends in the community that she can rely on, one that picks up groceries for her as needed. Ca's daughter lives in town, but they do not communicate.    Activities of daily living (ADL)/instrumental activities of daily living (IADL) and functional issues  Client needs help with the following ADL's: transfers (at times, off of lower chairs and in/out of a car at times)  Client needs help with the following IADL's: shopping, cooking, housekeeping, laundry, managing finances/bills and transportation  Client states she is unable to perform the above due to Arthritis, weakness, low back pain, anxiety, oxygen use      Health concerns for today  Today's biggest concern was with her oxygen tank, as she was having issues with the regulator. I called Iker a few times, and we ended up doing a conference call together, with Ca, and Iker was able to trouble shoot the issues via phone.  Has patient fallen 2 or more times in the last year? No  Has patient fallen with injury in the last year? No    Cognition/mental health  Ca suffers from anxiety, and will call multiple times per day. Once we got the issue with the oxygen tank remedied, she was much more relaxed and calm when I spok to her by phone, she had gotten out to visit a friend at [place]. She has minimal supports, one being a friend named Cyndi who helps with errands as needed. She use  to attend [facility], but has not in quite some time. When asked if she plans to return, she does not at this time. She also reports to seeing a therapist at [facility].    STARS/Med Adherence  Client is non-compliant with the following quality measures: Possibly Colon Cancer Screen? She is 76 and it may not be needed at her age.  Comments: education efforts    Client's Plan of Care consists of:  Chore services (1 visit per month), Specialized supplies and equipment (Incontinence supplies from [facility]) and Transportation ([employer] and [employer] rides). I offered Home Delivered meals, she wants to think about it. We discussed a home care nurse, she was not sure she needed/wanted one at this time. Previous CM has offered [employer], Homemaking and meals, all declined.    Marlene Mccord, ALEXANDER  [phone]

## 2021-12-30 ENCOUNTER — TELEPHONE (OUTPATIENT)
Dept: ORTHOPEDICS | Facility: CLINIC | Age: 76
End: 2021-12-30
Payer: COMMERCIAL

## 2021-12-30 NOTE — PROGRESS NOTES
S: Pt reports not following up recently because forgot..forgot about the place, so many appts.  Pt reports pain is better now than 2 months ago.  Pt reports unable to do exercises-have so many issues going on.  Pain currently is a 1/10.  Aggravating factor continues to be getting in and out of cabs.  I want to be able to manage the left low back pain.  O:  Lumbar AROM:  Flexion-65% and stiffness  Ext: able to get to neutral but not beyond, stiffness   Lat flex 25% and stiffness  A:   Yes

## 2021-12-30 NOTE — TELEPHONE ENCOUNTER
RN called and spoke with patient. RN tried to explain to patient to his best ability - in that it is hard for us to gauge how long a visit can take, RN can only give an estimate. And the also applies to whether the doctor will see patient right on time or a few minutes early or late. Patient expressed understanding. She just wanted to know so she does not missed her ride home.    Lauri Barillas RN        [facility] Call Center    Phone Message    May a detailed message be left on voicemail: yes     Reason for Call Please call Patient . She wants to know how long exactly will her appt be also will Doctor be on Time for Appt. I told Her all I could but She wants to speak to clinic   Action Taken: Message routed to:  [facility]    Travel Screening: Not Applicable

## 2022-01-05 ENCOUNTER — TELEPHONE (OUTPATIENT)
Dept: FAMILY MEDICINE | Facility: CLINIC | Age: 77
End: 2022-01-05
Payer: COMMERCIAL

## 2022-01-05 NOTE — TELEPHONE ENCOUNTER
[facility] Call Center    Phone Message    May a detailed message be left on voicemail: yes     Reason for Call: Other: Patient calling and wanting the clinic to know that she wants to keep both of her upcoming appointments with Brenna Aponte MD on 1/10/2022 and Favian Sahu MD on 1/17/2022.     Action Taken: Message routed to:  [facility]    Travel Screening: Not Applicable

## 2022-01-06 NOTE — TELEPHONE ENCOUNTER
No change to appointments.  Both appointments are still scheduled for the patient. Rosaura Saravia LPN 1/6/2022 6:21 AM

## 2022-01-10 DIAGNOSIS — M41.9 SCOLIOSIS: Primary | ICD-10-CM

## 2022-01-12 ENCOUNTER — ANCILLARY PROCEDURE (OUTPATIENT)
Dept: GENERAL RADIOLOGY | Facility: CLINIC | Age: 77
End: 2022-01-12
Attending: ORTHOPAEDIC SURGERY
Payer: COMMERCIAL

## 2022-01-12 ENCOUNTER — OFFICE VISIT (OUTPATIENT)
Dept: ORTHOPEDICS | Facility: CLINIC | Age: 77
End: 2022-01-12
Payer: COMMERCIAL

## 2022-01-12 VITALS — HEIGHT: 64 IN | BODY MASS INDEX: 33.29 KG/M2 | WEIGHT: 195 LBS

## 2022-01-12 DIAGNOSIS — M41.25 OTHER IDIOPATHIC SCOLIOSIS, THORACOLUMBAR REGION: Primary | ICD-10-CM

## 2022-01-12 DIAGNOSIS — M41.9 SCOLIOSIS: ICD-10-CM

## 2022-01-12 DIAGNOSIS — J44.1 COPD EXACERBATION (H): ICD-10-CM

## 2022-01-12 PROCEDURE — 77073 BONE LENGTH STUDIES: CPT | Performed by: STUDENT IN AN ORGANIZED HEALTH CARE EDUCATION/TRAINING PROGRAM

## 2022-01-12 PROCEDURE — 72082 X-RAY EXAM ENTIRE SPI 2/3 VW: CPT | Performed by: STUDENT IN AN ORGANIZED HEALTH CARE EDUCATION/TRAINING PROGRAM

## 2022-01-12 PROCEDURE — 99203 OFFICE O/P NEW LOW 30 MIN: CPT | Performed by: ORTHOPAEDIC SURGERY

## 2022-01-12 ASSESSMENT — MIFFLIN-ST. JEOR: SCORE: 1342.89

## 2022-01-12 NOTE — NURSING NOTE
"Reason For Visit:   Chief Complaint   Patient presents with     Consult     scoliosis referred by Dr. Xavier       Primary MD: Favian Sahu  Ref. MD: Dr. Xavier    ?  No  Occupation retired.    Date of injury: no  Type of injury: no.    Date of surgery: 11 years of age  Type of surgery: Fusion    Smoker: No  Request smoking cessation information: No    Ht 1.615 m (5' 3.58\")   Wt 88.5 kg (195 lb)   LMP  (LMP Unknown)   BMI 33.91 kg/m      Pain Assessment  Patient Currently in Pain: Denies    SRS:44.6%    Oswestry (FERCHO) Questionnaire    OSWESTRY DISABILITY INDEX 1/12/2022   Count 9   Sum 29   Oswestry Score (%) 64.44   Some recent data might be hidden          Visual Analog Pain Scale  Back Pain Scale 0-10: 0  Right leg pain: 0  Left leg pain: 0  Neck Pain Scale 0-10: 0  Right arm pain: 0  Left arm pain: 0    Promis 10 Assessment    PROMIS 10 1/12/2022   In general, would you say your health is: Fair   In general, would you say your quality of life is: Fair   In general, how would you rate your physical health? Poor   In general, how would you rate your mental health, including your mood and your ability to think? Poor   In general, how would you rate your satisfaction with your social activities and relationships? Poor   In general, please rate how well you carry out your usual social activities and roles Poor   To what extent are you able to carry out your everyday physical activities such as walking, climbing stairs, carrying groceries, or moving a chair? Not at all   How often have you been bothered by emotional problems such as feeling anxious, depressed or irritable? Often   How would you rate your fatigue on average? Moderate   How would you rate your pain on average?   0 = No Pain  to  10 = Worst Imaginable Pain 5   In general, would you say your health is: 2   In general, would you say your quality of life is: 2   In general, how would you rate your physical health? 1   In general, how " would you rate your mental health, including your mood and your ability to think? 1   In general, how would you rate your satisfaction with your social activities and relationships? 1   In general, please rate how well you carry out your usual social activities and roles. (This includes activities at home, at work and in your community, and responsibilities as a parent, child, spouse, employee, friend, etc.) 1   To what extent are you able to carry out your everyday physical activities such as walking, climbing stairs, carrying groceries, or moving a chair? 1   In the past 7 days, how often have you been bothered by emotional problems such as feeling anxious, depressed, or irritable? 4   In the past 7 days, how would you rate your fatigue on average? 3   In the past 7 days, how would you rate your pain on average, where 0 means no pain, and 10 means worst imaginable pain? 5   Global Mental Health Score 6   Global Physical Health Score 8   PROMIS TOTAL - SUBSCORES 14   Some recent data might be hidden                Paulette Hendricks LPN

## 2022-01-12 NOTE — PROGRESS NOTES
Spine Surgery Consultation    REFERRING PHYSICIAN: F=Referred Self   PRIMARY CARE PHYSICIAN: Favian Sahu           Chief Complaint:   Consult (scoliosis referred by Dr. Xavier)      History of Present Illness:  Symptom Profile Including: location of symptoms, onset, severity, exacerbating/alleviating factors, previous treatments:        Ca Yan is a 76 year old female with recent worsening of her dyspnea and now around-the-clock oxygen supplementation.  Previously was diagnosed with COPD but states she was told at some point that it was not COPD.  She presents today for evaluation of scoliosis, back pain.  She reports that she had a limited thoracic fusion many years ago and did relatively well postoperatively.  Over the last several years she has noted worsening back pain.  She feels more stooped in her posture.  She does not have any radicular pain going down her legs.  Denies any numbness, bowel or bladder incontinence, saddle anesthesia.  She previously did very well with regular exercises including other group fitness classes.  Over the last 18 to 24 months, like everyone else, she has had less physical activity, social interaction with her groups and is noted worsening back pain and weight gain.  She has not had injections prescriptions or guided physical therapy.    She states that she has limitations to walking equally due to back pain and oxygen requirement.  When asked directly she states if she did not have back pain she would still be unable to walk a city block due to shortness of breath.  She uses a wheelchair for longer trips.  Does short trips throughout the house but has to take breaks.    Past treatments tried:  - Physical therapy: None recently  - Injections: None recently  - Medications: None recently             Past Medical History:     Past Medical History:   Diagnosis Date     Anxiety      Arthritis      Breast cancer (H)      COPD (chronic obstructive pulmonary disease) (H)      12:  FEV 0.99 l     Depressive disorder      Hypertension      Low back pain with left-sided sciatica      Low bone density 2017    DEXA 2017: T score -2.0. Normal Z score. FRAX risk: major osteoporotic fracture 11.9%, hip fracture 2.6%, therefore not high-risk     Lymphedema, chronic lower extremities             Past Surgical History:     Past Surgical History:   Procedure Laterality Date     BACK SURGERY      spinal fusion     COLONOSCOPY       LARYNGOSCOPY WITH MICROSCOPE N/A 2021    Procedure: FLEXIBLE LARYNGOSCOPY;  Surgeon: Domonique Roche MD;  Location: [facility] OR     LUMPECTOMY BREAST       ORTHOPEDIC SURGERY      Knee surgery left side            Social History:     Social History     Tobacco Use     Smoking status: Former Smoker     Packs/day: 0.50     Years: 5.00     Pack years: 2.50     Types: Cigarettes     Start date: 1956     Quit date: 1980     Years since quittin.3     Smokeless tobacco: Former User     Quit date: 1980   Substance Use Topics     Alcohol use: Not Currently     Alcohol/week: 0.0 standard drinks     Comment: Sober for 2 years, previous alcoholic            Family History:     Family History   Problem Relation Age of Onset     Breast Cancer Mother      Diabetes Mother      Anxiety Disorder Mother      Asthma Mother      Other Cancer Mother      Hyperlipidemia Mother      Alcohol/Drug Father      Mental Illness Father      Substance Abuse Father      Obesity Father      Cerebrovascular Disease Maternal Grandmother 67            Allergies:     Allergies   Allergen Reactions     Azithromycin Itching     Codeine Nausea and Vomiting     Hydrocodone Nausea and Vomiting     Metronidazole Nausea     Oxycodone Itching and Rash     Percocet [Oxycodone-Acetaminophen] Nausea and Vomiting     Pollen Extract      sneezing and runny nose.      Seasonal Allergies      sneezing and runny nose.      Vicodin [Hydrocodone-Acetaminophen] Nausea and Vomiting      "Zolpidem      Amnestic behavior            Medications:     Current Outpatient Medications   Medication     atorvastatin (LIPITOR) 40 MG tablet     cephALEXin (KEFLEX) 250 MG capsule     clotrimazole (LOTRIMIN) 1 % external cream     Emollient (CERAVE) CREA     ipratropium (ATROVENT) 0.03 % nasal spray     latanoprost (XALATAN) 0.005 % ophthalmic solution     losartan-hydrochlorothiazide (HYZAAR) 100-25 MG tablet     mirtazapine (REMERON) 7.5 MG tablet     quetiapine (SEROQUEL) 200 MG tablet     sertraline (ZOLOFT) 100 MG tablet     Vitamin D3 (CHOLECALCIFEROL) 25 mcg (1000 units) tablet     No current facility-administered medications for this visit.             Review of Systems:     A 10 point ROS was performed and reviewed. Specific responses to these questions are noted at the end of the document.         Physical Exam:     PHYSICAL EXAM:   Constitutional - Patient is healthy, well-nourished and appears stated age, is in no acute distress    Vitals: Ht 1.615 m (5' 3.58\")   Wt 88.5 kg (195 lb)   LMP  (LMP Unknown)   BMI 33.91 kg/m     Respiratory - Patient is breathing normally, no audible wheeze or respiratory distress.   Skin - No suspicious rashes or lesions.   Psychiatric - Normal mood and affect.   Cardiovascular - Extremities warm and well perfused.   GI - No abdominal distention.   Musculoskeletal -has made concerted effort to go from sit to stand and has pain with this action.  She can walk without assistive device although she does have some limp.  She has exquisite tenderness to palpation of the lumbar paraspinal musculature down to the PSIS bilaterally.  She does not allow me to palpate her lower extremities as she states it makes her lymphedema painful.  She also did not allow me to evaluate her lower extremity strength because it is made her lymphedema painful.  She denied any numbness in her lower extremities although I did not examine her sensory distribution given her pain with any " palpation.             Imaging:   We ordered and independently reviewed new radiographs at this clinic visit. The results were discussed with the patient.  Findings include:    EOS long-leg films of the AP and lateral views demonstrate an overall kyphotic deformity with sagittal imbalance.  Lumbar lordosis measures 41 degrees from L1-S1, 25 degrees and L4-S1.  Pelvic incidence is 65 degrees, sacral slope is 43 degrees, pelvic tilt is 21 degrees.  In the coronal plane she has a 50degree right thoracic curve, 34 degree left lumbar curve with marked sclerosis only concavity.             Assessment and Plan:   Assessment:  76 year old female with idiopathic scoliosis status post noninstrumented thoracic fusion who has primary complaint of low back pain.  We had a long talk about nonoperative interventions including resuming physical therapy including pool therapy, core strengthening postural training Schroth exercises.  At this point this is the only treatment she would like.  She is not interested in injections and has Apsley no interest in a scoliosis surgery which I agree is not in her best interest given her respiratory condition.  Provided a prescription for her to do physical therapy with pool therapy.  Would like to see her back in 3 months just to ensure that she is progressing.  Could potentially consider facet joint blocks and RFA if low back pain remains her primary complaint in the future.     Time spent on this clinical encounter including previsit chart review, history and physical examination, patient counseling and documentation was  43 minutes on the date of encounter.      Respectfully,  Manuel Salinas MD  Spine Surgery  [facility]

## 2022-01-12 NOTE — LETTER
1/12/2022         RE: Ca Yan  [address]  [place] [address]        Dear Colleague,    Thank you for referring your patient, Ca Yan, to the [facility] [place]. Please see a copy of my visit note below.    Spine Surgery Consultation    REFERRING PHYSICIAN: F=Referred Self   PRIMARY CARE PHYSICIAN: Favian Sahu           Chief Complaint:   Consult (scoliosis referred by Dr. Xavier)      History of Present Illness:  Symptom Profile Including: location of symptoms, onset, severity, exacerbating/alleviating factors, previous treatments:        Ca Yan is a 76 year old female with recent worsening of her dyspnea and now around-the-clock oxygen supplementation.  Previously was diagnosed with COPD but states she was told at some point that it was not COPD.  She presents today for evaluation of scoliosis, back pain.  She reports that she had a limited thoracic fusion many years ago and did relatively well postoperatively.  Over the last several years she has noted worsening back pain.  She feels more stooped in her posture.  She does not have any radicular pain going down her legs.  Denies any numbness, bowel or bladder incontinence, saddle anesthesia.  She previously did very well with regular exercises including other group fitness classes.  Over the last 18 to 24 months, like everyone else, she has had less physical activity, social interaction with her groups and is noted worsening back pain and weight gain.  She has not had injections prescriptions or guided physical therapy.    She states that she has limitations to walking equally due to back pain and oxygen requirement.  When asked directly she states if she did not have back pain she would still be unable to walk a city block due to shortness of breath.  She uses a wheelchair for longer trips.  Does short trips throughout the house but has to take breaks.    Past treatments tried:  -  Physical therapy: None recently  - Injections: None recently  - Medications: None recently             Past Medical History:     Past Medical History:   Diagnosis Date     Anxiety      Arthritis      Breast cancer (H)      COPD (chronic obstructive pulmonary disease) (H)     12:  FEV 0.99 l     Depressive disorder      Hypertension      Low back pain with left-sided sciatica      Low bone density 2017    DEXA 2017: T score -2.0. Normal Z score. FRAX risk: major osteoporotic fracture 11.9%, hip fracture 2.6%, therefore not high-risk     Lymphedema, chronic lower extremities             Past Surgical History:     Past Surgical History:   Procedure Laterality Date     BACK SURGERY      spinal fusion     COLONOSCOPY       LARYNGOSCOPY WITH MICROSCOPE N/A 2021    Procedure: FLEXIBLE LARYNGOSCOPY;  Surgeon: Domonique Roche MD;  Location: [facility] OR     LUMPECTOMY BREAST       ORTHOPEDIC SURGERY      Knee surgery left side            Social History:     Social History     Tobacco Use     Smoking status: Former Smoker     Packs/day: 0.50     Years: 5.00     Pack years: 2.50     Types: Cigarettes     Start date: 1956     Quit date: 1980     Years since quittin.3     Smokeless tobacco: Former User     Quit date: 1980   Substance Use Topics     Alcohol use: Not Currently     Alcohol/week: 0.0 standard drinks     Comment: Sober for 2 years, previous alcoholic            Family History:     Family History   Problem Relation Age of Onset     Breast Cancer Mother      Diabetes Mother      Anxiety Disorder Mother      Asthma Mother      Other Cancer Mother      Hyperlipidemia Mother      Alcohol/Drug Father      Mental Illness Father      Substance Abuse Father      Obesity Father      Cerebrovascular Disease Maternal Grandmother 67            Allergies:     Allergies   Allergen Reactions     Azithromycin Itching     Codeine Nausea and Vomiting     Hydrocodone Nausea and Vomiting      "Metronidazole Nausea     Oxycodone Itching and Rash     Percocet [Oxycodone-Acetaminophen] Nausea and Vomiting     Pollen Extract      sneezing and runny nose.      Seasonal Allergies      sneezing and runny nose.      Vicodin [Hydrocodone-Acetaminophen] Nausea and Vomiting     Zolpidem      Amnestic behavior            Medications:     Current Outpatient Medications   Medication     atorvastatin (LIPITOR) 40 MG tablet     cephALEXin (KEFLEX) 250 MG capsule     clotrimazole (LOTRIMIN) 1 % external cream     Emollient (CERAVE) CREA     ipratropium (ATROVENT) 0.03 % nasal spray     latanoprost (XALATAN) 0.005 % ophthalmic solution     losartan-hydrochlorothiazide (HYZAAR) 100-25 MG tablet     mirtazapine (REMERON) 7.5 MG tablet     quetiapine (SEROQUEL) 200 MG tablet     sertraline (ZOLOFT) 100 MG tablet     Vitamin D3 (CHOLECALCIFEROL) 25 mcg (1000 units) tablet     No current facility-administered medications for this visit.             Review of Systems:     A 10 point ROS was performed and reviewed. Specific responses to these questions are noted at the end of the document.         Physical Exam:     PHYSICAL EXAM:   Constitutional - Patient is healthy, well-nourished and appears stated age, is in no acute distress    Vitals: Ht 1.615 m (5' 3.58\")   Wt 88.5 kg (195 lb)   LMP  (LMP Unknown)   BMI 33.91 kg/m     Respiratory - Patient is breathing normally, no audible wheeze or respiratory distress.   Skin - No suspicious rashes or lesions.   Psychiatric - Normal mood and affect.   Cardiovascular - Extremities warm and well perfused.   GI - No abdominal distention.   Musculoskeletal -has made concerted effort to go from sit to stand and has pain with this action.  She can walk without assistive device although she does have some limp.  She has exquisite tenderness to palpation of the lumbar paraspinal musculature down to the PSIS bilaterally.  She does not allow me to palpate her lower extremities as she states it " makes her lymphedema painful.  She also did not allow me to evaluate her lower extremity strength because it is made her lymphedema painful.  She denied any numbness in her lower extremities although I did not examine her sensory distribution given her pain with any palpation.             Imaging:   We ordered and independently reviewed new radiographs at this clinic visit. The results were discussed with the patient.  Findings include:    EOS long-leg films of the AP and lateral views demonstrate an overall kyphotic deformity with sagittal imbalance.  Lumbar lordosis measures 41 degrees from L1-S1, 25 degrees and L4-S1.  Pelvic incidence is 65 degrees, sacral slope is 43 degrees, pelvic tilt is 21 degrees.  In the coronal plane she has a 50degree right thoracic curve, 34 degree left lumbar curve with marked sclerosis only concavity.             Assessment and Plan:   Assessment:  76 year old female with idiopathic scoliosis status post noninstrumented thoracic fusion who has primary complaint of low back pain.  We had a long talk about nonoperative interventions including resuming physical therapy including pool therapy, core strengthening postural training Schroth exercises.  At this point this is the only treatment she would like.  She is not interested in injections and has Apsley no interest in a scoliosis surgery which I agree is not in her best interest given her respiratory condition.  Provided a prescription for her to do physical therapy with pool therapy.  Would like to see her back in 3 months just to ensure that she is progressing.  Could potentially consider facet joint blocks and RFA if low back pain remains her primary complaint in the future.     Time spent on this clinical encounter including previsit chart review, history and physical examination, patient counseling and documentation was  43 minutes on the date of encounter.      Respectfully,  Manuel Salinas MD  Spine Surgery  [place]  [facility]

## 2022-01-14 ENCOUNTER — TELEPHONE (OUTPATIENT)
Dept: PULMONOLOGY | Facility: CLINIC | Age: 77
End: 2022-01-14
Payer: COMMERCIAL

## 2022-01-14 NOTE — TELEPHONE ENCOUNTER
Spoke with patient. She stated she went to orthopedic provider and he states that patient's lung function is not hindered by her scoliosis. Patient stated that Dr Taylor told her that some of her lung disfunction is from scoliosis. This writer explained that Dr Taylor has years of experience in this field. His advisement in this area is credible. She confirmed understanding.

## 2022-01-18 ENCOUNTER — NURSE TRIAGE (OUTPATIENT)
Dept: NURSING | Facility: CLINIC | Age: 77
End: 2022-01-18
Payer: COMMERCIAL

## 2022-01-18 NOTE — TELEPHONE ENCOUNTER
Patient calling about her legs giving her a lot of trouble getting up and out of bed or up off the couch. The right thigh is hurting on the upper thigh. Patient describes this as muscle soreness. The pain is only on the right leg.   Pain is only there when she is getting up and down from sitting or laying. Pain rating 10/10. Better with walking and no pain when at rest. Patient has treated with tylenol which is not helpful. Legs have lymphedema and the lower legs are painful if you push on them. This pain has been going on for about a month. It has not changed or gotten worse.   Protocol recommends see PCP within 3 days.   Care advice given. Patient will call back with any worsening symptoms. Connected to scheduling.  Yael Lawton RN   01/18/22 8:39 AM  [facility] Nurse Advisor    COVID 19 Nurse Triage Plan/Patient Instructions    Please be aware that novel coronavirus (COVID-19) may be circulating in the community. If you develop symptoms such as fever, cough, or SOB or if you have concerns about the presence of another infection including coronavirus (COVID-19), please contact your health care provider or visit [email or web address].     Disposition/Instructions    In-Person Visit with provider recommended. Reference Visit Selection Guide.    Thank you for taking steps to prevent the spread of this virus.  o Limit your contact with others.  o Wear a simple mask to cover your cough.  o Wash your hands well and often.    Resources    [facility]: About COVID-19: [email or web address]    CDC: What to Do If You're Sick: www.cdc.gov/coronavirus/2019-ncov/about/steps-when-sick.html    CDC: Ending Home Isolation: www.cdc.gov/coronavirus/2019-ncov/hcp/disposition-in-home-patients.html     CDC: Caring for Someone: www.cdc.gov/coronavirus/2019-ncov/if-you-are-sick/care-for-someone.html     [facility]: Interim Guidance for Hospital Discharge to Home:  [email or web address]/diseases/coronavirus/hcp/hospdischarge.pdf    [facility] clinical trials (COVID-19 research studies): [email or web address]     Below are the COVID-19 hotlines at the [place] Department of [facility] ([facility]). Interpreters are available.   o For health questions: Call 946-471-5536 or 1-267.579.9821 (7 a.m. to 7 p.m.)  o For questions about schools and childcare: Call 570-725-4195 or 1-413.170.2863 (7 a.m. to 7 p.m.)     Reason for Disposition    Leg pain which occurs after walking a certain distance and disappears with rest, AND age > 50    Additional Information    Negative: Looks like a broken bone or dislocated joint (e.g., crooked or deformed)    Negative: Sounds like a life-threatening emergency to the triager    Negative: Followed a hip injury    Negative: Followed a knee injury    Negative: Followed an ankle or foot injury    Negative: Back pain radiating (shooting) into leg(s)    Negative: Foot pain is the main symptom    Negative: Ankle pain is the main symptom    Negative: Knee pain is the main symptom    Negative: Leg swelling is the main symptom    Negative: Chest pain    Negative: Difficulty breathing    Negative: Entire foot is cool or blue in comparison to other side    Negative: Unable to walk    Negative: Fever and red area (or area very tender to touch)    Negative: Fever and swollen joint    Negative: Thigh or calf pain in only one leg and present > 1 hour    Negative: Thigh, calf, or ankle swelling in only one leg    Negative: Thigh, calf, or ankle swelling in both legs, but one side is definitely more swollen    Negative: History of prior 'blood clot' in leg or lungs (i.e., deep vein thrombosis, pulmonary embolism)    Negative: History of inherited increased risk of blood clots (e.g., factor 5 Leiden, antithrombin 3, protein C or protein S deficiency, prothrombin mutation)    Negative: Major surgery in the past month    Negative: Hip or leg  fracture (broken bone) in past month (or had cast on leg or ankle in past month)    Negative: Illness requiring prolonged bedrest in past month (e.g., immobilization, long hospital stay)    Negative: Long-distance travel in past month (e.g., car, bus, train, plane; with trip lasting 6 or more hours)    Negative: Cancer treatment in the past two months (or has cancer now)    Negative: Patient sounds very sick or weak to the triager    Negative: SEVERE pain (e.g., excruciating, unable to do any normal activities)    Negative: Cast on leg or ankle and now has increasing pain    Negative: Red area or streak and large (> 2 in. or 5 cm)    Negative: Painful rash with multiple small blisters grouped together (i.e., dermatomal distribution or 'band' or 'stripe')    Negative: Looks like a boil, infected sore, deep ulcer, or other infected rash (spreading redness, pus)    Negative: Localized rash is very painful (no fever)    Negative: Numbness in a leg or foot (i.e., loss of sensation)    Negative: Localized pain, redness or hard lump along vein    Negative: Patient wants to be seen    Negative: MODERATE pain (e.g., interferes with normal activities, limping) and present > 3 days    Negative: Swollen joint with no fever or redness    Protocols used: LEG PAIN-A-OH

## 2022-01-25 ENCOUNTER — TELEPHONE (OUTPATIENT)
Dept: ORTHOPEDICS | Facility: CLINIC | Age: 77
End: 2022-01-25
Payer: COMMERCIAL

## 2022-01-25 NOTE — PROGRESS NOTES
"Physical Therapy Initial Examination/Evaluation  January 26, 2022    Ca Yan is a 76 year old female referred to physical therapy by Manuel Salinas MD for treatment of Idiopathic scoliosis in thoracolumbar region with Precautions/Restrictions/MD instructions Eval and treat.    Therapist Impression:   Patient ambulated into clinic on supplemental O2 via NC, unwilling to wear a mask due to lack of oxygen. PT measured SpO2 via O2 monitor with SpO2 93%, patient educated on oxygen flow rubin NC, O2 WNL and the need to wear a mask. Patient perseverated on not being able to breath throughout session, had extreme pain with all movements, unwilling to let PT assess ROM, strength, etc. Patient has complaints of LBP and KG LE pain. Patient found to have poor posture, decreased ROM, unable to assess strength. Patient educated on sitting posture to promote increased respiratory gains and decrease back pain. Patient given HEP with supine butterfly stretch, lumbar rolling, TA contraction and sitting posture with lumbar roll.       Subjective:  DOI/onset: Ongoing  Acute Injury or Gradual Onset?: Gradual injury over time  Mechanism of Injury: unknown  Related PMH: None  Imaging: x-ray  Chief Complaint/Functional Limitations:  Back/LE pain and see below in therapy evaluation codes   Pain: rest 0 /10, activity 9/10 Location: KG LBP and KG anterior thighs Frequency: Intermittent Described as: sore Previous Treatment: Tylenol Effect of prior treatment: poor Alleviated by: Rest Progression of Symptoms: Gradually getting worse. Time of day when pain is worse: Position related  Sleeping: No issues/uninterrupted   Occupation: retired  Job duties: sitting  Current HEP/exercise regimen: None  Patient's goals are see chief complaints \"To figure out what my pain is and avoid it\"    Other pertinent PMH/Red Flags: Cancer, Chemical Dependency, Depression, High Blood Pressure, History of Fractures, Incontinence, " Migraines/Headaches, Osteoarthritis, Overweight, Changes in Bowel/Bladder O2 supplementation, lymphedema  Barriers at home/work: Live alone and Transportation  Pertinent Surgical History: thoracic fusion many yrs ago  Medications: High blood pressure  General health as reported by patient: poor  Return to MD:  PRN    Lumbar Spine Evaluation  Static Posture  Standing posture: Mod forward shoulder, increased trunk flexion  Sitting posture: Poor, sits in slumped position, forward head    Dynamic Movement Screen  Gait: Increased trunk flexion, difficulty navigating hallway with portable O2 tank    Hip Joint Screen Not assessed due to pain    Trunk Range of Motion  Movement Loss Major Moderate Minimal Nil Pain   Flexion    X    Extension X    X   Sidebending R   X  Stiff   Sidebending L   X  Stiff   Side Gliding R    X    Side Gliding L    X        Flexibility Left Right   Figure 4 moderate moderate     Hip and Knee Strength - unable to assess due to pain     TA contraction: poor    Special Tests  Not able to assess due to pain    Palpation:  Not assessed    Assessment/Plan:  Patient is a 76 year old female with lumbar complaints.    Patient has the following significant findings with corresponding treatment plan.                Diagnosis 1:  LBP  Pain -  hot/cold therapy, self management and directional preference exercise  Decreased ROM/flexibility - manual therapy and therapeutic exercise  Decreased strength - therapeutic exercise and therapeutic activities  Impaired balance - neuro re-education and therapeutic activities  Impaired muscle performance - neuro re-education  Decreased function - therapeutic activities  Impaired posture - neuro re-education        Therapy Evaluation Codes:   1) History comprised of:   Personal factors that impact the plan of care:      Anxiety, Coping style, Overall behavior pattern, Past/current experiences and Time since onset of symptoms.    Comorbidity factors that impact the plan of  care are:      Cancer, Chemical Dependency, Depression, High Blood Pressure, History of Fractures, Incontinence, Migraines/Headaches, Osteoarthritis, Overweight, Changes in Bowel/Bladder O2 supplementation, lymphedema.     Medications impacting care: High blood pressure.  2) Examination of Body Systems comprised of:   Body structures and functions that impact the plan of care:      Lumbar spine.   Activity limitations that impact the plan of care are:      Bathing, Bending, Cooking, Dressing, Sitting, Squatting/kneeling, Stairs, Standing, Walking and Laying down.  3) Clinical presentation characteristics are:   Evolving/Changing.  4) Decision-Making    Low complexity using standardized patient assessment instrument and/or measureable assessment of functional outcome.  Cumulative Therapy Evaluation is: Low complexity.    Previous and current functional limitations:  (See Goal Flow Sheet for this information)    Short term and Long term goals: (See Goal Flow Sheet for this information)     Communication ability:  Patient appears to be able to clearly communicate and understand verbal and written communication and follow directions correctly.  Treatment Explanation - The following has been discussed with the patient:   RX ordered/plan of care  Anticipated outcomes  Possible risks and side effects  This patient would benefit from PT intervention to resume normal activities.   Rehab potential is fair.    Frequency:  1 X week, once daily  Duration:  for 8 weeks  Discharge Plan:  Achieve all LTG.  Independent in home treatment program.  Reach maximal therapeutic benefit.    Please refer to the daily flowsheet for treatment today, total treatment time and time spent performing 1:1 timed codes.

## 2022-01-25 NOTE — TELEPHONE ENCOUNTER
RN called and spoke with patient. Told patient she will have to reach out to PCP for that since Dr. Salinas does not prescribe meds pre surgery. Patient expressed understanding.    Lauri Barillas RN        [facility] Call Center    Phone Message    May a detailed message be left on voicemail: yes     Reason for Call: Other: Pt calling in asking for pain medication for back pain from Dr. Manuel Salinas.  Pt seen by dr. Salinas on 01/12.  Pt would like Rx called into [facility] mail orderr. Pt can be reached at [phone]      Action Taken: Message routed to:  [facility] Ortho - Dr. Manuel Salinas    Travel Screening: Not Applicable

## 2022-01-26 ENCOUNTER — THERAPY VISIT (OUTPATIENT)
Dept: PHYSICAL THERAPY | Facility: CLINIC | Age: 77
End: 2022-01-26
Payer: COMMERCIAL

## 2022-01-26 DIAGNOSIS — M41.25 OTHER IDIOPATHIC SCOLIOSIS, THORACOLUMBAR REGION: ICD-10-CM

## 2022-01-26 DIAGNOSIS — M54.50 LUMBAGO: Primary | ICD-10-CM

## 2022-01-26 PROCEDURE — 97110 THERAPEUTIC EXERCISES: CPT | Mod: GP

## 2022-01-26 PROCEDURE — 97161 PT EVAL LOW COMPLEX 20 MIN: CPT | Mod: GP

## 2022-01-26 PROCEDURE — 97530 THERAPEUTIC ACTIVITIES: CPT | Mod: GP

## 2022-01-26 NOTE — PROGRESS NOTES
[facility]    OUTPATIENT Physical Therapy ORTHOPEDIC EVALUATION  PLAN OF TREATMENT FOR OUTPATIENT REHABILITATION  (COMPLETE FOR INITIAL CLAIMS ONLY)  Patient's Last Name, First Name, M.I.  YOB: 1945  Ca Yan    Provider s Name:  [facility]   Medical Record No.  [id number]   Start of Care Date:  01/26/22   Onset Date:   01/12/22 (Order date)   Type:     _X__PT   ___OT Medical Diagnosis:    Encounter Diagnosis   Name Primary?     Other idiopathic scoliosis, thoracolumbar region         Treatment Diagnosis:  LBP        Goals:     01/26/22 0500   Body Part   Goals listed below are for LBP   Goal #1   Goal #1 ambulation   Previous Functional Level Blocks patient could walk   Performance Level 4, pain-free   Current Functional Level Feet patient can walk   Performance Level 20, pain 8/10   STG Target Performance Feet patient will be able to walk   Performance Level 40, pain 4/10   Rationale for safe household ambulation;for safe outdoor household ambulation;for safe work place ambulation;to promote a healthy and active lifestyle   Due Date 02/25/22    LTG Target Performance Blocks patient will be able to walk   Performance Level 1, pain-free   Rationale for safe household ambulation;for safe outdoor household ambulation;for safe community ambulation;to promote a healthy and active lifestyle   Due Date 03/22/22   Goal #2   Goal #2 sitting   Previous Functional Level No restrictions   Current Functional Level Minutes patient can sit   Performance level 5 with poor posture   STG Target Performance Minutes patient will be able to sit   Performance level 5 with good posture   Rationale for personal hygiene;to allow rest from standing  (To promote increased respiratory ease, to decrease risk pain)   Due date 02/25/22   LTG Target Performance Minutes patient will be  able to sit   Performance Level 30 with good posture   Rationale for personal hygiene;to allow rest from standing;for community transportation  (To promote increased respiratory ease, to decrease risk pain)   Due date 03/22/22       Therapy Frequency:  1x/week  Predicted Duration of Therapy Intervention:  8 weeks    Karen Romero, PT                 I CERTIFY THE NEED FOR THESE SERVICES FURNISHED UNDER        THIS PLAN OF TREATMENT AND WHILE UNDER MY CARE     (Physician attestation of this document indicates review and certification of the therapy plan).                       Certification Date From:  01/26/22   Certification Date To:  03/22/22    Referring Provider:  Manuel Salinas    Initial Assessment        See Epic Evaluation SOC Date: 01/26/22

## 2022-02-21 ENCOUNTER — TELEPHONE (OUTPATIENT)
Dept: PULMONOLOGY | Facility: CLINIC | Age: 77
End: 2022-02-21
Payer: COMMERCIAL

## 2022-02-21 NOTE — TELEPHONE ENCOUNTER
[facility] Call [facility]    Phone Message    May a detailed message be left on voicemail: yes     Reason for Call: Other: Per pt would like to speak with Brandon TERRY . Per pt did not state why but would love if Brandon Terry call her back asap.Thank you.      Action Taken: Message routed to:  [facility] PULMONARY    Travel Screening: Not Applicable

## 2022-02-21 NOTE — TELEPHONE ENCOUNTER
[facility]    Phone Message    May a detailed message be left on voicemail: yes     Reason for Call: Other: Per pt would like  to know that she been having nose bleeds. Per pt has also cough up big blood colts and then the nose bleeds stop.      Action Taken: Message routed to:  [facility] PUL[facility]ARY    Travel Screening: Not Applicable

## 2022-02-21 NOTE — TELEPHONE ENCOUNTER
Ca wanted to know whether she could blow her nose.  RN suggested holding off on this, if she does need to blow then she must do this very gently.  Encouraged call back if nosebleed should happen again.

## 2022-02-21 NOTE — TELEPHONE ENCOUNTER
Ca reports having a nose bleed yesterday, now resolved.  She also coughed up a large dark red clot.  She denies chest pain/pressure, cough, sputum.  Her SOB is at baseline.  She denies fever, chills and sweats.  Explained oxygen can exacerbate dry nares and lead to nose bleed.  She will consider purchasing a humidifier.  I have asked her to contact the clinic if it happens again.

## 2022-02-21 NOTE — TELEPHONE ENCOUNTER
Reviewed with Dr. Taylor.  No further recommendations at this time. Pt is scheduled for clinic follow up on 3/28.

## 2022-02-24 ENCOUNTER — TELEPHONE (OUTPATIENT)
Dept: FAMILY MEDICINE | Facility: CLINIC | Age: 77
End: 2022-02-24
Payer: COMMERCIAL

## 2022-02-28 ENCOUNTER — TELEPHONE (OUTPATIENT)
Dept: PULMONOLOGY | Facility: CLINIC | Age: 77
End: 2022-02-28
Payer: COMMERCIAL

## 2022-02-28 NOTE — TELEPHONE ENCOUNTER
"Contacted pt as requested.  She tells me she went to ED on 2/22 d/t nosebleed.  She confirmed that she has not had a nosebleed since that time, but she's concerned that it will happen again, particularly if she blows her nose.  Writer reiterated ED instruction:  \"Please take your saline nasal spray 4 times per day as prescribed. If you have a bloody nose, make sure to blow your nose and then place the nasal clip on for 10 minutes before seeing if it has improved\".  Ca verbalized understanding and has no further questions at this time.       "

## 2022-03-01 ENCOUNTER — TELEPHONE (OUTPATIENT)
Dept: PULMONOLOGY | Facility: CLINIC | Age: 77
End: 2022-03-01

## 2022-03-01 NOTE — TELEPHONE ENCOUNTER
Spoke with Magno at [facility].  He will follow up with pt regarding humidity.  He confirms no order is necessary. Contacted to let Ca know that [facility] will be reaching out to her to arrange set up.

## 2022-03-01 NOTE — TELEPHONE ENCOUNTER
Returned Ca's call as requested.  Ca is wondering whether her oxygen can be humidified. Writer contacted [facility] and left message for call back to discuss adding humidity to pt's O2.

## 2022-03-02 ENCOUNTER — TELEPHONE (OUTPATIENT)
Dept: PULMONOLOGY | Facility: CLINIC | Age: 77
End: 2022-03-02
Payer: COMMERCIAL

## 2022-03-02 ENCOUNTER — TELEPHONE (OUTPATIENT)
Dept: FAMILY MEDICINE | Facility: CLINIC | Age: 77
End: 2022-03-02
Payer: COMMERCIAL

## 2022-03-02 ENCOUNTER — TELEPHONE (OUTPATIENT)
Facility: CLINIC | Age: 77
End: 2022-03-02
Payer: COMMERCIAL

## 2022-03-02 NOTE — TELEPHONE ENCOUNTER
Explained goal and benefits of Palliative Care to Ca.  Review of chart indicates she has previously been seen by them, last appt 6/25/20.  Provided scheduling number, Ca is agreeable to making a follow up appointment with them.

## 2022-03-02 NOTE — TELEPHONE ENCOUNTER
Contacted Ca as requested. Ca tells me she anxious and worried aobut her dyspnea and is spending most of her time in her home as she's too scared to leave. She's using Oxygen at 2L/min and maintains sats in mid 90s at rest, however, she states profound exertional dyspnea and believes her sats drop in to the 80s with exertion.  She is using tanks for portability and cites issues with mobility d/t their weight and pain associated with her back and hips. She mentioned that she thinks she needs to speak with a psychiatrist to address her anxiety.  She tells me she does not have a psychiatrist.   Discussed with Dr. Taylor - if pt is amenable we could refer to Palliative Care.  Left message for Ca to call back to discuss.

## 2022-03-02 NOTE — TELEPHONE ENCOUNTER
[facility] Call Center    Phone Message    May a detailed message be left on voicemail: yes     Reason for Call: Other: Patient is calling in asking for a call back from STEVEN Taylor. She states that she is very anxious about her health and would like to speak to Brandon about her care. Please call back as soon as possible to discuss.     Action Taken: Message routed to:  [facility] Pulm    Travel Screening: Not Applicable

## 2022-03-02 NOTE — TELEPHONE ENCOUNTER
[facility]    Phone Message    May a detailed message be left on voicemail: yes     Reason for Call: Other: Per pt is having a hard time breathing. Per pt feels like her breathing today is not doing so well. Please call pt back to [facility]. Thank you.      Action Taken: Message routed to:  [facility] Pulmonary    Travel Screening: Not Applicable

## 2022-03-02 NOTE — TELEPHONE ENCOUNTER
[facility]    Phone Message    May a detailed message be left on voicemail: no     Reason for Call: Other: The patient was calling to ask the care team for a mental health referral preferably a psychologist, please review and follow up with the patient ASAP thank you.     Action Taken: Message routed to:  [facility] pcc    Travel Screening: Not Applicable

## 2022-03-03 ENCOUNTER — NURSE TRIAGE (OUTPATIENT)
Dept: NURSING | Facility: CLINIC | Age: 77
End: 2022-03-03
Payer: COMMERCIAL

## 2022-03-03 NOTE — TELEPHONE ENCOUNTER
Patient has nose bleed today, also Ed for nosebleed reported 2/22/22  Uses nasal cannula for 2 lPm O2  -Nose has been bleeding 1-2 hours.   911 came and placed clamped on nose and the bleeding has now stopped and the clamp is off.   She is worried about epistaxis while using nasal cannula.  She does not have an oxygen mask. Her sats are in the 90's.      Gets very dry nose using O2 and she is worried about wearing cannula/ worsening.    Reviewed nasal care and hydration- oral hydration, humidifier( has), nasal saline( has) Ayr  nasal gel. Direct pressure x 10-15 minutes if bleeding returns. Do not pick or manipulate. If cannot stop bleeding, 911.   Patient stated understanding but has a call coming in she wished to take. She will call back with symptoms, recurrent epistaxis. Has not seen ENT( for epistaxis- has seen for voice) nor had nasal packing placed recently.   HOME CARE ADVICE: NOSEBLEED      In sitting position with the head bent forward, firmly pinch the nose closed for 5 to 10 minutes with an ice-cold washcloth. Breathe through the mouth.     If the bleeding stops then recurS, pinch the nose closed for 10 to 15 minutes.    DO NOT blow nose.    Avoid aspirin and anti-inflammatory products.    Avoid smoking and strenuous activity for 24 hours after a nosebleed.    Keep mucous membranes moist. Use saline nasal drops or spray several times a day.    Avoid hot liquids (such as soup, coffee, or tea).    REPORT THE FOLLOWING PROBLEMS TO YOUR PCP/CLINIC/ED    Bleeding persist after > 15 to 30 minutes of pressure    Difficulty breathing, lightheadedness, or dizziness    Problems persist or worsens.    SEEK ED CARE IMMEDIATELY IF ANY OF THE FOLLOWING OCCUR    Change in level of consciousness    Rapid heart rate, pale skin, or shortness of breath.      Reason for Disposition    Mild-moderate nosebleed and bleeding has stopped now    Additional Information    Negative: Nosebleed followed nose injury    Negative:  "Fainted (passed out), or too weak to stand following large blood loss    Negative: Sounds like a life-threatening emergency to the triager    Negative: Bleeding present > 30 minutes and using correct method of direct pressure    Negative: Bleeding now and second call after being instructed in correct technique of direct pressure    Negative: Lightheadedness or dizziness    Negative: Pale skin (pallor) of new onset or worsening    Negative: Has nasal packing (inserted by health care provider to control bleeding) and now has new rash    Negative: Has nasal packing and now has bleeding around the packing (Exception: few drops or ooze)    Negative: Patient sounds very sick or weak to the triager    Negative: Large amount of blood has been lost (e.g., one cup)    Negative: Taking Coumadin (warfarin) or other strong blood thinner, or known bleeding disorder (e.g., thrombocytopenia)    Negative: Bleeding recurs 3 or more times in 24 hours despite direct pressure    Negative: Has skin bruises or bleeding gums that are not caused by an injury    Negative: Has nasal packing and now has fever > 100.4 F (38.0 C)    Negative: Patient wants to be seen    Negative: Has nasal packing (inserted by health care provider to control bleeding)    Negative: Hard-to-stop nosebleeds are a chronic symptom (recurrent or ongoing AND lasting > 4 weeks)    Negative: Easy bleeding present in other family members    Answer Assessment - Initial Assessment Questions  1. AMOUNT OF BLEEDING:     -     - MODERATE: needed many tissues       2. ONSET:  2 hours ago     3. FREQUENCY  Just worried about this one now  4. RECURRENT SYMPTOMS:  EMT came and clamped nose. They have left her apartment and bleeding has stopped.  5. CAUSE: \"What do you think caused this nosebleed?\"   Nasal cannula  6. LOCAL FACTORS: \"Do you have any cold symptoms?\", \"Have you been rubbing or picking at your nose?\"  Denies  7. SYSTEMIC FACTORS: \"Do you have high blood pressure or " "any bleeding problems?\"      No  8. BLOOD THINNERS: \"Do you take any blood thinners?\" (e.g., coumadin, heparin, aspirin, Plavix)  No  9. OTHER SYMPTOMS: \"Do you have any other symptoms?\" (e.g., lightheadedness)    Shortness of breath> palliative care recommended    Protocols used: NOSEBLEED-A-OH      "

## 2022-03-03 NOTE — TELEPHONE ENCOUNTER
Called patient and left VM to return call.  I will be in the office until 4 PM today.    If I am not at my desk or not reachable:  Need to know what patient is seeing a psychologist for.  Depression, anxiety, counseling, substance abuse, etc?      Jama De La Vega CMA ([facility]) at 9:20 AM on 3/3/2022

## 2022-03-04 NOTE — TELEPHONE ENCOUNTER
"Left two messages throughout the day to relay information from provider to patient. Per Dr. Taylor:    \"You can have her regularly check her oxygen saturation if she gets a pulse oximeter.  She can use oxygen just when her levels get below 88%.  She can try to use the lowest setting of 1 L.     You can try to get her a facemask as well\"      "

## 2022-03-07 ENCOUNTER — NURSE TRIAGE (OUTPATIENT)
Dept: NURSING | Facility: CLINIC | Age: 77
End: 2022-03-07
Payer: COMMERCIAL

## 2022-03-07 NOTE — TELEPHONE ENCOUNTER
"Call from Ca, see triage note.  She has had nose bleeds, last ones last Thursday and Friday, lasted a couple of hours.  Has nasal clamp.  Has humidity added to oxygen.  Has humidifier in bedroom.  Reports O2 sats low 90's.  Reports is working with  in the building she lives at to set up home care PCA.  Also uses [employer] moisturizer.    Is afraid to leave home due to nose bleeds.  Reports did go to \"hospital\" last week when had nose bleeds, but nothing was done.  She refuses to keep appointments 3/8/22.  Asking for additional help with what she can do for nose bleeds.  Best number to reach her [phone]    Reason for Disposition    Hard-to-stop nosebleeds are a chronic symptom (recurrent or ongoing AND lasting > 4 weeks)    Additional Information    Negative: Fainted (passed out), or too weak to stand following large blood loss    Negative: Sounds like a life-threatening emergency to the triager    Negative: Nosebleed followed nose injury    Negative: Bleeding present > 30 minutes and using correct method of direct pressure    Negative: Bleeding now and second call after being instructed in correct technique of direct pressure    Negative: Lightheadedness or dizziness    Negative: Pale skin (pallor) of new onset or worsening    Negative: Has nasal packing (inserted by health care provider to control bleeding) and now has new rash    Negative: Has nasal packing and now has bleeding around the packing (Exception: few drops or ooze)    Negative: Patient sounds very sick or weak to the triager    Negative: Large amount of blood has been lost (e.g., one cup)    Negative: Bleeding recurs 3 or more times in 24 hours despite direct pressure    Negative: Taking Coumadin (warfarin) or other strong blood thinner, or known bleeding disorder (e.g., thrombocytopenia)    Negative: Has skin bruises or bleeding gums that are not caused by an injury    Negative: Has nasal packing and now has fever > 100.4 F (38.0 " "C)    Negative: Patient wants to be seen    Negative: Has nasal packing (inserted by health care provider to control bleeding)    Answer Assessment - Initial Assessment Questions  1. AMOUNT OF BLEEDING: \"How bad is the bleeding?\" \"How much blood was lost?\" \"Has the bleeding stopped?\"    - MILD: needed a couple tissues    - MODERATE: needed many tissues    - SEVERE: large blood clots, soaked many tissues, lasted more than 30 minutes       Is not having active nose bleed  2. ONSET: \"When did the nosebleed start?\"       Had nose bleeds last Thursday and Friday  3. FREQUENCY: \"How many nosebleeds have you had in the last 24 hours?\"       none  4. RECURRENT SYMPTOMS: \"Have there been other recent nosebleeds?\" If so, ask: \"How long did it take you to stop the bleeding?\" \"What worked best?\"       Lasted a couple of hours last Thursday and Friday  5. CAUSE: \"What do you think caused this nosebleed?\"      Thinks is from the oxygen and dryness  6. LOCAL FACTORS: \"Do you have any cold symptoms?\", \"Have you been rubbing or picking at your nose?\"      No cold symptoms  7. SYSTEMIC FACTORS: \"Do you have high blood pressure or any bleeding problems?\"      Not that she is aware of  8. BLOOD THINNERS: \"Do you take any blood thinners?\" (e.g., coumadin, heparin, aspirin, Plavix)      no  9. OTHER SYMPTOMS: \"Do you have any other symptoms?\" (e.g., lightheadedness)      Denies dizziness/lightheadedness.  No nausea at this time.  Was nauseated last week, no vomiting.  Has poor appetite.  Did experience chills Saturday/Sunday, denies fever  10. PREGNANCY: \"Is there any chance you are pregnant?\" \"When was your last menstrual period?\"        N/A    Protocols used: NOSEBLEED-A-OH      "

## 2022-03-07 NOTE — TELEPHONE ENCOUNTER
Unable to reach patient and patient never returned call.    Mental health referral can be dicussed at tomorrow's appt with resident provider. Appt notes addendum.       Jama De La Vega CMA ([facility]) at 7:03 AM on 3/7/2022

## 2022-03-09 NOTE — TELEPHONE ENCOUNTER
Spoke with patient and advised to use humidification as much as possible. Patient is nervous about leaving home, where she will not have humidification. RN advised patient to contact [employer] to see if she can get a mask. We will send order if necessary. RN relayed all information for provider (see previous note) and patient verbalized understanding. Patient was given nurse number in case of any further question and will seek emergency care if the nosebleed starts again and will not stop.

## 2022-03-28 ENCOUNTER — OFFICE VISIT (OUTPATIENT)
Dept: PULMONOLOGY | Facility: CLINIC | Age: 77
End: 2022-03-28
Payer: COMMERCIAL

## 2022-03-28 VITALS
HEIGHT: 64 IN | WEIGHT: 190 LBS | HEART RATE: 78 BPM | RESPIRATION RATE: 17 BRPM | OXYGEN SATURATION: 94 % | BODY MASS INDEX: 32.44 KG/M2

## 2022-03-28 DIAGNOSIS — M41.9 KYPHOSCOLIOSIS: ICD-10-CM

## 2022-03-28 DIAGNOSIS — J98.11 ATELECTASIS: ICD-10-CM

## 2022-03-28 DIAGNOSIS — J96.11 CHRONIC HYPOXEMIC RESPIRATORY FAILURE (H): Primary | ICD-10-CM

## 2022-03-28 DIAGNOSIS — J96.11 CHRONIC HYPOXEMIC RESPIRATORY FAILURE (H): ICD-10-CM

## 2022-03-28 DIAGNOSIS — J98.4 RESTRICTIVE LUNG DISEASE: ICD-10-CM

## 2022-03-28 LAB
6 MIN WALK (FT): 200 FT
6 MIN WALK (M): 61 M

## 2022-03-28 PROCEDURE — 99213 OFFICE O/P EST LOW 20 MIN: CPT | Mod: 25

## 2022-03-28 PROCEDURE — G0463 HOSPITAL OUTPT CLINIC VISIT: HCPCS | Mod: 25

## 2022-03-28 PROCEDURE — 94618 PULMONARY STRESS TESTING: CPT | Performed by: INTERNAL MEDICINE

## 2022-03-28 ASSESSMENT — PAIN SCALES - GENERAL: PAINLEVEL: NO PAIN (0)

## 2022-03-28 NOTE — NURSING NOTE
Chief Complaint   Patient presents with     RECHECK     Return follow up     Medications reviewed and vital signs taken.   Troy Quarles, NASH

## 2022-03-28 NOTE — PROGRESS NOTES
[facility]  Pulmonary Medicine  Visit Clinic Note  March 28, 2022         ASSESSMENT & PLAN       Shortness of breath  Kyphosis  Deconditioning  Right middle lobe atelectasis  Chronic Hypoxemic Respiratory Failure    At this point, my evaluation and treatment recommendations are no different than the last time I saw her. Namely, the main reversible cause of her dyspnea would be her deconditioning.  We talked a long time about pulmonary rehab and the importance of it for her.  She stated she had a misunderstanding of why she was there and was getting easily frustrated.  She is willing to participate in it again.  I sent another referral for her.     Mono Taylor MD            Today's visit note:     Chief Complaint: Ca Yan is a 76 year old year old female who is being seen for shortness of breath    HISTORY OF PRESENT ILLNESS:    Today she describes a lot of shortness of breath that is interfering with her day-to-day activities.  She says that she really does not do much at all during the day anymore.  She will set at home and watch a lot of television.  She does check her oxygen saturation occasionally.  It has been as low as 71%.  Most of the time it is in the 90% range.  She now uses water to humidify the oxygen that she receives, and this is greatly helped with a lot of the nosebleeds that she has been experiencing.    She does that do any exercise.  Last time she enrolled in pulmonary rehab, she quit after her second session because she was not able to watch the television channel that she liked while doing pulmonary rehab.  She says she was also unclear of the purpose for pulmonary rehab.  She thought it was going to help her back and hip pain.  She did not know that it was to help out with shortness of breath.         Past Medical and Surgical History:     Past Medical History:   Diagnosis Date     Anxiety      Arthritis      Breast cancer (H)      COPD (chronic obstructive  pulmonary disease) (H)     12:  FEV 0.99 l     Depressive disorder      Hypertension      Low back pain with left-sided sciatica      Low bone density 2017    DEXA 2017: T score -2.0. Normal Z score. FRAX risk: major osteoporotic fracture 11.9%, hip fracture 2.6%, therefore not high-risk     Lymphedema, chronic lower extremities      Past Surgical History:   Procedure Laterality Date     BACK SURGERY      spinal fusion     COLONOSCOPY       LARYNGOSCOPY WITH MICROSCOPE N/A 2021    Procedure: FLEXIBLE LARYNGOSCOPY;  Surgeon: Domonique Roche MD;  Location: [facility] OR     LUMPECTOMY BREAST       ORTHOPEDIC SURGERY      Knee surgery left side           Family History:     Family History   Problem Relation Age of Onset     Breast Cancer Mother      Diabetes Mother      Anxiety Disorder Mother      Asthma Mother      Other Cancer Mother      Hyperlipidemia Mother      Alcohol/Drug Father      Mental Illness Father      Substance Abuse Father      Obesity Father      Cerebrovascular Disease Maternal Grandmother 67              Social History:     Social History     Socioeconomic History     Marital status: Single     Spouse name: Not on file     Number of children: Not on file     Years of education: Not on file     Highest education level: Not on file   Occupational History     Employer: RETIRED   Tobacco Use     Smoking status: Former Smoker     Packs/day: 0.50     Years: 5.00     Pack years: 2.50     Types: Cigarettes     Start date: 1956     Quit date: 1980     Years since quittin.5     Smokeless tobacco: Former User     Quit date: 1980   Substance and Sexual Activity     Alcohol use: Not Currently     Alcohol/week: 0.0 standard drinks     Comment: Sober for 2 years, previous alcoholic     Drug use: No     Sexual activity: Not Currently     Partners: Male     Birth control/protection: Abstinence   Other Topics Concern     Parent/sibling w/ CABG, MI or angioplasty before 65F 55M? Not  "Asked      Service Not Asked     Blood Transfusions Not Asked     Caffeine Concern Not Asked     Occupational Exposure Not Asked     Hobby Hazards Not Asked     Sleep Concern Not Asked     Stress Concern Not Asked     Comment: Lives alone     Weight Concern Not Asked     Special Diet Not Asked     Back Care Not Asked     Comment: Hx of scoliosis with spine fusion     Exercise Not Asked     Comment: Walks     Bike Helmet Not Asked     Seat Belt Not Asked     Self-Exams Not Asked   Social History Narrative    Ca Yan never   Lives in apartment  is  family member is daughter Rin in [place], [place]  Previous AA meetings     Social Determinants of Health     Financial Resource Strain: Not on file   Food Insecurity: Not on file   Transportation Needs: Not on file   Physical Activity: Not on file   Stress: Not on file   Social Connections: Not on file   Intimate Partner Violence: Not At Risk     Fear of Current or Ex-Partner: No     Emotionally Abused: No     Physically Abused: No     Sexually Abused: No   Housing Stability: Not on file            Medications:     Current Outpatient Medications   Medication     atorvastatin (LIPITOR) 40 MG tablet     cephALEXin (KEFLEX) 250 MG capsule     clotrimazole (LOTRIMIN) 1 % external cream     Emollient (CERAVE) CREA     ipratropium (ATROVENT) 0.03 % nasal spray     latanoprost (XALATAN) 0.005 % ophthalmic solution     losartan-hydrochlorothiazide (HYZAAR) 100-25 MG tablet     mirtazapine (REMERON) 7.5 MG tablet     quetiapine (SEROQUEL) 200 MG tablet     sertraline (ZOLOFT) 100 MG tablet     Vitamin D3 (CHOLECALCIFEROL) 25 mcg (1000 units) tablet     No current facility-administered medications for this visit.            Review of Systems:       A complete review of systems was otherwise negative except as noted in the HPI.      PHYSICAL EXAM:  Pulse 78   Resp 17   Ht 1.615 m (5' 3.58\")   Wt 86.2 kg (190 lb)   LMP  (LMP Unknown)   SpO2 94%   BMI 33.05 " kg/m        General: Anxious.  Some illogical statements  HEENT: No signs of epistaxis or scabbing.  She has a hoarse voice  Cardiovascular: Regular rate and rhythm  Respiratory: Diminished breath sounds.  No crackles or wheezing  Extremities: She would not let me examine her legs for swelling or rashes             Data:   All laboratory and imaging data reviewed.      6-minute walk test performed today  6-minute walk test.  Resting room air saturation on 4 L oxygen is 99%.  Her heart rate is 79.  She stopped the walk after about 1 and half minutes due to shortness of breath.  Her oxygen saturation was 95% at that time.    PFT:   FEV1/FVC ratio 0.71.  FVC is 1.12 L which is 42% predicted.  FEV1 is 0.8 L which is 39% predicted.      PFT Interpretation:  Possible airflow obstruction.  Low FVC and FEV1 suggestive of restriction.  No lung volumes available to correlate.  Valid Maneuver    Chest CT scan:  Chest: Thyroid gland appears unremarkable. Esophagus appears  unremarkable. Tracheobronchial tree appears patent.  No suspicious  lung nodules.      Lung nodule(s) described on series 4:  -Solid 3 mm pulmonary nodule of the left upper lobe (Image: 147)  -Solid 4 mm pulmonary nodule in the left upper lobe (Image: 164)     The pleura appears unremarkable. Small right pleural effusion. No  pneumothorax. Enlargement of the pulmonary artery measuring 4.0 cm.  Heart size is within normal limits. Mild atherosclerotic ossifications  of the coronary arteries. No significant pericardial effusion.   Visualized thoracic aorta and main pulmonary artery diameters appear  within normal limits. There are no visualized pathologically enlarged  mediastinal, hilar or axillary lymph nodes.     Abdomen: Examination of the upper abdomen is limited.   Hypoattenuating focus of the pancreas measuring 7 mm. Original  preliminary report had suggested a lesion in the pancreas. Images are  available from outside CT showed a very similar appearance  on outside  CT dated 8/13/2016. Appears to be fat extending up into the pancreas  rather than a mass.     Bones: No suspicious osseous lesion. Severe thoracic dextroscoliosis.         Soft Tissues: No suspicious mass.                                                                      IMPRESSION:   1. Small right pleural effusion.  2. Solid pulmonary nodules as detailed above. Consider follow-up if  patient is stratified into high-risk by Fleischner criteria.  3. Enlargement of the pulmonary artery which can be seen in setting of  pulmonary hypertension.  4. Hepatomegaly.

## 2022-03-28 NOTE — LETTER
3/28/2022     RE: Ca Yan  [address]  [facility] [address]    Dear Colleague,    Thank you for referring your patient, Ca Yan, to the [facility] [place]. Please see a copy of my visit note below.    [facility]  Pulmonary Medicine  Visit Clinic Note  March 28, 2022         ASSESSMENT & PLAN       Shortness of breath  Kyphosis  Deconditioning  Right middle lobe atelectasis  Chronic Hypoxemic Respiratory Failure    At this point, my evaluation and treatment recommendations are no different than the last time I saw her. Namely, the main reversible cause of her dyspnea would be her deconditioning.  We talked a long time about pulmonary rehab and the importance of it for her.  She stated she had a misunderstanding of why she was there and was getting easily frustrated.  She is willing to participate in it again.  I sent another referral for her.     Mono Taylor MD            Today's visit note:     Chief Complaint: Ca Yan is a 76 year old year old female who is being seen for shortness of breath    HISTORY OF PRESENT ILLNESS:    Today she describes a lot of shortness of breath that is interfering with her day-to-day activities.  She says that she really does not do much at all during the day anymore.  She will set at home and watch a lot of television.  She does check her oxygen saturation occasionally.  It has been as low as 71%.  Most of the time it is in the 90% range.  She now uses water to humidify the oxygen that she receives, and this is greatly helped with a lot of the nosebleeds that she has been experiencing.    She does that do any exercise.  Last time she enrolled in pulmonary rehab, she quit after her second session because she was not able to watch the television channel that she liked while doing pulmonary rehab.  She says she was also unclear of the purpose for pulmonary rehab.  She  thought it was going to help her back and hip pain.  She did not know that it was to help out with shortness of breath.         Past Medical and Surgical History:     Past Medical History:   Diagnosis Date     Anxiety      Arthritis      Breast cancer (H)      COPD (chronic obstructive pulmonary disease) (H)     12:  FEV 0.99 l     Depressive disorder      Hypertension      Low back pain with left-sided sciatica      Low bone density 2017    DEXA 2017: T score -2.0. Normal Z score. FRAX risk: major osteoporotic fracture 11.9%, hip fracture 2.6%, therefore not high-risk     Lymphedema, chronic lower extremities      Past Surgical History:   Procedure Laterality Date     BACK SURGERY      spinal fusion     COLONOSCOPY       LARYNGOSCOPY WITH MICROSCOPE N/A 2021    Procedure: FLEXIBLE LARYNGOSCOPY;  Surgeon: Domonique Roche MD;  Location: [facility] OR     LUMPECTOMY BREAST       ORTHOPEDIC SURGERY      Knee surgery left side           Family History:     Family History   Problem Relation Age of Onset     Breast Cancer Mother      Diabetes Mother      Anxiety Disorder Mother      Asthma Mother      Other Cancer Mother      Hyperlipidemia Mother      Alcohol/Drug Father      Mental Illness Father      Substance Abuse Father      Obesity Father      Cerebrovascular Disease Maternal Grandmother 67              Social History:     Social History     Socioeconomic History     Marital status: Single     Spouse name: Not on file     Number of children: Not on file     Years of education: Not on file     Highest education level: Not on file   Occupational History     Employer: RETIRED   Tobacco Use     Smoking status: Former Smoker     Packs/day: 0.50     Years: 5.00     Pack years: 2.50     Types: Cigarettes     Start date: 1956     Quit date: 1980     Years since quittin.5     Smokeless tobacco: Former User     Quit date: 1980   Substance and Sexual Activity     Alcohol use: Not Currently      Alcohol/week: 0.0 standard drinks     Comment: Sober for 2 years, previous alcoholic     Drug use: No     Sexual activity: Not Currently     Partners: Male     Birth control/protection: Abstinence   Other Topics Concern     Parent/sibling w/ CABG, MI or angioplasty before 65F 55M? Not Asked      Service Not Asked     Blood Transfusions Not Asked     Caffeine Concern Not Asked     Occupational Exposure Not Asked     Hobby Hazards Not Asked     Sleep Concern Not Asked     Stress Concern Not Asked     Comment: Lives alone     Weight Concern Not Asked     Special Diet Not Asked     Back Care Not Asked     Comment: Hx of scoliosis with spine fusion     Exercise Not Asked     Comment: Walks     Bike Helmet Not Asked     Seat Belt Not Asked     Self-Exams Not Asked   Social History Narrative    Ca Yan never   Lives in apartment  is  family member is daughter Rin in [place], [place]  Previous AA meetings     Social Determinants of Health     Financial Resource Strain: Not on file   Food Insecurity: Not on file   Transportation Needs: Not on file   Physical Activity: Not on file   Stress: Not on file   Social Connections: Not on file   Intimate Partner Violence: Not At Risk     Fear of Current or Ex-Partner: No     Emotionally Abused: No     Physically Abused: No     Sexually Abused: No   Housing Stability: Not on file            Medications:     Current Outpatient Medications   Medication     atorvastatin (LIPITOR) 40 MG tablet     cephALEXin (KEFLEX) 250 MG capsule     clotrimazole (LOTRIMIN) 1 % external cream     Emollient (CERAVE) CREA     ipratropium (ATROVENT) 0.03 % nasal spray     latanoprost (XALATAN) 0.005 % ophthalmic solution     losartan-hydrochlorothiazide (HYZAAR) 100-25 MG tablet     mirtazapine (REMERON) 7.5 MG tablet     quetiapine (SEROQUEL) 200 MG tablet     sertraline (ZOLOFT) 100 MG tablet     Vitamin D3 (CHOLECALCIFEROL) 25 mcg (1000 units) tablet     No current  "facility-administered medications for this visit.            Review of Systems:       A complete review of systems was otherwise negative except as noted in the HPI.      PHYSICAL EXAM:  Pulse 78   Resp 17   Ht 1.615 m (5' 3.58\")   Wt 86.2 kg (190 lb)   LMP  (LMP Unknown)   SpO2 94%   BMI 33.05 kg/m        General: Anxious.  Some illogical statements  HEENT: No signs of epistaxis or scabbing.  She has a hoarse voice  Cardiovascular: Regular rate and rhythm  Respiratory: Diminished breath sounds.  No crackles or wheezing  Extremities: She would not let me examine her legs for swelling or rashes             Data:   All laboratory and imaging data reviewed.      6-minute walk test performed today  6-minute walk test.  Resting room air saturation on 4 L oxygen is 99%.  Her heart rate is 79.  She stopped the walk after about 1 and half minutes due to shortness of breath.  Her oxygen saturation was 95% at that time.    PFT:   FEV1/FVC ratio 0.71.  FVC is 1.12 L which is 42% predicted.  FEV1 is 0.8 L which is 39% predicted.      PFT Interpretation:  Possible airflow obstruction.  Low FVC and FEV1 suggestive of restriction.  No lung volumes available to correlate.  Valid Maneuver    Chest CT scan:  Chest: Thyroid gland appears unremarkable. Esophagus appears  unremarkable. Tracheobronchial tree appears patent.  No suspicious  lung nodules.      Lung nodule(s) described on series 4:  -Solid 3 mm pulmonary nodule of the left upper lobe (Image: 147)  -Solid 4 mm pulmonary nodule in the left upper lobe (Image: 164)     The pleura appears unremarkable. Small right pleural effusion. No  pneumothorax. Enlargement of the pulmonary artery measuring 4.0 cm.  Heart size is within normal limits. Mild atherosclerotic ossifications  of the coronary arteries. No significant pericardial effusion.   Visualized thoracic aorta and main pulmonary artery diameters appear  within normal limits. There are no visualized pathologically " enlarged  mediastinal, hilar or axillary lymph nodes.     Abdomen: Examination of the upper abdomen is limited.   Hypoattenuating focus of the pancreas measuring 7 mm. Original  preliminary report had suggested a lesion in the pancreas. Images are  available from outside CT showed a very similar appearance on outside  CT dated 8/13/2016. Appears to be fat extending up into the pancreas  rather than a mass.     Bones: No suspicious osseous lesion. Severe thoracic dextroscoliosis.       Soft Tissues: No suspicious mass.                                                                    IMPRESSION:   1. Small right pleural effusion.  2. Solid pulmonary nodules as detailed above. Consider follow-up if  patient is stratified into high-risk by Fleischner criteria.  3. Enlargement of the pulmonary artery which can be seen in setting of  pulmonary hypertension.  4. Hepatomegaly.    Again, thank you for allowing me to participate in the care of your patient.      Sincerely,    Bassam Taylor MD

## 2022-03-30 ENCOUNTER — TELEPHONE (OUTPATIENT)
Dept: PULMONOLOGY | Facility: CLINIC | Age: 77
End: 2022-03-30
Payer: COMMERCIAL

## 2022-03-30 NOTE — TELEPHONE ENCOUNTER
Contacted Ca to follow up on her message. Explained that Pulmonary Rehab order was placed on 3/28 and they should be reaching out to her to schedule appointments.  I gave her their number, she will give them a call if she does not hear from them.

## 2022-03-30 NOTE — TELEPHONE ENCOUNTER
[facility] Call Center    Phone Message    May a detailed message be left on voicemail: yes     Reason for Call: Other: Per pt saw  back on 03/28/22 and was told she will be going to some exercise classes that  has spoken about. Per pt has not received any orders or talked with anyone about them since 03/28/21. Per pt is wondering about the location and time for these classes. Please call pt back with details thank you.     Action Taken: Message routed to:  [facility] ([facility] pulmonary    Travel Screening: Not Applicable

## 2022-04-06 ENCOUNTER — OFFICE VISIT (OUTPATIENT)
Dept: FAMILY MEDICINE | Facility: CLINIC | Age: 77
End: 2022-04-06
Payer: COMMERCIAL

## 2022-04-06 VITALS
HEIGHT: 63 IN | RESPIRATION RATE: 16 BRPM | OXYGEN SATURATION: 88 % | DIASTOLIC BLOOD PRESSURE: 84 MMHG | HEART RATE: 76 BPM | BODY MASS INDEX: 33.25 KG/M2 | SYSTOLIC BLOOD PRESSURE: 138 MMHG

## 2022-04-06 DIAGNOSIS — C50.912 MALIGNANT NEOPLASM OF LEFT FEMALE BREAST, UNSPECIFIED ESTROGEN RECEPTOR STATUS, UNSPECIFIED SITE OF BREAST (H): ICD-10-CM

## 2022-04-06 DIAGNOSIS — E21.3 PARATHYROID HORMONE EXCESS (H): ICD-10-CM

## 2022-04-06 DIAGNOSIS — R41.89 COGNITIVE IMPAIRMENT: ICD-10-CM

## 2022-04-06 DIAGNOSIS — G89.29 CHRONIC BILATERAL LOW BACK PAIN WITHOUT SCIATICA: ICD-10-CM

## 2022-04-06 DIAGNOSIS — R53.81 PHYSICAL DECONDITIONING: ICD-10-CM

## 2022-04-06 DIAGNOSIS — M54.50 CHRONIC BILATERAL LOW BACK PAIN WITHOUT SCIATICA: ICD-10-CM

## 2022-04-06 DIAGNOSIS — F60.89 CLUSTER C PERSONALITY DISORDER (H): Primary | ICD-10-CM

## 2022-04-06 DIAGNOSIS — Z78.0 ASYMPTOMATIC MENOPAUSAL STATE: ICD-10-CM

## 2022-04-06 DIAGNOSIS — R79.9 ABNORMAL FINDING OF BLOOD CHEMISTRY, UNSPECIFIED: ICD-10-CM

## 2022-04-06 DIAGNOSIS — Z23 ENCOUNTER FOR IMMUNIZATION: ICD-10-CM

## 2022-04-06 DIAGNOSIS — M40.00 KYPHOSIS (ACQUIRED) (POSTURAL): ICD-10-CM

## 2022-04-06 DIAGNOSIS — F43.22 ADJUSTMENT DISORDER WITH ANXIOUS MOOD: ICD-10-CM

## 2022-04-06 DIAGNOSIS — R71.8 ELEVATED MCV: ICD-10-CM

## 2022-04-06 DIAGNOSIS — E78.5 HYPERLIPIDEMIA LDL GOAL <100: ICD-10-CM

## 2022-04-06 DIAGNOSIS — J38.00 VOCAL CORD PARALYSIS: ICD-10-CM

## 2022-04-06 DIAGNOSIS — K58.9 IRRITABLE BOWEL SYNDROME WITHOUT DIARRHEA: ICD-10-CM

## 2022-04-06 DIAGNOSIS — Z23 HIGH PRIORITY FOR 2019-NCOV VACCINE: ICD-10-CM

## 2022-04-06 DIAGNOSIS — E55.9 VITAMIN D DEFICIENCY: ICD-10-CM

## 2022-04-06 DIAGNOSIS — R93.89 ABNORMAL FINDINGS ON DIAGNOSTIC IMAGING OF OTHER SPECIFIED BODY STRUCTURES: ICD-10-CM

## 2022-04-06 PROCEDURE — 0064A COVID-19,PF,MODERNA (18+ YRS BOOSTER .25ML): CPT | Performed by: FAMILY MEDICINE

## 2022-04-06 PROCEDURE — 99214 OFFICE O/P EST MOD 30 MIN: CPT | Mod: 25 | Performed by: FAMILY MEDICINE

## 2022-04-06 PROCEDURE — 90662 IIV NO PRSV INCREASED AG IM: CPT | Performed by: FAMILY MEDICINE

## 2022-04-06 PROCEDURE — G0008 ADMIN INFLUENZA VIRUS VAC: HCPCS | Performed by: FAMILY MEDICINE

## 2022-04-06 PROCEDURE — 91306 COVID-19,PF,MODERNA (18+ YRS BOOSTER .25ML): CPT | Performed by: FAMILY MEDICINE

## 2022-04-06 RX ORDER — LOSARTAN POTASSIUM 25 MG/1
25 TABLET ORAL
COMMUNITY
End: 2022-08-31

## 2022-04-06 RX ORDER — QUETIAPINE FUMARATE 300 MG/1
300 TABLET, FILM COATED ORAL
Status: ON HOLD | COMMUNITY
Start: 2022-04-04 | End: 2022-12-09

## 2022-04-06 RX ORDER — AZELASTINE 1 MG/ML
SPRAY, METERED NASAL
Status: ON HOLD | COMMUNITY
Start: 2021-06-30 | End: 2022-11-26

## 2022-04-06 ASSESSMENT — PAIN SCALES - GENERAL: PAINLEVEL: NO PAIN (0)

## 2022-04-06 NOTE — PROGRESS NOTES
"  Assessment & Plan   Ca is a 76-year-old with history of anxiety /depression, significant kyphoscoliosis causing impairment of respiratory function, benign essential hypertension, past history of breast cancer, vocal cord paralysis with dysphonia who presents today for primary care follow-up of her  dysphonia and significant anxiety related to her breathing restriction from Kyphoscoliosis. She also had bowel concerns now resolved. She gets very anxious living alone and \"panics\" calls help lines. She is aware she \"over-reacts\" at times.     Cluster C personality disorder (H)Adjustment disorder with anxious mood  Cognitive impairment  Today we reviewed strategies to help her relax and congratulated her on stay well through the pandemic. She agrees to counseling, previously met with Karlos Sung.   I reviewed we should have regular visits to assist in supporting her best health. Visit scheduled for June with labs prior.  - Adult Mental Health  Referral    Kyphosis (acquired) (postural)  Physical deconditioning  Parathyroid hormone excess (H)  I reviewed her last DEXA 2017 encouraged completion. Need bone health labs at next visit. I encouraged pulmonary rehab, physical activity  - Adult [facility] Referral  - Dexa hip/pelvis/spine*  - Comprehensive metabolic panel  - Vitamin D Deficiency  - Adult Mental [facility] Referral  - Ferritin  - Parathyroid Hormone Intact    Irritable bowel syndrome without diarrhea  Bowel symptoms resolved. I reviewed previous elevated PTH therefore need to recheck at follow-up. I reviewed recent labs done in February  - CBC with platelets differential  - Ferritin  - TSH with free T4 reflex    High priority for 2019-nCoV vaccine  Provided today  - COVID-19,PF,MODERNA (18+ Yrs BOOSTER .25mL)    Encounter for immunization  Provided today  - FLU VACCINE HIGH DOSE PRESERVATIVE FREE, AGE =>65 YR    Vocal cord paralysis  Severe anxiety interfered with ENT procedure " "2021  - Adult Mental Health  Referral    Hyperlipidemia LDL goal <100  Due for fasting lipids at next visit  - Lipid panel reflex to direct LDL Fasting    Chronic bilateral low back pain without sciatica  Needs to be active    Abnormal findings on diagnostic imaging of other specified body structures   Labs required in June  - TSH with free T4 reflex    Malignant neoplasm of left female breast, unspecified estrogen receptor status, unspecified site of breast (H)  Stable    Elevated MCV  She denies alcohol, mild anemia noted last lab  - Vitamin B12  - Folate    Asymptomatic menopausal state   Due for DEXA  - Dexa hip/pelvis/spine*    Vitamin D deficiency  Bone health Lab with next visit  - Vitamin D Deficiency               BMI:   Estimated body mass index is 33.25 kg/m  as calculated from the following:    Height as of this encounter: 1.61 m (5' 3.38\").    Weight as of 3/28/22: 86.2 kg (190 lb).         33 minutes spent on the date of the encounter doing chart review, history, exam, diagnostics review, documentation, counseling and coordination of cares as noted.    Return in about 10 weeks (around 6/15/2022) for follow-up, Lab Work.    Favian Sahu MD  [facility] [place]    Magalys Penaloza is a 76 year old who presents for the following health issues     HPI   Ca is a 76-year-old with history of anxiety /depression, significant kyphoscoliosis causing impairment of respiratory function, benign essential hypertension, past history of breast cancer, vocal cord paralysis with dysphonia who presents today for primary care follow-up of her  dysphonia and significant anxiety related to her breathing restriction from Kyphoscoliosis. She also had bowel concerns now resolved. She gets very anxious living alone and \"panics\" calls help lines. She is aware she \"over-reacts\" at times.   Breathing feels scary at times follows with pulmonary and will have pulmonary rehab She " understands she is deconditioned, thinking of ways she could increase her activity now the pandemic risk is less. Pandemic caused her to stay home, she had difficulties with masking but adheres to wearing a mask at office visits.  Bowels  One days she had several small bowel movements not diarrhea 6 times in one day but these symptoms resolved. She was very worried what this could be and called a help line prompting visit with me today.  She no longer has concern related to her bowels today. Denies urinary pain. Has urinary frequency at times but she is on a diuretic.  Right Hip and leg pain  Right hip and leg pain, XRAYs reviewed from. April 8 will start exercsie classes and exercsie bike. She spends a lot of time sitting not very active.  I reviewed last DEXA 2017 low bone denisty. Previous PTH elevation.  Mood/ Anxiety  Significant anxiety related to living alone, obsessing about her breathing understanding it is related to Kyphoscoliosis and deconditioning. She notes at home she is able to walk around her home as she has oxygen with long tubing, less able to ambulate outside of her home due to requiring oxygen and complexities of juggling oxygen in public settings.  She agrees to meeting with health psychologist.  Immunization  She would like second COVID booster and agrees to flu vaccine today.  SH: She denies smoking. Does not currently drink alcohol, lives alone is very lonely.            Labs reviewed in EPIC  BP Readings from Last 3 Encounters:   04/06/22 138/84   08/18/21 121/82   06/23/21 137/87    Wt Readings from Last 3 Encounters:   03/28/22 86.2 kg (190 lb)   01/12/22 88.5 kg (195 lb)   12/02/21 88.5 kg (195 lb)            Immunization History   Administered Date(s) Administered     COVID-19,PF,Moderna 01/28/2021, 02/25/2021, 11/10/2021     COVID-19,PF,Moderna Booster 04/06/2022     Influenza (H1N1) 01/08/2010     Influenza (High Dose) 3 valent vaccine 02/26/2016, 10/31/2016, 10/05/2017, 10/04/2018,  10/11/2019     Influenza (IIV3) PF 2010, 2013, 2014     Influenza, Quad, High Dose, Pf, 65yr+ (Fluzone HD) 10/05/2020, 2022     Mantoux Tuberculin Skin Test 2013, 02/10/2014     Pneumo Conj 13-V (2010&after) 2015     Pneumococcal 23 valent 2009, 2012     TD (ADULT, 7+) 2005, 2005     Tdap (Adacel,Boostrix) 2011, 2021           Patient Active Problem List   Diagnosis     Non morbid obesity due to excess calories     Alcohol abuse     Malignant neoplasm of breast (H)     Arthritis of knee     Diarrhea     Diastolic dysfunction     Osteoarthritis of spine     Gastroesophageal reflux disease     Generalized anxiety disorder     Hypertension     Hyperlipidemia     Insomnia     Irritable bowel syndrome     Kyphoscoliosis     Cluster C personality disorder (H)     Seborrheic eczema     Anemia     Anxiety state     Bunion     Low bone density     Fecal incontinence     Lumbago     Cognitive impairment     Parathyroid hormone excess (H)     Chronic fatigue     Vocal cord paralysis     Dysphonia     Callus of foot     Nail dystrophy     Past Surgical History:   Procedure Laterality Date     BACK SURGERY      spinal fusion     COLONOSCOPY       LARYNGOSCOPY WITH MICROSCOPE N/A 2021    Procedure: FLEXIBLE LARYNGOSCOPY;  Surgeon: Domonique Roche MD;  Location: [facility] OR     LUMPECTOMY BREAST       ORTHOPEDIC SURGERY      Knee surgery left side       Social History     Tobacco Use     Smoking status: Former Smoker     Packs/day: 0.50     Years: 5.00     Pack years: 2.50     Types: Cigarettes     Start date: 1956     Quit date: 1980     Years since quittin.5     Smokeless tobacco: Former User     Quit date: 1980   Substance Use Topics     Alcohol use: Not Currently     Alcohol/week: 0.0 standard drinks     Comment: Sober for 2 years, previous alcoholic     Family History   Problem Relation Age of Onset     Breast Cancer Mother      Diabetes  Mother      Anxiety Disorder Mother      Asthma Mother      Other Cancer Mother      Hyperlipidemia Mother      Alcohol/Drug Father      Mental Illness Father      Substance Abuse Father      Obesity Father      Cerebrovascular Disease Maternal Grandmother 67     Other - See Comments Maternal Aunt         lived to          Current Outpatient Medications   Medication Sig Dispense Refill     atorvastatin (LIPITOR) 40 MG tablet Take 1 tablet (40 mg) by mouth daily 90 tablet 3     azelastine (ASTELIN) 0.1 % nasal spray SMARTSIG:Both Nares       cephALEXin (KEFLEX) 250 MG capsule Take 250 mg by mouth       clotrimazole (LOTRIMIN) 1 % external cream Apply topically 2 times daily as needed (under arms breast groin rash until rash resolves) 60 g 1     Emollient (CERAVE) CREA Please apply twice daily to dry skin of body and feet 453 g 3     ipratropium (ATROVENT) 0.03 % nasal spray 2 sprays each nostril twice daily for clear rhinorrhea 30 mL 1     latanoprost (XALATAN) 0.005 % ophthalmic solution 1 drop       losartan (COZAAR) 25 MG tablet Take 25 mg by mouth       losartan-hydrochlorothiazide (HYZAAR) 100-25 MG tablet Take 1 tablet by mouth daily 90 tablet 3     mirtazapine (REMERON) 7.5 MG tablet        QUEtiapine (SEROQUEL) 300 MG tablet Take 300 mg by mouth       Vitamin D3 (CHOLECALCIFEROL) 25 mcg (1000 units) tablet Take 1 tablet (25 mcg) by mouth daily 100 tablet 3     quetiapine (SEROQUEL) 200 MG tablet Take 200 mg by mouth At Bedtime. (Patient not taking: Reported on 4/6/2022)       sertraline (ZOLOFT) 100 MG tablet Take 1.5 tablets (150 mg) by mouth daily (Patient not taking: Reported on 4/6/2022) 135 tablet 1     Allergies   Allergen Reactions     Azithromycin Itching     Codeine Nausea and Vomiting     Hydrocodone Nausea and Vomiting     Metronidazole Nausea     Oxycodone Itching and Rash     Percocet [Oxycodone-Acetaminophen] Nausea and Vomiting     Pollen Extract      sneezing and runny nose.      Seasonal  "Allergies      sneezing and runny nose.      Vicodin [Hydrocodone-Acetaminophen] Nausea and Vomiting     Zolpidem      Amnestic behavior     Recent Labs   Lab Test 06/23/21  0940 04/22/21  1145 10/08/20  1035 08/20/20  1202 01/21/20  0920 10/11/19  1520 11/04/16  1350 04/21/16  1443   A1C  --   --   --   --   --   --   --  5.8   LDL 87 182*  --   --  100*  --   --   --    HDL 90 90  --   --  89  --   --   --    TRIG 82 87  --   --  104  --   --   --    ALT  --  14  --  15  --  17   < >  --    CR  --  0.92  --  0.81 0.81 1.00   < >  --    GFRESTIMATED  --  60* 61 70 71 55*   < >  --    GFRESTBLACK  --  70 74 81 82 64   < >  --    POTASSIUM  --  4.1  --  3.9 4.1 3.9   < >  --    TSH  --  2.04  --  2.39  --   --    < >  --     < > = values in this interval not displayed.      Review of Systems         Objective    /84 (BP Location: Right arm, Patient Position: Sitting, Cuff Size: Adult Regular)   Pulse 76   Resp 16   Ht 1.61 m (5' 3.38\")   LMP  (LMP Unknown)   SpO2 (!) 88%   BMI 33.25 kg/m    Body mass index is 33.25 kg/m .  Physical Exam     Constitutional: Oriented to person, place, and time. Vital signs are noted.  Appears well-nourished, well groomed arrived ahead of time for 7 am appt. Non-toxic appearance.  No distress. She was cooperative and interactive today and appreciative of the visit.  She has vocal dysphonia. She  used mask the entire visit,  No distress. White hair, no oxygen today   HENT:Oral exam no thrush TM normal  Head: Normocephalic and atraumatic.   Eyes: Conjunctivae and EOM are normal. Pupils are equal, round, and reactive to light. No scleral icterus. Glasses  Neck:  Kyphosis, Deformity  Neck trachea deviation  Cardiovascular: Regular rhythm increases slightly with inspiration, normal heart sounds. Trace  murmur present in pulmonic region.   Pulmonary/Chest: Effort normal and breath sounds normal. No respiratory distress but indicates she feels short of breath related to her " Scoliosis   Abdominal: Obese nontender exam sitting in chair  Musculoskeletal:Significant kyphoscoliosis, bilateral chronic lymphedema. Bilateral osteoarthritis of knees   Neurological: Alert and oriented to person, place, and time. General deconditioning, sitting in wheelchair but moves all extremities  Chronic dysphonia  Skin:  Skin is warm and dry, mild pallor. No rash noted. No erythema.   Psychiatric: Fixed ideas, pleasant, cooperative, thought coherent but expresses worry about her breathing related to musculoskeletal limitations scoliosis impacting lung volumes,worry about living alone but does not want to change the situation.

## 2022-04-06 NOTE — Clinical Note
I ordered DEXA ( bone health)after visit please assist with coordination of cares.  Best wishes,  Favian Sahu MD

## 2022-04-06 NOTE — NURSING NOTE
Ca Yan is a 76 year old female patient that presents today in clinic for the following:    Chief Complaint   Patient presents with     Gastrointestinal Problem     Patient comes to check on bowel issue.      The patient's allergies and medications were reviewed as noted. A set of vitals were recorded as noted without incident. The patient does not have any other questions for the provider.    Pardeep Freeman, EMT at 6:50 AM on 4/6/2022

## 2022-04-07 ENCOUNTER — TELEPHONE (OUTPATIENT)
Dept: FAMILY MEDICINE | Facility: CLINIC | Age: 77
End: 2022-04-07
Payer: COMMERCIAL

## 2022-04-07 PROBLEM — E55.9 VITAMIN D DEFICIENCY: Status: ACTIVE | Noted: 2022-04-07

## 2022-04-07 PROBLEM — R71.8 ELEVATED MCV: Status: ACTIVE | Noted: 2022-04-07

## 2022-04-11 DIAGNOSIS — E78.5 HYPERLIPIDEMIA, UNSPECIFIED HYPERLIPIDEMIA TYPE: ICD-10-CM

## 2022-04-11 DIAGNOSIS — F43.22 ADJUSTMENT DISORDER WITH ANXIOUS MOOD: ICD-10-CM

## 2022-04-13 RX ORDER — ATORVASTATIN CALCIUM 40 MG/1
40 TABLET, FILM COATED ORAL DAILY
Qty: 90 TABLET | Refills: 0 | Status: SHIPPED | OUTPATIENT
Start: 2022-04-13 | End: 2022-08-16

## 2022-04-13 RX ORDER — SERTRALINE HYDROCHLORIDE 100 MG/1
150 TABLET, FILM COATED ORAL DAILY
Qty: 135 TABLET | Refills: 3 | Status: ON HOLD | OUTPATIENT
Start: 2022-04-13 | End: 2022-12-09

## 2022-04-13 NOTE — TELEPHONE ENCOUNTER
Last Clinic Visit: 4/6/2022  [facility] [place]    LDL Cholesterol Calculated   Date Value Ref Range Status   06/23/2021 87 <100 mg/dL Final     Comment:     Desirable:       <100 mg/dl

## 2022-04-18 ENCOUNTER — HOSPITAL ENCOUNTER (OUTPATIENT)
Dept: CARDIAC REHAB | Facility: CLINIC | Age: 77
Discharge: HOME | End: 2022-04-18
Payer: COMMERCIAL

## 2022-04-18 ENCOUNTER — TELEPHONE (OUTPATIENT)
Dept: FAMILY MEDICINE | Facility: CLINIC | Age: 77
End: 2022-04-18
Payer: COMMERCIAL

## 2022-04-18 DIAGNOSIS — M41.9 KYPHOSCOLIOSIS: ICD-10-CM

## 2022-04-18 DIAGNOSIS — J98.4 RESTRICTIVE LUNG DISEASE: ICD-10-CM

## 2022-04-18 DIAGNOSIS — J96.11 CHRONIC HYPOXEMIC RESPIRATORY FAILURE (H): ICD-10-CM

## 2022-04-18 DIAGNOSIS — J98.11 ATELECTASIS: ICD-10-CM

## 2022-04-18 PROCEDURE — G0238 OTH RESP PROC, INDIV: HCPCS

## 2022-04-18 NOTE — TELEPHONE ENCOUNTER
[facility] Call Center    Phone Message    May a detailed message be left on voicemail: yes     Reason for Call: Other: Patient calling requesting a return call to discuss whether or not she needs more covid vaccines. Please call to discuss thank you.      Action Taken: Message routed to:  [facility]    Travel Screening: Not Applicable

## 2022-04-18 NOTE — TELEPHONE ENCOUNTER
"Spoke to patient.  Patient completed her 2nd covid booster.  Patient does not need more covid vaccine at this time.  Patient was a bit confused because other residents are getting their \"4th\" covid vaccine at this time.  Clarified patient got a total of 4 covid vaccine: 2 series of covid vaccine, and 1st and 2nd booster.   Pt understands.   Patient does not recall her covid vaccines.        Jama DeL a Vega CMA ([facility]) at 2:39 PM on 4/18/2022     "

## 2022-04-20 DIAGNOSIS — I10 ESSENTIAL HYPERTENSION: ICD-10-CM

## 2022-04-22 RX ORDER — LOSARTAN POTASSIUM AND HYDROCHLOROTHIAZIDE 25; 100 MG/1; MG/1
1 TABLET ORAL DAILY
Qty: 90 TABLET | Refills: 3 | Status: SHIPPED | OUTPATIENT
Start: 2022-04-22 | End: 2022-08-31

## 2022-04-22 NOTE — TELEPHONE ENCOUNTER
Last Clinic Visit:  4/6/2022 [facility]    *rec labs per protocol completed within timeframe outside lab 11/22/2021 results within therapeutic range.

## 2022-04-27 ENCOUNTER — TELEPHONE (OUTPATIENT)
Facility: CLINIC | Age: 77
End: 2022-04-27
Payer: COMMERCIAL

## 2022-04-27 NOTE — TELEPHONE ENCOUNTER
[facility] Call Center    Phone Message    May a detailed message be left on voicemail: yes     Reason for Call: Other: Ca is calling in requesting a call back from an RN. Pt states that she would like to speak with someone on her care team in regards to her care, as she has general concerns about her breathing issues. Please call back as soon as possible to discuss.    Action Taken: Message routed to:  [facility] Pulm    Travel Screening: Not Applicable

## 2022-04-28 NOTE — TELEPHONE ENCOUNTER
Ca is reporting increasing SOB, she says it's become worse over the last year, she denies acute symptoms.  She tells me her O2 sats are in mid 90s on 3 - 4 ltrs O2.  She voiced concern about her ongoing dyspnea and what this might mean for her ability to manage in her home alone, in the future.  She tells me she'd like somebody to help her with food delivery as she's finding it difficult to manage the stairs from her apartment to main entrance where deliveries are accepted. She tells me she currently has home help once each week.  She tells me she does have a care manager, I have suggested she call them to discuss her home health care needs.  She agrees to do this.

## 2022-04-28 NOTE — TELEPHONE ENCOUNTER
Contacted pt as requested and left message for call back to clinic if she still needs to speak with us.

## 2022-05-09 ENCOUNTER — TELEPHONE (OUTPATIENT)
Dept: PULMONOLOGY | Facility: CLINIC | Age: 77
End: 2022-05-09
Payer: COMMERCIAL

## 2022-05-09 ENCOUNTER — HOSPITAL ENCOUNTER (OUTPATIENT)
Dept: CARDIAC REHAB | Facility: CLINIC | Age: 77
Discharge: HOME | End: 2022-05-09
Payer: COMMERCIAL

## 2022-05-09 PROCEDURE — 94625 PHY/QHP OP PULM RHB W/O MNTR: CPT

## 2022-05-10 ENCOUNTER — TELEPHONE (OUTPATIENT)
Dept: PULMONOLOGY | Facility: CLINIC | Age: 77
End: 2022-05-10
Payer: COMMERCIAL

## 2022-05-10 NOTE — TELEPHONE ENCOUNTER
[facility] Call Center    Phone Message    May a detailed message be left on voicemail: yes     Reason for Call: Other: Per pt would like to know if she can get an order for [employer] oxygen portable and if her insurance covers for it. Please call pt back to discuss. Thank you!     Action Taken: Message routed to:  [facility] PULM    Travel Screening: Not Applicable

## 2022-05-10 NOTE — TELEPHONE ENCOUNTER
"Contacted pt to let her know that she would need to contact [employer] to determine whether a POC would be covered, if it would be she can ask that they send an order form to our clinic for Dr. Taylor's review. Clinic fax number provided.     Ca states her portable tanks are too heavy for her to use, she is looking for something lighter for portability.  Contacted [employer], pt is currently using home fill tanks, the lightest option for portability that they have.  They state that they have had numerous conversations with pt regarding options for portability, stating \"Ca calls every other day about her oxygen\".  They will have their RT reach out to Ca to discuss.  Order for POC sent to [employer] 5/6/22.    "

## 2022-05-11 ENCOUNTER — HOSPITAL ENCOUNTER (OUTPATIENT)
Dept: CARDIAC REHAB | Facility: CLINIC | Age: 77
Discharge: HOME | End: 2022-05-11
Payer: COMMERCIAL

## 2022-05-11 ENCOUNTER — MEDICAL CORRESPONDENCE (OUTPATIENT)
Dept: HEALTH INFORMATION MANAGEMENT | Facility: CLINIC | Age: 77
End: 2022-05-11
Payer: COMMERCIAL

## 2022-05-11 PROCEDURE — 999N000104 HC STATISTIC NO CHARGE

## 2022-05-12 ENCOUNTER — TELEPHONE (OUTPATIENT)
Dept: PULMONOLOGY | Facility: CLINIC | Age: 77
End: 2022-05-12
Payer: COMMERCIAL

## 2022-05-12 ENCOUNTER — TELEPHONE (OUTPATIENT)
Facility: CLINIC | Age: 77
End: 2022-05-12
Payer: COMMERCIAL

## 2022-05-12 NOTE — TELEPHONE ENCOUNTER
"Contacted Ca as requested.  She states breathing is worse.  When questioned if it has become worse recently she states \"I can't remember but it's been a while, it got worse maybe a year ago\".  She states she's very afraid and states she lives in fear of dying every day, she states \"it doesn't help to sit here and have nothing to do\".  She denies any acute pulmonary symptoms. She states she went to pulmonary rehab yesterday and was told that that she should try physical therapy and \"something to do with food\", she doesn't think pulmonary rehab will work for her because she couldn't do anything they asked d/t pain and general exercise intolerance.  Per [facility] note pt encouraged to persevere with therapy, however, after 20 mins pt said she was \"done for the day\".  I have encouraged her to contact Palliative Care team and provided their number.  I have also suggested she reach out to Dr. Sahu to discuss referral to physical therapy.  "

## 2022-05-12 NOTE — TELEPHONE ENCOUNTER
[facility] Call Center    Phone Message    May a detailed message be left on voicemail: yes     Reason for Call: Other: Per pt wants to know what palliative care means. Writer gave a definition on goggle to pt but she says she doesn't understand me and then started asking questions that im not allow to answer and dont have answers to. PLease call pt back. Thank you! And  please speak very [facility] since pt can not understand.     Action Taken: Message routed to:  [facility] PULM    Travel Screening: Not Applicable

## 2022-05-12 NOTE — TELEPHONE ENCOUNTER
[facility] Call Center    Phone Message    May a detailed message be left on voicemail: yes     Reason for Call: Other: Ca is calling in asking for a call back from her care team. Pt states that she is very concerned about her increased breathing difficulty and would like to discuss her care plan with Dr. Taylor if possible. Please call back as soon as possible to discuss.     Action Taken: Message routed to:  [facility] Pulm    Travel Screening: Not Applicable

## 2022-05-12 NOTE — TELEPHONE ENCOUNTER
Called Ca and explained role of Palliative Care team and how they may be able to help her with her chronic illness with strategies to improve her symptoms and address her specific needs particularly around the anxiety and fear she has relating to her long term prognosis.  I have provided their number and encouraged her to call for a follow up appointment, she is agreeable to this.

## 2022-05-16 ENCOUNTER — TELEPHONE (OUTPATIENT)
Dept: FAMILY MEDICINE | Facility: CLINIC | Age: 77
End: 2022-05-16
Payer: COMMERCIAL

## 2022-05-16 NOTE — TELEPHONE ENCOUNTER
May 16, 2022  Patient has chronic anxiety. Please have her schedule an in person visit with me next available.  Could schedule with another provider if concerns prior to my availability however her condition has been stable to my knowledge.  Best wishes,  Favian Sahu MD

## 2022-05-16 NOTE — TELEPHONE ENCOUNTER
Called patient.  Pulmonary rehab order was already placed since 1 month ago.  Gave pulm rehab [facility] address and phone to patient.   Patient wanted to speak to a nurse about her breathing issues.      Nurse Kiki not able to assist. Was told pulmonary clinic is working to return patient call from today.    I let patient know the pulmonary nurse will be contacting patient.  Pt gave verbal understanding.      Patient has an appt with Dr. Sahu on 06/15/22.      Jama De La Vega CMA ([facility]) at 3:07 PM on 5/16/2022

## 2022-05-16 NOTE — TELEPHONE ENCOUNTER
PT called, requesting referral to exercise program for back and breathing. Please reach out to Patient.

## 2022-05-16 NOTE — TELEPHONE ENCOUNTER
Spoke to patient.  Patient was asking about order for pulmonary rehab and whether Dr. Sahu think this is a good idea for next step for patient SOB.  If so, would like orders placed for pulmonary rehab and a return call of update from staff.  Patient sounds out of breath over the phone.  Difficulty taking without taking deep breaths.        After talking to patient, I reviewed her chart. Patient already has a pulm rehab order placed on 03/28/22.     See 04/19/22 telephone encounter for Dr. Taylor.  Patient requested to switch and receive pulm rehab at [facility] due to personal reasons.  Switch was done by  pulmonary clinic.  Shortly after patient called not understanding why she was switched.  The pulm nurse had to remind patient of their conversation (see 04/22/22 Dr. Sahu telephone encounter)  She had her first pulm rehab appt with [facility] on 05/02/22.   Now patient is calling Dr. Sahu today for a pulm rehab order.      I am concern about possible memory loss with patient.      Message sent to Dr. Sahu to be aware and advise if this should call for an in-person cognitive evaluation with Dr. Sahu or a provider available.  Or can this wait until future follow up appointment with Dr. Sahu on 06/15/22.  I believe patient lives alone.        Jama De La Vega CMA ([facility]) at 11:14 AM on 5/16/2022       Pulmonary Rehab  [facility]    [address] [place], [place] [id number] [phone]   Wen Christie, [facility]    [address] [place], [place] [id number] [phone] (Work)    [phone] (Fax)

## 2022-05-17 ENCOUNTER — PATIENT OUTREACH (OUTPATIENT)
Dept: CARE COORDINATION | Facility: CLINIC | Age: 77
End: 2022-05-17
Payer: COMMERCIAL

## 2022-05-17 ENCOUNTER — TELEPHONE (OUTPATIENT)
Dept: INTERNAL MEDICINE | Facility: CLINIC | Age: 77
End: 2022-05-17
Payer: COMMERCIAL

## 2022-05-17 NOTE — PROGRESS NOTES
Patient attended only 2 sessions of OP Pulmonary Rehab. Therapy team is familiar with patient from multiple attempts of rehab in the past. Today, patient came into rehab for her exercise session. She had complaints of Leg pain, so patient did not perform exercise with her right leg. 5 minutes into her exercise pt states she wanted to be done. Pt/therapist discussed alternative exercise options in clinic. Patient declined and requested completion of session for the day. Patient had no additional sessions scheduled as she had cancelled multiple session prior to attending this week. When discussing scheduling of additional rehab session staff discussed need for patient to attend 2x/week per rehab policy. Pt stated that she does not wish to continue her rehab program as she feels it is too much for her regardless of staff accomodation.     Patient will be discharged from rehab program and will not be rescheduled for further appointments for OP pulmonary rehab due to lack of patient attendance. [place] October of 2021 [place] [facility]ting 5 sessions prior to requesting completion of program.       Magno Hanks MS,RCEP,CCRP   Cardiopulmonary Rehab Therapist  [facility]  [phone]

## 2022-05-17 NOTE — TELEPHONE ENCOUNTER
[facility] Call Center    Phone Message    May a detailed message be left on voicemail: yes     Reason for Call: Other: Patient is scheduled for 05/24/2022 with Trisha Garcia. Having issues finding transportation. Going to ask a friend to drive her. [employer] cut off her rides since she cancelled too much. Please let her know if there is any other options she can look into for this visit. Informed patient to call us at least 24 hours in advance if needing to reschedule or cancel.     Action Taken: Message routed to:  [facility]    Travel Screening: Not Applicable

## 2022-05-19 ENCOUNTER — TELEPHONE (OUTPATIENT)
Facility: CLINIC | Age: 77
End: 2022-05-19
Payer: COMMERCIAL

## 2022-05-19 DIAGNOSIS — M41.9 KYPHOSCOLIOSIS: Primary | ICD-10-CM

## 2022-05-19 DIAGNOSIS — R06.00 DYSPNEA: ICD-10-CM

## 2022-05-19 NOTE — TELEPHONE ENCOUNTER
Ca states she thinks she's going to have to move because she's old and lonely and doesn't want to live on her own any more.  She's wondering whether moving will make her breathing worse.  Advised that moving (with sufficient help), should not worsen her breathing.  She would like to have an understanding of her long term prognosis and is requesting an appointment with Dr. Taylor.  Encouraged Pulmonary Rehab, pt states she had difficulty d/t leg and hip pain, but willing to try again, if we can send an order to Cardiopulmonary Rehab at [facility].  Writer spoke to  therapist at [facility], she was officially discharged from [facility] on 5/11 due to her non-compliance. [facility] are willing to take her back if a new order is provided, they will also insist on pt signing a patient code of conduct during the in take evaluation. New order placed for pulm rehab per Dr. Taylor, per [facility] order will fall to their work que when placed and they will reach out to Ca to schedule. Will schedule for next available with Dr. Taylor. Pt notified of plan.

## 2022-05-19 NOTE — TELEPHONE ENCOUNTER
[facility] Call Center    Phone Message    May a detailed message be left on voicemail: yes     Reason for Call: Other: Ca is calling in asking for a call back. She states that she is looking into moving out of her current residence, but is wondering whether this process could exacerbate her breathing issues. Pt would like to know how much worse Dr. Taylor might think her ongoing breathing issues could get. Please call back as soon as possible to discuss.     Action Taken: Message routed to:  [facility] Pulm    Travel Screening: Not Applicable

## 2022-05-24 ENCOUNTER — OFFICE VISIT (OUTPATIENT)
Dept: INTERNAL MEDICINE | Facility: CLINIC | Age: 77
End: 2022-05-24
Payer: COMMERCIAL

## 2022-05-24 VITALS
SYSTOLIC BLOOD PRESSURE: 138 MMHG | HEART RATE: 73 BPM | TEMPERATURE: 97.6 F | WEIGHT: 190 LBS | RESPIRATION RATE: 12 BRPM | OXYGEN SATURATION: 100 % | BODY MASS INDEX: 32.44 KG/M2 | DIASTOLIC BLOOD PRESSURE: 82 MMHG | HEIGHT: 64 IN

## 2022-05-24 DIAGNOSIS — Z76.89 ENCOUNTER FOR SOCIAL WORK INTERVENTION: Primary | ICD-10-CM

## 2022-05-24 PROCEDURE — 99215 OFFICE O/P EST HI 40 MIN: CPT | Performed by: NURSE PRACTITIONER

## 2022-05-24 ASSESSMENT — PAIN SCALES - GENERAL: PAINLEVEL: NO PAIN (0)

## 2022-05-24 NOTE — PROGRESS NOTES
S:Ca Yan is a 76 year old female here for anxiety because she doesn't have anything to do all day.  She lives in a senior building with no congregate meals. No transportation services.  She has no friends in the building.  She has absolutely no family, all are . SHe knows a lady down the street but she is  with a family.  She did go to a community group but got bored and does not want to go back.  She doesn't like to play bingo or games.  She watches TV and reads.  She has to take her oxygen everywhere with her. She did not think pulmonary rehab was fun, but states she will go.      When asked if she would consider moving to a senior living building or assisted care she is not interested in spending anymore money on her living arrangements and states she would need total assistance to pack and move.       Patient Active Problem List   Diagnosis     Non morbid obesity due to excess calories     Alcohol abuse     Malignant neoplasm of breast (H)     Arthritis of knee     Diarrhea     Diastolic dysfunction     Osteoarthritis of spine     Gastroesophageal reflux disease     Generalized anxiety disorder     Hypertension     Hyperlipidemia     Insomnia     Irritable bowel syndrome     Kyphoscoliosis     Cluster C personality disorder (H)     Seborrheic eczema     Anemia     Anxiety state     Bunion     Low bone density     Fecal incontinence     Lumbago     Cognitive impairment     Parathyroid hormone excess (H)     Chronic fatigue     Vocal cord paralysis     Dysphonia     Callus of foot     Nail dystrophy     Elevated MCV     Vitamin D deficiency            Past Medical History:   Diagnosis Date     Anxiety      Arthritis      Breast cancer (H)      COPD (chronic obstructive pulmonary disease) (H)     12:  FEV 0.99 l     Depressive disorder      Hypertension      Low back pain with left-sided sciatica      Low bone density 2017    DEXA 2017: T score -2.0. Normal Z score.  FRAX risk: major osteoporotic fracture 11.9%, hip fracture 2.6%, therefore not high-risk     Lymphedema, chronic lower extremities             Past Surgical History:   Procedure Laterality Date     BACK SURGERY      spinal fusion     COLONOSCOPY       LARYNGOSCOPY WITH MICROSCOPE N/A 2021    Procedure: FLEXIBLE LARYNGOSCOPY;  Surgeon: Domonique Roche MD;  Location: [facility] OR     LUMPECTOMY BREAST       ORTHOPEDIC SURGERY      Knee surgery left side            Social History     Tobacco Use     Smoking status: Former Smoker     Packs/day: 0.50     Years: 5.00     Pack years: 2.50     Types: Cigarettes     Start date: 1956     Quit date: 1980     Years since quittin.6     Smokeless tobacco: Former User     Quit date: 1980   Substance Use Topics     Alcohol use: Not Currently     Alcohol/week: 0.0 standard drinks     Comment: Sober for 2 years, previous alcoholic            Family History   Problem Relation Age of Onset     Breast Cancer Mother      Diabetes Mother      Anxiety Disorder Mother      Asthma Mother      Other Cancer Mother      Hyperlipidemia Mother      Alcohol/Drug Father      Mental Illness Father      Substance Abuse Father      Obesity Father      Cerebrovascular Disease Maternal Grandmother 67     Other - See Comments Maternal Aunt         lived to                Allergies   Allergen Reactions     Azithromycin Itching     Codeine Nausea and Vomiting     Hydrocodone Nausea and Vomiting     Metronidazole Nausea     Oxycodone Itching and Rash     Percocet [Oxycodone-Acetaminophen] Nausea and Vomiting     Pollen Extract      sneezing and runny nose.      Seasonal Allergies      sneezing and runny nose.      Vicodin [Hydrocodone-Acetaminophen] Nausea and Vomiting     Zolpidem      Amnestic behavior            Current Outpatient Medications   Medication Sig Dispense Refill     atorvastatin (LIPITOR) 40 MG tablet Take 1 tablet (40 mg) by mouth daily 90 tablet 0     azelastine (ASTELIN)  "0.1 % nasal spray SMARTSIG:Both Nares       cephALEXin (KEFLEX) 250 MG capsule Take 250 mg by mouth       clotrimazole (LOTRIMIN) 1 % external cream Apply topically 2 times daily as needed (under arms breast groin rash until rash resolves) 60 g 1     Emollient (CERAVE) CREA Please apply twice daily to dry skin of body and feet 453 g 3     ipratropium (ATROVENT) 0.03 % nasal spray 2 sprays each nostril twice daily for clear rhinorrhea 30 mL 1     latanoprost (XALATAN) 0.005 % ophthalmic solution 1 drop       losartan (COZAAR) 25 MG tablet Take 25 mg by mouth       losartan-hydrochlorothiazide (HYZAAR) 100-25 MG tablet Take 1 tablet by mouth daily 90 tablet 3     mirtazapine (REMERON) 7.5 MG tablet        quetiapine (SEROQUEL) 200 MG tablet Take 200 mg by mouth At Bedtime       QUEtiapine (SEROQUEL) 300 MG tablet Take 300 mg by mouth       sertraline (ZOLOFT) 100 MG tablet Take 1.5 tablets (150 mg) by mouth daily 135 tablet 3     Vitamin D3 (CHOLECALCIFEROL) 25 mcg (1000 units) tablet Take 1 tablet (25 mcg) by mouth daily 100 tablet 3       REVIEW OF SYSTEMS:  See above.    O:   /82   Pulse 73   Temp 97.6  F (36.4  C) (Oral)   Resp 12   Ht 1.615 m (5' 3.58\")   Wt 86.2 kg (190 lb)   LMP  (LMP Unknown)   SpO2 100%   BMI 33.05 kg/m    GENERAL APPEARANCE: healthy, alert and no distress  EYES: EOMI,  PERRL.Conjunctiva white.  HENT: ear canals and TM's normal  and mouth without ulcers or lesions  RESP: lungs clear to auscultation - no rales, rhonchi or wheezes  CV: regular rates and rhythm, normal S1 S2, no S3 or S4 and no murmur, click or rub   ABDOMEN:  soft, nontender, no HSM or masses and bowel sounds normal  NEURO: alert and oriented.  MUSK: She is in a wheelchair.   SKIN: warm and dry  LYMPH: neg axillary, cervical, supra and infraclavicular nodes.  EXT: warm.  Edema {no pitting edema.  PSYCHE: This is my first interaction with her. She did not seem open to ideas regarding reaching outside of her " apartment for socialization but states she might be open to someone coming and visiting her.     A/P:  Ca was seen today for anxiety.    Diagnoses and all orders for this visit:    Encounter for social work intervention  -     Social Work Referral (Only to be ordered at: [facility] ONLY); Future      Some churches have [employer] members who are willing to visit shut ins. Maybe reaching out to [facility] or local [employer]?    She is advised she will receive a phone call from .        I spent a total of  45 minutes in the care of this pt during today's office visit. This time includes reviewing the patient's chart and prior history, obtaining a history, performing an examination and evaluation and counseling the patient. This time also includes ordering mediations or tests necessary in addition to communication to other member's of the patient's health care team. Time spent in documentation and care coordination is included.     Trisha GOLDSMITH, CNP

## 2022-05-24 NOTE — NURSING NOTE
Chief Complaint   Patient presents with     Anxiety     Patient comes in to discuss anxiety.         Emiliano Salas MA on 5/24/2022 at 11:18 AM

## 2022-05-25 ENCOUNTER — PATIENT OUTREACH (OUTPATIENT)
Dept: CARE COORDINATION | Facility: CLINIC | Age: 77
End: 2022-05-25
Payer: COMMERCIAL

## 2022-05-25 ENCOUNTER — TELEPHONE (OUTPATIENT)
Dept: FAMILY MEDICINE | Facility: CLINIC | Age: 77
End: 2022-05-25
Payer: COMMERCIAL

## 2022-05-25 NOTE — TELEPHONE ENCOUNTER
[facility] Call [facility]    Phone Message    May a detailed message be left on voicemail: yes     Reason for Call: Symptoms or Concerns     Current symptom or concern: Discuss right leg pain going on for many weeks, only hurts when she moves a certain way. Patient does not think that this was caused by bullets.    Symptoms have been present for:  multiple week(s)    Has patient previously been seen for this? No      Are there any new or worsening symptoms? Yes: Right leg pain.      Action Taken: Message routed to:  [facility]    Travel Screening: Not Applicable

## 2022-05-25 NOTE — TELEPHONE ENCOUNTER
RN called patient and reviewed her symptoms.  She relayed that leg pain is worse when she elevates legs at night, but then it resolves.  This has been present for weeks and she has not discussed this with Dr. Sahu previously.  RN offered patient a sooner appt for today with Dr. Gongora, but patient could not get transportation that soon.  She can do activities of daily living as pain is only present at bedtime.  She will keep the in person appt she has for 6/7/22 with Trisha Garcia.    Kiki Eddy RN on 5/25/2022 at 10:27 AM

## 2022-05-25 NOTE — PROGRESS NOTES
Social Work Intervention  [facility]    Data/Intervention:    Patient Name:  Ca Yan  /Age:  1945 (76 year old)    Visit Type: telephone  Referral Source: PCC  Reason for Referral:  Transportation, isolation, community resources    Collaborated With:    -ALEXANDER Cummings- Patient's previous [employer] Care Coordinator, [phone]  -ALEXANDER Read, Patient's current [employer] Care Coordinator, [phone]    Psychosocial Information/Concerns:  Last Care Coordination note, 2021. Per inbasket conversations with both Marlene Mccord and Paulette Prajapati, they have discussed a move to an [facility], where there could be more support staff and people to help her as needed, meals, activities, etc. She was not open to this previously when it was explained to her that she'd only get to keep about $104 of her income for personal needs.     She has intermittently attended [facility] in the past, but she seems disinterested at the moment. There is a bus for her building that goes out 1-2x/ month to the grocery store.     Ca has been calling Paulette WASHINGTON, frequently with the same concerns noted by PCP. She has recently been referred her to a new service through [employer] where they will be able to go shopping for her 1-2X month.   More recently, Patient voiced interest in assisted living and was going to call [facility] to find facilities with some openings.    Additionally, it appears that Patient has, in the past (unclear if current), been seeing a mental health therapist through [facility].     Intervention/Education/Resources Provided:  Patient's [employer] Care Coordinator is actively involved with helping Patient get supports. It seems the barriers are Patient not wanting to pursue some resources.     Assessment/Plan:   will continue to collaborate with Patient's [employer] Care Coordinator who will provide ongoing support to  Patient.      Dalia Gresham [facility]  Clinical , Outpatient Specialty Clinics  Direct Phone: [phone]

## 2022-05-31 ENCOUNTER — TELEPHONE (OUTPATIENT)
Dept: PULMONOLOGY | Facility: CLINIC | Age: 77
End: 2022-05-31
Payer: COMMERCIAL

## 2022-05-31 NOTE — TELEPHONE ENCOUNTER
LVM regarding scheduling appt with Dr. Taylor. Patient needs to schedule follow up next available appt with Dr. Taylor per nurse message. Left direct call back phone number and left call center's phone number.

## 2022-06-01 ENCOUNTER — TELEPHONE (OUTPATIENT)
Dept: PULMONOLOGY | Facility: CLINIC | Age: 77
End: 2022-06-01
Payer: COMMERCIAL

## 2022-06-01 NOTE — TELEPHONE ENCOUNTER
Spoke with pt after being instructed from STEVEN Taylor, that pt is hoping to get an appt with Dr. Taylor or a colleague for her breathing issues. Informed her that Dr. Taylor is booked until Sept, but Dr. Pinto has a sooner appt on 6/17. She is agreeable to appt and details confirmed with pt. Will send pt itinerary and mailing address verified.

## 2022-06-07 ENCOUNTER — OFFICE VISIT (OUTPATIENT)
Dept: INTERNAL MEDICINE | Facility: CLINIC | Age: 77
End: 2022-06-07
Payer: COMMERCIAL

## 2022-06-07 VITALS — HEART RATE: 74 BPM | WEIGHT: 198 LBS | BODY MASS INDEX: 34.44 KG/M2 | OXYGEN SATURATION: 97 %

## 2022-06-07 DIAGNOSIS — M79.604 PAIN OF RIGHT LOWER EXTREMITY: Primary | ICD-10-CM

## 2022-06-07 PROCEDURE — 99213 OFFICE O/P EST LOW 20 MIN: CPT | Performed by: NURSE PRACTITIONER

## 2022-06-07 ASSESSMENT — PAIN SCALES - GENERAL: PAINLEVEL: NO PAIN (0)

## 2022-06-07 NOTE — NURSING NOTE
Chief Complaint   Patient presents with     Pain     Pt here for right leg pain, on going, hurts worse when moving legs       Ousmane Coelho CMA, EMT at 11:14 AM on 6/7/2022.

## 2022-06-07 NOTE — PROGRESS NOTES
S: .Ca Yan is a 76 year old female here for right anterior thigh pain since probably January.  She has pain with trying to left her leg into bed, or rising from the toilet or climbing stairs. She points to her anterior mid thigh. She denies any falls, trauma, feeling of snapping or tearing.      Patient Active Problem List   Diagnosis     Non morbid obesity due to excess calories     Alcohol abuse     Malignant neoplasm of breast (H)     Arthritis of knee     Diarrhea     Diastolic dysfunction     Osteoarthritis of spine     Gastroesophageal reflux disease     Generalized anxiety disorder     Hypertension     Hyperlipidemia     Insomnia     Irritable bowel syndrome     Kyphoscoliosis     Cluster C personality disorder (H)The  anxious/fearful  cluster is comprised of dependent, avoidant, and obsessive-compulsive personality disorders. Patients with Cluster C disorders are emotionally i     Seborrheic eczema     Anemia     Anxiety state     Bunion     Low bone density     Fecal incontinence     Lumbago     Cognitive impairment     Parathyroid hormone excess (H)     Chronic fatigue     Vocal cord paralysis     Dysphonia     Callus of foot     Nail dystrophy     Elevated MCV     Vitamin D deficiency            Past Medical History:   Diagnosis Date     Anxiety      Arthritis      Breast cancer (H)      COPD (chronic obstructive pulmonary disease) (H)     6/19/12:  FEV 0.99 l     Depressive disorder      Hypertension      Low back pain with left-sided sciatica      Low bone density 4/13/2017    DEXA April 12, 2017: T score -2.0. Normal Z score. FRAX risk: major osteoporotic fracture 11.9%, hip fracture 2.6%, therefore not high-risk     Lymphedema, chronic lower extremities             Past Surgical History:   Procedure Laterality Date     BACK SURGERY      spinal fusion     COLONOSCOPY       LARYNGOSCOPY WITH MICROSCOPE N/A 4/30/2021    Procedure: FLEXIBLE LARYNGOSCOPY;  Surgeon: Domonique Roche MD;  Location:  [facility] OR     LUMPECTOMY BREAST       ORTHOPEDIC SURGERY      Knee surgery left side            Social History     Tobacco Use     Smoking status: Former Smoker     Packs/day: 0.50     Years: 5.00     Pack years: 2.50     Types: Cigarettes     Start date: 1956     Quit date: 1980     Years since quittin.7     Smokeless tobacco: Former User     Quit date: 1980   Substance Use Topics     Alcohol use: Not Currently     Alcohol/week: 0.0 standard drinks     Comment: Sober for 2 years, previous alcoholic            Family History   Problem Relation Age of Onset     Breast Cancer Mother      Diabetes Mother      Anxiety Disorder Mother      Asthma Mother      Other Cancer Mother      Hyperlipidemia Mother      Alcohol/Drug Father      Mental Illness Father      Substance Abuse Father      Obesity Father      Cerebrovascular Disease Maternal Grandmother 67     Other - See Comments Maternal Aunt         lived to                Allergies   Allergen Reactions     Azithromycin Itching     Codeine Nausea and Vomiting     Hydrocodone Nausea and Vomiting     Metronidazole Nausea     Oxycodone Itching and Rash     Percocet [Oxycodone-Acetaminophen] Nausea and Vomiting     Pollen Extract      sneezing and runny nose.      Seasonal Allergies      sneezing and runny nose.      Vicodin [Hydrocodone-Acetaminophen] Nausea and Vomiting     Zolpidem      Amnestic behavior            Current Outpatient Medications   Medication Sig Dispense Refill     atorvastatin (LIPITOR) 40 MG tablet Take 1 tablet (40 mg) by mouth daily 90 tablet 0     azelastine (ASTELIN) 0.1 % nasal spray SMARTSIG:Both Nares       cephALEXin (KEFLEX) 250 MG capsule Take 250 mg by mouth       clotrimazole (LOTRIMIN) 1 % external cream Apply topically 2 times daily as needed (under arms breast groin rash until rash resolves) 60 g 1     Emollient (CERAVE) CREA Please apply twice daily to dry skin of body and feet 453 g 3     ipratropium (ATROVENT) 0.03 %  nasal spray 2 sprays each nostril twice daily for clear rhinorrhea 30 mL 1     latanoprost (XALATAN) 0.005 % ophthalmic solution 1 drop       losartan (COZAAR) 25 MG tablet Take 25 mg by mouth       losartan-hydrochlorothiazide (HYZAAR) 100-25 MG tablet Take 1 tablet by mouth daily 90 tablet 3     mirtazapine (REMERON) 7.5 MG tablet        QUEtiapine (SEROQUEL) 300 MG tablet Take 300 mg by mouth       sertraline (ZOLOFT) 100 MG tablet Take 1.5 tablets (150 mg) by mouth daily 135 tablet 3     Vitamin D3 (CHOLECALCIFEROL) 25 mcg (1000 units) tablet Take 1 tablet (25 mcg) by mouth daily 100 tablet 3       REVIEW OF SYSTEMS:  See above.    O:   Pulse 74   Wt 89.8 kg (198 lb)   LMP  (LMP Unknown)   SpO2 97%   BMI 34.44 kg/m    GENERAL APPEARANCE: healthy, alert and no distress.  She is in a wheelchair.  RESP:no distress.  CV: see VS  MUSK: She has pain with palpation of the right anterior thigh at 1/3 distance from groin. Denies pain with palpation of the groin.   SKIN: normal.  EXT: warm.  Edema: none  PSYCHE: poor historian    A/P:  Ca was seen today for pain.    Diagnoses and all orders for this visit:    Pain of right lower extremity  -     US MSK Complete; Future  -     Orthopedic  Referral; Future    The patient voiced understanding of the information discussed and all questions were answered.     Total time spent today with this patient including chart review, exam time with patient and documentation : 20 minutes.    Trisha GOLDSMITH, CNP

## 2022-06-10 ENCOUNTER — NURSE TRIAGE (OUTPATIENT)
Dept: NURSING | Facility: CLINIC | Age: 77
End: 2022-06-10
Payer: COMMERCIAL

## 2022-06-10 ENCOUNTER — TELEPHONE (OUTPATIENT)
Dept: PULMONOLOGY | Facility: CLINIC | Age: 77
End: 2022-06-10
Payer: COMMERCIAL

## 2022-06-10 NOTE — TELEPHONE ENCOUNTER
"Called patient to return call. Patient repeated everything she had told triage. Patient stated, \"No one will help me.\" I asked her why she canceled her appointment with Dr. Pinto on 6/17 - she stated, \"Because he won't do anything.\" I told her I could have Pau reach out to her to get on a waitlist for a sooner appointment. Patient currently has an appointment in September with Dr. Taylor.    Writer explained that if breathing needs become emergent -she needs to go to the ED. Patient stated she has been struggling for 6 months. I encouraged her to keep her appointment with the pulmonologist. Patient stated she understands that she should go to the ED if symptoms become worse.   "

## 2022-06-10 NOTE — TELEPHONE ENCOUNTER
"Patient is calling regarding quality of life issues and longterm aspects of her care.    -Patient has had no acute change in her breathing.   -She is calling for information on how other people manage COPD.   -She is anxious- \"It is all I can think about today.\"  -Noted on chart review patient anxiety concerns have been frequent recently, with multiple phone calls and communication with Pulmonary clinic staff.        6/17/22 appt Millie cancelled. Next appt Dr Taylor is 9/26/22    Last visit 3/28/22/ Pulm/ Claudia: Shortness of breath  Kyphosis  Deconditioning  Right middle lobe atelectasis  Chronic Hypoxemic Respiratory Failure  Reason for Disposition    MILD longstanding difficulty breathing (e.g., speaks in phrases, SOB even at rest, pulse 100-120) and SAME as normal    Additional Information    Negative: Breathing stopped and hasn't returned    Negative: Choking on something    Negative: SEVERE difficulty breathing (e.g., struggling for each breath, speaks in single words, pulse > 120)    Negative: Bluish (or gray) lips or face    Negative: Difficult to awaken or acting confused (e.g., disoriented, slurred speech)    Negative: Passed out (i.e., fainted, collapsed and was not responding)    Negative: Wheezing started suddenly after medicine, an allergic food, or bee sting    Negative: Stridor    Negative: Slow, shallow and weak breathing    Negative: Sounds like a life-threatening emergency to the triager    Negative: Fever > 103 F (39.4 C)    Negative: Fever > 101 F (38.3 C) and over 60 years of age    Negative: Fever > 100.0 F (37.8 C) and bedridden (e.g., nursing home patient, stroke, chronic illness, recovering from surgery)    Negative: Fever > 100.0 F (37.8 C) and diabetes mellitus or weak immune system (e.g., HIV positive, cancer chemo, splenectomy, organ transplant, chronic steroids)    Negative: Periods where breathing stops and then resumes normally and bedridden (e.g., nursing home patient, CVA)    " "Negative: Pregnant or postpartum (from 0 to 6 weeks after delivery)    Negative: Patient sounds very sick or weak to the triager    Negative: Patient wants to be seen    Answer Assessment - Initial Assessment Questions  1. RESPIRATORY STATUS: \"Describe your breathing?\" (e.g., wheezing, shortness of breath, unable to speak, severe coughing)       Patient has had no acute change in her breathing. She is calling for information on how other people manage COPD. She is anxious- \"It is all I can think about today.\"  2. ONSET: \"When did this breathing problem begin?\"    COPD  3. PATTERN \"Does the difficult breathing come and go, or has it been constant since it started?\"     No acute change  4. SEVERITY:      - MODERATE: SOB at rest, SOB with minimal exertion and prefers to sit, cannot lie down flat, speaks in phrases, mild retractions, audible wheezing, pulse 100-120.        5. RECURRENT SYMPTOM: \"Have you had difficulty breathing before?\" If so, ask: \"When was the last time?\" and \"What happened that time?\"       Continous  6. CARDIAC HISTORY: \"Do you have any history of heart disease?\" (e.g., heart attack, angina, bypass surgery, angioplasty)        7. LUNG HISTORY:    Yes  8. CAUSE: \"What do you think is causing the breathing problem?\"     \" I just don't know how people manage it\"  9. OTHER SYMPTOMS: \"Do you have any other symptoms? (e.g., dizziness, runny nose, cough, chest pain, fever)    No    Protocols used: BREATHING DIFFICULTY-A-OH    "

## 2022-06-10 NOTE — TELEPHONE ENCOUNTER
"Call from Ca, see triage note  Best number to reach her today [phone]  She calls today stating \"I don't know who I am suppose to talk to, my breathing is really bad for the last 6 months\"    Uses oxygen 5 L continuously.    Reports her breathing keeps her from doing anything or going anywhere.  Has portable tank for out of home use, reports doesn't last long enough.  Goes to rehab  She doesn't know what she is to expect with her breathing, what the next thing she is to do.  Reports would like to know.  Reports appetite is good.  Continue with pain in leg, which was seen for 3 days ago, no swelling, no increase in pain.    Would appreciate an appointment with Dr Taylor  Nurse Triage SB[facility]    Is this a 2nd Level Triage? YES, LICENSED PRACTITIONER REVIEW IS REQUIRED    Situation:   shortness of breath the last 6 months, sounds frustrated with not knowing what to expect, what is she to do    Background:   as above    Assessment:   follow up with pulmonology    Protocol Recommended Disposition:   Go To Office Now        Reason for Disposition    MILD difficulty breathing (e.g., minimal/no SOB at rest, SOB with walking, pulse < 100) of new onset or worse than normal    Additional Information    Negative: Breathing stopped and hasn't returned    Negative: Choking on something    Negative: SEVERE difficulty breathing (e.g., struggling for each breath, speaks in single words, pulse > 120)    Negative: Bluish (or gray) lips or face    Negative: Difficult to awaken or acting confused (e.g., disoriented, slurred speech)    Negative: Passed out (i.e., fainted, collapsed and was not responding)    Negative: Wheezing started suddenly after medicine, an allergic food, or bee sting    Negative: Stridor    Negative: Slow, shallow and weak breathing    Negative: Sounds like a life-threatening emergency to the triager    Negative: Chest pain    Negative: Wheezing (high pitched whistling sound) and previous asthma attacks or " "use of asthma medicines    Negative: Difficulty breathing and only present when coughing    Negative: Difficulty breathing and only from stuffy or runny nose    Negative: Difficulty breathing and within 14 days of COVID-19 Exposure    Negative: MODERATE difficulty breathing (e.g., speaks in phrases, SOB even at rest, pulse 100-120) of new onset or worse than normal    Negative: Wheezing can be heard across the room    Negative: Drooling or spitting out saliva (because can't swallow)    Negative: Any history of prior \"blood clot\" in leg or lungs    Negative: Illness requiring prolonged bedrest in past month (e.g., immobilization, long hospital stay)    Negative: Hip or leg fracture (broken bone) in past month (or had cast on leg or ankle in past month)    Negative: Major surgery in the past month    Negative: Long-distance travel in past month (e.g., car, bus, train, plane; with trip lasting 6 or more hours)    Negative: Extra heart beats OR irregular heart beating   (i.e., \"palpitations\")    Negative: Fever > 103 F (39.4 C)    Negative: Fever > 101 F (38.3 C) and over 60 years of age    Negative: Fever > 100.0 F (37.8 C) and bedridden (e.g., nursing home patient, stroke, chronic illness, recovering from surgery)    Negative: Fever > 100.0 F (37.8 C) and diabetes mellitus or weak immune system (e.g., HIV positive, cancer chemo, splenectomy, organ transplant, chronic steroids)    Negative: Periods where breathing stops and then resumes normally and bedridden (e.g., nursing home patient, CVA)    Negative: Pregnant or postpartum (from 0 to 6 weeks after delivery)    Negative: Patient sounds very sick or weak to the triager    Answer Assessment - Initial Assessment Questions  1. RESPIRATORY STATUS: \"Describe your breathing?\" (e.g., wheezing, shortness of breath, unable to speak, severe coughing)       Short of breath at rest, with activity worsens  2. ONSET: \"When did this breathing problem begin?\"       6 months, thinks " "is becoming more difficult each week/month  3. PATTERN \"Does the difficult breathing come and go, or has it been constant since it started?\"       constant  4. SEVERITY: \"How bad is your breathing?\" (e.g., mild, moderate, severe)     - MILD: No SOB at rest, mild SOB with walking, speaks normally in sentences, can lay down, no retractions, pulse < 100.     - MODERATE: SOB at rest, SOB with minimal exertion and prefers to sit, cannot lie down flat, speaks in phrases, mild retractions, audible wheezing, pulse 100-120.     - SEVERE: Very SOB at rest, speaks in single words, struggling to breathe, sitting hunched forward, retractions, pulse > 120       Moderate-severe depends on if at rest/activity  5. RECURRENT SYMPTOM: \"Have you had difficulty breathing before?\" If so, ask: \"When was the last time?\" and \"What happened that time?\"       Has had for the past 6 months  6. CARDIAC HISTORY: \"Do you have any history of heart disease?\" (e.g., heart attack, angina, bypass surgery, angioplasty)       Doesn't think so  7. LUNG HISTORY: \"Do you have any history of lung disease?\"  (e.g., pulmonary embolus, asthma, emphysema)      + respiratory history  On 5 L oxygen constantly  8. CAUSE: \"What do you think is causing the breathing problem?\"       She doesn't think so, other than she is not able to breath very well  9. OTHER SYMPTOMS: \"Do you have any other symptoms? (e.g., dizziness, runny nose, cough, chest pain, fever)      Denies fever, cough, chest pain, dizziness/lightheadedness  10. PREGNANCY: \"Is there any chance you are pregnant?\" \"When was your last menstrual period?\"        N/A  11. TRAVEL: \"Have you traveled out of the country in the last month?\" (e.g., travel history, exposures)        No, has hard time leaving home due to shortness of breath.  Has portable tank which reports doesn't last long enough    Protocols used: BREATHING DIFFICULTY-A-[place]      "

## 2022-06-13 ENCOUNTER — TELEPHONE (OUTPATIENT)
Dept: PULMONOLOGY | Facility: CLINIC | Age: 77
End: 2022-06-13
Payer: COMMERCIAL

## 2022-06-13 DIAGNOSIS — F41.9 ANXIETY: Primary | ICD-10-CM

## 2022-06-13 NOTE — TELEPHONE ENCOUNTER
Contacted [facility] scheduling, they will reach out to pt to assist with appointment. Pt last seen in their clinic 4/21.

## 2022-06-13 NOTE — TELEPHONE ENCOUNTER
I called and spoke with Ms Yan. She is having significant anxiety about her breathing.  She feels alone and helpless.  She starts pulmonary rehab this week.    I told her that pulmonary rehab may help for a couple different reasons. It should help her get in better shape.  It will be a reason for her to leave her house and interact with other people.  It will also get her around other people with similar difficulties breathing, which may be helpful for her to interact with.      I would like to see her after she has completed pulmonary rehab.     I also spoke with her about palliative care. I think it would be helpful to see them again for ongoing management of her anxiety/depression and coping mechanisms. She agrees to meet with them.     Mono Taylor MD

## 2022-06-21 ENCOUNTER — TELEPHONE (OUTPATIENT)
Facility: CLINIC | Age: 77
End: 2022-06-21

## 2022-06-21 DIAGNOSIS — R06.00 DYSPNEA: Primary | ICD-10-CM

## 2022-06-21 NOTE — TELEPHONE ENCOUNTER
[facility] Call Center    Phone Message    May a detailed message be left on voicemail: yes     Reason for Call: Other: Ca is calling in asking for a call back. She states that she had spoken with her oxygen supplier and was told to turn her oxygen down to fill her portable tank, but she would like to speak with a member of her care team about what she should do as she cannot breathe if it is too low. Please call back as soon as possible to discuss.     Action Taken: Message routed to:  [facility] ([facility]): Pulm    Travel Screening: Not Applicable

## 2022-06-21 NOTE — TELEPHONE ENCOUNTER
I contacted Ca as requested.  At first she told me that there was nothing I could do to help her.  Eventually she told me that she has to use O2 at 5L/min, otherwise she can't breath.  She checked her O2 sats while we were talking and reports a reading of 96% on 5L/min. Her current O2 prescription is 3L/min, she tells me that this is not enough.  I've asked her to periodically measure her O2 sats and adjust oxygen to maintain sats ~95%. She's also concerned that she's not able to fully fill her tanks while her oxygen is set to 5L/min.  I have asked her to contact her DME for advise on best method of filling her tanks, which she agrees to do.  Message to MD with update.

## 2022-06-28 NOTE — TELEPHONE ENCOUNTER
Left message for Ca to call clinic to discuss Dr Taylor's recommendations for follow up. Review of chart indicates Ca cancelled her pulm rehab sessions on 5/28 and 6/28.

## 2022-06-29 ENCOUNTER — TELEPHONE (OUTPATIENT)
Dept: PULMONOLOGY | Facility: CLINIC | Age: 77
End: 2022-06-29

## 2022-06-29 NOTE — TELEPHONE ENCOUNTER
Contacted Ca to discuss Dr. Taylor's follow up recommendations to evaluate increased O2 use.  Ca confirms no acute breathing difficulties today, states she continues to use O2 @ 5 L/min for chronic SOB.  Reviewed recommendations for Chest CT, labs and echocardiogram.  Ca verbalized agreement with plan and confirmed that she would be able to organize a ride to the clinic.  She reports traveling to [place] for Pulm rehab, yesterday (6/28), but was told the appointment was cancelled on 6/27.  Review of chart indicates pt initiated cancellation, however, Ca denies knowledge of this.  She is very upset that she traveled to appointment only to find that it had been cancelled, she did not want to reschedule yesterday.  I encouraged Ca to make another appointment and reviewed all of the positive benefits of attending these sessions.  Ca has agreed to call and reschedule.   Plan: orders for Echocardiogram, 6MWT (titration only), Chest CT, CBC, CMP, VBG and BNP placed per Dr Taylor

## 2022-06-29 NOTE — TELEPHONE ENCOUNTER
Spoke with pt, scheduled testing and labs for 7/13/22 and scheduled echo for 7/22/22. confirmed dates and times with pt. Mailed itinerary for both dates. Shared nurse phone number with pt for questions about tests requested via Dr. Taylor

## 2022-07-01 ENCOUNTER — TELEPHONE (OUTPATIENT)
Dept: PULMONOLOGY | Facility: CLINIC | Age: 77
End: 2022-07-01

## 2022-07-01 NOTE — TELEPHONE ENCOUNTER
"Patient calling stating that she received call from pulmonary clinic. All clinic staff had denied calling patient and per chart review no calls made today. Stated to patient that no call was placed to her. She screamed that, \"Yes you did call me!\" and hung up.  "

## 2022-07-05 ENCOUNTER — TELEPHONE (OUTPATIENT)
Dept: PULMONOLOGY | Facility: CLINIC | Age: 77
End: 2022-07-05

## 2022-07-05 NOTE — TELEPHONE ENCOUNTER
Pt had left voicemail asking for clarification for location of her appts on 7/13. Spoke with patient and confirmed with her that all appts (6MWT, CT and labs) were at [address]. She voiced understanding.

## 2022-07-07 ENCOUNTER — TELEPHONE (OUTPATIENT)
Facility: CLINIC | Age: 77
End: 2022-07-07

## 2022-07-07 NOTE — TELEPHONE ENCOUNTER
Health Call Center    Phone Message    May a detailed message be left on voicemail: yes     Reason for Call: Other: Ca is calling in asking for a call back from STEVEN Taylor. She states that she had spoken with [facility], and she was told that they are going to need a new prescription for her oxygen. Please call Ca back as soon as possible to discuss.     Action Taken: Message routed to:  [facility] ([facility] Pulm    Travel Screening: Not Applicable

## 2022-07-07 NOTE — TELEPHONE ENCOUNTER
Ca states that [facility] contacted her and she told them she wanted to be able to use 5L/min O2 when she's filling her tanks.  She states they told her if that was the case she would need a new oxygen prescription and a larger concentrator, she did not discuss further with them.  I reminded her that she is scheduled for a 6MWT next Wednesday 7/13, once she completes this testing we will know precisely how much oxygen she needs and we will change her prescription accordingly.  Pt verbalized understanding and agreement with plan and has no further questions at this time.

## 2022-07-08 ENCOUNTER — APPOINTMENT (OUTPATIENT)
Dept: GENERAL RADIOLOGY | Facility: CLINIC | Age: 77
End: 2022-07-08
Attending: NURSE PRACTITIONER
Payer: COMMERCIAL

## 2022-07-08 ENCOUNTER — APPOINTMENT (OUTPATIENT)
Dept: ULTRASOUND IMAGING | Facility: CLINIC | Age: 77
End: 2022-07-08
Attending: EMERGENCY MEDICINE
Payer: COMMERCIAL

## 2022-07-08 ENCOUNTER — APPOINTMENT (OUTPATIENT)
Dept: CT IMAGING | Facility: CLINIC | Age: 77
End: 2022-07-08
Attending: EMERGENCY MEDICINE
Payer: COMMERCIAL

## 2022-07-08 ENCOUNTER — HOSPITAL ENCOUNTER (EMERGENCY)
Facility: CLINIC | Age: 77
Discharge: HOME | End: 2022-07-08
Attending: EMERGENCY MEDICINE | Admitting: EMERGENCY MEDICINE
Payer: COMMERCIAL

## 2022-07-08 VITALS
SYSTOLIC BLOOD PRESSURE: 167 MMHG | HEART RATE: 71 BPM | DIASTOLIC BLOOD PRESSURE: 81 MMHG | OXYGEN SATURATION: 99 % | RESPIRATION RATE: 16 BRPM

## 2022-07-08 DIAGNOSIS — M25.512 ACUTE PAIN OF LEFT SHOULDER: ICD-10-CM

## 2022-07-08 LAB
ALBUMIN SERPL BCG-MCNC: 3.9 G/DL (ref 3.5–5.2)
ALP SERPL-CCNC: 102 U/L (ref 35–104)
ALT SERPL W P-5'-P-CCNC: 7 U/L (ref 10–35)
ANION GAP SERPL CALCULATED.3IONS-SCNC: 7 MMOL/L (ref 7–15)
AST SERPL W P-5'-P-CCNC: 22 U/L (ref 10–35)
BASOPHILS # BLD AUTO: 0 10E3/UL (ref 0–0.2)
BASOPHILS NFR BLD AUTO: 1 %
BILIRUB SERPL-MCNC: 0.3 MG/DL
BUN SERPL-MCNC: 19.6 MG/DL (ref 8–23)
CALCIUM SERPL-MCNC: 9 MG/DL (ref 8.8–10.2)
CHLORIDE SERPL-SCNC: 100 MMOL/L (ref 98–107)
CREAT SERPL-MCNC: 0.73 MG/DL (ref 0.51–0.95)
D DIMER PPP FEU-MCNC: 1.13 UG/ML FEU (ref 0–0.5)
DEPRECATED HCO3 PLAS-SCNC: 33 MMOL/L (ref 22–29)
EOSINOPHIL # BLD AUTO: 0.1 10E3/UL (ref 0–0.7)
EOSINOPHIL NFR BLD AUTO: 3 %
ERYTHROCYTE [DISTWIDTH] IN BLOOD BY AUTOMATED COUNT: 14.5 % (ref 10–15)
GFR SERPL CREATININE-BSD FRML MDRD: 84 ML/MIN/1.73M2
GLUCOSE SERPL-MCNC: 172 MG/DL (ref 70–99)
HCT VFR BLD AUTO: 33.8 % (ref 35–47)
HGB BLD-MCNC: 10.4 G/DL (ref 11.7–15.7)
HOLD SPECIMEN: NORMAL
IMM GRANULOCYTES # BLD: 0 10E3/UL
IMM GRANULOCYTES NFR BLD: 0 %
LYMPHOCYTES # BLD AUTO: 0.9 10E3/UL (ref 0.8–5.3)
LYMPHOCYTES NFR BLD AUTO: 17 %
MCH RBC QN AUTO: 30.3 PG (ref 26.5–33)
MCHC RBC AUTO-ENTMCNC: 30.8 G/DL (ref 31.5–36.5)
MCV RBC AUTO: 99 FL (ref 78–100)
MONOCYTES # BLD AUTO: 0.4 10E3/UL (ref 0–1.3)
MONOCYTES NFR BLD AUTO: 8 %
NEUTROPHILS # BLD AUTO: 3.8 10E3/UL (ref 1.6–8.3)
NEUTROPHILS NFR BLD AUTO: 71 %
NRBC # BLD AUTO: 0 10E3/UL
NRBC BLD AUTO-RTO: 0 /100
PLATELET # BLD AUTO: 210 10E3/UL (ref 150–450)
POTASSIUM SERPL-SCNC: 3.9 MMOL/L (ref 3.4–5.3)
PROT SERPL-MCNC: 7.2 G/DL (ref 6.4–8.3)
RBC # BLD AUTO: 3.43 10E6/UL (ref 3.8–5.2)
SODIUM SERPL-SCNC: 140 MMOL/L (ref 136–145)
TROPONIN T SERPL HS-MCNC: 7 NG/L
WBC # BLD AUTO: 5.3 10E3/UL (ref 4–11)

## 2022-07-08 PROCEDURE — 93971 EXTREMITY STUDY: CPT | Mod: RT

## 2022-07-08 PROCEDURE — 73030 X-RAY EXAM OF SHOULDER: CPT | Mod: LT

## 2022-07-08 PROCEDURE — 93971 EXTREMITY STUDY: CPT | Mod: 26 | Performed by: RADIOLOGY

## 2022-07-08 PROCEDURE — 73030 X-RAY EXAM OF SHOULDER: CPT | Mod: 26 | Performed by: RADIOLOGY

## 2022-07-08 PROCEDURE — 71275 CT ANGIOGRAPHY CHEST: CPT | Mod: 26 | Performed by: RADIOLOGY

## 2022-07-08 PROCEDURE — 250N000009 HC RX 250: Performed by: EMERGENCY MEDICINE

## 2022-07-08 PROCEDURE — 84484 ASSAY OF TROPONIN QUANT: CPT | Performed by: NURSE PRACTITIONER

## 2022-07-08 PROCEDURE — 80053 COMPREHEN METABOLIC PANEL: CPT | Performed by: NURSE PRACTITIONER

## 2022-07-08 PROCEDURE — 71275 CT ANGIOGRAPHY CHEST: CPT

## 2022-07-08 PROCEDURE — 93010 ELECTROCARDIOGRAM REPORT: CPT | Performed by: EMERGENCY MEDICINE

## 2022-07-08 PROCEDURE — 99285 EMERGENCY DEPT VISIT HI MDM: CPT | Mod: 25 | Performed by: EMERGENCY MEDICINE

## 2022-07-08 PROCEDURE — 85025 COMPLETE CBC W/AUTO DIFF WBC: CPT | Performed by: NURSE PRACTITIONER

## 2022-07-08 PROCEDURE — 93005 ELECTROCARDIOGRAM TRACING: CPT | Performed by: EMERGENCY MEDICINE

## 2022-07-08 PROCEDURE — 36415 COLL VENOUS BLD VENIPUNCTURE: CPT | Performed by: NURSE PRACTITIONER

## 2022-07-08 PROCEDURE — 85379 FIBRIN DEGRADATION QUANT: CPT | Performed by: NURSE PRACTITIONER

## 2022-07-08 PROCEDURE — 250N000011 HC RX IP 250 OP 636: Performed by: EMERGENCY MEDICINE

## 2022-07-08 RX ORDER — IOPAMIDOL 755 MG/ML
80 INJECTION, SOLUTION INTRAVASCULAR ONCE
Status: COMPLETED | OUTPATIENT
Start: 2022-07-08 | End: 2022-07-08

## 2022-07-08 RX ADMIN — SODIUM CHLORIDE, PRESERVATIVE FREE 96 ML: 5 INJECTION INTRAVENOUS at 19:02

## 2022-07-08 RX ADMIN — IOPAMIDOL 80 ML: 755 INJECTION, SOLUTION INTRAVENOUS at 19:01

## 2022-07-08 ASSESSMENT — ENCOUNTER SYMPTOMS
CHILLS: 0
PALPITATIONS: 0
FEVER: 0
COUGH: 0
SORE THROAT: 0
SHORTNESS OF BREATH: 1
NAUSEA: 0
ABDOMINAL PAIN: 0
HEADACHES: 0
VOMITING: 0
DYSURIA: 0
FREQUENCY: 0

## 2022-07-08 NOTE — ED TRIAGE NOTES
Pt arrives [facility] w/ c/o left shoulder pain. States onset ~1hr prior to EMS arrival, has since resolved. EKG NSR, VSS en route. Hx of anxiety.

## 2022-07-08 NOTE — ED PROVIDER NOTES
"ED Provider Note  [facility]      History     Chief Complaint   Patient presents with     Shoulder Pain     HPI  Ca Yan is a 76 year old female with a PMH of breast cancer, HTN, HLD, cluster C personality disorder, COPD, excess parathyroid hormone, arthritis and chronic lower extremity edema who presents to the ED today reporting left shoulder pain.  Patient states that her left shoulder pain started this morning and was worse with deep breaths.  She denies any trauma or injury to the shoulder.  She states she does not currently have this shoulder pain here in the ED.  She endorses chronic home oxygen use.  She also states that her right thigh has been hurting for the past few weeks or months.  She denies any trauma to her thigh.  She endorses chronic cough as well as chronic lower extremity \"soreness\" and edema.  She states that she lives in her own apartment without family or other help.  She also notes a history of heart murmur.  Patient denies shortness of breath (not worse than baseline), numbness or weakness in the legs or feet, fever, abdominal pain, nausea, vomiting, chest pain or any lower extremity redness.    Past Medical History  Past Medical History:   Diagnosis Date     Anxiety      Arthritis      Breast cancer (H)      COPD (chronic obstructive pulmonary disease) (H)     6/19/12:  FEV 0.99 l     Depressive disorder      Hypertension      Low back pain with left-sided sciatica      Low bone density 4/13/2017    DEXA April 12, 2017: T score -2.0. Normal Z score. FRAX risk: major osteoporotic fracture 11.9%, hip fracture 2.6%, therefore not high-risk     Lymphedema, chronic lower extremities      Past Surgical History:   Procedure Laterality Date     BACK SURGERY      spinal fusion     COLONOSCOPY       LARYNGOSCOPY WITH MICROSCOPE N/A 4/30/2021    Procedure: FLEXIBLE LARYNGOSCOPY;  Surgeon: Domonique Roche MD;  Location: [facility] OR     LUMPECTOMY BREAST       ORTHOPEDIC " SURGERY      Knee surgery left side     atorvastatin (LIPITOR) 40 MG tablet  azelastine (ASTELIN) 0.1 % nasal spray  cephALEXin (KEFLEX) 250 MG capsule  clotrimazole (LOTRIMIN) 1 % external cream  Emollient (CERAVE) CREA  ipratropium (ATROVENT) 0.03 % nasal spray  latanoprost (XALATAN) 0.005 % ophthalmic solution  losartan (COZAAR) 25 MG tablet  losartan-hydrochlorothiazide (HYZAAR) 100-25 MG tablet  mirtazapine (REMERON) 7.5 MG tablet  QUEtiapine (SEROQUEL) 300 MG tablet  sertraline (ZOLOFT) 100 MG tablet  Vitamin D3 (CHOLECALCIFEROL) 25 mcg (1000 units) tablet      Allergies   Allergen Reactions     Azithromycin Itching     Codeine Nausea and Vomiting     Hydrocodone Nausea and Vomiting     Metronidazole Nausea     Oxycodone Itching and Rash     Percocet [Oxycodone-Acetaminophen] Nausea and Vomiting     Pollen Extract      sneezing and runny nose.      Seasonal Allergies      sneezing and runny nose.      Vicodin [Hydrocodone-Acetaminophen] Nausea and Vomiting     Zolpidem      Amnestic behavior     Family History  Family History   Problem Relation Age of Onset     Breast Cancer Mother      Diabetes Mother      Anxiety Disorder Mother      Asthma Mother      Other Cancer Mother      Hyperlipidemia Mother      Alcohol/Drug Father      Mental Illness Father      Substance Abuse Father      Obesity Father      Cerebrovascular Disease Maternal Grandmother 67     Other - See Comments Maternal Aunt         lived to      Social History   Social History     Tobacco Use     Smoking status: Former Smoker     Packs/day: 0.50     Years: 5.00     Pack years: 2.50     Types: Cigarettes     Start date: 1956     Quit date: 1980     Years since quittin.8     Smokeless tobacco: Former User     Quit date: 1980   Substance Use Topics     Alcohol use: Not Currently     Alcohol/week: 0.0 standard drinks     Comment: Sober for 2 years, previous alcoholic     Drug use: No      Past medical history, past surgical  history, medications, allergies, family history, and social history were reviewed with the patient. No additional pertinent items.       Review of Systems  A complete review of systems was performed with pertinent positives and negatives noted in the HPI, and all other systems negative.    Physical Exam   BP: (!) 167/81  Pulse: 71  Resp: 16  SpO2: 99 %  Physical Exam    GEN:  Well developed, no acute distress  HEENT:  EOMI, Mucous membranes are moist.   Cardio:  RRR, systolic murmur present, radial pulses equal bilaterally  PULM:  Lungs clear, good air movement, no wheezes, rales  Abd:  Soft, normal bowel sounds, no focal tenderness  Musculoskeletal: No tenderness or swelling in the left shoulder, there is bilateral lower extremity swelling with mild right calf tenderness.  No tenderness in the thighs    Neuro:  Alert and oriented X3, Follows commands, moving all extremities spontaneously   Skin:  Warm, dry  ED Course     5:32 PM  The patient was seen and examined by Lilly Leslie MD in Room [facility].     Procedures            EKG Interpretation:      Interpreted by Llily Leslie MD  Time reviewed: 14:28 by my colleague, reviewed after 17:00 by me.   Symptoms at time of EKG: left shoulder pain, now resolved   Rhythm: normal sinus   Rate: normal  Axis: normal  Ectopy: none  Conduction: Incomplete right bundle branch block  ST Segments/ T Waves: No ST-T wave changes  Q Waves: none  Comparison to prior: Unchanged from April 15, 2021, however, V2 is more isoelectric today compared to the previous EKG.  The incomplete right bundle branch block also was not seen on April 15, 2021    Clinical Impression: normal sinus rhythm with incomplete right bundle branch block      Labs were reviewed by me and results are shown below.  Left shoulder x-ray was reviewed by me and results are shown below.  D-dimer is elevated and the patient did describe having some pleuritic pain, so CT of the chest was done.  Right leg  ultrasound was also done.  CT findings were discussed with radiology.  They are essentially unchanged from previous CTs.  Results were discussed with the patient including the ascending aortic aneurysm.  She is already aware of the right lower lobe bronchus stenosis.  Patient did not seem to be aware of the aneurysm, however, now she understands that she needs to have this followed by her primary care provider and may need follow-up with cardiovascular surgery.    Patient did not have recurrence of her shoulder pain in the ED.       Results for orders placed or performed during the hospital encounter of 07/08/22   XR Shoulder Left 3 Views     Status: None    Narrative    3 views left shoulder radiographs 7/8/2022 3:24 PM    History: Left shoulder pain     Comparison: None    Findings:    AP internal, external rotation , transscapular Y views of the left  shoulder were obtained.     No acute osseous abnormality. Glenohumeral and acromioclavicular  joints are congruent.    Moderate degenerative changes of the acromioclavicular joint. No  substantial degenerative change of the glenohumeral joint.  Enthesopathic change at the greater tuberosity.    Soft tissue is unremarkable. The visualized lung is clear.      Impression    Impression:  1. No acute osseous abnormality.  2. No substantial glenohumeral degenerative change.    KINGSLEY UGALDE MD (Joe)         SYSTEM ID:  [id number]    Lower Extremity Venous Duplex Right     Status: None    Narrative    ULTRASOUND LOWER EXTREMITY VENOUS DUPLEX RIGHT 7/8/2022 6:19 PM    CLINICAL HISTORY: Leg pain and swelling.     COMPARISONS: None available.    REFERRING PROVIDER: VENTURA MARQUES MARQUIS    TECHNIQUE: Grayscale, color Doppler, Doppler waveform ultrasound  evaluation was performed through the right common femoral, femoral,  popliteal, and posterior tibial veins.     Left common femoral vein was evaluated for symmetry.    FINDINGS: Left common femoral vein is patent,  fully compressible, and  demonstrates normal phasic Doppler waveform.    Right common femoral, femoral, popliteal, and posterior tibial veins  are fully compressible, patent, and demonstrate normal phasic Doppler  waveforms.      Impression    IMPRESSION: No deep venous thrombosis demonstrated in the RIGHT leg.    JOHNATHON ROACH MD         SYSTEM ID:  [id number]   CT Chest Pulmonary Embolism w Contrast     Status: None    Narrative    EXAMINATION: CTA pulmonary angiogram, 7/8/2022 7:12 PM     COMPARISON: Chest radiograph 4/21/2021, chest CT 1/15/2020    HISTORY: Chest pain, pleuritic, shoulder pain    TECHNIQUE: Volumetric helical acquisition of CT images of the chest  from the lung apices to the kidneys were acquired after the  administration of 80 mL of Isovue-370 IV contrast. Post-processed  multiplanar and/or MIP reformations were obtained, archived to PACS  and used in interpretation of this study.     FINDINGS:      Contrast bolus is: excellent.  Exam is negative for acute pulmonary  embolism. No evidence for an heart strain. Stable enlarged main  pulmonary artery up to 4.0 cm.    Remaining chest: Thyroid is grossly unremarkable. Mild cardiomegaly.  Stable mild aneurysmal enlargement of the ascending thoracic aorta up  to 4.1 cm. Tortuous arch. Scattered aortic atherosclerosis. Mildly  patulous esophagus with trace layering debris and suspected small  hiatal hernia. Prominent lower right paratracheal lymph node measuring  9 mm (series 6 image 123). No suspicious lymphadenopathy. Biapical  scarring. No pneumothorax or pleural effusion. Chronic right lower  lobe collapse with similar stenosis of the right lower lobar bronchus.  Additional scattered atelectasis. Scattered mucus plugging. No  convincing acute airspace disease.    Upper abdomen: Coarse calcification in the spleen. Aortic  atherosclerosis.    Bones/Soft Tissues: Similar scoliotic curvature and associated right  hemithorax deformity. Degenerative changes  in the visualized spine. No  acute osseous abnormalities or suspicious bony lesions.       Impression    IMPRESSION:   1. No acute pulmonary embolism.  2. Similar stenosis of the right lower lobar bronchus with right lower  lobar collapse. No convincing acute airspace disease.  3. The remainder of the exam is not significantly changed since  1/15/2020, including ascending thoracic aortic aneurysm up to 4.1 cm,  pulmonary arterial enlargement, and scoliosis.      In the event of a positive result for acute pulmonary embolism  resulting in right heart strain, consider calling the   [facility] hospital  for PERT (Pulmonary Embolism Response Team)  Activation?    PERT -- Pulmonary Embolism Response Team (Multidisciplinary team  including cardiology, interventional radiology, critical care,  hematology)    I have personally reviewed the examination and initial interpretation  and I agree with the findings.    LÁZARO PALACIOS MD         SYSTEM ID:  [id number]   Comprehensive metabolic panel     Status: Abnormal   Result Value Ref Range    Sodium 140 136 - 145 mmol/L    Potassium 3.9 3.4 - 5.3 mmol/L    Creatinine 0.73 0.51 - 0.95 mg/dL    Urea Nitrogen 19.6 8.0 - 23.0 mg/dL    Chloride 100 98 - 107 mmol/L    Carbon Dioxide (CO2) 33 (H) 22 - 29 mmol/L    Anion Gap 7 7 - 15 mmol/L    Glucose 172 (H) 70 - 99 mg/dL    Calcium 9.0 8.8 - 10.2 mg/dL    Protein Total 7.2 6.4 - 8.3 g/dL    Albumin 3.9 3.5 - 5.2 g/dL    Bilirubin Total 0.3 <=1.2 mg/dL    Alkaline Phosphatase 102 35 - 104 U/L    AST 22 10 - 35 U/L    ALT 7 (L) 10 - 35 U/L    GFR Estimate 84 >60 mL/min/1.73m2   Troponin T, High Sensitivity     Status: Normal   Result Value Ref Range    Troponin T, High Sensitivity 7 <=14 ng/L   [place] Draw     Status: None    Narrative    The following orders were created for panel order [place] Draw.  Procedure                               Abnormality         Status                     ---------                                -----------         ------                     Extra Blue Top Tube[[id number]]                              Final result               Extra Red Top Tube[[id number]]                               Final result               Extra Green Top (Lithium...[[id number]]                      Final result               Extra Purple Top Tube[[id number]]                            Final result                 Please view results for these tests on the individual orders.   CBC with platelets and differential     Status: Abnormal   Result Value Ref Range    WBC Count 5.3 4.0 - 11.0 10e3/uL    RBC Count 3.43 (L) 3.80 - 5.20 10e6/uL    Hemoglobin 10.4 (L) 11.7 - 15.7 g/dL    Hematocrit 33.8 (L) 35.0 - 47.0 %    MCV 99 78 - 100 fL    MCH 30.3 26.5 - 33.0 pg    MCHC 30.8 (L) 31.5 - 36.5 g/dL    RDW 14.5 10.0 - 15.0 %    Platelet Count 210 150 - 450 10e3/uL    % Neutrophils 71 %    % Lymphocytes 17 %    % Monocytes 8 %    % Eosinophils 3 %    % Basophils 1 %    % Immature Granulocytes 0 %    NRBCs per 100 WBC 0 <1 /100    Absolute Neutrophils 3.8 1.6 - 8.3 10e3/uL    Absolute Lymphocytes 0.9 0.8 - 5.3 10e3/uL    Absolute Monocytes 0.4 0.0 - 1.3 10e3/uL    Absolute Eosinophils 0.1 0.0 - 0.7 10e3/uL    Absolute Basophils 0.0 0.0 - 0.2 10e3/uL    Absolute Immature Granulocytes 0.0 <=0.4 10e3/uL    Absolute NRBCs 0.0 10e3/uL   Extra Blue Top Tube     Status: None   Result Value Ref Range    Hold Specimen JIC    Extra Red Top Tube     Status: None   Result Value Ref Range    Hold Specimen JIC    Extra Green Top (Lithium Heparin) Tube     Status: None   Result Value Ref Range    Hold Specimen JIC    Extra Purple Top Tube     Status: None   Result Value Ref Range    Hold Specimen JIC    D dimer quantitative     Status: Abnormal   Result Value Ref Range    D-Dimer Quantitative 1.13 (H) 0.00 - 0.50 ug/mL FEU    Narrative    This D-dimer assay is intended for use in conjunction with a clinical pretest probability assessment model to exclude  pulmonary embolism (PE) and deep venous thrombosis (DVT) in outpatients suspected of PE or DVT. The cut-off value is 0.50 ug/mL FEU.   EKG 12-lead, tracing only     Status: None (Preliminary result)   Result Value Ref Range    Systolic Blood Pressure  mmHg    Diastolic Blood Pressure  mmHg    Ventricular Rate 73 BPM    Atrial Rate 73 BPM    CA Interval 164 ms    QRS Duration 96 ms     ms    QTc 449 ms    P Axis 73 degrees    R AXIS 56 degrees    T Axis 75 degrees    Interpretation ECG       Sinus rhythm  Incomplete right bundle branch block  Borderline ECG     CBC with platelets differential     Status: Abnormal    Narrative    The following orders were created for panel order CBC with platelets differential.  Procedure                               Abnormality         Status                     ---------                               -----------         ------                     CBC with platelets and d...[[id number]]  Abnormal            Final result                 Please view results for these tests on the individual orders.     Medications   CT saline (96 mLs Intravenous Given 7/8/22 1902)   iopamidol (ISOVUE-370) solution 80 mL (80 mLs Intravenous Given 7/8/22 1901)        Assessments & Plan (with Medical Decision Making)   Patient presents with left shoulder pain that started without any trauma or known cause and resolved spontaneously.  She did not have any further shoulder pain in the ED and ED work-up is negative for signs of ACS, PE, shoulder dislocation or acute fracture.  There is no sign of vascular compromise of the left upper extremity.  Patient reports that she is at her baseline in terms of her shortness of breath, is oxygen dependent at baseline.  No new fever cough, pneumonia is unlikely.  She was advised to follow-up with her primary care provider next week, but return the emergency department if she has new or worsening symptoms, any chest pain or shortness of breath.    I have reviewed  the nursing notes. I have reviewed the findings, diagnosis, plan and need for follow up with the patient.    Discharge Medication List as of 7/8/2022  8:35 PM          Final diagnoses:   Acute pain of left shoulder   I, Karlos Gonsales, am serving as a trained medical scribe to document services personally performed by Lilly Leslie MD, based on the provider's statements to me.     I, Lilly Leslie MD, was physically present and have reviewed and verified the accuracy of this note documented by Karlos Gonsales.      --  Lilly Leslie MD  [facility] EMERGENCY DEPARTMENT  7/8/2022     Lilly Leslie MD  07/08/22 2632

## 2022-07-08 NOTE — ED PROVIDER NOTES
ED Triage Provider Note  [facility]  Encounter Date: Jul 8, 2022    History:  Chief Complaint   Patient presents with     Shoulder Pain     Ca Yan is a 76 year old female with a past medical history of GERD, HTN, anxiety, chronic use of O2 who presents to the ED with complaints of left shoulder pain.  She reports today she began developing left shoulder pain at times she will take a deep breath.  She reports this lasted for about an hour, and following EMS arrival pain had subsided. She reports pain returned and she subsequently called EMS again.     Per patient she denies any recent trauma or falls, able to move shoulder with full ROM. Reports pain has now stopped. She denies numbness, tingling to the left arm or shoulder. Denies any recent fevers, chills, nausea, vomiting, abdominal pan.    Review of Systems:  Review of Systems   Constitutional: Negative for chills and fever.   HENT: Negative for sore throat.    Respiratory: Positive for shortness of breath. Negative for cough.         Chronic SOB, on chronic O2 supplementation   Cardiovascular: Negative for chest pain and palpitations.   Gastrointestinal: Negative for abdominal pain, nausea and vomiting.   Genitourinary: Negative for dysuria and frequency.   Neurological: Negative for headaches.       Exam:  BP (!) 167/81   Pulse 71   Resp 16   LMP  (LMP Unknown)   SpO2 99%   Physical Exam  Vitals and nursing note reviewed.   Constitutional:       Appearance: Normal appearance.   HENT:      Right Ear: External ear normal.      Left Ear: External ear normal.      Mouth/Throat:      Mouth: Mucous membranes are moist.   Eyes:      Conjunctiva/sclera: Conjunctivae normal.   Cardiovascular:      Rate and Rhythm: Normal rate and regular rhythm.      Pulses: Normal pulses.      Heart sounds: Normal heart sounds.   Pulmonary:      Effort: Pulmonary effort is normal.      Breath sounds: Normal breath sounds.    Abdominal:      General: Abdomen is flat.      Palpations: Abdomen is soft.      Tenderness: There is no abdominal tenderness.   Musculoskeletal:         General: Normal range of motion.      Cervical back: Normal range of motion.   Skin:     General: Skin is warm and dry.   Neurological:      Mental Status: She is alert and oriented to person, place, and time.               Medical Decision Making:  Patient arriving to the ED with problem as above. A medical screening exam was performed. Troponin, CBC, CMP, left shoulder x-ray orders initiated from Triage. The patient is appropriate to wait in triage.      RUPAL Lopes CNP on 7/8/2022 at 2:31 PM        Edmond Núñez APRN CNP  07/08/22 144

## 2022-07-08 NOTE — ED TRIAGE NOTES
Triage Assessment     Row Name 07/08/22 1420       Triage Assessment (Adult)    Airway WDL WDL       Respiratory WDL    Respiratory WDL WDL       Skin Circulation/Temperature WDL    Skin Circulation/Temperature WDL WDL       Cardiac WDL    Cardiac WDL WDL       Peripheral/Neurovascular WDL    Peripheral Neurovascular WDL WDL       Cognitive/Neuro/Behavioral WDL    Cognitive/Neuro/Behavioral WDL WDL

## 2022-07-09 NOTE — DISCHARGE INSTRUCTIONS
Please make an appointment to follow up with Your Primary Care Provider in 5-7 days for ongoing care.  You should also make sure that your primary care provider is following the ascending aortic aneurysm to make sure it is stable and not expanding. It is stable today compared to your previous CT scan.

## 2022-07-10 ENCOUNTER — TELEPHONE (OUTPATIENT)
Dept: FAMILY MEDICINE | Facility: CLINIC | Age: 77
End: 2022-07-10

## 2022-07-10 LAB
ATRIAL RATE - MUSE: 73 BPM
DIASTOLIC BLOOD PRESSURE - MUSE: NORMAL MMHG
INTERPRETATION ECG - MUSE: NORMAL
P AXIS - MUSE: 73 DEGREES
PR INTERVAL - MUSE: 164 MS
QRS DURATION - MUSE: 96 MS
QT - MUSE: 408 MS
QTC - MUSE: 449 MS
R AXIS - MUSE: 56 DEGREES
SYSTOLIC BLOOD PRESSURE - MUSE: NORMAL MMHG
T AXIS - MUSE: 75 DEGREES
VENTRICULAR RATE- MUSE: 73 BPM

## 2022-07-10 NOTE — LETTER
"    Caken Yan  [address]   [facility] [address]  : 1945  MRN:  [id number]      2022          Luis Penaloza,  Dr. Sahu sent this message for you:    \"I noted she was at the emergency room for shoulder pain ER visits was reviewed I noted they obtained a CT and ultrasound all appear reassuring.  They commented on her chest ascending aortic aneurysm which has been present for several years and is very stable, does not need further evaluation with me at this time.  She has other follow-up scheduled with other providers she should keep.   Please let her know I am thinking about her.   She does not need to make a follow-up with me if she is feeling well as everything appeared stable.    Best wishes  Favian Sahu MD\"    Kind regards,  STEVEN Swanson                      "

## 2022-07-10 NOTE — TELEPHONE ENCOUNTER
July 10, 2022  I noted she was at the emergency room for shoulder pain ER visits was reviewed I noted they obtained a CT and ultrasound all appear reassuring.  They commented on her chest ascending aortic aneurysm which has been present for several years and is very stable, does not need further evaluation with me at this time.  She has other follow-up scheduled with other providers she should keep.   Please let her know I am thinking about her she does not need to make a follow-up with me if she is feeling well as everything appeared stable.  Best wishes  Favian Sahu MD    EXAMINATION: CTA pulmonary angiogram, 7/8/2022 7:12 PM      COMPARISON: Chest radiograph 4/21/2021, chest CT 1/15/2020     HISTORY: Chest pain, pleuritic, shoulder pain     TECHNIQUE: Volumetric helical acquisition of CT images of the chest  from the lung apices to the kidneys were acquired after the  administration of 80 mL of Isovue-370 IV contrast. Post-processed  multiplanar and/or MIP reformations were obtained, archived to PACS  and used in interpretation of this study.      FINDINGS:       Contrast bolus is: excellent.  Exam is negative for acute pulmonary  embolism. No evidence for an heart strain. Stable enlarged main  pulmonary artery up to 4.0 cm.     Remaining chest: Thyroid is grossly unremarkable. Mild cardiomegaly.  Stable mild aneurysmal enlargement of the ascending thoracic aorta up  to 4.1 cm. Tortuous arch. Scattered aortic atherosclerosis. Mildly  patulous esophagus with trace layering debris and suspected small  hiatal hernia. Prominent lower right paratracheal lymph node measuring  9 mm (series 6 image 123). No suspicious lymphadenopathy. Biapical  scarring. No pneumothorax or pleural effusion. Chronic right lower  lobe collapse with similar stenosis of the right lower lobar bronchus.  Additional scattered atelectasis. Scattered mucus plugging. No  convincing acute airspace disease.     Upper abdomen: Coarse  calcification in the spleen. Aortic  atherosclerosis.     Bones/Soft Tissues: Similar scoliotic curvature and associated right  hemithorax deformity. Degenerative changes in the visualized spine. No  acute osseous abnormalities or suspicious bony lesions.                                                                       IMPRESSION:   1. No acute pulmonary embolism.  2. Similar stenosis of the right lower lobar bronchus with right lower  lobar collapse. No convincing acute airspace disease.  3. The remainder of the exam is not significantly changed since  1/15/2020, including ascending thoracic aortic aneurysm up to 4.1 cm,  pulmonary arterial enlargement, and scoliosis.        In the event of a positive result for acute pulmonary embolism  resulting in right heart strain, consider calling the   [facility] hospital  for PERT (Pulmonary Embolism Response Team)  Activation?     PERT -- Pulmonary Embolism Response Team (Multidisciplinary team  including cardiology, interventional radiology, critical care,  hematology)     I have personally reviewed the examination and initial interpretation  and I agree with the findings.     LÁZARO PALACIOS MD

## 2022-07-12 ENCOUNTER — TELEPHONE (OUTPATIENT)
Facility: CLINIC | Age: 77
End: 2022-07-12

## 2022-07-12 NOTE — TELEPHONE ENCOUNTER
Ca called to follow up on her message. Ca tells me that she filled her O2 tank and placed it in it's campa when it was full, now she tells me it's empty.  It sound to me like she has left it turned on, I have asked her to check that it is turned off once she's filled it again.  If she still has questions or concerns with the equipment she should call her oxygen provider.

## 2022-07-12 NOTE — TELEPHONE ENCOUNTER
[facility] Call Center    Phone Message    May a detailed message be left on voicemail: yes     Reason for Call: Other: Ca is calling in asking for a call back from [place]. Pt states that she would like to speak with her about how to handle her oxygen tanks. Please call back to discuss.     Action Taken: Message routed to:  [facility] Pulm    Travel Screening: Not Applicable

## 2022-07-13 ENCOUNTER — TELEPHONE (OUTPATIENT)
Dept: FAMILY MEDICINE | Facility: CLINIC | Age: 77
End: 2022-07-13

## 2022-07-13 ENCOUNTER — TELEPHONE (OUTPATIENT)
Dept: PULMONOLOGY | Facility: CLINIC | Age: 77
End: 2022-07-13

## 2022-07-13 DIAGNOSIS — R06.00 DYSPNEA: ICD-10-CM

## 2022-07-13 LAB
6 MIN WALK (FT): 400 FT
6 MIN WALK (M): 122 M

## 2022-07-13 PROCEDURE — 94618 PULMONARY STRESS TESTING: CPT | Performed by: INTERNAL MEDICINE

## 2022-07-13 NOTE — TELEPHONE ENCOUNTER
[facility] Call Center    Phone Message    May a detailed message be left on voicemail: yes     Reason for Call: Other: Patient was at our ER on 7/8/2022 and has an appt with you on 7/25, patient declined to see anyone else in the clinic. Please call back to discuss further. She found out she had an additional issue than her shoulder injury.     Action Taken: Message routed to:  [facility]    Travel Screening: Not Applicable

## 2022-07-13 NOTE — TELEPHONE ENCOUNTER
[facility] Call [facility]    Phone Message    May a detailed message be left on voicemail: yes     Reason for Call: Other: Patient wants an ER follow up with only Dr. Sahu in the next week. Please call and discuss.     Action Taken: Message routed to:  [facility]    Travel Screening: Not Applicable

## 2022-07-13 NOTE — TELEPHONE ENCOUNTER
Spoke with pt regarding cancelling her CT and lab appt today, as directed by STEVEN [place] and Dr. [place]. Pt completed this testing recently in the ED and this does not need to be repeated. Informed her that she would still have to come in for her 6MWT test. She is wondering about how she is going to get home and informed her that I could [facility] could [facility] . Pt voiced understanding and CT/labs appt cancelled. Reminded her of her appt check in time today.

## 2022-07-13 NOTE — TELEPHONE ENCOUNTER
Patient had a lab and Ct chest done a few days ago. For that reason her labs and imaging are cancelled.

## 2022-07-13 NOTE — TELEPHONE ENCOUNTER
Pt scheduled for Chest CT and labs today per Dr. [place], however, review of chart indicates pt was seen in ED on 7/8 and CT PE, CBC, CMP we completed at that time.  Message to Dr. Bee to review orders placed by Dr. [place] and advise whether pt needs to repeat.  Dr Taylor has ordered Chest CT, BNP, CBC, CMP, VBG (scheduled for this am), Echo (scheduled on 7/22) and 6MWT.

## 2022-07-14 ENCOUNTER — TELEPHONE (OUTPATIENT)
Dept: EMERGENCY MEDICINE | Facility: CLINIC | Age: 77
End: 2022-07-14

## 2022-07-14 ENCOUNTER — TELEPHONE (OUTPATIENT)
Dept: PULMONOLOGY | Facility: CLINIC | Age: 77
End: 2022-07-14

## 2022-07-14 DIAGNOSIS — J96.11 CHRONIC HYPOXEMIC RESPIRATORY FAILURE (H): Primary | ICD-10-CM

## 2022-07-14 LAB — FIO2-PRE: 36 %

## 2022-07-14 NOTE — TELEPHONE ENCOUNTER
[facility] Emergency Department Lab result notification     Patient/parent Name  Ca    Reason for call  Patient requesting information about an aneurysm    Recommendations/Instructions  Ca was encouraged to discuss questions about the aneurysm with her PCP, Dr Sahu  She was given Primary care clinic phone number and transferred    Peter Elkins RN  [facility]  Emergency Dept Lab Result RN  Ph# [phone]

## 2022-07-14 NOTE — TELEPHONE ENCOUNTER
As directed by STEVEN Gardner, fax cover sheet, demographic sheet, oxygen order, most recent office notes and most recent 6mwt results faxed to [facility] [phone]

## 2022-07-15 ENCOUNTER — TELEPHONE (OUTPATIENT)
Dept: PULMONOLOGY | Facility: CLINIC | Age: 77
End: 2022-07-15

## 2022-07-15 NOTE — TELEPHONE ENCOUNTER
RN sent letter to patient on 7/11/22.  RN called patient today 7/15/22 and asked if she received the letter.  Patient relayed that she did read the letter and had an appt scheduled for 7/18/22 with Dr. Sahu, but she cancelled that appt after reading the letter with Dr. Sahu's message, as appt was not needed.    Kiki Eddy RN on 7/15/2022 at 8:24 AM

## 2022-07-15 NOTE — TELEPHONE ENCOUNTER
Received message from Dr Bee yesterday, who is covering for Dr [place], that he ordered 4 LPM oxygen for result of 6 min walk done 7/13/22. Pau faxed oxygen order and testing to Derek [place] stated she will speak to patient Monday as the oxygen order remains the same.

## 2022-07-18 ENCOUNTER — TELEPHONE (OUTPATIENT)
Dept: PULMONOLOGY | Facility: CLINIC | Age: 77
End: 2022-07-18

## 2022-07-18 ENCOUNTER — NURSE TRIAGE (OUTPATIENT)
Dept: NURSING | Facility: CLINIC | Age: 77
End: 2022-07-18

## 2022-07-18 NOTE — TELEPHONE ENCOUNTER
Patient calling confirming appointments.  Asking for details like address so transferred back to scheduling as I do not know who these appointments are with or where.    Order for ekg confirmed in chart for 7/22/22 3pm Jaquelin appointment.    Luann Arteaga RN  [facility] Nurse Advisor

## 2022-07-18 NOTE — TELEPHONE ENCOUNTER
I called Ms Yan to follow up on testing that was done in the ED.  A chest CT scan with contrast was done.  It is unchanged since 2016. Chronically RLL atelectasis.  This is from her scoliosis and compression of right lung.  No PE.  No other opacities.  Unchanged O2 need on her six minute walk.      I discussed the importance of pulmonary rehab.  She has an appt on 8/2 at [facility].  I also discussed NIPPV therapy for her chronic hypercapnia.  She refuses to wear a BiPAP mask.      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     Mono [place] MD

## 2022-08-02 ENCOUNTER — HOSPITAL ENCOUNTER (OUTPATIENT)
Dept: CARDIAC REHAB | Facility: CLINIC | Age: 77
Discharge: HOME | End: 2022-08-02
Payer: COMMERCIAL

## 2022-08-02 PROCEDURE — G0238 OTH RESP PROC, INDIV: HCPCS | Performed by: CLINICAL EXERCISE PHYSIOLOGIST

## 2022-08-03 ENCOUNTER — TELEPHONE (OUTPATIENT)
Dept: PULMONOLOGY | Facility: CLINIC | Age: 77
End: 2022-08-03

## 2022-08-03 NOTE — TELEPHONE ENCOUNTER
Per note and MD order, patient should be on 4LPM. RN clarified with patient. Patient verbalized understanding and all questions were answered.

## 2022-08-03 NOTE — TELEPHONE ENCOUNTER
[facility] Call Center    Phone Message    May a detailed message be left on voicemail: yes     Reason for Call: Other:         Ca is calling in stating she needs her prescription for her oxygen changed/updated.  Please call her back to discuss.       Action Taken: Message routed to:  [facility] Pulm    Travel Screening: Not Applicable

## 2022-08-12 ENCOUNTER — LAB (OUTPATIENT)
Dept: LAB | Facility: CLINIC | Age: 77
End: 2022-08-12
Payer: COMMERCIAL

## 2022-08-12 ENCOUNTER — OFFICE VISIT (OUTPATIENT)
Dept: INTERNAL MEDICINE | Facility: CLINIC | Age: 77
End: 2022-08-12

## 2022-08-12 VITALS
RESPIRATION RATE: 18 BRPM | WEIGHT: 197.1 LBS | HEART RATE: 54 BPM | BODY MASS INDEX: 34.28 KG/M2 | OXYGEN SATURATION: 97 %

## 2022-08-12 DIAGNOSIS — M79.672 HEEL PAIN, BILATERAL: ICD-10-CM

## 2022-08-12 DIAGNOSIS — R73.9 HYPERGLYCEMIA: ICD-10-CM

## 2022-08-12 DIAGNOSIS — M79.671 HEEL PAIN, BILATERAL: ICD-10-CM

## 2022-08-12 DIAGNOSIS — D64.9 NORMOCYTIC ANEMIA: ICD-10-CM

## 2022-08-12 DIAGNOSIS — D64.9 NORMOCYTIC ANEMIA: Primary | ICD-10-CM

## 2022-08-12 LAB
FERRITIN SERPL-MCNC: 71 NG/ML (ref 8–252)
FOLATE SERPL-MCNC: 9 NG/ML (ref 4.6–34.8)
HBA1C MFR BLD: 5.6 % (ref 0–5.6)
IRON SATN MFR SERPL: 24 % (ref 15–46)
IRON SERPL-MCNC: 69 UG/DL (ref 35–180)
TIBC SERPL-MCNC: 288 UG/DL (ref 240–430)
VIT B12 SERPL-MCNC: 305 PG/ML (ref 193–986)

## 2022-08-12 PROCEDURE — 36415 COLL VENOUS BLD VENIPUNCTURE: CPT | Performed by: PATHOLOGY

## 2022-08-12 PROCEDURE — 82607 VITAMIN B-12: CPT | Performed by: PATHOLOGY

## 2022-08-12 PROCEDURE — 99214 OFFICE O/P EST MOD 30 MIN: CPT | Performed by: HOSPITALIST

## 2022-08-12 PROCEDURE — 83550 IRON BINDING TEST: CPT | Performed by: PATHOLOGY

## 2022-08-12 PROCEDURE — 82728 ASSAY OF FERRITIN: CPT | Performed by: PATHOLOGY

## 2022-08-12 PROCEDURE — 82746 ASSAY OF FOLIC ACID SERUM: CPT | Performed by: HOSPITALIST

## 2022-08-12 PROCEDURE — 83036 HEMOGLOBIN GLYCOSYLATED A1C: CPT | Performed by: PATHOLOGY

## 2022-08-12 ASSESSMENT — ENCOUNTER SYMPTOMS
FEVER: 0
CHILLS: 0
WEAKNESS: 1
PARESTHESIAS: 1
NUMBNESS: 1
SHORTNESS OF BREATH: 0

## 2022-08-12 NOTE — ASSESSMENT & PLAN NOTE
Possible neuropathy in both feet. Pains present for abuot 2 months. Does sit all day watching TV. Patient had labs on 7/8 show mild anemia at 10.4 (MCV 99). Also had glucose that was increased to 172 at the time.     - Labs for Vitamin B12, A1c, Iron labs, Folate labs.   - May try over the counter Biofreeze as needed, Icy/Hot as needed, or Voltaren Gel as needed up to 4 times a day for your feet.   - Recommend also moving every 1-2 hours during the day.   - Discuss use of Mirtazapine, if needs to continue. Patient was not sure if she was still taking this.   - Continue all other medications.   - Keep follow up with Dr. Sahu on 8/31.

## 2022-08-12 NOTE — PATIENT INSTRUCTIONS
- Suspect you have a nerve issues with your feet.   - Labs for Vitamin B12, A1c, Iron labs, Folate labs.   - May try Biofreeze as needed, Icy/Hot as needed, or Voltaren Gel as needed up to 4 times a day for your feet.   - Recommend also moving every 1-2 hours during the day.   - Discuss use of Mirtazapine, if needs to continue.   - Continue all other medications.     Follow up as needed with Dr. Sahu if not better. You see her on 8/31/22.

## 2022-08-12 NOTE — NURSING NOTE
Ca Yan is a 76 year old female patient that presents today in clinic for the following:    Chief Complaint   Patient presents with     Sick     Foot Pain     She can't remember when it started. She rates the pain at 9/10. She denies radiating pain. She describes it as nerve inflammation. She notes that nothing makes it better or worst. Denies any recent trauma with her reported bilateral leg pain.       Leg Pain     Shortness of Breath     Irritability     The patient's allergies and medications were reviewed as noted. A set of vitals were recorded as noted with incident: Pulse 54   Resp 18   Wt 89.4 kg (197 lb 1.6 oz)   LMP  (LMP Unknown)   SpO2 97%   BMI 34.28 kg/m  . Of note, the patient did not want her blood pressure assessed today in clinic. The patient does not have any other questions for the provider.    Max Wooten, EMT at 1:13 PM on 8/12/2022

## 2022-08-12 NOTE — PROGRESS NOTES
Assessment/Plan  Problem List Items Addressed This Visit        Nervous and Auditory    Heel pain, bilateral     Possible neuropathy in both feet. Pains present for abuot 2 months. Does sit all day watching TV. Patient had labs on 7/8 show mild anemia at 10.4 (MCV 99). Also had glucose that was increased to 172 at the time.     - Labs for Vitamin B12, A1c, Iron labs, Folate labs.   - May try over the counter Biofreeze as needed, Icy/Hot as needed, or Voltaren Gel as needed up to 4 times a day for your feet.   - Recommend also moving every 1-2 hours during the day.   - Discuss use of Mirtazapine, if needs to continue. Patient was not sure if she was still taking this.   - Continue all other medications.   - Keep follow up with Dr. Sahu on 8/31.           Relevant Orders    Hemoglobin A1c (Completed)    Vitamin B12      Other Visit Diagnoses     Normocytic anemia    -  Primary    Relevant Orders    Vitamin B12    Folate    Iron and iron binding capacity (Completed)    Ferritin (Completed)    Hyperglycemia        Relevant Orders    Hemoglobin A1c (Completed)          Results for orders placed or performed in visit on 08/12/22   Hemoglobin A1c     Status: Normal   Result Value Ref Range    Hemoglobin A1C 5.6 0.0 - 5.6 %   Iron and iron binding capacity     Status: Normal   Result Value Ref Range    Iron 69 35 - 180 ug/dL    Iron Binding Capacity 288 240 - 430 ug/dL    Iron Sat Index 24 15 - 46 %   Ferritin     Status: Normal   Result Value Ref Range    Ferritin 71 8 - 252 ng/mL       Health Maintenance Due   Topic Date Due     ZOSTER IMMUNIZATION (1 of 2) Never done     MEDICARE ANNUAL WELLNESS VISIT  02/20/2018     MAMMO SCREENING  07/20/2022       Subjective  Patient is her because of pains along the outsides of her feet, both sides. This has been going on for about 2 months. She mentions pains are most along her heels. Mentions that she sits all day watching TV without moving in her wheelchair. She has been  eating lost of sweets lately. Has numbness sensation along her toes as well.       Review of Systems   Constitutional: Negative for chills and fever.   Respiratory: Negative for shortness of breath.    Cardiovascular: Negative for chest pain.   Neurological: Positive for weakness, numbness and paresthesias.       History  Past Medical History:   Diagnosis Date     Anxiety      Arthritis      Breast cancer (H)      COPD (chronic obstructive pulmonary disease) (H)     12:  FEV 0.99 l     Depressive disorder      Hypertension      Low back pain with left-sided sciatica      Low bone density 2017    DEXA 2017: T score -2.0. Normal Z score. FRAX risk: major osteoporotic fracture 11.9%, hip fracture 2.6%, therefore not high-risk     Lymphedema, chronic lower extremities        Past Surgical History:   Procedure Laterality Date     BACK SURGERY      spinal fusion     COLONOSCOPY       LARYNGOSCOPY WITH MICROSCOPE N/A 2021    Procedure: FLEXIBLE LARYNGOSCOPY;  Surgeon: Domonique Roche MD;  Location: [facility] OR     LUMPECTOMY BREAST       ORTHOPEDIC SURGERY      Knee surgery left side       Family History   Problem Relation Age of Onset     Breast Cancer Mother      Diabetes Mother      Anxiety Disorder Mother      Asthma Mother      Other Cancer Mother      Hyperlipidemia Mother      Alcohol/Drug Father      Mental Illness Father      Substance Abuse Father      Obesity Father      Cerebrovascular Disease Maternal Grandmother 67     Other - See Comments Maternal Aunt         lived to        Social History     Tobacco Use     Smoking status: Former Smoker     Packs/day: 0.50     Years: 5.00     Pack years: 2.50     Types: Cigarettes     Start date: 1956     Quit date: 1980     Years since quittin.9     Smokeless tobacco: Former User     Quit date: 1980   Substance Use Topics     Alcohol use: Not Currently     Alcohol/week: 0.0 standard drinks     Comment: Sober for 2 years, previous  alcoholic        Objective  Pulse 54   Resp 18   Wt 89.4 kg (197 lb 1.6 oz)   LMP  (LMP Unknown)   SpO2 97%   BMI 34.28 kg/m    Vitals taken by Marco Britton MD    Physical Exam  Constitutional:       General: She is not in acute distress.     Appearance: She is not ill-appearing or toxic-appearing.   HENT:      Head: Normocephalic.   Pulmonary:      Effort: Pulmonary effort is normal.   Musculoskeletal:      Right lower leg: No edema.      Left lower leg: No edema.      Comments: Right heel without tenderness to palpation. No ulcerations present. No tenderness along plantar surfaces. 1+ pulse along dorsalis pedis, difficult to illicit posterior tibialis pulse. 2 second capillary refill in toes. Pink colored toes.    Skin:     General: Skin is warm and dry.      Capillary Refill: Capillary refill takes 2 to 3 seconds.   Neurological:      Mental Status: She is alert.   Psychiatric:         Mood and Affect: Mood normal.         Thought Content: Thought content normal.         I spent greater than 50% of the 20 minutes in the visit coordinating care regarding patient's recommendations towards pains in feet    Return if symptoms worsen or fail to improve.    Marco Britton MD  [facility] INTERNAL MEDICINE [place]

## 2022-08-16 DIAGNOSIS — E78.5 HYPERLIPIDEMIA, UNSPECIFIED HYPERLIPIDEMIA TYPE: ICD-10-CM

## 2022-08-16 RX ORDER — ATORVASTATIN CALCIUM 40 MG/1
40 TABLET, FILM COATED ORAL DAILY
Qty: 90 TABLET | Refills: 0 | Status: SHIPPED | OUTPATIENT
Start: 2022-08-16 | End: 2022-08-31

## 2022-08-16 NOTE — TELEPHONE ENCOUNTER
atorvastatin (LIPITOR) 40 MG tablet      Last Written Prescription Date:  4-13-22  Last Fill Quantity: 90,   # refills: 0  Last Office Visit : 8-12-22  Future Office visit:  8-31-22  Overdue lab LDL, FYI to clinic, lab order in epic  RF 90 day

## 2022-08-16 NOTE — TELEPHONE ENCOUNTER
[facility] Call Center    Phone Message    May a detailed message be left on voicemail: no     Reason for Call: Medication Refill Request    Has the patient contacted the pharmacy for the refill? Yes   Name of medication being requested: atorvastatin (LIPITOR) 40 MG tablet     Provider who prescribed the medication: Dr. Sahu  [facility]  Date medication is needed: 8/16/22

## 2022-08-19 ENCOUNTER — TELEPHONE (OUTPATIENT)
Dept: INTERNAL MEDICINE | Facility: CLINIC | Age: 77
End: 2022-08-19

## 2022-08-19 NOTE — TELEPHONE ENCOUNTER
Pt was informed the results and recommendations.   She still needs to have the fasting labs done ordered by Dr. Sahu and she will have the labs done on 8/23/22.    [facility]  ------------------------------------------------------------------------------        Please let patient know her anemia labs (folate, iron, vitamin B12) appeared normal. A1c also shows to be normal, no prediabetes or diabetes. No changes for now. Discussed moving every 1-2 hours at home for sensation changes in feet. May use biofreeze, icy/hot, or voltaren gel as needed for pains in feet.      Marco Britton MD  Internal Medicine  Primary Care Clinic, [place], [place]

## 2022-08-25 ENCOUNTER — HOSPITAL ENCOUNTER (OUTPATIENT)
Dept: CARDIAC REHAB | Facility: CLINIC | Age: 77
Discharge: HOME | End: 2022-08-25
Payer: COMMERCIAL

## 2022-08-25 PROCEDURE — G0239 OTH RESP PROC, GROUP: HCPCS | Performed by: REHABILITATION PRACTITIONER

## 2022-08-31 ENCOUNTER — OFFICE VISIT (OUTPATIENT)
Dept: FAMILY MEDICINE | Facility: CLINIC | Age: 77
End: 2022-08-31
Payer: COMMERCIAL

## 2022-08-31 VITALS
HEART RATE: 67 BPM | OXYGEN SATURATION: 100 % | SYSTOLIC BLOOD PRESSURE: 119 MMHG | BODY MASS INDEX: 34.28 KG/M2 | DIASTOLIC BLOOD PRESSURE: 78 MMHG | HEIGHT: 64 IN | TEMPERATURE: 98 F

## 2022-08-31 DIAGNOSIS — I10 ESSENTIAL HYPERTENSION: ICD-10-CM

## 2022-08-31 DIAGNOSIS — M79.672 PAIN IN BOTH FEET: ICD-10-CM

## 2022-08-31 DIAGNOSIS — F60.89 CLUSTER C PERSONALITY DISORDER (H): ICD-10-CM

## 2022-08-31 DIAGNOSIS — C50.912 MALIGNANT NEOPLASM OF LEFT FEMALE BREAST, UNSPECIFIED ESTROGEN RECEPTOR STATUS, UNSPECIFIED SITE OF BREAST (H): ICD-10-CM

## 2022-08-31 DIAGNOSIS — J38.00 VOCAL CORD PARALYSIS: ICD-10-CM

## 2022-08-31 DIAGNOSIS — M79.671 PAIN IN BOTH FEET: ICD-10-CM

## 2022-08-31 DIAGNOSIS — M54.50 CHRONIC BILATERAL LOW BACK PAIN WITHOUT SCIATICA: ICD-10-CM

## 2022-08-31 DIAGNOSIS — M40.00 KYPHOSIS (ACQUIRED) (POSTURAL): ICD-10-CM

## 2022-08-31 DIAGNOSIS — J38.3 GLOTTIC INSUFFICIENCY: ICD-10-CM

## 2022-08-31 DIAGNOSIS — Z00.00 ENCOUNTER FOR MEDICARE ANNUAL WELLNESS EXAM: Primary | ICD-10-CM

## 2022-08-31 DIAGNOSIS — G89.29 CHRONIC BILATERAL LOW BACK PAIN WITHOUT SCIATICA: ICD-10-CM

## 2022-08-31 DIAGNOSIS — R06.01 ORTHOPNEA: ICD-10-CM

## 2022-08-31 DIAGNOSIS — E78.5 HYPERLIPIDEMIA, UNSPECIFIED HYPERLIPIDEMIA TYPE: ICD-10-CM

## 2022-08-31 DIAGNOSIS — L98.9 SKIN LESION: ICD-10-CM

## 2022-08-31 DIAGNOSIS — M47.895 OTHER OSTEOARTHRITIS OF SPINE, THORACOLUMBAR REGION: ICD-10-CM

## 2022-08-31 PROCEDURE — G0439 PPPS, SUBSEQ VISIT: HCPCS | Performed by: FAMILY MEDICINE

## 2022-08-31 RX ORDER — LOSARTAN POTASSIUM AND HYDROCHLOROTHIAZIDE 25; 100 MG/1; MG/1
1 TABLET ORAL DAILY
Qty: 90 TABLET | Refills: 3 | Status: SHIPPED | OUTPATIENT
Start: 2022-08-31 | End: 2022-11-17

## 2022-08-31 RX ORDER — ATORVASTATIN CALCIUM 40 MG/1
40 TABLET, FILM COATED ORAL DAILY
Qty: 90 TABLET | Refills: 3 | Status: SHIPPED | OUTPATIENT
Start: 2022-08-31 | End: 2022-11-17

## 2022-08-31 NOTE — Clinical Note
Did you receive the physical form for her day program? Please place in my box by Wednesday. Best wishes, Favian Sahu MD

## 2022-08-31 NOTE — PATIENT INSTRUCTIONS
To schedule an lab appointment at the [facility], please call [phone].       Patient Education   Personalized Prevention Plan  You are due for the preventive services outlined below.  Your care team is available to assist you in scheduling these services.  If you have already completed any of these items, please share that information with your care team to update in your medical record.  Health Maintenance Due   Topic Date Due    Zoster (Shingles) Vaccine (1 of 2) Never done    Mammogram  07/20/2022    Flu Vaccine (1) 09/01/2022      Get inside shoes like beach shoes or Sketchers with arch support they are light weight tennis  shoes      Your health care Provider has recommended that you receive the new Shingle vaccine called Shingrix to prevent shingles for ages 50 and above. Many private insurance and Medicare Part D plans cover Shingrix. However, not all insurance carriers cover the entire cost of the Shingrix vaccine if the vaccine is administered at your primary care clinic. The clinic cannot determine your insurance benefits.  Please call your insurance carrier prior. The vaccine comes in two doses. Your second dose should be 2-6 months from your first dose.       Prior to receiving the vaccine, we recommend that you call your insurance carrier and ask them the following questions:            1. Is there a cost difference if I receive the vaccine at my doctor's office or a pharmacy?          2. Does my insurance cover the Shingrix Vaccine and administration of the vaccine?          3. What is my co-pay or deductible for the vaccine?        Please call to schedule a Nurse-Visit only at [phone].  Nurse Visit hours are available Monday, Wednesday, and Friday from 9:00 AM-11:00 AM and 1:00 PM-3:00 PM.

## 2022-08-31 NOTE — PROGRESS NOTES
Medicare Annual Wellness Questionnaire:  This 76 year old year old female presents for a Medicare Wellness Exam.    Providers/clinics involved in care:  Patient Care Team       Relationship Specialty Notifications Start End    Favian Sahu MD PCP - General Family Practice  6/3/20     Phone: [phone] Fax: [phone]         [address] [facility] [address] [facility] [address]    Senia Olivas MD MD Internal Medicine  9/30/15     Renetta Oates MD MD Pulmonary Disease  10/22/15     Phone: [phone] Fax: [phone]         [address] [facility] [address] [facility] [address]    Marlene Briscoe MD MD Internal Medicine  1/9/17     Tory Hobbs MD MD INTERNAL MEDICINE - ENDOCRINOLOGY, DIABETES & METABOLISM  11/8/17     Phone: [phone] Fax: [phone]         [address] [facility] [address] [facility] [address]    Courtney Gongora MD PhD MD Family Practice  2/19/19     Phone: [phone] Fax: [phone]         [address] [place] [address]    Margaux Nicholson NP Nurse Practitioner Family [facility]  9/24/19     Phone: [phone] Fax: [phone]         [address] [place] [address], [address]LO[address] [facility] [address]    Armand Rodriges MD MD Dermatology  11/25/19     Phone: [phone] Fax: [phone]         [facility] [address] [facility] [address] [facility] [address]    Jodi Gutierrez APRN CNP Nurse Practitioner Nurse Practitioner - Family  2/28/20     Phone: [phone] Fax: [phone]         [address] [place] [address]    Bloch, Lauren Turner, [facility] Pharmacist Pharmacist  3/12/20     Phone: [phone]          [address] [place] [address]    Jack Ernandez MD Resident Primary Care - CC  7/29/20     Phone: [phone] Fax: [phone]         [address] [place] [address]    Alison Schuster MD Assigned Palliative Care Provider   10/23/20     Phone: [phone] Fax: [phone]         PALLIATIVE CARE [address] [place] [address]     Bassam [place] MD Assigned Pulmonology Provider   10/23/20     Phone: [phone] Fax: [phone]         [address] [place] [address]    Domonique Roche MD Assigned Surgical Provider   10/23/20     Phone: [phone] Fax: [phone]         [address] [place] [address]    Favian Sahu MD Assigned PCP   7/16/21     Phone: [phone] Fax: [phone]         [address] [place] [address]    Kiki Eddy, STEVEN Registered Nurse   8/19/21     SpeakerSegundo EP Cardiac Rehabilitation Therapist   10/14/21 10/14/22    Phone: [phone] [address] [facility], [address] [place] [id number]    Karlos Sung Assigned Behavioral Health Provider   11/21/21     Manuel Salinas MD Assigned Musculoskeletal Provider   1/16/22     Phone: [phone] Fax: [phone]         ORTHOPEDIC SURGERY [address] [address]    Trisha Garcia APRN CNP Nurse Practitioner Internal Medicine  5/25/22     Phone: [phone] Fax: [phone]         [address] [place] [address] [address] [place] [address]    Paulette Prajapati    5/26/22     [facility]  [phone]    Segundo Villanueva EP Cardiac Rehabilitation Therapist   8/2/22 8/2/23    Phone: [phone] [address] [facility], [address] [facility] [address]    Marco Britton MD MD Internal Medicine  8/9/22     Phone: [phone] Pager: [phone] Fax: [phone]        [address] [facility] [address]          Fall Risk Assessment:    Have you fallen 2 or more times in the last year?  No    How many times were you injured due to a fall in the last year?  0    PHQ-2:    Over the last 2 weeks, how often have you been bothered by feeling down, depressed, or hopeless?     Not at all (0)     Over the last 2 weeks, how often have you had little interest or pleasure in doing things?     Not at all (0)     Social History:    What is your marital status?  Single    Who lives  "in your household?  myself    Does your home have loose rugs in the hallway:     No    Does your home have grab bars in the bathroom:    Yes     Does your home have handrails on the stairs?  Yes     Does your home have poorly lit areas?    No    Do you feel threatened or controlled by a partner, ex-partner or anyone in your life?   No    Has anyone hurt you physically, for example by pushing, hitting, slapping or kicking you   or forcing you to have sex?   No    Do you need help with the phone, transportation, shopping, preparing meals, housework, laundry, medications or managing money?   No    Sexual Health:    Are you sexually active?    No    If yes, with men, women, or both?  n/a    If yes, how many partners?  n/a    If yes, are you using condoms?    N/a    Have you had any sexually transmitted infections in the last year?   No    Do you have any sexual concerns?    N/a    Women Only:    Women: What year did you stop having periods (approximate age)?  1983    Women: Any vaginal bleeding in the last year?    No    Women: Have you ever had an abnormal Pap smear?    No    General Health Assessment:    Have you noticed any hearing difficulties?   No    Do you wear hearing aids?   No    Have you seen a hearing professional such as an audiologist in the last 1 year?   No    Do you have vision difficulty?    Yes     Do you wear glasses or contacts?   Yes     Have you seen an eye doctor in the last 1 year?   Yes     How many servings of fruits and vegetables do you eat a day?  Patient wrote \"No\"     How often do you exercise in a week?  none    How long and what kind of exercise do you do?  n/a    Tobacco and Alcohol History:    Do you use tobacco/nicotine products?    No    If yes, please list the method of use and average weekly consumption?  n/a    Do you use any other drugs?   No         Do you drink alcohol?   No    If you drink alcohol, how many drinks per week?  n/a      Advance Directive:    Have you completed " an Advance Directives document?  Yes     If yes, have you given a copy to the clinic?   No    Do you need information on Advance Directives?   Yes

## 2022-08-31 NOTE — LETTER
"9/29/2021      RE: Ca Yan  [address]  [facility] [address]       [facility] [place]: Integrated Behavioral Health  September 29, 2021      Behavioral Health Clinician Progress Note    Patient Name: Ca Yan           Service Type: Phone Visit      Service Location:  Phone Visit      Session Start Time: 2:30 Session End Time: 2:56      Session Length: 16 - 37      Attendees: Patient    Visit Activities (Refresh list every visit): [facility] Only and Phone Encounter    Ca Yan is a 75 year old female who is being evaluated via a telephone visit.      The patient has been notified of the following:     \"We have found that certain health care needs can be provided without the need for a face to face visit.  This service lets us provide the care you need with a short phone conversation.      I will have full access to your [place] medical record during this entire phone call.   I will be taking notes for your medical record.     Since this is like an office visit, we will bill your insurance company for this service.  Please check with your medical insurance if this type of telephone visit/virtual care is covered.  You may be responsible for the cost of this service if insurance coverage is denied.      There are potential benefits and risks of telephone visits (e.g. limits to patient confidentiality) that differ from in-person visits.?  Confidentiality still applies for telephone services, and nobody will record the visit.  It is important to be in a quiet, private space that is free of distractions (including cell phone or other devices) during the visit.??     If during the course of the call I believe a telephone visit is not appropriate, you will not be charged for this service\"    Consent has been obtained for this service by care team member: yes.      Diagnostic Assessment Date: IP  Treatment Plan Review Date: IP  See Flowsheets for " "today's PHQ-9 and RENATO-7 results  Previous PHQ-9:   PHQ-9 SCORE 3/28/2019 6/3/2020 4/7/2021   PHQ-9 Total Score MyChart 1 (Minimal depression) - -   PHQ-9 Total Score - 6 0   Some encounter information is confidential and restricted. Go to Review Flowsheets activity to see all data.     Previous RENATO-7:   RENATO-7 SCORE 1/24/2020 6/3/2020 4/7/2021   Total Score 14 (moderate anxiety) - -   Total Score 14 17 3   Some encounter information is confidential and restricted. Go to Review Flowsheets activity to see all data.       DATA  Extended Session (60+ minutes): No  Interactive Complexity: No  Crisis: No    Treatment Objective(s) Addressed in This Session:  Target Behavior(s): disease management/lifestyle changes      Anxiety: will experience a reduction in anxiety, will develop more effective coping skills to manage anxiety symptoms, will develop healthy cognitive patterns and beliefs and will increase ability to function adaptively    Current Stressors / Issues:  [facility] met with Ca over the phone today to continue supporting her treatment of mood difficulties. This appointment was rescheduled from earlier today due [facility] responsibilities that required us to postpone our session today.     We continued to discuss her case - what she is dealing with emotionally and would like help addressing. Ca presented today with concern about her SOB issues and feeling anxious when she leaves home. She inquired about ways she could better manage her anxious thoughts and anxiety feelings when she experiences labored breathing. She discussed how this occurs most in public spaces and we discussed a few instances of this for background. [facility] recommended Ca make use of calming self-statements to help her manage her anxious distress. For instance, we talked about using statements such as \"this will pass\" or \"I'll get through this, this passes soon.\"     Subjectively, Ca sounded dissatisfied with the coping skills we discussed " today. When asked about this, she indicated that she does not believe therapy will be helpful for her right now. [facility] offered to help connect her with another provider, noting that fit can be important to have with a counselor, however she declined this offer. She was willing to take this provider's contact information for scheduling a a follow-up as needed.       Progress on Treatment Objective(s) / Homework:  No improvement - PRECONTEMPLATION (Not seeing need for change); Intervened by educating the patient about the effects of current behavior on health.  Evoked information about reasons to continue behavior, express concern / recommendations, and explored any change talk    Motivational Interviewing    MI Intervention: Expressed Empathy/Understanding, Supported Autonomy, Collaboration, Evocation, Permission to raise concern or advise, Open-ended questions, Reflections: simple and complex, Change talk (evoked) and Reframe     Change Talk Expressed by the Patient: Reasons to change    Provider Response to Change Talk: E - Evoked more info from patient about behavior change, A - Affirmed patient's thoughts, decisions, or attempts at behavior change, R - Reflected patient's change talk and S - Summarized patient's change talk statements    Also provided psychoeducation about behavioral health condition, symptoms, and treatment options    Presented Ca CBT strategies today to help with anxious thoughts    Care Plan review completed: No    Medication Review:  No changes to current psychiatric medication(s)     Current Outpatient Medications   Medication     atorvastatin (LIPITOR) 40 MG tablet     cephALEXin (KEFLEX) 250 MG capsule     clotrimazole (LOTRIMIN) 1 % external cream     ipratropium (ATROVENT) 0.03 % nasal spray     latanoprost (XALATAN) 0.005 % ophthalmic solution     losartan-hydrochlorothiazide (HYZAAR) 100-25 MG tablet     mirtazapine (REMERON) 7.5 MG tablet     quetiapine (SEROQUEL) 200 MG tablet      sertraline (ZOLOFT) 100 MG tablet     Vitamin D3 (CHOLECALCIFEROL) 25 mcg (1000 units) tablet     No current facility-administered medications for this visit.       Medication Compliance:  Yes    Changes in Health Issues:   None reported    Chemical Use Review:   Substance Use: Chemical use reviewed, no active concerns identified      Tobacco Use: No current tobacco use.       CAGE-AID Total Score 3/28/2019   Total Score MyChart 0 (A total score of 2 or greater is considered clinically significant)   Some encounter information is confidential and restricted. Go to Review Flowsheets activity to see all data.       CAGE-AID score  > 1 is a positive screen, suggesting further discussion is needed to determine if evaluation for alcohol or substance abuse is appropriate.  A score > 2 is considered clinically significant, suggesting further evaluation of alcohol or substance-related problems is indicated.      Social History     Tobacco Use     Smoking status: Former Smoker     Packs/day: 0.50     Years: 5.00     Pack years: 2.50     Types: Cigarettes     Start date: 1956     Quit date: 1980     Years since quittin.0     Smokeless tobacco: Former User     Quit date: 1980   Substance Use Topics     Alcohol use: Not Currently     Alcohol/week: 0.0 standard drinks     Comment: Sober for 2 years, previous alcoholic     Drug use: No         Assessment: Current Emotional / Mental Status (status of significant symptoms):  Risk status (Self / Other harm or suicidal ideation)  Patient has had a history of suicidal ideation: passive thoughts only and denies a history of suicide attempts, self-injurious behavior, homicidal ideation, homicidal behavior and and other safety concerns     Patient denies current fears or concerns for personal safety.  Patient denies current or recent suicidal ideation or behaviors.  Patient denies current or recent homicidal ideation or behaviors.  Patient denies current or recent self  injurious behavior or ideation.  Patient denies other safety concerns.     A safety and risk management plan has not been developed at this time, however patient was encouraged to call [place] [phone] should there be a change in any of these risk factors.     [place] Suicide Severity Rating Scale (Lifetime/Recent)  [place] Suicide Severity Rating (Lifetime/Recent) 9/10/2021   1. Wish to be Dead (Lifetime) Yes   1. Wish to be Dead (Recent) No   2. Non-Specific Active Suicidal Thoughts (Lifetime) No   2. Non-Specific Active Suicidal Thoughts (Recent) No   3. Active Suicidal Ideation with any Methods (Not Plan) Without Intent to Act (Lifetime) No   3. Active Suicidal Ideation with any Methods (Not Plan) Without Intent to Act (Recent) No   4. Active Suicidal Ideation with Some Intent to Act, Without Specific Plan (Lifetime) No   4. Active Suicidal Ideation with Some Intent to Act, Without Specific Plan (Recent) No   5. Active Suicidal Ideation with Specific Plan and Intent (Lifetime) No   5. Active Suicidal Ideation with Specific Plan and Intent (Recent) No   Most Severe Ideation Rating (Lifetime) 1   Frequency (Lifetime) 1   Duration (Lifetime) 1   Controllability (Lifetime) 1   Protective Factors  (Lifetime) 1   Reasons for Ideation (Lifetime) 0   Most Severe Ideation Rating (Past Month) NA   Frequency (Past Month) NA   Duration (Past Month) NA   Controllability (Past Month) NA   Protective Factors (Past Month) NA   Reasons for Ideation (Past Month) NA   Actual Attempt (Lifetime) No   Actual Attempt (Past 3 Months) No   Has subject engaged in non-suicidal self-injurious behavior? (Lifetime) No   Has subject engaged in non-suicidal self-injurious behavior? (Past 3 Months) No   Interrupted Attempts (Lifetime) No   Interrupted Attempts (Past 3 Months) No   Aborted or Self-Interrupted Attempt (Lifetime) No   Aborted or Self-Interrupted Attempt (Past 3 Months) No   Preparatory Acts or Behavior (Lifetime) No    Preparatory Acts or Behavior (Past 3 Months) No   Most Recent Attempt Actual Lethality Code NA   Most Lethal Attempt Actual Lethality Code NA   Initial/First Attempt Actual Lethality Code NA     [place] Suicide Severity Rating Scale (Short Version)  [place] Suicide Severity Rating (Short Version) 4/30/2021   Over the past 2 weeks have you felt down, depressed, or hopeless? no   Over the past 2 weeks have you had thoughts of killing yourself? no   Have you ever attempted to kill yourself? no       Appearance:   Unable to assess   Eye Contact:   Unable to assess   Psychomotor Behavior: Unable to assess   Attitude:   Cooperative   Orientation:   All  Speech   Rate / Production: Normal    Volume:  Normal   Mood:    Normal  Affect:    Appropriate   Thought Content:  Clear   Thought Form:  Coherent  Logical   Insight:    Good     Diagnoses:  1. Generalized anxiety disorder    per hx and records    Collateral Reports Completed:  Routed note to Care Team Member(s)    Plan: (Homework, other):  Patient was given information about behavioral services and encouraged to schedule a follow up appointment with the clinic [facility] as needed. She declined a follow-up visit and offer to connect with another provider.  She was also given information about mental health symptoms and treatment options  and Cognitive Behavioral Therapy skills to practice when experiencing anxiety.  CD Recommendations: No indications of CD issues.    Karlos Sung Psy.D, LP   Behavioral Health Clinician   [facility] [place]         Karlos Sung       Alternatives Discussed Intro (Do Not Add Period): I discussed alternative treatments to Mohs surgery and specifically discussed the risks and benefits of

## 2022-08-31 NOTE — NURSING NOTE
Ca Yan is a 76 year old female that presents in clinic today for the following:     Chief Complaint   Patient presents with     Physical     Physical appointment to complete forms per patient       The patient's allergies and medications were reviewed. The patient's vitals were obtained without incident. The patient does not have any other questions for the provider.     Luis Felipe Block, EMT at 9:05 AM on 8/31/2022.  Primary Care Clinic: [phone]

## 2022-08-31 NOTE — PROGRESS NOTES
"SUBJECTIVE:   Ca Yan is a 76 year old female who presents for Preventive Visit.    Ca is a 76-year-old with history of anxiety /depression, significant kyphoscoliosis causing impairment of respiratory function, benign essential hypertension, past history of breast cancer, vocal cord paralysis with dysphonia who presents today for primary care follow-up, preventive care, form completion. She has dysphonia and significant anxiety related to her breathing restriction from Kyphoscoliosis. She also had bowel concerns now resolved. She gets very anxious living alone and \"panics\" calls help lines. She is aware she \"over-reacts\" at times.     She was upset she had to wait but calmed upon my entering room. Staff indicated she was angry. I discussed need to be appropriate to staff.  Feet are terrible, aching does not wear shoes at home.  Patient has been advised of split billing requirements and indicates understanding: Yes  Are you in the first 12 months of your Medicare Part B coverage?  No    Physical Health:    In general, how would you rate your overall physical health? poor  Outside of work, how many days during the week do you exercise? none     Mental Health:    In general, how would you rate your overall mental or emotional health? poor  PHQ-2 Score:     See staff note reviewed by me.  Do you feel safe in your environment? Yes    Have you ever done Advance Care Planning? (For example, a Health Directive, POLST, or a discussion with a medical provider or your loved ones about your wishes): No , declined    Additional concerns to address?  YES Needs form completion for Day Program. The form was not available today. We contacted her program to fax.    (September 7, 2022 We contacted [facility] to determine if form needed to be provided for the day program.  They indicated she was discharged from the program because she did not show multiple times therefore they did not require form physical.)    Fall " risk: In a wheel chair has difficulty ambulating       Cognitive Screening: Not appropriate due to mental/ mood disorder    Reviewed and updated as needed this visit by clinical staff   Tobacco  Allergies  Meds  Problems  Med Hx  Surg Hx  Fam Hx            Reviewed and updated as needed this visit by Provider   Tobacco  Allergies  Meds  Problems  Med Hx  Surg Hx  Fam Hx           Social History     Tobacco Use     Smoking status: Former Smoker     Packs/day: 0.50     Years: 5.00     Pack years: 2.50     Types: Cigarettes     Start date: 1956     Quit date: 1980     Years since quittin.9     Smokeless tobacco: Former User     Quit date: 1980   Substance Use Topics     Alcohol use: Not Currently     Alcohol/week: 0.0 standard drinks     Comment: Sober for 2 years, previous alcoholic                           Current providers sharing in care for this patient include: Patient Care Team:  Favian Sahu MD as PCP - General (Family Practice)  Senia Olivas MD as MD (Internal Medicine)  Renetta Oates MD as MD (Pulmonary Disease)  Marlene Briscoe MD as MD (Internal Medicine)  Tory Hobbs MD as MD (INTERNAL MEDICINE - ENDOCRINOLOGY, DIABETES & METABOLISM)  Courtney Gongora MD PhD as MD (Family Practice)  Margaux Nicholson NP as Nurse Practitioner (Family Practice)  Armand Rodriges MD as MD (Dermatology)  Jodi Gutierrez APRN CNP as Nurse Practitioner (Nurse Practitioner - Family)  Bloch, Lauren Turner, [facility] as Pharmacist (Pharmacist)  Jack Ernandez MD as Resident (Primary Care - CC)  Alison Schuster MD as Assigned Palliative Care Provider  Bassam Taylor MD as Assigned Pulmonology Provider  Domonique Roche MD as Assigned Surgical Provider  Favian Sahu MD as Assigned PCP  Kiki Eddy RN as Registered Nurse  , ZHEN Raymond as Cardiac Rehabilitation Therapist  Karlos Sung as Assigned Behavioral Health  Provider  Manuel Salinas MD as Assigned Musculoskeletal Provider  Trisha Garcia APRN CNP as Nurse Practitioner (Internal Medicine)  Paulette Prajapati as   Speaker, ZHEN Raymond as Cardiac Rehabilitation Therapist  Marco Britton MD as MD (Internal Medicine)    The following health maintenance items are reviewed in Epic and correct as of today:  Health Maintenance   Topic Date Due     ZOSTER IMMUNIZATION (1 of 2) Never done     MAMMO SCREENING  07/20/2022     INFLUENZA VACCINE (1) 09/01/2022     FALL RISK ASSESSMENT  04/06/2023     MEDICARE ANNUAL WELLNESS VISIT  08/31/2023     ADVANCE CARE PLANNING  04/07/2026     LIPID  06/23/2026     DTAP/TDAP/TD IMMUNIZATION (4 - Td or Tdap) 08/18/2031     DEXA  04/12/2032     SPIROMETRY  Completed     HEPATITIS C SCREENING  Completed     COVID-19 Vaccine  Completed     COPD ACTION PLAN  Addressed     IPV IMMUNIZATION  Aged Out     MENINGITIS IMMUNIZATION  Aged Out     HEPATITIS B IMMUNIZATION  Aged Out     Pneumococcal Vaccine: 65+ Years  Discontinued     Labs reviewed in EPIC  BP Readings from Last 3 Encounters:   08/31/22 119/78   07/08/22 (!) 167/81   05/24/22 138/82    Wt Readings from Last 3 Encounters:   08/12/22 89.4 kg (197 lb 1.6 oz)   06/07/22 89.8 kg (198 lb)   05/24/22 86.2 kg (190 lb)                  Patient Active Problem List   Diagnosis     Non morbid obesity due to excess calories     Alcohol abuse     Malignant neoplasm of breast (H)     Arthritis of knee     Diarrhea     Diastolic dysfunction     Osteoarthritis of spine     Gastroesophageal reflux disease     Generalized anxiety disorder     Essential hypertension     Hyperlipidemia     Insomnia     Irritable bowel syndrome     Kyphoscoliosis     Cluster C personality disorder (H)The  anxious/fearful  cluster is comprised of dependent, avoidant, and obsessive-compulsive personality disorders. Patients with Cluster C disorders are emotionally i     Seborrheic eczema     Anemia      Anxiety state     Bunion     Low bone density     Fecal incontinence     Lumbago     Cognitive impairment     Parathyroid hormone excess (H)     Chronic fatigue     Vocal cord paralysis     Dysphonia     Callus of foot     Nail dystrophy     Elevated MCV     Vitamin D deficiency     Pain in both feet     Orthopnea     Past Surgical History:   Procedure Laterality Date     BACK SURGERY      spinal fusion     COLONOSCOPY       LARYNGOSCOPY WITH MICROSCOPE N/A 2021    Procedure: FLEXIBLE LARYNGOSCOPY;  Surgeon: Domonique Roche MD;  Location: [facility] OR     LUMPECTOMY BREAST       ORTHOPEDIC SURGERY      Knee surgery left side       Social History     Tobacco Use     Smoking status: Former Smoker     Packs/day: 0.50     Years: 5.00     Pack years: 2.50     Types: Cigarettes     Start date: 1956     Quit date: 1980     Years since quittin.9     Smokeless tobacco: Former User     Quit date: 1980   Substance Use Topics     Alcohol use: Not Currently     Alcohol/week: 0.0 standard drinks     Comment: Sober for 2 years, previous alcoholic     Family History   Problem Relation Age of Onset     Breast Cancer Mother      Diabetes Mother      Anxiety Disorder Mother      Asthma Mother      Other Cancer Mother      Hyperlipidemia Mother      Alcohol/Drug Father      Mental Illness Father      Substance Abuse Father      Obesity Father      Cerebrovascular Disease Maternal Grandmother 67     Other - See Comments Maternal Aunt         lived to          Current Outpatient Medications   Medication Sig Dispense Refill     atorvastatin (LIPITOR) 40 MG tablet Take 1 tablet (40 mg) by mouth daily 90 tablet 3     azelastine (ASTELIN) 0.1 % nasal spray SMARTSIG:Both Nares       cephALEXin (KEFLEX) 250 MG capsule Take 250 mg by mouth       clotrimazole (LOTRIMIN) 1 % external cream Apply topically 2 times daily as needed (under arms breast groin rash until rash resolves) 60 g 1     Emollient (CERAVE) CREA Please apply  twice daily to dry skin of body and feet 453 g 3     ipratropium (ATROVENT) 0.03 % nasal spray 2 sprays each nostril twice daily for clear rhinorrhea 30 mL 1     latanoprost (XALATAN) 0.005 % ophthalmic solution 1 drop       losartan-hydrochlorothiazide (HYZAAR) 100-25 MG tablet Take 1 tablet by mouth daily 90 tablet 3     mirtazapine (REMERON) 7.5 MG tablet        QUEtiapine (SEROQUEL) 300 MG tablet Take 300 mg by mouth       sertraline (ZOLOFT) 100 MG tablet Take 1.5 tablets (150 mg) by mouth daily 135 tablet 3     Vitamin D3 (CHOLECALCIFEROL) 25 mcg (1000 units) tablet Take 1 tablet (25 mcg) by mouth daily 100 tablet 3     Allergies   Allergen Reactions     Azithromycin Itching     Codeine Nausea and Vomiting     Hydrocodone Nausea and Vomiting     Metronidazole Nausea     Oxycodone Itching and Rash     Percocet [Oxycodone-Acetaminophen] Nausea and Vomiting     Pollen Extract      sneezing and runny nose.      Seasonal Allergies      sneezing and runny nose.      Vicodin [Hydrocodone-Acetaminophen] Nausea and Vomiting     Zolpidem      Amnestic behavior     Recent Labs   Lab Test 08/12/22  1408 07/08/22  1437 06/23/21  0940 04/22/21  1145 10/08/20  1035 08/20/20  1202 01/21/20  0920 11/04/16  1350 04/21/16  1443   A1C 5.6  --   --   --   --   --   --   --  5.8   LDL  --   --  87 182*  --   --  100*  --   --    HDL  --   --  90 90  --   --  89  --   --    TRIG  --   --  82 87  --   --  104  --   --    ALT  --  7*  --  14  --  15  --    < >  --    CR  --  0.73  --  0.92  --  0.81 0.81   < >  --    GFRESTIMATED  --  84  --  60* 61 70 71   < >  --    GFRESTBLACK  --   --   --  70 74 81 82   < >  --    POTASSIUM  --  3.9  --  4.1  --  3.9 4.1   < >  --    TSH  --   --   --  2.04  --  2.39  --    < >  --     < > = values in this interval not displayed.          ROS:  Problem list, PMH, Surgical HX, FH, SH, allergies, medications,immunizations reviewed and updated in Epic. 10 point ROS negative other than noted in HPI  "and ROS.      OBJECTIVE:   /78 (BP Location: Right arm, Patient Position: Sitting, Cuff Size: Adult Regular)   Pulse 67   Temp 98  F (36.7  C) (Oral)   Ht 1.615 m (5' 3.58\")   LMP  (LMP Unknown)   SpO2 100%   BMI 34.28 kg/m   Estimated body mass index is 34.28 kg/m  as calculated from the following:    Height as of this encounter: 1.615 m (5' 3.58\").    Weight as of 8/12/22: 89.4 kg (197 lb 1.6 oz).  EXAM:   Constitutional: Oriented to person, place, and time. Vital signs are noted.  Appears well-nourished, well groomed arrived ahead of time for appt. Non-toxic appearance.  No distress. She was cooperative and interactive but angry about waiting, calmed after redirection.  She has vocal dysphonia. She used mask the entire visit but had to be reminded to place over nose. No acute respiratory distress. White hair, using oxygen today   HENT:Oral exam no thrush TM normal  Head: Normocephalic and atraumatic.   Eyes: Conjunctivae and EOM are normal. Pupils are equal, round, and reactive to light. No scleral icterus. Glasses  Neck:  Kyphosis, Deformity  Neck trachea deviation  Cardiovascular: Regular rhythm increases slightly with inspiration, normal heart sounds. Trace  murmur present in pulmonic region.   Pulmonary/Chest: Effort normal and breath sounds normal. No respiratory distress but indicates she feels short of breath related to her Scoliosis   Abdominal: Obese nontender exam sitting in chair  Musculoskeletal:Significant kyphoscoliosis, bilateral chronic lymphedema. Bilateral osteoarthritis of knees   Neurological: Alert and oriented to person, place, and time. General deconditioning, sitting in wheelchair but moves all extremities  Chronic dysphonia  Skin: Pigmented skin lesion noted. Skin is warm and dry, mild pallor. No rash noted. No erythema.   Psychiatric: Fixed ideas,  cooperative, thought coherent but expresses worry about her breathing related to musculoskeletal limitations scoliosis impacting " lung volumes,worry about living alone but does not want to change the situation.  PHQ 6/3/2020 4/7/2021 11/10/2021   PHQ-9 Total Score 6 0 0   Q9: Thoughts of better off dead/self-harm past 2 weeks Not at all Not at all Not at all     RENATO-7 SCORE 6/3/2020 4/7/2021 11/10/2021   Total Score - - -   Total Score 17 3 10   Total Score BEH Adult - - -         ASSESSMENT / PLAN:   Ca was seen today for physical.    Diagnoses and all orders for this visit:    Encounter for Medicare annual wellness exam    Malignant neoplasm of left female breast, unspecified estrogen receptor status, unspecified site of breast (H)  Mammogram ordered  Essential hypertension   Controlled refill provided  -     losartan-hydrochlorothiazide (HYZAAR) 100-25 MG tablet; Take 1 tablet by mouth daily    Cluster C personality disorder (H)  Has out busts at times, also calls EMS. Reviewed appropriate behavior. She apologized to staff today    Kyphosis (acquired) (postural)  Chronic bilateral low back pain without sciatica  Other osteoarthritis of spine, thoracolumbar region  Orthopnea  -     Miscellaneous Order for DME - ONLY FOR DME    Above all contributes to decreased ability to expand lungs I reviewed consider Incentive spirometer to increase lung volume.    Vocal cord paralysis  Glottic insufficiency  Stable    Pain in both feet  I encouraged her to wear supportive footwear in the home setting    Hyperlipidemia, unspecified hyperlipidemia type  Refills provided  -     atorvastatin (LIPITOR) 40 MG tablet; Take 1 tablet (40 mg) by mouth daily    Skin lesion  Referral to assess skin  -     Adult Dermatology Referral; Future        Patient has been advised of split billing requirements and indicates understanding: Yes    COUNSELING:  Reviewed preventive health counseling, as reflected in patient instructions       Healthy diet/nutrition       Vision screening       Hearing screening       Dental care       Fall risk prevention    Estimated body  "mass index is 34.28 kg/m  as calculated from the following:    Height as of this encounter: 1.615 m (5' 3.58\").    Weight as of 8/12/22: 89.4 kg (197 lb 1.6 oz).        She reports that she quit smoking about 41 years ago. Her smoking use included cigarettes. She started smoking about 66 years ago. She has a 2.50 pack-year smoking history. She quit smokeless tobacco use about 42 years ago.    Appropriate preventive services were discussed with this patient, including applicable screening as appropriate for cardiovascular disease, diabetes, osteopenia/osteoporosis, and glaucoma.  As appropriate for age/gender, discussed screening for colorectal cancer, prostate cancer, breast cancer, and cervical cancer. Checklist reviewing preventive services available has been given to the patient.    Reviewed patients plan of care and provided an AVS. The Basic Care Plan (routine screening as documented in Health Maintenance) for Ca meets the Care Plan requirement. This Care Plan has been established and reviewed with the Patient.    Counseling Resources:  ATP IV Guidelines  Pooled Cohorts Equation Calculator  Breast Cancer Risk Calculator  BRCA-Related Cancer Risk Assessment: FHS-7 Tool  FRAX Risk Assessment  ICSI Preventive Guidelines  Dietary Guidelines for Americans, 2010  [employer] MyPlate  ASA Prophylaxis  Lung CA Screening    Favian Sahu MD  [facility] [place]  "

## 2022-09-06 ENCOUNTER — HOSPITAL ENCOUNTER (OUTPATIENT)
Dept: CARDIAC REHAB | Facility: CLINIC | Age: 77
Discharge: HOME | End: 2022-09-06
Payer: COMMERCIAL

## 2022-09-06 PROCEDURE — G0239 OTH RESP PROC, GROUP: HCPCS | Performed by: REHABILITATION PRACTITIONER

## 2022-09-12 ENCOUNTER — ANCILLARY PROCEDURE (OUTPATIENT)
Dept: MAMMOGRAPHY | Facility: CLINIC | Age: 77
End: 2022-09-12
Attending: FAMILY MEDICINE
Payer: COMMERCIAL

## 2022-09-12 DIAGNOSIS — Z12.31 VISIT FOR SCREENING MAMMOGRAM: ICD-10-CM

## 2022-09-12 PROCEDURE — 77067 SCR MAMMO BI INCL CAD: CPT | Mod: GC | Performed by: STUDENT IN AN ORGANIZED HEALTH CARE EDUCATION/TRAINING PROGRAM

## 2022-09-12 PROCEDURE — 77063 BREAST TOMOSYNTHESIS BI: CPT | Mod: GC | Performed by: STUDENT IN AN ORGANIZED HEALTH CARE EDUCATION/TRAINING PROGRAM

## 2022-09-12 NOTE — LETTER
September 13, 2022      Ca Yan  [address]   [place] [place] [address]        Dear ,    We are writing to inform you of your test results.    Dear Ca Yan   Congratulations. The result of your recent mammogram was normal.   Best wishes,   Favian Sahu MD           Resulted Orders   MA Screen Bilateral w/Eddi    Narrative    BILATERAL FULL FIELD DIGITAL SCREENING MAMMOGRAM WITH TOMOSYNTHESIS    Performed on: 9/12/22    Compared to: 07/20/2021, 07/01/2020, and 03/13/2019    Technique:  This study was evaluated with the assistance of Computer-Aided   Detection.  Breast Tomosynthesis was used in interpretation.    Findings: The breasts are heterogeneously dense, which may obscure small   masses.  There is no radiographic evidence of malignancy.     Impression    IMPRESSION: ACR BI-RADS Category 1: Negative    RECOMMENDED FOLLOW-UP: Annual routine screening mammogram    The results and recommendations of this examination will be communicated   to the patient.    I have personally reviewed the examination and initial interpretation  and I agree with the findings.         If you have any questions or concerns, please call the clinic at the number listed above.       Sincerely,      Favian Sahu MD

## 2022-09-20 ENCOUNTER — HOSPITAL ENCOUNTER (OUTPATIENT)
Dept: CARDIAC REHAB | Facility: CLINIC | Age: 77
Discharge: HOME | End: 2022-09-20
Payer: COMMERCIAL

## 2022-09-20 PROCEDURE — G0239 OTH RESP PROC, GROUP: HCPCS

## 2022-09-22 ENCOUNTER — HOSPITAL ENCOUNTER (EMERGENCY)
Facility: CLINIC | Age: 77
Discharge: HOME | End: 2022-09-22
Attending: EMERGENCY MEDICINE | Admitting: EMERGENCY MEDICINE
Payer: COMMERCIAL

## 2022-09-22 ENCOUNTER — NURSE TRIAGE (OUTPATIENT)
Dept: NURSING | Facility: CLINIC | Age: 77
End: 2022-09-22

## 2022-09-22 ENCOUNTER — APPOINTMENT (OUTPATIENT)
Dept: ULTRASOUND IMAGING | Facility: CLINIC | Age: 77
End: 2022-09-22
Attending: EMERGENCY MEDICINE
Payer: COMMERCIAL

## 2022-09-22 ENCOUNTER — TELEPHONE (OUTPATIENT)
Dept: FAMILY MEDICINE | Facility: CLINIC | Age: 77
End: 2022-09-22

## 2022-09-22 ENCOUNTER — APPOINTMENT (OUTPATIENT)
Dept: GENERAL RADIOLOGY | Facility: CLINIC | Age: 77
End: 2022-09-22
Attending: EMERGENCY MEDICINE
Payer: COMMERCIAL

## 2022-09-22 VITALS
SYSTOLIC BLOOD PRESSURE: 158 MMHG | OXYGEN SATURATION: 92 % | TEMPERATURE: 98.4 F | DIASTOLIC BLOOD PRESSURE: 89 MMHG | HEART RATE: 85 BPM

## 2022-09-22 DIAGNOSIS — R60.0 BILATERAL LOWER EXTREMITY EDEMA: ICD-10-CM

## 2022-09-22 LAB
ALBUMIN SERPL BCG-MCNC: 4.1 G/DL (ref 3.5–5.2)
ALP SERPL-CCNC: 102 U/L (ref 35–104)
ALT SERPL W P-5'-P-CCNC: 9 U/L (ref 10–35)
ANION GAP SERPL CALCULATED.3IONS-SCNC: 7 MMOL/L (ref 7–15)
AST SERPL W P-5'-P-CCNC: 29 U/L (ref 10–35)
ATRIAL RATE - MUSE: 72 BPM
BASOPHILS # BLD AUTO: 0 10E3/UL (ref 0–0.2)
BASOPHILS NFR BLD AUTO: 1 %
BILIRUB SERPL-MCNC: 0.4 MG/DL
BUN SERPL-MCNC: 20.5 MG/DL (ref 8–23)
CALCIUM SERPL-MCNC: 9.2 MG/DL (ref 8.8–10.2)
CHLORIDE SERPL-SCNC: 99 MMOL/L (ref 98–107)
CREAT SERPL-MCNC: 0.74 MG/DL (ref 0.51–0.95)
DEPRECATED HCO3 PLAS-SCNC: 34 MMOL/L (ref 22–29)
DIASTOLIC BLOOD PRESSURE - MUSE: NORMAL MMHG
EOSINOPHIL # BLD AUTO: 0.2 10E3/UL (ref 0–0.7)
EOSINOPHIL NFR BLD AUTO: 2 %
ERYTHROCYTE [DISTWIDTH] IN BLOOD BY AUTOMATED COUNT: 14.6 % (ref 10–15)
GFR SERPL CREATININE-BSD FRML MDRD: 82 ML/MIN/1.73M2
GLUCOSE SERPL-MCNC: 106 MG/DL (ref 70–99)
HCT VFR BLD AUTO: 33.1 % (ref 35–47)
HGB BLD-MCNC: 10 G/DL (ref 11.7–15.7)
HOLD SPECIMEN: NORMAL
HOLD SPECIMEN: NORMAL
IMM GRANULOCYTES # BLD: 0 10E3/UL
IMM GRANULOCYTES NFR BLD: 0 %
INTERPRETATION ECG - MUSE: NORMAL
LYMPHOCYTES # BLD AUTO: 0.8 10E3/UL (ref 0.8–5.3)
LYMPHOCYTES NFR BLD AUTO: 10 %
MCH RBC QN AUTO: 30.4 PG (ref 26.5–33)
MCHC RBC AUTO-ENTMCNC: 30.2 G/DL (ref 31.5–36.5)
MCV RBC AUTO: 101 FL (ref 78–100)
MONOCYTES # BLD AUTO: 0.7 10E3/UL (ref 0–1.3)
MONOCYTES NFR BLD AUTO: 8 %
NEUTROPHILS # BLD AUTO: 6.4 10E3/UL (ref 1.6–8.3)
NEUTROPHILS NFR BLD AUTO: 79 %
NRBC # BLD AUTO: 0 10E3/UL
NRBC BLD AUTO-RTO: 0 /100
NT-PROBNP SERPL-MCNC: 278 PG/ML (ref 0–1800)
P AXIS - MUSE: 49 DEGREES
PLATELET # BLD AUTO: 219 10E3/UL (ref 150–450)
POTASSIUM SERPL-SCNC: 4.2 MMOL/L (ref 3.4–5.3)
PR INTERVAL - MUSE: 136 MS
PROT SERPL-MCNC: 7.7 G/DL (ref 6.4–8.3)
QRS DURATION - MUSE: 90 MS
QT - MUSE: 398 MS
QTC - MUSE: 435 MS
R AXIS - MUSE: 2 DEGREES
RBC # BLD AUTO: 3.29 10E6/UL (ref 3.8–5.2)
SODIUM SERPL-SCNC: 140 MMOL/L (ref 136–145)
SYSTOLIC BLOOD PRESSURE - MUSE: NORMAL MMHG
T AXIS - MUSE: 65 DEGREES
TROPONIN T SERPL HS-MCNC: 9 NG/L
VENTRICULAR RATE- MUSE: 72 BPM
WBC # BLD AUTO: 8.2 10E3/UL (ref 4–11)

## 2022-09-22 PROCEDURE — 99284 EMERGENCY DEPT VISIT MOD MDM: CPT | Mod: 25 | Performed by: EMERGENCY MEDICINE

## 2022-09-22 PROCEDURE — 83880 ASSAY OF NATRIURETIC PEPTIDE: CPT | Performed by: EMERGENCY MEDICINE

## 2022-09-22 PROCEDURE — 84484 ASSAY OF TROPONIN QUANT: CPT | Performed by: EMERGENCY MEDICINE

## 2022-09-22 PROCEDURE — 93005 ELECTROCARDIOGRAM TRACING: CPT | Performed by: EMERGENCY MEDICINE

## 2022-09-22 PROCEDURE — 85025 COMPLETE CBC W/AUTO DIFF WBC: CPT | Performed by: EMERGENCY MEDICINE

## 2022-09-22 PROCEDURE — 82947 ASSAY GLUCOSE BLOOD QUANT: CPT | Performed by: EMERGENCY MEDICINE

## 2022-09-22 PROCEDURE — 93970 EXTREMITY STUDY: CPT

## 2022-09-22 PROCEDURE — 71046 X-RAY EXAM CHEST 2 VIEWS: CPT

## 2022-09-22 PROCEDURE — 82310 ASSAY OF CALCIUM: CPT | Performed by: EMERGENCY MEDICINE

## 2022-09-22 PROCEDURE — 99285 EMERGENCY DEPT VISIT HI MDM: CPT | Mod: 25 | Performed by: EMERGENCY MEDICINE

## 2022-09-22 PROCEDURE — 93010 ELECTROCARDIOGRAM REPORT: CPT | Performed by: EMERGENCY MEDICINE

## 2022-09-22 PROCEDURE — 36415 COLL VENOUS BLD VENIPUNCTURE: CPT | Performed by: EMERGENCY MEDICINE

## 2022-09-22 PROCEDURE — 71046 X-RAY EXAM CHEST 2 VIEWS: CPT | Mod: 26 | Performed by: RADIOLOGY

## 2022-09-22 PROCEDURE — 93970 EXTREMITY STUDY: CPT | Mod: 26 | Performed by: RADIOLOGY

## 2022-09-22 ASSESSMENT — ACTIVITIES OF DAILY LIVING (ADL)
ADLS_ACUITY_SCORE: 35

## 2022-09-22 NOTE — ED NOTES
ED Triage Provider Note  [facility]  Encounter Date: Sep 22, 2022    History:  No chief complaint on file.    Ca Yan is a 76 year old female with a past medical history of alcohol abuse, breast cancer, OA, GERD, HTN, HLD, anemia who presents to the ED with a chief complaint of swelling in BLE. Started yesterday.Has had SOB (baseline), no CP.     Review of Systems:  General: No fevers or chills  Skin: No rash or diaphoresis  Eyes: No eye redness or discharge  Ears/Nose/Throat: No rhinorrhea or nasal congestion  Respiratory: No cough, positive for SOB  Cardiovascular: No chest pain or palpitations  Gastrointestinal: No nausea, vomiting, or diarrhea  Genitourinary: No urinary frequency, hematuria, or dysuria  Musculoskeletal: No arthralgias or myalgias  Neurologic: No numbness or weakness  Hematologic/Lymphatic/Immunologic: positive for BL leg swelling, no easy bruising/bleeding  Endocrine: No polyuria/polydypsia    Exam:  BP (!) 158/89   Pulse 85   Temp 98.4  F (36.9  C) (Oral)   LMP  (LMP Unknown)   SpO2 92%   General: No acute distress. Appears stated age. NAD  HEENT: EOMI, anicteric, NCAT  Neck: Supple. Normal ROM  Cardio: Regular rate, extremities well perfused  Resp: Normal work of breathing, grossly normal respiratory rate  Skin: Normal color, no rash noted  Psych: Normal mood and affect  Neuro: AxOx3, moving all extremities  Extremities: BLE pitting edema is present    Medical Decision Making:  Patient arriving to the ED with problem as above. A medical screening exam was performed. Labs, US, CXR orders initiated from Triage. The patient is appropriate to wait in triage.      Anabell Fleming MD on 9/22/2022 at 4:07 PM      Anabell Fleming MD  09/22/22 [id number]       Anabell Fleming MD  09/22/22 [id number]

## 2022-09-22 NOTE — DISCHARGE INSTRUCTIONS
Restart taking your medications as prescribed. Follow-up with your primary care provider early next week. If you develop worsening shortness of breath, if you are needing more oxygen, if your leg swelling gets worse or your legs get painful in your calves or change color (turn red), or if you develop fevers or vomiting you should return to the ER or be seen more urgently.      Home care  Follow these guidelines when caring for yourself at home:  Don't wear clothing such as stockings that are tight on your legs.  Keep your legs up while lying or sitting.  If infection, injury, or recent surgery is causing the swelling, stay off your legs as much as possible until symptoms get better.  If your healthcare provider says that your leg swelling is caused by venous insufficiency or varicose veins, don't sit or  one place for long periods of time. Take breaks and walk about every few hours. Brisk walking is a good exercise. It helps circulate the blood that has collected in your leg. Talk with your provider about using support stockings to stop daytime leg swelling.

## 2022-09-22 NOTE — TELEPHONE ENCOUNTER
[facility] Call Center    Phone Message    May a detailed message be left on voicemail: yes     Reason for Call: Symptoms or Concerns     If patient has red-flag symptoms, warm transfer to triage line    Current symptom or concern: swelling in bilateral feet and legs    Symptoms have been present for:  1 day, since last night day(s)    Has patient previously been seen for this? No    By .: .    Date: .    Are there any new or worsening symptoms? Yes: Per patient, on 6 scripts but didn't take any of them and swelling may be due to missed doses of medications.      Action Taken: Message routed to:  [facility]    Travel Screening: Not Applicable

## 2022-09-22 NOTE — TELEPHONE ENCOUNTER
"Nurse Triage SBAR    Is this a 2nd Level Triage? YES, LICENSED PRACTITIONER REVIEW IS REQUIRED    Situation:   stopped meds yesteday. LE  Swollen started meds a few min ago    Background:   on O2. Difficulty breathing.states always like that.    Assessment:   called pt.no answer.  messagecall 911. Go to ER..  Protocol Recommended Disposition:   Go to ED Now  Pt asks why, cant get shoes on and hung up phone    Recommendation:   fyi       Routed to provider    Does the patient meet one of the following criteria for ADS visit consideration? No      Reason for Disposition    Difficulty breathing at rest    Additional Information    Negative: Sounds like a life-threatening emergency to the triager    Negative: Chest pain    Negative: Small area of swelling and followed an insect bite to the area    Negative: Followed a knee injury    Negative: Ankle or foot injury    Negative: Pregnant with leg swelling or edema    Answer Assessment - Initial Assessment Questions  1. ONSET: \"When did the swelling start?\" (e.g., minutes, hours, days)  Last night  2. LOCATION: \"What part of the leg is swollen?\"  \"Are both legs swollen or just one leg?\"      From knee down  3. SEVERITY: \"How bad is the swelling?\" (e.g., localized; mild, moderate, severe)   - Localized - small area of swelling localized to one leg   - MILD pedal edema - swelling limited to foot and ankle, pitting edema < 1/4 inch (6 mm) deep, rest and elevation eliminate most or all swelling   - MODERATE edema - swelling of lower leg to knee, pitting edema > 1/4 inch (6 mm) deep, rest and elevation only partially reduce swelling   - SEVERE edema - swelling extends above knee, facial or hand swelling present   moderate  4. REDNESS: \"Does the swelling look red or infected?\"     no  5. PAIN: \"Is the swelling painful to touch?\" If Yes, ask: \"How painful is it?\"   (Scale 1-10; mild, moderate or severe)      no  6. FEVER: \"Do you have a fever?\" If Yes, ask: \"What is it, how was " "it measured, and when did it start?\"      no  7. CAUSE: \"What do you think is causing the leg swelling?\"     Stopped meds yesterday, started today  8. MEDICAL HISTORY: \"Do you have a history of heart failure, kidney disease, liver failure, or cancer?\"  no  9. RECURRENT SYMPTOM: \"Have you had leg swelling before?\" If Yes, ask: \"When was the last time?\" \"What happened that time?\"      no  10. OTHER SYMPTOMS: \"Do you have any other symptoms?\" (e.g., chest pain, difficulty breathing)      Difficulty breathing all the time  11. PREGNANCY: \"Is there any chance you are pregnant?\" \"When was your last menstrual period?\"  no    Protocols used: LEG SWELLING AND EDEMA-A-OH      "

## 2022-09-22 NOTE — ED NOTES
BIBA from home c/o BLE swelling, states she is not compliant with her meds. Very anxious. Denies pain. VSS. On 4LNC which is her baseline at home.

## 2022-09-22 NOTE — ED PROVIDER NOTES
"ED Provider Note  [facility]      History     Chief Complaint   Patient presents with     Leg Swelling     HPI  Ca Yan is a 76 year old female with a past medical history of alcohol abuse, breast cancer, OA, GERD, HTN, HLD, anemia who presents to the emergency department with bilateral leg swelling that started yesterday. Patient reports earlier this week she stopped taking several of her medications \"because I didn't want to take so many medications\". Patient is uncertain which medications she stopped taking. She denies worsening shortness of breath since this time. Typically requires 3-4 L O2 via nc at home. Denies chest pain. Denies pain in legs. Does report has noticed more pain in bottom of her feet for past few weeks, attributes this to walking around house barefoot.     Past Medical History  Past Medical History:   Diagnosis Date     Anxiety      Arthritis      Breast cancer (H)      COPD (chronic obstructive pulmonary disease) (H)     6/19/12:  FEV 0.99 l     Depressive disorder      Hypertension      Low back pain with left-sided sciatica      Low bone density 4/13/2017    DEXA April 12, 2017: T score -2.0. Normal Z score. FRAX risk: major osteoporotic fracture 11.9%, hip fracture 2.6%, therefore not high-risk     Lymphedema, chronic lower extremities      Past Surgical History:   Procedure Laterality Date     BACK SURGERY      spinal fusion     COLONOSCOPY       LARYNGOSCOPY WITH MICROSCOPE N/A 4/30/2021    Procedure: FLEXIBLE LARYNGOSCOPY;  Surgeon: Domonique Roche MD;  Location: [facility] OR     LUMPECTOMY BREAST       ORTHOPEDIC SURGERY      Knee surgery left side     atorvastatin (LIPITOR) 40 MG tablet  azelastine (ASTELIN) 0.1 % nasal spray  cephALEXin (KEFLEX) 250 MG capsule  clotrimazole (LOTRIMIN) 1 % external cream  Emollient (CERAVE) CREA  ipratropium (ATROVENT) 0.03 % nasal spray  latanoprost (XALATAN) 0.005 % ophthalmic solution  losartan-hydrochlorothiazide " (HYZAAR) 100-25 MG tablet  mirtazapine (REMERON) 7.5 MG tablet  QUEtiapine (SEROQUEL) 300 MG tablet  sertraline (ZOLOFT) 100 MG tablet  Vitamin D3 (CHOLECALCIFEROL) 25 mcg (1000 units) tablet      Allergies   Allergen Reactions     Azithromycin Itching     Codeine Nausea and Vomiting     Hydrocodone Nausea and Vomiting     Metronidazole Nausea     Oxycodone Itching and Rash     Percocet [Oxycodone-Acetaminophen] Nausea and Vomiting     Pollen Extract      sneezing and runny nose.      Seasonal Allergies      sneezing and runny nose.      Vicodin [Hydrocodone-Acetaminophen] Nausea and Vomiting     Zolpidem      Amnestic behavior     Family History  Family History   Problem Relation Age of Onset     Breast Cancer Mother      Diabetes Mother      Anxiety Disorder Mother      Asthma Mother      Other Cancer Mother      Hyperlipidemia Mother      Alcohol/Drug Father      Mental Illness Father      Substance Abuse Father      Obesity Father      Cerebrovascular Disease Maternal Grandmother 67     Other - See Comments Maternal Aunt         lived to      Social History   Social History     Tobacco Use     Smoking status: Former Smoker     Packs/day: 0.50     Years: 5.00     Pack years: 2.50     Types: Cigarettes     Start date: 1956     Quit date: 1980     Years since quittin.0     Smokeless tobacco: Former User     Quit date: 1980   Substance Use Topics     Alcohol use: Not Currently     Alcohol/week: 0.0 standard drinks     Comment: Sober for 2 years, previous alcoholic     Drug use: No      Past medical history, past surgical history, medications, allergies, family history, and social history were reviewed with the patient. No additional pertinent items.       Review of Systems  A complete review of systems was performed with pertinent positives and negatives noted in the HPI, and all other systems negative.    Physical Exam   BP: (!) 158/89  Pulse: 85  Temp: 98.4  F (36.9  C)  SpO2: 92 %  Physical  Exam  Constitutional:       Appearance: Normal appearance.   HENT:      Head: Normocephalic and atraumatic.   Cardiovascular:      Rate and Rhythm: Normal rate and regular rhythm.      Heart sounds: Normal heart sounds.   Pulmonary:      Effort: No respiratory distress.      Breath sounds: Normal breath sounds.   Chest:      Chest wall: No tenderness.   Abdominal:      General: Abdomen is flat.      Palpations: Abdomen is soft.   Skin:     General: Skin is cool.      Capillary Refill: Capillary refill takes less than 2 seconds.      Coloration: Skin is not cyanotic or mottled.      Findings: No bruising, ecchymosis, erythema, signs of injury, lesion, petechiae, rash or wound.      Comments: BLE +3 pitting edema in lower legs and feet. Pale color.    Neurological:      General: No focal deficit present.      Mental Status: She is alert and oriented to person, place, and time.   Psychiatric:         Mood and Affect: Mood normal.         Behavior: Behavior normal.         ED Course      Procedures          Results for orders placed or performed during the hospital encounter of 09/22/22   XR Chest 2 Views     Status: None    Narrative    Exam: XR CHEST 2 VIEWS, 9/22/2022 5:33 PM    Indication: fluid overload    Comparison: Chest x-ray 4/21/2021 and chest CT 7/8/2022    Findings:   Dextro scoliotic curvature of the thoracic spine again noted.  Prominent cardiac silhouette likely related to patient positioning. No  pneumothorax. No definite pleural effusions. Unchanged right basilar  atelectasis, otherwise no new focal airspace opacities.      Impression    Impression: No new focal consolidations or evidence of pulmonary  edema. No pleural effuiosns.    I have personally reviewed the examination and initial interpretation  and I agree with the findings.    LÁZARO PALACIOS MD         SYSTEM ID:  [id number]    Lower Extremity Venous Duplex Bilateral     Status: None    Narrative    EXAMINATION: DOPPLER VENOUS ULTRASOUND  OF BILATERAL LOWER EXTREMITIES,  9/22/2022 6:03 PM     COMPARISON: 7/8/2022    HISTORY: Swelling    TECHNIQUE:  Gray-scale evaluation with compression, spectral flow and  color Doppler assessment of the deep venous system of both legs from  groin to knee, and then at the ankles.    FINDINGS:  In both lower extremities, the common femoral, femoral, popliteal and  posterior tibial veins demonstrate normal compressibility and blood  flow.      Impression    IMPRESSION:  No evidence of deep venous thrombosis in either lower extremity.    I have personally reviewed the examination and initial interpretation  and I agree with the findings.    LÁZARO PALACIOS MD         SYSTEM ID:  [id number]   Comprehensive metabolic panel     Status: Abnormal   Result Value Ref Range    Sodium 140 136 - 145 mmol/L    Potassium 4.2 3.4 - 5.3 mmol/L    Chloride 99 98 - 107 mmol/L    Carbon Dioxide (CO2) 34 (H) 22 - 29 mmol/L    Anion Gap 7 7 - 15 mmol/L    Urea Nitrogen 20.5 8.0 - 23.0 mg/dL    Creatinine 0.74 0.51 - 0.95 mg/dL    Calcium 9.2 8.8 - 10.2 mg/dL    Glucose 106 (H) 70 - 99 mg/dL    Alkaline Phosphatase 102 35 - 104 U/L    AST 29 10 - 35 U/L    ALT 9 (L) 10 - 35 U/L    Protein Total 7.7 6.4 - 8.3 g/dL    Albumin 4.1 3.5 - 5.2 g/dL    Bilirubin Total 0.4 <=1.2 mg/dL    GFR Estimate 82 >60 mL/min/1.73m2   Nt probnp inpatient (BNP)     Status: Normal   Result Value Ref Range    N terminal Pro BNP Inpatient 278 0 - 1,800 pg/mL   Troponin T, High Sensitivity     Status: Normal   Result Value Ref Range    Troponin T, High Sensitivity 9 <=14 ng/L   [place] Draw     Status: None    Narrative    The following orders were created for panel order [place] Draw.  Procedure                               Abnormality         Status                     ---------                               -----------         ------                     Extra Blue Top Tube[[id number]]                              Final result               Extra Red Top  Tube[[id number]]                               Final result                 Please view results for these tests on the individual orders.   CBC with platelets and differential     Status: Abnormal   Result Value Ref Range    WBC Count 8.2 4.0 - 11.0 10e3/uL    RBC Count 3.29 (L) 3.80 - 5.20 10e6/uL    Hemoglobin 10.0 (L) 11.7 - 15.7 g/dL    Hematocrit 33.1 (L) 35.0 - 47.0 %     (H) 78 - 100 fL    MCH 30.4 26.5 - 33.0 pg    MCHC 30.2 (L) 31.5 - 36.5 g/dL    RDW 14.6 10.0 - 15.0 %    Platelet Count 219 150 - 450 10e3/uL    % Neutrophils 79 %    % Lymphocytes 10 %    % Monocytes 8 %    % Eosinophils 2 %    % Basophils 1 %    % Immature Granulocytes 0 %    NRBCs per 100 WBC 0 <1 /100    Absolute Neutrophils 6.4 1.6 - 8.3 10e3/uL    Absolute Lymphocytes 0.8 0.8 - 5.3 10e3/uL    Absolute Monocytes 0.7 0.0 - 1.3 10e3/uL    Absolute Eosinophils 0.2 0.0 - 0.7 10e3/uL    Absolute Basophils 0.0 0.0 - 0.2 10e3/uL    Absolute Immature Granulocytes 0.0 <=0.4 10e3/uL    Absolute NRBCs 0.0 10e3/uL   Extra Blue Top Tube     Status: None   Result Value Ref Range    Hold Specimen JIC    Extra Red Top Tube     Status: None   Result Value Ref Range    Hold Specimen JIC    EKG 12-lead, tracing only     Status: None   Result Value Ref Range    Systolic Blood Pressure  mmHg    Diastolic Blood Pressure  mmHg    Ventricular Rate 72 BPM    Atrial Rate 72 BPM    ND Interval 136 ms    QRS Duration 90 ms     ms    QTc 435 ms    P Axis 49 degrees    R AXIS 2 degrees    T Axis 65 degrees    Interpretation ECG       Sinus rhythm  Normal ECG    Unconfirmed report - interpretation of this ECG is computer generated - see medical record for final interpretation  Confirmed by - EMERGENCY ROOM, PHYSICIAN ([id number]),  AD HOLLIDAY ([id number]) on 9/22/2022 8:41:58 PM     CBC with platelets differential     Status: Abnormal    Narrative    The following orders were created for panel order CBC with platelets differential.  Procedure                                Abnormality         Status                     ---------                               -----------         ------                     CBC with platelets and d...[[id number]]  Abnormal            Final result                 Please view results for these tests on the individual orders.     Medications - No data to display     Assessments & Plan (with Medical Decision Making)   Ca Yan is a 76 year old female with a past medical history of alcohol abuse, breast cancer, OA, GERD, HTN, HLD, anemia who presents to the emergency department with bilateral leg swelling that started yesterday.     IMPRESSION: nontoxic appearing female in no acute distress with significant swelling of BLE without injury or discoloration.      PLAN:  Patient seen initially by transposition who ordered labs and imaging.  I have reevaluated this patient after imaging results were back and completed a thorough history and physical examination at that time.  RESULTS:  - Labs:  No evidence of leukocytosis WBC 8.2.  Troponin negative at 9, N-terminal BNP negative at 278.  This is consistent with her normal EKG today.  - Imaging: CXR showed no new focal consolidations or evidence of pulmonary  edema. No pleural effuiosns.   BLE ultrasound showed no evidence of deep venous thrombosis in either lower extremity.       DISCUSSIONS:  - w/ Patient:  Discussed results of laboratory and imaging studies with patient.  Given patient has recently stopped several of her medications and is uncertain which when she stopped taking at high suspicion that this could have been what caused the sudden onset of bilateral lower extremity swelling.  Encourage patient to take her medications as prescribed follow-up in clinic early next week with her primary care provider.  Discussed return precautions with the patient for reasons to come back to the emergency department including worsening shortness of breath, increased oxygen demands, decreased  oxygen saturations, fever, worsening pain in legs or feet, discoloration redness or warmth in legs or feet.    I have reviewed the nursing notes. I have reviewed the findings, diagnosis, plan and need for follow up with the patient.    New Prescriptions    No medications on file       Final diagnoses:   Bilateral lower extremity edema       --  RUPAL Hannon CNP  [facility] EMERGENCY DEPARTMENT  9/22/2022     Lani Brown APRN CNP  09/22/22 2114    --    ED Attending Physician Attestation    I Anabell Fleming MD, cared for this patient with the Advanced Practice Provider (KASSIE). I have performed a history and physical examination of the patient independent of the KASSIE. I reviewed the KASSIE's documentation above and agree with the documented findings and plan of care. I personally provided a substantive portion of the care for this patient.    I personally performed the substantive portion of the medical decision making for this visit - please see the KASSIE's documentation for full details.    Key management decisions made by me and carried out under my direction: See my triage note for further details    I personally performed the substantive portion of the history for this visit - please see the KASSIE's documentation for full details.  Key additional history findings made by me: See my triage note for further details    I personally performed the substantive portion of the physical exam - please see the KASSIE's documentation for full details.  I concur with the KASSIE exam findings, in addition I have noted: See my triage note for further details    Summary of HPI, PE, ED Course   Patient is a 76 year old female evaluated in the emergency department for leg swelling. Exam notable for bilateral lower extremity pitting edema. ED course notable for ultrasound negative for DVT, otherwise unremarkable work-up. After the completion of care in the emergency department, the patient was  discharged.    Critical Care & Procedures  Not applicable.    Medical Decision Making  The medical record was reviewed and interpreted.  Current labs reviewed and interpreted.  Previous labs reviewed and interpreted.  Current images reviewed and interpreted:  .  Previous images reviewed and interpreted:  .      Anabell Fleming MD  Emergency Medicine          Anabell Fleming MD  09/23/22 0957

## 2022-09-22 NOTE — TELEPHONE ENCOUNTER
RN called patient and reviewed her symptoms.  Patient gave update that she did take all of her meds about an hour ago.  RN encouraged patient to elevate legs as much as possible and to not miss any doses of meds going forward, and if leg swelling dos not improve she should call for an appt with Dr. Sahu.    Kiki Eddy RN on 9/22/2022 at 2:45 PM

## 2022-09-27 ENCOUNTER — NURSE TRIAGE (OUTPATIENT)
Dept: NURSING | Facility: CLINIC | Age: 77
End: 2022-09-27

## 2022-09-27 NOTE — TELEPHONE ENCOUNTER
Patient calling, and states she was in to ER regarding swelling off lower extremities and feet. She reports they did not find any reason.SHe ws offered to make an appointment with her MD, Dr. Sahu , and she was sent to make an appointment.    Rebecca Perez RN   [facility] Nurse Advisor    Reason for Disposition    Information only question and nurse able to answer    Protocols used: INFORMATION ONLY CALL - NO TRIAGE-A-[place]

## 2022-09-27 NOTE — NURSING NOTE
Chief Complaint   Patient presents with     Recheck Medication     nose running continuously, nasal spray not helping       Rachel Kate EMT at 9:02 AM on 7/15/2021.    Return to this emergency room immediately if your symptoms persist, worsen or if new ones form. Make sure you follow-up with your primary care doctor within the next 1-2 business days.

## 2022-09-28 ENCOUNTER — VIRTUAL VISIT (OUTPATIENT)
Dept: PSYCHOLOGY | Facility: CLINIC | Age: 77
End: 2022-09-28
Attending: FAMILY MEDICINE
Payer: COMMERCIAL

## 2022-09-28 DIAGNOSIS — M40.00 KYPHOSIS (ACQUIRED) (POSTURAL): ICD-10-CM

## 2022-09-28 DIAGNOSIS — M54.50 CHRONIC BILATERAL LOW BACK PAIN WITHOUT SCIATICA: ICD-10-CM

## 2022-09-28 DIAGNOSIS — F43.22 ADJUSTMENT DISORDER WITH ANXIOUS MOOD: ICD-10-CM

## 2022-09-28 DIAGNOSIS — G89.29 CHRONIC BILATERAL LOW BACK PAIN WITHOUT SCIATICA: ICD-10-CM

## 2022-09-28 DIAGNOSIS — R53.81 PHYSICAL DECONDITIONING: ICD-10-CM

## 2022-09-28 DIAGNOSIS — F41.9 ANXIETY: Primary | ICD-10-CM

## 2022-09-28 DIAGNOSIS — F60.89 CLUSTER C PERSONALITY DISORDER (H): ICD-10-CM

## 2022-09-28 DIAGNOSIS — J38.00 VOCAL CORD PARALYSIS: ICD-10-CM

## 2022-09-28 PROCEDURE — 90832 PSYTX W PT 30 MINUTES: CPT | Mod: TEL | Performed by: PSYCHOLOGIST

## 2022-09-28 NOTE — PROGRESS NOTES
Health Psychology                     Department of Medicine  Christelle Davis, Ph.D., L.P. [phone]                          [facility] India Fernandez, Ph.D., L.P. [phone]                   [place] Mail Code 744   Erika Merlos, Ph.D., L.P. [phone]      [address] [place] [address] Dalia Huerta, Ph.D., A.B.P.P., L.P. [phone]        [place] [address]          Turner Sierra, Ph.D., A.B.P.P., L.P. [phone]     Ella Mckenna, Ph.D., L.P. [phone]  [facility]   Shawna Olvera, Ph.D., A.B.P.P., L.P. [phone]  [address] [place] [address]        Confidential Summary of Health Psychology Telephone Evaluation*    Referral Source:  Favian Sahu MD    Date of Intake: 9/28/22    Demographics   Age 76 year old   Sex female   Race White   Ethnicity Not  or      Reason for Referral: Ms. Yan was unsure as to why I was calling to meet with her for a telephone evaluation.  She was not sure why Dr. Sahu had referred her or what might be accomplished.  She reported finding it hard to speak over the telehone.    History of Presenting Concerns: Ms. Yan stated that she did not think that she needed counseling.  She has difficulty speaking, and was eager to get off of the telephone.    She has significant kyphoscoliosis causing impairment of respiratory function, vocal cord paralysis with dysphonia, benign essential hypertension, past history of breast cancer.  The dysphonia and breathing problem cause her anxiety.  She currently is anxious about living alone, panics, calls help lines, tends to overreact to situations, behaves inappropriately, to the extent that she has been asked to apologize to staff.    In looking at the EMR, she reportedly has difficulty with anxiety, depression, shortness of breath and dysphonia.  She appears to have limited coping skills.  She herself is considered her physical and mental health to be poor.    She had briefly seen Karlos  "Hilda Sung a year ago, and had seemed somewhat more willing to engage him at that time than she was today.    Medical History: Ms. Yan quit smoking about 41 years ago.     Past Medical History:   Diagnosis Date     Anxiety      Arthritis      Breast cancer (H)      COPD (chronic obstructive pulmonary disease) (H)     6/19/12:  FEV 0.99 l     Depressive disorder      Hypertension      Low back pain with left-sided sciatica      Low bone density 4/13/2017    DEXA April 12, 2017: T score -2.0. Normal Z score. FRAX risk: major osteoporotic fracture 11.9%, hip fracture 2.6%, therefore not high-risk     Lymphedema, chronic lower extremities      Past Surgical History:   Procedure Laterality Date     BACK SURGERY      spinal fusion     COLONOSCOPY       LARYNGOSCOPY WITH MICROSCOPE N/A 4/30/2021    Procedure: FLEXIBLE LARYNGOSCOPY;  Surgeon: Domonique Roche MD;  Location: [facility] OR     LUMPECTOMY BREAST       ORTHOPEDIC SURGERY      Knee surgery left side     Current Outpatient Medications   Medication     atorvastatin (LIPITOR) 40 MG tablet     azelastine (ASTELIN) 0.1 % nasal spray     cephALEXin (KEFLEX) 250 MG capsule     clotrimazole (LOTRIMIN) 1 % external cream     Emollient (CERAVE) CREA     ipratropium (ATROVENT) 0.03 % nasal spray     latanoprost (XALATAN) 0.005 % ophthalmic solution     losartan-hydrochlorothiazide (HYZAAR) 100-25 MG tablet     mirtazapine (REMERON) 7.5 MG tablet     QUEtiapine (SEROQUEL) 300 MG tablet     sertraline (ZOLOFT) 100 MG tablet     Vitamin D3 (CHOLECALCIFEROL) 25 mcg (1000 units) tablet     No current facility-administered medications for this visit.     Wt Readings from Last 4 Encounters:   08/12/22 89.4 kg (197 lb 1.6 oz)   06/07/22 89.8 kg (198 lb)   05/24/22 86.2 kg (190 lb)   03/28/22 86.2 kg (190 lb)     Estimated body mass index is 34.28 kg/m  as calculated from the following:    Height as of 8/31/22: 1.615 m (5' 3.58\").    Weight as of 8/12/22: 89.4 kg (197 lb 1.6 " oz).    Social History: Ms. Yan  lives in a senior building with no congregate meals. She stopped going to a day program at [employer] for seniors. .    Psychiatric History: Previous treatment history includes brief consultaiton with multiple psychologists for one session evaluations (Omar Fabian in Sept 2018 and Harpal Laureano, PhD, LP in Dec 2015,Dalia Huerta, PhD, 2019) but no follow-up in therapy. Ms. Yan was previously referred by Dr. Bartlett to see Ella Mckenna, PhD whom she never saw.  She saw Karlos Sung Psy.D.  briefly a year ago.  She has had a .     Ms. Yan reported to Dr. Huerta that she has a history of an alcohol use disorder.  She stated that she was sober from ages [age], consuming alcohol between ages [age].  She has refused referrals for group therapy.    Her current psychoactive medications include    mirtazapine (REMERON) 7.5 MG tablet   QUEtiapine (SEROQUEL) 300 MG tablet   sertraline (ZOLOFT) 100 MG tablet     Psychological Screening: Ms. Yan was requested to complete the following screening measures:    PHQ-9 Score:  The Patient Health Questionnaire-9 is a depression screening instrument. Scores of 5, 10, 15, and 20 respectively indicate mild, moderate, moderately severe and severe depression symptoms.   PHQ-9 SCORE 6/3/2020 4/7/2021 11/10/2021   PHQ-9 Total Score MyChart - - -   PHQ-9 Total Score 6 0 0   PHQ-9 Total Score - - -       RENATO-7 Score:  The General Anxiety Disorder-7 is an an anxiety screening instrument. Scores of 5, 10, and 15 respectively indicate mild, moderate, and severe anxiety symptoms.  RENATO-7 SCORE 6/3/2020 4/7/2021 11/10/2021   Total Score - - -   Total Score 17 3 10   Total Score BEH Adult - - -       CAGE-AID Score: > 1 is a positive screen, suggesting further discussion is needed to determine if evaluation for alcohol or substance abuse is appropriate.  A score > 2 is considered clinically significant, suggesting further evaluation of alcohol  or substance-related problems is indicated.  CAGE-AID Total Score 3/28/2019   Total Score 0   Total Score MyChart 0 (A total score of 2 or greater is considered clinically significant)       WHODAS-12 Score:  WHODAS 2.0 Total Score 3/28/2019   Total Score 24   Total Score MyChart 24       PROMIS-10  PROMIS 10 FLOWSHEET DATA 5/28/2019 2/7/2020 1/12/2022   In general, would you say your health is: 2 2 2   In general, would you say your quality of life is: 1 2 2   In general, how would you rate your physical health? 2 2 1   In general, how would you rate your mental health, including your mood and your ability to think? 3 2 1   In general, how would you rate your satisfaction with your social activities and relationships? 1 1 1   In general, please rate how well you carry out your usual social activities and roles. (This includes activities at home, at work and in your community, and responsibilities as a parent, child, spouse, employee, friend, etc.) 1 2 1   To what extent are you able to carry out your everyday physical activities such as walking, climbing stairs, carrying groceries, or moving a chair? 1 2 1   In the past 7 days, how often have you been bothered by emotional problems such as feeling anxious, depressed, or irritable? 4 4 4   In the past 7 days, how would you rate your fatigue on average? 3 5 3   In the past 7 days, how would you rate your pain on average, where 0 means no pain, and 10 means worst imaginable pain? 0 0 5   Mental health question re-calculation - no clinical value 2 2 2   Physical health question re-calculation - no clinical value 3 1 3   Pain question re-calculation - no clinical value 5 5 3   Global Mental Health Score 7 7 6   Global Physical Health Score 11 10 8   PROMIS TOTAL - SUBSCORES 18 17 14       The PROMIS-10 measures health-related quality of life and assesses overall health, fatigue, social health, mental health, and physical health.  Raw scores are converted to t-scores  (average = 50, SD = 10). Lower t-scores indicate poorer health, higher t-scores indicate better health.   Global Mental Health Score -    Global Physical Health Score -    PROMIS TOTAL - SUBSCORES -      Mental Status/Interview: Ms. Yan was briefly interviewed via telephone.  She was somewhat hostile and impatient and ended the telephone call somewhat abruptly.  She was willing to take my telephone number in case you were to wish to be seen further.  It was not possible to obtain further diagnostic impressions.     Impression: Ms. Yan was not cooperative nor interested in mental health services at this time.  She was somewhat befuddled as to why an appointment had been scheduled.    Diagnosis:  Anxiety    This telehealth service is appropriate and effective for delivering services in light of the necessity for social distancing to mitigate the COVID-19 epidemic and for conservation of PPE.     Patient has agreed to receiving telehealth services after being informed about it: No. She does not have email.     Patient prefers video invitation/information to be sent by:   N/A    Time service started:1:02  Time service ended: 1:20    Mode of transmission: Telephone    Location of originating:  Home of the patient    Distance site:  Home office of provider for [facility]    The patient has been notified that:  Video visits will be conducted via a call with their psychologist to provide the care they need with a video conversation. Video visits may be billed at different rates depending on insurance coverage.  Patients are advised to please contact their insurance provider with any questions about their health insurance coverage. If during the course of a call the psychologist feels a video visit is not appropriate, patients will not be charged for this service.    Plan: Ms. Yan has not been scheduled for any follow-up given her disinterest in meeting.  She has already met with multiple mental health professionals at  [facility], and has consistently declined services.  She appears to to be more in need of case management than psychotherapy.  .  If further diagnostic evaluation is desired to assess competency, neuropsychological and Occupational Therapy evaluations might be informative. Another possibility might be in-home supportive services if she is not already getting them.        Turner Sierra, Ph.D., A.B.P.P., L.P.  Director, Health Psychology    cc: Dr. Sahu     *In accordance with the Rules of the [place] Board of [employer], it is noted that psychological descriptions and scientific procedures underlying psychological evaluations have limitations.  Absolute predictions cannot be made based on information in this report. An information sheet describing informed consent, limits to confidentiality, clinic scheduling and practices, and feedback from patients was given to the patient.  This note is dictated utilizing voice recognition software. Unfortunately this leads to occasional typographical errors. I apologize in advance if this occurs.  If questions arise, please do not hesitate to contact the author.

## 2022-10-11 ENCOUNTER — OFFICE VISIT (OUTPATIENT)
Dept: INTERNAL MEDICINE | Facility: CLINIC | Age: 77
End: 2022-10-11
Payer: COMMERCIAL

## 2022-10-11 VITALS — SYSTOLIC BLOOD PRESSURE: 137 MMHG | DIASTOLIC BLOOD PRESSURE: 77 MMHG | HEART RATE: 76 BPM | OXYGEN SATURATION: 100 %

## 2022-10-11 DIAGNOSIS — M72.2 PLANTAR FASCIITIS: ICD-10-CM

## 2022-10-11 DIAGNOSIS — Z23 INFLUENZA VACCINE NEEDED: ICD-10-CM

## 2022-10-11 DIAGNOSIS — Z23 NEED FOR COVID-19 VACCINE: ICD-10-CM

## 2022-10-11 DIAGNOSIS — M62.830 BACK MUSCLE SPASM: Primary | ICD-10-CM

## 2022-10-11 DIAGNOSIS — R32 URINARY INCONTINENCE, UNSPECIFIED TYPE: ICD-10-CM

## 2022-10-11 PROCEDURE — G0008 ADMIN INFLUENZA VIRUS VAC: HCPCS | Performed by: HOSPITALIST

## 2022-10-11 PROCEDURE — 99214 OFFICE O/P EST MOD 30 MIN: CPT | Mod: 25 | Performed by: HOSPITALIST

## 2022-10-11 PROCEDURE — 90662 IIV NO PRSV INCREASED AG IM: CPT | Performed by: HOSPITALIST

## 2022-10-11 RX ORDER — TIZANIDINE 2 MG/1
2 TABLET ORAL 2 TIMES DAILY PRN
Qty: 60 TABLET | Refills: 1 | Status: SHIPPED | OUTPATIENT
Start: 2022-10-11 | End: 2022-11-17

## 2022-10-11 RX ORDER — IBUPROFEN 600 MG/1
600 TABLET, FILM COATED ORAL 2 TIMES DAILY PRN
Qty: 60 TABLET | Refills: 0 | Status: SHIPPED | OUTPATIENT
Start: 2022-10-11 | End: 2022-11-17

## 2022-10-11 ASSESSMENT — ENCOUNTER SYMPTOMS
FEVER: 0
ARTHRALGIAS: 1
PARESTHESIAS: 0
NUMBNESS: 0
CHILLS: 0
BACK PAIN: 1
SHORTNESS OF BREATH: 1

## 2022-10-11 NOTE — PATIENT INSTRUCTIONS
- May take Ibuprofen 600mg (3 tablets) no more than twice a day as needed for a few weeks.   - Will send Tizanidine 2mg twice a day as needed if this may help with back pain.   - Referral to physical therapy for now.   - Try rolling heels with can to massage, may use warm foot soaks at home.   - Recommend stretches of calves.   - Bivalent COVID19 and Influenza vaccines today.     Follow up again with Dr. Sahu in 1-2 months.    4 = No assist / stand by assistance

## 2022-10-11 NOTE — ASSESSMENT & PLAN NOTE
- May take Ibuprofen 600mg (3 tablets) no more than twice a day as needed for a few weeks.    - Referral to physical therapy for now.   - Try rolling heels with can to massage, may use warm foot soaks at home.   - Recommend stretches of calves.

## 2022-10-11 NOTE — NURSING NOTE
Ca Yan is a 76 year old female that presents in clinic today for the following:     Chief Complaint   Patient presents with     Back Pain     Pt has been back pain when going from standing to sitting (aching pain); pt has also been having pain in heels       The patient's allergies and medications were reviewed. The patient's vitals were obtained without incident. The patient does not have any other questions for the provider.     Olivia Overton, EMT at 1:41 PM on 10/11/2022.  Primary Care Clinic: [phone]   Transitional planning. Cab called for pt. Pt is current with [facility], informed Chata Stewart with [facility] that pt is discharged. Need HC order and NAOMIE completed. RN notified physician.

## 2022-10-11 NOTE — PROGRESS NOTES
Assessment/Plan  Problem List Items Addressed This Visit        Musculoskeletal and Integumentary    Back muscle spasm - Primary     Has hx of scoliosis and prior back fusion surgery. Last Xray of lumbar spine was 12/2021 showing degenerative changes of her spine.   - May take Ibuprofen 600mg (3 tablets) no more than twice a day as needed for a few weeks.   - Will send Tizanidine 2mg twice a day as needed if this may help with back pain.   - Referral to physical therapy for now.          Relevant Medications    tiZANidine (ZANAFLEX) 2 MG tablet    ibuprofen (ADVIL/MOTRIN) 600 MG tablet    Other Relevant Orders    Physical Therapy Referral    Plantar fasciitis     - May take Ibuprofen 600mg (3 tablets) no more than twice a day as needed for a few weeks.    - Referral to physical therapy for now.   - Try rolling heels with can to massage, may use warm foot soaks at home.   - Recommend stretches of calves.          Relevant Medications    tiZANidine (ZANAFLEX) 2 MG tablet    ibuprofen (ADVIL/MOTRIN) 600 MG tablet    Other Relevant Orders    Physical Therapy Referral       Urinary    Urinary incontinence, unspecified type     Patient reported to have urinary incontinence needing incontinence supplies, includes adult diaper. DME ordered for patient.         Other Visit Diagnoses     Influenza vaccine needed        Relevant Orders    FLU VACCINE HIGH DOSE PRESERVATIVE FREE, AGE =>65 YR (Completed)    Need for COVID-19 vaccine              No results found for any visits on 10/11/22.    Health Maintenance Due   Topic Date Due     HEPATITIS B IMMUNIZATION (1 of 3 - 3-dose series) Never done     ZOSTER IMMUNIZATION (1 of 2) Never done     COVID-19 Vaccine (5 - Booster for Moderna series) 06/01/2022         Subjective  Patient had continued back pain off and on. Had increased back pain on Saturday and now feels this is improving. No recent injuries or trauma. Pains do not go down her legs. Mentions also having heel pains in  both feet. Mentions she likes the shoes that she wears. Tends to walk around bare foot at home.       Review of Systems   Constitutional: Negative for chills and fever.   Respiratory: Positive for shortness of breath.    Cardiovascular: Negative for chest pain.   Musculoskeletal: Positive for arthralgias and back pain.   Neurological: Negative for numbness and paresthesias.       History  Past Medical History:   Diagnosis Date     Anxiety      Arthritis      Breast cancer (H)      COPD (chronic obstructive pulmonary disease) (H)     12:  FEV 0.99 l     Depressive disorder      Hypertension      Low back pain with left-sided sciatica      Low bone density 2017    DEXA 2017: T score -2.0. Normal Z score. FRAX risk: major osteoporotic fracture 11.9%, hip fracture 2.6%, therefore not high-risk     Lymphedema, chronic lower extremities        Past Surgical History:   Procedure Laterality Date     BACK SURGERY      spinal fusion     COLONOSCOPY       LARYNGOSCOPY WITH MICROSCOPE N/A 2021    Procedure: FLEXIBLE LARYNGOSCOPY;  Surgeon: Domonique Roche MD;  Location: [facility] OR     LUMPECTOMY BREAST       ORTHOPEDIC SURGERY      Knee surgery left side       Family History   Problem Relation Age of Onset     Breast Cancer Mother      Diabetes Mother      Anxiety Disorder Mother      Asthma Mother      Other Cancer Mother      Hyperlipidemia Mother      Alcohol/Drug Father      Mental Illness Father      Substance Abuse Father      Obesity Father      Cerebrovascular Disease Maternal Grandmother 67     Other - See Comments Maternal Aunt         lived to        Social History     Tobacco Use     Smoking status: Former     Packs/day: 0.50     Years: 5.00     Pack years: 2.50     Types: Cigarettes     Start date: 1956     Quit date: 1980     Years since quittin.0     Smokeless tobacco: Former     Quit date: 1980   Substance Use Topics     Alcohol use: Not Currently     Alcohol/week: 0.0  standard drinks     Comment: Sober for 2 years, previous alcoholic        Objective  /77 (BP Location: Left arm, Patient Position: Sitting, Cuff Size: Adult Regular)   Pulse 76   LMP  (LMP Unknown)   SpO2 100%   Vitals taken by Marco Britton MD    Physical Exam  Constitutional:       General: She is not in acute distress.     Appearance: She is not ill-appearing or toxic-appearing.   HENT:      Head: Normocephalic.   Eyes:      Conjunctiva/sclera: Conjunctivae normal.   Cardiovascular:      Rate and Rhythm: Regular rhythm.      Heart sounds: No murmur heard.    No friction rub. No gallop.   Pulmonary:      Effort: Pulmonary effort is normal. No respiratory distress.      Breath sounds: No wheezing, rhonchi or rales.   Musculoskeletal:      Comments: Mild tenderness along left perispinal region of lower lumbar region. Curvature of spine to the right present.     Tenderness along both heels along plantar surfaces present. Tenderness along left aches of her foot. No ulcerations present. No erythema of her feet. No tenderness along achilles tendons.    Skin:     General: Skin is warm and dry.      Comments: Several calluses on both feet.      Neurological:      Mental Status: She is alert.   Psychiatric:         Mood and Affect: Mood normal.         Thought Content: Thought content normal.         I spent greater than 50% of the 20 minutes in the visit coordinating care regarding patient's recommendations towards back and heel pains    Return in about 1 month (around 11/11/2022).      Marco Britton MD  [facility] INTERNAL MEDICINE [place]

## 2022-10-11 NOTE — ASSESSMENT & PLAN NOTE
Has hx of scoliosis and prior back fusion surgery. Last Xray of lumbar spine was 12/2021 showing degenerative changes of her spine.   - May take Ibuprofen 600mg (3 tablets) no more than twice a day as needed for a few weeks.   - Will send Tizanidine 2mg twice a day as needed if this may help with back pain.   - Referral to physical therapy for now.

## 2022-10-12 ENCOUNTER — NURSE TRIAGE (OUTPATIENT)
Dept: NURSING | Facility: CLINIC | Age: 77
End: 2022-10-12

## 2022-10-12 NOTE — TELEPHONE ENCOUNTER
"Nurse Triage SBAR    Situation: Patient calling for information on a \"one-person cab.\"    Background: She states was in [facility] and they put me in a \"one-person cab\" to take her home, \"right up to my door.\"  Patient was happy with this service and wants to know more about it and how to reach them.    Assessment: The ER wasn't able to give more information.    Protocol Recommended Disposition:   Discuss With PCP And Callback By Nurse Today    Recommendation: Patient said she wants to switch and go to the clinic at the [facility] because they took her home right to her door.  RN explained that is the hospital and encouraged patient to connect with her clinic care team for more information.       Courtney Smith RN  [facility]      Reason for Disposition    Nursing judgment    Protocols used: INFORMATION ONLY CALL - NO TRIAGE-A-OH      "

## 2022-10-17 ENCOUNTER — TELEPHONE (OUTPATIENT)
Dept: FAMILY MEDICINE | Facility: CLINIC | Age: 77
End: 2022-10-17

## 2022-10-17 DIAGNOSIS — R32 URINARY INCONTINENCE: Primary | ICD-10-CM

## 2022-10-17 DIAGNOSIS — R39.81 URINARY INCONTINENCE, FUNCTIONAL: ICD-10-CM

## 2022-10-17 NOTE — TELEPHONE ENCOUNTER
[facility] Call Center    Phone Message    May a detailed message be left on voicemail: yes     Reason for Call: Other: Patient calling to get diapers. She never had this before and she wants to know what sizes they come in. Please call to discuss further.      Action Taken: Message routed to:  [facility]    Travel Screening: Not Applicable

## 2022-10-17 NOTE — TELEPHONE ENCOUNTER
Patient needs face-to-face visit per insurance for the requested DME order for incontinence/diaper supplies.   Must be in-person or video only.    No televisit or eVisit.       Jama De La Vega CMA ([facility]) at 10:22 AM on 10/17/2022

## 2022-10-18 ENCOUNTER — TELEPHONE (OUTPATIENT)
Dept: FAMILY MEDICINE | Facility: CLINIC | Age: 77
End: 2022-10-18

## 2022-10-18 PROBLEM — R32 URINARY INCONTINENCE, UNSPECIFIED TYPE: Status: ACTIVE | Noted: 2022-10-18

## 2022-10-18 NOTE — TELEPHONE ENCOUNTER
Patient needs someone to help her get to appointments and etc. She has no family to help her.   Face-to-face visit needed for DME order for incontinence supplies.

## 2022-10-18 NOTE — TELEPHONE ENCOUNTER
Called and spoke to the patient. A face to face visit per insurance for the requested DME order for incontinence/diaper supplies is needed. Patient was offered appointment in person or video appointment with any providers. No telephone or evisit as insurance will not covered. Patient decline both, does have an upcoming appointment with PCP on 11/28/22. Patient wanted to keep the appointment already schedule with PCP. Patient states she will need someone with her as she is on oxygen and continue to repeat with if she cannot make it. Patient does not have a PCA. Patient would like a call back.    Jessenia Obrien, Clinic , 10/18/22 at 12:41 PM

## 2022-10-18 NOTE — TELEPHONE ENCOUNTER
[facility] Call Center    Phone Message    May a detailed message be left on voicemail: yes     Reason for Call: Patient needs someone to help her get to appointments and etc. She has no family to help her. Writer tried helping her with getting a video appointment and she stated that she doesn't use text messaging. Please call her back to discuss further.    Action Taken: Message routed to:  [facility]    Travel Screening: Not Applicable

## 2022-10-19 ENCOUNTER — DOCUMENTATION ONLY (OUTPATIENT)
Dept: FAMILY MEDICINE | Facility: CLINIC | Age: 77
End: 2022-10-19

## 2022-10-19 NOTE — ASSESSMENT & PLAN NOTE
Patient reported to have urinary incontinence needing incontinence supplies, includes adult diaper. DME ordered for patient.

## 2022-10-19 NOTE — TELEPHONE ENCOUNTER
[facility] Medical staff called RN, and relayed they can supply incontinence supplies.  They will process the order signed by Dr. Britton and contact patient about delivery.    Kiki Eddy RN on 10/19/2022 at 12:11 PM

## 2022-10-19 NOTE — PROGRESS NOTES
Type of Form Received: SUPPLIES    Form Received (Date) 10/19/22   Form Filled out Yes 10/26/22   Placed in provider folder Yes

## 2022-10-19 NOTE — TELEPHONE ENCOUNTER
RN left voice message for [facility] to call RN back at direct number.  Patient had an office visit with Dr. Britton on 10/11/22.  DME order for incontinence supplies was signed by Dr. Britton and notes are in encounter from 10/11/22.  Therefore, patient does not need another appt for incontinence supplies.  RN needs to verify that [facility] can help with supplying DME, that insurance will cover, and that patient will be contacted with details about delivery.    Kiki Eddy RN on 10/19/2022 at 8:15 AM

## 2022-10-19 NOTE — TELEPHONE ENCOUNTER
I'll resign an order. There was one I had signed yesterday but will resign today.     Marco Britton MD  Internal Medicine  Primary Care Clinic, [place], [place]

## 2022-10-21 ENCOUNTER — HOSPITAL ENCOUNTER (EMERGENCY)
Facility: CLINIC | Age: 77
Discharge: HOME | End: 2022-10-21
Attending: EMERGENCY MEDICINE | Admitting: EMERGENCY MEDICINE
Payer: COMMERCIAL

## 2022-10-21 ENCOUNTER — APPOINTMENT (OUTPATIENT)
Dept: GENERAL RADIOLOGY | Facility: CLINIC | Age: 77
End: 2022-10-21
Attending: EMERGENCY MEDICINE
Payer: COMMERCIAL

## 2022-10-21 VITALS
DIASTOLIC BLOOD PRESSURE: 93 MMHG | OXYGEN SATURATION: 98 % | BODY MASS INDEX: 34.55 KG/M2 | WEIGHT: 195 LBS | TEMPERATURE: 98.3 F | HEART RATE: 72 BPM | HEIGHT: 63 IN | RESPIRATION RATE: 20 BRPM | SYSTOLIC BLOOD PRESSURE: 190 MMHG

## 2022-10-21 DIAGNOSIS — Z87.891 PERSONAL HISTORY OF TOBACCO USE, PRESENTING HAZARDS TO HEALTH: ICD-10-CM

## 2022-10-21 DIAGNOSIS — Z11.52 ENCOUNTER FOR SCREENING LABORATORY TESTING FOR SEVERE ACUTE RESPIRATORY SYNDROME CORONAVIRUS 2 (SARS-COV-2): ICD-10-CM

## 2022-10-21 DIAGNOSIS — J44.1 COPD EXACERBATION (H): ICD-10-CM

## 2022-10-21 DIAGNOSIS — I45.10 RIGHT BUNDLE BRANCH BLOCK: ICD-10-CM

## 2022-10-21 LAB
ALBUMIN SERPL BCG-MCNC: 4 G/DL (ref 3.5–5.2)
ALP SERPL-CCNC: 108 U/L (ref 35–104)
ALT SERPL W P-5'-P-CCNC: 9 U/L (ref 10–35)
ANION GAP SERPL CALCULATED.3IONS-SCNC: 6 MMOL/L (ref 7–15)
AST SERPL W P-5'-P-CCNC: 23 U/L (ref 10–35)
ATRIAL RATE - MUSE: 73 BPM
BASOPHILS # BLD AUTO: 0.1 10E3/UL (ref 0–0.2)
BASOPHILS NFR BLD AUTO: 1 %
BILIRUB SERPL-MCNC: 0.3 MG/DL
BUN SERPL-MCNC: 20.6 MG/DL (ref 8–23)
CALCIUM SERPL-MCNC: 9.3 MG/DL (ref 8.8–10.2)
CHLORIDE SERPL-SCNC: 97 MMOL/L (ref 98–107)
CREAT SERPL-MCNC: 0.75 MG/DL (ref 0.51–0.95)
DEPRECATED HCO3 PLAS-SCNC: 39 MMOL/L (ref 22–29)
DIASTOLIC BLOOD PRESSURE - MUSE: NORMAL MMHG
EOSINOPHIL # BLD AUTO: 0.1 10E3/UL (ref 0–0.7)
EOSINOPHIL NFR BLD AUTO: 2 %
ERYTHROCYTE [DISTWIDTH] IN BLOOD BY AUTOMATED COUNT: 13.8 % (ref 10–15)
FLUAV RNA SPEC QL NAA+PROBE: NEGATIVE
FLUBV RNA RESP QL NAA+PROBE: NEGATIVE
GFR SERPL CREATININE-BSD FRML MDRD: 81 ML/MIN/1.73M2
GLUCOSE SERPL-MCNC: 99 MG/DL (ref 70–99)
HCT VFR BLD AUTO: 31.6 % (ref 35–47)
HGB BLD-MCNC: 9.5 G/DL (ref 11.7–15.7)
IMM GRANULOCYTES # BLD: 0 10E3/UL
IMM GRANULOCYTES NFR BLD: 0 %
INR PPP: 0.97 (ref 0.85–1.15)
INTERPRETATION ECG - MUSE: NORMAL
LACTATE SERPL-SCNC: 0.6 MMOL/L (ref 0.7–2)
LIPASE SERPL-CCNC: 17 U/L (ref 13–60)
LYMPHOCYTES # BLD AUTO: 0.8 10E3/UL (ref 0.8–5.3)
LYMPHOCYTES NFR BLD AUTO: 14 %
MCH RBC QN AUTO: 30.7 PG (ref 26.5–33)
MCHC RBC AUTO-ENTMCNC: 30.1 G/DL (ref 31.5–36.5)
MCV RBC AUTO: 102 FL (ref 78–100)
MONOCYTES # BLD AUTO: 0.4 10E3/UL (ref 0–1.3)
MONOCYTES NFR BLD AUTO: 8 %
NEUTROPHILS # BLD AUTO: 4 10E3/UL (ref 1.6–8.3)
NEUTROPHILS NFR BLD AUTO: 75 %
NRBC # BLD AUTO: 0 10E3/UL
NRBC BLD AUTO-RTO: 0 /100
NT-PROBNP SERPL-MCNC: 154 PG/ML (ref 0–1800)
P AXIS - MUSE: 57 DEGREES
PLATELET # BLD AUTO: 210 10E3/UL (ref 150–450)
POTASSIUM SERPL-SCNC: 4.1 MMOL/L (ref 3.4–5.3)
PR INTERVAL - MUSE: 134 MS
PROT SERPL-MCNC: 7.2 G/DL (ref 6.4–8.3)
QRS DURATION - MUSE: 92 MS
QT - MUSE: 416 MS
QTC - MUSE: 458 MS
R AXIS - MUSE: 61 DEGREES
RBC # BLD AUTO: 3.09 10E6/UL (ref 3.8–5.2)
RSV RNA SPEC NAA+PROBE: NEGATIVE
SARS-COV-2 RNA RESP QL NAA+PROBE: NEGATIVE
SODIUM SERPL-SCNC: 142 MMOL/L (ref 136–145)
SYSTOLIC BLOOD PRESSURE - MUSE: NORMAL MMHG
T AXIS - MUSE: 68 DEGREES
TROPONIN T SERPL HS-MCNC: 8 NG/L
VENTRICULAR RATE- MUSE: 73 BPM
WBC # BLD AUTO: 5.4 10E3/UL (ref 4–11)

## 2022-10-21 PROCEDURE — 85025 COMPLETE CBC W/AUTO DIFF WBC: CPT | Performed by: EMERGENCY MEDICINE

## 2022-10-21 PROCEDURE — 83605 ASSAY OF LACTIC ACID: CPT | Performed by: EMERGENCY MEDICINE

## 2022-10-21 PROCEDURE — 84484 ASSAY OF TROPONIN QUANT: CPT | Performed by: EMERGENCY MEDICINE

## 2022-10-21 PROCEDURE — 94640 AIRWAY INHALATION TREATMENT: CPT

## 2022-10-21 PROCEDURE — 80053 COMPREHEN METABOLIC PANEL: CPT | Performed by: EMERGENCY MEDICINE

## 2022-10-21 PROCEDURE — 250N000009 HC RX 250: Performed by: EMERGENCY MEDICINE

## 2022-10-21 PROCEDURE — 83880 ASSAY OF NATRIURETIC PEPTIDE: CPT | Performed by: EMERGENCY MEDICINE

## 2022-10-21 PROCEDURE — 87040 BLOOD CULTURE FOR BACTERIA: CPT | Performed by: EMERGENCY MEDICINE

## 2022-10-21 PROCEDURE — 87637 SARSCOV2&INF A&B&RSV AMP PRB: CPT | Performed by: EMERGENCY MEDICINE

## 2022-10-21 PROCEDURE — 36415 COLL VENOUS BLD VENIPUNCTURE: CPT | Performed by: EMERGENCY MEDICINE

## 2022-10-21 PROCEDURE — C9803 HOPD COVID-19 SPEC COLLECT: HCPCS

## 2022-10-21 PROCEDURE — 85610 PROTHROMBIN TIME: CPT | Performed by: EMERGENCY MEDICINE

## 2022-10-21 PROCEDURE — 71046 X-RAY EXAM CHEST 2 VIEWS: CPT

## 2022-10-21 PROCEDURE — 93010 ELECTROCARDIOGRAM REPORT: CPT | Performed by: EMERGENCY MEDICINE

## 2022-10-21 PROCEDURE — 93005 ELECTROCARDIOGRAM TRACING: CPT

## 2022-10-21 PROCEDURE — 83690 ASSAY OF LIPASE: CPT | Performed by: EMERGENCY MEDICINE

## 2022-10-21 PROCEDURE — 99285 EMERGENCY DEPT VISIT HI MDM: CPT | Mod: CS,25

## 2022-10-21 PROCEDURE — 71046 X-RAY EXAM CHEST 2 VIEWS: CPT | Mod: 26 | Performed by: RADIOLOGY

## 2022-10-21 PROCEDURE — 99285 EMERGENCY DEPT VISIT HI MDM: CPT | Mod: CS | Performed by: EMERGENCY MEDICINE

## 2022-10-21 RX ORDER — IPRATROPIUM BROMIDE AND ALBUTEROL SULFATE 2.5; .5 MG/3ML; MG/3ML
3 SOLUTION RESPIRATORY (INHALATION) ONCE
Status: COMPLETED | OUTPATIENT
Start: 2022-10-21 | End: 2022-10-21

## 2022-10-21 RX ORDER — DOXYCYCLINE 100 MG/1
100 TABLET ORAL DAILY
Qty: 14 TABLET | Refills: 0 | Status: SHIPPED | OUTPATIENT
Start: 2022-10-21 | End: 2022-11-17

## 2022-10-21 RX ADMIN — IPRATROPIUM BROMIDE AND ALBUTEROL SULFATE 3 ML: .5; 3 SOLUTION RESPIRATORY (INHALATION) at 12:52

## 2022-10-21 ASSESSMENT — ENCOUNTER SYMPTOMS
CHILLS: 1
FEVER: 1
COUGH: 1
NAUSEA: 1

## 2022-10-21 ASSESSMENT — ACTIVITIES OF DAILY LIVING (ADL)
ADLS_ACUITY_SCORE: 35
ADLS_ACUITY_SCORE: 35

## 2022-10-21 NOTE — ED NOTES
Pt placed back in ED lobby due to ED max capacity. Pt informed to notify writer of any worsening symptoms or changes. Pt agreeable.

## 2022-10-21 NOTE — ED PROVIDER NOTES
[place] EMERGENCY DEPARTMENT ([facility])  10/21/22    History     Chief Complaint   Patient presents with     Shortness of Breath     Fever     The history is provided by the patient and medical records.     Ca Yan is a 76 year old female with a history of COPD, breast cancer s/p lumpectomy, chronic lower extremity lymphedema, alcohol abuse, GERD, HTN, and HLD who presents to the Emergency Department with fever and chills. Patient reports she has been feeling unwell since Monday, 10/17, having constant chills with intermittent subjective fever. Patient endorses some coughing. Patient is on 3-4L home O2, denies having to increase. She reports some nausea today. She took pepto bismol without relief. Patient denies chest pain. Patient states her legs are sore, notes this is chronic and unchanged. No increased leg swelling. Patient ambulates without assistive devices typically.    Past Medical History  Past Medical History:   Diagnosis Date     Anxiety      Arthritis      Breast cancer (H)      COPD (chronic obstructive pulmonary disease) (H)     6/19/12:  FEV 0.99 l     Depressive disorder      Hypertension      Low back pain with left-sided sciatica      Low bone density 4/13/2017    DEXA April 12, 2017: T score -2.0. Normal Z score. FRAX risk: major osteoporotic fracture 11.9%, hip fracture 2.6%, therefore not high-risk     Lymphedema, chronic lower extremities      Past Surgical History:   Procedure Laterality Date     BACK SURGERY      spinal fusion     COLONOSCOPY       LARYNGOSCOPY WITH MICROSCOPE N/A 4/30/2021    Procedure: FLEXIBLE LARYNGOSCOPY;  Surgeon: Domonique Roche MD;  Location: [facility] OR     LUMPECTOMY BREAST       ORTHOPEDIC SURGERY      Knee surgery left side     doxycycline monohydrate (ADOXA) 100 MG tablet  atorvastatin (LIPITOR) 40 MG tablet  azelastine (ASTELIN) 0.1 % nasal spray  cephALEXin (KEFLEX) 250 MG capsule  clotrimazole (LOTRIMIN) 1 % external cream  Emollient (CERAVE)  CREA  ibuprofen (ADVIL/MOTRIN) 600 MG tablet  ipratropium (ATROVENT) 0.03 % nasal spray  latanoprost (XALATAN) 0.005 % ophthalmic solution  losartan-hydrochlorothiazide (HYZAAR) 100-25 MG tablet  mirtazapine (REMERON) 7.5 MG tablet  QUEtiapine (SEROQUEL) 300 MG tablet  sertraline (ZOLOFT) 100 MG tablet  tiZANidine (ZANAFLEX) 2 MG tablet  Vitamin D3 (CHOLECALCIFEROL) 25 mcg (1000 units) tablet      Allergies   Allergen Reactions     Azithromycin Itching     Codeine Nausea and Vomiting     Hydrocodone Nausea and Vomiting     Metronidazole Nausea     Oxycodone Itching and Rash     Percocet [Oxycodone-Acetaminophen] Nausea and Vomiting     Pollen Extract      sneezing and runny nose.      Seasonal Allergies      sneezing and runny nose.      Vicodin [Hydrocodone-Acetaminophen] Nausea and Vomiting     Zolpidem      Amnestic behavior     Family History  Family History   Problem Relation Age of Onset     Breast Cancer Mother      Diabetes Mother      Anxiety Disorder Mother      Asthma Mother      Other Cancer Mother      Hyperlipidemia Mother      Alcohol/Drug Father      Mental Illness Father      Substance Abuse Father      Obesity Father      Cerebrovascular Disease Maternal Grandmother 67     Other - See Comments Maternal Aunt         lived to      Social History   Social History     Tobacco Use     Smoking status: Former     Packs/day: 0.50     Years: 5.00     Pack years: 2.50     Types: Cigarettes     Start date: 1956     Quit date: 1980     Years since quittin.0     Smokeless tobacco: Former     Quit date: 1980   Substance Use Topics     Alcohol use: Not Currently     Alcohol/week: 0.0 standard drinks     Comment: Sober for 2 years, previous alcoholic     Drug use: No      Past medical history, past surgical history, medications, allergies, family history, and social history were reviewed with the patient. No additional pertinent items.       Review of Systems   Constitutional: Positive for  "chills and fever.   Respiratory: Positive for cough.    Cardiovascular: Negative for chest pain and leg swelling.   Gastrointestinal: Positive for nausea.   All other systems reviewed and are negative.    A complete review of systems was performed with pertinent positives and negatives noted in the HPI, and all other systems negative.    Physical Exam   BP: (!) 148/76  Pulse: 72  Temp: 98.3  F (36.8  C)  Resp: 20  Height: 160 cm (5' 3\")  Weight: 88.5 kg (195 lb)  SpO2: 99 %  Physical Exam  Physical Exam   Constitutional: Elderly female, appears uncomfortable  HENT:   Head: Normocephalic and atraumatic.   Eyes: Conjunctivae are normal. Pupils are equal, round, and reactive to light.    pharynx has no erythema or exudate, mucous membranes are moist  Neck:   no adenopathy, no bony tenderness  Cardiovascular: regular rate and rhythm without murmurs or gallops  Pulmonary/Chest: Bibasilar crackles, with no wheezes or retractions. No respiratory distress.  GI: Soft with good bowel sounds.  Non-tender, non-distended, with no guarding, no rebound, no peritoneal signs.   Back:  No bony or CVA tenderness   Musculoskeletal: Pitting edema bilateral lower extremities.  Patient will not allow me to touch them or look at her feet  Skin: Skin is warm and dry. No rash noted.   Neurological: alert and oriented to person, place, and time. Nonfocal exam  Psychiatric:  normal mood and affect.   ED Course   9:55 AM  The patient was seen and examined by Nancie Alexis MD in Triage B.        Procedures       The medical record was reviewed and interpreted.  Current labs reviewed and interpreted.  Previous labs reviewed and interpreted.  Current images reviewed and interpreted: see below.  EKG reviewed and interpreted: see below.  Managed outpatient prescription medications.           EKG Interpretation:      Interpreted by Nancie Alexis MD  Time reviewed:1035 am   Symptoms at time of EKG: see  hpi   Rhythm: Normal sinus   Rate: " Normal  Axis: Normal  Ectopy: None  Conduction: Normal and Right bundle branch block (incomplete)  ST Segments/ T Waves: No ST-T wave changes and No acute ischemic changes  Q Waves: None  Comparison to prior: 9/22/2022: Normal sinus rhythm, rate of 72 bpm    Clinical Impression: Normal sinus rhythm, rate of 73 bpm with an incomplete right bundle branch block              Results for orders placed or performed during the hospital encounter of 10/21/22   XR Chest 2 Views     Status: None    Narrative    Chest 2 views    INDICATION: Short of breath. COPD.    COMPARISON: 9/22/2022    FINDINGS: Patient remains somewhat rotated. Heart size upper normal.  No new opacities.      Impression    IMPRESSION:    No significant interval change 9/22/2022.    GEM DESIR MD         SYSTEM ID:  [id number]   Symptomatic; Unknown Influenza A/B & SARS-CoV2 (COVID-19) Virus PCR Multiplex Nasopharyngeal     Status: Normal    Specimen: Nasopharyngeal; Swab   Result Value Ref Range    Influenza A PCR Negative Negative    Influenza B PCR Negative Negative    RSV PCR Negative Negative    SARS CoV2 PCR Negative Negative    Narrative    Testing was performed using the Xpert Xpress CoV2/Flu/RSV Assay on the Cepheid GeneXpert Instrument. This test should be ordered for the detection of SARS-CoV-2 and influenza viruses in individuals who meet clinical and/or epidemiological criteria. Test performance is unknown in asymptomatic patients. This test is for in vitro diagnostic use under the FDA EUA for laboratories certified under CLIA to perform high or moderate complexity testing. This test has not been FDA cleared or approved. A negative result does not rule out the presence of PCR inhibitors in the specimen or target RNA in concentration below the limit of detection for the assay. If only one viral target is positive but coinfection with multiple targets is suspected, the sample should be re-tested with another FDA cleared, approved, or  authorized test, if coinfection would change clinical management. This test was validated by the [facility]. These laboratories are certified under the Clinical Laboratory Improvement Amendments of 1988 (CLIA-88) as qualified to perform high complexity laboratory testing.   INR     Status: Normal   Result Value Ref Range    INR 0.97 0.85 - 1.15   Comprehensive metabolic panel     Status: Abnormal   Result Value Ref Range    Sodium 142 136 - 145 mmol/L    Potassium 4.1 3.4 - 5.3 mmol/L    Chloride 97 (L) 98 - 107 mmol/L    Carbon Dioxide (CO2) 39 (H) 22 - 29 mmol/L    Anion Gap 6 (L) 7 - 15 mmol/L    Urea Nitrogen 20.6 8.0 - 23.0 mg/dL    Creatinine 0.75 0.51 - 0.95 mg/dL    Calcium 9.3 8.8 - 10.2 mg/dL    Glucose 99 70 - 99 mg/dL    Alkaline Phosphatase 108 (H) 35 - 104 U/L    AST 23 10 - 35 U/L    ALT 9 (L) 10 - 35 U/L    Protein Total 7.2 6.4 - 8.3 g/dL    Albumin 4.0 3.5 - 5.2 g/dL    Bilirubin Total 0.3 <=1.2 mg/dL    GFR Estimate 81 >60 mL/min/1.73m2   Lipase     Status: Normal   Result Value Ref Range    Lipase 17 13 - 60 U/L   Lactic acid whole blood     Status: Abnormal   Result Value Ref Range    Lactic Acid 0.6 (L) 0.7 - 2.0 mmol/L   Troponin T, High Sensitivity     Status: Normal   Result Value Ref Range    Troponin T, High Sensitivity 8 <=14 ng/L   Nt probnp inpatient (BNP)     Status: Normal   Result Value Ref Range    N terminal Pro BNP Inpatient 154 0 - 1,800 pg/mL   CBC with platelets and differential     Status: Abnormal   Result Value Ref Range    WBC Count 5.4 4.0 - 11.0 10e3/uL    RBC Count 3.09 (L) 3.80 - 5.20 10e6/uL    Hemoglobin 9.5 (L) 11.7 - 15.7 g/dL    Hematocrit 31.6 (L) 35.0 - 47.0 %     (H) 78 - 100 fL    MCH 30.7 26.5 - 33.0 pg    MCHC 30.1 (L) 31.5 - 36.5 g/dL    RDW 13.8 10.0 - 15.0 %    Platelet Count 210 150 - 450 10e3/uL    % Neutrophils 75 %    % Lymphocytes 14 %    % Monocytes 8 %    % Eosinophils 2 %    % Basophils 1 %    % Immature Granulocytes 0 %     NRBCs per 100 WBC 0 <1 /100    Absolute Neutrophils 4.0 1.6 - 8.3 10e3/uL    Absolute Lymphocytes 0.8 0.8 - 5.3 10e3/uL    Absolute Monocytes 0.4 0.0 - 1.3 10e3/uL    Absolute Eosinophils 0.1 0.0 - 0.7 10e3/uL    Absolute Basophils 0.1 0.0 - 0.2 10e3/uL    Absolute Immature Granulocytes 0.0 <=0.4 10e3/uL    Absolute NRBCs 0.0 10e3/uL   EKG 12-lead, tracing only     Status: None   Result Value Ref Range    Systolic Blood Pressure  mmHg    Diastolic Blood Pressure  mmHg    Ventricular Rate 73 BPM    Atrial Rate 73 BPM    MA Interval 134 ms    QRS Duration 92 ms     ms    QTc 458 ms    P Axis 57 degrees    R AXIS 61 degrees    T Axis 68 degrees    Interpretation ECG       Sinus rhythm  Incomplete right bundle branch block  Borderline ECG  Unconfirmed report - interpretation of this ECG is computer generated - see medical record for final interpretation  Confirmed by - EMERGENCY ROOM, PHYSICIAN ([id number]),  DARERN HOLLIDAY ([id number]) on 10/21/2022 11:47:23 AM     CBC with platelets differential     Status: Abnormal    Narrative    The following orders were created for panel order CBC with platelets differential.  Procedure                               Abnormality         Status                     ---------                               -----------         ------                     CBC with platelets and d...[[id number]]  Abnormal            Final result                 Please view results for these tests on the individual orders.     Medications   ipratropium - albuterol 0.5 mg/2.5 mg/3 mL (DUONEB) neb solution 3 mL (3 mLs Nebulization Given 10/21/22 1252)        Assessments & Plan (with Medical Decision Making)       I have reviewed the nursing notes.   Emergency Department course:  The patient was seen and examined at 0955 am in triage.   EKG shows : Normal sinus rhythm, rate of 73 bpm with an incomplete right bundle branch block.    Chest x-ray shows heart size to be the upper limits of normal.  No  significant interval change since 9/20/2022.  Laboratory studies show anemia, with a hemoglobin of 9.5.  WBC is normal at 5.4.  Lactate is low at 0.6.  Troponin T is negative at 8.  Comprehensive metabolic panel shows an elevated carbon dioxide of 39 and an elevated alkaline phosphatase of 108.  Lipase is normal at 17.  INR is 0.97  BNP is 154  COVID-19, and influenza a and B are negative.    I treated the patient with a DuoNeb p.o.    Ca Yan is a 76 year old female who presents with worsening shortness of breath, chills, and not feeling well.  I suspect COPD exacerbation as etiology for her symptoms.  I doubt acute coronary syndrome, pneumonia, pulmonary embolus.   I had planned to admit the patient to the ED observation unit for treatment.  The patient is adamant that she would like to be discharged home.  Blood cultures x2 were drawn; these results are pending    She received a DuoNeb p.o. I had intended to prescribe azithromycin on discharge.  However the patient has an allergy to this medication.  After discussion with pharmacy, she will be discharged on doxycycline.  She will need close outpatient follow-up.  Doxycycline 100 mg po BID for 7  Days.   I have reviewed the findings, diagnosis, plan and need for follow up with the patient.      Final diagnoses:   COPD exacerbation (H)     I, Christelle Khalil , am serving as a trained medical scribe to document services personally performed by Nancie Alexis MD, based on the provider's statements to me.      I, Nancie Alexis MD, was physically present and have reviewed and verified the accuracy of this note documented by Christelle Khalil.     --This note was created in part by the use of Dragon voice recognition dictation system. Inadvertent grammatical errors and typographical errors may still exist.  MD Nancie Canseco MD  [facility] EMERGENCY DEPARTMENT  10/21/2022     Nancie Alexis MD  10/21/22 9638

## 2022-10-21 NOTE — DISCHARGE INSTRUCTIONS
Take doxycycline as directed.  Stay out of the sun when taking this medication as it can cause sunburns.  You will need to follow-up with your regular doctor this coming week for follow-up.  Return to the ED for concerns

## 2022-10-21 NOTE — ED TRIAGE NOTES
Triage Assessment     Row Name 10/21/22 0951       Triage Assessment (Adult)    Airway WDL WDL    Additional Documentation Breath Sounds (Group)       Respiratory WDL    Respiratory WDL X;all    Rhythm/Pattern, Respiratory shortness of breath    Cough Frequency frequent    Cough Type productive       Skin Circulation/Temperature WDL    Skin Circulation/Temperature WDL WDL       Cardiac WDL    Cardiac WDL WDL       Peripheral/Neurovascular WDL    Peripheral Neurovascular WDL WDL       Cognitive/Neuro/Behavioral WDL    Cognitive/Neuro/Behavioral WDL X  intermittent headache    Level of Consciousness alert    Arousal Level opens eyes spontaneously    Orientation oriented x 4    Mood/Behavior calm

## 2022-10-21 NOTE — ED TRIAGE NOTES
Pt BIBA from home for shortness of breath and fever for the past couple days. Pt anxious per EMS. Pt on 4L NC chronic.    100% on 4L  /80  EKG SR

## 2022-10-26 ENCOUNTER — MEDICAL CORRESPONDENCE (OUTPATIENT)
Dept: HEALTH INFORMATION MANAGEMENT | Facility: CLINIC | Age: 77
End: 2022-10-26

## 2022-10-26 ENCOUNTER — TELEPHONE (OUTPATIENT)
Dept: FAMILY MEDICINE | Facility: CLINIC | Age: 77
End: 2022-10-26

## 2022-10-26 LAB
BACTERIA BLD CULT: NO GROWTH
BACTERIA BLD CULT: NO GROWTH

## 2022-10-26 NOTE — TELEPHONE ENCOUNTER
[facility] Call Center    Phone Message    May a detailed message be left on voicemail: yes     Reason for Call: Other: Patient states that [facility] wants Dr. White to right an order for pads. Please call them back to discuss.[phone]      Action Taken: Message routed to:  [facility]): pcp    Travel Screening: Not Applicable

## 2022-10-27 ENCOUNTER — TELEPHONE (OUTPATIENT)
Dept: DERMATOLOGY | Facility: CLINIC | Age: 77
End: 2022-10-27

## 2022-10-27 ENCOUNTER — ALLIED HEALTH/NURSE VISIT (OUTPATIENT)
Dept: DERMATOLOGY | Facility: CLINIC | Age: 77
End: 2022-10-27
Payer: COMMERCIAL

## 2022-10-27 ENCOUNTER — OFFICE VISIT (OUTPATIENT)
Dept: DERMATOLOGY | Facility: CLINIC | Age: 77
End: 2022-10-27
Payer: COMMERCIAL

## 2022-10-27 ENCOUNTER — DOCUMENTATION ONLY (OUTPATIENT)
Dept: DERMATOLOGY | Facility: CLINIC | Age: 77
End: 2022-10-27

## 2022-10-27 DIAGNOSIS — D22.9 MULTIPLE BENIGN NEVI: Primary | ICD-10-CM

## 2022-10-27 DIAGNOSIS — L82.1 SEBORRHEIC KERATOSES: ICD-10-CM

## 2022-10-27 DIAGNOSIS — Z12.83 ENCOUNTER FOR SCREENING FOR MALIGNANT NEOPLASM OF SKIN: ICD-10-CM

## 2022-10-27 DIAGNOSIS — D48.9 NEOPLASM OF UNCERTAIN BEHAVIOR: Primary | ICD-10-CM

## 2022-10-27 DIAGNOSIS — L81.4 LENTIGINES: ICD-10-CM

## 2022-10-27 DIAGNOSIS — D48.9 NEOPLASM OF UNCERTAIN BEHAVIOR: ICD-10-CM

## 2022-10-27 DIAGNOSIS — D18.01 CHERRY ANGIOMA: ICD-10-CM

## 2022-10-27 DIAGNOSIS — Z53.9 ERRONEOUS ENCOUNTER--DISREGARD: Primary | ICD-10-CM

## 2022-10-27 PROCEDURE — 99207 PR NO CHARGE NURSE ONLY: CPT | Performed by: NURSE PRACTITIONER

## 2022-10-27 PROCEDURE — 88305 TISSUE EXAM BY PATHOLOGIST: CPT | Performed by: DERMATOLOGY

## 2022-10-27 PROCEDURE — 99213 OFFICE O/P EST LOW 20 MIN: CPT | Mod: 25 | Performed by: NURSE PRACTITIONER

## 2022-10-27 PROCEDURE — 11102 TANGNTL BX SKIN SINGLE LES: CPT | Performed by: NURSE PRACTITIONER

## 2022-10-27 ASSESSMENT — PAIN SCALES - GENERAL: PAINLEVEL: NO PAIN (0)

## 2022-10-27 NOTE — PATIENT INSTRUCTIONS
Wound Care Instructions     FOR SUPERFICIAL WOUNDS     [phone]          AFTER 24 HOURS YOU SHOULD REMOVE THE BANDAGE AND BEGIN DAILY DRESSING CHANGES AS FOLLOWS:     1) Remove Dressing.     2) Clean and dry the area with tap water using a Q-tip or sterile gauze pad.     3) Apply Vaseline, Aquaphor, Polysporin ointment or Bacitracin ointment over entire wound.  Do NOT use Neosporin ointment.     4) Cover the wound with a band-aid, or a sterile non-stick gauze pad and micropore paper tape      REPEAT THESE INSTRUCTIONS AT LEAST ONCE A DAY UNTIL THE WOUND HAS COMPLETELY HEALED.    It is an old wives tale that a wound heals better when it is exposed to air and allowed to dry out. The wound will heal faster with a better cosmetic result if it is kept moist with ointment and covered with a bandage.    **Do not let the wound dry out.**      Supplies Needed:      *Cotton tipped applicators (Q-tips)    *Polysporin Ointment or Bacitracin Ointment (NOT NEOSPORIN)    *Band-aids or non-stick gauze pads and micropore paper tape.      PATIENT INFORMATION:    During the healing process you will notice a number of changes. All wounds develop a small halo of redness surrounding the wound.  This means healing is occurring. Severe itching with extensive redness usually indicates sensitivity to the ointment or bandage tape used to dress the wound.  You should call our office if this develops.      Swelling  and/or discoloration around your surgical site is common, particularly when performed around the eye.    All wounds normally drain.  The larger the wound the more drainage there will be.  After 7-10 days, you will notice the wound beginning to shrink and new skin will begin to grow.  The wound is healed when you can see skin has formed over the entire area.  A healed wound has a healthy, shiny look to the surface and is red to dark pink in color to normalize.  Wounds may take approximately 4-6 weeks to heal.  Larger  wounds may take 6-8 weeks.  After the wound is healed you may discontinue dressing changes.    You may experience a sensation of tightness as your wound heals. This is normal and will gradually subside.    Your healed wound may be sensitive to temperature changes. This sensitivity improves with time, but if you re having a lot of discomfort, try to avoid temperature extremes.    Patients frequently experience itching after their wound appears to have healed because of the continue healing under the skin.  Plain Vaseline will help relieve the itching.        POSSIBLE COMPLICATIONS    BLEEDING:    Leave the bandage in place.  Use tightly rolled up gauze or a cloth to apply direct pressure over the bandage for 30  minutes.  Reapply pressure for an additional 30 minutes if necessary  Use additional gauze and tape to maintain pressure once the bleeding has stopped.

## 2022-10-27 NOTE — LETTER
10/27/2022         RE: Ca Yan  [address]  [place] [facility] [address]        Dear Colleague,    Thank you for referring your patient, Ca Yan, to the [facility]. Please see a copy of my visit note below.    [facility] Note  Encounter Date: Oct 27, 2022  Office Visit     Reviewed patients past medical history and pertinent chart review prior to patients visit today.     Dermatology Problem List:  Patient denies personal history of skin cancer or dysplastic nevi.      NUB shave biopsy dorsum nose 10/27/22     ____________________________________________    Assessment & Plan:     # Neoplasm of uncertain behavior:  Dorsum nose  DDx includes NMSC vs ISK. Shave biopsy today.    Procedure Note: Biopsy by shave technique  The risks and benefits of the procedure were described to the patient. These include but are not limited to bleeding, infection, scar, incomplete removal, and non-diagnostic biopsy. Verbal informed consent was obtained. The above site(s) was cleansed with an alcohol pad and injected with 1% lidocaine with epinephrine. Once anesthesia was obtained, a biopsy(ies) was performed with Gilette blade. The tissue(s) was placed in a labeled container(s) with formalin and sent to pathology. Hemostasis was achieved with aluminum chloride. Vaseline and a bandage were applied to the wound(s). The patient tolerated the procedure well and was given post biopsy care instructions.     # Benign skin findings including: seborrheic keratoses, cherry angioma, lentigines and benign nevi.   - No further intervention required. Patient to report changes.   - Patient reassured of the benign nature of these lesions.    #Signs and Symptoms of non-melanoma skin cancer and ABCDEs of melanoma reviewed with patient. Patient encouraged to perform monthly self skin exams and educated on how to perform them. UV precautions reviewed with patient. Patient was  asked about new or changing moles/lesions on body.     #Reviewed Sunscreen: Apply 20 minutes prior to going outdoors and reapply every two hours, when wet or sweating. We recommend using an SPF 30 or higher, and to use one that is water resistant.       Follow-up:  Pending biopsy results    Lilly Pinto, RUPAL CNP on 10/27/2022 at 1:07 PM    ____________________________________________    CC: Skin Check (Full body- concerns about spot on nose, under right breast, and elsewhere on body. )    HPI:  Ms. Ca Yan is a(n) 76 year old female who presents today as a return patient for an upper body skin cancer screening. Patient has concerns today about a spot on her nose that has been bleeding and not healing for a few months. She also has some irritated spots under her breasts.     Patient is otherwise feeling well, without additional skin concerns.     Physical Exam:  Vitals: LMP  (LMP Unknown)   SKIN: Total skin excluding the genitalia areas was performed. The exam included the head/face, neck, both arms, chest, back, abdomen, both legs, digits, mons pubis, buttock and nails.   -dorsum nose, 2 mm hemorrhagic crust with pink/brown base  -several 1-2mm red dome shaped symmetric papules scattered on the trunk  -multiple tan/brown flat round macules and raised papules scattered throughout trunk, extremities and head. No worrisome features for malignancy noted on examination.  -scattered tan, homogenous macules scattered on sun exposed areas of trunk, extremities and face.   -scattered waxy, stuck on tan/brown papules and patches on the trunk and face     - No other lesions of concern on areas examined.     Medications:  Current Outpatient Medications   Medication     atorvastatin (LIPITOR) 40 MG tablet     azelastine (ASTELIN) 0.1 % nasal spray     cephALEXin (KEFLEX) 250 MG capsule     clotrimazole (LOTRIMIN) 1 % external cream     doxycycline monohydrate (ADOXA) 100 MG tablet     Emollient (CERAVE) CREA      ibuprofen (ADVIL/MOTRIN) 600 MG tablet     ipratropium (ATROVENT) 0.03 % nasal spray     latanoprost (XALATAN) 0.005 % ophthalmic solution     losartan-hydrochlorothiazide (HYZAAR) 100-25 MG tablet     mirtazapine (REMERON) 7.5 MG tablet     QUEtiapine (SEROQUEL) 300 MG tablet     sertraline (ZOLOFT) 100 MG tablet     tiZANidine (ZANAFLEX) 2 MG tablet     Vitamin D3 (CHOLECALCIFEROL) 25 mcg (1000 units) tablet     No current facility-administered medications for this visit.      Past Medical History:   Patient Active Problem List   Diagnosis     Non morbid obesity due to excess calories     Alcohol abuse     Malignant neoplasm of breast (H)     Arthritis of knee     Diarrhea     Diastolic dysfunction     Osteoarthritis of spine     Gastroesophageal reflux disease     Generalized anxiety disorder     Essential hypertension     Hyperlipidemia     Insomnia     Irritable bowel syndrome     Kyphoscoliosis     Cluster C personality disorder (H)The  anxious/fearful  cluster is comprised of dependent, avoidant, and obsessive-compulsive personality disorders. Patients with Cluster C disorders are emotionally i     Seborrheic eczema     Anemia     Anxiety state     Bunion     Low bone density     Fecal incontinence     Lumbago     Cognitive impairment     Parathyroid hormone excess (H)     Chronic fatigue     Vocal cord paralysis     Dysphonia     Callus of foot     Nail dystrophy     Elevated MCV     Vitamin D deficiency     Pain in both feet     Orthopnea     Back muscle spasm     Plantar fasciitis     Urinary incontinence, unspecified type     Past Medical History:   Diagnosis Date     Anxiety      Arthritis      Breast cancer (H)      COPD (chronic obstructive pulmonary disease) (H)     6/19/12:  FEV 0.99 l     Depressive disorder      Hypertension      Low back pain with left-sided sciatica      Low bone density 4/13/2017    DEXA April 12, 2017: T score -2.0. Normal Z score. FRAX risk: major osteoporotic fracture 11.9%,  hip fracture 2.6%, therefore not high-risk     Lymphedema, chronic lower extremities        CC Referred Self, MD  No address on file on close of this encounter.      Again, thank you for allowing me to participate in the care of your patient.        Sincerely,        RUPAL Saxena CNP

## 2022-10-27 NOTE — PROGRESS NOTES
[facility] Dermatology Note  Encounter Date: Oct 27, 2022  Office Visit     Reviewed patients past medical history and pertinent chart review prior to patients visit today.     Dermatology Problem List:  Patient denies personal history of skin cancer or dysplastic nevi.      NUB shave biopsy dorsum nose 10/27/22     ____________________________________________    Assessment & Plan:     # Neoplasm of uncertain behavior:  Dorsum nose  DDx includes NMSC vs ISK. Shave biopsy today.    Procedure Note: Biopsy by shave technique  The risks and benefits of the procedure were described to the patient. These include but are not limited to bleeding, infection, scar, incomplete removal, and non-diagnostic biopsy. Verbal informed consent was obtained. The above site(s) was cleansed with an alcohol pad and injected with 1% lidocaine with epinephrine. Once anesthesia was obtained, a biopsy(ies) was performed with Gilette blade. The tissue(s) was placed in a labeled container(s) with formalin and sent to pathology. Hemostasis was achieved with aluminum chloride. Vaseline and a bandage were applied to the wound(s). The patient tolerated the procedure well and was given post biopsy care instructions.     # Benign skin findings including: seborrheic keratoses, cherry angioma, lentigines and benign nevi.   - No further intervention required. Patient to report changes.   - Patient reassured of the benign nature of these lesions.    #Signs and Symptoms of non-melanoma skin cancer and ABCDEs of melanoma reviewed with patient. Patient encouraged to perform monthly self skin exams and educated on how to perform them. UV precautions reviewed with patient. Patient was asked about new or changing moles/lesions on body.     #Reviewed Sunscreen: Apply 20 minutes prior to going outdoors and reapply every two hours, when wet or sweating. We recommend using an SPF 30 or higher, and to use one that is water resistant.        Follow-up:  Pending biopsy results    Lilly Pinto, APRN CNP on 10/27/2022 at 1:07 PM    ____________________________________________    CC: Skin Check (Full body- concerns about spot on nose, under right breast, and elsewhere on body. )    HPI:  Ms. Ca Yan is a(n) 76 year old female who presents today as a return patient for an upper body skin cancer screening. Patient has concerns today about a spot on her nose that has been bleeding and not healing for a few months. She also has some irritated spots under her breasts.     Patient is otherwise feeling well, without additional skin concerns.     Physical Exam:  Vitals: LMP  (LMP Unknown)   SKIN: Total skin excluding the genitalia areas was performed. The exam included the head/face, neck, both arms, chest, back, abdomen, both legs, digits, mons pubis, buttock and nails.   -dorsum nose, 2 mm hemorrhagic crust with pink/brown base  -several 1-2mm red dome shaped symmetric papules scattered on the trunk  -multiple tan/brown flat round macules and raised papules scattered throughout trunk, extremities and head. No worrisome features for malignancy noted on examination.  -scattered tan, homogenous macules scattered on sun exposed areas of trunk, extremities and face.   -scattered waxy, stuck on tan/brown papules and patches on the trunk and face     - No other lesions of concern on areas examined.     Medications:  Current Outpatient Medications   Medication     atorvastatin (LIPITOR) 40 MG tablet     azelastine (ASTELIN) 0.1 % nasal spray     cephALEXin (KEFLEX) 250 MG capsule     clotrimazole (LOTRIMIN) 1 % external cream     doxycycline monohydrate (ADOXA) 100 MG tablet     Emollient (CERAVE) CREA     ibuprofen (ADVIL/MOTRIN) 600 MG tablet     ipratropium (ATROVENT) 0.03 % nasal spray     latanoprost (XALATAN) 0.005 % ophthalmic solution     losartan-hydrochlorothiazide (HYZAAR) 100-25 MG tablet     mirtazapine (REMERON) 7.5 MG tablet      QUEtiapine (SEROQUEL) 300 MG tablet     sertraline (ZOLOFT) 100 MG tablet     tiZANidine (ZANAFLEX) 2 MG tablet     Vitamin D3 (CHOLECALCIFEROL) 25 mcg (1000 units) tablet     No current facility-administered medications for this visit.      Past Medical History:   Patient Active Problem List   Diagnosis     Non morbid obesity due to excess calories     Alcohol abuse     Malignant neoplasm of breast (H)     Arthritis of knee     Diarrhea     Diastolic dysfunction     Osteoarthritis of spine     Gastroesophageal reflux disease     Generalized anxiety disorder     Essential hypertension     Hyperlipidemia     Insomnia     Irritable bowel syndrome     Kyphoscoliosis     Cluster C personality disorder (H)The  anxious/fearful  cluster is comprised of dependent, avoidant, and obsessive-compulsive personality disorders. Patients with Cluster C disorders are emotionally i     Seborrheic eczema     Anemia     Anxiety state     Bunion     Low bone density     Fecal incontinence     Lumbago     Cognitive impairment     Parathyroid hormone excess (H)     Chronic fatigue     Vocal cord paralysis     Dysphonia     Callus of foot     Nail dystrophy     Elevated MCV     Vitamin D deficiency     Pain in both feet     Orthopnea     Back muscle spasm     Plantar fasciitis     Urinary incontinence, unspecified type     Past Medical History:   Diagnosis Date     Anxiety      Arthritis      Breast cancer (H)      COPD (chronic obstructive pulmonary disease) (H)     6/19/12:  FEV 0.99 l     Depressive disorder      Hypertension      Low back pain with left-sided sciatica      Low bone density 4/13/2017    DEXA April 12, 2017: T score -2.0. Normal Z score. FRAX risk: major osteoporotic fracture 11.9%, hip fracture 2.6%, therefore not high-risk     Lymphedema, chronic lower extremities        CC Referred Self, MD  No address on file on close of this encounter.

## 2022-10-27 NOTE — TELEPHONE ENCOUNTER
Called patient.  Patient report she received 3 full box of wipes but there are no pads.   I let patient know [facility] medical order for pull up was received last week, but provider was on vacation.  Order not signed until yesterday, first day provider back from vacation.  Pt not understanding why Dr. Sahu nurse or assistance cannot sign the order while provider is gone.  I tried explaining the signature needs to be the provider.  Order signed and faxed yesterday.  Advise patient to contact [facility] for expected date of delivery.      Jama De La Vega CMA ([facility]) at 8:27 AM on 10/27/2022

## 2022-10-27 NOTE — TELEPHONE ENCOUNTER
[facility] Call Center    Phone Message    May a detailed message be left on voicemail: yes     Reason for Call: Other: Pt has after-care questions about caring for today's shave process. Please call to discuss     Action Taken: Other: [facility]    Travel Screening: Not Applicable

## 2022-10-27 NOTE — PROGRESS NOTES
Encounter for photo for biopsy of nose done 10/27/22 by EVANGELISTA Hinton RN  [facility]  [phone]

## 2022-10-28 NOTE — TELEPHONE ENCOUNTER
Pt called back and went over wound care instructions.  Pt states she does not have the supplies that are needed to take care of the wound.  Advised pt to wash the site 1x day and apply aquaphor and a bandaid.  Advised to apply the aquaphor/vaseline to the bandage before applying to the wound.  Advised it is going to take 7-10 days to heal.  Pt states understanding.    Melissa VELASQUEZ RN  [facility] [place]  [phone]

## 2022-10-28 NOTE — TELEPHONE ENCOUNTER
Left message for pt to call derm nurse at [phone].    Melissa VELASQUEZ RN  [facility] [place]  [phone]

## 2022-10-31 LAB
PATH REPORT.COMMENTS IMP SPEC: ABNORMAL
PATH REPORT.COMMENTS IMP SPEC: ABNORMAL
PATH REPORT.COMMENTS IMP SPEC: YES
PATH REPORT.FINAL DX SPEC: ABNORMAL
PATH REPORT.GROSS SPEC: ABNORMAL
PATH REPORT.MICROSCOPIC SPEC OTHER STN: ABNORMAL
PATH REPORT.RELEVANT HX SPEC: ABNORMAL

## 2022-11-02 ENCOUNTER — TELEPHONE (OUTPATIENT)
Dept: DERMATOLOGY | Facility: CLINIC | Age: 77
End: 2022-11-02

## 2022-11-02 DIAGNOSIS — C44.320 SCC (SQUAMOUS CELL CARCINOMA), FACE: Primary | ICD-10-CM

## 2022-11-02 NOTE — TELEPHONE ENCOUNTER
Called and spoke with patient and gave results. Pt requesting removal at the [facility]. Referral placed. Please call to schedule.    Amanda RICE RN  Dermatology   [phone]

## 2022-11-02 NOTE — TELEPHONE ENCOUNTER
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 Please schedule or place referral. Suggest a skin check in 1 year, sooner if she finds more concerning lesions.

## 2022-11-03 ENCOUNTER — TELEPHONE (OUTPATIENT)
Dept: DERMATOLOGY | Facility: CLINIC | Age: 77
End: 2022-11-03

## 2022-11-03 NOTE — TELEPHONE ENCOUNTER
[facility] Call Center    Phone Message    May a detailed message be left on voicemail: yes     Reason for Call: Pt was seen 10/27/22 at the [facility] location. Pt was given in a bag at the time of Appt Aquaphor to use on wound on her nose and bandages. Pt is running out of Aquaphor and bandages, pt is asking how long is she to use the Aquaphor and bandages? Please call pt to discuss wound care on nose. Thanks     Action Taken: Message routed to:  [facility]    Travel Screening: Not Applicable

## 2022-11-03 NOTE — TELEPHONE ENCOUNTER
Called patient -discussed wound care.    Thank you,    Tanesha ROWERN BSN  [facility]- [phone]

## 2022-11-04 ENCOUNTER — TELEPHONE (OUTPATIENT)
Dept: DERMATOLOGY | Facility: CLINIC | Age: 77
End: 2022-11-04

## 2022-11-04 NOTE — TELEPHONE ENCOUNTER
Per notes Pt spoke with STEVEN Davenport from [facility] yesterday about her wound care,  but is confused and still has questions about next steps. Please call back at [phone]. Sending to [facility] since that is where she wants her follow-up

## 2022-11-04 NOTE — TELEPHONE ENCOUNTER
Called patient to schedule surgery with Dr. Webb    Date of Surgery: 12/19    Surgery type: Mohs    Consult scheduled: Yes    Has patient had mohs with us before? No    Additional comments: spoke with pt. Pt was confused about the purpose of her appts. Also noted that she was having trouble with her feet.       Belle Harrisgfried on 11/4/2022 at 12:15 PM

## 2022-11-05 NOTE — TELEPHONE ENCOUNTER
FUTURE VISIT INFORMATION      FUTURE VISIT INFORMATION:    Date: 12.5.22    Time: 2:15    Location: Telephone  REFERRAL INFORMATION:    Referring provider:  Blair    Referring providers clinic:  Derm     Reason for visit/diagnosis  SCCIS nose    RECORDS REQUESTED FROM:       Clinic name Comments Records Status Imaging Status   Derm 10.27.22  Path # [id number] [facility]

## 2022-11-07 ENCOUNTER — TELEPHONE (OUTPATIENT)
Dept: DERMATOLOGY | Facility: CLINIC | Age: 77
End: 2022-11-07

## 2022-11-07 NOTE — TELEPHONE ENCOUNTER
Left message on answering machine for patient to call back.    Amanda RICE RN  Dermatology   [phone]

## 2022-11-07 NOTE — TELEPHONE ENCOUNTER
[facility] Call Center    Phone Message    May a detailed message be left on voicemail: yes     Reason for Call: Other: Pt called and would like to speak to the care team regarding wound care. Pt has a few questions to ask. Thanks     Action Taken: Message routed to:  Other: [facility]    Travel Screening: Not Applicable

## 2022-11-07 NOTE — TELEPHONE ENCOUNTER
[facility] Call [facility]    Phone Message    May a detailed message be left on voicemail: yes     Reason for Call: Other:  Pt is scheduled for 12/15/22 and is asking what is going to be done at Appt? Please call pt back to discuss. Thanks      Action Taken: Message routed to:  [facility] Derm    Travel Screening: Not Applicable

## 2022-11-07 NOTE — TELEPHONE ENCOUNTER
Called and spoke with pt. Explained how long to continue wound care, and discussed what would happen at the Mohs procedure.    Thank you,  Amanda RICE RN  Dermatology   [phone]

## 2022-11-09 ENCOUNTER — NURSE TRIAGE (OUTPATIENT)
Dept: NURSING | Facility: CLINIC | Age: 77
End: 2022-11-09

## 2022-11-09 NOTE — TELEPHONE ENCOUNTER
Nurse Triage SBAR    Is this a 2nd Level Triage? YES, LICENSED PRACTITIONER REVIEW IS REQUIRED    Situation: Patient calls re c/o bilateral leg swelling and bilateral foot pain.    Background: Patient had called the ambulance earlier today but denied being transported into the hospital for care, but wanted them to check on her legs and feet.    Assessment: Patient does state bottoms of feet sore and difficult being up ambulating. Edema is LE up to knees bilaterally.  Patient is SOB, but reports this is not new or worsening for her. No discoloration to feet noted. Encouraged patient to go to ed/ucc now per protocol but patient was more interested in making dinner, and wanted to put writer on hold.    Protocol Recommended Disposition:   Go To ED/UCC Now (Or To Office With PCP Approval)    Recommendation:  Encouraged elevation and rest.  Writer is routing this to PCP care pool to advise patient on how she should proceed from here.    Routed to provider    Does the patient meet one of the following criteria for ADS visit consideration? 16+ years old, with an [facility] PCP     TIP  Providers, please consider if this condition is appropriate for management at one of our Acute and Diagnostic Services sites.     If patient is a good candidate, please use dotphrase <dot>triageresponse and select Refer to ADS to document.    Reason for Disposition    Can't walk or can barely stand (new-onset)    Additional Information    Negative: Chest pain    Negative: Small area of swelling and followed an insect bite to the area    Negative: Entire foot is cool or blue in comparison to other side    Negative: Difficulty breathing at rest    Negative: SEVERE swelling (e.g., swelling extends above knee, entire leg is swollen, weeping fluid)    Negative: Thigh or calf pain and only 1 side and present > 1 hour    Negative: Thigh, calf, or ankle swelling in only one leg    Negative: Thigh, calf, or ankle swelling in both legs, but one side is  definitely more swollen (Exception: longstanding difference between legs)    Negative: Cast on leg or ankle and has increasing pain    Negative: Sounds like a life-threatening emergency to the triager    Protocols used: LEG SWELLING AND EDEMA-A-OH

## 2022-11-10 ENCOUNTER — VIRTUAL VISIT (OUTPATIENT)
Dept: FAMILY MEDICINE | Facility: CLINIC | Age: 77
End: 2022-11-10
Payer: COMMERCIAL

## 2022-11-10 ENCOUNTER — APPOINTMENT (OUTPATIENT)
Dept: ULTRASOUND IMAGING | Facility: CLINIC | Age: 77
End: 2022-11-10
Attending: EMERGENCY MEDICINE
Payer: COMMERCIAL

## 2022-11-10 ENCOUNTER — PATIENT OUTREACH (OUTPATIENT)
Dept: CARE COORDINATION | Facility: CLINIC | Age: 77
End: 2022-11-10

## 2022-11-10 ENCOUNTER — HOSPITAL ENCOUNTER (EMERGENCY)
Facility: CLINIC | Age: 77
Discharge: HOME | End: 2022-11-10
Attending: EMERGENCY MEDICINE | Admitting: EMERGENCY MEDICINE
Payer: COMMERCIAL

## 2022-11-10 VITALS
HEART RATE: 81 BPM | BODY MASS INDEX: 38.62 KG/M2 | DIASTOLIC BLOOD PRESSURE: 82 MMHG | TEMPERATURE: 98.4 F | OXYGEN SATURATION: 97 % | RESPIRATION RATE: 22 BRPM | WEIGHT: 218 LBS | SYSTOLIC BLOOD PRESSURE: 182 MMHG

## 2022-11-10 DIAGNOSIS — R23.8 REDNESS AND SWELLING OF LOWER LEG: Primary | ICD-10-CM

## 2022-11-10 DIAGNOSIS — M79.89 REDNESS AND SWELLING OF LOWER LEG: Primary | ICD-10-CM

## 2022-11-10 DIAGNOSIS — J38.00 VOCAL CORD PARALYSIS: ICD-10-CM

## 2022-11-10 DIAGNOSIS — F41.1 GENERALIZED ANXIETY DISORDER: ICD-10-CM

## 2022-11-10 DIAGNOSIS — M79.671 PAIN IN BOTH FEET: ICD-10-CM

## 2022-11-10 DIAGNOSIS — I89.0 LYMPHEDEMA: ICD-10-CM

## 2022-11-10 DIAGNOSIS — R50.81 FEVER IN OTHER DISEASES: ICD-10-CM

## 2022-11-10 DIAGNOSIS — M79.672 PAIN IN BOTH FEET: ICD-10-CM

## 2022-11-10 LAB
ALBUMIN SERPL BCG-MCNC: 4 G/DL (ref 3.5–5.2)
ALP SERPL-CCNC: 110 U/L (ref 35–104)
ALT SERPL W P-5'-P-CCNC: 12 U/L (ref 10–35)
ANION GAP SERPL CALCULATED.3IONS-SCNC: 11 MMOL/L (ref 7–15)
AST SERPL W P-5'-P-CCNC: 41 U/L (ref 10–35)
ATRIAL RATE - MUSE: 77 BPM
BASOPHILS # BLD AUTO: 0 10E3/UL (ref 0–0.2)
BASOPHILS NFR BLD AUTO: 1 %
BILIRUB SERPL-MCNC: 0.3 MG/DL
BUN SERPL-MCNC: 26.2 MG/DL (ref 8–23)
CALCIUM SERPL-MCNC: 9.3 MG/DL (ref 8.8–10.2)
CHLORIDE SERPL-SCNC: 99 MMOL/L (ref 98–107)
CREAT SERPL-MCNC: 0.73 MG/DL (ref 0.51–0.95)
DEPRECATED HCO3 PLAS-SCNC: 34 MMOL/L (ref 22–29)
DIASTOLIC BLOOD PRESSURE - MUSE: NORMAL MMHG
EOSINOPHIL # BLD AUTO: 0.2 10E3/UL (ref 0–0.7)
EOSINOPHIL NFR BLD AUTO: 2 %
ERYTHROCYTE [DISTWIDTH] IN BLOOD BY AUTOMATED COUNT: 14.6 % (ref 10–15)
GFR SERPL CREATININE-BSD FRML MDRD: 84 ML/MIN/1.73M2
GLUCOSE SERPL-MCNC: 98 MG/DL (ref 70–99)
HCT VFR BLD AUTO: 31.1 % (ref 35–47)
HGB BLD-MCNC: 9.1 G/DL (ref 11.7–15.7)
HOLD SPECIMEN: NORMAL
HOLD SPECIMEN: NORMAL
IMM GRANULOCYTES # BLD: 0 10E3/UL
IMM GRANULOCYTES NFR BLD: 1 %
INTERPRETATION ECG - MUSE: NORMAL
LYMPHOCYTES # BLD AUTO: 0.7 10E3/UL (ref 0.8–5.3)
LYMPHOCYTES NFR BLD AUTO: 12 %
MCH RBC QN AUTO: 30.5 PG (ref 26.5–33)
MCHC RBC AUTO-ENTMCNC: 29.3 G/DL (ref 31.5–36.5)
MCV RBC AUTO: 104 FL (ref 78–100)
MONOCYTES # BLD AUTO: 0.6 10E3/UL (ref 0–1.3)
MONOCYTES NFR BLD AUTO: 9 %
NEUTROPHILS # BLD AUTO: 4.6 10E3/UL (ref 1.6–8.3)
NEUTROPHILS NFR BLD AUTO: 75 %
NRBC # BLD AUTO: 0 10E3/UL
NRBC BLD AUTO-RTO: 0 /100
NT-PROBNP SERPL-MCNC: 206 PG/ML (ref 0–1800)
P AXIS - MUSE: 67 DEGREES
PLATELET # BLD AUTO: 204 10E3/UL (ref 150–450)
POTASSIUM SERPL-SCNC: 4.3 MMOL/L (ref 3.4–5.3)
PR INTERVAL - MUSE: 154 MS
PROT SERPL-MCNC: 7.9 G/DL (ref 6.4–8.3)
QRS DURATION - MUSE: 76 MS
QT - MUSE: 386 MS
QTC - MUSE: 436 MS
R AXIS - MUSE: 46 DEGREES
RBC # BLD AUTO: 2.98 10E6/UL (ref 3.8–5.2)
SODIUM SERPL-SCNC: 144 MMOL/L (ref 136–145)
SYSTOLIC BLOOD PRESSURE - MUSE: NORMAL MMHG
T AXIS - MUSE: 64 DEGREES
VENTRICULAR RATE- MUSE: 77 BPM
WBC # BLD AUTO: 6.1 10E3/UL (ref 4–11)

## 2022-11-10 PROCEDURE — 83880 ASSAY OF NATRIURETIC PEPTIDE: CPT | Performed by: EMERGENCY MEDICINE

## 2022-11-10 PROCEDURE — 85004 AUTOMATED DIFF WBC COUNT: CPT | Performed by: EMERGENCY MEDICINE

## 2022-11-10 PROCEDURE — 99443 PR PHYSICIAN TELEPHONE EVALUATION 21-30 MIN: CPT | Performed by: FAMILY MEDICINE

## 2022-11-10 PROCEDURE — 93970 EXTREMITY STUDY: CPT

## 2022-11-10 PROCEDURE — 80053 COMPREHEN METABOLIC PANEL: CPT | Performed by: EMERGENCY MEDICINE

## 2022-11-10 PROCEDURE — 93970 EXTREMITY STUDY: CPT | Mod: 26 | Performed by: STUDENT IN AN ORGANIZED HEALTH CARE EDUCATION/TRAINING PROGRAM

## 2022-11-10 PROCEDURE — 99285 EMERGENCY DEPT VISIT HI MDM: CPT | Performed by: EMERGENCY MEDICINE

## 2022-11-10 PROCEDURE — 36415 COLL VENOUS BLD VENIPUNCTURE: CPT | Performed by: EMERGENCY MEDICINE

## 2022-11-10 PROCEDURE — 93005 ELECTROCARDIOGRAM TRACING: CPT | Performed by: EMERGENCY MEDICINE

## 2022-11-10 PROCEDURE — 93010 ELECTROCARDIOGRAM REPORT: CPT | Performed by: EMERGENCY MEDICINE

## 2022-11-10 PROCEDURE — 99285 EMERGENCY DEPT VISIT HI MDM: CPT | Mod: 25 | Performed by: EMERGENCY MEDICINE

## 2022-11-10 ASSESSMENT — ACTIVITIES OF DAILY LIVING (ADL)
ADLS_ACUITY_SCORE: 35
ADLS_ACUITY_SCORE: 33

## 2022-11-10 NOTE — ED PROVIDER NOTES
"ED Provider Note  [facility]      History     Chief Complaint   Patient presents with     Leg Swelling     HPI  Ca Yan is a 76 year old female with history of cognitive impairment, alcohol use disorder, COPD, alcohol use disorder, and hypertension who is BIBA to ED with complaint of 1 month of leg swelling.     Patient reports she first noticed bilateral leg swelling around the time that she \"got sick\" (had a COPD exacerbation which she was seen for 10/21). Since that time she has been more sedentary throughout the day, is spending a lot of time in her recliner with her legs only slightly elevated. Lower leg/foot swelling has gradually worsened since that time, prompting [place] [facility] [place] for evaluation. At that time, her BNP was negative. Cardiac US revealed possibly reduced LVEF, trace pericardial effusion, mild RV dilation. Lung US also showed dependent B-lines. Patient was discharged with a 5 day course of daily furosemide 20mg which she completed and reports \"did nothing\" for her swelling. Patient again called EMS this morning for transport to the ED today.    On my evaluation, patient denies any chest pain or worsening of shortness of breath recently. No abdominal pain, hematochezia, or hematuria. She hasn't been as active around her home since her COPD exacerbation. She has been able to lay flat to sleep and take naps during the day.     SHx: Patient does not drink alcohol, use illicit drugs, or smoke cigarettes. She lives alone in the  area.     Past Medical History  Past Medical History:   Diagnosis Date     Anxiety      Arthritis      Breast cancer (H)      COPD (chronic obstructive pulmonary disease) (H)     6/19/12:  FEV 0.99 l     Depressive disorder      Hypertension      Low back pain with left-sided sciatica      Low bone density 4/13/2017    DEXA April 12, 2017: T score -2.0. Normal Z score. FRAX risk: major osteoporotic fracture 11.9%, hip " fracture 2.6%, therefore not high-risk     Lymphedema, chronic lower extremities      Past Surgical History:   Procedure Laterality Date     BACK SURGERY      spinal fusion     COLONOSCOPY       LARYNGOSCOPY WITH MICROSCOPE N/A 4/30/2021    Procedure: FLEXIBLE LARYNGOSCOPY;  Surgeon: Domonique Roche MD;  Location: [facility] OR     LUMPECTOMY BREAST       ORTHOPEDIC SURGERY      Knee surgery left side     atorvastatin (LIPITOR) 40 MG tablet  azelastine (ASTELIN) 0.1 % nasal spray  cephALEXin (KEFLEX) 250 MG capsule  clotrimazole (LOTRIMIN) 1 % external cream  doxycycline monohydrate (ADOXA) 100 MG tablet  Emollient (CERAVE) CREA  ibuprofen (ADVIL/MOTRIN) 600 MG tablet  ipratropium (ATROVENT) 0.03 % nasal spray  latanoprost (XALATAN) 0.005 % ophthalmic solution  losartan-hydrochlorothiazide (HYZAAR) 100-25 MG tablet  mirtazapine (REMERON) 7.5 MG tablet  QUEtiapine (SEROQUEL) 300 MG tablet  sertraline (ZOLOFT) 100 MG tablet  tiZANidine (ZANAFLEX) 2 MG tablet  Vitamin D3 (CHOLECALCIFEROL) 25 mcg (1000 units) tablet      Allergies   Allergen Reactions     Azithromycin Itching     Codeine Nausea and Vomiting     Hydrocodone Nausea and Vomiting     Metronidazole Nausea     Oxycodone Itching and Rash     Percocet [Oxycodone-Acetaminophen] Nausea and Vomiting     Pollen Extract      sneezing and runny nose.      Seasonal Allergies      sneezing and runny nose.      Vicodin [Hydrocodone-Acetaminophen] Nausea and Vomiting     Zolpidem      Amnestic behavior     Family History  Family History   Problem Relation Age of Onset     Breast Cancer Mother      Diabetes Mother      Anxiety Disorder Mother      Asthma Mother      Other Cancer Mother      Hyperlipidemia Mother      Alcohol/Drug Father      Mental Illness Father      Substance Abuse Father      Obesity Father      Cerebrovascular Disease Maternal Grandmother 67     Other - See Comments Maternal Aunt         lived to      Social History   Social History     Tobacco Use      Smoking status: Former     Packs/day: 0.50     Years: 5.00     Pack years: 2.50     Types: Cigarettes     Start date: 1956     Quit date: 1980     Years since quittin.1     Smokeless tobacco: Former     Quit date: 1980   Substance Use Topics     Alcohol use: Not Currently     Alcohol/week: 0.0 standard drinks     Comment: Sober for 2 years, previous alcoholic     Drug use: No      Past medical history, past surgical history, medications, allergies, family history, and social history were reviewed with the patient. No additional pertinent items.       Review of Systems  A complete review of systems was performed with pertinent positives and negatives noted in the HPI, and all other systems negative.    Physical Exam   BP: (!) 182/82  Pulse: 81  Temp: 98.4  F (36.9  C)  Resp: 22  Weight: 98.9 kg (218 lb) (Bed scale was zeroed prior to weighing)  SpO2: 97 %  Physical Exam  Constitutional:       General: She is not in acute distress.     Appearance: Normal appearance. She is obese.   HENT:      Head: Normocephalic and atraumatic.      Nose: No congestion or rhinorrhea.   Eyes:      Extraocular Movements: Extraocular movements intact.      Conjunctiva/sclera: Conjunctivae normal.      Pupils: Pupils are equal, round, and reactive to light.   Cardiovascular:      Rate and Rhythm: Normal rate and regular rhythm.      Pulses: Normal pulses.   Pulmonary:      Effort: No respiratory distress.      Comments: Harsh breath sounds throughout. Prolonged expiratory phase with use of intercostal muscles. Chronic scoliosis of spine with resultant chest wall deformity. No wheeze.   Abdominal:      General: There is no distension.      Palpations: Abdomen is soft. There is no mass.      Tenderness: There is no abdominal tenderness. There is no guarding.      Hernia: No hernia is present.   Musculoskeletal:      Cervical back: Normal range of motion.   Skin:     General: Skin is warm and dry.   Neurological:       General: No focal deficit present.      Mental Status: She is alert.   Psychiatric:         Mood and Affect: Mood normal.       ED Course      Procedures            EKG Interpretation:      Interpreted by MARIAN WANG  Time reviewed: 1551  Symptoms at time of EKG: None   Rhythm: normal sinus   Rate: normal  Axis: normal  Ectopy: none  Conduction: normal  ST Segments/ T Waves: No ST-T wave changes  Q Waves: none  Comparison to prior: Unchanged from 10/21/2022    Clinical Impression: normal EKG          Results for orders placed or performed during the hospital encounter of 11/10/22   US Lower Extremity Venous Duplex Bilateral     Status: None    Narrative    EXAMINATION: DOPPLER VENOUS ULTRASOUND OF BILATERAL LOWER EXTREMITIES,  11/10/2022 3:37 PM     COMPARISON: Bilateral lower extremity ultrasound 9/22/2022.    HISTORY: 68-year-old female with one-month history of bilateral leg  swelling, concern for DVT.    TECHNIQUE:  Gray-scale evaluation with compression, spectral flow and  color Doppler assessment of the deep venous system of both legs from  groin to knee, and then at the ankles.    FINDINGS:    In both lower extremities, the common femoral, femoral, popliteal and  posterior tibial veins demonstrate normal compressibility and blood  flow.      Impression    IMPRESSION:  No evidence of deep venous thrombosis in either lower  extremity.    I have personally reviewed the examination and initial interpretation  and I agree with the findings.    JOVANY BARBER MD         SYSTEM ID:  [id number]   Comprehensive metabolic panel     Status: Abnormal   Result Value Ref Range    Sodium 144 136 - 145 mmol/L    Potassium 4.3 3.4 - 5.3 mmol/L    Chloride 99 98 - 107 mmol/L    Carbon Dioxide (CO2) 34 (H) 22 - 29 mmol/L    Anion Gap 11 7 - 15 mmol/L    Urea Nitrogen 26.2 (H) 8.0 - 23.0 mg/dL    Creatinine 0.73 0.51 - 0.95 mg/dL    Calcium 9.3 8.8 - 10.2 mg/dL    Glucose 98 70 - 99 mg/dL    Alkaline Phosphatase 110 (H) 35 -  104 U/L    AST 41 (H) 10 - 35 U/L    ALT 12 10 - 35 U/L    Protein Total 7.9 6.4 - 8.3 g/dL    Albumin 4.0 3.5 - 5.2 g/dL    Bilirubin Total 0.3 <=1.2 mg/dL    GFR Estimate 84 >60 mL/min/1.73m2   Nt probnp inpatient (BNP)     Status: Normal   Result Value Ref Range    N terminal Pro BNP Inpatient 206 0 - 1,800 pg/mL   CBC with platelets and differential     Status: Abnormal   Result Value Ref Range    WBC Count 6.1 4.0 - 11.0 10e3/uL    RBC Count 2.98 (L) 3.80 - 5.20 10e6/uL    Hemoglobin 9.1 (L) 11.7 - 15.7 g/dL    Hematocrit 31.1 (L) 35.0 - 47.0 %     (H) 78 - 100 fL    MCH 30.5 26.5 - 33.0 pg    MCHC 29.3 (L) 31.5 - 36.5 g/dL    RDW 14.6 10.0 - 15.0 %    Platelet Count 204 150 - 450 10e3/uL    % Neutrophils 75 %    % Lymphocytes 12 %    % Monocytes 9 %    % Eosinophils 2 %    % Basophils 1 %    % Immature Granulocytes 1 %    NRBCs per 100 WBC 0 <1 /100    Absolute Neutrophils 4.6 1.6 - 8.3 10e3/uL    Absolute Lymphocytes 0.7 (L) 0.8 - 5.3 10e3/uL    Absolute Monocytes 0.6 0.0 - 1.3 10e3/uL    Absolute Eosinophils 0.2 0.0 - 0.7 10e3/uL    Absolute Basophils 0.0 0.0 - 0.2 10e3/uL    Absolute Immature Granulocytes 0.0 <=0.4 10e3/uL    Absolute NRBCs 0.0 10e3/uL   [facility] Draw     Status: None    Narrative    The following orders were created for panel order [facility] Draw.  Procedure                               Abnormality         Status                     ---------                               -----------         ------                     Extra Blue Top Tube[[id number]]                              Final result               Extra Red Top Tube[[id number]]                               Final result                 Please view results for these tests on the individual orders.   Extra Blue Top Tube     Status: None   Result Value Ref Range    Hold Specimen JIC    Extra Red Top Tube     Status: None   Result Value Ref Range    Hold Specimen JIC    EKG 12-lead, tracing only     Status: None   Result Value Ref  Range    Systolic Blood Pressure  mmHg    Diastolic Blood Pressure  mmHg    Ventricular Rate 77 BPM    Atrial Rate 77 BPM    WY Interval 154 ms    QRS Duration 76 ms     ms    QTc 436 ms    P Axis 67 degrees    R AXIS 46 degrees    T Axis 64 degrees    Interpretation ECG       Sinus rhythm  Normal ECG  Unconfirmed report - interpretation of this ECG is computer generated - see medical record for final interpretation  Confirmed by - EMERGENCY ROOM, PHYSICIAN ([id number]),  DARREN HOLLIDAY ([id number]) on 11/10/2022 3:59:44 PM     CBC with platelets differential     Status: Abnormal    Narrative    The following orders were created for panel order CBC with platelets differential.  Procedure                               Abnormality         Status                     ---------                               -----------         ------                     CBC with platelets and d...[[id number]]  Abnormal            Final result                 Please view results for these tests on the individual orders.     Medications - No data to display     Assessments & Plan (with Medical Decision Making)   76 year old patient with above history presenting with one month history of lower leg swelling. Presentation concerning for cardiac etiology, but w/u 11/4 with negative BNP and no acute reduction in cardiac function. W/u today including EKG and repeat BNP also not indicative of new cardiac changes. No chest pain or new changes in shortness of breath. Furthermore, edema is not pitting. Patient reports she has been more sedentary since COPD exacerbation 10/21, so certainly possible that prolonged sitting could impede lymphatic flow and result in lymphedema. Also possible an abdominal mass or other process could be impeding lymphatic flow. Discussed desire to obtain abdominal CT with patient but she is frustrated with her length of stay and would rather leave the ED and follow up with her PCP for further evaluation.     --    ED  Attending Physician Attestation    I Bala Jesus MD, cared for this patient with the Medical Student. I performed, or re-performed, the physical exam and medical decision-making. I have verified the accuracy of all the medical student findings and documentation above, and have edited as necessary.    Summary of HPI, PE, ED Course   Patient is a 76 year old female evaluated in the emergency department for continued swelling of bilateral lower extremities. Exam notable for 3-4+ lymphedema of bilateral lower extremities.  This is nonpitting.  She has palpable pulses distally.  She is otherwise in no acute distress and oxygenating without difficulty on home O2. ED course notable for ultrasound was reobtained and demonstrated no evidence of DVT.  She is afebrile, does not have a leukocytosis and there is not anything on exam that would suggest cellulitis.  N-terminal proBNP is not significantly elevated decreasing suspicion for heart failure.  We did offer further evaluation including IV diuretics, patient voiced frustration at the amount of time that had taken so far to get things done and stated she just wanted to go home.  As this does seem like more of a chronic issue, we did discharge her and stressed the importance of following up with her primary clinic as I feel that she will most likely need further evaluation as an outpatient and referral to a lymphedema specialist.  He had also offered CT of the abdomen and pelvis to evaluate for any type of compressive intra-abdominal, retroperitoneal or pelvic mass that could be causing her lymphedema, but again she stated she just wanted to go home. After the completion of care in the emergency department, the patient was discharged.          Baal Jesus MD  Emergency Medicine       I have reviewed the nursing notes. I have reviewed the findings, diagnosis, plan and need for follow up with the patient.    Discharge Medication List as of 11/10/2022   4:21 PM          Final diagnoses:   Lymphedema     Christelle Patel, MS4  [employer] [facility]   --  [facility] EMERGENCY DEPARTMENT  11/10/2022     Bala Jesus MD  11/11/22 0584

## 2022-11-10 NOTE — DISCHARGE INSTRUCTIONS
Please make an appointment to follow up with Your Primary Care Provider as soon as possible.  You may benefit from referral to lymphedema clinic.    Return to the emergency department for any problems.

## 2022-11-10 NOTE — PROGRESS NOTES
Social Work Intervention  [facility]    Data/Intervention:    Patient Name:  Ca Yan  /Age:  1945 (76 year old)    Visit Type: telephone  Referral Source: Dr. Sahu, [facility]  Reason for Referral:  Ride to [facility] for today    Collaborated With:    -Patient  -[employer] ([phone])    Psychosocial Information/Concerns:  Per Dr. Sahu, Patient needs to be evaluated at [facility] today and needs a ride.     Intervention/Education/Resources Provided:   arranged a medical taxi through Patient's insurance to provide this ride.     [employer] ([phone]) will pick Patient up at home between 1150h-1220h to take Patient to the hospital. Will call return ride is also set up.     Patient is aware of and agreeable to this plan.    Assessment/Plan:  Patient will be transported to [facility] via [employer].  will remain available as needed.    Provided patient/family with contact information and availability.    JUNE Simon  Clinical , Outpatient Specialty Clinics  Direct Phone: [phone]

## 2022-11-10 NOTE — ED TRIAGE NOTES
BIBA for bilateral leg swelling x 1 mo. Per pt, seen at [facility] 11/4 and was given lasix which she completed. Also c/o SOB, on 4L at [place] for COPD. AAOx4, ambulatory at baseline-currently in W/C.  hypertensive 180/80s-other vss.     Triage Assessment     Row Name 11/10/22 3979       Triage Assessment (Adult)    Additional Documentation Breath Sounds (Group)       Respiratory WDL    Respiratory WDL X;rhythm/pattern    Rhythm/Pattern, Respiratory shortness of breath       Breath Sounds    Breath Sounds All Fields    All Lung Fields Breath Sounds diminished       Skin Circulation/Temperature WDL    Skin Circulation/Temperature WDL WDL       Cardiac WDL    Cardiac WDL WDL       Peripheral/Neurovascular WDL    Peripheral Neurovascular WDL X       Cognitive/Neuro/Behavioral WDL    Cognitive/Neuro/Behavioral WDL X    Level of Consciousness alert    Speech rambling    Mood/Behavior restless

## 2022-11-10 NOTE — PROGRESS NOTES
"Ca is a 76 year old who is being evaluated via a billable telephone visit.      What phone number would you like to be contacted at? [phone]  How would you like to obtain your AVS? Mail a copy  Phone call  Visit duration: 36   minutes      Assessment & Plan     Redness and swelling of lower leg  Fever in other diseases  Pain in both feet  Ca presents today for telephone visit indicating concern for bilateral leg swelling,  Pain, redness and new onset fever.  She was evaluated November 4 at [facility] was started on Lasix which has not been effective.  I discussed with Dalia Gresham social service aC requires ride to hospital for evaluation.  She will connect with the patient to determine next steps.  Vocal cord paralysis  Stable vocal cord dysphonia no increased difficulty breathing has breathing concerns chronically related to kyphoscoliosis and restriction on her lungs.    Generalized anxiety disorder  Chronic anxiety, lives alone.               BMI:   Estimated body mass index is 34.54 kg/m  as calculated from the following:    Height as of 10/21/22: 1.6 m (5' 3\").    Weight as of 10/21/22: 88.5 kg (195 lb).         Seek emergent care recommended.  Favian Sahu MD  [facility] [place]    Magalys Penaloza is a 76 year old, presenting for the following health issues:  Video Visit (Discuss feet legs and feet are swelling )      HPI   Olivia 76 has HX of restriction in her lung related to Kyphoscoliosis, anxiety & personality disorder, breast cancer s/p lumpectomy, chronic lower extremity lymphedema, alcohol abuse, GERD, HTN, and lipidemia presents via telephone visit.  Olivia indicates  swelling of bilateral lower extremities that was evaluated November 4 at [facility] as she was brought there via paramedics.  She indicates they gave her Lasix 20 mg daily which has not helped at all.  She is concerned as she has developed " pain and redness in both lower extremities.  She would like to be evaluated at [facility] and is worried if she calls the paramedics they might take her to [facility].  She indicates onset of a fever of 100 degrees today and pain in the bilateral lower extremities along with swelling and redness.  She is not able to see the bottom of her feet to determine if there are any openings or sores.  She was not able to connect with the video visit today therefore we had to move to a telephone visit.  She has not had change of level of consciousness.      Labs reviewed in EPIC  BP Readings from Last 3 Encounters:   10/21/22 (!) 190/93   10/11/22 137/77   09/22/22 (!) 158/89    Wt Readings from Last 3 Encounters:   10/21/22 88.5 kg (195 lb)   08/12/22 89.4 kg (197 lb 1.6 oz)   06/07/22 89.8 kg (198 lb)                  Patient Active Problem List   Diagnosis     Non morbid obesity due to excess calories     Alcohol abuse     Malignant neoplasm of breast (H)     Arthritis of knee     Diarrhea     Diastolic dysfunction     Osteoarthritis of spine     Gastroesophageal reflux disease     Generalized anxiety disorder     Essential hypertension     Hyperlipidemia     Insomnia     Irritable bowel syndrome     Kyphoscoliosis     Cluster C personality disorder (H)The  anxious/fearful  cluster is comprised of dependent, avoidant, and obsessive-compulsive personality disorders. Patients with Cluster C disorders are emotionally i     Seborrheic eczema     Anemia     Anxiety state     Bunion     Low bone density     Fecal incontinence     Lumbago     Cognitive impairment     Parathyroid hormone excess (H)     Chronic fatigue     Vocal cord paralysis     Dysphonia     Callus of foot     Nail dystrophy     Elevated MCV     Vitamin D deficiency     Pain in both feet     Orthopnea     Back muscle spasm     Plantar fasciitis     Urinary incontinence, unspecified type     Past Surgical History:   Procedure  Laterality Date     BACK SURGERY      spinal fusion     COLONOSCOPY       LARYNGOSCOPY WITH MICROSCOPE N/A 2021    Procedure: FLEXIBLE LARYNGOSCOPY;  Surgeon: Domonique Roche MD;  Location: [facility] OR     LUMPECTOMY BREAST       ORTHOPEDIC SURGERY      Knee surgery left side       Social History     Tobacco Use     Smoking status: Former     Packs/day: 0.50     Years: 5.00     Pack years: 2.50     Types: Cigarettes     Start date: 1956     Quit date: 1980     Years since quittin.1     Smokeless tobacco: Former     Quit date: 1980   Substance Use Topics     Alcohol use: Not Currently     Alcohol/week: 0.0 standard drinks     Comment: Sober for 2 years, previous alcoholic     Family History   Problem Relation Age of Onset     Breast Cancer Mother      Diabetes Mother      Anxiety Disorder Mother      Asthma Mother      Other Cancer Mother      Hyperlipidemia Mother      Alcohol/Drug Father      Mental Illness Father      Substance Abuse Father      Obesity Father      Cerebrovascular Disease Maternal Grandmother 67     Other - See Comments Maternal Aunt         lived to          Current Outpatient Medications   Medication Sig Dispense Refill     atorvastatin (LIPITOR) 40 MG tablet Take 1 tablet (40 mg) by mouth daily 90 tablet 3     cephALEXin (KEFLEX) 250 MG capsule Take 250 mg by mouth       clotrimazole (LOTRIMIN) 1 % external cream Apply topically 2 times daily as needed (under arms breast groin rash until rash resolves) 60 g 1     ibuprofen (ADVIL/MOTRIN) 600 MG tablet Take 1 tablet (600 mg) by mouth 2 times daily as needed for moderate pain 60 tablet 0     latanoprost (XALATAN) 0.005 % ophthalmic solution 1 drop       losartan-hydrochlorothiazide (HYZAAR) 100-25 MG tablet Take 1 tablet by mouth daily 90 tablet 3     QUEtiapine (SEROQUEL) 300 MG tablet Take 300 mg by mouth       sertraline (ZOLOFT) 100 MG tablet Take 1.5 tablets (150 mg) by mouth daily 135 tablet 3     azelastine (ASTELIN) 0.1 %  nasal spray SMARTSIG:Both Nares (Patient not taking: Reported on 11/10/2022)       doxycycline monohydrate (ADOXA) 100 MG tablet Take 1 tablet (100 mg) by mouth daily (Patient not taking: Reported on 11/10/2022) 14 tablet 0     Emollient (CERAVE) CREA Please apply twice daily to dry skin of body and feet (Patient not taking: Reported on 11/10/2022) 453 g 3     ipratropium (ATROVENT) 0.03 % nasal spray 2 sprays each nostril twice daily for clear rhinorrhea (Patient not taking: Reported on 11/10/2022) 30 mL 1     mirtazapine (REMERON) 7.5 MG tablet  (Patient not taking: Reported on 11/10/2022)       tiZANidine (ZANAFLEX) 2 MG tablet Take 1 tablet (2 mg) by mouth 2 times daily as needed for muscle spasms (back pains) (Patient not taking: Reported on 11/10/2022) 60 tablet 1     Vitamin D3 (CHOLECALCIFEROL) 25 mcg (1000 units) tablet Take 1 tablet (25 mcg) by mouth daily (Patient not taking: Reported on 11/10/2022) 100 tablet 3     Allergies   Allergen Reactions     Azithromycin Itching     Codeine Nausea and Vomiting     Hydrocodone Nausea and Vomiting     Metronidazole Nausea     Oxycodone Itching and Rash     Percocet [Oxycodone-Acetaminophen] Nausea and Vomiting     Pollen Extract      sneezing and runny nose.      Seasonal Allergies      sneezing and runny nose.      Vicodin [Hydrocodone-Acetaminophen] Nausea and Vomiting     Zolpidem      Amnestic behavior     Recent Labs   Lab Test 10/21/22  1131 09/22/22  1657 08/12/22  1408 07/08/22  1437 07/08/22  1437 06/23/21  0940 04/22/21  1145 10/08/20  1035 08/20/20  1202 01/21/20  0920 11/04/16  1350 04/21/16  1443   A1C  --   --  5.6  --   --   --   --   --   --   --   --  5.8   LDL  --   --   --   --   --  87 182*  --   --  100*  --   --    HDL  --   --   --   --   --  90 90  --   --  89  --   --    TRIG  --   --   --   --   --  82 87  --   --  104  --   --    ALT 9* 9*  --   --  7*  --  14  --  15  --    < >  --    CR 0.75 0.74  --    < > 0.73  --  0.92  --  0.81 0.81  "  < >  --    GFRESTIMATED 81 82  --    < > 84  --  60* 61 70 71   < >  --    GFRESTBLACK  --   --   --   --   --   --  70 74 81 82   < >  --    POTASSIUM 4.1 4.2  --    < > 3.9  --  4.1  --  3.9 4.1   < >  --    TSH  --   --   --   --   --   --  2.04  --  2.39  --    < >  --     < > = values in this interval not displayed.      Review of Systems   Problem list, PMH, Surgical HX,  SH, allergies, medications,immunizations reviewed and updated in Epic.       Objective    Vitals - Patient Reported  Weight (Patient Reported): 88.5 kg (195 lb)  Height (Patient Reported): 162.6 cm (5' 4\")  BMI (Based on Pt Reported Ht/Wt): 33.47  Temperature (Patient Reported): 100  F (37.8  C)  Pain Score: Extreme Pain (9)      Physical Exam   GENERAL: alert interactive stable dysphonia related to vocal cord abnormality.  Expresses pain and concern for temp elevation to 100 degrees.  No cough throughout the telephone encounter  RESP: No audible wheeze, cough  SKIN: Unable to visualize her legs due to telephone encounter.  PSYCH: Mentation chronic anxiety.  Expresses concern for pain.        Telephone-Visit Details    Telephone start Time: 11:17 am    Type of service: Telephone visit  I called the patient back to inform her Dalia Gresham will be calling her to discuss transportation was able to reach her directly.    End Time:11:44 am    Originating Location (pt. Location): Home    Distant Location (provider location):  Off-site    Platform used for Video Visit: Other: telephone    "

## 2022-11-10 NOTE — ED TRIAGE NOTES
bilateral lower leg edema  No hx of chf according to pt  Has been chronic issue  pcp told pt to come to ed  Has been seen for this before  On 4Lo2 at home / baseline

## 2022-11-10 NOTE — PATIENT INSTRUCTIONS
Thank you for visiting the Primary Care Center today at the [facility]! The following is some information about our clinic:     Primary Care Center Frequently-Asked Questions    (1) How do I schedule appointments at the [facility]?     Primary Care--to schedule or make changes to an existing appointment, please call our primary care line at [phone].    Labs--to schedule a lab appointment at the [facility] you can use Heekya or call [phone]. If you have a [place] location that is closer to home, you can reach out to that location for scheduling options.     Imaging--if you need to schedule a CT, X-ray, MRI, ultrasound, or other imaging study you can call [phone] to schedule at the [facility] or any other [facility] imaging location.     Referrals--if a referral to another specialty was ordered you can expect a phone call from their scheduling team. If you have not heard from them in a week, please call us or send us a Heekya message to check the status or get a scheduling number. Please note that this only applies to internal [facility] referrals. If the referral is external you would need to contact their office for scheduling.     (2) I have a question about my visit, who do I contact?     You can call us at the primary care line at [phone] to ask questions about your visit. You can also send a secure message through Heekya, which is reviewed by clinic staff. Please note that Heekya messages have a twenty-four to forty-eight business hour turnaround time and should not be used for urgent concerns.    (3) How will I get the results of my tests?    If you are signed up for [facility] all tests will be released to you within twenty-four hours of resulting. Please allow three to five days for your doctor to review your results and place a note interpreting the results. If you do not have [email or web address] you will receive your  results through mail seven to ten business days following the return of the tests. Please note that if there should be any urgent or concerning results that your doctor or their registered nurse will reach out to you the same day as the tests come back. If you have follow up questions about your results or would like to discuss the results in detail please schedule a follow up with your provider either in person or virtually.     (4) How do I get refills of my prescriptions?     You should always first contact your pharmacy for refills of your medications. If submitting a refill request on Venda, please be sure to submit the request only once--repeat requests can cause delays in refill. If you are requesting a NEW medication or a medication related to new symptoms you will need to schedule an appointment with a provider prior to approval. Please note: Routine medication refills have up to one to three business day turnaround whereas controlled substances refills have up to five to seven business day turnaround.    (5) I have new symptoms, what do I do?     If you are having an immediate medical emergency, you should dial 911 for assistance.   For anything urgent that needs to be seen within a few hours to one day you should visit a local urgent care for assistance.  For non-urgent symptoms that need to be seen within a few days to a week you can schedule with an available provider in primary care by going to [facility]adego or calling [phone].   If you are not sure how serious your symptoms are or you would like to receive medical advice you can always call [phone] to speak with a triage nurse.      Seek emergent care at [facility] ER recommended for evaluation of your leg swelling , fever, redness and pain.

## 2022-11-11 ENCOUNTER — TELEPHONE (OUTPATIENT)
Dept: NURSING | Facility: CLINIC | Age: 77
End: 2022-11-11

## 2022-11-11 ENCOUNTER — NURSE TRIAGE (OUTPATIENT)
Dept: NURSING | Facility: CLINIC | Age: 77
End: 2022-11-11

## 2022-11-11 NOTE — TELEPHONE ENCOUNTER
"Pt seen yesterday at ED for worsening swelling in both feet, ankles and lower legs x 2-3 days. She left ED without completing eval. ED doctor wanted CT but pt left as she was frustrated w/ wait time.   Today pt calling about same sx. \"I can't get my shoes on\".  Sounds SOB, sentences choppy, but pt says she is \"like that all the time\" and no worse usual. LE equally swollen bilat, no redness or other discoloration.   Triaged to be seen today. Pt balked at coming in, \"I already came in for this\" Will send note to [facility]/Dr Sahu. Next appt 11/28.     Reason for Disposition    MODERATE swelling of both ankles (e.g., swelling extends up to the knees) AND new-onset or worsening    Additional Information    Negative: Sounds like a life-threatening emergency to the triager    Negative: Chest pain    Negative: Small area of swelling and followed an insect bite to the area    Negative: Followed a knee injury    Negative: Ankle or foot injury    Negative: Pregnant with leg swelling or edema    Negative: Entire foot is cool or blue in comparison to other side    Negative: SEVERE swelling (e.g., swelling extends above knee, entire leg is swollen, weeping fluid)    Negative: Thigh or calf pain and only 1 side and present > 1 hour    Negative: Thigh, calf, or ankle swelling in only one leg    Negative: Thigh, calf, or ankle swelling in both legs, but one side is definitely more swollen (Exception: longstanding difference between legs)    Negative: Cast on leg or ankle and has increasing pain    Negative: Can't walk or can barely stand (new-onset)    Negative: Fever and red area (or area very tender to touch)    Negative: Patient sounds very sick or weak to the triager    Negative: Swelling of face, arm or hands  (Exception: Slight puffiness of fingers during hot weather.)    Negative: Pregnant 20 or more weeks and sudden weight gain (i.e., > 2 lbs, 1 kg in one week)    Commented on: Difficulty breathing at rest     Says like that " all the time    Protocols used: LEG SWELLING AND EDEMA-A-OH

## 2022-11-11 NOTE — TELEPHONE ENCOUNTER
Ca called stating she needs to talk to someone about an appointment with the lymphedema clinic.  She said she'd been in the ER.    Per her After Visit Summary, it was recommended that she follow up with her pcp.   I transferred Ca to the appointment line for her pcp Dr Sahu.    Tanesha TREVIZO RN [facility]     I

## 2022-11-14 ENCOUNTER — TELEPHONE (OUTPATIENT)
Dept: MULTI SPECIALTY CLINIC | Facility: CLINIC | Age: 77
End: 2022-11-14

## 2022-11-14 NOTE — TELEPHONE ENCOUNTER
Call received from pt reporting that when she was seen in the ER on 11/10/22, she had come there with her own full size oxygen tank along with the carrier for it (one of the tall ones on wheels that you would drag behind you).   She reports that at some point, the whole thing disappeared-she is not sure exactly when it was removed from her or her area-only that she had it when she went there that day, and that it was gone before she left.     She does have replacement oxygen at home, but she has to return her empty oxygen tanks to be refilled, and it would likely come out of her pocket if she is not able to get these items back (the cost of the tank and the carrier).     Is hoping to speak to someone that could possibly help her retrieve these items?

## 2022-11-15 ENCOUNTER — APPOINTMENT (OUTPATIENT)
Dept: GENERAL RADIOLOGY | Facility: CLINIC | Age: 77
End: 2022-11-15
Attending: EMERGENCY MEDICINE
Payer: COMMERCIAL

## 2022-11-15 ENCOUNTER — HOSPITAL ENCOUNTER (EMERGENCY)
Facility: CLINIC | Age: 77
Discharge: HOME | End: 2022-11-15
Attending: EMERGENCY MEDICINE | Admitting: EMERGENCY MEDICINE
Payer: COMMERCIAL

## 2022-11-15 VITALS
HEIGHT: 67 IN | DIASTOLIC BLOOD PRESSURE: 89 MMHG | WEIGHT: 218.03 LBS | HEART RATE: 80 BPM | OXYGEN SATURATION: 100 % | BODY MASS INDEX: 34.22 KG/M2 | SYSTOLIC BLOOD PRESSURE: 164 MMHG

## 2022-11-15 DIAGNOSIS — M79.89 SWELLING OF LIMB: ICD-10-CM

## 2022-11-15 DIAGNOSIS — F41.1 GENERALIZED ANXIETY DISORDER: ICD-10-CM

## 2022-11-15 DIAGNOSIS — D53.9 MACROCYTIC ANEMIA: ICD-10-CM

## 2022-11-15 DIAGNOSIS — R06.00 DYSPNEA, UNSPECIFIED TYPE: ICD-10-CM

## 2022-11-15 LAB
ALBUMIN SERPL BCG-MCNC: 3.9 G/DL (ref 3.5–5.2)
ALP SERPL-CCNC: 93 U/L (ref 35–104)
ALT SERPL W P-5'-P-CCNC: 19 U/L (ref 10–35)
ANION GAP SERPL CALCULATED.3IONS-SCNC: 8 MMOL/L (ref 7–15)
AST SERPL W P-5'-P-CCNC: 40 U/L (ref 10–35)
ATRIAL RATE - MUSE: 80 BPM
BASOPHILS # BLD AUTO: 0 10E3/UL (ref 0–0.2)
BASOPHILS NFR BLD AUTO: 0 %
BILIRUB SERPL-MCNC: 0.2 MG/DL
BUN SERPL-MCNC: 29 MG/DL (ref 8–23)
CALCIUM SERPL-MCNC: 9.3 MG/DL (ref 8.8–10.2)
CHLORIDE SERPL-SCNC: 98 MMOL/L (ref 98–107)
CREAT SERPL-MCNC: 0.73 MG/DL (ref 0.51–0.95)
DEPRECATED HCO3 PLAS-SCNC: 35 MMOL/L (ref 22–29)
DIASTOLIC BLOOD PRESSURE - MUSE: NORMAL MMHG
EOSINOPHIL # BLD AUTO: 0.2 10E3/UL (ref 0–0.7)
EOSINOPHIL NFR BLD AUTO: 3 %
ERYTHROCYTE [DISTWIDTH] IN BLOOD BY AUTOMATED COUNT: 14.4 % (ref 10–15)
GFR SERPL CREATININE-BSD FRML MDRD: 84 ML/MIN/1.73M2
GLUCOSE SERPL-MCNC: 100 MG/DL (ref 70–99)
HCT VFR BLD AUTO: 28.8 % (ref 35–47)
HGB BLD-MCNC: 8.3 G/DL (ref 11.7–15.7)
IMM GRANULOCYTES # BLD: 0 10E3/UL
IMM GRANULOCYTES NFR BLD: 0 %
INTERPRETATION ECG - MUSE: NORMAL
LYMPHOCYTES # BLD AUTO: 0.9 10E3/UL (ref 0.8–5.3)
LYMPHOCYTES NFR BLD AUTO: 13 %
MCH RBC QN AUTO: 30.5 PG (ref 26.5–33)
MCHC RBC AUTO-ENTMCNC: 28.8 G/DL (ref 31.5–36.5)
MCV RBC AUTO: 106 FL (ref 78–100)
MONOCYTES # BLD AUTO: 0.7 10E3/UL (ref 0–1.3)
MONOCYTES NFR BLD AUTO: 10 %
NEUTROPHILS # BLD AUTO: 5.2 10E3/UL (ref 1.6–8.3)
NEUTROPHILS NFR BLD AUTO: 74 %
NRBC # BLD AUTO: 0 10E3/UL
NRBC BLD AUTO-RTO: 0 /100
NT-PROBNP SERPL-MCNC: 263 PG/ML (ref 0–1800)
P AXIS - MUSE: 56 DEGREES
PLATELET # BLD AUTO: 204 10E3/UL (ref 150–450)
POTASSIUM SERPL-SCNC: 4.1 MMOL/L (ref 3.4–5.3)
PR INTERVAL - MUSE: 132 MS
PROCALCITONIN SERPL IA-MCNC: 0.05 NG/ML
PROT SERPL-MCNC: 7.3 G/DL (ref 6.4–8.3)
QRS DURATION - MUSE: 72 MS
QT - MUSE: 370 MS
QTC - MUSE: 426 MS
R AXIS - MUSE: 13 DEGREES
RBC # BLD AUTO: 2.72 10E6/UL (ref 3.8–5.2)
SODIUM SERPL-SCNC: 141 MMOL/L (ref 136–145)
SYSTOLIC BLOOD PRESSURE - MUSE: NORMAL MMHG
T AXIS - MUSE: 53 DEGREES
TROPONIN T SERPL HS-MCNC: 12 NG/L
VENTRICULAR RATE- MUSE: 80 BPM
WBC # BLD AUTO: 7 10E3/UL (ref 4–11)

## 2022-11-15 PROCEDURE — 84145 PROCALCITONIN (PCT): CPT | Performed by: EMERGENCY MEDICINE

## 2022-11-15 PROCEDURE — 85025 COMPLETE CBC W/AUTO DIFF WBC: CPT | Performed by: EMERGENCY MEDICINE

## 2022-11-15 PROCEDURE — 84443 ASSAY THYROID STIM HORMONE: CPT

## 2022-11-15 PROCEDURE — 85045 AUTOMATED RETICULOCYTE COUNT: CPT

## 2022-11-15 PROCEDURE — 84484 ASSAY OF TROPONIN QUANT: CPT | Performed by: EMERGENCY MEDICINE

## 2022-11-15 PROCEDURE — 71046 X-RAY EXAM CHEST 2 VIEWS: CPT | Mod: 26 | Performed by: RADIOLOGY

## 2022-11-15 PROCEDURE — 71046 X-RAY EXAM CHEST 2 VIEWS: CPT

## 2022-11-15 PROCEDURE — 83880 ASSAY OF NATRIURETIC PEPTIDE: CPT | Performed by: EMERGENCY MEDICINE

## 2022-11-15 PROCEDURE — 82728 ASSAY OF FERRITIN: CPT

## 2022-11-15 PROCEDURE — 36415 COLL VENOUS BLD VENIPUNCTURE: CPT | Performed by: EMERGENCY MEDICINE

## 2022-11-15 PROCEDURE — 93010 ELECTROCARDIOGRAM REPORT: CPT | Performed by: EMERGENCY MEDICINE

## 2022-11-15 PROCEDURE — 80053 COMPREHEN METABOLIC PANEL: CPT | Performed by: EMERGENCY MEDICINE

## 2022-11-15 PROCEDURE — 83550 IRON BINDING TEST: CPT

## 2022-11-15 PROCEDURE — 93005 ELECTROCARDIOGRAM TRACING: CPT

## 2022-11-15 PROCEDURE — 99285 EMERGENCY DEPT VISIT HI MDM: CPT | Mod: 25

## 2022-11-15 PROCEDURE — 99285 EMERGENCY DEPT VISIT HI MDM: CPT | Mod: 25 | Performed by: EMERGENCY MEDICINE

## 2022-11-15 RX ORDER — DOXYCYCLINE 100 MG/1
CAPSULE ORAL
COMMUNITY
Start: 2022-10-21 | End: 2022-11-17

## 2022-11-15 RX ORDER — FUROSEMIDE 20 MG
20 TABLET ORAL DAILY
COMMUNITY
Start: 2022-11-04 | End: 2022-11-17

## 2022-11-15 ASSESSMENT — ACTIVITIES OF DAILY LIVING (ADL)
ADLS_ACUITY_SCORE: 35

## 2022-11-15 NOTE — DISCHARGE INSTRUCTIONS
Return to the emergency department immediately for any chest pain, back pain, shortness of breath, weakness, abdominal pain nausea, vomiting, or any other concerns as given or discussed    Continue taking all your regular medications.

## 2022-11-15 NOTE — LETTER
November 30, 2022      Ca Yan  [address]   [place] [address]        Dear ,    We are writing to inform you of your test results.     I understand you are in the hospital. When you are discharged please make a follow-up to review next steps after review from hematology e-consult.  Best wishes,  Favian Sahu MD    Resulted Orders   TSH with free T4 reflex   Result Value Ref Range    TSH 2.21 0.30 - 4.20 uIU/mL   Ferritin   Result Value Ref Range    Ferritin 74 11 - 328 ng/mL   Iron and iron binding capacity   Result Value Ref Range    Iron 38 37 - 145 ug/dL    Iron Sat Index 13 (L) 15 - 46 %    Iron Binding Capacity 285 240 - 430 ug/dL   Reticulocyte count   Result Value Ref Range    % Reticulocyte 1.7 0.5 - 2.0 %    Absolute Reticulocyte 0.045 0.025 - 0.095 10e6/uL       If you have any questions or concerns, please call the clinic at the number listed above.       Sincerely,      Favian Sahu MD

## 2022-11-15 NOTE — TELEPHONE ENCOUNTER
Spoke to representative in [place] Patient Relations Department, they will follow up with pt regarding this issue.

## 2022-11-15 NOTE — ED PROVIDER NOTES
ED Provider Note  [facility]      History     Chief Complaint   Patient presents with     Shortness of Breath     HPI  Ca Yan is a 76 year old female with history of cognitive impairment, alcohol use disorder, COPD, and hypertension, amongst others, who presents to the ED with complaint of shortness of breath.  Patient's history giving ability is somewhat limited.  She reports that she chronically has some shortness of breath, but it was worse tonight.  She is not sure if it was worse during the day as well.  She states she has a chronic cough, this seems to be largely unchanged.  No fever.  No chest pain, abdominal pain, vomiting, diarrhea.  She states that she has chronic lower extremity edema, but has been worse lately.  She states it is hard to wear shoes because of this.  This bothers her.  She states she is a non-smoker.  She reports she uses 5 L of home O2 chronically.  She reports she is feeling improved at this time.    Past Medical History  Past Medical History:   Diagnosis Date     Anxiety      Arthritis      Breast cancer (H)      COPD (chronic obstructive pulmonary disease) (H)     6/19/12:  FEV 0.99 l     Depressive disorder      Hypertension      Low back pain with left-sided sciatica      Low bone density 4/13/2017    DEXA April 12, 2017: T score -2.0. Normal Z score. FRAX risk: major osteoporotic fracture 11.9%, hip fracture 2.6%, therefore not high-risk     Lymphedema, chronic lower extremities      Past Surgical History:   Procedure Laterality Date     BACK SURGERY      spinal fusion     COLONOSCOPY       LARYNGOSCOPY WITH MICROSCOPE N/A 4/30/2021    Procedure: FLEXIBLE LARYNGOSCOPY;  Surgeon: Domonique Roche MD;  Location: [facility] OR     LUMPECTOMY BREAST       ORTHOPEDIC SURGERY      Knee surgery left side     furosemide (LASIX) 20 MG tablet  atorvastatin (LIPITOR) 40 MG tablet  azelastine (ASTELIN) 0.1 % nasal spray  cephALEXin (KEFLEX) 250 MG  capsule  clotrimazole (LOTRIMIN) 1 % external cream  doxycycline monohydrate (ADOXA) 100 MG tablet  doxycycline monohydrate (MONODOX) 100 MG capsule  Emollient (CERAVE) CREA  ibuprofen (ADVIL/MOTRIN) 600 MG tablet  ipratropium (ATROVENT) 0.03 % nasal spray  latanoprost (XALATAN) 0.005 % ophthalmic solution  losartan-hydrochlorothiazide (HYZAAR) 100-25 MG tablet  mirtazapine (REMERON) 7.5 MG tablet  QUEtiapine (SEROQUEL) 300 MG tablet  sertraline (ZOLOFT) 100 MG tablet  tiZANidine (ZANAFLEX) 2 MG tablet  Vitamin D3 (CHOLECALCIFEROL) 25 mcg (1000 units) tablet      Allergies   Allergen Reactions     Azithromycin Itching     Codeine Nausea and Vomiting     Hydrocodone Nausea and Vomiting     Metronidazole Nausea     Oxycodone Itching and Rash     Percocet [Oxycodone-Acetaminophen] Nausea and Vomiting     Pollen Extract      sneezing and runny nose.      Seasonal Allergies      sneezing and runny nose.      Vicodin [Hydrocodone-Acetaminophen] Nausea and Vomiting     Zolpidem      Amnestic behavior     Family History  Family History   Problem Relation Age of Onset     Breast Cancer Mother      Diabetes Mother      Anxiety Disorder Mother      Asthma Mother      Other Cancer Mother      Hyperlipidemia Mother      Alcohol/Drug Father      Mental Illness Father      Substance Abuse Father      Obesity Father      Cerebrovascular Disease Maternal Grandmother 67     Other - See Comments Maternal Aunt         lived to      Social History   Social History     Tobacco Use     Smoking status: Former     Packs/day: 0.50     Years: 5.00     Pack years: 2.50     Types: Cigarettes     Start date: 1956     Quit date: 1980     Years since quittin.1     Smokeless tobacco: Former     Quit date: 1980   Substance Use Topics     Alcohol use: Not Currently     Alcohol/week: 0.0 standard drinks     Comment: Sober for 2 years, previous alcoholic     Drug use: No      Past medical history, past surgical history, medications,  "allergies, family history, and social history were reviewed with the patient. No additional pertinent items.       Review of Systems  A complete review of systems was performed with pertinent positives and negatives noted in the HPI, and all other systems negative.    Physical Exam   Height: 170.2 cm (5' 7\")  Weight: 98.9 kg (218 lb 0.6 oz)  Physical Exam  Constitutional:       General: She is not in acute distress.     Appearance: She is not diaphoretic.   HENT:      Head: Atraumatic.   Eyes:      General: No scleral icterus.  Cardiovascular:      Heart sounds: Normal heart sounds.   Pulmonary:      Effort: No respiratory distress.      Breath sounds: Normal breath sounds.   Abdominal:      Palpations: Abdomen is soft.      Tenderness: There is no abdominal tenderness.   Musculoskeletal:         General: No tenderness.      Right lower leg: Edema present.      Left lower leg: Edema present.   Skin:     General: Skin is warm.         ED Course      Procedures            EKG Interpretation:      Interpreted by Paulette Talbert MD  Time reviewed: 0600  Symptoms at time of EKG: dyspnea   Rhythm: normal sinus   Rate: normal  Axis: normal  Ectopy: none  Conduction: normal  ST Segments/ T Waves: No ST-T wave changes  Q Waves: none  Comparison to prior:      Clinical Impression: normal EKG                    Results for orders placed or performed during the hospital encounter of 11/15/22   EKG 12 lead     Status: None   Result Value Ref Range    Systolic Blood Pressure  mmHg    Diastolic Blood Pressure  mmHg    Ventricular Rate 80 BPM    Atrial Rate 80 BPM    SD Interval 132 ms    QRS Duration 72 ms     ms    QTc 426 ms    P Axis 56 degrees    R AXIS 13 degrees    T Axis 53 degrees    Interpretation ECG       Sinus rhythm  Normal ECG  Unconfirmed report - interpretation of this ECG is computer generated - see medical record for final interpretation  Confirmed by - EMERGENCY ROOM, PHYSICIAN ([id number]),  " DARREN HOLLIDAY ([id number]) on 11/15/2022 6:45:00 AM       Medications - No data to display     Assessments & Plan (with Medical Decision Making)   The patient presents reporting increased shortness of breath tonight.  By the time I see her she states she feels more or less back to her baseline.  She is a very difficult historian.  She is very focused on the fact that she has swelling in her legs.  She has been seen for this several times before.  She states this is ongoing and this really bothers her.  She does not report any significant pain in her legs though.  She denies any fever.  She denies any change in her chronic cough.  EKG did not look concerning.  We will obtain labs including basic labs, troponin, BNP as well as chest x-ray, COVID/influenza/RSV.  The patient be signed out to the oncoming provider pending the above evaluation.  If unrevealing, the patient continues to feel at baseline, I do think she can safely be discharged home with close follow-up.    Dictation Disclaimer: Some of this Note has been completed with voice-recognition dictation software. Although errors are generally corrected real-time, there is the potential for a rare error to be present in the completed chart.      I have reviewed the nursing notes. I have reviewed the findings, diagnosis, plan and need for follow up with the patient.    New Prescriptions    No medications on file       Final diagnoses:   Dyspnea, unspecified type       --  Paulette Talbert  [facility] EMERGENCY DEPARTMENT  11/15/2022     Paulette Talbert MD  11/15/22 0655

## 2022-11-15 NOTE — LETTER
November 29, 2022      Ca Yan  [address]   [place] [id number]        Dear MsNishi,    We are writing to inform you of your test results.    Dear Ca Yan    I understand you are in the hospital. When you are discharged please make a follow-up to review next steps after review from hematology e-consult.   Best wishes,   Favian Sahu MD           Resulted Orders   TSH with free T4 reflex   Result Value Ref Range    TSH 2.21 0.30 - 4.20 uIU/mL   Ferritin   Result Value Ref Range    Ferritin 74 11 - 328 ng/mL   Iron and iron binding capacity   Result Value Ref Range    Iron 38 37 - 145 ug/dL    Iron Sat Index 13 (L) 15 - 46 %    Iron Binding Capacity 285 240 - 430 ug/dL   Reticulocyte count   Result Value Ref Range    % Reticulocyte 1.7 0.5 - 2.0 %    Absolute Reticulocyte 0.045 0.025 - 0.095 10e6/uL       If you have any questions or concerns, please call the clinic at the number listed above.       Sincerely,      Favian Sahu MD

## 2022-11-15 NOTE — ED PROVIDER NOTES
"Patient signed out to me by the morning attending from overnight  Briefly 76-year-old female with possible history of COPD, cognitive impairment, alcohol use disorder who presented for shortness of breath.  Patient reports using oxygen at baseline  On arrival to the ED apparently had been feeling improved and respiratory status back to baseline    Plan at time of signout is to follow-up on laboratories, x-ray, and if unrevealing okay to discharge patient    On reevaluation, patient continues to feel well in the ED.  No longer short of breath.  Laboratories and imaging unrevealing.  Appropriate for discharge according to prior plan.  All questions answered.    BP (!) 139/105   Pulse 78   Ht 1.702 m (5' 7\")   Wt 98.9 kg (218 lb 0.6 oz)   LMP  (LMP Unknown)   SpO2 97%   BMI 34.15 kg/m      - Patient agrees to our plan and is ready and eager for discharge. Care plan, follow up plan, and reasons to return immediately to the ED were dicussed in detail and summarized as noted in the discharge instructions.       Luis Felipe Elaine MD  11/15/22 1121    "

## 2022-11-16 ENCOUNTER — TELEPHONE (OUTPATIENT)
Dept: FAMILY MEDICINE | Facility: CLINIC | Age: 77
End: 2022-11-16

## 2022-11-16 NOTE — TELEPHONE ENCOUNTER
"Contacted Ca as requested.  She tells me that \"a few weeks ago\", she took her tank and stand to a doctor appointment and thinks she left it in the clinic.  She states she can't remember where she left it, when she left it or which clinic she was visiting, she remembers her transport provider bringing into the clinic for her.  States she's contacted Dr Sahu's office, but they have been unable to locate.  She does have a second tank at home, but not longer has the stand/carrier for portability.  She has not contacted her oxygen provider to discuss getting a new tank and carrier.    Contacted [employer], per their representative, Ca has been informed that a new tank, regulator and cart will be delivered to her tomorrow, however, she will be charged unless she is able to locate her lost tank and return it to [facility].      I have suggested to pt that she contact [employer] her transport provider,  and look at calendars/appointment reminders to try and find out or remember where she may have left her tank, and contact those facilities asking them to check for tanks that might have have an [employer] label.  Pt verbalized understanding.    Review of chart indicates pt contacted PCP on 11/14 and told them she left tank in ED on 11/10.  Left message for pt with contact number for [facility] ED so that she can follow up.    "

## 2022-11-16 NOTE — TELEPHONE ENCOUNTER
[facility] Call Center    Phone Message    May a detailed message be left on voicemail: yes     Reason for Call: Other: Pt requesting call back. Pt called and stated that she has lost her oxygen tank and the campa for it, pt needing to discuss what to do. Pt stated she can not go anywhere without it

## 2022-11-16 NOTE — TELEPHONE ENCOUNTER
RN called patient and helped her make a telephone visit with Dr. Sahu for tomorrow, to discuss anxiety medication.    Kiki Eddy RN on 11/16/2022 at 2:00 PM

## 2022-11-16 NOTE — TELEPHONE ENCOUNTER
[facility] Call Center    Phone Message    May a detailed message be left on voicemail: yes     Reason for Call: Symptoms or Concerns     If patient has red-flag symptoms, warm transfer to triage line    Current symptom or concern: anxiety        Are there any new or worsening symptoms? Yes: Pt requesting call back to discuss getting a medication

## 2022-11-17 ENCOUNTER — VIRTUAL VISIT (OUTPATIENT)
Dept: FAMILY MEDICINE | Facility: CLINIC | Age: 77
End: 2022-11-17
Payer: COMMERCIAL

## 2022-11-17 ENCOUNTER — TELEPHONE (OUTPATIENT)
Dept: FAMILY MEDICINE | Facility: CLINIC | Age: 77
End: 2022-11-17

## 2022-11-17 DIAGNOSIS — Z60.4 SOCIAL ISOLATION: ICD-10-CM

## 2022-11-17 DIAGNOSIS — I89.0 LYMPHEDEMA DUE TO VENOUS INSUFFICIENCY: ICD-10-CM

## 2022-11-17 DIAGNOSIS — D53.9 MACROCYTIC ANEMIA: ICD-10-CM

## 2022-11-17 DIAGNOSIS — E78.5 HYPERLIPIDEMIA, UNSPECIFIED HYPERLIPIDEMIA TYPE: ICD-10-CM

## 2022-11-17 DIAGNOSIS — F41.1 GENERALIZED ANXIETY DISORDER: Primary | ICD-10-CM

## 2022-11-17 DIAGNOSIS — M41.9 KYPHOSCOLIOSIS: ICD-10-CM

## 2022-11-17 DIAGNOSIS — M47.895 OTHER OSTEOARTHRITIS OF SPINE, THORACOLUMBAR REGION: ICD-10-CM

## 2022-11-17 DIAGNOSIS — R71.8 ELEVATED MCV: ICD-10-CM

## 2022-11-17 DIAGNOSIS — F60.89 CLUSTER C PERSONALITY DISORDER (H): ICD-10-CM

## 2022-11-17 DIAGNOSIS — I10 ESSENTIAL HYPERTENSION: ICD-10-CM

## 2022-11-17 DIAGNOSIS — D53.9 MACROCYTIC ANEMIA: Primary | ICD-10-CM

## 2022-11-17 DIAGNOSIS — I87.2 LYMPHEDEMA DUE TO VENOUS INSUFFICIENCY: ICD-10-CM

## 2022-11-17 DIAGNOSIS — F41.1 GENERALIZED ANXIETY DISORDER: ICD-10-CM

## 2022-11-17 LAB
FERRITIN SERPL-MCNC: 74 NG/ML (ref 11–328)
IRON BINDING CAPACITY (ROCHE): 285 UG/DL (ref 240–430)
IRON SATN MFR SERPL: 13 % (ref 15–46)
IRON SERPL-MCNC: 38 UG/DL (ref 37–145)
RETICS # AUTO: 0.04 10E6/UL (ref 0.03–0.1)
RETICS/RBC NFR AUTO: 1.7 % (ref 0.5–2)
TSH SERPL DL<=0.005 MIU/L-ACNC: 2.21 UIU/ML (ref 0.3–4.2)

## 2022-11-17 PROCEDURE — 99443 PR PHYSICIAN TELEPHONE EVALUATION 21-30 MIN: CPT | Performed by: FAMILY MEDICINE

## 2022-11-17 RX ORDER — LOSARTAN POTASSIUM AND HYDROCHLOROTHIAZIDE 25; 100 MG/1; MG/1
1 TABLET ORAL DAILY
Qty: 90 TABLET | Refills: 3 | Status: ON HOLD | OUTPATIENT
Start: 2022-11-17 | End: 2022-12-09

## 2022-11-17 RX ORDER — BUSPIRONE HYDROCHLORIDE 10 MG/1
10 TABLET ORAL 2 TIMES DAILY
Qty: 60 TABLET | Refills: 3 | Status: ON HOLD | OUTPATIENT
Start: 2022-11-17 | End: 2022-12-09

## 2022-11-17 RX ORDER — LANOLIN ALCOHOL/MO/W.PET/CERES
400 CREAM (GRAM) TOPICAL DAILY
Qty: 100 TABLET | Refills: 3 | Status: SHIPPED | OUTPATIENT
Start: 2022-11-17

## 2022-11-17 RX ORDER — ATORVASTATIN CALCIUM 40 MG/1
40 TABLET, FILM COATED ORAL DAILY
Qty: 90 TABLET | Refills: 3 | Status: ON HOLD | OUTPATIENT
Start: 2022-11-17 | End: 2022-12-09

## 2022-11-17 SDOH — SOCIAL STABILITY - SOCIAL INSECURITY: SOCIAL EXCLUSION AND REJECTION: Z60.4

## 2022-11-17 ASSESSMENT — ANXIETY QUESTIONNAIRES
5. BEING SO RESTLESS THAT IT IS HARD TO SIT STILL: NEARLY EVERY DAY
8. IF YOU CHECKED OFF ANY PROBLEMS, HOW DIFFICULT HAVE THESE MADE IT FOR YOU TO DO YOUR WORK, TAKE CARE OF THINGS AT HOME, OR GET ALONG WITH OTHER PEOPLE?: VERY DIFFICULT
GAD7 TOTAL SCORE: 21
3. WORRYING TOO MUCH ABOUT DIFFERENT THINGS: NEARLY EVERY DAY
7. FEELING AFRAID AS IF SOMETHING AWFUL MIGHT HAPPEN: NEARLY EVERY DAY
GAD7 TOTAL SCORE: 21
1. FEELING NERVOUS, ANXIOUS, OR ON EDGE: NEARLY EVERY DAY
6. BECOMING EASILY ANNOYED OR IRRITABLE: NEARLY EVERY DAY
7. FEELING AFRAID AS IF SOMETHING AWFUL MIGHT HAPPEN: NEARLY EVERY DAY
IF YOU CHECKED OFF ANY PROBLEMS ON THIS QUESTIONNAIRE, HOW DIFFICULT HAVE THESE PROBLEMS MADE IT FOR YOU TO DO YOUR WORK, TAKE CARE OF THINGS AT HOME, OR GET ALONG WITH OTHER PEOPLE: VERY DIFFICULT
4. TROUBLE RELAXING: NEARLY EVERY DAY
GAD7 TOTAL SCORE: 21
2. NOT BEING ABLE TO STOP OR CONTROL WORRYING: NEARLY EVERY DAY

## 2022-11-17 NOTE — PROGRESS NOTES
November 17, 2022 6:36 AM  In anticipation of telephone visit today I reviewed labs from ER drop in hemoglobin with macrocytosis, add on labs to previous draw if able.  Diagnoses and all orders for this visit:    Generalized anxiety disorder  -     TSH with free T4 reflex; Future    Elevated MCV  -     Vitamin B12; Future    Macrocytic anemia  -     Reticulocyte count; Future  -     Ferritin; Future  -     Iron and iron binding capacity; Future  -     Folate; Future    Favian Sahu MD

## 2022-11-17 NOTE — PROGRESS NOTES
Ca is a 76 year old who is being evaluated via a billable telephone visit.      What phone number would you like to be contacted at? [phone]  How would you like to obtain your AVS? MyChart    Assessment & Plan     Macrocytic anemia  Newly noted macrocytic anemia with elevated  likely nutritional as she is eating mostly frozen dinners.  She would benefit from home meal service and looking into assisted living situation, recent increase in ER visits, anxiety, social isolation contributing.  Macrocytic anemia may be contributing to her feeling of difficulty breathing as well as lymphedema in particular if nutritional.  I will ask social service Dalia Gresham  to assist with connecting with Paulette Prajapati [phone] care coordinator.  Home care orders were placed for assessment of home safety, lymphedema therapy, assisted living assessment facilitation  - folic acid (FOLVITE) 400 MCG tablet  Dispense: 100 tablet; Refill: 3  - vitamin B complex with vitamin C (STRESS TAB) tablet  Dispense: 100 tablet; Refill: 3  - Home Care Referral  - Adult Oncology/Hematology  Referral  - Social Work Referral (Only to be ordered at: [facility] [place] ONLY)  November 21, 2022 I reviewed the following  Maryellen High, Favian Mena MD  Cc: Maryellen High RN; Cogan, Jacob, MD  Good afternoon-     We have reviewed the referral for Ca for reason of anemia. At this time, we are recommending an e-consult (Epic order [id number]) vs in person consult. This will provide an opportunity for one of our [place] based [facility]ologists to review the chart and make any recommendations for further workup and advise if in person is necessary. These are available to referring providers and patients as a way to save cost to the patient and preserve open new patient slots for urgent cases. I have copied myself on this message so that I can monitor for a timely response and follow up on anything directed to  "Class[facility] H[facility]ology as advised by response in the e-consult. We will \"reject\" the referral at this time and monitor for response to e-consult, at which time the referral can be reinstated by [facility] Navigation if needed.     When the order for e-consult is placed, please state, Attn: Dr Jacob Cogan, at the top or in a comment section as he has agreed to review this. Given her difficulty with completing in person visits, and the potential for her nutrition contributing to her anemia, it might be the easiest first step and we can go from there based on additional work up and care plan.     Thank you-     CHLOE PerezN, RN, PHN, OCN   Hematology/Oncology Nurse Navigator   [facility] [place] [facility]   [phone]   [email or web address]   November 21, 2022  E-consult placed.    Generalized anxiety disorder    Added BuSpar 10 mg twice daily scheduled dose.  - busPIRone (BUSPAR) 10 MG tablet  Dispense: 60 tablet; Refill: 3  - Home Care Referral  - Social Work Referral (Only to be ordered at: [facility]/MG ONLY)    Cluster C personality disorder (H)The  anxious/fearful  cluster is comprised of dependent, avoidant, and obsessive-compulsive personality disorders. Patients with Cluster C disorders are emotionally i   I will ask social service Dalia Gresham  to assist with connecting with Paulette Prajapati [phone] care coordinator.  Home care orders were placed for assessment of home safety, lymphedema therapy, assisted living assessment facilitation    - Home Care Referral  - Social Work Referral (Only to be ordered at: [facility]/PWB/MG ONLY)    Essential hypertension  I sent all of her medications I prescribed to local pharmacy [facility] and provided the patient with phone number for her to connect with them on how to deliver medications.  - losartan-hydrochlorothiazide (HYZAAR) 100-25 MG tablet  Dispense: 90 tablet; Refill: 3  - Home Care Referral  - Social Work Referral " (Only to be ordered at: [facility] ONLY)    Other osteoarthritis of spine, thoracolumbar region  Kyphoscoliosis  Kyphoscoliosis contributes to her restriction in breathing  - Home Care Referral  - Social Work Referral (Only to be ordered at: [facility] ONLY)    Lymphedema due to venous insufficiency  Requires home lymphedema therapy eventually will need compression stockings.  - Home Care Referral  - Social Work Referral (Only to be ordered at: [facility] ONLY)    Social isolation   I will ask social service Dalia Gresham  to assist with connecting with Paulette Victorina [phone] care coordinator.  Home care orders were placed for assessment of home safety, lymphedema therapy, assisted living assessment facilitation    - Home Care Referral  - Social Work Referral (Only to be ordered at: [facility] ONLY)  November 21, 2022  I noted this  message  Paulette Prajapati, Favian SIMONS MD; Dalia Gresham, [facility]; Kiki Eddy, RN  Hi there,   I have been working with Ca regarding an assisted living move as she's expressed interest in that the last couple of weeks. I just mailed a Vacancy report to her yesterday on a few facilities that have openings and encouraged her to schedule tours. I did send a previous list earlier this month but when I talked to her about it earlier this week she had misplaced it.   I have talked to Ca about other services in the past but she has always declined. After the message from Dr. Giordano, I placed another call to Ca today and she agreed to try home delivered meals from [employer] so I will be authorizing that. It is for 7 meals/ week.   ThanksPaulette                    In person follow-up as scheduled for November 28.  Favian Sahu MD  [facility] [place]              Documentation of a Face-to-Face Physician Encounter November 17, 2022    Ca Valerie Yan  1945  [id number]    I certify that this patient is under my care and  that I, or a nurse practitioner or physician's assistant working with me, had a face-to-face encounter that meets the physician face-to-face encounter requirements with this patient on: 11/17/2022.    The encounter with the patient was in whole, or in part, for the following medical condition, which is the primary reason for home health care:  Patient Active Problem List   Diagnosis     Non morbid obesity due to excess calories     Alcohol abuse     Malignant neoplasm of breast (H)     Arthritis of knee     Diarrhea     Diastolic dysfunction     Osteoarthritis of spine     Gastroesophageal reflux disease     Generalized anxiety disorder     Essential hypertension     Hyperlipidemia     Insomnia     Irritable bowel syndrome     Kyphoscoliosis     Cluster C personality disorder (H)The  anxious/fearful  cluster is comprised of dependent, avoidant, and obsessive-compulsive personality disorders. Patients with Cluster C disorders are emotionally i     Seborrheic eczema     Macrocytic anemia     Anxiety state     Bunion     Low bone density     Fecal incontinence     Lumbago     Cognitive impairment     Parathyroid hormone excess (H)     Chronic fatigue     Vocal cord paralysis     Dysphonia     Callus of foot     Nail dystrophy     Elevated MCV     Vitamin D deficiency     Pain in both feet     Orthopnea     Back muscle spasm     Plantar fasciitis     Urinary incontinence, unspecified type     Lymphedema due to venous insufficiency       I certify that, based on my findings, the following services are medically necessary home health services: Nursing, Occupational Therapy, Physical Therapy, Social Work and Lymphedema therapy    My clinical findings support the need for the above services because: See note,  increased ER visits, social isolation, anxiety, nutritional anemia, Lymphedema     Further, I certify that my clinical findings support that this patient is homebound (i.e. absences from home require considerable and  taxing effort and are for medical reasons or Congregational services or infrequently or of short duration when for other reasons) because: Unable to drive , anxious in situations interfering with ability to complete evaluations       Physician signature _____________Favian Sahu MD______________________   November 17, 2022  Physician name: Favian Sahu MD    Fax ([phone]) or scan/email ([email or web address]) this completed document to [facility] within 24 hours of the referral date.  Questions: [phone].  Subjective   Ca is a 76 year old, presenting for the following health issues:  Telephone (Anxiety check )      HPI   Olivia Foster has HX of restriction in her lung related to Kyphoscoliosis, anxiety & personality disorder, breast cancer s/p lumpectomy, chronic lower extremity lymphedema, alcohol abuse not current, GERD, HTN,vocal dysphonia and lipidemia presents via telephone visit.    Olivia Foster presents today for telephone visit after several emergency room visits.  She did not stay for complete visit as she did not want to wait.  Chart reviewed from ER visits 11/10/22 and 11/15/22. Edema in legs noted, She left ER prior to completion of CT abdomen evaluation but had negative lower extremity study for DVT. Labs normal BNP. Note drop in Hemoglobin and increased MCV. I added on labs to ER visit if able to clarify macrocytic anemia.  Lymphedema  Chart reviewed shows normal BNP, history of restrictive lung disease related to scoliosis, no evidence of heart failure other than possible restriction due to kyphoscoliosis.  She has home oxygen but during one of her visits her portable tank was lost she is not certain if it was recent during ER visits or when she was at a doctor's visit several weeks ago.  She has contacted patient relations to try to track down her oxygen tank but has a home oxygen tank at this time.  She is worried about the cost related to replacement.  She would be willing to  have home lymphedema physical therapy and eventual compression therapy.  Likely her lymphedema is related to venous insufficiency related to  inactive status.  Macrocytic anemia  I noted hemoglobin 8.3 with a macrocytosis  macrocytic anemia nutritional likely.  She indicates mostly diet consists of frozen meals waffles, apple turnover's occasionally some [employer] meat products all frozen meals.  She does not currently have a meal service and would be open to home delivery if left at her door. Denies current ETOH use.  She currently lives in an apartment and would benefit from a change of her living situation to assisted living.  She is open to exploring assisted living.  She has a care coordinator Paulette Prajapati [phone] I called and left a message today.  She would be open to social service assistance.  She is excepting of taking folic acid and B complex vitamin I will send to [facility] pharmacy provided her with [phone] number for her to connect with them on home delivery options.  Anxiety  She has a history of generalized anxiety disorder has a psychiatrist and is receiving Seroquel and sertraline.  She has not connected with her psychiatrist since the beginning of the pandemic but is receiving refills.  She is wondering if I could assist with the medication that would help with anxiety previously she has tolerated BuSpar therefore I will add to her medication.  She feels reassured that her heart failure labs were normal and also feels moving to an assisted living environment where she could have more cares would assist with her anxiety levels.  She lives alone and does not have close family nearby.    Today I reviewed her medication list and sent medications I prescribed to specialty pharmacy for coordination of services.        Labs reviewed in EPIC  BP Readings from Last 3 Encounters:   11/15/22 (!) 164/89   11/10/22 (!) 182/82   10/21/22 (!) 190/93    Wt Readings from Last 3  Encounters:   11/15/22 98.9 kg (218 lb 0.6 oz)   11/10/22 98.9 kg (218 lb)   10/21/22 88.5 kg (195 lb)                  Patient Active Problem List   Diagnosis     Non morbid obesity due to excess calories     Alcohol abuse     Malignant neoplasm of breast (H)     Arthritis of knee     Diarrhea     Diastolic dysfunction     Osteoarthritis of spine     Gastroesophageal reflux disease     Generalized anxiety disorder     Essential hypertension     Hyperlipidemia     Insomnia     Irritable bowel syndrome     Kyphoscoliosis     Cluster C personality disorder (H)The  anxious/fearful  cluster is comprised of dependent, avoidant, and obsessive-compulsive personality disorders. Patients with Cluster C disorders are emotionally i     Seborrheic eczema     Macrocytic anemia     Anxiety state     Bunion     Low bone density     Fecal incontinence     Lumbago     Cognitive impairment     Parathyroid hormone excess (H)     Chronic fatigue     Vocal cord paralysis     Dysphonia     Callus of foot     Nail dystrophy     Elevated MCV     Vitamin D deficiency     Pain in both feet     Orthopnea     Back muscle spasm     Plantar fasciitis     Urinary incontinence, unspecified type     Past Surgical History:   Procedure Laterality Date     BACK SURGERY      spinal fusion     COLONOSCOPY       LARYNGOSCOPY WITH MICROSCOPE N/A 2021    Procedure: FLEXIBLE LARYNGOSCOPY;  Surgeon: Domonique Roche MD;  Location: [facility] OR     LUMPECTOMY BREAST       ORTHOPEDIC SURGERY      Knee surgery left side       Social History     Tobacco Use     Smoking status: Former     Packs/day: 0.50     Years: 5.00     Pack years: 2.50     Types: Cigarettes     Start date: 1956     Quit date: 1980     Years since quittin.1     Smokeless tobacco: Former     Quit date: 1980   Substance Use Topics     Alcohol use: Not Currently     Alcohol/week: 0.0 standard drinks     Comment: Sober for 2 years, previous alcoholic     Family History   Problem  Relation Age of Onset     Breast Cancer Mother      Diabetes Mother      Anxiety Disorder Mother      Asthma Mother      Other Cancer Mother      Hyperlipidemia Mother      Alcohol/Drug Father      Mental Illness Father      Substance Abuse Father      Obesity Father      Cerebrovascular Disease Maternal Grandmother 67     Other - See Comments Maternal Aunt         lived to          Current Outpatient Medications   Medication Sig Dispense Refill     atorvastatin (LIPITOR) 40 MG tablet Take 1 tablet (40 mg) by mouth daily 90 tablet 3     busPIRone (BUSPAR) 10 MG tablet Take 1 tablet (10 mg) by mouth 2 times daily 60 tablet 3     Emollient (CERAVE) CREA Please apply twice daily to dry skin of body and feet 453 g 3     folic acid (FOLVITE) 400 MCG tablet Take 1 tablet (400 mcg) by mouth daily 100 tablet 3     latanoprost (XALATAN) 0.005 % ophthalmic solution 1 drop       losartan-hydrochlorothiazide (HYZAAR) 100-25 MG tablet Take 1 tablet by mouth daily 90 tablet 3     QUEtiapine (SEROQUEL) 300 MG tablet Take 300 mg by mouth       sertraline (ZOLOFT) 100 MG tablet Take 1.5 tablets (150 mg) by mouth daily 135 tablet 3     vitamin B complex with vitamin C (STRESS TAB) tablet Take 1 tablet by mouth daily 100 tablet 3     Vitamin D3 (CHOLECALCIFEROL) 25 mcg (1000 units) tablet Take 1 tablet (25 mcg) by mouth daily 100 tablet 3     azelastine (ASTELIN) 0.1 % nasal spray SMARTSIG:Both Nares (Patient not taking: Reported on 11/10/2022)       clotrimazole (LOTRIMIN) 1 % external cream Apply topically 2 times daily as needed (under arms breast groin rash until rash resolves) (Patient not taking: Reported on 11/17/2022) 60 g 1     Allergies   Allergen Reactions     Azithromycin Itching     Codeine Nausea and Vomiting     Hydrocodone Nausea and Vomiting     Metronidazole Nausea     Oxycodone Itching and Rash     Percocet [Oxycodone-Acetaminophen] Nausea and Vomiting     Pollen Extract      sneezing and runny nose.      Seasonal  "Allergies      sneezing and runny nose.      Vicodin [Hydrocodone-Acetaminophen] Nausea and Vomiting     Zolpidem      Amnestic behavior     Recent Labs   Lab Test 11/15/22  0828 11/10/22  1510 10/21/22  1131 09/22/22  1657 08/12/22  1408 07/08/22  1437 06/23/21  0940 04/22/21  1145 10/08/20  1035 08/20/20  1202 01/21/20  0920 11/04/16  1350 04/21/16  1443   A1C  --   --   --   --  5.6  --   --   --   --   --   --   --  5.8   LDL  --   --   --   --   --   --  87 182*  --   --  100*  --   --    HDL  --   --   --   --   --   --  90 90  --   --  89  --   --    TRIG  --   --   --   --   --   --  82 87  --   --  104  --   --    ALT 19 12 9*   < >  --    < >  --  14  --  15  --    < >  --    CR 0.73 0.73 0.75   < >  --    < >  --  0.92  --  0.81 0.81   < >  --    GFRESTIMATED 84 84 81   < >  --    < >  --  60* 61 70 71   < >  --    GFRESTBLACK  --   --   --   --   --   --   --  70 74 81 82   < >  --    POTASSIUM 4.1 4.3 4.1   < >  --    < >  --  4.1  --  3.9 4.1   < >  --    TSH  --   --   --   --   --   --   --  2.04  --  2.39  --    < >  --     < > = values in this interval not displayed.      Review of Systems   Problem list, PMH,  SH, allergies, medications,immunizations reviewed and updated in Epic. Pertinent ROS negative other than noted in HPI and ROS.        Objective    Vitals - Patient Reported  Weight (Patient Reported): 88.5 kg (195 lb)  Height (Patient Reported): 160 cm (5' 3\")  BMI (Based on Pt Reported Ht/Wt): 34.54  Pain Score: Extreme Pain (8)  Pain Loc: Foot      Vitals:  No vitals were obtained today due to virtual visit.    Physical Exam   alert and no respiratory distress, worried about various things related to anxiety, chronic vocal hoarseness related to previous vocal cord damage.  PSYCH: Alert and oriented times 3; coherent dysphonia speech, able to articulate logical thoughts, able to abstract reason, no hallucinations   or delusions  Her affect is anxious but relaxed when we reviewed exploring " possibility of assisted living  RESP: No cough, no audible wheezing, able to talk in full sentences  Remainder of exam unable to be completed due to telephone visits      PHQ 6/3/2020 4/7/2021 11/10/2021   PHQ-9 Total Score 6 0 0   Q9: Thoughts of better off dead/self-harm past 2 weeks Not at all Not at all Not at all     RENATO-7 SCORE 4/7/2021 11/10/2021 11/17/2022   Total Score - - 21 (severe anxiety)   Total Score 3 10 21   Total Score BEH Adult - - -      Latest Reference Range & Units 11/15/22 08:28   Sodium 136 - 145 mmol/L 141   Potassium 3.4 - 5.3 mmol/L 4.1   Chloride 98 - 107 mmol/L 98   Carbon Dioxide (CO2) 22 - 29 mmol/L 35 (H)   Urea Nitrogen 8.0 - 23.0 mg/dL 29.0 (H)   Creatinine 0.51 - 0.95 mg/dL 0.73   GFR Estimate >60 mL/min/1.73m2 84   Calcium 8.8 - 10.2 mg/dL 9.3   Anion Gap 7 - 15 mmol/L 8   Albumin 3.5 - 5.2 g/dL 3.9   Protein Total 6.4 - 8.3 g/dL 7.3   Alkaline Phosphatase 35 - 104 U/L 93   ALT 10 - 35 U/L 19   AST 10 - 35 U/L 40 (H)   Bilirubin Total <=1.2 mg/dL 0.2   Glucose 70 - 99 mg/dL 100 (H)   N-Terminal Pro BNP Inpatient 0 - 1,800 pg/mL 263   Procalcitonin <0.05 ng/mL 0.05 (H)   Troponin T, High Sensitivity <=14 ng/L 12   WBC 4.0 - 11.0 10e3/uL 7.0   Hemoglobin 11.7 - 15.7 g/dL 8.3 (L)   Hematocrit 35.0 - 47.0 % 28.8 (L)   Platelet Count 150 - 450 10e3/uL 204   RBC Count 3.80 - 5.20 10e6/uL 2.72 (L)   MCV 78 - 100 fL 106 (H)   MCH 26.5 - 33.0 pg 30.5   MCHC 31.5 - 36.5 g/dL 28.8 (L)   RDW 10.0 - 15.0 % 14.4   % Neutrophils % 74   % Lymphocytes % 13   % Monocytes % 10   % Eosinophils % 3   % Basophils % 0   Absolute Basophils 0.0 - 0.2 10e3/uL 0.0   Absolute Eosinophils 0.0 - 0.7 10e3/uL 0.2   Absolute Immature Granulocytes <=0.4 10e3/uL 0.0   Absolute Lymphocytes 0.8 - 5.3 10e3/uL 0.9   Absolute Monocytes 0.0 - 1.3 10e3/uL 0.7   % Immature Granulocytes % 0   Absolute Neutrophils 1.6 - 8.3 10e3/uL 5.2   Absolute NRBCs 10e3/uL 0.0   NRBCs per 100 WBC <1 /100 0   % Retic 0.5 - 2.0 % 1.7    Absolute Retic 0.025 - 0.095 10e6/uL 0.045   (H): Data is abnormally high  (L): Data is abnormally low  EXAMINATION: DOPPLER VENOUS ULTRASOUND OF BILATERAL LOWER EXTREMITIES,  11/10/2022 3:37 PM      COMPARISON: Bilateral lower extremity ultrasound 9/22/2022.     HISTORY: 68-year-old female with one-month history of bilateral leg  swelling, concern for DVT.     TECHNIQUE:  Gray-scale evaluation with compression, spectral flow and  color Doppler assessment of the deep venous system of both legs from  groin to knee, and then at the ankles.     FINDINGS:     In both lower extremities, the common femoral, femoral, popliteal and  posterior tibial veins demonstrate normal compressibility and blood  flow.                                                                      IMPRESSION:  No evidence of deep venous thrombosis in either lower  extremity.     I have personally reviewed the examination and initial interpretation  and I agree with the findings.     JOVANY BARBER MD     Above Reviewed today        Phone call duration: 32 minutes  60 minutes spent on the date of the encounter doing chart review, history, exam, diagnostics review, documentation, counseling and coordination of cares as noted.    Answers for HPI/ROS submitted by the patient on 11/17/2022  RENATO 7 TOTAL SCORE: 21

## 2022-11-17 NOTE — PATIENT INSTRUCTIONS
Ca today we discussed please contact Paulette Prajapati [phone] health care coordinator to assist with exploring assisted living as you require additional services.  Also ask for home delivered meal service.  I will have Dalia Gresham social service contact you to help facilitate additional services as noted above.    I have ordered home care for lymphedema therapy but also to assist with home safety evaluation to facilitate options for change to assisted living.    Please contact  pharmacy [phone] [place] [facility] for delivery options for your medications.  Please keep follow-up appointment with me on November 28 arrived at 9:45 for 10 AM appointment in person.

## 2022-11-17 NOTE — Clinical Note
Please review needs additional  home services now. Please coordinate. She would benefit from assisted living. Best wishes, Favian Sahu MD

## 2022-11-18 ENCOUNTER — PATIENT OUTREACH (OUTPATIENT)
Dept: CARE COORDINATION | Facility: CLINIC | Age: 77
End: 2022-11-18

## 2022-11-18 NOTE — PROGRESS NOTES
Social Work Follow-Up  [facility]    Data/Intervention:  Patient Name:  Ca Yan  /Age:  1945 (76 year old)    Reason for Follow-Up:  Request from Dr. Sahu to follow up with Patient's [employer] Care Coordinator, ALEXANDER Read regarding home delivered meals and assisted living    Collaborated With:    --ALEXANDER Read, Patient's current [employer] Care Coordinator, [phone]    Intervention/Education/Resources Provided:   received an inOptiWi-fiet message from Paulette Prajapati stating that she was working with Patient on finding an assisted living facility, though Patient has only been agreeable to this recently. She is also arranging home delivered meals through [employer] 7 days/week.     Assessment/Plan:  Patient to continue to work with Paulette Prajapati to obtain necessary services.      Dalia Gresham [facility]  Clinical , Outpatient Specialty Clinics  Direct Phone: [phone]

## 2022-11-22 ENCOUNTER — E-CONSULT (OUTPATIENT)
Dept: ONCOLOGY | Facility: CLINIC | Age: 77
End: 2022-11-22
Payer: COMMERCIAL

## 2022-11-22 PROCEDURE — 99451 NTRPROF PH1/NTRNET/EHR 5/>: CPT | Performed by: STUDENT IN AN ORGANIZED HEALTH CARE EDUCATION/TRAINING PROGRAM

## 2022-11-22 NOTE — PROGRESS NOTES
11/22/2022     E-Consult has been accepted.    Interprofessional consultation requested by:  Favian Sahu MD      Clinical Question/Purpose: Macrocytic anemia    Patient assessment and information reviewed:     76-year-old woman with a history of restrictive lung disease 2/2 kyphoscoliosis, breast cancer s/p lumpectomy, prior alcohol abuse, chronic lymphedema, referred to hematology for evaluation of macrocytic anemia.    She became anemic in July 2022, and currently has a hemoglobin of 8.3 g/dL with an MCV of 106.  Her white count and platelet count are normal.  Her reticulocyte count is inappropriately normal.  She has normal renal function.  She has a slightly elevated AST, but other liver function tests including bilirubin are within normal limits.  She has a ferritin of 74 ng/mL with an iron saturation index of 13%.  She has a B12 of 305 pg/mL and a folic acid of 9 ng/mL.  TSH was within normal limits last year.    Her medications include atorvastatin, losartan/hydrochlorothiazide, buspirone, and recently initiated vitamin B complex and folic acid.  She also takes Zoloft and Seroquel.    Recommendations:     76-year-old woman with multiple chronic medical issues and prior alcohol abuse now with macrocytic anemia.  There are several possibilities.  Nutritional deficiency is possible, with B12 and iron deficiency both potentially coexisting.  Her B12 level is technically normal, but this can be unreliable.  Would recommend sending a homocystine and methylmalonic acid level to try to clarify whether B12 deficiency is truly present or not.  Her ferritin is technically normal, but her iron saturation index is low, so there may be iron deficiency present.  It would be worth initiating a trial of repletion with oral iron once a day every other day.  Another possibility is an underlying malignancy, with the primary candidates being a plasma cell dyscrasia or myelodysplastic syndrome.  I would suggest  obtaining an SPEP with immunofixation, kappa/lambda free light chains, and quantitative immunoglobulins.  I would also suggest a peripheral smear for pathology review, and an erythropoietin level.  Pending the results of these tests (which I will follow-up as well), we can determine appropriate next steps.    - Please send methylmalonic acid, homocysteine  - Please start oral iron 1 tablet/day every other day (e.g. Monday Wednesday Friday)  - Please send SPEP with immunofixation, free light chains, quantitative immunoglobulins  - Please order peripheral smear for pathology review  - Please order erythropoietin level    Please feel free to reach out to me with any questions    The recommendations provided in this E-Consult are based on a review of clinical data pertinent to the clinical question presented, without a review of the patient's complete medical record or, the benefit of a comprehensive in-person or virtual patient evaluation. This consultation should not replace the clinical judgement and evaluation of the provider ordering this E-Consult. Any new clinical issues, or changes in patient status since the filing of this E-Consult will need to be taken into account when assessing these recommendations. Please contact me if you have further questions.    My total time spent reviewing clinical information and formulating assessment was 20 minutes.        Jacob Cogan, MD

## 2022-11-23 ENCOUNTER — NURSE TRIAGE (OUTPATIENT)
Dept: NURSING | Facility: CLINIC | Age: 77
End: 2022-11-23

## 2022-11-23 NOTE — TELEPHONE ENCOUNTER
RN called patient and reviewed her symptoms.  Patient will see Dr. Davison on 11/25/22 for leg edema.  RN let her know clinic staff are actively working on getting patient a home care provider.  So far, [facility], [facility], and [facility] have all been contacted, but no openings are available.    Kiki Eddy RN on 11/23/2022 at 1:16 PM

## 2022-11-23 NOTE — TELEPHONE ENCOUNTER
Nurse Triage SBAR    Is this a 2nd Level Triage? YES, LICENSED PRACTITIONER REVIEW IS REQUIRED    Situation:  -Legs are very painful 10/10 pain due to swelling  -Several months of this condition  -lower ext  swelling, no color change, not change since last visit 11/17/22  -Chest pain or increased shortness of breath denied.    Background:  Recently seen to address this issue-11/17/22 staff is working on home care, [facility] , [facility] may be able to take new patient(  and  [facility] unable to add patients)  PMH:history of restrictive lung disease 2/2 kyphoscoliosis, breast cancer s/p lumpectomy, prior alcohol abuse, chronic lymphedema, referred to hematology for evaluation of macrocytic anemia  Econsult done re: macrocytic anemia  SSW- 11/18/22> Patient's [employer] Care Coordinator, TERENCE ReadW is working with patient for  home delivered meals and assisted living  Assessment:   Patient reporting worsened bilateral legpain, no change in color or swelling of legs    Protocol Recommended Disposition:   No disposition on file.    Recommendation:  Would like call back today regarding pain in legs, evaluated 11/17/22 Dr Sahu now worsened pain without other sx ( Chest pain, leg swelling has not worsened, shortness of breath is baseline). Accompanying anxiety is present.  Note all Dr. Sahu's recommendations s from 11/17 are in process or completed( home carem meals, SSW, macrocytic anemia consult)  Now scheduled for in person appt 11/25/22    Routed to provider    Does the patient meet one of the following criteria for ADS visit consideration? No    Reason for Disposition    MODERATE swelling of both ankles (e.g., swelling extends up to the knees) AND new-onset or worsening    Additional Information    Negative: Sounds like a life-threatening emergency to the triager    Negative: Chest pain    Negative: Small area of swelling and followed an insect bite to the area    Negative: Followed a  knee injury    Negative: Ankle or foot injury    Negative: Pregnant with leg swelling or edema    Negative: Difficulty breathing at rest    Negative: Entire foot is cool or blue in comparison to other side    Negative: Swelling of face, arm or hands  (Exception: Slight puffiness of fingers during hot weather.)    Negative: Pregnant 20 or more weeks and sudden weight gain (i.e., > 2 lbs, 1 kg in one week)    Negative: SEVERE swelling (e.g., swelling extends above knee, entire leg is swollen, weeping fluid)    Negative: Thigh or calf pain and only 1 side and present > 1 hour    Negative: Thigh, calf, or ankle swelling in only one leg    Negative: Thigh, calf, or ankle swelling in both legs, but one side is definitely more swollen (Exception: longstanding difference between legs)    Negative: Cast on leg or ankle and has increasing pain    Negative: Can't walk or can barely stand (new-onset)    Negative: Fever and red area (or area very tender to touch)    Negative: Patient sounds very sick or weak to the triager    Protocols used: LEG SWELLING AND EDEMA-A-OH

## 2022-11-24 ENCOUNTER — APPOINTMENT (OUTPATIENT)
Dept: GENERAL RADIOLOGY | Facility: CLINIC | Age: 77
DRG: 205 | End: 2022-11-24
Attending: EMERGENCY MEDICINE
Payer: COMMERCIAL

## 2022-11-24 ENCOUNTER — HOSPITAL ENCOUNTER (INPATIENT)
Facility: CLINIC | Age: 77
LOS: 15 days | Discharge: NURSING, REHAB OR LONG-TERM CARE | DRG: 205 | End: 2022-12-09
Attending: EMERGENCY MEDICINE | Admitting: STUDENT IN AN ORGANIZED HEALTH CARE EDUCATION/TRAINING PROGRAM
Payer: COMMERCIAL

## 2022-11-24 DIAGNOSIS — F51.02 ADJUSTMENT INSOMNIA: ICD-10-CM

## 2022-11-24 DIAGNOSIS — E21.3 PARATHYROID HORMONE EXCESS (H): ICD-10-CM

## 2022-11-24 DIAGNOSIS — I89.0 LYMPHEDEMA DUE TO VENOUS INSUFFICIENCY: ICD-10-CM

## 2022-11-24 DIAGNOSIS — L03.116 BILATERAL LOWER LEG CELLULITIS: ICD-10-CM

## 2022-11-24 DIAGNOSIS — L85.3 DRY SKIN: ICD-10-CM

## 2022-11-24 DIAGNOSIS — I10 ESSENTIAL HYPERTENSION: ICD-10-CM

## 2022-11-24 DIAGNOSIS — F41.1 GENERALIZED ANXIETY DISORDER: Primary | ICD-10-CM

## 2022-11-24 DIAGNOSIS — L03.115 BILATERAL LOWER LEG CELLULITIS: ICD-10-CM

## 2022-11-24 DIAGNOSIS — R06.02 SHORTNESS OF BREATH: ICD-10-CM

## 2022-11-24 DIAGNOSIS — B36.9 FUNGAL SKIN DISEASE: ICD-10-CM

## 2022-11-24 DIAGNOSIS — J44.9 CHRONIC OBSTRUCTIVE PULMONARY DISEASE, UNSPECIFIED COPD TYPE (H): ICD-10-CM

## 2022-11-24 DIAGNOSIS — H40.003 GLAUCOMA SUSPECT, BILATERAL: ICD-10-CM

## 2022-11-24 DIAGNOSIS — M62.830 BACK MUSCLE SPASM: ICD-10-CM

## 2022-11-24 DIAGNOSIS — F43.22 ADJUSTMENT DISORDER WITH ANXIOUS MOOD: ICD-10-CM

## 2022-11-24 DIAGNOSIS — I87.2 LYMPHEDEMA DUE TO VENOUS INSUFFICIENCY: ICD-10-CM

## 2022-11-24 DIAGNOSIS — L03.90 CELLULITIS: ICD-10-CM

## 2022-11-24 DIAGNOSIS — E78.5 HYPERLIPIDEMIA, UNSPECIFIED HYPERLIPIDEMIA TYPE: ICD-10-CM

## 2022-11-24 DIAGNOSIS — Z20.822 CONTACT WITH AND (SUSPECTED) EXPOSURE TO COVID-19: ICD-10-CM

## 2022-11-24 DIAGNOSIS — D53.9 MACROCYTIC ANEMIA: ICD-10-CM

## 2022-11-24 LAB
ALBUMIN SERPL BCG-MCNC: 3.7 G/DL (ref 3.5–5.2)
ALP SERPL-CCNC: 86 U/L (ref 35–104)
ALT SERPL W P-5'-P-CCNC: 17 U/L (ref 10–35)
ANION GAP SERPL CALCULATED.3IONS-SCNC: 8 MMOL/L (ref 7–15)
ANION GAP SERPL CALCULATED.3IONS-SCNC: 9 MMOL/L (ref 7–15)
AST SERPL W P-5'-P-CCNC: 37 U/L (ref 10–35)
ATRIAL RATE - MUSE: 81 BPM
BASE EXCESS BLDV CALC-SCNC: 13.4 MMOL/L (ref -7.7–1.9)
BASOPHILS # BLD AUTO: 0 10E3/UL (ref 0–0.2)
BASOPHILS NFR BLD AUTO: 1 %
BILIRUB SERPL-MCNC: 0.3 MG/DL
BUN SERPL-MCNC: 22.1 MG/DL (ref 8–23)
BUN SERPL-MCNC: 22.2 MG/DL (ref 8–23)
CALCIUM SERPL-MCNC: 8.7 MG/DL (ref 8.8–10.2)
CALCIUM SERPL-MCNC: 8.8 MG/DL (ref 8.8–10.2)
CHLORIDE SERPL-SCNC: 100 MMOL/L (ref 98–107)
CHLORIDE SERPL-SCNC: 100 MMOL/L (ref 98–107)
CREAT SERPL-MCNC: 0.68 MG/DL (ref 0.51–0.95)
CREAT SERPL-MCNC: 0.69 MG/DL (ref 0.51–0.95)
CREAT SERPL-MCNC: 0.7 MG/DL (ref 0.51–0.95)
CRP SERPL-MCNC: 6.9 MG/L
DEPRECATED HCO3 PLAS-SCNC: 35 MMOL/L (ref 22–29)
DEPRECATED HCO3 PLAS-SCNC: 36 MMOL/L (ref 22–29)
DIASTOLIC BLOOD PRESSURE - MUSE: NORMAL MMHG
EOSINOPHIL # BLD AUTO: 0.1 10E3/UL (ref 0–0.7)
EOSINOPHIL NFR BLD AUTO: 2 %
ERYTHROCYTE [DISTWIDTH] IN BLOOD BY AUTOMATED COUNT: 14.5 % (ref 10–15)
ERYTHROCYTE [SEDIMENTATION RATE] IN BLOOD BY WESTERGREN METHOD: 53 MM/HR (ref 0–30)
FLUAV RNA SPEC QL NAA+PROBE: NEGATIVE
FLUBV RNA RESP QL NAA+PROBE: NEGATIVE
GFR SERPL CREATININE-BSD FRML MDRD: 88 ML/MIN/1.73M2
GFR SERPL CREATININE-BSD FRML MDRD: 88 ML/MIN/1.73M2
GFR SERPL CREATININE-BSD FRML MDRD: 89 ML/MIN/1.73M2
GLUCOSE SERPL-MCNC: 101 MG/DL (ref 70–99)
GLUCOSE SERPL-MCNC: 102 MG/DL (ref 70–99)
HCO3 BLDV-SCNC: 43 MMOL/L (ref 21–28)
HCT VFR BLD AUTO: 28.5 % (ref 35–47)
HGB BLD-MCNC: 8.2 G/DL (ref 11.7–15.7)
IMM GRANULOCYTES # BLD: 0 10E3/UL
IMM GRANULOCYTES NFR BLD: 0 %
INTERPRETATION ECG - MUSE: NORMAL
LIPASE SERPL-CCNC: 16 U/L (ref 13–60)
LYMPHOCYTES # BLD AUTO: 0.7 10E3/UL (ref 0.8–5.3)
LYMPHOCYTES NFR BLD AUTO: 12 %
MCH RBC QN AUTO: 30 PG (ref 26.5–33)
MCHC RBC AUTO-ENTMCNC: 28.8 G/DL (ref 31.5–36.5)
MCV RBC AUTO: 104 FL (ref 78–100)
MONOCYTES # BLD AUTO: 0.5 10E3/UL (ref 0–1.3)
MONOCYTES NFR BLD AUTO: 9 %
NEUTROPHILS # BLD AUTO: 4.3 10E3/UL (ref 1.6–8.3)
NEUTROPHILS NFR BLD AUTO: 76 %
NRBC # BLD AUTO: 0 10E3/UL
NRBC BLD AUTO-RTO: 0 /100
NT-PROBNP SERPL-MCNC: 398 PG/ML (ref 0–1800)
O2/TOTAL GAS SETTING VFR VENT: 90 %
P AXIS - MUSE: 82 DEGREES
PCO2 BLDV: 82 MM HG (ref 40–50)
PH BLDV: 7.33 [PH] (ref 7.32–7.43)
PLATELET # BLD AUTO: 233 10E3/UL (ref 150–450)
PO2 BLDV: 32 MM HG (ref 25–47)
POTASSIUM SERPL-SCNC: 3.8 MMOL/L (ref 3.4–5.3)
POTASSIUM SERPL-SCNC: 3.8 MMOL/L (ref 3.4–5.3)
PR INTERVAL - MUSE: 144 MS
PROCALCITONIN SERPL IA-MCNC: 0.04 NG/ML
PROT SERPL-MCNC: 6.8 G/DL (ref 6.4–8.3)
QRS DURATION - MUSE: 88 MS
QT - MUSE: 384 MS
QTC - MUSE: 446 MS
R AXIS - MUSE: 72 DEGREES
RBC # BLD AUTO: 2.73 10E6/UL (ref 3.8–5.2)
RSV RNA SPEC NAA+PROBE: NEGATIVE
SARS-COV-2 RNA RESP QL NAA+PROBE: NEGATIVE
SODIUM SERPL-SCNC: 144 MMOL/L (ref 136–145)
SODIUM SERPL-SCNC: 144 MMOL/L (ref 136–145)
SYSTOLIC BLOOD PRESSURE - MUSE: NORMAL MMHG
T AXIS - MUSE: 78 DEGREES
TROPONIN T SERPL HS-MCNC: 17 NG/L
TROPONIN T SERPL HS-MCNC: 18 NG/L
TSH SERPL DL<=0.005 MIU/L-ACNC: 2.2 UIU/ML (ref 0.3–4.2)
VENTRICULAR RATE- MUSE: 81 BPM
WBC # BLD AUTO: 5.6 10E3/UL (ref 4–11)

## 2022-11-24 PROCEDURE — 87040 BLOOD CULTURE FOR BACTERIA: CPT | Performed by: EMERGENCY MEDICINE

## 2022-11-24 PROCEDURE — 99223 1ST HOSP IP/OBS HIGH 75: CPT | Mod: AI | Performed by: STUDENT IN AN ORGANIZED HEALTH CARE EDUCATION/TRAINING PROGRAM

## 2022-11-24 PROCEDURE — 250N000011 HC RX IP 250 OP 636

## 2022-11-24 PROCEDURE — 80053 COMPREHEN METABOLIC PANEL: CPT | Performed by: EMERGENCY MEDICINE

## 2022-11-24 PROCEDURE — 87637 SARSCOV2&INF A&B&RSV AMP PRB: CPT | Performed by: NURSE PRACTITIONER

## 2022-11-24 PROCEDURE — 99285 EMERGENCY DEPT VISIT HI MDM: CPT | Mod: 25 | Performed by: EMERGENCY MEDICINE

## 2022-11-24 PROCEDURE — 85652 RBC SED RATE AUTOMATED: CPT | Performed by: NURSE PRACTITIONER

## 2022-11-24 PROCEDURE — 36415 COLL VENOUS BLD VENIPUNCTURE: CPT | Performed by: NURSE PRACTITIONER

## 2022-11-24 PROCEDURE — 120N000002 HC R&B MED SURG/OB UMMC

## 2022-11-24 PROCEDURE — 71046 X-RAY EXAM CHEST 2 VIEWS: CPT

## 2022-11-24 PROCEDURE — 250N000009 HC RX 250: Performed by: NURSE PRACTITIONER

## 2022-11-24 PROCEDURE — 71046 X-RAY EXAM CHEST 2 VIEWS: CPT | Mod: 26 | Performed by: RADIOLOGY

## 2022-11-24 PROCEDURE — 84443 ASSAY THYROID STIM HORMONE: CPT | Performed by: EMERGENCY MEDICINE

## 2022-11-24 PROCEDURE — 84145 PROCALCITONIN (PCT): CPT | Performed by: NURSE PRACTITIONER

## 2022-11-24 PROCEDURE — 85004 AUTOMATED DIFF WBC COUNT: CPT | Performed by: EMERGENCY MEDICINE

## 2022-11-24 PROCEDURE — 99285 EMERGENCY DEPT VISIT HI MDM: CPT | Mod: FS | Performed by: EMERGENCY MEDICINE

## 2022-11-24 PROCEDURE — 84484 ASSAY OF TROPONIN QUANT: CPT | Performed by: NURSE PRACTITIONER

## 2022-11-24 PROCEDURE — 250N000013 HC RX MED GY IP 250 OP 250 PS 637: Performed by: STUDENT IN AN ORGANIZED HEALTH CARE EDUCATION/TRAINING PROGRAM

## 2022-11-24 PROCEDURE — 250N000013 HC RX MED GY IP 250 OP 250 PS 637

## 2022-11-24 PROCEDURE — 36415 COLL VENOUS BLD VENIPUNCTURE: CPT | Performed by: EMERGENCY MEDICINE

## 2022-11-24 PROCEDURE — 83880 ASSAY OF NATRIURETIC PEPTIDE: CPT | Performed by: NURSE PRACTITIONER

## 2022-11-24 PROCEDURE — 93010 ELECTROCARDIOGRAM REPORT: CPT | Performed by: NURSE PRACTITIONER

## 2022-11-24 PROCEDURE — 82803 BLOOD GASES ANY COMBINATION: CPT | Performed by: NURSE PRACTITIONER

## 2022-11-24 PROCEDURE — 84484 ASSAY OF TROPONIN QUANT: CPT | Performed by: EMERGENCY MEDICINE

## 2022-11-24 PROCEDURE — 86140 C-REACTIVE PROTEIN: CPT | Performed by: NURSE PRACTITIONER

## 2022-11-24 PROCEDURE — 82565 ASSAY OF CREATININE: CPT

## 2022-11-24 PROCEDURE — 83690 ASSAY OF LIPASE: CPT | Performed by: NURSE PRACTITIONER

## 2022-11-24 PROCEDURE — 250N000011 HC RX IP 250 OP 636: Performed by: NURSE PRACTITIONER

## 2022-11-24 RX ORDER — IPRATROPIUM BROMIDE AND ALBUTEROL SULFATE 2.5; .5 MG/3ML; MG/3ML
3 SOLUTION RESPIRATORY (INHALATION) ONCE
Status: COMPLETED | OUTPATIENT
Start: 2022-11-24 | End: 2022-11-24

## 2022-11-24 RX ORDER — IPRATROPIUM BROMIDE AND ALBUTEROL SULFATE 2.5; .5 MG/3ML; MG/3ML
3 SOLUTION RESPIRATORY (INHALATION) EVERY 4 HOURS PRN
Status: DISCONTINUED | OUTPATIENT
Start: 2022-11-24 | End: 2022-11-26

## 2022-11-24 RX ORDER — FUROSEMIDE 10 MG/ML
40 INJECTION INTRAMUSCULAR; INTRAVENOUS ONCE
Status: COMPLETED | OUTPATIENT
Start: 2022-11-24 | End: 2022-11-24

## 2022-11-24 RX ORDER — QUETIAPINE FUMARATE 50 MG/1
150 TABLET, FILM COATED ORAL AT BEDTIME
Status: DISCONTINUED | OUTPATIENT
Start: 2022-11-24 | End: 2022-12-09 | Stop reason: HOSPADM

## 2022-11-24 RX ORDER — QUETIAPINE FUMARATE 300 MG/1
300 TABLET, FILM COATED ORAL AT BEDTIME
Status: DISCONTINUED | OUTPATIENT
Start: 2022-11-24 | End: 2022-11-24

## 2022-11-24 RX ORDER — LOSARTAN POTASSIUM AND HYDROCHLOROTHIAZIDE 25; 100 MG/1; MG/1
1 TABLET ORAL DAILY
Status: DISCONTINUED | OUTPATIENT
Start: 2022-11-24 | End: 2022-12-09 | Stop reason: HOSPADM

## 2022-11-24 RX ORDER — ENOXAPARIN SODIUM 100 MG/ML
30 INJECTION SUBCUTANEOUS EVERY 24 HOURS
Status: DISCONTINUED | OUTPATIENT
Start: 2022-11-24 | End: 2022-11-25

## 2022-11-24 RX ORDER — AZELASTINE 1 MG/ML
1 SPRAY, METERED NASAL 2 TIMES DAILY
Status: DISCONTINUED | OUTPATIENT
Start: 2022-11-24 | End: 2022-12-09 | Stop reason: HOSPADM

## 2022-11-24 RX ORDER — LATANOPROST 50 UG/ML
1 SOLUTION/ DROPS OPHTHALMIC AT BEDTIME
Status: DISCONTINUED | OUTPATIENT
Start: 2022-11-24 | End: 2022-12-09 | Stop reason: HOSPADM

## 2022-11-24 RX ORDER — VITAMIN B COMPLEX
25 TABLET ORAL DAILY
Status: DISCONTINUED | OUTPATIENT
Start: 2022-11-25 | End: 2022-12-09 | Stop reason: HOSPADM

## 2022-11-24 RX ORDER — AMOXICILLIN 250 MG
2 CAPSULE ORAL 2 TIMES DAILY
Status: DISCONTINUED | OUTPATIENT
Start: 2022-11-24 | End: 2022-12-09 | Stop reason: HOSPADM

## 2022-11-24 RX ORDER — AMOXICILLIN 250 MG
1 CAPSULE ORAL 2 TIMES DAILY
Status: DISCONTINUED | OUTPATIENT
Start: 2022-11-24 | End: 2022-12-09 | Stop reason: HOSPADM

## 2022-11-24 RX ORDER — ATORVASTATIN CALCIUM 40 MG/1
40 TABLET, FILM COATED ORAL AT BEDTIME
Status: DISCONTINUED | OUTPATIENT
Start: 2022-11-24 | End: 2022-12-09 | Stop reason: HOSPADM

## 2022-11-24 RX ORDER — LANOLIN ALCOHOL/MO/W.PET/CERES
400 CREAM (GRAM) TOPICAL DAILY
Status: DISCONTINUED | OUTPATIENT
Start: 2022-11-25 | End: 2022-12-09 | Stop reason: HOSPADM

## 2022-11-24 RX ORDER — ALBUTEROL SULFATE 90 UG/1
2 AEROSOL, METERED RESPIRATORY (INHALATION) EVERY 6 HOURS PRN
Status: DISCONTINUED | OUTPATIENT
Start: 2022-11-24 | End: 2022-12-05

## 2022-11-24 RX ORDER — BUSPIRONE HYDROCHLORIDE 5 MG/1
10 TABLET ORAL 2 TIMES DAILY
Status: DISCONTINUED | OUTPATIENT
Start: 2022-11-24 | End: 2022-11-27

## 2022-11-24 RX ORDER — LIDOCAINE 40 MG/G
CREAM TOPICAL
Status: DISCONTINUED | OUTPATIENT
Start: 2022-11-24 | End: 2022-12-09 | Stop reason: HOSPADM

## 2022-11-24 RX ORDER — CLOTRIMAZOLE 1 %
CREAM (GRAM) TOPICAL 2 TIMES DAILY PRN
Status: DISCONTINUED | OUTPATIENT
Start: 2022-11-24 | End: 2022-11-25

## 2022-11-24 RX ADMIN — ENOXAPARIN SODIUM 30 MG: 30 INJECTION SUBCUTANEOUS at 18:32

## 2022-11-24 RX ADMIN — Medication 150 MG: at 21:34

## 2022-11-24 RX ADMIN — FUROSEMIDE 40 MG: 10 INJECTION, SOLUTION INTRAVENOUS at 15:33

## 2022-11-24 RX ADMIN — ATORVASTATIN CALCIUM 40 MG: 40 TABLET, FILM COATED ORAL at 21:34

## 2022-11-24 RX ADMIN — IPRATROPIUM BROMIDE AND ALBUTEROL SULFATE 3 ML: 2.5; .5 SOLUTION RESPIRATORY (INHALATION) at 14:43

## 2022-11-24 RX ADMIN — LOSARTAN POTASSIUM AND HYDROCHLOROTHIAZIDE 1 TABLET: 25; 100 TABLET ORAL at 18:32

## 2022-11-24 RX ADMIN — Medication: at 21:42

## 2022-11-24 RX ADMIN — LATANOPROST 1 DROP: 50 SOLUTION OPHTHALMIC at 21:42

## 2022-11-24 RX ADMIN — BUSPIRONE HYDROCHLORIDE 10 MG: 10 TABLET ORAL at 21:33

## 2022-11-24 ASSESSMENT — ACTIVITIES OF DAILY LIVING (ADL)
ADLS_ACUITY_SCORE: 35

## 2022-11-24 NOTE — ED TRIAGE NOTES
Pt BIBA from home with c/o SOB and lower extremity swelling and redness. Pt's SpO2 100% on 1L NC in triage. Lower extremities have a red flaky rash and edema is +4.     Triage Assessment     Row Name 11/24/22 1232       Triage Assessment (Adult)    Airway WDL WDL       Respiratory WDL    Respiratory WDL WDL       Skin Circulation/Temperature WDL    Skin Circulation/Temperature WDL X       Cardiac WDL    Cardiac WDL WDL       Peripheral/Neurovascular WDL    Peripheral Neurovascular WDL X;capillary refill;neurovascular assessment lower    Capillary Refill, LUE less than/equal to 3 secs    Capillary Refill, RUE less than/equal to 3 secs    Capillary Refill, LLE greater than 3 secs    Capillary Refill, RLE greater than 3 secs       Cognitive/Neuro/Behavioral WDL    Cognitive/Neuro/Behavioral WDL WDL

## 2022-11-24 NOTE — LETTER
December 1, 2022      Ca Yan  [address]   [facility] [address]      To Whom It May Concern:    Ca Valerie Yan is currently admitted at the [facility] and has been admitted since 11/24/2022.      Sincerely,

## 2022-11-24 NOTE — ED PROVIDER NOTES
[facility] ([facility])  November 24, 2022     History     Chief Complaint   Patient presents with     Leg Swelling     Both lower extremities are +4 pitting edema with redness     HPI  Ca Yan is a 76 year old female with a past medical history including cognitive impairment, alcohol use disorder, COPD (on 5L O2 at home), alcohol use disorder, HTN, malignant neoplasm of breast who presents to the Emergency Department for evaluation of leg swelling and shortness of breath.  She was seen here in the ER on 11/10 for complaint of 1 month of leg swelling, but left before abdominal CT could be obtained. She had also been in the ER on 10/21 for COPD exacerbation.    Today, the patient complains of worsening of her shortness of breath as well as a new red rash that appeared yesterday on bilateral lower extremities.  Patient states the swelling in her legs has not changed, however the redness is new.        Past Medical History  Past Medical History:   Diagnosis Date     Anxiety      Arthritis      Breast cancer (H)      COPD (chronic obstructive pulmonary disease) (H)     6/19/12:  FEV 0.99 l     Depressive disorder      Hypertension      Low back pain with left-sided sciatica      Low bone density 4/13/2017    DEXA April 12, 2017: T score -2.0. Normal Z score. FRAX risk: major osteoporotic fracture 11.9%, hip fracture 2.6%, therefore not high-risk     Lymphedema, chronic lower extremities      Past Surgical History:   Procedure Laterality Date     BACK SURGERY      spinal fusion     COLONOSCOPY       LARYNGOSCOPY WITH MICROSCOPE N/A 4/30/2021    Procedure: FLEXIBLE LARYNGOSCOPY;  Surgeon: Domonique Roche MD;  Location: [facility] OR     LUMPECTOMY BREAST       ORTHOPEDIC SURGERY      Knee surgery left side     atorvastatin (LIPITOR) 40 MG tablet  azelastine (ASTELIN) 0.1 % nasal spray  busPIRone (BUSPAR) 10 MG tablet  clotrimazole (LOTRIMIN) 1 % external cream  Emollient (CERAVE)  CREA  folic acid (FOLVITE) 400 MCG tablet  latanoprost (XALATAN) 0.005 % ophthalmic solution  losartan-hydrochlorothiazide (HYZAAR) 100-25 MG tablet  QUEtiapine (SEROQUEL) 300 MG tablet  sertraline (ZOLOFT) 100 MG tablet  vitamin B complex with vitamin C (STRESS TAB) tablet  Vitamin D3 (CHOLECALCIFEROL) 25 mcg (1000 units) tablet      Allergies   Allergen Reactions     Azithromycin Itching     Codeine Nausea and Vomiting     Hydrocodone Nausea and Vomiting     Metronidazole Nausea     Oxycodone Itching and Rash     Percocet [Oxycodone-Acetaminophen] Nausea and Vomiting     Pollen Extract      sneezing and runny nose.      Seasonal Allergies      sneezing and runny nose.      Vicodin [Hydrocodone-Acetaminophen] Nausea and Vomiting     Zolpidem      Amnestic behavior     Family History  Family History   Problem Relation Age of Onset     Breast Cancer Mother      Diabetes Mother      Anxiety Disorder Mother      Asthma Mother      Other Cancer Mother      Hyperlipidemia Mother      Alcohol/Drug Father      Mental Illness Father      Substance Abuse Father      Obesity Father      Cerebrovascular Disease Maternal Grandmother 67     Other - See Comments Maternal Aunt         lived to      Social History   Social History     Tobacco Use     Smoking status: Former     Packs/day: 0.50     Years: 5.00     Pack years: 2.50     Types: Cigarettes     Start date: 1956     Quit date: 1980     Years since quittin.1     Smokeless tobacco: Former     Quit date: 1980   Substance Use Topics     Alcohol use: Not Currently     Alcohol/week: 0.0 standard drinks     Comment: Sober for 2 years, previous alcoholic     Drug use: No      Past medical history, past surgical history, medications, allergies, family history, and social history were reviewed with the patient. No additional pertinent items.       Review of Systems  A complete review of systems was performed with pertinent positives and negatives noted in the  "HPI, and all other systems negative.    Physical Exam   BP: (!) 171/81  Pulse: 83  Temp: 98.4  F (36.9  C)  Resp: 18  Height: 160 cm (5' 3\")  Weight: 100 kg (220 lb 7.4 oz)  SpO2: 100 %  Physical Exam  Constitutional:       General: She is not in acute distress.     Appearance: She is ill-appearing and toxic-appearing. She is not diaphoretic.   HENT:      Head: Normocephalic and atraumatic.   Cardiovascular:      Rate and Rhythm: Normal rate and regular rhythm.      Heart sounds: Normal heart sounds.      Comments: hypertensive  Pulmonary:      Effort: Tachypnea present.      Breath sounds: Decreased air movement present. Decreased breath sounds present.   Abdominal:      General: Abdomen is flat.      Palpations: Abdomen is soft.      Tenderness: There is no abdominal tenderness. There is no guarding.   Musculoskeletal:      Cervical back: Normal range of motion and neck supple.      Right lower leg: Edema present.      Left lower leg: Edema present.   Skin:     Capillary Refill: Capillary refill takes 2 to 3 seconds.      Comments: petechial rash BLE with overlying dry flaky skin. +4 pitting bilateral LE edema. + homans sign LLE.    Neurological:      General: No focal deficit present.      Mental Status: She is alert and oriented to person, place, and time.   Psychiatric:      Comments: anxious                 ED Course      Procedures            EKG Interpretation:      Interpreted by RUPAL Hannon CNP  Time reviewed: 1505  Symptoms at time of EKG: shortness of breath    Rhythm: normal sinus   Rate: normal  Axis: normal  Ectopy: none  Conduction: normal  ST Segments/ T Waves: No ST-T wave changes  Q Waves: none  Comparison to prior: Unchanged from 11/15/22    Clinical Impression: normal EKG         Results for orders placed or performed during the hospital encounter of 11/24/22   XR Chest 2 Views     Status: None    Narrative    Chest 2 view    History: short of breath    Compression:  " 11/15/2022    Findings: Cardiac silhouette is borderline enlarged. Interstitial  opacities and vascular engorgement. Small right pleural effusion.      Impression    Impression: Mild interstitial opacities bilaterally likely edema.     MICHAEL MCGRATH MD         SYSTEM ID:  [id number]   Basic metabolic panel     Status: Abnormal   Result Value Ref Range    Sodium 144 136 - 145 mmol/L    Potassium 3.8 3.4 - 5.3 mmol/L    Chloride 100 98 - 107 mmol/L    Carbon Dioxide (CO2) 36 (H) 22 - 29 mmol/L    Anion Gap 8 7 - 15 mmol/L    Urea Nitrogen 22.1 8.0 - 23.0 mg/dL    Creatinine 0.69 0.51 - 0.95 mg/dL    Calcium 8.7 (L) 8.8 - 10.2 mg/dL    Glucose 102 (H) 70 - 99 mg/dL    GFR Estimate 88 >60 mL/min/1.73m2   Troponin T, High Sensitivity     Status: Abnormal   Result Value Ref Range    Troponin T, High Sensitivity 18 (H) <=14 ng/L   CBC with platelets and differential     Status: Abnormal   Result Value Ref Range    WBC Count 5.6 4.0 - 11.0 10e3/uL    RBC Count 2.73 (L) 3.80 - 5.20 10e6/uL    Hemoglobin 8.2 (L) 11.7 - 15.7 g/dL    Hematocrit 28.5 (L) 35.0 - 47.0 %     (H) 78 - 100 fL    MCH 30.0 26.5 - 33.0 pg    MCHC 28.8 (L) 31.5 - 36.5 g/dL    RDW 14.5 10.0 - 15.0 %    Platelet Count 233 150 - 450 10e3/uL    % Neutrophils 76 %    % Lymphocytes 12 %    % Monocytes 9 %    % Eosinophils 2 %    % Basophils 1 %    % Immature Granulocytes 0 %    NRBCs per 100 WBC 0 <1 /100    Absolute Neutrophils 4.3 1.6 - 8.3 10e3/uL    Absolute Lymphocytes 0.7 (L) 0.8 - 5.3 10e3/uL    Absolute Monocytes 0.5 0.0 - 1.3 10e3/uL    Absolute Eosinophils 0.1 0.0 - 0.7 10e3/uL    Absolute Basophils 0.0 0.0 - 0.2 10e3/uL    Absolute Immature Granulocytes 0.0 <=0.4 10e3/uL    Absolute NRBCs 0.0 10e3/uL   Erythrocyte sedimentation rate auto     Status: Abnormal   Result Value Ref Range    Erythrocyte Sedimentation Rate 53 (H) 0 - 30 mm/hr   CRP inflammation     Status: Abnormal   Result Value Ref Range    CRP Inflammation 6.90 (H) <5.00 mg/L    Symptomatic; Unknown Influenza A/B & SARS-CoV2 (COVID-19) Virus PCR Multiplex Nose     Status: Normal    Specimen: Nose; Swab   Result Value Ref Range    Influenza A PCR Negative Negative    Influenza B PCR Negative Negative    RSV PCR Negative Negative    SARS CoV2 PCR Negative Negative    Narrative    Testing was performed using the Xpert Xpress CoV2/Flu/RSV Assay on the [employer] Gene[employer]pert Instrument. This test should be ordered for the detection of SARS-CoV-2 and influenza viruses in individuals who meet clinical and/or epidemiological criteria. Test performance is unknown in asymptomatic patients. This test is for in vitro diagnostic use under the FDA EUA for laboratories certified under CLIA to perform high or moderate complexity testing. This test has not been FDA cleared or approved. A negative result does not rule out the presence of PCR inhibitors in the specimen or target RNA in concentration below the limit of detection for the assay. If only one viral target is positive but coinfection with multiple targets is suspected, the sample should be re-tested with another FDA cleared, approved, or authorized test, if coinfection would change clinical management. This test was validated by the [facility] [employer]. These laboratories are certified under the Clinical Laboratory Improvement Amendments of 1988 (CLIA-88) as qualified to perform high complexity laboratory testing.   Blood gas venous     Status: Abnormal   Result Value Ref Range    pH Venous 7.33 7.32 - 7.43    pCO2 Venous 82 (HH) 40 - 50 mm Hg    pO2 Venous 32 25 - 47 mm Hg    Bicarbonate Venous 43 (H) 21 - 28 mmol/L    Base Excess/Deficit (+/-) 13.4 (H) -7.7 - 1.9 mmol/L    FIO2 90    Comprehensive metabolic panel     Status: Abnormal   Result Value Ref Range    Sodium 144 136 - 145 mmol/L    Potassium 3.8 3.4 - 5.3 mmol/L    Chloride 100 98 - 107 mmol/L    Carbon Dioxide (CO2) 35 (H) 22 - 29 mmol/L    Anion Gap 9 7 - 15 mmol/L    Urea  Nitrogen 22.2 8.0 - 23.0 mg/dL    Creatinine 0.68 0.51 - 0.95 mg/dL    Calcium 8.8 8.8 - 10.2 mg/dL    Glucose 101 (H) 70 - 99 mg/dL    Alkaline Phosphatase 86 35 - 104 U/L    AST 37 (H) 10 - 35 U/L    ALT 17 10 - 35 U/L    Protein Total 6.8 6.4 - 8.3 g/dL    Albumin 3.7 3.5 - 5.2 g/dL    Bilirubin Total 0.3 <=1.2 mg/dL    GFR Estimate 89 >60 mL/min/1.73m2   BNP     Status: Normal   Result Value Ref Range    N terminal Pro BNP Inpatient 398 0 - 1,800 pg/mL   Lipase     Status: Normal   Result Value Ref Range    Lipase 16 13 - 60 U/L   Procalcitonin     Status: Normal   Result Value Ref Range    Procalcitonin 0.04 <0.05 ng/mL   Troponin T, High Sensitivity     Status: Abnormal   Result Value Ref Range    Troponin T, High Sensitivity 17 (H) <=14 ng/L   EKG 12-lead     Status: None (Preliminary result)   Result Value Ref Range    Systolic Blood Pressure  mmHg    Diastolic Blood Pressure  mmHg    Ventricular Rate 81 BPM    Atrial Rate 81 BPM    AL Interval 144 ms    QRS Duration 88 ms     ms    QTc 446 ms    P Axis 82 degrees    R AXIS 72 degrees    T Axis 78 degrees    Interpretation ECG Sinus rhythm  Normal ECG      CBC with Platelets & Differential     Status: Abnormal    Narrative    The following orders were created for panel order CBC with Platelets & Differential.  Procedure                               Abnormality         Status                     ---------                               -----------         ------                     CBC with platelets and d...[[id number]]  Abnormal            Final result                 Please view results for these tests on the individual orders.     Medications   ipratropium - albuterol 0.5 mg/2.5 mg/3 mL (DUONEB) neb solution 3 mL (3 mLs Nebulization Given 11/24/22 1443)   furosemide (LASIX) injection 40 mg (40 mg Intravenous Given 11/24/22 1533)        Assessments & Plan (with Medical Decision Making)   Ca Yan is a 76 year old female with a past medical  history significant for cognitive impairment, alcohol use disorder, COPD (on 5L O2 at home), alcohol use disorder, HTN, malignant neoplasm of breast who presents to the Emergency Department for evaluation of leg swelling and shortness of breath. Upon arrival patient is nontoxic-appearing, afebrile, tachypneic and anxiety in no respiratory distress.  Patient here with concern for new redness in BLE and worsening BLE edema along with worsening shortness of breath.  Differential diagnosis includes but is not limited to COPD exacerbation, fluid retention, pneumonia, sepsis, DVT among others.  Upon arrival an IV was established, patient was given IV lasix 40mg and duoneb, comprehensive labs were performed including covid/influenza.      EKG was performed which showed NSR with no ectopy  high-sensitivity troponin-t elevated to 18, recheck 17.  BNP normal at 398. Less likely cardiac cause of symptoms.     I reviewed comprehensive labs which are significant for elevated CO2 of 35, VBG was checked which showed a pH of 7.33, PCO2 82, PO2 32, bicarb 43, base excess 13.4.  This was on 2 L nasal cannula.  Her significant CO2 retention likely explains some of her anxiety as well as tachypnea. She has been oxygenating well here on 2L nc with O2 sats 100%.    No leukocytosis, WBC 5.6, hemoglobin 8.2 consistent with her macrocytic anemia, no other acute metabolic or electrolyte abnormality,improving transaminitis, normal lipase. Elevated inflammatory markers, CRP 6.9, ESR 52.    Chest x-ray showed worsening bilateral pulmonary opacities, likely edema, cannot rule out infection.  Procalcitonin negative at 0.04 which likely rule out infectious etiology. Patient will be sent for bilateral lower extremity ultrasound as well as abdominal ultrasound, pending at time of admission. Blood cultures pending at time of admission. Covid, influenza, RSV negative.    Plan will be to admit patient to the hospital for ongoing management, diagnostics  and treatment.  Patient is concerned that she will not be able to return to independent living and would like assistance looking for an assisted living type placement upon discharge.  Case was discussed with admitting medicine team who accepted the patient onto their service.    I have reviewed the nursing notes. I have reviewed the findings, diagnosis, plan and need for follow up with the patient.    New Prescriptions    No medications on file       Final diagnoses:   Cellulitis   Shortness of breath       --  RUPAL Hannon [facility] EMERGENCY DEPARTMENT  11/24/2022  --    ED Attending Physician Attestation    I Nancie Alexis MD, cared for this patient with the Advanced Practice Provider (KASSIE). I have performed a history and physical examination of the patient independent of the KASSIE. I reviewed the KASSIE's documentation above and agree with the documented findings and plan of care. I personally provided a substantive portion of the care for this patient.    I personally performed the substantive portion of the medical decision making for this visit - please see the KASSIE's documentation for full details.    Key management decisions made by me and carried out under my direction: Patient with COPD and chronic lower extremity swelling, with worsening dyspnea and leg swelling.  Extremities show erythema with a nonblanching rash.  Patient will be admitted to the medicine service for further stabilization.    I personally performed the substantive portion of the history for this visit - please see the KASSIE's documentation for full details.      I personally performed the substantive portion of the physical exam - please see the KASSIE's documentation for full details.    Summary of HPI, PE, ED Course   Patient is a 76 year old female with a history of COPD, cognitive impairment, alcohol use disorder and hypertension who presents for evaluation of leg swelling and shortness of breath.  She uses oxygen  at home, up to 5 L.  She states that she has been increasingly short of breath.  She also notes worsening leg swelling and redness, which she states is new.  She states that she has been on Lasix for 5 days but this medication was not continued.  Exam notable for significant lower extremity swelling with an erythematous warm rash to the anterior lower extremities--see photographs above..   ED course notable for elevated inflammatory markers.  Chest x-ray shows worsening bilateral pulmonary opacities, likely edema.  Procalcitonin was sent..  Patient treated with IV Lasix as well as a DuoNeb after the completion of care in the emergency department, the patient was admitted to inpatient.    Critical Care & Procedures  Not applicable.    Medical Decision Making  The medical record was reviewed and interpreted.  Current labs reviewed and interpreted.  Previous labs reviewed and interpreted.  Current images reviewed and interpreted: see above.  EKG reviewed and interpreted: see above.      Nancie Alexis MD  Emergency Medicine         Lani Brown, APRN CNP  11/24/22 0201

## 2022-11-24 NOTE — LETTER
[facility] [address] [address]  [phone]    FACSIMILE TRANSMITTAL SHEET    TO:Adriana Lockhart   FAX NUMBER:[phone]  PHONE NUMBER: [phone]     FROM: Nathaniel but call Michael  PHONE:[phone]  DATE: 12/08/22          IF YOU DID NOT RECEIVE THE CORRECT NUMBER OF PAGES OR THE FAX DID NOT COME THROUGH CLEARLY, PLEASE CALL THE SENDER     CONFIDENTIALITY STATEMENT: Confidential information that may accompany this transmission contains protected health information under state and federal law and is legally privileged. This information is intended only for the use of the individual or entity named above and may be used only for carrying out treatment, payment or other healthcare operations. The recipient or person responsible for delivering this information is prohibited by law from disclosing this information without proper authorization to any other party, unless required to do so by law or regulation. If you are not the intended recipient, you are hereby notified that any review, dissemination, distribution, or copying of this message is strictly prohibited. If you have received this communication in error, please destroy the materials and contact us immediately by calling the number listed above. No response indicates that the information was received by the appropriate authorized party

## 2022-11-24 NOTE — ED NOTES
Bed: ED17  Expected date:   Expected time:   Means of arrival:   Comments:  413 [id number] F SOB/Swelling Yellow [id number]

## 2022-11-24 NOTE — LETTER
[facility] [address] [place] [id number]  [phone]    FACSIMILE TRANSMITTAL SHEET    TO:Roslyn  FAX NUMBER: [phone]  PHONE NUMBER: [phone]    FROM: Max shahab call Meli   PHONE:[phone]  DATE: 12/08/22    CONFIDENTIALITY STATEMENT: Confidential information that may accompany this transmission contains protected health information under state and federal law and is legally privileged. This information is intended only for the use of the individual or entity named above and may be used only for carrying out treatment, payment or other healthcare operations. The recipient or person responsible for delivering this information is prohibited by law from disclosing this information without proper authorization to any other party, unless required to do so by law or regulation. If you are not the intended recipient, you are hereby notified that any review, dissemination, distribution, or copying of this message is strictly prohibited. If you have received this communication in error, please destroy the materials and contact us immediately by calling the number listed above. No response indicates that the information was received by the appropriate authorized party

## 2022-11-24 NOTE — PROGRESS NOTES
Pt very anxious, repeatedly asking same questions regarding plan of care and expressing fear of dying. This RN attempted to frequently redirect pt thoughts w/ guided imagery. Bilat LE swelling/redness. C/o feeling SOB. 98% on RA. Increased RR and labored breathing. Neb tx given. Waiting for Lasix IV from pharmacy.

## 2022-11-24 NOTE — LETTER
Recipient: [facility]          Sender: Meli Mukherjee  [phone]              Date: December 1, 2022  Patient Name:  Ca Yan      The documents accompanying this notice contain confidential information belonging to the sender.  This information is intended only for the use of the individual or entity named above.  The authorized recipient of this information is prohibited from disclosing this information to any other party and is required to destroy the information after its stated need has been fulfilled, unless otherwise required by state law.    If you are not the intended recipient, you are hereby notified that any disclosure, copy, distribution or action taken in reliance on the contents of these documents is strictly prohibited.  If you have received this document in error, please return it by fax to [phone] with a note on the cover sheet explaining why you are returning it (e.g. not your patient, not your provider, etc.).  Documents may also be returned by mail to Health Information Management, , [facility], [place], [place] [address].

## 2022-11-25 ENCOUNTER — APPOINTMENT (OUTPATIENT)
Dept: ULTRASOUND IMAGING | Facility: CLINIC | Age: 77
DRG: 205 | End: 2022-11-25
Attending: NURSE PRACTITIONER
Payer: COMMERCIAL

## 2022-11-25 ENCOUNTER — APPOINTMENT (OUTPATIENT)
Dept: CARDIOLOGY | Facility: CLINIC | Age: 77
DRG: 205 | End: 2022-11-25
Payer: COMMERCIAL

## 2022-11-25 ENCOUNTER — APPOINTMENT (OUTPATIENT)
Dept: PHYSICAL THERAPY | Facility: CLINIC | Age: 77
DRG: 205 | End: 2022-11-25
Payer: COMMERCIAL

## 2022-11-25 ENCOUNTER — APPOINTMENT (OUTPATIENT)
Dept: OCCUPATIONAL THERAPY | Facility: CLINIC | Age: 77
DRG: 205 | End: 2022-11-25
Payer: COMMERCIAL

## 2022-11-25 LAB
ANION GAP SERPL CALCULATED.3IONS-SCNC: 11 MMOL/L (ref 7–15)
BUN SERPL-MCNC: 21.6 MG/DL (ref 8–23)
CALCIUM SERPL-MCNC: 8.9 MG/DL (ref 8.8–10.2)
CHLORIDE SERPL-SCNC: 94 MMOL/L (ref 98–107)
CREAT SERPL-MCNC: 0.76 MG/DL (ref 0.51–0.95)
DEPRECATED HCO3 PLAS-SCNC: 39 MMOL/L (ref 22–29)
ERYTHROCYTE [DISTWIDTH] IN BLOOD BY AUTOMATED COUNT: 14.7 % (ref 10–15)
GFR SERPL CREATININE-BSD FRML MDRD: 80 ML/MIN/1.73M2
GLUCOSE SERPL-MCNC: 103 MG/DL (ref 70–99)
HCT VFR BLD AUTO: 28.2 % (ref 35–47)
HGB BLD-MCNC: 8.3 G/DL (ref 11.7–15.7)
LVEF ECHO: NORMAL
MCH RBC QN AUTO: 30.2 PG (ref 26.5–33)
MCHC RBC AUTO-ENTMCNC: 29.4 G/DL (ref 31.5–36.5)
MCV RBC AUTO: 103 FL (ref 78–100)
PLATELET # BLD AUTO: 235 10E3/UL (ref 150–450)
POTASSIUM SERPL-SCNC: 3.3 MMOL/L (ref 3.4–5.3)
POTASSIUM SERPL-SCNC: 4 MMOL/L (ref 3.4–5.3)
RBC # BLD AUTO: 2.75 10E6/UL (ref 3.8–5.2)
SODIUM SERPL-SCNC: 144 MMOL/L (ref 136–145)
WBC # BLD AUTO: 5.2 10E3/UL (ref 4–11)

## 2022-11-25 PROCEDURE — 93970 EXTREMITY STUDY: CPT

## 2022-11-25 PROCEDURE — 76700 US EXAM ABDOM COMPLETE: CPT | Mod: 26 | Performed by: RADIOLOGY

## 2022-11-25 PROCEDURE — 36415 COLL VENOUS BLD VENIPUNCTURE: CPT

## 2022-11-25 PROCEDURE — 97530 THERAPEUTIC ACTIVITIES: CPT | Mod: GP

## 2022-11-25 PROCEDURE — 120N000002 HC R&B MED SURG/OB UMMC

## 2022-11-25 PROCEDURE — 84132 ASSAY OF SERUM POTASSIUM: CPT | Performed by: STUDENT IN AN ORGANIZED HEALTH CARE EDUCATION/TRAINING PROGRAM

## 2022-11-25 PROCEDURE — 999N000285 HC STATISTIC VASC ACCESS LAB DRAW WITH PIV START

## 2022-11-25 PROCEDURE — 250N000013 HC RX MED GY IP 250 OP 250 PS 637: Performed by: STUDENT IN AN ORGANIZED HEALTH CARE EDUCATION/TRAINING PROGRAM

## 2022-11-25 PROCEDURE — 999N000128 HC STATISTIC PERIPHERAL IV START W/O US GUIDANCE

## 2022-11-25 PROCEDURE — 97535 SELF CARE MNGMENT TRAINING: CPT | Mod: GO

## 2022-11-25 PROCEDURE — 999N000208 ECHOCARDIOGRAM COMPLETE

## 2022-11-25 PROCEDURE — 76700 US EXAM ABDOM COMPLETE: CPT

## 2022-11-25 PROCEDURE — 255N000002 HC RX 255 OP 636: Performed by: STUDENT IN AN ORGANIZED HEALTH CARE EDUCATION/TRAINING PROGRAM

## 2022-11-25 PROCEDURE — 93970 EXTREMITY STUDY: CPT | Mod: 26 | Performed by: RADIOLOGY

## 2022-11-25 PROCEDURE — 36415 COLL VENOUS BLD VENIPUNCTURE: CPT | Performed by: STUDENT IN AN ORGANIZED HEALTH CARE EDUCATION/TRAINING PROGRAM

## 2022-11-25 PROCEDURE — 250N000013 HC RX MED GY IP 250 OP 250 PS 637

## 2022-11-25 PROCEDURE — 97162 PT EVAL MOD COMPLEX 30 MIN: CPT | Mod: GP

## 2022-11-25 PROCEDURE — 250N000011 HC RX IP 250 OP 636

## 2022-11-25 PROCEDURE — 85027 COMPLETE CBC AUTOMATED: CPT

## 2022-11-25 PROCEDURE — 97165 OT EVAL LOW COMPLEX 30 MIN: CPT | Mod: GO

## 2022-11-25 PROCEDURE — 93306 TTE W/DOPPLER COMPLETE: CPT | Mod: 26 | Performed by: STUDENT IN AN ORGANIZED HEALTH CARE EDUCATION/TRAINING PROGRAM

## 2022-11-25 PROCEDURE — 99233 SBSQ HOSP IP/OBS HIGH 50: CPT | Mod: GC | Performed by: STUDENT IN AN ORGANIZED HEALTH CARE EDUCATION/TRAINING PROGRAM

## 2022-11-25 PROCEDURE — G0463 HOSPITAL OUTPT CLINIC VISIT: HCPCS

## 2022-11-25 PROCEDURE — 80048 BASIC METABOLIC PNL TOTAL CA: CPT

## 2022-11-25 RX ORDER — ONDANSETRON 4 MG/1
4 TABLET, ORALLY DISINTEGRATING ORAL EVERY 6 HOURS PRN
Status: DISCONTINUED | OUTPATIENT
Start: 2022-11-25 | End: 2022-12-09 | Stop reason: HOSPADM

## 2022-11-25 RX ORDER — SERTRALINE HYDROCHLORIDE 100 MG/1
100 TABLET, FILM COATED ORAL DAILY
Status: DISCONTINUED | OUTPATIENT
Start: 2022-11-26 | End: 2022-12-09 | Stop reason: HOSPADM

## 2022-11-25 RX ORDER — PROCHLORPERAZINE MALEATE 5 MG
5 TABLET ORAL EVERY 6 HOURS PRN
Status: DISCONTINUED | OUTPATIENT
Start: 2022-11-25 | End: 2022-12-09 | Stop reason: HOSPADM

## 2022-11-25 RX ORDER — PROCHLORPERAZINE 25 MG
12.5 SUPPOSITORY, RECTAL RECTAL EVERY 12 HOURS PRN
Status: DISCONTINUED | OUTPATIENT
Start: 2022-11-25 | End: 2022-12-09 | Stop reason: HOSPADM

## 2022-11-25 RX ORDER — POTASSIUM CHLORIDE 750 MG/1
40 TABLET, EXTENDED RELEASE ORAL ONCE
Status: COMPLETED | OUTPATIENT
Start: 2022-11-25 | End: 2022-11-25

## 2022-11-25 RX ORDER — ENOXAPARIN SODIUM 100 MG/ML
40 INJECTION SUBCUTANEOUS EVERY 24 HOURS
Status: DISCONTINUED | OUTPATIENT
Start: 2022-11-25 | End: 2022-12-09 | Stop reason: HOSPADM

## 2022-11-25 RX ORDER — ACETAMINOPHEN 325 MG/1
650 TABLET ORAL EVERY 6 HOURS PRN
Status: DISCONTINUED | OUTPATIENT
Start: 2022-11-25 | End: 2022-12-09 | Stop reason: HOSPADM

## 2022-11-25 RX ORDER — ONDANSETRON 2 MG/ML
4 INJECTION INTRAMUSCULAR; INTRAVENOUS EVERY 6 HOURS PRN
Status: DISCONTINUED | OUTPATIENT
Start: 2022-11-25 | End: 2022-12-09 | Stop reason: HOSPADM

## 2022-11-25 RX ORDER — FUROSEMIDE 10 MG/ML
20 INJECTION INTRAMUSCULAR; INTRAVENOUS 2 TIMES DAILY
Status: DISCONTINUED | OUTPATIENT
Start: 2022-11-25 | End: 2022-11-27

## 2022-11-25 RX ADMIN — FUROSEMIDE 20 MG: 10 INJECTION, SOLUTION INTRAVENOUS at 18:06

## 2022-11-25 RX ADMIN — Medication 25 MCG: at 07:54

## 2022-11-25 RX ADMIN — HUMAN ALBUMIN MICROSPHERES AND PERFLUTREN 5 ML: 10; .22 INJECTION, SOLUTION INTRAVENOUS at 15:24

## 2022-11-25 RX ADMIN — SERTRALINE HYDROCHLORIDE 150 MG: 100 TABLET ORAL at 07:54

## 2022-11-25 RX ADMIN — Medication 400 MCG: at 07:54

## 2022-11-25 RX ADMIN — ATORVASTATIN CALCIUM 40 MG: 40 TABLET, FILM COATED ORAL at 19:28

## 2022-11-25 RX ADMIN — ENOXAPARIN SODIUM 40 MG: 40 INJECTION SUBCUTANEOUS at 18:07

## 2022-11-25 RX ADMIN — LATANOPROST 1 DROP: 50 SOLUTION OPHTHALMIC at 19:29

## 2022-11-25 RX ADMIN — BUSPIRONE HYDROCHLORIDE 10 MG: 10 TABLET ORAL at 07:53

## 2022-11-25 RX ADMIN — CLOTRIMAZOLE: 1 CREAM TOPICAL at 06:55

## 2022-11-25 RX ADMIN — POTASSIUM CHLORIDE 40 MEQ: 750 TABLET, EXTENDED RELEASE ORAL at 09:50

## 2022-11-25 RX ADMIN — BUSPIRONE HYDROCHLORIDE 10 MG: 10 TABLET ORAL at 19:22

## 2022-11-25 RX ADMIN — FUROSEMIDE 20 MG: 10 INJECTION, SOLUTION INTRAVENOUS at 11:06

## 2022-11-25 RX ADMIN — LOSARTAN POTASSIUM AND HYDROCHLOROTHIAZIDE 1 TABLET: 25; 100 TABLET ORAL at 07:54

## 2022-11-25 RX ADMIN — SENNOSIDES AND DOCUSATE SODIUM 1 TABLET: 50; 8.6 TABLET ORAL at 19:22

## 2022-11-25 RX ADMIN — Medication 150 MG: at 19:27

## 2022-11-25 RX ADMIN — B-COMPLEX W/ C & FOLIC ACID TAB 1 TABLET: TAB at 07:54

## 2022-11-25 RX ADMIN — ACETAMINOPHEN 650 MG: 325 TABLET, FILM COATED ORAL at 18:02

## 2022-11-25 RX ADMIN — ACETAMINOPHEN 650 MG: 325 TABLET, FILM COATED ORAL at 23:02

## 2022-11-25 RX ADMIN — Medication: at 07:53

## 2022-11-25 RX ADMIN — MICONAZOLE NITRATE: 2 POWDER TOPICAL at 18:13

## 2022-11-25 ASSESSMENT — ACTIVITIES OF DAILY LIVING (ADL)
PREVIOUS_RESPONSIBILITIES: MEAL PREP;HOUSEKEEPING;LAUNDRY;SHOPPING;MEDICATION MANAGEMENT;FINANCES
ADLS_ACUITY_SCORE: 39
ADLS_ACUITY_SCORE: 39
ADLS_ACUITY_SCORE: 27
ADLS_ACUITY_SCORE: 39
ADLS_ACUITY_SCORE: 27
ADLS_ACUITY_SCORE: 39

## 2022-11-25 NOTE — PLAN OF CARE
"Goal Outcome Evaluation:      Plan of Care Reviewed With: patient    Overall Patient Progress: no change    Outcome Evaluation: Admitted to 5A from ED this shift, A&Ox4, forgetful, having hallucinations overnight see \"shadow man\" standing above her bed, RN reiterated they are safe and no other men are in her room, VSS on 2L O2 via NC, breathless/SAM, abdominal muscle use noted w/ respirations, incontinent to bladder, purwick in place w/ good urine output, no BM overnight, regular diet, rash noted underneath R armpit, R breast, R groin, 4+ weepy edema in BLE, WOC consult placed, pt expressed that they do not want to live alone anymore d/t difficulty taking care of themselves, SW consult placed, pt to have abdominal US this AM, call light in reach and bed alarm on for safety      "

## 2022-11-25 NOTE — PROGRESS NOTES
Pt is concerned that she cannot find her coat. Pt believes she was wearing it when she was brought in, although somewhat uncertain.  Patient was brought in by ambulance  Voicemail left with [facility] requesting return phone call to try to discern if pt had coat when she arrived.    1930: [facility] called, thus far they haven't found the coat, but they will call back once the right she was in returns for the night.

## 2022-11-25 NOTE — CONSULTS
[facility]  WOC Nurse Inpatient Assessment     Consulted for: bilateral lower extremities and right groin rash    Patient History (according to provider note(s):    Ca Yan is a 76 year old female admitted on 11/24/2022 for chief concern ofLower extremity swelling, redness, and shortness of breath over a duration estimated at 2-3 weeks. Her past medical history is significant for previous breast cancer s/p lumpectomy, GERD, HTN, HLD, chronic lower extremity lymphedema, cluster C personality disorder, home oxygen use of 2-3 LPM, question of CKD, question of COPD, and known restrictive lung disease.         Today:   - Lasix 20 mg IV Q6H twice per day  - TTE if patient amenable, counseled on importance 11/25 and stated she was wiling  - PT/OT/SW/WOC/Lymphedema consults, patient to likely need TCU  - 2-3 LPM NC, this is patients baseline      #Lower Extremity Edema  #Possible Venous stasis vs other rash  #Possible Heart Failure  4+ pitting bilateral lower extremity edema, apparently acute on chronic over last few weeks per patient and chart review. Unclear etiology with normal BNP somewhat less likely heart failure although given elevated BMI BNP may not be fully reliable. Would also consider potential liver pathology, although LFTs and albumin normal, as well as potential CKD contribution to volume retention although Cr normal. Most likely etiology appears to be heart failure, however patient declined initial TTE 11/25, willing to undergo study after additional interview/explanation.  Abdominal US significant for probable hepatic steatosis and small right pleural effusion. US DVT negative.  - TTE 11/25/2022, ordered again as patient required further explanation   - Lasix 20 mg IV daily first dose around 0800 and second dose 6 hours afterwards  - Daily BMP   - PTA clotrimazole cream  - PTA Eucerin cream  - Lymphedema consult  - WOC consult       Areas Assessed:       Areas visualized during today's visit: Skin folds  and Lower extremities   Skin Injury Location: Right groin and inferior breast    11/25- right groin  Last photo: 11/25  Skin injury due to: Fungal rash  and Intertrigo  Skin history and plan of care:  Pt endorses frequent rashes in skin folds including arm pits. Right inferior breast with similar presentation.  Affected area:      Skin assessment: Intact, Erythema and Lesions-satellite  Pain: denies  and  endorses itching  Pain interventions prior to dressing change: N/A  Treatment goal: Infection control/prevention  STATUS: initial assessment  Supplies ordered: discussed with RN and discussed with patient , paged provider. Provider order needed     Skin Injury Location: bilateral lower extremities    11/25  Skin injury due to: Venous stasis dermatitis. Possible infection, although less likely due to bilateral presentation, minimal pain (greater in left than in right), temperature of extremity is consistent/ not warmer in reddened areas.   Skin history and plan of care:   Patient has been complaining of BLE edema and swelling since 11/4 seen in ED a few times. It appears this is an issue that patient has been struggling with intermittently for some time, even before 11/4.   Affected area:      Skin assessment: Intact, Dry/flaky and Erythema     Measurements (length x width x depth, in cm)see photo     Color: red     Temperature  normal      Drainage: small and moderate .      Color: serous      Odor: none  Pain: mild, tender  Pain interventions prior to dressing change: patient tolerated well  Treatment goal: Drainage control  STATUS: initial assessment  Supplies ordered: supplies stored on unit and discussed with RN        Treatment Plan:     Bilateral lower leg erythema: Daily and PRN for heavy drainage  Monitor erythema and notify MD if it spreads beyond marked areas.  Cleanse legs with wound cleanser and pat dry.   Apply thin layer of criticaid paste to  reddened areas.  Cover with ABDs and wrap with Kerlix.   Ok to apply lymph wraps over this.   Change socks frequently if wet.    Right groin and right breast erythema: BID  Cleanse areas with Bg cleanse and protect and gently pat dry.  Apply dusting of Miconazole powder, per provider order to erythema in skin folds and gently brush in to skin to prevent caking.   Discontinue use of Miconazole once erythema has resolved and then continue to cleanse skin folds daily and utilize Interdry. Do not use interdry while using other powders or creams.    Orders: Written    RECOMMEND PRIMARY TEAM ORDER:Discontinue lotrimin cream, and instead apply Antifungal powder to groin and under breast erythema. Redness on legs marked and monitor for increased erythema.  Lymphedema already consulted.  Education provided: plan of care and Moisture management  Discussed plan of care with: Nurse, MD paged  WO nurse follow-up plan: weekly  Notify WOC if wound(s) deteriorate.  Nursing to notify the Provider(s) and re-consult the WOC Nurse if new skin concern.    DATA:     Current support surface: Standard  Standard gel/foam mattress (IsoFlex, Atmos air, etc)  Containment of urine/stool: Incontinent pad in bed  BMI: Body mass index is 39.05 kg/m .   Active diet order: Orders Placed This Encounter      Combination Diet Regular Diet Adult     Output: I/O last 3 completed shifts:  In: -   Out: 2550 [Urine:2550]     Labs: Recent Labs   Lab 11/25/22  0602 11/24/22  1259   ALBUMIN  --  3.7   HGB 8.3* 8.2*   WBC 5.2 5.6   CRP  --  6.90*     Pressure injury risk assessment:   Sensory Perception: 4-->no impairment  Moisture: 3-->occasionally moist  Activity: 2-->chairfast  Mobility: 3-->slightly limited  Nutrition: 3-->adequate  Friction and Shear: 3-->no apparent problem  Rodolfo Score: 18    Shayna Lackey RN, CWOCN  Dept. Pager: [phone]  Dept. Office Number: [phone]

## 2022-11-25 NOTE — PROVIDER NOTIFICATION
"MD notified via text-page: pt having visual hallucinations, stating that she sees a \"shadow man\" standing over her, RN assessed and there is no other man in pt room, pt was reassured they are safe, bed alarm on for safety  "

## 2022-11-25 NOTE — PLAN OF CARE
Goal Outcome Evaluation:           Overall Patient Progress: no changeOverall Patient Progress: no change    Outcome Evaluation: A&Ox4 although very forgetful and asks the same questions many times. Vitals are stable on 2-4L NC. 02 ranges from 90-94%. Removes o2 at times and has to be reminded to keep it on. Potassium was low at 3.3 it was replaced and recheck was 4.0. Denies any pain, nausea or vomiting. Complains of some SOB but it has improved since being admitted. Regular diet and no BM this shift. Refused full skin assessment and would not let writer look under her shirt much and refused a gown. Interdry placed under lawrence avila. Lymphedema consult placed. Abdominal and bilateral extremity ultrasound done today. Echo scheduled for today.

## 2022-11-25 NOTE — PROGRESS NOTES
Admission/Transfer from: [facility] ED  2 RN skin assessment completed: Yes, w/ Petty Ford RN  Significant findings include:  -Rash: R armpit, underneath R breast, R abdominal/groin fold  -Blanchable redness: Sacrum  -Weepy and non-blanchable reddness on BLE/ankles  -generalized salloused/scabbed feet  WOC Nurse Consult Ordered? Yes for BLE (MD paged)

## 2022-11-25 NOTE — PROGRESS NOTES
[facility]    Progress Note - Medicine Service, IVIS TEAM 1       Date of Admission:  11/24/2022    Assessment & Plan   Ca Yan is a 76 year old female admitted on 11/24/2022 for chief concern ofLower extremity swelling, redness, and shortness of breath over a duration estimated at 2-3 weeks. Her past medical history is significant for previous breast cancer s/p lumpectomy, GERD, HTN, HLD, chronic lower extremity lymphedema, cluster C personality disorder, home oxygen use of 2-3 LPM, question of CKD, question of COPD, and known restrictive lung disease.        Today:   - Lasix 20 mg IV Q6H twice per day  - TTE if patient amenable, counseled on importance 11/25 and stated she was wiling  - PT/OT/SW/WOC/Lymphedema consults, patient to likely need TCU  - 2-3 LPM NC, this is patients baseline      #Lower Extremity Edema  #Possible Venous stasis vs other rash  #Possible Heart Failure  4+ pitting bilateral lower extremity edema, apparently acute on chronic over last few weeks per patient and chart review. Unclear etiology with normal BNP somewhat less likely heart failure although given elevated BMI BNP may not be fully reliable. Would also consider potential liver pathology, although LFTs and albumin normal, as well as potential CKD contribution to volume retention although Cr normal. Most likely etiology appears to be heart failure, however patient declined initial TTE 11/25, willing to undergo study after additional interview/explanation.  Abdominal US significant for probable hepatic steatosis and small right pleural effusion. US DVT negative.  - TTE 11/25/2022, ordered again as patient required further explanation   - Lasix 20 mg IV daily first dose around 0800 and second dose 6 hours afterwards  - Daily BMP   - PTA clotrimazole cream  - PTA Eucerin cream  - Lymphedema consult  - WOC consult    #Acute on Chronic Hypoxic Hypercapnic Respiratory  "Failure  #Restrictive Lung Disease   Pt with history of chronic CO2 retention, has seen pulmonology in the past and reportedly declined NIPPV. Unclear baseline PCO2 venous, however gases and lab workup here suggest chronic metabolically compensated CO2 retention. Patient reports distant smoking history, stopped in 1980 and reports she was not a heavy smoker. Per pulmonology note from 03/28/2022, recent \"PFT data FEV1/FVC ratio 0.71.  FVC is 1.12 L which is 42% predicted.  FEV1 is 0.8 L which is 39% predicted.\"  Suspect significant contribution of spinous abnormalities toward restrictive lung disease. Given chronic CO2 retention, will keep O2 saturation goal lower to ensure maintenance of respiratory drive.   - Continue oxygen to keep saturations above 90%  - Albuterol inhaler Q6H PRN  - Duonebs Q4H PRN   - PTA azelastine 1 spray both nostrils BID     #Hypokalemia  - RN replacement protocol     #Decreased Functional Status  Appreciate PT/OT evaluation, patient concern of being unable to go back home due to difficulty completing home ADLs and overall shortness of breath. Appreciate social work consult for dispo planning.   Appreciate PT recommendations:   - TCU at discharge due to below baseline functional capacity   Appreciate OT recommendations:   - TCU at discharge due to below baseline functional capacity      #Generalized Anxiety Disorder  As per recent family medicine visit, continuing PTA medications.   - Buspirone 10 mg PO daily     #Depression  - Continue PTA sertraline, updated dose to 100 mg PO daily per pharmacy discussion     #Insomnia  #Overnight Hallucination  Multiple reports of patient concern for hallucination with man standing in room, but patient does not recall in AM discussion. Will monitor for now, see if issue recurs.   - Continue PTA Seroquel, 150 mg PO QHS     #Hypertension  - Continue PTA Losartan/HCTZ 100-25 mg PO daily     #HLD  - Continue PTA atorvastatin 40 mg PO daily     #Glaucoma  - " "PTA latanoprost 1 drop each eye at bedtime     #?Vitamin Deficiency  #Anemia  - Continue PTA folic acid 400 mcg daily  - Continue vitamin B complex with C 1 tablet daily  - Continue vitamin D3 25 mcg daily      Diet: Combination Diet Regular Diet Adult    DVT Prophylaxis: Enoxaparin (Lovenox) SQ  Lima Catheter: Not present  Fluids: PO  Central Lines: None  Cardiac Monitoring: None  Code Status: Full Code      Disposition Plan      Expected Discharge Date: 11/26/2022        Discharge Comments: Dispo: TBD  Delays:   Progress: CMA by SW today.        The patient's care was discussed with the Attending Physician, Dr. Jamison.    Nikita Posey MD  Medicine Service, [address]  Securely message with the Vocera Web Console (learn more here)  Text page via [facility] Paging/Directory   Please see signed in provider for up to date coverage information      Clinically Significant Risk Factors        # Hypokalemia: Lowest K = 3.3 mmol/L (Ref range: 3.4-5.3) in last 2 days, will replace as needed                # Obesity: Estimated body mass index is 39.05 kg/m  as calculated from the following:    Height as of this encounter: 1.6 m (5' 3\").    Weight as of this encounter: 100 kg (220 lb 7.4 oz)., PRESENT ON ADMISSION         ______________________________________________________________________    Interval History   Patient without recollection of overnight hallucination event. Expresses ongoing concern for being unable to go home due to functional difficulties and not wanting to stay in the hospital as it is uncomfortable. Discussed with patient need for TTE and after talking about indication for study for several minutes and reassurance regarding non-invasive nature of procedure she was wiling to proceed.     Data reviewed today: I reviewed all medications, new labs and imaging results over the last 24 hours. I personally reviewed no images or EKG's today.    Physical " Exam   Vital Signs: Temp: (P) 98.4  F (36.9  C) Temp src: (P) Oral BP: (!) (P) 155/87 Pulse: (P) 79   Resp: (P) 18 SpO2: (P) 100 % O2 Device: (P) Nasal cannula Oxygen Delivery: 2 LPM  Weight: 220 lbs 7.36 oz  Constitutional: awake, alert, cooperative, no apparent distress, and appears stated age  Eyes: Lids and lashes normal, pupils equal, round and reactive to light, extra ocular muscles intact, sclera clear, conjunctiva normal  ENT: Normocephalic, without obvious abnormality, atraumatic, external ears without lesions, oral pharynx with moist mucous membranes, tonsils without erythema or exudates, gums normal and good dentition.  Hematologic / Lymphatic: no cervical lymphadenopathy and no supraclavicular lymphadenopathy  Respiratory: Improvement in crackles at bilateral lung bases 11/25 compared to 11/24.   Cardiovascular: 2-3/6 systolic murmur loudest at right sternal border. Unable to assess JVD due to body habitus. Regular rhythm.   GI: No scars, normal bowel sounds, soft, non-distended, non-tender, no masses palpated, no hepatosplenomegally  Skin: Areas of bilaterals erythema located immediately over sock line on both feet. Pattern appears roughly symmetric.   Musculoskeletal: 4+ bilateral lower extremity edema. Moving all extremities. Probable bunion located on left foot.   Neurologic: Awake, alert, but forgetful. Able to participate fully in interview although frequently asks repeated questions.   Neuropsychiatric: General: fidgeting and normal eye contact  Level of consciousness: alert / normal  Affect: anxious    Data   Recent Labs   Lab 11/25/22  1328 11/25/22  0602 11/24/22  1533 11/24/22  1259   WBC  --  5.2  --  5.6   HGB  --  8.3*  --  8.2*   MCV  --  103*  --  104*   PLT  --  235  --  233   NA  --  144  --  144  144   POTASSIUM 4.0 3.3*  --  3.8  3.8   CHLORIDE  --  94*  --  100  100   CO2  --  39*  --  35*  36*   BUN  --  21.6  --  22.2  22.1   CR  --  0.76 0.70 0.68  0.69   ANIONGAP  --  11   "--  9  8   SYMONE  --  8.9  --  8.8  8.7*   GLC  --  103*  --  101*  102*   ALBUMIN  --   --   --  3.7   PROTTOTAL  --   --   --  6.8   BILITOTAL  --   --   --  0.3   ALKPHOS  --   --   --  86   ALT  --   --   --  17   AST  --   --   --  37*   LIPASE  --   --   --  16     Recent Results (from the past 24 hour(s))   US Lower Extremity Venous Duplex Bilateral    Narrative    Exam: Ultrasound of the deep venous system of bilateral legs dated  11/25/2022 8:42 AM    Clinical information: Increased bilateral lower extremity swelling and  redness. Left lower extremity calf tenderness.    Comparison: 11/10/2022    Ordering provider: Lani Brown APRN CNP    Technique: Gray-scale evaluation with compression and Doppler  assessment of deep venous system for spontaneous and phasic flow, as  well as the presence of distal augmentation. Color flow images  obtained as needed. Gray-scale images with compression of the great  saphenous vein obtained as needed.    Findings:    Right leg:    CFV: Thrombus: No, Phasic: Yes  Femoral vein, mid: Thrombus: No, Phasic: Yes  Popliteal vein: Thrombus: No, Phasic: Yes  PTV: Thrombus: No    Left leg:    CFV: Thrombus: No, Phasic: Yes  Femoral vein, mid: Thrombus: No, Phasic: Yes  Popliteal vein: Thrombus: No, Phasic: Yes  PTV: Thrombus: No      Impression    Impression:    Right leg: No deep venous thrombosis.     Left leg: No deep venous thrombosis.     Reference: \"Duplex Ultrasound in the Diagnosis of Lower-Extremity Deep  Venous Thrombosis\"- Ksenia Carver MD, S; Kenji Kowalski MD  (Circulation. 2014;129:917-921. http://circ.ahajournals.org )    I have personally reviewed the examination and initial interpretation  and I agree with the findings.    JOHNATHON ROACH MD         SYSTEM ID:  [id number]   Abdomen US, complete    Narrative    EXAMINATION: US ABDOMEN COMPLETE,  11/25/2022 8:55 AM     COMPARISON: Abdominal ultrasound 1/27/2017.    HISTORY: Evaluate liver function or other " intra-abdominal source  contributing to ongoing bilateral lower extremity edema.    TECHNIQUE: The abdomen was scanned in standard fashion with  specialized ultrasound transducer(s) using both gray-scale and limited  color Doppler techniques.    FINDINGS:  Liver: The liver demonstrates normal homogeneous echotexture with  increased hepatic echogenicity. Smooth hepatic surface contour. No  evidence of a focal hepatic mass. The main portal vein is patent with  flow towards the liver. The liver measures 13.7 cm in length.    Gallbladder:  There is no wall thickening (2 mm), pericholecystic  fluid, positive sonographic Aguiar's sign or evidence for  cholelithiasis.    Bile Ducts: Both the intra- and extrahepatic biliary system are of  normal caliber.  The common bile duct measures 2.0 mm in diameter.    Pancreas: The pancreas is not well-visualized secondary to bowel gas  blocking.    Kidneys: Both kidneys are of normal echotexture, without mass or  hydronephrosis.   The craniocaudal dimensions are: right- 9.4, left-  10.0 centimeters.    Spleen: The spleen is normal in size,  measuring 7.61 centimeters in  sagittal dimension.    Aorta and IVC: The visualized portions of the aorta and IVC are  unremarkable. The proximal aorta measures 2.1 cm in diameter and the  IVC measures 1.7 cm in diameter.    Fluid: Small right pleural effusion. No evidence of ascites.      Impression    IMPRESSION:   1.  Probable hepatic steatosis.  2.  Small right pleural effusion..    I have personally reviewed the examination and initial interpretation  and I agree with the findings.    GEM DESIR MD         SYSTEM ID:  [id number]   Echo Complete   Result Value    LVEF  65-70%    Narrative    [id number]  [id number]  [id number]  [id number]^PATTIE^INES^DWIGHT     [facility]  Echocardiography Laboratory  [address] [place] [id number]     Name: AMAURI WOOD  MRN: [id number]  : 1945  Study  Date: 11/25/2022 01:48 PM  Age: 76 yrs  Gender: Female  Patient Location: [facility]  Reason For Study: Heart Failure  Ordering Physician: INES BLANKENSHIP  Performed By: Petty Mckenna     BSA: 2.0 m2  Height: 63 in  Weight: 220 lb  HR: 85  BP: 133/68 mmHg  ______________________________________________________________________________  Procedure  Complete Portable Echo Adult. Contrast Optison. Technically difficult study.  Poor acoustic windows. Patient was given 5 ml mixture of 3 ml Optison and 6 ml  saline. 4 ml wasted.  ______________________________________________________________________________  Interpretation Summary  Technically difficult study. Poor acoustic windows.  Global and regional left ventricular function is hyperkinetic with an EF of  65-70%.  Global right ventricular function is normal. The right ventricle is normal  size.  No significant valvular abnormalities.  IVC diameter and respiratory changes fall into an intermediate range  suggesting an RA pressure of 8 mmHg.  This study was compared with the study from 04/12/2021. No significant changes  noted.  ______________________________________________________________________________  Left Ventricle  Global and regional left ventricular function is hyperkinetic with an EF of  65-70%. Left ventricular size is normal. Relative wall thickness is increased  consistent with concentric remodeling. Left ventricular diastolic function is  not assessable.     Right Ventricle  Global right ventricular function is normal. The right ventricle is normal  size.     Atria  The atria cannot be assessed.     Mitral Valve  The mitral valve is normal. Trace mitral insufficiency is present.     Aortic Valve  The valve leaflets are not well visualized. On Doppler interrogation, there is  no significant stenosis or regurgitation.     Tricuspid Valve  The tricuspid valve is normal. Trace tricuspid insufficiency is present.  Pulmonary artery systolic pressure cannot be  assessed.     Pulmonic Valve  The pulmonic valve cannot be assessed.     Vessels  The aorta root cannot be assessed. The thoracic aorta cannot be assessed. IVC  diameter and respiratory changes fall into an intermediate range suggesting an  RA pressure of 8 mmHg.     Pericardium  No pericardial effusion is present.     Compared to Previous Study  This study was compared with the study from 2021 . No significant  changes noted.  ______________________________________________________________________________  MMode/2D Measurements & Calculations     IVSd: 1.1 cm  LVIDd: 4.5 cm  LVPWd: 1.2 cm  LV mass(C)d: 184.1 grams  LV mass(C)dI: 91.4 grams/m2  LA Volume (BP): 40.0 ml  LA Volume Index (BP): 19.9 ml/m2  RWT: 0.55     Doppler Measurements & Calculations  MV E max ck: 67.3 cm/sec  MV A max ck: 98.6 cm/sec  MV E/A: 0.68  MV dec slope: 299.0 cm/sec2  MV dec time: 0.23 sec     Ao V2 max: 195.0 cm/sec  Ao max PG: 15.2 mmHg  Ao V2 mean: 119.0 cm/sec  Ao mean P.0 mmHg  Ao V2 VTI: 32.1 cm  LV V1 max P.4 mmHg  LV V1 max: 183.0 cm/sec  LV V1 VTI: 33.2 cm  AV Ck Ratio (DI): 0.94  Lateral E/e': 11.7     ______________________________________________________________________________  Report approved by: Shubham Barakat 2022 03:40 PM

## 2022-11-25 NOTE — PROGRESS NOTES
"   11/25/22 1100   Appointment Info   Signing Clinician's Name / Credentials (OT) Dorothy Reddy, OTD, OTR/L   Living Environment   People in Home alone   Current Living Arrangements apartment   Home Accessibility no concerns   Living Environment Comments Pt lives alone in single level apartment on 7th floor with elevator access. Pt reports no [place] apartment building. Has tub shower with bench (not a tub bench), GB's in bathtub and around toilet. Endorses no social support.   Self-Care   Usual Activity Tolerance moderate   Current Activity Tolerance poor   Regular Exercise No   Equipment Currently Used at Home shower chair   Fall history within last six months no   Activity/Exercise/Self-Care Comment Pt reports no recent falls, no AD use at baseline. Uses 2-3L O2 via NC at baseline. IND with I/ADL's however endorsing that she \"can't keep up\" her apartment very well and has difficulty with cleaning tasks. Does not recieve assist at home with any IADL's.   Instrumental Activities of Daily Living (IADL)   Previous Responsibilities meal prep;housekeeping;laundry;shopping;medication management;finances   General Information   Onset of Illness/Injury or Date of Surgery 11/24/22   Referring Physician Lani San MD   Additional Occupational Profile Info/Pertinent History of Current Problem \"Ca Yan is a 76 year old female admitted on 11/24/2022 for chief concern ofLower extremity swelling, redness, and shortness of breath over a duration estimated at 2-3 weeks. Her past medical history is significant for previous breast cancer s/p lumpectomy, GERD, HTN, HLD, chronic lower extremity lymphedema, cluster C personality disorder, home oxygen use of 2-3 LPM, question of CKD, question of COPD, question of restrictive lung disease. \"   Existing Precautions/Restrictions no known precautions/restrictions;oxygen therapy device and L/min  (2-3L O2 via NC baseline)   Left Upper Extremity (Weight-bearing Status) full " weight-bearing (FWB)   Right Upper Extremity (Weight-bearing Status) full weight-bearing (FWB)   Left Lower Extremity (Weight-bearing Status) full weight-bearing (FWB)   Right Lower Extremity (Weight-bearing Status) full weight-bearing (FWB)   General Observations and Info activity: up with assist; significant bilat LE edema w/ redness & warm to touch.   Cognitive Status Examination   Affect/Mental Status (Cognitive) WFL   Follows Commands WFL   Cognitive Status Comments Per chart review, pt hallucinating overnight. No hallucinations reported upon eval on this date, will continue to monitor.   Visual Perception   Visual Impairment/Limitations WFL   Pain Assessment   Patient Currently in Pain Yes, see Vital Sign flowsheet  (Pt initially declining pain during eval, however endorsing pain in BLE several minutes later.)   Range of Motion Comprehensive   Comment, General Range of Motion no deficits identified on this date, will continue to monitor.   Strength Comprehensive (MMT)   Comment, General Manual Muscle Testing (MMT) Assessment no deficits identified on this date, will continue to monitor.   Coordination   Coordination Comments no deficits identified on this date, will continue to monitor.   Bed Mobility   Bed Mobility supine-sit   Supine-Sit [place] (Bed Mobility) maximum assist (25% patient effort)  (req assist with moving BLE, lifting trunk, and adjusting hips throughout.)   Activities of Daily Living   BADL Assessment/Intervention upper body dressing;lower body dressing;toileting   Upper Body Dressing Assessment/Training   [place] Level (Upper Body Dressing) minimum assist (75% patient effort)  (per clinical judgement)   Lower Body Dressing Assessment/Training   [place] Level (Lower Body Dressing) maximum assist (25% patient effort)  (per clinical judgement)   Toileting   [place] Level (Toileting) maximum assist (25% patient effort)  (per clinical judgement)   Clinical Impression    Criteria for Skilled Therapeutic Interventions Met (OT) Yes, treatment indicated   OT Diagnosis dec ind with I/ADL's   OT Problem List-Impairments impacting ADL problems related to;activity tolerance impaired;balance;range of motion (ROM);strength;pain;mobility   Assessment of Occupational Performance 3-5 Performance Deficits   Identified Performance Deficits bathing, dressing, g/h, home mgmt, ambulation, safety   Planned Therapy Interventions (OT) ADL retraining;IADL retraining;balance training;bed mobility training;ROM;strengthening;transfer training;progressive activity/exercise   Clinical Decision Making Complexity (OT) low complexity   Anticipated Equipment Needs Upon Discharge (OT)   (TBD)   Risk & Benefits of therapy have been explained evaluation/treatment results reviewed;care plan/treatment goals reviewed;risks/benefits reviewed;current/potential barriers reviewed;participants voiced agreement with care plan;participants included;patient   OT Total Evaluation Time   OT Eval, Low Complexity Minutes (11671) 8   OT Goals   Therapy Frequency (OT) 4 times/wk   OT Predicted Duration/Target Date for Goal Attainment 12/09/22   OT Goals Hygiene/Grooming;Upper Body Dressing;Lower Body Dressing;Toilet Transfer/Toileting;OT Goal 1   OT: Hygiene/Grooming supervision/stand-by assist;while standing   OT: Upper Body Dressing Supervision/stand-by assist;including set-up/clothing retrieval   OT: Lower Body Dressing Minimal assist;including set-up/clothing retrieval   OT: Toilet Transfer/Toileting Minimal assist;cleaning and garment management   OT: Goal 1 Pt will be IND and demo safety with tub transfer   Self-Care/Home Management   Self-Care/Home Mgmt/ADL, Compensatory, Meal Prep Minutes (60476) 14   Symptoms Noted During/After Treatment (Meal Preparation/Planning Training) increased pain;shortness of breath   Treatment Detail/Skilled Intervention Pt on 3.5L O2 via NC upon arrival, O2> 90%, demo SOB while sitting EOB  upon conclusion of eval. Facilitated sit>stand with mod A to facilitate ambulation in room, however once standing pt declining ambulation 2/2 BLE pain. Returned to sit. OT noted wet sheets, NST assisting to change bed linens. Facilitated sit<>stand min A, pt tolerated standing approx 30 seconds while linens changed, req min A for standing bal. Facilitated stepping to HOB with min A. Returned to sit, returned to supine max A. Boosted total Ax2, left with all needs met.   OT Discharge Planning   OT Plan No walker at time of eval, placed order. Funcitonal ambulation & transfers, FBD, g/h sinkside   OT Discharge Recommendation (DC Rec) Transitional Care Facility;home with assist;home with home care occupational therapy   OT Rationale for DC Rec Pt below functional baseline with I/ADL completion, endorses inability to keep up IADL tasks at home and no social support to assist. Pt declining ambulation with OT on this date 2/2 BLE pain & edema. Recommend TCU at this time to continue to progress functional mobility and IND with I/ADLs. If pt were to d/c home on this date, would require Ax1 with all ambulation & ADL completion, consideration of DME and AE needs, and HHOT/PT services.   OT Brief overview of current status no ambulation on this date, mod A STS at EOB   Total Session Time   Timed Code Treatment Minutes 14   Total Session Time (sum of timed and untimed services) 22

## 2022-11-25 NOTE — PROGRESS NOTES
"SPIRITUAL HEALTH SERVICES Progress Note  [facility] ([place]) 5a    Pt is distressed by feelings of isolation. \"I forgot my phonebook at home, I don't have my friend Cyndi's number\". Pt asks \"why is God letting me go through this\". Pt asked this  what others do to cope in a time like this.     Pt requested a bible to read and a prayer- provided both.  also let nursing know that pt requested a coloring book or some form of art as a comfort to her.     [facility] will follow up per pt/staff/family request     Greg Chen MDiv  Chaplain Resident  Pager [phone]    * [facility] remains available 24/7 for emergent requests/referrals, either by having the switchboard page the on-call  or by entering an ASAP/STAT consult in Epic (this will also page the on-call ). Routine Epic consults receive an initial response within 24 hours.*   "

## 2022-11-25 NOTE — PROGRESS NOTES
11/25/22 1400   Appointment Info   Signing Clinician's Name / Credentials (PT) ARCENIO Ross   Student Supervision Direct Patient Contact Provided;Therapy services provided with the co-signing licensed therapist guiding and directing the services, and providing the skilled judgement and assessment throughout the session   Living Environment   People in Home alone   Current Living Arrangements apartment   Home Accessibility no concerns   Transportation Anticipated other (see comments)  (pt currently unsure)   Living Environment Comments Pt lives alone in single level apartment with no concerns for stairs. Pt has no assist available any part of the day   Self-Care   Usual Activity Tolerance fair   Current Activity Tolerance poor   Regular Exercise No   Equipment Currently Used at Home walker, rolling   Fall history within last six months no   Activity/Exercise/Self-Care Comment Pt used FWW infrequently when doing laundry   General Information   Onset of Illness/Injury or Date of Surgery 11/24/22   Referring Physician Nikita Posey MD   Patient/Family Therapy Goals Statement (PT) To get better and return home   Pertinent History of Current Problem (include personal factors and/or comorbidities that impact the POC) Ca Yan is a 76 year old female admitted on 11/24/2022 for chief concern ofLower extremity swelling, redness, and shortness of breath over a duration estimated at 2-3 weeks. Her past medical history is significant for previous breast cancer s/p lumpectomy, GERD, HTN, HLD, chronic lower extremity lymphedema, cluster C personality disorder, home oxygen use of 2-3 LPM, question of CKD, question of COPD, question of restrictive lung disease.   Existing Precautions/Restrictions no known precautions/restrictions   Weight-Bearing Status - LUE full weight-bearing   Weight-Bearing Status - RUE full weight-bearing   Weight-Bearing Status - LLE full weight-bearing   Weight-Bearing Status - RLE full  "weight-bearing   General Observations Activity: Up with assist   Cognition   Affect/Mental Status (Cognition) confused   Orientation Status (Cognition) oriented x 3   Follows Commands (Cognition) follows one-step commands   Behavioral Issues overwhelmed easily   Memory Deficit (Cognition) minimal deficit   Cognitive Status Comments Pt appears to be confused and will switch back and forth between consenting and refusing and appear frustrated frequently. Pt appears to have short term memory deficits.   Pain Assessment   Patient Currently in Pain Yes, see Vital Sign flowsheet   Integumentary/Edema   Integumentary/Edema Comments Singificant nonpitting edema BLE sensitive to touch with dry patchy bright red spots   Posture    Posture Forward head position;Protracted shoulders;Kyphosis   Range of Motion (ROM)   Range of Motion ROM is WFL   Strength (Manual Muscle Testing)   Strength (Manual Muscle Testing) strength is WFL   Bed Mobility   Comment, (Bed Mobility) Pt was min-modAx1 with bed mobility - states that feet \"get stuck\"   Transfers   Comment, (Transfers) minAx1 with supine>sit transfer and sit to stand   Gait/Stairs (Locomotion)   Comment, (Gait/Stairs) Unable to evaluate due to pt not consenting - \"Try tomorrow\". Pt demonstrated pregait - standing endurance and alternating steps in place. Anticipate pt able to ambulate w/ FWW CGA   Balance   Balance Comments Pt demo'd static balance for 30\" w/ FWW CGA   Sensory Examination   Sensory Perception WFL   Sensory Perception Comments B LE   Clinical Impression   Criteria for Skilled Therapeutic Intervention Yes, treatment indicated   PT Diagnosis (PT) Impaired functional mobility   Influenced by the following impairments fear of falling, fear of significant vital sign, general deconditioning, confusion, memory deficit   Functional limitations due to impairments bed mobility, transfers, gait, dynamic balance   Clinical Presentation (PT Evaluation Complexity) " Evolving/Changing   Clinical Presentation Rationale Clinical judgement   Clinical Decision Making (Complexity) moderate complexity   Planned Therapy Interventions (PT) balance training;bed mobility training;gait training;home exercise program;neuromuscular re-education;patient/family education;postural re-education;ROM (range of motion);strengthening;transfer training;wheelchair management/propulsion training;progressive activity/exercise   Anticipated Equipment Needs at Discharge (PT) other (see comments)  (tbd)   Risk & Benefits of therapy have been explained evaluation/treatment results reviewed;care plan/treatment goals reviewed;risks/benefits reviewed;current/potential barriers reviewed   Clinical Impression Comments Pt is a 75 yo female presenting with impaired functional mobility impacted by fear of significant vital sign and potential confusion and memory deficits. At baseline, pt was ind with mobility without assistive device limited by respiratory condition. Pt demo's appropriate pregait to progres to ambulation but pt refused attempted gait. Pt would later report pt is willing to walk in order to use the bathroom. Pt is inconsistent with responses and is not a willing participant. Monitor O2 closely, BP taken on forearm   PT Total Evaluation Time   PT Eval, Moderate Complexity Minutes (82889) 10   Physical Therapy Goals   PT Frequency 5x/week   PT Predicted Duration/Target Date for Goal Attainment 12/09/22   PT Goals Transfers;Gait;Bed Mobility   PT: Bed Mobility Modified independent   PT: Transfers Modified independent   PT: Gait 150 feet   PT Discharge Planning   PT Plan trial gait w/ FWW, proximal LE strengthening, HEP   PT Discharge Recommendation (DC Rec) Transitional Care Facility;home with assist;home with home care physical therapy   PT Rationale for DC Rec Pt is below baseline with functional mobility. Pt has not demo'd ability to ambulate and is limited by respiratory condition with activity.  Primary recommendation is TCU as pt is unsafe to discharge home. Pt would require 24/7 assist with all transfers which pt can not fulfill.   PT Brief overview of current status lift for transfers, anticipate able to ambulate within room w/FWW once gait trialed   Total Session Time   Total Session Time (sum of timed and untimed services) 10

## 2022-11-25 NOTE — H&P
"    [facility]    History and Physical - Medicine Service, IVIS TEAM 1       Date of Admission:  11/24/2022    Assessment & Plan      Ca Yan is a 76 year old female admitted on 11/24/2022 for chief concern ofLower extremity swelling, redness, and shortness of breath over a duration estimated at 2-3 weeks. Her past medical history is significant for previous breast cancer s/p lumpectomy, GERD, HTN, HLD, chronic lower extremity lymphedema, cluster C personality disorder, home oxygen use of 2-3 LPM, question of CKD, question of COPD, question of restrictive lung disease.     #Acute on Chronic Hypoxic Hypercapnic Respiratory Failure  Pt with history of chronic CO2 retention, has seen pulmonology in the past and reportedly declined NIPPV. Unclear baseline PCO2 venous, however gases and lab workup here suggest chronic metabolically compensated CO2 retention. Patient reports distant smoking history, stopped in 1980 and reports she was not a heavy smoker. Per pulmonology note from 03/28/2022, recent \"PFT data FEV1/FVC ratio 0.71.  FVC is 1.12 L which is 42% predicted.  FEV1 is 0.8 L which is 39% predicted.\"    - Continue oxygen to keep saturations above 90%  - Albuterol inhaler Q6H PRN  - Duonebs Q4H PRN   - PTA azelastine 1 spray both nostrils BID     #Lower Extremity Edema  #Possible Venous stasis vs other rash  4+ pitting bilateral lower extremity edema, apparently acute on chronic over last few weeks per patient and chart review. Unclear etiology with normal BNP somewhat less likely heart failure although given elevated BMI BNP may not be fully reliable. Would also consider potential liver pathology, although LFTs normal so far, as well as potential CKD contribution to volume retention although Cr today normal. Overall, etiology remains unclear, beyond heat failure, kidney disease, and liver pathology, would also consider less likely factors such as malignancy, " although this seems less likely to be causing bilateral uniform swelling. In the context of known pulmonary disease and 2-3/6 systolic murmur today, will get TTE for tomorrow, abdominal ultrasound to evaluate both liver and possible mass compressing vasculature, as well as bilateral LE ultrasound for consideration of possible but less likely bilateral DVT and subsequent PE contribution to oxygen status. Feel lower extremity rashes most consistent with venous stasis given they are bilateral and in dependent symmetric regions of LE.    - TTE 11/25/2022  - Abdominal ultrasound  - Bilateral LE ultrasound  - Follow bmp daily  - S/p lasix 40 mg IV in ED, will likely give additional dose 11/25/2022  - PTA clotrimazole cream  - PTA Eucerin cream    #Generalized Anxiety Disorder  As per recent family medicine visit, continuing PTA medications.   - Buspirone 10 mg PO daily    #Depression  - Continue PTA mirtazapine   - Continue PTA sertraline 150 mg daily    #Insomnia  - Continue PTA Seroquel, 150 mg PO QHS    #Hypertension  - Continue PTA Losartan/HCTZ 100-25 mg PO daily    #HLD  - Continue PTA atorvastatin 40 mg PO daily    #?Glaucoma  - PTA latanoprost 1 drop each eye at bedtime    #?Vitamin Deficiency  #Anemia  - Continue PTA folic acid 400 mcg daily  - Continue vitamin B complex with C 1 tablet daily  - Continue vitamin D3 25 mcg daily          Diet: Combination Diet Regular Diet Adult    DVT Prophylaxis: Enoxaparin (Lovenox) SQ  Lima Catheter: Not present  Fluids: PO  Central Lines: None  Cardiac Monitoring: None  Code Status: Full Code  Discussed with patient, she would like to be full code for now but would consider decreasing level of requested care. Counseled regarding options for full code, pre-arrest intubation only, and DNR/DNI.     Clinically Significant Risk Factors Present on Admission                  # Hypertension: home medication list includes antihypertensive(s)     # Obesity: Estimated body mass index  "is 39.05 kg/m  as calculated from the following:    Height as of this encounter: 1.6 m (5' 3\").    Weight as of this encounter: 100 kg (220 lb 7.4 oz).           Disposition Plan      Expected Discharge Date: 11/29/2022                The patient's care was discussed with the Attending Physician, Dr. San.    Nikita Posey MD  Medicine Service, [address]  Securely message with the Vocera Web Console (learn more here)  Text page via [facility] Paging/Directory   Please see signed in provider for up to date coverage information    ______________________________________________________________________    Chief Complaint   Lower extremity swelling, redness, and shortness of breath 2-3 weeks in duration    History is obtained from the patient    History of Present Illness   Ca Yan is a 76 year old female admitted on 11/24/2022 for chief concern ofLower extremity swelling, redness, and shortness of breath over a duration estimated at 2-3 weeks. Her past medical history is significant for previous breast cancer s/p lumpectomy, GERD, HTN, HLD, chronic lower extremity lymphedema, cluster C personality disorder, home oxygen use of 2-3 LPM, question of CKD, question of COPD, question of restrictive lung disease.     States that over the last several weeks she has had gradually worsening swelling and redness in her legs as well as gradually worsening shortness of breath. She notes she uses 2-3 LPM of NC at home, typically able to tolerate walking without difficulty on a flat surface as long as she has her oxygen tank. Has been very concerned that recently has had worsening SOB and increasing oxygen requirements. Denies chest pain now or previous as well as shortness of breath on current level of oxygen support. Has not had changes in vision, swallowing. Has not had light headedness, dizziness, or abdominal pain. No hematochezia, melena, constipation, diarrhea, " hematuria, dysuria, nausea, vomiting. Denies fevers, notes that she has chills she thinks for a few weeks but uncertain of duration or severity. Reports remote smoke history and having stopped in 1980. Reports she stopped drinking 25 years ago, has difficulty remembering but may have drank daily. Reports less than 15 drinks per week when she was drinking.     Throughout interview very concerned about sensation of needing to urinate, counseled to use purewick.     Review of Systems    The 10 point Review of Systems is negative other than noted in the HPI or here.     Past Medical History    I have reviewed this patient's medical history and updated it with pertinent information if needed.   Past Medical History:   Diagnosis Date     Anxiety      Arthritis      Breast cancer (H)      COPD (chronic obstructive pulmonary disease) (H)     6/19/12:  FEV 0.99 l     Depressive disorder      Hypertension      Low back pain with left-sided sciatica      Low bone density 4/13/2017    DEXA April 12, 2017: T score -2.0. Normal Z score. FRAX risk: major osteoporotic fracture 11.9%, hip fracture 2.6%, therefore not high-risk     Lymphedema, chronic lower extremities        Past Surgical History   I have reviewed this patient's surgical history and updated it with pertinent information if needed.  Past Surgical History:   Procedure Laterality Date     BACK SURGERY      spinal fusion     COLONOSCOPY       LARYNGOSCOPY WITH MICROSCOPE N/A 4/30/2021    Procedure: FLEXIBLE LARYNGOSCOPY;  Surgeon: Domonique Roche MD;  Location: [facility] OR     LUMPECTOMY BREAST       ORTHOPEDIC SURGERY      Knee surgery left side        Social History   I have reviewed this patient's social history and updated it with pertinent information if needed. Ca Yan  reports that she quit smoking about 42 years ago. Her smoking use included cigarettes. She started smoking about 66 years ago. She has a 2.50 pack-year smoking history. She quit smokeless tobacco  use about 42 years ago. She reports that she does not currently use alcohol. She reports that she does not use drugs.    Family History   I have reviewed this patient's family history and updated it with pertinent information if needed.  Family History   Problem Relation Age of Onset     Breast Cancer Mother      Diabetes Mother      Anxiety Disorder Mother      Asthma Mother      Other Cancer Mother      Hyperlipidemia Mother      Alcohol/Drug Father      Mental Illness Father      Substance Abuse Father      Obesity Father      Cerebrovascular Disease Maternal Grandmother 67     Other - See Comments Maternal Aunt         lived to 95       Prior to Admission Medications   Prior to Admission Medications   Prescriptions Last Dose Informant Patient Reported? Taking?   Emollient (CERAVE) CREA More than a month  No Yes   Sig: Please apply twice daily to dry skin of body and feet   QUEtiapine (SEROQUEL) 300 MG tablet 2022  Yes Yes   Sig: Take 300 mg by mouth   Vitamin D3 (CHOLECALCIFEROL) 25 mcg (1000 units) tablet 2022  No Yes   Sig: Take 1 tablet (25 mcg) by mouth daily   atorvastatin (LIPITOR) 40 MG tablet 2022  No Yes   Sig: Take 1 tablet (40 mg) by mouth daily   azelastine (ASTELIN) 0.1 % nasal spray Unknown  Yes Yes   busPIRone (BUSPAR) 10 MG tablet 2022  No Yes   Sig: Take 1 tablet (10 mg) by mouth 2 times daily   clotrimazole (LOTRIMIN) 1 % external cream More than a month  No Yes   Sig: Apply topically 2 times daily as needed (under arms breast groin rash until rash resolves)   folic acid (FOLVITE) 400 MCG tablet 2022  No Yes   Sig: Take 1 tablet (400 mcg) by mouth daily   latanoprost (XALATAN) 0.005 % ophthalmic solution 2022  Yes Yes   Si drop   losartan-hydrochlorothiazide (HYZAAR) 100-25 MG tablet 2022  No Yes   Sig: Take 1 tablet by mouth daily   sertraline (ZOLOFT) 100 MG tablet 2022  No Yes   Sig: Take 1.5 tablets (150 mg) by mouth daily   vitamin B  complex with vitamin C (STRESS TAB) tablet 11/23/2022  No Yes   Sig: Take 1 tablet by mouth daily      Facility-Administered Medications: None     Allergies   Allergies   Allergen Reactions     Azithromycin Itching     Codeine Nausea and Vomiting     Hydrocodone Nausea and Vomiting     Metronidazole Nausea     Oxycodone Itching and Rash     Percocet [Oxycodone-Acetaminophen] Nausea and Vomiting     Pollen Extract      sneezing and runny nose.      Seasonal Allergies      sneezing and runny nose.      Vicodin [Hydrocodone-Acetaminophen] Nausea and Vomiting     Zolpidem      Amnestic behavior       Physical Exam   Vital Signs: Temp: 98.4  F (36.9  C) Temp src: Oral BP: (!) 164/80 Pulse: 83   Resp: 22 SpO2: 100 % O2 Device: Nasal cannula Oxygen Delivery: 2 LPM  Weight: 220 lbs 7.36 oz    Constitutional: awake, alert, cooperative, no apparent distress, and appears stated age  Eyes: Lids and lashes normal, pupils equal, round and reactive to light, extra ocular muscles intact, sclera clear, conjunctiva normal  ENT: Normocephalic, without obvious abnormality, atraumatic, sinuses nontender on palpation, external ears without lesions, oral pharynx with moist mucous membranes, tonsils without erythema or exudates, gums normal and good dentition.  Hematologic / Lymphatic: no cervical lymphadenopathy and no supraclavicular lymphadenopathy  Respiratory: Bibasilar crackles in lungs left greater than right on posterior lung fields. Superior fields CTAB. Noted kyphosis.   Cardiovascular: 2-3/6 systolic murmur loudest at right sternal border. Unable to assess JVD 2/2 body habitus.   GI: No scars, normal bowel sounds, soft, non-distended, non-tender, no masses palpated, no hepatosplenomegally  Skin: Areas of bilateral erythema located immediately over sock line on both feet. Pattern appears roughly symmetric.   Musculoskeletal: 4+ bilateral lower extremity edema. Moving all extremities. Probable bunion located on left foot.    Neurologic: Awake, alert, able to participate fully in interview although frequently perseverates on needing o tuse bathroom.Cranial nerves II-XII are grossly intact.   Neuropsychiatric: General: fidgeting and normal eye contact  Level of consciousness: alert / normal  Affect: anxious    Data   Data reviewed today: I reviewed all medications, new labs and imaging results over the last 24 hours. I personally reviewed the EKG tracing showing NSR. Chest XR showing mild interstitial opacities bilaterally likely edema.     Recent Labs   Lab 11/24/22  1533 11/24/22  1259   WBC  --  5.6   HGB  --  8.2*   MCV  --  104*   PLT  --  233   NA  --  144  144   POTASSIUM  --  3.8  3.8   CHLORIDE  --  100  100   CO2  --  35*  36*   BUN  --  22.2  22.1   CR 0.70 0.68  0.69   ANIONGAP  --  9  8   SYMONE  --  8.8  8.7*   GLC  --  101*  102*   ALBUMIN  --  3.7   PROTTOTAL  --  6.8   BILITOTAL  --  0.3   ALKPHOS  --  86   ALT  --  17   AST  --  37*   LIPASE  --  16     Most Recent 3 CBC's:Recent Labs   Lab Test 11/24/22  1259 11/15/22  0828 11/10/22  1510   WBC 5.6 7.0 6.1   HGB 8.2* 8.3* 9.1*   * 106* 104*    204 204     Most Recent 3 BMP's:Recent Labs   Lab Test 11/24/22  1533 11/24/22  1259 11/15/22  0828 11/10/22  1510   NA  --  144  144 141 144   POTASSIUM  --  3.8  3.8 4.1 4.3   CHLORIDE  --  100  100 98 99   CO2  --  35*  36* 35* 34*   BUN  --  22.2  22.1 29.0* 26.2*   CR 0.70 0.68  0.69 0.73 0.73   ANIONGAP  --  9  8 8 11   SYMONE  --  8.8  8.7* 9.3 9.3   GLC  --  101*  102* 100* 98     Most Recent 2 LFT's:Recent Labs   Lab Test 11/24/22  1259 11/15/22  0828   AST 37* 40*   ALT 17 19   ALKPHOS 86 93   BILITOTAL 0.3 0.2     Most Recent 3 Troponin's:No lab results found.  Recent Results (from the past 24 hour(s))   XR Chest 2 Views    Narrative    Chest 2 view    History: short of breath    Compression:  11/15/2022    Findings: Cardiac silhouette is borderline enlarged. Interstitial  opacities and  vascular engorgement. Small right pleural effusion.      Impression    Impression: Mild interstitial opacities bilaterally likely edema.     MICHAEL MCGRATH MD         SYSTEM ID:  [id number]

## 2022-11-26 ENCOUNTER — APPOINTMENT (OUTPATIENT)
Dept: OCCUPATIONAL THERAPY | Facility: CLINIC | Age: 77
DRG: 205 | End: 2022-11-26
Payer: COMMERCIAL

## 2022-11-26 ENCOUNTER — APPOINTMENT (OUTPATIENT)
Dept: GENERAL RADIOLOGY | Facility: CLINIC | Age: 77
DRG: 205 | End: 2022-11-26
Attending: PHYSICIAN ASSISTANT
Payer: COMMERCIAL

## 2022-11-26 LAB
ALLEN'S TEST: YES
ANION GAP SERPL CALCULATED.3IONS-SCNC: 10 MMOL/L (ref 7–15)
ANION GAP SERPL CALCULATED.3IONS-SCNC: 12 MMOL/L (ref 7–15)
BASE EXCESS BLDA CALC-SCNC: 20.9 MMOL/L (ref -9–1.8)
BUN SERPL-MCNC: 21.2 MG/DL (ref 8–23)
BUN SERPL-MCNC: 21.8 MG/DL (ref 8–23)
CALCIUM SERPL-MCNC: 9 MG/DL (ref 8.8–10.2)
CALCIUM SERPL-MCNC: 9.6 MG/DL (ref 8.8–10.2)
CHLORIDE SERPL-SCNC: 89 MMOL/L (ref 98–107)
CHLORIDE SERPL-SCNC: 94 MMOL/L (ref 98–107)
CREAT SERPL-MCNC: 0.81 MG/DL (ref 0.51–0.95)
CREAT SERPL-MCNC: 0.82 MG/DL (ref 0.51–0.95)
DEPRECATED HCO3 PLAS-SCNC: 38 MMOL/L (ref 22–29)
DEPRECATED HCO3 PLAS-SCNC: 42 MMOL/L (ref 22–29)
GFR SERPL CREATININE-BSD FRML MDRD: 73 ML/MIN/1.73M2
GFR SERPL CREATININE-BSD FRML MDRD: 74 ML/MIN/1.73M2
GLUCOSE SERPL-MCNC: 106 MG/DL (ref 70–99)
GLUCOSE SERPL-MCNC: 96 MG/DL (ref 70–99)
HCO3 BLD-SCNC: 47 MMOL/L (ref 21–28)
HOLD SPECIMEN: NORMAL
NT-PROBNP SERPL-MCNC: 395 PG/ML (ref 0–1800)
O2/TOTAL GAS SETTING VFR VENT: 100 %
PCO2 BLD: 64 MM HG (ref 35–45)
PH BLD: 7.48 [PH] (ref 7.35–7.45)
PO2 BLD: 57 MM HG (ref 80–105)
POTASSIUM SERPL-SCNC: 3.8 MMOL/L (ref 3.4–5.3)
POTASSIUM SERPL-SCNC: 3.8 MMOL/L (ref 3.4–5.3)
SODIUM SERPL-SCNC: 142 MMOL/L (ref 136–145)
SODIUM SERPL-SCNC: 143 MMOL/L (ref 136–145)
TROPONIN T SERPL HS-MCNC: 19 NG/L
TROPONIN T SERPL HS-MCNC: 22 NG/L
TROPONIN T SERPL HS-MCNC: 25 NG/L

## 2022-11-26 PROCEDURE — 250N000009 HC RX 250

## 2022-11-26 PROCEDURE — 36416 COLLJ CAPILLARY BLOOD SPEC: CPT

## 2022-11-26 PROCEDURE — 94640 AIRWAY INHALATION TREATMENT: CPT

## 2022-11-26 PROCEDURE — 250N000013 HC RX MED GY IP 250 OP 250 PS 637

## 2022-11-26 PROCEDURE — 93005 ELECTROCARDIOGRAM TRACING: CPT

## 2022-11-26 PROCEDURE — 99233 SBSQ HOSP IP/OBS HIGH 50: CPT | Mod: GC | Performed by: STUDENT IN AN ORGANIZED HEALTH CARE EDUCATION/TRAINING PROGRAM

## 2022-11-26 PROCEDURE — 999N000157 HC STATISTIC RCP TIME EA 10 MIN

## 2022-11-26 PROCEDURE — 71045 X-RAY EXAM CHEST 1 VIEW: CPT

## 2022-11-26 PROCEDURE — 83880 ASSAY OF NATRIURETIC PEPTIDE: CPT | Performed by: PHYSICIAN ASSISTANT

## 2022-11-26 PROCEDURE — 250N000013 HC RX MED GY IP 250 OP 250 PS 637: Performed by: STUDENT IN AN ORGANIZED HEALTH CARE EDUCATION/TRAINING PROGRAM

## 2022-11-26 PROCEDURE — 84484 ASSAY OF TROPONIN QUANT: CPT | Performed by: PHYSICIAN ASSISTANT

## 2022-11-26 PROCEDURE — 120N000002 HC R&B MED SURG/OB UMMC

## 2022-11-26 PROCEDURE — 94640 AIRWAY INHALATION TREATMENT: CPT | Mod: 76

## 2022-11-26 PROCEDURE — 36415 COLL VENOUS BLD VENIPUNCTURE: CPT

## 2022-11-26 PROCEDURE — 71045 X-RAY EXAM CHEST 1 VIEW: CPT | Mod: 26 | Performed by: RADIOLOGY

## 2022-11-26 PROCEDURE — 82803 BLOOD GASES ANY COMBINATION: CPT

## 2022-11-26 PROCEDURE — 82310 ASSAY OF CALCIUM: CPT | Performed by: PHYSICIAN ASSISTANT

## 2022-11-26 PROCEDURE — 80048 BASIC METABOLIC PNL TOTAL CA: CPT

## 2022-11-26 PROCEDURE — 250N000011 HC RX IP 250 OP 636

## 2022-11-26 PROCEDURE — 84484 ASSAY OF TROPONIN QUANT: CPT

## 2022-11-26 PROCEDURE — 36415 COLL VENOUS BLD VENIPUNCTURE: CPT | Performed by: PHYSICIAN ASSISTANT

## 2022-11-26 PROCEDURE — 93010 ELECTROCARDIOGRAM REPORT: CPT | Performed by: INTERNAL MEDICINE

## 2022-11-26 PROCEDURE — 36600 WITHDRAWAL OF ARTERIAL BLOOD: CPT

## 2022-11-26 PROCEDURE — 97535 SELF CARE MNGMENT TRAINING: CPT | Mod: GO

## 2022-11-26 RX ORDER — IPRATROPIUM BROMIDE AND ALBUTEROL SULFATE 2.5; .5 MG/3ML; MG/3ML
3 SOLUTION RESPIRATORY (INHALATION)
Status: DISCONTINUED | OUTPATIENT
Start: 2022-11-26 | End: 2022-12-04

## 2022-11-26 RX ORDER — ACETYLCYSTEINE 100 MG/ML
4 SOLUTION ORAL; RESPIRATORY (INHALATION) EVERY 4 HOURS
Status: DISCONTINUED | OUTPATIENT
Start: 2022-11-26 | End: 2022-12-05

## 2022-11-26 RX ORDER — FUROSEMIDE 10 MG/ML
20 INJECTION INTRAMUSCULAR; INTRAVENOUS ONCE
Status: COMPLETED | OUTPATIENT
Start: 2022-11-26 | End: 2022-11-26

## 2022-11-26 RX ADMIN — SENNOSIDES AND DOCUSATE SODIUM 1 TABLET: 50; 8.6 TABLET ORAL at 20:01

## 2022-11-26 RX ADMIN — Medication 25 MCG: at 08:08

## 2022-11-26 RX ADMIN — AZELASTINE HYDROCHLORIDE 1 SPRAY: 137 SPRAY, METERED NASAL at 20:05

## 2022-11-26 RX ADMIN — LATANOPROST 1 DROP: 50 SOLUTION OPHTHALMIC at 21:24

## 2022-11-26 RX ADMIN — MICONAZOLE NITRATE: 2 POWDER TOPICAL at 08:07

## 2022-11-26 RX ADMIN — ENOXAPARIN SODIUM 40 MG: 40 INJECTION SUBCUTANEOUS at 18:32

## 2022-11-26 RX ADMIN — FUROSEMIDE 20 MG: 10 INJECTION, SOLUTION INTRAVENOUS at 08:07

## 2022-11-26 RX ADMIN — LOSARTAN POTASSIUM AND HYDROCHLOROTHIAZIDE 1 TABLET: 25; 100 TABLET ORAL at 08:08

## 2022-11-26 RX ADMIN — Medication 1 MG: at 21:20

## 2022-11-26 RX ADMIN — SERTRALINE HYDROCHLORIDE 100 MG: 100 TABLET ORAL at 08:08

## 2022-11-26 RX ADMIN — ACETYLCYSTEINE 4 ML: 100 SOLUTION ORAL; RESPIRATORY (INHALATION) at 15:39

## 2022-11-26 RX ADMIN — Medication 400 MCG: at 08:08

## 2022-11-26 RX ADMIN — IPRATROPIUM BROMIDE AND ALBUTEROL SULFATE 3 ML: 2.5; .5 SOLUTION RESPIRATORY (INHALATION) at 19:39

## 2022-11-26 RX ADMIN — ACETAMINOPHEN 650 MG: 325 TABLET, FILM COATED ORAL at 12:00

## 2022-11-26 RX ADMIN — FUROSEMIDE 20 MG: 10 INJECTION, SOLUTION INTRAVENOUS at 13:09

## 2022-11-26 RX ADMIN — ACETYLCYSTEINE 4 ML: 100 SOLUTION ORAL; RESPIRATORY (INHALATION) at 19:39

## 2022-11-26 RX ADMIN — Medication 150 MG: at 21:20

## 2022-11-26 RX ADMIN — ATORVASTATIN CALCIUM 40 MG: 40 TABLET, FILM COATED ORAL at 21:20

## 2022-11-26 RX ADMIN — IPRATROPIUM BROMIDE AND ALBUTEROL SULFATE 3 ML: .5; 3 SOLUTION RESPIRATORY (INHALATION) at 12:36

## 2022-11-26 RX ADMIN — IPRATROPIUM BROMIDE AND ALBUTEROL SULFATE 3 ML: 2.5; .5 SOLUTION RESPIRATORY (INHALATION) at 15:39

## 2022-11-26 RX ADMIN — BUSPIRONE HYDROCHLORIDE 10 MG: 10 TABLET ORAL at 20:02

## 2022-11-26 RX ADMIN — B-COMPLEX W/ C & FOLIC ACID TAB 1 TABLET: TAB at 08:08

## 2022-11-26 RX ADMIN — Medication: at 08:09

## 2022-11-26 RX ADMIN — BUSPIRONE HYDROCHLORIDE 10 MG: 10 TABLET ORAL at 08:07

## 2022-11-26 ASSESSMENT — ACTIVITIES OF DAILY LIVING (ADL)
ADLS_ACUITY_SCORE: 39

## 2022-11-26 NOTE — PLAN OF CARE
Goal Outcome Evaluation:      Plan of Care Reviewed With: patient    Overall Patient Progress: no changeOverall Patient Progress: no change    Outcome Evaluation: A&Ox4. but forgetful, asks the same questions multiple times. Complains of back pain. PRN pain medication administered with relief. Purewick in place, unable to accuratley document urine output due to purewick leaking. Pt had an rapid response called due to low oxygen levels. Pt stood up to have incontience care completed and when she sat down O2 steadly stayed around 80-83% on 7-9 L O2 oxymask. Pt was put up to 15 L oxymask with O2 around 88-91%. Labs collected, EKG, ultrasound and an extra dose of lasix provided. Team aware. Redness on legs maintained did not get any larger. Regular appetite. No BM this shift. Refuses full skin assessment due to being unable to see under pts sweater.

## 2022-11-26 NOTE — PHARMACY-ADMISSION MEDICATION HISTORY
"  Admission Medication History Completed by Pharmacy    See [facility] Admission Navigator for allergy information, preferred outpatient pharmacy, prior to admission medications and immunization status.     Medication History Sources:     Med Scribe notes after interview- Nurse then changed status to \"unable to ascess\" possibly because the patient couldn't remember exactly when she last took medications    [facility] medication review from 10/7/22    [facility] pharmacy filling history    Changes made to PTA medication list (reason):    Added: None    Deleted: Azelastine spray    Changed: Sertraline from 150mg po daily to 100mg po daily per pharmacy filling records for last 180 days and [facility] records    Additional Information:    Patient admitted she should probably have someone assisting with medications per the 11/24/22 interview from the med scribe    The rx medications look to be filled with regularity, the buspirone appears to have just been started on 11/17/22     The OTCs cannot be verified for filling history    Prior to Admission medications    Medication Sig Last Dose Taking? Auth Provider Long Term End Date   atorvastatin (LIPITOR) 40 MG tablet Take 1 tablet (40 mg) by mouth daily 11/23/2022 Yes Favian Sahu MD Yes    busPIRone (BUSPAR) 10 MG tablet Take 1 tablet (10 mg) by mouth 2 times daily 11/23/2022 Yes Favian Sahu MD Yes    clotrimazole (LOTRIMIN) 1 % external cream Apply topically 2 times daily as needed (under arms breast groin rash until rash resolves) More than a month Yes Favian Sahu MD     Emollient (CERAVE) CREA Please apply twice daily to dry skin of body and feet More than a month Yes Favian Sahu MD     folic acid (FOLVITE) 400 MCG tablet Take 1 tablet (400 mcg) by mouth daily 11/23/2022 Yes Favian Sahu MD     latanoprost (XALATAN) 0.005 % ophthalmic solution 1 drop 11/23/2022 Yes Reported, Patient Yes    losartan-hydrochlorothiazide " (HYZAAR) 100-25 MG tablet Take 1 tablet by mouth daily 11/23/2022 Yes Favian Sahu MD Yes    QUEtiapine (SEROQUEL) 300 MG tablet Take 300 mg by mouth 11/23/2022 Yes Reported, Patient     sertraline (ZOLOFT) 100 MG tablet Take 1.5 tablets (150 mg) by mouth daily  Patient taking differently: Take 100 mg by mouth daily 11/23/2022 Yes Favian Sahu MD Yes    vitamin B complex with vitamin C (STRESS TAB) tablet Take 1 tablet by mouth daily 11/23/2022 Yes Favian Sahu MD     Vitamin D3 (CHOLECALCIFEROL) 25 mcg (1000 units) tablet Take 1 tablet (25 mcg) by mouth daily 11/23/2022 Yes Favian Sahu MD         Date completed: 11/26/22    Medication history completed by:     Delfino Hernandez PharmD, [facility]    [phone]  Pager [phone]

## 2022-11-26 NOTE — PROGRESS NOTES
Rapid Response Team Note    Assessment   In assessment a rapid response was called on Ca Yan due to acute hypoxic respiratory failure and respiratory distress. The etiology of this presentation is unclear. CXR on admission suggestive of pulmonary edema, however given the acute change in status will need to consider other possibilities as well. Patient does report back pain and pleuritic chest pain. DDx includes pulmonary edema, pleural effusion, PE, PTX, dissection, mucous plugging, and infection.     Co-morbidities include: chronic hypoxia, possible obstructive/restrictive lung disease, chronic LE edema     Plan   -  Stat CTA Chest to r/o PE and evaluate lung fields  -  Check VBG, BMP, BNP, troponin  -  Duoneb given, consider mucolytics and metanebs  -  Already received IV lasix 20mg  -  The Internal Medicine primary team was able to be reached and they are in agreement with the above plan.  -  Disposition: The patient will remain on the current unit. We will continue to monitor this patient closely.  -  Reassessment and plan follow-up will be performed by the primary team      Chadwick Vincent PA-C  [facility] RRT [facility] Job Code Contact #[phone]  [facility] Paging/Directory    Hospital Course   Brief Summary of events leading to rapid response:   RRT called due to worsening hypoxia and respiratory distress. Patient is on 2-3 L at baseline. She was admitted with worsening lower extremity edema and shortness of breath. Currently being diuresed for suspected pulmonary edema. Patient stood up and developed acute back pain and shortness of breath, requiring up to 15 L to maintain sats. Denies sputum production. Chest pain is worse with deep breathing.     Primary team is at bedside. Case discussed. RT noted that oxygen tubing was loose, now only requiring 5-6 L to maintain sats. Patient still in some distress.     Admission Diagnosis:   Shortness of breath [R06.02]  Cellulitis [L03.90]    Physical Exam   Temp:  98  F (36.7  C) Temp  Min: 98  F (36.7  C)  Max: 98.6  F (37  C)  Resp: 16 Resp  Min: 16  Max: 18  SpO2: 99 % SpO2  Min: 99 %  Max: 100 %  Pulse: 77 Pulse  Min: 74  Max: 95    No data recorded  BP: (!) 142/65 Systolic (24hrs), Av , Min:109 , Max:155   Diastolic (24hrs), Av, Min:48, Max:87     I/Os: I/O last 3 completed shifts:  In: -   Out: 1300 [Urine:1300]     Exam:   General: acutely ill appearing  Mental Status: AAOx4.  CV:  RRR, no murmur or gallop  PULM:  Labored breathing. Diminished throughout with scattered rhonchi. No wheezing.  GI:  Soft, ND, NT.  Ext:  2-3+ pitting edema    Significant Results and Procedures   Lactic Acid:   Recent Labs   Lab Test 10/21/22  1131   LACT 0.6*     CBC:   Recent Labs   Lab Test 22  0602 22  1259 11/15/22  0828   WBC 5.2 5.6 7.0   HGB 8.3* 8.2* 8.3*   HCT 28.2* 28.5* 28.8*    233 204        Sepsis Evaluation   The patient is not known to have an infection.  NO EVIDENCE OF SEPSIS at this time.  Vital sign, physical exam, and lab findings are due to N/A.

## 2022-11-26 NOTE — PROGRESS NOTES
[facility]    Progress Note - Medicine Service, VIIS TEAM 1       Date of Admission:  11/24/2022    Assessment & Plan   Ca Yan is a 76 year old female admitted on 11/24/2022 for chief concern ofLower extremity swelling, redness, and shortness of breath over a duration estimated at 2-3 weeks. Her past medical history is significant for previous breast cancer s/p lumpectomy, GERD, HTN, HLD, chronic lower extremity lymphedema, cluster C personality disorder, home oxygen use of 2-3 LPM, question of CKD, question of COPD, and known restrictive lung disease.        Today:   - Patient with acutely increased oxygen requirements 11/26, evaluated at bedside with rapid team and staff physician. Most likely represents mucous plugging based on CXR with localized worsening of RLL, worsening cough, sudden onset. Favor mucous clearance strategies. Less likely worsening volume status with improvement in left lung congestion after days of diuresis. Additional dose of 20 mg IV lasix given for total of 40 mg IV during rapid, will likely defer additional doses tomorrow. Additionally, in the setting of DVT ultrasound negative this admission, no tachycardia, and CXR findings with cough more suggestive of mucous plugging would not favor CT PE study.   - S/p lasix 20 mg IV AM and 40 mg IV during rapid  - Scheduling duonebs and mucomyst Q4H  - Chest physiotherapy ordered QID with focus on RLL  - BNP pending  - Mild troponin elevation at 19, will trend overnight  - EKG without clear signs of active ischemia  - Most recent discussion maintaining oxygen saturations 7 LPM oxymask, if persistently requiring 15 LPM can trial BiPAP       #Lower Extremity Edema  #Possible Venous stasis vs other rash  #Possible Heart Failure  4+ pitting bilateral lower extremity edema, apparently acute on chronic over last few weeks per patient and chart review. Unclear etiology with normal BNP somewhat  "less likely heart failure although given elevated BMI BNP may not be fully reliable. Would also consider potential liver pathology, although LFTs and albumin normal, as well as potential CKD contribution to volume retention although Cr normal. Most likely etiology appears to be heart failure, however patient declined initial TTE 11/25, willing to undergo study after additional interview/explanation.  Abdominal US significant for probable hepatic steatosis and small right pleural effusion. US DVT negative.  - TTE 11/25/2022, ordered again as patient required further explanation   - Lasix 20 mg IV daily first dose around 0800 and second dose 6 hours afterwards, second 20 mg dose given during rapid 11/26, likely to defer further doses 11/27  - Daily BMP   - discontinue clotrimazole cream per WOC  - PTA Eucerin cream  - Lymphedema consult  - WOC consult, ordered miconazole as recommended     #Acute on Chronic Hypoxic Hypercapnic Respiratory Failure  #Restrictive Lung Disease   Pt with history of chronic CO2 retention, has seen pulmonology in the past and reportedly declined NIPPV. Unclear baseline PCO2 venous, however gases and lab workup here suggest chronic metabolically compensated CO2 retention. Patient reports distant smoking history, stopped in 1980 and reports she was not a heavy smoker. Per pulmonology note from 03/28/2022, recent \"PFT data FEV1/FVC ratio 0.71.  FVC is 1.12 L which is 42% predicted.  FEV1 is 0.8 L which is 39% predicted.\"  Suspect significant contribution of spinous abnormalities toward restrictive lung disease. Given chronic CO2 retention, will keep O2 saturation goal lower to ensure maintenance of respiratory drive.   - Continue oxygen to keep saturations above 90%  - Albuterol inhaler Q6H PRN  - Duonebs Q4H  - Mucomyst Q4H  - Chest physiotherapy   - Escalate to BiPAP if needed    - PTA azelastine 1 spray both nostrils BID     #Hypokalemia  - RN replacement protocol     #Decreased Functional " Status  Appreciate PT/OT evaluation, patient concern of being unable to go back home due to difficulty completing home ADLs and overall shortness of breath. Appreciate social work consult for dispo planning.   Appreciate PT recommendations:   - TCU at discharge due to below baseline functional capacity   Appreciate OT recommendations:   - TCU at discharge due to below baseline functional capacity      #Generalized Anxiety Disorder  As per recent family medicine visit, continuing PTA medications.   - Buspirone 10 mg PO daily     #Depression  - Continue PTA sertraline, updated dose to 100 mg PO daily per pharmacy discussion     #Insomnia  #Overnight Hallucination  Multiple reports of patient concern for hallucination with man standing in room, but patient does not recall in AM discussion. Will monitor for now, see if issue recurs.   - Continue PTA Seroquel, 150 mg PO QHS     #Hypertension  - Continue PTA Losartan/HCTZ 100-25 mg PO daily     #HLD  - Continue PTA atorvastatin 40 mg PO daily     #Glaucoma  - PTA latanoprost 1 drop each eye at bedtime     #?Vitamin Deficiency  #Anemia  - Continue PTA folic acid 400 mcg daily  - Continue vitamin B complex with C 1 tablet daily  - Continue vitamin D3 25 mcg daily      Diet: Combination Diet Regular Diet Adult    DVT Prophylaxis: Enoxaparin (Lovenox) SQ  Lima Catheter: Not present  Fluids: PO  Central Lines: None  Cardiac Monitoring: None  Code Status: Full Code      Disposition Plan     Expected Discharge Date: 11/26/2022        Discharge Comments: Dispo: TBD  Delays:   Progress: CMA by SW today.        The patient's care was discussed with the Attending Physician, Dr. Jamison.    Nikita Posey MD  Medicine Service, [facility] TEAM 1  [facility]  Securely message with the Vocera Web Console (learn more here)  Text page via [facility] Paging/Directory   Please see signed in provider for up to date coverage information      Clinically  "Significant Risk Factors        # Hypokalemia: Lowest K = 3.3 mmol/L (Ref range: 3.4-5.3) in last 2 days, will replace as needed                # Obesity: Estimated body mass index is 39.05 kg/m  as calculated from the following:    Height as of this encounter: 1.6 m (5' 3\").    Weight as of this encounter: 100 kg (220 lb 7.4 oz)., PRESENT ON ADMISSION         ______________________________________________________________________    Interval History   Patient with increased oxygen requirements 11/26/2022 as above. Noted she was feeling more short of breath during episode but denied chest pain.     Data reviewed today: I reviewed all medications, new labs and imaging results over the last 24 hours. I personally reviewed no images or EKG's today.    Physical Exam   Vital Signs: Temp: 98.7  F (37.1  C) Temp src: Oral BP: (!) 154/74 Pulse: 83   Resp: 20 SpO2: 99 % O2 Device: Nasal cannula Oxygen Delivery: 6 LPM  Weight: 220 lbs 7.36 oz  Constitutional: awake, alert, cooperative, sitting up on side of bed during rapid, later resting in bed without clear acute signs of distress.   Eyes: Lids and lashes normal, pupils equal, round and reactive to light, extra ocular muscles intact, sclera clear, conjunctiva normal  ENT: Normocephalic, without obvious abnormality, atraumatic, external ears without lesions, oral pharynx with moist mucous membranes, tonsils without erythema or exudates, gums normal and good dentition.  Hematologic / Lymphatic: no cervical lymphadenopathy and no supraclavicular lymphadenopathy  Respiratory: Ongoing bilateral crackles in lung bases 11/26 although improved compared to admission. Tachypnea, some scattered rhonchi.   Cardiovascular: 2-3/6 systolic murmur loudest at right sternal border. Unable to assess JVD due to body habitus. Regular rhythm.   GI: No scars, normal bowel sounds, soft, non-distended, non-tender, no masses palpated, no hepatosplenomegally  Skin: Areas of bilaterals erythema located " immediately over sock line on both feet. Pattern appears roughly symmetric.   Musculoskeletal: 3-4+ bilateral lower extremity edema. Moving all extremities. Probable bunion located on left foot.   Neurologic: Awake, alert, but forgetful. Able to participate fully in interview although frequently asks repeated questions.   Neuropsychiatric: General: fidgeting and normal eye contact  Level of consciousness: alert / normal  Affect: anxious    Data   Recent Labs   Lab 22  1252 22  0706 22  1328 22  0602 22  1533 22  1259   WBC  --   --   --  5.2  --  5.6   HGB  --   --   --  8.3*  --  8.2*   MCV  --   --   --  103*  --  104*   PLT  --   --   --  235  --  233    142  --  144  --  144  144   POTASSIUM 3.8 3.8 4.0 3.3*  --  3.8  3.8   CHLORIDE 89* 94*  --  94*  --  100  100   CO2 42* 38*  --  39*  --  35*  36*   BUN 21.2 21.8  --  21.6  --  22.2  22.1   CR 0.81 0.82  --  0.76   < > 0.68  0.69   ANIONGAP 12 10  --  11  --  9  8   SYMONE 9.6 9.0  --  8.9  --  8.8  8.7*   * 96  --  103*  --  101*  102*   ALBUMIN  --   --   --   --   --  3.7   PROTTOTAL  --   --   --   --   --  6.8   BILITOTAL  --   --   --   --   --  0.3   ALKPHOS  --   --   --   --   --  86   ALT  --   --   --   --   --  17   AST  --   --   --   --   --  37*   LIPASE  --   --   --   --   --  16    < > = values in this interval not displayed.     Recent Results (from the past 24 hour(s))   Echo Complete   Result Value    LVEF  65-70%    Narrative    [id number]  [id number]  [id number]  [id number]^PATTIE^INES^DWIGHT     [facility]  Echocardiography Laboratory  [address] [place] [id number]     Name: AMAURI WOOD  MRN: [id number]  : 1945  Study Date: 2022 01:48 PM  Age: 76 yrs  Gender: Female  Patient Location: [facility]  Reason For Study: Heart Failure  Ordering Physician: INES BLANKENSHIP  Performed By: Petty Mckenna     BSA: 2.0 m2  Height: 63  in  Weight: 220 lb  HR: 85  BP: 133/68 mmHg  ______________________________________________________________________________  Procedure  Complete Portable Echo Adult. Contrast Optison. Technically difficult study.  Poor acoustic windows. Patient was given 5 ml mixture of 3 ml Optison and 6 ml  saline. 4 ml wasted.  ______________________________________________________________________________  Interpretation Summary  Technically difficult study. Poor acoustic windows.  Global and regional left ventricular function is hyperkinetic with an EF of  65-70%.  Global right ventricular function is normal. The right ventricle is normal  size.  No significant valvular abnormalities.  IVC diameter and respiratory changes fall into an intermediate range  suggesting an RA pressure of 8 mmHg.  This study was compared with the study from 04/12/2021. No significant changes  noted.  ______________________________________________________________________________  Left Ventricle  Global and regional left ventricular function is hyperkinetic with an EF of  65-70%. Left ventricular size is normal. Relative wall thickness is increased  consistent with concentric remodeling. Left ventricular diastolic function is  not assessable.     Right Ventricle  Global right ventricular function is normal. The right ventricle is normal  size.     Atria  The atria cannot be assessed.     Mitral Valve  The mitral valve is normal. Trace mitral insufficiency is present.     Aortic Valve  The valve leaflets are not well visualized. On Doppler interrogation, there is  no significant stenosis or regurgitation.     Tricuspid Valve  The tricuspid valve is normal. Trace tricuspid insufficiency is present.  Pulmonary artery systolic pressure cannot be assessed.     Pulmonic Valve  The pulmonic valve cannot be assessed.     Vessels  The aorta root cannot be assessed. The thoracic aorta cannot be assessed. IVC  diameter and respiratory changes fall into an  intermediate range suggesting an  RA pressure of 8 mmHg.     Pericardium  No pericardial effusion is present.     Compared to Previous Study  This study was compared with the study from 2021 . No significant  changes noted.  ______________________________________________________________________________  MMode/2D Measurements & Calculations     IVSd: 1.1 cm  LVIDd: 4.5 cm  LVPWd: 1.2 cm  LV mass(C)d: 184.1 grams  LV mass(C)dI: 91.4 grams/m2  LA Volume (BP): 40.0 ml  LA Volume Index (BP): 19.9 ml/m2  RWT: 0.55     Doppler Measurements & Calculations  MV E max ck: 67.3 cm/sec  MV A max ck: 98.6 cm/sec  MV E/A: 0.68  MV dec slope: 299.0 cm/sec2  MV dec time: 0.23 sec     Ao V2 max: 195.0 cm/sec  Ao max PG: 15.2 mmHg  Ao V2 mean: 119.0 cm/sec  Ao mean P.0 mmHg  Ao V2 VTI: 32.1 cm  LV V1 max P.4 mmHg  LV V1 max: 183.0 cm/sec  LV V1 VTI: 33.2 cm  AV Ck Ratio (DI): 0.94  Lateral E/e': 11.7     ______________________________________________________________________________  Report approved by: Shubham Barakat 2022 03:40 PM         XR Chest Port 1 View    Narrative    EXAM: XR CHEST PORT 1 VIEW  2022 1:15 PM     HISTORY:  acute increase in O2 needs       COMPARISON:  Chest radiograph 2022.    TECHNIQUE: Portable AP upright view of the chest    FINDINGS:     The trachea is midline. The cardiomediastinal silhouette is within  normal limits. Atherosclerotic ossification of the aortic arch.  Asymmetric inflation of the lungs, with a hypoinflated right lung. No  appreciable pneumothorax.  Increased right-sided pleural effusion.  Dense right perihilar and basilar opacities. Decreased left lung hazy  opacities. There is mild rightward shifting of the mediastinum. No  focal airspace opacity. No acute osseous abnormality. Dextroscoliosis  of the thoracic spine.      Impression    IMPRESSION:  1. New dense right perihilar and basilar opacities with volume loss in  the right lung and mild rightward  shifting of the mediastinum likely  representing atelectasis, however infection cannot be ruled out.  2. Increased small right pleural effusion.  3. Decreased hazy opacities throughout the left lung field.    I have personally reviewed the examination and initial interpretation  and I agree with the findings.    EAGLE CASTRO MD         SYSTEM ID:  [id number]

## 2022-11-26 NOTE — PROGRESS NOTES
Care Management Follow Up    Length of Stay (days): 2  Expected Discharge Date: 11/26/2022  Concerns to be Addressed: Discharge planning      Patient plan of care discussed at interdisciplinary rounds: Yes  Anticipated Discharge Disposition: TCU   Anticipated Discharge Services: N/A   Anticipated Discharge DME: N/A   Additional Information: @1300 hours SW attempted to meet with pt at bedside to discuss TCU recommendations and provide education. Pt being seen by providers. SW to return at a later time.    @1400 hours SW attempted to meet with pt at bedside, however pt having difficulty breathing and providers attending to this matter. SW will return tomorrow.  __________________________     EPI Torres, JUNE  Advanced Practice Independent Clinical   [facility]   [address] 5B Medicine Beds [id number] & 5C Medicine Overflow Beds [id number]  [email or web address]  Weekday Phone: [phone]  Weekday Pager: [phone]  Fax: [phone]  Message me on Inbox david.     Schedule: I work Wednesday-Friday and every 6th week I work Friday-Sunday (November 25-27). If you need assistance when I am out of the office, please contact my job share partner Marlene Clarke (same contact info as above).    Weekend 5B  (0800 - 1630) Pager: [phone]     Weekend 5B RNCC ([id number]) Pager: [phone]     Weekdays after hours (1630 - 0000), Saturday & Sunday after hours ([phone]), & [facility] Recognized Holidays Coverage  Pager: [phone]

## 2022-11-26 NOTE — PLAN OF CARE
Goal Outcome Evaluation:      Plan of Care Reviewed With: patient    Overall Patient Progress: no changeOverall Patient Progress: no change    Outcome Evaluation: Pt is A&Ox4 but often asking mult questions at once and repeating them. Removed NC and O2 sensor 2x, was ok having them back on though. C/o upper back pain and had PRN Tylenol with some relief. Multiple repositions. Using Purewik but also had to be changed d/t leakage. Pt req melatonin at 0600 and was advised that she has a 0730 lymphadema eval and we do not generally give out sleeping meds in the morning. WOC outlined redness on BLE, please monitor for change in size.

## 2022-11-27 ENCOUNTER — APPOINTMENT (OUTPATIENT)
Dept: PHYSICAL THERAPY | Facility: CLINIC | Age: 77
DRG: 205 | End: 2022-11-27
Payer: COMMERCIAL

## 2022-11-27 LAB
ANION GAP SERPL CALCULATED.3IONS-SCNC: 14 MMOL/L (ref 7–15)
BUN SERPL-MCNC: 28.1 MG/DL (ref 8–23)
CALCIUM SERPL-MCNC: 9.2 MG/DL (ref 8.8–10.2)
CHLORIDE SERPL-SCNC: 87 MMOL/L (ref 98–107)
CREAT SERPL-MCNC: 1.21 MG/DL (ref 0.51–0.95)
DEPRECATED HCO3 PLAS-SCNC: 42 MMOL/L (ref 22–29)
ERYTHROCYTE [DISTWIDTH] IN BLOOD BY AUTOMATED COUNT: 14.7 % (ref 10–15)
GFR SERPL CREATININE-BSD FRML MDRD: 46 ML/MIN/1.73M2
GLUCOSE SERPL-MCNC: 100 MG/DL (ref 70–99)
HCT VFR BLD AUTO: 30.8 % (ref 35–47)
HGB BLD-MCNC: 9.6 G/DL (ref 11.7–15.7)
MCH RBC QN AUTO: 30.5 PG (ref 26.5–33)
MCHC RBC AUTO-ENTMCNC: 31.2 G/DL (ref 31.5–36.5)
MCV RBC AUTO: 98 FL (ref 78–100)
PLATELET # BLD AUTO: 254 10E3/UL (ref 150–450)
POTASSIUM SERPL-SCNC: 3.6 MMOL/L (ref 3.4–5.3)
RBC # BLD AUTO: 3.15 10E6/UL (ref 3.8–5.2)
SODIUM SERPL-SCNC: 143 MMOL/L (ref 136–145)
TROPONIN T SERPL HS-MCNC: 25 NG/L
TROPONIN T SERPL HS-MCNC: 28 NG/L
TROPONIN T SERPL HS-MCNC: 30 NG/L
WBC # BLD AUTO: 7.5 10E3/UL (ref 4–11)

## 2022-11-27 PROCEDURE — 120N000002 HC R&B MED SURG/OB UMMC

## 2022-11-27 PROCEDURE — 250N000011 HC RX IP 250 OP 636

## 2022-11-27 PROCEDURE — 99233 SBSQ HOSP IP/OBS HIGH 50: CPT | Mod: GC | Performed by: STUDENT IN AN ORGANIZED HEALTH CARE EDUCATION/TRAINING PROGRAM

## 2022-11-27 PROCEDURE — 94640 AIRWAY INHALATION TREATMENT: CPT

## 2022-11-27 PROCEDURE — 97530 THERAPEUTIC ACTIVITIES: CPT | Mod: GP

## 2022-11-27 PROCEDURE — 94640 AIRWAY INHALATION TREATMENT: CPT | Mod: 76

## 2022-11-27 PROCEDURE — 250N000009 HC RX 250

## 2022-11-27 PROCEDURE — 36415 COLL VENOUS BLD VENIPUNCTURE: CPT

## 2022-11-27 PROCEDURE — 250N000013 HC RX MED GY IP 250 OP 250 PS 637

## 2022-11-27 PROCEDURE — 82310 ASSAY OF CALCIUM: CPT | Performed by: STUDENT IN AN ORGANIZED HEALTH CARE EDUCATION/TRAINING PROGRAM

## 2022-11-27 PROCEDURE — 84484 ASSAY OF TROPONIN QUANT: CPT

## 2022-11-27 PROCEDURE — 250N000013 HC RX MED GY IP 250 OP 250 PS 637: Performed by: STUDENT IN AN ORGANIZED HEALTH CARE EDUCATION/TRAINING PROGRAM

## 2022-11-27 PROCEDURE — 85027 COMPLETE CBC AUTOMATED: CPT

## 2022-11-27 PROCEDURE — 999N000157 HC STATISTIC RCP TIME EA 10 MIN

## 2022-11-27 RX ORDER — FUROSEMIDE 10 MG/ML
20 INJECTION INTRAMUSCULAR; INTRAVENOUS ONCE
Status: DISCONTINUED | OUTPATIENT
Start: 2022-11-27 | End: 2022-11-27

## 2022-11-27 RX ORDER — BUSPIRONE HYDROCHLORIDE 5 MG/1
10 TABLET ORAL 3 TIMES DAILY
Status: DISCONTINUED | OUTPATIENT
Start: 2022-11-27 | End: 2022-12-09 | Stop reason: HOSPADM

## 2022-11-27 RX ADMIN — IPRATROPIUM BROMIDE AND ALBUTEROL SULFATE 3 ML: 2.5; .5 SOLUTION RESPIRATORY (INHALATION) at 15:44

## 2022-11-27 RX ADMIN — ENOXAPARIN SODIUM 40 MG: 40 INJECTION SUBCUTANEOUS at 18:39

## 2022-11-27 RX ADMIN — IPRATROPIUM BROMIDE AND ALBUTEROL SULFATE 3 ML: 2.5; .5 SOLUTION RESPIRATORY (INHALATION) at 12:29

## 2022-11-27 RX ADMIN — MICONAZOLE NITRATE: 2 POWDER TOPICAL at 08:43

## 2022-11-27 RX ADMIN — ATORVASTATIN CALCIUM 40 MG: 40 TABLET, FILM COATED ORAL at 19:57

## 2022-11-27 RX ADMIN — AZELASTINE HYDROCHLORIDE 1 SPRAY: 137 SPRAY, METERED NASAL at 08:45

## 2022-11-27 RX ADMIN — ACETYLCYSTEINE 4 ML: 100 SOLUTION ORAL; RESPIRATORY (INHALATION) at 19:44

## 2022-11-27 RX ADMIN — SENNOSIDES AND DOCUSATE SODIUM 1 TABLET: 50; 8.6 TABLET ORAL at 08:47

## 2022-11-27 RX ADMIN — SENNOSIDES AND DOCUSATE SODIUM 1 TABLET: 50; 8.6 TABLET ORAL at 19:55

## 2022-11-27 RX ADMIN — ACETYLCYSTEINE 4 ML: 100 SOLUTION ORAL; RESPIRATORY (INHALATION) at 12:29

## 2022-11-27 RX ADMIN — B-COMPLEX W/ C & FOLIC ACID TAB 1 TABLET: TAB at 08:42

## 2022-11-27 RX ADMIN — ACETYLCYSTEINE 4 ML: 100 SOLUTION ORAL; RESPIRATORY (INHALATION) at 08:13

## 2022-11-27 RX ADMIN — AZELASTINE HYDROCHLORIDE 1 SPRAY: 137 SPRAY, METERED NASAL at 20:00

## 2022-11-27 RX ADMIN — LOSARTAN POTASSIUM AND HYDROCHLOROTHIAZIDE 1 TABLET: 25; 100 TABLET ORAL at 08:42

## 2022-11-27 RX ADMIN — ACETYLCYSTEINE 4 ML: 100 SOLUTION ORAL; RESPIRATORY (INHALATION) at 15:44

## 2022-11-27 RX ADMIN — Medication 400 MCG: at 08:42

## 2022-11-27 RX ADMIN — ACETAMINOPHEN 650 MG: 325 TABLET, FILM COATED ORAL at 18:38

## 2022-11-27 RX ADMIN — Medication: at 08:45

## 2022-11-27 RX ADMIN — MICONAZOLE NITRATE: 2 POWDER TOPICAL at 16:41

## 2022-11-27 RX ADMIN — BUSPIRONE HYDROCHLORIDE 10 MG: 10 TABLET ORAL at 08:42

## 2022-11-27 RX ADMIN — IPRATROPIUM BROMIDE AND ALBUTEROL SULFATE 3 ML: 2.5; .5 SOLUTION RESPIRATORY (INHALATION) at 19:44

## 2022-11-27 RX ADMIN — BUSPIRONE HYDROCHLORIDE 10 MG: 5 TABLET ORAL at 19:55

## 2022-11-27 RX ADMIN — Medication 150 MG: at 19:57

## 2022-11-27 RX ADMIN — MICONAZOLE NITRATE: 2 POWDER TOPICAL at 20:00

## 2022-11-27 RX ADMIN — LATANOPROST 1 DROP: 50 SOLUTION OPHTHALMIC at 19:59

## 2022-11-27 RX ADMIN — Medication 25 MCG: at 08:42

## 2022-11-27 RX ADMIN — IPRATROPIUM BROMIDE AND ALBUTEROL SULFATE 3 ML: 2.5; .5 SOLUTION RESPIRATORY (INHALATION) at 08:14

## 2022-11-27 RX ADMIN — SERTRALINE HYDROCHLORIDE 100 MG: 100 TABLET ORAL at 08:44

## 2022-11-27 ASSESSMENT — ACTIVITIES OF DAILY LIVING (ADL)
ADLS_ACUITY_SCORE: 35

## 2022-11-27 NOTE — PLAN OF CARE
Goal Outcome Evaluation:      Plan of Care Reviewed With: patient    Overall Patient Progress: no changeOverall Patient Progress: no change    Outcome Evaluation: A&Ox4. Vitals stable on 4L NC.  Although forgetful at times. Neuro checks remain unchanged and WNL. Denies any pain,nausea or vomiting. Up to the commode with a large BM this shift. Troponin trended up this shift but 11 AM checked was back down to 25. No replacements needed today. Pt on strict I & O's and daily weights.

## 2022-11-27 NOTE — PLAN OF CARE
"Goal Outcome Evaluation:      Plan of Care Reviewed With: patient    Overall Patient Progress: declining    Outcome Evaluation: Troponin trending up, MD made aware and will get another level checked this AM. Neuro checks remain unchanged and WNL. BLE wound cares completed, no reddness outside of marked lines. Pt denies pain. Getting up to the bedside commode w/ A1. VSS stable on 4L NC.      /62 (BP Location: Right arm, Patient Position: Semi-Bowden's)   Pulse 83   Temp 98.3  F (36.8  C) (Oral)   Resp 15   Ht 1.6 m (5' 3\")   Wt 100 kg (220 lb 7.4 oz)   LMP  (LMP Unknown)   SpO2 98%   BMI 39.05 kg/m      "

## 2022-11-27 NOTE — PROGRESS NOTES
[facility]    Progress Note - Medicine Service, IVIS TEAM 1       Date of Admission:  11/24/2022    Assessment & Plan   Ca Yan is a 76 year old female admitted on 11/24/2022 for chief concern ofLower extremity swelling, redness, and shortness of breath over a duration estimated at 2-3 weeks. Her past medical history is significant for previous breast cancer s/p lumpectomy, GERD, HTN, HLD, chronic lower extremity lymphedema, cluster C personality disorder, home oxygen use of 2-3 LPM, question of CKD, question of COPD, and known restrictive lung disease.      Today:  - Cardiology consult with formal recommendations pending staffing, for now agree with assessment regarding recommendation to hold additional diuresis  - Holding AM 20mg IV lasix in context of 60mg IV administered yesterday + contraction alkalosis; intravascularly not volume overloaded, will continue to monitor in setting of NUNU   - Continue scheduled duonebs and mucomyst q4h  - Continue chest physiotherapy  - Consult OT for MOCA evaluation     #Lower Extremity Edema  #Possible venous stasis vs lymphedema  #Possible HFpEF (65-70%)  #Metabolic alkalosis with concomitant chronic respiratory acidosis  4+ pitting bilateral lower extremity edema, apparently acute on chronic over last few weeks per patient and chart review.  Would also consider potential liver pathology, although LFTs and albumin normal, as well as potential CKD contribution to volume retention although Cr normal. Most likely etiology appears to be heart failure given clinical picture of improving LE edema and concurrent improvement in SOB with diuresis. NT pro-BNP of 395, but may be confounded by obesity. TTE showed preserved ejection fraction of 65-70%. H2FPEF calculator showed 70% probability for HFpEF. Abdominal US significant for probable hepatic steatosis and small right pleural effusion. US DVT negative. Rapid response called  "on 11/27 (see below), patient was administered 40mg IV lasix in addition to morning 20mg IV dose. ABG showed metabolic alkalosis with concomitant respiratory acidosis, likely representing contraction alkalosis in context of aggressive diuresis - reasonable to hold lasix this AM (11/27) pending labs.  - Inpatient heart failure consult  - Hold lasix this AM in context of contraction alkalosis, reevaluate for afternoon dose  - Daily BMP   - PTA Eucerin cream  - Lymphedema consult  - WOC consult, ordered miconazole as recommended     #Acute on Chronic Hypoxic Hypercapnic Respiratory Failure  #Restrictive Lung Disease   Pt with history of chronic CO2 retention, has seen pulmonology in the past and reportedly declined NIPPV. Unclear baseline PCO2 venous, however gases and lab workup here suggest chronic metabolically compensated CO2 retention. Patient reports distant smoking history, stopped in 1980 and reports she was not a heavy smoker. Per pulmonology note from 03/28/2022, recent \"PFT data FEV1/FVC ratio 0.71.  FVC is 1.12 L which is 42% predicted.  FEV1 is 0.8 L which is 39% predicted.\"  Suspect significant contribution of spinous abnormalities toward restrictive lung disease. Given chronic CO2 retention, will keep O2 saturation goal lower to ensure maintenance of respiratory drive. Rapid response called 11/26 afternoon due to increased dyspnea - stat CXR showed right opacification and worsening of right pleural effusion, more consistent with atelectasis 2/2 mucus plugging rather than fluid overload. On 4L O2 on 11/27 with sat > 95% (baseline is 2-3L at home), with periodic desaturation with ambulation.   - Continue oxygen to keep saturations above 90%  - Albuterol inhaler Q6H PRN  - Duonebs Q4H  - Mucomyst Q4H  - Chest physiotherapy   - Escalate to BiPAP if needed    - PTA azelastine 1 spray both nostrils BID     #Hypokalemia  - RN replacement protocol     #Decreased Functional Status  Appreciate PT/OT evaluation, " patient concern of being unable to go back home due to difficulty completing home ADLs and overall shortness of breath. Appreciate social work consult for dispo planning.   Appreciate ongoing SW evaluation:   SW successfully met with pt to discuss therapy recommendations and provide the medicare.gov list. SW provided education on the quality and service rating system. SW requested 10 TCU choices from pt.  Appreciate PT recommendations:   - TCU at discharge due to below baseline functional capacity   Appreciate OT recommendations:   - TCU at discharge due to below baseline functional capacity      #Generalized Anxiety Disorder  #Possible dementia  As per recent family medicine visit, continuing PTA medications. Patient has repeatedly asked about echo procedure and findings (required postponement of 11/26 echo due to patient request to re-explain procedure), unclear if she has full capacity. Possible etiologies include dementia vs manifestation of anxiety.  - Buspirone 10 mg PO daily  - Consult OT for MOCA evaluation     #Depression  - Continue PTA sertraline, updated dose to 100 mg PO daily per pharmacy discussion     #Insomnia  #Overnight Hallucination  Multiple reports of patient concern for hallucination with man standing in room, but patient does not recall in AM discussion. Will monitor for now, see if issue recurs.   - Continue PTA Seroquel, 150 mg PO QHS     #Hypertension  - Continue PTA Losartan/HCTZ 100-25 mg PO daily     #HLD  - Continue PTA atorvastatin 40 mg PO daily     #Glaucoma  - PTA latanoprost 1 drop each eye at bedtime     #?Vitamin Deficiency  #Chronic macrocytic anemia  - Continue PTA folic acid 400 mcg daily  - Continue vitamin B complex with C 1 tablet daily  - Continue vitamin D3 25 mcg daily      Diet: Combination Diet Regular Diet Adult    DVT Prophylaxis: Enoxaparin (Lovenox) SQ  Lima Catheter: Not present  Fluids: PO  Central Lines: None  Cardiac Monitoring: None  Code Status: Full Code       Disposition Plan      Expected Discharge Date: 11/29/2022      Destination: nursing home  Discharge Comments: Dispo: TBD  Delays:   Progress: CMA by SW today.        The patient's care was discussed with the Attending Physician, Dr. San.    [facility]  Medicine Service, [facility] TEAM [address] [place] [address]  Securely message with the Vocera Web Console (learn more here)  Text page via [facility] Paging/Directory   Please see signed in provider for up to date coverage information      Resident/Fellow Attestation   I, Nikita Posey MD, was present with the medical/KASSIE student who participated in the service and in the documentation of the note.  I have verified the history and personally performed the physical exam and medical decision making.  I agree with the assessment and plan of care as documented in the note.      Patient personally seen and assessed at the bedside. Agree with documentation from medical student with select edits as above. Today patient with most important updates of improved respiratory status compared to 11/26, maintaining oxygen saturations near home supplemental O2 needs. Tolerating nebulizer treatments without acute concern. Subjectively reports her legs feel less swollen and agree with this on my assessment. Spoke with cardiology team by phone, agree with their tentative recommendation to defer additional diuresis at this time in the context of NUNU, their assessment of bedside IVC not showing signs consistent with intravascular volume overload as well as likely contraction alkalosis. Will continue to monitor for now, appreciate formal cardiology recommendations to come when staffed.     Nikita Posey MD  PGY1, Internal Medicine   Date of Service (when I saw the patient): 11/27/22      Clinically Significant Risk Factors                       # Obesity: Estimated body mass index is 39.05 kg/m  as calculated from the following:    Height as of  "this encounter: 1.6 m (5' 3\").    Weight as of this encounter: 100 kg (220 lb 7.4 oz)., PRESENT ON ADMISSION         ______________________________________________________________________    Interval History   Nursing notes reviewed, no O/N events. Rapid response called yesterday 11/26 due to acute increase in O2 requirements, patient received 40mg IV lasix during rapid in addition to morning 20mg IV. Subjectively today, patient is near to her baseline with regards to breathing - currently on 4L O2 compared to 2-3L at home. Per nursing, patient desaturates when ambulating but recovers quickly on activity cessation. Patient denies chest pain, SOB, but perseverates on abdominal pain, which has been present since before this current admission. States a subjective improvement in LE edema.    Data reviewed today: I reviewed all medications, new labs and imaging results over the last 24 hours. I personally reviewed the 11/26 CXR showing right perihilar opacification suggestive of atelectasis and increased right pleural effusion.    Physical Exam   Vital Signs: Temp: 98.3  F (36.8  C) Temp src: Oral BP: 117/62 Pulse: 83   Resp: 15 SpO2: 98 % O2 Device: Nasal cannula Oxygen Delivery: 4 LPM  Weight: 220 lbs 7.36 oz     General: Alert, cooperative, no signs of acute distress  HEENT: Normocephalic, atraumatic. No lymphadenopathy appreciated.  Cardiac: Regular rate and rhythm, normal S1/S2, previously documented 4/6 systolic murmur best heard at RUSB. Unable to assess JVD/hepatojugular reflex.  Pulmonary: Bilateral basilar crackles appreciated on frontal auscultation.  Abdomen: Soft, non-distended, no irregular masses, no rebound or guarding.   Extremities: 2+ lower extremity edema, palpable dorsalis pedis pulses. Flaking skin and regions of erythema on bilateral lower extremities.  Psychiatric: Perseverates on abdominal pain, repeatedly asked for clarification regarding yesterday's echo.    Data   Recent Labs   Lab " 11/27/22  0656 11/26/22  1252 11/26/22  0706 11/25/22  1328 11/25/22  0602 11/24/22  1533 11/24/22  1259   WBC 7.5  --   --   --  5.2  --  5.6   HGB 9.6*  --   --   --  8.3*  --  8.2*   MCV 98  --   --   --  103*  --  104*     --   --   --  235  --  233   NA  --  143 142  --  144  --  144  144   POTASSIUM  --  3.8 3.8 4.0 3.3*  --  3.8  3.8   CHLORIDE  --  89* 94*  --  94*  --  100  100   CO2  --  42* 38*  --  39*  --  35*  36*   BUN  --  21.2 21.8  --  21.6  --  22.2  22.1   CR  --  0.81 0.82  --  0.76   < > 0.68  0.69   ANIONGAP  --  12 10  --  11  --  9  8   SYMONE  --  9.6 9.0  --  8.9  --  8.8  8.7*   GLC  --  106* 96  --  103*  --  101*  102*   ALBUMIN  --   --   --   --   --   --  3.7   PROTTOTAL  --   --   --   --   --   --  6.8   BILITOTAL  --   --   --   --   --   --  0.3   ALKPHOS  --   --   --   --   --   --  86   ALT  --   --   --   --   --   --  17   AST  --   --   --   --   --   --  37*   LIPASE  --   --   --   --   --   --  16    < > = values in this interval not displayed.     Recent Results (from the past 24 hour(s))   XR Chest Port 1 View    Narrative    EXAM: XR CHEST PORT 1 VIEW  11/26/2022 1:15 PM     HISTORY:  acute increase in O2 needs       COMPARISON:  Chest radiograph 11/24/2022.    TECHNIQUE: Portable AP upright view of the chest    FINDINGS:     The trachea is midline. The cardiomediastinal silhouette is within  normal limits. Atherosclerotic ossification of the aortic arch.  Asymmetric inflation of the lungs, with a hypoinflated right lung. No  appreciable pneumothorax.  Increased right-sided pleural effusion.  Dense right perihilar and basilar opacities. Decreased left lung hazy  opacities. There is mild rightward shifting of the mediastinum. No  focal airspace opacity. No acute osseous abnormality. Dextroscoliosis  of the thoracic spine.      Impression    IMPRESSION:  1. New dense right perihilar and basilar opacities with volume loss in  the right lung and mild  rightward shifting of the mediastinum likely  representing atelectasis, however infection cannot be ruled out.  2. Increased small right pleural effusion.  3. Decreased hazy opacities throughout the left lung field.    I have personally reviewed the examination and initial interpretation  and I agree with the findings.    EAGLE CASTRO MD         SYSTEM ID:  [id number]

## 2022-11-27 NOTE — PROGRESS NOTES
"BP (!) 154/74 (BP Location: Right arm)   Pulse 83   Temp 98.7  F (37.1  C) (Oral)   Resp 20   Ht 1.6 m (5' 3\")   Wt 100 kg (220 lb 7.4 oz)   LMP  (LMP Unknown)   SpO2 99%   BMI 39.05 kg/m      Time: 6381-9482    VS on 4L via NC. Pt has not needed any increased oxygen this shift. Denies pain. Up with A2. A&O x4, although forgetful. Pt desats with ambulation- needs increased oxygen with any movement.Scheduled nebs given by RT. gonzales in place with good urine output. No BM.   "

## 2022-11-27 NOTE — PROGRESS NOTES
Care Management Follow Up    Length of Stay (days): 3  Expected Discharge Date: 11/29/2022  Concerns to be Addressed: discharge planning     Patient plan of care discussed at interdisciplinary rounds: Yes  Anticipated Discharge Disposition: Transitional Care  Anticipated Discharge Services: None  Anticipated Discharge DME: None  Patient/family educated on Medicare website which has current facility and service quality ratings: no  Education Provided on the Discharge Plan: yes   Patient/Family in Agreement with the Plan: yes  Referrals Placed by CM/SW: External Care Coordination, Senior Linkage Line, Post Acute Facilities, Communication hand-offs to next level of Care Providers  Private pay costs discussed: Not applicable  Additional Information:  @0840 SW attempted to meet with pt at bedside, but pt was seeing another provider.    @0920 SW successfully met with pt to discuss therapy recommendations and provide the medicare.gov list. SW provided education on the quality and service rating system. SW requested 10 TCU choices from pt.  __________________________     EPI Torres, JUNE  Advanced Practice Independent Clinical   [facility]   [address] 5B Medicine Beds [id number] & 5C Medicine Overflow Beds [id number]  [email or web address]  Weekday Phone: [phone]  Weekday Pager: [phone]  Fax: [phone]  Message me on [employer] david.     Schedule: I work Wednesday-Friday and every 6th week I work Friday-Sunday (November 25-27). If you need assistance when I am out of the office, please contact my job share partner Marlene Clarke (same contact info as above).    Weekend 5B  (0800 - 1630) Pager: [phone]     Weekend 5B RNCC (3628-4811) Pager: [phone]     Weekdays after hours (1630 - 0000), Saturday & Sunday after hours (9219-9211), & [facility] Recognized Holidays Coverage  Pager: [phone]

## 2022-11-27 NOTE — PROVIDER NOTIFICATION
Provider (Cam Ponce MD) notified about Troponin trending up. Pt currently denies chest pain, but does report SOB. Per MD will continue to monitor and report increase in SOB and continue to monitor troponin levels.

## 2022-11-28 ENCOUNTER — APPOINTMENT (OUTPATIENT)
Dept: OCCUPATIONAL THERAPY | Facility: CLINIC | Age: 77
DRG: 205 | End: 2022-11-28
Payer: COMMERCIAL

## 2022-11-28 ENCOUNTER — APPOINTMENT (OUTPATIENT)
Dept: PHYSICAL THERAPY | Facility: CLINIC | Age: 77
DRG: 205 | End: 2022-11-28
Payer: COMMERCIAL

## 2022-11-28 ENCOUNTER — APPOINTMENT (OUTPATIENT)
Dept: OCCUPATIONAL THERAPY | Facility: CLINIC | Age: 77
DRG: 205 | End: 2022-11-28
Attending: STUDENT IN AN ORGANIZED HEALTH CARE EDUCATION/TRAINING PROGRAM
Payer: COMMERCIAL

## 2022-11-28 PROBLEM — N17.9 ACUTE KIDNEY FAILURE, UNSPECIFIED (H): Status: ACTIVE | Noted: 2022-11-28

## 2022-11-28 LAB
ANION GAP SERPL CALCULATED.3IONS-SCNC: 11 MMOL/L (ref 7–15)
ATRIAL RATE - MUSE: 86 BPM
BUN SERPL-MCNC: 43.3 MG/DL (ref 8–23)
CALCIUM SERPL-MCNC: 8.8 MG/DL (ref 8.8–10.2)
CHLORIDE SERPL-SCNC: 89 MMOL/L (ref 98–107)
CREAT SERPL-MCNC: 1.48 MG/DL (ref 0.51–0.95)
DEPRECATED HCO3 PLAS-SCNC: 41 MMOL/L (ref 22–29)
DIASTOLIC BLOOD PRESSURE - MUSE: NORMAL MMHG
GFR SERPL CREATININE-BSD FRML MDRD: 36 ML/MIN/1.73M2
GLUCOSE SERPL-MCNC: 102 MG/DL (ref 70–99)
HOLD SPECIMEN: NORMAL
INTERPRETATION ECG - MUSE: NORMAL
P AXIS - MUSE: 60 DEGREES
POTASSIUM SERPL-SCNC: 3.5 MMOL/L (ref 3.4–5.3)
PR INTERVAL - MUSE: 154 MS
QRS DURATION - MUSE: 76 MS
QT - MUSE: 382 MS
QTC - MUSE: 457 MS
R AXIS - MUSE: 5 DEGREES
SODIUM SERPL-SCNC: 141 MMOL/L (ref 136–145)
SYSTOLIC BLOOD PRESSURE - MUSE: NORMAL MMHG
T AXIS - MUSE: 58 DEGREES
VENTRICULAR RATE- MUSE: 86 BPM

## 2022-11-28 PROCEDURE — 97140 MANUAL THERAPY 1/> REGIONS: CPT | Mod: GO

## 2022-11-28 PROCEDURE — 97530 THERAPEUTIC ACTIVITIES: CPT | Mod: GP

## 2022-11-28 PROCEDURE — 250N000013 HC RX MED GY IP 250 OP 250 PS 637

## 2022-11-28 PROCEDURE — 94640 AIRWAY INHALATION TREATMENT: CPT

## 2022-11-28 PROCEDURE — 250N000013 HC RX MED GY IP 250 OP 250 PS 637: Performed by: STUDENT IN AN ORGANIZED HEALTH CARE EDUCATION/TRAINING PROGRAM

## 2022-11-28 PROCEDURE — 94799 UNLISTED PULMONARY SVC/PX: CPT

## 2022-11-28 PROCEDURE — 94640 AIRWAY INHALATION TREATMENT: CPT | Mod: 76

## 2022-11-28 PROCEDURE — 999N000157 HC STATISTIC RCP TIME EA 10 MIN

## 2022-11-28 PROCEDURE — 99233 SBSQ HOSP IP/OBS HIGH 50: CPT | Mod: GC | Performed by: STUDENT IN AN ORGANIZED HEALTH CARE EDUCATION/TRAINING PROGRAM

## 2022-11-28 PROCEDURE — 258N000003 HC RX IP 258 OP 636

## 2022-11-28 PROCEDURE — 99221 1ST HOSP IP/OBS SF/LOW 40: CPT | Mod: GC | Performed by: INTERNAL MEDICINE

## 2022-11-28 PROCEDURE — 97129 THER IVNTJ 1ST 15 MIN: CPT | Mod: GO

## 2022-11-28 PROCEDURE — 120N000002 HC R&B MED SURG/OB UMMC

## 2022-11-28 PROCEDURE — 82435 ASSAY OF BLOOD CHLORIDE: CPT

## 2022-11-28 PROCEDURE — 82374 ASSAY BLOOD CARBON DIOXIDE: CPT

## 2022-11-28 PROCEDURE — 250N000011 HC RX IP 250 OP 636

## 2022-11-28 PROCEDURE — 36415 COLL VENOUS BLD VENIPUNCTURE: CPT

## 2022-11-28 PROCEDURE — 250N000009 HC RX 250

## 2022-11-28 RX ORDER — LIDOCAINE 4 G/G
1 PATCH TOPICAL
Status: DISCONTINUED | OUTPATIENT
Start: 2022-11-28 | End: 2022-12-01

## 2022-11-28 RX ADMIN — SENNOSIDES AND DOCUSATE SODIUM 1 TABLET: 50; 8.6 TABLET ORAL at 08:51

## 2022-11-28 RX ADMIN — BUSPIRONE HYDROCHLORIDE 10 MG: 5 TABLET ORAL at 20:43

## 2022-11-28 RX ADMIN — Medication 150 MG: at 21:08

## 2022-11-28 RX ADMIN — Medication 400 MCG: at 08:51

## 2022-11-28 RX ADMIN — B-COMPLEX W/ C & FOLIC ACID TAB 1 TABLET: TAB at 08:51

## 2022-11-28 RX ADMIN — AZELASTINE HYDROCHLORIDE 1 SPRAY: 137 SPRAY, METERED NASAL at 08:52

## 2022-11-28 RX ADMIN — BUSPIRONE HYDROCHLORIDE 10 MG: 5 TABLET ORAL at 13:33

## 2022-11-28 RX ADMIN — IPRATROPIUM BROMIDE AND ALBUTEROL SULFATE 3 ML: 2.5; .5 SOLUTION RESPIRATORY (INHALATION) at 08:50

## 2022-11-28 RX ADMIN — LIDOCAINE PATCH 4% 1 PATCH: 40 PATCH TOPICAL at 13:32

## 2022-11-28 RX ADMIN — Medication 25 MCG: at 08:52

## 2022-11-28 RX ADMIN — MICONAZOLE NITRATE: 2 POWDER TOPICAL at 09:00

## 2022-11-28 RX ADMIN — AZELASTINE HYDROCHLORIDE 1 SPRAY: 137 SPRAY, METERED NASAL at 20:55

## 2022-11-28 RX ADMIN — ACETYLCYSTEINE 4 ML: 100 SOLUTION ORAL; RESPIRATORY (INHALATION) at 12:44

## 2022-11-28 RX ADMIN — BUSPIRONE HYDROCHLORIDE 10 MG: 5 TABLET ORAL at 08:51

## 2022-11-28 RX ADMIN — MICONAZOLE NITRATE: 2 POWDER TOPICAL at 17:45

## 2022-11-28 RX ADMIN — ACETYLCYSTEINE 4 ML: 100 SOLUTION ORAL; RESPIRATORY (INHALATION) at 17:16

## 2022-11-28 RX ADMIN — ACETYLCYSTEINE 4 ML: 100 SOLUTION ORAL; RESPIRATORY (INHALATION) at 20:12

## 2022-11-28 RX ADMIN — IPRATROPIUM BROMIDE AND ALBUTEROL SULFATE 3 ML: 2.5; .5 SOLUTION RESPIRATORY (INHALATION) at 12:44

## 2022-11-28 RX ADMIN — MICONAZOLE NITRATE: 2 POWDER TOPICAL at 20:44

## 2022-11-28 RX ADMIN — IPRATROPIUM BROMIDE AND ALBUTEROL SULFATE 3 ML: 2.5; .5 SOLUTION RESPIRATORY (INHALATION) at 17:17

## 2022-11-28 RX ADMIN — IPRATROPIUM BROMIDE AND ALBUTEROL SULFATE 3 ML: 2.5; .5 SOLUTION RESPIRATORY (INHALATION) at 20:12

## 2022-11-28 RX ADMIN — SENNOSIDES AND DOCUSATE SODIUM 1 TABLET: 50; 8.6 TABLET ORAL at 20:43

## 2022-11-28 RX ADMIN — LOSARTAN POTASSIUM AND HYDROCHLOROTHIAZIDE 1 TABLET: 25; 100 TABLET ORAL at 08:51

## 2022-11-28 RX ADMIN — SERTRALINE HYDROCHLORIDE 100 MG: 100 TABLET ORAL at 08:51

## 2022-11-28 RX ADMIN — ACETYLCYSTEINE 4 ML: 100 SOLUTION ORAL; RESPIRATORY (INHALATION) at 08:50

## 2022-11-28 RX ADMIN — ATORVASTATIN CALCIUM 40 MG: 40 TABLET, FILM COATED ORAL at 20:43

## 2022-11-28 RX ADMIN — ENOXAPARIN SODIUM 40 MG: 40 INJECTION SUBCUTANEOUS at 17:44

## 2022-11-28 RX ADMIN — SODIUM CHLORIDE 1000 ML: 9 INJECTION, SOLUTION INTRAVENOUS at 09:57

## 2022-11-28 RX ADMIN — LATANOPROST 1 DROP: 50 SOLUTION OPHTHALMIC at 20:44

## 2022-11-28 ASSESSMENT — ACTIVITIES OF DAILY LIVING (ADL)
ADLS_ACUITY_SCORE: 35

## 2022-11-28 NOTE — PLAN OF CARE
Goal Outcome Evaluation:      Plan of Care Reviewed With: patient    Overall Patient Progress: no changeOverall Patient Progress: no change    Outcome Evaluation: A&Ox4 with some confusion. VSS currently on 4L NC. PRN Ty for pain in upper abdomen that pt states is not new. Up to commode with Ax1, not using Purewick, no incontinence. O2 dips with cares but stabalizes. Lasix d/c. Pt expressed increased anxiety abt leaving hosp/TCU/caring for self and req med. Buspirone increased to 3x/day.

## 2022-11-28 NOTE — PROGRESS NOTES
[facility]    Progress Note - Medicine Service, IVIS TEAM 1       Date of Admission:  11/24/2022    Assessment & Plan   Ca Yan is a 76 year old female admitted on 11/24/2022 for chief concern of lower extremity swelling, redness, and shortness of breath over a duration estimated at 2-3 weeks. Her past medical history is significant for previous breast cancer s/p lumpectomy, GERD, HTN, HLD, chronic lower extremity lymphedema, cluster C personality disorder, home oxygen use of 2-3 LPM, question of CKD, question of COPD, and known restrictive lung disease.      Today:  - Cardiology consult with formal recommendations pending staffing  - Holding diuresis in context of prerenal NUNU  - 1L NS to address NUNU; reevaluate creatinine tomorrow  - Continue scheduled duonebs and mucomyst q4h  - Continue chest physiotherapy  - Formal RT consult  - Dental hygiene consult  - Lidocaine patch for abdominal pain  - Dispo pending improvement in renal function     #Lower Extremity Edema  #Possible venous stasis vs lymphedema  #Possible HFpEF (65-70%)  4+ pitting bilateral lower extremity edema, apparently acute on chronic over last few weeks per patient and chart review.  Would also consider potential liver pathology, although LFTs and albumin normal, as well as potential CKD contribution to volume retention although Cr normal. Other etiology could be heart failure given clinical picture of improving LE edema and concurrent improvement in SOB with diuresis, however per cardiology unclear if patient has HFpEF based on echo. NT pro-BNP of 395, but may be confounded by obesity. TTE showed preserved ejection fraction of 65-70%. H2FPEF calculator showed 70% probability for HFpEF. Abdominal US significant for probable hepatic steatosis and small right pleural effusion. US DVT negative.   - Inpatient heart failure consult, appreciate recs   - Hold diuresis in context of NUNU  - Daily  "BMP   - PTA Eucerin cream  - Lymphedema consult  - WOC consult, ordered miconazole as recommended     #Metabolic alkalosis with concomitant chronic respiratory acidosis  #Prerenal NUNU  Rapid response called on 11/26, patient was administered 40mg IV lasix in addition to morning 20mg IV dose. Patient subsequently had creatinine increase 0.81-1.21 11/28 and further increase to 1.48 on 11/29, suggesting prerenal picture.  - 1L NS today  - BMP tomorrow    #Acute on Chronic Hypoxic Hypercapnic Respiratory Failure  #Restrictive Lung Disease   Pt with history of chronic CO2 retention, has seen pulmonology in the past and reportedly declined NIPPV. Unclear baseline PCO2 venous, however gases and lab workup here suggest chronic metabolically compensated CO2 retention. Patient reports distant smoking history, stopped in 1980 and reports she was not a heavy smoker. Per pulmonology note from 03/28/2022, recent \"PFT data FEV1/FVC ratio 0.71.  FVC is 1.12 L which is 42% predicted.  FEV1 is 0.8 L which is 39% predicted.\"  Suspect significant contribution of spinous abnormalities toward restrictive lung disease. Given chronic CO2 retention, will keep O2 saturation goal lower to ensure maintenance of respiratory drive. Rapid response called 11/26 afternoon due to increased dyspnea - stat CXR showed right opacification and worsening of right pleural effusion, more consistent with atelectasis 2/2 mucus plugging rather than fluid overload. On 4L O2 on 11/27 with sat > 95% (baseline is 2-3L at home), with periodic desaturation with ambulation.   - Formal RT consult  - Dental hygiene consult  - Continue oxygen to keep saturations above 90%  - Albuterol inhaler Q6H PRN  - Duonebs Q4H  - Mucomyst Q4H  - Chest physiotherapy   - Escalate to BiPAP if needed    - PTA azelastine 1 spray both nostrils BID   - F/u with outpatient pulmonologist    #Hypokalemia  - RN replacement protocol     #Decreased Functional Status  Appreciate PT/OT " evaluation, patient concern of being unable to go back home due to difficulty completing home ADLs and overall shortness of breath. Appreciate social work consult for dispo planning.   Appreciate ongoing SW evaluation:   SW successfully met with pt to discuss therapy recommendations and provide the medicare.gov list. SW provided education on the quality and service rating system. SW requested 10 TCU choices from pt.  Appreciate PT recommendations:   - TCU at discharge due to below baseline functional capacity   Appreciate OT recommendations:   - TCU at discharge due to below baseline functional capacity      #Generalized Anxiety Disorder  #Cognitive impairment  #Hx of AUD  As per recent family medicine visit, continuing PTA medications. Patient has repeatedly asked about echo procedure and findings (required postponement of 11/26 echo due to patient request to re-explain procedure), unclear if she has full capacity or if this represents significant deviation from baseline. Additional chart review shows hx of alcohol used disorder which may be contributing - not current drinker but documentation shows hx of drinking 1/2 pint liquor per day in 2016. Increased buspirone from BID to TID due to patient concern of anxiety 11/27.   - Buspirone 10 mg PO TID  - Consult OT for MOCA evaluation     #Depression  - Continue PTA sertraline, updated dose to 100 mg PO daily per pharmacy discussion     #Insomnia  #Overnight Hallucination  Multiple reports of patient concern for hallucination with man standing in room, but patient does not recall in AM discussion. Will monitor for now, see if issue recurs.   - Continue PTA Seroquel, 150 mg PO QHS     #Hypertension  - Continue PTA Losartan/HCTZ 100-25 mg PO daily     #HLD  - Continue PTA atorvastatin 40 mg PO daily     #Glaucoma  - PTA latanoprost 1 drop each eye at bedtime     #?Vitamin Deficiency  #Chronic macrocytic anemia  - Continue PTA folic acid 400 mcg daily  - Continue vitamin  B complex with C 1 tablet daily  - Continue vitamin D3 25 mcg daily     #Abdominal pain  Patient has had chronic abdominal wall pain, likely MSK in context of severe kyphoscoliosis. States that it is worsened by coughing. Pain is improved with Tylenol.  - Tylenol 650mg q6h PRN  - Lidocaine patch     Diet: Combination Diet Regular Diet Adult    DVT Prophylaxis: Enoxaparin (Lovenox) SQ  Lima Catheter: Not present  Fluids: PO  Central Lines: None  Cardiac Monitoring: None  Code Status: Full Code      Disposition Plan      Expected Discharge Date: 12/02/2022      Destination: nursing home  Discharge Comments: Dispo: TBD  Delays:   Progress: CMA by SW today.        The patient's care was discussed with the Attending Physician, Dr. San.    [facility]  Medicine Service, [address]  Securely message with the Vocera Web Console (learn more here)  Text page via [facility] Paging/Directory   Please see signed in provider for up to date coverage information      Resident/Fellow Attestation   I, Nikita Posey MD, was present with the medical/KASSIE student who participated in the service and in the documentation of the note.  I have verified the history and personally performed the physical exam and medical decision making.  I agree with the assessment and plan of care as documented in the note.      Patient personally seen and assessed at bedside. Documentation from medical student accurate with relevant updates or changes made by me. Most significant daily updates include fluid bolus for NUNU, formal respiratory therapy consult to follow up previously recommended mucous clearance therapies, ongoing discussion with patient about ultimate plan for discharge likely requiring TCU and possibly longer term care options pending rehab course. Also placing dental hygiene consult.     Nikita Posey MD  PGY-1, Internal Medicine   Date of Service (when I saw the patient):  "11/28/22      Clinically Significant Risk Factors                       # Obesity: Estimated body mass index is 36.55 kg/m  as calculated from the following:    Height as of this encounter: 1.6 m (5' 3\").    Weight as of this encounter: 93.6 kg (206 lb 5.6 oz)., PRESENT ON ADMISSION         ______________________________________________________________________    Interval History   Nursing notes reviewed, patient had mild confusion overnight but otherwise no acute changes. Patient states that she is near to baseline regarding work of breathing today (O2% ~ 96% on 4.5L during interview). Still desats during ambulation but quickly recovers. States that she has had been dealing with increased phlegm production since starting pulmonary hygiene regimen. Said that she has concern about rent payment (e-pay not set up, patient has no checkbook in hospital), as well as logistical concerns about upcoming TCU placement.    Data reviewed today: I reviewed all medications, new labs and imaging results over the last 24 hours. I personally reviewed no images today.    Physical Exam   Vital Signs: Temp: 98.4  F (36.9  C) Temp src: Oral BP: 110/66 Pulse: 70   Resp: 16 SpO2: 95 % O2 Device: Nasal cannula Oxygen Delivery: 2.5 LPM  Weight: 206 lbs 5.61 oz     General: Alert, cooperative, no signs of acute distress  HEENT: Normocephalic, atraumatic. No lymphadenopathy appreciated.  Cardiac: Regular rate and rhythm, normal S1/S2, previously documented 4/6 systolic murmur best heard at RUSB. Unable to assess JVD/hepatojugular reflex.  Pulmonary: No significant crackles 11/28 AM exam, did have significant rhonchi and coughing however subsequent improvement with neb treatments.   Abdomen: Soft, non-distended, no irregular masses, no rebound or guarding.   Extremities: 2+ lower extremity edema, palpable dorsalis pedis pulses. Flaking skin and regions of erythema on bilateral lower extremities.  Psychiatric: Some tangentiality and confusion, " perseverates on certain topics, but redirectable.    Data   Recent Labs   Lab 11/28/22  0618 11/27/22  0656 11/26/22  1252 11/25/22  1328 11/25/22  0602 11/24/22  1533 11/24/22  1259   WBC  --  7.5  --   --  5.2  --  5.6   HGB  --  9.6*  --   --  8.3*  --  8.2*   MCV  --  98  --   --  103*  --  104*   PLT  --  254  --   --  235  --  233    143 143   < > 144  --  144  144   POTASSIUM 3.5 3.6 3.8   < > 3.3*  --  3.8  3.8   CHLORIDE 89* 87* 89*   < > 94*  --  100  100   CO2 41* 42* 42*   < > 39*  --  35*  36*   BUN 43.3* 28.1* 21.2   < > 21.6  --  22.2  22.1   CR 1.48* 1.21* 0.81   < > 0.76   < > 0.68  0.69   ANIONGAP 11 14 12   < > 11  --  9  8   SYMONE 8.8 9.2 9.6   < > 8.9  --  8.8  8.7*   * 100* 106*   < > 103*  --  101*  102*   ALBUMIN  --   --   --   --   --   --  3.7   PROTTOTAL  --   --   --   --   --   --  6.8   BILITOTAL  --   --   --   --   --   --  0.3   ALKPHOS  --   --   --   --   --   --  86   ALT  --   --   --   --   --   --  17   AST  --   --   --   --   --   --  37*   LIPASE  --   --   --   --   --   --  16    < > = values in this interval not displayed.     No results found for this or any previous visit (from the past 24 hour(s)).

## 2022-11-28 NOTE — PLAN OF CARE
Goal Outcome Evaluation:      Plan of Care Reviewed With: patient          Outcome Evaluation: AO x4 with forgetfullness, denies c/o pain, VSS on 2.5L oxygen via NC sats mid 90's, denies SOB at rest but c/o SOB with excertion, RT completed NEB tx, SBA to commode, she's continent of B+B, received 1000NS bolus and tolerated well, lymphedema wraps to BLE

## 2022-11-28 NOTE — PROGRESS NOTES
11/28/22 1054   Appointment Info   Signing Clinician's Name / Credentials (OT) Helen Nicholson, OTR/L  (Edema)   General Information   Onset of Illness/Injury or Date of Surgery 11/24/22   Referring Physician Armand Jamison,    Patient/Family Therapy Goal Statement (OT) to reduce edema in BLEs   Edema General Information   Onset of Edema   (acute)   Affected Body Part(s) Right LE;Left LE   Edema Etiology Infection   Etiology Comments cellulitis to distal BLEs, receiving medication for >48hrs to tx   Edema Precautions Acute infection   General Comments/Previous Edema Treatment/Edema Equipment Pt reports increased swelling over previous few weeks with no hx of edema in past   Edema Examination/Assessment   Skin Condition Pitting;Temperature;Dryness   Skin Condition Comments scale-like, taut, measuring circumference/spread of infection on BLEs -appears to remain within borders at eval   Scar No   Ulcerations No   Pitting Assessment 3-4+ pitting ankle to knee crease twila; trace-1+ twila feet   Edema Assessment Comments Noted mild folding/pocketing near ankles   Clinical Impression   Criteria for Skilled Therapeutic Interventions Met (OT) Yes, treatment indicated   OT Diagnosis increased fluid to BLEs impacting functional mobility to complete ADLs   Edema: Patient Presentation Edema   Edema: Planned Interventions Gradient compression bandaging;Fit for compression garment;Edema exercises;Precautions to prevent infection/exacerbation;Education;Manual therapy   Clinical Decision Making Complexity (OT) low complexity   Risk & Benefits of therapy have been explained evaluation/treatment results reviewed;care plan/treatment goals reviewed;risks/benefits reviewed;current/potential barriers reviewed;participants voiced agreement with care plan;participants included;patient   Clinical Impression Comments Pt may benefit from skilled IP lymph management to decrease fluid to BLEs and return to ADL baseline   OT Goals   Therapy  "Frequency (OT) 4 times/wk   OT Predicted Duration/Target Date for Goal Attainment 12/09/22   OT Goals Edema   OT: Edema education to increase ability to manage edema after discharge from the hospital Patient;Demonstrate;Lackey   OT: Management of edema bandages Patient;Caregiver;Lackey   OT: Functional edema exercise program to reduce limb volume, increase activity tolerance and improve independence with ADL Patient;Demonstrates;Caregiver;Lackey;HEP   Manual Therapy   Manual Therapy: Mobilization, MFR, MLD, friction massage minutes (93789) 40   Symptoms noted during/after treatment fatigue   Treatment Detail/Skilled Intervention Edema: Pt educated on lymphatic system anatomy/physiology, precautions, and treatment options. See evaluation above for description of BLE edema and skin. Pt is appropriate for use of GCBs to promote fluid mobilization and edema management, for improved skin integrity, functional mobility, and ADL independence. Pt's LE's washed and lotioned with Sween 24. Donned Size M TGSoft base layer, followed by 1 x 8cm karen from MTP's to distal shin and 1 x 10 cm karen from ankle to knee crease using \"quick wrap technique\" with 50% overlap and 50% stretch. Stockinette added over tape for increased hold. Pt reporting comfort. Pt educated on wear 24-48 hr wear schedule and indications for removal (pain, numbness, tingling, soiled, or loose/falling off). Pt educated in conservative strategies for managing BLE edema, including elevation and muscle pump activation. Pt verbalized understanding of all education. Patient care order in place.   Total Session Time   Timed Code Treatment Minutes 40   Total Session Time (sum of timed and untimed services) 40     "

## 2022-11-28 NOTE — PROGRESS NOTES
Patient refused CPT, will try using a aerobika with nebs in the meantime if she continues to refuse CPT.         Sarah Samaniego, RRT

## 2022-11-29 LAB
ANION GAP SERPL CALCULATED.3IONS-SCNC: 12 MMOL/L (ref 7–15)
BACTERIA BLD CULT: NO GROWTH
BACTERIA BLD CULT: NO GROWTH
BUN SERPL-MCNC: 46 MG/DL (ref 8–23)
CALCIUM SERPL-MCNC: 8.4 MG/DL (ref 8.8–10.2)
CHLORIDE SERPL-SCNC: 93 MMOL/L (ref 98–107)
CREAT SERPL-MCNC: 1.17 MG/DL (ref 0.51–0.95)
DEPRECATED HCO3 PLAS-SCNC: 35 MMOL/L (ref 22–29)
GFR SERPL CREATININE-BSD FRML MDRD: 47 ML/MIN/1.73M2
GLUCOSE SERPL-MCNC: 99 MG/DL (ref 70–99)
HOLD SPECIMEN: NORMAL
POTASSIUM SERPL-SCNC: 3.4 MMOL/L (ref 3.4–5.3)
POTASSIUM SERPL-SCNC: 3.6 MMOL/L (ref 3.4–5.3)
SODIUM SERPL-SCNC: 140 MMOL/L (ref 136–145)

## 2022-11-29 PROCEDURE — 94640 AIRWAY INHALATION TREATMENT: CPT

## 2022-11-29 PROCEDURE — 250N000009 HC RX 250

## 2022-11-29 PROCEDURE — 250N000013 HC RX MED GY IP 250 OP 250 PS 637: Performed by: STUDENT IN AN ORGANIZED HEALTH CARE EDUCATION/TRAINING PROGRAM

## 2022-11-29 PROCEDURE — 120N000002 HC R&B MED SURG/OB UMMC

## 2022-11-29 PROCEDURE — 250N000011 HC RX IP 250 OP 636

## 2022-11-29 PROCEDURE — 250N000013 HC RX MED GY IP 250 OP 250 PS 637

## 2022-11-29 PROCEDURE — 99233 SBSQ HOSP IP/OBS HIGH 50: CPT | Mod: GC | Performed by: STUDENT IN AN ORGANIZED HEALTH CARE EDUCATION/TRAINING PROGRAM

## 2022-11-29 PROCEDURE — 36415 COLL VENOUS BLD VENIPUNCTURE: CPT

## 2022-11-29 PROCEDURE — G0463 HOSPITAL OUTPT CLINIC VISIT: HCPCS

## 2022-11-29 PROCEDURE — 94640 AIRWAY INHALATION TREATMENT: CPT | Mod: 76

## 2022-11-29 PROCEDURE — 999N000157 HC STATISTIC RCP TIME EA 10 MIN

## 2022-11-29 PROCEDURE — 80048 BASIC METABOLIC PNL TOTAL CA: CPT

## 2022-11-29 PROCEDURE — 84132 ASSAY OF SERUM POTASSIUM: CPT | Performed by: STUDENT IN AN ORGANIZED HEALTH CARE EDUCATION/TRAINING PROGRAM

## 2022-11-29 PROCEDURE — 36415 COLL VENOUS BLD VENIPUNCTURE: CPT | Performed by: STUDENT IN AN ORGANIZED HEALTH CARE EDUCATION/TRAINING PROGRAM

## 2022-11-29 RX ORDER — POTASSIUM CHLORIDE 1.5 G/1.58G
40 POWDER, FOR SOLUTION ORAL ONCE
Status: COMPLETED | OUTPATIENT
Start: 2022-11-29 | End: 2022-11-29

## 2022-11-29 RX ADMIN — B-COMPLEX W/ C & FOLIC ACID TAB 1 TABLET: TAB at 07:54

## 2022-11-29 RX ADMIN — BUSPIRONE HYDROCHLORIDE 10 MG: 5 TABLET ORAL at 13:01

## 2022-11-29 RX ADMIN — MICONAZOLE NITRATE: 2 POWDER TOPICAL at 17:50

## 2022-11-29 RX ADMIN — Medication 25 MCG: at 07:54

## 2022-11-29 RX ADMIN — LATANOPROST 1 DROP: 50 SOLUTION OPHTHALMIC at 21:10

## 2022-11-29 RX ADMIN — Medication 400 MCG: at 07:54

## 2022-11-29 RX ADMIN — MICONAZOLE NITRATE: 2 POWDER TOPICAL at 07:58

## 2022-11-29 RX ADMIN — SENNOSIDES AND DOCUSATE SODIUM 2 TABLET: 50; 8.6 TABLET ORAL at 07:55

## 2022-11-29 RX ADMIN — ACETAMINOPHEN 650 MG: 325 TABLET, FILM COATED ORAL at 20:46

## 2022-11-29 RX ADMIN — IPRATROPIUM BROMIDE AND ALBUTEROL SULFATE 3 ML: 2.5; .5 SOLUTION RESPIRATORY (INHALATION) at 09:04

## 2022-11-29 RX ADMIN — ATORVASTATIN CALCIUM 40 MG: 40 TABLET, FILM COATED ORAL at 21:09

## 2022-11-29 RX ADMIN — IPRATROPIUM BROMIDE AND ALBUTEROL SULFATE 3 ML: 2.5; .5 SOLUTION RESPIRATORY (INHALATION) at 19:44

## 2022-11-29 RX ADMIN — ENOXAPARIN SODIUM 40 MG: 40 INJECTION SUBCUTANEOUS at 17:49

## 2022-11-29 RX ADMIN — AZELASTINE HYDROCHLORIDE 1 SPRAY: 137 SPRAY, METERED NASAL at 07:57

## 2022-11-29 RX ADMIN — LIDOCAINE PATCH 4% 1 PATCH: 40 PATCH TOPICAL at 07:56

## 2022-11-29 RX ADMIN — ACETAMINOPHEN 650 MG: 325 TABLET, FILM COATED ORAL at 06:52

## 2022-11-29 RX ADMIN — BUSPIRONE HYDROCHLORIDE 10 MG: 5 TABLET ORAL at 07:57

## 2022-11-29 RX ADMIN — LOSARTAN POTASSIUM AND HYDROCHLOROTHIAZIDE 1 TABLET: 25; 100 TABLET ORAL at 10:46

## 2022-11-29 RX ADMIN — MICONAZOLE NITRATE: 2 POWDER TOPICAL at 12:55

## 2022-11-29 RX ADMIN — ACETYLCYSTEINE 4 ML: 100 SOLUTION ORAL; RESPIRATORY (INHALATION) at 19:44

## 2022-11-29 RX ADMIN — IPRATROPIUM BROMIDE AND ALBUTEROL SULFATE 3 ML: 2.5; .5 SOLUTION RESPIRATORY (INHALATION) at 16:30

## 2022-11-29 RX ADMIN — POTASSIUM CHLORIDE 40 MEQ: 1.5 POWDER, FOR SOLUTION ORAL at 10:46

## 2022-11-29 RX ADMIN — ACETYLCYSTEINE 4 ML: 100 SOLUTION ORAL; RESPIRATORY (INHALATION) at 16:30

## 2022-11-29 RX ADMIN — BUSPIRONE HYDROCHLORIDE 10 MG: 5 TABLET ORAL at 19:56

## 2022-11-29 RX ADMIN — Medication 150 MG: at 21:09

## 2022-11-29 RX ADMIN — SERTRALINE HYDROCHLORIDE 100 MG: 100 TABLET ORAL at 07:54

## 2022-11-29 RX ADMIN — ACETYLCYSTEINE 4 ML: 100 SOLUTION ORAL; RESPIRATORY (INHALATION) at 09:04

## 2022-11-29 ASSESSMENT — ACTIVITIES OF DAILY LIVING (ADL)
ADLS_ACUITY_SCORE: 35
DEPENDENT_IADLS:: CLEANING;COOKING;LAUNDRY;SHOPPING;MEAL PREPARATION;MEDICATION MANAGEMENT;TRANSPORTATION
ADLS_ACUITY_SCORE: 35

## 2022-11-29 NOTE — PLAN OF CARE
"Goal Outcome Evaluation:      Plan of Care Reviewed With: patient    Overall Patient Progress: no changeOverall Patient Progress: no change    Outcome Evaluation: No major changes overnight.  Pt slept well in between cares, vitally stable on 3L NC, and is up to BSC with SBA.  Denied pain until this morning, around 0500 reported new onset CP, achy in nature - roughly 10 minutes later pain had resolved, no interventions, MD aware, okay to monitor for now.  No BM overnight, void x 1.  PIV saline locked.  Forgetful at times, bed alarm on overnight for safety.  No lidocaine patch on.  Has made needs known overnight, likely will need TCU at discharge, will continue to monitor    /50 (BP Location: Right arm, Patient Position: Semi-Bowden's)   Pulse 78   Temp 98  F (36.7  C) (Oral)   Resp 16   Ht 1.6 m (5' 3\")   Wt 93.6 kg (206 lb 5.6 oz)   LMP  (LMP Unknown)   SpO2 92%   BMI 36.55 kg/m      "

## 2022-11-29 NOTE — PLAN OF CARE
Goal Outcome Evaluation:      Plan of Care Reviewed With: patient    Overall Patient Progress: no changeOverall Patient Progress: no change    Outcome Evaluation: Pt A&Ox4 though few repetative questions and forgetfulness. VSS on 3L O2. Sats above 90 for majority of shift. Pt uses commode with Ax1. Lymph wraps clean/dry/intact and comfortable per pt.

## 2022-11-29 NOTE — PLAN OF CARE
Goal Outcome Evaluation:    Outcome Evaluation: Pt awake and alert, oriented x4 with intermittent forgetfulness, denies pain/chest pain, vitally stable, SBA to bedside commode, on 3L NC oxygen with cont pulse ox, L PIV saline locked, replaced K, scheduled for recheck, RT completed her scheduled Nebs, reports SOB with exertion that resolves at rest

## 2022-11-29 NOTE — PROGRESS NOTES
Care Management Follow Up    Length of Stay (days): 5    Expected Discharge Date: 12/02/2022     Concerns to be Addressed: discharge planning     Patient plan of care discussed at interdisciplinary rounds: Yes    Anticipated Discharge Disposition: Transitional Care  Anticipated Discharge Services: None  Anticipated Discharge DME: None    Patient/family educated on Medicare website which has current facility and service quality ratings: no  Education Provided on the Discharge Plan:  yes  Patient/Family in Agreement with the Plan: yes    Referrals Placed by CM/SW:   SW made initial referrals:  [facility]   [address] [place]  [address]  P: [phone]  P: [phone] - Admissions  F: [phone]    [facility]  [address] [place]  [address]  P: [phone]  F: [phone]    [facility]  [address]  [place], [place]  [address]  P: [phone]  P: [phone] - Admissions  F: [phone]    [facility]  [address] [place]  [address]  P: [phone]  F: [phone]    [facility]  [address] [place] [address]  [phone]    [facility]   [address] [place]  [address]  P: [phone]  P: [phone] - Admissions  F: [phone]    [facility]  [address] [place]  [address]  P: [phone]  P: [phone] - Admissions  F: [phone]    [facility]  [address] [place]  [address]  P: [phone]  F: [phone]    [facility]  [address]  [place] [phone]  11/30: SW received a voicemail after 1730PM from [facility] reporting that they are unable to accept pt due [facility].     [address] [address]  P: [phone]    [facility]  [address] [facility] [place]  [address]  P: [phone]  F: [phone]    Inactive Referrals:  [address]  [place], [place] [address]  P: [phone]        Private pay costs discussed: Not applicable    Additional Information:  @1125AM SW met with pt at bedside and retrieved list of TCU choices.      SW made initial referrals.     will continue to follow for discharge planning, support, and resources.    EPI Hood, [facility]  [address]   Office: [phone]   Pager: [phone]  [email or web address]

## 2022-11-29 NOTE — PROGRESS NOTES
[facility]    Progress Note - Medicine Service, IVIS TEAM 1        Date of Admission:  11/24/2022    Assessment & Plan   Ca Yan is a 76-year-old female with past medical history significant for previous breast cancer s/p lumpectomy, GERD, HTN, HLD, chronic lower extremity lymphedema, cluster C personality disorder, CKD, COPD, and known restrictive lung disease who was admitted on 11/24/2022 for chief concern of lower extremity swelling, redness, and shortness of breath over a duration estimated at 2-3 weeks, underwent diuresis. Awaiting TCU placement.      Today:  - Holding diuresis in context of prerenal NUNU  - Continue scheduled duonebs and mucomyst q4h  - Continue chest physiotherapy  - Medically clear to discharge, awaiting TCU placement     #Lower Extremity Edema  #Possible venous stasis vs lymphedema  4+ pitting bilateral lower extremity edema, apparently acute on chronic over last few weeks per patient and chart review. Would also consider potential liver pathology, although LFTs and albumin normal, as well as potential CKD contribution to volume retention although Cr normal. H2FPEF calculator showed 70% probability for HFpEF, but per cardiology, no concern for HFpEF. NT pro-BNP of 395, but may be confounded by obesity. TTE showed preserved ejection fraction of 65-70%. Abdominal US significant for probable hepatic steatosis and small right pleural effusion. US DVT negative.   - Inpatient heart failure consult, appreciate recs   - Hold diuresis in context of NUNU  - Daily BMP   - PTA Eucerin cream  - Lymphedema consult  - WOC consult   - Discontinue lotrimin cream   - Apply antifungal powder to groin and under breast    #Metabolic alkalosis with concomitant chronic respiratory acidosis  #Prerenal NUNU  Rapid response called on 11/26, patient was administered 40mg IV lasix in addition to morning 20mg IV dose. Patient subsequently had creatinine increase  "0.81-1.21 11/28 and further increase to 1.48 on 11/29, suggesting prerenal picture. Downtrend to 1.17 on 11/29.  - BMP tomorrow    #Acute on Chronic Hypoxic Hypercapnic Respiratory Failure  #Restrictive Lung Disease   Pt with history of chronic CO2 retention, has seen pulmonology in the past and reportedly declined NIPPV. Unclear baseline PCO2 venous, however gases and lab workup here suggest chronic metabolically compensated CO2 retention. Patient reports distant smoking history, stopped in 1980 and reports she was not a heavy smoker. Per pulmonology note from 03/28/2022, recent \"PFT data FEV1/FVC ratio 0.71.  FVC is 1.12 L which is 42% predicted.  FEV1 is 0.8 L which is 39% predicted.\"  Suspect significant contribution of spinous abnormalities toward restrictive lung disease. Given chronic CO2 retention, will keep O2 saturation goal lower to ensure maintenance of respiratory drive. Rapid response called 11/26 afternoon due to increased dyspnea - stat CXR showed right opacification and worsening of right pleural effusion, more consistent with atelectasis 2/2 mucus plugging rather than fluid overload. On 3L O2 on 11/29 with sat ~ 90% (baseline is 2-3L at home), still with periodic desaturation with ambulation.   - Continue oxygen to keep saturations above 90%  - Albuterol inhaler Q6H PRN  - Duonebs Q4H  - Mucomyst Q4H  - Chest physiotherapy   - Escalate to BiPAP if needed    - PTA azelastine 1 spray both nostrils BID   - F/u with outpatient pulmonologist    #Hypokalemia  - RN replacement protocol     #Decreased Functional Status  Appreciate PT/OT evaluation, patient concern of being unable to go back home due to difficulty completing home ADLs and overall shortness of breath. Appreciate social work consult for dispo planning.   Appreciate ongoing SW evaluation:   SW successfully met with pt to discuss therapy recommendations and provide the medicare.gov list. SW provided education on the quality and service rating " system. SW requested 10 TCU choices from pt.  Appreciate PT recommendations:   - TCU at discharge due to below baseline functional capacity   Appreciate OT recommendations:   - TCU at discharge due to below baseline functional capacity      #Generalized Anxiety Disorder  #Cognitive impairment  #Hx of AUD  As per recent family medicine visit, continuing PTA medications. Patient has repeatedly asked about echo procedure and findings (required postponement of 11/26 echo due to patient request to re-explain procedure), unclear if she has full capacity or if this represents significant deviation from baseline. Additional chart review shows hx of alcohol used disorder which may be contributing - not current drinker but documentation shows hx of drinking 1/2 pint liquor per day in 2016. Increased buspirone from BID to TID due to patient concern of anxiety 11/27.   - Buspirone 10 mg PO TID  - Consult OT for MOCA evaluation     #Depression  - Continue PTA sertraline, updated dose to 100 mg PO daily per pharmacy discussion     #Insomnia  #Overnight Hallucination  Multiple reports of patient concern for hallucination with man standing in room, but patient does not recall in AM discussion. Will monitor for now, see if issue recurs.   - Continue PTA Seroquel, 150 mg PO QHS     #Hypertension  - Continue PTA Losartan/HCTZ 100-25 mg PO daily     #HLD  - Continue PTA atorvastatin 40 mg PO daily     #Glaucoma  - PTA latanoprost 1 drop each eye at bedtime     #?Vitamin Deficiency  #Chronic macrocytic anemia  - Continue PTA folic acid 400 mcg daily  - Continue vitamin B complex with C 1 tablet daily  - Continue vitamin D3 25 mcg daily     #Abdominal pain  Patient has had chronic abdominal wall pain, likely MSK in context of severe kyphoscoliosis. States that it is worsened by coughing. Pain is improved with Tylenol.  - Tylenol 650mg q6h PRN  - Lidocaine patch     Diet: Combination Diet Regular Diet Adult    DVT Prophylaxis: Enoxaparin  "(Lovenox) SQ  Lima Catheter: Not present  Fluids: PO  Central Lines: None  Cardiac Monitoring: None  Code Status: Full Code      Disposition Plan      Expected Discharge Date: 12/02/2022      Destination: inpatient rehabilitation facility  Discharge Comments: Dispo: TBD  Delays:   Progress: CMA by SW today.        The patient's care was discussed with the Attending Physician, Dr. Gala Biggs MD  Medicine Service, [address]  Securely message with the Vocera Web Console (learn more here)  Text page via [employer] Paging/Directory   Please see signed in provider for up to date coverage information    Resident/Fellow Attestation   I, Jakub Biggs MD, was present with the medical/KASSIE student who participated in the service and in the documentation of the note.  I have verified the history and personally performed the physical exam and medical decision making.  I agree with the assessment and plan of care as documented in the note.      Jakub Biggs MD  Internal Medicine-Pediatrics, PGY-2     Clinically Significant Risk Factors                       # Obesity: Estimated body mass index is 35.39 kg/m  as calculated from the following:    Height as of this encounter: 1.6 m (5' 3\").    Weight as of this encounter: 90.6 kg (199 lb 12.8 oz).        ______________________________________________________________________    Interval History   Nursing notes reviewed, patient had mild confusion overnight which is similar to her baseline. No interval changes in breathing - still desats during ambulation but quickly recovers, on 3L and saturating ~95% on interview today. Explained plan to discharge to TCU, which she seemed amenable to.    Data reviewed today: I reviewed all medications, new labs and imaging results over the last 24 hours. I personally reviewed no images today.    Physical Exam   Vital Signs: Temp: 98.1  F (36.7  C) Temp src: Oral BP: 133/66 Pulse: 77   " Resp: 16 SpO2: 90 % O2 Device: Nasal cannula Oxygen Delivery: 3 LPM  Weight: 199 lbs 12.8 oz     General: Alert, cooperative, no signs of acute distress  HEENT: Normocephalic, atraumatic. No lymphadenopathy appreciated.  Cardiac: Regular rate and rhythm, normal S1/S2, previously documented 3/6 systolic murmur best heard at RUSB. Unable to assess JVD/hepatojugular reflex.  Pulmonary: Some crackles in posterior lung bases. High I/E ratio.  Abdomen: Soft, non-distended, no irregular masses, no rebound or guarding.   Extremities: 2+ lower extremity edema, palpable dorsalis pedis pulses. Flaking skin and regions of erythema on bilateral lower extremities.  Psychiatric: Some tangentiality and confusion, perseverates on certain topics, but redirectable.    Data   Recent Labs   Lab 11/29/22  0539 11/28/22  0618 11/27/22  0656 11/25/22  1328 11/25/22  0602 11/24/22  1533 11/24/22  1259   WBC  --   --  7.5  --  5.2  --  5.6   HGB  --   --  9.6*  --  8.3*  --  8.2*   MCV  --   --  98  --  103*  --  104*   PLT  --   --  254  --  235  --  233    141 143   < > 144  --  144  144   POTASSIUM 3.4 3.5 3.6   < > 3.3*  --  3.8  3.8   CHLORIDE 93* 89* 87*   < > 94*  --  100  100   CO2 35* 41* 42*   < > 39*  --  35*  36*   BUN 46.0* 43.3* 28.1*   < > 21.6  --  22.2  22.1   CR 1.17* 1.48* 1.21*   < > 0.76   < > 0.68  0.69   ANIONGAP 12 11 14   < > 11  --  9  8   SYMONE 8.4* 8.8 9.2   < > 8.9  --  8.8  8.7*   GLC 99 102* 100*   < > 103*  --  101*  102*   ALBUMIN  --   --   --   --   --   --  3.7   PROTTOTAL  --   --   --   --   --   --  6.8   BILITOTAL  --   --   --   --   --   --  0.3   ALKPHOS  --   --   --   --   --   --  86   ALT  --   --   --   --   --   --  17   AST  --   --   --   --   --   --  37*   LIPASE  --   --   --   --   --   --  16    < > = values in this interval not displayed.     No results found for this or any previous visit (from the past 24 hour(s)).

## 2022-11-29 NOTE — PROGRESS NOTES
[facility]  WOC Nurse Inpatient Assessment     Consulted for: bilateral lower extremities and right groin rash    Patient History (according to provider note(s):    Ca Yan is a 76 year old female admitted on 11/24/2022 for chief concern ofLower extremity swelling, redness, and shortness of breath over a duration estimated at 2-3 weeks. Her past medical history is significant for previous breast cancer s/p lumpectomy, GERD, HTN, HLD, chronic lower extremity lymphedema, cluster C personality disorder, home oxygen use of 2-3 LPM, question of CKD, question of COPD, and known restrictive lung disease.         Today:   - Lasix 20 mg IV Q6H twice per day  - TTE if patient amenable, counseled on importance 11/25 and stated she was wiling  - PT/OT/SW/WOC/Lymphedema consults, patient to likely need TCU  - 2-3 LPM NC, this is patients baseline      #Lower Extremity Edema  #Possible Venous stasis vs other rash  #Possible Heart Failure  4+ pitting bilateral lower extremity edema, apparently acute on chronic over last few weeks per patient and chart review. Unclear etiology with normal BNP somewhat less likely heart failure although given elevated BMI BNP may not be fully reliable. Would also consider potential liver pathology, although LFTs and albumin normal, as well as potential CKD contribution to volume retention although Cr normal. Most likely etiology appears to be heart failure, however patient declined initial TTE 11/25, willing to undergo study after additional interview/explanation.  Abdominal US significant for probable hepatic steatosis and small right pleural effusion. US DVT negative.  - TTE 11/25/2022, ordered again as patient required further explanation   - Lasix 20 mg IV daily first dose around 0800 and second dose 6 hours afterwards  - Daily BMP   - PTA clotrimazole cream  - PTA Eucerin cream  - Lymphedema consult  - WOC consult       Areas Assessed:       Areas visualized during today's visit: Skin folds  and Lower extremities   Skin Injury Location: Right groin and inferior breast    11/25- right groin  Last photo: 11/25  Skin injury due to: Fungal rash  and Intertrigo  Skin history and plan of care:  Pt endorses frequent rashes in skin folds including arm pits. Right inferior breast with similar presentation.  Affected area:      Skin assessment: Intact, Erythema and Lesions-satellite  Pain: denies  and  endorses itching  Pain interventions prior to dressing change: N/A  Treatment goal: Infection control/prevention  STATUS: stable  Supplies ordered: discussed with RN and discussed with patient      Skin Injury Location: bilateral lower extremities    11/25    Left     right    Skin injury due to: Venous stasis dermatitis. Possible infection, although less likely due to bilateral presentation, minimal pain (greater in left than in right), temperature of extremity is consistent/ not warmer in reddened areas.   Skin history and plan of care:   Patient has been complaining of BLE edema and swelling since 11/4 seen in ED a few times. It appears this is an issue that patient has been struggling with intermittently for some time, even before 11/4.   Affected area:      Skin assessment: Dry/flaky     Measurements (length x width x depth, in cm)see photo     Color: red     Temperature  normal      Drainage: small and moderate .      Color: serous      Odor: none  Pain: mild, tender  Pain interventions prior to dressing change: patient tolerated well  Treatment goal: none  STATUS: improving no longer weeping   Supplies ordered: supplies stored on unit and discussed with RN    Treatment Plan:     Right groin and right breast erythema: BID  Cleanse areas with Bg cleanse and protect and gently pat dry.  Apply dusting of Miconazole powder, per provider order to erythema in skin folds and gently brush in to skin to prevent caking.   Discontinue use of Miconazole once  erythema has resolved and then continue to cleanse skin folds daily and utilize Interdry. Do not use interdry while using other powders or creams.    Orders: Reviewed and Updated    RECOMMEND PRIMARY TEAM ORDER:Discontinue lotrimin cream, and instead apply Antifungal powder to groin and under breast erythema. Redness on legs marked and monitor for increased erythema.  Lymphedema already consulted.  Education provided: plan of care and Moisture management  Discussed plan of care with: Nurse  WOC nurse follow-up plan: weekly  Notify WOC if wound(s) deteriorate.  Nursing to notify the Provider(s) and re-consult the WOC Nurse if new skin concern.    DATA:     Current support surface: Standard  Standard gel/foam mattress (IsoFlex, Atmos air, etc)  Containment of urine/stool: Incontinent pad in bed  BMI: Body mass index is 36.55 kg/m .   Active diet order: Orders Placed This Encounter      Combination Diet Regular Diet Adult     Output: I/O last 3 completed shifts:  In: 360 [P.O.:360]  Out: 905 [Urine:905]     Labs:   Recent Labs   Lab 11/27/22  0656 11/25/22  0602 11/24/22  1259   ALBUMIN  --   --  3.7   HGB 9.6*   < > 8.2*   WBC 7.5   < > 5.6   CRP  --   --  6.90*    < > = values in this interval not displayed.     Pressure injury risk assessment:   Sensory Perception: 4-->no impairment  Moisture: 3-->occasionally moist  Activity: 3-->walks occasionally  Mobility: 3-->slightly limited  Nutrition: 3-->adequate  Friction and Shear: 2-->potential problem  Rodolfo Score: 18    Dalia Trimble RN CWOCN  Dept. Pager: [phone]  Dept. Office Number: [phone]

## 2022-11-29 NOTE — RESULT ENCOUNTER NOTE
Dear Ca Yan    I understand you are in the hospital. When you are discharged please make a follow-up to review next steps after review from hematology e-consult.  Landon wishes,  Favian Sahu MD

## 2022-11-29 NOTE — CONSULTS
Dental Hygiene Consult Service        Ca Yan MRN# [id number]   YOB: 1945 Age: 77 year old     Date of Admission: 11/24/2022  PCP is Favian Sahu  Date of Service: 11/29/2022           Reason for Consult:   Referring MD & Reason for Visit: I was asked by Manuel Vizcarra MD, to see Ca Yan for oral assessment.            History of Present Illness:   This patient is a 77 year old female with a history of admitted on 11/24/2022 for chief concern ofLower extremity swelling, redness, and shortness of breath over a duration estimated at 2-3 weeks. Her past medical history is significant for previous breast cancer s/p lumpectomy, GERD, HTN, HLD, chronic lower extremity lymphedema, cluster C personality disorder, home oxygen use of 2-3 LPM, question of CKD, question of COPD, and known restrictive lung disease. .  Dental and oropharyngeal history is pertinent for oxygen use, .  The patient reports no current oral pain, knows that she has broken teeth.            General Observations   Level of support Patient requires some assistance:  Needs oral care products brought to bedside. Can brush independently.    Patient currently in pain No   Patient wears dentures No   Current oral care routine None at this time.    Patient has oral care products Patient does not have oral care products   Extraoral assessment   General appearance Symmetrical and Normal TMJ function   Lymph nodes Symmetrical, no abnormalities, non-tender   Oral Health Assessment Tool (OHAT)   Lips 0=Healthy: smooth, pink, moist   Tongue 0=Healthy: normal, moist, roughness, pink   Gums and Tissues 1=Changes: dry, shiny, rough, red, swollen, one ulcer/sore spot (under dentures) - slight gingival inflammation   Saliva 0=Healthy: moist tissues, watery and free flowing saliva   Natural Teeth Yes/No 2=Unhealthy: 4+ decayed or broken teeth/roots, or very worn down teeth, or less than 4 teeth - several teeth  broken/fractured. Root tip on UL front tooth, tooth broke off a few months ago.    Dentures Yes/No Patient does not wear dentures   Oral Cleanliness 1=Changes: food particles/tartar/plaque in 1-2 areas of the mouth or on small area of dentures or halitosis (bad breath) - moderate biofilm    Dental Pain 0=Healthy: no behavioural, verbal, or physical signs of dental pain   OHAT Total Score (0-16) 4   Other Dental Concerns No other concerns    Care provided during assessment Oral Assessment and Patient education   Follow up DH assessments required? Yes : will follow pt, providing oral care as indicated, follow up assessment   Connect with [facility] or [place] care? If concern for infection, consider consult to dental and/or imaging due to several broken teeth, suspect decay.    Oral Care Plan - encourage frequent oral care 3-4x/day  Pt needs oral care products brought to her, can brush on her own. Encourage brushing around gumline.             Assessment and Plan:   Assessment:  This patient is a 77 year old female with a history of admitted on 11/24/2022 for chief concern ofLower extremity swelling, redness, and shortness of breath over a duration estimated at 2-3 weeks. Her past medical history is significant for previous breast cancer s/p lumpectomy, GERD, HTN, HLD, chronic lower extremity lymphedema, cluster C personality disorder, home oxygen use of 2-3 LPM, question of CKD, question of COPD, and known restrictive lung disease. , oral exam concerning for NVHAP risk, O2 needs, moderate biofilm accumulation.      Plan:   -Encourage pt to follow oral care plan as above to optimize oral health, reduce risk of pna      Gabriella Patten [facility]  Cell: [phone]      Past Medical History   I have reviewed this patient's medical history and updated it with pertinent information if needed.  Past Medical History:   Diagnosis Date     Anxiety      Arthritis      Breast cancer (H)      COPD (chronic obstructive pulmonary disease) (H)      12:  FEV 0.99 l     Depressive disorder      Hypertension      Low back pain with left-sided sciatica      Low bone density 2017    DEXA 2017: T score -2.0. Normal Z score. FRAX risk: major osteoporotic fracture 11.9%, hip fracture 2.6%, therefore not high-risk     Lymphedema, chronic lower extremities        Past Surgical History   I have reviewed this patient's surgical history and updated it with pertinent information if needed.  Past Surgical History:   Procedure Laterality Date     BACK SURGERY      spinal fusion     COLONOSCOPY       LARYNGOSCOPY WITH MICROSCOPE N/A 2021    Procedure: FLEXIBLE LARYNGOSCOPY;  Surgeon: Domonique Roche MD;  Location: [facility] OR     LUMPECTOMY BREAST       ORTHOPEDIC SURGERY      Knee surgery left side       Social History   I have reviewed this patient's social history and updated it with pertinent information if needed.  Social History     Tobacco Use     Smoking status: Former     Packs/day: 0.50     Years: 5.00     Pack years: 2.50     Types: Cigarettes     Start date: 1956     Quit date: 1980     Years since quittin.2     Smokeless tobacco: Former     Quit date: 1980   Substance Use Topics     Alcohol use: Not Currently     Alcohol/week: 0.0 standard drinks     Comment: Sober for 2 years, previous alcoholic     Drug use: No     Prior to Admission Medications   Prior to Admission Medications   Prescriptions Last Dose Informant Patient Reported? Taking?   Emollient (CERAVE) CREA More than a month  No Yes   Sig: Please apply twice daily to dry skin of body and feet   QUEtiapine (SEROQUEL) 300 MG tablet 2022  Yes Yes   Sig: Take 300 mg by mouth   Vitamin D3 (CHOLECALCIFEROL) 25 mcg (1000 units) tablet 2022  No Yes   Sig: Take 1 tablet (25 mcg) by mouth daily   atorvastatin (LIPITOR) 40 MG tablet 2022  No Yes   Sig: Take 1 tablet (40 mg) by mouth daily   busPIRone (BUSPAR) 10 MG tablet 2022  No Yes   Sig: Take 1 tablet  (10 mg) by mouth 2 times daily   clotrimazole (LOTRIMIN) 1 % external cream More than a month  No Yes   Sig: Apply topically 2 times daily as needed (under arms breast groin rash until rash resolves)   folic acid (FOLVITE) 400 MCG tablet 2022  No Yes   Sig: Take 1 tablet (400 mcg) by mouth daily   latanoprost (XALATAN) 0.005 % ophthalmic solution 2022  Yes Yes   Si drop   losartan-hydrochlorothiazide (HYZAAR) 100-25 MG tablet 2022  No Yes   Sig: Take 1 tablet by mouth daily   sertraline (ZOLOFT) 100 MG tablet 2022  No Yes   Sig: Take 1.5 tablets (150 mg) by mouth daily   Patient taking differently: Take 100 mg by mouth daily   vitamin B complex with vitamin C (STRESS TAB) tablet 2022  No Yes   Sig: Take 1 tablet by mouth daily      Facility-Administered Medications: None     Allergies   Allergies   Allergen Reactions     Azithromycin Itching     Codeine Nausea and Vomiting     Hydrocodone Nausea and Vomiting     Metronidazole Nausea     Oxycodone Itching and Rash     Percocet [Oxycodone-Acetaminophen] Nausea and Vomiting     Pollen Extract      sneezing and runny nose.      Seasonal Allergies      sneezing and runny nose.      Vicodin [Hydrocodone-Acetaminophen] Nausea and Vomiting     Zolpidem      Amnestic behavior     Physical Exam

## 2022-11-30 ENCOUNTER — APPOINTMENT (OUTPATIENT)
Dept: PHYSICAL THERAPY | Facility: CLINIC | Age: 77
DRG: 205 | End: 2022-11-30
Payer: COMMERCIAL

## 2022-11-30 ENCOUNTER — APPOINTMENT (OUTPATIENT)
Dept: OCCUPATIONAL THERAPY | Facility: CLINIC | Age: 77
DRG: 205 | End: 2022-11-30
Payer: COMMERCIAL

## 2022-11-30 LAB
ANION GAP SERPL CALCULATED.3IONS-SCNC: 10 MMOL/L (ref 7–15)
BASOPHILS # BLD AUTO: 0 10E3/UL (ref 0–0.2)
BASOPHILS NFR BLD AUTO: 1 %
BUN SERPL-MCNC: 49.3 MG/DL (ref 8–23)
CALCIUM SERPL-MCNC: 8.9 MG/DL (ref 8.8–10.2)
CHLORIDE SERPL-SCNC: 96 MMOL/L (ref 98–107)
CREAT SERPL-MCNC: 1.03 MG/DL (ref 0.51–0.95)
DEPRECATED HCO3 PLAS-SCNC: 35 MMOL/L (ref 22–29)
EOSINOPHIL # BLD AUTO: 0.2 10E3/UL (ref 0–0.7)
EOSINOPHIL NFR BLD AUTO: 2 %
ERYTHROCYTE [DISTWIDTH] IN BLOOD BY AUTOMATED COUNT: 14.6 % (ref 10–15)
GFR SERPL CREATININE-BSD FRML MDRD: 55 ML/MIN/1.73M2
GLUCOSE SERPL-MCNC: 93 MG/DL (ref 70–99)
HCT VFR BLD AUTO: 30.3 % (ref 35–47)
HGB BLD-MCNC: 9.1 G/DL (ref 11.7–15.7)
HOLD SPECIMEN: NORMAL
HOLD SPECIMEN: NORMAL
IMM GRANULOCYTES # BLD: 0 10E3/UL
IMM GRANULOCYTES NFR BLD: 0 %
LYMPHOCYTES # BLD AUTO: 0.7 10E3/UL (ref 0.8–5.3)
LYMPHOCYTES NFR BLD AUTO: 8 %
MCH RBC QN AUTO: 30.5 PG (ref 26.5–33)
MCHC RBC AUTO-ENTMCNC: 30 G/DL (ref 31.5–36.5)
MCV RBC AUTO: 102 FL (ref 78–100)
MONOCYTES # BLD AUTO: 0.9 10E3/UL (ref 0–1.3)
MONOCYTES NFR BLD AUTO: 10 %
NEUTROPHILS # BLD AUTO: 6.5 10E3/UL (ref 1.6–8.3)
NEUTROPHILS NFR BLD AUTO: 79 %
NRBC # BLD AUTO: 0 10E3/UL
NRBC BLD AUTO-RTO: 0 /100
PLATELET # BLD AUTO: 235 10E3/UL (ref 150–450)
POTASSIUM SERPL-SCNC: 3.6 MMOL/L (ref 3.4–5.3)
RBC # BLD AUTO: 2.98 10E6/UL (ref 3.8–5.2)
RETICS # AUTO: 0.06 10E6/UL (ref 0.03–0.1)
RETICS/RBC NFR AUTO: 2.1 % (ref 0.5–2)
SODIUM SERPL-SCNC: 141 MMOL/L (ref 136–145)
TOTAL PROTEIN SERUM FOR ELP: 6.5 G/DL (ref 6.4–8.3)
WBC # BLD AUTO: 8.3 10E3/UL (ref 4–11)

## 2022-11-30 PROCEDURE — 84165 PROTEIN E-PHORESIS SERUM: CPT | Mod: TC | Performed by: PATHOLOGY

## 2022-11-30 PROCEDURE — 82784 ASSAY IGA/IGD/IGG/IGM EACH: CPT | Performed by: STUDENT IN AN ORGANIZED HEALTH CARE EDUCATION/TRAINING PROGRAM

## 2022-11-30 PROCEDURE — 120N000002 HC R&B MED SURG/OB UMMC

## 2022-11-30 PROCEDURE — 97530 THERAPEUTIC ACTIVITIES: CPT | Mod: GP

## 2022-11-30 PROCEDURE — 250N000013 HC RX MED GY IP 250 OP 250 PS 637

## 2022-11-30 PROCEDURE — 97116 GAIT TRAINING THERAPY: CPT | Mod: GP

## 2022-11-30 PROCEDURE — 94668 MNPJ CHEST WALL SBSQ: CPT

## 2022-11-30 PROCEDURE — 250N000011 HC RX IP 250 OP 636

## 2022-11-30 PROCEDURE — 999N000111 HC STATISTIC OT IP EVAL DEFER

## 2022-11-30 PROCEDURE — 97535 SELF CARE MNGMENT TRAINING: CPT | Mod: GO

## 2022-11-30 PROCEDURE — 250N000009 HC RX 250

## 2022-11-30 PROCEDURE — 94799 UNLISTED PULMONARY SVC/PX: CPT

## 2022-11-30 PROCEDURE — 999N000157 HC STATISTIC RCP TIME EA 10 MIN

## 2022-11-30 PROCEDURE — 94640 AIRWAY INHALATION TREATMENT: CPT | Mod: 76

## 2022-11-30 PROCEDURE — 94640 AIRWAY INHALATION TREATMENT: CPT

## 2022-11-30 PROCEDURE — 250N000013 HC RX MED GY IP 250 OP 250 PS 637: Performed by: STUDENT IN AN ORGANIZED HEALTH CARE EDUCATION/TRAINING PROGRAM

## 2022-11-30 PROCEDURE — 36415 COLL VENOUS BLD VENIPUNCTURE: CPT | Performed by: STUDENT IN AN ORGANIZED HEALTH CARE EDUCATION/TRAINING PROGRAM

## 2022-11-30 PROCEDURE — 86334 IMMUNOFIX E-PHORESIS SERUM: CPT | Performed by: PATHOLOGY

## 2022-11-30 PROCEDURE — 99232 SBSQ HOSP IP/OBS MODERATE 35: CPT | Mod: GC | Performed by: STUDENT IN AN ORGANIZED HEALTH CARE EDUCATION/TRAINING PROGRAM

## 2022-11-30 PROCEDURE — 83921 ORGANIC ACID SINGLE QUANT: CPT | Performed by: STUDENT IN AN ORGANIZED HEALTH CARE EDUCATION/TRAINING PROGRAM

## 2022-11-30 PROCEDURE — 97110 THERAPEUTIC EXERCISES: CPT | Mod: GP

## 2022-11-30 PROCEDURE — 80048 BASIC METABOLIC PNL TOTAL CA: CPT | Performed by: STUDENT IN AN ORGANIZED HEALTH CARE EDUCATION/TRAINING PROGRAM

## 2022-11-30 PROCEDURE — 85045 AUTOMATED RETICULOCYTE COUNT: CPT | Performed by: STUDENT IN AN ORGANIZED HEALTH CARE EDUCATION/TRAINING PROGRAM

## 2022-11-30 PROCEDURE — 97530 THERAPEUTIC ACTIVITIES: CPT | Mod: GO

## 2022-11-30 PROCEDURE — 84155 ASSAY OF PROTEIN SERUM: CPT | Performed by: STUDENT IN AN ORGANIZED HEALTH CARE EDUCATION/TRAINING PROGRAM

## 2022-11-30 PROCEDURE — 82668 ASSAY OF ERYTHROPOIETIN: CPT | Performed by: STUDENT IN AN ORGANIZED HEALTH CARE EDUCATION/TRAINING PROGRAM

## 2022-11-30 PROCEDURE — 85025 COMPLETE CBC W/AUTO DIFF WBC: CPT | Performed by: STUDENT IN AN ORGANIZED HEALTH CARE EDUCATION/TRAINING PROGRAM

## 2022-11-30 PROCEDURE — 83521 IG LIGHT CHAINS FREE EACH: CPT | Performed by: STUDENT IN AN ORGANIZED HEALTH CARE EDUCATION/TRAINING PROGRAM

## 2022-11-30 PROCEDURE — 83090 ASSAY OF HOMOCYSTEINE: CPT | Performed by: STUDENT IN AN ORGANIZED HEALTH CARE EDUCATION/TRAINING PROGRAM

## 2022-11-30 RX ORDER — FERROUS SULFATE 325(65) MG
325 TABLET ORAL EVERY OTHER DAY
Status: CANCELLED | OUTPATIENT
Start: 2022-11-30

## 2022-11-30 RX ADMIN — IPRATROPIUM BROMIDE AND ALBUTEROL SULFATE 3 ML: 2.5; .5 SOLUTION RESPIRATORY (INHALATION) at 07:32

## 2022-11-30 RX ADMIN — Medication 400 MCG: at 07:44

## 2022-11-30 RX ADMIN — ATORVASTATIN CALCIUM 40 MG: 40 TABLET, FILM COATED ORAL at 21:00

## 2022-11-30 RX ADMIN — Medication 150 MG: at 21:00

## 2022-11-30 RX ADMIN — AZELASTINE HYDROCHLORIDE 1 SPRAY: 137 SPRAY, METERED NASAL at 07:47

## 2022-11-30 RX ADMIN — BUSPIRONE HYDROCHLORIDE 10 MG: 5 TABLET ORAL at 07:44

## 2022-11-30 RX ADMIN — Medication 25 MCG: at 07:44

## 2022-11-30 RX ADMIN — AZELASTINE HYDROCHLORIDE 1 SPRAY: 137 SPRAY, METERED NASAL at 19:43

## 2022-11-30 RX ADMIN — BUSPIRONE HYDROCHLORIDE 10 MG: 5 TABLET ORAL at 13:15

## 2022-11-30 RX ADMIN — BUSPIRONE HYDROCHLORIDE 10 MG: 5 TABLET ORAL at 19:44

## 2022-11-30 RX ADMIN — MICONAZOLE NITRATE: 2 POWDER TOPICAL at 07:55

## 2022-11-30 RX ADMIN — IPRATROPIUM BROMIDE AND ALBUTEROL SULFATE 3 ML: 2.5; .5 SOLUTION RESPIRATORY (INHALATION) at 20:28

## 2022-11-30 RX ADMIN — LOSARTAN POTASSIUM AND HYDROCHLOROTHIAZIDE 1 TABLET: 25; 100 TABLET ORAL at 07:44

## 2022-11-30 RX ADMIN — ACETYLCYSTEINE 4 ML: 100 SOLUTION ORAL; RESPIRATORY (INHALATION) at 07:32

## 2022-11-30 RX ADMIN — ACETYLCYSTEINE 4 ML: 100 SOLUTION ORAL; RESPIRATORY (INHALATION) at 20:28

## 2022-11-30 RX ADMIN — MICONAZOLE NITRATE: 2 POWDER TOPICAL at 19:49

## 2022-11-30 RX ADMIN — LATANOPROST 1 DROP: 50 SOLUTION OPHTHALMIC at 21:00

## 2022-11-30 RX ADMIN — IPRATROPIUM BROMIDE AND ALBUTEROL SULFATE 3 ML: 2.5; .5 SOLUTION RESPIRATORY (INHALATION) at 11:26

## 2022-11-30 RX ADMIN — LIDOCAINE PATCH 4% 1 PATCH: 40 PATCH TOPICAL at 07:44

## 2022-11-30 RX ADMIN — ACETYLCYSTEINE 4 ML: 100 SOLUTION ORAL; RESPIRATORY (INHALATION) at 15:38

## 2022-11-30 RX ADMIN — B-COMPLEX W/ C & FOLIC ACID TAB 1 TABLET: TAB at 07:44

## 2022-11-30 RX ADMIN — ENOXAPARIN SODIUM 40 MG: 40 INJECTION SUBCUTANEOUS at 19:44

## 2022-11-30 RX ADMIN — IPRATROPIUM BROMIDE AND ALBUTEROL SULFATE 3 ML: 2.5; .5 SOLUTION RESPIRATORY (INHALATION) at 15:38

## 2022-11-30 RX ADMIN — SENNOSIDES AND DOCUSATE SODIUM 2 TABLET: 50; 8.6 TABLET ORAL at 07:44

## 2022-11-30 RX ADMIN — ACETYLCYSTEINE 4 ML: 100 SOLUTION ORAL; RESPIRATORY (INHALATION) at 11:26

## 2022-11-30 RX ADMIN — SERTRALINE HYDROCHLORIDE 100 MG: 100 TABLET ORAL at 07:44

## 2022-11-30 ASSESSMENT — ACTIVITIES OF DAILY LIVING (ADL)
ADLS_ACUITY_SCORE: 35

## 2022-11-30 NOTE — PROGRESS NOTES
Care Management Follow Up    [facility]   [address] [place]  [address]  P: [phone]  P: [phone] - Admissions  F: [phone]   11/30: CHW spoke with admissions and they are full for the week.     [facility]  [address] [place]  [address]  P: [phone]  F: [phone]  11/30:CHW LVM for admissions to f/u on referral; requested they call SW back.     [facility]  [address]  [place], [place]  [address]  P: [phone]  P: [phone] - Admissions  F: [phone]  11/30: CHW was unable to reach admissions and didn't go to .     [place] [facility]  [address] [place]  [address]  P: [phone]  F: [phone]   11/30: CHW sent referral via [facility]  [address], [place] [id number] [phone]    11/30: CHW sent referral via [facility]   [address] [place]  [address]  P: [phone]  P: [phone] - Admissions  F: [phone]   11/30: CHW spoke with Paulette in admissions and they don't have any bed openings as of now.     [facility]  [address] [place]  [address]  P: [phone]  P: [phone] - Admissions  F: [phone]    11/30: CHW sent referral via [facility]  [address] [place]  [address]  P: [phone]  F: [phone]   11/30: [address] [address]  [phone]    11/30: CHW sent referral via [facility]  [address] [place] [address]  P: [phone]    11/30: CHW sent referral via [facility]  [address] [place] [address]  [address]  P: [phone]  F: [phone]    11/30: CHW sent referral via Epic    Inactive Referrals:  [address]  [place] [address]  P: [phone]     Max Zaman   Inpatient Community Health Worker  [facility] 5A & 5B

## 2022-11-30 NOTE — PLAN OF CARE
"OT/Edema 5A: Attempted for the second consecutive day to encourage patient to have gradient compression bandaging re-applied to bilateral lower extremities to assist with fluid movement and skin integrity. Unfortunately, she continues to refuse, stating she just \"doesn't like to wear\" the wraps. She is not receptive to any form of education on conservative measures at this time and declines any compression use. Will complete lymphedema orders at this time, however, please re-consult if patient becomes agreeable.     Laura Hollins, OTR/L  Pager # [phone]      "

## 2022-11-30 NOTE — PROGRESS NOTES
[facility]    Progress Note - Medicine Service, IVIS TEAM 1        Date of Admission:  11/24/2022    Assessment & Plan   Ca Yan is a 76-year-old female with past medical history significant for previous breast cancer s/p lumpectomy, GERD, HTN, HLD, chronic lower extremity lymphedema, cluster C personality disorder, CKD, COPD, and known restrictive lung disease who was admitted on 11/24/2022 for chief concern of lower extremity swelling, redness, and shortness of breath over a duration estimated at 2-3 weeks, underwent diuresis. Awaiting TCU placement.      Today:  - Holding diuresis in context of prerenal NUNU  - Continue scheduled duonebs and mucomyst q4h  - Continue chest physiotherapy  - Medically clear to discharge, awaiting TCU placement  - Ordered labs for macrocytic anemia workup, to be followed outpatient   - Methylmalonic acid, homocysteine   - Oral iron 325mg tablet/day every other day (e.g. Monday Wednesday Friday)   - SPEP with immunofixation, free light chains, quantitative immunoglobulins   - Peripheral smear for pathology review   - Erythropoietin level     #Lower Extremity Edema  #Possible venous stasis vs lymphedema  4+ pitting bilateral lower extremity edema, apparently acute on chronic over last few weeks per patient and chart review. Would also consider potential liver pathology, although LFTs and albumin normal, as well as potential CKD contribution to volume retention although Cr normal. H2FPEF calculator showed 70% probability for HFpEF, but per cardiology, no concern for HFpEF. NT pro-BNP of 395, but may be confounded by obesity. TTE showed preserved ejection fraction of 65-70%. Abdominal US significant for probable hepatic steatosis and small right pleural effusion. US DVT negative.   - Inpatient heart failure consult, appreciate recs   - Hold diuresis in context of NUNU  - Daily BMP   - PTA Eucerin cream  - Lymphedema consult  - WOC  "consult   - Discontinue lotrimin cream   - Apply antifungal powder to groin and under breast    #Metabolic alkalosis with concomitant chronic respiratory acidosis  #Prerenal NUNU  Rapid response called on 11/26, patient was administered 40mg IV lasix in addition to morning 20mg IV dose. Patient subsequently had creatinine increase 0.81-1.21 11/28 and further increase to 1.48 on 11/29, suggesting prerenal picture. Downtrend to 1.17 on 11/29.  - BMP today 11/30 AM, awaiting creatinine    #Acute on Chronic Hypoxic Hypercapnic Respiratory Failure  #Restrictive Lung Disease   Pt with history of chronic CO2 retention, has seen pulmonology in the past and reportedly declined NIPPV. Unclear baseline PCO2 venous, however gases and lab workup here suggest chronic metabolically compensated CO2 retention. Patient reports distant smoking history, stopped in 1980 and reports she was not a heavy smoker. Per pulmonology note from 03/28/2022, recent \"PFT data FEV1/FVC ratio 0.71.  FVC is 1.12 L which is 42% predicted.  FEV1 is 0.8 L which is 39% predicted.\"  Suspect significant contribution of spinous abnormalities toward restrictive lung disease. Given chronic CO2 retention, will keep O2 saturation goal lower to ensure maintenance of respiratory drive. Rapid response called 11/26 afternoon due to increased dyspnea - stat CXR showed right opacification and worsening of right pleural effusion, more consistent with atelectasis 2/2 mucus plugging rather than fluid overload. On 3L O2 on 11/29 with sat ~ 90% (baseline is 2-3L at home), still with periodic desaturation with ambulation.   - Continue oxygen to keep saturations above 90%  - Albuterol inhaler Q6H PRN  - Duonebs Q4H  - Mucomyst Q4H  - Chest physiotherapy   - Escalate to BiPAP if needed    - PTA azelastine 1 spray both nostrils BID   - F/u with outpatient pulmonologist    #Hypokalemia  - RN replacement protocol     #Decreased Functional Status  Appreciate PT/OT evaluation, " patient concern of being unable to go back home due to difficulty completing home ADLs and overall shortness of breath. Appreciate social work consult for dispo planning.   Appreciate ongoing SW evaluation:   SW successfully met with pt to discuss therapy recommendations and provide the medicare.gov list. SW provided education on the quality and service rating system. SW requested 10 TCU choices from pt.  Appreciate PT recommendations:   - TCU at discharge due to below baseline functional capacity   Appreciate OT recommendations:   - TCU at discharge due to below baseline functional capacity      #Generalized Anxiety Disorder  #Cognitive impairment  #Hx of AUD  As per recent family medicine visit, continuing PTA medications. Patient has repeatedly asked about echo procedure and findings (required postponement of 11/26 echo due to patient request to re-explain procedure), unclear if she has full capacity or if this represents significant deviation from baseline. Additional chart review shows hx of alcohol used disorder which may be contributing - not current drinker but documentation shows hx of drinking 1/2 pint liquor per day in 2016. Increased buspirone from BID to TID due to patient concern of anxiety 11/27.   - Buspirone 10 mg PO TID  - Consult OT for MOCA evaluation     #Depression  - Continue PTA sertraline, updated dose to 100 mg PO daily per pharmacy discussion    #Insomnia  #Overnight Hallucination  Multiple reports of patient concern for hallucination with man standing in room, but patient does not recall in AM discussion. Will monitor for now, see if issue recurs.   - Continue PTA Seroquel, 150 mg PO QHS     #Hypertension  - Continue PTA Losartan/HCTZ 100-25 mg PO daily     #HLD  - Continue PTA atorvastatin 40 mg PO daily     #Glaucoma  - PTA latanoprost 1 drop each eye at bedtime     #?Vitamin Deficiency  #Chronic macrocytic anemia 2/2 nutritional deficiency vs malignancy  - Continue PTA folic acid 400  mcg daily  - Continue vitamin B complex with C 1 tablet daily  - Continue vitamin D3 25 mcg daily   - Ordered labs for outpatient followup   - Methylmalonic acid, homocysteine   - Oral iron 325mg tablet/day every other day (e.g. Monday Wednesday Friday)   - SPEP with immunofixation, free light chains, quantitative immunoglobulins   - Peripheral smear for pathology review   - Erythropoietin level    #Abdominal pain  Patient has had chronic abdominal wall pain, likely MSK in context of severe kyphoscoliosis. States that it is worsened by coughing. Pain is improved with Tylenol.  - Tylenol 650mg q6h PRN  - Lidocaine patch     Diet: Combination Diet Regular Diet Adult    DVT Prophylaxis: Enoxaparin (Lovenox) SQ  Lima Catheter: Not present  Fluids: PO  Central Lines: None  Cardiac Monitoring: None  Code Status: Full Code      Disposition Plan      Expected Discharge Date: 12/02/2022    Discharge Delays: *Medically Ready for Discharge  Placement - TCU  Destination: inpatient rehabilitation facility  Discharge Comments: Dispo: TBD  Delays:   Progress: CMA by SW today.        The patient's care was discussed with the Attending Physician, Dr. Gala Biggs MD  Medicine Service, [address]  Securely message with the Vocera Web Console (learn more here)  Text page via [facility] Paging/Directory   Please see signed in provider for up to date coverage information    Resident/Fellow Attestation   I, Jakub Biggs MD, was present with the medical/KASSIE student who participated in the service and in the documentation of the note.  I have verified the history and personally performed the physical exam and medical decision making.  I agree with the assessment and plan of care as documented in the note.      Jakub Biggs MD  Internal Medicine-Pediatrics, PGY-2     Clinically Significant Risk Factors                       # Obesity: Estimated body mass index is 35.52 kg/m  as  "calculated from the following:    Height as of this encounter: 1.6 m (5' 3\").    Weight as of this encounter: 90.9 kg (200 lb 8 oz).        ______________________________________________________________________    Interval History   Nursing notes reviewed, patient had mild confusion and forgetfullness overnight with labile mood which is similar to her baseline. Interviewed patient while she was being visited by RT - patient was disinclined to take her mucomyst, stating that she did not know what it was for. Said that she has been having more difficulty breathing due to nasal congestion. Explained plan to discharge to TCU.    Data reviewed today: I reviewed all medications, new labs and imaging results over the last 24 hours. I personally reviewed no images today.    Physical Exam   Vital Signs: Temp: 98.6  F (37  C) Temp src: Oral BP: 118/41 Pulse: 71   Resp: 18 SpO2: 97 % O2 Device: Nasal cannula Oxygen Delivery: 3 LPM  Weight: 200 lbs 8 oz     General: Alert, cooperative, no signs of acute distress  HEENT: Normocephalic, atraumatic. No lymphadenopathy appreciated.  Cardiac: Regular rate and rhythm, normal S1/S2, previously documented 3/6 systolic murmur best heard at RUSB. Unable to assess JVD/hepatojugular reflex.  Pulmonary: Mild crackles in posterior lung bases. High I/E ratio.  Abdomen: Soft, non-distended, no irregular masses, no rebound or guarding.   Extremities: 2+ lower extremity edema, palpable dorsalis pedis pulses. Flaking skin and regions of erythema on bilateral lower extremities with no interval change.  Psychiatric: Some tangentiality and confusion, perseverates on certain topics, but redirectable.    Data   Recent Labs   Lab 11/30/22  1302 11/30/22  0659 11/29/22  1840 11/29/22  0539 11/28/22  0618 11/27/22  0656 11/25/22  1328 11/25/22  0602 11/24/22  1533 11/24/22  1259   WBC 8.3  --   --   --   --  7.5  --  5.2  --  5.6   HGB 9.1*  --   --   --   --  9.6*  --  8.3*  --  8.2*   *  --   -- "   --   --  98  --  103*  --  104*     --   --   --   --  254  --  235  --  233   NA  --  141  --  140 141 143   < > 144  --  144  144   POTASSIUM  --  3.6 3.6 3.4 3.5 3.6   < > 3.3*  --  3.8  3.8   CHLORIDE  --  96*  --  93* 89* 87*   < > 94*  --  100  100   CO2  --  35*  --  35* 41* 42*   < > 39*  --  35*  36*   BUN  --  49.3*  --  46.0* 43.3* 28.1*   < > 21.6  --  22.2  22.1   CR  --  1.03*  --  1.17* 1.48* 1.21*   < > 0.76   < > 0.68  0.69   ANIONGAP  --  10  --  12 11 14   < > 11  --  9  8   SYMONE  --  8.9  --  8.4* 8.8 9.2   < > 8.9  --  8.8  8.7*   GLC  --  93  --  99 102* 100*   < > 103*  --  101*  102*   ALBUMIN  --   --   --   --   --   --   --   --   --  3.7   PROTTOTAL  --   --   --   --   --   --   --   --   --  6.8   BILITOTAL  --   --   --   --   --   --   --   --   --  0.3   ALKPHOS  --   --   --   --   --   --   --   --   --  86   ALT  --   --   --   --   --   --   --   --   --  17   AST  --   --   --   --   --   --   --   --   --  37*   LIPASE  --   --   --   --   --   --   --   --   --  16    < > = values in this interval not displayed.     No results found for this or any previous visit (from the past 24 hour(s)).

## 2022-11-30 NOTE — PLAN OF CARE
Goal Outcome Evaluation:      Plan of Care Reviewed With: patient          Outcome Evaluation: Cont Pulse oximetry d/c'd, Pt forgetful but alert and orienred x4, gets anxious at times, on 3L oxygen NC, K and Mg rescheduled for a recheck 12/1/22, gets up to bedside commode SBA, denies c/o pain, reports tolerable discomfort to shoulder, lidocain patch applied, RT completed Neb tx, Pt accidentally pulled out L PIV, team notified.

## 2022-11-30 NOTE — PLAN OF CARE
Goal Outcome Evaluation:      Plan of Care Reviewed With: patient    Overall Patient Progress: no changeOverall Patient Progress: no change    Outcome Evaluation: Alert and oriented. Forgetful and labile mood. Denies chest pain. Dyspneic with activity. Ongoing 02 inhalation at 3 lpm , 02 sats in the mid 90's. K rechecked this evening and result is 3.6. Refused  neb treatment and wound cares this shift. Verbalized back pain with partial relief from Tylenol, repositioning and pillow support. Up to commode with assist of 1. Plan is to discharge to TCU. Vital signs stable.

## 2022-12-01 ENCOUNTER — APPOINTMENT (OUTPATIENT)
Dept: OCCUPATIONAL THERAPY | Facility: CLINIC | Age: 77
DRG: 205 | End: 2022-12-01
Payer: COMMERCIAL

## 2022-12-01 LAB
BASE EXCESS BLDV CALC-SCNC: 15.2 MMOL/L (ref -7.7–1.9)
HCO3 BLDV-SCNC: 42 MMOL/L (ref 21–28)
IGA SERPL-MCNC: 208 MG/DL (ref 84–499)
IGG SERPL-MCNC: 1502 MG/DL (ref 610–1616)
IGM SERPL-MCNC: 37 MG/DL (ref 35–242)
KAPPA LC FREE SER-MCNC: 10.02 MG/DL (ref 0.33–1.94)
KAPPA LC FREE/LAMBDA FREE SER NEPH: 3.36 {RATIO} (ref 0.26–1.65)
LAMBDA LC FREE SERPL-MCNC: 2.98 MG/DL (ref 0.57–2.63)
O2/TOTAL GAS SETTING VFR VENT: 3 %
PATH REPORT.COMMENTS IMP SPEC: NORMAL
PATH REPORT.COMMENTS IMP SPEC: NORMAL
PATH REPORT.FINAL DX SPEC: NORMAL
PATH REPORT.MICROSCOPIC SPEC OTHER STN: NORMAL
PATH REPORT.MICROSCOPIC SPEC OTHER STN: NORMAL
PATH REPORT.RELEVANT HX SPEC: NORMAL
PCO2 BLDV: 66 MM HG (ref 40–50)
PH BLDV: 7.42 [PH] (ref 7.32–7.43)
PO2 BLDV: 60 MM HG (ref 25–47)

## 2022-12-01 PROCEDURE — 82803 BLOOD GASES ANY COMBINATION: CPT | Performed by: STUDENT IN AN ORGANIZED HEALTH CARE EDUCATION/TRAINING PROGRAM

## 2022-12-01 PROCEDURE — 94640 AIRWAY INHALATION TREATMENT: CPT

## 2022-12-01 PROCEDURE — 999N000157 HC STATISTIC RCP TIME EA 10 MIN

## 2022-12-01 PROCEDURE — 85060 BLOOD SMEAR INTERPRETATION: CPT | Performed by: STUDENT IN AN ORGANIZED HEALTH CARE EDUCATION/TRAINING PROGRAM

## 2022-12-01 PROCEDURE — 250N000011 HC RX IP 250 OP 636

## 2022-12-01 PROCEDURE — 36415 COLL VENOUS BLD VENIPUNCTURE: CPT | Performed by: STUDENT IN AN ORGANIZED HEALTH CARE EDUCATION/TRAINING PROGRAM

## 2022-12-01 PROCEDURE — 97535 SELF CARE MNGMENT TRAINING: CPT | Mod: GO

## 2022-12-01 PROCEDURE — 250N000013 HC RX MED GY IP 250 OP 250 PS 637

## 2022-12-01 PROCEDURE — 250N000009 HC RX 250

## 2022-12-01 PROCEDURE — 120N000002 HC R&B MED SURG/OB UMMC

## 2022-12-01 PROCEDURE — 250N000013 HC RX MED GY IP 250 OP 250 PS 637: Performed by: STUDENT IN AN ORGANIZED HEALTH CARE EDUCATION/TRAINING PROGRAM

## 2022-12-01 PROCEDURE — 94640 AIRWAY INHALATION TREATMENT: CPT | Mod: 76

## 2022-12-01 RX ORDER — FERROUS SULFATE 325(65) MG
325 TABLET ORAL
Status: DISCONTINUED | OUTPATIENT
Start: 2022-12-02 | End: 2022-12-02

## 2022-12-01 RX ORDER — LIDOCAINE 4 G/G
2 PATCH TOPICAL
Status: DISCONTINUED | OUTPATIENT
Start: 2022-12-02 | End: 2022-12-09 | Stop reason: HOSPADM

## 2022-12-01 RX ADMIN — AZELASTINE HYDROCHLORIDE 1 SPRAY: 137 SPRAY, METERED NASAL at 08:11

## 2022-12-01 RX ADMIN — IPRATROPIUM BROMIDE AND ALBUTEROL SULFATE 3 ML: 2.5; .5 SOLUTION RESPIRATORY (INHALATION) at 19:36

## 2022-12-01 RX ADMIN — BUSPIRONE HYDROCHLORIDE 10 MG: 5 TABLET ORAL at 08:09

## 2022-12-01 RX ADMIN — IPRATROPIUM BROMIDE AND ALBUTEROL SULFATE 3 ML: 2.5; .5 SOLUTION RESPIRATORY (INHALATION) at 15:34

## 2022-12-01 RX ADMIN — SERTRALINE HYDROCHLORIDE 100 MG: 100 TABLET ORAL at 08:09

## 2022-12-01 RX ADMIN — B-COMPLEX W/ C & FOLIC ACID TAB 1 TABLET: TAB at 08:09

## 2022-12-01 RX ADMIN — Medication 400 MCG: at 08:09

## 2022-12-01 RX ADMIN — ACETYLCYSTEINE 4 ML: 100 SOLUTION ORAL; RESPIRATORY (INHALATION) at 15:36

## 2022-12-01 RX ADMIN — BUSPIRONE HYDROCHLORIDE 10 MG: 5 TABLET ORAL at 20:34

## 2022-12-01 RX ADMIN — ATORVASTATIN CALCIUM 40 MG: 40 TABLET, FILM COATED ORAL at 20:35

## 2022-12-01 RX ADMIN — ACETYLCYSTEINE 4 ML: 100 SOLUTION ORAL; RESPIRATORY (INHALATION) at 07:45

## 2022-12-01 RX ADMIN — LOSARTAN POTASSIUM AND HYDROCHLOROTHIAZIDE 1 TABLET: 25; 100 TABLET ORAL at 08:09

## 2022-12-01 RX ADMIN — BUSPIRONE HYDROCHLORIDE 10 MG: 5 TABLET ORAL at 13:41

## 2022-12-01 RX ADMIN — Medication 150 MG: at 20:34

## 2022-12-01 RX ADMIN — SENNOSIDES AND DOCUSATE SODIUM 2 TABLET: 50; 8.6 TABLET ORAL at 20:34

## 2022-12-01 RX ADMIN — AZELASTINE HYDROCHLORIDE 1 SPRAY: 137 SPRAY, METERED NASAL at 20:36

## 2022-12-01 RX ADMIN — Medication 25 MCG: at 08:09

## 2022-12-01 RX ADMIN — IPRATROPIUM BROMIDE AND ALBUTEROL SULFATE 3 ML: 2.5; .5 SOLUTION RESPIRATORY (INHALATION) at 11:35

## 2022-12-01 RX ADMIN — IPRATROPIUM BROMIDE AND ALBUTEROL SULFATE 3 ML: 2.5; .5 SOLUTION RESPIRATORY (INHALATION) at 07:44

## 2022-12-01 RX ADMIN — Medication: at 11:18

## 2022-12-01 RX ADMIN — ACETYLCYSTEINE 4 ML: 100 SOLUTION ORAL; RESPIRATORY (INHALATION) at 19:36

## 2022-12-01 RX ADMIN — ACETYLCYSTEINE 4 ML: 100 SOLUTION ORAL; RESPIRATORY (INHALATION) at 11:35

## 2022-12-01 RX ADMIN — SENNOSIDES AND DOCUSATE SODIUM 2 TABLET: 50; 8.6 TABLET ORAL at 08:09

## 2022-12-01 RX ADMIN — ENOXAPARIN SODIUM 40 MG: 40 INJECTION SUBCUTANEOUS at 18:33

## 2022-12-01 RX ADMIN — MICONAZOLE NITRATE: 2 POWDER TOPICAL at 11:27

## 2022-12-01 ASSESSMENT — ACTIVITIES OF DAILY LIVING (ADL)
ADLS_ACUITY_SCORE: 35

## 2022-12-01 NOTE — PLAN OF CARE
Goal Outcome Evaluation:      Plan of Care Reviewed With: patient    Overall Patient Progress: improvingOverall Patient Progress: improving    Outcome Evaluation: Alert, oriented x 4. Forgetful, labile mood. Denies pain. Up with standy assist to commode. Ongoing 02 inhlation at 3 lpm nasal cannula. Refused neb treatments overnight with RT. Noted with congested cough this am. Offered to call RT for neb treatment , pt refused .   Plan is to discharge to TCU.

## 2022-12-01 NOTE — PLAN OF CARE
Goal Outcome Evaluation:      Plan of Care Reviewed With: patient    Overall Patient Progress: no changeOverall Patient Progress: no change    Outcome Evaluation: Makes her needs known.  Can be dependent on staff to do cares that she can do herself.  At 3L nasal canula, spot checks.  Up to bsc with SBA.  Productive sounding cough, rhonci heard scatterered through out. Declinced lido patch placement this morning becasue she doesn't want out of her covers right away.  but she does want 2 patches to use for her back pain, not her abdomen.  Only one patch is ordered.  No PIV.  Small bloody nose due to nasal dryness.  Consider humidified o2.

## 2022-12-01 NOTE — PLAN OF CARE
Goal Outcome Evaluation:      Plan of Care Reviewed With: patient    Overall Patient Progress: improving    Outcome Evaluation: A&Ox4, forgetful. Denies pain. VSS on 3L NC, cont pulse ox discontinued. Denies N/V/D and chest pain. SAM, some abdominal breathing, LS diminished. SBA to bathroom and BSC. No PIV, team aware. No lymph wraps present d/t patient refusal. Writer provided/reineforced education about wraps, pt agreed to try them tomorrow. Pt states she is worried about paying rent on Dec 1. Writer clarified with pt that she is not concerned about having enough money, but rather the logistics of paying rent since she does not have her check book. Writer passed along to oncoming nurse to contact SW in am to help pt communicate with landlorfelisa and possibly get provider note to excuse pt from late rent payment d/t hospitalization.

## 2022-12-01 NOTE — PROGRESS NOTES
"CLINICAL NUTRITION SERVICES - ASSESSMENT NOTE     Nutrition Prescription    RECOMMENDATIONS FOR MDs/PROVIDERS TO ORDER:  None at this time    Malnutrition Status:    does not meet malnutrition criteria    Recommendations already ordered by Registered Dietitian (RD):  None at this time    Future/Additional Recommendations:  Monitor PO intake, wt trends  Consider addition of supplement if PO intake decreases     REASON FOR ASSESSMENT  Ca Yan is a/an 77 year old female assessed by the dietitian for LOS. Presents with leg swelling and shortness of breath. PMH of breast cancer, GERD, COPD, HTN, low bone density, and chronic lymphedema in lower extremities.    NUTRITION HISTORY  Per previous RD note, had a weight management nutrition consultation on 2/7/2020.   RD gave education on portion control, healthy food choices, and sources of protein. Also suggested, \"1) Eat slowly (20-30 minutes per meal), chewing foods well (25 chews per bite/applesauce consistency)  2) 9\" Plate method (1/2 plate non-starchy vegetables/fruit, 1/4 plate lean protein, 1/4 plate whole grain starch - no more than 1/2 cup carb/meal)  3) Get a microwave to use for frozen meals.\"    Per RD note from 5/28/2019, \"She reports that she does not like to cook. Pt continued to ask what foods were good or bad, she seems to have an all or nothing complex. Pt keeps healthier options in her home (ie: cottage cheese, eggs or yogurt) but trends to forget about them and chose pastries or sweets instead. Pt explains that sometimes she will get up in the middle of the night and eat; she is aware that she is eating and is unsure why she chooses to eat.\"    Per patient, does not recall getting prior nutrition advice. Seemed confused.    CURRENT NUTRITION ORDERS  Diet: Regular    Intake/Tolerance:  -Per flowsheet, % intakes since admission  -Per [facility], ordering 3 meals per day  -Per patient, says her appetite is good and she hasn't noticed any " "decrease in appetite. Has been eating 3 meals since hospital admission and says she eats at least half of each meal.     LABS  Cr 1.03 (H), BUN 49.3 (H)  --> per chart, pt with NUNU    MEDICATIONS  Lipitor, Folvite, Senokot, Vitamin B complex with vitamin C, Vitamin D3    ANTHROPOMETRICS  Height: 160 cm (5' 3\")  Most Recent Weight: 91.2 kg (201 lb)    IBW: 52.3 kg  %IBW: 174%  BMI: Obesity Grade II BMI 35-39.9  Weight History:   Wt Readings from Last 10 Encounters:   12/01/22 91.2 kg (201 lb)   11/15/22 98.9 kg (218 lb 0.6 oz)   11/10/22 98.9 kg (218 lb)   10/21/22 88.5 kg (195 lb)   08/12/22 89.4 kg (197 lb 1.6 oz)   06/07/22 89.8 kg (198 lb)   05/24/22 86.2 kg (190 lb)   03/28/22 86.2 kg (190 lb)   01/12/22 88.5 kg (195 lb)   12/02/21 88.5 kg (195 lb)     Dosing Weight: 62 kg (adjusted based on lowest weight since admission of 90.6 kg and IBW of 52.3 kg)    ASSESSED NUTRITION NEEDS  Estimated Energy Needs: 1109-2428 kcals/day (25 - 30 kcals/kg)  Justification: Maintenance  Estimated Protein Needs: 62-74 grams protein/day (1 - 1.2 grams of pro/kg)  Justification: Increased needs w/ acute illness  Estimated Fluid Needs: Per provider pending fluid status    PHYSICAL FINDINGS  Per H&P, 4+ pitting BLE edema    MALNUTRITION  % Intake: Decreased intake does not meet criteria  % Weight Loss: None noted/suspect fluid build-up  Subcutaneous Fat Loss: None observed  Muscle Loss: Thoracic region (clavicle, acromium bone, deltoid, trapezius, pectoral):  mild and Dorsal hand:  mild  Fluid Accumulation/Edema: Severe, not sure if nutrition related  Malnutrition Diagnosis: does not meet malnutrition criteria    NUTRITION DIAGNOSIS  Predicted inadequate oral intake related to dyspnea on exertion and possible prolonged stay in hospital.    INTERVENTIONS  Implementation  Nutrition Education: introduced role of nutrition services    Goals  Patient to consume % of nutritionally adequate meal trays TID, or the equivalent with " supplements/snacks.     Monitoring/Evaluation  Progress toward goals will be monitored and evaluated per protocol.    Nini Alcala  Dietetic Intern    I have read and agree with the above nutrition note, recs, and interventions.  I did not personally see this patient today, chart review only.  Nyla Rushing, MARTHA, , LD  Weekday Pager: [phone]  Weekday Units covered: 7C (all beds) and 5A (beds 5201 through 5211-2)  Weekend/Holiday RD Pager: [phone]

## 2022-12-01 NOTE — PROGRESS NOTES
Care Management Follow Up    Length of Stay (days): 7    Expected Discharge Date: 12/05/2022     Concerns to be Addressed: discharge planning, utilization management     Patient plan of care discussed at interdisciplinary rounds: Yes    Anticipated Discharge Disposition: Skilled Nursing Facility     Anticipated Discharge Services: Transportation Services  Anticipated Discharge DME: None    Patient/family educated on Medicare website which has current facility and service quality ratings: Yes  Education Provided on the Discharge Plan:  Yes  Patient/Family in Agreement with the Plan: yes    Referrals Placed by CM/SW:  [facility]   [address] [place]  [address]  P: [phone]  P: [phone] - Admissions  F: [phone]   11/30: CHW spoke with admissions and they are full for the week.      [facility]  [address] [place]  [address]  P: [phone]  F: [phone]  11/30:CHW LVM for admissions to f/u on referral; requested they call SW back.   12/1:CHW LVM for admissions to f/u on referral; requested they call SW back.      [facility]  [address] [place], [place]  [address]  P: [phone]  P: [phone] - Admissions  F: [phone]  11/30: CHW was unable to reach admissions and didn't go to .   12/1: CHW was unable to reach admissions and didn't go to .     Jessica at [facility]  [address] [place]  [address]  P: [phone]  F: [phone]  11/30: CHW sent referral via Epic  12/1: CHW spoke with Nancy she will review and call SW back.      [facility]  [place] [id number] [phone]  11/30: CHW sent referral via Epic  12/1:CHW LVM for admissions to f/u on referral; requested they call SW back.      [address] [address]  P: [phone]   11/30: SUKUMAR sent referral via [facility]  12/1: SUKUMAR LVM for admissions to f/u on referral;  requested they call SW back.      [facility]  [address] [place]  [address]  P: [phone]  F: [phone]   11/30: CHW sent referral via Epic  12/1: SW advised to re-send fax to a new fax number: F: [phone].  @1545PM SW re-faxed referral.  @1700PM SW received a call and was informed that they have no appropriate bed for pt.     Inactive Referrals:  [facility]:  [facility]. Pt w/ significant cognitive deficits (dementia level per SLUMS 14/30), lives alone. Anticipate pt would need 24/7 A for discharge home. Pt is self-limiting.    [facility] of [address]  [place], [place] [address]  P: [phone]     [facility]  [address] [place]  [address]  P: [phone]  F: [phone]   11/30: Facility is closed     [facility]  [address] [place]  [address]  P: [phone]  P: [phone] - Admissions  F: [phone]  12/1: Declined via [facility]  [address] [place] [address]  [phone]   11/30: CHW sent referral via Epic  12/1: Declined. No Beds available.    [facility]   [address] [place] [address].  [place]  [address]  P: [phone]  P: [phone] - Admissions  F: [phone]   11/30: CHW spoke with Paulette in admissions and they don't have any bed openings as of now.       Private pay costs discussed: Not applicable    Additional Information:  @1056AM SW met with pt at bedside due to pt voicing concerns to the team about being able to pay her rent and being unable to contact her landlord.  Pt reports that her friend will assist in paying pts rent and no further concerns were identified.    CHW Nathaniel followed up on referrals.     will continue to follow for discharge planning, support, and resources.    EPI Hood, [facility]  [address]   Office: [phone]   Pager:  [phone]  [email or web address]

## 2022-12-02 ENCOUNTER — APPOINTMENT (OUTPATIENT)
Dept: PHYSICAL THERAPY | Facility: CLINIC | Age: 77
DRG: 205 | End: 2022-12-02
Payer: COMMERCIAL

## 2022-12-02 ENCOUNTER — APPOINTMENT (OUTPATIENT)
Dept: OCCUPATIONAL THERAPY | Facility: CLINIC | Age: 77
DRG: 205 | End: 2022-12-02
Payer: COMMERCIAL

## 2022-12-02 LAB
ALBUMIN SERPL ELPH-MCNC: 3.2 G/DL (ref 3.7–5.1)
ALPHA1 GLOB SERPL ELPH-MCNC: 0.4 G/DL (ref 0.2–0.4)
ALPHA2 GLOB SERPL ELPH-MCNC: 0.8 G/DL (ref 0.5–0.9)
B-GLOBULIN SERPL ELPH-MCNC: 0.8 G/DL (ref 0.6–1)
EPO SERPL-ACNC: 8 MU/ML
GAMMA GLOB SERPL ELPH-MCNC: 1.2 G/DL (ref 0.7–1.6)
M PROTEIN SERPL ELPH-MCNC: 0.2 G/DL
PROT PATTERN SERPL ELPH-IMP: ABNORMAL
PROT PATTERN SERPL IFE-IMP: NORMAL

## 2022-12-02 PROCEDURE — 250N000013 HC RX MED GY IP 250 OP 250 PS 637: Performed by: STUDENT IN AN ORGANIZED HEALTH CARE EDUCATION/TRAINING PROGRAM

## 2022-12-02 PROCEDURE — 250N000009 HC RX 250

## 2022-12-02 PROCEDURE — 97530 THERAPEUTIC ACTIVITIES: CPT | Mod: GP

## 2022-12-02 PROCEDURE — 94799 UNLISTED PULMONARY SVC/PX: CPT

## 2022-12-02 PROCEDURE — 120N000002 HC R&B MED SURG/OB UMMC

## 2022-12-02 PROCEDURE — 99232 SBSQ HOSP IP/OBS MODERATE 35: CPT | Mod: GC | Performed by: STUDENT IN AN ORGANIZED HEALTH CARE EDUCATION/TRAINING PROGRAM

## 2022-12-02 PROCEDURE — 97116 GAIT TRAINING THERAPY: CPT | Mod: GP

## 2022-12-02 PROCEDURE — 97110 THERAPEUTIC EXERCISES: CPT | Mod: GP

## 2022-12-02 PROCEDURE — 97535 SELF CARE MNGMENT TRAINING: CPT | Mod: GO

## 2022-12-02 PROCEDURE — 94640 AIRWAY INHALATION TREATMENT: CPT

## 2022-12-02 PROCEDURE — 999N000157 HC STATISTIC RCP TIME EA 10 MIN

## 2022-12-02 PROCEDURE — 84165 PROTEIN E-PHORESIS SERUM: CPT | Mod: 26

## 2022-12-02 PROCEDURE — 250N000013 HC RX MED GY IP 250 OP 250 PS 637

## 2022-12-02 PROCEDURE — 94640 AIRWAY INHALATION TREATMENT: CPT | Mod: 76

## 2022-12-02 PROCEDURE — 250N000011 HC RX IP 250 OP 636

## 2022-12-02 PROCEDURE — 86334 IMMUNOFIX E-PHORESIS SERUM: CPT | Mod: 26

## 2022-12-02 RX ORDER — FERROUS SULFATE 325(65) MG
325 TABLET ORAL
Status: DISCONTINUED | OUTPATIENT
Start: 2022-12-05 | End: 2022-12-09 | Stop reason: HOSPADM

## 2022-12-02 RX ORDER — OXYMETAZOLINE HYDROCHLORIDE 0.05 G/100ML
2 SPRAY NASAL 2 TIMES DAILY
Status: DISCONTINUED | OUTPATIENT
Start: 2022-12-02 | End: 2022-12-09 | Stop reason: HOSPADM

## 2022-12-02 RX ADMIN — SENNOSIDES AND DOCUSATE SODIUM 2 TABLET: 50; 8.6 TABLET ORAL at 20:09

## 2022-12-02 RX ADMIN — ACETYLCYSTEINE 4 ML: 100 SOLUTION ORAL; RESPIRATORY (INHALATION) at 19:54

## 2022-12-02 RX ADMIN — IPRATROPIUM BROMIDE AND ALBUTEROL SULFATE 3 ML: 2.5; .5 SOLUTION RESPIRATORY (INHALATION) at 19:54

## 2022-12-02 RX ADMIN — AZELASTINE HYDROCHLORIDE 1 SPRAY: 137 SPRAY, METERED NASAL at 20:12

## 2022-12-02 RX ADMIN — Medication 150 MG: at 20:10

## 2022-12-02 RX ADMIN — ACETYLCYSTEINE 4 ML: 100 SOLUTION ORAL; RESPIRATORY (INHALATION) at 12:26

## 2022-12-02 RX ADMIN — FERROUS SULFATE TAB 325 MG (65 MG ELEMENTAL FE) 325 MG: 325 (65 FE) TAB at 10:18

## 2022-12-02 RX ADMIN — IPRATROPIUM BROMIDE AND ALBUTEROL SULFATE 3 ML: 2.5; .5 SOLUTION RESPIRATORY (INHALATION) at 12:26

## 2022-12-02 RX ADMIN — ATORVASTATIN CALCIUM 40 MG: 40 TABLET, FILM COATED ORAL at 20:10

## 2022-12-02 RX ADMIN — LOSARTAN POTASSIUM AND HYDROCHLOROTHIAZIDE 1 TABLET: 25; 100 TABLET ORAL at 10:18

## 2022-12-02 RX ADMIN — AZELASTINE HYDROCHLORIDE 1 SPRAY: 137 SPRAY, METERED NASAL at 10:18

## 2022-12-02 RX ADMIN — BUSPIRONE HYDROCHLORIDE 10 MG: 5 TABLET ORAL at 10:17

## 2022-12-02 RX ADMIN — Medication: at 10:29

## 2022-12-02 RX ADMIN — ENOXAPARIN SODIUM 40 MG: 40 INJECTION SUBCUTANEOUS at 17:31

## 2022-12-02 RX ADMIN — SERTRALINE HYDROCHLORIDE 100 MG: 100 TABLET ORAL at 10:18

## 2022-12-02 RX ADMIN — ACETYLCYSTEINE 4 ML: 100 SOLUTION ORAL; RESPIRATORY (INHALATION) at 08:20

## 2022-12-02 RX ADMIN — LATANOPROST 1 DROP: 50 SOLUTION OPHTHALMIC at 20:08

## 2022-12-02 RX ADMIN — IPRATROPIUM BROMIDE AND ALBUTEROL SULFATE 3 ML: 2.5; .5 SOLUTION RESPIRATORY (INHALATION) at 08:19

## 2022-12-02 RX ADMIN — BUSPIRONE HYDROCHLORIDE 10 MG: 5 TABLET ORAL at 13:49

## 2022-12-02 RX ADMIN — MICONAZOLE NITRATE: 2 POWDER TOPICAL at 17:30

## 2022-12-02 RX ADMIN — SENNOSIDES AND DOCUSATE SODIUM 2 TABLET: 50; 8.6 TABLET ORAL at 10:18

## 2022-12-02 RX ADMIN — Medication 400 MCG: at 10:17

## 2022-12-02 RX ADMIN — Medication: at 20:14

## 2022-12-02 RX ADMIN — BUSPIRONE HYDROCHLORIDE 10 MG: 5 TABLET ORAL at 20:09

## 2022-12-02 ASSESSMENT — ACTIVITIES OF DAILY LIVING (ADL)
ADLS_ACUITY_SCORE: 35

## 2022-12-02 NOTE — PROGRESS NOTES
[facility]    Medicine Progress Note - Medicine Service, IVIS TEAM 1    Date of Admission:  11/24/2022    Assessment & Plan   Ca Yan is a 76-year-old female with past medical history significant for previous breast cancer s/p lumpectomy, GERD, HTN, HLD, chronic lower extremity lymphedema, cluster C personality disorder, CKD, COPD, and known restrictive lung disease who was admitted on 11/24/2022 for chief concern of lower extremity swelling, redness, and shortness of breath over a duration estimated at 2-3 weeks which was likely due to mild HFpEF exacerbation and underlying lung disease. She is stable awaiting TCU placement.      Today:  - no changes     #Decreased Functional Status  Appreciate PT/OT evaluation, patient concern of being unable to go back home due to difficulty completing home ADLs and overall shortness of breath. PT/OT recommend TCU. Appreciate social work consult for dispo planning.     #Chronic macrocytic anemia  Pt with chronic macrocytic anemia noted by her PCP. Hematology was e-consulted outpatient and recommended further workup. Obtained some labs while inpatient and will get followed out by PCP and hematology. Thus far she has normal liver testing, normal EPO, normal immunoglobulin levels. She did have an elevated kappa free light chain and elevated kappa/lacey ratio and rouleaux formation on her smear. Will follow up protein immunofixation. Prior iron levels were normal three months ago along with normal ferritin.   - oral iron supplementation MWF  - follow up in process labs: homocysteine, MMA, protein immunofixation   - needs outpatient follow up with PCP/Hematology      #Lower Extremity Edema  #Possible venous stasis vs lymphedema  4+ pitting bilateral lower extremity edema, apparently acute on chronic over last few weeks per patient and chart review. H2FPEF calculator showed 70% probability for HFpEF. Does have prior hx of  lymphedema as well. NT pro-BNP of 395, but may be confounded by obesity. TTE showed preserved ejection fraction of 65-70%. Would also consider potential liver pathology, although LFTs and albumin normal. Abdominal US significant for probable hepatic steatosis and small right pleural effusion. US DVT negative.   - Hold diuresis in context of NUNU  - Daily BMP   - PTA Eucerin cream  - Lymphedema consult  - WOC consult              - Apply antifungal powder to groin and under breast     #Prerenal NUNU, resolving   Overdiuresis in setting of concern for heart failure exacerbation. Cr improving with holding diuresis.      #Acute on Chronic Hypoxic Hypercapnic Respiratory Failure  #Restrictive Lung Disease   #Metabolic alkalosis with concomitant chronic respiratory acidosis  Pt with history of chronic CO2 retention, has seen pulmonology in the past and reportedly declined NIPPV. Unclear baseline PCO2 venous, however gases and lab workup here suggest chronic metabolically compensated CO2 retention. Significant restrictive lung dz due to thoracic cage abnormality in addition to deconditioning and prior smoking history. On 2-3L home O2.   - Continue oxygen to keep saturations above 90%  - Albuterol inhaler Q6H PRN  - Duonebs Q4H  - Mucomyst Q4H  - Chest physiotherapy   - PTA azelastine 1 spray both nostrils BID   - F/u with outpatient pulmonologist    #Generalized Anxiety Disorder  #Cognitive impairment  #Hx of AUD  As per recent family medicine visit, continuing PTA medications. Patient has repeatedly asked about echo procedure and findings (required postponement of 11/26 echo due to patient request to re-explain procedure), unclear if she has full capacity or if this represents significant deviation from baseline. Additional chart review shows hx of alcohol used disorder which may be contributing - not current drinker but documentation shows hx of drinking 1/2 pint liquor per day in 2016. Increased buspirone from BID to TID  due to patient concern of anxiety 11/27.   - Buspirone 10 mg PO TID  - Continue PTA Seroquel, 150 mg PO QHS     #Depression  - Continue PTA sertraline, updated dose to 100 mg PO daily per pharmacy discussion    #Hypertension  - Continue PTA Losartan/HCTZ 100-25 mg PO daily     #HLD  - Continue PTA atorvastatin 40 mg PO daily     #Glaucoma  - PTA latanoprost 1 drop each eye at bedtime     #?Vitamin Deficiency  #Chronic macrocytic anemia 2/2 nutritional deficiency vs malignancy  - Continue PTA folic acid 400 mcg daily  - Continue vitamin B complex with C 1 tablet daily  - Continue vitamin D3 25 mcg daily   - Ordered labs for outpatient followup              - Methylmalonic acid, homocysteine              - Oral iron 325mg tablet/day every other day (e.g. Monday Wednesday Friday)              - SPEP with immunofixation, free light chains, quantitative immunoglobulins              - Peripheral smear for pathology review              - Erythropoietin level     #Abdominal pain  Patient has had chronic abdominal wall pain, likely MSK in context of severe kyphoscoliosis. States that it is worsened by coughing. Pain is improved with Tylenol.  - Tylenol 650mg q6h PRN  - Lidocaine patch     Diet: Combination Diet Regular Diet Adult    DVT Prophylaxis: Enoxaparin (Lovenox) SQ  Lima Catheter: Not present  Central Lines: None  Cardiac Monitoring: None  Code Status: Full Code      Disposition Plan      Expected Discharge Date: 12/05/2022    Discharge Delays: *Medically Ready for Discharge  Placement - TCU  Destination: inpatient rehabilitation facility  Discharge Comments: Awaiting placement        The patient's care was discussed with the Attending Physician, Dr. Cedeño.    Scott Travis MD  Medicine Service, [facility] TEAM 1  [facility]  Securely message with the Vocera Web Console (learn more here)  Text page via [facility] Paging/Directory   Please see signed in provider for up to date  "coverage information    Clinically Significant Risk Factors                       # Obesity: Estimated body mass index is 35.62 kg/m  as calculated from the following:    Height as of this encounter: 1.6 m (5' 3\").    Weight as of this encounter: 91.2 kg (201 lb 1 oz).         ___________________________________________    Interval History   No new events, breathing feels the same as her baseline, interested in getting out of the hospital, asking to get hair shampooed     Data reviewed today: I reviewed all medications, new labs and imaging results over the last 24 hours. I personally reviewed no images or EKG's today.    Physical Exam   Vital Signs: Temp: 97.9  F (36.6  C) Temp src: Oral BP: 126/60 Pulse: 77   Resp: 16 SpO2: 95 % O2 Device: Nasal cannula Oxygen Delivery: 3 LPM  Weight: 201 lbs .95 oz  Physical Exam  Constitutional:       Appearance: Normal appearance.      Comments: Sitting up in bed, no acute distress, alert and answering questions   HENT:      Head: Normocephalic and atraumatic.   Eyes:      Extraocular Movements: Extraocular movements intact.      Conjunctiva/sclera: Conjunctivae normal.   Cardiovascular:      Rate and Rhythm: Normal rate and regular rhythm.      Pulses: Normal pulses.      Heart sounds: Normal heart sounds.   Pulmonary:      Comments: No increased work of breathing, no wheezing or crackles, shortened expiratory phase  Abdominal:      General: Abdomen is flat.      Palpations: Abdomen is soft.   Musculoskeletal:         General: Swelling (3+ bilateral lower extremity swelling) present.   Skin:     General: Skin is warm and dry.   Neurological:      Mental Status: She is alert. Mental status is at baseline.      Comments: Answering questions appropriately, CN appear intact, moving upper and lower extremities in bed         Data   Labs reviewed in the chart.   "

## 2022-12-02 NOTE — PLAN OF CARE
Goal Outcome Evaluation:      Plan of Care Reviewed With: patient    Overall Patient Progress: no changeOverall Patient Progress: no change    Outcome Evaluation: Alert and orietned x 4. Forgetful and labile mood. Ongoing 02 inhalation at 3lpm NC. Denies pain. Up to commode with standby assist. Refused neb treatment overnight. Woke up this am with congested cough. Offered to call RT for neb treatment but pt refused. Vital signs stable. Plan is TCU discharge

## 2022-12-02 NOTE — PLAN OF CARE
Goal Outcome Evaluation:      Plan of Care Reviewed With: patient    Overall Patient Progress: no changeOverall Patient Progress: no change    Outcome Evaluation: Pt is alert and oriented but slightly forgetful at times. Denies pain. Up to BSC. 1 BM today. Productive cough continues, encouraging aerobika use. Requiring 3 L NC to maintain sats. Humidity added due to brief nose bleed.

## 2022-12-02 NOTE — PROGRESS NOTES
Care Management Follow Up    [facility]   [address] [place]  [address]  P: [phone]  P: [phone] - Admissions  F: [phone]   11/30: CHW spoke with admissions and they are full for the week.      [facility]  [address] [place]  [address]  P: [phone]  F: [phone]  11/30:CHW LVM for admissions to f/u on referral; requested they call SW back.   12/1:CHW LVM for admissions to f/u on referral; requested they call SW back.   12/2: CHW LVM for admissions to f/u on referral; requested they call SW back.      [facility]  [address]  [place], [place]  [address]  P: [phone]  P: [phone] - Admissions  F: [phone]  11/30: CHW was unable to reach admissions and didn't go to VM.   12/1: CHW was unable to reach admissions and didn't go to VM.  12/2: CHW was unable to reach admissions and didn't go to VM.    Jessica at [facility]  [address] [place]  [address]  P: [phone]  F: [phone]  11/30: CHW sent referral via Epic  12/1: CHW spoke with Nancy she will review and call SW back.   12/2:      [facility]  [facility] [address]  [place], [place] [address] [phone]  11/30: CHW sent referral via Epic  12/1:CHW LVM for admissions to f/u on referral; requested they call SW back.   12/2: :CHW LVM for admissions to f/u on referral; requested they call SW back.     [address] [address]  P: [phone]   11/30: CHW sent referral via Epic  12/1: CHW LVM for admissions to f/u on referral; requested they call SW back.    12/2:CHW LVM for admissions to f/u on referral; requested they call SW back.       Inactive Referrals:  [facility]:  [facility]. Pt w/ significant cognitive deficits (dementia level per SLUMS 14/30), lives alone. Anticipate pt would need 24/7 A for discharge home. Pt is self-limiting.     [facility]  [place]  [address].  [place] [address]  P: [phone]     [facility]  [address]  [place]  [address]  P: [phone]  F: [phone]   11/30: Facility is closed     [facility]  [address] [place]  [address]  P: [phone]  P: [phone] - Admissions  F: [phone]  12/1: Declined via [facility]  [address] [place] [address]  [phone]   11/30: CHW sent referral via Epic  12/1: Declined. No Beds available.     [facility]   [address] [place]  [address]  P: [phone]  P: [phone] - Admissions  F: [phone]   11/30: CHW spoke with Paulette in admissions and they don't have any bed openings as of now.     [facility] [place]   [address] [place]  [address]  P: [phone]  F: [phone]   11/30: CHW sent referral via Epic  12/1: SW advised to re-send fax to a new fax number: F: [phone].  @1545PM SW re-faxed referral.  @1700PM SW received a call and was informed that they have no appropriate bed for pt.    Max Zaman   Inpatient Community Health Worker  [facility] 5A & 5B

## 2022-12-02 NOTE — PLAN OF CARE
Goal Outcome Evaluation:    AOx4. 3 LPM NC, sats in mid 90's. Denies pain. Good appetite. Pt had an episode of scant nose bleed, O2 NC in use with humidification. BM - soft and dark brown stool. Up to bedside commode with A1. Brief in place, but will call appropriately to use commode.    Nose bleeding at 1550 from L nostril d/t pt blowing nose.

## 2022-12-03 ENCOUNTER — APPOINTMENT (OUTPATIENT)
Dept: OCCUPATIONAL THERAPY | Facility: CLINIC | Age: 77
DRG: 205 | End: 2022-12-03
Payer: COMMERCIAL

## 2022-12-03 LAB — POTASSIUM SERPL-SCNC: 3.8 MMOL/L (ref 3.4–5.3)

## 2022-12-03 PROCEDURE — 36415 COLL VENOUS BLD VENIPUNCTURE: CPT | Performed by: STUDENT IN AN ORGANIZED HEALTH CARE EDUCATION/TRAINING PROGRAM

## 2022-12-03 PROCEDURE — 999N000157 HC STATISTIC RCP TIME EA 10 MIN

## 2022-12-03 PROCEDURE — 250N000011 HC RX IP 250 OP 636

## 2022-12-03 PROCEDURE — 250N000013 HC RX MED GY IP 250 OP 250 PS 637: Performed by: STUDENT IN AN ORGANIZED HEALTH CARE EDUCATION/TRAINING PROGRAM

## 2022-12-03 PROCEDURE — 94640 AIRWAY INHALATION TREATMENT: CPT

## 2022-12-03 PROCEDURE — 120N000002 HC R&B MED SURG/OB UMMC

## 2022-12-03 PROCEDURE — 250N000009 HC RX 250

## 2022-12-03 PROCEDURE — 97535 SELF CARE MNGMENT TRAINING: CPT | Mod: GO

## 2022-12-03 PROCEDURE — 250N000013 HC RX MED GY IP 250 OP 250 PS 637

## 2022-12-03 PROCEDURE — 99232 SBSQ HOSP IP/OBS MODERATE 35: CPT | Mod: GC | Performed by: STUDENT IN AN ORGANIZED HEALTH CARE EDUCATION/TRAINING PROGRAM

## 2022-12-03 PROCEDURE — 94640 AIRWAY INHALATION TREATMENT: CPT | Mod: 76

## 2022-12-03 PROCEDURE — 84132 ASSAY OF SERUM POTASSIUM: CPT | Performed by: STUDENT IN AN ORGANIZED HEALTH CARE EDUCATION/TRAINING PROGRAM

## 2022-12-03 RX ADMIN — LATANOPROST 1 DROP: 50 SOLUTION OPHTHALMIC at 20:33

## 2022-12-03 RX ADMIN — B-COMPLEX W/ C & FOLIC ACID TAB 1 TABLET: TAB at 08:29

## 2022-12-03 RX ADMIN — Medication 150 MG: at 20:29

## 2022-12-03 RX ADMIN — SERTRALINE HYDROCHLORIDE 100 MG: 100 TABLET ORAL at 08:30

## 2022-12-03 RX ADMIN — BUSPIRONE HYDROCHLORIDE 10 MG: 5 TABLET ORAL at 20:29

## 2022-12-03 RX ADMIN — ACETYLCYSTEINE 4 ML: 100 SOLUTION ORAL; RESPIRATORY (INHALATION) at 19:43

## 2022-12-03 RX ADMIN — IPRATROPIUM BROMIDE AND ALBUTEROL SULFATE 3 ML: 2.5; .5 SOLUTION RESPIRATORY (INHALATION) at 15:45

## 2022-12-03 RX ADMIN — ACETYLCYSTEINE 4 ML: 100 SOLUTION ORAL; RESPIRATORY (INHALATION) at 15:45

## 2022-12-03 RX ADMIN — MICONAZOLE NITRATE: 2 POWDER TOPICAL at 08:35

## 2022-12-03 RX ADMIN — Medication 400 MCG: at 08:29

## 2022-12-03 RX ADMIN — IPRATROPIUM BROMIDE AND ALBUTEROL SULFATE 3 ML: 2.5; .5 SOLUTION RESPIRATORY (INHALATION) at 08:05

## 2022-12-03 RX ADMIN — ACETYLCYSTEINE 4 ML: 100 SOLUTION ORAL; RESPIRATORY (INHALATION) at 08:05

## 2022-12-03 RX ADMIN — IPRATROPIUM BROMIDE AND ALBUTEROL SULFATE 3 ML: 2.5; .5 SOLUTION RESPIRATORY (INHALATION) at 19:43

## 2022-12-03 RX ADMIN — ENOXAPARIN SODIUM 40 MG: 40 INJECTION SUBCUTANEOUS at 18:26

## 2022-12-03 RX ADMIN — AZELASTINE HYDROCHLORIDE 1 SPRAY: 137 SPRAY, METERED NASAL at 08:30

## 2022-12-03 RX ADMIN — SENNOSIDES AND DOCUSATE SODIUM 2 TABLET: 50; 8.6 TABLET ORAL at 08:30

## 2022-12-03 RX ADMIN — IPRATROPIUM BROMIDE AND ALBUTEROL SULFATE 3 ML: 2.5; .5 SOLUTION RESPIRATORY (INHALATION) at 11:49

## 2022-12-03 RX ADMIN — Medication: at 08:35

## 2022-12-03 RX ADMIN — ATORVASTATIN CALCIUM 40 MG: 40 TABLET, FILM COATED ORAL at 20:29

## 2022-12-03 RX ADMIN — OXYMETAZOLINE HCL 2 SPRAY: 0.05 SPRAY NASAL at 08:30

## 2022-12-03 RX ADMIN — Medication 25 MCG: at 08:29

## 2022-12-03 RX ADMIN — BUSPIRONE HYDROCHLORIDE 10 MG: 5 TABLET ORAL at 13:40

## 2022-12-03 RX ADMIN — LOSARTAN POTASSIUM AND HYDROCHLOROTHIAZIDE 1 TABLET: 25; 100 TABLET ORAL at 08:29

## 2022-12-03 RX ADMIN — LIDOCAINE PATCH 4% 2 PATCH: 40 PATCH TOPICAL at 08:30

## 2022-12-03 RX ADMIN — ACETYLCYSTEINE 4 ML: 100 SOLUTION ORAL; RESPIRATORY (INHALATION) at 11:49

## 2022-12-03 RX ADMIN — BUSPIRONE HYDROCHLORIDE 10 MG: 5 TABLET ORAL at 08:30

## 2022-12-03 RX ADMIN — SENNOSIDES AND DOCUSATE SODIUM 2 TABLET: 50; 8.6 TABLET ORAL at 20:29

## 2022-12-03 ASSESSMENT — ACTIVITIES OF DAILY LIVING (ADL)
ADLS_ACUITY_SCORE: 35
ADLS_ACUITY_SCORE: 32
ADLS_ACUITY_SCORE: 35
ADLS_ACUITY_SCORE: 32
ADLS_ACUITY_SCORE: 35

## 2022-12-03 NOTE — PROGRESS NOTES
Order for CPT QID, pt has refused for multiple days & evenings.   Pt is on 2L NC, SpO2: 95%, does have a congested cough but stated in the CXR results, it is pointing to pleural effusions & edema which CPT is not indicated for that.     Will continue to monitor and provide support.     Becca Zhang, RT

## 2022-12-03 NOTE — PLAN OF CARE
Goal Outcome Evaluation:    AOx4. 2 LPM NC + humidification, sats in mid 90's. Denies pain. Potassium 3.8, recheck tomorrow AM. BM x2, dark brown soft stool and loose brown stool. Brief in place, may need to remind pt to call to use commode. Scheduled afrin given this AM, no nose bleeds this shift. Lidocaine patches applied this AM, pt removed patches in the afternoon.    Waiting for TCU placement.

## 2022-12-03 NOTE — PLAN OF CARE
Goal Outcome Evaluation:      Plan of Care Reviewed With: patient    Overall Patient Progress: no changeOverall Patient Progress: no change    Outcome Evaluation: Pt is alert and oriented but forgetful. Pt resistant to independent ADL's but strongly encouraging more independence. Up with SBA walker and GB. Denies pain. Pt has a productive cough, requires 2-3 L NC. Oxygen is humidified. Pt has been having bloody noses but none in last 16 hrs, Pt declined afrin. Declined overnight nebs. Medically ready for discharge.

## 2022-12-04 ENCOUNTER — APPOINTMENT (OUTPATIENT)
Dept: OCCUPATIONAL THERAPY | Facility: CLINIC | Age: 77
DRG: 205 | End: 2022-12-04
Payer: COMMERCIAL

## 2022-12-04 PROCEDURE — 250N000011 HC RX IP 250 OP 636

## 2022-12-04 PROCEDURE — 250N000013 HC RX MED GY IP 250 OP 250 PS 637: Performed by: STUDENT IN AN ORGANIZED HEALTH CARE EDUCATION/TRAINING PROGRAM

## 2022-12-04 PROCEDURE — 94640 AIRWAY INHALATION TREATMENT: CPT | Mod: 76

## 2022-12-04 PROCEDURE — 120N000002 HC R&B MED SURG/OB UMMC

## 2022-12-04 PROCEDURE — 97110 THERAPEUTIC EXERCISES: CPT | Mod: GO

## 2022-12-04 PROCEDURE — 99232 SBSQ HOSP IP/OBS MODERATE 35: CPT | Mod: GC | Performed by: STUDENT IN AN ORGANIZED HEALTH CARE EDUCATION/TRAINING PROGRAM

## 2022-12-04 PROCEDURE — 250N000009 HC RX 250

## 2022-12-04 PROCEDURE — 94640 AIRWAY INHALATION TREATMENT: CPT

## 2022-12-04 PROCEDURE — 999N000157 HC STATISTIC RCP TIME EA 10 MIN

## 2022-12-04 PROCEDURE — 250N000013 HC RX MED GY IP 250 OP 250 PS 637

## 2022-12-04 PROCEDURE — 97535 SELF CARE MNGMENT TRAINING: CPT | Mod: GO

## 2022-12-04 RX ORDER — IPRATROPIUM BROMIDE AND ALBUTEROL SULFATE 2.5; .5 MG/3ML; MG/3ML
3 SOLUTION RESPIRATORY (INHALATION)
Status: DISCONTINUED | OUTPATIENT
Start: 2022-12-04 | End: 2022-12-05

## 2022-12-04 RX ADMIN — SENNOSIDES AND DOCUSATE SODIUM 1 TABLET: 50; 8.6 TABLET ORAL at 08:34

## 2022-12-04 RX ADMIN — ACETYLCYSTEINE 4 ML: 100 SOLUTION ORAL; RESPIRATORY (INHALATION) at 21:13

## 2022-12-04 RX ADMIN — LATANOPROST 1 DROP: 50 SOLUTION OPHTHALMIC at 19:44

## 2022-12-04 RX ADMIN — SERTRALINE HYDROCHLORIDE 100 MG: 100 TABLET ORAL at 08:34

## 2022-12-04 RX ADMIN — BUSPIRONE HYDROCHLORIDE 10 MG: 5 TABLET ORAL at 13:31

## 2022-12-04 RX ADMIN — ATORVASTATIN CALCIUM 40 MG: 40 TABLET, FILM COATED ORAL at 19:45

## 2022-12-04 RX ADMIN — ACETYLCYSTEINE 4 ML: 100 SOLUTION ORAL; RESPIRATORY (INHALATION) at 08:11

## 2022-12-04 RX ADMIN — BUSPIRONE HYDROCHLORIDE 10 MG: 5 TABLET ORAL at 19:16

## 2022-12-04 RX ADMIN — Medication: at 08:41

## 2022-12-04 RX ADMIN — Medication 400 MCG: at 08:34

## 2022-12-04 RX ADMIN — AZELASTINE HYDROCHLORIDE 1 SPRAY: 137 SPRAY, METERED NASAL at 08:34

## 2022-12-04 RX ADMIN — Medication 150 MG: at 19:45

## 2022-12-04 RX ADMIN — BUSPIRONE HYDROCHLORIDE 10 MG: 5 TABLET ORAL at 08:34

## 2022-12-04 RX ADMIN — B-COMPLEX W/ C & FOLIC ACID TAB 1 TABLET: TAB at 08:34

## 2022-12-04 RX ADMIN — IPRATROPIUM BROMIDE AND ALBUTEROL SULFATE 3 ML: .5; 3 SOLUTION RESPIRATORY (INHALATION) at 21:13

## 2022-12-04 RX ADMIN — ENOXAPARIN SODIUM 40 MG: 40 INJECTION SUBCUTANEOUS at 19:16

## 2022-12-04 RX ADMIN — Medication 25 MCG: at 08:34

## 2022-12-04 RX ADMIN — LOSARTAN POTASSIUM AND HYDROCHLOROTHIAZIDE 1 TABLET: 25; 100 TABLET ORAL at 08:34

## 2022-12-04 RX ADMIN — OXYMETAZOLINE HCL 2 SPRAY: 0.05 SPRAY NASAL at 08:34

## 2022-12-04 RX ADMIN — IPRATROPIUM BROMIDE AND ALBUTEROL SULFATE 3 ML: 2.5; .5 SOLUTION RESPIRATORY (INHALATION) at 08:11

## 2022-12-04 RX ADMIN — OXYMETAZOLINE HCL 2 SPRAY: 0.05 SPRAY NASAL at 19:16

## 2022-12-04 RX ADMIN — MICONAZOLE NITRATE: 2 POWDER TOPICAL at 08:41

## 2022-12-04 ASSESSMENT — ACTIVITIES OF DAILY LIVING (ADL)
ADLS_ACUITY_SCORE: 32
ADLS_ACUITY_SCORE: 32
ADLS_ACUITY_SCORE: 35
ADLS_ACUITY_SCORE: 35
ADLS_ACUITY_SCORE: 32
ADLS_ACUITY_SCORE: 32
ADLS_ACUITY_SCORE: 35
ADLS_ACUITY_SCORE: 32
ADLS_ACUITY_SCORE: 35

## 2022-12-04 NOTE — PROGRESS NOTES
[facility]    Medicine Progress Note - Medicine Service, IVIS TEAM 1    Date of Admission:  11/24/2022    Assessment & Plan   Ca Yan is a 76-year-old female with past medical history significant for previous breast cancer s/p lumpectomy, GERD, HTN, HLD, chronic lower extremity lymphedema, cluster C personality disorder, CKD, COPD, and known restrictive lung disease who was admitted on 11/24/2022 for chief concern of lower extremity swelling, redness, and shortness of breath over a duration estimated at 2-3 weeks which was likely due to mild HFpEF exacerbation and underlying lung disease. She is stable awaiting TCU placement.      Today:  - Switched nebulizer to MDI   - OT for MOCA assessment     #Decreased Functional Status  Appreciate PT/OT evaluation, patient concern of being unable to go back home due to difficulty completing home ADLs and overall shortness of breath. PT/OT recommend TCU. Appreciate social work consult for disposition planning.     #Chronic macrocytic anemia  Pt with chronic macrocytic anemia noted by her PCP. Hematology was e-consulted outpatient and recommended further workup. Obtained some labs while inpatient and will get followed out by PCP and hematology. Thus far she has normal liver testing, normal EPO, normal immunoglobulin levels. She did have an elevated kappa free light chain and elevated kappa/lacey ratio and rouleaux formation on her smear. Will follow up protein immunofixation. Prior iron levels were normal three months ago along with normal ferritin.   - oral iron supplementation MWF  - follow up in process labs: homocysteine, MMA, protein immunofixation   - needs outpatient follow up with PCP/Hematology      #Lower Extremity Edema  #Possible venous stasis vs lymphedema  4+ pitting bilateral lower extremity edema, apparently acute on chronic over last few weeks per patient and chart review. H2FPEF calculator showed 70%  probability for HFpEF. Does have prior hx of lymphedema as well. NT pro-BNP of 395, but may be confounded by obesity. TTE showed preserved ejection fraction of 65-70%. Would also consider potential liver pathology, although LFTs and albumin normal. Abdominal US significant for probable hepatic steatosis and small right pleural effusion. US DVT negative.   - Hold diuresis in context of NUNU  - Daily BMP   - PTA Eucerin cream  - Lymphedema consult  - WOC consult              - Apply antifungal powder to groin and under breast     #Prerenal NUNU, resolving   Overdiuresis in setting of concern for heart failure exacerbation. Cr improving with holding diuresis.      #Acute on Chronic Hypoxic Hypercapnic Respiratory Failure  #Restrictive Lung Disease   #Metabolic alkalosis with concomitant chronic respiratory acidosis  Pt with history of chronic CO2 retention, has seen pulmonology in the past and reportedly declined NIPPV. Unclear baseline PCO2 venous, however gases and lab workup here suggest chronic metabolically compensated CO2 retention. Significant restrictive lung dz due to thoracic cage abnormality in addition to deconditioning and prior smoking history. On 2-3L home O2.   - Continue oxygen to keep saturations above 90%  - Albuterol inhaler Q6H PRN  - Duonebs Q4H  - Mucomyst Q4H  - Chest physiotherapy   - PTA azelastine 1 spray both nostrils BID   - F/u with outpatient pulmonologist    #Generalized Anxiety Disorder  #Cognitive impairment  #Hx of AUD  As per recent family medicine visit, continuing PTA medications. Patient has repeatedly asked about echo procedure and findings (required postponement of 11/26 echo due to patient request to re-explain procedure), unclear if she has full capacity or if this represents significant deviation from baseline. Additional chart review shows hx of alcohol used disorder which may be contributing - not current drinker but documentation shows hx of drinking 1/2 pint liquor per day  in 2016. Increased buspirone from BID to TID due to patient concern of anxiety 11/27.   - Buspirone 10 mg PO TID  - Continue PTA Seroquel, 150 mg PO QHS     #Depression  - Continue PTA sertraline, updated dose to 100 mg PO daily per pharmacy discussion    #Hypertension  - Continue PTA Losartan/HCTZ 100-25 mg PO daily     #HLD  - Continue PTA atorvastatin 40 mg PO daily     #Glaucoma  - PTA latanoprost 1 drop each eye at bedtime     #?Vitamin Deficiency  #Chronic macrocytic anemia 2/2 nutritional deficiency vs malignancy  - Continue PTA folic acid 400 mcg daily  - Continue vitamin B complex with C 1 tablet daily  - Continue vitamin D3 25 mcg daily   - Ordered labs for outpatient followup              - Methylmalonic acid, homocysteine              - Oral iron 325mg tablet/day every other day (e.g. Monday Wednesday Friday)              - SPEP with immunofixation, free light chains, quantitative immunoglobulins              - Peripheral smear for pathology review              - Erythropoietin level     #Abdominal pain  Patient has had chronic abdominal wall pain, likely MSK in context of severe kyphoscoliosis. States that it is worsened by coughing. Pain is improved with Tylenol.  - Tylenol 650mg q6h PRN  - Lidocaine patch     Diet: Combination Diet Regular Diet Adult    DVT Prophylaxis: Enoxaparin (Lovenox) SQ  Lima Catheter: Not present  Central Lines: None  Cardiac Monitoring: None  Code Status: Full Code      Disposition Plan     Expected Discharge Date: 12/05/2022,  3:00 PM  Discharge Delays: *Medically Ready for Discharge  Placement - TCU  Destination: inpatient rehabilitation facility  Discharge Comments: Awaiting placement        The patient's care was discussed with the Attending Physician, Dr. Cedeño.    Jakub Biggs MD  Medicine Service, [facility] TEAM 1  [facility]  Securely message with the Vocera Web Console (learn more here)  Text page via [employer] Paging/Directory  "  Please see signed in provider for up to date coverage information    Clinically Significant Risk Factors                       # Obesity: Estimated body mass index is 35.18 kg/m  as calculated from the following:    Height as of this encounter: 1.6 m (5' 3\").    Weight as of this encounter: 90.1 kg (198 lb 9.6 oz).      ___________________________________________    Interval History   Nursing notes reviewed, no acute events  Asks about disposition planning and when she would be able to leave the hospital, discussed referrals have been sent out.   Breathing is about the same     Data reviewed today: I reviewed all medications, new labs and imaging results over the last 24 hours.     Physical Exam   Vital Signs: Temp: 98.5  F (36.9  C) Temp src: Oral BP: 120/49 Pulse: 70   Resp: 16 SpO2: 94 % O2 Device: Nasal cannula with humidification Oxygen Delivery: 3 LPM  Weight: 198 lbs 9.6 oz  Physical Exam  Constitutional:       Appearance: Normal appearance.      Comments: Sitting up in bed, no acute distress, alert and answering questions   HENT:      Head: Normocephalic and atraumatic.   Eyes:      Extraocular Movements: Extraocular movements intact.      Conjunctiva/sclera: Conjunctivae normal.   Cardiovascular:      Rate and Rhythm: Normal rate and regular rhythm.      Pulses: Normal pulses.      Heart sounds: Normal heart sounds.   Pulmonary:      Comments: No increased work of breathing, no wheezing or crackles, shortened expiratory phase  Abdominal:      General: Abdomen is flat.      Palpations: Abdomen is soft.   Musculoskeletal:         General: Swelling (3+ bilateral lower extremity swelling) present.   Skin:     General: Skin is warm and dry.   Neurological:      Mental Status: She is alert. Mental status is at baseline.      Comments: Answering questions appropriately, CN appear intact, moving upper and lower extremities in bed         "

## 2022-12-04 NOTE — PLAN OF CARE
Goal Outcome Evaluation:      Plan of Care Reviewed With: patient    Overall Patient Progress: no changeOverall Patient Progress: no change    Outcome Evaluation: A&O x4, forgetful and anxious. AVSS on 2L NC. Up SBA to bedside commode twice overnight, Voids spontaneously, no BM. Activity and idependent repostitioning encourage, but pt reluctant to doing most things on her own. Medically ready for discharge, awaiting TCU placement

## 2022-12-04 NOTE — PLAN OF CARE
Goal Outcome Evaluation:      Plan of Care Reviewed With: patient    Overall Patient Progress: no changeOverall Patient Progress: no change    Outcome Evaluation: A&Ox4. Forgetful and anxious mood. 3LPM NC with humidification. Denies pain. Up to commode with SBA. Pt refused miconazole powder. Awaiting TCU placement.

## 2022-12-04 NOTE — PLAN OF CARE
Goal Outcome Evaluation:    AOx4. 3 LPM NC + Humidification. Denies pain. Good appetite. Potassium protocol discontinued. Refused lidocaine patches. Refused afternoon nebs and has been refusing delia/night MD kaycee notified. BM - soft and brown. Uses commode at bedside. Writer encouraged pt to ambulate to bathroom but pt declined. Pt showered at 1400, washed hair.    Waiting for TCU placement.

## 2022-12-04 NOTE — PROGRESS NOTES
[facility]    Medicine Progress Note - Medicine Service, IVIS TEAM 1    Date of Admission:  11/24/2022    Assessment & Plan   Ca Yan is a 76-year-old female with past medical history significant for previous breast cancer s/p lumpectomy, GERD, HTN, HLD, chronic lower extremity lymphedema, cluster C personality disorder, CKD, COPD, and known restrictive lung disease who was admitted on 11/24/2022 for chief concern of lower extremity swelling, redness, and shortness of breath over a duration estimated at 2-3 weeks which was likely due to mild HFpEF exacerbation and underlying lung disease. She is stable awaiting TCU placement.      Today:  - no changes     #Decreased Functional Status  Appreciate PT/OT evaluation, patient concern of being unable to go back home due to difficulty completing home ADLs and overall shortness of breath. PT/OT recommend TCU. Appreciate social work consult for dispo planning.     #Chronic macrocytic anemia  Pt with chronic macrocytic anemia noted by her PCP. Hematology was e-consulted outpatient and recommended further workup. Obtained some labs while inpatient and will get followed out by PCP and hematology. Thus far she has normal liver testing, normal EPO, normal immunoglobulin levels. She did have an elevated kappa free light chain and elevated kappa/lacey ratio and rouleaux formation on her smear. Will follow up protein immunofixation. Prior iron levels were normal three months ago along with normal ferritin.   - oral iron supplementation MWF  - follow up in process labs: homocysteine, MMA, protein immunofixation   - needs outpatient follow up with PCP/Hematology      #Lower Extremity Edema  #Possible venous stasis vs lymphedema  4+ pitting bilateral lower extremity edema, apparently acute on chronic over last few weeks per patient and chart review. H2FPEF calculator showed 70% probability for HFpEF. Does have prior hx of  lymphedema as well. NT pro-BNP of 395, but may be confounded by obesity. TTE showed preserved ejection fraction of 65-70%. Would also consider potential liver pathology, although LFTs and albumin normal. Abdominal US significant for probable hepatic steatosis and small right pleural effusion. US DVT negative.   - Hold diuresis in context of NUNU  - Daily BMP   - PTA Eucerin cream  - Lymphedema consult  - WOC consult              - Apply antifungal powder to groin and under breast     #Prerenal NUNU, resolving   Overdiuresis in setting of concern for heart failure exacerbation. Cr improving with holding diuresis.      #Acute on Chronic Hypoxic Hypercapnic Respiratory Failure  #Restrictive Lung Disease   #Metabolic alkalosis with concomitant chronic respiratory acidosis  Pt with history of chronic CO2 retention, has seen pulmonology in the past and reportedly declined NIPPV. Unclear baseline PCO2 venous, however gases and lab workup here suggest chronic metabolically compensated CO2 retention. Significant restrictive lung dz due to thoracic cage abnormality in addition to deconditioning and prior smoking history. On 2-3L home O2.   - Continue oxygen to keep saturations above 90%  - Albuterol inhaler Q6H PRN  - Duonebs Q4H  - Mucomyst Q4H  - Chest physiotherapy   - PTA azelastine 1 spray both nostrils BID   - F/u with outpatient pulmonologist    #Generalized Anxiety Disorder  #Cognitive impairment  #Hx of AUD  As per recent family medicine visit, continuing PTA medications. Patient has repeatedly asked about echo procedure and findings (required postponement of 11/26 echo due to patient request to re-explain procedure), unclear if she has full capacity or if this represents significant deviation from baseline. Additional chart review shows hx of alcohol used disorder which may be contributing - not current drinker but documentation shows hx of drinking 1/2 pint liquor per day in 2016. Increased buspirone from BID to TID  due to patient concern of anxiety 11/27.   - Buspirone 10 mg PO TID  - Continue PTA Seroquel, 150 mg PO QHS     #Depression  - Continue PTA sertraline, updated dose to 100 mg PO daily per pharmacy discussion    #Hypertension  - Continue PTA Losartan/HCTZ 100-25 mg PO daily     #HLD  - Continue PTA atorvastatin 40 mg PO daily     #Glaucoma  - PTA latanoprost 1 drop each eye at bedtime     #?Vitamin Deficiency  #Chronic macrocytic anemia 2/2 nutritional deficiency vs malignancy  - Continue PTA folic acid 400 mcg daily  - Continue vitamin B complex with C 1 tablet daily  - Continue vitamin D3 25 mcg daily   - Ordered labs for outpatient followup              - Methylmalonic acid, homocysteine              - Oral iron 325mg tablet/day every other day (e.g. Monday Wednesday Friday)              - SPEP with immunofixation, free light chains, quantitative immunoglobulins              - Peripheral smear for pathology review              - Erythropoietin level     #Abdominal pain  Patient has had chronic abdominal wall pain, likely MSK in context of severe kyphoscoliosis. States that it is worsened by coughing. Pain is improved with Tylenol.  - Tylenol 650mg q6h PRN  - Lidocaine patch     Diet: Combination Diet Regular Diet Adult    DVT Prophylaxis: Enoxaparin (Lovenox) SQ  Lima Catheter: Not present  Central Lines: None  Cardiac Monitoring: None  Code Status: Full Code      Disposition Plan      Expected Discharge Date: 12/05/2022,  3:00 PM  Discharge Delays: *Medically Ready for Discharge  Placement - TCU  Destination: inpatient rehabilitation facility  Discharge Comments: Awaiting placement        The patient's care was discussed with the Attending Physician, Dr. Cedeño.    Jakub Biggs MD  Medicine Service, [facility] TEAM 1  [facility]  Securely message with the Vocera Web Console (learn more here)  Text page via [facility] Paging/Directory   Please see signed in provider for up to  "date coverage information    Clinically Significant Risk Factors                       # Obesity: Estimated body mass index is 35.43 kg/m  as calculated from the following:    Height as of this encounter: 1.6 m (5' 3\").    Weight as of this encounter: 90.7 kg (200 lb).      ___________________________________________    Interval History   Nursing notes reviewed, no acute events  States her breathing is okay, no increased work of breathing   Looking forward to friend visiting today     Data reviewed today: I reviewed all medications, new labs and imaging results over the last 24 hours.     Physical Exam   Vital Signs: Temp: 98.4  F (36.9  C) Temp src: Oral BP: 122/49 Pulse: 72   Resp: 19 SpO2: 97 % O2 Device: Nasal cannula with humidification Oxygen Delivery: 2 LPM  Weight: 200 lbs 0 oz  Physical Exam  Constitutional:       Appearance: Normal appearance.      Comments: Sitting up in bed, no acute distress, alert and answering questions   HENT:      Head: Normocephalic and atraumatic.   Eyes:      Extraocular Movements: Extraocular movements intact.      Conjunctiva/sclera: Conjunctivae normal.   Cardiovascular:      Rate and Rhythm: Normal rate and regular rhythm.      Pulses: Normal pulses.      Heart sounds: Normal heart sounds.   Pulmonary:      Comments: No increased work of breathing, no wheezing or crackles, shortened expiratory phase  Abdominal:      General: Abdomen is flat.      Palpations: Abdomen is soft.   Musculoskeletal:         General: Swelling (3+ bilateral lower extremity swelling) present.   Skin:     General: Skin is warm and dry.   Neurological:      Mental Status: She is alert. Mental status is at baseline.      Comments: Answering questions appropriately, CN appear intact, moving upper and lower extremities in bed         "

## 2022-12-05 ENCOUNTER — APPOINTMENT (OUTPATIENT)
Dept: GENERAL RADIOLOGY | Facility: CLINIC | Age: 77
DRG: 205 | End: 2022-12-05
Payer: COMMERCIAL

## 2022-12-05 ENCOUNTER — APPOINTMENT (OUTPATIENT)
Dept: OCCUPATIONAL THERAPY | Facility: CLINIC | Age: 77
DRG: 205 | End: 2022-12-05
Payer: COMMERCIAL

## 2022-12-05 ENCOUNTER — VIRTUAL VISIT (OUTPATIENT)
Dept: DERMATOLOGY | Facility: CLINIC | Age: 77
End: 2022-12-05
Payer: COMMERCIAL

## 2022-12-05 ENCOUNTER — PRE VISIT (OUTPATIENT)
Dept: DERMATOLOGY | Facility: CLINIC | Age: 77
End: 2022-12-05

## 2022-12-05 ENCOUNTER — APPOINTMENT (OUTPATIENT)
Dept: PHYSICAL THERAPY | Facility: CLINIC | Age: 77
DRG: 205 | End: 2022-12-05
Payer: COMMERCIAL

## 2022-12-05 DIAGNOSIS — Z53.9 ERRONEOUS ENCOUNTER--DISREGARD: Primary | ICD-10-CM

## 2022-12-05 PROCEDURE — 120N000002 HC R&B MED SURG/OB UMMC

## 2022-12-05 PROCEDURE — 999N000157 HC STATISTIC RCP TIME EA 10 MIN

## 2022-12-05 PROCEDURE — 97530 THERAPEUTIC ACTIVITIES: CPT | Mod: GO

## 2022-12-05 PROCEDURE — 250N000009 HC RX 250

## 2022-12-05 PROCEDURE — 94640 AIRWAY INHALATION TREATMENT: CPT | Mod: 76

## 2022-12-05 PROCEDURE — 250N000013 HC RX MED GY IP 250 OP 250 PS 637: Performed by: STUDENT IN AN ORGANIZED HEALTH CARE EDUCATION/TRAINING PROGRAM

## 2022-12-05 PROCEDURE — 77075 RADEX OSSEOUS SURVEY COMPL: CPT | Mod: 26 | Performed by: RADIOLOGY

## 2022-12-05 PROCEDURE — 250N000009 HC RX 250: Performed by: STUDENT IN AN ORGANIZED HEALTH CARE EDUCATION/TRAINING PROGRAM

## 2022-12-05 PROCEDURE — 250N000013 HC RX MED GY IP 250 OP 250 PS 637

## 2022-12-05 PROCEDURE — 94640 AIRWAY INHALATION TREATMENT: CPT

## 2022-12-05 PROCEDURE — 99232 SBSQ HOSP IP/OBS MODERATE 35: CPT | Mod: GC | Performed by: STUDENT IN AN ORGANIZED HEALTH CARE EDUCATION/TRAINING PROGRAM

## 2022-12-05 PROCEDURE — 250N000011 HC RX IP 250 OP 636

## 2022-12-05 PROCEDURE — 97535 SELF CARE MNGMENT TRAINING: CPT | Mod: GO

## 2022-12-05 PROCEDURE — 77075 RADEX OSSEOUS SURVEY COMPL: CPT

## 2022-12-05 PROCEDURE — 97530 THERAPEUTIC ACTIVITIES: CPT | Mod: GP

## 2022-12-05 RX ORDER — ALBUTEROL SULFATE 0.83 MG/ML
2.5 SOLUTION RESPIRATORY (INHALATION) 4 TIMES DAILY
Status: DISCONTINUED | OUTPATIENT
Start: 2022-12-05 | End: 2022-12-06

## 2022-12-05 RX ORDER — IPRATROPIUM BROMIDE AND ALBUTEROL SULFATE 2.5; .5 MG/3ML; MG/3ML
3 SOLUTION RESPIRATORY (INHALATION) EVERY 4 HOURS PRN
Status: DISCONTINUED | OUTPATIENT
Start: 2022-12-05 | End: 2022-12-09 | Stop reason: HOSPADM

## 2022-12-05 RX ORDER — ALBUTEROL SULFATE 0.83 MG/ML
2.5 SOLUTION RESPIRATORY (INHALATION) EVERY 6 HOURS PRN
Status: DISCONTINUED | OUTPATIENT
Start: 2022-12-05 | End: 2022-12-05

## 2022-12-05 RX ORDER — ACETYLCYSTEINE 200 MG/ML
4 SOLUTION ORAL; RESPIRATORY (INHALATION) 4 TIMES DAILY
Status: DISCONTINUED | OUTPATIENT
Start: 2022-12-05 | End: 2022-12-06

## 2022-12-05 RX ADMIN — Medication 4 ML: at 15:59

## 2022-12-05 RX ADMIN — BUSPIRONE HYDROCHLORIDE 10 MG: 5 TABLET ORAL at 21:09

## 2022-12-05 RX ADMIN — BUSPIRONE HYDROCHLORIDE 10 MG: 5 TABLET ORAL at 08:10

## 2022-12-05 RX ADMIN — Medication 25 MCG: at 08:10

## 2022-12-05 RX ADMIN — ENOXAPARIN SODIUM 40 MG: 40 INJECTION SUBCUTANEOUS at 17:06

## 2022-12-05 RX ADMIN — AZELASTINE HYDROCHLORIDE 1 SPRAY: 137 SPRAY, METERED NASAL at 08:13

## 2022-12-05 RX ADMIN — LATANOPROST 1 DROP: 50 SOLUTION OPHTHALMIC at 21:10

## 2022-12-05 RX ADMIN — Medication 150 MG: at 21:09

## 2022-12-05 RX ADMIN — SENNOSIDES AND DOCUSATE SODIUM 2 TABLET: 50; 8.6 TABLET ORAL at 08:10

## 2022-12-05 RX ADMIN — MICONAZOLE NITRATE: 2 POWDER TOPICAL at 21:12

## 2022-12-05 RX ADMIN — MICONAZOLE NITRATE: 2 POWDER TOPICAL at 08:11

## 2022-12-05 RX ADMIN — Medication 400 MCG: at 08:11

## 2022-12-05 RX ADMIN — MICONAZOLE NITRATE: 2 POWDER TOPICAL at 17:09

## 2022-12-05 RX ADMIN — ACETYLCYSTEINE 4 ML: 100 SOLUTION ORAL; RESPIRATORY (INHALATION) at 09:00

## 2022-12-05 RX ADMIN — SERTRALINE HYDROCHLORIDE 100 MG: 100 TABLET ORAL at 08:10

## 2022-12-05 RX ADMIN — Medication 4 ML: at 11:52

## 2022-12-05 RX ADMIN — Medication: at 21:12

## 2022-12-05 RX ADMIN — FERROUS SULFATE TAB 325 MG (65 MG ELEMENTAL FE) 325 MG: 325 (65 FE) TAB at 08:10

## 2022-12-05 RX ADMIN — IPRATROPIUM BROMIDE AND ALBUTEROL SULFATE 3 ML: .5; 3 SOLUTION RESPIRATORY (INHALATION) at 09:00

## 2022-12-05 RX ADMIN — Medication 4 ML: at 20:32

## 2022-12-05 RX ADMIN — B-COMPLEX W/ C & FOLIC ACID TAB 1 TABLET: TAB at 08:10

## 2022-12-05 RX ADMIN — ALBUTEROL SULFATE 2.5 MG: 2.5 SOLUTION RESPIRATORY (INHALATION) at 11:52

## 2022-12-05 RX ADMIN — OXYMETAZOLINE HCL 2 SPRAY: 0.05 SPRAY NASAL at 08:11

## 2022-12-05 RX ADMIN — ATORVASTATIN CALCIUM 40 MG: 40 TABLET, FILM COATED ORAL at 21:09

## 2022-12-05 RX ADMIN — ALBUTEROL SULFATE 2.5 MG: 2.5 SOLUTION RESPIRATORY (INHALATION) at 16:00

## 2022-12-05 RX ADMIN — OXYMETAZOLINE HCL 2 SPRAY: 0.05 SPRAY NASAL at 21:09

## 2022-12-05 RX ADMIN — LOSARTAN POTASSIUM AND HYDROCHLOROTHIAZIDE 1 TABLET: 25; 100 TABLET ORAL at 08:11

## 2022-12-05 RX ADMIN — ALBUTEROL SULFATE 2.5 MG: 2.5 SOLUTION RESPIRATORY (INHALATION) at 20:32

## 2022-12-05 RX ADMIN — BUSPIRONE HYDROCHLORIDE 10 MG: 5 TABLET ORAL at 17:06

## 2022-12-05 RX ADMIN — Medication: at 08:13

## 2022-12-05 ASSESSMENT — ACTIVITIES OF DAILY LIVING (ADL)
ADLS_ACUITY_SCORE: 35

## 2022-12-05 NOTE — PROGRESS NOTES
Care Management Follow Up    [address] [place]  [address]  P: [phone]  P: [phone] - Admissions  F: [phone]   11/30: CHW spoke with admissions and they are full for the week.   12/5: CHW LVM for admissions to f/u on referral; requested they call SW back. Later received a message via Epic  no beds available.     [facility]  [address] [place]  [address]  P: [phone]  F: [phone]  11/30:CHW LVM for admissions to f/u on referral; requested they call SW back.   12/1:CHW LVM for admissions to f/u on referral; requested they call SW back.   12/2: CHW LVM for admissions to f/u on referral; requested they call SW back.   12/5: CHW spoke with admissions they did not receive the referral. The CHW resent referral via epic     [facility]  [address] [place], [place]  [address]  P: [phone]  P: [phone] - Admissions  F: [phone]  11/30: CHW was unable to reach admissions and didn't go to .   12/1: CHW was unable to reach admissions and didn't go to VM.  12/2: CHW was unable to reach admissions and didn't go to VM.   12/5: CHW sent referral via Epic.. Later received a message via Epic  no beds available.        [facility]  [address] [place]  [address]  P: [phone]  F: [phone]  11/30: CHW sent referral via Epic  12/1: CHW spoke with Nancy she will review and call SW back.   12/5:CHW resent referral via Epic    [facility]  [address]  [place] [address]  [phone]  11/30: CHW sent referral via Epic  12/1:CHW LVM for admissions to f/u on referral; requested they call SW back.   12/2:CHW LVM for admissions to f/u on referral; requested they call SW back.   12/5:CHW spoke with admissions they declined due to not being MA certified.     [address] [address]  P: [phone]   11/30: CHW sent  referral via Epic  12/1: CHW LVM for admissions to f/u on referral; requested they call SW back.    12/2:CHW LVM for admissions to f/u on referral; requested they call SW back.   12/5: CHW LVM for admissions to f/u on referral; requested they call SW back.     [facility]   [address] [place]  [address]  P: [phone]  P: [phone] - Admissions  F: [phone]   11/30: CHW spoke with Paulette in admissions and they don't have any bed openings as of now.    12/5: CHW sent referral via Epic. Later received a message via Epic  no beds available.          Inactive Referrals:  [facility]:  [facility]. Pt w/ significant cognitive deficits (dementia level per SLUMS 14/30), lives alone. Anticipate pt would need 24/7 A for discharge home. Pt is self-limiting.     [facility] [address] [facility]  [place], [place] [address]  P: [phone]     [facility]  [address] [place]  [address]  P: [phone]  F: [phone]   11/30: Facility is closed     [facility]  [address] [place]  [address]  P: [phone]  P: [phone] - Admissions  F: [phone]  12/1: Declined via [employer]     [facility]  [address] [place] [address]  [phone]   11/30: CHW sent referral via Epic  12/1: Declined. No Beds available.     [facility]   [address] [place] [address]  [address]  P: [phone]  F: [phone]   11/30: CHW sent referral via Epic  12/1: SHARRON advised to re-send fax to a new fax number: F: [phone].  @1545PM SHARRON re-faxed referral.  @1700PM SW received a call and was informed that they have no appropriate bed for pt.    Max Zaman   Inpatient Community Health Worker  [facility] 5A & 5B

## 2022-12-05 NOTE — PLAN OF CARE
Goal Outcome Evaluation:      Plan of Care Reviewed With: patient    Overall Patient Progress: no changeOverall Patient Progress: no change    Outcome Evaluation: Baseline oxygen needs.  Calls to make needs known.  Irritable and whiny at times.  Needs to be pushed to do things more independently.  Skin dry and flaky throught out.  Aquaphor applied to the back and to legs and feet.  Medically ready, waiting for placement.

## 2022-12-05 NOTE — LETTER
12/5/2022       RE: Ca Yan  [address]  [facility] [address]     Dear Colleague,    Thank you for referring your patient, Ca Yan, to the [facility] DERMATOLOGIC SURGERY CLINIC [place] [facility]. Please see a copy of my visit note below.    Erroneous    Dalia PALM RN        This encounter was opened in error. Please disregard.      Again, thank you for allowing me to participate in the care of your patient.      Sincerely,    Jay Webb MD

## 2022-12-05 NOTE — PLAN OF CARE
Goal Outcome Evaluation:       A&O VSS on 3L O2 humidified. No PIV. Up to commode, large BM, void. Refuses walking to bathroom (anxiety). LE edema, reddened area marked. Reg diet, good appetite. Calls frequently to make needs known. Awaiting TCU bed.

## 2022-12-05 NOTE — PATIENT INSTRUCTIONS
[facility] Dermatology Visit    Thank you for allowing us to participate in your care. Your findings, instructions and follow-up plan are as follows:         When should I call my doctor?  If you are worsening or not improving, please, contact us or seek urgent care as noted below.     Who should I call with questions (adults)?  [facility] (adult and pediatric): [phone]  [facility] (adult): [phone]  For urgent needs outside of business hours call the [facility] at [phone] and ask for the dermatology resident on call  If this is a medical emergency and you are unable to reach an ER, Call 911    Who should I call with questions (pediatric)?  [facility]- Pediatric Dermatology  Dr. Janna Larios, Dr. Solis Murphy, Dr. Jenny Leonardo, Samira Vela, PA  Dr. Elena Villalobos, Dr. Mary Kate Schulz & Dr. Js Jackson  Non Urgent  Nurse Triage Line; [phone]- Ngoc and Abby RN Care Coordinators   Madeleine (/Complex ) [phone]    If you need a prescription refill, please contact your pharmacy. Refills are approved or denied by our physicians during normal business hours, Monday through Fridays  Per office policy, refills will not be granted if you have not been seen within the past year (or sooner depending on your child's condition).    Scheduling Information:  Pediatric Appointment Scheduling and Call Center [phone]  Radiology Scheduling- [phone]  Sedation Unit Scheduling- [phone]  [facility] Scheduling- General [phone]; Pediatric Dermatology [phone]  Main  Services: [phone]  Sao Tomean: [phone]  Bolivian: [phone]  Hmong/Blanco/Angolan: [phone]  Preadmission Nursing Department Fax Number: [phone] (fax all pre-operative paperwork to this number)    For urgent matters arising during evenings, weekends, or  holidays that cannot wait for normal business hours please call [phone] and ask for the dermatology resident on call to be paged.

## 2022-12-05 NOTE — LETTER
Date:December 6, 2022      Provider requested that no letter be sent. Do not send.       [facility]

## 2022-12-05 NOTE — PROGRESS NOTES
[facility]    Medicine Progress Note - Medicine Service, IVIS TEAM 1    Date of Admission:  11/24/2022    Assessment & Plan   Ca Yan is a 76-year-old female with past medical history significant for previous breast cancer s/p lumpectomy, GERD, HTN, HLD, chronic lower extremity lymphedema, cluster C personality disorder, CKD, COPD, and known restrictive lung disease who was admitted on 11/24/2022 for chief concern of lower extremity swelling, redness, and shortness of breath over a duration estimated at 2-3 weeks which was likely due to mild HFpEF exacerbation and underlying lung disease. She is stable awaiting TCU placement.      Today:  - No changes  - Awaiting TCU placement     #Decreased Functional Status  Appreciate PT/OT evaluation, patient concern of being unable to go back home due to difficulty completing home ADLs and overall shortness of breath. PT/OT recommend TCU. Appreciate social work consult for disposition planning.     #Chronic macrocytic anemia  Pt with chronic macrocytic anemia noted by her PCP. Hematology was e-consulted outpatient and recommended further workup. Obtained some labs while inpatient and will get followed out by PCP and hematology. Thus far she has normal liver testing, normal EPO, normal immunoglobulin levels. She did have an elevated kappa free light chain and elevated kappa/lacey ratio and rouleaux formation on her smear. Will follow up protein immunofixation. Prior iron levels were normal three months ago along with normal ferritin.   - oral iron supplementation MWF   - needs outpatient follow up with PCP/Hematology      #Lower Extremity Edema  #Possible venous stasis vs lymphedema  4+ pitting bilateral lower extremity edema, apparently acute on chronic over last few weeks per patient and chart review. H2FPEF calculator showed 70% probability for HFpEF. Does have prior hx of lymphedema as well. NT pro-BNP of 395, but may  be confounded by obesity. TTE showed preserved ejection fraction of 65-70%. Would also consider potential liver pathology, although LFTs and albumin normal. Abdominal US significant for probable hepatic steatosis and small right pleural effusion. US DVT negative.   - Hold diuresis in context of NUNU  - Daily BMP   - PTA Eucerin cream  - Lymphedema consult  - WOC consult              - Apply antifungal powder to groin and under breast     #Prerenal NUNU, resolved        #Acute on Chronic Hypoxic Hypercapnic Respiratory Failure  #Restrictive Lung Disease   #Metabolic alkalosis with concomitant chronic respiratory acidosis  Pt with history of chronic CO2 retention, has seen pulmonology in the past and reportedly declined NIPPV. Unclear baseline PCO2 venous, however gases and lab workup here suggest chronic metabolically compensated CO2 retention. Significant restrictive lung dz due to thoracic cage abnormality in addition to deconditioning and prior smoking history. On 2-3L home O2.   - Continue oxygen to keep saturations above 90%  - Albuterol inhaler Q6H PRN  - Duonebs Q4H  - Mucomyst Q4H  - Chest physiotherapy   - PTA azelastine 1 spray both nostrils BID   - F/u with outpatient pulmonologist    #Generalized Anxiety Disorder  #Cognitive impairment  #Hx of AUD  As per recent family medicine visit, continuing PTA medications. Patient has repeatedly asked about echo procedure and findings (required postponement of 11/26 echo due to patient request to re-explain procedure), unclear if she has full capacity or if this represents significant deviation from baseline. Additional chart review shows hx of alcohol used disorder which may be contributing - not current drinker but documentation shows hx of drinking 1/2 pint liquor per day in 2016. Increased buspirone from BID to TID due to patient concern of anxiety 11/27.   - Buspirone 10 mg PO TID  - Continue PTA Seroquel, 150 mg PO QHS     #Depression  - Continue PTA sertraline,  updated dose to 100 mg PO daily per pharmacy discussion    #Hypertension  - Continue PTA Losartan/HCTZ 100-25 mg PO daily     #HLD  - Continue PTA atorvastatin 40 mg PO daily     #Glaucoma  - PTA latanoprost 1 drop each eye at bedtime     #?Vitamin Deficiency  #Chronic macrocytic anemia 2/2 nutritional deficiency vs malignancy  - Continue PTA folic acid 400 mcg daily  - Continue vitamin B complex with C 1 tablet daily  - Continue vitamin D3 25 mcg daily   - Ordered labs for outpatient followup              - Methylmalonic acid, homocysteine              - Oral iron 325mg tablet/day every other day (e.g. Monday Wednesday Friday)              - SPEP with immunofixation, free light chains, quantitative immunoglobulins              - Peripheral smear for pathology review              - Erythropoietin level     #Abdominal pain  Patient has had chronic abdominal wall pain, likely MSK in context of severe kyphoscoliosis. States that it is worsened by coughing. Pain is improved with Tylenol.  - Tylenol 650mg q6h PRN  - Lidocaine patch     Diet: Combination Diet Regular Diet Adult    DVT Prophylaxis: Enoxaparin (Lovenox) SQ  Lima Catheter: Not present  Central Lines: None  Cardiac Monitoring: None  Code Status: Full Code      Disposition Plan      Expected Discharge Date: 12/07/2022    Discharge Delays: *Medically Ready for Discharge  Placement - TCU  Destination: inpatient rehabilitation facility  Discharge Comments: Awaiting placement        The patient's care was discussed with the Attending Physician, Dr. Cedeño.    Jakub Biggs MD  Medicine Service, [facility] TEAM [address] [place] [address]  Securely message with the Vocera Web Console (learn more here)  Text page via [facility] Paging/Directory   Please see signed in provider for up to date coverage information    Clinically Significant Risk Factors                       # Obesity: Estimated body mass index is 35.18 kg/m  as calculated from the  "following:    Height as of this encounter: 1.6 m (5' 3\").    Weight as of this encounter: 90.1 kg (198 lb 9.6 oz).      ___________________________________________    Interval History   Nursing notes reviewed, no acute events  Breathing is about the same  Tolerating diet     Data reviewed today: I reviewed all medications, new labs and imaging results over the last 24 hours.     Physical Exam   Vital Signs: Temp: 98.3  F (36.8  C) Temp src: Oral BP: 107/62 Pulse: 69   Resp: 18 SpO2: 95 % O2 Device: Nasal cannula with humidification Oxygen Delivery: 3 LPM  Weight: 198 lbs 9.6 oz  Physical Exam  Constitutional:       Appearance: Normal appearance.      Comments: Sitting up in bed, no acute distress, alert and answering questions   HENT:      Head: Normocephalic and atraumatic.   Eyes:      Extraocular Movements: Extraocular movements intact.      Conjunctiva/sclera: Conjunctivae normal.   Cardiovascular:      Rate and Rhythm: Normal rate and regular rhythm.      Pulses: Normal pulses.      Heart sounds: Normal heart sounds.   Pulmonary:      Comments: No increased work of breathing, no wheezing or crackles, shortened expiratory phase  Abdominal:      General: Abdomen is flat.      Palpations: Abdomen is soft.   Musculoskeletal:         General: Swelling (3+ bilateral lower extremity swelling) present.   Skin:     General: Skin is warm and dry.   Neurological:      Mental Status: She is alert. Mental status is at baseline.      Comments: Answering questions appropriately, CN appear intact, moving upper and lower extremities in bed         "

## 2022-12-05 NOTE — PLAN OF CARE
Goal Outcome Evaluation:      Plan of Care Reviewed With: patient    Overall Patient Progress: no changeOverall Patient Progress: no change    Outcome Evaluation: A&O x4, anxious at times. AVSS on 2L NC. Up SBA to bedside bathroom overnight, Voids spontaneously, BM during evening shift. Activity and idependent repostitioning encourage, pt still reluctant to doing most things on her own. Was able to get her to reposition a little bit by herself with a lot of encouragement. Medically ready for discharge, awaiting TCU placement

## 2022-12-06 ENCOUNTER — APPOINTMENT (OUTPATIENT)
Dept: PHYSICAL THERAPY | Facility: CLINIC | Age: 77
DRG: 205 | End: 2022-12-06
Payer: COMMERCIAL

## 2022-12-06 ENCOUNTER — APPOINTMENT (OUTPATIENT)
Dept: OCCUPATIONAL THERAPY | Facility: CLINIC | Age: 77
DRG: 205 | End: 2022-12-06
Payer: COMMERCIAL

## 2022-12-06 PROCEDURE — G0463 HOSPITAL OUTPT CLINIC VISIT: HCPCS

## 2022-12-06 PROCEDURE — 250N000011 HC RX IP 250 OP 636

## 2022-12-06 PROCEDURE — 250N000009 HC RX 250: Performed by: STUDENT IN AN ORGANIZED HEALTH CARE EDUCATION/TRAINING PROGRAM

## 2022-12-06 PROCEDURE — 250N000013 HC RX MED GY IP 250 OP 250 PS 637: Performed by: STUDENT IN AN ORGANIZED HEALTH CARE EDUCATION/TRAINING PROGRAM

## 2022-12-06 PROCEDURE — 97116 GAIT TRAINING THERAPY: CPT | Mod: GP

## 2022-12-06 PROCEDURE — 120N000002 HC R&B MED SURG/OB UMMC

## 2022-12-06 PROCEDURE — 99232 SBSQ HOSP IP/OBS MODERATE 35: CPT | Performed by: STUDENT IN AN ORGANIZED HEALTH CARE EDUCATION/TRAINING PROGRAM

## 2022-12-06 PROCEDURE — 97530 THERAPEUTIC ACTIVITIES: CPT | Mod: GP

## 2022-12-06 PROCEDURE — 250N000013 HC RX MED GY IP 250 OP 250 PS 637

## 2022-12-06 PROCEDURE — 84166 PROTEIN E-PHORESIS/URINE/CSF: CPT | Performed by: PATHOLOGY

## 2022-12-06 PROCEDURE — 999N000157 HC STATISTIC RCP TIME EA 10 MIN

## 2022-12-06 PROCEDURE — 97535 SELF CARE MNGMENT TRAINING: CPT | Mod: GO

## 2022-12-06 PROCEDURE — 94640 AIRWAY INHALATION TREATMENT: CPT | Mod: 76

## 2022-12-06 PROCEDURE — 94640 AIRWAY INHALATION TREATMENT: CPT

## 2022-12-06 RX ORDER — ALBUTEROL SULFATE 0.83 MG/ML
2.5 SOLUTION RESPIRATORY (INHALATION) EVERY 4 HOURS PRN
Status: DISCONTINUED | OUTPATIENT
Start: 2022-12-06 | End: 2022-12-09 | Stop reason: HOSPADM

## 2022-12-06 RX ORDER — ACETYLCYSTEINE 200 MG/ML
4 SOLUTION ORAL; RESPIRATORY (INHALATION) 4 TIMES DAILY PRN
Status: DISCONTINUED | OUTPATIENT
Start: 2022-12-06 | End: 2022-12-09 | Stop reason: HOSPADM

## 2022-12-06 RX ADMIN — Medication 4 ML: at 11:03

## 2022-12-06 RX ADMIN — Medication 150 MG: at 21:10

## 2022-12-06 RX ADMIN — LATANOPROST 1 DROP: 50 SOLUTION OPHTHALMIC at 21:10

## 2022-12-06 RX ADMIN — ATORVASTATIN CALCIUM 40 MG: 40 TABLET, FILM COATED ORAL at 21:10

## 2022-12-06 RX ADMIN — BUSPIRONE HYDROCHLORIDE 10 MG: 5 TABLET ORAL at 19:11

## 2022-12-06 RX ADMIN — BUSPIRONE HYDROCHLORIDE 10 MG: 5 TABLET ORAL at 13:51

## 2022-12-06 RX ADMIN — AZELASTINE HYDROCHLORIDE 1 SPRAY: 137 SPRAY, METERED NASAL at 08:00

## 2022-12-06 RX ADMIN — Medication: at 08:02

## 2022-12-06 RX ADMIN — Medication 25 MCG: at 08:01

## 2022-12-06 RX ADMIN — OXYMETAZOLINE HCL 2 SPRAY: 0.05 SPRAY NASAL at 08:00

## 2022-12-06 RX ADMIN — B-COMPLEX W/ C & FOLIC ACID TAB 1 TABLET: TAB at 08:01

## 2022-12-06 RX ADMIN — BUSPIRONE HYDROCHLORIDE 10 MG: 5 TABLET ORAL at 08:07

## 2022-12-06 RX ADMIN — ENOXAPARIN SODIUM 40 MG: 40 INJECTION SUBCUTANEOUS at 18:47

## 2022-12-06 RX ADMIN — Medication 1 MG: at 22:19

## 2022-12-06 RX ADMIN — Medication 400 MCG: at 08:00

## 2022-12-06 RX ADMIN — Medication 4 ML: at 16:28

## 2022-12-06 RX ADMIN — SERTRALINE HYDROCHLORIDE 100 MG: 100 TABLET ORAL at 08:01

## 2022-12-06 RX ADMIN — ALBUTEROL SULFATE 2.5 MG: 2.5 SOLUTION RESPIRATORY (INHALATION) at 16:25

## 2022-12-06 RX ADMIN — LOSARTAN POTASSIUM AND HYDROCHLOROTHIAZIDE 1 TABLET: 25; 100 TABLET ORAL at 08:00

## 2022-12-06 RX ADMIN — ALBUTEROL SULFATE 2.5 MG: 2.5 SOLUTION RESPIRATORY (INHALATION) at 11:03

## 2022-12-06 ASSESSMENT — ACTIVITIES OF DAILY LIVING (ADL)
ADLS_ACUITY_SCORE: 35
ADLS_ACUITY_SCORE: 32
ADLS_ACUITY_SCORE: 35
ADLS_ACUITY_SCORE: 32
ADLS_ACUITY_SCORE: 35
ADLS_ACUITY_SCORE: 32
ADLS_ACUITY_SCORE: 35
ADLS_ACUITY_SCORE: 32
ADLS_ACUITY_SCORE: 35
ADLS_ACUITY_SCORE: 35

## 2022-12-06 NOTE — PLAN OF CARE
Goal Outcome Evaluation:      Plan of Care Reviewed With: patient    Overall Patient Progress: no changeOverall Patient Progress: no change    Outcome Evaluation: A&Ox4. Although forgetful at times. Vitals are stable on 3L NC. Denies pain, nausea and vomiting. Up to the commode with SBA. Regular diet with good appetite. Pt refused full skin assessement under clothes. Continues to call frequently.

## 2022-12-06 NOTE — PLAN OF CARE
"Goal Outcome Evaluation:    Plan of Care Reviewed With: patient    Overall Patient Progress: improving     Outcome Evaluation: Remains on 3L NC, vitally stable.  Up with SBA in room.  Calls frequently, encouraging pt to be more independent with tasks.  No pain, slept in between cares.  Outlined areas on shins remain within boarders.  Urine sample sent.  Awaiting TCU placement    /51 (BP Location: Right arm)   Pulse 70   Temp 98.2  F (36.8  C) (Oral)   Resp 16   Ht 1.6 m (5' 3\")   Wt 90.1 kg (198 lb 9.6 oz)   LMP  (LMP Unknown)   SpO2 96%   BMI 35.18 kg/m      "

## 2022-12-06 NOTE — PLAN OF CARE
Goal Outcome Evaluation:    AOx4. 3 LPM NC + Humidification. Denies pain. Calls frequently. XR for lytic lesions completed around 1715, results pending. Urine sample needed, hat placed in toilet (pt aware/knows) - urine at 1819 was contaminated, will pass task on to on-coming RN. Refused astelin nasal spray and stool softener.     Waiting for TCU placement.

## 2022-12-06 NOTE — PROGRESS NOTES
[facility]    Medicine Progress Note - Medicine Service, IVIS TEAM 1    Date of Admission:  11/24/2022    Assessment & Plan   Ca Yan is a 76-year-old female with past medical history significant for previous breast cancer s/p lumpectomy, GERD, HTN, HLD, chronic lower extremity lymphedema, cluster C personality disorder, CKD, COPD, and known restrictive lung disease who was admitted on 11/24/2022 for chief concern of lower extremity swelling, redness, and shortness of breath over a duration estimated at 2-3 weeks which was likely due to mild HFpEF exacerbation and underlying lung disease. She is stable and is awaiting TCU placement.      Today:  - No changes  - Awaiting TCU placement    #Decreased Functional Status  Appreciate PT/OT evaluation, patient concern of being unable to go back home due to difficulty completing home ADLs and overall shortness of breath. PT/OT recommend TCU. Appreciate social work consult for assistance with TCU placement     #Chronic macrocytic anemia  Pt with chronic macrocytic anemia noted by her PCP. Hematology was e-consulted outpatient and recommended further workup. Obtained some labs while inpatient and will get followed out by PCP and hematology. Thus far she has normal liver testing, normal EPO, normal immunoglobulin levels. She did have an elevated kappa free light chain and elevated kappa/lacey ratio and rouleaux formation on her smear. Skeletal XR with left iliac wing lucency thought to be overlying bowel gas but difficult to completely characterize, no additional lesions identified.   Prior iron levels were normal three months ago along with normal ferritin.   - Continue oral iron supplementation MWF (ferritin is technically normal, but her iron saturation index is low, so there may be iron deficiency present)  - Will plan for PCP/Hematology follow-up after discharge      #Lower Extremity Edema  #Possible venous stasis  vs lymphedema  4+ pitting bilateral lower extremity edema, apparently acute on chronic over last few weeks per patient and chart review. H2FPEF calculator showed 70% probability for HFpEF. Does have prior hx of lymphedema as well. NT pro-BNP of 395, but may be confounded by obesity. TTE showed preserved ejection fraction of 65-70%. Would also consider potential liver pathology, although LFTs and albumin normal. Abdominal US significant for probable hepatic steatosis and small right pleural effusion. US DVT negative.   - Hold diuresis in context of NUNU  - Daily BMP   - PTA Eucerin cream  - Lymphedema consult  - WOC consult              - Apply antifungal powder to groin and under breast     #Prerenal NUNU, resolved     #Acute on Chronic Hypoxic Hypercapnic Respiratory Failure  #Restrictive Lung Disease   #Metabolic alkalosis with concomitant chronic respiratory acidosis  Pt with history of chronic CO2 retention, has seen pulmonology in the past and reportedly declined NIPPV. Unclear baseline PCO2 venous, however gases and lab workup here suggest chronic metabolically compensated CO2 retention. Significant restrictive lung dz due to thoracic cage abnormality in addition to deconditioning and prior smoking history. On 2-3L home O2.   - Continue oxygen to keep saturations above 90%  - Albuterol inhaler Q6H PRN  - Duonebs Q4H  - Mucomyst Q4H  - Chest physiotherapy   - PTA azelastine 1 spray both nostrils BID   - F/u with outpatient pulmonologist    #Generalized Anxiety Disorder  #Cognitive impairment  #Hx of AUD  As per recent family medicine visit, continuing PTA medications. Patient has repeatedly asked about echo procedure and findings (required postponement of 11/26 echo due to patient request to re-explain procedure), unclear if she has full capacity or if this represents significant deviation from baseline. Additional chart review shows hx of alcohol used disorder which may be contributing - not current drinker but  "documentation shows hx of drinking 1/2 pint liquor per day in 2016. Increased buspirone from BID to TID due to patient concern of anxiety 11/27.   - Buspirone 10 mg PO TID  - Continue PTA Seroquel, 150 mg PO QHS     #Depression  - Continue PTA sertraline, updated dose to 100 mg PO daily per pharmacy discussion    #Hypertension  - Continue PTA Losartan/HCTZ 100-25 mg PO daily     #HLD  - Continue PTA atorvastatin 40 mg PO daily     #Glaucoma  - PTA latanoprost 1 drop each eye at bedtime     #?Vitamin Deficiency  - Continue PTA folic acid 400 mcg daily  - Continue vitamin B complex with C 1 tablet daily  - Continue vitamin D3 25 mcg daily       #Abdominal pain  Patient has had chronic abdominal wall pain, likely MSK in context of severe kyphoscoliosis. States that it is worsened by coughing. Pain is improved with Tylenol.  - Tylenol 650mg q6h PRN  - Lidocaine patch     Diet: Combination Diet Regular Diet Adult    DVT Prophylaxis: Enoxaparin (Lovenox) SQ  Lima Catheter: Not present  Central Lines: None  Cardiac Monitoring: None  Code Status: Full Code      Disposition Plan      Expected Discharge Date: 12/09/2022    Discharge Delays: *Medically Ready for Discharge  Placement - TCU  Destination: inpatient rehabilitation facility  Discharge Comments: Awaiting placement        The patient's care was discussed with the Attending Physician, Dr. San.    Jakub Biggs MD  Medicine Service, [address]  Securely message with the Vocera Web Console (learn more here)  Text page via [facility] Paging/Directory   Please see signed in provider for up to date coverage information    Clinically Significant Risk Factors                       # Obesity: Estimated body mass index is 35.18 kg/m  as calculated from the following:    Height as of this encounter: 1.6 m (5' 3\").    Weight as of this encounter: 90.1 kg (198 lb 9.6 oz).      " ___________________________________________    Interval History   Nursing notes reviewed, no acute events  Breathing is about the same, says she walked around the unit and this went well.   She denies any chest pain.  Tolerating diet and reports good appetite. No nausea or vomiting.   Her lower extremity swelling is overall improved, wondering about compression socks instead of wraps.    Data reviewed today: I reviewed all medications, new labs and imaging results over the last 24 hours.     Physical Exam   Vital Signs: Temp: 98.2  F (36.8  C) Temp src: Oral BP: 114/51 Pulse: 70   Resp: 16 SpO2: 96 % O2 Device: Nasal cannula with humidification Oxygen Delivery: 3 LPM  Weight: 198 lbs 9.6 oz  Physical Exam  Constitutional:       Appearance: Normal appearance.      Comments: Sitting up in bed, no acute distress, alert and answering questions   HENT:      Head: Normocephalic and atraumatic.   Eyes:      Extraocular Movements: Extraocular movements intact.      Conjunctiva/sclera: Conjunctivae normal.   Cardiovascular:      Rate and Rhythm: Normal rate and regular rhythm.      Pulses: Normal pulses.      Heart sounds: Normal heart sounds.   Pulmonary:      Comments: No increased work of breathing, no wheezing or crackles, shortened expiratory phase  Abdominal:      General: Abdomen is flat.      Palpations: Abdomen is soft.   Musculoskeletal:         General: Swelling (3+ bilateral lower extremity swelling) present.   Skin:     General: Skin is warm and dry.   Neurological:      Mental Status: She is alert. Mental status is at baseline.      Comments: Answering questions appropriately, CN appear intact, moving upper and lower extremities in bed

## 2022-12-06 NOTE — PROGRESS NOTES
Care Management Follow Up       [facility]  [address] [place]  [address]  P: [phone]  F: [phone]  11/30:CHW LVM for admissions to f/u on referral; requested they call SW back.   12/1:CHW LVM for admissions to f/u on referral; requested they call SW back.   12/2: CHW LVM for admissions to f/u on referral; requested they call SW back.   12/5: CHW spoke with admissions they did not receive the referral. The CHW resent referral via epic   12/6: CHW LVM for admissions to f/u on referral; requested they call SW back.       Jessica at [facility]  [address] [place]  [address]  P: [phone]  F: [phone]  11/30: CHW sent referral via Epic  12/1: CHW spoke with Nancy she will review and call SW back.   12/5:CHW resent referral via Epic  12/6:  CHW LVM for admissions to f/u on referral; requested they call SW back.        [facility]  [address] [place] [address]  P: [phone]   11/30: CHW sent referral via Epic  12/1: CHW LVM for admissions to f/u on referral; requested they call SW back.    12/2:CHW LVM for admissions to f/u on referral; requested they call SW back.   12/5: CHW LVM for admissions to f/u on referral; requested they call SW back.   12/6: CHW LVM for admissions to f/u on referral; requested they call SW back.          Inactive Referrals:  [facility]:  [facility]. Pt w/ significant cognitive deficits (dementia level per [facility]UMS 14/30), lives alone. Anticipate pt would need 24/7 A for discharge home. Pt is self-limiting.     [email or web address]  [address]  [place], [place] [address]  P: [phone]     [facility]  [address] [place]  [address]  P: [phone]  F: [phone]   11/30: Facility is closed     [facility]  [address] [place]  [address]  P: [phone]  P: [phone] - Admissions  F: [phone]  12/1: Declined via  [facility]  [address] [place] [address]  [phone]   11/30: CHW sent referral via Epic  12/1: Declined. No Beds available.     [facility]   [address] [place] [address]  [address]  P: [phone]  F: [phone]   11/30: CHW sent referral via Epic  12/1: SW advised to re-send fax to a new fax number: F: [phone].  @1545PM SW re-faxed referral.  @1700PM SW received a call and was informed that they have no appropriate bed for pt.    [address]  [address]  P: [phone]  P: [phone] - Admissions  F: [phone]   11/30: CHW spoke with admissions and they are full for the week.   12/5: CHW LVM for admissions to f/u on referral; requested they call SW back. Later received a message via Epic  no beds available.     [facility]  [address] [place], [place]  [address]  P: [phone]  P: [phone] - Admissions  F: [phone]  11/30: CHW was unable to reach admissions and didn't go to VM.   12/1: CHW was unable to reach admissions and didn't go to VM.  12/2: CHW was unable to reach admissions and didn't go to VM.   12/5: CHW sent referral via Epic.. Later received a message via Epic  no beds available.     [address] [place] [address]  [phone]  11/30: CHW sent referral via Epic  12/1:CHW LVM for admissions to f/u on referral; requested they call SW back.   12/2:CHW LVM for admissions to f/u on referral; requested they call SW back.   12/5:CHW spoke with admissions they declined due to not being MA certified.     [facility]   [address] [place]  [address]  P: [phone]  P: [phone] - Admissions  F: [phone]   11/30: CHW spoke with Paulette in admissions and they don't have any bed openings as of now.    12/5: CHW sent referral via Epic. Later received a message via  Epic  no beds available.     Max Zaman   Inpatient Community Health Worker  [facility] & [facility]B

## 2022-12-06 NOTE — PROGRESS NOTES
[facility]  WOC Nurse Inpatient Assessment     Consulted for: bilateral lower extremities and right groin rash    Patient History (according to provider note(s):    Ca Yan is a 76 year old female admitted on 11/24/2022 for chief concern ofLower extremity swelling, redness, and shortness of breath over a duration estimated at 2-3 weeks. Her past medical history is significant for previous breast cancer s/p lumpectomy, GERD, HTN, HLD, chronic lower extremity lymphedema, cluster C personality disorder, home oxygen use of 2-3 LPM, question of CKD, question of COPD, and known restrictive lung disease.         Today:   - Lasix 20 mg IV Q6H twice per day  - TTE if patient amenable, counseled on importance 11/25 and stated she was wiling  - PT/OT/SW/WOC/Lymphedema consults, patient to likely need TCU  - 2-3 LPM NC, this is patients baseline      #Lower Extremity Edema  #Possible Venous stasis vs other rash  #Possible Heart Failure  4+ pitting bilateral lower extremity edema, apparently acute on chronic over last few weeks per patient and chart review. Unclear etiology with normal BNP somewhat less likely heart failure although given elevated BMI BNP may not be fully reliable. Would also consider potential liver pathology, although LFTs and albumin normal, as well as potential CKD contribution to volume retention although Cr normal. Most likely etiology appears to be heart failure, however patient declined initial TTE 11/25, willing to undergo study after additional interview/explanation.  Abdominal US significant for probable hepatic steatosis and small right pleural effusion. US DVT negative.  - TTE 11/25/2022, ordered again as patient required further explanation   - Lasix 20 mg IV daily first dose around 0800 and second dose 6 hours afterwards  - Daily BMP   - PTA clotrimazole cream  - PTA Eucerin cream  - Lymphedema consult  - WOC consult       Areas Assessed:       Areas visualized during today's visit: Skin folds  and Lower extremities      Skin Injury Location: Right groin and inferior breast    11/25- right groin  Last photo: 11/25  Skin injury due to: Fungal rash  and Intertrigo  Skin history and plan of care:  Pt endorses frequent rashes in skin folds including arm pits. Right inferior breast with similar presentation.  Affected area:      Skin assessment: Intact, Erythema and Lesions-satellite  Pain: denies  and  endorses itching  Pain interventions prior to dressing change: N/A  Treatment goal: Infection control/prevention  STATUS: improving  Supplies ordered: discussed with RN and discussed with patient     Treatment Plan:     Right groin and right breast erythema: BID  Cleanse areas with Bg cleanse and protect and gently pat dry.  Apply dusting of Miconazole powder, per provider order to erythema in skin folds and gently brush in to skin to prevent caking.   Discontinue use of Miconazole once erythema has resolved and then continue to cleanse skin folds daily and utilize Interdry. Do not use interdry while using other powders or creams.    Orders: Reviewed    RECOMMEND PRIMARY TEAM ORDER:Discontinue lotrimin cream, and instead apply Antifungal powder to groin and under breast erythema. Redness on legs marked and monitor for increased erythema.  Lymphedema already consulted.  Education provided: plan of care and Moisture management  Discussed plan of care with: Nurse  WOC nurse follow-up plan: every other week  Notify WOC if wound(s) deteriorate.  Nursing to notify the Provider(s) and re-consult the WOC Nurse if new skin concern.    DATA:     Current support surface: Standard  Standard gel/foam mattress (IsoFlex, Atmos air, etc)  Containment of urine/stool: Incontinent pad in bed  BMI: Body mass index is 35.18 kg/m .   Active diet order: Orders Placed This Encounter      Combination Diet Regular Diet Adult     Output: I/O last 3 completed shifts:  In: 1260  [P.O.:1260]  Out: 225 [Urine:225]     Labs:   Recent Labs   Lab 11/30/22  1302   HGB 9.1*   WBC 8.3     Pressure injury risk assessment:   Sensory Perception: 4-->no impairment  Moisture: 4-->rarely moist  Activity: 3-->walks occasionally  Mobility: 3-->slightly limited  Nutrition: 3-->adequate  Friction and Shear: 3-->no apparent problem  Rodolfo Score: 20    Dalia Trimble RN CWOCN  Dept. Pager: [phone]  Dept. Office Number: [phone]

## 2022-12-07 ENCOUNTER — APPOINTMENT (OUTPATIENT)
Dept: PHYSICAL THERAPY | Facility: CLINIC | Age: 77
DRG: 205 | End: 2022-12-07
Payer: COMMERCIAL

## 2022-12-07 LAB
HCYS SERPL-SCNC: 19.2 UMOL/L (ref 4–12)
METHYLMALONATE SERPL-SCNC: 0.4 UMOL/L (ref 0–0.4)
PROT PATTERN UR ELPH-IMP: NORMAL

## 2022-12-07 PROCEDURE — 120N000002 HC R&B MED SURG/OB UMMC

## 2022-12-07 PROCEDURE — 250N000013 HC RX MED GY IP 250 OP 250 PS 637: Performed by: STUDENT IN AN ORGANIZED HEALTH CARE EDUCATION/TRAINING PROGRAM

## 2022-12-07 PROCEDURE — 250N000013 HC RX MED GY IP 250 OP 250 PS 637

## 2022-12-07 PROCEDURE — 84166 PROTEIN E-PHORESIS/URINE/CSF: CPT | Mod: 26

## 2022-12-07 PROCEDURE — 99232 SBSQ HOSP IP/OBS MODERATE 35: CPT | Mod: GC | Performed by: STUDENT IN AN ORGANIZED HEALTH CARE EDUCATION/TRAINING PROGRAM

## 2022-12-07 PROCEDURE — 250N000011 HC RX IP 250 OP 636

## 2022-12-07 PROCEDURE — 97110 THERAPEUTIC EXERCISES: CPT | Mod: GP

## 2022-12-07 RX ADMIN — FERROUS SULFATE TAB 325 MG (65 MG ELEMENTAL FE) 325 MG: 325 (65 FE) TAB at 07:38

## 2022-12-07 RX ADMIN — LOSARTAN POTASSIUM AND HYDROCHLOROTHIAZIDE 1 TABLET: 25; 100 TABLET ORAL at 07:38

## 2022-12-07 RX ADMIN — ENOXAPARIN SODIUM 40 MG: 40 INJECTION SUBCUTANEOUS at 18:26

## 2022-12-07 RX ADMIN — Medication 1 MG: at 21:32

## 2022-12-07 RX ADMIN — LATANOPROST 1 DROP: 50 SOLUTION OPHTHALMIC at 21:32

## 2022-12-07 RX ADMIN — Medication 25 MCG: at 07:38

## 2022-12-07 RX ADMIN — LIDOCAINE PATCH 4% 2 PATCH: 40 PATCH TOPICAL at 07:38

## 2022-12-07 RX ADMIN — ACETAMINOPHEN 650 MG: 325 TABLET, FILM COATED ORAL at 21:33

## 2022-12-07 RX ADMIN — SENNOSIDES AND DOCUSATE SODIUM 2 TABLET: 50; 8.6 TABLET ORAL at 20:18

## 2022-12-07 RX ADMIN — BUSPIRONE HYDROCHLORIDE 10 MG: 5 TABLET ORAL at 07:38

## 2022-12-07 RX ADMIN — ATORVASTATIN CALCIUM 40 MG: 40 TABLET, FILM COATED ORAL at 21:32

## 2022-12-07 RX ADMIN — Medication 150 MG: at 21:32

## 2022-12-07 RX ADMIN — ACETAMINOPHEN 650 MG: 325 TABLET, FILM COATED ORAL at 07:38

## 2022-12-07 RX ADMIN — B-COMPLEX W/ C & FOLIC ACID TAB 1 TABLET: TAB at 07:38

## 2022-12-07 RX ADMIN — ACETAMINOPHEN 650 MG: 325 TABLET, FILM COATED ORAL at 14:05

## 2022-12-07 RX ADMIN — SERTRALINE HYDROCHLORIDE 100 MG: 100 TABLET ORAL at 07:38

## 2022-12-07 RX ADMIN — Medication 400 MCG: at 07:38

## 2022-12-07 RX ADMIN — BUSPIRONE HYDROCHLORIDE 10 MG: 5 TABLET ORAL at 20:17

## 2022-12-07 RX ADMIN — BUSPIRONE HYDROCHLORIDE 10 MG: 5 TABLET ORAL at 13:37

## 2022-12-07 ASSESSMENT — ACTIVITIES OF DAILY LIVING (ADL)
ADLS_ACUITY_SCORE: 32

## 2022-12-07 NOTE — PLAN OF CARE
Goal Outcome Evaluation:      Plan of Care Reviewed With: patient    Overall Patient Progress: improvingOverall Patient Progress: improving    Outcome Evaluation: Pt admitted for SOB and LE swelling. Pt on medically stable and ready for discharge. Pt A&Ox4, up with SBA and walker. Pt denied any pain. Pt on 3L NC. Diminished lung sounds. No PIV. Pt stated she feels depressed, took patient for a wheelchair ride around the unit. Pt refused skin assessment. Continue with plan of care.

## 2022-12-07 NOTE — PLAN OF CARE
Goal Outcome Evaluation:           Overall Patient Progress: improving     Outcome Evaluation: A&O x 4 but forgetful.  Sleeping between cares. c/o back pain at a 7/10.  Given PRN tylenol x 2 with relief.  Tolerates regular diet with no n/v.  No IV access.  A-1 with a walker to ambulate.  No acute changes.  Waiting for placement.

## 2022-12-07 NOTE — PLAN OF CARE
Goal Outcome Evaluation:      Plan of Care Reviewed With: patient    Overall Patient Progress: improvingOverall Patient Progress: improving    Outcome Evaluation: No changes overnight.  Slept well.  Continues to deny pain.  Vitally stable on 3L humidified O2.  Up to bathroom with SBA.  Encouraging activity.  TCU placement pending

## 2022-12-07 NOTE — PROGRESS NOTES
[facility]    Medicine Progress Note - Medicine Service, IVIS TEAM 1    Date of Admission:  11/24/2022    Assessment & Plan   Ca Yan is a 76-year-old female with past medical history significant for previous breast cancer s/p lumpectomy, GERD, HTN, HLD, chronic lower extremity lymphedema, cluster C personality disorder, CKD, COPD, and known restrictive lung disease who was admitted on 11/24/2022 for chief concern of lower extremity swelling, redness, and shortness of breath over a duration estimated at 2-3 weeks which was likely due to mild HFpEF exacerbation and underlying lung disease. She is stable and is awaiting TCU placement.      Today:  -OT lymphedema consult for compression stockings   - Awaiting TCU placement    #Decreased Functional Status  Appreciate PT/OT evaluation, patient concern of being unable to go back home due to difficulty completing home ADLs and overall shortness of breath. PT/OT recommend TCU. Appreciate social work consult for assistance with TCU placement     #Chronic macrocytic anemia  Pt with chronic macrocytic anemia noted by her PCP. Hematology was e-consulted outpatient and recommended further workup. Obtained some labs while inpatient and will get followed out by PCP and hematology. Thus far she has normal liver testing, normal EPO, normal immunoglobulin levels. She did have an elevated kappa free light chain and elevated kappa/lacey ratio and rouleaux formation on her smear. Skeletal XR with left iliac wing lucency thought to be overlying bowel gas but difficult to completely characterize, no additional lesions identified.   Prior iron levels were normal three months ago along with normal ferritin.   - Continue oral iron supplementation MW (ferritin is technically normal, but her iron saturation index is low, so there may be iron deficiency present)  - Will plan for PCP/Hematology follow-up after discharge      #Lower  Extremity Edema  #Possible venous stasis vs lymphedema  4+ pitting bilateral lower extremity edema, apparently acute on chronic over last few weeks per patient and chart review. H2FPEF calculator showed 70% probability for HFpEF. Does have prior hx of lymphedema as well. NT pro-BNP of 395, but may be confounded by obesity. TTE showed preserved ejection fraction of 65-70%. Would also consider potential liver pathology, although LFTs and albumin normal. Abdominal US significant for probable hepatic steatosis and small right pleural effusion. US DVT negative.   - Hold diuresis in context of NUNU  - Daily BMP   - PTA Eucerin cream  - Lymphedema consult  - WOC consult              - Apply antifungal powder to groin and under breast     #Prerenal NUNU, resolved     #Acute on Chronic Hypoxic Hypercapnic Respiratory Failure  #Restrictive Lung Disease   #Metabolic alkalosis with concomitant chronic respiratory acidosis  Pt with history of chronic CO2 retention, has seen pulmonology in the past and reportedly declined NIPPV. Unclear baseline PCO2 venous, however gases and lab workup here suggest chronic metabolically compensated CO2 retention. Significant restrictive lung dz due to thoracic cage abnormality in addition to deconditioning and prior smoking history. On 2-3L home O2.   - Continue oxygen to keep saturations above 90%  - Albuterol inhaler Q6H PRN  - Duonebs Q4H  - Mucomyst Q4H  - Chest physiotherapy   - PTA azelastine 1 spray both nostrils BID   - F/u with outpatient pulmonologist    #Generalized Anxiety Disorder  #Cognitive impairment  #Hx of AUD  As per recent family medicine visit, continuing PTA medications. Patient has repeatedly asked about echo procedure and findings (required postponement of 11/26 echo due to patient request to re-explain procedure), unclear if she has full capacity or if this represents significant deviation from baseline. Additional chart review shows hx of alcohol used disorder which may  "be contributing - not current drinker but documentation shows hx of drinking 1/2 pint liquor per day in 2016. Increased buspirone from BID to TID due to patient concern of anxiety 11/27.   - Buspirone 10 mg PO TID  - Continue PTA Seroquel, 150 mg PO QHS     #Depression  - Continue PTA sertraline, updated dose to 100 mg PO daily per pharmacy discussion    #Hypertension  - Continue PTA Losartan/HCTZ 100-25 mg PO daily     #HLD  - Continue PTA atorvastatin 40 mg PO daily     #Glaucoma  - PTA latanoprost 1 drop each eye at bedtime     #?Vitamin Deficiency  - Continue PTA folic acid 400 mcg daily  - Continue vitamin B complex with C 1 tablet daily  - Continue vitamin D3 25 mcg daily       #Abdominal pain  Patient has had chronic abdominal wall pain, likely MSK in context of severe kyphoscoliosis. States that it is worsened by coughing. Pain is improved with Tylenol.  - Tylenol 650mg q6h PRN  - Lidocaine patch     Diet: Combination Diet Regular Diet Adult    DVT Prophylaxis: Enoxaparin (Lovenox) SQ  Lima Catheter: Not present  Central Lines: None  Cardiac Monitoring: None  Code Status: Full Code      Disposition Plan      Expected Discharge Date: 12/09/2022    Discharge Delays: *Medically Ready for Discharge  Placement - TCU  Destination: inpatient rehabilitation facility  Discharge Comments: Awaiting placement        The patient's care was discussed with the Attending Physician, Dr. San.    Jakub Biggs MD  Medicine Service, [facility] TEAM [address] [place] [address]  Securely message with the Vocera Web Console (learn more here)  Text page via [facility] Paging/Directory   Please see signed in provider for up to date coverage information    Clinically Significant Risk Factors                       # Obesity: Estimated body mass index is 34.99 kg/m  as calculated from the following:    Height as of this encounter: 1.6 m (5' 3\").    Weight as of this encounter: 89.6 kg (197 lb 8.5 oz).      " ___________________________________________    Interval History   Nursing notes reviewed, no acute events  Tolerating diet and reports good appetite. No nausea or vomiting.     Data reviewed today: I reviewed all medications, new labs and imaging results over the last 24 hours.     Physical Exam   Vital Signs: Temp: 98.3  F (36.8  C) Temp src: Oral BP: 127/59 Pulse: 71   Resp: 18 SpO2: 97 % O2 Device: Nasal cannula with humidification Oxygen Delivery: 3 LPM  Weight: 197 lbs 8.51 oz  Physical Exam  Constitutional:       Appearance: Normal appearance.      Comments: Sitting up in bed, no acute distress, alert and answering questions   HENT:      Head: Normocephalic and atraumatic.   Eyes:      Extraocular Movements: Extraocular movements intact.      Conjunctiva/sclera: Conjunctivae normal.   Cardiovascular:      Rate and Rhythm: Normal rate and regular rhythm.      Pulses: Normal pulses.      Heart sounds: Normal heart sounds.   Pulmonary:      Comments: No increased work of breathing, no wheezing or crackles, shortened expiratory phase  Abdominal:      General: Abdomen is flat.      Palpations: Abdomen is soft.   Musculoskeletal:         General: Swelling (3+ bilateral lower extremity swelling) present.   Skin:     General: Skin is warm and dry.   Neurological:      Mental Status: She is alert. Mental status is at baseline.      Comments: Answering questions appropriately, CN appear intact, moving upper and lower extremities in bed

## 2022-12-07 NOTE — PROGRESS NOTES
Care Management Follow Up    Length of Stay (days): 13    Expected Discharge Date: 12/09/2022     Concerns to be Addressed: discharge planning, utilization management     Patient plan of care discussed at interdisciplinary rounds: Yes    Anticipated Discharge Disposition: Skilled Nursing Facility     Anticipated Discharge Services: Transportation Services  Anticipated Discharge DME: None    Patient/family educated on Medicare website which has current facility and service quality ratings: no  Education Provided on the Discharge Plan: Yes    Patient/Family in Agreement with the Plan: yes    Referrals Placed by CM/SW:   Albaro Meredith [facility]  [address] [place]  [address]  P: [phone]  F: [phone]  11/30:CHW LVM for admissions to f/u on referral; requested they call SW back.   12/1:CHW LVM for admissions to f/u on referral; requested they call SW back.   12/2: CHW LVM for admissions to f/u on referral; requested they call SW back.   12/5: CHW spoke with admissions they did not receive the referral. The CHW resent referral via [facility]  12/6: CHW LVM for admissions to f/u on referral; requested they call SW back.   12/7: SW LVM with admissions.     Jessica at [facility]  [address] [place]  [address]  P: [phone]  F: [phone]  11/30: CHW sent referral via Epic  12/1: CHW spoke with Nancy she will review and call SW back.   12/5:CHW resent referral via [facility]  12/6:  CHW LVM for admissions to f/u on referral; requested they call SW back.   12/7: SW spoke to Nancy in admission and confirmed that she received fax. She will review pt and get back to .      Inactive Referrals:    [address] [address]  P: [phone]  12/6: Declined. No appropriate bed.     [facility]:  [facility]. Pt w/ significant cognitive deficits (dementia level per SLUMS 14/30), lives alone. Anticipate pt would need 24/7 A for discharge home. Pt is  self-limiting.     [facility] [place]  [address]  [place] [address]  P: [phone]     [facility]  [address]  [place]  [address]  P: [phone]  F: [phone]   11/30: Facility is closed     [facility]  [address] [facility] [facility] [place]  [address]  P: [phone]  P: [phone] - Admissions  F: [phone]  12/1: Declined via [facility]  [address] [place] [address]  [phone]   11/30: CHW sent referral via Epic  12/1: Declined. No Beds available.     [facility]   [address] [place]  [address]  P: [phone]  F: [phone]   11/30: CHW sent referral via Epic  12/1: SW advised to re-send fax to a new fax number: F: [phone].  @1545PM SW re-faxed referral.  @1700PM SW received a call and was informed that they have no appropriate bed for pt.     [address]  [address]  P: [phone]  P: [phone] - Admissions  F: [phone]   11/30: CHW spoke with admissions and they are full for the week.   12/5: CHW LVM for admissions to f/u on referral; requested they call SW back. Later received a message via Epic  no beds available.      [facility]  [address]  [place], [place]  [address]  P: [phone]  P: [phone] - Admissions  F: [phone]  11/30: CHW was unable to reach admissions and didn't go to .   12/1: CHW was unable to reach admissions and didn't go to .  12/2: CHW was unable to reach admissions and didn't go to .   12/5: CHW sent referral via Epic.. Later received a message via Epic  no beds available.      [address]  [place] [address] [phone]  11/30: CHW sent referral via Epic  12/1:CHW LVM for admissions to f/u on referral; requested they call SW back.   12/2:CHW LVM for admissions to f/u on  referral; requested they call SW back.   12/5:CHW spoke with admissions they declined due to not being MA certified.      [facility]   [address] [place]  [address]  P: [phone]  P: [phone] - Admissions  F: [phone]   11/30: CHW spoke with Paulette in admissions and they don't have any bed openings as of now.    12/5: CHW sent referral via Epic. Later received a message via Epic  no beds available.       Private pay costs discussed: Not applicable    Additional Information:  SW followed up on TCU referrals.      will continue to follow for discharge planning, support, and resources.    EPI Hood, LGSW  [address]   Office: [phone]   Pager: [phone]  [email or web address]

## 2022-12-08 ENCOUNTER — APPOINTMENT (OUTPATIENT)
Dept: PHYSICAL THERAPY | Facility: CLINIC | Age: 77
DRG: 205 | End: 2022-12-08
Payer: COMMERCIAL

## 2022-12-08 ENCOUNTER — APPOINTMENT (OUTPATIENT)
Dept: OCCUPATIONAL THERAPY | Facility: CLINIC | Age: 77
DRG: 205 | End: 2022-12-08
Payer: COMMERCIAL

## 2022-12-08 PROCEDURE — 97116 GAIT TRAINING THERAPY: CPT | Mod: GP

## 2022-12-08 PROCEDURE — 250N000013 HC RX MED GY IP 250 OP 250 PS 637

## 2022-12-08 PROCEDURE — 120N000002 HC R&B MED SURG/OB UMMC

## 2022-12-08 PROCEDURE — 250N000013 HC RX MED GY IP 250 OP 250 PS 637: Performed by: STUDENT IN AN ORGANIZED HEALTH CARE EDUCATION/TRAINING PROGRAM

## 2022-12-08 PROCEDURE — 97535 SELF CARE MNGMENT TRAINING: CPT | Mod: GO

## 2022-12-08 PROCEDURE — 97530 THERAPEUTIC ACTIVITIES: CPT | Mod: GP

## 2022-12-08 PROCEDURE — 250N000011 HC RX IP 250 OP 636

## 2022-12-08 PROCEDURE — 97530 THERAPEUTIC ACTIVITIES: CPT | Mod: GO

## 2022-12-08 PROCEDURE — 99232 SBSQ HOSP IP/OBS MODERATE 35: CPT | Performed by: STUDENT IN AN ORGANIZED HEALTH CARE EDUCATION/TRAINING PROGRAM

## 2022-12-08 RX ADMIN — LOSARTAN POTASSIUM AND HYDROCHLOROTHIAZIDE 1 TABLET: 25; 100 TABLET ORAL at 08:52

## 2022-12-08 RX ADMIN — ATORVASTATIN CALCIUM 40 MG: 40 TABLET, FILM COATED ORAL at 21:47

## 2022-12-08 RX ADMIN — BUSPIRONE HYDROCHLORIDE 10 MG: 5 TABLET ORAL at 13:53

## 2022-12-08 RX ADMIN — SENNOSIDES AND DOCUSATE SODIUM 2 TABLET: 50; 8.6 TABLET ORAL at 08:52

## 2022-12-08 RX ADMIN — BUSPIRONE HYDROCHLORIDE 10 MG: 5 TABLET ORAL at 19:02

## 2022-12-08 RX ADMIN — Medication 25 MCG: at 08:52

## 2022-12-08 RX ADMIN — LIDOCAINE PATCH 4% 2 PATCH: 40 PATCH TOPICAL at 08:51

## 2022-12-08 RX ADMIN — BUSPIRONE HYDROCHLORIDE 10 MG: 5 TABLET ORAL at 08:52

## 2022-12-08 RX ADMIN — ENOXAPARIN SODIUM 40 MG: 40 INJECTION SUBCUTANEOUS at 19:02

## 2022-12-08 RX ADMIN — SENNOSIDES AND DOCUSATE SODIUM 2 TABLET: 50; 8.6 TABLET ORAL at 19:02

## 2022-12-08 RX ADMIN — Medication 400 MCG: at 08:52

## 2022-12-08 RX ADMIN — LATANOPROST 1 DROP: 50 SOLUTION OPHTHALMIC at 21:47

## 2022-12-08 RX ADMIN — OXYMETAZOLINE HCL 2 SPRAY: 0.05 SPRAY NASAL at 19:02

## 2022-12-08 RX ADMIN — AZELASTINE HYDROCHLORIDE 1 SPRAY: 137 SPRAY, METERED NASAL at 19:02

## 2022-12-08 RX ADMIN — SERTRALINE HYDROCHLORIDE 100 MG: 100 TABLET ORAL at 08:52

## 2022-12-08 RX ADMIN — B-COMPLEX W/ C & FOLIC ACID TAB 1 TABLET: TAB at 08:52

## 2022-12-08 RX ADMIN — Medication 150 MG: at 21:47

## 2022-12-08 RX ADMIN — ACETAMINOPHEN 650 MG: 325 TABLET, FILM COATED ORAL at 08:52

## 2022-12-08 ASSESSMENT — ACTIVITIES OF DAILY LIVING (ADL)
ADLS_ACUITY_SCORE: 32

## 2022-12-08 NOTE — PROGRESS NOTES
Care Management Follow Up    Length of Stay (days): 14    Expected Discharge Date: 12/12/2022     Concerns to be Addressed: discharge planning, utilization management     Patient plan of care discussed at interdisciplinary rounds: Yes    Anticipated Discharge Disposition:   [facility]  [address] [place] [address]  [phone]    Anticipated Discharge Services: Transportation Services  Anticipated Discharge DME: None    Patient/family educated on Medicare website which has current facility and service quality ratings: Yes  Education Provided on the Discharge Plan:  Yes  Patient/Family in Agreement with the Plan: yes    Referrals Placed by CM/SW:  CHW Nathaniel sent new initial referrals:  [facility]  [address] [place] [address]  [phone]  12/8: CHW sent referral via [facility]  [address] [place] [address]  [phone]  12/8: CHW sent referral via [facility]  [address] [place], [place] [address] [phone]  12/8: CHW sent referral via [facility]     [facility]  [address] [place] [address]  [phone]  12/8: CHW sent referral via [facility]  [address] [place] [address]  [phone]  12/8: CHW sent referral via [facility]     [employer]erral (Adriana; Ph: [phone]; F: [phone]; [email or web address])  12/8: SW received a call back from Adriana who reports that [facility] [place] can accept but needs check on insurance.     "[employer] Referral (Roslyn; [phone]; Fax: [phone]; [email or web address])  12/8: CHW sent referral via [facility]  [address] [place]  [address]  P: [phone]  F: [phone]  11/30:CHW LVM for admissions to f/u on referral; requested they call SW back.   12/1:CHW LVM for admissions to  f/u on referral; requested they call SW back.   12/2: CHW LVM for admissions to f/u on referral; requested they call SW back.   12/5: CHW spoke with admissions they did not receive the referral. The CHW resent referral via epic  12/6: CHW LVM for admissions to f/u on referral; requested they call SW back.   12/7: SW LVM with admissions.  12/8:CHW LVM for admissions to f/u on referral; requested they call SW back.      [facility]  [address] [place]  [address]  P: [phone]  F: [phone]  11/30: CHW sent referral via Epic  12/1: CHW spoke with Nancy she will review and call SW back.   12/5:CHW resent referral via Epic  12/6:  CHW LVM for admissions to f/u on referral; requested they call SW back.   12/7: SW spoke to Nancy in admission and confirmed that she received fax. She will review pt and get back to SW.   12/8:  CHW LVM for admissions to f/u on referral; requested they call SW back.      Inactive Referrals:     [facility]  [address] [place] [address]  P: [phone]  12/6: Declined. No appropriate bed.      [facility]  [facility]. Pt w/ significant cognitive deficits (dementia level per SLUMS 14/30), lives alone. Anticipate pt would need 24/7 A for discharge home. Pt is self-limiting.     [facility]  [address]  [place], [place] [address]  P: [phone]     [facility]  [address]  [place]  [address]  P: [phone]  F: [phone]   11/30: Facility is closed     [facility]  [address] [place]  [address]  P: [phone]  P: [phone] - Admissions  F: [phone]  12/1: Declined via [facility]  [address] [place] [address]  [phone]   11/30: CHW sent referral via Epic  12/1: Declined. No Beds available.     [facility]   [address]  [address]  P:  [phone]  F: [phone]   11/30: CHW sent referral via Epic  12/1: SW advised to re-send fax to a new fax number: F: [phone].  @1545PM SW re-faxed referral.  @1700PM SW received a call and was informed that they have no appropriate bed for pt.     [address]  [address]  P: [phone]  P: [phone] - Admissions  F: [phone]   11/30: CHW spoke with admissions and they are full for the week.   12/5: CHW LVM for admissions to f/u on referral; requested they call SW back. Later received a message via Epic  no beds available.      [facility]  [address]  [place], [place]  [address]  P: [phone]  P: [phone] - Admissions  F: [phone]  11/30: CHW was unable to reach admissions and didn't go to VM.   12/1: CHW was unable to reach admissions and didn't go to VM.  12/2: CHW was unable to reach admissions and didn't go to VM.   12/5: CHW sent referral via Epic.. Later received a message via Epic  no beds available.      [address] [place] [address]  [phone]  11/30: CHW sent referral via Epic  12/1:CHW LVM for admissions to f/u on referral; requested they call SW back.   12/2:CHW LVM for admissions to f/u on referral; requested they call SW back.   12/5:CHW spoke with admissions they declined due to not being MA certified.      [facility]   [address]  [place]  [address]  P: [phone]  P: [phone] - Admissions  F: [phone]   11/30: CHW spoke with Paulette in admissions and they don't have any bed openings as of now.    12/5: CHW sent referral via Epic. Later received a message via Epic  no beds available.       Private pay costs discussed: Not applicable    Additional Information:  CHW Nathaniel initiated new referrals and followed up on pending referrals.    @1443PM SHARRON received a call from [facility] liaison Adriana Lockhart.  She informed SHARRON  that pt is accepted at the [facility] but that they are currently waiting on insurance approval.     @1459PM SW called pt by bedside phone and informed her that the [facility] can accept pt but they are currently check on insurance approval.  Pt acknowledged and expressed appreciation.  SW informed pt that at the earliest pt may discharge tomorrow.    [facility]  [address] [place] [address]  [phone]    @1636PM SW received a call back from [place] liaison Adriana Lockhart ([phone]) who reports that insurance has been approved and pt may admit to facility anytime tomorrow. Adriana advised SW that discharge orders will need to be faxed to Adriana 2 hours before discharge time. SHARRON messaged Dr. San via [employer] to update her on discharge plans.      @1645PM SHARRON scheduled stretcher transportation (due to pt being unable to manage own oxygen-per bedside nurse Gabriel/pt) via [employer] ([phone]).  Ride scheduled for 1045AM tomorrow morning.  SHARRON informed Dr San via [employer] of ride time and informed her that discharge orders would need to be completed by 0845AM tomorrow morning. Dr. San acknowledged and aware.     @1655PM SHARRON called bedside nurse Gabriel to update him on discharge plans.     PCS Form completed, faxed, and placed in pts chart.    PAS completed: [id number]     will continue to follow for discharge planning, support, and resources.    EPI Hood, LGSW  Unit 5A   Office: [phone]   Pager: [phone]  [email or web address]       Normal for race

## 2022-12-08 NOTE — PLAN OF CARE
Goal Outcome Evaluation:      Plan of Care Reviewed With: patient    Overall Patient Progress: no change    Outcome Evaluation: A&O x 4 but forgetful.  VSS on 3L NC.  c/o L side pain- PRN tylenol and scheduled lidocaine patches effective.  No IV access.  Tolerates regular diet with no n/v.  BLE remain edematous.  No acute changes.  Awaiting TCU placement.

## 2022-12-08 NOTE — PLAN OF CARE
Goal Outcome Evaluation:      Plan of Care Reviewed With: patient    Overall Patient Progress: no changeOverall Patient Progress: no change    Outcome Evaluation: A&Ox4 but forgetful. VSS on 3L NC. C/o back pain. Awaiting TCU placement.

## 2022-12-08 NOTE — PROGRESS NOTES
[facility]    Medicine Progress Note - Medicine Service, IVIS TEAM 1    Date of Admission:  11/24/2022    Assessment & Plan   Ca Yan is a 76-year-old female with past medical history significant for previous breast cancer s/p lumpectomy, GERD, HTN, HLD, chronic lower extremity lymphedema, cluster C personality disorder, CKD, COPD, and known restrictive lung disease who was admitted on 11/24/2022 for chief concern of lower extremity swelling due to lymphedema and shortness of breath likely due to underlying lung disease. She is stable and is awaiting TCU placement.      Today:  -OT lymphedema consult for compression stockings   - Awaiting TCU placement    #Decreased Functional Status  Appreciate PT/OT evaluation, patient concern of being unable to go back home due to difficulty completing home ADLs and overall shortness of breath. PT/OT recommend TCU. Appreciate social work consult for assistance with TCU placement     #Chronic macrocytic anemia  Pt with chronic macrocytic anemia noted by her PCP. Hematology was e-consulted outpatient and recommended further workup. Obtained some labs while inpatient and will get followed out by PCP and hematology. Thus far she has normal liver testing, normal EPO, normal immunoglobulin levels. She did have an elevated kappa free light chain and elevated kappa/lacey ratio and rouleaux formation on her smear. Skeletal XR with left iliac wing lucency thought to be overlying bowel gas but difficult to completely characterize, no additional lesions identified. Prior iron levels were normal three months ago along with normal ferritin.   - Continue oral iron supplementation MWF (ferritin is technically normal, but her iron saturation index is low, so there may be iron deficiency present)  - Will plan for PCP/Hematology follow-up after discharge      #Lower Extremity Edema  #Possible venous stasis vs lymphedema  4+ pitting bilateral  lower extremity edema, apparently acute on chronic over last few weeks per patient and chart review. H2FPEF calculator showed 70% probability for HFpEF. Does have prior hx of lymphedema as well. NT pro-BNP of 395, but may be confounded by obesity. TTE showed preserved ejection fraction of 65-70%. Would also consider potential liver pathology, although LFTs and albumin normal. Abdominal US significant for probable hepatic steatosis and small right pleural effusion. US DVT negative. At this point, LE edema may all be secondary to lymphedema.  - Daily BMP   - PTA Eucerin cream  - Lymphedema consult  - WOC consult              - Apply antifungal powder to groin and under breast     #Prerenal NUNU, resolved     #Acute on Chronic Hypoxic Hypercapnic Respiratory Failure  #Restrictive Lung Disease   #Metabolic alkalosis with concomitant chronic respiratory acidosis  Pt with history of chronic CO2 retention, has seen pulmonology in the past and reportedly declined NIPPV. Unclear baseline PCO2 venous, however gases and lab workup here suggest chronic metabolically compensated CO2 retention. Significant restrictive lung dz due to thoracic cage abnormality in addition to deconditioning and prior smoking history. On 2-3L home O2.   - Continue oxygen to keep saturations above 90%  - Albuterol inhaler Q6H PRN  - Duonebs Q4H prn  - Chest physiotherapy   - PTA azelastine 1 spray both nostrils BID   - F/u with outpatient pulmonologist Dr. Livingston-previously recommended pulmonary rehab     #Generalized Anxiety Disorder  #Cognitive impairment  #Hx of AUD  As per recent family medicine visit, continuing PTA medications. Patient has repeatedly asked about echo procedure and findings (required postponement of 11/26 echo due to patient request to re-explain procedure), unclear if she has full capacity or if this represents significant deviation from baseline. Additional chart review shows hx of alcohol used disorder which may be contributing  "- not current drinker but documentation shows hx of drinking 1/2 pint liquor per day in 2016. Increased buspirone from BID to TID due to patient concern of anxiety 11/27.   - Buspirone 10 mg PO TID  - Continue PTA Seroquel, 150 mg PO QHS     #Depression  - Continue PTA sertraline, updated dose to 100 mg PO daily per pharmacy discussion    #Hypertension  - Continue PTA Losartan/HCTZ 100-25 mg PO daily     #HLD  - Continue PTA atorvastatin 40 mg PO daily     #Glaucoma  - PTA latanoprost 1 drop each eye at bedtime     # Vitamin Deficiency  - Continue PTA folic acid 400 mcg daily  - Continue vitamin B complex with C 1 tablet daily  - Continue vitamin D3 25 mcg daily     #Abdominal pain  Patient has had chronic abdominal wall pain, likely MSK in context of severe kyphoscoliosis. States that it is worsened by coughing. Pain is improved with Tylenol.  - Tylenol 650mg q6h PRN  - Lidocaine patch       Diet: Combination Diet Regular Diet Adult    DVT Prophylaxis: Enoxaparin (Lovenox) SQ  Lima Catheter: Not present  Central Lines: None  Cardiac Monitoring: None  Code Status: Full Code      Disposition Plan      Expected Discharge Date: 12/12/2022    Discharge Delays: *Medically Ready for Discharge  Placement - TCU  Destination: inpatient rehabilitation facility  Discharge Comments: Awaiting placement        The patient's care was discussed with the Bedside Nurse and Patient.    Lani San MD  Medicine Service, [facility] TEAM [address] [place] [address]  Securely message with the Vocera Web Console (learn more here)  Text page via [facility] Paging/Directory   Please see signed in provider for up to date coverage information      Clinically Significant Risk Factors                       # Obesity: Estimated body mass index is 34.99 kg/m  as calculated from the following:    Height as of this encounter: 1.6 m (5' 3\").    Weight as of this encounter: 89.6 kg (197 lb 8.5 oz).      "     ______________________________________________________________________    Interval History   No acute events overnight. Ca reports some left sided chest wall pain today that is chronic- nursing applying lidocaine patches. She slept well overnight. Good appetite. She is voiding and stooling. She reports her breathing is at baseline.     Data reviewed today: I reviewed all medications, new labs and imaging results over the last 24 hours. I personally reviewed no images or EKG's today.    Physical Exam   Vital Signs: Temp: 97.4  F (36.3  C) Temp src: Oral BP: 100/49 Pulse: 64   Resp: 18 SpO2: 97 % O2 Device: Nasal cannula Oxygen Delivery: 3 LPM  Weight: 197 lbs 8.51 oz  General Appearance: Alert, sitting on edge of bed, NAD.  Respiratory: Diminished breath sounds bilaterally. No wheezing or crackles.   Cardiovascular: RRR. Normal S1/S2. No murmurs.   GI: Soft, non-tender, non-distended. Normoactive bowel sounds.   Skin: Dry skin with erythematous patch above ankles bilaterally.   Other: 3+ lower extremity edema.     Data   No new labs or imaging.

## 2022-12-08 NOTE — PROGRESS NOTES
Care Management Follow Up    CHW spoke with the SW and she stated the pt was okay with us sending universal referrals. CHW sent to the following locations:     [facility]  [address] [place] [address]  [phone]  12/8: CHW sent referral via [employer]    [facility]  [address] [place] [address]  [phone]  12/8: CHW sent referral via [employer]    [facility]  [address] [place] [address]  [phone]  12/8: CHW sent referral via [facility]    [facility]  [address] [place] [address]  [phone]  12/8: CHW sent referral via [employer]    [facility]  [address] [place] [address]  [phone]  12/8: CHW sent referral via [employer] [facility] (Adriana; Ph: [phone]; F: [phone]; [email or web address])   12/8: CHW sent referral via [employer] [facility] (Roslyn; [phone]; Fax: [phone]; [email or web address])   12/8: CHW sent referral via Communications     [facility]  [address] [place]  [address]  P: [phone]  F: [phone]  11/30:CHW LVM for admissions to f/u on referral; requested they call SW back.   12/1:CHW LVM for admissions to f/u on referral; requested they call SW back.   12/2: CHW LVM for admissions to f/u on referral; requested they call SW back.   12/5: CHW spoke with admissions they did not receive the referral. The CHW resent referral via [facility]  12/6: CHW LVM for admissions to f/u on referral; requested they call SW back.   12/7: SW LVM with admissions.  12/8:CHW LVM for admissions to f/u on referral; requested they call SW back.     [facility]  [address] [place]  [address]  P: [phone]  F: [phone]  11/30: CHW sent referral via [facility]  12/1: CHW spoke with Nancy she will review and call SW back.   12/5:CHW resent referral via  Epic  12/6:  CHW LVM for admissions to f/u on referral; requested they call SW back.   12/7: SW spoke to Nancy in admission and confirmed that she received fax. She will review pt and get back to SW.   12/8:  CHW LVM for admissions to f/u on referral; requested they call SW back.     Inactive Referrals:     [facility]  [address] [place] [address]  P: [phone]  12/6: Declined. No appropriate bed.      [facility]  [facility]. Pt w/ significant cognitive deficits (dementia level per SLUMS 14/30), lives alone. Anticipate pt would need 24/7 A for discharge home. Pt is self-limiting.     [facility] [address]  [place], [place] [address]  P: [phone]     [facility]  [address] [place]  [address]  P: [phone]  F: [phone]   11/30: Facility is closed     [facility]  [address] [place]  [address]  P: [phone]  P: [phone] - Admissions  F: [phone]  12/1: Declined via [facility]  [address] [place] [address]  [phone]   11/30: CHW sent referral via Epic  12/1: Declined. No Beds available.     [facility]   [address]  [address]  P: [phone]  F: [phone]   11/30: CHW sent referral via Epic  12/1: SW advised to re-send fax to a new fax number: F: [phone].  @1545PM SW re-faxed referral.  @1700PM SW received a call and was informed that they have no appropriate bed for pt.     [address]  [address]  P: [phone]  P: [phone] - Admissions  F: [phone]   11/30: CHW spoke with admissions and they are full for the week.   12/5: CHW LVM for admissions to f/u on referral; requested they call SW back. Later received a message via Epic  no beds available.      [facility]  [address]  [place], [place]  [address]  P: [phone]  P: [phone] - Admissions  F: [phone]  11/30: CHW was unable to reach admissions and didn't go to VM.   12/1: CHW was unable to reach admissions and didn't go to VM.  12/2: CHW was unable to reach admissions and didn't go to VM.   12/5: CHW sent referral via Epic.. Later received a message via Epic  no beds available.      [place] [address]  [phone]  11/30: CHW sent referral via Epic  12/1:CHW LVM for admissions to f/u on referral; requested they call SW back.   12/2:CHW LVM for admissions to f/u on referral; requested they call SW back.   12/5:CHW spoke with admissions they declined due to not being MA certified.      [facility]   [address] [place]  [address]  P: [phone]  P: [phone] - Admissions  F: [phone]   11/30: CHW spoke with Paulette in admissions and they don't have any bed openings as of now.    12/5: CHW sent referral via Epic. Later received a message via Epic  no beds available.      Max Zaman   Inpatient Community Health Worker  [facility] 5A & 5B

## 2022-12-09 VITALS
RESPIRATION RATE: 18 BRPM | HEART RATE: 72 BPM | TEMPERATURE: 97.8 F | BODY MASS INDEX: 33.48 KG/M2 | DIASTOLIC BLOOD PRESSURE: 50 MMHG | HEIGHT: 63 IN | SYSTOLIC BLOOD PRESSURE: 113 MMHG | OXYGEN SATURATION: 95 % | WEIGHT: 188.93 LBS

## 2022-12-09 PROCEDURE — 250N000013 HC RX MED GY IP 250 OP 250 PS 637: Performed by: STUDENT IN AN ORGANIZED HEALTH CARE EDUCATION/TRAINING PROGRAM

## 2022-12-09 PROCEDURE — 250N000013 HC RX MED GY IP 250 OP 250 PS 637

## 2022-12-09 PROCEDURE — 99239 HOSP IP/OBS DSCHRG MGMT >30: CPT | Performed by: STUDENT IN AN ORGANIZED HEALTH CARE EDUCATION/TRAINING PROGRAM

## 2022-12-09 RX ORDER — SERTRALINE HYDROCHLORIDE 100 MG/1
100 TABLET, FILM COATED ORAL DAILY
DISCHARGE
Start: 2022-12-09

## 2022-12-09 RX ORDER — VITAMIN B COMPLEX
1 TABLET ORAL DAILY
Qty: 100 TABLET | Refills: 3 | DISCHARGE
Start: 2022-12-09

## 2022-12-09 RX ORDER — FERROUS SULFATE 325(65) MG
325 TABLET ORAL
DISCHARGE
Start: 2022-12-09

## 2022-12-09 RX ORDER — AZELASTINE 1 MG/ML
1 SPRAY, METERED NASAL 2 TIMES DAILY
DISCHARGE
Start: 2022-12-09

## 2022-12-09 RX ORDER — LOSARTAN POTASSIUM AND HYDROCHLOROTHIAZIDE 25; 100 MG/1; MG/1
1 TABLET ORAL DAILY
Qty: 90 TABLET | Refills: 3 | DISCHARGE
Start: 2022-12-09 | End: 2023-11-14

## 2022-12-09 RX ORDER — LIDOCAINE 4 G/G
2 PATCH TOPICAL DAILY PRN
DISCHARGE
Start: 2022-12-09

## 2022-12-09 RX ORDER — BUSPIRONE HYDROCHLORIDE 10 MG/1
10 TABLET ORAL 3 TIMES DAILY
DISCHARGE
Start: 2022-12-09

## 2022-12-09 RX ORDER — ATORVASTATIN CALCIUM 40 MG/1
40 TABLET, FILM COATED ORAL DAILY
Qty: 90 TABLET | Refills: 3 | DISCHARGE
Start: 2022-12-09

## 2022-12-09 RX ORDER — QUETIAPINE FUMARATE 300 MG/1
150 TABLET, FILM COATED ORAL AT BEDTIME
DISCHARGE
Start: 2022-12-09

## 2022-12-09 RX ORDER — LATANOPROST 50 UG/ML
1 SOLUTION/ DROPS OPHTHALMIC DAILY
DISCHARGE
Start: 2022-12-09

## 2022-12-09 RX ADMIN — Medication 25 MCG: at 09:18

## 2022-12-09 RX ADMIN — Medication 1 MG: at 01:21

## 2022-12-09 RX ADMIN — Medication 400 MCG: at 09:18

## 2022-12-09 RX ADMIN — BUSPIRONE HYDROCHLORIDE 10 MG: 5 TABLET ORAL at 09:19

## 2022-12-09 RX ADMIN — B-COMPLEX W/ C & FOLIC ACID TAB 1 TABLET: TAB at 09:19

## 2022-12-09 RX ADMIN — SENNOSIDES AND DOCUSATE SODIUM 2 TABLET: 50; 8.6 TABLET ORAL at 09:19

## 2022-12-09 RX ADMIN — LOSARTAN POTASSIUM AND HYDROCHLOROTHIAZIDE 1 TABLET: 25; 100 TABLET ORAL at 09:19

## 2022-12-09 RX ADMIN — FERROUS SULFATE TAB 325 MG (65 MG ELEMENTAL FE) 325 MG: 325 (65 FE) TAB at 09:18

## 2022-12-09 RX ADMIN — AZELASTINE HYDROCHLORIDE 1 SPRAY: 137 SPRAY, METERED NASAL at 09:20

## 2022-12-09 RX ADMIN — SERTRALINE HYDROCHLORIDE 100 MG: 100 TABLET ORAL at 09:19

## 2022-12-09 ASSESSMENT — ACTIVITIES OF DAILY LIVING (ADL)
ADLS_ACUITY_SCORE: 32

## 2022-12-09 NOTE — PROGRESS NOTES
Care Management Discharge Note    Discharge Date: 12/09/2022       Discharge Disposition:  [facility]  [address] [place] [address] [phone]   Liaison:  Adriana Krfrederick  P: [phone]  F: [phone]      Discharge Services: Transportation Services    Discharge DME: O2 provided by [facility] for transport.    Discharge Transportation:  [facility] ([phone]) Stretcher Transport.    Private pay costs discussed: Not applicable    PAS Confirmation Code: [id number]      Patient/family educated on Medicare website which has current facility and service quality ratings: Yes    Education Provided on the Discharge Plan: Yes   Persons Notified of Discharge Plans: pt, treatment team, nursing staff,   Patient/Family in Agreement with the Plan: yes    Handoff Referral Completed: Yes    Additional Information:   [facility].      @0846AM SHARRON called [place] Liaison Adriana Chantelle (P: [phone]) to confirm that she has received the discharge orders and to inquire about the nurse to nurse contact information.    @0944AM SHARRON received a call back from Adriana and she reports that she has received the discharge orders and the PAS #.  She reports that the number to call for nurse to nurse is: [phone].  SHARRON then called bedside nurse Israel and informed him of the nurse to nurse number.     SHARRON informed by bedside RN Israel that he attempted to call facility but was unable to speak with a nurse to give report.      IMM completed by SUKUMAR Armstrong.    No other discharge needs or concerns identified.    EPI Hood, LGSW  Unit 5A   Office: [phone]   Pager: [phone]  [email or web address]

## 2022-12-09 NOTE — PLAN OF CARE
Occupational Therapy Discharge Summary    Reason for therapy discharge:    Discharged to transitional care facility.    Progress towards therapy goal(s). See goals on Care Plan in [facility] electronic health record for goal details.  Goals not met.  Barriers to achieving goals:   discharge from facility.    Therapy recommendation(s):    Continued therapy is recommended.  Rationale/Recommendations:  Pt still presents below functional baseline. Will benefit from continued OT services to increase independence and safety in ADLs and functional mobility.

## 2022-12-09 NOTE — PLAN OF CARE
Physical Therapy Discharge Summary    Reason for therapy discharge:    Discharged to transitional care facility.    Progress towards therapy goal(s). See goals on Care Plan in [facility] electronic health record for goal details.  Goals partially met.  Barriers to achieving goals:   discharge from facility.    Therapy recommendation(s):    Continued therapy is recommended.  Rationale/Recommendations:  Recommend further therapy to continue progressing strength and activity tolerance for functional mobility.

## 2022-12-09 NOTE — DISCHARGE SUMMARY
[facility]  Hospitalist Discharge Summary      Date of Admission:  11/24/2022  Date of Discharge:  12/9/2022  Discharging Provider: Lani San MD  Discharge Service: Medicine Service, IVIS TEAM 1    Discharge Diagnoses   Decreased Functional Status  Chronic macrocytic anemia  Lower Extremity Edema- likely lymphedema  Prerenal NUNU, resolved   Acute on Chronic Hypoxic Hypercapnic Respiratory Failure  Restrictive Lung Disease   Metabolic alkalosis with concomitant chronic respiratory acidosis  Generalized Anxiety Disorder  Cognitive impairment  Hx of AUD  Depression  Hypertension  HLD  Glaucoma  Vitamin Deficiency  Chronic chest wall/abdominal wall pain    Follow-ups Needed After Discharge   Follow-up Appointments     Follow Up ([facility])      Follow up with primary care provider, Favian Sahu, within 7 days of   discharge from TCU for hospital follow- up.      Appointments on [place] and/or [facility] (with [facility] or [facility]   provider or service). Call [phone] if you haven't heard regarding   these appointments within 7 days of discharge.         Follow Up and recommended labs and tests      Follow up with specialist, Dr. Livingston (pulmonologist), in 1 month.           Unresulted Labs Ordered in the Past 30 Days of this Admission     No orders found from 10/25/2022 to 11/25/2022.        Discharge Disposition   Discharged to home  Condition at discharge: Stable    Hospital Course   Ca Yan is a 76-year-old female with past medical history significant for previous breast cancer s/p lumpectomy, GERD, HTN, HLD, chronic lower extremity lymphedema, cluster C personality disorder, CKD, COPD, and known restrictive lung disease who was admitted on 11/24/2022 for chief concern of lower extremity swelling most likely secondary to lymphedema and shortness of breath likely due to underlying lung disease.     #Decreased Functional Status  Appreciate PT/OT  evaluation, patient concern of being unable to go back home due to difficulty completing home ADLs at this time. PT/OT recommend TCU. Discharged to [facility] TCU.    #Chronic macrocytic anemia  Pt with chronic macrocytic anemia noted by her PCP. Hematology was e-consulted outpatient and recommended further workup. Obtained some labs while inpatient and will get followed up by PCP and hematology. Thus far she has normal liver testing, normal EPO, normal immunoglobulin levels. She did have an elevated kappa free light chain and elevated kappa/lacey ratio and rouleaux formation on her smear. Skeletal XR with left iliac wing lucency thought to be overlying bowel gas but difficult to completely characterize, no additional lesions identified. Prior iron levels were normal three months ago along with normal ferritin.   - Continue oral iron supplementation MWF (ferritin is technically normal, but her iron saturation index is low, so there may be iron deficiency present)  - Will plan for PCP follow up after discharge from TCU     #Lower Extremity Edema  #Lymphedema  4+ pitting bilateral lower extremity edema, apparently acute on chronic over last few weeks per patient and chart review. H2FPEF calculator showed 70% probability for HFpEF. Does have prior hx of lymphedema as well which is most likely the cause. NT pro-BNP of 395, but may be confounded by obesity. TTE showed preserved ejection fraction of 65-70%. She was evaluated by cardiology who felt this was unlikely HFpEF. Would also consider potential liver pathology, although LFTs and albumin normal. Abdominal US significant for probable hepatic steatosis and small right pleural effusion. US DVT negative. At this point, LE edema may all be secondary to lymphedema.  - Daily BMP   - PTA Eucerin cream  - Lymphedema consult     #Prerenal NUNU, resolved  Likely due to dieresis, resolved with 1 L IVF.    #Acute on Chronic Hypoxic Hypercapnic Respiratory Failure  #Restrictive Lung  Disease   #Metabolic alkalosis with concomitant chronic respiratory acidosis  Pt with history of chronic CO2 retention, has seen pulmonology in the past and reportedly declined NIPPV. Unclear baseline PCO2 venous, however gases and lab workup here suggest chronic metabolically compensated CO2 retention. Significant restrictive lung dz due to thoracic cage abnormality in addition to deconditioning and prior smoking history. She was at her baseline respiratory status prior to discharge. On 2-3L home O2.   - Continue oxygen to keep saturations above 90%  - Ipratroprium-albuterol QID prn  - PTA azelastine 1 spray both nostrils BID   - F/u with outpatient pulmonologist Dr. Livingston-previously recommended pulmonary rehab     #Generalized Anxiety Disorder  #Cognitive impairment  #Hx of AUD  As per recent family medicine visit, continuing PTA medications. Increased buspirone from BID to TID due to patient concern of anxiety 11/27.   - Buspirone 10 mg PO TID  - Continue PTA Seroquel, 150 mg PO QHS     #Depression  - Continue PTA sertraline 100mg daily     #Hypertension  - Continue PTA Losartan/HCTZ 100-25 mg PO daily     #HLD  - Continue PTA atorvastatin 40 mg PO daily     #Glaucoma  - PTA latanoprost 1 drop each eye at bedtime     # Vitamin Deficiency  - Continue PTA folic acid 400 mcg daily  - Continue vitamin B complex with C 1 tablet daily  - Continue vitamin D3 25 mcg daily     #Chronic Chest wall/Abdminal wall pain  Patient has had chronic chest wall/abdominal wall pain, likely MSK in context of severe kyphoscoliosis.   - Lidocaine patch prn     Consultations This Hospital Stay   OCCUPATIONAL THERAPY ADULT IP CONSULT  PHYSICAL THERAPY ADULT IP CONSULT  NURSING TO CONSULT FOR VASCULAR ACCESS CARE IP CONSULT  WOUND OSTOMY CONTINENCE NURSE  IP CONSULT  SOCIAL WORK IP CONSULT  LYMPHEDEMA THERAPY IP CONSULT  NURSING TO CONSULT FOR VASCULAR ACCESS CARE IP CONSULT  OCCUPATIONAL THERAPY ADULT IP CONSULT  CARDIOLOGY HEART FAILURE  (HF) IP CONSULT  DENTAL HYGIENE IP ADULT CONSULT - UU  DENTAL HYGIENE IP ADULT CONSULT - UU  RESPIRATORY CARE IP CONSULT  OCCUPATIONAL THERAPY ADULT IP CONSULT  OCCUPATIONAL THERAPY ADULT IP CONSULT  PHYSICAL THERAPY ADULT IP CONSULT  OCCUPATIONAL THERAPY ADULT IP CONSULT  LYMPHEDEMA THERAPY IP CONSULT  PHYSICAL THERAPY ADULT IP CONSULT  OCCUPATIONAL THERAPY ADULT IP CONSULT    Code Status   Full Code    Time Spent on this Encounter   I, Lani San MD, personally saw the patient today and spent greater than 30 minutes discharging this patient.       Lani San MD  [facility] [address] [address]  Phone: [phone]  ______________________________________________________________________    Physical Exam   Vital Signs: Temp: 97.8  F (36.6  C) Temp src: Oral BP: 113/50 Pulse: 72   Resp: 18 SpO2: 95 % O2 Device: Nasal cannula with humidification Oxygen Delivery: 3 LPM  Weight: 188 lbs 14.95 oz  General Appearance: Alert, pleasant, NAD.  Respiratory: Diminished breath sounds bilaterally. No wheezing or crackles noted. Breathing comfortably on 3 L of oxygen.   Cardiovascular: RRR. Normal S1/S2. No murmurs.  GI: Soft, non-tender, non-distended. Normoactive bowel sounds.  Skin: No rash or lesions.  Other: 3+ lower extremity edema. Overlying dry and mildly erythematous patch of skin above ankles bilaterally.       Primary Care Physician   Favian Sahu    Discharge Orders      General info for SNF    Length of Stay Estimate: Short Term Care: Estimated # of Days <30  Condition at Discharge: Stable  Level of care:skilled   Rehabilitation Potential: Fair  Admission H&P remains valid and up-to-date: Yes  Recent Chemotherapy: N/A  Use Nursing Home Standing Orders: Yes     Mantoux instructions    Give two-step Mantoux (PPD) Per Facility Policy Yes     Follow Up and recommended labs and tests    Follow up with specialist, Dr. Livingston (pulmonologist), in 1 month.     Reason for  your hospital stay    You were in the hospital for shortness of breath due to your lung disease and for swelling in your legs.     Follow Up ([facility])    Follow up with primary care provider, Favian Sahu, within 7 days of discharge from TCU for hospital follow- up.      Appointments on [place] and/or [facility] (with [facility] or [facility] provider or service). Call [phone] if you haven't heard regarding these appointments within 7 days of discharge.     Activity - Up with nursing assistance     Full Code     Physical Therapy Adult Consult    Evaluate and treat as clinically indicated.    Reason:  Significant deconditioning     Occupational Therapy Adult Consult    Evaluate and treat as clinically indicated.    Reason:  Significant deconditioning     Lymphedema Therapy Adult Consult    Evaluate and treat as clinically indicated.    Reason:  Lymphedema in lower extremities- benefits from wraps and compression socks     Oxygen (SNF/TCU) Discharge     Fall precautions     Diet    Follow this diet upon discharge: Orders Placed This Encounter      Combination Diet Regular Diet Adult       Significant Results and Procedures   Most Recent 3 CBC's:Recent Labs   Lab Test 11/30/22  1302 11/27/22  0656 11/25/22  0602   WBC 8.3 7.5 5.2   HGB 9.1* 9.6* 8.3*   * 98 103*    254 235     Most Recent 3 BMP's:Recent Labs   Lab Test 12/03/22  0731 11/30/22  0659 11/29/22  1840 11/29/22  0539 11/28/22  0618   NA  --  141  --  140 141   POTASSIUM 3.8 3.6 3.6 3.4 3.5   CHLORIDE  --  96*  --  93* 89*   CO2  --  35*  --  35* 41*   BUN  --  49.3*  --  46.0* 43.3*   CR  --  1.03*  --  1.17* 1.48*   ANIONGAP  --  10  --  12 11   SYMONE  --  8.9  --  8.4* 8.8   GLC  --  93  --  99 102*       Discharge Medications   Current Discharge Medication List      START taking these medications    Details   ferrous sulfate (FEROSUL) 325 (65 Fe) MG tablet Take 1 tablet (325 mg) by mouth Every Mon, Wed, Fri Morning    Comments: For iron  deficiency anemia  Associated Diagnoses: Macrocytic anemia      ipratropium-albuterol (COMBIVENT RESPIMAT)  MCG/ACT inhaler Inhale 1 puff into the lungs 4 times daily as needed for shortness of breath / dyspnea or wheezing    Associated Diagnoses: Shortness of breath      Lidocaine (LIDOCARE) 4 % Patch Place 2 patches onto the skin daily as needed for moderate pain (4-6) (As needed for left chest wall pain) To prevent lidocaine toxicity, patient should be patch free for 12 hrs daily.    Associated Diagnoses: Back muscle spasm      miconazole (MICATIN) 2 % external powder Apply topically 4 times daily    Associated Diagnoses: Fungal skin disease         CONTINUE these medications which have CHANGED    Details   atorvastatin (LIPITOR) 40 MG tablet Take 1 tablet (40 mg) by mouth daily  Qty: 90 tablet, Refills: 3    Associated Diagnoses: Hyperlipidemia, unspecified hyperlipidemia type      azelastine (ASTELIN) 0.1 % nasal spray Spray 1 spray into both nostrils 2 times daily    Associated Diagnoses: Shortness of breath      busPIRone (BUSPAR) 10 MG tablet Take 1 tablet (10 mg) by mouth 3 times daily    Comments: For anxiety  Associated Diagnoses: Generalized anxiety disorder      Emollient (CERAVE) CREA Please apply twice daily to dry skin of body and feet  Qty: 453 g, Refills: 3    Associated Diagnoses: Dry skin      latanoprost (XALATAN) 0.005 % ophthalmic solution Place 1 drop into both eyes daily    Comments: For gluacoma  Associated Diagnoses: Glaucoma suspect, bilateral      losartan-hydrochlorothiazide (HYZAAR) 100-25 MG tablet Take 1 tablet by mouth daily  Qty: 90 tablet, Refills: 3    Associated Diagnoses: Essential hypertension      QUEtiapine (SEROQUEL) 300 MG tablet Take 0.5 tablets (150 mg) by mouth At Bedtime    Comments: For sleep and anxiety  Associated Diagnoses: Adjustment insomnia      sertraline (ZOLOFT) 100 MG tablet Take 1 tablet (100 mg) by mouth daily    Comments: For anxiety  Associated  Diagnoses: Adjustment disorder with anxious mood      vitamin B complex with vitamin C (STRESS TAB) tablet Take 1 tablet by mouth daily  Qty: 100 tablet, Refills: 3    Comments: For vitamin deficiency  Associated Diagnoses: Macrocytic anemia      Vitamin D3 (CHOLECALCIFEROL) 25 mcg (1000 units) tablet Take 1 tablet (25 mcg) by mouth daily  Qty: 100 tablet, Refills: 3    Comments: For vitamin D deficiency  Associated Diagnoses: Parathyroid hormone excess (H)         CONTINUE these medications which have NOT CHANGED    Details   folic acid (FOLVITE) 400 MCG tablet Take 1 tablet (400 mcg) by mouth daily  Qty: 100 tablet, Refills: 3    Comments: Please call her about delivery options  Associated Diagnoses: Macrocytic anemia         STOP taking these medications       clotrimazole (LOTRIMIN) 1 % external cream Comments:   Reason for Stopping:             Allergies   Allergies   Allergen Reactions     Azithromycin Itching     Codeine Nausea and Vomiting     Hydrocodone Nausea and Vomiting     Metronidazole Nausea     Oxycodone Itching and Rash     Percocet [Oxycodone-Acetaminophen] Nausea and Vomiting     Pollen Extract      sneezing and runny nose.      Seasonal Allergies      sneezing and runny nose.      Vicodin [Hydrocodone-Acetaminophen] Nausea and Vomiting     Zolpidem      Amnestic behavior

## 2022-12-09 NOTE — PLAN OF CARE
Goal Outcome Evaluation:    A&O x4, AVSS on 3L NC. Denies pain or nausea. Up SBA to bathroom overnight, voids WDL. Slept well throughout the night, calls appropriately. Discharging 12/9 @1045 ride set up for [facility] TCU.

## 2022-12-13 ENCOUNTER — TELEPHONE (OUTPATIENT)
Dept: FAMILY MEDICINE | Facility: CLINIC | Age: 77
End: 2022-12-13

## 2022-12-13 NOTE — TELEPHONE ENCOUNTER
----- Message from Paulette Victorina sent at 12/12/2022  2:04 PM CST -----  Regarding: RE: Living situation  Yes, I'm also considering a referral to [facility] which is able to assist with housing a little better than I am as that is their sole focus.     ----- Message -----  From: Favian Sahu MD  Sent: 12/9/2022   8:52 AM CST  To: Paulette Prajapati  Subject: Living situation                                 Are you able to work with Ca Yan regarding assistive living or nursing home post TCU stay?  Best wishes,  Favian Sahu MD            Tele Summary  Rhythm: SR/ SB  Rate: 50's-08's  TN: 0.16  QRS: 0.10  QT: 0.32    Ectopy: Rare PVC/ PAC    This is an interpretation of this strip only. All assessments, care, and updates to the MD are the responsibility of the primary RN

## 2022-12-19 ENCOUNTER — TELEPHONE (OUTPATIENT)
Dept: DERMATOLOGY | Facility: CLINIC | Age: 77
End: 2022-12-19

## 2022-12-19 NOTE — TELEPHONE ENCOUNTER
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 Provided my direct phone number.    Belle Garcia on 12/19/2022 at 9:48 AM

## 2022-12-19 NOTE — TELEPHONE ENCOUNTER
[facility] Call Center    Phone Message    May a detailed message be left on voicemail: yes     Reason for Call: Appointment Intake    Referring Provider Name: Lilly Pinto APRN CNP in  DERMATOLOGY  Diagnosis and/or Symptoms: SCCIS nose  Pt was scheduled today 12/19/22 and needed to cancel Appt. Pt has no transportation. Please call Nathaniel for scheduling. Thanks     Action Taken: Message routed to:  [facility] Derm    Travel Screening: Not Applicable

## 2022-12-19 NOTE — TELEPHONE ENCOUNTER
[facility] Call Center    Phone Message    May a detailed message be left on voicemail: yes     Reason for Call: Other: Nathaniel returning call from [facility] to reschedule MOHS for Pt. Previous TE seems to be closed. The number she left for direct line is not working for him. Please call back at your convenience. Thank you.       Action Taken: Message routed to:  [facility] Derm Surg    Travel Screening: Not Applicable

## 2022-12-21 NOTE — TELEPHONE ENCOUNTER
Called patient to schedule surgery with Dr. Webb    Date of Surgery: 02/08    Surgery type: mohs    Consult scheduled: Yes    Has patient had mohs with us before? No    Additional comments: rescheduled from 12/19      Belle Garcia on 12/21/2022 at 9:00 AM

## 2022-12-22 ENCOUNTER — APPOINTMENT (OUTPATIENT)
Dept: GENERAL RADIOLOGY | Facility: CLINIC | Age: 77
DRG: 193 | End: 2022-12-22
Attending: EMERGENCY MEDICINE
Payer: COMMERCIAL

## 2022-12-22 ENCOUNTER — HOSPITAL ENCOUNTER (INPATIENT)
Facility: CLINIC | Age: 77
LOS: 8 days | Discharge: NURSING, REHAB OR LONG-TERM CARE | DRG: 193 | End: 2022-12-30
Attending: EMERGENCY MEDICINE | Admitting: STUDENT IN AN ORGANIZED HEALTH CARE EDUCATION/TRAINING PROGRAM
Payer: COMMERCIAL

## 2022-12-22 DIAGNOSIS — J96.02 ACUTE RESPIRATORY FAILURE WITH HYPOXIA AND HYPERCARBIA (H): ICD-10-CM

## 2022-12-22 DIAGNOSIS — J96.01 ACUTE RESPIRATORY FAILURE WITH HYPOXIA AND HYPERCARBIA (H): ICD-10-CM

## 2022-12-22 DIAGNOSIS — R06.02 SHORTNESS OF BREATH: Primary | ICD-10-CM

## 2022-12-22 DIAGNOSIS — J98.4 RESTRICTIVE LUNG DISEASE: ICD-10-CM

## 2022-12-22 DIAGNOSIS — Z87.891 HISTORY OF TOBACCO USE: ICD-10-CM

## 2022-12-22 DIAGNOSIS — J10.1 INFLUENZA A: ICD-10-CM

## 2022-12-22 DIAGNOSIS — Z20.822 CONTACT WITH AND (SUSPECTED) EXPOSURE TO COVID-19: ICD-10-CM

## 2022-12-22 DIAGNOSIS — Z99.81 DEPENDENCE ON SUPPLEMENTAL OXYGEN: ICD-10-CM

## 2022-12-22 LAB
ALBUMIN MFR UR ELPH: 33.7 MG/DL
ALBUMIN UR-MCNC: 30 MG/DL
ANION GAP SERPL CALCULATED.3IONS-SCNC: 11 MMOL/L (ref 7–15)
APPEARANCE UR: ABNORMAL
BACTERIA #/AREA URNS HPF: ABNORMAL /HPF
BASE EXCESS BLDV CALC-SCNC: 11.9 MMOL/L (ref -7.7–1.9)
BASE EXCESS BLDV CALC-SCNC: 15.1 MMOL/L (ref -7.7–1.9)
BASOPHILS # BLD AUTO: 0 10E3/UL (ref 0–0.2)
BASOPHILS NFR BLD AUTO: 0 %
BILIRUB UR QL STRIP: NEGATIVE
BUN SERPL-MCNC: 41.1 MG/DL (ref 8–23)
CALCIUM SERPL-MCNC: 8.9 MG/DL (ref 8.8–10.2)
CHLORIDE SERPL-SCNC: 95 MMOL/L (ref 98–107)
COLOR UR AUTO: YELLOW
CREAT SERPL-MCNC: 1.1 MG/DL (ref 0.51–0.95)
CREAT SERPL-MCNC: 1.1 MG/DL (ref 0.51–0.95)
CREAT UR-MCNC: 107 MG/DL
CREAT UR-MCNC: 107 MG/DL
DEPRECATED HCO3 PLAS-SCNC: 35 MMOL/L (ref 22–29)
EOSINOPHIL # BLD AUTO: 0 10E3/UL (ref 0–0.7)
EOSINOPHIL NFR BLD AUTO: 0 %
ERYTHROCYTE [DISTWIDTH] IN BLOOD BY AUTOMATED COUNT: 13.6 % (ref 10–15)
FLUAV RNA SPEC QL NAA+PROBE: POSITIVE
FLUBV RNA RESP QL NAA+PROBE: NEGATIVE
GFR SERPL CREATININE-BSD FRML MDRD: 51 ML/MIN/1.73M2
GFR SERPL CREATININE-BSD FRML MDRD: 51 ML/MIN/1.73M2
GLUCOSE SERPL-MCNC: 110 MG/DL (ref 70–99)
GLUCOSE UR STRIP-MCNC: NEGATIVE MG/DL
HCO3 BLDV-SCNC: 41 MMOL/L (ref 21–28)
HCO3 BLDV-SCNC: 43 MMOL/L (ref 21–28)
HCT VFR BLD AUTO: 33.9 % (ref 35–47)
HGB BLD-MCNC: 9.6 G/DL (ref 11.7–15.7)
HGB UR QL STRIP: NEGATIVE
HYALINE CASTS: 6 /LPF
IMM GRANULOCYTES # BLD: 0.1 10E3/UL
IMM GRANULOCYTES NFR BLD: 1 %
KETONES UR STRIP-MCNC: NEGATIVE MG/DL
LACTATE SERPL-SCNC: 0.8 MMOL/L (ref 0.7–2)
LEUKOCYTE ESTERASE UR QL STRIP: ABNORMAL
LYMPHOCYTES # BLD AUTO: 0.5 10E3/UL (ref 0.8–5.3)
LYMPHOCYTES NFR BLD AUTO: 6 %
MCH RBC QN AUTO: 30 PG (ref 26.5–33)
MCHC RBC AUTO-ENTMCNC: 28.3 G/DL (ref 31.5–36.5)
MCV RBC AUTO: 106 FL (ref 78–100)
MONOCYTES # BLD AUTO: 0.7 10E3/UL (ref 0–1.3)
MONOCYTES NFR BLD AUTO: 8 %
MUCOUS THREADS #/AREA URNS LPF: PRESENT /LPF
NEUTROPHILS # BLD AUTO: 7.3 10E3/UL (ref 1.6–8.3)
NEUTROPHILS NFR BLD AUTO: 85 %
NITRATE UR QL: NEGATIVE
NRBC # BLD AUTO: 0 10E3/UL
NRBC BLD AUTO-RTO: 0 /100
NT-PROBNP SERPL-MCNC: 120 PG/ML (ref 0–1800)
O2/TOTAL GAS SETTING VFR VENT: 2 %
O2/TOTAL GAS SETTING VFR VENT: 3 %
PCO2 BLDV: 83 MM HG (ref 40–50)
PCO2 BLDV: 83 MM HG (ref 40–50)
PH BLDV: 7.31 [PH] (ref 7.32–7.43)
PH BLDV: 7.32 [PH] (ref 7.32–7.43)
PH UR STRIP: 5.5 [PH] (ref 5–7)
PLATELET # BLD AUTO: 202 10E3/UL (ref 150–450)
PO2 BLDV: 19 MM HG (ref 25–47)
PO2 BLDV: 50 MM HG (ref 25–47)
POTASSIUM SERPL-SCNC: 3.9 MMOL/L (ref 3.4–5.3)
PROT/CREAT 24H UR: 0.31 MG/MG CR (ref 0–0.2)
RBC # BLD AUTO: 3.2 10E6/UL (ref 3.8–5.2)
RBC URINE: 2 /HPF
RSV RNA SPEC NAA+PROBE: NEGATIVE
SARS-COV-2 RNA RESP QL NAA+PROBE: NEGATIVE
SODIUM SERPL-SCNC: 141 MMOL/L (ref 136–145)
SP GR UR STRIP: 1.02 (ref 1–1.03)
SQUAMOUS EPITHELIAL: 7 /HPF
TRANSITIONAL EPI: <1 /HPF
UROBILINOGEN UR STRIP-MCNC: NORMAL MG/DL
WBC # BLD AUTO: 8.6 10E3/UL (ref 4–11)
WBC URINE: 23 /HPF

## 2022-12-22 PROCEDURE — 83880 ASSAY OF NATRIURETIC PEPTIDE: CPT | Performed by: STUDENT IN AN ORGANIZED HEALTH CARE EDUCATION/TRAINING PROGRAM

## 2022-12-22 PROCEDURE — 99223 1ST HOSP IP/OBS HIGH 75: CPT | Mod: GC | Performed by: STUDENT IN AN ORGANIZED HEALTH CARE EDUCATION/TRAINING PROGRAM

## 2022-12-22 PROCEDURE — C9803 HOPD COVID-19 SPEC COLLECT: HCPCS | Performed by: EMERGENCY MEDICINE

## 2022-12-22 PROCEDURE — 36415 COLL VENOUS BLD VENIPUNCTURE: CPT | Performed by: STUDENT IN AN ORGANIZED HEALTH CARE EDUCATION/TRAINING PROGRAM

## 2022-12-22 PROCEDURE — 99285 EMERGENCY DEPT VISIT HI MDM: CPT | Mod: CS,25 | Performed by: EMERGENCY MEDICINE

## 2022-12-22 PROCEDURE — 36415 COLL VENOUS BLD VENIPUNCTURE: CPT | Performed by: EMERGENCY MEDICINE

## 2022-12-22 PROCEDURE — 82803 BLOOD GASES ANY COMBINATION: CPT | Performed by: EMERGENCY MEDICINE

## 2022-12-22 PROCEDURE — 83605 ASSAY OF LACTIC ACID: CPT | Performed by: EMERGENCY MEDICINE

## 2022-12-22 PROCEDURE — 71046 X-RAY EXAM CHEST 2 VIEWS: CPT | Mod: 26 | Performed by: RADIOLOGY

## 2022-12-22 PROCEDURE — 999N000157 HC STATISTIC RCP TIME EA 10 MIN

## 2022-12-22 PROCEDURE — 82803 BLOOD GASES ANY COMBINATION: CPT | Performed by: STUDENT IN AN ORGANIZED HEALTH CARE EDUCATION/TRAINING PROGRAM

## 2022-12-22 PROCEDURE — 82570 ASSAY OF URINE CREATININE: CPT | Performed by: STUDENT IN AN ORGANIZED HEALTH CARE EDUCATION/TRAINING PROGRAM

## 2022-12-22 PROCEDURE — 84156 ASSAY OF PROTEIN URINE: CPT | Performed by: STUDENT IN AN ORGANIZED HEALTH CARE EDUCATION/TRAINING PROGRAM

## 2022-12-22 PROCEDURE — 250N000013 HC RX MED GY IP 250 OP 250 PS 637: Performed by: STUDENT IN AN ORGANIZED HEALTH CARE EDUCATION/TRAINING PROGRAM

## 2022-12-22 PROCEDURE — 82310 ASSAY OF CALCIUM: CPT | Performed by: EMERGENCY MEDICINE

## 2022-12-22 PROCEDURE — 71046 X-RAY EXAM CHEST 2 VIEWS: CPT

## 2022-12-22 PROCEDURE — 250N000011 HC RX IP 250 OP 636: Performed by: STUDENT IN AN ORGANIZED HEALTH CARE EDUCATION/TRAINING PROGRAM

## 2022-12-22 PROCEDURE — 94660 CPAP INITIATION&MGMT: CPT

## 2022-12-22 PROCEDURE — 81001 URINALYSIS AUTO W/SCOPE: CPT | Performed by: EMERGENCY MEDICINE

## 2022-12-22 PROCEDURE — 258N000003 HC RX IP 258 OP 636: Performed by: STUDENT IN AN ORGANIZED HEALTH CARE EDUCATION/TRAINING PROGRAM

## 2022-12-22 PROCEDURE — 93005 ELECTROCARDIOGRAM TRACING: CPT | Performed by: EMERGENCY MEDICINE

## 2022-12-22 PROCEDURE — 99285 EMERGENCY DEPT VISIT HI MDM: CPT | Mod: CS | Performed by: EMERGENCY MEDICINE

## 2022-12-22 PROCEDURE — 87086 URINE CULTURE/COLONY COUNT: CPT | Performed by: EMERGENCY MEDICINE

## 2022-12-22 PROCEDURE — 120N000003 HC R&B IMCU UMMC

## 2022-12-22 PROCEDURE — 999N000128 HC STATISTIC PERIPHERAL IV START W/O US GUIDANCE

## 2022-12-22 PROCEDURE — 87637 SARSCOV2&INF A&B&RSV AMP PRB: CPT | Performed by: EMERGENCY MEDICINE

## 2022-12-22 PROCEDURE — 93010 ELECTROCARDIOGRAM REPORT: CPT | Performed by: EMERGENCY MEDICINE

## 2022-12-22 PROCEDURE — 85025 COMPLETE CBC W/AUTO DIFF WBC: CPT | Performed by: EMERGENCY MEDICINE

## 2022-12-22 RX ORDER — CEFTRIAXONE 1 G/1
1 INJECTION, POWDER, FOR SOLUTION INTRAMUSCULAR; INTRAVENOUS ONCE
Status: COMPLETED | OUTPATIENT
Start: 2022-12-22 | End: 2022-12-22

## 2022-12-22 RX ORDER — OLANZAPINE 5 MG/1
5 TABLET, ORALLY DISINTEGRATING ORAL
Status: DISCONTINUED | OUTPATIENT
Start: 2022-12-22 | End: 2022-12-25

## 2022-12-22 RX ORDER — AZELASTINE 1 MG/ML
1 SPRAY, METERED NASAL 2 TIMES DAILY PRN
Status: DISCONTINUED | OUTPATIENT
Start: 2022-12-22 | End: 2022-12-30 | Stop reason: HOSPADM

## 2022-12-22 RX ORDER — LANOLIN ALCOHOL/MO/W.PET/CERES
400 CREAM (GRAM) TOPICAL DAILY
Status: DISCONTINUED | OUTPATIENT
Start: 2022-12-23 | End: 2022-12-30 | Stop reason: HOSPADM

## 2022-12-22 RX ORDER — LIDOCAINE 40 MG/G
CREAM TOPICAL
Status: DISCONTINUED | OUTPATIENT
Start: 2022-12-22 | End: 2022-12-30 | Stop reason: HOSPADM

## 2022-12-22 RX ORDER — FERROUS SULFATE 325(65) MG
325 TABLET ORAL
Status: DISCONTINUED | OUTPATIENT
Start: 2022-12-23 | End: 2022-12-30 | Stop reason: HOSPADM

## 2022-12-22 RX ORDER — OSELTAMIVIR PHOSPHATE 30 MG/1
30 CAPSULE ORAL 2 TIMES DAILY
Status: DISCONTINUED | OUTPATIENT
Start: 2022-12-22 | End: 2022-12-22

## 2022-12-22 RX ORDER — AMOXICILLIN 250 MG
2 CAPSULE ORAL 2 TIMES DAILY PRN
Status: DISCONTINUED | OUTPATIENT
Start: 2022-12-22 | End: 2022-12-30 | Stop reason: HOSPADM

## 2022-12-22 RX ORDER — OSELTAMIVIR PHOSPHATE 75 MG/1
75 CAPSULE ORAL 2 TIMES DAILY
Status: DISCONTINUED | OUTPATIENT
Start: 2022-12-22 | End: 2022-12-22

## 2022-12-22 RX ORDER — BUSPIRONE HYDROCHLORIDE 10 MG/1
10 TABLET ORAL 3 TIMES DAILY
Status: DISCONTINUED | OUTPATIENT
Start: 2022-12-22 | End: 2022-12-30 | Stop reason: HOSPADM

## 2022-12-22 RX ORDER — ENOXAPARIN SODIUM 100 MG/ML
40 INJECTION SUBCUTANEOUS EVERY 24 HOURS
Status: DISCONTINUED | OUTPATIENT
Start: 2022-12-22 | End: 2022-12-23

## 2022-12-22 RX ORDER — SERTRALINE HYDROCHLORIDE 100 MG/1
100 TABLET, FILM COATED ORAL DAILY
Status: DISCONTINUED | OUTPATIENT
Start: 2022-12-23 | End: 2022-12-30 | Stop reason: HOSPADM

## 2022-12-22 RX ORDER — OLANZAPINE 5 MG/1
5 TABLET, ORALLY DISINTEGRATING ORAL ONCE
Status: DISCONTINUED | OUTPATIENT
Start: 2022-12-22 | End: 2022-12-22

## 2022-12-22 RX ORDER — OSELTAMIVIR PHOSPHATE 30 MG/1
30 CAPSULE ORAL 2 TIMES DAILY
Status: DISCONTINUED | OUTPATIENT
Start: 2022-12-22 | End: 2022-12-26

## 2022-12-22 RX ORDER — VITAMIN B COMPLEX
25 TABLET ORAL DAILY
Status: DISCONTINUED | OUTPATIENT
Start: 2022-12-23 | End: 2022-12-30 | Stop reason: HOSPADM

## 2022-12-22 RX ORDER — ATORVASTATIN CALCIUM 40 MG/1
40 TABLET, FILM COATED ORAL DAILY
Status: DISCONTINUED | OUTPATIENT
Start: 2022-12-23 | End: 2022-12-30 | Stop reason: HOSPADM

## 2022-12-22 RX ORDER — LATANOPROST 50 UG/ML
1 SOLUTION/ DROPS OPHTHALMIC EVERY EVENING
Status: DISCONTINUED | OUTPATIENT
Start: 2022-12-22 | End: 2022-12-30 | Stop reason: HOSPADM

## 2022-12-22 RX ORDER — AMOXICILLIN 250 MG
1 CAPSULE ORAL 2 TIMES DAILY PRN
Status: DISCONTINUED | OUTPATIENT
Start: 2022-12-22 | End: 2022-12-30 | Stop reason: HOSPADM

## 2022-12-22 RX ADMIN — OSELTAMIVIR PHOSPHATE 30 MG: 30 CAPSULE ORAL at 19:52

## 2022-12-22 RX ADMIN — ENOXAPARIN SODIUM 40 MG: 40 INJECTION SUBCUTANEOUS at 19:56

## 2022-12-22 RX ADMIN — Medication 150 MG: at 21:05

## 2022-12-22 RX ADMIN — SODIUM CHLORIDE, POTASSIUM CHLORIDE, SODIUM LACTATE AND CALCIUM CHLORIDE 500 ML: 600; 310; 30; 20 INJECTION, SOLUTION INTRAVENOUS at 21:00

## 2022-12-22 RX ADMIN — BUSPIRONE HYDROCHLORIDE 10 MG: 10 TABLET ORAL at 19:52

## 2022-12-22 RX ADMIN — LATANOPROST 1 DROP: 50 SOLUTION/ DROPS OPHTHALMIC at 23:01

## 2022-12-22 RX ADMIN — CEFTRIAXONE SODIUM 1 G: 1 INJECTION, POWDER, FOR SOLUTION INTRAMUSCULAR; INTRAVENOUS at 20:19

## 2022-12-22 ASSESSMENT — ACTIVITIES OF DAILY LIVING (ADL)
EQUIPMENT_CURRENTLY_USED_AT_HOME: WALKER, STANDARD
CONCENTRATING,_REMEMBERING_OR_MAKING_DECISIONS_DIFFICULTY: YES
DIFFICULTY_EATING/SWALLOWING: NO
CHANGE_IN_FUNCTIONAL_STATUS_SINCE_ONSET_OF_CURRENT_ILLNESS/INJURY: YES
ADLS_ACUITY_SCORE: 27
DOING_ERRANDS_INDEPENDENTLY_DIFFICULTY: YES
WEAR_GLASSES_OR_BLIND: YES
DRESSING/BATHING_DIFFICULTY: NO
WALKING_OR_CLIMBING_STAIRS_DIFFICULTY: NO
TOILETING: 1-->ASSISTANCE (EQUIPMENT/PERSON) NEEDED (NOT DEVELOPMENTALLY APPROPRIATE)
ADLS_ACUITY_SCORE: 35
FALL_HISTORY_WITHIN_LAST_SIX_MONTHS: NO
ADLS_ACUITY_SCORE: 25
ADLS_ACUITY_SCORE: 25
NUMBER_OF_TIMES_PATIENT_HAS_FALLEN_WITHIN_LAST_SIX_MONTHS: 0
ADLS_ACUITY_SCORE: 35
TOILETING_ISSUES: YES
TOILETING: 1-->ASSISTANCE (EQUIPMENT/PERSON) NEEDED

## 2022-12-22 NOTE — ED TRIAGE NOTES
"BIBA from facility for \"refusing meds\". Concern for failure to thrive. A&Ox4. On 4L O2 at baseline. Reports having increased shortness of breath.       "

## 2022-12-22 NOTE — ED PROVIDER NOTES
ED Provider Note  [facility]      History     Chief Complaint   Patient presents with     Shortness of Breath     HPI  Ca Yan is a 77 year old female who presents with SOB and cough.   Has a hx of restrictive lung disease. Wears O2 2-3L at baseline.   For last 2 days she has noticed a deep cough. Feels slightly more short of breath than usual. Arrived on 4L O2 with sat 100%. She is not sure when O2 was increased from baseline to 4L. Denies fevers or chills. No abdominal pain or urinary symptoms.   Feels very tired. Falls asleep during history taking.     Per EMS pt may have been refusing medications or other cares at her facility today. Raising concern for possible altered mental state.     Past Medical History  Past Medical History:   Diagnosis Date     Anxiety      Arthritis      Breast cancer (H)      COPD (chronic obstructive pulmonary disease) (H)     6/19/12:  FEV 0.99 l     Depressive disorder      Hypertension      Low back pain with left-sided sciatica      Low bone density 4/13/2017    DEXA April 12, 2017: T score -2.0. Normal Z score. FRAX risk: major osteoporotic fracture 11.9%, hip fracture 2.6%, therefore not high-risk     Lymphedema, chronic lower extremities      Past Surgical History:   Procedure Laterality Date     BACK SURGERY      spinal fusion     COLONOSCOPY       LARYNGOSCOPY WITH MICROSCOPE N/A 4/30/2021    Procedure: FLEXIBLE LARYNGOSCOPY;  Surgeon: Domonique Roche MD;  Location: [facility] OR     LUMPECTOMY BREAST       ORTHOPEDIC SURGERY      Knee surgery left side     atorvastatin (LIPITOR) 40 MG tablet  azelastine (ASTELIN) 0.1 % nasal spray  busPIRone (BUSPAR) 10 MG tablet  Emollient (CERAVE) CREA  ferrous sulfate (FEROSUL) 325 (65 Fe) MG tablet  folic acid (FOLVITE) 400 MCG tablet  ipratropium-albuterol (COMBIVENT RESPIMAT)  MCG/ACT inhaler  latanoprost (XALATAN) 0.005 % ophthalmic solution  Lidocaine (LIDOCARE) 4 % Patch  losartan-hydrochlorothiazide  (HYZAAR) 100-25 MG tablet  miconazole (MICATIN) 2 % external powder  QUEtiapine (SEROQUEL) 300 MG tablet  sertraline (ZOLOFT) 100 MG tablet  vitamin B complex with vitamin C (STRESS TAB) tablet  Vitamin D3 (CHOLECALCIFEROL) 25 mcg (1000 units) tablet      Allergies   Allergen Reactions     Azithromycin Itching     Codeine Nausea and Vomiting     Hydrocodone Nausea and Vomiting     Metronidazole Nausea     Oxycodone Itching and Rash     Percocet [Oxycodone-Acetaminophen] Nausea and Vomiting     Pollen Extract      sneezing and runny nose.      Seasonal Allergies      sneezing and runny nose.      Vicodin [Hydrocodone-Acetaminophen] Nausea and Vomiting     Zolpidem      Amnestic behavior     Family History  Family History   Problem Relation Age of Onset     Breast Cancer Mother      Diabetes Mother      Anxiety Disorder Mother      Asthma Mother      Other Cancer Mother      Hyperlipidemia Mother      Alcohol/Drug Father      Mental Illness Father      Substance Abuse Father      Obesity Father      Cerebrovascular Disease Maternal Grandmother 67     Other - See Comments Maternal Aunt         lived to      Social History   Social History     Tobacco Use     Smoking status: Former     Packs/day: 0.50     Years: 5.00     Pack years: 2.50     Types: Cigarettes     Start date: 1956     Quit date: 1980     Years since quittin.2     Smokeless tobacco: Former     Quit date: 1980   Substance Use Topics     Alcohol use: Not Currently     Alcohol/week: 0.0 standard drinks     Comment: Sober for 2 years, previous alcoholic     Drug use: No      Past medical history, past surgical history, medications, allergies, family history, and social history were reviewed with the patient. No additional pertinent items.       Review of Systems  A complete review of systems was performed with pertinent positives and negatives noted in the HPI, and all other systems negative.    Physical Exam   BP: (!) 158/72  Pulse:  84  Temp: 97.6  F (36.4  C)  Resp: 20  Weight: 90.7 kg (200 lb)  SpO2: 97 %     Physical Exam  Gen: sleepy but able to give history.   HEENT:PERRL, no facial tenderness or wounds, head atraumatic, oropharynx clear, mucous membranes moist, TMs clear bilaterally  Neck:no bony tenderness or step offs, no JVD, trachea midline  Back: no CVA tenderness, no midline bony tenderness  CV:RRR without murmurs  PULM: coarse bilaterally  Abd:soft, nontender, nondistended. Bowel sounds present and normal  UE:No traumatic injuries, skin normal  LE:no traumatic injuries, skin normal, 4+ LE edema.   Neuro:CN II-XII intact, strength 5/5 throughout.   Skin: no rashes or ecchymoses      ED Course      Procedures            EKG Interpretation:      Interpreted by Jillian Luu MD  Time reviewed: 3:02pm  Symptoms at time of EKG: dyspnea  Rhythm: normal sinus   Rate: 83  Axis: normal  Ectopy: none  Conduction: normal  ST Segments/ T Waves: No ST-T wave changes  Q Waves: none  Comparison to prior: No old EKG available    Clinical Impression: normal EKG       Results for orders placed or performed during the hospital encounter of 12/22/22   Chest XR,  PA & LAT     Status: None (Preliminary result)    Impression    RESIDENT PRELIMINARY INTERPRETATION  Impression: Improved appearance of the right lung compared to  11/26/2022. Mild diffuse interstitial opacities bilaterally favored to  represent edema. Infection cannot be excluded.   Basic metabolic panel     Status: Abnormal   Result Value Ref Range    Sodium 141 136 - 145 mmol/L    Potassium 3.9 3.4 - 5.3 mmol/L    Chloride 95 (L) 98 - 107 mmol/L    Carbon Dioxide (CO2) 35 (H) 22 - 29 mmol/L    Anion Gap 11 7 - 15 mmol/L    Urea Nitrogen 41.1 (H) 8.0 - 23.0 mg/dL    Creatinine 1.10 (H) 0.51 - 0.95 mg/dL    Calcium 8.9 8.8 - 10.2 mg/dL    Glucose 110 (H) 70 - 99 mg/dL    GFR Estimate 51 (L) >60 mL/min/1.73m2   Blood gas venous     Status: Abnormal   Result Value Ref Range    pH Venous  7.31 (L) 7.32 - 7.43    pCO2 Venous 83 (HH) 40 - 50 mm Hg    pO2 Venous 19 (L) 25 - 47 mm Hg    Bicarbonate Venous 41 (H) 21 - 28 mmol/L    Base Excess/Deficit (+/-) 11.9 (H) -7.7 - 1.9 mmol/L    FIO2 2    Asymptomatic Influenza A/B & SARS-CoV2 (COVID-19) Virus PCR Multiplex Nasopharyngeal     Status: Abnormal    Specimen: Nasopharyngeal; Swab   Result Value Ref Range    Influenza A PCR Positive (A) Negative    Influenza B PCR Negative Negative    RSV PCR Negative Negative    SARS CoV2 PCR Negative Negative    Narrative    Testing was performed using the Xpert Xpress CoV2/Flu/RSV Assay on the Cepheid GeneXpert Instrument. This test should be ordered for the detection of SARS-CoV-2 and influenza viruses in individuals who meet clinical and/or epidemiological criteria. Test performance is unknown in asymptomatic patients. This test is for in vitro diagnostic use under the FDA EUA for laboratories certified under CLIA to perform high or moderate complexity testing. This test has not been FDA cleared or approved. A negative result does not rule out the presence of PCR inhibitors in the specimen or target RNA in concentration below the limit of detection for the assay. If only one viral target is positive but coinfection with multiple targets is suspected, the sample should be re-tested with another FDA cleared, approved, or authorized test, if coinfection would change clinical management. This test was validated by the [email or web address]. These laboratories are certified under the Clinical Laboratory Improvement Amendments of 1988 (CLIA-88) as qualified to perform high complexity laboratory testing.   Lactic acid whole blood     Status: Normal   Result Value Ref Range    Lactic Acid 0.8 0.7 - 2.0 mmol/L   CBC with platelets and differential     Status: Abnormal   Result Value Ref Range    WBC Count 8.6 4.0 - 11.0 10e3/uL    RBC Count 3.20 (L) 3.80 - 5.20 10e6/uL    Hemoglobin 9.6 (L) 11.7 - 15.7 g/dL     Hematocrit 33.9 (L) 35.0 - 47.0 %     (H) 78 - 100 fL    MCH 30.0 26.5 - 33.0 pg    MCHC 28.3 (L) 31.5 - 36.5 g/dL    RDW 13.6 10.0 - 15.0 %    Platelet Count 202 150 - 450 10e3/uL    % Neutrophils 85 %    % Lymphocytes 6 %    % Monocytes 8 %    % Eosinophils 0 %    % Basophils 0 %    % Immature Granulocytes 1 %    NRBCs per 100 WBC 0 <1 /100    Absolute Neutrophils 7.3 1.6 - 8.3 10e3/uL    Absolute Lymphocytes 0.5 (L) 0.8 - 5.3 10e3/uL    Absolute Monocytes 0.7 0.0 - 1.3 10e3/uL    Absolute Eosinophils 0.0 0.0 - 0.7 10e3/uL    Absolute Basophils 0.0 0.0 - 0.2 10e3/uL    Absolute Immature Granulocytes 0.1 <=0.4 10e3/uL    Absolute NRBCs 0.0 10e3/uL   CBC with platelets differential     Status: Abnormal    Narrative    The following orders were created for panel order CBC with platelets differential.  Procedure                               Abnormality         Status                     ---------                               -----------         ------                     CBC with platelets and d...[[id number]]  Abnormal            Final result                 Please view results for these tests on the individual orders.     Medications   ipratropium-albuterol (COMBIVENT RESPIMAT) inhaler 1 puff (has no administration in time range)   oseltamivir (TAMIFLU) capsule 30 mg (has no administration in time range)   OLANZapine zydis (zyPREXA) ODT tab 5 mg (has no administration in time range)        Assessments & Plan (with Medical Decision Making)   76 yo F with a hx of restrictive lung diease presenting with 2 days of cough.   Arrived on an increase in O2 to 4L NC, up from baseline 2-3L.   Appears sleepy.  IV access obtained and lab testing done.   VBG resulted with hypercarbia, pCO2 83. Was 66 upon discharge earlier this month. This is the likely cause of her somnolence.   O2 titrated and was able to tolerate decreased to 3L and then 2L with appropriate sat. This was accompanied by improvement in mental status. She  is now more alert, but is now less cooperative with care.   CBC with stable anemia with hgb 9.6.   BMP with normal electrolytes and stable Cr.   COVID and RSV negative.   Influenza A +   Awaiting CXR, and will do trial of BiPAP for hypercarbia.   Discussed with IM service and will plan for [facility] admission.     I have reviewed the nursing notes. I have reviewed the findings, diagnosis, plan and need for follow up with the patient.    New Prescriptions    No medications on file       Final diagnoses:   Acute respiratory failure with hypoxia and hypercarbia (H)   Influenza A       --  Jillian Luu MD   [facility] EMERGENCY DEPARTMENT  12/22/2022     Jillian Luu MD  12/22/22 5421

## 2022-12-22 NOTE — H&P
[facility]    History and Physical - Medicine Service, [facility] TEAM 2       Date of Admission:  12/22/2022    Assessment & Plan      Ca Yan is a 77 year old female who has a hx of previous breast cancer s/p lumpectomy, GERD, HTN, HLD, chronic lower extremity lymphedema, cluster C personality disorder, CKD, restrictive lung disease and COPD (FEV1/FVC 0.71), and known restrictive lung disease.  Presenting with acute on chronic hypercarbic respiratory failure, increased WOB, and reported confusion found to have flu A infection, NUNU and possible UTI.     #Influenza A infection  #Acute on Chronic Hypoxic Hypercapnic Respiratory Failure  #Restrictive Lung Disease   #Chronic Respiratory acidosis with compensated metabolic alkalosis  Pt with history of chronic CO2 retention, has seen pulmonology in the past and reportedly declined NIPPV. Unclear baseline PCO2 venous, however gases and lab workup here suggest chronic metabolically compensated CO2 retention that is somewhat worse today though the pH is still normal.  Significant restrictive lung dz due to thoracic cage abnormality in addition to deconditioning and prior smoking history.   Influenza infection likely contributing to some of her symptoms.  I do not have any concerns for superimposed bacterial pneumonia at this point.  Would also consider potential treatment for COPD exacerbation if no improvement of symptoms.  -Tamiflu 30 mg twice daily  -Recheck VBG; discussed with patient positive pressure ventilation if persistent CO2 retention or worsening; patient has declined this unless absolutely necessary  -Supplemental oxygen to maintain oxygen saturations between 88 and 92%    #NUNU   Creatinine 1.1 (baseline around 0.8), BUN 41 (baseline 20s).  NUNU likely prerenal; in addition possibly related to UTI as well.  We will cautiously give fluids given respiratory status.  -500 cc fluid bolus  -AM BMP  -AM Cystatin  C to assess more accurately GFR    #Possible UTI  There is some concern at the TCU that patient was confused.  In addition she reports intermittent suprapubic pain.  UA obtained in the ED consistent with UTI.  Taken together this is another for me to treat for UTI.  -Ceftriaxone 1 g daily until urine culture results     #Chronic macrocytic anemia  #Possible plasma cell disorder  Over the last several months has developed a mild macrocytic anemia.  B12 and folate levels have been checked and are normal.  Seems the prior providers were concerned about multiple myeloma.  During prior hospitalization lab work was obtained including immunoglobulin levels and protein electrophoresis; this showed a monoclonal spike. Skeletal XR with left iliac wing lucency thought to be overlying bowel gas but difficult to completely characterize, no additional lesions identified. Prior iron levels were normal three months ago along with normal ferritin.  Wonder if there is a component of multiple myeloma contributing to worsening kidney function.  - Continue oral iron supplementation MWF    #Generalized Anxiety Disorder  #Cognitive impairment  #Hx of AUD  - Buspirone 10 mg PO TID  - Continue PTA Seroquel, 150 mg PO QHS    #Depression  - Continue PTA sertraline 100mg daily      #Hypertension  - Hold PTA Losartan/HCTZ 100-25 mg PO daily given NUNU     #HLD  - Continue PTA atorvastatin 40 mg PO daily     #Glaucoma  - PTA latanoprost 1 drop each eye at bedtime     # Vitamin Deficiency  - Continue PTA folic acid 400 mcg daily  - Continue vitamin B complex with C 1 tablet daily  - Continue vitamin D3 25 mcg daily         Diet: Regular Diet Adult    DVT Prophylaxis: Enoxaparin (Lovenox) SQ  Lima Catheter: Not present  Fluids: as above  Central Lines: None  Cardiac Monitoring: None  Code Status:   full code    Clinically Significant Risk Factors Present on Admission                  # Hypertension: home medication list includes  "antihypertensive(s)      # Obesity: Estimated body mass index is 35.43 kg/m  as calculated from the following:    Height as of 11/24/22: 1.6 m (5' 3\").    Weight as of this encounter: 90.7 kg (200 lb).           Disposition Plan      Expected Discharge Date: 12/24/2022                The patient's care was discussed with the Attending Physician, Dr. Joy .    Anish Trujillo MD  Medicine Service, [address]  Securely message with the Vocera Web Console (learn more here)  Text page via [facility] Paging/Directory   Please see signed in provider for up to date coverage information    ______________________________________________________________________    Chief Complaint     History is obtained from the patient and chart review    History of Present Illness   Ca Yan is a 77 year old female who has a hx of previous breast cancer s/p lumpectomy, GERD, HTN, HLD, chronic lower extremity lymphedema, cluster C personality disorder, CKD, restrictive lung disease and COPD (FEV1/FVC 0.71), and known restrictive lung disease.      She is presenting from her TCU for respiratory symptoms.  In addition, it has been reported that she is may be more confused and refusing some cares at her TCU.    Patient states that for over the past few days she has had typical flu symptoms of low-grade fever (up to 100), productive cough, chills, body aches.  Work of breathing has increased a little bit; she uses 2 L of oxygen at baseline.  She has been eating and drinking normally but may be drinking a little bit less.  She has no UTI symptoms except occasional suprapubic tenderness.  No nausea, vomiting, diarrhea.     ED course: Vital signs notable for no fever, respiratory rate of 20; other vital signs unremarkable.  Patient was on 4 L by nasal cannula initially.  Labs notable for chronic metabolic alkalosis similar to baseline, mildly elevated BUN and creatinine.  VBG with pH " of 7.31 and CO2 of 83 and a bicarb of 41.  Viral testing was positive for influenza A.  Chest x-ray was obtained and preliminary read notable for improved but still present bilateral hazy opacities suggesting possible edema versus infection.    Review of Systems    The 10 point Review of Systems is negative other than noted in the HPI or here.     Past Medical History    I have reviewed this patient's medical history and updated it with pertinent information if needed.   Past Medical History:   Diagnosis Date    Anxiety     Arthritis     Breast cancer (H)     COPD (chronic obstructive pulmonary disease) (H)     6/19/12:  FEV 0.99 l    Depressive disorder     Hypertension     Low back pain with left-sided sciatica     Low bone density 4/13/2017    DEXA April 12, 2017: T score -2.0. Normal Z score. FRAX risk: major osteoporotic fracture 11.9%, hip fracture 2.6%, therefore not high-risk    Lymphedema, chronic lower extremities         Past Surgical History   I have reviewed this patient's surgical history and updated it with pertinent information if needed.  Past Surgical History:   Procedure Laterality Date    BACK SURGERY      spinal fusion    COLONOSCOPY      LARYNGOSCOPY WITH MICROSCOPE N/A 4/30/2021    Procedure: FLEXIBLE LARYNGOSCOPY;  Surgeon: Domonique Roche MD;  Location: [facility] OR    LUMPECTOMY BREAST      ORTHOPEDIC SURGERY      Knee surgery left side        Social History   I have reviewed this patient's social history and updated it with pertinent information if needed. Ca Yan  reports that she quit smoking about 42 years ago. Her smoking use included cigarettes. She started smoking about 66 years ago. She has a 2.50 pack-year smoking history. She quit smokeless tobacco use about 42 years ago. She reports that she does not currently use alcohol. She reports that she does not use drugs.    Family History   I have reviewed this patient's family history and updated it with pertinent information if  needed.  Family History   Problem Relation Age of Onset    Breast Cancer Mother     Diabetes Mother     Anxiety Disorder Mother     Asthma Mother     Other Cancer Mother     Hyperlipidemia Mother     Alcohol/Drug Father     Mental Illness Father     Substance Abuse Father     Obesity Father     Cerebrovascular Disease Maternal Grandmother 67    Other - See Comments Maternal Aunt         lived to 95       Prior to Admission Medications   Prior to Admission Medications   Prescriptions Last Dose Informant Patient Reported? Taking?   Emollient (CERAVE) CREA   No No   Sig: Please apply twice daily to dry skin of body and feet   Lidocaine (LIDOCARE) 4 % Patch   No No   Sig: Place 2 patches onto the skin daily as needed for moderate pain (4-6) (As needed for left chest wall pain) To prevent lidocaine toxicity, patient should be patch free for 12 hrs daily.   QUEtiapine (SEROQUEL) 300 MG tablet   No No   Sig: Take 0.5 tablets (150 mg) by mouth At Bedtime   Vitamin D3 (CHOLECALCIFEROL) 25 mcg (1000 units) tablet   No No   Sig: Take 1 tablet (25 mcg) by mouth daily   atorvastatin (LIPITOR) 40 MG tablet   No No   Sig: Take 1 tablet (40 mg) by mouth daily   azelastine (ASTELIN) 0.1 % nasal spray   No No   Sig: Spray 1 spray into both nostrils 2 times daily   busPIRone (BUSPAR) 10 MG tablet   No No   Sig: Take 1 tablet (10 mg) by mouth 3 times daily   ferrous sulfate (FEROSUL) 325 (65 Fe) MG tablet   No No   Sig: Take 1 tablet (325 mg) by mouth Every Mon, Wed, Fri Morning   folic acid (FOLVITE) 400 MCG tablet   No No   Sig: Take 1 tablet (400 mcg) by mouth daily   ipratropium-albuterol (COMBIVENT RESPIMAT)  MCG/ACT inhaler   No No   Sig: Inhale 1 puff into the lungs 4 times daily as needed for shortness of breath / dyspnea or wheezing   latanoprost (XALATAN) 0.005 % ophthalmic solution   No No   Sig: Place 1 drop into both eyes daily   losartan-hydrochlorothiazide (HYZAAR) 100-25 MG tablet   No No   Sig: Take 1 tablet by  mouth daily   miconazole (MICATIN) 2 % external powder   No No   Sig: Apply topically 4 times daily   sertraline (ZOLOFT) 100 MG tablet   No No   Sig: Take 1 tablet (100 mg) by mouth daily   vitamin B complex with vitamin C (STRESS TAB) tablet   No No   Sig: Take 1 tablet by mouth daily      Facility-Administered Medications: None     Allergies   Allergies   Allergen Reactions    Azithromycin Itching    Codeine Nausea and Vomiting    Hydrocodone Nausea and Vomiting    Metronidazole Nausea    Oxycodone Itching and Rash    Percocet [Oxycodone-Acetaminophen] Nausea and Vomiting    Pollen Extract      sneezing and runny nose.     Seasonal Allergies      sneezing and runny nose.     Vicodin [Hydrocodone-Acetaminophen] Nausea and Vomiting    Zolpidem      Amnestic behavior       Physical Exam   Vital Signs: Temp: 97.6  F (36.4  C) Temp src: Oral BP: (!) 158/72 Pulse: 84   Resp: 20 SpO2: 97 % O2 Device: Nasal cannula Oxygen Delivery: 4 LPM  Weight: 200 lbs 0 oz    General Appearance: Alert, no acute distress  HEENT: Dry lips, sclera clear  Respiratory: Breathing in short sentences, mildly increased work of breathing; on 2 L of oxygen, prolonged expiratory phase with diffuse wheezing bilaterally  Cardiovascular: Regular rate and rhythm, no murmurs rubs or gallops  GI: Soft, nontender, nondistended  Skin: No rashes on exposed skin  Musculoskeletal: Normal muscle bulk and tone, lower extremity edema; legs wrapped with compression wraps   Neurologic: no focal deficits, cranial nerves intact, fully oriented      Data   Data reviewed today: I reviewed all medications, new labs and imaging results over the last 24 hours.

## 2022-12-22 NOTE — LETTER
Recipient: Adriana - Admissions at [facility]           Sender: [facility] [phone]          Date: December 30, 2022  Patient Name:  Ca Yan  Patient YOB: 1945  Routing Message: discharge orders, ride scheduled for 1:00PM discharge. Thanks         The documents accompanying this notice contain confidential information belonging to the sender.  This information is intended only for the use of the individual or entity named above.  The authorized recipient of this information is prohibited from disclosing this information to any other party and is required to destroy the information after its stated need has been fulfilled, unless otherwise required by state law.    If you are not the intended recipient, you are hereby notified that any disclosure, copy, distribution or action taken in reliance on the contents of these documents is strictly prohibited.  If you have received this document in error, please return it by fax to [phone] with a note on the cover sheet explaining why you are returning it (e.g. not your patient, not your provider, etc.).  If you need further assistance, please call .  Documents may also be returned by mail to [employer], , [address], [place], [place] [address].

## 2022-12-23 ENCOUNTER — APPOINTMENT (OUTPATIENT)
Dept: GENERAL RADIOLOGY | Facility: CLINIC | Age: 77
DRG: 193 | End: 2022-12-23
Payer: COMMERCIAL

## 2022-12-23 LAB
ALLEN'S TEST: ABNORMAL
ALLEN'S TEST: YES
AMMONIA PLAS-SCNC: 13 UMOL/L (ref 11–51)
ANION GAP SERPL CALCULATED.3IONS-SCNC: 8 MMOL/L (ref 7–15)
ATRIAL RATE - MUSE: 83 BPM
BASE EXCESS BLDA CALC-SCNC: 11.2 MMOL/L (ref -9–1.8)
BASE EXCESS BLDA CALC-SCNC: 12.9 MMOL/L (ref -9–1.8)
BASE EXCESS BLDV CALC-SCNC: 11.7 MMOL/L (ref -7.7–1.9)
BASE EXCESS BLDV CALC-SCNC: 11.8 MMOL/L (ref -7.7–1.9)
BASE EXCESS BLDV CALC-SCNC: 11.8 MMOL/L (ref -7.7–1.9)
BASE EXCESS BLDV CALC-SCNC: 12.7 MMOL/L (ref -7.7–1.9)
BUN SERPL-MCNC: 39.8 MG/DL (ref 8–23)
CALCIUM SERPL-MCNC: 8.7 MG/DL (ref 8.8–10.2)
CHLORIDE SERPL-SCNC: 99 MMOL/L (ref 98–107)
CREAT SERPL-MCNC: 1.04 MG/DL (ref 0.51–0.95)
CYSTATIN C (ROCHE): 2.1 MG/L (ref 0.6–1)
DEPRECATED HCO3 PLAS-SCNC: 36 MMOL/L (ref 22–29)
DIASTOLIC BLOOD PRESSURE - MUSE: NORMAL MMHG
ERYTHROCYTE [DISTWIDTH] IN BLOOD BY AUTOMATED COUNT: 13.6 % (ref 10–15)
GFR SERPL CREATININE-BSD FRML MDRD: 25 ML/MIN/1.73M2
GFR SERPL CREATININE-BSD FRML MDRD: 54 ML/MIN/1.73M2
GLUCOSE SERPL-MCNC: 103 MG/DL (ref 70–99)
HCO3 BLD-SCNC: 39 MMOL/L (ref 21–28)
HCO3 BLD-SCNC: 41 MMOL/L (ref 21–28)
HCO3 BLDV-SCNC: 39 MMOL/L (ref 21–28)
HCO3 BLDV-SCNC: 42 MMOL/L (ref 21–28)
HCO3 BLDV-SCNC: 43 MMOL/L (ref 21–28)
HCO3 BLDV-SCNC: 43 MMOL/L (ref 21–28)
HCT VFR BLD AUTO: 30.4 % (ref 35–47)
HGB BLD-MCNC: 8.3 G/DL (ref 11.7–15.7)
INTERPRETATION ECG - MUSE: NORMAL
MCH RBC QN AUTO: 30 PG (ref 26.5–33)
MCHC RBC AUTO-ENTMCNC: 27.3 G/DL (ref 31.5–36.5)
MCV RBC AUTO: 110 FL (ref 78–100)
O2/TOTAL GAS SETTING VFR VENT: 30 %
O2/TOTAL GAS SETTING VFR VENT: 40 %
O2/TOTAL GAS SETTING VFR VENT: 45 %
P AXIS - MUSE: 71 DEGREES
PCO2 BLD: 74 MM HG (ref 35–45)
PCO2 BLD: 76 MM HG (ref 35–45)
PCO2 BLDV: 105 MM HG (ref 40–50)
PCO2 BLDV: 106 MM HG (ref 40–50)
PCO2 BLDV: 71 MM HG (ref 40–50)
PCO2 BLDV: 91 MM HG (ref 40–50)
PH BLD: 7.33 [PH] (ref 7.35–7.45)
PH BLD: 7.34 [PH] (ref 7.35–7.45)
PH BLDV: 7.21 [PH] (ref 7.32–7.43)
PH BLDV: 7.22 [PH] (ref 7.32–7.43)
PH BLDV: 7.28 [PH] (ref 7.32–7.43)
PH BLDV: 7.35 [PH] (ref 7.32–7.43)
PLATELET # BLD AUTO: 170 10E3/UL (ref 150–450)
PO2 BLD: 113 MM HG (ref 80–105)
PO2 BLD: 60 MM HG (ref 80–105)
PO2 BLDV: 25 MM HG (ref 25–47)
PO2 BLDV: 26 MM HG (ref 25–47)
PO2 BLDV: 27 MM HG (ref 25–47)
PO2 BLDV: 44 MM HG (ref 25–47)
POTASSIUM SERPL-SCNC: 4.1 MMOL/L (ref 3.4–5.3)
PR INTERVAL - MUSE: 150 MS
QRS DURATION - MUSE: 86 MS
QT - MUSE: 376 MS
QTC - MUSE: 441 MS
R AXIS - MUSE: 41 DEGREES
RBC # BLD AUTO: 2.77 10E6/UL (ref 3.8–5.2)
SODIUM SERPL-SCNC: 143 MMOL/L (ref 136–145)
SYSTOLIC BLOOD PRESSURE - MUSE: NORMAL MMHG
T AXIS - MUSE: 64 DEGREES
VENTRICULAR RATE- MUSE: 83 BPM
WBC # BLD AUTO: 5.9 10E3/UL (ref 4–11)

## 2022-12-23 PROCEDURE — 94660 CPAP INITIATION&MGMT: CPT

## 2022-12-23 PROCEDURE — 71045 X-RAY EXAM CHEST 1 VIEW: CPT | Mod: 26 | Performed by: RADIOLOGY

## 2022-12-23 PROCEDURE — 87149 DNA/RNA DIRECT PROBE: CPT | Performed by: STUDENT IN AN ORGANIZED HEALTH CARE EDUCATION/TRAINING PROGRAM

## 2022-12-23 PROCEDURE — 250N000013 HC RX MED GY IP 250 OP 250 PS 637: Performed by: STUDENT IN AN ORGANIZED HEALTH CARE EDUCATION/TRAINING PROGRAM

## 2022-12-23 PROCEDURE — 36415 COLL VENOUS BLD VENIPUNCTURE: CPT | Performed by: STUDENT IN AN ORGANIZED HEALTH CARE EDUCATION/TRAINING PROGRAM

## 2022-12-23 PROCEDURE — 71045 X-RAY EXAM CHEST 1 VIEW: CPT

## 2022-12-23 PROCEDURE — 85027 COMPLETE CBC AUTOMATED: CPT | Performed by: STUDENT IN AN ORGANIZED HEALTH CARE EDUCATION/TRAINING PROGRAM

## 2022-12-23 PROCEDURE — 250N000009 HC RX 250: Performed by: EMERGENCY MEDICINE

## 2022-12-23 PROCEDURE — 250N000009 HC RX 250: Performed by: STUDENT IN AN ORGANIZED HEALTH CARE EDUCATION/TRAINING PROGRAM

## 2022-12-23 PROCEDURE — 82803 BLOOD GASES ANY COMBINATION: CPT | Performed by: STUDENT IN AN ORGANIZED HEALTH CARE EDUCATION/TRAINING PROGRAM

## 2022-12-23 PROCEDURE — 250N000011 HC RX IP 250 OP 636: Performed by: STUDENT IN AN ORGANIZED HEALTH CARE EDUCATION/TRAINING PROGRAM

## 2022-12-23 PROCEDURE — 999N000157 HC STATISTIC RCP TIME EA 10 MIN

## 2022-12-23 PROCEDURE — 94640 AIRWAY INHALATION TREATMENT: CPT | Mod: 76

## 2022-12-23 PROCEDURE — 82610 CYSTATIN C: CPT | Performed by: STUDENT IN AN ORGANIZED HEALTH CARE EDUCATION/TRAINING PROGRAM

## 2022-12-23 PROCEDURE — 82140 ASSAY OF AMMONIA: CPT | Performed by: STUDENT IN AN ORGANIZED HEALTH CARE EDUCATION/TRAINING PROGRAM

## 2022-12-23 PROCEDURE — 258N000003 HC RX IP 258 OP 636: Performed by: STUDENT IN AN ORGANIZED HEALTH CARE EDUCATION/TRAINING PROGRAM

## 2022-12-23 PROCEDURE — 5A09357 ASSISTANCE WITH RESPIRATORY VENTILATION, LESS THAN 24 CONSECUTIVE HOURS, CONTINUOUS POSITIVE AIRWAY PRESSURE: ICD-10-PCS | Performed by: STUDENT IN AN ORGANIZED HEALTH CARE EDUCATION/TRAINING PROGRAM

## 2022-12-23 PROCEDURE — 82310 ASSAY OF CALCIUM: CPT | Performed by: STUDENT IN AN ORGANIZED HEALTH CARE EDUCATION/TRAINING PROGRAM

## 2022-12-23 PROCEDURE — 120N000003 HC R&B IMCU UMMC

## 2022-12-23 PROCEDURE — 87077 CULTURE AEROBIC IDENTIFY: CPT | Performed by: STUDENT IN AN ORGANIZED HEALTH CARE EDUCATION/TRAINING PROGRAM

## 2022-12-23 PROCEDURE — 99233 SBSQ HOSP IP/OBS HIGH 50: CPT | Mod: GC | Performed by: STUDENT IN AN ORGANIZED HEALTH CARE EDUCATION/TRAINING PROGRAM

## 2022-12-23 RX ORDER — DOXYCYCLINE 100 MG/10ML
100 INJECTION, POWDER, LYOPHILIZED, FOR SOLUTION INTRAVENOUS EVERY 12 HOURS
Status: COMPLETED | OUTPATIENT
Start: 2022-12-23 | End: 2022-12-28

## 2022-12-23 RX ORDER — POLYETHYLENE GLYCOL 3350 17 G/17G
1 POWDER, FOR SOLUTION ORAL 2 TIMES DAILY
COMMUNITY

## 2022-12-23 RX ORDER — SODIUM CHLORIDE, SODIUM LACTATE, POTASSIUM CHLORIDE, CALCIUM CHLORIDE 600; 310; 30; 20 MG/100ML; MG/100ML; MG/100ML; MG/100ML
INJECTION, SOLUTION INTRAVENOUS CONTINUOUS
Status: ACTIVE | OUTPATIENT
Start: 2022-12-23 | End: 2022-12-23

## 2022-12-23 RX ORDER — HEPARIN SODIUM 5000 [USP'U]/.5ML
5000 INJECTION, SOLUTION INTRAVENOUS; SUBCUTANEOUS EVERY 12 HOURS
Status: DISCONTINUED | OUTPATIENT
Start: 2022-12-23 | End: 2022-12-30 | Stop reason: HOSPADM

## 2022-12-23 RX ORDER — DEXAMETHASONE SODIUM PHOSPHATE 10 MG/ML
10 INJECTION, SOLUTION INTRAMUSCULAR; INTRAVENOUS ONCE
Status: DISCONTINUED | OUTPATIENT
Start: 2022-12-23 | End: 2022-12-23

## 2022-12-23 RX ORDER — LANOLIN ALCOHOL/MO/W.PET/CERES
3 CREAM (GRAM) TOPICAL AT BEDTIME
COMMUNITY

## 2022-12-23 RX ORDER — IPRATROPIUM BROMIDE AND ALBUTEROL SULFATE 2.5; .5 MG/3ML; MG/3ML
3 SOLUTION RESPIRATORY (INHALATION)
Status: DISCONTINUED | OUTPATIENT
Start: 2022-12-23 | End: 2022-12-27

## 2022-12-23 RX ORDER — DOXYCYCLINE 100 MG/10ML
100 INJECTION, POWDER, LYOPHILIZED, FOR SOLUTION INTRAVENOUS EVERY 12 HOURS
Status: DISCONTINUED | OUTPATIENT
Start: 2022-12-23 | End: 2022-12-23

## 2022-12-23 RX ORDER — ALBUTEROL SULFATE 90 UG/1
2 AEROSOL, METERED RESPIRATORY (INHALATION) EVERY 6 HOURS PRN
Status: DISCONTINUED | OUTPATIENT
Start: 2022-12-23 | End: 2022-12-30 | Stop reason: HOSPADM

## 2022-12-23 RX ORDER — BUDESONIDE 1 MG/2ML
2 INHALANT ORAL EVERY 8 HOURS
COMMUNITY

## 2022-12-23 RX ORDER — DEXAMETHASONE SODIUM PHOSPHATE 10 MG/ML
6 INJECTION, SOLUTION INTRAMUSCULAR; INTRAVENOUS DAILY
Status: COMPLETED | OUTPATIENT
Start: 2022-12-24 | End: 2022-12-27

## 2022-12-23 RX ORDER — ONDANSETRON 4 MG/1
4 TABLET, ORALLY DISINTEGRATING ORAL EVERY 6 HOURS PRN
Status: DISCONTINUED | OUTPATIENT
Start: 2022-12-23 | End: 2022-12-30 | Stop reason: HOSPADM

## 2022-12-23 RX ORDER — CEFTRIAXONE 1 G/1
1 INJECTION, POWDER, FOR SOLUTION INTRAMUSCULAR; INTRAVENOUS EVERY 24 HOURS
Status: DISCONTINUED | OUTPATIENT
Start: 2022-12-23 | End: 2022-12-23

## 2022-12-23 RX ORDER — SENNOSIDES 8.6 MG
1 TABLET ORAL 2 TIMES DAILY
COMMUNITY

## 2022-12-23 RX ORDER — CEFTRIAXONE 1 G/1
1 INJECTION, POWDER, FOR SOLUTION INTRAMUSCULAR; INTRAVENOUS EVERY 24 HOURS
Status: COMPLETED | OUTPATIENT
Start: 2022-12-23 | End: 2022-12-26

## 2022-12-23 RX ORDER — ACETAMINOPHEN 325 MG/1
650 TABLET ORAL EVERY 4 HOURS PRN
Status: DISCONTINUED | OUTPATIENT
Start: 2022-12-23 | End: 2022-12-30 | Stop reason: HOSPADM

## 2022-12-23 RX ORDER — BENZONATATE 200 MG/1
200 CAPSULE ORAL 3 TIMES DAILY PRN
COMMUNITY

## 2022-12-23 RX ORDER — DEXAMETHASONE SODIUM PHOSPHATE 10 MG/ML
10 INJECTION, SOLUTION INTRAMUSCULAR; INTRAVENOUS ONCE
Status: COMPLETED | OUTPATIENT
Start: 2022-12-23 | End: 2022-12-23

## 2022-12-23 RX ADMIN — Medication 25 MCG: at 07:38

## 2022-12-23 RX ADMIN — ATORVASTATIN CALCIUM 40 MG: 40 TABLET, FILM COATED ORAL at 07:38

## 2022-12-23 RX ADMIN — Medication 150 MG: at 21:05

## 2022-12-23 RX ADMIN — OSELTAMIVIR PHOSPHATE 30 MG: 30 CAPSULE ORAL at 20:47

## 2022-12-23 RX ADMIN — Medication 400 MCG: at 07:38

## 2022-12-23 RX ADMIN — OSELTAMIVIR PHOSPHATE 30 MG: 30 CAPSULE ORAL at 07:43

## 2022-12-23 RX ADMIN — DOXYCYCLINE 100 MG: 100 INJECTION, POWDER, LYOPHILIZED, FOR SOLUTION INTRAVENOUS at 10:21

## 2022-12-23 RX ADMIN — LATANOPROST 1 DROP: 50 SOLUTION/ DROPS OPHTHALMIC at 20:48

## 2022-12-23 RX ADMIN — SODIUM CHLORIDE, POTASSIUM CHLORIDE, SODIUM LACTATE AND CALCIUM CHLORIDE: 600; 310; 30; 20 INJECTION, SOLUTION INTRAVENOUS at 16:40

## 2022-12-23 RX ADMIN — CEFTRIAXONE SODIUM 1 G: 1 INJECTION, POWDER, FOR SOLUTION INTRAMUSCULAR; INTRAVENOUS at 20:54

## 2022-12-23 RX ADMIN — B-COMPLEX W/ C & FOLIC ACID TAB 1 TABLET: TAB at 07:38

## 2022-12-23 RX ADMIN — DOXYCYCLINE 100 MG: 100 INJECTION, POWDER, LYOPHILIZED, FOR SOLUTION INTRAVENOUS at 22:48

## 2022-12-23 RX ADMIN — SERTRALINE HYDROCHLORIDE 100 MG: 100 TABLET ORAL at 07:38

## 2022-12-23 RX ADMIN — IPRATROPIUM BROMIDE AND ALBUTEROL SULFATE 3 ML: .5; 3 SOLUTION RESPIRATORY (INHALATION) at 16:13

## 2022-12-23 RX ADMIN — Medication 1 MG: at 20:47

## 2022-12-23 RX ADMIN — HEPARIN SODIUM 5000 UNITS: 5000 INJECTION, SOLUTION INTRAVENOUS; SUBCUTANEOUS at 20:48

## 2022-12-23 RX ADMIN — FERROUS SULFATE TAB 325 MG (65 MG ELEMENTAL FE) 325 MG: 325 (65 FE) TAB at 07:38

## 2022-12-23 RX ADMIN — DEXAMETHASONE SODIUM PHOSPHATE 10 MG: 10 INJECTION INTRAMUSCULAR; INTRAVENOUS at 10:18

## 2022-12-23 RX ADMIN — BUSPIRONE HYDROCHLORIDE 10 MG: 10 TABLET ORAL at 07:38

## 2022-12-23 RX ADMIN — BUSPIRONE HYDROCHLORIDE 10 MG: 10 TABLET ORAL at 20:47

## 2022-12-23 RX ADMIN — IPRATROPIUM BROMIDE AND ALBUTEROL 1 PUFF: 20; 100 SPRAY, METERED RESPIRATORY (INHALATION) at 07:38

## 2022-12-23 RX ADMIN — IPRATROPIUM BROMIDE AND ALBUTEROL SULFATE 3 ML: .5; 3 SOLUTION RESPIRATORY (INHALATION) at 20:56

## 2022-12-23 ASSESSMENT — ACTIVITIES OF DAILY LIVING (ADL)
ADLS_ACUITY_SCORE: 31
ADLS_ACUITY_SCORE: 27
ADLS_ACUITY_SCORE: 31
ADLS_ACUITY_SCORE: 27
ADLS_ACUITY_SCORE: 31

## 2022-12-23 NOTE — PROGRESS NOTES
Vital signs:  Temp: 97.6  F (36.4  C) Temp src: Oral BP: (!) 158/72 Pulse: 84   Resp: 20 SpO2: 97 % O2 Device: Nasal cannula 2L   Weight: 90.7 kg (200 lb)    Patient has all labs sent.  Regular diet ordered and patient eating dinner.  Report given to Freddy [facility]ARR[facility] on [facility] and patient transport put in for her to go up to them for inpatient care.

## 2022-12-23 NOTE — PLAN OF CARE
Shift Note 1900 - 0700  Pt alert on 3L NC and refusing BiPap. Was compliant for about 15 minutes overnight.   MD notified of critical CO2 overnight, no further interventions at this time unless increase in o2 need.  Pt more lethargic this AM and unwilling to answer questions. VSS.    All comfort and safety measures are in place.  Will continue to monitor pt for the remainder of the shift.    Goal Outcome Evaluation:      Plan of Care Reviewed With: patient    Overall Patient Progress: no changeOverall Patient Progress: no change    Problem: Plan of Care - These are the overarching goals to be used throughout the patient stay.    Goal: Plan of Care Review  Description: The Plan of Care Review/Shift note should be completed every shift.  The Outcome Evaluation is a brief statement about your assessment that the patient is improving, declining, or no change.  This information will be displayed automatically on your shift note.  Outcome: Progressing  Flowsheets (Taken 12/22/2022 9165)  Plan of Care Reviewed With: patient  Overall Patient Progress: no change  Goal: Optimal Comfort and Wellbeing  Outcome: Progressing     Problem: Fall Injury Risk  Goal: Absence of Fall and Fall-Related Injury  Outcome: Progressing  Intervention: Identify and Manage Contributors  Recent Flowsheet Documentation  Taken 12/22/2022 2000 by Sade Greenwood, RN  Medication Review/Management: medications reviewed  Intervention: Promote Injury-Free Environment  Recent Flowsheet Documentation  Taken 12/22/2022 2000 by Sade Greenwood RN  Safety Promotion/Fall Prevention:   activity supervised   bed alarm on   clutter free environment maintained   fall prevention program maintained   increased rounding and observation   increase visualization of patient   lighting adjusted   nonskid shoes/slippers when out of bed   patient and family education   room door open   room near nurse's station   room organization consistent   safety round/check  completed

## 2022-12-23 NOTE — PHARMACY-ADMISSION MEDICATION HISTORY
Admission Medication History Completed by Pharmacy    See [facility] Admission Navigator for allergy information, preferred outpatient pharmacy, prior to admission medications and immunization status.     Medication History Sources:     Nursing home MAR    Changes made to PTA medication list (reason):    Added: benzonatate, budesonide neb, melatonin, Miralax, senna    Deleted: None    Changed: None    Additional Information:    None    Prior to Admission medications    Medication Sig Last Dose Taking? Auth Provider Long Term End Date   atorvastatin (LIPITOR) 40 MG tablet Take 1 tablet (40 mg) by mouth daily 12/22/2022 Yes Lani San MD Yes    azelastine (ASTELIN) 0.1 % nasal spray Spray 1 spray into both nostrils 2 times daily 12/22/2022 Yes Lani San MD     benzonatate (TESSALON) 200 MG capsule Take 200 mg by mouth 3 times daily as needed for cough  Yes Unknown, Entered By History     budesonide (PULMICORT) 1 MG/2ML neb solution Take 2 mg by nebulization every 8 hours  Yes Unknown, Entered By History No    busPIRone (BUSPAR) 10 MG tablet Take 1 tablet (10 mg) by mouth 3 times daily 12/22/2022 Yes Lani San MD Yes    Emollient (CERAVE) CREA Please apply twice daily to dry skin of body and feet 12/22/2022 Yes Lani San MD     ferrous sulfate (FEROSUL) 325 (65 Fe) MG tablet Take 1 tablet (325 mg) by mouth Every Mon, Wed, Fri Morning Past Week Yes Lani San MD     folic acid (FOLVITE) 400 MCG tablet Take 1 tablet (400 mcg) by mouth daily 12/22/2022 Yes Favian Sahu MD     ipratropium-albuterol (COMBIVENT RESPIMAT)  MCG/ACT inhaler Inhale 1 puff into the lungs 4 times daily as needed for shortness of breath / dyspnea or wheezing  at PRN Yes Lani San MD Yes    latanoprost (XALATAN) 0.005 % ophthalmic solution Place 1 drop into both eyes daily Past Week Yes Lani San MD Yes    Lidocaine (LIDOCARE) 4 % Patch Place 2 patches onto the skin daily as  needed for moderate pain (4-6) (As needed for left chest wall pain) To prevent lidocaine toxicity, patient should be patch free for 12 hrs daily.  at PRN Yes Lani San MD     losartan-hydrochlorothiazide (HYZAAR) 100-25 MG tablet Take 1 tablet by mouth daily 12/22/2022 Yes Lani San MD Yes    melatonin 3 MG tablet Take 3 mg by mouth At Bedtime Past Week Yes Unknown, Entered By History     miconazole (MICATIN) 2 % external powder Apply topically 4 times daily 12/22/2022 Yes Lani San MD     polyethylene glycol (MIRALAX) 17 GM/Dose powder Take 1 capful by mouth 2 times daily  Yes Unknown, Entered By History     QUEtiapine (SEROQUEL) 300 MG tablet Take 0.5 tablets (150 mg) by mouth At Bedtime Past Week Yes Lani San MD Yes    sennosides (SENOKOT) 8.6 MG tablet Take 1 tablet by mouth 2 times daily  Yes Unknown, Entered By History     sertraline (ZOLOFT) 100 MG tablet Take 1 tablet (100 mg) by mouth daily 12/22/2022 Yes Lani San MD Yes    vitamin B complex with vitamin C (STRESS TAB) tablet Take 1 tablet by mouth daily 12/22/2022 Yes Lani San MD Yes    Vitamin D3 (CHOLECALCIFEROL) 25 mcg (1000 units) tablet Take 1 tablet (25 mcg) by mouth daily 12/22/2022 Yes Lani San MD       Date completed: 12/23/22    Medication history completed by: Lottie Blackman [facility]

## 2022-12-23 NOTE — PROGRESS NOTES
SPIRITUAL HEALTH SERVICES Progress Note  [facility] 6b     connected with nursing, nursing informed this  that now was not a good time to visit pt due to pt wearing respirator. Nursing requested that  not visit at this time    SHS to follow up as able    Greg Chen MDiv  Chaplain Resident  Pager [phone]    * [facility] remains available 24/7 for emergent requests/referrals, either by having the switchboard page the on-call  or by entering an ASAP/STAT consult in Epic (this will also page the on-call ). Routine Epic consults receive an initial response within 24 hours.*

## 2022-12-23 NOTE — PROVIDER NOTIFICATION
-------------------CRITICAL LAB VALUE-------------------    Critical lab result: CO2 76, ph 7.34, O2 60   Time of notification from lab: 1200  Time lab value reported to provider: 1207   Patient status: Patient lethargic, arouses to touch and pain, falls back to sleep quickly. Bipap setting 24/5, 45% FIO2. Respirations 24 by bipap, patient not triggering breaths.   Temp:  [97.5  F (36.4  C)-98.4  F (36.9  C)] 97.5  F (36.4  C)  Pulse:  [] 68  Resp:  [20-26] 24  BP: (116-158)/() 116/66  FiO2 (%):  [40 %] 40 %  SpO2:  [96 %-100 %] 100 %

## 2022-12-23 NOTE — PROGRESS NOTES
Patient with severe restrictive lung disease followed by Dr Taylor admitted to Medicine yesterday with hypoxemia and hypercapania associated with Influenza A infection.  Min improvement on bipap now up to 25/5    Venous Blood Gas  Recent Labs   Lab 12/23/22  1140 12/23/22  1050 12/23/22  0940 12/23/22  0813 12/22/22 2009   PHV  --  7.28* 7.22* 7.21* 7.32   PCO2V  --  91* 105* 106* 83*   PO2V  --  44 27 26 50*   HCO3V  --  43* 43* 42* 43*   ROBBY  --  11.8* 12.7* 11.7* 15.1*   O2PER 30 40 40 40 3     Patient is full code.      A:  resp failure some improvement on bipap but patient remains encephalopathic.  Should be transferred to ICU for possible intubation but there is no current ICU bed.  If patient worsens then needs emergency intubation and then will have be managed in the PACU or elsewhere.  25/5 is close to upper limit of bipap.

## 2022-12-23 NOTE — PROGRESS NOTES
[facility]    Progress Note - Medicine Service, IVIS TEAM 2       Date of Admission:  12/22/2022    Assessment & Plan   Ca Yan is a 77 year old female who has a hx of previous breast cancer s/p lumpectomy, GERD, HTN, HLD, chronic lower extremity lymphedema, cluster C personality disorder, CKD, restrictive lung disease and COPD (FEV1/FVC 0.71), and known restrictive lung disease.  Presenting with acute on chronic hypercarbic respiratory failure, increased WOB, and reported confusion found to have flu A infection, NUNU and possible UTI. Overnight developed worsening hypercarbic respiratory failure.      #Influenza A infection  #Acute on Chronic Hypoxic Hypercapnic Respiratory Failure-worsening  #Restrictive Lung Disease   #Chronic Respiratory acidosis with compensated metabolic alkalosis  Significant restrictive lung dz due to thoracic cage abnormality in addition to deconditioning and prior smoking history. Also likely some obstructive disease. Pt with history of chronic CO2 retention, has seen pulmonology in the past and reportedly declined NIPPV. Likely has chronic compensated respiratory acidosis.  Influenza infection likely contributing to some of her symptoms. Overnight developed worsening hypercapnea. Unclear why. No meds to really depress resp drive. CXR unchanged. Did not get aggressive fluid resuscitation (500ml overnight) and no hx HFrEF. Gasses and mentation have improved today with BiPap. Will tx for possible superimposed CAP and add IV steroids for COPD exacerbation tx.   -Tamiflu 30 mg twice daily  -DuoNeb QID sched  -Bipap per RT  -BCx 12/23  -ceftriaxone 12/22-  -doxycycline 12/23-  -IV steroids dexamethasone 12/23-  -follow VBG/ABG closely      #NUNU   Creatinine 1.1 (baseline around 0.8), BUN 41 (baseline 20s).  NUNU likely prerenal; in addition possibly related to UTI as well.  500cc fluid bolus on admit. Labs essentially unchanged.   -AM  BMP     #Possible UTI  There is some concern at the TCU that patient was confused.  In addition she reports intermittent suprapubic pain.  UA obtained in the ED consistent with UTI.  Taken together this is enough for me to treat for UTI.  -follow UCx results  -abx as above     #Chronic macrocytic anemia  #Possible plasma cell disorder  Over the last several months has developed a mild macrocytic anemia.  B12 and folate levels have been checked and are normal.  Seems the prior providers were concerned about multiple myeloma.  During prior hospitalization lab work was obtained including immunoglobulin levels and protein electrophoresis; this showed a monoclonal spike. Skeletal XR with left iliac wing lucency thought to be overlying bowel gas but difficult to completely characterize, no additional lesions identified. Prior iron levels were normal three months ago along with normal ferritin.  Wonder if there is a component of multiple myeloma contributing to worsening kidney function.  - Continue oral iron supplementation MWF     #Generalized Anxiety Disorder  #Cognitive impairment  #Hx of AUD  - Buspirone 10 mg PO TID  - Continue PTA Seroquel, 150 mg PO at bedtime pending improvement in mentation today     #Depression  - Continue PTA sertraline 100mg daily      #Hypertension  - Hold PTA Losartan/HCTZ 100-25 mg PO daily given NUNU     #HLD  - Continue PTA atorvastatin 40 mg PO daily     #Glaucoma  - PTA latanoprost 1 drop each eye at bedtime     # Vitamin Deficiency  - Continue PTA folic acid 400 mcg daily  - Continue vitamin B complex with C 1 tablet daily  - Continue vitamin D3 25 mcg daily      Diet: NPO for Medical/Clinical Reasons Except for: Meds    DVT Prophylaxis: Heparin SQ  Lima Catheter: Not present  Fluids: none  Central Lines: None  Cardiac Monitoring: None  Code Status: Full Code      Disposition Plan      Expected Discharge Date: 12/24/2022                The patient's care was discussed with the  "Attending Physician, Dr. Blankenship.    Anish Trujillo MD  Medicine Service, [facility] TEAM 2  M [facility]  Securely message with the Vocera Web Console (learn more here)  Text page via [facility] Paging/Directory   Please see signed in provider for up to date coverage information      Clinically Significant Risk Factors Present on Admission                  # Hypertension: home medication list includes antihypertensive(s)   # Acute Respiratory Failure: based on blood gas results.  Continue supplemental oxygen as needed     # Obesity: Estimated body mass index is 35.43 kg/m  as calculated from the following:    Height as of 11/24/22: 1.6 m (5' 3\").    Weight as of this encounter: 90.7 kg (200 lb).           ______________________________________________________________________    Interval History   VBG unchanged yesterday evening. Declined/did not tolerate BIpAP overnight. Only wore 15min. This AM lethargic, needing sternal rub to rouse per nursing. I saw pt shortly after Bipap started in AM. Unable to get more history from her, but she is following commands and interacting with me.    Data reviewed today: I reviewed all medications, new labs and imaging results over the last 24 hours.     Physical Exam   Vital Signs: Temp: 97.5  F (36.4  C) Temp src: Axillary BP: 116/66 Pulse: 68   Resp: 24 SpO2: 100 % O2 Device: Nasal cannula Oxygen Delivery: 3 LPM  Weight: 200 lbs 0 oz  General Appearance: eyes closed, open to voice, follows commands  HEENT: Dry lips, sclera clear  Respiratory: mild tachypnea on Bipap, coarse breath sounds bilaterally  Cardiovascular: Regular rate and rhythm, no murmurs rubs or gallops  GI: Soft, nontender, nondistended  Skin: No rashes on exposed skin  Neurologic: wiggles toes, squeezes hands on command    Data   Recent Labs   Lab 12/23/22  0607 12/22/22  1551   WBC 5.9 8.6   HGB 8.3* 9.6*   * 106*    202    141   POTASSIUM 4.1 3.9   CHLORIDE 99 " 95*   CO2 36* 35*   BUN 39.8* 41.1*   CR 1.04* 1.10*  1.10*   ANIONGAP 8 11   SYMONE 8.7* 8.9   * 110*     Recent Results (from the past 24 hour(s))   Chest XR,  PA & LAT    Narrative    Exam: XR CHEST 2 VIEWS, 12/22/2022 5:18 PM    Indication: cough and dyspnea    Comparison: 11/26/2022    Findings:   AP and lateral views of the chest. Cardiomediastinal silhouettes is  within normal limits. Asymmetric inflation of the lungs with  hypoinflated right lung similar to previous. Decreased right pleural  effusion and dense right perihilar and basilar opacities. Mild  increased interstitial opacities bilaterally with increased hazy  attenuation of the left lung. Stable rightward mediastinal shift. No  new focal consolidative opacity. Dextroscoliosis of the thoracic  spine.      Impression    Impression: Improved appearance of the right lung compared to  11/26/2022. Mild diffuse interstitial opacities bilaterally favored to  represent edema. Infection cannot be excluded.    I have personally reviewed the examination and initial interpretation  and I agree with the findings.    MICHAEL MCGRATH MD         SYSTEM ID:  [id number]   XR Chest Port 1 View    Impression    RESIDENT PRELIMINARY INTERPRETATION  IMPRESSION:   1. Bilateral diffuse mixed pulmonary opacities, not significantly  changed from prior.  2. Stable small right pleural effusion.

## 2022-12-23 NOTE — PROVIDER NOTIFICATION
-------------------CRITICAL LAB VALUE-------------------    Critical lab result: CO2 106  Time of notification from lab: 0822  Results given with read-back to (provider):  Time lab value reported to provider: reported to primary RN who will page the team regarding results  Patient status:  Temp:  [97.6  F (36.4  C)-98.4  F (36.9  C)] 98.4  F (36.9  C)  Pulse:  [] 83  Resp:  [20-26] 26  BP: (122-158)/() 127/100  SpO2:  [96 %-100 %] 99 %

## 2022-12-23 NOTE — PLAN OF CARE
Goal Outcome Evaluation:                      Admission          12/22/2022  2:15 PM  -----------------------------------------------------------  Reason for admission:  Primary team notified of pt arrival.  Admitted from:  Via: stretcher  Accompanied by: employee  Belongings: Placed in closet  Admission Profile: complete  Teaching: orientation to unit and call light- call light within reach, call don't fall, use of console, meal times, when to call for the RN, and enforced importance of safety   Access: PIV  Telemetry:Placed on pt  Ht./Wt.: complete  Code Status verified on armband: yes  2 RN Skin Assessment Completed By: Jw  Med Rec completed: yes  Bed surface reassessed with algorithm and charted: yes  New bed surface ordered: yno  Suction/Ambu bag/Flowmeter at bedside: yes  Is patient having diarrhea upon admission- if YES fill out testing algorithm : no    C. Diff Testing Algorithm (MUST be marked YES)   3 or more loose stools in 24 hrs. [] Yes [x] No       Additional symptoms:(At least ONE must be marked yes)   Abdominal pain/discomfort [] Yes [x] No   Fever at least 38C (100.4 F) [] Yes [x] No   Elevated WBC(>11,000) [] Yes [x] No       Exclusion Criteria:  (MUST be marked YES)   Off laxatives for at least 48 hrs. [] Yes [x] No       Pt status:    Temp:  [97.6  F (36.4  C)] 97.6  F (36.4  C)  Pulse:  [84] 84  Resp:  [20] 20  BP: (158)/(72) 158/72  SpO2:  [97 %] 97 %

## 2022-12-24 LAB
ANION GAP SERPL CALCULATED.3IONS-SCNC: 9 MMOL/L (ref 7–15)
BACTERIA UR CULT: NORMAL
BASE EXCESS BLDV CALC-SCNC: 13.1 MMOL/L (ref -7.7–1.9)
BASE EXCESS BLDV CALC-SCNC: 14 MMOL/L (ref -7.7–1.9)
BASE EXCESS BLDV CALC-SCNC: 14 MMOL/L (ref -7.7–1.9)
BASE EXCESS BLDV CALC-SCNC: 14.3 MMOL/L (ref -7.7–1.9)
BASE EXCESS BLDV CALC-SCNC: 14.3 MMOL/L (ref -7.7–1.9)
BASE EXCESS BLDV CALC-SCNC: 14.6 MMOL/L (ref -7.7–1.9)
BUN SERPL-MCNC: 44.8 MG/DL (ref 8–23)
CALCIUM SERPL-MCNC: 8.8 MG/DL (ref 8.8–10.2)
CHLORIDE SERPL-SCNC: 100 MMOL/L (ref 98–107)
CREAT SERPL-MCNC: 0.89 MG/DL (ref 0.51–0.95)
DEPRECATED HCO3 PLAS-SCNC: 35 MMOL/L (ref 22–29)
ENTEROCOCCUS FAECALIS: NOT DETECTED
ENTEROCOCCUS FAECIUM: NOT DETECTED
ERYTHROCYTE [DISTWIDTH] IN BLOOD BY AUTOMATED COUNT: 13.4 % (ref 10–15)
GFR SERPL CREATININE-BSD FRML MDRD: 66 ML/MIN/1.73M2
GLUCOSE SERPL-MCNC: 128 MG/DL (ref 70–99)
HCO3 BLDV-SCNC: 42 MMOL/L (ref 21–28)
HCO3 BLDV-SCNC: 43 MMOL/L (ref 21–28)
HCO3 BLDV-SCNC: 44 MMOL/L (ref 21–28)
HCT VFR BLD AUTO: 29.5 % (ref 35–47)
HGB BLD-MCNC: 8.3 G/DL (ref 11.7–15.7)
LISTERIA SPECIES (DETECTED/NOT DETECTED): NOT DETECTED
MCH RBC QN AUTO: 29.9 PG (ref 26.5–33)
MCHC RBC AUTO-ENTMCNC: 28.1 G/DL (ref 31.5–36.5)
MCV RBC AUTO: 106 FL (ref 78–100)
MRSA DNA SPEC QL NAA+PROBE: NEGATIVE
O2/TOTAL GAS SETTING VFR VENT: 35 %
O2/TOTAL GAS SETTING VFR VENT: 40 %
O2/TOTAL GAS SETTING VFR VENT: 97 %
PCO2 BLDV: 74 MM HG (ref 40–50)
PCO2 BLDV: 77 MM HG (ref 40–50)
PCO2 BLDV: 83 MM HG (ref 40–50)
PCO2 BLDV: 88 MM HG (ref 40–50)
PH BLDV: 7.31 [PH] (ref 7.32–7.43)
PH BLDV: 7.32 [PH] (ref 7.32–7.43)
PH BLDV: 7.34 [PH] (ref 7.32–7.43)
PH BLDV: 7.34 [PH] (ref 7.32–7.43)
PH BLDV: 7.35 [PH] (ref 7.32–7.43)
PH BLDV: 7.37 [PH] (ref 7.32–7.43)
PLATELET # BLD AUTO: 177 10E3/UL (ref 150–450)
PO2 BLDV: 24 MM HG (ref 25–47)
PO2 BLDV: 35 MM HG (ref 25–47)
PO2 BLDV: 35 MM HG (ref 25–47)
PO2 BLDV: 40 MM HG (ref 25–47)
PO2 BLDV: 57 MM HG (ref 25–47)
PO2 BLDV: 60 MM HG (ref 25–47)
POTASSIUM SERPL-SCNC: 4 MMOL/L (ref 3.4–5.3)
RBC # BLD AUTO: 2.78 10E6/UL (ref 3.8–5.2)
SA TARGET DNA: NEGATIVE
SODIUM SERPL-SCNC: 144 MMOL/L (ref 136–145)
STAPHYLOCOCCUS AUREUS: NOT DETECTED
STAPHYLOCOCCUS EPIDERMIDIS: DETECTED
STAPHYLOCOCCUS LUGDUNENSIS: NOT DETECTED
STREPTOCOCCUS AGALACTIAE: NOT DETECTED
STREPTOCOCCUS ANGINOSUS GROUP: NOT DETECTED
STREPTOCOCCUS PNEUMONIAE: NOT DETECTED
STREPTOCOCCUS PYOGENES: NOT DETECTED
STREPTOCOCCUS SPECIES: NOT DETECTED
WBC # BLD AUTO: 4.8 10E3/UL (ref 4–11)

## 2022-12-24 PROCEDURE — 99207 PR CDG-CUT & PASTE-POTENTIAL IMPACT ON LEVEL: CPT | Performed by: STUDENT IN AN ORGANIZED HEALTH CARE EDUCATION/TRAINING PROGRAM

## 2022-12-24 PROCEDURE — 82803 BLOOD GASES ANY COMBINATION: CPT

## 2022-12-24 PROCEDURE — 36415 COLL VENOUS BLD VENIPUNCTURE: CPT

## 2022-12-24 PROCEDURE — 80048 BASIC METABOLIC PNL TOTAL CA: CPT | Performed by: STUDENT IN AN ORGANIZED HEALTH CARE EDUCATION/TRAINING PROGRAM

## 2022-12-24 PROCEDURE — 36415 COLL VENOUS BLD VENIPUNCTURE: CPT | Performed by: STUDENT IN AN ORGANIZED HEALTH CARE EDUCATION/TRAINING PROGRAM

## 2022-12-24 PROCEDURE — 120N000003 HC R&B IMCU UMMC

## 2022-12-24 PROCEDURE — 87641 MR-STAPH DNA AMP PROBE: CPT | Performed by: STUDENT IN AN ORGANIZED HEALTH CARE EDUCATION/TRAINING PROGRAM

## 2022-12-24 PROCEDURE — 250N000013 HC RX MED GY IP 250 OP 250 PS 637: Performed by: STUDENT IN AN ORGANIZED HEALTH CARE EDUCATION/TRAINING PROGRAM

## 2022-12-24 PROCEDURE — 85014 HEMATOCRIT: CPT | Performed by: STUDENT IN AN ORGANIZED HEALTH CARE EDUCATION/TRAINING PROGRAM

## 2022-12-24 PROCEDURE — 94640 AIRWAY INHALATION TREATMENT: CPT

## 2022-12-24 PROCEDURE — 250N000011 HC RX IP 250 OP 636: Performed by: STUDENT IN AN ORGANIZED HEALTH CARE EDUCATION/TRAINING PROGRAM

## 2022-12-24 PROCEDURE — 258N000003 HC RX IP 258 OP 636: Performed by: STUDENT IN AN ORGANIZED HEALTH CARE EDUCATION/TRAINING PROGRAM

## 2022-12-24 PROCEDURE — 94660 CPAP INITIATION&MGMT: CPT

## 2022-12-24 PROCEDURE — 999N000157 HC STATISTIC RCP TIME EA 10 MIN

## 2022-12-24 PROCEDURE — 82803 BLOOD GASES ANY COMBINATION: CPT | Performed by: STUDENT IN AN ORGANIZED HEALTH CARE EDUCATION/TRAINING PROGRAM

## 2022-12-24 PROCEDURE — 999N000127 HC STATISTIC PERIPHERAL IV START W US GUIDANCE

## 2022-12-24 PROCEDURE — 94640 AIRWAY INHALATION TREATMENT: CPT | Mod: 76

## 2022-12-24 PROCEDURE — 250N000009 HC RX 250: Performed by: STUDENT IN AN ORGANIZED HEALTH CARE EDUCATION/TRAINING PROGRAM

## 2022-12-24 PROCEDURE — 87077 CULTURE AEROBIC IDENTIFY: CPT | Performed by: STUDENT IN AN ORGANIZED HEALTH CARE EDUCATION/TRAINING PROGRAM

## 2022-12-24 PROCEDURE — 99233 SBSQ HOSP IP/OBS HIGH 50: CPT | Mod: GC | Performed by: STUDENT IN AN ORGANIZED HEALTH CARE EDUCATION/TRAINING PROGRAM

## 2022-12-24 RX ADMIN — ACETAMINOPHEN 650 MG: 325 TABLET, FILM COATED ORAL at 21:43

## 2022-12-24 RX ADMIN — VANCOMYCIN HYDROCHLORIDE 750 MG: 1 INJECTION, POWDER, LYOPHILIZED, FOR SOLUTION INTRAVENOUS at 23:59

## 2022-12-24 RX ADMIN — DEXAMETHASONE SODIUM PHOSPHATE 6 MG: 10 INJECTION INTRAMUSCULAR; INTRAVENOUS at 07:31

## 2022-12-24 RX ADMIN — OSELTAMIVIR PHOSPHATE 30 MG: 30 CAPSULE ORAL at 20:02

## 2022-12-24 RX ADMIN — LATANOPROST 1 DROP: 50 SOLUTION/ DROPS OPHTHALMIC at 20:03

## 2022-12-24 RX ADMIN — Medication 1 MG: at 20:02

## 2022-12-24 RX ADMIN — IPRATROPIUM BROMIDE AND ALBUTEROL SULFATE 3 ML: .5; 3 SOLUTION RESPIRATORY (INHALATION) at 15:41

## 2022-12-24 RX ADMIN — OSELTAMIVIR PHOSPHATE 30 MG: 30 CAPSULE ORAL at 07:30

## 2022-12-24 RX ADMIN — Medication 150 MG: at 21:43

## 2022-12-24 RX ADMIN — BUSPIRONE HYDROCHLORIDE 10 MG: 10 TABLET ORAL at 13:09

## 2022-12-24 RX ADMIN — VANCOMYCIN HYDROCHLORIDE 750 MG: 1 INJECTION, POWDER, LYOPHILIZED, FOR SOLUTION INTRAVENOUS at 11:50

## 2022-12-24 RX ADMIN — IPRATROPIUM BROMIDE AND ALBUTEROL SULFATE 3 ML: .5; 3 SOLUTION RESPIRATORY (INHALATION) at 11:33

## 2022-12-24 RX ADMIN — Medication 400 MCG: at 07:30

## 2022-12-24 RX ADMIN — B-COMPLEX W/ C & FOLIC ACID TAB 1 TABLET: TAB at 07:31

## 2022-12-24 RX ADMIN — DOXYCYCLINE 100 MG: 100 INJECTION, POWDER, LYOPHILIZED, FOR SOLUTION INTRAVENOUS at 21:43

## 2022-12-24 RX ADMIN — SERTRALINE HYDROCHLORIDE 100 MG: 100 TABLET ORAL at 07:31

## 2022-12-24 RX ADMIN — CEFTRIAXONE SODIUM 1 G: 1 INJECTION, POWDER, FOR SOLUTION INTRAMUSCULAR; INTRAVENOUS at 20:07

## 2022-12-24 RX ADMIN — BUSPIRONE HYDROCHLORIDE 10 MG: 10 TABLET ORAL at 20:02

## 2022-12-24 RX ADMIN — IPRATROPIUM BROMIDE AND ALBUTEROL SULFATE 3 ML: .5; 3 SOLUTION RESPIRATORY (INHALATION) at 20:44

## 2022-12-24 RX ADMIN — BUSPIRONE HYDROCHLORIDE 10 MG: 10 TABLET ORAL at 07:31

## 2022-12-24 RX ADMIN — HEPARIN SODIUM 5000 UNITS: 5000 INJECTION, SOLUTION INTRAVENOUS; SUBCUTANEOUS at 07:31

## 2022-12-24 RX ADMIN — HEPARIN SODIUM 5000 UNITS: 5000 INJECTION, SOLUTION INTRAVENOUS; SUBCUTANEOUS at 20:04

## 2022-12-24 RX ADMIN — ATORVASTATIN CALCIUM 40 MG: 40 TABLET, FILM COATED ORAL at 07:31

## 2022-12-24 RX ADMIN — Medication 25 MCG: at 07:31

## 2022-12-24 RX ADMIN — IPRATROPIUM BROMIDE AND ALBUTEROL SULFATE 3 ML: .5; 3 SOLUTION RESPIRATORY (INHALATION) at 08:03

## 2022-12-24 RX ADMIN — DOXYCYCLINE 100 MG: 100 INJECTION, POWDER, LYOPHILIZED, FOR SOLUTION INTRAVENOUS at 08:43

## 2022-12-24 ASSESSMENT — ACTIVITIES OF DAILY LIVING (ADL)
ADLS_ACUITY_SCORE: 31
ADLS_ACUITY_SCORE: 33
ADLS_ACUITY_SCORE: 31

## 2022-12-24 NOTE — PLAN OF CARE
Neuro: Alert, occasionally disoriented to time and situation.   Cardiac: No tele, VSS.   Respiratory: Pt agreeable to bipap at 40% from 10pm~5am. Q6 VBGs, see results. Now on HFNC 60% 35L. Bipap off this AM, team aware.   GI/: Adequate urine output via purewick. No BM this shift.  Diet/appetite: Tolerating reg diet. Eating well.  Activity:  Turns self in bed, not oob this shift  Pain: At acceptable level on current regimen.   Skin: No new deficits noted.  LDA's: L and R PIV (SL)   Plan: Continue with POC. Notify primary team with changes.   Pt with coughing episode this AM after drinking water, lung sounds remain coarse. Kristan team paged, no interventions at this time.

## 2022-12-24 NOTE — PHARMACY-VANCOMYCIN DOSING SERVICE
Pharmacy Vancomycin Initial Note  Date of Service 2022  Patient's  1945  77 year old, female    Indication: Bacteremia    Current estimated CrCl = Estimated Creatinine Clearance: 54.8 mL/min (based on SCr of 0.89 mg/dL).    Creatinine for last 3 days  2022:  3:51 PM Creatinine 1.10 mg/dL;  3:51 PM Creatinine 1.10 mg/dL  2022:  6:07 AM Creatinine 1.04 mg/dL  2022:  5:33 AM Creatinine 0.89 mg/dL    Recent Vancomycin Level(s) for last 3 days  No results found for requested labs within last 72 hours.      Vancomycin IV Administrations (past 72 hours)      No vancomycin orders with administrations in past 72 hours.                Nephrotoxins and other renal medications (From now, onward)    None          Contrast Orders - past 72 hours (72h ago, onward)    None          [employer] Prediction of Planned Initial Vancomycin Regimen  Loading dose: N/A  Regimen: 750 mg IV every 12 hours.  Start time: 09:33 on 2022  Exposure target: AUC24 (range)400-600 mg/L.hr   AUC24,ss: 472 mg/L.hr  Probability of AUC24 > 400: 68 %  Ctrough,ss: 16.2 mg/L  Probability of Ctrough,ss > 20: 29 %  Probability of nephrotoxicity (Lodise LAKHWINDER ): 12 %        Plan:  1. Start vancomycin  750 mg IV q12h.   2. Vancomycin monitoring method: AUC  3. Vancomycin therapeutic monitoring goal: 400-600 mg*h/L  4. Pharmacy will check vancomycin levels as appropriate in 1-3 Days.    5. Serum creatinine levels will be ordered daily for the first week of therapy and at least twice weekly for subsequent weeks.      [facility]

## 2022-12-24 NOTE — PROVIDER NOTIFICATION
"Time of notification: 2:45 PM  Provider notified: Sonia Busch MD   Patient status: CO2 level 88. Patient was placed on bipap after MD to bedside to discuss the need for the bipap. After 25 minutes on bipap patient took bipap off. RN discussed at length again the need for the bipap and the risk of intubation if she does not wear it. Patient continues to refuse bipap stating \"I don't believe you. You're lying.\" Patient now on HFNC. MD aware. Will attempt bipap again in 45 minutes per discussion with MD. Will continue to monitor.      "

## 2022-12-24 NOTE — PLAN OF CARE
Neuro: Patient somnolent this morning, aroused to vigorous touch. Now alert, oriented x2-3.   Cardiac: HR 70's, no tele orders. BP's stable 110's-120's systolic. Afebrile.   Respiratory: Has been on bipap all day. Now currently on 60% HFNC, 40L. Taken off bipap at 6pm, VBG's improving, but plans to be on bipap throughout the night starting at 9pm. Patient currently agreeable to this plan.   GI/: Adequate urine output via purewick. No BM this shift.   Diet/appetite: Tolerating regular diet. Eating well this evening.   Activity: Turn q 2 hours in bed.   Pain: At acceptable level on current regimen.   Skin: No new deficits noted.  LDA's: Left and right PIV, TKO and LR infusing at 100ml/hr.   Plan: VBG's ordered q 6 hours. Continue with POC. Notify primary team with changes.

## 2022-12-24 NOTE — PROGRESS NOTES
[facility]    Progress Note - Medicine Service, MAROON TEAM 2       Date of Admission:  12/22/2022    Assessment & Plan   Ca Yan is a 77 year old female who has a hx of previous breast cancer s/p lumpectomy, GERD, HTN, HLD, chronic lower extremity lymphedema, cluster C personality disorder, CKD, restrictive lung disease and COPD (FEV1/FVC 0.71), and known restrictive lung disease.  Presenting with acute on chronic hypercarbic respiratory failure, increased WOB, and reported confusion found to have flu A infection, NUNU, possible UTI, and bacteremia. Patient's hypercapnic respiratory failure has been worsening in the setting of patient declining BIPAP.     Changes today:  - patient's PM VBG with worsening hypercarbia and acidosis. Patient very resistant to wearing BIPAP; agreed to alternate one hour on/one hour off (with HF NC O2) until PM VBG check. If worse, will resume continuous BIPAP.  - started vancomycin for MRSE bacteremia     #Influenza A infection  #Acute on Chronic Hypoxic Hypercapnic Respiratory Failure-worsening  #Restrictive Lung Disease   #Chronic Respiratory acidosis with compensated metabolic alkalosis  Significant restrictive lung dz due to thoracic cage abnormality in addition to deconditioning and prior smoking history. Also likely some obstructive disease. Pt with history of chronic CO2 retention, has seen pulmonology in the past and reportedly declined NIPPV. Likely has chronic compensated respiratory acidosis.  Influenza infection likely contributing to some of her symptoms. Overnight 12/22-12/23 developed worsening hypercapnea. Unclear why. No meds to really depress resp drive. CXR unchanged. Did not get aggressive fluid resuscitation (500ml overnight) and no hx HFrEF. Gasses and mentation improved on 12/23 with BIPAP, though patient declining BIPAP on 12/24 due to associated discomfort. Will tx for possible superimposed CAP and add  IV steroids for COPD exacerbation tx.    -Tamiflu 30 mg twice daily  -DuoNeb QID sched  -Bipap per RT  -ceftriaxone (12/22-  -doxycycline (12/23-  -IV steroids dexamethasone (12/23-  -follow VBG/ABG closely    #Staph Epi Bacteremia  Blood cultures from 12/23 growing MRSE in 1/2 bottles. Patient does not have lines, catheters, prosthetic valves etc. Patient currently afebrile, normotensive, and does not have leukocytosis.   - vancomycin (12/24-)    #NUNU, resolved  Creatinine 1.1 (baseline around 0.8), BUN 41 (baseline 20s).  NUNU likely prerenal; in addition possibly related to UTI as well.  500cc fluid bolus on admit. Improved by 12/24.  -AM BMP     #Possible UTI  There is some concern at the TCU that patient was confused.  In addition she reports intermittent suprapubic pain.  UA obtained in the ED consistent with UTI.  Taken together this is enough for me to treat for UTI.  -follow UCx results  -abx as above     #Chronic macrocytic anemia  #Possible plasma cell disorder  Over the last several months has developed a mild macrocytic anemia.  B12 and folate levels have been checked and are normal.  Seems the prior providers were concerned about multiple myeloma.  During prior hospitalization lab work was obtained including immunoglobulin levels and protein electrophoresis; this showed a monoclonal spike. Skeletal XR with left iliac wing lucency thought to be overlying bowel gas but difficult to completely characterize, no additional lesions identified. Prior iron levels were normal three months ago along with normal ferritin.  Wonder if there is a component of multiple myeloma contributing to worsening kidney function.  - Continue oral iron supplementation MWF     #Generalized Anxiety Disorder  #Cognitive impairment  #Hx of AUD  - Buspirone 10 mg PO TID  - Continue PTA Seroquel, 150 mg PO at bedtime pending improvement in mentation today     #Depression  - Continue PTA sertraline 100mg daily      #Hypertension  - Hold  PTA Losartan/HCTZ 100-25 mg PO daily given NUNU     #HLD  - Continue PTA atorvastatin 40 mg PO daily     #Glaucoma  - PTA latanoprost 1 drop each eye at bedtime     # Vitamin Deficiency  - Continue PTA folic acid 400 mcg daily  - Continue vitamin B complex with C 1 tablet daily  - Continue vitamin D3 25 mcg daily      Diet: Regular Diet Adult    DVT Prophylaxis: Heparin SQ  Lima Catheter: Not present  Fluids: none  Central Lines: None  Cardiac Monitoring: None  Code Status: Full Code      Disposition Plan      Expected Discharge Date: 12/26/2022                The patient's care was discussed with the Attending Physician, Dr. Blankenship.    Sonia Busch MD  Medicine Service, [address]  Securely message with the Vocera Web Console (learn more here)  Text page via [facility] Paging/Directory   Please see signed in provider for up to date coverage information      Clinically Significant Risk Factors                                 ______________________________________________________________________    Interval History   VBG unchanged this morning, but worsened in the afternoon. Patient adamantly declining BIPAP due to discomfort. Discussed potential consequences of worsening hypercarbia. Patient agreed to alternate one hour of BIPAP with one hour of HF NC O2, then will recheck VBG in the PM - if worse, patient will resume continuous BIPAP. She denies fevers, chills, chest pain, worsening shortness of breath, abdominal pain, nausea, vomiting.    Data reviewed today: I reviewed all medications, new labs and imaging results over the last 24 hours.     Physical Exam   Vital Signs: Temp: 98  F (36.7  C) Temp src: Oral BP: 137/81 Pulse: 75   Resp: 24 SpO2: 98 % O2 Device: High Flow Nasal Cannula (HFNC) Oxygen Delivery: 35 LPM  Weight: 200 lbs 0 oz  General Appearance: alert, interactive, does not appear to be in acute distress  HEENT: Dry lips, sclera  clear  Respiratory: tachypneic on HF NC O2, coarse breath sounds bilaterally  Cardiovascular: Regular rate and rhythm, no murmurs rubs or gallops  GI: Soft, nontender, nondistended  Skin: No rashes on exposed skin  Neurologic: AAOx3, CN II-XII grossly intact    Data   Recent Labs   Lab 12/24/22  0533 12/23/22  0607 12/22/22  1551   WBC 4.8 5.9 8.6   HGB 8.3* 8.3* 9.6*   * 110* 106*    170 202    143 141   POTASSIUM 4.0 4.1 3.9   CHLORIDE 100 99 95*   CO2 35* 36* 35*   BUN 44.8* 39.8* 41.1*   CR 0.89 1.04* 1.10*  1.10*   ANIONGAP 9 8 11   SYMONE 8.8 8.7* 8.9   * 103* 110*     No results found for this or any previous visit (from the past 24 hour(s)).

## 2022-12-25 LAB
BASE EXCESS BLDV CALC-SCNC: 13.5 MMOL/L (ref -7.7–1.9)
BASE EXCESS BLDV CALC-SCNC: 13.8 MMOL/L (ref -7.7–1.9)
BASE EXCESS BLDV CALC-SCNC: 13.8 MMOL/L (ref -7.7–1.9)
BASE EXCESS BLDV CALC-SCNC: 14.3 MMOL/L (ref -7.7–1.9)
BASE EXCESS BLDV CALC-SCNC: 14.8 MMOL/L (ref -7.7–1.9)
BASE EXCESS BLDV CALC-SCNC: 15.1 MMOL/L (ref -7.7–1.9)
BASE EXCESS BLDV CALC-SCNC: 15.5 MMOL/L (ref -7.7–1.9)
ERYTHROCYTE [DISTWIDTH] IN BLOOD BY AUTOMATED COUNT: 13.7 % (ref 10–15)
HCO3 BLDV-SCNC: 41 MMOL/L (ref 21–28)
HCO3 BLDV-SCNC: 42 MMOL/L (ref 21–28)
HCO3 BLDV-SCNC: 43 MMOL/L (ref 21–28)
HCO3 BLDV-SCNC: 43 MMOL/L (ref 21–28)
HCT VFR BLD AUTO: 30.8 % (ref 35–47)
HGB BLD-MCNC: 8.9 G/DL (ref 11.7–15.7)
HOLD SPECIMEN: NORMAL
MCH RBC QN AUTO: 30.2 PG (ref 26.5–33)
MCHC RBC AUTO-ENTMCNC: 28.9 G/DL (ref 31.5–36.5)
MCV RBC AUTO: 104 FL (ref 78–100)
O2/TOTAL GAS SETTING VFR VENT: 40 %
PCO2 BLDV: 61 MM HG (ref 40–50)
PCO2 BLDV: 62 MM HG (ref 40–50)
PCO2 BLDV: 67 MM HG (ref 40–50)
PCO2 BLDV: 67 MM HG (ref 40–50)
PCO2 BLDV: 70 MM HG (ref 40–50)
PCO2 BLDV: 75 MM HG (ref 40–50)
PCO2 BLDV: 77 MM HG (ref 40–50)
PH BLDV: 7.36 [PH] (ref 7.32–7.43)
PH BLDV: 7.37 [PH] (ref 7.32–7.43)
PH BLDV: 7.39 [PH] (ref 7.32–7.43)
PH BLDV: 7.43 [PH] (ref 7.32–7.43)
PH BLDV: 7.43 [PH] (ref 7.32–7.43)
PLAT MORPH BLD: NORMAL
PLATELET # BLD AUTO: 80 10E3/UL (ref 150–450)
PO2 BLDV: 24 MM HG (ref 25–47)
PO2 BLDV: 31 MM HG (ref 25–47)
PO2 BLDV: 35 MM HG (ref 25–47)
PO2 BLDV: 46 MM HG (ref 25–47)
PO2 BLDV: 49 MM HG (ref 25–47)
PO2 BLDV: 50 MM HG (ref 25–47)
PO2 BLDV: 73 MM HG (ref 25–47)
RBC # BLD AUTO: 2.95 10E6/UL (ref 3.8–5.2)
RBC MORPH BLD: NORMAL
WBC # BLD AUTO: 6.7 10E3/UL (ref 4–11)

## 2022-12-25 PROCEDURE — 999N000128 HC STATISTIC PERIPHERAL IV START W/O US GUIDANCE

## 2022-12-25 PROCEDURE — 250N000011 HC RX IP 250 OP 636: Performed by: STUDENT IN AN ORGANIZED HEALTH CARE EDUCATION/TRAINING PROGRAM

## 2022-12-25 PROCEDURE — 82803 BLOOD GASES ANY COMBINATION: CPT | Performed by: STUDENT IN AN ORGANIZED HEALTH CARE EDUCATION/TRAINING PROGRAM

## 2022-12-25 PROCEDURE — 250N000013 HC RX MED GY IP 250 OP 250 PS 637: Performed by: STUDENT IN AN ORGANIZED HEALTH CARE EDUCATION/TRAINING PROGRAM

## 2022-12-25 PROCEDURE — 999N000127 HC STATISTIC PERIPHERAL IV START W US GUIDANCE

## 2022-12-25 PROCEDURE — 94640 AIRWAY INHALATION TREATMENT: CPT

## 2022-12-25 PROCEDURE — 87040 BLOOD CULTURE FOR BACTERIA: CPT | Performed by: STUDENT IN AN ORGANIZED HEALTH CARE EDUCATION/TRAINING PROGRAM

## 2022-12-25 PROCEDURE — 36415 COLL VENOUS BLD VENIPUNCTURE: CPT | Performed by: STUDENT IN AN ORGANIZED HEALTH CARE EDUCATION/TRAINING PROGRAM

## 2022-12-25 PROCEDURE — 999N000157 HC STATISTIC RCP TIME EA 10 MIN

## 2022-12-25 PROCEDURE — 250N000009 HC RX 250: Performed by: STUDENT IN AN ORGANIZED HEALTH CARE EDUCATION/TRAINING PROGRAM

## 2022-12-25 PROCEDURE — 258N000003 HC RX IP 258 OP 636: Performed by: STUDENT IN AN ORGANIZED HEALTH CARE EDUCATION/TRAINING PROGRAM

## 2022-12-25 PROCEDURE — 120N000003 HC R&B IMCU UMMC

## 2022-12-25 PROCEDURE — 85027 COMPLETE CBC AUTOMATED: CPT | Performed by: STUDENT IN AN ORGANIZED HEALTH CARE EDUCATION/TRAINING PROGRAM

## 2022-12-25 PROCEDURE — 99233 SBSQ HOSP IP/OBS HIGH 50: CPT | Mod: GC | Performed by: STUDENT IN AN ORGANIZED HEALTH CARE EDUCATION/TRAINING PROGRAM

## 2022-12-25 PROCEDURE — 94640 AIRWAY INHALATION TREATMENT: CPT | Mod: 76

## 2022-12-25 RX ORDER — LOSARTAN POTASSIUM AND HYDROCHLOROTHIAZIDE 25; 100 MG/1; MG/1
1 TABLET ORAL DAILY
Status: DISCONTINUED | OUTPATIENT
Start: 2022-12-25 | End: 2022-12-25

## 2022-12-25 RX ORDER — HYDROXYZINE HYDROCHLORIDE 25 MG/1
25 TABLET, FILM COATED ORAL ONCE
Status: COMPLETED | OUTPATIENT
Start: 2022-12-25 | End: 2022-12-25

## 2022-12-25 RX ORDER — HYDROXYZINE HYDROCHLORIDE 25 MG/1
25 TABLET, FILM COATED ORAL 3 TIMES DAILY PRN
Status: DISCONTINUED | OUTPATIENT
Start: 2022-12-25 | End: 2022-12-30 | Stop reason: HOSPADM

## 2022-12-25 RX ORDER — OLANZAPINE 5 MG/1
5 TABLET, ORALLY DISINTEGRATING ORAL 2 TIMES DAILY PRN
Status: DISCONTINUED | OUTPATIENT
Start: 2022-12-25 | End: 2022-12-30 | Stop reason: HOSPADM

## 2022-12-25 RX ORDER — LOSARTAN POTASSIUM AND HYDROCHLOROTHIAZIDE 25; 100 MG/1; MG/1
1 TABLET ORAL DAILY
Status: DISCONTINUED | OUTPATIENT
Start: 2022-12-26 | End: 2022-12-30 | Stop reason: HOSPADM

## 2022-12-25 RX ADMIN — Medication 150 MG: at 21:39

## 2022-12-25 RX ADMIN — BUSPIRONE HYDROCHLORIDE 10 MG: 10 TABLET ORAL at 19:54

## 2022-12-25 RX ADMIN — SERTRALINE HYDROCHLORIDE 100 MG: 100 TABLET ORAL at 07:39

## 2022-12-25 RX ADMIN — Medication 25 MCG: at 07:39

## 2022-12-25 RX ADMIN — DOXYCYCLINE 100 MG: 100 INJECTION, POWDER, LYOPHILIZED, FOR SOLUTION INTRAVENOUS at 21:38

## 2022-12-25 RX ADMIN — HEPARIN SODIUM 5000 UNITS: 5000 INJECTION, SOLUTION INTRAVENOUS; SUBCUTANEOUS at 19:56

## 2022-12-25 RX ADMIN — BUSPIRONE HYDROCHLORIDE 10 MG: 10 TABLET ORAL at 07:39

## 2022-12-25 RX ADMIN — IPRATROPIUM BROMIDE AND ALBUTEROL SULFATE 3 ML: .5; 3 SOLUTION RESPIRATORY (INHALATION) at 15:10

## 2022-12-25 RX ADMIN — IPRATROPIUM BROMIDE AND ALBUTEROL SULFATE 3 ML: .5; 3 SOLUTION RESPIRATORY (INHALATION) at 07:23

## 2022-12-25 RX ADMIN — OSELTAMIVIR PHOSPHATE 30 MG: 30 CAPSULE ORAL at 19:54

## 2022-12-25 RX ADMIN — VANCOMYCIN HYDROCHLORIDE 750 MG: 1 INJECTION, POWDER, LYOPHILIZED, FOR SOLUTION INTRAVENOUS at 10:50

## 2022-12-25 RX ADMIN — IPRATROPIUM BROMIDE AND ALBUTEROL SULFATE 3 ML: .5; 3 SOLUTION RESPIRATORY (INHALATION) at 11:18

## 2022-12-25 RX ADMIN — Medication 400 MCG: at 07:39

## 2022-12-25 RX ADMIN — HYDROXYZINE HYDROCHLORIDE 25 MG: 25 TABLET, FILM COATED ORAL at 10:46

## 2022-12-25 RX ADMIN — ACETAMINOPHEN 650 MG: 325 TABLET, FILM COATED ORAL at 17:45

## 2022-12-25 RX ADMIN — DOXYCYCLINE 100 MG: 100 INJECTION, POWDER, LYOPHILIZED, FOR SOLUTION INTRAVENOUS at 08:45

## 2022-12-25 RX ADMIN — B-COMPLEX W/ C & FOLIC ACID TAB 1 TABLET: TAB at 07:39

## 2022-12-25 RX ADMIN — HEPARIN SODIUM 5000 UNITS: 5000 INJECTION, SOLUTION INTRAVENOUS; SUBCUTANEOUS at 07:40

## 2022-12-25 RX ADMIN — BUSPIRONE HYDROCHLORIDE 10 MG: 10 TABLET ORAL at 14:14

## 2022-12-25 RX ADMIN — ATORVASTATIN CALCIUM 40 MG: 40 TABLET, FILM COATED ORAL at 07:39

## 2022-12-25 RX ADMIN — CEFTRIAXONE SODIUM 1 G: 1 INJECTION, POWDER, FOR SOLUTION INTRAMUSCULAR; INTRAVENOUS at 19:59

## 2022-12-25 RX ADMIN — OSELTAMIVIR PHOSPHATE 30 MG: 30 CAPSULE ORAL at 07:39

## 2022-12-25 RX ADMIN — Medication 1 MG: at 19:54

## 2022-12-25 RX ADMIN — OLANZAPINE 5 MG: 5 TABLET, ORALLY DISINTEGRATING ORAL at 12:18

## 2022-12-25 RX ADMIN — VANCOMYCIN HYDROCHLORIDE 750 MG: 1 INJECTION, POWDER, LYOPHILIZED, FOR SOLUTION INTRAVENOUS at 23:22

## 2022-12-25 RX ADMIN — LATANOPROST 1 DROP: 50 SOLUTION/ DROPS OPHTHALMIC at 19:55

## 2022-12-25 RX ADMIN — IPRATROPIUM BROMIDE AND ALBUTEROL SULFATE 3 ML: .5; 3 SOLUTION RESPIRATORY (INHALATION) at 20:28

## 2022-12-25 RX ADMIN — HYDROXYZINE HYDROCHLORIDE 25 MG: 25 TABLET, FILM COATED ORAL at 09:20

## 2022-12-25 RX ADMIN — DEXAMETHASONE SODIUM PHOSPHATE 6 MG: 10 INJECTION INTRAMUSCULAR; INTRAVENOUS at 07:40

## 2022-12-25 ASSESSMENT — ACTIVITIES OF DAILY LIVING (ADL)
ADLS_ACUITY_SCORE: 37
ADLS_ACUITY_SCORE: 35
ADLS_ACUITY_SCORE: 37
DEPENDENT_IADLS:: CLEANING;COOKING;LAUNDRY;SHOPPING;MEAL PREPARATION;MEDICATION MANAGEMENT;TRANSPORTATION
ADLS_ACUITY_SCORE: 35
ADLS_ACUITY_SCORE: 37
ADLS_ACUITY_SCORE: 37
ADLS_ACUITY_SCORE: 35
ADLS_ACUITY_SCORE: 35
ADLS_ACUITY_SCORE: 37
ADLS_ACUITY_SCORE: 37

## 2022-12-25 NOTE — PLAN OF CARE
"Neuro: Patient alert and oriented x4 throughout the shift. Forgetful. Agitated.   Cardiac: HR 70's, no tele orders. BP's stable. Afebrile.   Respiratory: Patient has refused bipap all day. RN has attempted hourly to have patient put bipap on. Last attempt conversation as follows: \"Ca if you are going to nap right now will you please put the bipap mask on to help your CO2 levels?\" \"I am so sick of hearing about this! Leave me alone!\" Patient currently on 55% HFNC at 35L. Lung sounds course. Dry cough present. Next VBG draw at 1800 tonight. Waiting for results.   GI/: Adequate urine output, now getting up to commode. 1 BM this shift.   Diet/appetite: Tolerating regular diet. Eating well.   Activity: Up to EOB and commode multiple times this shift. 1 assist.   Pain: At acceptable level on current regimen.   Skin: No new deficits noted.  LDA's: New right PIV placed, SL.   Plan: VBG's ordered q 6 hours. Continue with POC. Notify primary team with changes.   "

## 2022-12-25 NOTE — PROVIDER NOTIFICATION
"Provider notified: Kristan 2 Intern    0911- \"KATHRYN Yan 6B 2927- pt feeling anxious and requesting PRN, anything we can add? Thanks Brenna [id number]\"    Orders received: 25 mg atarax TID PRN    1032- \"KATHRYN Yan 6B 9252- gave PO atarax, pt still complaining of intense anxiety and is quite restless. requesting additional medications. can we do one time dose of something? Thanks Brenna [phone]\"      "

## 2022-12-25 NOTE — PROVIDER NOTIFICATION
"Provider notified: Kristan 2     1198- \"KATHRYN Yan 6B 6226- pt's /93. no PRNs ordered, do you want to treat? Thanks Brenna [id number]\"    Spoke to MD on the phone, no new orders at this time.  "

## 2022-12-25 NOTE — PROGRESS NOTES
[facility]    Progress Note - Medicine Service, MAROON TEAM 2       Date of Admission:  12/22/2022    Assessment & Plan   Ca Yan is a 77 year old female who has a hx of previous breast cancer s/p lumpectomy, GERD, HTN, HLD, chronic lower extremity lymphedema, cluster C personality disorder, CKD, restrictive lung disease and COPD (FEV1/FVC 0.71), and known restrictive lung disease.  Presenting with acute on chronic hypercarbic respiratory failure, increased WOB, and reported confusion found to have flu A infection, NUNU, possible UTI, and bacteremia. Patient's hypercapnic respiratory failure has been worsening in the setting of patient declining BIPAP. Now cultures c/w MRSE bacteremia.     Changes today:  - continue vancomycin for ADAM bacteremia, repeat BCx, ID consult tomorrow  - hydroxyzine prn anxiety  - completing 5d course steroids/ceftriaxone/doxycyline tomorrow     #Influenza A infection  #Acute on Chronic Hypoxic Hypercapnic Respiratory Failure-worsening  #Restrictive Lung Disease   #Chronic Respiratory acidosis with compensated metabolic alkalosis  Significant restrictive lung dz due to thoracic cage abnormality in addition to deconditioning and prior smoking history. Also likely some obstructive disease. Pt with history of chronic CO2 retention, has seen pulmonology in the past and reportedly declined NIPPV. Likely has chronic compensated respiratory acidosis.  Influenza infection likely contributing to some of her symptoms. Overnight 12/22-12/23 developed worsening hypercapnea. Unclear why. No meds to really depress resp drive. CXR unchanged. Did not get aggressive fluid resuscitation (500ml overnight) and no hx HFrEF. Gasses and mentation improved on 12/23 with BIPAP, though patient declining BIPAP on 12/24 due to associated discomfort. Will tx for possible superimposed CAP and add IV steroids for COPD exacerbation tx.  In addition ADAM  infection (see below) could be contributing.   -Tamiflu 30 mg twice daily 12/22-  -DuoNeb QID sched  -Bipap per RT  -ceftriaxone (12/22-  -doxycycline (12/23-  -IV steroids dexamethasone (12/23-  -follow VBG/ABG closely    #MRSE Bacteremia  Blood cultures from 12/23 growing MRSE in 1/2 bottles. Repeat cx 12/24 1/2 bottles growing GPCs. Patient does not have lines, catheters, prosthetic valves etc. Patient currently afebrile, normotensive, and does not have leukocytosis.   - vancomycin (12/24-)  - TTE  - ID consult 12/26     #NUNU, resolved  Creatinine 1.1 (baseline around 0.8), BUN 41 (baseline 20s).  NUNU likely prerenal; in addition possibly related to UTI as well.  500cc fluid bolus on admit. Improved by 12/24.  -AM BMP     #Possible UTI  There is some concern at the TCU that patient was confused.  In addition she reports intermittent suprapubic pain.  UA obtained in the ED consistent with UTI.  UCx ultimately urogenital zeina.   -abx as above      #Chronic macrocytic anemia  #Possible plasma cell disorder  Over the last several months has developed a mild macrocytic anemia.  B12 and folate levels have been checked and are normal.  Seems the prior providers were concerned about multiple myeloma.  During prior hospitalization lab work was obtained including immunoglobulin levels and protein electrophoresis; this showed a monoclonal spike. Skeletal XR with left iliac wing lucency thought to be overlying bowel gas but difficult to completely characterize, no additional lesions identified. Prior iron levels were normal three months ago along with normal ferritin.  Wonder if there is a component of multiple myeloma contributing to worsening kidney function.  - Continue oral iron supplementation MWF     #Generalized Anxiety Disorder  #Cognitive impairment  #Hx of AUD  - Buspirone 10 mg PO TID  - Continue PTA Seroquel, 150 mg PO at bedtime pending improvement in mentation today  - add hydroxyzine 25-50mg PO prn for  anxiety today     #Depression  - Continue PTA sertraline 100mg daily      #Hypertension  - Resume  PTA Losartan/HCTZ 100-25 mg PO daily now that NUNU resolved     #HLD  - Continue PTA atorvastatin 40 mg PO daily     #Glaucoma  - PTA latanoprost 1 drop each eye at bedtime     # Vitamin Deficiency  - Continue PTA folic acid 400 mcg daily  - Continue vitamin B complex with C 1 tablet daily  - Continue vitamin D3 25 mcg daily      Diet: Regular Diet Adult    DVT Prophylaxis: Heparin SQ  Lima Catheter: Not present  Fluids: none  Central Lines: None  Cardiac Monitoring: None  Code Status: Full Code      Disposition Plan      Expected Discharge Date: 12/26/2022        Discharge Comments: hope to discharge midweek 12/27 or 12/28        The patient's care was discussed with the Attending Physician, Dr. Blankenship.    Anish Trujillo MD  PGY3  Internal Medicine-Pediatrics    Medicine Service, [address]  Securely message with the Vocera Web Console (learn more here)  Text page via [facility] Paging/Directory   Please see signed in provider for up to date coverage information      Clinically Significant Risk Factors                # Thrombocytopenia: Lowest platelets = 80 in last 2 days, will monitor for bleeding                  ______________________________________________________________________    Interval History   Overnight wore BipAP about 40min. Otherwise wearing HFNC. No F/C/cough. SOB unchanged. Has lots of dry mouth w/Bipap and finds it uncomfortable.     Data reviewed today: I reviewed all medications, new labs and imaging results over the last 24 hours.     Physical Exam   Vital Signs: Temp: 97.9  F (36.6  C) Temp src: Oral BP: (!) 153/82 Pulse: 88   Resp: 22 SpO2: 93 % O2 Device: High Flow Nasal Cannula (HFNC) Oxygen Delivery: 35 LPM  Weight: 198 lbs 10.15 oz  General Appearance: alert, interactive, does not appear to be in acute distress  HEENT: Dry lips, sclera  clear  Respiratory: tachypneic on HF NC O2, coarse breath sounds bilaterally, answers questions in short sentences  Cardiovascular: Regular rate and rhythm, no murmurs rubs or gallops  GI: Soft, nontender, nondistended  Skin: No rashes on exposed skin  Neurologic: AAOx3, CN II-XII grossly intact    Data   Recent Labs   Lab 12/25/22  0814 12/24/22  0533 12/23/22  0607 12/22/22  1551   WBC 6.7 4.8 5.9 8.6   HGB 8.9* 8.3* 8.3* 9.6*   * 106* 110* 106*   PLT 80* 177 170 202   NA  --  144 143 141   POTASSIUM  --  4.0 4.1 3.9   CHLORIDE  --  100 99 95*   CO2  --  35* 36* 35*   BUN  --  44.8* 39.8* 41.1*   CR  --  0.89 1.04* 1.10*  1.10*   ANIONGAP  --  9 8 11   SYMONE  --  8.8 8.7* 8.9   GLC  --  128* 103* 110*     No results found for this or any previous visit (from the past 24 hour(s)).

## 2022-12-25 NOTE — PROVIDER NOTIFICATION
Time of notification: 1:03 AM  Provider notified: Kristan night intern  Patient status: Pt wore bipap for ~40 min before taking it off. Co2 67  Temp:  [97.6  F (36.4  C)-98.1  F (36.7  C)] 97.6  F (36.4  C)  Pulse:  [57-82] 82  Resp:  [22-24] 22  BP: (103-144)/(54-81) 142/75  FiO2 (%):  [40 %-60 %] 50 %  SpO2:  [92 %-100 %] 94 %  Orders received: Continue to encourage bipap frequently. Co2 seems to be downtrending, recheck in AM. Cont to monitor mental status.

## 2022-12-25 NOTE — CONSULTS
Care Management Initial Consult    General Information  Assessment completed with: Patient, VM-chart review, Ca  Type of CM/SW Visit: Initial Assessment    Primary Care Provider verified and updated as needed: Yes   Readmission within the last 30 days: unable to assess         Advance Care Planning: Advance Care Planning Reviewed: no concerns identified          Communication Assessment  Patient's communication style: spoken language (English or Bilingual)    Hearing Difficulty or Deaf: no   Wear Glasses or Blind: yes    Cognitive  Cognitive/Neuro/Behavioral: .WDL except, mood/behavior  Level of Consciousness: alert  Arousal Level: opens eyes spontaneously  Orientation: oriented x 4  Mood/Behavior: agitated, uncooperative  Best Language: 0 - No aphasia  Speech: whispers, hoarse    Living Environment:   People in home: alone     Current living Arrangements: apartment      Able to return to prior arrangements:         Family/Social Support:  Care provided by: self  Provides care for:    Marital Status: Single  Other (specify) (Friends - Cyndi Hanks)          Description of Support System: Supportive, Other (see comments) (Unclear how involved friends are)         Current Resources:   Patient receiving home care services: No     Community Resources:  (Pt reports having Elderly Waiver but no services through this. Pt reports cleaning services through [employer])  Equipment currently used at home: walker, standard  Supplies currently used at home: Oxygen Tubing/Supplies    Employment/Financial:  Employment Status: retired        Financial Concerns: No concerns identified   Referral to Financial Worker: No       Lifestyle & Psychosocial Needs:  Social Determinants of Health     Tobacco Use: Medium Risk     Smoking Tobacco Use: Former     Smokeless Tobacco Use: Former     Passive Exposure: Not on file   Alcohol Use: Not on file   Financial Resource Strain: Not on file   Food Insecurity: Not on file   Transportation  "Needs: Not on file   Physical Activity: Not on file   Stress: Not on file   Social Connections: Not on file   Intimate Partner Violence: Not on file   Depression: Not at risk     PHQ-2 Score: 1   Housing Stability: Not on file       Functional Status:  Prior to admission patient needed assistance:   Dependent ADLs:: Bathing, Dressing, Grooming, Positioning, Transfers, Toileting  Dependent IADLs:: Cleaning, Cooking, Laundry, Shopping, Meal Preparation, Medication Management, Transportation       Mental Health Status:  Mental Health Status: Current Concern  Mental Health Management: Medication    Chemical Dependency Status:  Chemical Dependency Status: No Current Concerns             Values/Beliefs:  Spiritual, Cultural Beliefs, Restorationist Practices, Values that affect care:  (Protestant)               Additional Information:  Per H&P, \"Ca Yan is a 77 year old female who has a hx of previous breast cancer s/p lumpectomy, GERD, HTN, HLD, chronic lower extremity lymphedema, cluster C personality disorder, CKD, restrictive lung disease and COPD (FEV1/FVC 0.71), and known restrictive lung disease.  Presenting with acute on chronic hypercarbic respiratory failure, increased WOB, and reported confusion found to have flu A infection, NUNU and possible UTI. \"    SHARRON attempted to complete CMA with pt via phone call. Pt answered and reported that pt was having a lot of anxiety. SW attempted to discuss coping strategies with pt. Pt reported that medication assisted pt when feeling this way. Pt requested that SW call back at a later time. Pt did confirm with SHARRON that she came from [place] [facility]. SW to try again later today.    SHARRON spoke with Bedside RN about conversation with pt. Bedside RN aware and working with pt to provide medication for anxiety.    SHARRON left  with Sly Wright liaison, (ph: [phone]) reporting that pt was here and requesting call back to 6B SW to confirm pt had bed hold. "     ADDENDUM 1500: SW met with pt at bedside to complete CMA. Pt reports being at [place] [facility]. Pt noted feeling calmer than this morning. Prior to that pt was living alone in apartment with an elevator. Pt reports having two girlfriends who call and are supportive but unclear how involved they are. Pt reported not having any other family or friends in life. Pt reports noticing changes with anxiety especially since being in the hospital and is concerned about this. Pt is retired.      Pt reports being open to [employer] ([employer] Care Coordinator - Paulette, ph: [phone]) but not having any services open at this time, however, pt did report having cleaning services through [employer]. During pt's previous hospital stay, pt expressed interested in finding [facility] housing. Pt confirmed this during assessment. SW informed pt that pt's Care Coordinator/EW CM should be able to assist pt with finding as custodial that accepts EW for payment. Pt expressed understanding. No other questions at this time.     Susana Gallego, [facility]   [facility]     Social Work and Care Management Department       SEARCHABLE in Dip[employer] - search SOCIAL WORK       [place] (0800 - 1630) Saturday and Sunday     Units: 4A, 4C, & 4E Pager: [phone]     Units: 5A & 5B Pager: [phone]     Units: 6A & 6B   Pager: [phone]     Units: 6C & 6D Pager: [phone]     Units: 7A &7B  Pager: [phone]     Units: 7C, 7D, & 5C Pager: [phone]     Unit: [place] ED Pager: [phone]      [facility] (0912-3378) Saturday and Sunday      Units: 5 Ortho, 5 Med/Surg & WB ED  Pager:[phone]     Units: 6 Med/Surg, 8A, & 10A ICU  Pager: [phone]        After hours (1630 - 0000) Saturday & Sunday; (8302-9293) Mon-Fri; (2866-9081) [facility] Recognized Holidays     Units: ALL  Pager: [phone]

## 2022-12-25 NOTE — PLAN OF CARE
"Goal Outcome Evaluation:    Neuro: A&Ox4. Forgetful at times. Restless and uncooperative. Anxious this morning, PRN atarax and zyprexa given with relief reported. Pt appears less anxious. Frequent call light use throughout shift.  Cardiac: Hypertensive. HR 80-90s. VSS.   Respiratory: Sating >95% on HFNC 45% FiO2 35L. LS coarse. Pt refused to wear BiPAP, RN attempted to have pt wear BiPAP several times. Pt states \"I will not wear that, it makes my throat dry.\" Education and reassurance provided, pt continues to refuse.  GI/: Adequate urine output. Incontinent at times. No BM today.   Diet/appetite: Tolerating regular diet. Eating well.  Activity:  SBA up to edge of bed, chair x2, and commode.   Pain: HA. PRN tylenol given x1.  Skin: No new deficits noted.  LDA's: PIV SL    Plan: Continue with POC. Notify primary team with changes.    "

## 2022-12-26 ENCOUNTER — APPOINTMENT (OUTPATIENT)
Dept: PHYSICAL THERAPY | Facility: CLINIC | Age: 77
DRG: 193 | End: 2022-12-26
Payer: COMMERCIAL

## 2022-12-26 ENCOUNTER — APPOINTMENT (OUTPATIENT)
Dept: OCCUPATIONAL THERAPY | Facility: CLINIC | Age: 77
DRG: 193 | End: 2022-12-26
Payer: COMMERCIAL

## 2022-12-26 ENCOUNTER — APPOINTMENT (OUTPATIENT)
Dept: CARDIOLOGY | Facility: CLINIC | Age: 77
DRG: 193 | End: 2022-12-26
Payer: COMMERCIAL

## 2022-12-26 LAB
ANION GAP SERPL CALCULATED.3IONS-SCNC: 8 MMOL/L (ref 7–15)
BASE EXCESS BLDV CALC-SCNC: 14.7 MMOL/L (ref -7.7–1.9)
BASE EXCESS BLDV CALC-SCNC: 16.2 MMOL/L (ref -7.7–1.9)
BUN SERPL-MCNC: 29.5 MG/DL (ref 8–23)
CALCIUM SERPL-MCNC: 8.8 MG/DL (ref 8.8–10.2)
CHLORIDE SERPL-SCNC: 102 MMOL/L (ref 98–107)
CREAT SERPL-MCNC: 0.74 MG/DL (ref 0.51–0.95)
DEPRECATED HCO3 PLAS-SCNC: 36 MMOL/L (ref 22–29)
ERYTHROCYTE [DISTWIDTH] IN BLOOD BY AUTOMATED COUNT: 13.5 % (ref 10–15)
GFR SERPL CREATININE-BSD FRML MDRD: 82 ML/MIN/1.73M2
GLUCOSE SERPL-MCNC: 96 MG/DL (ref 70–99)
HCO3 BLDV-SCNC: 41 MMOL/L (ref 21–28)
HCO3 BLDV-SCNC: 43 MMOL/L (ref 21–28)
HCT VFR BLD AUTO: 28.5 % (ref 35–47)
HGB BLD-MCNC: 8.2 G/DL (ref 11.7–15.7)
HOLD SPECIMEN: NORMAL
HOLD SPECIMEN: NORMAL
LVEF ECHO: NORMAL
MCH RBC QN AUTO: 29.2 PG (ref 26.5–33)
MCHC RBC AUTO-ENTMCNC: 28.8 G/DL (ref 31.5–36.5)
MCV RBC AUTO: 101 FL (ref 78–100)
O2/TOTAL GAS SETTING VFR VENT: 10 %
O2/TOTAL GAS SETTING VFR VENT: 35 %
PCO2 BLDV: 56 MM HG (ref 40–50)
PCO2 BLDV: 70 MM HG (ref 40–50)
PH BLDV: 7.4 [PH] (ref 7.32–7.43)
PH BLDV: 7.47 [PH] (ref 7.32–7.43)
PLATELET # BLD AUTO: 199 10E3/UL (ref 150–450)
PO2 BLDV: 68 MM HG (ref 25–47)
PO2 BLDV: 81 MM HG (ref 25–47)
POTASSIUM SERPL-SCNC: 3.8 MMOL/L (ref 3.4–5.3)
RBC # BLD AUTO: 2.81 10E6/UL (ref 3.8–5.2)
SODIUM SERPL-SCNC: 146 MMOL/L (ref 136–145)
VANCOMYCIN SERPL-MCNC: 20.7 UG/ML
WBC # BLD AUTO: 6.3 10E3/UL (ref 4–11)

## 2022-12-26 PROCEDURE — 250N000011 HC RX IP 250 OP 636: Performed by: STUDENT IN AN ORGANIZED HEALTH CARE EDUCATION/TRAINING PROGRAM

## 2022-12-26 PROCEDURE — 93306 TTE W/DOPPLER COMPLETE: CPT | Mod: 26 | Performed by: INTERNAL MEDICINE

## 2022-12-26 PROCEDURE — 97165 OT EVAL LOW COMPLEX 30 MIN: CPT | Mod: GO

## 2022-12-26 PROCEDURE — 80048 BASIC METABOLIC PNL TOTAL CA: CPT | Performed by: STUDENT IN AN ORGANIZED HEALTH CARE EDUCATION/TRAINING PROGRAM

## 2022-12-26 PROCEDURE — 97110 THERAPEUTIC EXERCISES: CPT | Mod: GO

## 2022-12-26 PROCEDURE — 97530 THERAPEUTIC ACTIVITIES: CPT | Mod: GP

## 2022-12-26 PROCEDURE — 80202 ASSAY OF VANCOMYCIN: CPT | Performed by: STUDENT IN AN ORGANIZED HEALTH CARE EDUCATION/TRAINING PROGRAM

## 2022-12-26 PROCEDURE — 250N000009 HC RX 250: Performed by: STUDENT IN AN ORGANIZED HEALTH CARE EDUCATION/TRAINING PROGRAM

## 2022-12-26 PROCEDURE — 36415 COLL VENOUS BLD VENIPUNCTURE: CPT | Performed by: STUDENT IN AN ORGANIZED HEALTH CARE EDUCATION/TRAINING PROGRAM

## 2022-12-26 PROCEDURE — 87040 BLOOD CULTURE FOR BACTERIA: CPT | Performed by: STUDENT IN AN ORGANIZED HEALTH CARE EDUCATION/TRAINING PROGRAM

## 2022-12-26 PROCEDURE — 94640 AIRWAY INHALATION TREATMENT: CPT

## 2022-12-26 PROCEDURE — 99223 1ST HOSP IP/OBS HIGH 75: CPT | Mod: FS | Performed by: STUDENT IN AN ORGANIZED HEALTH CARE EDUCATION/TRAINING PROGRAM

## 2022-12-26 PROCEDURE — 99233 SBSQ HOSP IP/OBS HIGH 50: CPT | Mod: GC | Performed by: STUDENT IN AN ORGANIZED HEALTH CARE EDUCATION/TRAINING PROGRAM

## 2022-12-26 PROCEDURE — 85027 COMPLETE CBC AUTOMATED: CPT | Performed by: STUDENT IN AN ORGANIZED HEALTH CARE EDUCATION/TRAINING PROGRAM

## 2022-12-26 PROCEDURE — 93306 TTE W/DOPPLER COMPLETE: CPT

## 2022-12-26 PROCEDURE — 258N000003 HC RX IP 258 OP 636: Performed by: STUDENT IN AN ORGANIZED HEALTH CARE EDUCATION/TRAINING PROGRAM

## 2022-12-26 PROCEDURE — 94640 AIRWAY INHALATION TREATMENT: CPT | Mod: 76

## 2022-12-26 PROCEDURE — 97530 THERAPEUTIC ACTIVITIES: CPT | Mod: GO

## 2022-12-26 PROCEDURE — 120N000003 HC R&B IMCU UMMC

## 2022-12-26 PROCEDURE — 82803 BLOOD GASES ANY COMBINATION: CPT | Performed by: STUDENT IN AN ORGANIZED HEALTH CARE EDUCATION/TRAINING PROGRAM

## 2022-12-26 PROCEDURE — 250N000013 HC RX MED GY IP 250 OP 250 PS 637: Performed by: STUDENT IN AN ORGANIZED HEALTH CARE EDUCATION/TRAINING PROGRAM

## 2022-12-26 PROCEDURE — 97162 PT EVAL MOD COMPLEX 30 MIN: CPT | Mod: GP

## 2022-12-26 PROCEDURE — 999N000157 HC STATISTIC RCP TIME EA 10 MIN

## 2022-12-26 RX ORDER — OSELTAMIVIR PHOSPHATE 30 MG/1
30 CAPSULE ORAL 2 TIMES DAILY
Status: COMPLETED | OUTPATIENT
Start: 2022-12-26 | End: 2022-12-27

## 2022-12-26 RX ADMIN — Medication 400 MCG: at 08:00

## 2022-12-26 RX ADMIN — CEFTRIAXONE SODIUM 1 G: 1 INJECTION, POWDER, FOR SOLUTION INTRAMUSCULAR; INTRAVENOUS at 21:41

## 2022-12-26 RX ADMIN — ATORVASTATIN CALCIUM 40 MG: 40 TABLET, FILM COATED ORAL at 08:00

## 2022-12-26 RX ADMIN — HEPARIN SODIUM 5000 UNITS: 5000 INJECTION, SOLUTION INTRAVENOUS; SUBCUTANEOUS at 07:59

## 2022-12-26 RX ADMIN — SERTRALINE HYDROCHLORIDE 100 MG: 100 TABLET ORAL at 08:00

## 2022-12-26 RX ADMIN — Medication 1 MG: at 21:32

## 2022-12-26 RX ADMIN — BUSPIRONE HYDROCHLORIDE 10 MG: 10 TABLET ORAL at 08:00

## 2022-12-26 RX ADMIN — LOSARTAN POTASSIUM AND HYDROCHLOROTHIAZIDE 1 TABLET: 25; 100 TABLET ORAL at 08:00

## 2022-12-26 RX ADMIN — OSELTAMIVIR PHOSPHATE 30 MG: 30 CAPSULE ORAL at 08:00

## 2022-12-26 RX ADMIN — VANCOMYCIN HYDROCHLORIDE 750 MG: 1 INJECTION, POWDER, LYOPHILIZED, FOR SOLUTION INTRAVENOUS at 11:44

## 2022-12-26 RX ADMIN — FERROUS SULFATE TAB 325 MG (65 MG ELEMENTAL FE) 325 MG: 325 (65 FE) TAB at 08:00

## 2022-12-26 RX ADMIN — LATANOPROST 1 DROP: 50 SOLUTION/ DROPS OPHTHALMIC at 21:33

## 2022-12-26 RX ADMIN — Medication 150 MG: at 21:33

## 2022-12-26 RX ADMIN — B-COMPLEX W/ C & FOLIC ACID TAB 1 TABLET: TAB at 08:00

## 2022-12-26 RX ADMIN — HYDROXYZINE HYDROCHLORIDE 25 MG: 25 TABLET, FILM COATED ORAL at 08:06

## 2022-12-26 RX ADMIN — HEPARIN SODIUM 5000 UNITS: 5000 INJECTION, SOLUTION INTRAVENOUS; SUBCUTANEOUS at 21:33

## 2022-12-26 RX ADMIN — BUSPIRONE HYDROCHLORIDE 10 MG: 10 TABLET ORAL at 15:19

## 2022-12-26 RX ADMIN — DOXYCYCLINE 100 MG: 100 INJECTION, POWDER, LYOPHILIZED, FOR SOLUTION INTRAVENOUS at 09:55

## 2022-12-26 RX ADMIN — IPRATROPIUM BROMIDE AND ALBUTEROL SULFATE 3 ML: .5; 3 SOLUTION RESPIRATORY (INHALATION) at 20:42

## 2022-12-26 RX ADMIN — DEXAMETHASONE SODIUM PHOSPHATE 6 MG: 10 INJECTION INTRAMUSCULAR; INTRAVENOUS at 08:01

## 2022-12-26 RX ADMIN — IPRATROPIUM BROMIDE AND ALBUTEROL SULFATE 3 ML: .5; 3 SOLUTION RESPIRATORY (INHALATION) at 11:45

## 2022-12-26 RX ADMIN — BUSPIRONE HYDROCHLORIDE 10 MG: 10 TABLET ORAL at 21:33

## 2022-12-26 RX ADMIN — DOXYCYCLINE 100 MG: 100 INJECTION, POWDER, LYOPHILIZED, FOR SOLUTION INTRAVENOUS at 22:47

## 2022-12-26 RX ADMIN — HYDROXYZINE HYDROCHLORIDE 25 MG: 25 TABLET, FILM COATED ORAL at 21:33

## 2022-12-26 RX ADMIN — IPRATROPIUM BROMIDE AND ALBUTEROL SULFATE 3 ML: .5; 3 SOLUTION RESPIRATORY (INHALATION) at 15:38

## 2022-12-26 RX ADMIN — OSELTAMIVIR PHOSPHATE 30 MG: 30 CAPSULE ORAL at 21:32

## 2022-12-26 RX ADMIN — Medication 25 MCG: at 08:00

## 2022-12-26 RX ADMIN — IPRATROPIUM BROMIDE AND ALBUTEROL SULFATE 3 ML: .5; 3 SOLUTION RESPIRATORY (INHALATION) at 08:20

## 2022-12-26 ASSESSMENT — ACTIVITIES OF DAILY LIVING (ADL)
ADLS_ACUITY_SCORE: 37
PREVIOUS_RESPONSIBILITIES: MEAL PREP;HOUSEKEEPING;LAUNDRY;SHOPPING;MEDICATION MANAGEMENT;FINANCES
ADLS_ACUITY_SCORE: 37

## 2022-12-26 NOTE — PROGRESS NOTES
Brief progress note:    Met with patient at bedside at approx 7pm. I again discussed with Ca our concern that she may need PPV including BiPap overnight in order to prevent hypercapnic respiratory failure. She reiterated that she does not like the Bipap machine and will likely decline it if we offer it tonight based on her VBG. She understands why we feel the Bipap machine is needed.     I then discussed our concern that she may need to be intubated if she declines Bipap. I discussed the risks and benefits of this with her, and she indicated she would not want to be intubated if we felt she needed it. At this point I brought up CPR, again discussing risks and benefits including that resuscitation often includes intubation. She indicated she would not want CPR either.     Given the above, changing Ca's code status to DNR/DNI to reflect her wishes.     Anish Trujillo MD  PGY3  Internal Medicine-Pediatrics

## 2022-12-26 NOTE — PROGRESS NOTES
"   12/26/22 0919   Appointment Info   Signing Clinician's Name / Credentials (OT) Helen Nicholson, OTR/L   Rehab Comments (OT) 8L [place] with mobility   Living Environment   People in Home alone   Current Living Arrangements apartment   Home Accessibility no concerns   Transportation Anticipated health plan transportation;family or friend will provide;other (see comments)  ([employer] mobility)   Living Environment Comments Pt reports living alone in signle level apartment -7th floor, elevator access. Tub/shower combination with bench. grab bars in bathroom near toilet and bathtub.   Self-Care   Usual Activity Tolerance fair   Current Activity Tolerance poor   Regular Exercise No   Equipment Currently Used at Home walker, standard   Fall history within last six months no   Activity/Exercise/Self-Care Comment Pt reports inconsistent use with FWW during IADL completion. O2 via NC at baseline, extra time needed to complete ADL/IADL.   Instrumental Activities of Daily Living (IADL)   Previous Responsibilities meal prep;housekeeping;laundry;shopping;medication management;finances   IADL Comments Pt reports IND completing IADLs -limited community mobility, receives shopping delievered.   General Information   Onset of Illness/Injury or Date of Surgery 12/22/22   Referring Physician Snoia Busch MD   Patient/Family Therapy Goal Statement (OT) did not state   Additional Occupational Profile Info/Pertinent History of Current Problem Per EMR, \"Ca Yan is a 77 year old female who has a hx of previous breast cancer s/p lumpectomy, GERD, HTN, HLD, chronic lower extremity lymphedema, cluster C personality disorder, CKD, restrictive lung disease and COPD (FEV1/FVC 0.71), and known restrictive lung disease.  Presenting with acute on chronic hypercarbic respiratory failure, increased WOB, and reported confusion found to have flu A infection, NUNU, and MRSE bacteremia.\"   Existing Precautions/Restrictions oxygen therapy device " and L/min  (45% FiO2 30 LPM)   Left Upper Extremity (Weight-bearing Status) full weight-bearing (FWB)   Right Upper Extremity (Weight-bearing Status) full weight-bearing (FWB)   Left Lower Extremity (Weight-bearing Status) full weight-bearing (FWB)   Right Lower Extremity (Weight-bearing Status) full weight-bearing (FWB)   Cognitive Status Examination   Orientation Status orientation to person, place and time   Cognitive Status Comments Reports difficulty with recall of home nevironment -answers all orientation questions correctly -will continue to monitor and formally screen as needed   Visual Perception   Visual Impairment/Limitations WFL;corrective lenses full-time   Pain Assessment   Patient Currently in Pain Yes, see Vital Sign flowsheet   Posture   Posture forward head position;protracted shoulders   Range of Motion Comprehensive   General Range of Motion no range of motion deficits identified   Strength Comprehensive (MMT)   General Manual Muscle Testing (MMT) Assessment no strength deficits identified   Muscle Tone Assessment   Muscle Tone Quick Adds No deficits were identified   Coordination   Upper Extremity Coordination No deficits were identified   Bed Mobility   Comment (Bed Mobility) min. A   Transfers   Transfer Comments CGA-min. A STS   Balance   Balance Comments CGA w/FWW functional mobility   Activities of Daily Living   BADL Assessment/Intervention bathing;upper body dressing;lower body dressing;grooming;toileting   Bathing Assessment/Intervention   Position (Bathing) supported sitting   [place] Level (Bathing) moderate assist (50% patient effort)   Comment, (Bathing) Per clinical judgement; baseline tub bench   Assistive Devices (Bathing) shower chair   Upper Body Dressing Assessment/Training   [place] Level (Upper Body Dressing) contact guard assist   Comment, (Upper Body Dressing) Per clinical judgement   Lower Body Dressing Assessment/Training   [place] Level (Lower Body  Dressing) minimum assist (75% patient effort)   Comment, (Lower Body Dressing) Per clinical judgement   Grooming Assessment/Training   Position (Grooming) supported standing   [place] Level (Grooming) contact guard assist   Comment, (Grooming) Per clinical judgement   Toileting   [place] Level (Toileting) contact guard assist   Position (Toileting) supported sitting   Assistive Devices (Toileting) commode chair   Comment, (Toileting) Per clinical judgement   Clinical Impression   Criteria for Skilled Therapeutic Interventions Met (OT) Yes, treatment indicated   OT Diagnosis Decreased ADL/IADL/mobility 2/2 increased O2 needs, decreaased functional endurance, and weakness   OT Problem List-Impairments impacting ADL problems related to;activity tolerance impaired;balance;cognition;mobility;strength   Assessment of Occupational Performance 5 or more Performance Deficits   Identified Performance Deficits toileting, bathing, dressing, functional mobility/transfers, IADL   Planned Therapy Interventions (OT) ADL retraining;IADL retraining;transfer training;home program guidelines;cognition   Clinical Decision Making Complexity (OT) low complexity   Anticipated Equipment Needs Upon Discharge (OT)   (TBD)   Risk & Benefits of therapy have been explained evaluation/treatment results reviewed;care plan/treatment goals reviewed;risks/benefits reviewed;current/potential barriers reviewed;participants voiced agreement with care plan;participants included;patient   Clinical Impression Comments Pt will benefit from skilled IP OT to maximize funcitonal endurance and IND with ADL/IADL prior to return home   OT Total Evaluation Time   OT Eval, Low Complexity Minutes (76542) 9   OT Goals   Therapy Frequency (OT) 5 times/wk   OT Predicted Duration/Target Date for Goal Attainment 01/13/23   OT Goals Hygiene/Grooming;Upper Body Dressing;Lower Body Dressing;Upper Body Bathing;Lower Body Bathing;Toilet Transfer/Toileting;Home  Management;Cognition   OT: Hygiene/Grooming independent   OT: Upper Body Dressing Independent   OT: Lower Body Dressing Independent   OT: Upper Body Bathing Independent   OT: Lower Body Bathing Independent   OT: Toilet Transfer/Toileting Independent   OT: Home Management Independent   OT: Cognitive Patient/caregiver will verbalize understanding of cognitive assessment results/recommendations as needed for safe discharge planning   Therapeutic Procedures/Exercise   Therapeutic Procedure: strength, endurance, ROM, flexibillity minutes (74510) 10   Symptoms Noted During/After Treatment fatigue;shortness of breath   Treatment Detail/Skilled Intervention OT: Progressed functional transfer training through engagement of x1 set x3 reps of STS with min. A. Instructed through consecutive completion to build BLE strength necessary for safe completion and in prep for mobility. Given pt rest break and attemptedwith max. encouragement to perform additional sets, pt firm decline.   Therapeutic Activities   Therapeutic Activity Minutes (67810) 10   Symptoms noted during/after treatment fatigue;shortness of breath   Treatment Detail/Skilled Intervention OT: Evaluation completed and tx initiated. Pt required max. encoruagement for participation and progression of standing ax. Completed ADLs seated in bedside chair where received finishing completion. Set up A. Pt satting on 45% FiO2 at 30LPM and donned portable O2 at 8L satting >98% O2. Challenged functional mobility ~10 steps in room as pt declines further amb. Completed with HHA, use of GB, and minimal desatting to 92%. Retruend to seated edge of chair. Pt with increased anxiety and to maintain rapport did not push further. Will continue to progress next session. Left returned to FiO2 with call light in reach.   OT Discharge Planning   OT Plan OT: standing ADL, toilet transfer, progress functional mobility   OT Discharge Recommendation (DC Rec) Transitional Care Facility   OT  Rationale for DC Rec Pt is well below baseline level of function and lives alone in an apartment with no support from family or friends. Pt currently needing Ax1 for all functional mobility and ADLs with increased O2 needs. Pt to benefit from additional therapy to progress functional independence prior to return home. Pt will likely need eventual transition to assisted living facility as pt cannot keep up with IADLs at home including laundry, cleaning, cooking, and shopping.   OT Brief overview of current status CGA-min. A STS; poor ax tolerance   Total Session Time   Timed Code Treatment Minutes 20   Total Session Time (sum of timed and untimed services) 29

## 2022-12-26 NOTE — PHARMACY-VANCOMYCIN DOSING SERVICE
Pharmacy Vancomycin Note  Date of Service 2022  Patient's  1945   77 year old, female    Indication: Bacteremia  Day of Therapy: 3  Current vancomycin regimen:  750 mg IV q12h  Current vancomycin monitoring method: AUC  Current vancomycin therapeutic monitoring goal: 400-600 mg*h/L    InsightRX Prediction of Current Vancomycin Regimen  Loading dose: N/A  Regimen: 750 mg IV every 12 hours.  Start time: 11:22 on 2022  Exposure target: AUC24 (range)400-600 mg/L.hr   AUC24,ss: 505 mg/L.hr  Probability of AUC24 > 400: 89 %  Ctrough,ss: 16.7 mg/L  Probability of Ctrough,ss > 20: 24 %  Probability of nephrotoxicity (Lodise LAKHWINDER ): 12 %    Current estimated CrCl = Estimated Creatinine Clearance: 66 mL/min (based on SCr of 0.74 mg/dL).    Creatinine for last 3 days  2022:  5:33 AM Creatinine 0.89 mg/dL  2022:  6:16 AM Creatinine 0.74 mg/dL    Recent Vancomycin Levels (past 3 days)  2022:  6:07 AM Vancomycin 20.7 ug/mL    Vancomycin IV Administrations (past 72 hours)                   vancomycin (VANCOCIN) 750 mg in sodium chloride 0.9 % 250 mL intermittent infusion (mg) 750 mg New Bag 22 2322     750 mg New Bag  1050     750 mg New Bag 22 2359     750 mg New Bag  1150                Nephrotoxins and other renal medications (From now, onward)    Start     Dose/Rate Route Frequency Ordered Stop    22 1030  vancomycin (VANCOCIN) 750 mg in sodium chloride 0.9 % 250 mL intermittent infusion         750 mg  over 90 Minutes Intravenous EVERY 12 HOURS 22 0935               Contrast Orders - past 72 hours (72h ago, onward)    None          Interpretation of levels and current regimen:  Vancomycin level is reflective of -600    Has serum creatinine changed greater than 50% in last 72 hours: No    Urine output:  good urine output    Renal Function: Improving    Plan:  1. Continue Current Dose  2. Vancomycin monitoring method: AUC  3. Vancomycin therapeutic  monitoring goal: 400-600 mg*h/L  4. Pharmacy will check vancomycin levels as appropriate in 1-3 Days.  5. Serum creatinine levels will be ordered daily for the first week of therapy and at least twice weekly for subsequent weeks.    Christelle Spann [facility]

## 2022-12-26 NOTE — PROGRESS NOTES
[facility]    Progress Note - Medicine Service, MAROON TEAM 2       Date of Admission:  12/22/2022    Assessment & Plan   Ca Yan is a 77 year old female who has a hx of previous breast cancer s/p lumpectomy, GERD, HTN, HLD, chronic lower extremity lymphedema, cluster C personality disorder, CKD, restrictive lung disease and COPD (FEV1/FVC 0.71), and known restrictive lung disease.  Presenting with acute on chronic hypercarbic respiratory failure, increased WOB, and reported confusion found to have flu A infection, NUNU, and MRSE bacteremia.     Changes today:  - TTE for eval vegetations  - ID consult, recs IV vancomycin for 7d after BCx neg 48hrs  - wean oxygen as able     #Influenza A infection  #Acute on Chronic Hypoxic Hypercapnic Respiratory Failure-worsening  #Restrictive Lung Disease   #Chronic Respiratory acidosis with compensated metabolic alkalosis  Significant restrictive lung dz due to thoracic cage abnormality in addition to deconditioning and prior smoking history. Also likely some obstructive disease. Pt with history of chronic CO2 retention, has seen pulmonology in the past and reportedly declined NIPPV. Likely has chronic compensated respiratory acidosis.  Influenza infection likely contributing to some of her symptoms. Overnight 12/22-12/23 developed worsening hypercapnea. Unclear why. No meds to really depress resp drive. CXR unchanged. Did not get aggressive fluid resuscitation (500ml overnight) and no hx HFrEF. Gasses and mentation improved on 12/23 with BIPAP, though patient declining BIPAP on 12/24 due to associated discomfort. Treating for possible superimposed CAP and add IV steroids for COPD exacerbation tx.  Respiratory failure now improving without Bipap.   -Tamiflu 30 mg twice daily 12/22 for 5d course  -DuoNeb QID sched  -Bipap per RT if pt agreeable  -ceftriaxone (12/22 for 5d course  -doxycycline (12/23 for 5d course  -IV  steroids dexamethasone (12/23- for 5d course  -daily VBG    #MRSE Bacteremia  Blood cultures from 12/23 growing MRSE in 1/2 bottles. Repeat cx 12/24 1/2 bottles growing GPCs. Patient does not have lines, catheters, prosthetic valves etc. Patient currently afebrile, normotensive, and does not have leukocytosis.   - vancomycin (12/24-)  - TTE 12/26 w/o vegetations  - ID consult    - vancomycin for 7d AFTEr Bcx negative for at least 48hrs   - daily BCx until negative x 48hrs    #Hypernatremia  Likely due to hypovolemia due to poor PO intake.   - encourage PO intake  - recheck AM BMP    #NUNU, resolved  Creatinine 1.1 (baseline around 0.8), BUN 41 (baseline 20s).  NUNU likely prerenal; in addition possibly related to UTI as well.  500cc fluid bolus on admit. Improved by 12/24.  -AM BMP    #Chronic macrocytic anemia  #Possible plasma cell disorder  Over the last several months has developed a mild macrocytic anemia.  B12 and folate levels have been checked and are normal.  Seems the prior providers were concerned about multiple myeloma.  During prior hospitalization lab work was obtained including immunoglobulin levels and protein electrophoresis; this showed a monoclonal spike. Skeletal XR with left iliac wing lucency thought to be overlying bowel gas but difficult to completely characterize, no additional lesions identified. Prior iron levels were normal three months ago along with normal ferritin.   - Continue oral iron supplementation MWF     #Generalized Anxiety Disorder  #Cognitive impairment  #Hx of AUD  - Buspirone 10 mg PO TID  - Continue PTA Seroquel, 150 mg PO at bedtime pending improvement in mentation today  - hydroxyzine 25-50mg PO prn for anxiety      #Depression  - Continue PTA sertraline 100mg daily      #Hypertension  - Resume  PTA Losartan/HCTZ 100-25 mg PO daily now that NUNU resolved     #HLD  - Continue PTA atorvastatin 40 mg PO daily     #Glaucoma  - PTA latanoprost 1 drop each eye at bedtime     #  Vitamin Deficiency  - Continue PTA folic acid 400 mcg daily  - Continue vitamin B complex with C 1 tablet daily  - Continue vitamin D3 25 mcg daily      Diet: Regular Diet Adult    DVT Prophylaxis: Heparin SQ  [place] Catheter: Not present  Fluids: none  Central Lines: None  Cardiac Monitoring: None  Code Status: No CPR- Do NOT Intubate      Disposition Plan      Expected Discharge Date: 12/27/2022      Destination: nursing home  Discharge Comments: will need 7 d of IV abx after blood cultures are negative for 48hrs; would be IV abx at least through 1/1/23        The patient's care was discussed with the Attending Physician, Dr. Blankenship.    Anish Trujillo MD  PGY3  Internal Medicine-Pediatrics    Medicine Service, [address]  Securely message with the Vocera Web Console (learn more here)  Text page via [employer] Paging/Directory   Please see signed in provider for up to date coverage information      Clinically Significant Risk Factors         # Hypernatremia: Highest Na = 146 mmol/L in last 2 days, will monitor as appropriate        # Thrombocytopenia: Lowest platelets = 80 in last 2 days, will monitor for bleeding                  ______________________________________________________________________    Interval History   Declined Bipap overnight. Feeling thirsty with dry mouth. No fevers, breathing about the same with some SOB. No chest pain. Understands the plan for echo and ID consult.     Data reviewed today: I reviewed all medications, new labs and imaging results over the last 24 hours.     Physical Exam   Vital Signs: Temp: 97.7  F (36.5  C) Temp src: Oral BP: (!) 147/78 Pulse: 82   Resp: 22 SpO2: 95 % O2 Device: Nasal cannula Oxygen Delivery: 4 LPM  Weight: 200 lbs 6.37 oz  General Appearance: alert, interactive, does not appear to be in acute distress, resting comfortably in chair  HEENT: Dry lips, sclera clear  Respiratory: mildly tachypneic on HF NC O2,  coarse breath sounds bilaterally  Cardiovascular: Regular rate and rhythm, no murmurs rubs or gallops  GI: Soft, nontender, nondistended  Skin: No rashes on exposed skin, calluses on bunions; no open areas on legs/feet  Neurologic: AAOx3, CN II-XII grossly intact    Data   Recent Labs   Lab 22  0616 22  0607 22  0814 22  0533 22  0607   WBC  --  6.3 6.7 4.8 5.9   HGB  --  8.2* 8.9* 8.3* 8.3*   MCV  --  101* 104* 106* 110*   PLT  --  199 80* 177 170   *  --   --  144 143   POTASSIUM 3.8  --   --  4.0 4.1   CHLORIDE 102  --   --  100 99   CO2 36*  --   --  35* 36*   BUN 29.5*  --   --  44.8* 39.8*   CR 0.74  --   --  0.89 1.04*   ANIONGAP 8  --   --  9 8   SYMONE 8.8  --   --  8.8 8.7*   GLC 96  --   --  128* 103*     Recent Results (from the past 24 hour(s))   Echo Complete   Result Value    LVEF  65-70%    Narrative    [id number]  [id number]  [id number]  [id number]^LUKE^ALINA     [facility],[place]  Echocardiography Laboratory  [address] [place] [id number]     Name: AMAURI WOOD  MRN: [id number]  : 1945  Study Date: 2022 07:41 AM  Age: 77 yrs  Gender: Female  Patient Location: [facility]  Reason For Study: Endocarditis  Ordering Physician: ALINA BUTLER  Performed By: Susan Patel     BSA: 1.9 m2  Height: 63 in  Weight: 200 lb  HR: 92  BP: 150/88 mmHg  ______________________________________________________________________________  Procedure  Complete Portable Echo Adult. Poor acoustic windows.  ______________________________________________________________________________  Interpretation Summary  No vegetation or mass identified.  ______________________________________________________________________________  Left Ventricle  Global and regional left ventricular function is hyperkinetic with an EF of  65-70%. Left ventricular wall thickness is normal. Left ventricular size is  normal. Left ventricular diastolic function is not  assessable. No regional  wall motion abnormalities are seen.     Right Ventricle  Right ventricular function, chamber size, wall motion, and thickness are  normal.     Atria  The atria cannot be assessed.     Mitral Valve  Mild mitral annular calcification is present. Trace mitral insufficiency is  present.     Aortic Valve  Aortic valve sclerosis is present.     Tricuspid Valve  The tricuspid valve is normal. Trace tricuspid insufficiency is present. The  right ventricular systolic pressure is approximated at 36.6 mmHg plus the  right atrial pressure.     Pulmonic Valve  The pulmonic valve is normal.     Vessels  The thoracic aorta cannot be assessed. The pulmonary artery and bifurcation  cannot be assessed. The inferior vena cava is normal.     Pericardium  No pericardial effusion is present.     ______________________________________________________________________________  Doppler Measurements & Calculations     TR max dk: 302.3 cm/sec  TR max P.6 mmHg     ______________________________________________________________________________  Report approved by: Shubham Hoyos 2022 09:11 AM

## 2022-12-26 NOTE — PROGRESS NOTES
Time of notification: 12:34 AM  Provider notified: Kristan arboleda   Patient Status: FYI Pt still refusing bipap, also SBPs 150s-160s   Temp:  [97.4  F (36.3  C)-97.9  F (36.6  C)] 97.4  F (36.3  C)  Pulse:  [75-88] 87  Resp:  [22-30] 24  BP: (149-169)/(78-93) 155/87  FiO2 (%):  [40 %-50 %] 45 %  SpO2:  [92 %-97 %] 92 %  Orders received: Co2 61, cont to monitor Q6. No interventions for BP at this time

## 2022-12-26 NOTE — PROVIDER NOTIFICATION
Provider notified: Kristan Yan [phone]- pt CO2 77.  Continues to refuse BiPAP. Thanks Brenna [phone]

## 2022-12-26 NOTE — PLAN OF CARE
Neuro: A&Ox4. Forgetful, anxious, uncooperative  Cardiac: No tele orders. VSS, SBPs 150s-160s    Respiratory: Pt refused bipap all night, remains on HFNC 45% and 30L, attempted to wean to 40% but pt unable to tolerate. Lung sounds coarse, persistent dry cough. q6 VBGs.  GI/: Adequate urine output via commode.No BM this shift  Diet/appetite: Tolerating reg diet. Eating well.  Activity:  SBA to BSC/chair, slept in chair for a few hours  Pain: At acceptable level on current regimen.   Skin: No new deficits noted.  LDA's: New L PIV (SL)     Plan: Continue with POC. Notify primary team with changes.

## 2022-12-26 NOTE — PLAN OF CARE
Goal Outcome Evaluation:    Patient was able to rest throughout the shift. VSS started shift on 30L HiFlo and titrated down to 2L NC. Alert and oriented but forgetful and repeated requests. Able to make needs known. Moved SBA. Spent majority of day in chair. Regular diet maintained and tolerated. Droplet precautions maintained. No complaints of pain, however hacking cough still present. No other concerns.

## 2022-12-26 NOTE — CONSULTS
[facility] CONSULT NOTE     Patient:  Ca Yan   Date of birth 1945, Medical record number [id number]  Date of Visit:  12/26/2022  Date of Admission: 12/22/2022  Consult Requester:Nell Blankenship MD for ADAM Bacteremia          Assessment and Recommendations:   ID Problem List  1. MRSE Bacteremia - etiology unknown, possible related to pneumonia vs contaminant, first positive 12/24, +12/25 for GPC  2. Influenza A infection with question of possible secondary bacterial pneumonia (no Cx obtained, no sputum) and COPD exacerbation  3. Acute on chronic hypercapnic respiratory failure  4. 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 Left knee replacement 2011    RECOMMENDATION:  1. Complete 5 day course for CAP - ceftriaxone (EOT 12/26) and doxycycline (EOT 12/27)  2. Continue Tamiflu for 5 day course (10 doses - EOT 12/27)  3. Continue vancomycin at this time for bacteremia, duration TBD pending clearance  4. Repeat blood cultures daily until remain clear x48-72 hrs  a. Would recommend surveillance blood cultures 3 days after completing vancomycin (TBD)  5. 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 She was found to have influenza A and CXR with some bilateral opacities related to edema vs infection. She has been on ceftriaxone, doxycycline and steroids for CAP and COPD exacerbation as well as Tamiflu for influenza since 12/22 or 12/23. She continues to require HFNC 30L, refusing BIPAP for hypercapnia,  but reports feeling better than when she came in. Cough has been dry, no sputum able to be obtained for culture. MRSE bacteremia of unclear etiology for which ID was consulted 12/26.  It is unclear why blood cultures were collected as she has remained afebrile and without leukocytosis, however +MRSE on 12/23 in one of two sets and +GPC in one of two sets on 12/24. Repeat cultures with NGTD. TTE without evidence of endocarditis, overall unremarkable echo, no need for RACHAEL. Etiology of bacteremia may be related to possible pneumonia vs possibly contaminant, though with two days positive cultures (pending GPC speciation), will treat for full course. Reasonable to complete 5 days of antibiotics for CAP (ceftriaxone and doxycycline) and influenza A (Tamiflu). Recommend repeat blood cultures daily to ensure clearance, must remain negative x48-72 hrs and continue vancomycin. Duration of vancomycin likely 7 days, though start date remains TBD pending blood culture clearance. Would also recommend surveillance blood cultures approximately 3 days after completing vancomycin.     Thank you for this consult. ID will continue to follow.     Patient was discussed with Dr. Bassett. Recommendations discussed with primary team.    Sade Clement PA-C  Infectious Diseases  Pager # [phone]    I spent a total of 80 minutes face-to-face and/or coordinating care of Ca Yan. Over 50% of my time on the unit was spent counseling the patient and/or coordinating care.         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 She was found to have influenza A and MRSE bacteremia for which ID was  consulted.     In ED, she reported cough x2 days, fatigue, low grade fever, body aches, chills, and more dyspnea. She was on 4L oxygen on arrival. Denied abdominal pain or urinary symptoms. She was afebrile and without leukocytosis. She had been refusing medication and cares at her TCU facility - felt to be confused. She developed worsening hypercapnia but has been declining BIPAP. Influenza A positive, COVID/RSV negative. CXR notable for mild interstitial opacities favored to represent edema, but cannot exclude infection, no new focal consolidation. Abnormal UA, though urine culture with urogenital zeina and denied urinary symptoms on admission.    She was started on ceftriaxone 12/22, doxycycline and steroids for CAP and COPD exacerbation on 12/23 in the setting of worsening hypercapnia. She was also started on Tamiflu on 12/22.     Blood cultures positive for MRSE in 1 of 2 sets, 2/2 bottles on 12/23 and again in 1/2 sets on 12/24. It is not clear why blood cultures were sent - no fever, no leukocytosis. She was started on vancomycin on 12/24. Repeat blood cultures 12/25 with NGTD. TTE 12/26 with poor acoustic windows, no definitive vegetations identified.    On interview today, she reports cough for few weeks prior to admission that was nonproductive. She denies any current fever, chills. She feels that her breathing is at baseline and that she does not feel short of breath. She is on high flow nasal cannula at 30 LPM. Endorses sore throat x2 days. She wants to go home. Denies abdominal pain, diarrhea, or urinary symptoms. She has no history of bacteremia. Denies aspiration, but did have some AMS due to hypercapnia.    She has previous back surgery for scoliosis. She cannot recall if she has any spinal hardware in place. She denies back pain, neck pain, headache. She has left knee replacement (2011). Denies any new joint pain, erythema, or swelling. Denies any other indwelling devices or hardware. She has no  pets or animal exposures. Denies IVDU.           Review of Systems:   CONSTITUTIONAL:  No fevers, chills or night sweats.   EYES: negative for icterus, redness, or purulent drainage.   ENT:  negative for nasal congestion, rhinorrhea, +sore throat.  RESPIRATORY:  negative for cough with sputum and dyspnea. +dry cough  CARDIOVASCULAR:  negative for chest pain or palpitations.  GASTROINTESTINAL:  negative for nausea, vomiting, diarrhea and constipation.  GENITOURINARY:  negative for dysuria, frequency, or urgency.   HEME:  No easy bruising or bleeding.  INTEGUMENT:  negative for rash and pruritus.  NEURO:  Negative for headache, vision changes, or numbness/tingling in extremities.         Past Medical History:     Past Medical History:   Diagnosis Date     Anxiety      Arthritis      Breast cancer (H)      COPD (chronic obstructive pulmonary disease) (H)     6/19/12:  FEV 0.99 l     Depressive disorder      Hypertension      Low back pain with left-sided sciatica      Low bone density 4/13/2017    DEXA April 12, 2017: T score -2.0. Normal Z score. FRAX risk: major osteoporotic fracture 11.9%, hip fracture 2.6%, therefore not high-risk     Lymphedema, chronic lower extremities             Past Surgical History:     Past Surgical History:   Procedure Laterality Date     BACK SURGERY      spinal fusion     COLONOSCOPY       LARYNGOSCOPY WITH MICROSCOPE N/A 4/30/2021    Procedure: FLEXIBLE LARYNGOSCOPY;  Surgeon: Domonique Roche MD;  Location: [facility] OR     LUMPECTOMY BREAST       ORTHOPEDIC SURGERY      Knee surgery left side            Family History:     Family History   Problem Relation Age of Onset     Breast Cancer Mother      Diabetes Mother      Anxiety Disorder Mother      Asthma Mother      Other Cancer Mother      Hyperlipidemia Mother      Alcohol/Drug Father      Mental Illness Father      Substance Abuse Father      Obesity Father      Cerebrovascular Disease Maternal Grandmother 67     Other - See Comments  Maternal Aunt         lived to             Social History:     Social History     Tobacco Use     Smoking status: Former     Packs/day: 0.50     Years: 5.00     Pack years: 2.50     Types: Cigarettes     Start date: 1956     Quit date: 1980     Years since quittin.2     Smokeless tobacco: Former     Quit date: 1980   Substance Use Topics     Alcohol use: Not Currently     Alcohol/week: 0.0 standard drinks     Comment: Sober for 2 years, previous alcoholic     History   Sexual Activity     Sexual activity: Not Currently     Partners: Male     Birth control/ protection: Abstinence            Current Medications:       atorvastatin  40 mg Oral Daily     busPIRone  10 mg Oral TID     cefTRIAXone  1 g Intravenous Q24H     dexamethasone  6 mg Intravenous Daily     doxycycline  100 mg Intravenous Q12H     ferrous sulfate  325 mg Oral Q Mon Wed Fri AM     folic acid  400 mcg Oral Daily     heparin ANTICOAGULANT  5,000 Units Subcutaneous Q12H     ipratropium - albuterol 0.5 mg/2.5 mg/3 mL  3 mL Nebulization 4x daily     latanoprost  1 drop Both Eyes QPM     losartan-hydrochlorothiazide  1 tablet Oral Daily     oseltamivir  30 mg Oral BID     QUEtiapine  150 mg Oral At Bedtime     sertraline  100 mg Oral Daily     sodium chloride (PF)  3 mL Intracatheter Q8H     vancomycin  750 mg Intravenous Q12H     vitamin B complex with vitamin C  1 tablet Oral Daily     Vitamin D3  25 mcg Oral Daily            Allergies:     Allergies   Allergen Reactions     Azithromycin Itching     Codeine Nausea and Vomiting     Hydrocodone Nausea and Vomiting     Metronidazole Nausea     Oxycodone Itching and Rash     Percocet [Oxycodone-Acetaminophen] Nausea and Vomiting     Pollen Extract      sneezing and runny nose.      Seasonal Allergies      sneezing and runny nose.      Vicodin [Hydrocodone-Acetaminophen] Nausea and Vomiting     Zolpidem      Amnestic behavior            Physical Exam:   Vitals were reviewed  Patient  Vitals for the past 24 hrs:   BP Temp Temp src Pulse Resp SpO2 Weight   12/26/22 0734 (!) 150/88 97.7  F (36.5  C) Oral 88 20 95 % --   12/26/22 0445 (!) 149/81 -- -- 76 -- 94 % --   12/26/22 0015 -- -- -- -- -- -- 90.9 kg (200 lb 6.4 oz)   12/25/22 2328 (!) 155/87 97.4  F (36.3  C) Oral 87 24 92 % --   12/25/22 1955 (!) 154/85 97.8  F (36.6  C) Oral 75 22 97 % --   12/25/22 1950 (!) 169/92 -- -- -- -- 97 % --   12/25/22 1600 (!) 162/93 97.5  F (36.4  C) Oral 75 30 -- --   12/25/22 1510 -- -- -- -- -- 97 % --   12/25/22 1118 -- -- -- -- -- 92 % --       Physical Examination:  Constitutional: Pleasant adult female seen sitting up in chair, in NAD. Alert and interactive.   HEENT: NCAT, anicteric sclerae, conjunctiva clear. Moist mucous membranes without lesions or thrush. HFNC 30 LPM.   Respiratory: Non-labored breathing, good air exchange. Coarse lung sounds bilaterally, expiratory wheezing, no crackles or rhonchi appreciated. No cough noted.   Cardiovascular: Regular rate and rhythm with no murmur, rub or gallop.  GI: Normoactive BS. Abdomen is soft, obese, and non-tender to palpation. No rigidity or guarding.  Skin: Warm and dry. No rashes or lesions on exposed surfaces.  Musculoskeletal: Extremities grossly normal. No tenderness to palpation of large joints. 3+ edema present bilaterally, compression stockings on.  Neurologic: A &O x3, speech normal, answering questions appropriately. Moves all extremities spontaneously. Grossly non-focal.  Neuropsychiatric: Mentation and affect normal/appropriate.  VAD: PIV in LUE is c/d/i with no erythema, drainage, or tenderness.         Laboratory Data:     Inflammatory Markers    Recent Labs   Lab Test 11/24/22  1259 09/25/19  1138   SED 53* 18   CRP 6.90* 6.4       Hematology Studies    Recent Labs   Lab Test 12/26/22  0607 12/25/22  0814 12/24/22  0533 12/23/22  0607 12/22/22  1551 11/30/22  1302 04/22/21  1145 08/20/20  1202 01/21/20  0920 10/11/19  1520 05/10/19  1057  03/08/18  1430 02/20/17  1522   WBC 6.3 6.7 4.8 5.9 8.6 8.3   < > 5.4   < > 6.3 5.7 5.9 6.1   ANEU  --   --   --   --   --   --   --  3.4  --  4.1 4.1 3.7 3.9   AEOS  --   --   --   --   --   --   --  0.1  --  0.1 0.1 0.1 0.1   HGB 8.2* 8.9* 8.3* 8.3* 9.6* 9.1*   < > 14.3   < > 14.3 13.4 13.7 13.4   * 104* 106* 110* 106* 102*   < > 98   < > 99 100 94 96    80* 177 170 202 235   < > 217   < > 230 236 279 269    < > = values in this interval not displayed.       Metabolic Studies     Recent Labs   Lab Test 12/26/22  0616 12/24/22  0533 12/23/22  0607 12/22/22  1551 12/03/22  0731 11/30/22  0659   * 144 143 141  --  141   POTASSIUM 3.8 4.0 4.1 3.9 3.8 3.6   CHLORIDE 102 100 99 95*  --  96*   CO2 36* 35* 36* 35*  --  35*   BUN 29.5* 44.8* 39.8* 41.1*  --  49.3*   CR 0.74 0.89 1.04* 1.10*  1.10*  --  1.03*   GFRESTIMATED 82 66 54* 51*  51*  --  55*       Hepatic Studies    Recent Labs   Lab Test 11/24/22  1259 11/15/22  0828 11/10/22  1510 10/21/22  1131 09/22/22  1657 07/08/22  1437   BILITOTAL 0.3 0.2 0.3 0.3 0.4 0.3   ALKPHOS 86 93 110* 108* 102 102   ALBUMIN 3.7 3.9 4.0 4.0 4.1 3.9   AST 37* 40* 41* 23 29 22   ALT 17 19 12 9* 9* 7*       Microbiology:  Culture   Date Value Ref Range Status   12/25/2022 No growth after 12 hours  Preliminary   12/25/2022 No growth after 12 hours  Preliminary   12/24/2022 No growth after 1 day  Preliminary   12/24/2022 Positive on the 1st day of incubation (A)  Preliminary   12/24/2022 Gram positive cocci (AA)  Preliminary     Comment:     1 of 2 bottles   12/23/2022 Positive on the 1st day of incubation (A)  Preliminary   12/23/2022 Staphylococcus epidermidis (AA)  Preliminary     Comment:     2 of 2 bottles   12/23/2022 No growth after 2 days  Preliminary   12/22/2022 >100,000 CFU/mL Mixture of urogenital zeina  Final   11/24/2022 No Growth  Final   11/24/2022 No Growth  Final   10/21/2022 No Growth  Final   10/21/2022 No Growth  Final       Urine Studies     Recent Labs   Lab Test 12/22/22  1727 08/20/20  1030 08/12/20  0800 01/21/20  0925 08/24/17  0820   LEUKEST Moderate* Negative Trace* Negative Negative   WBCU 23* 4 6* 2 14*       Vancomycin Levels    Recent Labs   Lab Test 12/26/22  0607   VANCOMYCIN 20.7       Hepatitis B Testing No lab results found.  Hepatitis C Testing     Hepatitis C Antibody   Date Value Ref Range Status   04/22/2021 Nonreactive NR^Nonreactive Final     Comment:     Assay performance characteristics have not been established for newborns,   infants, and children       Respiratory Virus Testing    12/22 Influenza A+, negative COVID/RSV    IMAGING  CXR 12/23/22  IMPRESSION:   1. Bilateral diffuse mixed pulmonary opacities, not significantly  changed from prior.  2. Stable small right pleural effusion.    CXR 12/22/22  Impression: Improved appearance of the right lung compared to  11/26/2022. Mild diffuse interstitial opacities bilaterally favored to  represent edema. Infection cannot be excluded.    ECHO  TTE 12/26  Interpretation Summary  No vegetation or mass identified.

## 2022-12-26 NOTE — PROGRESS NOTES
12/26/22 1511   Appointment Info   Signing Clinician's Name / Credentials (PT) Christelle Patel DPT       Present no   Living Environment   People in Home alone   Current Living Arrangements apartment   Home Accessibility no concerns   Transportation Anticipated health plan transportation;family or friend will provide;other (see comments)  (metro mobility)   Living Environment Comments Pt reports living alone in a single level apartmetn. 7th floor w/ elevator access.   Self-Care   Usual Activity Tolerance fair   Current Activity Tolerance poor   Regular Exercise No   Equipment Currently Used at Home walker, standard   Fall history within last six months no   Activity/Exercise/Self-Care Comment Pt IND at baseline, inconsistent use of FWW for mobility. 3LPM NC at baseline.   General Information   Onset of Illness/Injury or Date of Surgery 12/22/22   Referring Physician Sonia Busch MD   Patient/Family Therapy Goals Statement (PT) to improve functional mobility   Pertinent History of Current Problem (include personal factors and/or comorbidities that impact the POC) Ca Yan is a 77 year old female who has a hx of previous breast cancer s/p lumpectomy, GERD, HTN, HLD, chronic lower extremity lymphedema, cluster C personality disorder, CKD, restrictive lung disease and COPD (FEV1/FVC 0.71), and known restrictive lung disease.  Presenting with acute on chronic hypercarbic respiratory failure, increased WOB, and reported confusion found to have flu A infection, NUNU, and MRSE bacteremia.   Existing Precautions/Restrictions oxygen therapy device and L/min   Heart Disease Risk Factors Age;Medical history;Lack of physical activity;High blood pressure;Dislipidemia   General Observations 4 LPM NC at rest   Cognition   Affect/Mental Status (Cognition) anxious   Orientation Status (Cognition) oriented x 4   Follows Commands (Cognition) WFL   Pain Assessment   Patient Currently in Pain No    Integumentary/Edema   Integumentary/Edema Comments significant BLE edema, comperm donned twila.   Posture    Posture Forward head position;Protracted shoulders;Kyphosis   Range of Motion (ROM)   Range of Motion ROM is WFL   Strength (Manual Muscle Testing)   Strength Comments functionally deconditioned but able to demo >3/5 for functional mobility   Bed Mobility   Comment, (Bed Mobility) NT, pt declining as wanting to remain in chair   Transfers   Comment, (Transfers) CGA to FWW for balance   Gait/Stairs (Locomotion)   Comment, (Gait/Stairs) CGA w/ FWW for 15' to fatigue   Balance   Balance Comments IND sitting balance in chair, CGA-close SBA standing balance w/ FWW support   Sensory Examination   Sensory Perception patient reports no sensory changes   Coordination   Coordination no deficits were identified   Muscle Tone   Muscle Tone no deficits were identified   Clinical Impression   Criteria for Skilled Therapeutic Intervention Yes, treatment indicated   PT Diagnosis (PT) impaired functional mobility   Influenced by the following impairments anxiety, medical status, functional activity tolerance, strength, balance   Functional limitations due to impairments decreased (I) w/ bed mobility, transfers, gait   Clinical Presentation (PT Evaluation Complexity) Evolving/Changing   Clinical Presentation Rationale Clinical judgement, home set up and support, current status   Clinical Decision Making (Complexity) moderate complexity   Planned Therapy Interventions (PT) balance training;bed mobility training;gait training;home exercise program;patient/family education;postural re-education;ROM (range of motion);strengthening;transfer training;progressive activity/exercise;risk factor education;home program guidelines   Anticipated Equipment Needs at Discharge (PT)   (TBD)   Risk & Benefits of therapy have been explained evaluation/treatment results reviewed;care plan/treatment goals reviewed;risks/benefits  reviewed;current/potential barriers reviewed;participants voiced agreement with care plan;participants included;patient   Clinical Impression Comments Pt presents below functional baseline w/ deficits in activity tolerance, strength and balance. Pt w/ increased O2 needs from baseline and anxiety impacting POC and functional progression. Will benefit from skilled IP PT to progress IND and tolerance and assist with safe discharge planning.   PT Total Evaluation Time   PT Eval, Moderate Complexity Minutes (43305) 10   Physical Therapy Goals   PT Frequency 4x/week   PT Predicted Duration/Target Date for Goal Attainment 01/02/23   PT Goals Bed Mobility;Transfers;Gait;Aerobic Activity   PT: Bed Mobility Independent;Supine to/from sit;Rolling;Bridging   PT: Transfers Modified independent;Sit to/from stand;Bed to/from chair;Assistive device   PT: Gait Modified independent;Assistive device;100 feet   PT: Perform aerobic activity with stable cardiovascular response intermittent activity;5 minutes;ambulation   PT Discharge Planning   PT Discharge Recommendation (DC Rec) Transitional Care Facility   PT Rationale for DC Rec Primary recommendation is TCU to progress functional activity tolerance and reduce falls risk. Pt lives alone, open to pursuing [facility] for increased services long term.   PT Brief overview of current status A x 1; please encourage frequent ambulation.   Total Session Time   Total Session Time (sum of timed and untimed services) 10

## 2022-12-27 ENCOUNTER — APPOINTMENT (OUTPATIENT)
Dept: OCCUPATIONAL THERAPY | Facility: CLINIC | Age: 77
DRG: 193 | End: 2022-12-27
Payer: COMMERCIAL

## 2022-12-27 ENCOUNTER — APPOINTMENT (OUTPATIENT)
Dept: PHYSICAL THERAPY | Facility: CLINIC | Age: 77
DRG: 193 | End: 2022-12-27
Payer: COMMERCIAL

## 2022-12-27 LAB
ANION GAP SERPL CALCULATED.3IONS-SCNC: 8 MMOL/L (ref 7–15)
BACTERIA BLD CULT: ABNORMAL
BASE EXCESS BLDV CALC-SCNC: 12.2 MMOL/L (ref -7.7–1.9)
BUN SERPL-MCNC: 32.4 MG/DL (ref 8–23)
CALCIUM SERPL-MCNC: 8.8 MG/DL (ref 8.8–10.2)
CHLORIDE SERPL-SCNC: 98 MMOL/L (ref 98–107)
CREAT SERPL-MCNC: 0.87 MG/DL (ref 0.51–0.95)
DEPRECATED HCO3 PLAS-SCNC: 33 MMOL/L (ref 22–29)
ERYTHROCYTE [DISTWIDTH] IN BLOOD BY AUTOMATED COUNT: 13.8 % (ref 10–15)
GFR SERPL CREATININE-BSD FRML MDRD: 67 ML/MIN/1.73M2
GLUCOSE SERPL-MCNC: 93 MG/DL (ref 70–99)
HCO3 BLDV-SCNC: 39 MMOL/L (ref 21–28)
HCT VFR BLD AUTO: 28.7 % (ref 35–47)
HGB BLD-MCNC: 8.4 G/DL (ref 11.7–15.7)
MCH RBC QN AUTO: 29.6 PG (ref 26.5–33)
MCHC RBC AUTO-ENTMCNC: 29.3 G/DL (ref 31.5–36.5)
MCV RBC AUTO: 101 FL (ref 78–100)
O2/TOTAL GAS SETTING VFR VENT: 40 %
PCO2 BLDV: 66 MM HG (ref 40–50)
PH BLDV: 7.39 [PH] (ref 7.32–7.43)
PLATELET # BLD AUTO: 229 10E3/UL (ref 150–450)
PO2 BLDV: 113 MM HG (ref 25–47)
POTASSIUM SERPL-SCNC: 3.7 MMOL/L (ref 3.4–5.3)
RBC # BLD AUTO: 2.84 10E6/UL (ref 3.8–5.2)
SODIUM SERPL-SCNC: 139 MMOL/L (ref 136–145)
WBC # BLD AUTO: 7.4 10E3/UL (ref 4–11)

## 2022-12-27 PROCEDURE — 94640 AIRWAY INHALATION TREATMENT: CPT

## 2022-12-27 PROCEDURE — 250N000011 HC RX IP 250 OP 636: Performed by: STUDENT IN AN ORGANIZED HEALTH CARE EDUCATION/TRAINING PROGRAM

## 2022-12-27 PROCEDURE — 999N000043 HC STATISTIC CTO2 CONT OXYGEN TECH TIME EA 90 MIN

## 2022-12-27 PROCEDURE — 999N000032 HC STATISTIC CHRONIC DISEASE SPECIALIST RT CONSULT

## 2022-12-27 PROCEDURE — 258N000003 HC RX IP 258 OP 636: Performed by: STUDENT IN AN ORGANIZED HEALTH CARE EDUCATION/TRAINING PROGRAM

## 2022-12-27 PROCEDURE — G0463 HOSPITAL OUTPT CLINIC VISIT: HCPCS

## 2022-12-27 PROCEDURE — 120N000003 HC R&B IMCU UMMC

## 2022-12-27 PROCEDURE — 99207 PR CDG-CUT & PASTE-POTENTIAL IMPACT ON LEVEL: CPT | Performed by: STUDENT IN AN ORGANIZED HEALTH CARE EDUCATION/TRAINING PROGRAM

## 2022-12-27 PROCEDURE — 82803 BLOOD GASES ANY COMBINATION: CPT | Performed by: STUDENT IN AN ORGANIZED HEALTH CARE EDUCATION/TRAINING PROGRAM

## 2022-12-27 PROCEDURE — 250N000013 HC RX MED GY IP 250 OP 250 PS 637: Performed by: STUDENT IN AN ORGANIZED HEALTH CARE EDUCATION/TRAINING PROGRAM

## 2022-12-27 PROCEDURE — 87077 CULTURE AEROBIC IDENTIFY: CPT | Performed by: STUDENT IN AN ORGANIZED HEALTH CARE EDUCATION/TRAINING PROGRAM

## 2022-12-27 PROCEDURE — 999N000157 HC STATISTIC RCP TIME EA 10 MIN

## 2022-12-27 PROCEDURE — 97530 THERAPEUTIC ACTIVITIES: CPT | Mod: GP

## 2022-12-27 PROCEDURE — 97530 THERAPEUTIC ACTIVITIES: CPT | Mod: GO

## 2022-12-27 PROCEDURE — 999N000033 HC STATISTIC CHRONIC PULMONARY DISEASE SPECIALIST

## 2022-12-27 PROCEDURE — 80048 BASIC METABOLIC PNL TOTAL CA: CPT | Performed by: STUDENT IN AN ORGANIZED HEALTH CARE EDUCATION/TRAINING PROGRAM

## 2022-12-27 PROCEDURE — 99233 SBSQ HOSP IP/OBS HIGH 50: CPT | Performed by: STUDENT IN AN ORGANIZED HEALTH CARE EDUCATION/TRAINING PROGRAM

## 2022-12-27 PROCEDURE — 99233 SBSQ HOSP IP/OBS HIGH 50: CPT | Mod: GC | Performed by: STUDENT IN AN ORGANIZED HEALTH CARE EDUCATION/TRAINING PROGRAM

## 2022-12-27 PROCEDURE — 97116 GAIT TRAINING THERAPY: CPT | Mod: GP

## 2022-12-27 PROCEDURE — 999N000248 HC STATISTIC IV INSERT WITH US BY RN

## 2022-12-27 PROCEDURE — 272N000202 HC AEROBIKA WITH MANOMETER

## 2022-12-27 PROCEDURE — 36415 COLL VENOUS BLD VENIPUNCTURE: CPT | Performed by: STUDENT IN AN ORGANIZED HEALTH CARE EDUCATION/TRAINING PROGRAM

## 2022-12-27 PROCEDURE — 85027 COMPLETE CBC AUTOMATED: CPT | Performed by: STUDENT IN AN ORGANIZED HEALTH CARE EDUCATION/TRAINING PROGRAM

## 2022-12-27 PROCEDURE — 999N000215 HC STATISTIC HFNC ADULT NON-CPAP

## 2022-12-27 PROCEDURE — 97110 THERAPEUTIC EXERCISES: CPT | Mod: GO

## 2022-12-27 PROCEDURE — 250N000009 HC RX 250: Performed by: STUDENT IN AN ORGANIZED HEALTH CARE EDUCATION/TRAINING PROGRAM

## 2022-12-27 RX ORDER — POLYETHYLENE GLYCOL 3350 17 G/17G
17 POWDER, FOR SOLUTION ORAL 2 TIMES DAILY
Status: DISCONTINUED | OUTPATIENT
Start: 2022-12-27 | End: 2022-12-30 | Stop reason: HOSPADM

## 2022-12-27 RX ORDER — ONDANSETRON 4 MG/1
4 TABLET, FILM COATED ORAL EVERY 6 HOURS PRN
Status: DISCONTINUED | OUTPATIENT
Start: 2022-12-27 | End: 2022-12-30 | Stop reason: HOSPADM

## 2022-12-27 RX ORDER — ALBUTEROL SULFATE 90 UG/1
2 AEROSOL, METERED RESPIRATORY (INHALATION) EVERY 6 HOURS PRN
Status: DISCONTINUED | OUTPATIENT
Start: 2022-12-27 | End: 2022-12-27

## 2022-12-27 RX ORDER — FLUTICASONE FUROATE AND VILANTEROL 100; 25 UG/1; UG/1
1 POWDER RESPIRATORY (INHALATION) DAILY
Status: DISCONTINUED | OUTPATIENT
Start: 2022-12-27 | End: 2022-12-27

## 2022-12-27 RX ADMIN — DOXYCYCLINE 100 MG: 100 INJECTION, POWDER, LYOPHILIZED, FOR SOLUTION INTRAVENOUS at 09:05

## 2022-12-27 RX ADMIN — VANCOMYCIN HYDROCHLORIDE 750 MG: 1 INJECTION, POWDER, LYOPHILIZED, FOR SOLUTION INTRAVENOUS at 21:53

## 2022-12-27 RX ADMIN — Medication 400 MCG: at 08:50

## 2022-12-27 RX ADMIN — VANCOMYCIN HYDROCHLORIDE 750 MG: 1 INJECTION, POWDER, LYOPHILIZED, FOR SOLUTION INTRAVENOUS at 00:40

## 2022-12-27 RX ADMIN — LATANOPROST 1 DROP: 50 SOLUTION/ DROPS OPHTHALMIC at 20:11

## 2022-12-27 RX ADMIN — VANCOMYCIN HYDROCHLORIDE 750 MG: 1 INJECTION, POWDER, LYOPHILIZED, FOR SOLUTION INTRAVENOUS at 10:50

## 2022-12-27 RX ADMIN — HEPARIN SODIUM 5000 UNITS: 5000 INJECTION, SOLUTION INTRAVENOUS; SUBCUTANEOUS at 20:12

## 2022-12-27 RX ADMIN — BUSPIRONE HYDROCHLORIDE 10 MG: 10 TABLET ORAL at 15:08

## 2022-12-27 RX ADMIN — IPRATROPIUM BROMIDE AND ALBUTEROL SULFATE 3 ML: .5; 3 SOLUTION RESPIRATORY (INHALATION) at 07:45

## 2022-12-27 RX ADMIN — BUSPIRONE HYDROCHLORIDE 10 MG: 10 TABLET ORAL at 08:50

## 2022-12-27 RX ADMIN — DEXAMETHASONE SODIUM PHOSPHATE 6 MG: 10 INJECTION INTRAMUSCULAR; INTRAVENOUS at 08:50

## 2022-12-27 RX ADMIN — Medication 25 MCG: at 08:50

## 2022-12-27 RX ADMIN — B-COMPLEX W/ C & FOLIC ACID TAB 1 TABLET: TAB at 08:50

## 2022-12-27 RX ADMIN — HEPARIN SODIUM 5000 UNITS: 5000 INJECTION, SOLUTION INTRAVENOUS; SUBCUTANEOUS at 08:50

## 2022-12-27 RX ADMIN — ONDANSETRON 4 MG: 4 TABLET, ORALLY DISINTEGRATING ORAL at 11:11

## 2022-12-27 RX ADMIN — Medication 150 MG: at 20:11

## 2022-12-27 RX ADMIN — ATORVASTATIN CALCIUM 40 MG: 40 TABLET, FILM COATED ORAL at 08:50

## 2022-12-27 RX ADMIN — BUSPIRONE HYDROCHLORIDE 10 MG: 10 TABLET ORAL at 20:12

## 2022-12-27 RX ADMIN — OSELTAMIVIR PHOSPHATE 30 MG: 30 CAPSULE ORAL at 08:50

## 2022-12-27 RX ADMIN — DOXYCYCLINE 100 MG: 100 INJECTION, POWDER, LYOPHILIZED, FOR SOLUTION INTRAVENOUS at 20:34

## 2022-12-27 RX ADMIN — SERTRALINE HYDROCHLORIDE 100 MG: 100 TABLET ORAL at 08:50

## 2022-12-27 RX ADMIN — UMECLIDINIUM BROMIDE AND VILANTEROL TRIFENATATE 1 PUFF: 62.5; 25 POWDER RESPIRATORY (INHALATION) at 15:09

## 2022-12-27 RX ADMIN — LOSARTAN POTASSIUM AND HYDROCHLOROTHIAZIDE 1 TABLET: 25; 100 TABLET ORAL at 08:50

## 2022-12-27 ASSESSMENT — ACTIVITIES OF DAILY LIVING (ADL)
ADLS_ACUITY_SCORE: 37
ADLS_ACUITY_SCORE: 37
ADLS_ACUITY_SCORE: 33
ADLS_ACUITY_SCORE: 37
ADLS_ACUITY_SCORE: 33
ADLS_ACUITY_SCORE: 37
ADLS_ACUITY_SCORE: 33
ADLS_ACUITY_SCORE: 37
ADLS_ACUITY_SCORE: 37

## 2022-12-27 NOTE — PROGRESS NOTES
[facility]    Progress Note - Medicine Service, MARO[facility] TEAM 2       Date of Admission:  12/22/2022    Assessment & Plan   Ca Yan is a 77 year old female who has a hx of previous breast cancer s/p lumpectomy, GERD, HTN, HLD, chronic lower extremity lymphedema, cluster C personality disorder, CKD, restrictive lung disease and COPD (FEV1/FVC 0.71), and known restrictive lung disease.  Presenting with acute on chronic hypercarbic respiratory failure, increased WOB, and reported confusion found to have flu A infection, NUNU, and MRSE bacteremia.     Changes today:  - zofran for nausea  - continue vancomycin for MRSE through 12/29  - COPD RT consult, add LABA/LAMA, stop duonebs      #Influenza A infection  #Acute on Chronic Hypoxic Hypercapnic Respiratory Failure-resolved  #Restrictive Lung Disease  #Chronic Respiratory acidosis with compensated metabolic alkalosis  Significant restrictive lung dz due to thoracic cage abnormality in addition to deconditioning and prior smoking history. Also likely some obstructive disease. Pt with history of chronic CO2 retention, has seen pulmonology in the past and reportedly declined NIPPV. Likely has chronic compensated respiratory acidosis.  Influenza infection likely contributing to some of her symptoms. Overnight 12/22-12/23 developed worsening hypercapnea. Unclear why. No meds to really depress resp drive. CXR unchanged. Did not get aggressive fluid resuscitation (500ml overnight) and no hx HFrEF. Gasses and mentation improved on 12/23 with BIPAP. Treating for possible superimposed CAP and add IV steroids for COPD exacerbation tx.  Respiratory failure now improving without Bipap.   -Tamiflu 30 mg twice daily 12/22 for 5d course  - COPD RT consult, add LABA/LAMA, stop duonebs  -ceftriaxone 12/22 for 5d course  -doxycycline 12/23 for 5d course  -IV steroids dexamethasone 12/23- for 5d course  -daily VBG    #MRSE  Bacteremia  Blood cultures from 12/23 growing MRSE in 1/2 bottles. Repeat cx 12/24 1/2 bottles growing GPCs. Patient does not have lines, catheters, prosthetic valves etc. Patient currently afebrile, normotensive, and does not have leukocytosis.   - vancomycin (12/24-12/29)  - TTE 12/26 w/o vegetations  - ID consult    - vancomycin through 12/29 per their final recs   - daily BCx until negative x 48hrs    #Hypernatremia-resolved  Likely due to hypovolemia due to poor PO intake.   - encourage PO intake  - recheck AM BMP    #NUNU, resolved  Creatinine 1.1 (baseline around 0.8), BUN 41 (baseline 20s).  NUNU likely prerenal; in addition possibly related to UTI as well.  500cc fluid bolus on admit. Improved by 12/24.  -AM BMP    #Chronic macrocytic anemia  #Possible plasma cell disorder  Over the last several months has developed a mild macrocytic anemia.  B12 and folate levels have been checked and are normal.  Seems the prior providers were concerned about multiple myeloma.  During prior hospitalization lab work was obtained including immunoglobulin levels and protein electrophoresis; this showed a monoclonal spike. Skeletal XR with left iliac wing lucency thought to be overlying bowel gas but difficult to completely characterize, no additional lesions identified. Prior iron levels were normal three months ago along with normal ferritin.   - Continue oral iron supplementation MWF     #Generalized Anxiety Disorder  #Cognitive impairment  #Hx of AUD  - Buspirone 10 mg PO TID  - Continue PTA Seroquel, 150 mg PO at bedtime pending improvement in mentation today  - hydroxyzine 25-50mg PO prn for anxiety      #Depression  - Continue PTA sertraline 100mg daily      #Hypertension  - Resume  PTA Losartan/HCTZ 100-25 mg PO daily now that NUNU resolved     #HLD  - Continue PTA atorvastatin 40 mg PO daily     #Glaucoma  - PTA latanoprost 1 drop each eye at bedtime     # Vitamin Deficiency  - Continue PTA folic acid 400 mcg daily  -  Continue vitamin B complex with C 1 tablet daily  - Continue vitamin D3 25 mcg daily      Diet: Regular Diet Adult    DVT Prophylaxis: Heparin SQ  [place]ima Catheter: Not present  Fluids: none  Central Lines: None  Cardiac Monitoring: None  Code Status: No CPR- Do NOT Intubate      Disposition Plan      Expected Discharge Date: 12/28/2022      Destination: nursing home  Discharge Comments: will need 7 d of IV abx after blood cultures are negative for 48hrs; would be IV abx at least through 1/1/23; can TCU do IV Abx?        The patient's care was discussed with the attending physician Dr. Juarez.     Anish Trujillo MD  PGY3  Internal Medicine-Pediatrics    Medicine Service, [address]  Securely message with the Vocera Web Console (learn more here)  Text page via [facility] Paging/Directory   Please see signed in provider for up to date coverage information      Clinically Significant Risk Factors         # Hypernatremia: Highest Na = 146 mmol/L in last 2 days, will monitor as appropriate                         ______________________________________________________________________    Interval History   Weaned to 4L O2 overnight. Slept poorly due to dry mouth. Eating/drinking well. Still SOB but feels improved. No fevers. Working with therapy teams. Longterm interested in retirement.      Data reviewed today: I reviewed all medications, new labs and imaging results over the last 24 hours.     Physical Exam   Vital Signs: Temp: 98.6  F (37  C) Temp src: Oral BP: (!) 144/77 Pulse: 80   Resp: 18 SpO2: 97 % O2 Device: Nasal cannula Oxygen Delivery: 3 LPM  Weight: 201 lbs 4.48 oz  General Appearance: alert, interactive, does not appear to be in acute distress, resting comfortably in chair  HEENT: Dry lips, sclera clear  Respiratory: mildly tachypneic on 4L NC, coarse breath sounds bilaterally  Cardiovascular: Regular rate and rhythm, no murmurs rubs or gallops  GI: Soft,  nontender, nondistended  Skin: No rashes on exposed skin  Neurologic: AAOx3, CN II-XII grossly intact    Data   Recent Labs   Lab 12/27/22  0535 12/26/22  0616 12/26/22  0607 12/25/22  0814 12/24/22  0533   WBC 7.4  --  6.3 6.7 4.8   HGB 8.4*  --  8.2* 8.9* 8.3*   *  --  101* 104* 106*     --  199 80* 177    146*  --   --  144   POTASSIUM 3.7 3.8  --   --  4.0   CHLORIDE 98 102  --   --  100   CO2 33* 36*  --   --  35*   BUN 32.4* 29.5*  --   --  44.8*   CR 0.87 0.74  --   --  0.89   ANIONGAP 8 8  --   --  9   SYMONE 8.8 8.8  --   --  8.8   GLC 93 96  --   --  128*     No results found for this or any previous visit (from the past 24 hour(s)).

## 2022-12-27 NOTE — PROGRESS NOTES
[facility] PROGRESS NOTE     Patient:  Ca Yan   Date of birth 1945, Medical record number [id number]  Date of Visit:  12/27/2022  Date of Admission: 12/22/2022  Consult Requester:Nell Blankenship MD for MRSE Bacteremia          Assessment and Recommendations:   ID Problem List  1. Bacteremia - etiology possible related to pneumonia vs more likely contaminant as polymicrobial CoNS and micrococcus, first positive 12/23, NGTD since 12/25  2. Influenza A infection with question of possible secondary bacterial pneumonia (no Cx obtained, no sputum) and COPD exacerbation  3. Acute on chronic hypercapnic respiratory failure  4. 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 Left knee replacement 2011    RECOMMENDATION:  1. Completed 5 day course for CAP - ceftriaxone (EOT 12/26) and doxycycline (EOT 12/27)  2. Continue Tamiflu for 5 day course (10 doses - EOT 12/27)  3. Continue vancomycin for 5 day course for bacteremia, though likely contaminant. Start date from first negative cultures 12/25 with EOT = 12/29    ASSESSMENT:  Ca Yan is a 77 year old female with past medical history significant for restrictive lung disease 2/2 thoracic cage abnormality, COPD (on home O2 2-3L, chronic hypercapnia), anxiety, depression, chronic lymphedema, history of breast cancer s/p lumpectomy, and HTN who was admitted 12/22/22 with acute on chronic hypercarbic respiratory failure, nonproductive cough x2-3 weeks, fatigue, body aches, chills, low grade fever and confusion. She was found to have influenza A and CXR with some bilateral opacities related to edema vs infection. She has been on ceftriaxone, doxycycline and steroids for CAP and COPD exacerbation as well as Tamiflu for influenza since 12/22 or 12/23. She continues to require HFNC 30L, refusing BIPAP for hypercapnia, but reports feeling better than when she came in. Cough has been dry,  no sputum able to be obtained for culture. MRSE bacteremia of unclear etiology for which ID was consulted 12/26.  It is unclear why blood cultures were collected as she has remained afebrile and without leukocytosis, however +MRSE and Micrococcus on 12/23 in one of two sets and +Staph haemolyticus in one of two sets on 12/24 suspicious for likely contaminant.  TTE without evidence of endocarditis, overall unremarkable echo, no need for RACHAEL. Etiology of bacteremia may be related to possible pneumonia vs more likely contaminant. Repeat cultures with NGTD since 12/25. Recommend treat with vancomycin for 5 day course (12/25 - 12/29). No further blood cultures necessary. She has also completed 5 days of antibiotics for CAP (ceftriaxone and doxycycline) and influenza A (Tamiflu) as of 12/27.     ID will sign off at this time. Please do not hesitate to call if further questions arise.     Patient was discussed with Dr. Pichardo. Recommendations discussed with primary team.    Sade Clement PA-C  Infectious Diseases  Pager # [phone]    I spent a total of 80 minutes face-to-face and/or coordinating care of Ca Yan. Over 50% of my time on the unit was spent counseling the patient and/or coordinating care.           Interval History:     Ca reports feeling better today, though tired as she did not sleep much overnight. She reports sore throat still and cough but better than when she came in. She feels her breathing is improved. Denies fever, chills, abdominal pain, diarrhea, or urinary symptoms.          History of Present Illness: as per ID consult note dated 12/26/22     Ca Yan is a 77 year old female with past medical history significant for restrictive lung disease 2/2 thoracic cage abnormality, COPD (on home O2 2-3L, chronic hypercapnia), anxiety, depression, chronic lymphedema, history of breast cancer s/p lumpectomy, and HTN who was admitted 12/22/22 with acute on chronic hypercarbic respiratory  failure and confusion. She was found to have influenza A and MRSE bacteremia for which ID was consulted.     In ED, she reported cough x2 days, fatigue, low grade fever, body aches, chills, and more dyspnea. She was on 4L oxygen on arrival. Denied abdominal pain or urinary symptoms. She was afebrile and without leukocytosis. She had been refusing medication and cares at her TCU facility - felt to be confused. She developed worsening hypercapnia but has been declining BIPAP. Influenza A positive, COVID/RSV negative. CXR notable for mild interstitial opacities favored to represent edema, but cannot exclude infection, no new focal consolidation. Abnormal UA, though urine culture with urogenital zeina and denied urinary symptoms on admission.    She was started on ceftriaxone 12/22, doxycycline and steroids for CAP and COPD exacerbation on 12/23 in the setting of worsening hypercapnia. She was also started on Tamiflu on 12/22.     Blood cultures positive for MRSE in 1 of 2 sets, 2/2 bottles on 12/23 and again in 1/2 sets on 12/24. It is not clear why blood cultures were sent - no fever, no leukocytosis. She was started on vancomycin on 12/24. Repeat blood cultures 12/25 with NGTD. TTE 12/26 with poor acoustic windows, no definitive vegetations identified.    On interview today, she reports cough for few weeks prior to admission that was nonproductive. She denies any current fever, chills. She feels that her breathing is at baseline and that she does not feel short of breath. She is on high flow nasal cannula at 30 LPM. Endorses sore throat x2 days. She wants to go home. Denies abdominal pain, diarrhea, or urinary symptoms. She has no history of bacteremia. Denies aspiration, but did have some AMS due to hypercapnia.    She has previous back surgery for scoliosis. She cannot recall if she has any spinal hardware in place. She denies back pain, neck pain, headache. She has left knee replacement (2011). Denies any new  joint pain, erythema, or swelling. Denies any other indwelling devices or hardware. She has no pets or animal exposures. Denies IVDU.           Review of Systems:   CONSTITUTIONAL:  No fevers, chills or night sweats.   EYES: negative for icterus, redness, or purulent drainage.   ENT:  negative for nasal congestion, rhinorrhea, +sore throat.  RESPIRATORY:  negative for cough with sputum and dyspnea. +dry cough  CARDIOVASCULAR:  negative for chest pain or palpitations.  GASTROINTESTINAL:  negative for nausea, vomiting, diarrhea and constipation.  GENITOURINARY:  negative for dysuria, frequency, or urgency.   HEME:  No easy bruising or bleeding.  INTEGUMENT:  negative for rash and pruritus.  NEURO:  Negative for headache, vision changes, or numbness/tingling in extremities.         Current Medications:       atorvastatin  40 mg Oral Daily     busPIRone  10 mg Oral TID     doxycycline  100 mg Intravenous Q12H     ferrous sulfate  325 mg Oral Q Mon Wed Fri AM     folic acid  400 mcg Oral Daily     heparin ANTICOAGULANT  5,000 Units Subcutaneous Q12H     latanoprost  1 drop Both Eyes QPM     losartan-hydrochlorothiazide  1 tablet Oral Daily     polyethylene glycol  17 g Oral BID     QUEtiapine  150 mg Oral At Bedtime     sertraline  100 mg Oral Daily     sodium chloride (PF)  3 mL Intracatheter Q8H     umeclidinium-vilanterol  1 puff Inhalation Daily     vancomycin  750 mg Intravenous Q12H     vitamin B complex with vitamin C  1 tablet Oral Daily     Vitamin D3  25 mcg Oral Daily            Allergies:     Allergies   Allergen Reactions     Azithromycin Itching     Codeine Nausea and Vomiting     Hydrocodone Nausea and Vomiting     Metronidazole Nausea     Oxycodone Itching and Rash     Percocet [Oxycodone-Acetaminophen] Nausea and Vomiting     Pollen Extract      sneezing and runny nose.      Seasonal Allergies      sneezing and runny nose.      Vicodin [Hydrocodone-Acetaminophen] Nausea and Vomiting     Zolpidem       Amnestic behavior            Physical Exam:   Vitals were reviewed  Patient Vitals for the past 24 hrs:   BP Temp Temp src Pulse Resp SpO2 Weight   12/27/22 1100 (!) 144/77 98.6  F (37  C) Oral 80 -- 97 % --   12/27/22 0755 134/71 97.6  F (36.4  C) Oral 90 18 94 % --   12/27/22 0633 -- -- -- -- -- -- 91.3 kg (201 lb 4.5 oz)   12/27/22 0318 (!) 148/79 98  F (36.7  C) Oral 88 18 100 % --   12/26/22 2308 137/67 97.8  F (36.6  C) Oral 95 18 95 % --   12/26/22 2042 -- -- -- 79 16 -- --   12/26/22 1844 -- -- -- -- -- 100 % --   12/26/22 1807 (!) 149/90 97.6  F (36.4  C) Oral 80 20 94 % --   12/26/22 1600 -- -- -- -- -- 97 % --   12/26/22 1505 (!) 152/89 97.6  F (36.4  C) Oral 80 20 96 % --       Physical Examination:  Constitutional: Pleasant adult female seen sitting up in chair, in NAD. Alert and interactive.   HEENT: NCAT, anicteric sclerae, conjunctiva clear. Moist mucous membranes without lesions or thrush. NC 4 LPM.   Respiratory: Non-labored breathing, good air exchange. Coarse lung sounds bilaterally, no wheezing, crackles or rhonchi appreciated. No cough noted.   Cardiovascular: Regular rate and rhythm with no murmur, rub or gallop.  GI: Normoactive BS. Abdomen is soft, obese, and non-tender to palpation. No rigidity or guarding.  Skin: Warm and dry. No rashes or lesions on exposed surfaces.  Musculoskeletal: Extremities grossly normal. No tenderness to palpation of large joints. 3+ edema present bilaterally, compression stockings on.  Neurologic: A &O x3, speech normal, answering questions appropriately. Moves all extremities spontaneously. Grossly non-focal.  Neuropsychiatric: Mentation and affect normal/appropriate.  VAD: PIV in LUE is c/d/i with no erythema, drainage, or tenderness.         Laboratory Data:     Inflammatory Markers    Recent Labs   Lab Test 11/24/22  1259 09/25/19  1138   SED 53* 18   CRP 6.90* 6.4       Hematology Studies    Recent Labs   Lab Test 12/27/22  0535 12/26/22  0607 12/25/22  0814  12/24/22  0533 12/23/22  0607 12/22/22  1551 04/22/21  1145 08/20/20  1202 01/21/20  0920 10/11/19  1520 05/10/19  1057 03/08/18  1430 02/20/17  1522   WBC 7.4 6.3 6.7 4.8 5.9 8.6   < > 5.4   < > 6.3 5.7 5.9 6.1   ANEU  --   --   --   --   --   --   --  3.4  --  4.1 4.1 3.7 3.9   AEOS  --   --   --   --   --   --   --  0.1  --  0.1 0.1 0.1 0.1   HGB 8.4* 8.2* 8.9* 8.3* 8.3* 9.6*   < > 14.3   < > 14.3 13.4 13.7 13.4   * 101* 104* 106* 110* 106*   < > 98   < > 99 100 94 96    199 80* 177 170 202   < > 217   < > 230 236 279 269    < > = values in this interval not displayed.       Metabolic Studies     Recent Labs   Lab Test 12/27/22  0535 12/26/22  0616 12/24/22  0533 12/23/22  0607 12/22/22  1551    146* 144 143 141   POTASSIUM 3.7 3.8 4.0 4.1 3.9   CHLORIDE 98 102 100 99 95*   CO2 33* 36* 35* 36* 35*   BUN 32.4* 29.5* 44.8* 39.8* 41.1*   CR 0.87 0.74 0.89 1.04* 1.10*  1.10*   GFRESTIMATED 67 82 66 54* 51*  51*       Hepatic Studies    Recent Labs   Lab Test 11/24/22  1259 11/15/22  0828 11/10/22  1510 10/21/22  1131 09/22/22  1657 07/08/22  1437   BILITOTAL 0.3 0.2 0.3 0.3 0.4 0.3   ALKPHOS 86 93 110* 108* 102 102   ALBUMIN 3.7 3.9 4.0 4.0 4.1 3.9   AST 37* 40* 41* 23 29 22   ALT 17 19 12 9* 9* 7*       Microbiology:  Culture   Date Value Ref Range Status   12/26/2022 No growth after 12 hours  Preliminary   12/26/2022 No growth after 12 hours  Preliminary   12/25/2022 No growth after 2 days  Preliminary   12/25/2022 No growth after 2 days  Preliminary   12/24/2022 No growth after 3 days  Preliminary   12/24/2022 Positive on the 1st day of incubation (A)  Preliminary   12/24/2022 Staphylococcus haemolyticus (AA)  Preliminary     Comment:     1 of 2 bottles   12/24/2022 Staphylococcus haemolyticus (AA)  Preliminary     Comment:     1 of 2 bottles   12/23/2022 Positive on the 1st day of incubation (A)  Final   12/23/2022 Staphylococcus epidermidis (AA)  Final     Comment:     2 of 2 bottles    12/23/2022 Micrococcus species (AA)  Final     Comment:     1 of 2 bottles   12/23/2022 No growth after 4 days  Preliminary   12/22/2022 >100,000 CFU/mL Mixture of urogenital zeina  Final   11/24/2022 No Growth  Final   11/24/2022 No Growth  Final   10/21/2022 No Growth  Final   10/21/2022 No Growth  Final       Urine Studies    Recent Labs   Lab Test 12/22/22  1727 08/20/20  1030 08/12/20  0800 01/21/20  0925 08/24/17  0820   LEUKEST Moderate* Negative Trace* Negative Negative   WBCU 23* 4 6* 2 14*       Vancomycin Levels    Recent Labs   Lab Test 12/26/22  0607   VANCOMYCIN 20.7       Hepatitis B Testing No lab results found.  Hepatitis C Testing     Hepatitis C Antibody   Date Value Ref Range Status   04/22/2021 Nonreactive NR^Nonreactive Final     Comment:     Assay performance characteristics have not been established for newborns,   infants, and children       Respiratory Virus Testing    12/22 Influenza A+, negative COVID/RSV    IMAGING  CXR 12/23/22  IMPRESSION:   1. Bilateral diffuse mixed pulmonary opacities, not significantly  changed from prior.  2. Stable small right pleural effusion.    CXR 12/22/22  Impression: Improved appearance of the right lung compared to  11/26/2022. Mild diffuse interstitial opacities bilaterally favored to  represent edema. Infection cannot be excluded.    ECHO  TTE 12/26  Interpretation Summary  No vegetation or mass identified.

## 2022-12-27 NOTE — CONSULTS
Chronic Pulmonary Disease Specialist Consult    2022    Patient: Ca Yan      :  1945                    MRN:[id number]      Reason for Consult:  Consulted to eval inhaler needs.     History of Present Illness: Ca Yan is a 77 year old female who has a hx of previous breast cancer s/p lumpectomy, GERD, HTN, HLD, chronic lower extremity lymphedema, cluster C personality disorder, CKD, restrictive lung disease and COPD (FEV1/FVC 0.71), and known restrictive lung disease.  Presenting with acute on chronic hypercarbic respiratory failure, increased WOB, and reported confusion found to have flu A infection, NUNU, and MRSE bacteremia.     Smoking Hx: Former smoker-quit in  has12 pack year history               Pulmonologist/Last office visit: Patient has established care with Dr. Mono Taylor, pulmonologist [facility] [place]        Most recent  PFT/interpretation on: 19         FVC 1.12/42%   FEV1 0.80/39%   FEV1/FVC   (Ratio)   71%   IMPRESSION:   The symmetric decrease in the FEV1 and FVC (compared to 2017 results) may represent restrictive lung physiology v. obstruction with air trapping.   Lung volumes would be necessary to confirm if clinically required.   The study should be interpreted with caution given the reported difficulties with testing.   Compared with testing from 2017 there has been no significant change in the FEV1 or FVC.    Sleep Studies: None      Home Oxygen Use: 4L NC provided by [employer]         Pulmonary Rehab History: States she completed this several years ago.         Home respiratory medications include:      -Albuterol/Ipratropium (Combivent)  States she does not take this medication and stated it didn't really help her.     Assessment: Patient sitting upright in bedside recliner with sats of 97% on 3L NC, RR 16, HR 80, BP of 144/77       -Patient states she does not take any respiratory medication at home. Home meds indicate she has  Combivent (STACY/VIET) available to her.   Does not use this.   -When discussing Dr. Henry recommendation of trying Bipap to assist with her breathing, patient adamant that she will not try this.   -Also discussed recommendation that patient attend pulmonary rehab to address deconditioning,   patient stated she completed this several years ago   but did not object to another referral despite referral in place from 3/28/22 and 5/19/22 and a PT referral from 10/11/22.     Action:         Evaluated patients inspiratory flow using In-Check device:      --Medium/low resistance setting: Patient unable to generate at least 15-20 LPM,   which is insufficient inspiratory flow for an Anoro Ellipta DPI.   Medium resistance inhalers require a fast and deep inhalation of   30-80LPM with 5-10 second breath hold. Provided extensive coaching on how to inhale as strongly   and as quickly as she could. Asked her to imagine her favorite milkshake and inhale.      -Patient unable to generate a pressure of 5 cm H2O on Aerobika OPEP device. The goal for each breath, for a total of 3 sets of 10 breaths,  is to exhale at a of pressure 10-20 for 3-4 seconds without fatigue. Educated patient to perform 2 to 3 'de la o coughs'  to clear airway after each set.   Shared that consistent use of this device will help open smaller airways, improve mucus clearance, decrease cough frequency, and improve exercise tolerance.     Recommendations:    Inpatient pulmonary consult for further recommendations and coordination of care      Use Aerobika Oscillating PEP Device for 3 sets of 10 breaths two times daily as directed above      Consider CT chest for lung cancer screening given smoking history      Palliative consult to discuss symptom management: degree of air hunger/pain/coping with illness/anxiety and depression/advanced care planning/ goals of care and comfort.      PT/OT consult to assess patients functional abilities, limitations, home care  "needs, and make recommendations for safe transition to home or TCU.      Another referral for outpatient pulmonary Rehab      Home Oxygen Assessment Testing 24-48 hours prior to discharge to determine O2 needs at rest and with exertion/activity. If the patient requires oxygen at rest, the walk test must be performed using the new baseline O2 to determine if patient needs more oxygen with activity.       In the event patient qualifies for oxygen, at rest or with activity, please use the following verbiage in oxygen order: \"Please provide portable oxygen concentrator AND please test for oxygen conserving device using ____LPM to maintain sats between ____)    I spent 30 minutes with the patient.    -Will continue to follow patient, assist bedside RT, RN CC, SW, and treatment team with specific respiratory discharge needs and IP recommendations.       Aimee Contreras, RRT, CTTS  Chronic Pulmonary Disease Specialist  Office: [phone]  Pager: [phone]  Hours: M-F 8-4:30          "

## 2022-12-27 NOTE — PROGRESS NOTES
Care Management Follow Up    SW left a voicemail with Adriana ([facility] Liaison, PH: [phone]) to inquire if pt has a bed hold at the [facility]. SW has left a voicemail both today and yesterday with no call back.     SW received a page from pt's medicine team (Kristan 2) that pt may be medically ready tomorrow or Thursday. SW to follow up with Adriana again.     Update 1:15PM: SW reached out to Adriana again. She is reporting that she is still waiting on a response from [facility] to confirm bed hold. [facility] has confirmed that pt was a resident there but have yet to confirm if pt can return.     [facility] [address] [address]  PH: [phone]    EPI Dodson, LGSW   [address]    Phone: [phone]  Pager: [phone]

## 2022-12-27 NOTE — PLAN OF CARE
"Neuro- A&O but very forgetful. Impulsively getting up, chair alarm/bed alarm on. Anxious and gave PRN Atarax and scheduled Seroquel, neither seemed to calm her anxiety. Pt using call light/frequently calling and needing reminders and instructions on call light, importance of grouping cares.   Cardiac-  no tele  VS Blood pressure (!) 148/79, pulse 88, temperature 98  F (36.7  C), temperature source Oral, resp. rate 18, weight 90.9 kg (200 lb 6.4 oz), SpO2 100 %, not currently breastfeeding.  Pain-denied  O2- 2-4L NC (baseline 3L NC)  GI/-  up to commode adequate UOP, no BM this shift.  Lines-  PIV on right   Activity-  pt restless and said she couldn't sleep during the night. Pt had said she \"forgot\" to use call light until she was already oob twice. Bed alarm not working with low bed, chair alarm is now under pt in bed. Pt then stated, \"you guys don't trust me.\" Explained x2 that chair/bed alarm is for her safety in case she forgets to call before getting oob.     No other concerns will continue to monitor and follow POC.   Carmen Pollock RN on 12/27/2022 at 7:02 AM      "

## 2022-12-28 LAB
BACTERIA BLD CULT: NO GROWTH
CREAT SERPL-MCNC: 0.91 MG/DL (ref 0.51–0.95)
GFR SERPL CREATININE-BSD FRML MDRD: 64 ML/MIN/1.73M2
LACTATE SERPL-SCNC: 0.8 MMOL/L (ref 0.7–2)
PLATELET # BLD AUTO: 241 10E3/UL (ref 150–450)

## 2022-12-28 PROCEDURE — 36415 COLL VENOUS BLD VENIPUNCTURE: CPT | Performed by: STUDENT IN AN ORGANIZED HEALTH CARE EDUCATION/TRAINING PROGRAM

## 2022-12-28 PROCEDURE — 99233 SBSQ HOSP IP/OBS HIGH 50: CPT | Mod: GC | Performed by: STUDENT IN AN ORGANIZED HEALTH CARE EDUCATION/TRAINING PROGRAM

## 2022-12-28 PROCEDURE — 120N000003 HC R&B IMCU UMMC

## 2022-12-28 PROCEDURE — 250N000011 HC RX IP 250 OP 636: Performed by: STUDENT IN AN ORGANIZED HEALTH CARE EDUCATION/TRAINING PROGRAM

## 2022-12-28 PROCEDURE — 250N000013 HC RX MED GY IP 250 OP 250 PS 637: Performed by: STUDENT IN AN ORGANIZED HEALTH CARE EDUCATION/TRAINING PROGRAM

## 2022-12-28 PROCEDURE — 85049 AUTOMATED PLATELET COUNT: CPT | Performed by: STUDENT IN AN ORGANIZED HEALTH CARE EDUCATION/TRAINING PROGRAM

## 2022-12-28 PROCEDURE — 258N000003 HC RX IP 258 OP 636: Performed by: STUDENT IN AN ORGANIZED HEALTH CARE EDUCATION/TRAINING PROGRAM

## 2022-12-28 PROCEDURE — 99207 PR CDG-CUT & PASTE-POTENTIAL IMPACT ON LEVEL: CPT | Performed by: STUDENT IN AN ORGANIZED HEALTH CARE EDUCATION/TRAINING PROGRAM

## 2022-12-28 PROCEDURE — 83605 ASSAY OF LACTIC ACID: CPT | Performed by: STUDENT IN AN ORGANIZED HEALTH CARE EDUCATION/TRAINING PROGRAM

## 2022-12-28 PROCEDURE — 82565 ASSAY OF CREATININE: CPT | Performed by: STUDENT IN AN ORGANIZED HEALTH CARE EDUCATION/TRAINING PROGRAM

## 2022-12-28 RX ADMIN — BUSPIRONE HYDROCHLORIDE 10 MG: 10 TABLET ORAL at 20:46

## 2022-12-28 RX ADMIN — ATORVASTATIN CALCIUM 40 MG: 40 TABLET, FILM COATED ORAL at 09:11

## 2022-12-28 RX ADMIN — UMECLIDINIUM BROMIDE AND VILANTEROL TRIFENATATE 1 PUFF: 62.5; 25 POWDER RESPIRATORY (INHALATION) at 09:11

## 2022-12-28 RX ADMIN — FERROUS SULFATE TAB 325 MG (65 MG ELEMENTAL FE) 325 MG: 325 (65 FE) TAB at 09:09

## 2022-12-28 RX ADMIN — BUSPIRONE HYDROCHLORIDE 10 MG: 10 TABLET ORAL at 13:34

## 2022-12-28 RX ADMIN — POLYETHYLENE GLYCOL 3350 17 G: 17 POWDER, FOR SOLUTION ORAL at 20:46

## 2022-12-28 RX ADMIN — BUSPIRONE HYDROCHLORIDE 10 MG: 10 TABLET ORAL at 09:10

## 2022-12-28 RX ADMIN — POLYETHYLENE GLYCOL 3350 17 G: 17 POWDER, FOR SOLUTION ORAL at 09:09

## 2022-12-28 RX ADMIN — HEPARIN SODIUM 5000 UNITS: 5000 INJECTION, SOLUTION INTRAVENOUS; SUBCUTANEOUS at 20:47

## 2022-12-28 RX ADMIN — Medication 400 MCG: at 09:10

## 2022-12-28 RX ADMIN — VANCOMYCIN HYDROCHLORIDE 750 MG: 1 INJECTION, POWDER, LYOPHILIZED, FOR SOLUTION INTRAVENOUS at 22:23

## 2022-12-28 RX ADMIN — Medication 150 MG: at 22:23

## 2022-12-28 RX ADMIN — VANCOMYCIN HYDROCHLORIDE 750 MG: 1 INJECTION, POWDER, LYOPHILIZED, FOR SOLUTION INTRAVENOUS at 10:31

## 2022-12-28 RX ADMIN — HEPARIN SODIUM 5000 UNITS: 5000 INJECTION, SOLUTION INTRAVENOUS; SUBCUTANEOUS at 09:11

## 2022-12-28 RX ADMIN — B-COMPLEX W/ C & FOLIC ACID TAB 1 TABLET: TAB at 09:10

## 2022-12-28 RX ADMIN — SERTRALINE HYDROCHLORIDE 100 MG: 100 TABLET ORAL at 09:10

## 2022-12-28 RX ADMIN — LATANOPROST 1 DROP: 50 SOLUTION/ DROPS OPHTHALMIC at 20:47

## 2022-12-28 RX ADMIN — DOXYCYCLINE 100 MG: 100 INJECTION, POWDER, LYOPHILIZED, FOR SOLUTION INTRAVENOUS at 09:11

## 2022-12-28 RX ADMIN — LOSARTAN POTASSIUM AND HYDROCHLOROTHIAZIDE 1 TABLET: 25; 100 TABLET ORAL at 09:10

## 2022-12-28 RX ADMIN — Medication 25 MCG: at 09:09

## 2022-12-28 ASSESSMENT — ACTIVITIES OF DAILY LIVING (ADL)
ADLS_ACUITY_SCORE: 33

## 2022-12-28 NOTE — PROVIDER NOTIFICATION
[6226 ( ) FYI blood cultures collected 12/24 positive for Staphylococcus haemolyticus. #[id number] RN]    Sent @ 5369 via ascom to Kristan Freitas.

## 2022-12-28 NOTE — PLAN OF CARE
Neuro: A&Ox4. Forgetful, anxious   Cardiac: no tele.  VSS.   Respiratory: Sating >90 on 3 L NC. Had to bump up to 6 for awhile. Frequent cough. De-sats to 80s when takes off NC to blow her nose  GI/: Urine frequency and urgency. Up to bedside commode.   Diet/appetite: Tolerating regular diet. Eating well.  Activity:  Assist of 1 GB and walker, up to chair and in halls.  Pain: At acceptable level on current regimen.   Skin: No new deficits noted.  LDA's:    Plan: Chair alarm in place in chair and when in bed- cannot get bed alarm to work. Receiving IV antibiotics. Droplet and MRSE precautions. Continue with POC. Notify primary team with changes.

## 2022-12-28 NOTE — DISCHARGE SUMMARY
[facility]  Discharge Summary - Medicine & Pediatrics       Date of Admission:  12/22/2022  Date of Discharge:  12/30/2022  1:19 PM  Discharging Provider: Nanette Juarez MD   Discharge Service: Medicine Service, IVIS TEAM 2    Discharge Diagnoses   #Influenza A infection  #Acute on Chronic Hypoxic Hypercapnic Respiratory Failure  #Restrictive Lung Disease  #Chronic Respiratory acidosis with compensated metabolic alkalosis  #MRSE Bacteremia  #Hypernatremia  #NUNU  #Chronic macrocytic anemia  #Possible plasma cell disorder    Follow-ups Needed After Discharge   -Pulmonary rehab    Unresulted Labs Ordered in the Past 30 Days of this Admission     Date and Time Order Name Status Description    12/27/2022 11:13 PM Blood Culture Peripheral Blood Preliminary     12/27/2022 11:13 PM Blood Culture Peripheral Blood Preliminary     12/26/2022 12:13 PM Blood Culture Hand, Right Preliminary     12/26/2022 12:13 PM Blood Culture Arm, Right Preliminary       These results will be followed up by TCU physician.     Discharge Disposition   Discharged to short-term care facility  Condition at discharge: Stable    Hospital Course   Ca Yan or  hx of previous breast cancer s/p lumpectomy, GERD, HTN, HLD, chronic lower extremity lymphedema, cluster C personality disorder, CKD, restrictive lung disease and COPD (FEV1/FVC 0.71), and known restrictive lung disease. She was admitted on 12/22/2022 for acute on chronic hypercarbic respiratory failure, flu A infection, NUNU, and MRSE bacteremia.    The following problems were addressed during her hospitalization:    #Influenza A infection  #Acute on Chronic Hypoxic Hypercapnic Respiratory Failure-resolved  #Restrictive Lung Disease  #Chronic Respiratory acidosis with compensated metabolic alkalosis  Significant restrictive lung dz due to thoracic cage abnormality in addition to deconditioning and prior smoking history. Also likely some  obstructive disease. Pt with history of chronic CO2 retention, has seen pulmonology in the past and reportedly declined NIPPV. Likely has chronic compensated respiratory acidosis.  Influenza infection likely contributing to some of her symptoms. Overnight 12/22-12/23 developed worsening hypercapnea. Unclear why. No meds to really depress resp drive. CXR unchanged. Did not get aggressive fluid resuscitation (500ml overnight) and no hx HFrEF. Gasses and mentation improved on 12/23 with BIPAP. Treating for possible superimposed CAP and add IV steroids for COPD exacerbation tx.  Respiratory failure now resolved, back on home O2.   -Tamiflu 30 mg twice daily 12/22 for 5d course  - daily LABA/LAMA (Anoro Ellipta), stop duonebs              - aerobika for pulm toilet              - plan for re-referral pulm rehab once discharged  -ceftriaxone 12/22 for 5d course  -doxycycline 12/23 for 5d course  -IV steroids dexamethasone 12/23- for 5d course     #MRSE Bacteremia  Blood cultures from 12/23 growing MRSE in 1/2 bottles. Repeat cx 12/24 1/2 bottles growing GPCs. Patient does not have lines, catheters, prosthetic valves etc. Patient currently afebrile, normotensive, and does not have leukocytosis.   - vancomycin (12/24-12/29)  - TTE 12/26 w/o vegetations  - ID consult               - vancomycin through 12/29 per their final recs              - daily BCx until negative x 48hrs     #Hypernatremia-resolved  Likely due to hypovolemia due to poor PO intake.      #NUNU, resolved  Creatinine 1.1 (baseline around 0.8), BUN 41 (baseline 20s).  NUNU likely prerenal; in addition possibly related to UTI as well.  500cc fluid bolus on admit. Resolved prior to discharge.     #Chronic macrocytic anemia  #Possible plasma cell disorder  Over the last several months has developed a mild macrocytic anemia.  B12 and folate levels have been checked and are normal.  Seems the prior providers were concerned about multiple myeloma.  During prior  hospitalization lab work was obtained including immunoglobulin levels and protein electrophoresis; this showed a monoclonal spike. Skeletal XR with left iliac wing lucency thought to be overlying bowel gas but difficult to completely characterize, no additional lesions identified. Prior iron levels were normal three months ago along with normal ferritin.   - Continue oral iron supplementation MWF    Consultations This Hospital Stay   NURSING TO CONSULT FOR VASCULAR ACCESS CARE IP CONSULT  PHYSICAL THERAPY ADULT IP CONSULT  OCCUPATIONAL THERAPY ADULT IP CONSULT  PHARMACY TO DOSE VANCO  NURSING TO CONSULT FOR VASCULAR ACCESS CARE IP CONSULT  NURSING TO CONSULT FOR VASCULAR ACCESS CARE IP CONSULT  CARE MANAGEMENT / SOCIAL WORK IP CONSULT  NURSING TO CONSULT FOR VASCULAR ACCESS CARE IP CONSULT  INFECTIOUS DISEASE GENERAL ADULT IP CONSULT  NURSING TO CONSULT FOR VASCULAR ACCESS CARE IP CONSULT  IP RESPIRATORY CARE CHRONIC PULMONARY DISEASE SPECIALIST  PHYSICAL THERAPY ADULT IP CONSULT  OCCUPATIONAL THERAPY ADULT IP CONSULT  LYMPHEDEMA THERAPY IP CONSULT  PULMONARY GENERAL ADULT IP CONSULT    Code Status   No CPR- Do NOT Intubate         Nanette Juarez MD  [address] [address]  Phone: [phone]  ______________________________________________________________________    Physical Exam   Vital Signs: Temp: 98.2  F (36.8  C) Temp src: Oral BP: (!) 134/93 Pulse: 84   Resp: 20 SpO2: 99 % O2 Device: Nasal cannula with humidification Oxygen Delivery: 4 LPM  Weight: 202 lbs 9.64 oz  General Appearance: Alert, oriented, sitting in chair listening to music  Respiratory: no respiratory distress, decreased aeration bilaterally (baseline), NC in place, no wheezing or crackles  Cardiovascular: RRR, murmur in LUSB  GI: soft, non-tender  Skin: warm and dry, 3+ non-pitting edema of bilateral lower extremities        Primary Care Physician   Favian ROSALES  Adelina    Discharge Orders      General info for SNF    Length of Stay Estimate: Short Term Care: Estimated # of Days <30  Condition at Discharge: Stable  Level of care:skilled   Rehabilitation Potential: Fair  Admission H&P remains valid and up-to-date: Yes  Recent Chemotherapy: N/A  Use Nursing Home Standing Orders: Yes     Mantoux instructions    Give two-step Mantoux (PPD) Per Facility Policy Yes     Activity - Up with nursing assistance     General info for SNF    Length of Stay Estimate: Short Term Care: Estimated # of Days <30  Condition at Discharge: Improving  Level of care:skilled   Rehabilitation Potential: Good  Admission H&P remains valid and up-to-date: Yes  Recent Chemotherapy: N/A  Use Nursing Home Standing Orders: Yes     Mantoux instructions    Give two-step Mantoux (PPD) Per Facility Policy Yes     Follow Up and recommended labs and tests    Follow up with Nursing home physician.  No follow up labs or test are needed.     Reason for your hospital stay    Dear Ca Yan    You were hospitalized at [facility] with influenza, COPD, and pneumonia and treated with antibiotics.  Over your hospitalization your symptoms improved and today you are ready to be discharged transitional care.  If you continue physical therapy you should continue to improve but if you develop fever, shortness of breath, light headedness, chest pain please seek medical attention.    We are suggesting the following medication changes:  -- COPD meds as discussed with educators (anoro ellipta and albuterol)    Please get the following tests done:  None    Please set up an appointment with:  - Pulmonary rehab  - PCP  - Pulmonology     It was a pleasure meeting with you today. Thank you for allowing me and my team the privilege of caring for you today. You are the reason we are here, and I truly hope we provided you with the excellent service you deserve. Please let us know if there is anything  else we can do for you so that we can be sure you are leaving completely satisfied with your care experience.      Take care!  Nanette Juarez MD  Department of Medicine  [facility]     Activity - Up with nursing assistance     No CPR- Do NOT Intubate     Physical Therapy Adult Consult    Evaluate and treat as clinically indicated.    Reason:  restrictive lung disease and deconditioning     Occupational Therapy Adult Consult    Evaluate and treat as clinically indicated.    Reason:  restrictive lung disease and deconditioning     Lymphedema Therapy Adult Consult    Evaluate and treat as clinically indicated.    Reason:  Chronic lymphedema     Fall precautions     Fall precautions     Diet    Follow this diet upon discharge: Orders Placed This Encounter      Regular Diet Adult     Diet    Follow this diet upon discharge: Orders Placed This Encounter      Regular Diet Adult       Significant Results and Procedures   Results for orders placed or performed during the hospital encounter of 12/22/22   Chest XR,  PA & LAT    Narrative    Exam: XR CHEST 2 VIEWS, 12/22/2022 5:18 PM    Indication: cough and dyspnea    Comparison: 11/26/2022    Findings:   AP and lateral views of the chest. Cardiomediastinal silhouettes is  within normal limits. Asymmetric inflation of the lungs with  hypoinflated right lung similar to previous. Decreased right pleural  effusion and dense right perihilar and basilar opacities. Mild  increased interstitial opacities bilaterally with increased hazy  attenuation of the left lung. Stable rightward mediastinal shift. No  new focal consolidative opacity. Dextroscoliosis of the thoracic  spine.      Impression    Impression: Improved appearance of the right lung compared to  11/26/2022. Mild diffuse interstitial opacities bilaterally favored to  represent edema. Infection cannot be excluded.    I have personally reviewed the examination and initial interpretation  and I agree with the  findings.    MICHAEL MCGRATH MD         SYSTEM ID:  [id number]   XR Chest Port 1 View    Narrative    EXAM: XR CHEST PORT 1 VIEW  2022 9:56 AM     HISTORY:  worsening respiratory failure       COMPARISON:  2022    FINDINGS: Single view of the chest.  Stable cardiomediastinal  silhouette. Asymmetric inflation of the lungs with hyperinflated right  lung field, similar to prior. Small right pleural effusion, similar to  prior. No appreciable pneumothorax. Linear lucency along the  peripheral left lung likely represents a skinfold. Continued bilateral  diffuse mixed interstitial and airspace opacities.      Impression    IMPRESSION:   1. Bilateral diffuse mixed pulmonary opacities, not significantly  changed from prior.  2. Stable small right pleural effusion.    I have personally reviewed the examination and initial interpretation  and I agree with the findings.    MICHAEL MCGRATH MD         SYSTEM ID:  [id number]   Echo Complete     Value    LVEF  65-70%    Narrative    [id number]  [id number]  [id number]  [id number]^LUKE^ALINA     [facility],[place]  Echocardiography Laboratory  [address] [place] [id number]     Name: AMAURI WOOD  MRN: [id number]  : 1945  Study Date: 2022 07:41 AM  Age: 77 yrs  Gender: Female  Patient Location: [facility]  Reason For Study: Endocarditis  Ordering Physician: ALINA BUTLER  Performed By: Susan Patel     BSA: 1.9 m2  Height: 63 in  Weight: 200 lb  HR: 92  BP: 150/88 mmHg  ______________________________________________________________________________  Procedure  Complete Portable Echo Adult. Poor acoustic windows.  ______________________________________________________________________________  Interpretation Summary  No vegetation or mass identified.  ______________________________________________________________________________  Left Ventricle  Global and regional left ventricular function is hyperkinetic with an EF of  65-70%. Left  ventricular wall thickness is normal. Left ventricular size is  normal. Left ventricular diastolic function is not assessable. No regional  wall motion abnormalities are seen.     Right Ventricle  Right ventricular function, chamber size, wall motion, and thickness are  normal.     Atria  The atria cannot be assessed.     Mitral Valve  Mild mitral annular calcification is present. Trace mitral insufficiency is  present.     Aortic Valve  Aortic valve sclerosis is present.     Tricuspid Valve  The tricuspid valve is normal. Trace tricuspid insufficiency is present. The  right ventricular systolic pressure is approximated at 36.6 mmHg plus the  right atrial pressure.     Pulmonic Valve  The pulmonic valve is normal.     Vessels  The thoracic aorta cannot be assessed. The pulmonary artery and bifurcation  cannot be assessed. The inferior vena cava is normal.     Pericardium  No pericardial effusion is present.     ______________________________________________________________________________  Doppler Measurements & Calculations     TR max dk: 302.3 cm/sec  TR max P.6 mmHg     ______________________________________________________________________________  Report approved by: Shubham Hoyos 2022 09:11 AM           *Note: Due to a large number of results and/or encounters for the requested time period, some results have not been displayed. A complete set of results can be found in Results Review.       Discharge Medications   Discharge Medication List as of 2022  1:07 PM      START taking these medications    Details   acetaminophen (TYLENOL) 325 MG tablet Take 2 tablets (650 mg) by mouth every 4 hours as needed for mild pain or fever, Disp-60 tablet, R-0, Transitional      albuterol (PROAIR HFA/PROVENTIL HFA/VENTOLIN HFA) 108 (90 Base) MCG/ACT inhaler Inhale 2 puffs into the lungs every 6 hours as needed for wheezing, Disp-18 g, R-0, TransitionalPharmacy may dispense brand covered by insurance  (Proair, or proventil or ventolin or generic albuterol inhaler)      umeclidinium-vilanterol (ANORO ELLIPTA) 62.5-25 MCG/ACT oral inhaler Inhale 1 puff into the lungs daily, Disp-1 each, R-0, Transitional         CONTINUE these medications which have NOT CHANGED    Details   atorvastatin (LIPITOR) 40 MG tablet Take 1 tablet (40 mg) by mouth daily, Disp-90 tablet, R-3, Transitional      azelastine (ASTELIN) 0.1 % nasal spray Spray 1 spray into both nostrils 2 times daily, Transitional      benzonatate (TESSALON) 200 MG capsule Take 200 mg by mouth 3 times daily as needed for cough, Historical      budesonide (PULMICORT) 1 MG/2ML neb solution Take 2 mg by nebulization every 8 hours, Historical      busPIRone (BUSPAR) 10 MG tablet Take 1 tablet (10 mg) by mouth 3 times daily, TransitionalFor anxiety      Emollient (CERAVE) CREA Please apply twice daily to dry skin of body and feetDisp-453 g, R-3Transitional      ferrous sulfate (FEROSUL) 325 (65 Fe) MG tablet Take 1 tablet (325 mg) by mouth Every Mon, Wed, Fri Morning, TransitionalFor iron deficiency anemia      folic acid (FOLVITE) 400 MCG tablet Take 1 tablet (400 mcg) by mouth daily, Disp-100 tablet, R-3, E-PrescribePlease call her about delivery options      ipratropium-albuterol (COMBIVENT RESPIMAT)  MCG/ACT inhaler Inhale 1 puff into the lungs 4 times daily as needed for shortness of breath / dyspnea or wheezing, Transitional      latanoprost (XALATAN) 0.005 % ophthalmic solution Place 1 drop into both eyes daily, TransitionalFor gluacoma      Lidocaine (LIDOCARE) 4 % Patch Place 2 patches onto the skin daily as needed for moderate pain (4-6) (As needed for left chest wall pain) To prevent lidocaine toxicity, patient should be patch free for 12 hrs daily.Transitional      losartan-hydrochlorothiazide (HYZAAR) 100-25 MG tablet Take 1 tablet by mouth daily, Disp-90 tablet, R-3, Transitional      melatonin 3 MG tablet Take 3 mg by mouth At Bedtime, Historical       miconazole (MICATIN) 2 % external powder Apply topically 4 times dailyTransitional      polyethylene glycol (MIRALAX) 17 GM/Dose powder Take 1 capful by mouth 2 times daily, Historical      QUEtiapine (SEROQUEL) 300 MG tablet Take 0.5 tablets (150 mg) by mouth At Bedtime, TransitionalFor sleep and anxiety      sennosides (SENOKOT) 8.6 MG tablet Take 1 tablet by mouth 2 times daily, Historical      sertraline (ZOLOFT) 100 MG tablet Take 1 tablet (100 mg) by mouth daily, TransitionalFor anxiety      vitamin B complex with vitamin C (STRESS TAB) tablet Take 1 tablet by mouth daily, Disp-100 tablet, R-3, TransitionalFor vitamin deficiency      Vitamin D3 (CHOLECALCIFEROL) 25 mcg (1000 units) tablet Take 1 tablet (25 mcg) by mouth daily, Disp-100 tablet, R-3, TransitionalFor vitamin D deficiency           Allergies   Allergies   Allergen Reactions     Azithromycin Itching     Codeine Nausea and Vomiting     Hydrocodone Nausea and Vomiting     Metronidazole Nausea     Oxycodone Itching and Rash     Percocet [Oxycodone-Acetaminophen] Nausea and Vomiting     Pollen Extract      sneezing and runny nose.      Seasonal Allergies      sneezing and runny nose.      Vicodin [Hydrocodone-Acetaminophen] Nausea and Vomiting     Zolpidem      Amnestic behavior

## 2022-12-28 NOTE — PROGRESS NOTES
[facility]    Progress Note - Medicine Service, IVIS TEAM 2       Date of Admission:  12/22/2022    Assessment & Plan   Ca Yan is a 77 year old female who has a hx of previous breast cancer s/p lumpectomy, GERD, HTN, HLD, chronic lower extremity lymphedema, cluster C personality disorder, CKD, restrictive lung disease and COPD (FEV1/FVC 0.71), and known restrictive lung disease.  Presenting with acute on chronic hypercarbic respiratory failure, increased WOB, and reported confusion found to have flu A infection, NUNU, and MRSE bacteremia.     Changes today:  - continue vancomycin for ADAM through 12/29  - working on placement back to TCU    #Influenza A infection  #Acute on Chronic Hypoxic Hypercapnic Respiratory Failure-resolved  #Restrictive Lung Disease  #Chronic Respiratory acidosis with compensated metabolic alkalosis  Significant restrictive lung dz due to thoracic cage abnormality in addition to deconditioning and prior smoking history. Also likely some obstructive disease. Pt with history of chronic CO2 retention, has seen pulmonology in the past and reportedly declined NIPPV. Likely has chronic compensated respiratory acidosis.  Influenza infection likely contributing to some of her symptoms. Overnight 12/22-12/23 developed worsening hypercapnea. Unclear why. No meds to really depress resp drive. CXR unchanged. Did not get aggressive fluid resuscitation (500ml overnight) and no hx HFrEF. Gasses and mentation improved on 12/23 with BIPAP. Treating for possible superimposed CAP and add IV steroids for COPD exacerbation tx.  Respiratory failure now resolved, back on home O2.   -Tamiflu 30 mg twice daily 12/22 for 5d course  - daily LABA/LAMA (Anoro Ellipta), stop duonebs   - aerobika for pulm toilet   - plan for re-referral pulm rehab once discharged  -ceftriaxone 12/22 for 5d course  -doxycycline 12/23 for 5d course  -IV steroids dexamethasone  12/23- for 5d course    #MRSE Bacteremia  Blood cultures from 12/23 growing MRSE in 1/2 bottles. Repeat cx 12/24 1/2 bottles growing GPCs. Patient does not have lines, catheters, prosthetic valves etc. Patient currently afebrile, normotensive, and does not have leukocytosis.   - vancomycin (12/24-12/29)  - TTE 12/26 w/o vegetations  - ID consult    - vancomycin through 12/29 per their final recs   - daily BCx until negative x 48hrs    #Hypernatremia-resolved  Likely due to hypovolemia due to poor PO intake.   - encourage PO intake    #NUNU, resolved  Creatinine 1.1 (baseline around 0.8), BUN 41 (baseline 20s).  NUNU likely prerenal; in addition possibly related to UTI as well.  500cc fluid bolus on admit. Improved by 12/24.    #Chronic macrocytic anemia  #Possible plasma cell disorder  Over the last several months has developed a mild macrocytic anemia.  B12 and folate levels have been checked and are normal.  Seems the prior providers were concerned about multiple myeloma.  During prior hospitalization lab work was obtained including immunoglobulin levels and protein electrophoresis; this showed a monoclonal spike. Skeletal XR with left iliac wing lucency thought to be overlying bowel gas but difficult to completely characterize, no additional lesions identified. Prior iron levels were normal three months ago along with normal ferritin.   - Continue oral iron supplementation MWF     #Generalized Anxiety Disorder  #Cognitive impairment  #Hx of AUD  - Buspirone 10 mg PO TID  - Continue PTA Seroquel, 150 mg PO at bedtime  - hydroxyzine 25-50mg PO prn for anxiety      #Depression  - Continue PTA sertraline 100mg daily      #Hypertension  - Resume  PTA Losartan/HCTZ 100-25 mg PO daily now that NUNU resolved     #HLD  - Continue PTA atorvastatin 40 mg PO daily     #Glaucoma  - PTA latanoprost 1 drop each eye at bedtime     # Vitamin Deficiency  - Continue PTA folic acid 400 mcg daily  - Continue vitamin B complex with C 1  tablet daily  - Continue vitamin D3 25 mcg daily      Diet: Regular Diet Adult    DVT Prophylaxis: Heparin SQ  [place]ima Catheter: Not present  Fluids: none  Central Lines: None  Cardiac Monitoring: None  Code Status: No CPR- Do NOT Intubate      Disposition Plan      Expected Discharge Date: 12/29/2022      Destination: nursing home  Discharge Comments: will need IV abx through 12/29 per ID; may be able to discharge 12/28 pending stability on O2 needs        The patient's care was discussed with the attending physician Dr. Juarez.     Anish Trujillo MD  PGY3  Internal Medicine-Pediatrics    Medicine Service, [facility] TEAM [address] [place] [address]  Securely message with the Vocera Web Console (learn more here)  Text page via [facility] Paging/Directory   Please see signed in provider for up to date coverage information      Clinically Significant Risk Factors                                 ______________________________________________________________________    Interval History   Continues on low flow nasal cannula, basically home level of O2 support. Slept better, breathing better. Understands plan for IV abx. Reiterated to Ca importance of bowel regimen.     Data reviewed today: I reviewed all medications, new labs and imaging results over the last 24 hours.     Physical Exam   Vital Signs: Temp: 97.2  F (36.2  C) Temp src: Oral BP: (!) 140/80 Pulse: 85   Resp: 20 SpO2: 100 % O2 Device: Nasal cannula Oxygen Delivery: 5 LPM  Weight: 201 lbs 15.06 oz  General Appearance: alert, interactive, does not appear to be in acute distress, resting comfortably in bed  HEENT: Dry lips, sclera clear  Respiratory: normal WOB on 5L NC, coarse breath sounds bilaterally  Cardiovascular: Regular rate and rhythm, no murmurs rubs or gallops  GI: Soft, nontender, nondistended  Skin: No rashes on exposed skin  Neurologic: AAOx3, CN II-XII grossly intact    Data   Recent Labs   Lab 12/28/22  0582  12/27/22  0535 12/26/22  0616 12/26/22  0607 12/25/22  0814 12/24/22  0533   WBC  --  7.4  --  6.3 6.7 4.8   HGB  --  8.4*  --  8.2* 8.9* 8.3*   MCV  --  101*  --  101* 104* 106*    229  --  199 80* 177   NA  --  139 146*  --   --  144   POTASSIUM  --  3.7 3.8  --   --  4.0   CHLORIDE  --  98 102  --   --  100   CO2  --  33* 36*  --   --  35*   BUN  --  32.4* 29.5*  --   --  44.8*   CR 0.91 0.87 0.74  --   --  0.89   ANIONGAP  --  8 8  --   --  9   SYMONE  --  8.8 8.8  --   --  8.8   GLC  --  93 96  --   --  128*     No results found for this or any previous visit (from the past 24 hour(s)).

## 2022-12-28 NOTE — PLAN OF CARE
ICU End of Shift Summary. See flowsheets for vital signs and detailed assessment.    Changes this shift: AxO 4, forgetful and anxious at times. Denies complaints of pain. On 2-4L NC Overnight. Nonproductive cough w/ coarse/diminished lung sounds. Up w/ 1 person assist. Voiding. LBM: 12/25, refused bowel regimen medications. Provided education regarding antibiotics, lab results, and medications.     Plan: PICC insertion pending negative blood cultures with 7 day course of abx to follow if TCU accepts abx infusions. Continue plan of care.     Goal Outcome Evaluation:      Plan of Care Reviewed With: patient    Overall Patient Progress: improvingOverall Patient Progress: improving    Outcome Evaluation: weaned to 2L NC, VSS

## 2022-12-29 ENCOUNTER — APPOINTMENT (OUTPATIENT)
Dept: PHYSICAL THERAPY | Facility: CLINIC | Age: 77
DRG: 193 | End: 2022-12-29
Payer: COMMERCIAL

## 2022-12-29 ENCOUNTER — APPOINTMENT (OUTPATIENT)
Dept: OCCUPATIONAL THERAPY | Facility: CLINIC | Age: 77
DRG: 193 | End: 2022-12-29
Payer: COMMERCIAL

## 2022-12-29 LAB
BACTERIA BLD CULT: ABNORMAL
BACTERIA BLD CULT: NO GROWTH
HOLD SPECIMEN: NORMAL
PLATELET # BLD AUTO: 216 10E3/UL (ref 150–450)

## 2022-12-29 PROCEDURE — 120N000003 HC R&B IMCU UMMC

## 2022-12-29 PROCEDURE — 85049 AUTOMATED PLATELET COUNT: CPT | Performed by: STUDENT IN AN ORGANIZED HEALTH CARE EDUCATION/TRAINING PROGRAM

## 2022-12-29 PROCEDURE — 97535 SELF CARE MNGMENT TRAINING: CPT | Mod: GO

## 2022-12-29 PROCEDURE — 258N000003 HC RX IP 258 OP 636: Performed by: STUDENT IN AN ORGANIZED HEALTH CARE EDUCATION/TRAINING PROGRAM

## 2022-12-29 PROCEDURE — 97110 THERAPEUTIC EXERCISES: CPT | Mod: GO

## 2022-12-29 PROCEDURE — 250N000011 HC RX IP 250 OP 636: Performed by: STUDENT IN AN ORGANIZED HEALTH CARE EDUCATION/TRAINING PROGRAM

## 2022-12-29 PROCEDURE — 250N000013 HC RX MED GY IP 250 OP 250 PS 637: Performed by: STUDENT IN AN ORGANIZED HEALTH CARE EDUCATION/TRAINING PROGRAM

## 2022-12-29 PROCEDURE — 99233 SBSQ HOSP IP/OBS HIGH 50: CPT | Mod: GC | Performed by: STUDENT IN AN ORGANIZED HEALTH CARE EDUCATION/TRAINING PROGRAM

## 2022-12-29 PROCEDURE — 99207 PR CDG-CUT & PASTE-POTENTIAL IMPACT ON LEVEL: CPT | Performed by: STUDENT IN AN ORGANIZED HEALTH CARE EDUCATION/TRAINING PROGRAM

## 2022-12-29 PROCEDURE — 36415 COLL VENOUS BLD VENIPUNCTURE: CPT | Performed by: STUDENT IN AN ORGANIZED HEALTH CARE EDUCATION/TRAINING PROGRAM

## 2022-12-29 PROCEDURE — 97110 THERAPEUTIC EXERCISES: CPT | Mod: GP

## 2022-12-29 PROCEDURE — 97535 SELF CARE MNGMENT TRAINING: CPT | Mod: GP

## 2022-12-29 RX ADMIN — POLYETHYLENE GLYCOL 3350 17 G: 17 POWDER, FOR SOLUTION ORAL at 08:51

## 2022-12-29 RX ADMIN — Medication 400 MCG: at 08:52

## 2022-12-29 RX ADMIN — HEPARIN SODIUM 5000 UNITS: 5000 INJECTION, SOLUTION INTRAVENOUS; SUBCUTANEOUS at 21:13

## 2022-12-29 RX ADMIN — Medication 150 MG: at 21:13

## 2022-12-29 RX ADMIN — BUSPIRONE HYDROCHLORIDE 10 MG: 10 TABLET ORAL at 08:53

## 2022-12-29 RX ADMIN — ATORVASTATIN CALCIUM 40 MG: 40 TABLET, FILM COATED ORAL at 08:52

## 2022-12-29 RX ADMIN — B-COMPLEX W/ C & FOLIC ACID TAB 1 TABLET: TAB at 08:53

## 2022-12-29 RX ADMIN — LOSARTAN POTASSIUM AND HYDROCHLOROTHIAZIDE 1 TABLET: 25; 100 TABLET ORAL at 08:52

## 2022-12-29 RX ADMIN — VANCOMYCIN HYDROCHLORIDE 750 MG: 1 INJECTION, POWDER, LYOPHILIZED, FOR SOLUTION INTRAVENOUS at 10:09

## 2022-12-29 RX ADMIN — BUSPIRONE HYDROCHLORIDE 10 MG: 10 TABLET ORAL at 14:30

## 2022-12-29 RX ADMIN — HEPARIN SODIUM 5000 UNITS: 5000 INJECTION, SOLUTION INTRAVENOUS; SUBCUTANEOUS at 08:54

## 2022-12-29 RX ADMIN — UMECLIDINIUM BROMIDE AND VILANTEROL TRIFENATATE 1 PUFF: 62.5; 25 POWDER RESPIRATORY (INHALATION) at 08:53

## 2022-12-29 RX ADMIN — LATANOPROST 1 DROP: 50 SOLUTION/ DROPS OPHTHALMIC at 21:13

## 2022-12-29 RX ADMIN — Medication 25 MCG: at 08:53

## 2022-12-29 RX ADMIN — SERTRALINE HYDROCHLORIDE 100 MG: 100 TABLET ORAL at 08:52

## 2022-12-29 RX ADMIN — BUSPIRONE HYDROCHLORIDE 10 MG: 10 TABLET ORAL at 21:13

## 2022-12-29 ASSESSMENT — ACTIVITIES OF DAILY LIVING (ADL)
ADLS_ACUITY_SCORE: 33

## 2022-12-29 NOTE — PROGRESS NOTES
[facility]    Progress Note - Medicine Service, IVIS TEAM 2       Date of Admission:  12/22/2022    Assessment & Plan   Ca Yan is a 77 year old female who has a hx of previous breast cancer s/p lumpectomy, GERD, HTN, HLD, chronic lower extremity lymphedema, cluster C personality disorder, CKD, restrictive lung disease and COPD (FEV1/FVC 0.71), and known restrictive lung disease.  Presenting with acute on chronic hypercarbic respiratory failure, increased WOB, and reported confusion found to have flu A infection, NUNU, and MRSE bacteremia. Now awaiting placement.      Changes today:  - continue vancomycin for ADAM through 12/29  - working on placement back to TCU    #Influenza A infection  #Acute on Chronic Hypoxic Hypercapnic Respiratory Failure-resolved  #Restrictive Lung Disease  #Chronic Respiratory acidosis with compensated metabolic alkalosis  Significant restrictive lung dz due to thoracic cage abnormality in addition to deconditioning and prior smoking history. Also likely some obstructive disease. Pt with history of chronic CO2 retention, has seen pulmonology in the past and reportedly declined NIPPV. Likely has chronic compensated respiratory acidosis.  Influenza infection likely contributing to some of her symptoms. Overnight 12/22-12/23 developed worsening hypercapnea. Unclear why. No meds to really depress resp drive. CXR unchanged. Did not get aggressive fluid resuscitation (500ml overnight) and no hx HFrEF. Gasses and mentation improved on 12/23 with BIPAP. Treating for possible superimposed CAP and add IV steroids for COPD exacerbation tx.  Respiratory failure now resolved, back on home O2.   -Tamiflu 30 mg twice daily 12/22 for 5d course  - daily LABA/LAMA (Anoro Ellipta)   - aerobika for pulm toilet   - plan for re-referral pulm rehab once discharged  -ceftriaxone 12/22 for 5d course  -doxycycline 12/23 for 5d course  -IV steroids  dexamethasone 12/23- for 5d course    #MRSE Bacteremia  Blood cultures from 12/23 growing MRSE in 1/2 bottles. Repeat cx 12/24 1/2 bottles growing GPCs. Patient does not have lines, catheters, prosthetic valves etc. Patient currently afebrile, normotensive, and does not have leukocytosis. Repeat BCx + for Staph Haemolyticus, likely contaminant.   - vancomycin (12/24-12/29)  - TTE 12/26 w/o vegetations  - ID consult    - vancomycin through 12/29 per their final recs   - daily BCx until negative x 48hrs    #Hypernatremia-resolved  Likely due to hypovolemia due to poor PO intake.   - encourage PO intake    #NUNU, resolved  Creatinine 1.1 (baseline around 0.8), BUN 41 (baseline 20s).  NUNU likely prerenal; in addition possibly related to UTI as well.  500cc fluid bolus on admit. Improved by 12/24.    #Chronic macrocytic anemia  #Possible plasma cell disorder  Over the last several months has developed a mild macrocytic anemia.  B12 and folate levels have been checked and are normal.  Seems the prior providers were concerned about multiple myeloma.  During prior hospitalization lab work was obtained including immunoglobulin levels and protein electrophoresis; this showed a monoclonal spike. Skeletal XR with left iliac wing lucency thought to be overlying bowel gas but difficult to completely characterize, no additional lesions identified. Prior iron levels were normal three months ago along with normal ferritin.   - Continue oral iron supplementation MWF     #Generalized Anxiety Disorder  #Cognitive impairment  #Hx of AUD  - Buspirone 10 mg PO TID  - Continue PTA Seroquel, 150 mg PO at bedtime  - hydroxyzine 25-50mg PO prn for anxiety      #Depression  - Continue PTA sertraline 100mg daily      #Hypertension  - Resume  PTA Losartan/HCTZ 100-25 mg PO daily now that NUNU resolved     #HLD  - Continue PTA atorvastatin 40 mg PO daily     #Glaucoma  - PTA latanoprost 1 drop each eye at bedtime     # Vitamin Deficiency  -  Continue PTA folic acid 400 mcg daily  - Continue vitamin B complex with C 1 tablet daily  - Continue vitamin D3 25 mcg daily      Diet: Regular Diet Adult    DVT Prophylaxis: Heparin SQ  Lima Catheter: Not present  Fluids: none  Central Lines: None  Cardiac Monitoring: None  Code Status: No CPR- Do NOT Intubate      Disposition Plan      Expected Discharge Date: 12/30/2022      Destination: nursing home  Discharge Comments: will need IV abx through 12/29 per ID; may be able to discharge 12/28 pending stability on O2 needs        The patient's care was discussed with the attending physician Dr. Juarez.     Anish Trujillo MD  PGY3  Internal Medicine-Pediatrics    Medicine Service, [facility] TEAM 2  [facility]  Securely message with the Vocera Web Console (learn more here)  Text page via [employer] Paging/Directory   Please see signed in provider for up to date coverage information      Clinically Significant Risk Factors                                 ______________________________________________________________________    Interval History   Continues on low flow nasal cannula. Increased to 6L overnight, but down to home levels (2-4L) this AM. Slept poorly due to dry mouth. Eating well. Had BM yesterday.     Data reviewed today: I reviewed all medications, new labs and imaging results over the last 24 hours.     Physical Exam   Vital Signs: Temp: 97.3  F (36.3  C) Temp src: Oral BP: 122/63 Pulse: 90   Resp: 20 SpO2: (!) 81 % O2 Device: Nasal cannula Oxygen Delivery: 6 LPM  Weight: 203 lbs 4.23 oz  General Appearance: alert, interactive, does not appear to be in acute distress, sitting up in chair watching TV  HEENT: Dry lips, sclera clear  Respiratory: normal WOB on 3L NC, coarse breath sounds bilaterally  Cardiovascular: Regular rate and rhythm, no murmurs rubs or gallops  GI: Soft, nontender, nondistended  Skin: No rashes on exposed skin, LE covered by lymphedema  wraps  Neurologic: AAOx3, CN II-XII grossly intact    Data   Recent Labs   Lab 12/29/22  0540 12/28/22  0540 12/27/22  0535 12/26/22  0616 12/26/22  0607 12/25/22  0814 12/24/22  0533   WBC  --   --  7.4  --  6.3 6.7 4.8   HGB  --   --  8.4*  --  8.2* 8.9* 8.3*   MCV  --   --  101*  --  101* 104* 106*    241 229  --  199 80* 177   NA  --   --  139 146*  --   --  144   POTASSIUM  --   --  3.7 3.8  --   --  4.0   CHLORIDE  --   --  98 102  --   --  100   CO2  --   --  33* 36*  --   --  35*   BUN  --   --  32.4* 29.5*  --   --  44.8*   CR  --  0.91 0.87 0.74  --   --  0.89   ANIONGAP  --   --  8 8  --   --  9   SYMONE  --   --  8.8 8.8  --   --  8.8   GLC  --   --  93 96  --   --  128*     No results found for this or any previous visit (from the past 24 hour(s)).

## 2022-12-29 NOTE — PROGRESS NOTES
Care Management Follow Up    Length of Stay (days): 7    Expected Discharge Date: 12/30/2022     Concerns to be Addressed: discharge planning      Patient plan of care discussed at interdisciplinary rounds: Yes    Anticipated Discharge Disposition: Transitional Care     Anticipated Discharge Services: None  Anticipated Discharge DME: TBD     Patient/family educated on Medicare website which has current facility and service quality ratings:    Education Provided on the Discharge Plan:    Patient/Family in Agreement with the Plan:      Referrals Placed by CM/SW:    Private pay costs discussed: Not applicable    Additional Information:    Pt's medicine team (Kristan Ruiz) reports that pt will be medically ready this afternoon.     Update 9:00AM: SHARRON has reached out to Adriana ([place] [facility] Liaison, PH: [phone]) to discuss pt's discharge plan. There was no answer so SW left a detailed voicemail. [facility].     Update 1:00PM: SHARRON spoke with Adriana who gave SHARRON the phone number (PH: [phone]) for [facility]. Pt needs to verbally agree to returning and having her bed held. SHARRON met with pt in her room to call [facility] together however, there was no answer. SW to follow up with Adriana.     Update 2:45PM: SW met with pt in her room. Writer was able to contact [facility] with pt to confirm bed hold. [facility] can take pt back tomorrow 12/30.     SHARRON reached out to [facility] to determine what would be needed for pt to return tomorrow in terms of medication orders, discharge paperwork, etc. SHARRON left a voicemail. SHARRON paged pt's medicine team (Kristan Oconnell) to let him know about discharge.     Once SHARRON has confirmation from facility, writer will arrange transportation. SW to continue to follow for discharge planning and TCU placement.     EPI Dodson, WAGNERSW   6B    Phone: [phone]  Pager: [phone]

## 2022-12-29 NOTE — PLAN OF CARE
[id number]    Neuro: A&Ox4. Pt forgetful and anxious, redirectable.  Cardiac: SR. VSS.   Respiratory: Sating 93-96% on 6L NC. Pt was encouraged to wear the BiPAP intermittently overnight, refused d/t dry mouth and sore throat when she wears the BiPAP.  GI/: Adequate urine output. No BM this shift.  Diet/appetite: Tolerating regular diet. Eating well.  Activity:  Stand by assist, up to bedside commode.  Pain: Denied pain this shift.   Skin: No new deficits noted.  LDA's: R PIV SL, CDI.    Plan: Continue with POC. Notify primary team with changes.

## 2022-12-29 NOTE — PROGRESS NOTES
NURSING PROGRESS NOTE  Shift Summary      Date: December 28, 2022     Neuro/Musculoskeletal:  A&Ox4. Forgetful at times.  Cardiac:  SR.  VSS.     Respiratory:  Sating in the 90s on 5L NC.  GI/:  Adequate urine output.  LBM: 12/25/22.  Diet/Appetite:  Tolerating Regular diet.  Activity:  Assist of 1, standby.   Pain:  Denies.   Skin:  No new deficits noted.   LDAs + Drips/IVF:  Right PIV is patent and saline locked.    Pertinent Shift Updates:  Patient was able to get out of her room today. She was in a wheelchair and was assisted by two NSTs.        Jose Bey RN  .................................................... December 28, 2022   6:49 PM  [facility] ([facility] [place]  Stepdown ICU (Unit 6D)

## 2022-12-29 NOTE — PROGRESS NOTES
CLINICAL NUTRITION SERVICES    Reviewed nutrition risk factors due to LOS. Pt is tolerating a regular diet, eating well per nursing documentation, consistently documented as 100% of meals. Per review of room service selections, pt is ordering 3 meals per day consistently with seemingly adequate Kcal/protein intake (if eating 100%).      Reviewed pt wt trends; stable during present admission. Wt trends reviewed for PTA and while there is some variation, long-term trends appear stable. Fluctuation to weight range likely 2/2 fluid status, given hx chronic LE lymphedema.     No nutrition issues identified at this time. RD will follow via rounds at this time, unless consulted.  Will re-screen pt in 7-10 days unless consulted or alerted of nutrition need sooner.    Scott Ascencio RDN, LD, [facility]  6B RD pager: [phone] work-room RD phone: *[phone]    Weekend/Holiday RD pager [phone]

## 2022-12-30 ENCOUNTER — APPOINTMENT (OUTPATIENT)
Dept: PHYSICAL THERAPY | Facility: CLINIC | Age: 77
DRG: 193 | End: 2022-12-30
Payer: COMMERCIAL

## 2022-12-30 VITALS
DIASTOLIC BLOOD PRESSURE: 93 MMHG | RESPIRATION RATE: 20 BRPM | OXYGEN SATURATION: 99 % | TEMPERATURE: 98.2 F | WEIGHT: 202.6 LBS | SYSTOLIC BLOOD PRESSURE: 134 MMHG | BODY MASS INDEX: 35.89 KG/M2 | HEART RATE: 84 BPM

## 2022-12-30 LAB
BACTERIA BLD CULT: NO GROWTH
BACTERIA BLD CULT: NO GROWTH

## 2022-12-30 PROCEDURE — 250N000011 HC RX IP 250 OP 636: Performed by: STUDENT IN AN ORGANIZED HEALTH CARE EDUCATION/TRAINING PROGRAM

## 2022-12-30 PROCEDURE — 99238 HOSP IP/OBS DSCHRG MGMT 30/<: CPT | Performed by: STUDENT IN AN ORGANIZED HEALTH CARE EDUCATION/TRAINING PROGRAM

## 2022-12-30 PROCEDURE — 97530 THERAPEUTIC ACTIVITIES: CPT | Mod: GP

## 2022-12-30 PROCEDURE — 250N000013 HC RX MED GY IP 250 OP 250 PS 637: Performed by: STUDENT IN AN ORGANIZED HEALTH CARE EDUCATION/TRAINING PROGRAM

## 2022-12-30 PROCEDURE — 97535 SELF CARE MNGMENT TRAINING: CPT | Mod: GP

## 2022-12-30 RX ORDER — ALBUTEROL SULFATE 90 UG/1
2 AEROSOL, METERED RESPIRATORY (INHALATION) EVERY 6 HOURS PRN
Qty: 18 G | Refills: 0 | DISCHARGE
Start: 2022-12-30

## 2022-12-30 RX ORDER — ACETAMINOPHEN 325 MG/1
650 TABLET ORAL EVERY 4 HOURS PRN
Qty: 60 TABLET | Refills: 0 | DISCHARGE
Start: 2022-12-30

## 2022-12-30 RX ORDER — FUROSEMIDE 10 MG/ML
20 INJECTION INTRAMUSCULAR; INTRAVENOUS ONCE
Status: COMPLETED | OUTPATIENT
Start: 2022-12-30 | End: 2022-12-30

## 2022-12-30 RX ADMIN — FUROSEMIDE 20 MG: 10 INJECTION, SOLUTION INTRAVENOUS at 09:41

## 2022-12-30 RX ADMIN — LOSARTAN POTASSIUM AND HYDROCHLOROTHIAZIDE 1 TABLET: 25; 100 TABLET ORAL at 09:29

## 2022-12-30 RX ADMIN — UMECLIDINIUM BROMIDE AND VILANTEROL TRIFENATATE 1 PUFF: 62.5; 25 POWDER RESPIRATORY (INHALATION) at 09:30

## 2022-12-30 RX ADMIN — FERROUS SULFATE TAB 325 MG (65 MG ELEMENTAL FE) 325 MG: 325 (65 FE) TAB at 09:29

## 2022-12-30 RX ADMIN — Medication 400 MCG: at 09:30

## 2022-12-30 RX ADMIN — HEPARIN SODIUM 5000 UNITS: 5000 INJECTION, SOLUTION INTRAVENOUS; SUBCUTANEOUS at 09:30

## 2022-12-30 RX ADMIN — SERTRALINE HYDROCHLORIDE 100 MG: 100 TABLET ORAL at 09:30

## 2022-12-30 RX ADMIN — B-COMPLEX W/ C & FOLIC ACID TAB 1 TABLET: TAB at 09:30

## 2022-12-30 RX ADMIN — BUSPIRONE HYDROCHLORIDE 10 MG: 10 TABLET ORAL at 09:30

## 2022-12-30 RX ADMIN — Medication 25 MCG: at 09:29

## 2022-12-30 RX ADMIN — ATORVASTATIN CALCIUM 40 MG: 40 TABLET, FILM COATED ORAL at 09:30

## 2022-12-30 ASSESSMENT — ACTIVITIES OF DAILY LIVING (ADL)
ADLS_ACUITY_SCORE: 33

## 2022-12-30 NOTE — PROGRESS NOTES
NURSING PROGRESS NOTE  Shift Summary      Date: December 29, 2022     Neuro/Musculoskeletal:  A&Ox4. Generalized weakness.  Cardiac:  SR.  VSS.     Respiratory:  Sating in the 90s on 5L NC.  GI/:  Adequate urine output.  LBM: 12/25/22.  Diet/Appetite:  Tolerating regular diet.  Activity:  Assist of 1, standby.   Pain:  Denies.   Skin:  No new deficits noted.   LDAs + Drips/IVF:  Right PIV is patent and running NS TKO    Pertinent Shift Updates:  Pt continues to be nonchalant with her breathing issues. She was found several time in her room with the NC out of her nose, and had to be reminded to keep it in her nose. Patient is call light heavy, at times she will press the light on accident, or she'll hit it right after someone leaves her room.      Jose Bey RN  .................................................... December 29, 2022   6:57 PM  [facility] ([facility]): [place]  Stepdown ICU (Unit 6D)

## 2022-12-30 NOTE — PLAN OF CARE
Physical Therapy Discharge Summary    Reason for therapy discharge:    Discharged to transitional care facility.    Progress towards therapy goal(s). See goals on Care Plan in [facility] electronic health record for goal details.  Goals partially met.  Barriers to achieving goals:   discharge from facility.    Therapy recommendation(s):    Continued therapy is recommended.  Rationale/Recommendations:  home safety, IND, edema management.

## 2022-12-30 NOTE — PROGRESS NOTES
Care Management Discharge Note    Discharge Date: 12/30/2022    Discharge Disposition:     [facility] [address] [address]  PH: [phone]    Adriana (Sly Be) PH: [phone]  FAX: [phone]     RN to RN PH: [phone]    Discharge Services: transportation, oxygen    Discharge DME: None     Discharge Transportation: [employer] wheelchair ride is scheduled for 1:00PM today.     Private pay costs discussed: transportation costs    PAS Confirmation Code: NA    Patient/family educated on Medicare website which has current facility and service quality ratings: NA     Education Provided on the Discharge Plan: yes     Persons Notified of Discharge Plans: pt, bedside nurse, charge nurse, NST, medicine team (Kristan Ruiz)   Patient/Family in Agreement with the Plan: yes     Handoff Referral Completed: Yes    Additional Information:    SW has faxed discharge orders to Adriana (Sly Be, FAX: [phone]). Pt signed IMM and is in agreement with her discharge plan. No other questions or concerns at this time.     EPI Dodson, LGSW   6B    Phone: [phone]  Pager: [phone]

## 2022-12-30 NOTE — PROGRESS NOTES
DISCHARGE                         No discharge date for patient encounter.  ----------------------------------------------------------------------------  Discharged to: TCU  Via: [employer] wheelchair ride is scheduled for 1:00PM today.   Accompanied by: Transport service   Discharge Instructions: reg diet, activity, medications, follow up appointments, when to call the MD, aftercare instructions.  Prescriptions: To be filled by TCU  pharmacy; medication list reviewed & sent with pt (inhaler sent with pt)  Follow Up Appointments: arranged; information given  Belongings: All sent with pt- 2 bags and purse. One bag is extra wipes/supplies, other bag is pt belonging and personal inhaler   IV: d/c'd  Telemetry: no tele on originally   Pt exhibits understanding of above discharge instructions; all questions answered.    Discharge Paperwork: Signed, copied, and sent home with patient.

## 2022-12-30 NOTE — PLAN OF CARE
[id number]     Neuro: A&Ox4. Pt forgetful and anxious, redirectable.  Cardiac: SR. VSS.       Respiratory: Sating % on 5L NC.  GI/: Adequate urine output. No BM this shift.  Diet/appetite: Tolerating regular diet. Eating well.  Activity:  Stand by assist, up to bedside commode.  Pain: Denied pain this shift.   Skin: No new deficits noted.  LDA's: R PIV SL, CDI.     Plan: Continue with POC. Notify primary team with changes.

## 2022-12-31 LAB
BACTERIA BLD CULT: NO GROWTH
BACTERIA BLD CULT: NO GROWTH

## 2022-12-31 NOTE — PLAN OF CARE
Occupational Therapy Discharge Summary    Reason for therapy discharge:    Discharged to transitional care facility.    Progress towards therapy goal(s). See goals on Care Plan in [facility] electronic health record for goal details.  Goals partially met.  Barriers to achieving goals:   discharge from facility.    Therapy recommendation(s):    Continued therapy is recommended.  Rationale/Recommendations:  TCU to maximize functional independence, strength and endurance.

## 2023-01-01 LAB
BACTERIA BLD CULT: ABNORMAL
BACTERIA BLD CULT: ABNORMAL

## 2023-01-02 LAB — BACTERIA BLD CULT: NO GROWTH

## 2023-01-03 ENCOUNTER — DOCUMENTATION ONLY (OUTPATIENT)
Dept: OTHER | Facility: CLINIC | Age: 78
End: 2023-01-03

## 2023-01-23 ENCOUNTER — MEDICAL CORRESPONDENCE (OUTPATIENT)
Dept: HEALTH INFORMATION MANAGEMENT | Facility: CLINIC | Age: 78
End: 2023-01-23

## 2023-02-03 ENCOUNTER — TELEPHONE (OUTPATIENT)
Dept: FAMILY MEDICINE | Facility: CLINIC | Age: 78
End: 2023-02-03

## 2023-02-03 NOTE — TELEPHONE ENCOUNTER
Attempted to contact patient but unable to leave a message due to mailbox being full.  Will try again at a later date.  .  Kristyn Lopez RN on 2/3/2023 at 4:54 PM

## 2023-02-03 NOTE — TELEPHONE ENCOUNTER
[facility] Call Center    Phone Message    May a detailed message be left on voicemail: yes     Reason for Call: Other: Patient would like a call back from the care team to discuss her recent move, she is very confused, is not sure why she wants to call the clinic, or what for but would like a call back     Action Taken: Message routed to:  [facility] pcp    Travel Screening: Not Applicable

## 2023-02-08 ENCOUNTER — TELEPHONE (OUTPATIENT)
Dept: DERMATOLOGY | Facility: CLINIC | Age: 78
End: 2023-02-08

## 2023-02-08 DIAGNOSIS — D04.39 SQUAMOUS CELL CARCINOMA IN SITU (SCCIS) OF SKIN OF NOSE: Primary | ICD-10-CM

## 2023-02-08 NOTE — TELEPHONE ENCOUNTER
Called abut the scheduled Mohs. Unable to reach pt. Pt also never completed the consult.     Belle Garcia, Procedure  2/8/2023 9:19 AM

## 2023-02-08 NOTE — TELEPHONE ENCOUNTER
I have been unable to contact pt to reschedule the consult and Mohs. Pt had a mohs scheduled for this morning and did not come in for the appt. I have been unable to get ahold of her. From what I can see in her chart, pt has not had treatment for the SCCis.     Thank you!    Belle Garcia, Procedure  2/8/2023 9:24 AM

## 2023-02-10 NOTE — TELEPHONE ENCOUNTER
Ca couldn't remember why she called again yesterday at 4:30 pm.  She is wondering when to expect [facility] to contact her with an update on her oxygen humidifyer. He is agreeable to calling [facility] if she does not hear back from them today.  Discussed Palliative Care follow up visit, during a call with pt yesterday, encouraged her to make appointment, which she agrees to do today.    Yes

## 2023-02-17 NOTE — TELEPHONE ENCOUNTER
Holding on to this message in basket.     Padmini FORTE RN  [facility] Dermatology  [phone]

## 2023-02-20 ENCOUNTER — TELEPHONE (OUTPATIENT)
Dept: ONCOLOGY | Facility: CLINIC | Age: 78
End: 2023-02-20
Payer: COMMERCIAL

## 2023-02-24 ENCOUNTER — PATIENT OUTREACH (OUTPATIENT)
Dept: CARE COORDINATION | Facility: CLINIC | Age: 78
End: 2023-02-24
Payer: COMMERCIAL
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
## 2023-02-28 NOTE — TELEPHONE ENCOUNTER
Tried again to reach pt's care facility to schedule. Unable to reach someone.     Belle Garcia, Procedure  2/28/2023 2:35 PM

## 2023-03-02 NOTE — TELEPHONE ENCOUNTER
Tried to reach [facility] is [phone]  Left a vm.     Belle Garcia, Procedure  3/2/2023 3:05 PM

## 2023-03-09 ENCOUNTER — NURSE TRIAGE (OUTPATIENT)
Dept: NURSING | Facility: CLINIC | Age: 78
End: 2023-03-09
Payer: COMMERCIAL

## 2023-03-09 NOTE — TELEPHONE ENCOUNTER
Nurse Triage SBAR    Is this a 2nd Level Triage? No,      Situation:   Pt is having a tooth pain.  She is [facility] and was not able get through.      Background:   Tooth pain is not responding to IBU or Tylenol.      Pt has been having pain.  Did not access which one it was.      Assessment:   Denies fever, or swelling.      Protocol Recommended Disposition:   Call Dentist Now    Recommendation:   \call DDS.  Tired to reach [facility].  Closed for the day.  Pt is in a wheelchair and is on O2.    Advised to call tomorrow.  Also gave other 24 hour.  Options.      Reason for Disposition    SEVERE toothache pain    Additional Information    Negative: Pale cold skin and very weak (can't stand)    Negative: Similar pain previously and it was from 'heart attack'    Negative: Similar pain previously and it was from 'angina' and not relieved by nitroglycerin    Negative: Sounds like a life-threatening emergency to the triager    Negative: Chest pain    Negative: Toothache followed tooth injury    Negative: Patient sounds very sick or weak to the triager    Negative: Face is swollen    Negative: Fever    Protocols used: TOOTHACHE-A-OH    [phone]  [employer]

## 2023-03-10 ENCOUNTER — DOCUMENTATION ONLY (OUTPATIENT)
Dept: CARE COORDINATION | Facility: CLINIC | Age: 78
End: 2023-03-10
Payer: COMMERCIAL

## 2023-03-10 NOTE — TELEPHONE ENCOUNTER
Called patient's home at [facility], transferred to Jesusita who coordinates scheduling for residents. Jesusita was unavailable- left a vm asking her to give us a call back at [phone] for scheduling of a procedure for Ca FORTE RN  [facility]  [phone]

## 2023-03-10 NOTE — PROGRESS NOTES
Social Work Follow-Up  [facility]    Data/Intervention:  Patient Name:  Ca Yan  /Age:  1945 (77 year old)    Reason for Follow-Up:  Scheduling concerns    Collaborated With:    -[facility] MARLEE Mendoza  -Radha RN     Intervention/Education/Resources Provided:  Message received from Radha RN Padmini indicating that [facility] has not responded to a scheduling request for Ca. SW is asked to assist.     I contacted the [facility] and spoke with the MARLEE Mendoza and provided the above update and that Jesusita has been contacted with no response. Kelly said she is aware of the scheduling request and explained it has been very busy which is probably why Jesusita hasn't responded. Kelly said she will send Jesusita an email as well with a reminder.     I updated STEVEN Washington as to the above update from Kelly.     Assessment/Plan:  No follow up planned but I will remain available if further support is needed.         EPI Encarnacion, [facility]    [facility]  Ph: [phone], Pgr: [phone]  3/10/2023

## 2023-03-22 NOTE — TELEPHONE ENCOUNTER
Called patient's home at [facility], transferred to [facility]. Left a  asking her to give us a call back at [phone] to schedule for Ca FORTE RN  [facility]  [phone]

## 2023-03-24 NOTE — CONFIDENTIAL NOTE
3/24/23  Called nurse line- no answer- keeps transferring back to menu  Pressed admin 1- invalid entry  Pressed 2 for Director of nursing- invalid entry   #3- Unable to leave a vm-    Adriana Houston- answered on nurse line and appointment scheduled. She verbalized understanding with what mohs is and how long patient may be there. Verbalized understanding of what will or may take place with a mohs procedure and could last 4-6 hours  Advised patient needs a staff member with her during this appointment as patient dose not fully understand what her appts are for    She also verbalized understand with how urgent this is and patient cannot keep     missing these appts. I advised I would contact the [facility] if this continues    Read back appointment time/date and address.      Has no further questions.    Thank you,    Tanesha ROWERN BSN  [facility]- [phone]

## 2023-04-17 NOTE — PROGRESS NOTES
Surgical Office Location :   [facility]  [address] [place] [address]

## 2023-04-18 ENCOUNTER — OFFICE VISIT (OUTPATIENT)
Dept: DERMATOLOGY | Facility: CLINIC | Age: 78
End: 2023-04-18
Payer: COMMERCIAL

## 2023-04-18 VITALS — HEART RATE: 84 BPM | DIASTOLIC BLOOD PRESSURE: 93 MMHG | OXYGEN SATURATION: 99 % | SYSTOLIC BLOOD PRESSURE: 134 MMHG

## 2023-04-18 DIAGNOSIS — D18.01 ANGIOMA OF SKIN: ICD-10-CM

## 2023-04-18 DIAGNOSIS — L81.4 LENTIGO: ICD-10-CM

## 2023-04-18 DIAGNOSIS — D23.9 DERMAL NEVUS: ICD-10-CM

## 2023-04-18 DIAGNOSIS — L82.1 SEBORRHEIC KERATOSIS: Primary | ICD-10-CM

## 2023-04-18 DIAGNOSIS — D04.39 SQUAMOUS CELL CARCINOMA IN SITU (SCCIS) OF SKIN OF NOSE: ICD-10-CM

## 2023-04-18 PROCEDURE — 11313 SHAVE SKIN LESION >2.0 CM: CPT | Performed by: DERMATOLOGY

## 2023-04-18 PROCEDURE — 17311 MOHS 1 STAGE H/N/HF/G: CPT | Performed by: DERMATOLOGY

## 2023-04-18 PROCEDURE — 99213 OFFICE O/P EST LOW 20 MIN: CPT | Mod: 25 | Performed by: DERMATOLOGY

## 2023-04-18 ASSESSMENT — PAIN SCALES - GENERAL: PAINLEVEL: NO PAIN (0)

## 2023-04-18 NOTE — LETTER
4/18/2023         RE: Ca Yan  [address]  [facility] [address]        Dear Colleague,    Thank you for referring your patient, Ca Yan, to the [facility]. Please see a copy of my visit note below.    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  Patient states this has been present for a while.  Patient reports the following symptoms:  bleeding .  Patient reports the following previous treatments none.  Patient reports the following modifying factors none.  Associated symptoms: none.  Patient has no other skin complaints today.  Remainder of the HPI, Meds, PMH, Allergies, FH, and SH was reviewed in chart.      Past Medical History:   Diagnosis Date     Anxiety      Arthritis      Breast cancer (H)      COPD (chronic obstructive pulmonary disease) (H)     6/19/12:  FEV 0.99 l     Depressive disorder      Hypertension      Low back pain with left-sided sciatica      Low bone density 04/13/2017    DEXA April 12, 2017: T score -2.0. Normal Z score. FRAX risk: major osteoporotic fracture 11.9%, hip fracture 2.6%, therefore not high-risk     Lymphedema, chronic lower extremities      Squamous cell carcinoma of skin, unspecified        Past Surgical History:   Procedure Laterality Date     BACK SURGERY      spinal fusion     COLONOSCOPY       LARYNGOSCOPY WITH MICROSCOPE N/A 4/30/2021    Procedure: FLEXIBLE LARYNGOSCOPY;  Surgeon: Domonique Roche MD;  Location: [facility] OR     LUMPECTOMY BREAST       ORTHOPEDIC SURGERY      Knee surgery left side        Family History   Problem Relation Age of Onset     Breast Cancer Mother      Diabetes Mother      Anxiety Disorder Mother      Asthma Mother      Other Cancer Mother      Hyperlipidemia Mother      Alcohol/Drug Father      Mental Illness  Father      Substance Abuse Father      Obesity Father      Cerebrovascular Disease Maternal Grandmother 67     Other - See Comments Maternal Aunt         lived to        Social History     Socioeconomic History     Marital status: Single     Spouse name: Not on file     Number of children: Not on file     Years of education: Not on file     Highest education level: Not on file   Occupational History     Employer: RETIRED   Tobacco Use     Smoking status: Former     Packs/day: 0.50     Years: 5.00     Pack years: 2.50     Types: Cigarettes     Start date: 1956     Quit date: 1980     Years since quittin.5     Smokeless tobacco: Former     Quit date: 1980   Vaping Use     Vaping status: Not on file   Substance and Sexual Activity     Alcohol use: Not Currently     Alcohol/week: 0.0 standard drinks of alcohol     Comment: Sober for 2 years, previous alcoholic     Drug use: No     Sexual activity: Not Currently     Partners: Male     Birth control/protection: Abstinence   Other Topics Concern     Parent/sibling w/ CABG, MI or angioplasty before 65F 55M? Not Asked      Service Not Asked     Blood Transfusions Not Asked     Caffeine Concern Not Asked     Occupational Exposure Not Asked     Hobby Hazards Not Asked     Sleep Concern Not Asked     Stress Concern Not Asked     Comment: Lives alone     Weight Concern Not Asked     Special Diet Not Asked     Back Care Not Asked     Comment: Hx of scoliosis with spine fusion     Exercise Not Asked     Comment: Walks     Bike Helmet Not Asked     Seat Belt Not Asked     Self-Exams Not Asked   Social History Narrative    Ca Yan never   Lives in apartment  is  family member is daughter Rin in [place], [place]  Previous AA meetings     Social Determinants of Health     Financial Resource Strain: Not on file   Food Insecurity: Not on file   Transportation Needs: Not on file   Physical Activity: Not on file   Stress: Not on file   Social  Connections: Moderately Isolated (8/20/2020)    Social Connection and Isolation Panel [NHANES]      Frequency of Communication with Friends and Family: Once a week      Frequency of Social Gatherings with Friends and Family: Never      Attends Buddhist Services: More than 4 times per year      Active Member of Clubs or Organizations: Yes      Attends Club or Organization Meetings: More than 4 times per year      Marital Status: Never    Intimate Partner Violence: Not At Risk (8/20/2020)    Humiliation, Afraid, Rape, and Kick questionnaire      Fear of Current or Ex-Partner: No      Emotionally Abused: No      Physically Abused: No      Sexually Abused: No   Housing Stability: Not on file       Outpatient Encounter Medications as of 4/18/2023   Medication Sig Dispense Refill     acetaminophen (TYLENOL) 325 MG tablet Take 2 tablets (650 mg) by mouth every 4 hours as needed for mild pain or fever 60 tablet 0     albuterol (PROAIR HFA/PROVENTIL HFA/VENTOLIN HFA) 108 (90 Base) MCG/ACT inhaler Inhale 2 puffs into the lungs every 6 hours as needed for wheezing 18 g 0     atorvastatin (LIPITOR) 40 MG tablet Take 1 tablet (40 mg) by mouth daily 90 tablet 3     azelastine (ASTELIN) 0.1 % nasal spray Spray 1 spray into both nostrils 2 times daily       benzonatate (TESSALON) 200 MG capsule Take 200 mg by mouth 3 times daily as needed for cough       budesonide (PULMICORT) 1 MG/2ML neb solution Take 2 mg by nebulization every 8 hours       busPIRone (BUSPAR) 10 MG tablet Take 1 tablet (10 mg) by mouth 3 times daily       Emollient (CERAVE) CREA Please apply twice daily to dry skin of body and feet 453 g 3     ferrous sulfate (FEROSUL) 325 (65 Fe) MG tablet Take 1 tablet (325 mg) by mouth Every Mon, Wed, Fri Morning       folic acid (FOLVITE) 400 MCG tablet Take 1 tablet (400 mcg) by mouth daily 100 tablet 3     ipratropium-albuterol (COMBIVENT RESPIMAT)  MCG/ACT inhaler Inhale 1 puff into the lungs 4 times daily  as needed for shortness of breath / dyspnea or wheezing       latanoprost (XALATAN) 0.005 % ophthalmic solution Place 1 drop into both eyes daily       Lidocaine (LIDOCARE) 4 % Patch Place 2 patches onto the skin daily as needed for moderate pain (4-6) (As needed for left chest wall pain) To prevent lidocaine toxicity, patient should be patch free for 12 hrs daily.       losartan-hydrochlorothiazide (HYZAAR) 100-25 MG tablet Take 1 tablet by mouth daily 90 tablet 3     melatonin 3 MG tablet Take 3 mg by mouth At Bedtime       miconazole (MICATIN) 2 % external powder Apply topically 4 times daily       polyethylene glycol (MIRALAX) 17 GM/Dose powder Take 1 capful by mouth 2 times daily       QUEtiapine (SEROQUEL) 300 MG tablet Take 0.5 tablets (150 mg) by mouth At Bedtime       sennosides (SENOKOT) 8.6 MG tablet Take 1 tablet by mouth 2 times daily       sertraline (ZOLOFT) 100 MG tablet Take 1 tablet (100 mg) by mouth daily       umeclidinium-vilanterol (ANORO ELLIPTA) 62.5-25 MCG/ACT oral inhaler Inhale 1 puff into the lungs daily 1 each 0     vitamin B complex with vitamin C (STRESS TAB) tablet Take 1 tablet by mouth daily 100 tablet 3     Vitamin D3 (CHOLECALCIFEROL) 25 mcg (1000 units) tablet Take 1 tablet (25 mcg) by mouth daily 100 tablet 3     No facility-administered encounter medications on file as of 4/18/2023.             Review Of Systems  Skin: As above  Eyes: negative  Ears/Nose/Throat: negative  Respiratory: No shortness of breath, dyspnea on exertion, cough, or hemoptysis  Cardiovascular: negative  Gastrointestinal: negative  Genitourinary: negative  Musculoskeletal: negative  Neurologic: negative  Psychiatric: negative  Hematologic/Lymphatic/Immunologic: negative  Endocrine: negative      O:   NAD, WDWN, Alert & Oriented, Mood & Affect wnl, Vitals stable   Here today with care giver   BP (!) 134/93   Pulse 84   LMP  (LMP Unknown)   SpO2 99%    General appearance brandan ii   Vitals stable   Alert,  oriented and in no acute distress   L nasal dorsum 3mm ill-defined scaly papule   Stuck on papules and brown macules on trunk and ext   Red papules on trunk  Flesh colored papules on trunk       Eyes: Conjunctivae/lids:Normal     ENT: Lips, buccal mucosa, tongue: normal    MSK:Normal    Cardiovascular: peripheral edema slight    Pulm: Breathing Normal    Lymph Nodes: No Head and Neck Lymphadenopathy     Neuro/Psych: Orientation:Normal; Mood/Affect:Normal      A/P:  1. Seborrheic keratosis, lentigo, angioma, dermal nevus  2. Squamous cell carcinoma in situ nose  It was a pleasure speaking to Ca Yan today.  Previous clinic  notes and pertinent laboratory tests were reviewed prior to Ca Yan's visit.  Signs and Symptoms of skin cancer discussed with patient.  Patient encouraged to perform monthly skin exams.  UV precautions reviewed with patient.  Risks of non-melanoma skin cancer discussed with patient   Return to clinic 6 months  PROCEDURE NOTE  L nasal dorsum squamous cell carcinoma in situ  MOHS:   Location and Ill-defined margins    The rationale for Mohs surgery was discussed with the patient and consent was obtained.  The risks and benefits as well as alternatives to therapy were discussed, in detail.  Specifically, the risks of infection, scarring, bleeding, prolonged wound healing, incomplete removal, allergy to anesthesia, nerve injury and recurrence were addressed.  Indication for Mohs was Location and Ill-defined margins. Prior to the procedure, the treatment site was clearly identified and, if available, confirmed with previous photos and confirmed by the patient   All components of the Universal Protocol/PAUSE rule were completed.  The Mohs surgeon operated in two distinct and integrated capacities as the surgeon and pathologist.      The area was prepped with Betasept.  A rim of normal appearing skin was marked circumferentially around the lesion.  The area was infiltrated with local  anesthesia.  The tumor was first debulked to remove all clinically apparent tumor.  An incision following the standard Mohs approach was done and the specimen was oriented,mapped and placed in 1 block(s).  Each specimen was then chromacoded and processed in the Mohs laboratory using standard Mohs technique and submitted for frozen section histology.  Frozen section analysis showed no residual tumor but CLEAR MARGINS.      The tumor was excised using standard Mohs technique in 1 stages(s).  CLEAR MARGINS OBTAINED and Final defect size was 1 cm.     We discussed the options for wound management in full with the patient including risks/benefits/ possible outcomes.        DERMABRASION: After PGACAC discussed with patient, decision for tangential excision and dermabrasion was made. After anesthesia with Lido/Epi/Clinda and prep with hibiclens, hypertrophic areas were tangentially excised and entire cosmetic unit was smoothed 2.2cm. Hemostasis was obtained with pressure. Patient tolerated procedure well. There were no complications and EBL minimal. Patient advised to keep abraded surfaces covered with generous ointment until healed, approximately 2 weeks. Return to office in 3 months or prn.        Again, thank you for allowing me to participate in the care of your patient.        Sincerely,        Hayden Gould MD

## 2023-04-18 NOTE — PROGRESS NOTES
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  Patient states this has been present for a while.  Patient reports the following symptoms:  bleeding .  Patient reports the following previous treatments none.  Patient reports the following modifying factors none.  Associated symptoms: none.  Patient has no other skin complaints today.  Remainder of the HPI, Meds, PMH, Allergies, FH, and SH was reviewed in chart.      Past Medical History:   Diagnosis Date     Anxiety      Arthritis      Breast cancer (H)      COPD (chronic obstructive pulmonary disease) (H)     6/19/12:  FEV 0.99 l     Depressive disorder      Hypertension      Low back pain with left-sided sciatica      Low bone density 04/13/2017    DEXA April 12, 2017: T score -2.0. Normal Z score. FRAX risk: major osteoporotic fracture 11.9%, hip fracture 2.6%, therefore not high-risk     Lymphedema, chronic lower extremities      Squamous cell carcinoma of skin, unspecified        Past Surgical History:   Procedure Laterality Date     BACK SURGERY      spinal fusion     COLONOSCOPY       LARYNGOSCOPY WITH MICROSCOPE N/A 4/30/2021    Procedure: FLEXIBLE LARYNGOSCOPY;  Surgeon: Domonique Roche MD;  Location: [facility] OR     LUMPECTOMY BREAST       ORTHOPEDIC SURGERY      Knee surgery left side        Family History   Problem Relation Age of Onset     Breast Cancer Mother      Diabetes Mother      Anxiety Disorder Mother      Asthma Mother      Other Cancer Mother      Hyperlipidemia Mother      Alcohol/Drug Father      Mental Illness Father      Substance Abuse Father      Obesity Father      Cerebrovascular Disease Maternal Grandmother 67     Other - See Comments Maternal Aunt         lived to        Social History     Socioeconomic History     Marital status: Single     Spouse name: Not on file     Number of children: Not on file     Years of education: Not on file     Highest  education level: Not on file   Occupational History     Employer: RETIRED   Tobacco Use     Smoking status: Former     Packs/day: 0.50     Years: 5.00     Pack years: 2.50     Types: Cigarettes     Start date: 1956     Quit date: 1980     Years since quittin.5     Smokeless tobacco: Former     Quit date: 1980   Vaping Use     Vaping status: Not on file   Substance and Sexual Activity     Alcohol use: Not Currently     Alcohol/week: 0.0 standard drinks of alcohol     Comment: Sober for 2 years, previous alcoholic     Drug use: No     Sexual activity: Not Currently     Partners: Male     Birth control/protection: Abstinence   Other Topics Concern     Parent/sibling w/ CABG, MI or angioplasty before 65F 55M? Not Asked      Service Not Asked     Blood Transfusions Not Asked     Caffeine Concern Not Asked     Occupational Exposure Not Asked     Hobby Hazards Not Asked     Sleep Concern Not Asked     Stress Concern Not Asked     Comment: Lives alone     Weight Concern Not Asked     Special Diet Not Asked     Back Care Not Asked     Comment: Hx of scoliosis with spine fusion     Exercise Not Asked     Comment: Walks     Bike Helmet Not Asked     Seat Belt Not Asked     Self-Exams Not Asked   Social History Narrative    Ca Yuriy never   Lives in apartment  is  family member is daughter Rin in [place], [place]  Previous AA meetings     Social Determinants of Health     Financial Resource Strain: Not on file   Food Insecurity: Not on file   Transportation Needs: Not on file   Physical Activity: Not on file   Stress: Not on file   Social Connections: Moderately Isolated (2020)    Social Connection and Isolation Panel [NHANES]      Frequency of Communication with Friends and Family: Once a week      Frequency of Social Gatherings with Friends and Family: Never      Attends [employer] Services: More than 4 times per year      Active Member of Clubs or Organizations: Yes      Attends Club  or Organization Meetings: More than 4 times per year      Marital Status: Never    Intimate Partner Violence: Not At Risk (8/20/2020)    Humiliation, Afraid, Rape, and Kick questionnaire      Fear of Current or Ex-Partner: No      Emotionally Abused: No      Physically Abused: No      Sexually Abused: No   Housing Stability: Not on file       Outpatient Encounter Medications as of 4/18/2023   Medication Sig Dispense Refill     acetaminophen (TYLENOL) 325 MG tablet Take 2 tablets (650 mg) by mouth every 4 hours as needed for mild pain or fever 60 tablet 0     albuterol (PROAIR HFA/PROVENTIL HFA/VENTOLIN HFA) 108 (90 Base) MCG/ACT inhaler Inhale 2 puffs into the lungs every 6 hours as needed for wheezing 18 g 0     atorvastatin (LIPITOR) 40 MG tablet Take 1 tablet (40 mg) by mouth daily 90 tablet 3     azelastine (ASTELIN) 0.1 % nasal spray Spray 1 spray into both nostrils 2 times daily       benzonatate (TESSALON) 200 MG capsule Take 200 mg by mouth 3 times daily as needed for cough       budesonide (PULMICORT) 1 MG/2ML neb solution Take 2 mg by nebulization every 8 hours       busPIRone (BUSPAR) 10 MG tablet Take 1 tablet (10 mg) by mouth 3 times daily       Emollient (CERAVE) CREA Please apply twice daily to dry skin of body and feet 453 g 3     ferrous sulfate (FEROSUL) 325 (65 Fe) MG tablet Take 1 tablet (325 mg) by mouth Every Mon, Wed, Fri Morning       folic acid (FOLVITE) 400 MCG tablet Take 1 tablet (400 mcg) by mouth daily 100 tablet 3     ipratropium-albuterol (COMBIVENT RESPIMAT)  MCG/ACT inhaler Inhale 1 puff into the lungs 4 times daily as needed for shortness of breath / dyspnea or wheezing       latanoprost (XALATAN) 0.005 % ophthalmic solution Place 1 drop into both eyes daily       Lidocaine (LIDOCARE) 4 % Patch Place 2 patches onto the skin daily as needed for moderate pain (4-6) (As needed for left chest wall pain) To prevent lidocaine toxicity, patient should be patch free for 12 hrs  daily.       losartan-hydrochlorothiazide (HYZAAR) 100-25 MG tablet Take 1 tablet by mouth daily 90 tablet 3     melatonin 3 MG tablet Take 3 mg by mouth At Bedtime       miconazole (MICATIN) 2 % external powder Apply topically 4 times daily       polyethylene glycol (MIRALAX) 17 GM/Dose powder Take 1 capful by mouth 2 times daily       QUEtiapine (SEROQUEL) 300 MG tablet Take 0.5 tablets (150 mg) by mouth At Bedtime       sennosides (SENOKOT) 8.6 MG tablet Take 1 tablet by mouth 2 times daily       sertraline (ZOLOFT) 100 MG tablet Take 1 tablet (100 mg) by mouth daily       umeclidinium-vilanterol (ANORO ELLIPTA) 62.5-25 MCG/ACT oral inhaler Inhale 1 puff into the lungs daily 1 each 0     vitamin B complex with vitamin C (STRESS TAB) tablet Take 1 tablet by mouth daily 100 tablet 3     Vitamin D3 (CHOLECALCIFEROL) 25 mcg (1000 units) tablet Take 1 tablet (25 mcg) by mouth daily 100 tablet 3     No facility-administered encounter medications on file as of 4/18/2023.             Review Of Systems  Skin: As above  Eyes: negative  Ears/Nose/Throat: negative  Respiratory: No shortness of breath, dyspnea on exertion, cough, or hemoptysis  Cardiovascular: negative  Gastrointestinal: negative  Genitourinary: negative  Musculoskeletal: negative  Neurologic: negative  Psychiatric: negative  Hematologic/Lymphatic/Immunologic: negative  Endocrine: negative      O:   NAD, WDWN, Alert & Oriented, Mood & Affect wnl, Vitals stable   Here today with care giver   BP (!) 134/93   Pulse 84   LMP  (LMP Unknown)   SpO2 99%    General appearance brandan ii   Vitals stable   Alert, oriented and in no acute distress   L nasal dorsum 3mm ill-defined scaly papule   Stuck on papules and brown macules on trunk and ext   Red papules on trunk  Flesh colored papules on trunk       Eyes: Conjunctivae/lids:Normal     ENT: Lips, buccal mucosa, tongue: normal    MSK:Normal    Cardiovascular: peripheral edema slight    Pulm: Breathing Normal    Lymph  Nodes: No Head and Neck Lymphadenopathy     Neuro/Psych: Orientation:Normal; Mood/Affect:Normal      A/P:  1. Seborrheic keratosis, lentigo, angioma, dermal nevus  2. Squamous cell carcinoma in situ nose  It was a pleasure speaking to Ca Yan today.  Previous clinic  notes and pertinent laboratory tests were reviewed prior to Ca Yan's visit.  Signs and Symptoms of skin cancer discussed with patient.  Patient encouraged to perform monthly skin exams.  UV precautions reviewed with patient.  Risks of non-melanoma skin cancer discussed with patient   Return to clinic 6 months  PROCEDURE NOTE  L nasal dorsum squamous cell carcinoma in situ  MOHS:   Location and Ill-defined margins    The rationale for Mohs surgery was discussed with the patient and consent was obtained.  The risks and benefits as well as alternatives to therapy were discussed, in detail.  Specifically, the risks of infection, scarring, bleeding, prolonged wound healing, incomplete removal, allergy to anesthesia, nerve injury and recurrence were addressed.  Indication for Mohs was Location and Ill-defined margins. Prior to the procedure, the treatment site was clearly identified and, if available, confirmed with previous photos and confirmed by the patient   All components of the Universal Protocol/PAUSE rule were completed.  The Mohs surgeon operated in two distinct and integrated capacities as the surgeon and pathologist.      The area was prepped with Betasept.  A rim of normal appearing skin was marked circumferentially around the lesion.  The area was infiltrated with local anesthesia.  The tumor was first debulked to remove all clinically apparent tumor.  An incision following the standard Mohs approach was done and the specimen was oriented,mapped and placed in 1 block(s).  Each specimen was then chromacoded and processed in the Mohs laboratory using standard Mohs technique and submitted for frozen section histology.  Frozen  section analysis showed no residual tumor but CLEAR MARGINS.      The tumor was excised using standard Mohs technique in 1 stages(s).  CLEAR MARGINS OBTAINED and Final defect size was 1 cm.     We discussed the options for wound management in full with the patient including risks/benefits/ possible outcomes.        DERMABRASION: After PGACAC discussed with patient, decision for tangential excision and dermabrasion was made. After anesthesia with Lido/Epi/Clinda and prep with hibiclens, hypertrophic areas were tangentially excised and entire cosmetic unit was smoothed 2.2cm. Hemostasis was obtained with pressure. Patient tolerated procedure well. There were no complications and EBL minimal. Patient advised to keep abraded surfaces covered with generous ointment until healed, approximately 2 weeks. Return to office in 3 months or prn.

## 2023-04-18 NOTE — PATIENT INSTRUCTIONS
Open Wound Care     for _NOSE_____________        No strenuous activity for 48 hours    Take Tylenol as needed for discomfort.                                                .         Do not drink alcoholic beverages for 48 hours.    Keep the pressure bandage in place for 24 hours. If the bandage becomes blood tinged or loose, reinforce it with gauze and tape.        (Refer to the reverse side of this page for management of bleeding).    Remove bandage in 24 hours and begin wound care as follows:     Clean area with tap water using a Q tip or gauze pad, (shower / bathe normally)  Dry wound with Q tip or gauze pad  Apply Aquaphor, Vaseline, Polysporin or Bacitracin Ointment with a Q tip  Do NOT use Neosporin Ointment *  Cover the wound with a band-aid or nonstick gauze pad and paper tape.  Repeat wound care once a day until wound is completely healed.    It is an old wives tale that a wound heals better when it is exposed to air and allowed to dry out. The wound will heal faster with a better cosmetic result if it is kept moist with ointment and covered with a bandage.  Do not let the wound dry out.      Supplies Needed:                Qtips or gauze pads                Polysporin or Bacitracin Ointment                Bandaids or nonstick gauze pads and paper tape    Wound care kits and brown paper tape are available for purchase at   the pharmacy.    BLEEDING:    Use tightly rolled up gauze or cloth to apply direct pressure over the bandage for 20   minutes.  Reapply pressure for an additional 20 minutes if necessary  Call the office or go to the nearest emergency room if pressure fails to stop the bleeding.  Use additional gauze and tape to maintain pressure once the bleeding has stopped.  Begin wound care 24 hours after surgery as directed.                  WOUND HEALING    One week after surgery a pink / red halo will form around the outside of the wound.   This is new skin.  The center of the wound will appear  yellowish white and produce some drainage.  The pink halo will slowly migrate in toward the center of the wound until the wound is covered with new shiny pink skin.  There will be no more drainage when the wound is completely healed.  It will take six months to one year for the redness to fade.  The scar may be itchy, tight and sensitive to extreme temperatures for a year after the surgery.  Massaging the area several times a day for several minutes after the wound is completely healed will help the scar soften and normalize faster. Begin massage only after healing is complete.      In case of emergency call: Dr Gould: [phone]    [facility]: [phone]    [facility]:[phone]

## 2023-04-20 ENCOUNTER — TRANSFERRED RECORDS (OUTPATIENT)
Dept: HEALTH INFORMATION MANAGEMENT | Facility: CLINIC | Age: 78
End: 2023-04-20
Payer: COMMERCIAL

## 2023-09-01 ENCOUNTER — DOCUMENTATION ONLY (OUTPATIENT)
Dept: FAMILY MEDICINE | Facility: CLINIC | Age: 78
End: 2023-09-01
Payer: COMMERCIAL

## 2023-09-01 NOTE — PROGRESS NOTES
Type of Form Received:     Form Received (Date) 9/1/23   Form Filled out No   Placed in provider folder Yes

## 2023-10-06 DIAGNOSIS — E78.5 HYPERLIPIDEMIA, UNSPECIFIED HYPERLIPIDEMIA TYPE: ICD-10-CM

## 2023-10-09 RX ORDER — ATORVASTATIN CALCIUM 40 MG/1
40 TABLET, FILM COATED ORAL AT BEDTIME
Qty: 30 TABLET | Refills: 0 | OUTPATIENT
Start: 2023-10-09

## 2023-10-09 RX ORDER — QUETIAPINE FUMARATE 25 MG/1
TABLET, FILM COATED ORAL
Qty: 30 TABLET | Refills: 0 | OUTPATIENT
Start: 2023-10-09

## 2023-10-09 RX ORDER — QUETIAPINE FUMARATE 50 MG/1
TABLET, FILM COATED ORAL
Qty: 30 TABLET | Refills: 0 | OUTPATIENT
Start: 2023-10-09

## 2023-10-09 RX ORDER — FUROSEMIDE 40 MG
40 TABLET ORAL 2 TIMES DAILY
Qty: 60 TABLET | Refills: 0 | OUTPATIENT
Start: 2023-10-09

## 2023-10-09 RX ORDER — SPIRONOLACTONE 25 MG/1
25 TABLET ORAL 2 TIMES DAILY
Qty: 60 TABLET | Refills: 0 | OUTPATIENT
Start: 2023-10-09

## 2023-11-09 DIAGNOSIS — I10 ESSENTIAL HYPERTENSION: ICD-10-CM

## 2023-11-14 ENCOUNTER — TELEPHONE (OUTPATIENT)
Dept: FAMILY MEDICINE | Facility: CLINIC | Age: 78
End: 2023-11-14
Payer: COMMERCIAL

## 2023-11-14 RX ORDER — LOSARTAN POTASSIUM AND HYDROCHLOROTHIAZIDE 25; 100 MG/1; MG/1
1 TABLET ORAL DAILY
Qty: 90 TABLET | Refills: 0 | Status: SHIPPED | OUTPATIENT
Start: 2023-11-14 | End: 2024-02-16

## 2023-11-14 NOTE — TELEPHONE ENCOUNTER
----- Message from Loren Be RN sent at 11/14/2023 12:34 PM CST -----  Regarding: [facility]   APPT  Please call to schedule  NEXT  AVAIL.  WITH Favian Sahu MD  I do not need any follow up on the scheduling of this appointment unless the patient will no longer be receiving care in our clinic.  THANK YOU

## 2023-11-14 NOTE — TELEPHONE ENCOUNTER
Last Clinic Visit:  11/17/22   NV: NONE  Scheduling has been notified to contact the pt for appointment.

## 2023-11-15 ENCOUNTER — TELEPHONE (OUTPATIENT)
Dept: FAMILY MEDICINE | Facility: CLINIC | Age: 78
End: 2023-11-15
Payer: COMMERCIAL

## 2023-11-17 ENCOUNTER — TELEPHONE (OUTPATIENT)
Dept: FAMILY MEDICINE | Facility: CLINIC | Age: 78
End: 2023-11-17
Payer: COMMERCIAL

## 2024-01-22 NOTE — TELEPHONE ENCOUNTER
Problem: Discharge Planning  Goal: Discharge to home or other facility with appropriate resources  Outcome: Progressing     Problem: Skin/Tissue Integrity  Goal: Absence of new skin breakdown  Description: 1.  Monitor for areas of redness and/or skin breakdown  2.  Assess vascular access sites hourly  3.  Every 4-6 hours minimum:  Change oxygen saturation probe site  4.  Every 4-6 hours:  If on nasal continuous positive airway pressure, respiratory therapy assess nares and determine need for appliance change or resting period.  1/22/2024 0439 by Sesar Johnson, RN  Outcome: Progressing     Problem: Safety - Adult  Goal: Free from fall injury  1/22/2024 0439 by Sesar Johnson, RN  Outcome: Progressing     Problem: ABCDS Injury Assessment  Goal: Absence of physical injury  Outcome: Progressing      [facility] Call Center    Phone Message    May a detailed message be left on voicemail: yes     Reason for Call: Other: Pt called in looking for clarification regarding her Appt with Dr. Roche on 3/25. She would like to know if she needs to bring someone with her for this Appt. She believes that she does but she would like clarification on this. Thank you.     Action Taken: Message routed to:  [facility] ENT    Travel Screening: Not Applicable

## 2024-02-11 DIAGNOSIS — I10 ESSENTIAL HYPERTENSION: ICD-10-CM

## 2024-02-16 RX ORDER — LOSARTAN POTASSIUM AND HYDROCHLOROTHIAZIDE 25; 100 MG/1; MG/1
1 TABLET ORAL DAILY
Qty: 90 TABLET | Refills: 1 | Status: SHIPPED | OUTPATIENT
Start: 2024-02-16

## 2024-02-16 NOTE — TELEPHONE ENCOUNTER
LOSARTAN/ HYDROCHLOROTHIAZIDE TABS 100/25MG      Last Written Prescription Date:  11/14/23  Last Fill Quantity: 90,   # refills: 0  Last Office Visit : 11/17/22  Future Office visit:  None  [facility] 10/7/23  Routing refill request to provider for review/approval because:  Last given 90 day ace refill. Scheduling unable to contact: number  out of service.   Per chart review: 10/09/2023 2:30 PM CDT     [facility]  Care Management Discharge Note    Patient to be Discharged to: [facility] [address] [facility] [address] [phone]

## 2024-02-21 ENCOUNTER — TELEPHONE (OUTPATIENT)
Dept: FAMILY MEDICINE | Facility: CLINIC | Age: 79
End: 2024-02-21
Payer: COMMERCIAL

## 2024-02-21 NOTE — TELEPHONE ENCOUNTER
Health Call Center    Phone Message    May a detailed message be left on voicemail: yes     Reason for Call: Other: Neeta is calling from [facility] wondering if patient had spoke with PHUONG Sahu about any wishes she had as she cannot speak for herself. Please give her a call back if you know of any end of life wishes for this patient. Number for contact [phone].     Action Taken: Message routed to:  [facility] pcc    Travel Screening: Not Applicable

## 2024-02-22 NOTE — TELEPHONE ENCOUNTER
Left a message for [facility] stating patient has not been seen recently by Dr. Guerra and we have not consent to communicate on file.     Kristyn Lopez RN on 2/22/2024 at 3:26 PM

## 2024-02-29 ENCOUNTER — MEDICAL CORRESPONDENCE (OUTPATIENT)
Dept: HEALTH INFORMATION MANAGEMENT | Facility: CLINIC | Age: 79
End: 2024-02-29

## 2024-08-13 DIAGNOSIS — I10 ESSENTIAL HYPERTENSION: ICD-10-CM

## 2024-08-16 NOTE — TELEPHONE ENCOUNTER
losartan-hydrochlorothiazide (HYZAAR) 100-25 MG tablet       Last Written Prescription Date:  2/16/24  Last Fill Quantity: 90,   # refills: 1  Last Office Visit : 11/17/22  Future Office visit:  0    Routing refill request to provider for review/approval because:  Has not been seen since 2022

## 2024-08-20 RX ORDER — LOSARTAN POTASSIUM AND HYDROCHLOROTHIAZIDE 25; 100 MG/1; MG/1
1 TABLET ORAL DAILY
Qty: 90 TABLET | Refills: 3 | OUTPATIENT
Start: 2024-08-20

## 2024-08-29 NOTE — TELEPHONE ENCOUNTER
Unable to reach patient for [facility] scheduling- letter sent to patient requesting follow up visit for medication refills.

## 2024-10-24 ENCOUNTER — APPOINTMENT (OUTPATIENT)
Dept: CT IMAGING | Facility: CLINIC | Age: 79
DRG: 291 | End: 2024-10-24
Attending: BEHAVIOR TECHNICIAN
Payer: COMMERCIAL

## 2024-10-24 ENCOUNTER — TRANSFERRED RECORDS (OUTPATIENT)
Dept: HEALTH INFORMATION MANAGEMENT | Facility: CLINIC | Age: 79
End: 2024-10-24

## 2024-10-24 ENCOUNTER — HOSPITAL ENCOUNTER (INPATIENT)
Facility: CLINIC | Age: 79
LOS: 5 days | Discharge: NURSING, REHAB OR LONG-TERM CARE | DRG: 291 | End: 2024-10-29
Attending: EMERGENCY MEDICINE | Admitting: HOSPITALIST
Payer: COMMERCIAL

## 2024-10-24 ENCOUNTER — APPOINTMENT (OUTPATIENT)
Dept: GENERAL RADIOLOGY | Facility: CLINIC | Age: 79
DRG: 291 | End: 2024-10-24
Attending: EMERGENCY MEDICINE
Payer: COMMERCIAL

## 2024-10-24 DIAGNOSIS — I50.9 ACUTE CONGESTIVE HEART FAILURE, UNSPECIFIED HEART FAILURE TYPE (H): ICD-10-CM

## 2024-10-24 DIAGNOSIS — Z86.59 HISTORY OF DEMENTIA: ICD-10-CM

## 2024-10-24 DIAGNOSIS — F43.22 ADJUSTMENT DISORDER WITH ANXIOUS MOOD: ICD-10-CM

## 2024-10-24 DIAGNOSIS — J96.02 ACUTE RESPIRATORY FAILURE WITH HYPERCAPNIA (H): ICD-10-CM

## 2024-10-24 DIAGNOSIS — J96.01 ACUTE RESPIRATORY FAILURE WITH HYPOXIA AND HYPERCARBIA (H): Primary | ICD-10-CM

## 2024-10-24 DIAGNOSIS — J96.02 ACUTE RESPIRATORY FAILURE WITH HYPOXIA AND HYPERCARBIA (H): Primary | ICD-10-CM

## 2024-10-24 DIAGNOSIS — J44.1 COPD EXACERBATION (H): ICD-10-CM

## 2024-10-24 LAB
ALBUMIN SERPL BCG-MCNC: 3.8 G/DL (ref 3.5–5.2)
ALP SERPL-CCNC: 95 U/L (ref 40–150)
ALT SERPL W P-5'-P-CCNC: 7 U/L (ref 0–50)
ANION GAP SERPL CALCULATED.3IONS-SCNC: <1 MMOL/L (ref 7–15)
AST SERPL W P-5'-P-CCNC: 18 U/L (ref 0–45)
ATRIAL RATE - MUSE: 79 BPM
BASE EXCESS BLDV CALC-SCNC: 16.8 MMOL/L (ref -3–3)
BASE EXCESS BLDV CALC-SCNC: 18.6 MMOL/L (ref -3–3)
BASE EXCESS BLDV CALC-SCNC: 19 MMOL/L (ref -3–3)
BASOPHILS # BLD AUTO: 0.1 10E3/UL (ref 0–0.2)
BASOPHILS NFR BLD AUTO: 1 %
BILIRUB SERPL-MCNC: 0.2 MG/DL
BUN SERPL-MCNC: 32.5 MG/DL (ref 8–23)
CALCIUM SERPL-MCNC: 9.2 MG/DL (ref 8.8–10.4)
CHLORIDE SERPL-SCNC: 100 MMOL/L (ref 98–107)
CREAT SERPL-MCNC: 1.51 MG/DL (ref 0.51–0.95)
DIASTOLIC BLOOD PRESSURE - MUSE: NORMAL MMHG
EGFRCR SERPLBLD CKD-EPI 2021: 35 ML/MIN/1.73M2
EOSINOPHIL # BLD AUTO: 0.1 10E3/UL (ref 0–0.7)
EOSINOPHIL NFR BLD AUTO: 1 %
ERYTHROCYTE [DISTWIDTH] IN BLOOD BY AUTOMATED COUNT: 14.2 % (ref 10–15)
FLUAV RNA SPEC QL NAA+PROBE: NEGATIVE
FLUBV RNA RESP QL NAA+PROBE: NEGATIVE
GLUCOSE SERPL-MCNC: 121 MG/DL (ref 70–99)
HCO3 BLDV-SCNC: 49 MMOL/L (ref 21–28)
HCO3 BLDV-SCNC: 51 MMOL/L (ref 21–28)
HCO3 BLDV-SCNC: 51 MMOL/L (ref 21–28)
HCO3 SERPL-SCNC: 49 MMOL/L (ref 22–29)
HCT VFR BLD AUTO: 31.6 % (ref 35–47)
HGB BLD-MCNC: 8.6 G/DL (ref 11.7–15.7)
HOLD SPECIMEN: NORMAL
HOLD SPECIMEN: NORMAL
IMM GRANULOCYTES # BLD: 0.1 10E3/UL
IMM GRANULOCYTES NFR BLD: 1 %
INTERPRETATION ECG - MUSE: NORMAL
LACTATE BLD-SCNC: 0.4 MMOL/L
LYMPHOCYTES # BLD AUTO: 0.8 10E3/UL (ref 0.8–5.3)
LYMPHOCYTES NFR BLD AUTO: 7 %
MCH RBC QN AUTO: 30.7 PG (ref 26.5–33)
MCHC RBC AUTO-ENTMCNC: 27.2 G/DL (ref 31.5–36.5)
MCV RBC AUTO: 113 FL (ref 78–100)
MONOCYTES # BLD AUTO: 0.8 10E3/UL (ref 0–1.3)
MONOCYTES NFR BLD AUTO: 7 %
NEUTROPHILS # BLD AUTO: 10.5 10E3/UL (ref 1.6–8.3)
NEUTROPHILS NFR BLD AUTO: 84 %
NRBC # BLD AUTO: 0 10E3/UL
NRBC BLD AUTO-RTO: 0 /100
NT-PROBNP SERPL-MCNC: 1592 PG/ML (ref 0–1800)
O2/TOTAL GAS SETTING VFR VENT: 100 %
O2/TOTAL GAS SETTING VFR VENT: 30 %
OXYHGB MFR BLDV: 33 % (ref 70–75)
OXYHGB MFR BLDV: 91 % (ref 70–75)
P AXIS - MUSE: 75 DEGREES
PCO2 BLDV: 126 MM HG (ref 40–50)
PCO2 BLDV: 128 MM HG (ref 40–50)
PCO2 BLDV: >130 MM HG (ref 40–50)
PH BLDV: 7.16 [PH] (ref 7.32–7.43)
PH BLDV: 7.19 [PH] (ref 7.32–7.43)
PH BLDV: 7.22 [PH] (ref 7.32–7.43)
PLATELET # BLD AUTO: 181 10E3/UL (ref 150–450)
PO2 BLDV: 22 MM HG (ref 25–47)
PO2 BLDV: 39 MM HG (ref 25–47)
PO2 BLDV: 69 MM HG (ref 25–47)
POTASSIUM SERPL-SCNC: 5.2 MMOL/L (ref 3.4–5.3)
PR INTERVAL - MUSE: 140 MS
PROT SERPL-MCNC: 7.9 G/DL (ref 6.4–8.3)
QRS DURATION - MUSE: 94 MS
QT - MUSE: 368 MS
QTC - MUSE: 421 MS
R AXIS - MUSE: 78 DEGREES
RBC # BLD AUTO: 2.8 10E6/UL (ref 3.8–5.2)
RSV RNA SPEC NAA+PROBE: NEGATIVE
SAO2 % BLDV: 33.7 % (ref 70–75)
SAO2 % BLDV: 57 % (ref 70–75)
SAO2 % BLDV: 92.9 % (ref 70–75)
SARS-COV-2 RNA RESP QL NAA+PROBE: NEGATIVE
SODIUM SERPL-SCNC: 147 MMOL/L (ref 135–145)
SYSTOLIC BLOOD PRESSURE - MUSE: NORMAL MMHG
T AXIS - MUSE: 79 DEGREES
TROPONIN T SERPL HS-MCNC: 24 NG/L
VENTRICULAR RATE- MUSE: 79 BPM
WBC # BLD AUTO: 12.4 10E3/UL (ref 4–11)

## 2024-10-24 PROCEDURE — 36415 COLL VENOUS BLD VENIPUNCTURE: CPT | Performed by: BEHAVIOR TECHNICIAN

## 2024-10-24 PROCEDURE — 99223 1ST HOSP IP/OBS HIGH 75: CPT | Performed by: HOSPITALIST

## 2024-10-24 PROCEDURE — 36415 COLL VENOUS BLD VENIPUNCTURE: CPT | Performed by: HOSPITALIST

## 2024-10-24 PROCEDURE — 84484 ASSAY OF TROPONIN QUANT: CPT | Performed by: HOSPITALIST

## 2024-10-24 PROCEDURE — 82803 BLOOD GASES ANY COMBINATION: CPT

## 2024-10-24 PROCEDURE — 999N000157 HC STATISTIC RCP TIME EA 10 MIN

## 2024-10-24 PROCEDURE — 250N000011 HC RX IP 250 OP 636: Performed by: BEHAVIOR TECHNICIAN

## 2024-10-24 PROCEDURE — 5A09357 ASSISTANCE WITH RESPIRATORY VENTILATION, LESS THAN 24 CONSECUTIVE HOURS, CONTINUOUS POSITIVE AIRWAY PRESSURE: ICD-10-PCS | Performed by: BEHAVIOR TECHNICIAN

## 2024-10-24 PROCEDURE — 93005 ELECTROCARDIOGRAM TRACING: CPT

## 2024-10-24 PROCEDURE — 84484 ASSAY OF TROPONIN QUANT: CPT | Performed by: BEHAVIOR TECHNICIAN

## 2024-10-24 PROCEDURE — 70450 CT HEAD/BRAIN W/O DYE: CPT

## 2024-10-24 PROCEDURE — 82805 BLOOD GASES W/O2 SATURATION: CPT | Performed by: HOSPITALIST

## 2024-10-24 PROCEDURE — 71045 X-RAY EXAM CHEST 1 VIEW: CPT

## 2024-10-24 PROCEDURE — 82805 BLOOD GASES W/O2 SATURATION: CPT | Performed by: EMERGENCY MEDICINE

## 2024-10-24 PROCEDURE — 85025 COMPLETE CBC W/AUTO DIFF WBC: CPT | Performed by: BEHAVIOR TECHNICIAN

## 2024-10-24 PROCEDURE — 99285 EMERGENCY DEPT VISIT HI MDM: CPT | Mod: 25

## 2024-10-24 PROCEDURE — 210N000001 HC R&B IMCU HEART CARE

## 2024-10-24 PROCEDURE — 94660 CPAP INITIATION&MGMT: CPT

## 2024-10-24 PROCEDURE — 83880 ASSAY OF NATRIURETIC PEPTIDE: CPT | Performed by: BEHAVIOR TECHNICIAN

## 2024-10-24 PROCEDURE — 80053 COMPREHEN METABOLIC PANEL: CPT | Performed by: BEHAVIOR TECHNICIAN

## 2024-10-24 PROCEDURE — 87637 SARSCOV2&INF A&B&RSV AMP PRB: CPT | Performed by: BEHAVIOR TECHNICIAN

## 2024-10-24 RX ORDER — FUROSEMIDE 10 MG/ML
60 INJECTION INTRAMUSCULAR; INTRAVENOUS ONCE
Status: COMPLETED | OUTPATIENT
Start: 2024-10-24 | End: 2024-10-24

## 2024-10-24 RX ORDER — MULTIVIT WITH MINERALS/LUTEIN
500 TABLET ORAL
COMMUNITY

## 2024-10-24 RX ORDER — AMOXICILLIN 250 MG
2 CAPSULE ORAL 2 TIMES DAILY PRN
Status: DISCONTINUED | OUTPATIENT
Start: 2024-10-24 | End: 2024-10-29 | Stop reason: HOSPADM

## 2024-10-24 RX ORDER — FUROSEMIDE 40 MG/1
40 TABLET ORAL DAILY
COMMUNITY

## 2024-10-24 RX ORDER — AMOXICILLIN 250 MG
1 CAPSULE ORAL 2 TIMES DAILY PRN
Status: DISCONTINUED | OUTPATIENT
Start: 2024-10-24 | End: 2024-10-29 | Stop reason: HOSPADM

## 2024-10-24 RX ORDER — METHYLPREDNISOLONE SODIUM SUCCINATE 40 MG/ML
40 INJECTION INTRAMUSCULAR; INTRAVENOUS EVERY 8 HOURS
Status: COMPLETED | OUTPATIENT
Start: 2024-10-25 | End: 2024-10-26

## 2024-10-24 RX ORDER — LATANOPROST 50 UG/ML
1 SOLUTION/ DROPS OPHTHALMIC AT BEDTIME
Status: DISCONTINUED | OUTPATIENT
Start: 2024-10-24 | End: 2024-10-29 | Stop reason: HOSPADM

## 2024-10-24 RX ORDER — ALBUTEROL SULFATE 90 UG/1
2 INHALANT RESPIRATORY (INHALATION) EVERY 6 HOURS PRN
Status: DISCONTINUED | OUTPATIENT
Start: 2024-10-24 | End: 2024-10-29 | Stop reason: ALTCHOICE

## 2024-10-24 RX ORDER — SENNOSIDES 8.6 MG
2 TABLET ORAL DAILY
Status: DISCONTINUED | OUTPATIENT
Start: 2024-10-25 | End: 2024-10-29 | Stop reason: HOSPADM

## 2024-10-24 RX ORDER — CARBOXYMETHYLCELLULOSE SODIUM 5 MG/ML
1 SOLUTION/ DROPS OPHTHALMIC
Status: DISCONTINUED | OUTPATIENT
Start: 2024-10-24 | End: 2024-10-29 | Stop reason: HOSPADM

## 2024-10-24 RX ORDER — IPRATROPIUM BROMIDE AND ALBUTEROL SULFATE 2.5; .5 MG/3ML; MG/3ML
3 SOLUTION RESPIRATORY (INHALATION) EVERY 6 HOURS PRN
Status: DISCONTINUED | OUTPATIENT
Start: 2024-10-24 | End: 2024-10-29 | Stop reason: ALTCHOICE

## 2024-10-24 RX ORDER — ACETAMINOPHEN 325 MG/1
650 TABLET ORAL EVERY 4 HOURS PRN
Status: DISCONTINUED | OUTPATIENT
Start: 2024-10-24 | End: 2024-10-29 | Stop reason: HOSPADM

## 2024-10-24 RX ORDER — ONDANSETRON 4 MG/1
4 TABLET, ORALLY DISINTEGRATING ORAL EVERY 6 HOURS PRN
Status: DISCONTINUED | OUTPATIENT
Start: 2024-10-24 | End: 2024-10-29 | Stop reason: HOSPADM

## 2024-10-24 RX ORDER — ALBUTEROL SULFATE 90 UG/1
2 INHALANT RESPIRATORY (INHALATION) EVERY 6 HOURS PRN
Status: CANCELLED | OUTPATIENT
Start: 2024-10-24

## 2024-10-24 RX ORDER — FUROSEMIDE 40 MG/1
40 TABLET ORAL DAILY
Status: DISCONTINUED | OUTPATIENT
Start: 2024-10-25 | End: 2024-10-29 | Stop reason: HOSPADM

## 2024-10-24 RX ORDER — HALOPERIDOL 5 MG/ML
2 INJECTION INTRAMUSCULAR ONCE
Status: DISCONTINUED | OUTPATIENT
Start: 2024-10-24 | End: 2024-10-24

## 2024-10-24 RX ORDER — AMMONIUM LACTATE 12 G/100G
CREAM TOPICAL 2 TIMES DAILY
COMMUNITY

## 2024-10-24 RX ORDER — ONDANSETRON 2 MG/ML
4 INJECTION INTRAMUSCULAR; INTRAVENOUS EVERY 6 HOURS PRN
Status: DISCONTINUED | OUTPATIENT
Start: 2024-10-24 | End: 2024-10-29 | Stop reason: HOSPADM

## 2024-10-24 RX ORDER — LIDOCAINE 40 MG/G
CREAM TOPICAL
Status: DISCONTINUED | OUTPATIENT
Start: 2024-10-24 | End: 2024-10-26

## 2024-10-24 RX ORDER — LIDOCAINE 40 MG/G
CREAM TOPICAL
Status: DISCONTINUED | OUTPATIENT
Start: 2024-10-24 | End: 2024-10-29 | Stop reason: HOSPADM

## 2024-10-24 RX ORDER — CALCIUM CARBONATE 500 MG/1
1000 TABLET, CHEWABLE ORAL 4 TIMES DAILY PRN
Status: DISCONTINUED | OUTPATIENT
Start: 2024-10-24 | End: 2024-10-29 | Stop reason: HOSPADM

## 2024-10-24 RX ORDER — BUSPIRONE HYDROCHLORIDE 10 MG/1
10 TABLET ORAL 3 TIMES DAILY
Status: DISCONTINUED | OUTPATIENT
Start: 2024-10-24 | End: 2024-10-29 | Stop reason: HOSPADM

## 2024-10-24 RX ORDER — ACETAMINOPHEN 325 MG/1
650 TABLET ORAL EVERY 4 HOURS PRN
Status: CANCELLED | OUTPATIENT
Start: 2024-10-24

## 2024-10-24 RX ORDER — IPRATROPIUM BROMIDE AND ALBUTEROL SULFATE 2.5; .5 MG/3ML; MG/3ML
3 SOLUTION RESPIRATORY (INHALATION)
Status: ACTIVE | OUTPATIENT
Start: 2024-10-24 | End: 2024-10-24

## 2024-10-24 RX ORDER — PREDNISONE 20 MG/1
40 TABLET ORAL
Status: DISCONTINUED | OUTPATIENT
Start: 2024-10-26 | End: 2024-10-29 | Stop reason: HOSPADM

## 2024-10-24 RX ORDER — METHYLPREDNISOLONE SODIUM SUCCINATE 125 MG/2ML
125 INJECTION INTRAMUSCULAR; INTRAVENOUS ONCE
Status: COMPLETED | OUTPATIENT
Start: 2024-10-24 | End: 2024-10-24

## 2024-10-24 RX ORDER — ATORVASTATIN CALCIUM 40 MG/1
40 TABLET, FILM COATED ORAL AT BEDTIME
Status: DISCONTINUED | OUTPATIENT
Start: 2024-10-24 | End: 2024-10-29 | Stop reason: ALTCHOICE

## 2024-10-24 RX ORDER — ATORVASTATIN CALCIUM 40 MG/1
40 TABLET, FILM COATED ORAL DAILY
Status: CANCELLED | OUTPATIENT
Start: 2024-10-25

## 2024-10-24 RX ADMIN — METHYLPREDNISOLONE SODIUM SUCCINATE 125 MG: 125 INJECTION, POWDER, FOR SOLUTION INTRAMUSCULAR; INTRAVENOUS at 21:19

## 2024-10-24 RX ADMIN — FUROSEMIDE 60 MG: 10 INJECTION, SOLUTION INTRAMUSCULAR; INTRAVENOUS at 21:19

## 2024-10-24 ASSESSMENT — ACTIVITIES OF DAILY LIVING (ADL)
ADLS_ACUITY_SCORE: 0

## 2024-10-24 ASSESSMENT — COLUMBIA-SUICIDE SEVERITY RATING SCALE - C-SSRS
6. HAVE YOU EVER DONE ANYTHING, STARTED TO DO ANYTHING, OR PREPARED TO DO ANYTHING TO END YOUR LIFE?: NO
1. IN THE PAST MONTH, HAVE YOU WISHED YOU WERE DEAD OR WISHED YOU COULD GO TO SLEEP AND NOT WAKE UP?: NO
2. HAVE YOU ACTUALLY HAD ANY THOUGHTS OF KILLING YOURSELF IN THE PAST MONTH?: NO

## 2024-10-24 NOTE — ED NOTES
Bed: ED10  Expected date:   Expected time:   Means of arrival:   Comments:  416  78 F sob/increased ams  1813

## 2024-10-24 NOTE — ED TRIAGE NOTES
EMS reports pt had increasing work of breathing since this am. Had CXR ordered by NP that was clear. Pt has baseline dementia and is more lethargic per staff report. Uses 3L/min O2 per nc at baseline. Given duoneb by EMS en route with some improvement to breathing.      Triage Assessment (Adult)       Row Name 10/24/24 3432          Triage Assessment    Airway WDL WDL        Respiratory WDL    Respiratory WDL X        Skin Circulation/Temperature WDL    Skin Circulation/Temperature WDL WDL        Cardiac WDL    Cardiac WDL WDL        Cognitive/Neuro/Behavioral WDL    Cognitive/Neuro/Behavioral WDL WDL

## 2024-10-24 NOTE — ED PROVIDER NOTES
"  Emergency Department Note      History of Present Illness     Chief Complaint   Shortness of Breath      HPI   Ca Yan is a 78 year old female with history of dementia, breast cancer, COPD, restrictive lung disease, oxygen dependent who presents to the ED for evaluation of shortness of breath. Group home staff noted that the patient had increased work of breathing this morning and appeared more lethargic. They also noted that she had a hard time responding which is not her baseline. She is chronically on 3 L oxygen via NC. She was given a duoneb by EMS with some improvement. Patient is replying \"yes\" to all questions currently.     Independent Historian   None    Review of External Notes   Reviewed hospital mission note from 2/17/2024.  Patient was admitted for multifocal pneumonia.    Past Medical History     Medical History and Problem List   Past Medical History:   Diagnosis Date    Anxiety     Arthritis     Breast cancer (H)     COPD (chronic obstructive pulmonary disease) (H)     Depressive disorder     Hypertension     Low back pain with left-sided sciatica     Low bone density 04/13/2017    Lymphedema, chronic lower extremities     Squamous cell carcinoma of skin, unspecified        Medications   acetaminophen (TYLENOL) 325 MG tablet  albuterol (PROAIR HFA/PROVENTIL HFA/VENTOLIN HFA) 108 (90 Base) MCG/ACT inhaler  ammonium lactate (AMLACTIN) 12 % external cream  atorvastatin (LIPITOR) 40 MG tablet  benzonatate (TESSALON) 200 MG capsule  busPIRone (BUSPAR) 10 MG tablet  Emollient (CERAVE) CREA  ferrous sulfate (FEROSUL) 325 (65 Fe) MG tablet  folic acid (FOLVITE) 400 MCG tablet  furosemide (LASIX) 40 MG tablet  ipratropium-albuterol (COMBIVENT RESPIMAT)  MCG/ACT inhaler  latanoprost (XALATAN) 0.005 % ophthalmic solution  melatonin 3 MG tablet  polyethylene glycol (MIRALAX) 17 GM/Dose powder  QUEtiapine (SEROQUEL) 300 MG tablet  sennosides (SENOKOT) 8.6 MG tablet  sertraline (ZOLOFT) 100 MG " tablet  tiotropium-olodaterol 2.5-2.5 MCG/ACT AERS  vitamin C (ASCORBIC ACID) 250 MG tablet  Vitamin D3 (CHOLECALCIFEROL) 25 mcg (1000 units) tablet  zinc oxide (TRIPLE PASTE) 12.8 % external ointment  Lidocaine (LIDOCARE) 4 % Patch  losartan-hydrochlorothiazide (HYZAAR) 100-25 MG tablet  miconazole (MICATIN) 2 % external powder  umeclidinium-vilanterol (ANORO ELLIPTA) 62.5-25 MCG/ACT oral inhaler  vitamin B complex with vitamin C (STRESS TAB) tablet        Surgical History   Past Surgical History:   Procedure Laterality Date    BACK SURGERY      spinal fusion    COLONOSCOPY      LARYNGOSCOPY WITH MICROSCOPE N/A 4/30/2021    Procedure: FLEXIBLE LARYNGOSCOPY;  Surgeon: Domonique Roche MD;  Location: [facility] OR    LUMPECTOMY BREAST      ORTHOPEDIC SURGERY      Knee surgery left side       Physical Exam     Patient Vitals for the past 24 hrs:   BP Temp Temp src Pulse Resp SpO2   10/24/24 2021 -- -- -- 76 (!) 9 98 %   10/24/24 2006 139/66 -- -- 78 18 99 %   10/24/24 1951 -- -- -- 75 (!) 6 100 %   10/24/24 1936 (!) 137/97 -- -- 69 10 100 %   10/24/24 1928 -- -- -- -- -- 99 %   10/24/24 1853 (!) 140/74 98.3  F (36.8  C) Oral 79 18 94 %     Physical Exam  Physical Exam:  General: Lying comfortably on hospital bed, fatigued  Head: normocephalic, atraumatic  Eyes: PERRLA, EOMI  Ears: External ears appear normal.   Nose: no signs of bleeding   Throat: moist mucous membranes  Neck: No JVD  CV: regular rate and rhythm  Pulm: Expiratory wheezes noted bilaterally. Normal respiratory rate and effort.   Abdomen: soft, non-tender, non-distended  MSK: No midline tenderness  Ext: normal range of motion of all extremities. No gross deformities  Skin: warm, dry, no rashes  Neuro: alert         Diagnostics     Lab Results   Labs Ordered and Resulted from Time of ED Arrival to Time of ED Departure   COMPREHENSIVE METABOLIC PANEL - Abnormal       Result Value    Sodium 147 (*)     Potassium 5.2      Carbon Dioxide (CO2) 49 (*)     Anion Gap <1  (*)     Urea Nitrogen 32.5 (*)     Creatinine 1.51 (*)     GFR Estimate 35 (*)     Calcium 9.2      Chloride 100      Glucose 121 (*)     Alkaline Phosphatase 95      AST 18      ALT 7      Protein Total 7.9      Albumin 3.8      Bilirubin Total 0.2     TROPONIN T, HIGH SENSITIVITY - Abnormal    Troponin T, High Sensitivity 24 (*)    CBC WITH PLATELETS AND DIFFERENTIAL - Abnormal    WBC Count 12.4 (*)     RBC Count 2.80 (*)     Hemoglobin 8.6 (*)     Hematocrit 31.6 (*)      (*)     MCH 30.7      MCHC 27.2 (*)     RDW 14.2      Platelet Count 181      % Neutrophils 84      % Lymphocytes 7      % Monocytes 7      % Eosinophils 1      % Basophils 1      % Immature Granulocytes 1      NRBCs per 100 WBC 0      Absolute Neutrophils 10.5 (*)     Absolute Lymphocytes 0.8      Absolute Monocytes 0.8      Absolute Eosinophils 0.1      Absolute Basophils 0.1      Absolute Immature Granulocytes 0.1      Absolute NRBCs 0.0     ISTAT GASES LACTATE VENOUS POCT - Abnormal    Lactic Acid POCT 0.4      Bicarbonate Venous POCT 51 (*)     O2 Sat, Venous POCT 57 (*)     pCO2 Venous POCT 126 (*)     pH Venous POCT 7.22 (*)     pO2 Venous POCT 39      Base Excess/Deficit (+/-) POCT 19.0 (*)    NT PROBNP INPATIENT - Normal    N terminal Pro BNP Inpatient 1,592     INFLUENZA A/B, RSV, & SARS-COV2 PCR - Normal    Influenza A PCR Negative      Influenza B PCR Negative      RSV PCR Negative      SARS CoV2 PCR Negative     TROPONIN T, HIGH SENSITIVITY   BLOOD GAS VENOUS       Imaging   CT Head w/o Contrast   Final Result   IMPRESSION:   1.  No CT evidence for acute intracranial process.   2.  Brain atrophy and presumed chronic microvascular ischemic changes similar to the previous exam.      XR Chest Port 1 View   Final Result   IMPRESSION: Cardiomegaly. Pulmonary vascularity normal. Scoliosis with diffuse volume loss right hemithorax, unchanged. Small right pleural effusion and subsegmental atelectasis/scarring right lower lung field,  minimally unchanged. Subsegmental    atelectasis left base.          EKG   ECG results from 10/24/24   EKG 12-lead, tracing only     Value    Systolic Blood Pressure     Diastolic Blood Pressure     Ventricular Rate 79    Atrial Rate 79    TX Interval 140    QRS Duration 94        QTc 421    P Axis 75    R AXIS 78    T Axis 79    Interpretation ECG      Sinus rhythm  Incomplete right bundle branch block  Borderline ECG  When compared with ECG of 22-Dec-2022 14:52,  No significant change was found  Confirmed by GENERATED REPORT, COMPUTER ([id number]),  Ro Escamilla ([id number]) on 10/24/2024 7:41:20 PM       *Note: Due to a large number of results and/or encounters for the requested time period, some results have not been displayed. A complete set of results can be found in Results Review.         Independent Interpretation   CXR: right pleural effusion.    ED Course      Medications Administered   Medications   ipratropium - albuterol 0.5 mg/2.5 mg/3 mL (DUONEB) neb solution 3 mL (has no administration in time range)   haloperidol lactate (HALDOL) injection 2 mg (has no administration in time range)   furosemide (LASIX) injection 60 mg (60 mg Intravenous $Given 10/24/24 2119)   methylPREDNISolone Na Suc (solu-MEDROL) injection 125 mg (125 mg Intravenous $Given 10/24/24 2119)       Procedures   Procedures     Discussion of Management   Admitting Hospitalist, Dr. Nagel.     ED Course   ED Course as of 10/24/24 2121   Thu Oct 24, 2024   1901 I evaluated patient and obtained history.   1910 I evaluated the patient. Ordered bipap, turned oxygen down to 1L to keep sats above 90%. Patient awake and responding.   1925 I attempted to call the guardian on record. Left a voicemail asking for return call ASAP. Reviewed and verified POLST.   1935 I received a call from Js Cobb the patient's guardian and apprised him of the situation and verified her code status as DNR/DNI and discussed her POLST.    2028 Discussed  with Dr. Nagel, hospitalist who accepted patient for admission.       Additional Documentation  None    Medical Decision Making / Diagnosis     CMS Diagnoses: None    MIPS       None    MDM   Ca Yan is a 78 year old female who presents to the ED for evaluation of shortness of breath.  Patient has a history of dementia and is not able to give a reliable history.  Per group home staff, patient had increased work of breathing since this morning and appeared short of breath and lethargic.  She also appeared altered and had trouble responding which is not her baseline.  She has a history of COPD and restrictive lung disease and is chronically on 3 L O2 via NC.  On exam, patient appears comfortable and does not appear to have increased work of breathing.  She does have some faint expiratory wheezes bilaterally.  Her vitals are reassuring.  O2 sats are 94% on 3 L O2.  Labs are notable for hypercapnia with pCO2 of 126 and bicarb of 51. Patient's code status reveals that she is DNR/DNI but BiPAP is allowed.  BiPAP was initiated.  EKG shows normal sinus rhythm.  No ischemic changes.  No dysrhythmia.  Initial troponin was elevated but stable compared to prior.  Repeat troponin is pending.  Chest x-ray is notable for a small right pleural effusion and subsegmental atelectasis/scarring in the right lower lung field that is unchanged from prior.  BNP is significantly elevated compared to prior.  No pneumonia. Suspect acute CHF versus COPD exacerbation.  Viral panel was negative for COVID/influenza/RSV.  CT of the head was obtained given patient's altered mental status today.  Thankfully, CT does not show evidence of acute intracranial process.  Patient's legal guardian was made aware that BiPAP was initiated we attempted to get a hold of a family friend but was unsuccessful.  Patient will need to be admitted to [facility] for acute respiratory failure with hypercapnia.  Discussed with hospitalist accept the patient for  admission.     Disposition   The patient was admitted to the hospital.     Diagnosis     ICD-10-CM    1. Acute respiratory failure with hypercapnia (H)  J96.02       2. COPD exacerbation (H)  J44.1       3. Acute congestive heart failure, unspecified heart failure type (H)  I50.9       4. History of dementia  Z86.59            Discharge Medications   New Prescriptions    No medications on file         CHAITANYA Kline Kausar, PA-C  10/25/24 1058

## 2024-10-25 ENCOUNTER — DOCUMENTATION ONLY (OUTPATIENT)
Dept: OTHER | Facility: CLINIC | Age: 79
End: 2024-10-25
Payer: COMMERCIAL

## 2024-10-25 LAB
ALBUMIN UR-MCNC: 10 MG/DL
ANION GAP SERPL CALCULATED.3IONS-SCNC: 1 MMOL/L (ref 7–15)
APPEARANCE UR: ABNORMAL
BASE EXCESS BLDV CALC-SCNC: 21.2 MMOL/L (ref -3–3)
BASE EXCESS BLDV CALC-SCNC: 23 MMOL/L (ref -3–3)
BILIRUB UR QL STRIP: NEGATIVE
BUN SERPL-MCNC: 37.7 MG/DL (ref 8–23)
CALCIUM SERPL-MCNC: 9.3 MG/DL (ref 8.8–10.4)
CHLORIDE SERPL-SCNC: 99 MMOL/L (ref 98–107)
COLOR UR AUTO: ABNORMAL
CREAT SERPL-MCNC: 1.47 MG/DL (ref 0.51–0.95)
EGFRCR SERPLBLD CKD-EPI 2021: 36 ML/MIN/1.73M2
GLUCOSE SERPL-MCNC: 107 MG/DL (ref 70–99)
GLUCOSE UR STRIP-MCNC: NEGATIVE MG/DL
HCO3 BLDV-SCNC: 50 MMOL/L (ref 21–28)
HCO3 BLDV-SCNC: 50 MMOL/L (ref 21–28)
HCO3 SERPL-SCNC: 47 MMOL/L (ref 22–29)
HGB UR QL STRIP: NEGATIVE
KETONES UR STRIP-MCNC: NEGATIVE MG/DL
LEUKOCYTE ESTERASE UR QL STRIP: ABNORMAL
NITRATE UR QL: NEGATIVE
O2/TOTAL GAS SETTING VFR VENT: 45 %
O2/TOTAL GAS SETTING VFR VENT: 45 %
OXYHGB MFR BLDV: 39 % (ref 70–75)
OXYHGB MFR BLDV: 66 % (ref 70–75)
PCO2 BLDV: 70 MM HG (ref 40–50)
PCO2 BLDV: 92 MM HG (ref 40–50)
PH BLDV: 7.35 [PH] (ref 7.32–7.43)
PH BLDV: 7.46 [PH] (ref 7.32–7.43)
PH UR STRIP: 5 [PH] (ref 5–7)
PO2 BLDV: 21 MM HG (ref 25–47)
PO2 BLDV: 31 MM HG (ref 25–47)
POTASSIUM SERPL-SCNC: 5.5 MMOL/L (ref 3.4–5.3)
PROCALCITONIN SERPL IA-MCNC: 0.11 NG/ML
RBC URINE: 3 /HPF
SAO2 % BLDV: 39.8 % (ref 70–75)
SAO2 % BLDV: 67.7 % (ref 70–75)
SODIUM SERPL-SCNC: 147 MMOL/L (ref 135–145)
SP GR UR STRIP: 1.01 (ref 1–1.03)
SQUAMOUS EPITHELIAL: 2 /HPF
TROPONIN T SERPL HS-MCNC: 26 NG/L
UROBILINOGEN UR STRIP-MCNC: NORMAL MG/DL
WBC URINE: 32 /HPF

## 2024-10-25 PROCEDURE — 81001 URINALYSIS AUTO W/SCOPE: CPT | Performed by: HOSPITALIST

## 2024-10-25 PROCEDURE — 999N000157 HC STATISTIC RCP TIME EA 10 MIN

## 2024-10-25 PROCEDURE — 250N000011 HC RX IP 250 OP 636: Performed by: HOSPITALIST

## 2024-10-25 PROCEDURE — 94660 CPAP INITIATION&MGMT: CPT

## 2024-10-25 PROCEDURE — 250N000013 HC RX MED GY IP 250 OP 250 PS 637: Performed by: HOSPITALIST

## 2024-10-25 PROCEDURE — 82805 BLOOD GASES W/O2 SATURATION: CPT | Performed by: HOSPITALIST

## 2024-10-25 PROCEDURE — 80048 BASIC METABOLIC PNL TOTAL CA: CPT | Performed by: HOSPITALIST

## 2024-10-25 PROCEDURE — 36415 COLL VENOUS BLD VENIPUNCTURE: CPT | Performed by: HOSPITALIST

## 2024-10-25 PROCEDURE — 82947 ASSAY GLUCOSE BLOOD QUANT: CPT | Performed by: HOSPITALIST

## 2024-10-25 PROCEDURE — 84145 PROCALCITONIN (PCT): CPT | Performed by: INTERNAL MEDICINE

## 2024-10-25 PROCEDURE — 99233 SBSQ HOSP IP/OBS HIGH 50: CPT | Performed by: INTERNAL MEDICINE

## 2024-10-25 PROCEDURE — 87086 URINE CULTURE/COLONY COUNT: CPT | Performed by: HOSPITALIST

## 2024-10-25 PROCEDURE — 210N000001 HC R&B IMCU HEART CARE

## 2024-10-25 RX ADMIN — ATORVASTATIN CALCIUM 40 MG: 40 TABLET, FILM COATED ORAL at 21:26

## 2024-10-25 RX ADMIN — METHYLPREDNISOLONE SODIUM SUCCINATE 40 MG: 40 INJECTION, POWDER, FOR SOLUTION INTRAMUSCULAR; INTRAVENOUS at 13:11

## 2024-10-25 RX ADMIN — BUSPIRONE HYDROCHLORIDE 10 MG: 10 TABLET ORAL at 08:50

## 2024-10-25 RX ADMIN — BUSPIRONE HYDROCHLORIDE 10 MG: 10 TABLET ORAL at 21:26

## 2024-10-25 RX ADMIN — UMECLIDINIUM BROMIDE AND VILANTEROL TRIFENATATE 1 PUFF: 62.5; 25 POWDER RESPIRATORY (INHALATION) at 08:50

## 2024-10-25 RX ADMIN — ACETAMINOPHEN 650 MG: 325 TABLET, FILM COATED ORAL at 08:50

## 2024-10-25 RX ADMIN — METHYLPREDNISOLONE SODIUM SUCCINATE 40 MG: 40 INJECTION, POWDER, FOR SOLUTION INTRAMUSCULAR; INTRAVENOUS at 21:26

## 2024-10-25 RX ADMIN — QUETIAPINE FUMARATE 150 MG: 100 TABLET ORAL at 21:26

## 2024-10-25 RX ADMIN — METHYLPREDNISOLONE SODIUM SUCCINATE 40 MG: 40 INJECTION, POWDER, FOR SOLUTION INTRAMUSCULAR; INTRAVENOUS at 06:43

## 2024-10-25 RX ADMIN — SERTRALINE HYDROCHLORIDE 50 MG: 50 TABLET ORAL at 08:50

## 2024-10-25 RX ADMIN — BUSPIRONE HYDROCHLORIDE 10 MG: 10 TABLET ORAL at 15:30

## 2024-10-25 RX ADMIN — LATANOPROST 1 DROP: 50 SOLUTION OPHTHALMIC at 21:27

## 2024-10-25 RX ADMIN — LATANOPROST 1 DROP: 50 SOLUTION OPHTHALMIC at 00:10

## 2024-10-25 RX ADMIN — SENNOSIDES 2 TABLET: 8.6 TABLET, FILM COATED ORAL at 08:50

## 2024-10-25 RX ADMIN — FUROSEMIDE 40 MG: 40 TABLET ORAL at 08:50

## 2024-10-25 ASSESSMENT — ACTIVITIES OF DAILY LIVING (ADL)
ADLS_ACUITY_SCORE: 26.75
ADLS_ACUITY_SCORE: 25.25
ADLS_ACUITY_SCORE: 27.25
ADLS_ACUITY_SCORE: 25.25
ADLS_ACUITY_SCORE: 27.25
ADLS_ACUITY_SCORE: 25.25
ADLS_ACUITY_SCORE: 27.25
ADLS_ACUITY_SCORE: 25.25
ADLS_ACUITY_SCORE: 28.75
ADLS_ACUITY_SCORE: 25.25
ADLS_ACUITY_SCORE: 26.75
ADLS_ACUITY_SCORE: 25.25
ADLS_ACUITY_SCORE: 0
ADLS_ACUITY_SCORE: 25.75
ADLS_ACUITY_SCORE: 25.75
ADLS_ACUITY_SCORE: 27.25
ADLS_ACUITY_SCORE: 25.25
ADLS_ACUITY_SCORE: 27.25
ADLS_ACUITY_SCORE: 25.25
DEPENDENT_IADLS:: CLEANING;COOKING;LAUNDRY;SHOPPING;MEAL PREPARATION;MEDICATION MANAGEMENT;MONEY MANAGEMENT;TRANSPORTATION;INCONTINENCE
ADLS_ACUITY_SCORE: 25.75
ADLS_ACUITY_SCORE: 26.75
ADLS_ACUITY_SCORE: 27.75
ADLS_ACUITY_SCORE: 25.25

## 2024-10-25 NOTE — PROGRESS NOTES
MD Notification    Notified Person: MD Nagel  Notification Date/Time: 10-24-24 6053  Notification Interaction: Page  Purpose of Notification:     Patient increased SOB AMS on BiPap admitted by you today. FYI critical labs VBG pH 7.19 at 2330 improved from 7.16 at 2100, pCO2 128 improved from >130, bicarp 49 improved from 51. RT placed pt on AVAPS at 2100 and has already adjusted BiPAP settings again at 2300. They had recommended a repeat VBG at 0100, which was ordered. Thank you, Franc [phone].    Orders Received:  Comments:

## 2024-10-25 NOTE — PROGRESS NOTES
Pt was transported to 270 from ED10.  PT was on bipap  SPO2 was 99    Device transported: v60      Cory Mckenna, RRT  10/24/2024

## 2024-10-25 NOTE — PHARMACY-ADMISSION MEDICATION HISTORY
Pharmacist Admission Medication History    Admission medication history is complete. The information provided in this note is only as accurate as the sources available at the time of the update.    Information Source(s): Patient, Hospital records, and CareEverywhere/SureScripts via in-person    Pertinent Information: None    Changes made to PTA medication list:  Added: None  Deleted: None  Changed: None      Medication History Completed By: Jadiel Browne RPH 10/24/2024 7:40 PM    PTA Med List   Medication Sig Last Dose/Taking    acetaminophen (TYLENOL) 325 MG tablet Take 2 tablets (650 mg) by mouth every 4 hours as needed for mild pain or fever Unknown    albuterol (PROAIR HFA/PROVENTIL HFA/VENTOLIN HFA) 108 (90 Base) MCG/ACT inhaler Inhale 2 puffs into the lungs every 6 hours as needed for wheezing Unknown    ammonium lactate (AMLACTIN) 12 % external cream Apply topically 2 times daily. 10/24/2024    atorvastatin (LIPITOR) 40 MG tablet Take 1 tablet (40 mg) by mouth daily 10/23/2024 Bedtime    benzonatate (TESSALON) 200 MG capsule Take 200 mg by mouth 3 times daily as needed for cough Unknown    busPIRone (BUSPAR) 10 MG tablet Take 1 tablet (10 mg) by mouth 3 times daily 10/24/2024 Morning    Emollient (CERAVE) CREA Please apply twice daily to dry skin of body and feet Unknown    ferrous sulfate (FEROSUL) 325 (65 Fe) MG tablet Take 1 tablet (325 mg) by mouth Every Mon, Wed, Fri Morning 10/23/2024    folic acid (FOLVITE) 400 MCG tablet Take 1 tablet (400 mcg) by mouth daily 10/24/2024    furosemide (LASIX) 40 MG tablet Take 40 mg by mouth daily. 10/24/2024    ipratropium-albuterol (COMBIVENT RESPIMAT)  MCG/ACT inhaler Inhale 1 puff into the lungs 4 times daily as needed for shortness of breath / dyspnea or wheezing Unknown    latanoprost (XALATAN) 0.005 % ophthalmic solution Place 1 drop into both eyes daily 10/23/2024 Bedtime    melatonin 3 MG tablet Take 3 mg by mouth At Bedtime 10/23/2024 Bedtime     polyethylene glycol (MIRALAX) 17 GM/Dose powder Take 17 g by mouth 2 times daily as needed for constipation. Unknown    QUEtiapine (SEROQUEL) 300 MG tablet Take 0.5 tablets (150 mg) by mouth At Bedtime 10/23/2024 Bedtime    sennosides (SENOKOT) 8.6 MG tablet Take 2 tablets by mouth daily. 10/24/2024 Morning    sertraline (ZOLOFT) 100 MG tablet Take 1 tablet (100 mg) by mouth daily (Patient taking differently: Take 50 mg by mouth daily.) 10/24/2024 Morning    tiotropium-olodaterol 2.5-2.5 MCG/ACT AERS Inhale 1 puff into the lungs daily. 10/24/2024    vitamin C (ASCORBIC ACID) 250 MG tablet Take 500 mg by mouth three times a week. Mon, Wed, Fri 10/23/2024    Vitamin D3 (CHOLECALCIFEROL) 25 mcg (1000 units) tablet Take 1 tablet (25 mcg) by mouth daily 10/24/2024    zinc oxide (TRIPLE PASTE) 12.8 % external ointment Apply topically 2 times daily. 10/24/2024

## 2024-10-25 NOTE — ED NOTES
Patient placed on Bipap.  She is restless and anxious but redirects. Warm blankets placed on the patient.

## 2024-10-25 NOTE — PROGRESS NOTES
SW:  D: Message sent to [facility] to confirm if patient has an active guardianship/update chart with information.    EPI [place], [facility]  Social Work- Inpatient Care Coordination  [facility]

## 2024-10-25 NOTE — PROGRESS NOTES
Pt was transported to CT from ED 10.  PT was on bipap  SPO2 was %    Device transported: v60      Cory Mckenna, RRT  10/24/2024

## 2024-10-25 NOTE — PLAN OF CARE
Goal Outcome Evaluation:      Plan of Care Reviewed With: other (see comments) (facility staff)          Outcome Evaluation: Return to LTC when medically ready

## 2024-10-25 NOTE — ED PROVIDER NOTES
Emergency Department Attending Supervision Note  10/24/2024  10:13 PM      I evaluated this patient in conjunction with Ike Chambers PA-C.    History of Present Illness:    Briefly, the patient presented with from the nursing facility with perceived worsening shortness of breath as well as lethargy.  She apparently is more responsive at baseline.  Normally on 3 L of oxygen by nasal cannula.  Was given a DuoNeb by EMS and route and was responding yes to questions but minimally conversant otherwise.    Please consult DENIA Chambers's full note above for additional information, history an complete ROS.    Physical Exam:  General: Well-nourished  Eyes: PERRL, conjunctivae pink no scleral icterus or conjunctival injection  ENT:  Moist mucus membranes, posterior oropharynx clear without erythema or exudates  Respiratory: Mild diffuse wheezes.  No apparent respiratory distress.  Saturating 93% on 3 L of oxygen by nasal cannula.  Lungs clear to auscultation bilaterally, no crackles/rubs/wheezes.   CV: Normal rate and rhythm, no murmurs/rubs/gallops  GI:  Abdomen soft and non-distended.  Normoactive BS.  No tenderness, guarding or rebound  Skin: Warm, dry.  No rashes or petechiae  Musculoskeletal: No peripheral edema or calf tenderness  Neuro: Alert but does not seem to be oriented to person or place.  Unable to assess  Psychiatric: Normal affect      Medical Decision Making:    I agree with DENIA Chambers's medical decision making.      Patient arrived with report of difficulty in breathing.  She does not seem to have respiratory distress but has a history of COPD and had some mild wheezing and so we treated her with Solu-Medrol and nebulizer.  She had a VBG to look for and assess for respiratory status and sepsis and this showed a normal lactic but a very significantly elevated pCO2.  Oxygen levels were turned down to maintain sats just at 90%.  She was started on BiPAP.  Verified that she is indeed DNR/DNI and verified with her  guardian that this is the case as well.  Discussed the critical nature of her presentation, especially in the setting of a DNR/DNI.  The patient initially refused BiPAP but we are able to place her on BiPAP with success and we had a sitter stay by her to ensure that the BiPAP stayed on.  No florid pulmonary edema but given the elevation in BNP and possibility this represents congestive heart failure, we did also treat with a dose of Lasix.  Chest x-ray did not show any signs of pneumonia and she is afebrile.  I do not believe this represents severe sepsis or septic shock from a bacterial etiology but rather from hypoventilation and CO2 narcosis.  Head CT was obtained and fortunately shows no evidence of intracranial hemorrhage or other concerning acute findings.  Repeat VBG was unfortunately not significantly improved.  She will be admitted to the hospital under the hospitalist service which to continue BiPAP and continue diagnostic evaluation and treatment.  Duong Nagel MD graciously agreed to meet the patient.    Diagnosis:    ICD-10-CM    1. Acute respiratory failure with hypercapnia (H)  J96.02       2. COPD exacerbation (H)  J44.1       3. Acute congestive heart failure, unspecified heart failure type (H)  I50.9       4. History of dementia  Z86.59                 Virgie PEDROZA. Virgie Anderson MD  10/24/24 2950

## 2024-10-25 NOTE — CONSULTS
Care Management Initial Consult    General Information  Assessment completed with: Care Team Member, Herrera Yost  Type of CM/SW Visit: Initial Assessment    Primary Care Provider verified and updated as needed: Yes   Readmission within the last 30 days: no previous admission in last 30 days      Reason for Consult: discharge planning  Advance Care Planning: Advance Care Planning Reviewed: present on chart, no concerns identified          Communication Assessment  Patient's communication style: spoken language (English or Bilingual)             Cognitive  Cognitive/Neuro/Behavioral: .WDL except, orientation  Level of Consciousness: alert  Arousal Level: opens eyes spontaneously  Orientation: disoriented to, place, time, situation  Mood/Behavior: calm, cooperative     Speech: clear    Living Environment:   People in home: facility resident  Facility resident  Current living Arrangements: extended care facility  Name of Facility: [facility]   Able to return to prior arrangements: yes  Living Arrangement Comments: has a bed hold    Family/Social Support:  Care provided by: self, other (see comments) (Facility Staff)  Provides care for: no one, unable/limited ability to care for self  Marital Status: Single  Support system: Facility resident(s)/Staff          Description of Support System: Supportive, Involved    Support Assessment: Adequate family and caregiver support, Adequate social supports    Current Resources:   Patient receiving home care services: No        Community Resources: None  Equipment currently used at home: wheelchair, manual, walker, rolling  Supplies currently used at home: Oxygen Tubing/Supplies    Employment/Financial:  Employment Status: retired        Financial Concerns: none   Referral to Financial Worker: No  [place]ance Comments[place] [facility] [place]    Does the patient's insurance plan have a 3 day qualifying hospital stay waiver?  Yes     Which insurance plan 3 day waiver is available?  Alternative insurance waiver    Will the waiver be used for post-acute placement? No    Lifestyle & Psychosocial Needs:  Social Drivers of Health     Food Insecurity: Patient Unable To Answer (2/17/2024)    Received from [facility]    Hunger Vital Sign     Worried About Running Out of Food in the Last Year: Patient unable to answer     Ran Out of Food in the Last Year: Patient unable to answer   Depression: Not at risk (11/17/2022)    PHQ-2     PHQ-2 Score: 1   Housing Stability: Unknown (2/17/2024)    Received from [facility]    Housing Stability     What is your housing situation today?: 5 - Patient unable to answer   Tobacco Use: Medium Risk (10/24/2024)    Patient History     Smoking Tobacco Use: Former     Smokeless Tobacco Use: Former     Passive Exposure: Not on file   Financial Resource Strain: Patient Unable To Answer (2/17/2024)    Received from [facility]    Overall Financial Resource Strain (CARDIA)     Difficulty of Paying Living Expenses: Patient unable to answer   Alcohol Use: Not on file   Transportation Needs: Patient Unable To Answer (2/17/2024)    Received from [facility]    PRAPARE - Transportation     Lack of Transportation (Medical): Patient unable to answer     Lack of Transportation (Non-Medical): Patient unable to answer   Physical Activity: Not on file   Interpersonal Safety: Patient Unable To Answer (10/13/2024)    Received from [facility]    Humiliation, Afraid, Rape, and Kick questionnaire     Fear of Current or Ex-Partner: Patient unable to answer     Emotionally Abused: Patient unable to answer     Physically Abused: Patient unable to answer     Sexually Abused: Patient unable to answer   Stress: Not on file   Social Connections: Moderately Isolated (8/20/2020)    Social Connection and Isolation Panel [NHANES]     Frequency of Communication with Friends and Family: Once a week     Frequency of Social Gatherings with Friends and Family: Never      "Attends Sikhism Services: More than 4 times per year     Active Member of Clubs or Organizations: Yes     Attends Club or Organization Meetings: More than 4 times per year     Marital Status: Never    Health Literacy: Not on file       Functional Status:  Prior to admission patient needed assistance:   Dependent ADLs:: Bathing, Dressing, Wheelchair-with assist, Transfers, Grooming  Dependent IADLs:: Cleaning, Cooking, Laundry, Shopping, Meal Preparation, Medication Management, Money Management, Transportation, Incontinence  Assesssment of Functional Status: At functional baseline    Mental Health Status:  Mental Health Status: No Current Concerns       Chemical Dependency Status:  Chemical Dependency Status: No Current Concerns             Values/Beliefs:  Spiritual, Cultural Beliefs, Sikhism Practices, Values that affect care: no               Discussed  Partnership in Safe Discharge Planning  document with patient/family: Yes:     Additional Information:  Per care management consult for Discharge planning/elevated risk chart was reviewed. Patient is a 78 year old female that presents to [facility] on 10/24/24 from [facility]. According to the H & P \" PMH significant for COPD (3L O2 at baseline), HTN, MDD, dementia, chronic lymphedema (diastolic CHF), CKD stage III who was brought to ED by EMS for shortness of breath and lethargy, admitted inpatient 10/24/24\".    Call placed to patient's [employer] (navigator Fiduciary) at [phone] and left a VM asking for a return call.     Call placed to [facility] at [phone] and left a VM for Tia, the [employer] [facility]. Writer called the Main line at [phone] and spoke to STEVEN Silverman (clinical Manager) who confirmed patient has been a resident since July 2023. She lives in the Long term care. At baseline patient had been ambulating with a walker, however, the last 3-4 week has not been " "getting out of bed. She has been SBA/Assist of one for transfers and requires assistance with all daily cares. Staff completes housekeeping/meals/medication assist. Patient is typically on oxygen at 2-4L daily (they use [facility]). Herrera stated that writer can reach out to [facility] at [phone] as they work in admissions. Call placed to [facility] and confirmed patient has an 18 day bed hold and can return when medically ready. If patient is ready over the weekend suggested that we call the main line and ask for [facility] nursing station and the staff can coordinate with Tia the DON regarding patient returning. If patient discharges over the weekend asking that orders be faxed directly to the facility at [phone]. [facility] explained that he wanted to let staff know that facility does \"cares in pairs\" as patient will make false alligations. Writer updated bedside, RN regarding this information.     Next Steps: Follow for medical readiness.      EPI Arri[place], [facility]  Social Work- Inpatient Care Coordination  [facility]    "

## 2024-10-25 NOTE — ED NOTES
left for Js Cobb, person listed in the patients chart as the guardian.  I also attempted to call her friend also listed in the chart Cyndi with no success.

## 2024-10-25 NOTE — PLAN OF CARE
"Care plan note:      Recent Vitals:  Temp: 99.2  F (37.3  C) Temp src: Oral BP: (!) 143/74 Pulse: 88   Resp: 20 SpO2: 100 % O2 Device: Nasal cannula      Orientation/Neuro: Disoriented to, Place , Time, Situation, Easily re-directed  Pain: The patient is not having any pain.   Tele: Sinus rhythm    IV medications:  IV solumedrol    Mobility: Assist of 1, Assist of 2, Gait belt, and Walker, pt up to chair x2 today, minimal assist needed, but had 2 staff per Facility rec of \"cares in pairs\"   Skin: Rashes: Pt has redness under armpits and breasts, asked MD to order Micatin powder.  LE francisco, +2 edema   Resp: LS dim, coarse.  Able to wean off bipap this am, tolerating 3L NC which is baseline  GI: WDL unknow last BM, pt did have small BM this am, passing gas with active BS.  Given scheduled senna.   : Pt was using purewick this morning, removed, 2 episodes of incontinence in brief.  Bladder scanned to check for retention, 259 ml, will continue to encourage PO intake and monitor output     Diet: Tolerating diet:  Poor, needs encouragement to eat and drink  Orders Placed This Encounter      Regular Diet Adult      Safety/Concerns:  Fall Risk and Rodolfo Risk  Aggression Color: Green    Plan: Pt tolerating being off bipap, sitter at bedside to redirect pulling at cords/ monitors. Transitioned to PO lasix today.  Plan to return to her LTC in next few days.     Continue to monitor.      Susana Torres RN                          "

## 2024-10-25 NOTE — H&P
[facility]  History and Physical   Hospitalist  Duong Nagel MD       Ca Yan MRN# [id number]   YOB: 1945 Age: 78 year old      Date of Admission:  10/24/2024         Assessment and Plan:   Ca Yan is a 78 year old female with PMH significant for COPD (3L O2 at baseline), HTN, MDD, dementia, chronic lymphedema (diastolic CHF), CKD stage III who was brought to ED by EMS for shortness of breath and lethargy, admitted inpatient 10/24/24.    History is limited from the patient due to her dementia. Unable to contact her guardian at this time. Per ED report patient resides at assisted living and apparently had been having increased workup breathing since morning and per nursing home staff she had been more lethargic than usual. She had hospitalization in February with similar presentation at which time there was concern for pneumonia pulmonary edema. She had to be intubated at that time and after subsequent goals of care discussion code status was switched to DNR/DNI.    Acute on chronic hypoxic and hypercapnia respiratory failure likely due to COPD exacerbation  likely acute metabolic encephalopathy secondary to above in the setting of dementia  -at baseline on 3 L nasal cannula oxygen  -unsure of baseline mentation with dementia but per report has been more lethargic  -chest x-ray noted some cardiomegaly with normal pulmonary vasculature; no acute infiltrates, did note scoliosis with volume loss right hemothorax, small right pleural effusion, sub segmental atelectasis, scaring right lower lung with no significant change from prior  -VBG noted with PCO2 126  -COVID/influenza/RSV negative  -CT head pending    -Will admit as inpatient to [facility] [place]  -was given 60 mg of IV Lasix and IV Solu-Medrol in ED and started on BiPAP  -will continue with BiPAP; repeat VBG in 4 hrs and respiratory therapy to trial off Bipap  -will start Solu-Medrol Q8 hours followed by PO  prednisone  -continue with PTA inhalers; duo nebs Q6 hours PRN  -clinically does not look significantly volume overloaded; will hold off on further IV diuresis  -will check UA  -will have delirium precautions in place; fall precautions; will need to discuss baseline mentation once Guardian available  -ED provider did discuss goals of care with the Guardian and Guardian affirmed DNR/DNI status    CKD stage III  mild hypernatremia  -baseline creatinine around 1.2 to 1.4  -on presentation sodium 147, creatinine 1.51  -will monitor BMP; she used to be on losartan-HCTZ but not currently taking    Chronic lymphedema  chronic diastolic CHF  likely type II non-STEMI  -PTA on Lasix 40 mg PO daily  -troponin slightly elevated at 24; EKG normal sinus rhythm with nothing acute  -was given 60 mg IV Lasix in ED  -clinically does not look significantly follow overloaded at this time and current presentation is likely COPD exacerbation  -will hold off on further IV diuresis and resume PTA PO Lasix 40 mg daily when off BiPAP and able to take PO    Anxiety/depression  -resume PTA buspirone, Seroquel and Zoloft    Chronic anemia  -hemoglobin 8.6, stable around baseline of 8 to 9    Clinically Significant Risk Factors Present on Admission         # Hypernatremia: Highest Na = 147 mmol/L in last 2 days, will monitor as appropriate               # Hypertension: Noted on problem list  # Chronic heart failure with preserved ejection fraction: heart failure noted on problem list and last echo with EF >50%    # Acute Hypercapnic Respiratory Failure: based on venous blood gas results.  Continue supplemental oxygen and ventilatory support as indicated.       # Anemia: based on hgb <11                    DVT prophylaxis: PCD boots  Code status: DNR/DNI (has a POLST)    Disposition:   Medically Ready for Discharge: Anticipated in 2-4 Days       Care plan discussed with ED provider           Primary Care Physician:   Turner Anthony  [phone]         Chief Complaint:     Shortness of breath, altered mentation    History is limited from the patient due to her dementia and no family member or guardian available at this time         History of Present Illness:     Ca Yan is a 78 year old female with PMH significant for COPD (3L O2 at baseline), HTN, MDD, dementia, chronic lymphedema (diastolic CHF), CKD stage III who was brought to ED by EMS for shortness of breath and lethargy, admitted inpatient 10/24/24.    History is limited from the patient due to her dementia. Unable to contact her guardian at this time. Per ED report patient resides at assisted living and apparently had been having increased workup breathing since morning and per nursing home staff she had been more lethargic than usual. She had hospitalization in February with similar presentation at which time there was concern for pneumonia pulmonary edema. She had to be intubated at that time and after subsequent goals of care discussion code status was switched to DNR/DNI.    In the ED she was seen by DENIA Chambers along with Dr. Humphrey  -at baseline on 3 L nasal cannula oxygen  -unsure of baseline mentation with dementia but per report has been more lethargic  -chest x-ray noted some cardiomegaly with normal pulmonary vasculature; no acute infiltrates, did note scoliosis with volume loss right hemothorax, small right pleural effusion, sub segmental atelectasis, scaring right lower lung with no significant change from prior  -VBG noted with PCO2 126  -COVID/influenza/RSV negative  -CT head pending    -was given 60 mg of IV Lasix and IV Solu-Medrol in ED and started on BiPAP and hospitalist was requested admission for further evaluation.           Past Medical History:     COPD (3L O2 at baseline)  HTN  MDD  Dementia  chronic lymphedema (diastolic CHF)  CKD stage III           Past Surgical History:     Past Surgical History:   Procedure Laterality Date    BACK SURGERY      spinal  fusion    COLONOSCOPY      LARYNGOSCOPY WITH MICROSCOPE N/A 4/30/2021    Procedure: FLEXIBLE LARYNGOSCOPY;  Surgeon: Domonique Roche MD;  Location: [facility] OR    LUMPECTOMY BREAST      ORTHOPEDIC SURGERY      Knee surgery left side              Home Medications:     Prior to Admission Medications   Prescriptions Last Dose Informant Patient Reported? Taking?   Emollient (CERAVE) CREA Unknown Nursing Home No Yes   Sig: Please apply twice daily to dry skin of body and feet   Lidocaine (LIDOCARE) 4 % Patch Unknown Nursing Home No No   Sig: Place 2 patches onto the skin daily as needed for moderate pain (4-6) (As needed for left chest wall pain) To prevent lidocaine toxicity, patient should be patch free for 12 hrs daily.   QUEtiapine (SEROQUEL) 300 MG tablet 10/23/2024 Bedtime Nursing Home No Yes   Sig: Take 0.5 tablets (150 mg) by mouth At Bedtime   Vitamin D3 (CHOLECALCIFEROL) 25 mcg (1000 units) tablet 10/24/2024 Nursing Home No Yes   Sig: Take 1 tablet (25 mcg) by mouth daily   acetaminophen (TYLENOL) 325 MG tablet Unknown  No Yes   Sig: Take 2 tablets (650 mg) by mouth every 4 hours as needed for mild pain or fever   albuterol (PROAIR HFA/PROVENTIL HFA/VENTOLIN HFA) 108 (90 Base) MCG/ACT inhaler Unknown  No Yes   Sig: Inhale 2 puffs into the lungs every 6 hours as needed for wheezing   ammonium lactate (AMLACTIN) 12 % external cream 10/24/2024  Yes Yes   Sig: Apply topically 2 times daily.   atorvastatin (LIPITOR) 40 MG tablet 10/23/2024 Bedtime Nursing Home No Yes   Sig: Take 1 tablet (40 mg) by mouth daily   benzonatate (TESSALON) 200 MG capsule Unknown Nursing Home Yes Yes   Sig: Take 200 mg by mouth 3 times daily as needed for cough   busPIRone (BUSPAR) 10 MG tablet 10/24/2024 Morning Nursing Home No Yes   Sig: Take 1 tablet (10 mg) by mouth 3 times daily   ferrous sulfate (FEROSUL) 325 (65 Fe) MG tablet 10/23/2024 Nursing Home No Yes   Sig: Take 1 tablet (325 mg) by mouth Every Mon, Wed, Fri Morning   folic acid  (FOLVITE) 400 MCG tablet 10/24/2024 Nursing Home No Yes   Sig: Take 1 tablet (400 mcg) by mouth daily   furosemide (LASIX) 40 MG tablet 10/24/2024  Yes Yes   Sig: Take 40 mg by mouth daily.   ipratropium-albuterol (COMBIVENT RESPIMAT)  MCG/ACT inhaler Unknown Nursing Home No Yes   Sig: Inhale 1 puff into the lungs 4 times daily as needed for shortness of breath / dyspnea or wheezing   latanoprost (XALATAN) 0.005 % ophthalmic solution 10/23/2024 Bedtime Nursing Home No Yes   Sig: Place 1 drop into both eyes daily   losartan-hydrochlorothiazide (HYZAAR) 100-25 MG tablet Not Taking  No No   Sig: TAKE 1 TABLET DAILY   Patient not taking: Reported on 10/24/2024   melatonin 3 MG tablet 10/23/2024 Bedtime Nursing Home Yes Yes   Sig: Take 3 mg by mouth At Bedtime   miconazole (MICATIN) 2 % external powder Not Taking Nursing Home No No   Sig: Apply topically 4 times daily   Patient not taking: Reported on 10/24/2024   polyethylene glycol (MIRALAX) 17 GM/Dose powder Unknown Nursing Home Yes Yes   Sig: Take 17 g by mouth 2 times daily as needed for constipation.   sennosides (SENOKOT) 8.6 MG tablet 10/24/2024 Morning Nursing Home Yes Yes   Sig: Take 2 tablets by mouth daily.   sertraline (ZOLOFT) 100 MG tablet 10/24/2024 Morning Nursing Home No Yes   Sig: Take 1 tablet (100 mg) by mouth daily   Patient taking differently: Take 50 mg by mouth daily.   tiotropium-olodaterol 2.5-2.5 MCG/ACT AERS 10/24/2024  Yes Yes   Sig: Inhale 1 puff into the lungs daily.   umeclidinium-vilanterol (ANORO ELLIPTA) 62.5-25 MCG/ACT oral inhaler Not Taking  No No   Sig: Inhale 1 puff into the lungs daily   Patient not taking: Reported on 10/24/2024   vitamin B complex with vitamin C (STRESS TAB) tablet Not Taking Nursing Home No No   Sig: Take 1 tablet by mouth daily   Patient not taking: Reported on 10/24/2024   vitamin C (ASCORBIC ACID) 250 MG tablet 10/23/2024  Yes Yes   Sig: Take 500 mg by mouth three times a week. Mon, Wed, Fri   zinc  oxide (TRIPLE PASTE) 12.8 % external ointment 10/24/2024  Yes Yes   Sig: Apply topically 2 times daily.      Facility-Administered Medications: None            Allergies:     Allergies   Allergen Reactions    Azithromycin Itching    Codeine Nausea and Vomiting    Hydrocodone Nausea and Vomiting    Metronidazole Nausea    Oxycodone Itching and Rash    Percocet [Oxycodone-Acetaminophen] Nausea and Vomiting    Pollen Extract      sneezing and runny nose.     Seasonal Allergies      sneezing and runny nose.     Vicodin [Hydrocodone-Acetaminophen] Nausea and Vomiting    Zolpidem      Amnestic behavior            Social History:   Ca Yan  reports that she quit smoking about 44 years ago. Her smoking use included cigarettes. She started smoking about 68 years ago. She has a 12.2 pack-year smoking history. She quit smokeless tobacco use about 44 years ago. She reports that she does not currently use alcohol. She reports that she does not use drugs.              Family History:   Ca Yan family history includes Alcohol/Drug in her father; Anxiety Disorder in her mother; Asthma in her mother; Breast Cancer in her mother; Cerebrovascular Disease (age of onset: 67) in her maternal grandmother; Diabetes in her mother; Hyperlipidemia in her mother; Mental Illness in her father; Obesity in her father; Other - See Comments in her maternal aunt; Other Cancer in her mother; Substance Abuse in her father.    Family history was reviewed by myself and not pertinent to current presentation.           Review of Systems:   A10 point Review of Systems was done and were negative other than noted in the HPI.             Physical Exam:   Blood pressure 139/66, pulse 76, temperature 98.3  F (36.8  C), temperature source Oral, resp. rate (!) 9, SpO2 98%, not currently breastfeeding.  0 lbs 0 oz        Constitutional: Alert, awake but mostly noncommunicative ; at times answer with one word answers ; on Bipap; lying  comfortably in bed in no apparent distress   HEENT: Pupils equal and reactive to light and accomodation, EOMI intact; neck supple no raised JVD or rigidity    Oral cavity: Moist mucosa   Cardiovascular: Normal s1 s2, regular rate and rhythm, no murmur   Lungs: B/l expiratory wheezes ; no significant crepitations   Abdomen: Soft, nt, nd, no guarding, rigidity or rebound; BS +   LE : B/l lymphedema   Musculoskeletal: moving all extremities equally   Neuro: No focal neurological deficits noted, difficult to assess given her dementia and altered mentation              Data:   All new lab and imaging data was reviewed in Epic.   Significant labs and imagings include:    CMP notable for sodium 147, BUN 32, creatinine 1.51  normal BNP  troponin T 24  V BG with pH 7.22, PCO2 126  CBC with WBC 12.4, hemoglobin 8.6  COVID/influenza/RSV negative  EKG sinus rhythm, no acute ST-T changes  CXR:  IMPRESSION: Cardiomegaly. Pulmonary vascularity normal. Scoliosis with diffuse volume loss right hemithorax, unchanged. Small right pleural effusion and subsegmental atelectasis/scarring right lower lung field, minimally unchanged. Subsegmental atelectasis left base.             Duong Nagel MD  Hospitalist

## 2024-10-25 NOTE — PROVIDER NOTIFICATION
MD Notification    Notified Person: MD    Notified Person Name: Dr Cook    Notification Date/Time: 10-25-24 0775    Notification Interaction: vocera message    Purpose of Notification: critical lab, venous bicarb 50    Orders Received:    Comments:

## 2024-10-25 NOTE — PROGRESS NOTES
Pt is on Continuous AVAPS and tolerating well. Skin intact, mask alternate between full face and under the nose. Mepilex applied over the bridge of the nose. Will continue to follow.  10/25/2024  Jaycee Banks, RT

## 2024-10-25 NOTE — PROGRESS NOTES
Pt was taken off of V60 with AVAPS settings around 0800 based off of VBG results.  Venous Blood Gas  Recent Labs   Lab 10/25/24  0713 10/25/24  0229 10/24/24  2332 10/24/24  2118   PHV 7.46* 7.35 7.19* 7.16*   PCO2V 70* 92* 128* >130*   PO2V 31 21* 22* 69*   HCO3V 50* 50* 49* 51*   ROBBY 23.0* 21.2* 16.8* 18.6*   O2PER 45 45 30 100     Pt is currently on a 3L NC with SpO2 in the upper 90's. V60 on SB.  Will cont to monitor.  10/25/2024  Luann Al, RT

## 2024-10-25 NOTE — PROGRESS NOTES
Somnolent upon transfer from ED, now awake and alert. Pulling at tubes, redirection with 1:1 sitter. Q2 hour turns. Purewick. Monitoring VBGs with BiPAP adjustments. AVAPS 45%.

## 2024-10-26 LAB
ANION GAP SERPL CALCULATED.3IONS-SCNC: 1 MMOL/L (ref 7–15)
BACTERIA UR CULT: NORMAL
BASE EXCESS BLDV CALC-SCNC: 21.2 MMOL/L (ref -3–3)
BUN SERPL-MCNC: 49.6 MG/DL (ref 8–23)
CALCIUM SERPL-MCNC: 9 MG/DL (ref 8.8–10.4)
CHLORIDE SERPL-SCNC: 95 MMOL/L (ref 98–107)
CREAT SERPL-MCNC: 1.66 MG/DL (ref 0.51–0.95)
EGFRCR SERPLBLD CKD-EPI 2021: 31 ML/MIN/1.73M2
ERYTHROCYTE [DISTWIDTH] IN BLOOD BY AUTOMATED COUNT: 14.4 % (ref 10–15)
GLUCOSE SERPL-MCNC: 123 MG/DL (ref 70–99)
HCO3 BLDV-SCNC: 50 MMOL/L (ref 21–28)
HCO3 SERPL-SCNC: 47 MMOL/L (ref 22–29)
HCT VFR BLD AUTO: 26.5 % (ref 35–47)
HGB BLD-MCNC: 8.1 G/DL (ref 11.7–15.7)
MCH RBC QN AUTO: 32 PG (ref 26.5–33)
MCHC RBC AUTO-ENTMCNC: 30.6 G/DL (ref 31.5–36.5)
MCV RBC AUTO: 105 FL (ref 78–100)
O2/TOTAL GAS SETTING VFR VENT: 4 %
OXYHGB MFR BLDV: 96 % (ref 70–75)
PCO2 BLDV: 92 MM HG (ref 40–50)
PH BLDV: 7.34 [PH] (ref 7.32–7.43)
PLATELET # BLD AUTO: 176 10E3/UL (ref 150–450)
PO2 BLDV: 89 MM HG (ref 25–47)
POTASSIUM SERPL-SCNC: 4.8 MMOL/L (ref 3.4–5.3)
RBC # BLD AUTO: 2.53 10E6/UL (ref 3.8–5.2)
SAO2 % BLDV: 98.1 % (ref 70–75)
SODIUM SERPL-SCNC: 143 MMOL/L (ref 135–145)
WBC # BLD AUTO: 8.7 10E3/UL (ref 4–11)

## 2024-10-26 PROCEDURE — 250N000013 HC RX MED GY IP 250 OP 250 PS 637: Performed by: INTERNAL MEDICINE

## 2024-10-26 PROCEDURE — 99232 SBSQ HOSP IP/OBS MODERATE 35: CPT | Performed by: INTERNAL MEDICINE

## 2024-10-26 PROCEDURE — 82947 ASSAY GLUCOSE BLOOD QUANT: CPT | Performed by: INTERNAL MEDICINE

## 2024-10-26 PROCEDURE — 82805 BLOOD GASES W/O2 SATURATION: CPT | Performed by: INTERNAL MEDICINE

## 2024-10-26 PROCEDURE — 250N000012 HC RX MED GY IP 250 OP 636 PS 637: Performed by: HOSPITALIST

## 2024-10-26 PROCEDURE — 80048 BASIC METABOLIC PNL TOTAL CA: CPT | Performed by: INTERNAL MEDICINE

## 2024-10-26 PROCEDURE — 210N000002 HC R&B HEART CARE

## 2024-10-26 PROCEDURE — 250N000011 HC RX IP 250 OP 636: Performed by: INTERNAL MEDICINE

## 2024-10-26 PROCEDURE — 36415 COLL VENOUS BLD VENIPUNCTURE: CPT | Performed by: INTERNAL MEDICINE

## 2024-10-26 PROCEDURE — 250N000013 HC RX MED GY IP 250 OP 250 PS 637: Performed by: HOSPITALIST

## 2024-10-26 PROCEDURE — 250N000011 HC RX IP 250 OP 636: Performed by: HOSPITALIST

## 2024-10-26 PROCEDURE — 85018 HEMOGLOBIN: CPT | Performed by: INTERNAL MEDICINE

## 2024-10-26 RX ORDER — CEFTRIAXONE 1 G/1
1 INJECTION, POWDER, FOR SOLUTION INTRAMUSCULAR; INTRAVENOUS EVERY 24 HOURS
Status: DISCONTINUED | OUTPATIENT
Start: 2024-10-26 | End: 2024-10-27

## 2024-10-26 RX ADMIN — UMECLIDINIUM BROMIDE AND VILANTEROL TRIFENATATE 1 PUFF: 62.5; 25 POWDER RESPIRATORY (INHALATION) at 09:49

## 2024-10-26 RX ADMIN — MICONAZOLE NITRATE: 2 POWDER TOPICAL at 20:11

## 2024-10-26 RX ADMIN — SENNOSIDES 2 TABLET: 8.6 TABLET, FILM COATED ORAL at 09:49

## 2024-10-26 RX ADMIN — MICONAZOLE NITRATE: 2 POWDER TOPICAL at 15:39

## 2024-10-26 RX ADMIN — LATANOPROST 1 DROP: 50 SOLUTION OPHTHALMIC at 21:06

## 2024-10-26 RX ADMIN — FUROSEMIDE 40 MG: 40 TABLET ORAL at 09:49

## 2024-10-26 RX ADMIN — BUSPIRONE HYDROCHLORIDE 10 MG: 10 TABLET ORAL at 21:08

## 2024-10-26 RX ADMIN — SERTRALINE HYDROCHLORIDE 50 MG: 50 TABLET ORAL at 09:49

## 2024-10-26 RX ADMIN — CEFTRIAXONE SODIUM 1 G: 1 INJECTION, POWDER, FOR SOLUTION INTRAMUSCULAR; INTRAVENOUS at 13:05

## 2024-10-26 RX ADMIN — METHYLPREDNISOLONE SODIUM SUCCINATE 40 MG: 40 INJECTION, POWDER, FOR SOLUTION INTRAMUSCULAR; INTRAVENOUS at 06:54

## 2024-10-26 RX ADMIN — PREDNISONE 40 MG: 20 TABLET ORAL at 15:39

## 2024-10-26 RX ADMIN — ATORVASTATIN CALCIUM 40 MG: 40 TABLET, FILM COATED ORAL at 21:05

## 2024-10-26 RX ADMIN — QUETIAPINE FUMARATE 150 MG: 100 TABLET ORAL at 21:06

## 2024-10-26 RX ADMIN — BUSPIRONE HYDROCHLORIDE 10 MG: 10 TABLET ORAL at 15:40

## 2024-10-26 RX ADMIN — BUSPIRONE HYDROCHLORIDE 10 MG: 10 TABLET ORAL at 09:49

## 2024-10-26 NOTE — PLAN OF CARE
Care plan note:      Recent Vitals:  Temp: 98.3  F (36.8  C) Temp src: Oral BP: (!) 143/86 Pulse: 80   Resp: 18 SpO2: 96 % O2 Device: Nasal cannula      Orientation/Neuro: Forgetful, Disoriented to, Place , Time  Pain: The patient is not having any pain.   Tele: Sinus rhythm    IV medications:  IV antibiotic   Mobility: Assist of 1, Assist of 2, Gait belt, and Walker   Skin:  Dry flaky skin, redness under breasts, arm pits, LE francisco   Resp: LS dim, on 2L NC, occ SAM  GI: WDL and last BM 10-25-24  : Pt incotinent x3 today, able to go once when up to BSC, urine odorous, prev UA/UC sent, MD started IV rocephin     Diet: Tolerating diet:  Needs encouragement to eat  Orders Placed This Encounter      Regular Diet Adult      Safety/Concerns:  Fall Risk and Rodolfo Risk  Aggression Color: Green    Plan: Start IV abx today, pt up to chair x2 today, more tired and fatigued this afternoon.  Needs encouragement to eat.  Likely discharge back to LTC tomorrow.   Continue to monitor.      Susaan Torres RN

## 2024-10-26 NOTE — PROGRESS NOTES
[facility]    Medicine Progress Note - Hospitalist Service    Date of Admission:  10/24/2024    Assessment & Plan      Ca Yan is a 78 year old female with PMH significant for COPD (3L O2 at baseline), HTN, MDD, dementia, chronic lymphedema (diastolic CHF), CKD stage III who was brought to ED by EMS for shortness of breath and lethargy, admitted inpatient 10/24/24.     History is limited from the patient due to her dementia. Unable to contact her guardian at this time. Per ED report patient resides at assisted living and apparently had been having increased workup breathing since morning and per nursing home staff she had been more lethargic than usual. She had hospitalization in February with similar presentation at which time there was concern for pneumonia pulmonary edema. She had to be intubated at that time and after subsequent goals of care discussion code status was switched to DNR/DNI.     Acute on chronic hypoxic and hypercapnia respiratory failure likely due to COPD exacerbation  likely acute metabolic encephalopathy secondary to above in the setting of dementia  -at baseline on 3 L nasal cannula oxygen  -chest x-ray noted some cardiomegaly with normal pulmonary vasculature; no acute infiltrates, did note scoliosis with volume loss right hemothorax, small right pleural effusion, sub segmental atelectasis, scaring right lower lung with no significant change from prior  -VBG noted with PCO2 126, manage on BiPAP overnight with improvement in CO2 10/25 morning.  More awake so we will place her on nasal cannula and look for response.  -COVID/influenza/RSV negative  -CT head : No evidence for acute intracranial process  -was given 60 mg of IV Lasix and IV Solu-Medrol in ED and started on BiPAP  -titrated off Bipap on NC  -completed  Solu-Medrol Q8 hours   - PO prednisone  -continue with PTA inhalers; duo nebs Q6 hours PRN  -clinically does not look significantly volume  overloaded; will hold off on further IV diuresis  -UA leukocyte esterase large but nitrate negative.  No fever, procalcitonin 0.1.  Ucx pending  -started IV ceftriaxone  -will have delirium precautions in place; fall precautions  -per ED provider did discuss goals of care with the Guardian and Guardian affirmed DNR/DNI status .  Will try to get more collateral history.     CKD stage III  mild hypernatremia  -baseline creatinine around 1.2 to 1.4  -on presentation sodium 147, creatinine 1.51  -will monitor BMP; she used to be on losartan-HCTZ but not currently taking     Chronic lymphedema  chronic diastolic CHF  likely type II non-STEMI  -PTA on Lasix 40 mg PO daily  -troponin slightly elevated at 24; EKG normal sinus rhythm with nothing acute  -was given 60 mg IV Lasix in ED  -hold off on further IV diuresis and resume PTA PO Lasix 40 mg daily     Anxiety/depression  -resume PTA buspirone, Seroquel and Zoloft     Chronic anemia  -hemoglobin 8.6, stable around baseline of 8 to 9           Diet: Regular Diet Adult    DVT Prophylaxis: Pneumatic Compression Devices  Lima Catheter: Not present  Lines: None     Cardiac Monitoring: None  Code Status: No CPR- Do NOT Intubate      Clinically Significant Risk Factors        # Hyperkalemia: Highest K = 5.5 mmol/L in last 2 days, will monitor as appropriate  # Hypernatremia: Highest Na = 147 mmol/L in last 2 days, will monitor as appropriate  # Hypochloremia: Lowest Cl = 95 mmol/L in last 2 days, will monitor as appropriate          # Hypertension: Noted on problem list  # Acute heart failure with preserved ejection fraction: heart failure noted on problem list, last echo with EF >50%, and receiving IV diuretics   # Acute Hypercapnic Respiratory Failure: based on venous blood gas results.  Continue supplemental oxygen and ventilatory support as indicated.             # Financial/Environmental Concerns: none         Disposition Plan     Medically Ready for Discharge:  Anticipated Tomorrow             Adam Rollins MD  Hospitalist Service  [facility]  Securely message with [employer] (more info)  Text page via [employer] Paging/Directory   ______________________________________________________________________    Interval History   Pt is sitting unit(s) in chair,more awake and alert, no overt SOB/wheezing.eating breakfast    Physical Exam   Vital Signs: Temp: 97.7  F (36.5  C) Temp src: Axillary BP: 135/71 Pulse: 76   Resp: 20 SpO2: 98 % O2 Device: Nasal cannula Oxygen Delivery: 2 LPM  Weight: 165 lbs 5.52 oz    Constitutional: Obese Awake, alert, cooperative, no apparent distress  Respiratory: Clear to auscultation bilaterally, no crackles or wheezing  Cardiovascular: Regular rate and rhythm, normal S1 and S2, and no murmur noted  GI: Normal bowel sounds, soft, non-distended, non-tender  Skin/Integumen: No rashes, no cyanosis, no edema  Other: oriented to self,no apparent focal deficits

## 2024-10-26 NOTE — PLAN OF CARE
Goal Outcome Evaluation:      Plan of Care Reviewed With: patient    Overall Patient Progress: improvingOverall Patient Progress: improving     Heart Failure Care Map  GOALS TO BE MET BEFORE DISCHARGE:    1. Decrease congestion and/or edema with diuretic therapy to achieve near optimal volume status.     Dyspnea improved: Yes, satisfactory for discharge.   Edema improved: No, further care required to meet this goal. Please explain chronic lymphedema        Last 24 hour I/O:   Intake/Output Summary (Last 24 hours) at 10/25/2024 2204  Last data filed at 10/25/2024 2148  Gross per 24 hour   Intake 1080 ml   Output 1350 ml   Net -270 ml           Net I/O and Weights since admission:   09/25 2300 - 10/25 2259  In: 1080 [P.O.:1080]  Out: 1350 [Urine:1350]  Net: -270     Vitals:    10/25/24 0610   Weight: 75 kg (165 lb 5.5 oz)       2.  O2 sats > 90% on room air, or at prior home O2 therapy level.      Able to wean O2 this shift to keep sats above 90%?: Yes, satisfactory for discharge.   Does patient use Home O2? Yes-  3L NC at baseline          Current oxygenation status:   SpO2: 95 %     O2 Device: Nasal cannula, Oxygen Delivery: 3 LPM    3.  Tolerates ambulation and mobility near baseline.     Ambulation: Yes, satisfactory for discharge.   Times patient ambulated with staff this shift: 1    Please review the Heart Failure Care Map for additional HF goal outcomes.    Ya Farley RN  10/25/2024     Neuro:oriented to self  CV/Rhythm:SR  Resp/02:3 L NC at baseline home oxygen  GI/Diet:regular diet  :incont, bladder scanned  Skin/Incisions/Sites:pale, francisco BLE, rash under breasts/arms  Pulses/CMS:intact  Edema:BLE  Activity/Falls Risk:fall risk, up with 2 and walker  Lines/Drains/IVs:PIV x 2  Labs/BGM:-  Test/Procedures:-  VS/Pain:stable, no pain  DC Plan:LTC per social work  Other:-

## 2024-10-27 PROCEDURE — 120N000001 HC R&B MED SURG/OB

## 2024-10-27 PROCEDURE — 250N000012 HC RX MED GY IP 250 OP 636 PS 637: Performed by: HOSPITALIST

## 2024-10-27 PROCEDURE — 99232 SBSQ HOSP IP/OBS MODERATE 35: CPT | Performed by: INTERNAL MEDICINE

## 2024-10-27 PROCEDURE — 250N000011 HC RX IP 250 OP 636: Performed by: HOSPITALIST

## 2024-10-27 PROCEDURE — 250N000013 HC RX MED GY IP 250 OP 250 PS 637: Performed by: HOSPITALIST

## 2024-10-27 RX ORDER — SERTRALINE HYDROCHLORIDE 100 MG/1
50 TABLET, FILM COATED ORAL DAILY
Status: SHIPPED
Start: 2024-10-27

## 2024-10-27 RX ADMIN — PREDNISONE 40 MG: 20 TABLET ORAL at 09:36

## 2024-10-27 RX ADMIN — FUROSEMIDE 40 MG: 40 TABLET ORAL at 09:36

## 2024-10-27 RX ADMIN — MICONAZOLE NITRATE: 2 POWDER TOPICAL at 22:06

## 2024-10-27 RX ADMIN — BUSPIRONE HYDROCHLORIDE 10 MG: 10 TABLET ORAL at 09:47

## 2024-10-27 RX ADMIN — SENNOSIDES 1 TABLET: 8.6 TABLET, FILM COATED ORAL at 09:47

## 2024-10-27 RX ADMIN — ACETAMINOPHEN 650 MG: 325 TABLET, FILM COATED ORAL at 15:55

## 2024-10-27 RX ADMIN — MICONAZOLE NITRATE: 2 POWDER TOPICAL at 09:48

## 2024-10-27 RX ADMIN — BUSPIRONE HYDROCHLORIDE 10 MG: 10 TABLET ORAL at 15:52

## 2024-10-27 RX ADMIN — SERTRALINE HYDROCHLORIDE 50 MG: 50 TABLET ORAL at 09:47

## 2024-10-27 RX ADMIN — ONDANSETRON 4 MG: 2 INJECTION INTRAMUSCULAR; INTRAVENOUS at 15:55

## 2024-10-27 NOTE — PLAN OF CARE
[id number]. Pt here with UTI, lymphedema, weaned off of BiPap yesterday. A&Ox1, confused. VSS on baseline O2 2-3 L. Tele SR. Tolerating diet. Incontinent urine, purewick placed. Up with Ax2 gb+w. Denies pain. Pt scoring green on the Aggression Stop Light Tool. Discharge to LTC tomorrow.

## 2024-10-27 NOTE — PROGRESS NOTES
Vitals: WNL  Lungs: Diminished LLQ. 3L Oxygen at baseline per report. Denies SOB. Pt. Was not able to use inhaler appropriately, did not take a deep breath upon command.   CV: WNL  GI: Obese,rounded. Bowel sounds active. Encouragement with eating and drinking. Poor appetite. Meals ordered for 2pm and 6pm today and breakfast at 8am tomorrow.   Uro: Incontinent/Pure wick   CMS: WNL. +2/3 twila LE edema. EDITH stockings placed, elevated.   Neuro: Generalized weakness.   Skin: Shiny twlia LE with petichae. Pink rash to abd/jesús/breast folds - powder applied. Repositioning during shift.   Psych: Calm and cooperative.   MSK: Ax-2 with walker and belt.   Pain: denies, does  moan with touch to twila LE. Appears tender to touch.   Labs: crt 1.66- encouraging PO fluids. Hgb 8.1- monitoring. No s/sx of bleeding noted.   Tests/Procedures: na  Other:  Plan for discharge back to nursing home tomorrow. Transferring to  for continued cares. Report given.

## 2024-10-27 NOTE — PLAN OF CARE
Pt alert to self only. Assist x1/2 w/ walker. Incontinent, voiding in the purewick. Tolerating a regular diet. On 2L MC, PIV SL. Tele- SR. Denies any pain or SOB. Plan of care ongoing.

## 2024-10-27 NOTE — PROGRESS NOTES
PRIMARY Concern: Acute on chronic hypoxia, respiratory failure   SAFETY RISK Concerns (fall risk, behaviors, etc.): Fall       Aggression Tool Color: Green   Isolation/Type: NA   Tests/Procedures for NEXT shift: None   Consults? (Pending/following, signed-off?) SW following    Where is patient from? (Home, TCU, etc.): custodial   Other Important info for NEXT shift: Meals ordered for 6pm and 8am tomorrow   Anticipated DC date & active delays: Tomorrow at 10:30-11:30 am back to custodial   _____________________________________________________________________________  SUMMARY NOTE:  Orientation/Cognitive: AOx3  Observation Goals (Met/ Not Met): Inpatient   Mobility Level/Assist Equipment: A2/GB/W   Antibiotics & Plan (IV/po, length of tx left): NA   Pain Management: Denies   Tele/VS/O2: VSS on 2-3 L NC (baseline)  ABNL Lab/BG: See results   Diet: Regular   Bowel/Bladder: Incontinent, purewick in place  Skin Concerns: 2-3+ BLE edema; rash to breast folds   Drains/Devices: PIV SL; PCD's in place   Patient Stated Goal for Today: Rest

## 2024-10-27 NOTE — PROGRESS NOTES
Care Management Follow Up    Length of Stay (days): 3    Expected Discharge Date: 10/26/2024     Concerns to be Addressed: discharge planning     Patient plan of care discussed at interdisciplinary rounds: Yes    Anticipated Discharge Disposition: Long Term Care  Disposition Comments: return to LTC           Anticipated Discharge Services: None  Anticipated Discharge DME: Oxygen    Patient/family educated on Medicare website which has current facility and service quality ratings: no  Education Provided on the Discharge Plan: Yes  Patient/Family in Agreement with the Plan: yes    Referrals Placed by CM/SW: Post Acute Facilities  Private pay costs discussed: Not applicable    Discussed  Partnership in Safe Discharge Planning  document with patient/family: No     Handoff Completed: No, handoff not indicated or clinically appropriate    Additional Information:  Call placed to [facility] to update them that patient is likely ready to discharge today.  Spoke with Harris who states that  he has to call  his DON Tia to see if this is possible and he will return the call.  Received a call back from [facility].  They  state that they spoke with the DON and due to staffing they are unable to accept patient today.  Attempted to push back stating that patient lives there and she is not a new admit, but Harris states that they only have one nurse on today.  Call placed to [facility] to arrange for stretcher transport for between 10:35-11:20 tomorrow as patient has dementia, she is on oxygen that she is unable to manage herself, and she is not safe to be alone in the back to the [facility].  Completed the PCS in Epic.      Next Steps: Arrange for the discharge on Monday.  Update the guardian.        EPI Griffin, [facility]    [phone]  [facility]

## 2024-10-27 NOTE — PROGRESS NOTES
[facility]    Medicine Progress Note - Hospitalist Service    Date of Admission:  10/24/2024    Assessment & Plan   Ca Yan is a 78 year old female with PMH significant for COPD (3L O2 at baseline), HTN, MDD, dementia, chronic lymphedema (diastolic CHF), CKD stage III who was brought to ED by EMS for shortness of breath and lethargy, admitted inpatient 10/24/24.     History is limited from the patient due to her dementia. Unable to contact her guardian at this time. Per ED report patient resides at assisted living and apparently had been having increased workup breathing since morning and per nursing home staff she had been more lethargic than usual. She had hospitalization in February with similar presentation at which time there was concern for pneumonia pulmonary edema. She had to be intubated at that time and after subsequent goals of care discussion code status was switched to DNR/DNI.     Acute on chronic hypoxic and hypercapnia respiratory failure likely due to COPD exacerbation  likely acute metabolic encephalopathy secondary to above in the setting of dementia  -at baseline on 3 L nasal cannula oxygen  -chest x-ray noted some cardiomegaly with normal pulmonary vasculature; no acute infiltrates, did note scoliosis with volume loss right hemothorax, small right pleural effusion, sub segmental atelectasis, scaring right lower lung with no significant change from prior  -VBG noted with PCO2 126, manage on BiPAP overnight with improvement in CO2 10/25 morning.  More awake so we will place her on nasal cannula and look for response.  -COVID/influenza/RSV negative  -CT head : No evidence for acute intracranial process  -was given 60 mg of IV Lasix and IV Solu-Medrol in ED and started on BiPAP  -titrated off Bipap currently on NC  -completed  Solu-Medrol Q8 hours   - PO prednisone 40 mg to be continued at discharge  -continue with PTA inhalers; duo nebs Q6 hours PRN  -clinically  does not look significantly volume overloaded; will hold off on further IV diuresis  -UA leukocyte esterase large but nitrate negative.  No fever, procalcitonin 0.1.  Ucx : mixed growth  -discontinue IV ceftriaxone  -will have delirium precautions in place; fall precautions  -per ED provider did discuss goals of care with the Guardian and Guardian affirmed DNR/DNI status .       CKD stage III  mild hypernatremia  -baseline creatinine around 1.2 to 1.4  -on presentation sodium 147, creatinine 1.51  -will monitor BMP; she used to be on losartan-HCTZ but not currently taking     Chronic lymphedema  chronic diastolic CHF  likely type II non-STEMI  -PTA on Lasix 40 mg PO daily  -troponin slightly elevated at 24; EKG normal sinus rhythm with nothing acute  -was given 60 mg IV Lasix in ED  -hold off on further IV diuresis and resume PTA PO Lasix 40 mg daily     Anxiety/depression  -resume PTA buspirone, Seroquel and Zoloft     Chronic anemia  -hemoglobin 8.6, stable around baseline of 8 to 9             Diet: Regular Diet Adult  Room Service    DVT Prophylaxis: Pneumatic Compression Devices  Lima Catheter: Not present  Lines: None     Cardiac Monitoring: None  Code Status: No CPR- Do NOT Intubate      Clinically Significant Risk Factors          # Hypochloremia: Lowest Cl = 95 mmol/L in last 2 days, will monitor as appropriate          # Hypertension: Noted on problem list  # Acute heart failure with preserved ejection fraction: heart failure noted on problem list, last echo with EF >50%, and receiving IV diuretics   # Acute Hypercapnic Respiratory Failure: based on venous blood gas results.  Continue supplemental oxygen and ventilatory support as indicated.             # Financial/Environmental Concerns: none         Disposition Plan     Medically Ready for Discharge: Ready Now             Adam Rollins MD  Hospitalist Service  [facility]  Securely message with [employer] (more info)  Text page  via [facility] Paging/Directory   ______________________________________________________________________    Interval History   Pt is sitting up in bed,breathing seems to be stable,continues ot require oxygen,denies any new symptoms of N/V,continues to have cough with sputum slight    Physical Exam   Vital Signs: Temp: 97.8  F (36.6  C) Temp src: Oral BP: (!) 127/96 Pulse: 80   Resp: 18 SpO2: 91 % O2 Device: Nasal cannula Oxygen Delivery: 3 LPM  Weight: 168 lbs 3.38 oz    Constitutional: Elderly female,Awake, alert, cooperative, mild apparent distress  Respiratory: Clear to auscultation bilaterally, no crackles or wheezing  Cardiovascular: Regular rate and rhythm, normal S1 and S2, and no murmur noted  GI: Normal bowel sounds, soft, non-distended, non-tender  Skin/Integumen: No rashes, no cyanosis, no edema  Other: AxO x3,no apparent focal deficits

## 2024-10-28 ENCOUNTER — APPOINTMENT (OUTPATIENT)
Dept: CT IMAGING | Facility: CLINIC | Age: 79
DRG: 291 | End: 2024-10-28
Attending: NURSE PRACTITIONER
Payer: COMMERCIAL

## 2024-10-28 ENCOUNTER — DOCUMENTATION ONLY (OUTPATIENT)
Dept: OTHER | Facility: CLINIC | Age: 79
End: 2024-10-28
Payer: COMMERCIAL

## 2024-10-28 LAB
ALBUMIN SERPL BCG-MCNC: 3.6 G/DL (ref 3.5–5.2)
ALLEN'S TEST: YES
ALP SERPL-CCNC: 77 U/L (ref 40–150)
ALT SERPL W P-5'-P-CCNC: 8 U/L (ref 0–50)
ANION GAP SERPL CALCULATED.3IONS-SCNC: 1 MMOL/L (ref 7–15)
AST SERPL W P-5'-P-CCNC: 16 U/L (ref 0–45)
ATRIAL RATE - MUSE: 75 BPM
BASE EXCESS BLDA CALC-SCNC: 16.7 MMOL/L (ref -3–3)
BASOPHILS # BLD AUTO: 0 10E3/UL (ref 0–0.2)
BASOPHILS NFR BLD AUTO: 0 %
BILIRUB SERPL-MCNC: <0.2 MG/DL
BUN SERPL-MCNC: 71.7 MG/DL (ref 8–23)
CALCIUM SERPL-MCNC: 9.1 MG/DL (ref 8.8–10.4)
CHLORIDE SERPL-SCNC: 98 MMOL/L (ref 98–107)
COHGB MFR BLD: 98.9 % (ref 95–96)
CREAT SERPL-MCNC: 1.94 MG/DL (ref 0.51–0.95)
DIASTOLIC BLOOD PRESSURE - MUSE: NORMAL MMHG
EGFRCR SERPLBLD CKD-EPI 2021: 26 ML/MIN/1.73M2
EOSINOPHIL # BLD AUTO: 0 10E3/UL (ref 0–0.7)
EOSINOPHIL NFR BLD AUTO: 0 %
ERYTHROCYTE [DISTWIDTH] IN BLOOD BY AUTOMATED COUNT: 13.9 % (ref 10–15)
GLUCOSE BLDC GLUCOMTR-MCNC: 109 MG/DL (ref 70–99)
GLUCOSE SERPL-MCNC: 110 MG/DL (ref 70–99)
HCO3 BLD-SCNC: 50 MMOL/L (ref 21–28)
HCO3 SERPL-SCNC: 48 MMOL/L (ref 22–29)
HCT VFR BLD AUTO: 35.3 % (ref 35–47)
HGB BLD-MCNC: 9.5 G/DL (ref 11.7–15.7)
HOLD SPECIMEN: NORMAL
HOLD SPECIMEN: NORMAL
IMM GRANULOCYTES # BLD: 0.3 10E3/UL
IMM GRANULOCYTES NFR BLD: 3 %
INTERPRETATION ECG - MUSE: NORMAL
LACTATE SERPL-SCNC: 0.4 MMOL/L (ref 0.7–2)
LYMPHOCYTES # BLD AUTO: 0.9 10E3/UL (ref 0.8–5.3)
LYMPHOCYTES NFR BLD AUTO: 9 %
MCH RBC QN AUTO: 30.5 PG (ref 26.5–33)
MCHC RBC AUTO-ENTMCNC: 26.9 G/DL (ref 31.5–36.5)
MCV RBC AUTO: 114 FL (ref 78–100)
MONOCYTES # BLD AUTO: 1.1 10E3/UL (ref 0–1.3)
MONOCYTES NFR BLD AUTO: 11 %
NEUTROPHILS # BLD AUTO: 7.9 10E3/UL (ref 1.6–8.3)
NEUTROPHILS NFR BLD AUTO: 77 %
NRBC # BLD AUTO: 0 10E3/UL
NRBC BLD AUTO-RTO: 0 /100
O2/TOTAL GAS SETTING VFR VENT: 30 %
P AXIS - MUSE: 71 DEGREES
PCO2 BLD: >130 MM HG (ref 35–45)
PH BLD: 7.13 [PH] (ref 7.35–7.45)
PLATELET # BLD AUTO: 222 10E3/UL (ref 150–450)
PO2 BLD: 134 MM HG (ref 80–105)
POTASSIUM SERPL-SCNC: 5.6 MMOL/L (ref 3.4–5.3)
PR INTERVAL - MUSE: 148 MS
PROT SERPL-MCNC: 7.6 G/DL (ref 6.4–8.3)
QRS DURATION - MUSE: 94 MS
QT - MUSE: 376 MS
QTC - MUSE: 419 MS
R AXIS - MUSE: 69 DEGREES
RBC # BLD AUTO: 3.11 10E6/UL (ref 3.8–5.2)
SAO2 % BLDA: 97 % (ref 92–100)
SODIUM SERPL-SCNC: 147 MMOL/L (ref 135–145)
SYSTOLIC BLOOD PRESSURE - MUSE: NORMAL MMHG
T AXIS - MUSE: 71 DEGREES
TROPONIN T SERPL HS-MCNC: 25 NG/L
TROPONIN T SERPL HS-MCNC: 26 NG/L
VENTRICULAR RATE- MUSE: 75 BPM
WBC # BLD AUTO: 10.2 10E3/UL (ref 4–11)

## 2024-10-28 PROCEDURE — 85004 AUTOMATED DIFF WBC COUNT: CPT | Performed by: NURSE PRACTITIONER

## 2024-10-28 PROCEDURE — 99291 CRITICAL CARE FIRST HOUR: CPT | Performed by: NURSE PRACTITIONER

## 2024-10-28 PROCEDURE — 83605 ASSAY OF LACTIC ACID: CPT | Performed by: NURSE PRACTITIONER

## 2024-10-28 PROCEDURE — 99233 SBSQ HOSP IP/OBS HIGH 50: CPT | Performed by: INTERNAL MEDICINE

## 2024-10-28 PROCEDURE — 93005 ELECTROCARDIOGRAM TRACING: CPT

## 2024-10-28 PROCEDURE — 84484 ASSAY OF TROPONIN QUANT: CPT | Performed by: NURSE PRACTITIONER

## 2024-10-28 PROCEDURE — 36600 WITHDRAWAL OF ARTERIAL BLOOD: CPT

## 2024-10-28 PROCEDURE — 36415 COLL VENOUS BLD VENIPUNCTURE: CPT | Performed by: NURSE PRACTITIONER

## 2024-10-28 PROCEDURE — 70450 CT HEAD/BRAIN W/O DYE: CPT

## 2024-10-28 PROCEDURE — 120N000001 HC R&B MED SURG/OB

## 2024-10-28 PROCEDURE — 82805 BLOOD GASES W/O2 SATURATION: CPT | Performed by: HOSPITALIST

## 2024-10-28 PROCEDURE — 84155 ASSAY OF PROTEIN SERUM: CPT | Performed by: NURSE PRACTITIONER

## 2024-10-28 PROCEDURE — 250N000013 HC RX MED GY IP 250 OP 250 PS 637: Performed by: HOSPITALIST

## 2024-10-28 PROCEDURE — 999N000157 HC STATISTIC RCP TIME EA 10 MIN

## 2024-10-28 RX ADMIN — LATANOPROST 1 DROP: 50 SOLUTION OPHTHALMIC at 21:20

## 2024-10-28 RX ADMIN — BUSPIRONE HYDROCHLORIDE 10 MG: 10 TABLET ORAL at 21:20

## 2024-10-28 RX ADMIN — MICONAZOLE NITRATE: 2 POWDER TOPICAL at 21:19

## 2024-10-28 RX ADMIN — ATORVASTATIN CALCIUM 40 MG: 40 TABLET, FILM COATED ORAL at 21:20

## 2024-10-28 RX ADMIN — BUSPIRONE HYDROCHLORIDE 10 MG: 10 TABLET ORAL at 17:37

## 2024-10-28 RX ADMIN — MICONAZOLE NITRATE: 2 POWDER TOPICAL at 11:16

## 2024-10-28 NOTE — PROGRESS NOTES
Care Management Follow Up    Length of Stay (days): 4    Expected Discharge Date: 10/29/2024     Concerns to be Addressed: discharge planning     Patient plan of care discussed at interdisciplinary rounds: Yes    Anticipated Discharge Disposition: Long Term Care  Disposition Comments: return to LTC           Anticipated Discharge Services: None  Anticipated Discharge DME: Oxygen    Patient/family educated on Medicare website which has current facility and service quality ratings: no  Education Provided on the Discharge Plan: Yes  Patient/Family in Agreement with the Plan: yes    Referrals Placed by CM/SW: Post Acute Facilities  Private pay costs discussed: Not applicable    Discussed  Partnership in Safe Discharge Planning  document with patient/family: No     Handoff Completed: No, handoff not indicated or clinically appropriate    Additional Information:  SW informed patient not medically stable for discharge. SW cancelled [facility] stretcher transport and called [facility] and informed of cancelled discharge    Next Steps: Discharge once medically stable    ALEXANDER Costa    [facility]

## 2024-10-28 NOTE — PLAN OF CARE
Summary:  sob, resp failure. Hx dementia, anemia, ckd3, lymphedema   DATE & TIME: 10/27 2183-5743  Cognitive Concerns/ Orientation : Aox2 (do time, situation)   BEHAVIOR & AGGRESSION TOOL COLOR: green  ABNL VS/O2: VSS 3L NC; destating into low 80s on 2. Baseline 2-3  MOBILITY: A2 GB/W. Not oob this shift, resting. T/R Q2.  PAIN MANAGMENT: denies  DIET: regular, needs tray set up, assistance getting into/maintaining upright eating position. Room service.  BOWEL/BLADDER: incontinent, purewick in  ABNL LAB/BG: pco2/bicarb elevated, hbg 8.1 (chronic anemia)  DRAIN/DEVICES: 2 PIV SL  SKIN: rash under breasts/abd folds, edema 2-3 BLE  TESTS/PROCEDURES: none  D/C DAY/GOALS/PLACE: tomorrow 10/28 between [id number], ride arranged  OTHER IMPORTANT INFO: patient very lethargic after dinner, unable to wake fully for safe ingestion of oral bedtime meds (O2 stable, rr stable); transferred from heart center today to wait for discharge to [facility] tomorrow

## 2024-10-28 NOTE — PLAN OF CARE
Goal Outcome Evaluation: reviewed with patient   DATE & TIME: 10/28/2024 7-330    Cognitive Concerns/ Orientation :  alert and oriented x 2   BEHAVIOR & AGGRESSION TOOL COLOR:  green   CIWA SCORE:  na    ABNL VS/O2: VSS on 02 at 2-3 liters   MOBILITY:  total care up with lift   PAIN MANAGMENT:  states no pain   DIET:  Regular did have some food at 1500 needed to be fed ate 50 %  BOWEL/BLADDER:  incontinent of urine with pure wick in place and no stools this shift   ABNL LAB/BG:  ph 7.13 pco2 >130 trop 26  creat 1.94 K 5.6 bicarb 50  DRAIN/DEVICES:  SL x 2 purewick   TELEMETRY RHYTHM:  na   SKIN:  scattered bruises and lower legs on the shins red areas appears to be abrasion like areas, scattered rash under breasts and underarms   TESTS/PROCEDURES:  Head CT   D/C DATE:  uncertain   Discharge Barriers:  pt was unresponsive this am and discharge held   OTHER IMPORTANT INFO:  Pt was not able to be awaken by sternal rub or to voice, RRT called. ABG's abnormal and attempted to decrease 02 but desats to 70's was able to maintain on 1 liter but then dropped to the 70's and returned to 3 liters which her baseline 02. Pt was awake around 1230 she did state her name and that she was in the hospital. She had no pain and was hungry. Pt was fed and did eat 50% of her meal. Pt did not get out of bed this shift and was turned every 2 hours.

## 2024-10-28 NOTE — PLAN OF CARE
Orientation: ADEEL, pt lethargic. Opens eye to gentle touch. Will not respond to questions.     Vitals/Tele: VSS 3L. Pt appears comfortable.    IV Access/drains: PIV SL    Diet: Regular    Mobility: A2. T/r q2    GI/: Incontinent urine. Purewick in place, AUO. No BM this shift.    Wound/Skin: Red rash in bilateral groin, bilateral breast. Otherwise dry.    Consults:     Discharge Plan: Back to [facility], ride scheduled for [id number]. Pt has legal guardian.       See Flow sheets for assessment

## 2024-10-28 NOTE — CODE/RAPID RESPONSE
[facility]    RRT Note  10/28/2024   Time Called: 0801    Code Status: No CPR- Do NOT Intubate    I was called to evaluate Ca Yan for decreased mental status, who is a 78 year old female who was admitted on 10/24/2024 for shortness of breath and lethargy. PMH includes dementia, COPD on chronic 3L nc, htn, HFpEF, and stage III ckd.    RRT was called due to change in patient's mental status reported by nursing at bedside.  Upon entering room rapid response team Flier is present as well as RRT.  Patient is resting with eyes closed does not necessarily appear in distress significantly lethargic however.  Patient only opens eyes to noxious stimuli.  Vitally however patient is stable with blood pressure 128/61, heart rate 76 and sinus, respirations 16, and 100% on her baseline 3 L.  Attempted to arouse patient to voice and stimulus which was not successful.    Review of chart is notable that patient has history of COPD baseline is at 3 L noting that patient appears to be a significant retainer with hypercapnia previous blood gases upwards to 100+ CO2 noted.  Currently suspect that she has significant hypercapnia as causative factor to her lethargy and altered mental status.  Attempted to call the guardian went to a voice message recording system.  Review of chart with Dr. Rollins following patient is a DNR/DNI.  With her amount of lethargy at this time she would not tolerate BiPAP.  Will update rounding hospitalist as well.  In the meantime plan as below.    Assessment & Plan     Altered mental status, lethargy  Chronic obstructive pulmonary disease, suspect hypercapnia   -baseline alert, oriented x2 reported   -upon entering room, patient arouses only to painful stimuli   -Stat ABG, CMP, CBC, Trope, lactic  -Stat head CT  -ABG did return notable for severe hypercapnia currently compensating hypercapnic respiratory failure with pH 7.13, CO2 130, bicarb 50.  -Singh bedside nurse asked to  remove her 3 L nasal oxygen now.  -Patient is DNR/DNI again attempted to reach guardian was not successful  -Patient is not a BiPAP candidate with level of lethargy at this time  -Paged rounding hospitalist Dr. Barrow waiting to update  -Patient will remain on sedation 66  -Anticipate benefit of discussion of hospice/comfort cares.      Discussed with and defer further cares to Dr. Rollins primary inpatient services      VIVIEN Bailon CNP  [facility]  Securely message with the Vocera Web Console (learn more here)  Text page via [employer] Paging/Directory          Physical Exam   Vital Signs with Ranges:  Temp:  [97.3  F (36.3  C)-98  F (36.7  C)] 97.3  F (36.3  C)  Pulse:  [71-82] 76  Resp:  [16-22] 16  BP: (110-147)/() 128/61  SpO2:  [79 %-100 %] 100 %  I/O last 3 completed shifts:  In: 200 [P.O.:180; I.V.:20]  Out: 850 [Urine:850]    Orthostatic:                Physical Exam     Data     EKG:  Interpreted by RUPAL Bailon CNP  Time reviewed: 0835  Symptoms at time of EKG: None   Rhythm: normal sinus   Rate: Normal  Axis: Normal  Ectopy: none  Conduction: normal  ST Segments/ T Waves: No ST-T wave changes  Q Waves: none  Comparison to prior: No old EKG available    Clinical Impression: normal EKG    IMAGING: (X-ray/CT/MRI)   No results found for this or any previous visit (from the past 24 hours).    CBC with Diff:  Recent Labs   Lab Test 10/26/24  0545 11/10/22  1510 10/21/22  1131   WBC 8.7   < > 5.4   HGB 8.1*   < > 9.5*   *   < > 102*      < > 210   INR  --   --  0.97    < > = values in this interval not displayed.      Absolute Reticulocyte (10e6/uL)   Date Value   11/30/2022 0.062     % Reticulocyte (%)   Date Value   11/30/2022 2.1 (H)       Comprehensive Metabolic Panel:  Recent Labs   Lab 10/26/24  0545 10/25/24  0545 10/24/24  1857      < > 147*   POTASSIUM 4.8   < > 5.2   CHLORIDE 95*   < > 100   CO2 47*   < > 49*   ANIONGAP 1*   < > <1*    *   < > 121*   BUN 49.6*   < > 32.5*   CR 1.66*   < > 1.51*   GFRESTIMATED 31*   < > 35*   SYMONE 9.0   < > 9.2   PROTTOTAL  --   --  7.9   ALBUMIN  --   --  3.8   BILITOTAL  --   --  0.2   ALKPHOS  --   --  95   AST  --   --  18   ALT  --   --  7    < > = values in this interval not displayed.         Time Spent on this Encounter         CRITICAL CARE TIME  I spent 40 minutes of critical care time on the unit/floor managing the care of Ca Yan. Upon evaluation, this patient had a high probability of imminent or life-threatening deterioration due to altered mental status which required my direct attention, intervention, and personal management. 100% of my time was spent at the bedside counseling the patient and/or coordinating care regarding services listed in this note.

## 2024-10-28 NOTE — PROGRESS NOTES
Care Management Follow Up    Length of Stay (days): 4    Expected Discharge Date: 10/31/2024     Concerns to be Addressed: discharge planning     Patient plan of care discussed at interdisciplinary rounds: Yes    Anticipated Discharge Disposition: Long Term Care  Disposition Comments: return to LTC           Anticipated Discharge Services: None  Anticipated Discharge DME: Oxygen    Patient/family educated on Medicare website which has current facility and service quality ratings: no  Education Provided on the Discharge Plan: Yes  Patient/Family in Agreement with the Plan: yes    Referrals Placed by CM/SW: Post Acute Facilities  Private pay costs discussed: Not applicable    Discussed  Partnership in Safe Discharge Planning  document with patient/family: No     Handoff Completed: No, handoff not indicated or clinically appropriate    Additional Information:  Writer called [facility] for an updated phone number for patients guardian     Patients guardian is Js Cobb      VM Message left for him to call charge nurse station [phone]    Next Steps: GOALEXANDER Nelson

## 2024-10-29 VITALS
WEIGHT: 168.21 LBS | SYSTOLIC BLOOD PRESSURE: 124 MMHG | RESPIRATION RATE: 16 BRPM | HEART RATE: 87 BPM | BODY MASS INDEX: 29.8 KG/M2 | OXYGEN SATURATION: 92 % | TEMPERATURE: 98.2 F | DIASTOLIC BLOOD PRESSURE: 68 MMHG

## 2024-10-29 PROCEDURE — 250N000013 HC RX MED GY IP 250 OP 250 PS 637: Performed by: HOSPITALIST

## 2024-10-29 PROCEDURE — 99239 HOSP IP/OBS DSCHRG MGMT >30: CPT | Performed by: INTERNAL MEDICINE

## 2024-10-29 PROCEDURE — 99497 ADVNCD CARE PLAN 30 MIN: CPT | Performed by: REGISTERED NURSE

## 2024-10-29 PROCEDURE — 99255 IP/OBS CONSLTJ NEW/EST HI 80: CPT | Performed by: REGISTERED NURSE

## 2024-10-29 PROCEDURE — 250N000012 HC RX MED GY IP 250 OP 636 PS 637: Performed by: HOSPITALIST

## 2024-10-29 PROCEDURE — 250N000013 HC RX MED GY IP 250 OP 250 PS 637: Performed by: REGISTERED NURSE

## 2024-10-29 RX ORDER — ACETAMINOPHEN 650 MG/1
650 SUPPOSITORY RECTAL EVERY 6 HOURS PRN
Status: DISCONTINUED | OUTPATIENT
Start: 2024-10-29 | End: 2024-10-29 | Stop reason: HOSPADM

## 2024-10-29 RX ORDER — NALOXONE HYDROCHLORIDE 0.4 MG/ML
0.1 INJECTION, SOLUTION INTRAMUSCULAR; INTRAVENOUS; SUBCUTANEOUS
Status: DISCONTINUED | OUTPATIENT
Start: 2024-10-29 | End: 2024-10-29 | Stop reason: HOSPADM

## 2024-10-29 RX ORDER — HYDROMORPHONE HYDROCHLORIDE 1 MG/ML
2 SOLUTION ORAL
Status: DISCONTINUED | OUTPATIENT
Start: 2024-10-29 | End: 2024-10-29 | Stop reason: HOSPADM

## 2024-10-29 RX ORDER — SENNOSIDES 8.6 MG
1 TABLET ORAL 2 TIMES DAILY PRN
Status: DISCONTINUED | OUTPATIENT
Start: 2024-10-29 | End: 2024-10-29 | Stop reason: HOSPADM

## 2024-10-29 RX ORDER — SENNOSIDES 8.6 MG
1 TABLET ORAL 2 TIMES DAILY PRN
DISCHARGE
Start: 2024-10-29

## 2024-10-29 RX ORDER — LORAZEPAM 0.5 MG/1
0.5 TABLET ORAL
Status: DISCONTINUED | OUTPATIENT
Start: 2024-10-29 | End: 2024-10-29 | Stop reason: HOSPADM

## 2024-10-29 RX ORDER — HYDROMORPHONE HYDROCHLORIDE 2 MG/1
2 TABLET ORAL
Status: DISCONTINUED | OUTPATIENT
Start: 2024-10-29 | End: 2024-10-29 | Stop reason: HOSPADM

## 2024-10-29 RX ORDER — HYDROMORPHONE HYDROCHLORIDE 2 MG/1
1 TABLET ORAL
Qty: 10 TABLET | Refills: 0 | Status: SHIPPED | OUTPATIENT
Start: 2024-10-29

## 2024-10-29 RX ORDER — PREDNISONE 20 MG/1
40 TABLET ORAL
DISCHARGE
Start: 2024-10-30 | End: 2024-11-04

## 2024-10-29 RX ORDER — GLYCOPYRROLATE 2 MG/1
2 TABLET ORAL EVERY 4 HOURS PRN
DISCHARGE
Start: 2024-10-29

## 2024-10-29 RX ORDER — GLYCOPYRROLATE 1 MG/1
2 TABLET ORAL EVERY 4 HOURS PRN
Status: DISCONTINUED | OUTPATIENT
Start: 2024-10-29 | End: 2024-10-29 | Stop reason: HOSPADM

## 2024-10-29 RX ORDER — NALOXONE HYDROCHLORIDE 0.4 MG/ML
0.2 INJECTION, SOLUTION INTRAMUSCULAR; INTRAVENOUS; SUBCUTANEOUS
Status: DISCONTINUED | OUTPATIENT
Start: 2024-10-29 | End: 2024-10-29 | Stop reason: HOSPADM

## 2024-10-29 RX ORDER — MINERAL OIL/HYDROPHIL PETROLAT
OINTMENT (GRAM) TOPICAL
Status: DISCONTINUED | OUTPATIENT
Start: 2024-10-29 | End: 2024-10-29 | Stop reason: HOSPADM

## 2024-10-29 RX ORDER — HYDROMORPHONE HYDROCHLORIDE 1 MG/ML
1 SOLUTION ORAL
Status: DISCONTINUED | OUTPATIENT
Start: 2024-10-29 | End: 2024-10-29 | Stop reason: HOSPADM

## 2024-10-29 RX ORDER — ATROPINE SULFATE 10 MG/ML
2 SOLUTION/ DROPS OPHTHALMIC EVERY 4 HOURS PRN
Status: DISCONTINUED | OUTPATIENT
Start: 2024-10-29 | End: 2024-10-29 | Stop reason: HOSPADM

## 2024-10-29 RX ORDER — BISACODYL 10 MG
10 SUPPOSITORY, RECTAL RECTAL
Status: DISCONTINUED | OUTPATIENT
Start: 2024-11-01 | End: 2024-10-29 | Stop reason: HOSPADM

## 2024-10-29 RX ORDER — LORAZEPAM 0.5 MG/1
0.5 TABLET ORAL
Qty: 10 TABLET | Refills: 0 | Status: SHIPPED | OUTPATIENT
Start: 2024-10-29

## 2024-10-29 RX ADMIN — UMECLIDINIUM BROMIDE AND VILANTEROL TRIFENATATE 1 PUFF: 62.5; 25 POWDER RESPIRATORY (INHALATION) at 08:59

## 2024-10-29 RX ADMIN — ACETAMINOPHEN 650 MG: 325 TABLET, FILM COATED ORAL at 02:29

## 2024-10-29 RX ADMIN — BUSPIRONE HYDROCHLORIDE 10 MG: 10 TABLET ORAL at 08:53

## 2024-10-29 RX ADMIN — MICONAZOLE NITRATE: 2 POWDER TOPICAL at 10:03

## 2024-10-29 RX ADMIN — PREDNISONE 40 MG: 20 TABLET ORAL at 08:53

## 2024-10-29 RX ADMIN — BUSPIRONE HYDROCHLORIDE 10 MG: 10 TABLET ORAL at 16:27

## 2024-10-29 RX ADMIN — SENNOSIDES 2 TABLET: 8.6 TABLET, FILM COATED ORAL at 08:53

## 2024-10-29 RX ADMIN — FUROSEMIDE 40 MG: 40 TABLET ORAL at 08:53

## 2024-10-29 RX ADMIN — SERTRALINE HYDROCHLORIDE 50 MG: 50 TABLET ORAL at 08:53

## 2024-10-29 RX ADMIN — LORAZEPAM 0.5 MG: 0.5 TABLET ORAL at 13:16

## 2024-10-29 ASSESSMENT — ACTIVITIES OF DAILY LIVING (ADL)
ADLS_ACUITY_SCORE: 0
ADLS_ACUITY_SCORE: 0
WEAR_GLASSES_OR_BLIND: NO
ADLS_ACUITY_SCORE: 0
WALKING_OR_CLIMBING_STAIRS: TRANSFERRING DIFFICULTY, DEPENDENT
DIFFICULTY_EATING/SWALLOWING: NO
EQUIPMENT_CURRENTLY_USED_AT_HOME: WALKER, ROLLING
NUMBER_OF_TIMES_PATIENT_HAS_FALLEN_WITHIN_LAST_SIX_MONTHS: 1
ADLS_ACUITY_SCORE: 0
WALKING_OR_CLIMBING_STAIRS_DIFFICULTY: YES
ADLS_ACUITY_SCORE: 0
CHANGE_IN_FUNCTIONAL_STATUS_SINCE_ONSET_OF_CURRENT_ILLNESS/INJURY: YES
TOILETING_ISSUES: YES
FALL_HISTORY_WITHIN_LAST_SIX_MONTHS: YES
DRESSING/BATHING: DRESSING DIFFICULTY, DEPENDENT
ADLS_ACUITY_SCORE: 0
ADLS_ACUITY_SCORE: 0
DRESSING/BATHING_DIFFICULTY: YES
TOILETING: 2-->COMPLETELY DEPENDENT
ADLS_ACUITY_SCORE: 0
TOILETING_ASSISTANCE: TOILETING DIFFICULTY, DEPENDENT
ADLS_ACUITY_SCORE: 0
TOILETING: 2-->COMPLETELY DEPENDENT (NOT DEVELOPMENTALLY APPROPRIATE)
ADLS_ACUITY_SCORE: 0
ADLS_ACUITY_SCORE: 0
CONCENTRATING,_REMEMBERING_OR_MAKING_DECISIONS_DIFFICULTY: YES
ADLS_ACUITY_SCORE: 0
DOING_ERRANDS_INDEPENDENTLY_DIFFICULTY: YES
ADLS_ACUITY_SCORE: 0

## 2024-10-29 NOTE — DISCHARGE SUMMARY
[facility]  Hospitalist Discharge Summary      Date of Admission:  10/24/2024  Date of Discharge:  10/29/2024  7:09 PM  Discharging Provider: Adam Rollins MD  Discharge Service: Hospitalist Service    Discharge Diagnoses   Acute on chronic hypoxic and hypercapnia respiratory failure likely due to COPD exacerbation     Clinically Significant Risk Factors          Follow-ups Needed After Discharge   Follow-up Appointments       Follow Up and recommended labs and tests      Follow up with Nursing home physician.  No follow up labs or test are needed.                Unresulted Labs Ordered in the Past 30 Days of this Admission       No orders found from 9/24/2024 to 10/25/2024.        These results will be followed up by PCP    Discharge Disposition   Discharged to long-term care facility  Condition at discharge: Guarded    Hospital Course    Ca Yan is a 78 year old female with PMH significant for COPD (3L O2 at baseline), HTN, MDD, dementia, chronic lymphedema (diastolic CHF), CKD stage III who was brought to ED by EMS for shortness of breath and lethargy, admitted inpatient 10/24/24.     History is limited from the patient due to her dementia. Unable to contact her guardian at this time. Per ED report patient resides at assisted living and apparently had been having increased workup breathing since morning and per nursing home staff she had been more lethargic than usual. She had hospitalization in February with similar presentation at which time there was concern for pneumonia pulmonary edema. She had to be intubated at that time and after subsequent goals of care discussion code status was switched to DNR/DNI.          Acute on chronic hypoxic and hypercapnia respiratory failure likely due to COPD exacerbation  likely acute metabolic encephalopathy secondary to above in the setting of dementia  -at baseline on 3 L nasal cannula oxygen  -chest x-ray noted some cardiomegaly with  normal pulmonary vasculature; no acute infiltrates, did note scoliosis with volume loss right hemothorax, small right pleural effusion, sub segmental atelectasis, scaring right lower lung with no significant change from prior  -VBG noted with PCO2 126, manage on BiPAP overnight with improvement in CO2 10/25 morning.  More awake so we will place her on nasal cannula and look for response.  -COVID/influenza/RSV negative  -CT head : No evidence for acute intracranial process    - PO prednisone 40 mg to be continued at discharge  -continue with PTA inhalers; duo nebs Q6 hours PRN    -UA leukocyte esterase large but nitrate negative.  No fever, procalcitonin 0.1.  Ucx : mixed growth    Goals of care discussion held with the patient's legal guardian  Consult to palliative care  Given multiple comorbidities as listed with the ongoing acute hypoxic respiratory failure, decision was made to transition to comfort focused cares.  Patient to be discharged to LTC on comfort measures.       CKD stage III  mild hypernatremia  -baseline creatinine around 1.2 to 1.4     Chronic lymphedema  chronic diastolic CHF  likely type II non-STEMI  -PTA on Lasix 40 mg PO daily  y     Anxiety/depression  PTA buspirone, Seroquel and Zoloft     Chronic anemia  -hemoglobin 8.6, stable around baseline of 8 to 9          Consultations This Hospital Stay   RESPIRATORY CARE IP CONSULT  RESPIRATORY CARE IP CONSULT  PHARMACY IP CONSULT  CARE MANAGEMENT / SOCIAL WORK IP CONSULT  PALLIATIVE CARE ADULT IP CONSULT  CARE MANAGEMENT / SOCIAL WORK IP CONSULT    Code Status   Prior    Time Spent on this Encounter   I, Adam Rollins MD, personally saw the patient today and spent greater than 30 minutes discharging this patient.       Adam Rollins MD  [place] [address] [facility]  [facility]– [place] [address]  Phone: [phone]  ______________________________________________________________________    Physical Exam    Vital Signs:                    Weight: 168 lbs 3.38 oz  Constitutional: Elderly female, frail moderate apparent apparent distress  Respiratory: Decreased bilateral basal breath sounds  Cardiovascular: Regular rate and rhythm, normal S1 and S2, and no murmur noted  GI: Normal bowel sounds, soft, non-distended, non-tender         Primary Care Physician   Turner Anthony    Discharge Orders      General info for SNF    Length of Stay Estimate: Long Term Care  Condition at Discharge: Declining  Level of care:skilled   Rehabilitation Potential: Poor  Admission H&P remains valid and up-to-date: Yes  Recent Chemotherapy: N/A  Use Nursing Home Standing Orders: Yes     Mantoux instructions    Give two-step Mantoux (PPD) Per Facility Policy Yes     Follow Up and recommended labs and tests    Follow up with Nursing home physician.  No follow up labs or test are needed.     Reason for your hospital stay    COPD exacerbation     Activity - Up ad nadiya     Fall precautions     Diet    Follow this diet upon discharge:       Regular Diet Adult       Significant Results and Procedures   Results for orders placed or performed during the hospital encounter of 10/24/24   CT Head w/o Contrast    Narrative    EXAM: CT HEAD W/O CONTRAST  LOCATION: [facility]  DATE: 10/24/2024    INDICATION: altered mental status  COMPARISON: None.  TECHNIQUE: Routine CT Head without IV contrast. Multiplanar reformats. Dose reduction techniques were used.    FINDINGS:  INTRACRANIAL CONTENTS: No intracranial hemorrhage, extraaxial collection, or mass effect.  No CT evidence of acute infarct. Moderate presumed chronic small vessel ischemic changes. Mild generalized volume loss. No hydrocephalus.     VISUALIZED ORBITS/SINUSES/MASTOIDS: Prior left cataract surgery. Visualized portions of the orbits are otherwise unremarkable. No paranasal sinus mucosal disease. No middle ear or mastoid effusion.    BONES/SOFT TISSUES: No acute  abnormality.      Impression    IMPRESSION:  1.  No CT evidence for acute intracranial process.  2.  Brain atrophy and presumed chronic microvascular ischemic changes similar to the previous exam.   XR Chest Port 1 View    Narrative    EXAM: XR CHEST PORT 1 VIEW  LOCATION: [facility]  DATE: 10/24/2024    INDICATION: SOB on bipap  COMPARISON: 6/25/2023      Impression    IMPRESSION: Cardiomegaly. Pulmonary vascularity normal. Scoliosis with diffuse volume loss right hemithorax, unchanged. Small right pleural effusion and subsegmental atelectasis/scarring right lower lung field, minimally unchanged. Subsegmental   atelectasis left base.   CT Head w/o Contrast    Narrative    CT HEAD WITHOUT CONTRAST  10/28/2024 8:44 AM     HISTORY:  Altered.    COMPARISON:  Head CT 10/24/2024.    TECHNIQUE: Using multidetector thin collimation helical acquisition  technique, axial, coronal and sagittal CT images from the skull base  to the vertex were obtained without intravenous contrast.   (topogram) image(s) also obtained and reviewed. Dose reduction  techniques were performed.    FINDINGS:  No intracranial hemorrhage, mass effect, or midline shift.  No acute loss of gray-white matter differentiation in the cerebral  hemispheres. Ventricles are proportionate to the cerebral sulci. Clear  basal cisterns. Mild to moderate generalized cerebral atrophy.  Periventricular and subcortical hypodensities, consistent with small  vessel ischemic disease.    The bony calvaria and the bones of the skull base are normal. The  visualized portions of the paranasal sinuses and mastoid air cells are  clear. Grossly normal orbits. Bilateral temporomandibular joint  degeneration.      Impression    IMPRESSION:   1. No acute intracranial pathology.   2. Chronic small vessel ischemic disease and generalized cerebral  atrophy.    SUBHA MAZA MD         SYSTEM ID:  [id number]     *Note: Due to a large number of results  and/or encounters for the requested time period, some results have not been displayed. A complete set of results can be found in Results Review.       Discharge Medications   Discharge Medication List as of 10/29/2024  2:19 PM        START taking these medications    Details   glycopyrrolate (GLYCATE) 2 MG tablet Take 1 tablet (2 mg) by mouth every 4 hours as needed (secretions, may also be given via G-tube or J-tube if needed.)., Transitional      HYDROmorphone (DILAUDID) 2 MG tablet Take 0.5 tablets (1 mg) by mouth every 2 hours as needed for moderate to severe pain., Disp-10 tablet, R-0, Local Print      LORazepam (ATIVAN) 0.5 MG tablet Place 1 tablet (0.5 mg) under the tongue every 3 hours as needed for anxiety., Disp-10 tablet, R-0, Local Print      predniSONE (DELTASONE) 20 MG tablet Take 2 tablets (40 mg) by mouth daily (with breakfast) for 5 days., Transitional      !! sennosides (SENOKOT) 8.6 MG tablet Take 1 tablet by mouth 2 times daily as needed for constipation., Transitional       !! - Potential duplicate medications found. Please discuss with provider.        CONTINUE these medications which have CHANGED    Details   sertraline (ZOLOFT) 100 MG tablet Take 0.5 tablets (50 mg) by mouth daily., No Print Out           CONTINUE these medications which have NOT CHANGED    Details   acetaminophen (TYLENOL) 325 MG tablet Take 2 tablets (650 mg) by mouth every 4 hours as needed for mild pain or fever, Disp-60 tablet, R-0, Transitional      albuterol (PROAIR HFA/PROVENTIL HFA/VENTOLIN HFA) 108 (90 Base) MCG/ACT inhaler Inhale 2 puffs into the lungs every 6 hours as needed for wheezing, Disp-18 g, R-0, TransitionalPharmacy may dispense brand covered by insurance (Proair, or proventil or ventolin or generic albuterol inhaler)      ammonium lactate (AMLACTIN) 12 % external cream Apply topically 2 times daily.Historical      atorvastatin (LIPITOR) 40 MG tablet Take 1 tablet (40 mg) by mouth daily, Disp-90 tablet,  R-3, Transitional      benzonatate (TESSALON) 200 MG capsule Take 200 mg by mouth 3 times daily as needed for cough, Historical      busPIRone (BUSPAR) 10 MG tablet Take 1 tablet (10 mg) by mouth 3 times daily, TransitionalFor anxiety      Emollient (CERAVE) CREA Please apply twice daily to dry skin of body and feetDisp-453 g, R-3Transitional      ferrous sulfate (FEROSUL) 325 (65 Fe) MG tablet Take 1 tablet (325 mg) by mouth Every Mon, Wed, Fri Morning, TransitionalFor iron deficiency anemia      folic acid (FOLVITE) 400 MCG tablet Take 1 tablet (400 mcg) by mouth daily, Disp-100 tablet, R-3, E-PrescribePlease call her about delivery options      furosemide (LASIX) 40 MG tablet Take 40 mg by mouth daily., Historical      ipratropium-albuterol (COMBIVENT RESPIMAT)  MCG/ACT inhaler Inhale 1 puff into the lungs 4 times daily as needed for shortness of breath / dyspnea or wheezing, Transitional      latanoprost (XALATAN) 0.005 % ophthalmic solution Place 1 drop into both eyes daily, TransitionalFor gluacoma      melatonin 3 MG tablet Take 3 mg by mouth At Bedtime, Historical      miconazole (MICATIN) 2 % external powder Apply topically 4 times dailyTransitional      polyethylene glycol (MIRALAX) 17 GM/Dose powder Take 17 g by mouth 2 times daily as needed for constipation., Historical      QUEtiapine (SEROQUEL) 300 MG tablet Take 0.5 tablets (150 mg) by mouth At Bedtime, TransitionalFor sleep and anxiety      !! sennosides (SENOKOT) 8.6 MG tablet Take 2 tablets by mouth daily., Historical      tiotropium-olodaterol 2.5-2.5 MCG/ACT AERS Inhale 1 puff into the lungs daily., Historical      vitamin C (ASCORBIC ACID) 250 MG tablet Take 500 mg by mouth three times a week. Mon, Wed, Fri, Historical      Vitamin D3 (CHOLECALCIFEROL) 25 mcg (1000 units) tablet Take 1 tablet (25 mcg) by mouth daily, Disp-100 tablet, R-3, TransitionalFor vitamin D deficiency      zinc oxide (TRIPLE PASTE) 12.8 % external ointment Apply  topically 2 times daily.Historical      Lidocaine (LIDOCARE) 4 % Patch Place 2 patches onto the skin daily as needed for moderate pain (4-6) (As needed for left chest wall pain) To prevent lidocaine toxicity, patient should be patch free for 12 hrs daily.Transitional      umeclidinium-vilanterol (ANORO ELLIPTA) 62.5-25 MCG/ACT oral inhaler Inhale 1 puff into the lungs daily, Disp-1 each, R-0, Transitional      vitamin B complex with vitamin C (STRESS TAB) tablet Take 1 tablet by mouth daily, Disp-100 tablet, R-3, TransitionalFor vitamin deficiency       !! - Potential duplicate medications found. Please discuss with provider.        STOP taking these medications       losartan-hydrochlorothiazide (HYZAAR) 100-25 MG tablet Comments:   Reason for Stopping:             Allergies   Allergies   Allergen Reactions    Azithromycin Itching    Codeine Nausea and Vomiting    Hydrocodone Nausea and Vomiting    Metronidazole Nausea    Oxycodone Itching and Rash    Percocet [Oxycodone-Acetaminophen] Nausea and Vomiting    Pollen Extract      sneezing and runny nose.     Seasonal Allergies      sneezing and runny nose.     Vicodin [Hydrocodone-Acetaminophen] Nausea and Vomiting    Zolpidem      Amnestic behavior

## 2024-10-29 NOTE — PLAN OF CARE
Goal Outcome Evaluation:      Acute on chronic hypoxia, respiratory failure  DATE & TIME: 47-62- [id number]    Cognitive Concerns/ Orientation : Alert to self   BEHAVIOR & AGGRESSION TOOL COLOR: Green  CIWA SCORE: NA   ABNL VS/O2: VSS- slightly elevated BP on 2 L  MOBILITY: A-2 lift- turn and repo every two hours  PAIN MANAGMENT: Denies pain, tylenol given to due moaning, unable to sleep  DIET: Regular diet need assistance  BOWEL/BLADDER: Incontinent of B/B, purwick in place  ABNL LAB/BG: No new lab for this shift  DRAIN/DEVICES: PIV SL  TELEMETRY RHYTHM: NA  SKIN: Intact  TESTS/PROCEDURES: None  D/C DATE: Pending  Discharge Barriers: RRT called yesterday due to unresponsive  OTHER IMPORTANT INFO: Patient is confused, alert to self, moaning, did not sleep for most of the night.

## 2024-10-29 NOTE — PROGRESS NOTES
[facility]    Medicine Progress Note - Hospitalist Service    Date of Admission:  10/24/2024    Assessment & Plan   Ca Yan is a 78 year old female with PMH significant for COPD (3L O2 at baseline), HTN, MDD, dementia, chronic lymphedema (diastolic CHF), CKD stage III who was brought to ED by EMS for shortness of breath and lethargy, admitted inpatient 10/24/24.     History is limited from the patient due to her dementia. Unable to contact her guardian at this time. Per ED report patient resides at assisted living and apparently had been having increased workup breathing since morning and per nursing home staff she had been more lethargic than usual. She had hospitalization in February with similar presentation at which time there was concern for pneumonia pulmonary edema. She had to be intubated at that time and after subsequent goals of care discussion code status was switched to DNR/DNI.    Acute hypercarbic respiratory failure leading to metabolic encephalopathy:  Call to legal guardian  Discussed preliminary GOC  Consult to palliative care awaiting decision making about comfort cares initiation     Acute on chronic hypoxic and hypercapnia respiratory failure likely due to COPD exacerbation  likely acute metabolic encephalopathy secondary to above in the setting of dementia  -at baseline on 3 L nasal cannula oxygen  -chest x-ray noted some cardiomegaly with normal pulmonary vasculature; no acute infiltrates, did note scoliosis with volume loss right hemothorax, small right pleural effusion, sub segmental atelectasis, scaring right lower lung with no significant change from prior  -VBG noted with PCO2 126, manage on BiPAP overnight with improvement in CO2 10/25 morning.  More awake so we will place her on nasal cannula and look for response.  -COVID/influenza/RSV negative  -CT head : No evidence for acute intracranial process  -was given 60 mg of IV Lasix and IV Solu-Medrol in  ED and started on BiPAP  -titrated off Bipap   -completed  Solu-Medrol Q8 hours   - PO prednisone 40 mg to be continued at discharge  -continue with PTA inhalers; duo nebs Q6 hours PRN  -clinically does not look significantly volume overloaded; will hold off on further IV diuresis  -UA leukocyte esterase large but nitrate negative.  No fever, procalcitonin 0.1.  Ucx : mixed growth  -discontinue IV ceftriaxone  -will have delirium precautions in place; fall precautions  -per ED provider did discuss goals of care with the Guardian and Guardian affirmed DNR/DNI status .       CKD stage III  mild hypernatremia  -baseline creatinine around 1.2 to 1.4     Chronic lymphedema  chronic diastolic CHF  likely type II non-STEMI  -PTA on Lasix 40 mg PO daily  -troponin slightly elevated at 24; EKG normal sinus rhythm with nothing acute  -was given 60 mg IV Lasix in ED  -hold off on further IV diuresis and resume PTA PO Lasix 40 mg daily     Anxiety/depression  -resume PTA buspirone, Seroquel and Zoloft     Chronic anemia  -hemoglobin 8.6, stable around baseline of 8 to 9             Diet: Regular Diet Adult  Room Service    DVT Prophylaxis: Pneumatic Compression Devices  Lima Catheter: Not present  Lines: None     Cardiac Monitoring: None  Code Status: No CPR- Do NOT Intubate      Clinically Significant Risk Factors        # Hyperkalemia: Highest K = 5.6 mmol/L in last 2 days, will monitor as appropriate  # Hypernatremia: Highest Na = 147 mmol/L in last 2 days, will monitor as appropriate           # Hypertension: Noted on problem list  # Chronic heart failure with preserved ejection fraction: heart failure noted on problem list and last echo with EF >50%   # Acute Hypercapnic Respiratory Failure: based on arterial blood gas results.  Continue supplemental oxygen and ventilatory support as indicated.  # Acute Hypercapnic Respiratory Failure: based on venous blood gas results.  Continue supplemental oxygen and ventilatory  support as indicated.             # Financial/Environmental Concerns: none         Disposition Plan     Medically Ready for Discharge: Anticipated in 2-4 Days             Adam Rollins MD  Hospitalist Service  [facility]  Securely message with [email or web address] (more info)  Text page via [email or web address] Paging/Directory   ______________________________________________________________________    Interval History   Pt is very sleepy,not responsive to name,doesn't open eyes,RRT was initiated, ABG was done elevated pCO2 level likely contributory to her clinical worsening as noted,unable to try Bipap since pt is not awake.Tried calling guardian this am awaiting call back.    Physical Exam   Vital Signs: Temp: 98.5  F (36.9  C) Temp src: Oral BP: 115/70 Pulse: 85   Resp: 18 SpO2: 96 % O2 Device: Nasal cannula Oxygen Delivery: 2 LPM  Weight: 168 lbs 3.38 oz    Constitutional: Sleeping,not responsive  Respiratory:coarse BS  Cardiovascular: Regular rate and rhythm, normal S1 and S2, and no murmur noted  GI: Normal bowel sounds, soft, non-distended, non-tender  Other: sleeping

## 2024-10-29 NOTE — PLAN OF CARE
Goal Outcome Evaluation: reviewed with patient   DATE & TIME: 10/29/[phone]    Cognitive Concerns/ Orientation :  alert and oriented x 2    BEHAVIOR & AGGRESSION TOOL COLOR:  green   CIWA SCORE:  na    ABNL VS/O2:  VSS on 02 at 2liters   MOBILITY: up with assistance of 2 and walker and gait belt   PAIN MANAGMENT:  denies   DIET:  regular ate fair   BOWEL/BLADDER:  incontinent of urine using purewick, no stools this shift   ABNL LAB/BG:    DRAIN/DEVICES:  Pure wick SL removed   TELEMETRY RHYTHM:  na   SKIN:  rash under breasts and under arms and periarea. Abrasion rash like areas on bilateral shins   TESTS/PROCEDURES:  none   D/C DATE:  today   Discharge Barriers:  none   OTHER IMPORTANT INFO:  discharging between 440-520. Pt is aware of discharge. Confused to situation and time. Pt up with assistance of 2 and walker and gait belt. VSS on 02 at 2 liters. Going back to the care center on comfort care and will go on hospice once there. Pt moaning at times when asked if she was in pain she denies.

## 2024-10-29 NOTE — CONSULTS
Palliative Care Consultation Note  [facility]      Patient: Ca Yan  Date of Admission:  10/24/2024    Requesting Clinician / Team: Dr Rollins/Medicine  Reason for consult: Goals of care     Recommendations & Counseling     GOALS OF CARE:   Comfort focused   Patient's guardian Js has spoken with hospitalist about her current health status, respiratory compromise and overall condition. Js  feels that comfort care would be the best option for Olivia as transitioning in and out of hospital would would likely be disorienting and contribute to distressing situations  Education provided regarding initiation of comfort care here at the hospital prior to discharge with hospice.  Reviewed changes to orders that would be made that we will prioritize symptoms and avoid unnecessary labs, treatments and meds not for comfort.    I will consult social work to assist with discharge planning for hospice back at her facility.    ADVANCE CARE PLANNING:  Has legal guardian Js Cobb [phone].  There is a POLST form on file, but will need to be updated prior to discharge.  New POLST form completed today.   Code status: No CPR- Do NOT Intubate    MEDICAL MANAGEMENT:     # Dementia, advanced.  -Maintain day/night routines and orientation.  -Avoid medications such as steroids, benzodiazepines and anticholinergics.   -Optimize hydration/nutrition, and sleep.  -Utilize sensory aids to maintain orientation such as eye glasses, hearing aids or pocket talkers.  -Calm environment, quiet/reassuring voices, orienting cues, minimized noise/night disruptions, when possible.     #Respiratory compromise, acute on chronic hypoxic/hypercapnia. #Dyspnea, related to COPD-  has been on chronic oxygen for several years- baseline use is 3 L via NC.  Oxygen for comfort  Repositioning  Fan at bedside  hydromorphone (Dilaudid) 1st choice: hydromorphone PO 1-2 mg q 1 hour as needed.   2nd choice:hydromorphone IV  "0.3-0.5 mg q 15 minutes as needed     # Weakness, related to serious illness, decreased mobility.  Assist with repositioning for comfort and to maintain skin integrity.  Assist with transfers for safety.  Maintain fall precautions.    Comfort Care   Below are common symptoms routinely seen in patients with comfort focused goals and at the end of life. This patient may not currently exhibit all of these symptoms; however, if these were to occur our recommendations are as follows:     #Pain  1st choice: hydromorphone PO 1-2 mg q 2 hour as needed.   2nd choice:hydromorphone IV 0.3-0.5 mg q 15 minutes as needed   If unable to take PO and morphine, consider oxycodone SL 5-10 q 1 hour as needed     #Anxiety    1st choice: Lorazepam PO/SL q 1 mg  q 3 hour as needed.   2nd choice: Lorazepam IV q 1 mg  q 3 hour as needed    #Secretion burden   Robinul 0.1 mg (PO/IV) q 4 hours as needed. If ineffective, increase up to 0.3 mg   Consider atropine if Robinul ineffective after dose increase      #Nausea   Zofran 4 mg q 6 hours as needed. Can increase to 8 mg   Zyprexa 5 mg q 6 hours as needed     #Agitation  Aggressive control of other symptoms first, then  1st choice: Zyprexa 5 mg ODT or IM   2nd choice: Increase dose of Zyprexa to 10 mg or consider switch to Haldol   3rd choice: Lorazepam as above     PSYCHOSOCIAL/SPIRITUAL SUPPORT:  Unable to assess    Palliative Care will continue to follow. Thank you for the consult and allowing us to aid in the care of Ca Yan.    Reviewed patient case with hospitalist Dr Acharya, patient's RN,     Sujey Stroud, CNS  Securely message with [employer] (more info)  Text page via [employer] Paging/Directory      During regular M-F work hours ([phone]) -- please contact me on [employer]   In my absence, securely message our team via [employer] \"[facility] [place]\" or go to On Call tab in [employer], search for \"[facility] [place]\"   After regular work hours and on weekends/holidays, to " "reach our on call physician, securely message via [employer] \"[facility] [place]\" or go to On Call tab in [facility], search for \"[facility] [place]\"     Palliative Summary/HPI     Ca Yan is a 78 year old female with a past medical history of dementia, HTN, chronic diastolic CHF, chronic lymphedema, CKD3, COPD on 3L 02 baseline, MDD,  who presented via EMS from her care facility on 10/24/24 with Increased work of breathing and lethargy.  She was started on IV Lasix and placed on BiPAP support.  She had a rapid response on 10/28 due to decreased responsiveness, only opening eyes to noxious stimuli. She was too lethargic to place on BiPAP support.     She had a previous hospitalization in February for respiratory failure with concern for pulmonary edema and pneumonia, requiring intubation.      Today, the patient was seen for:  Goals of care, COPD, dementia    Palliative Care Summary:   Met with Ca in room. She is too confused to participate in a discussion. I called Ca Weinstein's legal guardian on the phone  Introduced the role of palliative care as an interdisciplinary team that cares for patients with serious illness to help support symptom management, communication, coping for patients and their families as well as support with medical decision making.    Prognosis, Goals, & Planning:    Functional Status just prior to this current hospitalization:  Has been living in a SNF for care for several years. Has history of dementia, now quite advanced. Hospitalized earlier in the year for pneumonia.   Outpatient Palliative Performance Score (PPS) 40%  Extensive disease. Mainly in bed w/little ambulation. ADLs/activities mainly w/assistance. Normal or reduced intake. Full LOC or drowsy or confused.   ECOG3 (Capable of only limited self-care; needs help with ADLs; in bed/chair >50% of waking hours)  Functional Assessment Staging of Alzheimer's Disease (FAST)    Stage 7a. Very Severe Decline- Ability " to speak limited to approximately a half dozen different words or fewer, in the course of an average day or in the course of an intensive interview.     Prognosis, Goals, and/or Advance Care Planning:  We discussed general treatment options (full/restorative, selective/conservatives, and comfort only/hospice). We then discussed how these specifically apply to Ca's past and current medical status. Discussed what continuing restorative/life-prolonging care entails, including continued (re)admissions to the hospital, continuing with preventative and primary care, seeing disease/organ specific specialty consultations for medical treatments in hopes to prolong life for as long as possible.  We discussed quality of life and that we are coming to the point where the burdens of restorative care may not offer her a good quality of life.   Education provided on option to transition to comfort-focused goals of care.  This would be including discontinuation of IV fluids, cardiac monitoring, labs, and other interventions that do not directly promote comfort.  Anticipatory guidance was given regarding  We discussed utilization of medications are mainly to treat symptoms to maintain comfort.  Discussed that for care process is very purposeful in terms of ensuring patient is as comfortable as possible and that dignity and quality of life are the priority.  Js has spoken with patient's hospitalist about patient's current health status and respiratory compromise. He feels that comfort care would likely be the best choice for Olivia as transitioning in and out of hospital would would likely be disorienting and contribute to distressing situations.  Education provided regarding hospice as a service that provides comfort care outside of the hospital setting and could take place at her current nursing care facility.  Reviewed philosophy, prognostic,and eligibility criteria.  Js states he is very familiar with hospice services  as many clients of his have on the service.  Js feels it would be a good decision to move ahead with comfort focused care here at the hospital and for hospice at discharge to maintain quality of life and avoid further hospital stays.  I will consult social work to assist with discharge planning for hospice tach at her facility.  I reviewed patient's previous POLST from 2022 and recommended that we obtain to be new document reflecting CODE STATUS and comfort focused care.  I reviewed contents of the document and partially completed .  Js requested that I send the document to him at his email [email or web address] so he could review and sign document and email back to me.  Advance Care Planning Discussion 10/29/2024. I, TRUDY Bowling met with The Legal Guardian today at the hospital to discuss Advance Care Planning. Ca Yan does not have decisional capacity  and was not present for this discussion due to advanced dementia .  Those present were informed of the voluntary nature of this discussion and wished to proceed.  The discussion included: See documentation in Serious Illness Conversation section of the ACP Activity. This discussion began at 1100 and ended at 1130 for a total of 30 minutes.    Code Status was addressed today:   Yes, We discussed potential risks and rationale of attempting cardiac resuscitation, intubation, and mechanical ventilation.  We also discussed probability of survival as well as quality of life implications.  Based on this discussion, patient or surrogate response/decision: Confirmed DNR/DNI status.      Patient's decision making preferences: unable to assess        Patient has decision-making capacity today for complex decisions: No, she does not have capacity- she has a legal guardian for decision making            Coping, Meaning, & Spirituality:   Mood, coping, and/or meaning in the context of serious illness were addressed today: Yes    Social:   Living  situation:resides in a nursing home    Important relationships/caregivers:alyssa Cobb    Medications:  I have reviewed this patient's medication profile and medications from this hospitalization. Notable medications:     Noted scheduled meds are:  Current Facility-Administered Medications   Medication Dose Route Frequency Provider Last Rate Last Admin    atorvastatin (LIPITOR) tablet 40 mg  40 mg Oral At Bedtime Duong Nagel MD   40 mg at 10/28/24 2120    busPIRone (BUSPAR) tablet 10 mg  10 mg Oral TID Duong Nagel MD   10 mg at 10/29/24 0853    furosemide (LASIX) tablet 40 mg  40 mg Oral Daily Duong Nagel MD   40 mg at 10/29/24 0853    latanoprost (XALATAN) 0.005 % ophthalmic solution 1 drop  1 drop Both Eyes At Bedtime Duong Nagel MD   1 drop at 10/28/24 2120    miconazole (MICATIN) 2 % powder   Topical BID Adam Rollins MD   Given at 10/28/24 2119    predniSONE (DELTASONE) tablet 40 mg  40 mg Oral Daily with breakfast Duong Nagel MD   40 mg at 10/29/24 0853    [Held by provider] QUEtiapine (SEROquel) tablet 150 mg  150 mg Oral At Bedtime Duong Nagel MD   150 mg at 10/26/24 2106    sennosides (SENOKOT) tablet 2 tablet  2 tablet Oral Daily Duong Nagel MD   2 tablet at 10/29/24 0853    sertraline (ZOLOFT) tablet 50 mg  50 mg Oral Daily Duong Nagel MD   50 mg at 10/29/24 0853    sodium chloride (PF) 0.9% PF flush 3 mL  3 mL Intracatheter Q8H Duong Nagel MD   3 mL at 10/28/24 1549    umeclidinium-vilanterol (ANORO ELLIPTA) 62.5-25 MCG/ACT oral inhaler 1 puff  1 puff Inhalation Daily Duong Nagel MD   1 puff at 10/29/24 0859       Noted PRN meds are:  Current Facility-Administered Medications   Medication Dose Route Frequency Provider Last Rate Last Admin    acetaminophen (TYLENOL) tablet 650 mg  650 mg Oral Q4H PRN Duong Nagel MD   650 mg at 10/29/24 0229    albuterol (PROVENTIL HFA/VENTOLIN HFA)  inhaler  2 puff Inhalation Q6H PRN Duong Nagel MD        calcium carbonate (TUMS) chewable tablet 1,000 mg  1,000 mg Oral 4x Daily PRN Duong Nagel MD        carboxymethylcellulose PF (REFRESH PLUS) 0.5 % ophthalmic solution 1 drop  1 drop Both Eyes Q1H PRN Duong Nagel MD        HOLD: All Oral Medications   Does not apply HOLD Doung Nagel MD        ipratropium - albuterol 0.5 mg/2.5 mg/3 mL (DUONEB) neb solution 3 mL  3 mL Nebulization Q6H PRN Duong Nagel MD        lidocaine (LMX4) cream   Topical Q1H PRN Duong Nagel MD        lidocaine 1 % 0.1-1 mL  0.1-1 mL Other Q1H PRN Duong Nagel MD        No lozenges or gum should be given while patient on BIPAP/AVAPS/AVAPS AE   Does not apply Continuous PRN Duong Nagel MD        ondansetron (ZOFRAN ODT) ODT tab 4 mg  4 mg Oral Q6H PRN Duong Nagel MD        Or    ondansetron (ZOFRAN) injection 4 mg  4 mg Intravenous Q6H PRN Duong Nagel MD   4 mg at 10/27/24 1555    senna-docusate (SENOKOT-S/PERICOLACE) 8.6-50 MG per tablet 1 tablet  1 tablet Oral BID PRN Duong Nagel MD        Or    senna-docusate (SENOKOT-S/PERICOLACE) 8.6-50 MG per tablet 2 tablet  2 tablet Oral BID PRN Duong Nagel MD        sodium chloride (PF) 0.9% PF flush 3 mL  3 mL Intracatheter q1 min prn Duong Nagel MD           ROS:  Comprehensive ROS is unobtainable due to mental status.    PHYSICAL EXAM:  Vital Signs: Temp: 98.5  F (36.9  C) Temp src: Oral BP: 95/58 Pulse: 78   Resp: 18 SpO2: 96 % O2 Device: Nasal cannula Oxygen Delivery: 2 LPM  Weight: 168 lbs 3.38 oz  Wt Readings from Last 4 Encounters:   10/27/24 76.3 kg (168 lb 3.4 oz)   12/30/22 91.9 kg (202 lb 9.6 oz)   12/08/22 85.7 kg (188 lb 15 oz)   11/15/22 98.9 kg (218 lb 0.6 oz)     Lab Results   Component Value Date    ALBUMIN 3.6 10/28/2024    ALBUMIN 3.6 04/22/2021     GENERAL:  Alert, fatigued, no distress, pulling sheets,  restless.  HEAD: Normocephalic atraumatic  SCLERA: Anicteric  EXTREMITIES: Warm; bilateral LE edema  ABDOMEN:  Soft, nontender  RESPIRATORY: Breathing non labored  CARDIOVASCULAR: RRR  NEUROLOGIC: Alert.  PSYCH: Calm, confused.    Data reviewed:  Lab Results   Component Value Date    WBC 10.2 10/28/2024    WBC 8.7 10/26/2024    WBC 12.4 (H) 10/24/2024    HGB 9.5 (L) 10/28/2024    HGB 8.1 (L) 10/26/2024    HGB 8.6 (L) 10/24/2024    HCT 35.3 10/28/2024    HCT 26.5 (L) 10/26/2024    HCT 31.6 (L) 10/24/2024     10/28/2024     10/26/2024     10/24/2024     (H) 10/28/2024     10/26/2024     (H) 10/25/2024    POTASSIUM 5.6 (H) 10/28/2024    POTASSIUM 4.8 10/26/2024    POTASSIUM 5.5 (H) 10/25/2024    CHLORIDE 98 10/28/2024    CHLORIDE 95 (L) 10/26/2024    CHLORIDE 99 10/25/2024    CO2 48 (HH) 10/28/2024    CO2 47 (HH) 10/26/2024    CO2 47 (HH) 10/25/2024    BUN 71.7 (H) 10/28/2024    BUN 49.6 (H) 10/26/2024    BUN 37.7 (H) 10/25/2024    CR 1.94 (H) 10/28/2024    CR 1.66 (H) 10/26/2024    CR 1.47 (H) 10/25/2024     (H) 10/28/2024     (H) 10/28/2024     (H) 10/26/2024    SED 53 (H) 11/24/2022    SED 18 09/25/2019    DD 1.13 (H) 07/08/2022    NTBNPI 1,592 10/24/2024    NTBNPI 120 12/22/2022    NTBNPI 395 11/26/2022    AST 16 10/28/2024    AST 18 10/24/2024    AST 37 (H) 11/24/2022    ALT 8 10/28/2024    ALT 7 10/24/2024    ALT 17 11/24/2022    GGT 20 02/08/2006    ALKPHOS 77 10/28/2024    ALKPHOS 95 10/24/2024    ALKPHOS 86 11/24/2022    BILITOTAL <0.2 10/28/2024    BILITOTAL 0.2 10/24/2024    BILITOTAL 0.3 11/24/2022    AIMEE 13 12/23/2022    INR 0.97 10/21/2022        Results for orders placed or performed during the hospital encounter of 10/24/24   CT Head w/o Contrast    Impression    IMPRESSION:  1.  No CT evidence for acute intracranial process.  2.  Brain atrophy and presumed chronic microvascular ischemic changes similar to the previous exam.   XR Chest Port 1  View    Impression    IMPRESSION: Cardiomegaly. Pulmonary vascularity normal. Scoliosis with diffuse volume loss right hemithorax, unchanged. Small right pleural effusion and subsegmental atelectasis/scarring right lower lung field, minimally unchanged. Subsegmental   atelectasis left base.   CT Head w/o Contrast    Impression    IMPRESSION:   1. No acute intracranial pathology.   2. Chronic small vessel ischemic disease and generalized cerebral  atrophy.    SUBHA MAZA MD         SYSTEM ID:  [id number]       Medical Decision Making               Much or all of the text in this note was generated through the use of Dragon dictation, voice to text software. Errors in spelling or words which appear to be out of context are unintentional and may be present due to having escaped editing.

## 2024-10-29 NOTE — PLAN OF CARE
Goal Outcome Evaluation: Progressing    3 p to 7 p, VSS on 3 to 4 L NC, may need oxy mask occasionally, anxious, obeys commands, bed bound, Q2 T and repo, no pain, regular diet, takes pills one at a time with pudding, continue to monitor.

## 2024-10-29 NOTE — CONSULTS
Care Management Discharge Note    Discharge Date: 10/29/2024       Discharge Disposition: Long Term Care    Discharge Services: None    Discharge DME: Oxygen    Discharge Transportation: agency    Private pay costs discussed: transportation costs    Does the patient's insurance plan have a 3 day qualifying hospital stay waiver?  No    PAS Confirmation Code:    Patient/family educated on Medicare website which has current facility and service quality ratings: no    Education Provided on the Discharge Plan: Yes  Persons Notified of Discharge Plans: HUC, bedside nurse, facility, guardian.   Patient/Family in Agreement with the Plan: yes    Handoff Referral Completed: No, handoff not indicated or clinically appropriate    Additional Information:  Writer was notified that patient transitioned to comfort cares and was able to be discharged today. Writer called the facility, they could take patient up until 8pm. Per chart review a stretcher ride was arranged prior. Writer called [facility] and arranged a new stretcher ride due to patient needing supervision. Stretcher ride arranged for [id number]. Facility asked to have orders faxed directly to them at [phone].    PCS was completed prior. Orders were sent to facility     ALEXANDER CAMACHO  [facility]  INPATIENT CARE COORDINATION

## 2024-10-30 NOTE — PLAN OF CARE
Goal Outcome Evaluation:         Patient discharging to TCU. Discharge instructions not done due to patient confuse. Patient took all her belongings. [facility] transporting patient via stretcher.

## 2024-10-30 NOTE — PROGRESS NOTES
Discharge    Patient discharged to LTC via stretcher with [facility] transport.  Care plan note done    Listed belongings gathered and given to patient (including from security/pharmacy). Yes  Care Plan and Patient education resolved: Yes  Prescriptions if needed, hard copies sent with patient  No  Medication Bin checked and emptied on discharge Yes  SW/care coordinator/charge RN aware of discharge: Yes

## 2025-01-06 NOTE — PROGRESS NOTES
[facility]    CHIEF COMPLAINT:   Chief Complaint   Patient presents with    Consult       HPI:  Jessica Oliver is a 43 y.o. female with a past medical history of obesity, HIRAL, prediabetes, hypertension.     She presents today for chest pain. Briefly, she presented to [facility] emergency department on 10/16/2024 for chest discomfort which occurred while at work.  Felt tightness in her left neck and started going down her arm. She describes this as a nagging central chest discomfort was constant throughout the day.      She took antacids that morning without significant improvement.  On initial presentation vital signs within normal limits.  ECG showed sinus rhythm with first-degree AV block without ST/T changes.  CMP was unremarkable.  CBC showed mild leukocytosis.  High-sensitivity troponin negative x 2.  She was treated with GI cocktail.    She confirms the above history. She initially felt her heart was racing very briefly that morning. Once that self resolved, she felt mid chest and left neck pressure which radiated to her L arm. She reports this occurs sporadically and without consistency. Usually occurs at rest and denies exertional chest pain. Does admit to exertional dyspnea. Occasional lightheadedness - sporadic, random, rest or with walking. Occasional leg swelling - intermittent.     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 Has history hypertension on amlodipine. Smoking 1/2 ppd.     Social history: Current everyday smoker.   Family history: Mother has history of MI - premature CVD. siblings without cardiac issues.     The following portions of the patient's history were reviewed and updated as appropriate: allergies, current medications, past family history, past medical history, past social history, past surgical history, and problem list.    SINCE LAST OV I REVIEWED WITH THE PATIENT THE INTERIM LABS, TEST RESULTS, CONSULTANT(S) NOTES AND PERFORMED AN  Ca is a 75 year old who is being evaluated via a billable telephone visit.      What phone number would you like to be contacted at? [phone] (home)   How would you like to obtain your AVS? Mail a copy    Assessment & Plan   Ca Yan is a 75-year-old female with history of hypertension, hyperlipidemia, vocal dysphonia following with ENT, depression/ anxiety personality cluster C, irritable bowel syndrome severe kyphosis interfering with breathing who presents today for telephone visit for follow-up of test results.  Today we reviewed her test results show her cholesterol had markedly increased and reviewed in detail her medication.  Apparently her pharmacy has not been mailing the atorvastatin 40 mg to her.  I called the pharmacy  and also gave Ca the phone number [phone]  to call to work on obtaining automated refills for her medications including Atorvastatin 40 mg for her cholesterol.  She indicated she will call the pharmacy today to set up renewal process.  I recommended she continue on losartan hydrochlorothiazide combination tablet 100/25 mg take once daily.  She will schedule a follow-up in mid June for fasting lab and blood pressure check in clinic may be same-day.  She will contact her pulmonary provider for recommendations related to oxygen.    Hyperlipidemia, unspecified hyperlipidemia type  See above Need to ensure adherence to medication through automated refill process  - atorvastatin (LIPITOR) 40 MG tablet  Dispense: 90 tablet; Refill: 3  - Lipid panel reflex to direct LDL Fasting    Essential hypertension  See above. Need to ensure adherence to medication through automated refill process  - losartan-hydrochlorothiazide (HYZAAR) 100-25 MG tablet  Dispense: 90 tablet; Refill: 3      Glottic insufficiency Dysphonia  Following with ENT    Generalized anxiety disorder  Cluster C personality disorder (H)   On Zoloft                       Return in about 6 weeks (around 6/16/2021)  INTERIM REVIEW OF HISTORY    Past Medical History:   Diagnosis Date    Abnormal Pap smear of cervix     2018 HSV 1, 9/10/18- + Trich    Adrenal mass, left (HCC) 2021    1.8 x 2.1 cm on ct 2020    Arthritis     Asthma     Blurry vision 2020    Diabetes mellitus (HCC)     Essential hypertension 2020    Headache 2020    Hidradenitis suppurativa     Hypertension     Immunization deficiency 10/02/2020    Hep a nonimmune    Insomnia 2021    Migraine     Moderate episode of recurrent major depressive disorder (HCC) 2020    Obesity (BMI 30.0-34.9) 2021    Prediabetes 2020    Seasonal allergies     Tobacco use 2021    Vitamin D deficiency 10/02/2020       Past Surgical History:   Procedure Laterality Date    APPENDECTOMY      BREAST BIOPSY Left 2017    BREAST BIOPSY Right     2 core biopsies    BREAST EXCISIONAL BIOPSY Left 2018    BREAST SURGERY Left 2018    Left breast mass excision    CERVICAL BIOPSY  W/ LOOP ELECTRODE EXCISION       SECTION      X2    COLPOSCOPY      CYST REMOVAL      FACIAL/NECK BIOPSY Right 10/03/2023    Procedure: EXCISION MASS RIGHT CHEEK;  Surgeon: Robert Bloch, MD;  Location:  MAIN OR;  Service: General    FL INJECTION RIGHT KNEE (ARTHROGRAM)  01/15/2024    HYSTERECTOMY      heavy bleeding, ovaries remain, also had abnormal pap    KNEE SURGERY      LYMPH NODE DISSECTION      under armpit    TUBAL LIGATION  2007    US GUIDED THYROID BIOPSY  2021       Social History     Socioeconomic History    Marital status: Single     Spouse name: Not on file    Number of children: Not on file    Years of education: 10    Highest education level: Not on file   Occupational History    Not on file   Tobacco Use    Smoking status: Every Day     Current packs/day: 0.50     Average packs/day: 0.5 packs/day for 20.0 years (10.0 ttl pk-yrs)     Types: Cigarettes     Passive exposure: Current    Smokeless tobacco: Never     for BP Recheck, Lab Work.    Favian Sahu MD  [facility] ODALIS Penaloza is a 75 year old who presents for the following health issues     HPI   Ca Yan is a 75-year-old female with history of hypertension, hyperlipidemia, vocal dysphonia following with ENT, depression/ anxiety personality cluster C, irritable bowel syndrome severe kyphosis interfering with breathing who presents today for telephone visit for follow-up of test results.     Lipids/hypertension  She scheduled telephone visit today as I noted her cholesterol had increased.  Today we reviewed her medications she has at home then I contacted her pharmacy and even though prescription for atorvastatin 40 mg was sent August 20 to her [place] mail specialty pharmacy this medication had not been mailed to her as they indicated she had not set up renewal process for atorvastatin.  The pharmacy confirmed that they have been mailing losartan hydrochlorothiazide 100/25 mg  to her and she has been taking the medication daily with last fill March 20, 2021 of note her blood pressure reading has been slightly elevated at last visit therefore I recommended a follow-up in person to assess her blood pressure.  She indicates she has been stressed due to her vocal dysphonia.  Pulmonary dysfunction related to Kyphosis   She has been having difficulty understanding her oxygen, orders need to be coordinated through pulmonary provider sees Dr Manuel Taylor.  Vocal dysphonia  She is working with ENT for evaluation of her vocal cord abnormality, procedure requires reschedule as she is not able to tolerate treatment with local anesthesia.          Labs reviewed in EPIC  BP Readings from Last 3 Encounters:   04/30/21 (!) 151/97   04/15/21 (!) 143/89   10/08/20 (!) 146/87    Wt Readings from Last 3 Encounters:   04/30/21 87.8 kg (193 lb 9 oz)   04/15/21 87.1 kg (192 lb)   01/19/21 85.7 kg (189 lb)                   Patient Active Problem List   Diagnosis     Non morbid obesity due to excess calories     Alcohol abuse     Malignant neoplasm of breast (H)     Osteoarthritis of knee     Diarrhea     Diastolic dysfunction     Osteoarthritis of spine     Gastroesophageal reflux disease     Generalized anxiety disorder     Hypertension     Hyperlipidemia     Insomnia     Irritable bowel syndrome     Kyphoscoliosis     Cluster C personality disorder (H)     Seborrheic eczema     Anemia     Anxiety state     Bunion     Low bone density     Fecal incontinence     Cognitive impairment     Parathyroid hormone excess (H)     Chronic fatigue     Glottic insufficiency     Dysphonia     Past Surgical History:   Procedure Laterality Date     BACK SURGERY      spinal fusion     COLONOSCOPY       LARYNGOSCOPY WITH MICROSCOPE N/A 2021    Procedure: FLEXIBLE LARYNGOSCOPY;  Surgeon: Domonique Roche MD;  Location: [facility] OR     LUMPECTOMY BREAST       ORTHOPEDIC SURGERY      Knee surgery left side       Social History     Tobacco Use     Smoking status: Former Smoker     Packs/day: 0.50     Years: 5.00     Pack years: 2.50     Types: Cigarettes     Start date: 1956     Quit date: 1980     Years since quittin.6     Smokeless tobacco: Former User     Quit date: 1980   Substance Use Topics     Alcohol use: Not Currently     Alcohol/week: 0.0 standard drinks     Comment: Sober for 2 years, previous alcoholic     Family History   Problem Relation Age of Onset     Breast Cancer Mother      Diabetes Mother      Anxiety Disorder Mother      Asthma Mother      Other Cancer Mother      Hyperlipidemia Mother      Alcohol/Drug Father      Mental Illness Father      Substance Abuse Father      Obesity Father      Cerebrovascular Disease Maternal Grandmother 67         Current Outpatient Medications   Medication Sig Dispense Refill     atorvastatin (LIPITOR) 40 MG tablet Take 1 tablet (40 mg) by mouth daily 90 tablet 3     cephALEXin (KEFLEX)  Tobacco comments:     pt is down to .5 packs a day   Vaping Use    Vaping status: Never Used   Substance and Sexual Activity    Alcohol use: Not Currently     Comment: occasional    Drug use: No    Sexual activity: Not Currently     Partners: Male     Birth control/protection: Female Sterilization     Comment: HYSTERECTOMY   Other Topics Concern    Not on file   Social History Narrative    Most recent tobacco use screenin-    Do you currently or have you served in the [employer]: No    Were you activated, into active duty, as a member of the [employer] or as a Reservist: No    Occupation: unemployed    Education: 10    Marital status: Single    Sexual orientation: Heterosexual    Exercise level: Occasional    Diet: Regular    General stress level: Low    Alcohol intake: Occasional    Caffeine intake: Moderate    Chewing tobacco: none    Illicit drugs: denies    Guns present in home: No    Seat belts used routinely: Yes    Sunscreen used routinely: No    Smoke alarm in home: Yes    Advance directive: No    Live alone or with others: with others    Are there stairs in your home: Yes    Pets: Yes    Deaf or serious difficulty hearing: No    Blind or serious difficulty seeing: Yes    Difficulty concentrating, remembering or making decisions: No    Difficulty walking or climbing stairs: No    Difficulty dressing or bathing: No    Difficulty doing errands alone: No    Passive smoke exposure: Yes    Are you currently employed: No    Asbestos exposure: No    TB exposure: No    Environmental exposure: No    Animal exposure: Yes    cat and dog    Blood Transfusion: No    Sexually active: No    Presence of domestic violence: No    Do you feel safe at home: Yes    no cpap or nebulizer     Social Drivers of Health     Financial Resource Strain: Not on file   Food Insecurity: Not on file   Transportation Needs: Not on file   Physical Activity: Not on file   Stress: Not on file   Social Connections: Not on  250 MG capsule Take 250 mg by mouth       ibuprofen (ADVIL,MOTRIN) 200 MG tablet Take 3 tablets (600 mg) by mouth 3 times daily (with meals) (Patient not taking: Reported on 4/15/2021) 90 tablet 0     latanoprost (XALATAN) 0.005 % ophthalmic solution 1 drop       losartan-hydrochlorothiazide (HYZAAR) 100-25 MG tablet Take 1 tablet by mouth daily 90 tablet 3     quetiapine (SEROQUEL) 200 MG tablet Take 200 mg by mouth At Bedtime.       sertraline (ZOLOFT) 100 MG tablet Take 1.5 tablets (150 mg) by mouth daily 45 tablet 3     Vitamin D3 (CHOLECALCIFEROL) 25 mcg (1000 units) tablet Take 1 tablet (25 mcg) by mouth daily 100 tablet 3     Allergies   Allergen Reactions     Azithromycin Itching     Codeine Nausea and Vomiting     Hydrocodone Nausea and Vomiting     Metronidazole Nausea     Oxycodone Itching and Rash     Percocet [Oxycodone-Acetaminophen] Nausea and Vomiting     Pollen Extract      sneezing and runny nose.      Seasonal Allergies      sneezing and runny nose.      Vicodin [Hydrocodone-Acetaminophen] Nausea and Vomiting     Zolpidem      Amnestic behavior     Recent Labs   Lab Test 04/22/21  1145 10/08/20  1035 08/20/20  1202 01/21/20  0920 10/11/19  1520 04/21/16  1443 04/21/16  1443   A1C  --   --   --   --   --   --  5.8   *  --   --  100*  --   --   --    HDL 90  --   --  89  --   --   --    TRIG 87  --   --  104  --   --   --    ALT 14  --  15  --  17   < >  --    CR 0.92  --  0.81 0.81 1.00   < >  --    GFRESTIMATED 60* 61 70 71 55*   < >  --    GFRESTBLACK 70 74 81 82 64   < >  --    POTASSIUM 4.1  --  3.9 4.1 3.9   < >  --    TSH 2.04  --  2.39  --   --    < >  --     < > = values in this interval not displayed.      Review of Systems         Objective           Vitals:  No vitals were obtained today due to virtual visit.    Physical Exam   Alert, interactive, able to read directions on medication bottles, chronic dysphonia  PSYCH: Alert and oriented times 3; coherent speech, dysphonia, able  file   Intimate Partner Violence: Not on file   Housing Stability: Not on file       Family History   Problem Relation Age of Onset    Diabetes Mother     Heart attack Mother     Heart disease Mother         Internal Defibrilation    No Known Problems Father     Endometrial cancer Sister 28    Colon cancer Sister 35    No Known Problems Daughter     Diabetes Maternal Grandmother     Diabetes Paternal Grandmother     No Known Problems Son     No Known Problems Son     Lupus Maternal Aunt 48    Seizures Maternal Aunt     Leukemia Maternal Uncle 18    Diabetes Paternal Aunt     No Known Problems Paternal Aunt     Breast cancer Neg Hx        No Known Allergies    Current Outpatient Medications   Medication Sig Dispense Refill    albuterol (PROVENTIL HFA,VENTOLIN HFA) 90 mcg/act inhaler INHALE 2 PUFFS BY MOUTH EVERY 6 HOURS AS NEEDED FOR WHEEZING 18 g 1    amLODIPine (NORVASC) 5 mg tablet TAKE 1 TABLET (5 MG TOTAL) BY MOUTH DAILY BLOOD PRESUURE 90 tablet 1    buPROPion (WELLBUTRIN XL) 300 mg 24 hr tablet Take 1 tablet (300 mg total) by mouth every morning 90 tablet 1    Cholecalciferol (Vitamin D3) 125 MCG (5000 UT) CAPS TAKE 1 CAPSULE BY MOUTH EVERY DAY 90 capsule 1    [employer] Vitamin C 500 MG tablet TAKE 1 TABLET (500 MG TOTAL) BY MOUTH DAILY. 90 tablet 3    cyanocobalamin (VITAMIN B-12) 2000 MCG tablet Take 1 tablet (2,000 mcg total) by mouth daily 90 tablet 2    gabapentin (Neurontin) 100 mg capsule Take 1 capsule (100 mg total) by mouth daily at bedtime for 3 days, THEN 2 capsules (200 mg total) daily at bedtime for 3 days, THEN 3 capsules (300 mg total) daily at bedtime. 99 capsule 0    ibuprofen (MOTRIN) 600 mg tablet Take 1 tablet (600 mg total) by mouth every 6 (six) hours as needed for moderate pain 30 tablet 0    lidocaine (XYLOCAINE) 5 % ointment Apply topically 3 (three) times a day as needed for moderate pain (vulvar pain) 50 g 0    loratadine (CLARITIN) 10 mg tablet TAKE 1 TABLET (10 MG TOTAL) BY MOUTH DAILY  "FOR ALLERGIES 90 tablet 1    metFORMIN (GLUCOPHAGE-XR) 500 mg 24 hr tablet TAKE 2 TABLETS BY MOUTH EVERY DAY WITH BREAKFAST 180 tablet 1    multivitamin (THERAGRAN) TABS Take 1 tablet by mouth daily      spironolactone (ALDACTONE) 25 mg tablet TAKE 1 TABLET BY MOUTH EVERY DAY 90 tablet 1    tiotropium (SPIRIVA) 18 mcg inhalation capsule Place 18 mcg into inhaler and inhale daily      Euflexxa 20 MG/2ML SOSY  (Patient not taking: Reported on 1/6/2025)      methocarbamol (ROBAXIN) 500 mg tablet Take 1 tablet (500 mg total) by mouth 3 (three) times a day for 7 days (Patient not taking: Reported on 12/23/2024) 21 tablet 0    methylPREDNISolone 4 MG tablet therapy pack Use as directed on package (Patient not taking: Reported on 1/6/2025) 21 each 0    Zepbound 2.5 MG/0.5ML auto-injector Inject 0.5 mL (2.5 mg total) under the skin once a week (Patient not taking: Reported on 1/6/2025) 2 mL 0     No current facility-administered medications for this visit.       /82 (BP Location: Left arm, Patient Position: Sitting, Cuff Size: Standard)   Pulse 101   Temp 98.1 °F (36.7 °C) (Tympanic)   Ht 5' 4\" (1.626 m)   Wt 93.4 kg (206 lb)   SpO2 98%   BMI 35.36 kg/m²     Review of Systems   All other systems reviewed and are negative.      Physical Exam  Vitals reviewed.   Constitutional:       General: She is not in acute distress.     Appearance: Normal appearance. She is well-developed. She is obese. She is not toxic-appearing.   HENT:      Head: Normocephalic and atraumatic.   Eyes:      General: No scleral icterus.     Conjunctiva/sclera: Conjunctivae normal.   Cardiovascular:      Rate and Rhythm: Normal rate and regular rhythm.      Heart sounds: Normal heart sounds. No murmur heard.     No gallop.   Pulmonary:      Effort: Pulmonary effort is normal. No respiratory distress.      Breath sounds: Normal breath sounds. No wheezing or rales.   Abdominal:      General: Bowel sounds are normal. There is no distension.      " to articulate logical thoughts, able to abstract reason, no tangential thoughts, no hallucinations   or delusions  Her affect is detailed, asking clarifying questions  RESP: No cough, no audible wheezing, able to talk in full sentences, chronic dysphonia  Remainder of exam unable to be completed due to telephone visits      Results for AMAURI WOOD (MRN [id number]) as of 5/6/2021 09:26   Ref. Range 4/22/2021 11:45 4/27/2021 11:07   Sodium Latest Ref Range: 133 - 144 mmol/L 138    Potassium Latest Ref Range: 3.4 - 5.3 mmol/L 4.1    Chloride Latest Ref Range: 94 - 109 mmol/L 103    Carbon Dioxide Latest Ref Range: 20 - 32 mmol/L 32    Urea Nitrogen Latest Ref Range: 7 - 30 mg/dL 26    Creatinine Latest Ref Range: 0.52 - 1.04 mg/dL 0.92    GFR Estimate Latest Ref Range: >60 mL/min/1.73_m2 60 (L)    GFR Estimate If Black Latest Ref Range: >60 mL/min/1.73_m2 70    Calcium Latest Ref Range: 8.5 - 10.1 mg/dL 8.9    Anion Gap Latest Ref Range: 3 - 14 mmol/L 2 (L)    Albumin Latest Ref Range: 3.4 - 5.0 g/dL 3.6    Protein Total Latest Ref Range: 6.8 - 8.8 g/dL 7.8    Bilirubin Total Latest Ref Range: 0.2 - 1.3 mg/dL 0.5    Alkaline Phosphatase Latest Ref Range: 40 - 150 U/L 104    ALT Latest Ref Range: 0 - 50 U/L 14    AST Latest Ref Range: 0 - 45 U/L 17    Cholesterol Latest Ref Range: <200 mg/dL 289 (H)    HDL Cholesterol Latest Ref Range: >49 mg/dL 90    LDL Cholesterol Calculated Latest Ref Range: <100 mg/dL 182 (H)    Non HDL Cholesterol Latest Ref Range: <130 mg/dL 200 (H)    Triglycerides Latest Ref Range: <150 mg/dL 87    TSH Latest Ref Range: 0.40 - 4.00 mU/L 2.04    Glucose Latest Ref Range: 70 - 99 mg/dL 86    WBC Latest Ref Range: 4.0 - 11.0 10e9/L 5.6    Hemoglobin Latest Ref Range: 11.7 - 15.7 g/dL 13.3    Hematocrit Latest Ref Range: 35.0 - 47.0 % 42.1    Platelet Count Latest Ref Range: 150 - 450 10e9/L 223    RBC Count Latest Ref Range: 3.8 - 5.2 10e12/L 4.25    MCV Latest Ref Range: 78 - 100 fl 99     MCH Latest Ref Range: 26.5 - 33.0 pg 31.3    MCHC Latest Ref Range: 31.5 - 36.5 g/dL 31.6    RDW Latest Ref Range: 10.0 - 15.0 % 14.9    COVID-19 Virus PCR to [facility] - Source Unknown  Nasopharyngeal   COVID-19 Virus PCR to [facility] - Result Unknown  Test received-See reflex to [facility]DL test SARS CoV2 (COVID-19) Virus RT-PCR   SARS-CoV-2 Virus Specimen Source Unknown  Nasopharyngeal   SARS-CoV-2 PCR Result Unknown  NEGATIVE   Hepatitis C Antibody Latest Ref Range: NR^Nonreactive  Nonreactive    The 10-year ASCVD risk score (Sohan ESPINOZA Jr., et al., 2013) is: 35.4%    Values used to calculate the score:      Age: 75 years      Sex: Female      Is Non- : No      Diabetic: No      Tobacco smoker: No      Systolic Blood Pressure: 151 mmHg      Is BP treated: Yes      HDL Cholesterol: 90 mg/dL      Total Cholesterol: 289 mg/dL          Phone call duration:9:30-  9:51 AM 21 minutes   Palpations: Abdomen is soft.      Tenderness: There is no abdominal tenderness.   Musculoskeletal:      Right lower leg: No edema.      Left lower leg: No edema.   Skin:     General: Skin is warm and dry.      Capillary Refill: Capillary refill takes less than 2 seconds.      Coloration: Skin is not jaundiced.   Neurological:      Mental Status: She is alert.   Psychiatric:         Mood and Affect: Mood normal.          Lab Results   Component Value Date    K 4.1 11/19/2024     11/19/2024    CO2 28 11/19/2024    BUN 9 11/19/2024    CREATININE 0.77 11/19/2024    CALCIUM 9.1 11/19/2024    ALT 9 11/19/2024    AST 12 (L) 11/19/2024       Lab Results   Component Value Date    HDL 45 (L) 11/19/2024    LDLCALC 81 11/19/2024    TRIG 133 11/19/2024       Lab Results   Component Value Date    WBC 13.11 (H) 11/19/2024    HGB 14.2 11/19/2024    HCT 43.8 11/19/2024     11/19/2024       Lab Results   Component Value Date     11/19/2024    HGBA1C 6.4 (H) 11/19/2024       ASSESSMENT AND PLAN:  Jessica was seen today for consult.    Diagnoses and all orders for this visit:    Chest pain: 43-year-old female with a past medical history pertinent for hypertension, prediabetes, and current everyday smoker who presents for chest pain.  She has atypical chest discomfort symptoms.  She denies any exertional chest discomfort.  Family history is pertinent for mother with premature coronary artery disease.  Discussed possible treatment of her cardiovascular risk factors.  Her blood pressure is at goal.  Smoking cessation counseling was performed today.  Symptoms could be related to coronary disease or coronary vasospasm related to cigarette smoking.  No signs/symptoms of congestive heart failure.  No loud murmur appreciated on exam.  Recommend transthoracic echocardiogram to assess for structural heart disease.  Will also check an exercise stress test to assess for obstructive coronary disease/ischemia.  -     Ambulatory  referral to Cardiology  -     Stress test only, exercise; Future  -     Echo complete w/ contrast if indicated; Future    Essential hypertension: Her blood pressure is currently well-controlled and at goal.  She will continue her current regimen of amlodipine 5 mg daily.  She is also taking spironolactone 25 mg daily.  - Recommend low-sodium diet, exercise, and weight loss  - Treatment of obstructive sleep apnea is recommended  -     Echo complete w/ contrast if indicated; Future    HIRAL (obstructive sleep apnea): Discussed with patient that treatment of her moderate obstructive sleep apnea is recommended and she will follow up on this.     Tobacco use: Smoking cessation counseling performed today.          Fredy García MD

## 2025-01-21 NOTE — PROGRESS NOTES
Attempted to call RN to RN hand off To [place]. [phone] no answer after 5 minutes. Attempted to leave message with  and Director of Nursing, [phone], but their mailboxes not allowing messages.   36.7

## (undated) DEVICE — ENDO TOOTH QUICK GUARD CONFORMING PROTECTION

## (undated) DEVICE — CONNECTOR OMNI-FLEX 3222

## (undated) DEVICE — LINEN TOWEL PACK X5 5464

## (undated) DEVICE — SUCTION MANIFOLD NEPTUNE 2 SYS 4 PORT 0702-020-000

## (undated) DEVICE — NDL 25GA 1.5" 305127

## (undated) DEVICE — SYR PISTON URETHRAL 60ML 68000

## (undated) DEVICE — NDL 27GA 1.25" 305136

## (undated) DEVICE — Device

## (undated) DEVICE — IMP VOCAL CORD PROLARYN GEL INJ 1ML 8602MOK5: Type: IMPLANTABLE DEVICE | Site: THROAT | Status: NON-FUNCTIONAL

## (undated) DEVICE — SOL WATER IRRIG 1000ML BOTTLE 2F7114

## (undated) DEVICE — SOL NACL 0.9% IRRIG 1000ML BOTTLE 2F7124

## (undated) DEVICE — PACK ENT ENDOSCOPY UMMC

## (undated) RX ORDER — LIDOCAINE HYDROCHLORIDE 20 MG/ML
SOLUTION OROPHARYNGEAL
Status: DISPENSED
Start: 2021-03-25

## (undated) RX ORDER — CEFAZOLIN SODIUM 2 G/100ML
INJECTION, SOLUTION INTRAVENOUS
Status: DISPENSED
Start: 2021-04-30

## (undated) RX ORDER — ACETAMINOPHEN 325 MG/1
TABLET ORAL
Status: DISPENSED
Start: 2021-04-30

## (undated) RX ORDER — LIDOCAINE HYDROCHLORIDE 20 MG/ML
SOLUTION OROPHARYNGEAL
Status: DISPENSED
Start: 2020-08-06

## (undated) RX ORDER — GABAPENTIN 100 MG/1
CAPSULE ORAL
Status: DISPENSED
Start: 2021-04-30

## (undated) RX ORDER — FENTANYL CITRATE 50 UG/ML
INJECTION, SOLUTION INTRAMUSCULAR; INTRAVENOUS
Status: DISPENSED
Start: 2021-04-30